# Patient Record
Sex: FEMALE | Race: WHITE | NOT HISPANIC OR LATINO | Employment: OTHER | URBAN - METROPOLITAN AREA
[De-identification: names, ages, dates, MRNs, and addresses within clinical notes are randomized per-mention and may not be internally consistent; named-entity substitution may affect disease eponyms.]

---

## 2017-01-13 ENCOUNTER — CLINICAL SUPPORT (OUTPATIENT)
Dept: LAB | Facility: HOSPITAL | Age: 67
End: 2017-01-13
Attending: ORTHOPAEDIC SURGERY
Payer: MEDICARE

## 2017-01-13 ENCOUNTER — HOSPITAL ENCOUNTER (OUTPATIENT)
Dept: RADIOLOGY | Facility: HOSPITAL | Age: 67
Discharge: HOME | End: 2017-01-13
Attending: ORTHOPAEDIC SURGERY
Payer: MEDICARE

## 2017-01-13 DIAGNOSIS — Z01.818 PREOP EXAMINATION: ICD-10-CM

## 2017-01-13 PROCEDURE — 71020 XR CHEST PA AND LATERAL: CPT | Mod: TC

## 2017-01-13 PROCEDURE — 93005 ELECTROCARDIOGRAM TRACING: CPT

## 2017-01-13 PROCEDURE — 71020 XR CHEST PA AND LATERAL: CPT | Mod: 26,,, | Performed by: RADIOLOGY

## 2017-01-24 ENCOUNTER — ANESTHESIA EVENT (OUTPATIENT)
Dept: SURGERY | Facility: HOSPITAL | Age: 67
DRG: 470 | End: 2017-01-24
Payer: MEDICARE

## 2017-01-24 ENCOUNTER — CLINICAL SUPPORT (OUTPATIENT)
Dept: ELECTROPHYSIOLOGY | Facility: CLINIC | Age: 67
End: 2017-01-24
Payer: MEDICARE

## 2017-01-24 DIAGNOSIS — I63.9 CRYPTOGENIC STROKE: ICD-10-CM

## 2017-01-24 DIAGNOSIS — Z95.818 PRESENCE OF CARDIAC DEVICE: ICD-10-CM

## 2017-01-24 PROCEDURE — 93299 LOOP RECORDER REMOTE: CPT | Mod: S$GLB,,, | Performed by: INTERNAL MEDICINE

## 2017-01-24 PROCEDURE — 93297 REM INTERROG DEV EVAL ICPMS: CPT | Mod: S$GLB,,, | Performed by: INTERNAL MEDICINE

## 2017-01-25 ENCOUNTER — OFFICE VISIT (OUTPATIENT)
Dept: CARDIOLOGY | Facility: CLINIC | Age: 67
End: 2017-01-25
Payer: MEDICARE

## 2017-01-25 ENCOUNTER — HOSPITAL ENCOUNTER (OUTPATIENT)
Dept: PREADMISSION TESTING | Facility: HOSPITAL | Age: 67
Discharge: HOME | End: 2017-01-25
Attending: RADIOLOGY
Payer: MEDICARE

## 2017-01-25 ENCOUNTER — OFFICE VISIT (OUTPATIENT)
Dept: FAMILY MEDICINE | Facility: HOSPITAL | Age: 67
End: 2017-01-25
Attending: FAMILY MEDICINE
Payer: MEDICARE

## 2017-01-25 VITALS
DIASTOLIC BLOOD PRESSURE: 82 MMHG | BODY MASS INDEX: 38.76 KG/M2 | SYSTOLIC BLOOD PRESSURE: 142 MMHG | HEART RATE: 62 BPM | OXYGEN SATURATION: 98 % | HEIGHT: 66 IN | WEIGHT: 241.19 LBS

## 2017-01-25 VITALS
SYSTOLIC BLOOD PRESSURE: 130 MMHG | BODY MASS INDEX: 38.92 KG/M2 | RESPIRATION RATE: 18 BRPM | HEIGHT: 66 IN | HEART RATE: 66 BPM | WEIGHT: 242.19 LBS | DIASTOLIC BLOOD PRESSURE: 63 MMHG | OXYGEN SATURATION: 95 %

## 2017-01-25 VITALS
BODY MASS INDEX: 38.69 KG/M2 | HEART RATE: 64 BPM | DIASTOLIC BLOOD PRESSURE: 78 MMHG | SYSTOLIC BLOOD PRESSURE: 139 MMHG | HEIGHT: 66 IN | WEIGHT: 240.75 LBS

## 2017-01-25 DIAGNOSIS — I10 ESSENTIAL HYPERTENSION: Chronic | ICD-10-CM

## 2017-01-25 DIAGNOSIS — I63.9 CEREBRAL INFARCTION, UNSPECIFIED MECHANISM: ICD-10-CM

## 2017-01-25 DIAGNOSIS — Z01.818 PRE-OP EVALUATION: Primary | ICD-10-CM

## 2017-01-25 DIAGNOSIS — G47.33 OSA (OBSTRUCTIVE SLEEP APNEA): ICD-10-CM

## 2017-01-25 DIAGNOSIS — E78.2 MIXED HYPERLIPIDEMIA: Chronic | ICD-10-CM

## 2017-01-25 DIAGNOSIS — Z01.818 PRE-OP EVALUATION: ICD-10-CM

## 2017-01-25 PROCEDURE — 1125F AMNT PAIN NOTED PAIN PRSNT: CPT | Mod: S$GLB,,, | Performed by: INTERNAL MEDICINE

## 2017-01-25 PROCEDURE — 3074F SYST BP LT 130 MM HG: CPT | Mod: S$GLB,,, | Performed by: INTERNAL MEDICINE

## 2017-01-25 PROCEDURE — 1157F ADVNC CARE PLAN IN RCRD: CPT | Mod: S$GLB,,, | Performed by: INTERNAL MEDICINE

## 2017-01-25 PROCEDURE — 1160F RVW MEDS BY RX/DR IN RCRD: CPT | Mod: S$GLB,,, | Performed by: INTERNAL MEDICINE

## 2017-01-25 PROCEDURE — 99999 PR PBB SHADOW E&M-EST. PATIENT-LVL III: CPT | Mod: PBBFAC,,, | Performed by: INTERNAL MEDICINE

## 2017-01-25 PROCEDURE — 1159F MED LIST DOCD IN RCRD: CPT | Mod: S$GLB,,, | Performed by: INTERNAL MEDICINE

## 2017-01-25 PROCEDURE — 3079F DIAST BP 80-89 MM HG: CPT | Mod: S$GLB,,, | Performed by: INTERNAL MEDICINE

## 2017-01-25 PROCEDURE — 99499 UNLISTED E&M SERVICE: CPT | Mod: S$GLB,,, | Performed by: INTERNAL MEDICINE

## 2017-01-25 PROCEDURE — 99204 OFFICE O/P NEW MOD 45 MIN: CPT | Mod: S$GLB,,, | Performed by: INTERNAL MEDICINE

## 2017-01-25 RX ORDER — SODIUM CHLORIDE, SODIUM LACTATE, POTASSIUM CHLORIDE, CALCIUM CHLORIDE 600; 310; 30; 20 MG/100ML; MG/100ML; MG/100ML; MG/100ML
INJECTION, SOLUTION INTRAVENOUS CONTINUOUS
Status: CANCELLED | OUTPATIENT
Start: 2017-01-25

## 2017-01-25 RX ORDER — LIDOCAINE HYDROCHLORIDE 10 MG/ML
1 INJECTION, SOLUTION EPIDURAL; INFILTRATION; INTRACAUDAL; PERINEURAL ONCE
Status: CANCELLED | OUTPATIENT
Start: 2017-01-25 | End: 2017-01-25

## 2017-01-25 RX ORDER — CALCIUM CARBONATE 600 MG
600 TABLET ORAL DAILY
COMMUNITY
End: 2023-02-07

## 2017-01-25 NOTE — PROGRESS NOTES
Subjective:       Patient ID: Tracy Armendariz is a 66 y.o. female.    Chief Complaint: Pre-op Exam (R. knee replacement)    HPI Comments: Pt presents for pre op evaluation for right knee arthroplasty by Dr Ventura. Pt is scheduled for a February 6th 2017 surgery. Pt states that she can climb a flight of stairs but is limited by knee pain and will get short of breath afterwards. She is able to wash dishes and do other chores around the house without shortness of breath. She does endorse doing very limited physical activity on a daily basis. She has a history of 20+ pack years smoking but quit 10-15 years ago. She has never had any complications with general anesthesia.    Review of Systems   Constitutional: Negative for chills and fever.   HENT: Negative for sore throat.    Eyes: Negative for visual disturbance.   Respiratory: Negative for shortness of breath.    Cardiovascular: Negative for chest pain.   Gastrointestinal: Negative for abdominal pain.   Endocrine: Negative for polyuria.   Genitourinary: Negative for dysuria.   Musculoskeletal: Negative for back pain.   Skin: Negative for color change.   Neurological: Negative for headaches.   Psychiatric/Behavioral: Negative for agitation and confusion.       Objective:      Vitals:    01/25/17 0959   BP: 139/78   Pulse: 64     Physical Exam   Constitutional: She is oriented to person, place, and time. She appears well-developed and well-nourished.   HENT:   Head: Normocephalic and atraumatic.   Eyes: EOM are normal. Pupils are equal, round, and reactive to light.   Neck: Normal range of motion. Neck supple.   Cardiovascular: Normal rate, regular rhythm, normal heart sounds and intact distal pulses.    Pulmonary/Chest: Effort normal and breath sounds normal.   Abdominal: Soft. She exhibits no distension.   Musculoskeletal: Normal range of motion.   Neurological: She is alert and oriented to person, place, and time.   Skin: Skin is warm and dry.   Psychiatric: She has  a normal mood and affect. Her behavior is normal. Judgment and thought content normal.   Nursing note and vitals reviewed.      Assessment:       1. Pre-op evaluation        Plan:       Pre-op evaluation  - pt is able to do greater than 4 METs on a daily basis, which includes washing dishes, standing, and sitting  - pt is on appropriate medications including aspirin, plavix, statin after her CVA  - pt history atrial enlargement but never diagnosed with atrial fibrillation after undergoing holter monitoring  - in surgical procedure with intermediate bleeding risk would continue aspirin and plavix but will defer to cardiologist  - pt has seen Dr Higginbotham who is cardiologist a [facility], will defer cardiac clearance to her cardiologist  - labs, EKG, and CXR reviewed, once evaluated by her cardiologist patient is medically optimized for surgery    Jefferson Melo MD  PGY-2   11:19 AM

## 2017-01-25 NOTE — PROGRESS NOTES
Subjective:   Patient ID:  Tracy Armendariz is a 66 y.o. female who presents for evaluation of Pre-op Exam      HPI: 67 y/o female with PMH of HTN, HLD and CVA present to establish care and to be evaluated before right knee surgery. She is on plavix secondary to CVA in . She was suspected of having cryptogenic CVA so ILR was placed and Atrial fib has been seen since implant. No history of CAD or stent placement. No chest pain or SOB currently. BP is controlled. No CHF. No known tachyarrhythmias or severe obstructive valve disease. She can walk 2-3 blocks despite knee pain. Recent echo showed normal ef with no DD. No valve pathology. She has treadmill at home and can walk for 30 minutes.     She has MARTIN and wear CPAP nightly.    Past Medical History   Diagnosis Date    Anticoagulant long-term use     Arthritis     CVA (cerebral infarction)     Depression     Diverticulosis of colon     Extrinsic asthma, unspecified     Hyperlipidemia     Hypertension     Left atrial enlargement 2014    Low back pain     MARTIN (obstructive sleep apnea)     Osteopenia     PUD (peptic ulcer disease)     Stroke  2013       Past Surgical History   Procedure Laterality Date    Inner ear surgery       replaced ear drum    Oophorectomy      Cholecystectomy       laparoscopic     section  1977    Dilation and curettage of uterus  1972    Appendectomy       @ time of hysterectomy    Hysterectomy       TAHUSO with appendectomy    Tympanoplasty      Lumbar discectomy       L4-L5    Knee arthroscopy w/ debridement      Tonsillectomy      Back surgery      Cataract extraction         Social History   Substance Use Topics    Smoking status: Former Smoker     Quit date: 1995    Smokeless tobacco: Never Used    Alcohol use 0.6 oz/week     1 Glasses of wine per week      Comment: social       Family History   Problem Relation Age of Onset    Cervical cancer Mother      Cancer Mother 65     lung cancer - non smoker    Stroke Paternal Grandfather     Hypertension Maternal Grandfather     Heart disease Maternal Grandfather     Hypertension Father     Coronary artery disease Father 62    Heart disease Father     Diabetes Sister     Heart disease Sister     Kidney disease Sister     Breast cancer Daughter 36    Heart failure Sister      60s    Colon cancer Neg Hx     Ovarian cancer Neg Hx        Patient's Medications   New Prescriptions    No medications on file   Previous Medications    ALBUTEROL (PROAIR HFA) 90 MCG/ACTUATION INHALER    Inhale 2 puffs into the lungs every 6 (six) hours as needed. As needed for wheezing    ASPIRIN 81 MG CHEW    Take 81 mg by mouth once daily.    ATORVASTATIN (LIPITOR) 10 MG TABLET    TAKE 1 TABLET(10 MG) BY MOUTH EVERY DAY    CALCIUM CARBONATE (OS-SPENCER) 600 MG (1,500 MG) TAB    Take 600 mg by mouth 2 (two) times daily with meals.    CHOLECALCIFEROL, VITAMIN D3, 1,000 UNIT CHEW    Take by mouth.    CLOPIDOGREL (PLAVIX) 75 MG TABLET    TAKE ONE TABLET BY MOUTH EVERY DAY    CLOTRIMAZOLE-BETAMETHASONE 1-0.05% (LOTRISONE) CREAM    Apply twice daily    ESCITALOPRAM OXALATE (LEXAPRO) 10 MG TABLET    TAKE 1 TABLET BY MOUTH EVERY DAY    LOSARTAN (COZAAR) 100 MG TABLET    Take 1 tablet (100 mg total) by mouth once daily.    MULTIVIT WITH CALCIUM,IRON,MIN (WOMEN'S DAILY MULTIVITAMIN ORAL)    Take by mouth.    MUPIROCIN (BACTROBAN) 2 % OINTMENT       Modified Medications    No medications on file   Discontinued Medications    No medications on file        Review of patient's allergies indicates:   Allergen Reactions    Iodinated contrast media - iv dye Hives    Pcn [penicillins] Rash       Review of Systems   Constitution: Negative.   HENT: Negative.    Eyes: Negative.    Cardiovascular: Negative.    Respiratory: Negative.    Endocrine: Negative.    Hematologic/Lymphatic: Negative.    Skin: Negative.    Musculoskeletal: Negative.    Gastrointestinal:  Negative.    Neurological: Negative.      Objective:   Physical Exam   Constitutional: She is oriented to person, place, and time. She appears well-developed and well-nourished. No distress.   Examination of the digits showed no clubbing or cyanosis   HENT:   Head: Normocephalic and atraumatic.   Eyes: Conjunctivae are normal. Pupils are equal, round, and reactive to light. Right eye exhibits no discharge.   Neck: Normal range of motion. Neck supple. No JVD present. No thyromegaly present.   No carotid bruits   Cardiovascular: Normal rate, regular rhythm, S1 normal, S2 normal, normal heart sounds, intact distal pulses and normal pulses.  PMI is not displaced.  Exam reveals no gallop, no friction rub and no opening snap.    No murmur heard.  Pulmonary/Chest: Effort normal and breath sounds normal. No respiratory distress. She has no wheezes. She has no rales. She exhibits no tenderness.   Abdominal: Soft. Bowel sounds are normal. She exhibits no distension and no mass. There is no tenderness. There is no guarding.   No hepatosplenomegaly   Musculoskeletal: Normal range of motion. She exhibits no edema or tenderness.   Lymphadenopathy:     She has no cervical adenopathy.   Neurological: She is alert and oriented to person, place, and time.   Skin: Skin is warm. No rash noted. She is not diaphoretic. No erythema.   Psychiatric: She has a normal mood and affect.   Nursing note and vitals reviewed.      Lab Results   Component Value Date    WBC 9.98 01/13/2017    HGB 14.1 01/13/2017    HCT 42.5 01/13/2017    MCV 92 01/13/2017     01/13/2017         Chemistry        Component Value Date/Time     01/13/2017 1418    K 4.0 01/13/2017 1418     01/13/2017 1418    CO2 26 01/13/2017 1418    BUN 13 01/13/2017 1418    CREATININE 0.7 01/13/2017 1418     01/13/2017 1418        Component Value Date/Time    CALCIUM 10.0 01/13/2017 1418    ALKPHOS 73 01/13/2017 1418    AST 17 01/13/2017 1418    ALT 16  01/13/2017 1418    BILITOT 0.4 01/13/2017 1418            Lab Results   Component Value Date    CHOL 166 03/14/2016    CHOL 204 (H) 12/01/2015    CHOL 167 09/23/2014     Lab Results   Component Value Date    HDL 81 (H) 03/14/2016    HDL 79 (H) 12/01/2015    HDL 85 (H) 09/23/2014     Lab Results   Component Value Date    LDLCALC 64.8 03/14/2016    LDLCALC 96.8 12/01/2015    LDLCALC 65.0 09/23/2014     Lab Results   Component Value Date    TRIG 101 03/14/2016    TRIG 141 12/01/2015    TRIG 85 09/23/2014     Lab Results   Component Value Date    CHOLHDL 48.8 03/14/2016    CHOLHDL 38.7 12/01/2015    CHOLHDL 50.9 (H) 09/23/2014       Lab Results   Component Value Date    TSH 1.795 03/22/2016       Lab Results   Component Value Date    HGBA1C 6.1 01/13/2017       ECGs reviewed-NSR  LABS reviewed  Imaging including Echoes reviewed    Assessment:     1. Pre-op evaluation    2. Cerebral infarction, unspecified mechanism    3. Essential hypertension    4. Mixed hyperlipidemia    5. MARTIN (obstructive sleep apnea)        Plan:     Patient is low to moderate risk for intermediate risk surgery to have right knee replaced. No contraindications to surgery such as recent MI, CHF, Tachyarrhythmias, or obstructive valve disease. METs>4. Stop plavix 5 days before surgery. Restart day after surgery.     Continue current medications.   Activity as tolerate  F/u in 6 months        Clinic time spent with this patient is 40 minutes.

## 2017-01-25 NOTE — MR AVS SNAPSHOT
[facility]  [address]  [facility] [address]  Phone: [phone]                  Tracy Armendariz   2017 3:20 PM   Office Visit    Description:  Female : 1950   Provider:  Eloy Medrano MD   Department:  [facility]           Reason for Visit     Pre-op Exam           Diagnoses this Visit        Comments    Pre-op evaluation    -  Primary     Cerebral infarction, unspecified mechanism         Essential hypertension         Mixed hyperlipidemia         MARTIN (obstructive sleep apnea)                To Do List           Future Appointments        Provider Department Dept Phone    2017 3:20 PM Eloy Medrano MD [facility] [phone]    2017 1:00 PM Betsy Nunez, PT [facility] [phone]    2017 3:00 PM Betsy Nunez, PT [facility] [phone]    2017 2:00 PM Betsy Nunez, PT [facility] [phone]    2017 1:00 PM Betsy Nunez, PT [facility] [phone]      Your Future Surgeries/Procedures     2017   Surgery with Michael Treviño MD   [facility]    [address]  [facility] [address]   [phone]            2017   Surgery with John Kim MD   [facility]    [address]  [place] [address]   [phone]              Goals (5 Years of Data)     None      [facility]     [facility] Nurse Care Line -  Assistance  Registered nurses in the [facility] provide clinical advisement, health education, appointment booking, and other advisory services.  Call for this free service at [phone].             Medications           Message regarding Medications     Verify the changes and/or additions to your medication regime listed below are the same as discussed with your clinician today.   "If any of these changes or additions are incorrect, please notify your healthcare provider.             Verify that the below list of medications is an accurate representation of the medications you are currently taking.  If none reported, the list may be blank. If incorrect, please contact your healthcare provider. Carry this list with you in case of emergency.           Current Medications     calcium carbonate (OS-SPENCER) 600 mg (1,500 mg) Tab Take 600 mg by mouth 2 (two) times daily with meals.    albuterol (PROAIR HFA) 90 mcg/actuation inhaler Inhale 2 puffs into the lungs every 6 (six) hours as needed. As needed for wheezing    aspirin 81 MG Chew Take 81 mg by mouth once daily.    atorvastatin (LIPITOR) 10 MG tablet TAKE 1 TABLET(10 MG) BY MOUTH EVERY DAY    cholecalciferol, vitamin D3, 1,000 unit Chew Take by mouth.    clopidogrel (PLAVIX) 75 mg tablet TAKE ONE TABLET BY MOUTH EVERY DAY    clotrimazole-betamethasone 1-0.05% (LOTRISONE) cream Apply twice daily    escitalopram oxalate (LEXAPRO) 10 MG tablet TAKE 1 TABLET BY MOUTH EVERY DAY    losartan (COZAAR) 100 MG tablet Take 1 tablet (100 mg total) by mouth once daily.    MULTIVIT WITH CALCIUM,IRON,MIN (WOMEN'S DAILY MULTIVITAMIN ORAL) Take by mouth.    mupirocin (BACTROBAN) 2 % ointment            Clinical Reference Information           Vital Signs - Last Recorded  Most recent update: 1/25/2017  2:27 PM by Adriane M Toussaint, LPN    BP Pulse Ht Wt SpO2 BMI    (!) 142/82 62 5' 6" (1.676 m) 109.4 kg (241 lb 3.2 oz) 98% 38.93 kg/m2      Blood Pressure          Most Recent Value    BP  (!)  142/82      Allergies as of 1/25/2017     Iodinated Contrast Media - Iv Dye    Pcn [Penicillins]      Immunizations Administered on Date of Encounter - 1/25/2017     None      "

## 2017-01-25 NOTE — DISCHARGE INSTRUCTIONS
· Arrive on  2/6/2017  at  5:30 am.  · Report to the 2nd floor Procedural Check In Room .   · Nothing to eat or drink after midnight the night before your procedure.  · Take the Losartan medications the morning of surgery with a small sip of water.                                                         · Please remove all body piercings and leave all your jewelery and valuables at home .  · Please remove your contact lenses.   · Wear loose comfortable clothing.  · You will not be able to drive that day, please make arrangement for transportation to and from your procedure.  · You cannot be alone for 24 hours after anesthesia. Make arrangements as needed.  · Shower twice a day for  3 days before your procedure and the morning of your procedure with Hibiclens antibacterial solution.  · No lotions, creams or powders  · Gargle twice daily with Gold Listerine 3 days prior to surgery  · Stop Aspirin, Ibuprofen, Advil, Motrin, Aleve, Nuprin, Naprosyn (all NSAID Medication) or any other blood thinners 5 days before your procedure unless directed by your physician.  Also hold all over the counter vitamins and herbal supplements for 5 days prior to your procedure.  · You may take Tylenol or Acetaminophen up the day of surgery for any pain.        Call [phone] with any questions.        Pre-Op Bathing Instructions    Before surgery, you can play an important role in your own health.    Because skin is not sterile, we need to be sure that your skin is as free of germs as possible. By following the instructions below, you can reduce the number of germs on your skin before surgery.    IMPORTANT: You will need to shower with a special soap called Hibiclens*, available at any pharmacy, over the counter usually in the first aid isle.  If you are allergic to Chlorhexidine (the antiseptic in Hibiclens), use an antibacterial soap such as Dial Soap for your preoperative showers.  You will shower with Hibiclens twice a day for  three days prior to surgery. Both the night before your surgery and the morning of your surgery see steps 2 and 3.   Do not use Hibiclens on the head, face or genitals to avoid injury to those areas.    STEP #1  1.    Three (3) days prior to surgery, shower twice a day with Hibiclens solution      STEP #2: THE NIGHT BEFORE YOUR SURGERY     1. Do not shave the area of your body where your surgery will be performed.  2. Wash your hair as usual with your normal  Shampoo. Wash body shoulder to toes with Hibiclens.  3. Squeeze Hibiclens into hand apply to surgical site.   4. Wash the site gently for five (5) minutes. Do not scrub your skin too hard.   5. Do not wash with your regular soap after Hibiclens is used.  6. Rinse your body thoroughly.  7. Pat yourself dry with a clean, soft towel.  8. Do not use lotion, cream, or powder.  9. Wear clean clothes.    STEP #3: THE MORNING OF YOUR SURGERY     1. Repeat Step #2.    * Not to be used by people allergic to Chlorhexidine.          Anesthesia: Regional Anesthesia  Youre scheduled for surgery. During surgery, youll receive medication called anesthesia to keep you comfortable and pain-free. Your surgeon has decided that youll receive regional anesthesia. This sheet tells you what to expect with this type of anesthesia.  What is regional anesthesia?  Regional anesthesia numbs one region of your body. The anesthesia may be given around nerves or into veins in your arms, neck, or legs (nerve block or [place] block). Or it may be sent into the spinal fluid (spinal anesthesia) or into the space just outside the spinal fluid (epidural anesthesia). You may also be given sedatives to help you relax.  Nerve block or Jam block  A small area of the body, such as an arm or leg, can be numbed using a nerve block or [place] block.  · Nerve block. During a nerve block, your skin is numbed. A needle is then inserted near nerves that serve the area to be numbed. Anesthetic is sent through the  needle.  · IV regional or Jam block. For this type of block, an IV line is put into a vein. The blood flow to the area to be numbed is blocked for a short time. Anesthetic is sent through the IV.  Spinal anesthesia  Spinal anesthesia numbs your body from about the waist down.  · Anesthetic is injected into the spinal fluid. This is a substance that surrounds the spinal cord in your spinal column. The anesthetic blocks pain traveling from the body to the brain.  · To receive the anesthetic, your skin is numbed at the injection site on your back.  · A needle is then inserted into the spinal space. Anesthetic is sent into the spinal fluid through the needle.    Epidural anesthesia  Epidural anesthesia is most commonly used during childbirth and may also be used after surgical procedures of the chest, abdomen, and legs.  · Anesthetic is injected into the epidural space. This is just outside the dural sac which contains the spinal fluid.  · To receive the anesthetic, your skin is numbed at the injection site on your back.  · A needle is then inserted into the epidural space. Anesthetic is sent into the epidural space through the needle.  · A small flexible catheter may be attached to the needle and left in place. This allows for continuous injections or infusions of anesthetic.  Anesthesia tools and medications that might be near you during your procedure  · Local anesthetic.  This medication is given through a needle numbs one region of your body.  · Electrocardiography leads (electrodes). These are used to record your heart rate and rhythm.  · Blood pressure cuff. A cuff is placed on your arm to keep track of your blood pressure.  · Pulse oximeter. This small clip is placed on the end of the finger. It measures your blood oxygen level.  · Sedatives. These medications may be given through an IV. They help to relax you and keep you comfortable. You may stay awake or sleep lightly.  · Oxygen. You may be given oxygen  through a facemask.  Risks and possible complications  Regional anesthesia carries some risks. These include:  · Nausea and vomiting  · Headache  · Backache  · Decreased blood pressure  · Allergic reaction to the anesthetic  · Ongoing numbness (rare)  · Irregular heartbeat (rare)  · Cardiac arrest (rare)   © 1926-1737 [employer]. [address] [place] [address]. All rights reserved. This information is not intended as a substitute for professional medical care. Always follow your healthcare professional's instructions.            Total Knee Replacement  During total knee replacement surgery, your damaged knee joint is replaced with an artificial joint, called a prosthesis. This surgery almost always reduces joint pain and improves your quality of life.     The parts of the prosthesis are secured to the bones of the knee. Together they form the new joint.   Before your surgery  You will most likely arrive at the hospital on the morning of the surgery. Be sure to follow all of your doctors instructions on preparing for surgery:  · Stop eating or drinking 10 hours before surgery.  · If you take a daily medicine, ask if you should still take it the morning of surgery.  · At the hospital, your temperature, pulse, breathing, and blood pressure will be checked.  · An IV (intravenous) line will be started to provide fluids and medicines needed during surgery.  The surgical procedure  When the surgical team is ready, youll be taken to the operating room. There youll be given anesthesia to help you sleep through surgery, or to make you numb from the waist down. Then an incision is made on the front or side of your knee. Any damaged bone is cleaned away, and the new joint components are put into place. The incision is closed with surgical staples or stitches.  After your surgery  After surgery, youll be sent to the recovery room. When you are fully awake, youll be moved to your room. The nurses  will give you medicines to ease your pain. You may have a catheter (small tube) in your bladder. A continuous passive motion machine may be used on your knee to keep it from getting stiff. A sequential compression machine may be used to prevent blood clots by gently squeezing then releasing your leg. You may be given medicine to prevent blood clots. Soon, healthcare providers will help you get up and moving.  When to call your doctor  Once at home, call your doctor if you have any of the symptoms below:  · An increase in knee pain  · Pain or swelling in the calf or leg  · Unusual redness, heat, or drainage at the incision site  · Fever of 100.4°F (38°C) or higher  · Shaking chills  · Trouble breathing or chest pain (call 911)   © 8626-2019 [employer]. [address] [place] [address], [place] [address]. All rights reserved. This information is not intended as a substitute for professional medical care. Always follow your healthcare professional's instructions.

## 2017-01-25 NOTE — ANESTHESIA PREPROCEDURE EVALUATION
"                                                                                                             2017  Tracy Armendariz is a 66 y.o., female is scheduled for right TKA under spinal/reg/MAC/gen on 2017.    Cardiology note 2017 (Dr. Medrano):  "Patient is low to moderate risk for intermediate risk surgery to have right knee replaced. No contraindications to surgery such as recent MI, CHF, Tachyarrhythmias, or obstructive valve disease. METs>4. Stop plavix 5 days before surgery. Restart day after surgery."       Past Surgical History   Procedure Laterality Date    Inner ear surgery       replaced ear drum    Oophorectomy      Cholecystectomy       laparoscopic     section      Dilation and curettage of uterus      Appendectomy       @ time of hysterectomy    Hysterectomy       TAHUSO with appendectomy    Tympanoplasty      Lumbar discectomy       L4-L5    Knee arthroscopy w/ debridement      Tonsillectomy      Back surgery      Cataract extraction           [facility] Anesthesia Evaluation    I have reviewed the Patient Summary Reports.    I have reviewed the Nursing Notes.   I have reviewed the Medications.   Steroids Taken In Past Year: Cortisone    Review of Systems  Anesthesia Hx:  No problems with previous Anesthesia  History of prior surgery of interest to airway management or planning: Previous anesthesia: General, MAC  Denies Personal Hx of Anesthesia complications.   Social:  No Alcohol Use, Former Smoker  Tobacco Use: Former smoker of cigarette, quit smoking >10 years ago, Smoking Cessation discussion.   Hematology/Oncology:        Hematology Comments: Pt on plavix and ASA; pt will see cardiology for recommendations   EENT/Dental:EENT/Dental Normal   Cardiovascular:   Hypertension, well controlled Denies Dysrhythmias.   Denies Angina. hyperlipidemia CARD  Functional Capacity 3.5 METS    Pulmonary:   Asthma mild Shortness of breath Sleep Apnea  "   Hepatic/GI:   PUD, GERD, well controlled    Musculoskeletal:   Arthritis  Right knee pain   Neurological:   CVA (visual disturbance left eye), residual symptoms  CVA - Cerebrovasular Accident, Thrombotic Stroke , Most recent CVA was on 12/2013 , has had 1 stroke , residual deficits are residual deficit.    Endocrine:  Endocrine Normal    Psych:   depression          Physical Exam  General:  Obesity    Airway/Jaw/Neck:  Airway Findings: Mouth Opening: Normal Tongue: Normal  General Airway Assessment: Adult  Mallampati: II  TM Distance: Normal, at least 6 cm  Jaw/Neck Findings:  Neck ROM: Extension Decreased, Mild     Eyes/Ears/Nose:  EYES/EARS/NOSE FINDINGS: Normal   Dental:  Dental Findings: Periodontal disease, Severe   Chest/Lungs:  Chest/Lungs Clear    Heart/Vascular:  Heart Findings: Normal Heart murmur: negative    Abdomen:  Abdomen Findings: Normal    Musculoskeletal:  Musculoskeletal Findings: (right knee) Tender Joint, Swollen Joint     Mental Status:  Mental Status Findings: Normal        2D Echo w/CFD 3/2016  CONCLUSIONS     1 - Normal left ventricular systolic function (EF 55-60%).     2 - Normal left ventricular diastolic function.     3 - Normal right ventricular systolic function .     4 - Concentric remodeling.     5 - Mild left atrial enlargement.     6 - The estimated PA systolic pressure is greater than 15 mmHg.   negative Bubble study  This document has been electronically    SIGNED BY: Eloy Medrano MD On: 03/31/2016 12:35    Anesthesia Plan  Type of Anesthesia, risks & benefits discussed:  Anesthesia Type:  spinal, regional, MAC  Patient's Preference:   Intra-op Monitoring Plan:   Intra-op Monitoring Plan Comments:   Post Op Pain Control Plan:   Post Op Pain Control Plan Comments:   Induction:   IV  Beta Blocker:  Patient is not currently on a Beta-Blocker (No further documentation required).       Informed Consent: Patient understands risks and agrees with Anesthesia plan.  Questions  answered.   ASA Score: 3     Day of Surgery Review of History & Physical:        Anesthesia Plan Notes: Anesthesia consent will be obtained prior to procedure on 2/06/2017.    Cardiology note 1/25/2017 (Dr. Medrano) in [facility]        Ready For Surgery From Anesthesia Perspective.

## 2017-01-25 NOTE — PLAN OF CARE
· Arrive on  2/6/2017  at  5:30 am.  · Report to the 2nd floor Procedural Check In Room .   · Nothing to eat or drink after midnight the night before your procedure.  · Take the Losartan medications the morning of surgery with a small sip of water.                                                         · Please remove all body piercings and leave all your jewelery and valuables at home .  · Please remove your contact lenses.   · Wear loose comfortable clothing.  · You will not be able to drive that day, please make arrangement for transportation to and from your procedure.  · You cannot be alone for 24 hours after anesthesia. Make arrangements as needed.  · Shower twice a day for  3 days before your procedure and the morning of your procedure with Hibiclens antibacterial solution.  · No lotions, creams or powders  · Gargle twice daily with Gold Listerine 3 days prior to surgery  · Stop Aspirin, Ibuprofen, Advil, Motrin, Aleve, Nuprin, Naprosyn (all NSAID Medication) or any other blood thinners 5 days before your procedure unless directed by your physician.  Also hold all over the counter vitamins and herbal supplements for 5 days prior to your procedure.  · You may take Tylenol or Acetaminophen up the day of surgery for any pain.        Call [phone] with any questions.        Pre-Op Bathing Instructions    Before surgery, you can play an important role in your own health.    Because skin is not sterile, we need to be sure that your skin is as free of germs as possible. By following the instructions below, you can reduce the number of germs on your skin before surgery.    IMPORTANT: You will need to shower with a special soap called Hibiclens*, available at any pharmacy, over the counter usually in the first aid isle.  If you are allergic to Chlorhexidine (the antiseptic in Hibiclens), use an antibacterial soap such as Dial Soap for your preoperative showers.  You will shower with Hibiclens twice a day for  three days prior to surgery. Both the night before your surgery and the morning of your surgery see steps 2 and 3.   Do not use Hibiclens on the head, face or genitals to avoid injury to those areas.    STEP #1  1.    Three (3) days prior to surgery, shower twice a day with Hibiclens solution      STEP #2: THE NIGHT BEFORE YOUR SURGERY     1. Do not shave the area of your body where your surgery will be performed.  2. Wash your hair as usual with your normal  Shampoo. Wash body shoulder to toes with Hibiclens.  3. Squeeze Hibiclens into hand apply to surgical site.   4. Wash the site gently for five (5) minutes. Do not scrub your skin too hard.   5. Do not wash with your regular soap after Hibiclens is used.  6. Rinse your body thoroughly.  7. Pat yourself dry with a clean, soft towel.  8. Do not use lotion, cream, or powder.  9. Wear clean clothes.    STEP #3: THE MORNING OF YOUR SURGERY     1. Repeat Step #2.    * Not to be used by people allergic to Chlorhexidine.          Anesthesia: Regional Anesthesia  Youre scheduled for surgery. During surgery, youll receive medication called anesthesia to keep you comfortable and pain-free. Your surgeon has decided that youll receive regional anesthesia. This sheet tells you what to expect with this type of anesthesia.  What is regional anesthesia?  Regional anesthesia numbs one region of your body. The anesthesia may be given around nerves or into veins in your arms, neck, or legs (nerve block or Marbury block). Or it may be sent into the spinal fluid (spinal anesthesia) or into the space just outside the spinal fluid (epidural anesthesia). You may also be given sedatives to help you relax.  Nerve block or Jam block  A small area of the body, such as an arm or leg, can be numbed using a nerve block or Marbury block.  · Nerve block. During a nerve block, your skin is numbed. A needle is then inserted near nerves that serve the area to be numbed. Anesthetic is sent through the  needle.  · IV regional or Jam block. For this type of block, an IV line is put into a vein. The blood flow to the area to be numbed is blocked for a short time. Anesthetic is sent through the IV.  Spinal anesthesia  Spinal anesthesia numbs your body from about the waist down.  · Anesthetic is injected into the spinal fluid. This is a substance that surrounds the spinal cord in your spinal column. The anesthetic blocks pain traveling from the body to the brain.  · To receive the anesthetic, your skin is numbed at the injection site on your back.  · A needle is then inserted into the spinal space. Anesthetic is sent into the spinal fluid through the needle.    Epidural anesthesia  Epidural anesthesia is most commonly used during childbirth and may also be used after surgical procedures of the chest, abdomen, and legs.  · Anesthetic is injected into the epidural space. This is just outside the dural sac which contains the spinal fluid.  · To receive the anesthetic, your skin is numbed at the injection site on your back.  · A needle is then inserted into the epidural space. Anesthetic is sent into the epidural space through the needle.  · A small flexible catheter may be attached to the needle and left in place. This allows for continuous injections or infusions of anesthetic.  Anesthesia tools and medications that might be near you during your procedure  · Local anesthetic.  This medication is given through a needle numbs one region of your body.  · Electrocardiography leads (electrodes). These are used to record your heart rate and rhythm.  · Blood pressure cuff. A cuff is placed on your arm to keep track of your blood pressure.  · Pulse oximeter. This small clip is placed on the end of the finger. It measures your blood oxygen level.  · Sedatives. These medications may be given through an IV. They help to relax you and keep you comfortable. You may stay awake or sleep lightly.  · Oxygen. You may be given oxygen  through a facemask.  Risks and possible complications  Regional anesthesia carries some risks. These include:  · Nausea and vomiting  · Headache  · Backache  · Decreased blood pressure  · Allergic reaction to the anesthetic  · Ongoing numbness (rare)  · Irregular heartbeat (rare)  · Cardiac arrest (rare)   © 8254-2549 [employer]. [address] [place] [address]. All rights reserved. This information is not intended as a substitute for professional medical care. Always follow your healthcare professional's instructions.            Total Knee Replacement  During total knee replacement surgery, your damaged knee joint is replaced with an artificial joint, called a prosthesis. This surgery almost always reduces joint pain and improves your quality of life.     The parts of the prosthesis are secured to the bones of the knee. Together they form the new joint.   Before your surgery  You will most likely arrive at the hospital on the morning of the surgery. Be sure to follow all of your doctors instructions on preparing for surgery:  · Stop eating or drinking 10 hours before surgery.  · If you take a daily medicine, ask if you should still take it the morning of surgery.  · At the hospital, your temperature, pulse, breathing, and blood pressure will be checked.  · An IV (intravenous) line will be started to provide fluids and medicines needed during surgery.  The surgical procedure  When the surgical team is ready, youll be taken to the operating room. There youll be given anesthesia to help you sleep through surgery, or to make you numb from the waist down. Then an incision is made on the front or side of your knee. Any damaged bone is cleaned away, and the new joint components are put into place. The incision is closed with surgical staples or stitches.  After your surgery  After surgery, youll be sent to the recovery room. When you are fully awake, youll be moved to your room. The nurses  will give you medicines to ease your pain. You may have a catheter (small tube) in your bladder. A continuous passive motion machine may be used on your knee to keep it from getting stiff. A sequential compression machine may be used to prevent blood clots by gently squeezing then releasing your leg. You may be given medicine to prevent blood clots. Soon, healthcare providers will help you get up and moving.  When to call your doctor  Once at home, call your doctor if you have any of the symptoms below:  · An increase in knee pain  · Pain or swelling in the calf or leg  · Unusual redness, heat, or drainage at the incision site  · Fever of 100.4°F (38°C) or higher  · Shaking chills  · Trouble breathing or chest pain (call 911)   © 0151-9616 [employer]. [address] [place] [address], [place] [address]. All rights reserved. This information is not intended as a substitute for professional medical care. Always follow your healthcare professional's instructions.

## 2017-01-25 NOTE — IP AVS SNAPSHOT
[facility]  [address]  [place] [address]  Phone: [phone]           Patient Discharge Instructions    Our goal is to set you up for success. This packet includes information on your condition, medications, and your home care. It will help you to care for yourself so you don't get sicker.     Please ask your nurse if you have any questions.        There are many details to remember when preparing for your surgery. Here is what you will need to do, please ask your nurse if there are more specific instructions and if you have any questions:    1. 24 hours before procedure Do not smoke or drink alcoholic beverages 24 hours prior to your procedure    2. Eating before procedure Do not eat or drink anything 8 hours before your procedure - this includes gum, mints, and candy.     3. Day of procedure Please remove all jewelry for the procedure. If you wear contact lenses, dentures, hearing aids or glasses, bring a container to put them in during your surgery and give to a family member for safekeeping.  If your doctor has scheduled you for an overnight stay, bring a small overnight bag with any personal items that you need.    4. After procedure Make arrangements in advance for transportation home by a responsible adult. It is not safe to drive a vehicle during the 24 hours following surgery.     PLEASE NOTE: You may be contacted the day before your surgery to confirm your surgery date and arrival time. The Surgery schedule has many variables which may affect the time of your surgery case. Family members should be available if your surgery time changes.                [facility]  Unless otherwise directed by your provider, please contact [facility], our nurse care line that is available for 24/7 assistance.     [phone] (toll-free)    Registered nurses in the [facility] provide clinical advisement, health education, appointment booking, and other advisory services.                     ** Verify the list of medication(s) below is accurate and up to date. Carry this with you in case of emergency. If your medications have changed, please notify your healthcare provider.             Medication List      TAKE these medications        Additional Info                      albuterol 90 mcg/actuation inhaler   Commonly known as:  PROAIR HFA   Quantity:  8.5 g   Refills:  3   Dose:  2 puff    Instructions:  Inhale 2 puffs into the lungs every 6 (six) hours as needed. As needed for wheezing     Begin Date    AM    Noon    PM    Bedtime       aspirin 81 MG Chew   Refills:  0   Dose:  81 mg    Instructions:  Take 81 mg by mouth once daily.     Begin Date    AM    Noon    PM    Bedtime       atorvastatin 10 MG tablet   Commonly known as:  LIPITOR   Quantity:  90 tablet   Refills:  11   Comments:  **Patient requests 90 days supply**    Instructions:  TAKE 1 TABLET(10 MG) BY MOUTH EVERY DAY     Begin Date    AM    Noon    PM    Bedtime       calcium carbonate 600 mg (1,500 mg) Tab   Commonly known as:  OS-SPENCER   Refills:  0   Dose:  600 mg    Instructions:  Take 600 mg by mouth 2 (two) times daily with meals.     Begin Date    AM    Noon    PM    Bedtime       cholecalciferol (vitamin D3) 1,000 unit Chew   Refills:  0    Instructions:  Take by mouth.     Begin Date    AM    Noon    PM    Bedtime       clopidogrel 75 mg tablet   Commonly known as:  PLAVIX   Quantity:  90 tablet   Refills:  3    Instructions:  TAKE ONE TABLET BY MOUTH EVERY DAY     Begin Date    AM    Noon    PM    Bedtime       clotrimazole-betamethasone 1-0.05% cream   Commonly known as:  LOTRISONE   Quantity:  45 g   Refills:  6    Instructions:  Apply twice daily     Begin Date    AM    Noon    PM    Bedtime       escitalopram oxalate 10 MG tablet   Commonly known as:  LEXAPRO   Quantity:  90 tablet   Refills:  3    Instructions:  TAKE 1 TABLET BY MOUTH EVERY DAY     Begin Date    AM    Noon    PM    Bedtime       losartan 100  MG tablet   Commonly known as:  COZAAR   Quantity:  90 tablet   Refills:  11   Dose:  100 mg    Instructions:  Take 1 tablet (100 mg total) by mouth once daily.     Begin Date    AM    Noon    PM    Bedtime       mupirocin 2 % ointment   Commonly known as:  BACTROBAN   Refills:  0      Begin Date    AM    Noon    PM    Bedtime       WOMEN'S DAILY MULTIVITAMIN ORAL   Refills:  0    Instructions:  Take by mouth.     Begin Date    AM    Noon    PM    Bedtime                  Please bring to all follow up appointments:    1. A copy of your discharge instructions.  2. All medicines you are currently taking in their original bottles.  3. Identification and insurance card.    Please arrive 15 minutes ahead of scheduled appointment time.    Please call 24 hours in advance if you must reschedule your appointment and/or time.        Your Scheduled Appointments     Feb 07, 2017  1:00 PM CST   New Physical Therapy Patient with Betsy Nunez, PT   [facility]    [address]  [place] [address]   [phone]            Feb 08, 2017  3:00 PM CST   Established Physical Therapy with Betsy Nunez, PT   [facility]    [address]  [place] [address]   [phone]            Feb 13, 2017  2:00 PM CST   Established Physical Therapy with Betsy Nunez, PT   [facility]    [address]  [place] [address]   [phone]            Feb 14, 2017  1:00 PM CST   Established Physical Therapy with Betsy Nunez, PT   [facility]    [address]  [place] [address]   [phone]            Feb 16, 2017  1:00 PM CST   Established Physical Therapy with Betsy Nunez, PT   [facility]    [address]  [place] [address]   [phone]              Your Future  Surgeries/Procedures     Feb 02, 2017   Surgery with Michael Treviño MD   [facility]    [address] [place] [address] [phone]            Feb 06, 2017   Surgery with John Kim MD   [facility]    [address]  [place] [id number] [phone]                  Discharge Instructions       · Arrive on  2/6/2017  at  5:30 am.  · Report to the 2nd floor Procedural Check In Room .   · Nothing to eat or drink after midnight the night before your procedure.  · Take the Losartan medications the morning of surgery with a small sip of water.                                                         · Please remove all body piercings and leave all your jewelery and valuables at home .  · Please remove your contact lenses.   · Wear loose comfortable clothing.  · You will not be able to drive that day, please make arrangement for transportation to and from your procedure.  · You cannot be alone for 24 hours after anesthesia. Make arrangements as needed.  · Shower twice a day for  3 days before your procedure and the morning of your procedure with Hibiclens antibacterial solution.  · No lotions, creams or powders  · Gargle twice daily with Gold Listerine 3 days prior to surgery  · Stop Aspirin, Ibuprofen, Advil, Motrin, Aleve, Nuprin, Naprosyn (all NSAID Medication) or any other blood thinners 5 days before your procedure unless directed by your physician.  Also hold all over the counter vitamins and herbal supplements for 5 days prior to your procedure.  · You may take Tylenol or Acetaminophen up the day of surgery for any pain.        Call [phone] with any questions.        Pre-Op Bathing Instructions    Before surgery, you can play an important role in your own health.    Because skin is not sterile, we need to be sure that your skin is as free of germs as possible. By following the instructions below, you can  reduce the number of germs on your skin before surgery.    IMPORTANT: You will need to shower with a special soap called Hibiclens*, available at any pharmacy, over the counter usually in the first aid isle.  If you are allergic to Chlorhexidine (the antiseptic in Hibiclens), use an antibacterial soap such as Dial Soap for your preoperative showers.  You will shower with Hibiclens twice a day for three days prior to surgery. Both the night before your surgery and the morning of your surgery see steps 2 and 3.   Do not use Hibiclens on the head, face or genitals to avoid injury to those areas.    STEP #1  1.    Three (3) days prior to surgery, shower twice a day with Hibiclens solution      STEP #2: THE NIGHT BEFORE YOUR SURGERY     1. Do not shave the area of your body where your surgery will be performed.  2. Wash your hair as usual with your normal  Shampoo. Wash body shoulder to toes with Hibiclens.  3. Squeeze Hibiclens into hand apply to surgical site.   4. Wash the site gently for five (5) minutes. Do not scrub your skin too hard.   5. Do not wash with your regular soap after Hibiclens is used.  6. Rinse your body thoroughly.  7. Pat yourself dry with a clean, soft towel.  8. Do not use lotion, cream, or powder.  9. Wear clean clothes.    STEP #3: THE MORNING OF YOUR SURGERY     1. Repeat Step #2.    * Not to be used by people allergic to Chlorhexidine.          Anesthesia: Regional Anesthesia  Youre scheduled for surgery. During surgery, youll receive medication called anesthesia to keep you comfortable and pain-free. Your surgeon has decided that youll receive regional anesthesia. This sheet tells you what to expect with this type of anesthesia.  What is regional anesthesia?  Regional anesthesia numbs one region of your body. The anesthesia may be given around nerves or into veins in your arms, neck, or legs (nerve block or Jam block). Or it may be sent into the spinal fluid (spinal anesthesia) or into  the space just outside the spinal fluid (epidural anesthesia). You may also be given sedatives to help you relax.  Nerve block or [place] block  A small area of the body, such as an arm or leg, can be numbed using a nerve block or [place] block.  · Nerve block. During a nerve block, your skin is numbed. A needle is then inserted near nerves that serve the area to be numbed. Anesthetic is sent through the needle.  · IV regional or Jam block. For this type of block, an IV line is put into a vein. The blood flow to the area to be numbed is blocked for a short time. Anesthetic is sent through the IV.  Spinal anesthesia  Spinal anesthesia numbs your body from about the waist down.  · Anesthetic is injected into the spinal fluid. This is a substance that surrounds the spinal cord in your spinal column. The anesthetic blocks pain traveling from the body to the brain.  · To receive the anesthetic, your skin is numbed at the injection site on your back.  · A needle is then inserted into the spinal space. Anesthetic is sent into the spinal fluid through the needle.    Epidural anesthesia  Epidural anesthesia is most commonly used during childbirth and may also be used after surgical procedures of the chest, abdomen, and legs.  · Anesthetic is injected into the epidural space. This is just outside the dural sac which contains the spinal fluid.  · To receive the anesthetic, your skin is numbed at the injection site on your back.  · A needle is then inserted into the epidural space. Anesthetic is sent into the epidural space through the needle.  · A small flexible catheter may be attached to the needle and left in place. This allows for continuous injections or infusions of anesthetic.  Anesthesia tools and medications that might be near you during your procedure  · Local anesthetic.  This medication is given through a needle numbs one region of your body.  · Electrocardiography leads (electrodes). These are used to record your heart  rate and rhythm.  · Blood pressure cuff. A cuff is placed on your arm to keep track of your blood pressure.  · Pulse oximeter. This small clip is placed on the end of the finger. It measures your blood oxygen level.  · Sedatives. These medications may be given through an IV. They help to relax you and keep you comfortable. You may stay awake or sleep lightly.  · Oxygen. You may be given oxygen through a facemask.  Risks and possible complications  Regional anesthesia carries some risks. These include:  · Nausea and vomiting  · Headache  · Backache  · Decreased blood pressure  · Allergic reaction to the anesthetic  · Ongoing numbness (rare)  · Irregular heartbeat (rare)  · Cardiac arrest (rare)   © 3226-2551 [employer]. [address] [place] [address], [place] [address]. All rights reserved. This information is not intended as a substitute for professional medical care. Always follow your healthcare professional's instructions.            Total Knee Replacement  During total knee replacement surgery, your damaged knee joint is replaced with an artificial joint, called a prosthesis. This surgery almost always reduces joint pain and improves your quality of life.     The parts of the prosthesis are secured to the bones of the knee. Together they form the new joint.   Before your surgery  You will most likely arrive at the hospital on the morning of the surgery. Be sure to follow all of your doctors instructions on preparing for surgery:  · Stop eating or drinking 10 hours before surgery.  · If you take a daily medicine, ask if you should still take it the morning of surgery.  · At the hospital, your temperature, pulse, breathing, and blood pressure will be checked.  · An IV (intravenous) line will be started to provide fluids and medicines needed during surgery.  The surgical procedure  When the surgical team is ready, youll be taken to the operating room. There youll be given anesthesia to help you sleep  "through surgery, or to make you numb from the waist down. Then an incision is made on the front or side of your knee. Any damaged bone is cleaned away, and the new joint components are put into place. The incision is closed with surgical staples or stitches.  After your surgery  After surgery, youll be sent to the recovery room. When you are fully awake, youll be moved to your room. The nurses will give you medicines to ease your pain. You may have a catheter (small tube) in your bladder. A continuous passive motion machine may be used on your knee to keep it from getting stiff. A sequential compression machine may be used to prevent blood clots by gently squeezing then releasing your leg. You may be given medicine to prevent blood clots. Soon, healthcare providers will help you get up and moving.  When to call your doctor  Once at home, call your doctor if you have any of the symptoms below:  · An increase in knee pain  · Pain or swelling in the calf or leg  · Unusual redness, heat, or drainage at the incision site  · Fever of 100.4°F (38°C) or higher  · Shaking chills  · Trouble breathing or chest pain (call 911)   © 6985-2393 [employer]. [address] [place] [address]. All rights reserved. This information is not intended as a substitute for professional medical care. Always follow your healthcare professional's instructions.            Admission Information     Date & Time Provider Department CSN    1/25/2017  1:00 PM Michael Treviño MD [facility] [id number]      Care Providers     Provider Role Specialty Primary office phone    Michael Treviño MD Attending Provider Radiology [phone]      Your Vitals Were     BP Pulse Resp Height Weight SpO2    130/63 66 18 5' 6" (1.676 m) 109.8 kg (242 lb 2.8 oz) 95%    BMI                39.09 kg/m2          Recent Lab Values        6/13/2008 5/4/2011 5/12/2012 12/19/2013 4/4/2014 12/1/2015 3/14/2016 1/13/2017      9:07 AM  " 4:42 PM  8:18 AM  9:30 PM 10:38 AM  8:10 AM 12:28 PM  2:18 PM    A1C 5.8 6.0 5.8 6.1 6.1 5.9 5.9 6.1    Comment for A1C at  2:18 PM on 1/13/2017:  According to ADA guidelines, hemoglobin A1C <7.0% represents  optimal control in non-pregnant diabetic patients.  Different  metrics may apply to specific populations.   Standards of Medical Care in Diabetes - 2016.  For the purpose of screening for the presence of diabetes:  <5.7%     Consistent with the absence of diabetes  5.7-6.4%  Consistent with increasing risk for diabetes   (prediabetes)  >or=6.5%  Consistent with diabetes  Currently no consensus exists for use of hemoglobin A1C  for diagnosis of diabetes for children.        Allergies as of 1/25/2017        Reactions    Iodinated Contrast Media - Iv Dye Hives    Pcn [Penicillins] Rash      Advance Directives     An advance directive is a document which, in the event you are no longer able to make decisions for yourself, tells your healthcare team what kind of treatment you do or do not want to receive, or who you would like to make those decisions for you.  If you do not currently have an advance directive, [facility] encourages you to create one.  For more information call:  [phone] ([phone]), [phone] ([phone]),  or log on to [email or web address].        Smoking Cessation     If you would like to quit smoking:   You may be eligible for free services if you are a [place] resident and started smoking cigarettes before September 1, 1988.  Call the Smoking Cessation Trust (SCT) toll free at (358) 623-7132 or (077) 701-2100.   Call 1-800-QUIT-NOW if you do not meet the above criteria.            Language Assistance Services     ATTENTION: Language assistance services are available, free of charge. Please call [phone].      ATENCIÓN: Si habla español, tiene a wheeler disposición servicios gratuitos de asistencia lingüística. Llame al [phone].     CHÚ Ý: N?u b?n nói Ti?ng Vi?t, có các  d?ch v? h? tr? ngôn ng? mi?n phí dành cho b?n. G?i s? [phone].        Stroke Education              Pneumonmia Discharge Instructions                 [facility] complies with applicable Federal civil rights laws and does not discriminate on the basis of race, color, national origin, age, disability, or sex.

## 2017-01-25 NOTE — LETTER
January 25, 2017        John Kim MD  [address]  [address]  [facility] [address]             [facility]  [address]  [facility] [address]  Phone: [phone]   Patient: Tracy Armendariz   MR Number: [id number]   YOB: 1950   Date of Visit: 1/25/2017       Dear Dr. Kim:    Thank you for referring Tracy Armendariz to me for evaluation. Attached you will find relevant portions of my assessment and plan of care.    ECGs reviewed-NSR  LABS reviewed  Imaging including Echoes reviewed     Assessment:      1. Pre-op evaluation    2. Cerebral infarction, unspecified mechanism    3. Essential hypertension    4. Mixed hyperlipidemia    5. MARTIN (obstructive sleep apnea)          Plan:      Patient is low to moderate risk for intermediate risk surgery to have right knee replaced. No contraindications to surgery such as recent MI, CHF, Tachyarrhythmias, or obstructive valve disease. METs>4. Stop asa and plavix 5 days before surgery. Restart day after surgery.      Continue current medications.   Activity as tolerate  F/u in 6 months    If you have questions, please do not hesitate to call me. I look forward to following Tracy Armendariz along with you.    Sincerely,      Eloy Medrano MD            CC  No Recipients    Enclosure

## 2017-01-26 NOTE — PROGRESS NOTES
I assume primary medical responsibility for this patient, I have reviewed the case history, findings, diagnosis and treatment plan with the resident and agree that the care is reasonable and necessary. This service has been performed by a resident without the presence of a teaching physician under the primary care exception  Mary Bateman  1/26/2017

## 2017-02-02 ENCOUNTER — SURGERY (OUTPATIENT)
Age: 67
End: 2017-02-02

## 2017-02-02 ENCOUNTER — LAB VISIT (OUTPATIENT)
Dept: LAB | Facility: HOSPITAL | Age: 67
End: 2017-02-02
Attending: ORTHOPAEDIC SURGERY
Payer: MEDICARE

## 2017-02-02 DIAGNOSIS — M17.10 PRIMARY LOCALIZED OSTEOARTHROSIS, LOWER LEG: ICD-10-CM

## 2017-02-02 DIAGNOSIS — Z01.818 PRE-OPERATIVE CLEARANCE: ICD-10-CM

## 2017-02-02 PROBLEM — M17.11 OSTEOARTHRITIS OF RIGHT KNEE: Status: ACTIVE | Noted: 2017-02-02

## 2017-02-02 LAB
ABO + RH BLD: NORMAL
BLD GP AB SCN CELLS X3 SERPL QL: NORMAL

## 2017-02-02 PROCEDURE — 86900 BLOOD TYPING SEROLOGIC ABO: CPT

## 2017-02-02 PROCEDURE — 86901 BLOOD TYPING SEROLOGIC RH(D): CPT

## 2017-02-02 PROCEDURE — 36415 COLL VENOUS BLD VENIPUNCTURE: CPT

## 2017-02-06 ENCOUNTER — SURGERY (OUTPATIENT)
Age: 67
End: 2017-02-06

## 2017-02-06 ENCOUNTER — ANESTHESIA (OUTPATIENT)
Dept: SURGERY | Facility: HOSPITAL | Age: 67
DRG: 470 | End: 2017-02-06
Payer: MEDICARE

## 2017-02-06 PROBLEM — M17.11 ARTHRITIS OF RIGHT KNEE: Status: ACTIVE | Noted: 2017-02-06

## 2017-02-06 PROCEDURE — 27200704 HC ULTRASOUND NDL/ECHOSTIM: Performed by: ANESTHESIOLOGY

## 2017-02-06 PROCEDURE — 63600175 PHARM REV CODE 636 W HCPCS: Performed by: ANESTHESIOLOGY

## 2017-02-06 PROCEDURE — 25000003 PHARM REV CODE 250: Performed by: ANESTHESIOLOGY

## 2017-02-06 PROCEDURE — 76942 ECHO GUIDE FOR BIOPSY: CPT | Performed by: ANESTHESIOLOGY

## 2017-02-06 PROCEDURE — 27200688 HC TRAY, SPINAL-HYPER/ ISOBARIC: Performed by: ANESTHESIOLOGY

## 2017-02-06 PROCEDURE — 25000003 PHARM REV CODE 250: Performed by: STUDENT IN AN ORGANIZED HEALTH CARE EDUCATION/TRAINING PROGRAM

## 2017-02-06 RX ORDER — SODIUM CHLORIDE, SODIUM LACTATE, POTASSIUM CHLORIDE, CALCIUM CHLORIDE 600; 310; 30; 20 MG/100ML; MG/100ML; MG/100ML; MG/100ML
INJECTION, SOLUTION INTRAVENOUS CONTINUOUS PRN
Status: DISCONTINUED | OUTPATIENT
Start: 2017-02-06 | End: 2017-02-06

## 2017-02-06 RX ORDER — FENTANYL CITRATE 50 UG/ML
INJECTION, SOLUTION INTRAMUSCULAR; INTRAVENOUS
Status: DISCONTINUED | OUTPATIENT
Start: 2017-02-06 | End: 2017-02-06

## 2017-02-06 RX ORDER — ROPIVACAINE HYDROCHLORIDE 5 MG/ML
INJECTION, SOLUTION EPIDURAL; INFILTRATION; PERINEURAL
Status: DISCONTINUED | OUTPATIENT
Start: 2017-02-06 | End: 2017-02-06

## 2017-02-06 RX ORDER — BUPIVACAINE HYDROCHLORIDE 7.5 MG/ML
INJECTION, SOLUTION INTRASPINAL
Status: DISCONTINUED | OUTPATIENT
Start: 2017-02-06 | End: 2017-02-06

## 2017-02-06 RX ORDER — PROPOFOL 10 MG/ML
INJECTION, EMULSION INTRAVENOUS
Status: DISCONTINUED | OUTPATIENT
Start: 2017-02-06 | End: 2017-02-06

## 2017-02-06 RX ORDER — ONDANSETRON 2 MG/ML
INJECTION INTRAMUSCULAR; INTRAVENOUS
Status: DISCONTINUED | OUTPATIENT
Start: 2017-02-06 | End: 2017-02-06

## 2017-02-06 RX ORDER — MIDAZOLAM HYDROCHLORIDE 1 MG/ML
INJECTION, SOLUTION INTRAMUSCULAR; INTRAVENOUS
Status: DISCONTINUED | OUTPATIENT
Start: 2017-02-06 | End: 2017-02-06

## 2017-02-06 RX ORDER — PROPOFOL 10 MG/ML
INJECTION, EMULSION INTRAVENOUS CONTINUOUS PRN
Status: DISCONTINUED | OUTPATIENT
Start: 2017-02-06 | End: 2017-02-06

## 2017-02-06 RX ORDER — LIDOCAINE HCL/PF 100 MG/5ML
SYRINGE (ML) INTRAVENOUS
Status: DISCONTINUED | OUTPATIENT
Start: 2017-02-06 | End: 2017-02-06

## 2017-02-06 RX ADMIN — ROPIVACAINE HYDROCHLORIDE 10 ML: 5 INJECTION, SOLUTION EPIDURAL; INFILTRATION; PERINEURAL at 09:02

## 2017-02-06 RX ADMIN — BUPIVACAINE HYDROCHLORIDE 1.6 ML: 7.5 INJECTION, SOLUTION SUBARACHNOID at 06:02

## 2017-02-06 RX ADMIN — SODIUM CHLORIDE, SODIUM LACTATE, POTASSIUM CHLORIDE, AND CALCIUM CHLORIDE: .6; .31; .03; .02 INJECTION, SOLUTION INTRAVENOUS at 06:02

## 2017-02-06 RX ADMIN — LIDOCAINE HYDROCHLORIDE 100 MG: 20 INJECTION, SOLUTION INTRAVENOUS at 07:02

## 2017-02-06 RX ADMIN — ONDANSETRON 4 MG: 2 INJECTION, SOLUTION INTRAMUSCULAR; INTRAVENOUS at 08:02

## 2017-02-06 RX ADMIN — FENTANYL CITRATE 25 MCG: 50 INJECTION, SOLUTION INTRAMUSCULAR; INTRAVENOUS at 06:02

## 2017-02-06 RX ADMIN — PROPOFOL 100 MCG/KG/MIN: 10 INJECTION, EMULSION INTRAVENOUS at 07:02

## 2017-02-06 RX ADMIN — BUPIVACAINE HYDROCHLORIDE AND EPINEPHRINE BITARTRATE 90 ML: 2.5; .0091 INJECTION, SOLUTION EPIDURAL; INFILTRATION; INTRACAUDAL; PERINEURAL at 07:02

## 2017-02-06 RX ADMIN — PROPOFOL 10 MG: 10 INJECTION, EMULSION INTRAVENOUS at 09:02

## 2017-02-06 RX ADMIN — FENTANYL CITRATE 65 MCG: 50 INJECTION, SOLUTION INTRAMUSCULAR; INTRAVENOUS at 08:02

## 2017-02-06 RX ADMIN — PROPOFOL 20 MG: 10 INJECTION, EMULSION INTRAVENOUS at 07:02

## 2017-02-06 RX ADMIN — FENTANYL CITRATE 10 MCG: 50 INJECTION, SOLUTION INTRAMUSCULAR; INTRAVENOUS at 06:02

## 2017-02-06 RX ADMIN — TRANEXAMIC ACID 1000 MG: 100 INJECTION, SOLUTION INTRAVENOUS at 08:02

## 2017-02-06 RX ADMIN — MIDAZOLAM 2 MG: 1 INJECTION INTRAMUSCULAR; INTRAVENOUS at 06:02

## 2017-02-06 RX ADMIN — METHYLENE BLUE 100 MG: 10 INJECTION INTRAVENOUS at 07:02

## 2017-02-06 RX ADMIN — CLINDAMYCIN PHOSPHATE 900 MG: 18 INJECTION, SOLUTION INTRAVENOUS at 06:02

## 2017-02-06 RX ADMIN — TRANEXAMIC ACID 3000 MG: 100 INJECTION, SOLUTION INTRAVENOUS at 07:02

## 2017-02-06 NOTE — TRANSFER OF CARE
"Anesthesia Transfer of Care Note    Patient: Tracy Armendariz    Procedure(s) Performed: Procedure(s) (LRB):  ARTHROPLASTY-KNEE (Right)    Patient location: PACU    Anesthesia Type: spinal    Transport from OR: Transported from OR on 6-10 L/min O2 by face mask with adequate spontaneous ventilation    Post pain: adequate analgesia    Post assessment: no apparent anesthetic complications    Post vital signs: stable    Level of consciousness: sedated    Nausea/Vomiting: no nausea/vomiting    Complications: none          Last vitals:   Visit Vitals    /65 (BP Location: Right arm, Patient Position: Lying, BP Method: Automatic)    Pulse 60    Temp 36.7 °C (98 °F) (Oral)    Resp 18    Ht 5' 2" (1.575 m)    Wt 108.9 kg (240 lb)    SpO2 95%    Breastfeeding No    BMI 43.9 kg/m2     "

## 2017-02-06 NOTE — ANESTHESIA POSTPROCEDURE EVALUATION
"Anesthesia Post Evaluation    Patient: Tracy Armendariz    Procedure(s) Performed: Procedure(s) (LRB):  ARTHROPLASTY-KNEE (Right)    Final Anesthesia Type: spinal  Patient location during evaluation: PACU  Patient participation: Yes- Able to Participate  Level of consciousness: awake and alert  Post-procedure vital signs: reviewed and stable  Pain management: adequate  Airway patency: patent  PONV status at discharge: No PONV  Anesthetic complications: no      Cardiovascular status: hemodynamically stable  Respiratory status: unassisted  Hydration status: euvolemic  Follow-up not needed.        Visit Vitals    /61    Pulse (!) 48    Temp 36.4 °C (97.5 °F) (Oral)    Resp 11    Ht 5' 2" (1.575 m)    Wt 108.9 kg (240 lb)    SpO2 96%    Breastfeeding No    BMI 43.9 kg/m2       Pain/Elsie Score: Pain Assessment Performed: Yes (2/6/2017 10:10 AM)  Presence of Pain: denies (2/6/2017 10:10 AM)  Pain Rating Prior to Med Admin: 0 (2/6/2017  6:18 AM)  Elsie Score: 7 (2/6/2017 10:10 AM)      "

## 2017-02-06 NOTE — ANESTHESIA PROCEDURE NOTES
Peripheral    Patient location during procedure: post-op    Start time: 2/6/2017 9:52 AM  Timeout: 2/6/2017 9:50 AM   End time: 2/6/2017 9:56 AM  Staffing  Anesthesiologist: STEPHEN BAHENA  Resident/CRNA: DHEERAJ BAR  Performed by: resident/CRNA   Preanesthetic Checklist  Completed: patient identified, site marked, surgical consent, pre-op evaluation, timeout performed, IV checked, risks and benefits discussed and monitors and equipment checked  Peripheral Block  Patient position: supine  Prep: ChloraPrep  Patient monitoring: heart rate, cardiac monitor, continuous pulse ox, continuous capnometry and frequent blood pressure checks  Block type: adductor canal  Laterality: right  Injection technique: single shot  Needle  Needle type: Short-bevel   Needle gauge: 20 G  Needle length: 3.5 in  Needle localization: ultrasound guidance  Needle insertion depth: 6 cm   -ultrasound image captured on disc.  Assessment  Injection assessment: negative aspiration, negative parasthesia and local visualized surrounding nerve  Paresthesia pain: none  Heart rate change: no  Slow fractionated injection: yes  Medications:  Bolus administered: 30 mL of 0.5 ropivacaine

## 2017-02-06 NOTE — ANESTHESIA PROCEDURE NOTES
Spinal    Patient location during procedure: OR  Start time: 2/6/2017 6:48 AM  Timeout: 2/6/2017 6:48 AM  End time: 2/6/2017 6:58 AM  Staffing  Anesthesiologist: STEPHEN BAHENA  Resident/CRNA: DHEERAJ BAR  Performed by: resident/CRNA   Preanesthetic Checklist  Completed: patient identified, site marked, surgical consent, pre-op evaluation, timeout performed, IV checked, risks and benefits discussed and monitors and equipment checked  Spinal Block  Patient position: sitting  Prep: ChloraPrep  Patient monitoring: heart rate and continuous pulse ox  Approach: midline  Location: L2-3  Injection technique: single shot  CSF Fluid: clear free-flowing CSF  Needle  Needle type: pencil-tip   Needle gauge: 25 G  Needle length: 4 in  Additional Documentation: incremental injection, negative aspiration for heme and no paresthesia on injection  Needle localization: anatomical landmarks  Assessment  Ease of block: easy  Patient's tolerance of the procedure: comfortable throughout block  Medications:  Bolus administered: 1.6 mL of 0.75 and with dextrose bupivacaine  Opioid administered: 10 mcg of   fentanyl

## 2017-02-07 ENCOUNTER — PATIENT OUTREACH (OUTPATIENT)
Dept: ADMINISTRATIVE | Facility: CLINIC | Age: 67
End: 2017-02-07
Payer: MEDICARE

## 2017-02-07 ENCOUNTER — PATIENT MESSAGE (OUTPATIENT)
Dept: FAMILY MEDICINE | Facility: CLINIC | Age: 67
End: 2017-02-07

## 2017-02-07 ENCOUNTER — TELEPHONE (OUTPATIENT)
Dept: FAMILY MEDICINE | Facility: CLINIC | Age: 67
End: 2017-02-07

## 2017-02-07 ENCOUNTER — CLINICAL SUPPORT (OUTPATIENT)
Dept: SPEECH THERAPY | Facility: HOSPITAL | Age: 67
End: 2017-02-07
Attending: ORTHOPAEDIC SURGERY
Payer: MEDICARE

## 2017-02-07 DIAGNOSIS — R26.89 DECREASED FUNCTIONAL MOBILITY: ICD-10-CM

## 2017-02-07 DIAGNOSIS — R26.2 DIFFICULTY WALKING: ICD-10-CM

## 2017-02-07 DIAGNOSIS — M25.561 RIGHT KNEE PAIN, UNSPECIFIED CHRONICITY: ICD-10-CM

## 2017-02-07 DIAGNOSIS — M25.661 DECREASED RANGE OF MOTION OF RIGHT KNEE: ICD-10-CM

## 2017-02-07 PROCEDURE — 97140 MANUAL THERAPY 1/> REGIONS: CPT

## 2017-02-07 PROCEDURE — G8979 MOBILITY GOAL STATUS: HCPCS | Mod: CJ

## 2017-02-07 PROCEDURE — 97162 PT EVAL MOD COMPLEX 30 MIN: CPT

## 2017-02-07 PROCEDURE — G8978 MOBILITY CURRENT STATUS: HCPCS | Mod: CL

## 2017-02-07 NOTE — TELEPHONE ENCOUNTER
----- Message from John Kim MD sent at 2/6/2017  5:03 PM CST -----  Regarding: plavix  Bartolo,  i sent her home today after tka on xarelto for DVT prophylaxis. Can you transition her back to plavix?

## 2017-02-07 NOTE — PLAN OF CARE
TIME RECORD    Date: 02/07/2017    Start Time:  1300  Stop Time:  1415    PROCEDURES:    TIMED  Procedure Min.   eval 50   MT 10                 UNTIMED  Procedure Min.   CP 15         Total Timed Minutes:  10  Total Timed Units:  1  Total Untimed Units:  1  Charges Billed/# of units:  2 (1 eval, 1 MT)    OUTPATIENT PHYSICAL THERAPY   PATIENT EVALUATION  Onset Date: 2/6/17  Primary Diagnosis:   1. Difficulty walking     2. Decreased range of motion of right knee     3. Right knee pain, unspecified chronicity     4. Decreased functional mobility       Treatment Diagnosis: s/p R knee replacement  Past Medical History   Diagnosis Date    Anticoagulant long-term use     Arthritis     CVA (cerebral infarction) 2013    Depression     Diverticulosis of colon     Extrinsic asthma, unspecified     Hyperlipidemia     Hypertension     Left atrial enlargement 12/16/2014    Low back pain     MARTIN (obstructive sleep apnea)     Osteopenia     PUD (peptic ulcer disease)     Stroke  December 2013     Precautions: standard   Prior Therapy: PT 6 months ago  Medications: Tracy Armendariz has a current medication list which includes the following prescription(s): albuterol, aspirin, atorvastatin, calcium carbonate, cholecalciferol (vitamin d3), clindamycin, clopidogrel, clotrimazole-betamethasone 1-0.05%, docusate sodium, escitalopram oxalate, famotidine, losartan, multivit with calcium,iron,min, mupirocin, oxycodone-acetaminophen, and rivaroxaban.  Nutrition:  Obese  History of Present Illness: R knee OA  Prior Level of Function: Assistive Device  Social History: lives with   Place of Residence (Steps/Adaptations): retired  Functional Deficits Leading to Referral/Nature of Injury: R LE OA  Patient Therapy Goals: to be able to walk without a walker and without pain.  SHe would like to be at 90% or above in the next 2 months to be able to participate in the traveling and other recreational activities before.  She  wants to be able to drive.      Subjective     Tracy Armendariz states she is in a lot of pain. SHe stated she was looking forward to the end result.      Pain:  Location: R knee pain   Description: Aching and Throbbing  Activities Which Increase Pain: Extension and Flexing knee  Activities Which Decrease Pain: ice  Pain Scale: 6/10 at best 9/10 now  9/10 at worst    Objective     Posture: trunk WFL but pt held R LE out in extension in sitting  Palpation: tender to palpation around incision at posterior knee (popliteal region), and proximal thigh  Sensation: intact  DTRs:  Range of Motion/Strength:      AROM: R 10-65  PROM: NT    L/E Strength w/ MicroFET Muscle Michelle Dynamometer Right Left Pain/Dysfunction with Movement   (approx 4 sec hold w/ max contraction)   Hip Flexion 12.1 kg   18.4 kg     Hip Abduction 6.4 kg  20.6 kg     Quadriceps 7.0 kg  13.8 kg     Hamstrings 5.0 kg  17.4 kg       TU seconds (w/  assistive device)  30 second sit-to-stand test (without U/E support): 3 with UE support  KOOS Knee Survey:    Symptoms:   Pain:  33/36  Functional, Daily Livin/68 (78% disabled) Gcode CL  Function, Sports and Recreational Activities:    Quality of Life:    PROMIS-29:    Physical Function:    Anxiety:    Depression:     Fatigue:     Sleep Disturbance:     Satisfaction with Social Role:     Pain Interference:     Pain Intensity:  8/10    Flexibility: NT  Gait: With AD.  Device Used -  Rolling walker  Analysis: decreased ginger, decreased step length L, decreased stance R, decreased heel strike B, antalgic gait R, increased WB through walker  Bed Mobility: supervision  Transfers: supervision with RW  Special Tests: none  Other: FOTO: 99 % level of limitation  Treatment: Pt educated on HEP consisting of: SLR, QS, SAQ, hip abduction, ankle pumps, seated marches, pt verbalized understanding of all.    PT will progess next session to supine TE in LE log and  hold bike and heel slides week 1.      Assessment       Initial Assessment (Pertinent finding, problem list and factors affecting outcome): Pt is a 66 year old female s/p R THR with R knee pain, decreased R knee ROM, decreased functional mobility and gait as well as decreased overall mobility.  She has increase R knee swelling and R knee incision.  She is ambulating slowly with RW with many gait impairments.  She would benefit from PT to increase R LE ROM and to improve gait and overall function.  She would also benefit from intervention to reduce swelling and pain.  Pt has had a previous stroke that affected her R side and her speech and vision.  SHe is otherwise high function pt pre-op.    History  Co-morbidities and personal factors that may impact the plan of care Examination  Body Structures and Functions, activity limitations and participation restrictions that may impact the plan of care Clinical Presentation Decision Making/ Complexity Score   Co-morbidities:    previous CVA, HTN,  HLD, vision impairments                 Personal Factors:   Depression, low back pain Body Regions:LE, trunk     Body Systems: ROM, strength, gait, transfer, gross symmetry        Activity limitations: decreased knee flexion, difficulty walking, decreased functional mobility         Participation Restrictions: pt can't ambulate in community long distances, pt cant walk withtout AD, pt unable to perform household activities in standing          Complicated due to previous stroke with R sided weakness and visual deficits and HTN    moderate complexity eval         Rehab Potiential: good    Short Term Goals (4 Weeks):   1.  Independent with initial HEP for L/E strengthening and knee ROM.  2.  Increase R knee AROM to 0-105.  3.  Increase R knee PROM to 0-110  4.  Increase R hip and knee strength to be 75-80% of L LE.  5.  Decrease edema compared to L knee.  Long Term Goals (8 Weeks):   1.  Independent with updated HEP.  2.  Increase R  knee AROM to 0-120.  3.  Increase R L/E strength to 90% of L LE.  4.  Able to ambulate community distances without use of assistive device without pain or gait deficits.  5.  Pt will report 61% on the FOTO lower extremity assessment placing the patient in the 60-80% impaired, limited, or restricted category indicating increased functional LE mobility.  6.  Pt will report 20/68 on the KOOS functional mobility assessment placing the patient in the 20-40% impaired, limited, or restricted category indicating increased functional LE mobility. Gcode 8979 Goal mobility CJ    Plan     Certification Period: 2/7/2017 to 4/7/17  Recommended Treatment Plan: 1-2 times per week for 8 weeks: Electrical Stimulation russian, Gait Training, Locomotor Training, Manual Therapy, Moist Heat/ Ice, Neuromuscular Re-ed, Patient Education, Self Care, Therapeutic Activites and Therapeutic Exercise  Other Recommendations: none      Therapist: Betsy Nunez, PT    I CERTIFY THE NEED FOR THESE SERVICES FURNISHED UNDER THIS PLAN OF TREATMENT AND WHILE UNDER MY CARE    Physician's comments: ________________________________________________________________________________________________________________________________________________      Physician's Name: ___________________________________

## 2017-02-07 NOTE — PATIENT INSTRUCTIONS
Discharge Instructions for Total Knee Replacement  You have undergone knee replacement surgery. The knee joint forms where the thighbone, shinbone, and kneecap meet. The knee joint is supported by muscles and ligaments, and is lined with a cushioning called cartilage. Over time, cartilage wears away. This can make the knee feel stiff and painful. Your doctor replaced your painful joint with an artificial joint to relieve pain and restore movement. Here are some instructions to follow once at home.  Home care  · When your doctor says it's OK to shower, carefully wash your incision with soap and water. Rinse the incision well. Then gently pat it dry. Dont rub the incision, or apply creams or lotions. Sit on a shower stool or chair when you shower to keep from falling.  · Take pain medicine as directed by your doctor.  Sitting and sleeping  · Sit in chairs with arms. The arms make it easier for you to stand up or sit down.  · Dont sit for more than 30 to 45 minutes at one time.  · Nap if you are tired, but dont stay in bed all day.  · Sleep with a pillow under your ankle, not your knee. Be sure to change the position of your leg during the night.  Moving safely  · The key to successful recovery is movement with walking and exercising your knee as directed by your doctor. You should be able to start moving your leg shortly after surgery as directed by your doctor.    · Walk up and down stairs with support. Try one step at a time. Use the railing if possible.  · Dont drive until your doctor says its OK. Most people can start driving about 6 weeks after surgery. Dont drive while you are taking opioid pain medication.  Other precautions  · Avoid soaking your knee in water (no hot tubs, bathtubs, swimming pools) until your doctor says its OK.  · Wear the support stockings you were given in the hospital, as instructed by your doctor. You may wear these stockings for 4 to 6 weeks after surgery. If needed, you can  place a bandage over the incision to prevent irritation from clothing or support stockings.  · Arrange your household to keep the items you need handy. Keep everything else out of the way. Remove items that may cause you to fall, such as throw rugs and electrical cords.  · Use nonslip bath mats, grab bars, an elevated toilet seat, and a shower chair in your bathroom.  · Until your balance, flexibility, and strength improve, use a cane, crutches, a walker, handrails, or someone to help you.  · Keep your hands free by using a backpack, diana pack, apron, or pockets to carry things.  · Prevent infection. Ask your doctor for instructions if you havent already received them. Any infection will need to be treated immediately. Call your doctor right away if you think you might have an infection.  · Tell your dentist that you have an artificial joint and take antibiotics as prescribed before any dental work.  · Tell all your healthcare providers about your artificial joint before any medical procedure.  · Maintain a healthy weight. Get help to lose any extra pounds. Added body weight puts stress on the knee.  · Take any medicine you may have been given after surgery. This may include blood-thinning medicine to prevent blood clots or antibiotics to prevent infection.  Follow-up care  Follow up with your healthcare provider, or as advised. You will need to have your staples removed 2 to 3 weeks after surgery.     When to call your healthcare provider  Call 911 right away if you have:  · Chest pain  · Shortness of breath  · Any pain or tenderness in your calf  Otherwise, call your healthcare provider right away if you have:  · Fever of 100.4 °F (38 °C) or higher, or as advised  · Shaking chills  · Stiffness, or inability to move the knee  · Increased swelling in your leg  · Increased redness, tenderness, or swelling in or around the knee incision  · Drainage from the knee incision  · Increased knee pain   Date Last Reviewed:  7/1/2016  © 6561-7468 [employer]. [address] [place] [address], [place] [address]. All rights reserved. This information is not intended as a substitute for professional medical care. Always follow your healthcare professional's instructions.

## 2017-02-08 ENCOUNTER — CLINICAL SUPPORT (OUTPATIENT)
Dept: SPEECH THERAPY | Facility: HOSPITAL | Age: 67
End: 2017-02-08
Attending: ORTHOPAEDIC SURGERY
Payer: MEDICARE

## 2017-02-08 ENCOUNTER — TELEPHONE (OUTPATIENT)
Dept: FAMILY MEDICINE | Facility: CLINIC | Age: 67
End: 2017-02-08

## 2017-02-08 DIAGNOSIS — M25.561 RIGHT KNEE PAIN, UNSPECIFIED CHRONICITY: ICD-10-CM

## 2017-02-08 DIAGNOSIS — M25.661 DECREASED RANGE OF MOTION OF RIGHT KNEE: ICD-10-CM

## 2017-02-08 DIAGNOSIS — R26.89 DECREASED FUNCTIONAL MOBILITY: ICD-10-CM

## 2017-02-08 DIAGNOSIS — R26.2 DIFFICULTY WALKING: ICD-10-CM

## 2017-02-08 PROCEDURE — 97140 MANUAL THERAPY 1/> REGIONS: CPT

## 2017-02-08 PROCEDURE — 97110 THERAPEUTIC EXERCISES: CPT

## 2017-02-08 NOTE — TELEPHONE ENCOUNTER
----- Message from Azeb Nicolette sent at 2/7/2017  3:36 PM CST -----  Contact: [facility]  Called in requesting a call to patient to schedule her hospital discharge within 7 days. Patient would also like to discuss the change of her blood thinner from plavix to Xeralto by Dr. Kim and states she is due for a physical. Please advise.

## 2017-02-08 NOTE — PROGRESS NOTES
TIME RECORD    Date:  02/08/2017    Start Time:  1500  Stop Time:  1600    PROCEDURES:    TIMED  Procedure Min.   TE 30   MT 20   SUpervised TE 10             UNTIMED  Procedure Min.             Total Timed Minutes:  60  Total Timed Units:  3  Total Untimed Units:  0  Charges Billed/# of units:  3 (1 MT, 2 TE)      Progress/Current Status    Subjective:     Patient ID: Tracy Armendariz is a 66 y.o. female.  Diagnosis:   1. Difficulty walking     2. Decreased range of motion of right knee     3. Right knee pain, unspecified chronicity     4. Decreased functional mobility       Pain: 8 /10  Pt reported that she slept most of the day today and she stated that she does not like for her knee to bend at all.      Objective:      Session initiated with MT x 20' with retrograde edema massage to R knee and STM to R quad and HS with hs ms splaying and MFR to distal HS and proximal Gastroc, gentle patellar mobs. Pt performed supine and seated TE per log x 30'. Session ended with CP x 10' in supine with LE elevated on wedge.     Date  2/8/17   VISIT 2   POC 2/7/2017   Gcode 2/10   Visit amount   total    MT 20   Long Sit HS 3 x 30'' R   Gastroc Str. --   Bike Hold     QS 5'' x 15   SAQ 2 x 10 B   SLR 2 x 10 R   SL Abd  2 x 10    Heel Slides hold   Sisi Hip Add next   LAQs 2 x 10 B   Seated Hip Flex 2 x 30'' B   Cybex   Leg Press --   TKE --   Hip Abd --   Hip Flex --   Hip Ext --   HS Curls --   Knee Ext --   Step Ups --   Step Downs -   Gait --   CP 10'   Initials FS         Assessment:     Pt was in a severe amount of pain with any knee flexion which limited her in positioning for TE on the L LE.  Pt need max cues for deep breathing and other pain management techniques.      Patient Education/Response:      CONT HEP    Plans and Goals:   Start E-stim for quad facilitation week 2 and start bike week 2   Short Term Goals (4 Weeks):   1. Independent with initial HEP for L/E strengthening and knee ROM.  2. Increase R knee AROM to  0-105.  3. Increase R knee PROM to 0-110  4. Increase R hip and knee strength to be 75-80% of L LE.  5. Decrease edema compared to L knee.  Long Term Goals (8 Weeks):   1. Independent with updated HEP.  2. Increase R knee AROM to 0-120.  3. Increase R L/E strength to 90% of L LE.  4. Able to ambulate community distances without use of assistive device without pain or gait deficits.  5. Pt will report 61% on the FOTO lower extremity assessment placing the patient in the 60-80% impaired, limited, or restricted category indicating increased functional LE mobility.  6. Pt will report 20/68 on the KOOS functional mobility assessment placing the patient in the 20-40% impaired, limited, or restricted category indicating increased functional LE mobility. ode [id number] Goal mobility CJ

## 2017-02-13 ENCOUNTER — CLINICAL SUPPORT (OUTPATIENT)
Dept: REHABILITATION | Facility: HOSPITAL | Age: 67
End: 2017-02-13
Attending: ORTHOPAEDIC SURGERY
Payer: MEDICARE

## 2017-02-13 DIAGNOSIS — R26.2 DIFFICULTY WALKING: ICD-10-CM

## 2017-02-13 DIAGNOSIS — M25.661 DECREASED RANGE OF MOTION OF RIGHT KNEE: ICD-10-CM

## 2017-02-13 DIAGNOSIS — M25.561 RIGHT KNEE PAIN, UNSPECIFIED CHRONICITY: ICD-10-CM

## 2017-02-13 DIAGNOSIS — R26.89 DECREASED FUNCTIONAL MOBILITY: ICD-10-CM

## 2017-02-13 PROCEDURE — 97014 ELECTRIC STIMULATION THERAPY: CPT | Mod: PN

## 2017-02-13 PROCEDURE — 97110 THERAPEUTIC EXERCISES: CPT | Mod: PN

## 2017-02-13 PROCEDURE — 97140 MANUAL THERAPY 1/> REGIONS: CPT | Mod: PN

## 2017-02-13 NOTE — PROGRESS NOTES
TIME RECORD    Date:  02/13/2017    Start Time:  1350  Stop Time:  1410    PROCEDURES:    TIMED  Procedure Min.   Supervised TE 7   MT 20   TE 33             UNTIMED  Procedure Min.   Unattended E-stim 10         Total Timed Minutes:  53  Total Timed Units:  4  Total Untimed Units:  1  Charges Billed/# of units:  5(2 MT, 3TE, 1 unattended E-stim)      Progress/Current Status    Subjective:     Patient ID: Tracy Armendariz is a 66 y.o. female.  Diagnosis:   1. Difficulty walking     2. Decreased range of motion of right knee     3. Right knee pain, unspecified chronicity     4. Decreased functional mobility       Pain: 5 /10  Pt stated she did exercises this weekend and that she feels like she is doing a lot better.      Objective:     Pt presented to session with RW.  Session initiated stationary bike with vcs on partial revolutionthe 1st 3 minutes f/b supervised TE on bike with partial revolutions x 7'.  MT x 20' with retrograde edema massage to R knee and STM to R quad and HS with hs ms splaying and MFR to distal HS and proximal Gastroc, gentle patellar mobs. Pt performed supine and seated TE per log x 33' including unattended E-stim to R LE during some TE in supine with vcs throughout for initiation of movement 10 secs on/10 secs off, ramp up, 2secs at 80pps to quad and VMO x 10' total f/b LE assesment. Session ended with CP x 10' in seated with LE elevated on traction bench    Date  2/13/2017 2/8/17   VISIT 3 2   POC 2/7/2017 2/7/2017   Gcode 3/10 2/10   Visit amount   total 142.88  312.30 93.82  169.42   MT 20 20   HS supine with str 3 x 30'' R 3 x 30'' R   Gastroc Str. W/ strap --   Bike 10' partial revs Hold     QS 4'' on estim 10'' holds R 5'' x 15   SAQ 3'' Estim R  2 x 10 B   SLR 3'' Estim R 2 x 10 R   SL Abd 2 x 10   2 x 10    Heel Slides 10 x 10 L hold   Sisi Hip Add 5'' x10 next   LAQs OOT 2 x 10 B   Seated Hip Flex OOT 2 x 30'' B   Cybex   Leg Press -- --   TKE -- --   Hip Abd -- --   Hip Flex -- --    Hip Ext -- --   HS Curls -- --   Knee Ext -- --   Step Ups -- --   Step Downs -- -   Gait -- --   CP 10' 10'   Initials FS FS     AROM: R 6-75  PROM:R  4-85     L/E Strength w/ Wizeline Muscle Michelle Dynamometer Right Left Pain/Dysfunction with Movement   (approx 4 sec hold w/ max contraction)   Hip Flexion 8.6 kg  14.7 kg      Hip Abduction 15.3 kg  12.9 kg      Quadriceps 3.4 kg  17.4 kg      Hamstrings 8.8 kg  12.4 kg         TU seconds (w/ assistive device)  30 second sit-to-stand test (without U/E support): 7 with UE support  KOOS Knee Survey:   Functional, Daily Living: NT      Assessment:     Pt tolerated session well with less pain and increased ROM with knee flexion.  She is doing well with partial revolutions on bike as well.  Function improving with TUG and transfers.      Patient Education/Response:     CONT HEP and add heel slides issued.    Plans and Goals:     Start E-stim for quad facilitation week 2 and start bike week 2   Short Term Goals (4 Weeks):   1. Independent with initial HEP for L/E strengthening and knee ROM.  2. Increase R knee AROM to 0-105.  3. Increase R knee PROM to 0-110  4. Increase R hip and knee strength to be 75-80% of L LE.  5. Decrease edema compared to L knee.  Long Term Goals (8 Weeks):   1. Independent with updated HEP.  2. Increase R knee AROM to 0-120.  3. Increase R L/E strength to 90% of L LE.  4. Able to ambulate community distances without use of assistive device without pain or gait deficits.  5. Pt will report 61% on the FOTO lower extremity assessment placing the patient in the 60-80% impaired, limited, or restricted category indicating increased functional LE mobility.  6. Pt will report 20/68 on the KOOS functional mobility assessment placing the patient in the 20-40% impaired, limited, or restricted category indicating increased functional LE mobility. Gcode 8979 Goal mobility CJ

## 2017-02-14 ENCOUNTER — TELEPHONE (OUTPATIENT)
Dept: REHABILITATION | Facility: HOSPITAL | Age: 67
End: 2017-02-14

## 2017-02-14 ENCOUNTER — CLINICAL SUPPORT (OUTPATIENT)
Dept: REHABILITATION | Facility: HOSPITAL | Age: 67
End: 2017-02-14
Attending: ORTHOPAEDIC SURGERY
Payer: MEDICARE

## 2017-02-14 DIAGNOSIS — R26.2 DIFFICULTY WALKING: ICD-10-CM

## 2017-02-14 DIAGNOSIS — M25.561 RIGHT KNEE PAIN, UNSPECIFIED CHRONICITY: ICD-10-CM

## 2017-02-14 DIAGNOSIS — M25.661 DECREASED RANGE OF MOTION OF RIGHT KNEE: ICD-10-CM

## 2017-02-14 DIAGNOSIS — R26.89 DECREASED FUNCTIONAL MOBILITY: ICD-10-CM

## 2017-02-14 PROCEDURE — 97014 ELECTRIC STIMULATION THERAPY: CPT | Mod: PN

## 2017-02-14 PROCEDURE — 97110 THERAPEUTIC EXERCISES: CPT | Mod: PN

## 2017-02-14 PROCEDURE — 97140 MANUAL THERAPY 1/> REGIONS: CPT | Mod: PN

## 2017-02-14 NOTE — PROGRESS NOTES
"TIME RECORD    Date:  02/14/2017    Start Time:  1300  Stop Time:  1410    PROCEDURES:    TIMED  Procedure Min.   Supervised TE 10 NC   MT 15   TE 35             UNTIMED  Procedure Min.   Estim 10         Total Timed Minutes:  50  Total Timed Units:  4  Total Untimed Units:  1  Charges Billed/# of units:  5 (MT-1, TE-3, Estim-1)      Progress/Current Status    Subjective:     Patient ID: Tracy Armendariz is a 66 y.o. female.  Diagnosis:   1. Difficulty walking     2. Decreased range of motion of right knee     3. Right knee pain, unspecified chronicity     4. Decreased functional mobility       Pain: 5 /10  Pt reports her knee is "hurting" today.    Objective:     Pt presented to session with RW. Initiated treatment on bike x 10' with partial revolutions f/b TE 1:1 with PT per log x 45'  including Estim to R LE during in supine with verbal and tactile cuing throughout for initiation of movement 10 secs on/10 secs off, ramp up, 2secs at 80pps to quad and VMO x 10' total f/b LE assessment f/b  MT x 15' with retrograde edema massage to R knee and STM to R quad and HS with hs ms splaying and MFR to distal HS and proximal Gastroc, gentle patellar mobs, MFR R ITB.  Session ended with CP to R knee x 10' in supine with B LE on wedge.    Date  2/14/2017 2/13/2017 2/8/17   VISIT 4 3 2   POC 2/7/17 2/7/2017 2/7/2017   Gcode 4/10 3/10 2/10   Visit amount   total 145.00  457.30 142.88  312.30 93.82  169.42   MT 15' 20 20   HS supine with str 3 x 30'' R 3 x 30'' R 3 x 30'' R   Gastroc Str. W/ strap W/ strap --   Bike 10' partial revs 10' partial revs Hold     QS 4'' on estim 10'' holds R 4'' on estim 10'' holds R 5'' x 15   SAQ 3'' Estim R 3'' Estim R  2 x 10 B   SLR 3'' Estim R  Mod A 3'' Estim R 2 x 10 R   SL Abd 2 x 10 L 2 x 10   2 x 10    Heel Slides 10 x 10 L 10 x 10 L hold   Sisi Hip Add 5'' x10 5'' x10 next   LAQs OOT OOT 2 x 10 B   Seated Hip Flex OOT OOT 2 x 30'' B   Cybex   Leg Press -- -- --   TKE -- -- --   Hip Abd " -- -- --   Hip Flex -- -- --   Hip Ext -- -- --   HS Curls -- -- --   Knee Ext -- -- --   Step Ups -- -- --   Step Downs -- -- -   Gait -- -- --   CP 10' 10' 10'   Initials KV FS FS         Assessment:     Pt performing partial revolutions on bike. Required Mod A for R SLR.    Patient Education/Response:     Cont HEP.    Plans and Goals:     Start E-stim for quad facilitation week 2 and start bike week 2     Short Term Goals (4 Weeks):   1. Independent with initial HEP for L/E strengthening and knee ROM.  2. Increase R knee AROM to 0-105.  3. Increase R knee PROM to 0-110  4. Increase R hip and knee strength to be 75-80% of L LE.  5. Decrease edema compared to L knee.    Long Term Goals (8 Weeks):   1. Independent with updated HEP.  2. Increase R knee AROM to 0-120.  3. Increase R L/E strength to 90% of L LE.  4. Able to ambulate community distances without use of assistive device without pain or gait deficits.  5. Pt will report 61% on the FOTO lower extremity assessment placing the patient in the 60-80% impaired, limited, or restricted category indicating increased functional LE mobility.  6. Pt will report 20/68 on the KOOS functional mobility assessment placing the patient in the 20-40% impaired, limited, or restricted category indicating increased functional LE mobility. ode [id number] Goal mobility CJ

## 2017-02-16 ENCOUNTER — TELEPHONE (OUTPATIENT)
Dept: FAMILY MEDICINE | Facility: CLINIC | Age: 67
End: 2017-02-16

## 2017-02-16 ENCOUNTER — CLINICAL SUPPORT (OUTPATIENT)
Dept: REHABILITATION | Facility: HOSPITAL | Age: 67
End: 2017-02-16
Attending: ORTHOPAEDIC SURGERY
Payer: MEDICARE

## 2017-02-16 DIAGNOSIS — R26.89 DECREASED FUNCTIONAL MOBILITY: ICD-10-CM

## 2017-02-16 DIAGNOSIS — M25.661 DECREASED RANGE OF MOTION OF RIGHT KNEE: ICD-10-CM

## 2017-02-16 DIAGNOSIS — R26.2 DIFFICULTY WALKING: ICD-10-CM

## 2017-02-16 DIAGNOSIS — M25.561 RIGHT KNEE PAIN, UNSPECIFIED CHRONICITY: ICD-10-CM

## 2017-02-16 PROCEDURE — 97110 THERAPEUTIC EXERCISES: CPT | Mod: PN

## 2017-02-16 PROCEDURE — 97140 MANUAL THERAPY 1/> REGIONS: CPT | Mod: PN

## 2017-02-16 NOTE — TELEPHONE ENCOUNTER
----- Message from Zuri Jones sent at 2/16/2017  9:29 AM CST -----  Contact: [phone]/ self   Pt its requesting an appointment for a hospital follow up as soon as possible . Please advise

## 2017-02-16 NOTE — TELEPHONE ENCOUNTER
Pt stated that she recently had a knee replacement.  Pt stated that surgeon switched her Plavix to Xarelto and would like her to stay on it for six weeks.  Pt would like to know should she go back to Plavix or stay on the Xarelto pt was advised to speak with you about it.  Please advise

## 2017-02-16 NOTE — PROGRESS NOTES
TIME RECORD    Date:  02/16/2017    Start Time:  1300  Stop Time:  1415    PROCEDURES:    TIMED  Procedure Min.   Supervised TE 37   TE 18       MT 10         UNTIMED  Procedure Min.   Unattended estim 10   CP 10     Total Timed Minutes:  28  Total Timed Units:  2  Total Untimed Units:  0  Charges Billed/# of units:  2 (1TE, 1MT)      Progress/Current Status    Subjective:     Patient ID: Tracy Armendariz is a 66 y.o. female.  Diagnosis:   1. Difficulty walking     2. Decreased range of motion of right knee     3. Right knee pain, unspecified chronicity     4. Decreased functional mobility       Pain: 5 /10  Pt stated that she was doing well today, better everyday.    Objective:     Pt presented to session with RW. Initiated treatment on bike x 10' with partial revolutions with cues intermittently to try to achieve a full revolution f/b supervised TE per log x 37', f/b TE per log with Estim to R LE during in supine with verbal and tactile cuing throughout for initiation of movement 10 secs on/10 secs off, ramp up, 2secs at 80pps to quad and VMO x 10' f/b  MT x 15' with retrograde edema massage to R knee and STM to R quad and HS with hs ms splaying and MFR to distal HS and proximal Gastroc, gentle patellar mobs, MFR R ITB.  Pt performed gait training in // bars with 1 UE support 2 x 15' then trialed SPC x 100 ft with CGA-Margarita and max vcs for 3 point pattern coordination (LOB x 2 with Margarita to correct). Session ended with CP to R knee x 10' in supine with B LE on wedge.    FOTO next visit* 2/17  Date  2/16/17 2/14/2017 2/13/2017 2/8/17   VISIT 5 4 3 2   POC 4/7/17 4/7/17 4/7/2017 4/7/2017   Gcode 5/10 4/10 3/10 2/10   Visit amount   total 61.84  514.14 145.00  457.30 142.88  312.30 93.82  169.42   MT 10' 15' 20 20   HS supine with str 3 x 30'' R 3 x 30'' R 3 x 30'' R 3 x 30'' R   Gastroc Str. W/ strap W/ strap W/ strap --   Bike 10' partial revs 10' partial revs 10' partial revs Hold     QS 4'' on estim 10'' holds R  4'' on estim 10'' holds R 4'' on estim 10'' holds R 5'' x 15   SAQ 3'' Estim R 3'' Estim R 3'' Estim R  2 x 10 B   SLR 3'' Estim R  Mod A 3'' Estim R  Mod A 3'' Estim R 2 x 10 R   SL Abd 2 x 10 L 2 x 10 L 2 x 10   2 x 10    Heel Slides 10'' x 10 L 10 x 10 L 10 x 10 L hold   Sisi Hip Add 5'' x10 5'' x10 5'' x10 next   LAQs 2x10 B OOT OOT 2 x 10 B   Seated Hip Flex 2x10 B OOT OOT 2 x 30'' B   Cybex   Leg Press next -- -- --   TKE -- -- -- --   Hip Abd 2 x 10 B -- -- --   Hip Flex Next  -- -- --   Hip Ext next -- -- --   HS Curls -- -- -- --   Knee Ext -- -- -- --   Step Ups -- -- -- --   Step Downs -- -- -- -   Gait  see note -- -- --   CP 10' 10' 10' 10'   Initials FS KV FS FS       Assessment:     Pt progressing toward SPC with trial today. Pt strength improving.    Patient Education/Response:     CONT HEP    Plans and Goals:     CONT POC and progress as tolerated to SPC  Short Term Goals (4 Weeks):   1. Independent with initial HEP for L/E strengthening and knee ROM.  2. Increase R knee AROM to 0-105.  3. Increase R knee PROM to 0-110  4. Increase R hip and knee strength to be 75-80% of L LE.  5. Decrease edema compared to L knee.    Long Term Goals (8 Weeks):   1. Independent with updated HEP.  2. Increase R knee AROM to 0-120.  3. Increase R L/E strength to 90% of L LE.  4. Able to ambulate community distances without use of assistive device without pain or gait deficits.  5. Pt will report 61% on the FOTO lower extremity assessment placing the patient in the 60-80% impaired, limited, or restricted category indicating increased functional LE mobility.  6. Pt will report 20/68 on the KOOS functional mobility assessment placing the patient in the 20-40% impaired, limited, or restricted category indicating increased functional LE mobility. Gcode 8967 Goal mobility CJ

## 2017-02-17 ENCOUNTER — CLINICAL SUPPORT (OUTPATIENT)
Dept: REHABILITATION | Facility: HOSPITAL | Age: 67
End: 2017-02-17
Attending: ORTHOPAEDIC SURGERY
Payer: MEDICARE

## 2017-02-17 DIAGNOSIS — M25.561 RIGHT KNEE PAIN, UNSPECIFIED CHRONICITY: ICD-10-CM

## 2017-02-17 DIAGNOSIS — R26.89 DECREASED FUNCTIONAL MOBILITY: ICD-10-CM

## 2017-02-17 DIAGNOSIS — M25.661 DECREASED RANGE OF MOTION OF RIGHT KNEE: ICD-10-CM

## 2017-02-17 DIAGNOSIS — R26.2 DIFFICULTY WALKING: ICD-10-CM

## 2017-02-17 PROCEDURE — 97140 MANUAL THERAPY 1/> REGIONS: CPT | Mod: PN

## 2017-02-17 PROCEDURE — 97110 THERAPEUTIC EXERCISES: CPT | Mod: PN

## 2017-02-17 RX ORDER — ESCITALOPRAM OXALATE 10 MG/1
TABLET ORAL
Qty: 90 TABLET | Refills: 11 | Status: SHIPPED | OUTPATIENT
Start: 2017-02-17 | End: 2018-03-12 | Stop reason: SDUPTHER

## 2017-02-17 NOTE — TELEPHONE ENCOUNTER
I believe I sent her a message through email about this but if she supposed to be on Xarelto for 6 weeks, once she stops her Xarelto, she can get back on her Plavix

## 2017-02-17 NOTE — TELEPHONE ENCOUNTER
Call placed to pt to inform her that she can continue back with her Plavix per Dr. Jules.  Pt verbalized understanding.

## 2017-02-17 NOTE — PROGRESS NOTES
TIME RECORD    Date:  02/17/2017    Start Time:  1050  Stop Time:  1110    PROCEDURES:    TIMED  Procedure Min.   Supervised TE 7   TE 38   MT 15             UNTIMED  Procedure Min.   CP 10         Total Timed Minutes:  60  Total Timed Units:  4  Total Untimed Units:  0  Charges Billed/# of units:  4 (3 TE, 1 MT)      Progress/Current Status    Subjective:     Patient ID: Tracy Armendariz is a 66 y.o. female.  Diagnosis:   1. Difficulty walking     2. Decreased range of motion of right knee     3. Right knee pain, unspecified chronicity     4. Decreased functional mobility       Pain: 5 /10  Pt stated that she was doing well today.      Objective:     Pt presented to session with RW.  Session initiated stationary bike with vcs on partial revolution the 1st 5 minutes supervised f/b TE on bike with cues and assist for full backward revolutions partial revolutions x 3' f/b 3' supervised for 10' total.  MT x 15'' with retrograde edema massage to R knee and STM to R quad and HS with hs ms splaying and MFR to distal HS and proximal Gastroc, gentle patellar mobs. Pt performed supine and seated and standing TE per log x 38' including unattended E-stim to R LE during some TE in supine with vcs throughout for initiation of movement 10 secs on/10 secs off, ramp up, 2secs at 80pps to quad and VMO x 10' total. Pt performed gait training with SPC and cues from PT and CGASession ended with CP x 10' in seated with LE elevated on traction bench    FOTO next visit*   Date  2/17/2017 2/16/17 2/14/2017 2/13/2017 2/8/17   VISIT 6 5 4 3 2   POC 4/7/17 4/7/17 4/7/17 4/7/2017 4/7/2017   Gcode 6/10 5/10 4/10 3/10 2/10   Visit amount   total 125.8  639.94 61.84  514.14 145.00  457.30 142.88  312.30 93.82  169.42   MT 10' 10' 15' 20 20   HS supine with str 3 x 30'' R 3 x 30'' R 3 x 30'' R 3 x 30'' R 3 x 30'' R   Gastroc Str. W/ strap W/ strap W/ strap W/ strap --   Bike 10' with 10 bacward full revs 10' partial revs 10' partial revs 10'  partial revs Hold     QS 4'' on estim 10'' holds R 4'' on estim 10'' holds R 4'' on estim 10'' holds R 4'' on estim 10'' holds R 5'' x 15   SAQ 3'' Estim R 3'' Estim R 3'' Estim R 3'' Estim R  2 x 10 B   SLR 3'' Estim R  Mod A 3'' Estim R  Mod A 3'' Estim R  Mod A 3'' Estim R 2 x 10 R   SL Abd 2 x 10 R 2 x 10 R 2 x 10 R 2 x 10   2 x 10    Heel Slides 10'' x 10 R 10'' x 10 r 10 x 10 r 10 x 10 r hold   Sisi Hip Add 5'' x10 5'' x10 5'' x10 5'' x10 next   LAQs 2x10 R 2x10 B OOT OOT 2 x 10 B   Seated Hip Flex OOT 2x10 B OOT OOT 2 x 30'' B   Cybex   Leg Press next next -- -- --   TKE -- -- -- -- --   Hip Abd 2 x 10 R 2 x 10 B -- -- --   Hip Flex 2 x 10  R Next  -- -- --   Hip Ext 2 x 10 R next -- -- --   HS Curls -- -- -- -- --   Knee Ext -- -- -- -- --   Step Ups -- -- -- -- --   Step Downs -- -- -- -- -   Gait See note  see note -- -- --   CP 10' 10' 10' 10' 10'   Initials FS FS KV FS FS       Assessment:     Pt able to perform 10 full revolutions backward on back indicating increase LE flexion.    Patient Education/Response:     CONT HEP    Plans and Goals:     CONT POC and progress as tolerated to SPC  Short Term Goals (4 Weeks):   1. Independent with initial HEP for L/E strengthening and knee ROM.  2. Increase R knee AROM to 0-105.  3. Increase R knee PROM to 0-110  4. Increase R hip and knee strength to be 75-80% of L LE.  5. Decrease edema compared to L knee.    Long Term Goals (8 Weeks):   1. Independent with updated HEP.  2. Increase R knee AROM to 0-120.  3. Increase R L/E strength to 90% of L LE.  4. Able to ambulate community distances without use of assistive device without pain or gait deficits.  5. Pt will report 61% on the FOTO lower extremity assessment placing the patient in the 60-80% impaired, limited, or restricted category indicating increased functional LE mobility.  6. Pt will report 20/68 on the KOOS functional mobility assessment placing the patient in the 20-40% impaired, limited, or restricted  category indicating increased functional LE mobility. Gcode 8961 Goal mobility CJ

## 2017-02-17 NOTE — TELEPHONE ENCOUNTER
----- Message from Aleyda Flores sent at 2/17/2017  1:39 PM CST -----  Contact: [phone]/self   Pt states she's returning your call.   Please advise

## 2017-02-17 NOTE — TELEPHONE ENCOUNTER
----- Message from Aleyda Flores sent at 2/17/2017  1:39 PM CST -----  Contact: [phone]/self   Pt states she's returning your call.   Please advise

## 2017-02-20 ENCOUNTER — RESEARCH ENCOUNTER (OUTPATIENT)
Dept: RESEARCH | Facility: HOSPITAL | Age: 67
End: 2017-02-20

## 2017-02-20 ENCOUNTER — CLINICAL SUPPORT (OUTPATIENT)
Dept: REHABILITATION | Facility: HOSPITAL | Age: 67
End: 2017-02-20
Attending: ORTHOPAEDIC SURGERY
Payer: MEDICARE

## 2017-02-20 DIAGNOSIS — R26.89 DECREASED FUNCTIONAL MOBILITY: ICD-10-CM

## 2017-02-20 DIAGNOSIS — R26.2 DIFFICULTY WALKING: ICD-10-CM

## 2017-02-20 DIAGNOSIS — M25.561 RIGHT KNEE PAIN, UNSPECIFIED CHRONICITY: ICD-10-CM

## 2017-02-20 DIAGNOSIS — M25.661 DECREASED RANGE OF MOTION OF RIGHT KNEE: ICD-10-CM

## 2017-02-20 PROCEDURE — G8979 MOBILITY GOAL STATUS: HCPCS | Mod: CJ,PN

## 2017-02-20 PROCEDURE — G8978 MOBILITY CURRENT STATUS: HCPCS | Mod: CJ,PN

## 2017-02-20 PROCEDURE — 97110 THERAPEUTIC EXERCISES: CPT | Mod: PN

## 2017-02-20 PROCEDURE — 97140 MANUAL THERAPY 1/> REGIONS: CPT | Mod: PN

## 2017-02-20 NOTE — PROGRESS NOTES
Contacted Tracy Armendariz regarding the [employer] registry. Patient has completed the 18 month follow-up period and will be exited from the study, effective 02/20/2017. The chart was reviewed for adverse events, of which none were found, and the patient does not report any additional events. Patient verbalized understanding of study exit

## 2017-02-20 NOTE — PROGRESS NOTES
TIME RECORD    Date:  02/20/2017    Start Time:  1400  Stop Time:  1510    PROCEDURES:    TIMED  Procedure Min.   MT 20   TE 30   Supervised TE 10             UNTIMED  Procedure Min.   CP 10         Total Timed Minutes:  50  Total Timed Units:  3   Total Untimed Units:  0  Charges Billed/# of units:  3 (2 TE, 1MT)      Progress/Current Status    Subjective:     Patient ID: Tracy Armendariz is a 66 y.o. female.  Diagnosis:   1. Difficulty walking     2. Decreased range of motion of right knee     3. Right knee pain, unspecified chronicity     4. Decreased functional mobility       Pain: 4 /10  Pt presented to session feeling good.      Objective:     Pt presented to session with RW.  Session initiated stationary bike with partial revolution supervised for 10' total.  PT performed LE assessment x 10' f/b gait training with SPC and cues from PT and SBA (pt get distracted easily with this).  MT x 15'' with retrograde edema massage to R knee and STM to R quad and HS with hs ms splaying and MFR to distal HS and proximal Gastroc, gentle patellar mobs. Pt performed supine and seated and standing TE per log x 30' no estim. Pt performed Session ended with CP x 10' in seated with LE elevated on traction bench       Date  2/20/2017 2/17/2017 2/16/17 2/14/2017 2/13/2017 2/8/17   VISIT 7 6 5 4 3 2   POC 4/7/17 4/7/17 4/7/17 4/7/17 4/7/2017 4/7/2017   Gcode (KOOS 1/10 6/10 5/10 4/10 3/10 2/10   Visit amount   total 93.82  733.76 125.8  639.94 61.84  514.14 145.00  457.30 142.88  312.30 93.82  169.42   MT 10' 10' 10' 15' 20 20   HS supine with str MT 3 x 30'' R 3 x 30'' R 3 x 30'' R 3 x 30'' R 3 x 30'' R   Gastroc Str. NT W/ strap W/ strap W/ strap W/ strap --   Bike 10' bacward full revs 10' with 10 bacward full revs 10' partial revs 10' partial revs 10' partial revs Hold     QS 10'' x 10 B 4'' on estim 10'' holds R 4'' on estim 10'' holds R 4'' on estim 10'' holds R 4'' on estim 10'' holds R 5'' x 15   SAQ 2 x 10 B 3''  Estim R 3'' Estim R 3'' Estim R 3'' Estim R  2 x 10 B   SLR 2 x 10 B 3'' Estim R  Mod A 3'' Estim R  Mod A 3'' Estim R  Mod A 3'' Estim R 2 x 10 R   SL Abd 2 x 10 B 2 x 10 R 2 x 10 R 2 x 10 R 2 x 10   2 x 10    Heel Slides 10'' x 10 R 10'' x 10 R 10'' x 10 r 10 x 10 r 10 x 10 r hold   Sisi Hip Add 10'' x10 seated 5'' x10 5'' x10 5'' x10 5'' x10 next   LAQs 2x10 R 2x10 R 2x10 B OOT OOT 2 x 10 B   Seated Hip Flex 2 x 30'' B OOT 2x10 B OOT OOT 2 x 30'' B   Cybex   Leg Press OOT/next next next -- -- --   TKE -- -- -- -- -- --   Hip Abd 2 x 10 B 2 x 10 R 2 x 10 B -- -- --   Hip Flex 2 x 10 B 2 x 10  R Next  -- -- --   Hip Ext 2 x 10 B 2 x 10 R next -- -- --   HS Curls -- -- -- -- -- --   Knee Ext -- -- -- -- -- --   Step Ups -- -- -- -- -- --   Step Downs -- -- -- -- -- -   Gait See note See note  see note -- -- --   CP 10' 10' 10' 10' 10' 10'   Initials FS FS FS KV FS FS     AROM: 5-90  PROM: 4-105     L/E Strength w/ MicroFET Muscle Michelle Dynamometer Right Left Pain/Dysfunction with Movement   (approx 4 sec hold w/ max contraction)   Hip Flexion 14.6 kg  15.2 kg      Hip Abduction 13.8 kg  16.6kg      Quadriceps 9.4 kg  18.0 kg      Hamstrings 13.8 kg  18.4 kg         TU seconds (w/ assistive device)  30 second sit-to-stand test (without U/E support):  10 with UE support  KOOS Knee Survey:   Functional, Daily Livin/68 (35% disabled) Gcode CJ      Assessment:     PT ROM and gait improving as well as function and transfers.      Patient Education/Response:     CONT HEP with heel slides and progress toward cane gradually with  providing CGA.      Plans and Goals:     CONT POC and progress as tolerated to SPC  Short Term Goals (4 Weeks):   1. Independent with initial HEP for L/E strengthening and knee ROM.  2. Increase R knee AROM to 0-105.  3. Increase R knee PROM to 0-110  4. Increase R hip and knee strength to be 75-80% of L LE.  5. Decrease edema compared to L knee.    Long Term Goals (8 Weeks):   1.  Independent with updated HEP.  2. Increase R knee AROM to 0-120.  3. Increase R L/E strength to 90% of L LE.  4. Able to ambulate community distances without use of assistive device without pain or gait deficits.  5. Pt will report 61% on the FOTO lower extremity assessment placing the patient in the 60-80% impaired, limited, or restricted category indicating increased functional LE mobility.  6. Pt will report 20/68 on the KOOS functional mobility assessment placing the patient in the 20-40% impaired, limited, or restricted category indicating increased functional LE mobility. ode [id number] Goal mobility CJ

## 2017-02-22 ENCOUNTER — CLINICAL SUPPORT (OUTPATIENT)
Dept: REHABILITATION | Facility: HOSPITAL | Age: 67
End: 2017-02-22
Attending: ORTHOPAEDIC SURGERY
Payer: MEDICARE

## 2017-02-22 DIAGNOSIS — M25.561 RIGHT KNEE PAIN, UNSPECIFIED CHRONICITY: ICD-10-CM

## 2017-02-22 DIAGNOSIS — R26.2 DIFFICULTY WALKING: ICD-10-CM

## 2017-02-22 DIAGNOSIS — M25.661 DECREASED RANGE OF MOTION OF RIGHT KNEE: ICD-10-CM

## 2017-02-22 DIAGNOSIS — R26.89 DECREASED FUNCTIONAL MOBILITY: ICD-10-CM

## 2017-02-22 PROCEDURE — 97110 THERAPEUTIC EXERCISES: CPT | Mod: PN

## 2017-02-22 PROCEDURE — 97116 GAIT TRAINING THERAPY: CPT | Mod: PN

## 2017-02-22 NOTE — PROGRESS NOTES
"TIME RECORD    Date:  02/22/2017    Start Time:  1255  Stop Time:  1400    PROCEDURES:    TIMED  Procedure Min.   Supervised TE 30   MT 12'   TE 5   gait 8         UNTIMED  Procedure Min.             Total Timed Minutes:  25  Total Timed Units:  0  Total Untimed Units:  2  Charges Billed/# of units:  2 (1MT, 1 gait)      Progress/Current Status    Subjective:     Patient ID: Tracy Armendariz is a 66 y.o. female.  Diagnosis:   1. Difficulty walking     2. Decreased range of motion of right knee     3. Right knee pain, unspecified chronicity     4. Decreased functional mobility       Pain: 3 /10  Pt stated she had a good MD appt and she was feeling "tickled" because everyday gets a little better.      Objective:     Pt presented to session with RW.  Session initiated stationary bike with partial revolution supervised for 10' total. Gait training with SPC and cues from PT and SBA (pt get distracted easily with this) then pt performed gait training without  ft with improved gait pattern compared to using SPC.  Pt performed supervised TE per log x 20'.  PT performed MT x 12'' with retrograde edema massage to R knee and STM to R quad and HS with hs ms splaying and MFR to distal HS and proximal Gastroc, gentle patellar mobs. Pt performed supine TE per log x 5' with PT 1:1. Session ended with CP x 10' in supine with LE elevated on wedge.         Date  2/22/2017 2/20/2017 2/17/2017 2/16/17 2/14/2017 2/13/2017 2/8/17   VISIT 8 7 6 5 4 3 2   POC 4/7/17 4/7/17 4/7/17 4/7/17 4/7/17 4/7/2017 4/7/2017   Gcode (KOOS 2/10 1/10 6/10 5/10 4/10 3/10 2/10   Visit amount   total 60.41  794.17 93.82  733.76 125.8  639.94 61.84  514.14 145.00  457.30 142.88  312.30 93.82  169.42   MT 10' 10' 10' 10' 15' 20 20   HS supine with str -- MT 3 x 30'' R 3 x 30'' R 3 x 30'' R 3 x 30'' R 3 x 30'' R   Gastroc Str. -- NT W/ strap W/ strap W/ strap W/ strap --   Bike 10' 10' bacward full revs 10' with 10 bacward full revs 10' partial " revs 10' partial revs 10' partial revs Hold     QS 10'' x 10 B 10'' x 10 B 4'' on estim 10'' holds R 4'' on estim 10'' holds R 4'' on estim 10'' holds R 4'' on estim 10'' holds R 5'' x 15   SAQ 2 x 10 B 2 x 10 B 3'' Estim R 3'' Estim R 3'' Estim R 3'' Estim R  2 x 10 B   SLR 2 x 10 B 2 x 10 B 3'' Estim R  Mod A 3'' Estim R  Mod A 3'' Estim R  Mod A 3'' Estim R 2 x 10 R   SL Abd 2 x 10 B 2 x 10 B 2 x 10 R 2 x 10 R 2 x 10 R 2 x 10   2 x 10    Heel Slides 10'' x 10 R 10'' x 10 R 10'' x 10 R 10'' x 10 r 10 x 10 r 10 x 10 r hold   Sisi Hip Add 10'' x10 seated 10'' x10 seated 5'' x10 5'' x10 5'' x10 5'' x10 next   LAQs 2x10 R 2x10 R 2x10 R 2x10 B OOT OOT 2 x 10 B   Seated Hip Flex 2 x 30'' B 2 x 30'' B OOT 2x10 B OOT OOT 2 x 30'' B   Cybex   Leg Press 3.0 DL  2 x 10 B OOT/next next next -- -- --   TKE -- -- -- -- -- -- --   Hip Abd 2 x 10 B 2 x 10 B 2 x 10 R 2 x 10 B -- -- --   Hip Flex 2 x 10 B 2 x 10 B 2 x 10  R Next  -- -- --   Hip Ext 2 x 10 B 2 x 10 B 2 x 10 R next -- -- --   HS Curls -- -- -- -- -- -- --   Knee Ext -- -- -- -- -- -- --   Step Ups -- -- -- -- -- -- --   Step Downs -- -- -- -- -- -- -   Gait See note See note See note  see note -- -- --   CP 10' 10' 10' 10' 10' 10' 10'   Initials FS FS FS FS KV FS FS       Assessment:     Pt has progressed to walking without SPC.      Patient Education/Response:     CONT HEP    Plans and Goals:     CONT POC and progress as tolerated to SPC  Short Term Goals (4 Weeks):   1. Independent with initial HEP for L/E strengthening and knee ROM.  2. Increase R knee AROM to 0-105.  3. Increase R knee PROM to 0-110  4. Increase R hip and knee strength to be 75-80% of L LE.  5. Decrease edema compared to L knee.    Long Term Goals (8 Weeks):   1. Independent with updated HEP.  2. Increase R knee AROM to 0-120.  3. Increase R L/E strength to 90% of L LE.  4. Able to ambulate community distances without use of assistive device without pain or gait deficits.  5. Pt will report 61%  on the FOTO lower extremity assessment placing the patient in the 60-80% impaired, limited, or restricted category indicating increased functional LE mobility.  6. Pt will report 20/68 on the KOOS functional mobility assessment placing the patient in the 20-40% impaired, limited, or restricted category indicating increased functional LE mobility. Gcode 8963 Goal mobility CJ

## 2017-02-24 ENCOUNTER — CLINICAL SUPPORT (OUTPATIENT)
Dept: REHABILITATION | Facility: HOSPITAL | Age: 67
End: 2017-02-24
Attending: ORTHOPAEDIC SURGERY
Payer: MEDICARE

## 2017-02-24 DIAGNOSIS — M25.661 DECREASED RANGE OF MOTION OF RIGHT KNEE: ICD-10-CM

## 2017-02-24 DIAGNOSIS — M25.561 RIGHT KNEE PAIN, UNSPECIFIED CHRONICITY: ICD-10-CM

## 2017-02-24 DIAGNOSIS — R26.2 DIFFICULTY WALKING: ICD-10-CM

## 2017-02-24 DIAGNOSIS — R26.89 DECREASED FUNCTIONAL MOBILITY: ICD-10-CM

## 2017-02-24 PROCEDURE — 97140 MANUAL THERAPY 1/> REGIONS: CPT | Mod: PN

## 2017-02-24 PROCEDURE — 97110 THERAPEUTIC EXERCISES: CPT | Mod: PN

## 2017-02-24 NOTE — PROGRESS NOTES
"TIME RECORD    Date:  02/24/2017    Start Time:  1255  Stop Time:   0210    PROCEDURES:    TIMED  Procedure Min.   MT 20'   TE 30             UNTIMED  Procedure Min.   bike 10'  NC   CP 10' NC     Total Timed Minutes:    Total Timed Units:  0  Total Untimed Units:    Charges Billed/# of units:      Progress/Current Status    Subjective:     Patient ID: Tracy Armendariz is a 66 y.o. female.  Diagnosis:   1. Difficulty walking     2. Decreased range of motion of right knee     3. Right knee pain, unspecified chronicity     4. Decreased functional mobility       Pain: 5 /10  Patient reports R lateral hip pain.  Objective:     Pt presented to session carrying SPC.   Patient performed bike x 10 minutes with supervision.  Patient received MT x 20 minutes consisting of  retrograde edema massage to R knee and STM to R quad and HS with hs ms splaying and MFR to distal HS and proximal Gastroc, hamstring/gastroc stretching gentle patellar mobs and STM/MFR to proximal ITB. Pt performed TE per log x 30 minutes 1:1 with PTA . Ambulated in and around gym without AD.  Session ended with CP x 10' in supine with LE elevated on wedge.         Date  2./24/17 2/22/17 2/20/2017 2/17/2017 2/16/17 2/14/2017 2/13/2017 2/8/17   VISIT 9 8 7 6 5 4 3 2   POC 4/7/17 4/7/17 4/7/17 4/7/17 4/7/17 4/7/17 4/7/2017 4/7/2017   Gcode (KOOS 3/10 2/10 1/10 6/10 5/10 4/10 3/10 2/10   Visit amount   total 93.82  887.99 60.41  794.17 93.82  733.76 125.8  639.94 61.84  514.14 145.00  457.30 142.88  312.30 93.82  169.42   MT 15' 10' 10' 10' 10' 15' 20 20   HS supine with str - -- MT 3 x 30'' R 3 x 30'' R 3 x 30'' R 3 x 30'' R 3 x 30'' R   Gastroc Str. - -- NT W/ strap W/ strap W/ strap W/ strap --   Bike 10' 10' 10' bacward full revs 10' with 10 bacward full revs 10' partial revs 10' partial revs 10' partial revs Hold     QS 10"x10 B 10'' x 10 B 10'' x 10 B 4'' on estim 10'' holds R 4'' on estim 10'' holds R 4'' on estim 10'' holds R 4'' on estim 10'' " "holds R 5'' x 15   SAQ 2x12 B 2 x 10 B 2 x 10 B 3'' Estim R 3'' Estim R 3'' Estim R 3'' Estim R  2 x 10 B   SLR 2x12 B 2 x 10 B 2 x 10 B 3'' Estim R  Mod A 3'' Estim R  Mod A 3'' Estim R  Mod A 3'' Estim R 2 x 10 R   SL Abd 2x12 B 2 x 10 B 2 x 10 B 2 x 10 R 2 x 10 R 2 x 10 R 2 x 10   2 x 10    Heel Slides 10"x10 R 10'' x 10 R 10'' x 10 R 10'' x 10 R 10'' x 10 r 10 x 10 r 10 x 10 r hold   Sisi Hip Add 10"x10  seated 10'' x10 seated 10'' x10 seated 5'' x10 5'' x10 5'' x10 5'' x10 next   LAQs 2x12 R 2x10 R 2x10 R 2x10 R 2x10 B OOT OOT 2 x 10 B   Seated Hip Flex  2 x 30'' B 2 x 30'' B OOT 2x10 B OOT OOT 2 x 30'' B   Cybex   Leg Press 3.0  DL  2x10 B 3.0 DL  2 x 10 B OOT/next next next -- -- --   TKE  -- -- -- -- -- -- --   Hip Abd 2x10 B 2 x 10 B 2 x 10 B 2 x 10 R 2 x 10 B -- -- --   Hip Flex 2x10 B 2 x 10 B 2 x 10 B 2 x 10  R Next  -- -- --   Hip Ext 2x10 B 2 x 10 B 2 x 10 B 2 x 10 R next -- -- --   HS Curls -- -- -- -- -- -- -- --   Knee Ext -- -- -- -- -- -- -- --   Step Ups -- -- -- -- -- -- -- --   Step Downs -- -- -- -- -- -- -- -   Gait -- See note See note See note  see note -- -- --   CP 10' 10' 10' 10' 10' 10' 10' 10'   Initials CS 1/6 FS FS FS FS KV FS FS       Assessment:   Patient fatigued at end of session but no report of increased pain.    Patient Education/Response:     CONT HEP    Plans and Goals:     CONT POC and progress as tolerated to SPC  Short Term Goals (4 Weeks):   1. Independent with initial HEP for L/E strengthening and knee ROM.  2. Increase R knee AROM to 0-105.  3. Increase R knee PROM to 0-110  4. Increase R hip and knee strength to be 75-80% of L LE.  5. Decrease edema compared to L knee.    Long Term Goals (8 Weeks):   1. Independent with updated HEP.  2. Increase R knee AROM to 0-120.  3. Increase R L/E strength to 90% of L LE.  4. Able to ambulate community distances without use of assistive device without pain or gait deficits.  5. Pt will report 61% on the FOTO lower extremity " assessment placing the patient in the 60-80% impaired, limited, or restricted category indicating increased functional LE mobility.  6. Pt will report 20/68 on the KOOS functional mobility assessment placing the patient in the 20-40% impaired, limited, or restricted category indicating increased functional LE mobility. Gcode 8946 Goal mobility CJ

## 2017-02-27 ENCOUNTER — CLINICAL SUPPORT (OUTPATIENT)
Dept: REHABILITATION | Facility: HOSPITAL | Age: 67
End: 2017-02-27
Attending: ORTHOPAEDIC SURGERY
Payer: MEDICARE

## 2017-02-27 ENCOUNTER — CLINICAL SUPPORT (OUTPATIENT)
Dept: ELECTROPHYSIOLOGY | Facility: CLINIC | Age: 67
End: 2017-02-27
Payer: MEDICARE

## 2017-02-27 DIAGNOSIS — M25.561 RIGHT KNEE PAIN, UNSPECIFIED CHRONICITY: ICD-10-CM

## 2017-02-27 DIAGNOSIS — R26.89 DECREASED FUNCTIONAL MOBILITY: ICD-10-CM

## 2017-02-27 DIAGNOSIS — I63.9 CRYPTOGENIC STROKE: ICD-10-CM

## 2017-02-27 DIAGNOSIS — R26.2 DIFFICULTY WALKING: ICD-10-CM

## 2017-02-27 DIAGNOSIS — Z95.818 PRESENCE OF CARDIAC DEVICE: ICD-10-CM

## 2017-02-27 DIAGNOSIS — M25.661 DECREASED RANGE OF MOTION OF RIGHT KNEE: ICD-10-CM

## 2017-02-27 PROCEDURE — 93297 REM INTERROG DEV EVAL ICPMS: CPT | Mod: S$GLB,,, | Performed by: INTERNAL MEDICINE

## 2017-02-27 PROCEDURE — 97110 THERAPEUTIC EXERCISES: CPT | Mod: PN

## 2017-02-27 PROCEDURE — 97140 MANUAL THERAPY 1/> REGIONS: CPT | Mod: PN

## 2017-02-27 PROCEDURE — 93299 LOOP RECORDER REMOTE: CPT | Mod: S$GLB,,, | Performed by: INTERNAL MEDICINE

## 2017-02-27 NOTE — PROGRESS NOTES
"TIME RECORD    Date:  02/27/2017    Start Time:  1300  Stop Time:  1415    PROCEDURES:    TIMED  Procedure Min.   Supervised TE 10   MT 15   TE 40             UNTIMED  Procedure Min.   CP 10         Total Timed Minutes:  55  Total Timed Units:  4  Total Untimed Units:  0  Charges Billed/# of units:  4 (3TE, 1MT)      Progress/Current Status    Subjective:     Patient ID: Tracy Armendariz is a 66 y.o. female.  Diagnosis:   1. Difficulty walking     2. Decreased range of motion of right knee     3. Right knee pain, unspecified chronicity     4. Decreased functional mobility       Pain: 2 /10  Pt stated that she has been walking around good without her cane.    Objective:       Pt presented to session with RW.  Session initiated stationary bike with partial revolution supervised for 8' total.  Pt performed supine and seated and standing TE per log x 30' f/b LE assessment and objective measures. MT x 15'' with retrograde edema massage to R knee and STM to R quad and HS with hs ms splaying and MFR to distal HS and proximal Gastroc, gentle patellar mob along with scar STM at proximal scar.  Pt performed Session ended with CP x 10' in supine with LE elevated on wedge.       Date  2/27/2017 2./24/17 2/22/17 2/20/2017 2/17/2017 2/16/17 2/14/2017 2/13/2017 2/8/17   VISIT FOTO10 9 8 7 6 5 4 3 2   POC 4/7/17 4/7/17 4/7/17 4/7/17 4/7/17 4/7/17 4/7/17 4/7/2017 4/7/2017   Gcode (KOOS) 4/10 3/10 2/10 1/10 6/10 5/10 4/10 3/10 2/10   Visit amount   total 125.8  1013.79 93.82  887.99 60.41  794.17 93.82  733.76 125.8  639.94 61.84  514.14 145.00  457.30 142.88  312.30 93.82  169.42   MT 15' 15' 10' 10' 10' 10' 15' 20 20   HS supine with str -- - -- MT 3 x 30'' R 3 x 30'' R 3 x 30'' R 3 x 30'' R 3 x 30'' R   Gastroc Str.  - -- NT W/ strap W/ strap W/ strap W/ strap --   Bike 8' 10' 10' 10' bacward full revs 10' with 10 bacward full revs 10' partial revs 10' partial revs 10' partial revs Hold     QS 10"x10 B 10"x10 B 10'' x 10 B " "10'' x 10 B 4'' on estim 10'' holds R 4'' on estim 10'' holds R 4'' on estim 10'' holds R 4'' on estim 10'' holds R 5'' x 15   SAQ -- 2x12 B 2 x 10 B 2 x 10 B 3'' Estim R 3'' Estim R 3'' Estim R 3'' Estim R  2 x 10 B   SLR -- 2x12 B 2 x 10 B 2 x 10 B 3'' Estim R  Mod A 3'' Estim R  Mod A 3'' Estim R  Mod A 3'' Estim R 2 x 10 R   SL Abd -- 2x12 B 2 x 10 B 2 x 10 B 2 x 10 R 2 x 10 R 2 x 10 R 2 x 10   2 x 10    Heel Slides 10"x10 R 10"x10 R 10'' x 10 R 10'' x 10 R 10'' x 10 R 10'' x 10 r 10 x 10 r 10 x 10 r hold   Sisi Hip Add 10"x10  seated 10"x10  seated 10'' x10 seated 10'' x10 seated 5'' x10 5'' x10 5'' x10 5'' x10 next   LAQs 2x15 R 2x12 R 2x10 R 2x10 R 2x10 R 2x10 B OOT OOT 2 x 10 B   Seated Hip Flex --  2 x 30'' B 2 x 30'' B OOT 2x10 B OOT OOT 2 x 30'' B   Cybex   Leg Press 3.5  DL  2x10 B  2.5 R 2x10 3.0  DL  2x10 B 3.0 DL  2 x 10 B OOT/next next next -- -- --   TKE RTB 2 x 10  -- -- -- -- -- -- --   Hip Abd 2x10 B 1# 2x10 B 2 x 10 B 2 x 10 B 2 x 10 R 2 x 10 B -- -- --   Hip Flex 2x10 B 1# 2x10 B 2 x 10 B 2 x 10 B 2 x 10  R Next  -- -- --   Hip Ext 2x10 B 1# 2x10 B 2 x 10 B 2 x 10 B 2 x 10 R next -- -- --   HS Curls next -- -- -- -- -- -- -- --   Knee Ext -- -- -- -- -- -- -- -- --   Step Ups 2 x 10 B -- -- -- -- -- -- -- --   Step Downs -- -- -- -- -- -- -- -- -   Gait -- -- See note See note See note  see note -- -- --   CP 10' 10' 10' 10' 10' 10' 10' 10' 10'   Initials FS CS 1/6 FS FS FS FS KV FS FS     AROM: 0-100  PROM: 0-105      [facility]/ [facility] Muscle Michelle Dynamometer Right Left Pain/Dysfunction with Movement   (approx 4 sec hold w/ max contraction)   Hip Flexion 12.7 kg  13.7 kg       Hip Abduction 15.1 kg  16.6kg       Quadriceps 10.2kg  17.3 kg       Hamstrings 16.9 kg  20.4 kg           TU seconds (w/0 assistive device)  30 second sit-to-stand test (without U/E support): 12 with UE support  KOOS Knee Survey:     Assessment:     Pt ROM improved but her strength measured lower b/c she " was in more pain during exercises because she reported not taking prescribed meds before PT.      Patient Education/Response:     CONT HEP, take meds as prescribed before therapy if it helps with tolerance of TE.      Plans and Goals:     CONT POC and progress as tolerated to SPC  Short Term Goals (4 Weeks):   1. Independent with initial HEP for L/E strengthening and knee ROM.  2. Increase R knee AROM to 0-105.   3. Increase R knee PROM to 0-110.   4. Increase R hip and knee strength to be 75-80% of L LE.  5. Decrease edema compared to L knee.    Long Term Goals (8 Weeks):   1. Independent with updated HEP.  2. Increase R knee AROM to 0-120.  3. Increase R L/E strength to 90% of L LE.  4. Able to ambulate community distances without use of assistive device without pain or gait deficits.  5. Pt will report 61% on the FOTO lower extremity assessment placing the patient in the 60-80% impaired, limited, or restricted category indicating increased functional LE mobility.  6. Pt will report 20/68 on the KOOS functional mobility assessment placing the patient in the 20-40% impaired, limited, or restricted category indicating increased functional LE mobility. ode [id number] Goal mobility CJ

## 2017-03-03 ENCOUNTER — CLINICAL SUPPORT (OUTPATIENT)
Dept: REHABILITATION | Facility: HOSPITAL | Age: 67
End: 2017-03-03
Attending: ORTHOPAEDIC SURGERY
Payer: MEDICARE

## 2017-03-03 DIAGNOSIS — R26.2 DIFFICULTY WALKING: ICD-10-CM

## 2017-03-03 DIAGNOSIS — M25.561 RIGHT KNEE PAIN, UNSPECIFIED CHRONICITY: ICD-10-CM

## 2017-03-03 DIAGNOSIS — R26.89 DECREASED FUNCTIONAL MOBILITY: ICD-10-CM

## 2017-03-03 DIAGNOSIS — M25.661 DECREASED RANGE OF MOTION OF RIGHT KNEE: ICD-10-CM

## 2017-03-03 PROCEDURE — 97110 THERAPEUTIC EXERCISES: CPT | Mod: PN

## 2017-03-03 PROCEDURE — 97140 MANUAL THERAPY 1/> REGIONS: CPT | Mod: PN

## 2017-03-03 NOTE — PROGRESS NOTES
TIME RECORD    Date:  03/03/2017    Start Time:  1100  Stop Time:  1120     PROCEDURES:    TIMED  Procedure Min.   MT 10                     UNTIMED  Procedure Min.             Total Timed Minutes:  10  Total Timed Units:  1  Total Untimed Units:  0  Charges Billed/# of units:  1 MT      Progress/Current Status    Subjective:     Patient ID: Tracy Armendariz is a 66 y.o. female.  Diagnosis:   1. Difficulty walking     2. Decreased range of motion of right knee     3. Right knee pain, unspecified chronicity     4. Decreased functional mobility       Pain: 2 /10  Pt stated that she was sick with a stomach virus on Wednesday.  She stated that she has been doing her exercises and walking around the block.    Objective:     Session initated with supervised TE x 10' on bike. PT performedPatient received MT x 10 minutes consisting of  retrograde edema massage to R knee and STM to R quad and HS with hs ms splaying and MFR to distal HS and proximal Gastroc, hamstring/gastroc stretching gentle patellar mobs and STM/MFR to proximal ITB. Remainder of session and TE performed with CS see her note.    Assessment:     See CS    Patient Education/Response:     CONT HEP    Plans and Goals:       CONT POC and progress as tolerated to SPC  Short Term Goals (4 Weeks):   1. Independent with initial HEP for L/E strengthening and knee ROM.  2. Increase R knee AROM to 0-105.   3. Increase R knee PROM to 0-110.   4. Increase R hip and knee strength to be 75-80% of L LE.  5. Decrease edema compared to L knee.    Long Term Goals (8 Weeks):   1. Independent with updated HEP.  2. Increase R knee AROM to 0-120.  3. Increase R L/E strength to 90% of L LE.  4. Able to ambulate community distances without use of assistive device without pain or gait deficits.  5. Pt will report 61% on the FOTO lower extremity assessment placing the patient in the 60-80% impaired, limited, or restricted category indicating increased functional LE mobility.  6. Pt  will report 20/68 on the KOOS functional mobility assessment placing the patient in the 20-40% impaired, limited, or restricted category indicating increased functional LE mobility. ode [id number] Goal mobility CJ

## 2017-03-03 NOTE — PROGRESS NOTES
"TIME RECORD    Date:  03/03/2017    Start Time:  1120  Stop Time:   1210  PROCEDURES:    TIMED  Procedure Min.   TE 40                     UNTIMED  Procedure Min.   CP 10         Total Timed Minutes:  55  Total Timed Units:  4  Total Untimed Units:  0  Charges Billed/# of units:  4 (3TE, 1MT)      Progress/Current Status    Subjective:     Patient ID: Tracy Armendariz is a 66 y.o. female.  Diagnosis:   1. Difficulty walking     2. Decreased range of motion of right knee     3. Right knee pain, unspecified chronicity     4. Decreased functional mobility       Pain: see note by Betsy Nunez, PT    Objective:       Patient seen by Betsy Nunez, PT for bike and manual therapy (see her note)  Patient then performed therex as outlined per log 1:1 with PTA x 40 minutes.  CP to R anterior and posterior knee with elevation x 10 minutes.     Date  3/3/17 2/27/17 2./24/17 2/22/17 2/20/2017 2/17/2017 2/16/17 2/14/2017 2/13/2017 2/8/17   VISIT 11 FOTO10 9 8 7 6 5 4 3 2   POC 4/7/17 4/7/17 4/7/17 4/7/17 4/7/17 4/7/17 4/7/17 4/7/17 4/7/2017 4/7/2017   Gcode (KOOS) 5/10 4/10 3/10 2/10 1/10 6/10 5/10 4/10 3/10 2/10   Visit amount   total 125.82 125.8  1013.79 93.82  887.99 60.41  794.17 93.82  733.76 125.8  639.94 61.84  514.14 145.00  457.30 142.88  312.30 93.82  169.42   MT See note by  FS, PT 15' 15' 10' 10' 10' 10' 15' 20 20   HS supine with str - -- - -- MT 3 x 30'' R 3 x 30'' R 3 x 30'' R 3 x 30'' R 3 x 30'' R   Gastroc Str.   - -- NT W/ strap W/ strap W/ strap W/ strap --   Bike See note by FS PT 8' 10' 10' 10' bacward full revs 10' with 10 bacward full revs 10' partial revs 10' partial revs 10' partial revs Hold     QS 10"x10 B 10"x10 B 10"x10 B 10'' x 10 B 10'' x 10 B 4'' on estim 10'' holds R 4'' on estim 10'' holds R 4'' on estim 10'' holds R 4'' on estim 10'' holds R 5'' x 15   SAQ -- -- 2x12 B 2 x 10 B 2 x 10 B 3'' Estim R 3'' Estim R 3'' Estim R 3'' Estim R  2 x 10 B   SLR -- -- 2x12 B 2 x 10 B 2 x 10 B " "3'' Estim R  Mod A 3'' Estim R  Mod A 3'' Estim R  Mod A 3'' Estim R 2 x 10 R   SL Abd -- -- 2x12 B 2 x 10 B 2 x 10 B 2 x 10 R 2 x 10 R 2 x 10 R 2 x 10   2 x 10    Heel Slides 10"x10 R 10"x10 R 10"x10 R 10'' x 10 R 10'' x 10 R 10'' x 10 R 10'' x 10 r 10 x 10 r 10 x 10 r hold   Sisi Hip Add NT 10"x10  seated 10"x10  seated 10'' x10 seated 10'' x10 seated 5'' x10 5'' x10 5'' x10 5'' x10 next   LAQs 1# 2x10 R 2x15 R 2x12 R 2x10 R 2x10 R 2x10 R 2x10 B OOT OOT 2 x 10 B   Seated Hip Flex  --  2 x 30'' B 2 x 30'' B OOT 2x10 B OOT OOT 2 x 30'' B   Cybex   Leg Press 3.5  2x10 DL    2.5  2x10 R 3.5  DL  2x10 B  2.5 R 2x10 3.0  DL  2x10 B 3.0 DL  2 x 10 B OOT/next next next -- -- --   TKE RTB 2x12 R RTB 2 x 10  -- -- -- -- -- -- --   Hip Abd 1# 2x12 B 2x10 B 1# 2x10 B 2 x 10 B 2 x 10 B 2 x 10 R 2 x 10 B -- -- --   Hip Flex 1# 2x12 B 2x10 B 1# 2x10 B 2 x 10 B 2 x 10 B 2 x 10  R Next  -- -- --   Hip Ext 1# 2x12 B  2x10 B 1# 2x10 B 2 x 10 B 2 x 10 B 2 x 10 R next -- -- --   HS Curls Next next -- -- -- -- -- -- -- --   Knee Ext -- -- -- -- -- -- -- -- -- --   Step Ups 2x12 B 2 x 10 B -- -- -- -- -- -- -- --   Step Downs -- -- -- -- -- -- -- -- -- -   Gait -- -- -- See note See note See note  see note -- -- --   CP 10' 10' 10' 10' 10' 10' 10' 10' 10' 10'   Initials CS  1/6 FS CS 1/6 FS FS FS FS KV FS FS            Assessment:     Patient performed all therex as outlined per log without difficulty or report of increased pain.    Patient Education/Response:     CONT HEP.    Plans and Goals:     CONT POC and progress as tolerated to SPC  Short Term Goals (4 Weeks):   1. Independent with initial HEP for L/E strengthening and knee ROM.  2. Increase R knee AROM to 0-105.   3. Increase R knee PROM to 0-110.   4. Increase R hip and knee strength to be 75-80% of L LE.  5. Decrease edema compared to L knee.    Long Term Goals (8 Weeks):   1. Independent with updated HEP.  2. Increase R knee AROM to 0-120.  3. Increase R L/E strength to 90% " of L LE.  4. Able to ambulate community distances without use of assistive device without pain or gait deficits.  5. Pt will report 61% on the FOTO lower extremity assessment placing the patient in the 60-80% impaired, limited, or restricted category indicating increased functional LE mobility.  6. Pt will report 20/68 on the KOOS functional mobility assessment placing the patient in the 20-40% impaired, limited, or restricted category indicating increased functional LE mobility. ode [id number] Goal mobility CJ

## 2017-03-06 ENCOUNTER — CLINICAL SUPPORT (OUTPATIENT)
Dept: REHABILITATION | Facility: HOSPITAL | Age: 67
End: 2017-03-06
Attending: ORTHOPAEDIC SURGERY
Payer: MEDICARE

## 2017-03-06 DIAGNOSIS — R26.89 DECREASED FUNCTIONAL MOBILITY: ICD-10-CM

## 2017-03-06 DIAGNOSIS — M25.561 RIGHT KNEE PAIN, UNSPECIFIED CHRONICITY: ICD-10-CM

## 2017-03-06 DIAGNOSIS — R26.2 DIFFICULTY WALKING: ICD-10-CM

## 2017-03-06 DIAGNOSIS — M25.661 DECREASED RANGE OF MOTION OF RIGHT KNEE: ICD-10-CM

## 2017-03-06 PROCEDURE — 97140 MANUAL THERAPY 1/> REGIONS: CPT | Mod: PN

## 2017-03-06 PROCEDURE — 97110 THERAPEUTIC EXERCISES: CPT | Mod: PN

## 2017-03-06 NOTE — PROGRESS NOTES
TIME RECORD    Date:  03/06/2017    Start Time:  1500  Stop Time:  1610    PROCEDURES:    TIMED  Procedure Min.   MT 10   TE 20   SUpervised TE 30             UNTIMED  Procedure Min.   CP 10         Total Timed Minutes:  30  Total Timed Units:  2  Total Untimed Units:  0  Charges Billed/# of units:  2 (1 MT, 1 TE)      Progress/Current Status    Subjective:     Patient ID: Tracy Armendariz is a 66 y.o. female.  Diagnosis:   1. Difficulty walking     2. Decreased range of motion of right knee     3. Right knee pain, unspecified chronicity     4. Decreased functional mobility       Pain: 2 /10  Pt stated that her R shin was hurting today along the whole bone in the front.    Objective:     Pt presented to session with RW.  Session initiated stationary bike with partial revolution supervised for 10' total.  Pt performed supervised TE per log x 20' f/b TE with PT 1:1 x 20' f/b LE assessment and objective measures. MT x 10' with retrograde edema massage to R knee and STM to R quad and HS with hs ms splaying and MFR to distal HS and proximal Gastroc, gentle patellar mob along with scar STM at proximal scar.  Pt performed Session ended with CP x 10' in supine with LE elevated on wedge.    Date  3/6/17 3/3/17 2/27/17 2./24/17 2/22/17 2/20/2017 2/17/2017 2/16/17 2/14/2017 2/13/2017 2/8/17   VISIT 12 11 FOTO10 9 8 7 6 5 4 3 2   POC 4/7/17 4/7/17 4/7/17 4/7/17 4/7/17 4/7/17 4/7/17 4/7/17 4/7/17 4/7/2017 4/7/2017   Gcode (KOOS) 6/10 5/10 4/10 3/10 2/10 1/10 6/10 5/10 4/10 3/10 2/10   Visit amount   total  125.82 125.8  1013.79 93.82  887.99 60.41  794.17 93.82  733.76 125.8  639.94 61.84  514.14 145.00  457.30 142.88  312.30 93.82  169.42   MT 10' See note by  FS, PT 15' 15' 10' 10' 10' 10' 15' 20 20   HS supine with str -- - -- - -- MT 3 x 30'' R 3 x 30'' R 3 x 30'' R 3 x 30'' R 3 x 30'' R   Gastroc Str. --   - -- NT W/ strap W/ strap W/ strap W/ strap --   Bike 10' See note by FS PT 8' 10' 10' 10' danette full revs 10'  "with 10 bacward full revs 10' partial revs 10' partial revs 10' partial revs Hold     QS 10"x10 B 10"x10 B 10"x10 B 10"x10 B 10'' x 10 B 10'' x 10 B 4'' on estim 10'' holds R 4'' on estim 10'' holds R 4'' on estim 10'' holds R 4'' on estim 10'' holds R 5'' x 15   SAQ 2 x 10 B 1# skip next time -- -- 2x12 B 2 x 10 B 2 x 10 B 3'' Estim R 3'' Estim R 3'' Estim R 3'' Estim R  2 x 10 B   SLR 2 x 10 B 1#  Skip next time -- -- 2x12 B 2 x 10 B 2 x 10 B 3'' Estim R  Mod A 3'' Estim R  Mod A 3'' Estim R  Mod A 3'' Estim R 2 x 10 R   SL Abd -- -- -- 2x12 B 2 x 10 B 2 x 10 B 2 x 10 R 2 x 10 R 2 x 10 R 2 x 10   2 x 10    Heel Slides 10"x10 R 10"x10 R 10"x10 R 10"x10 R 10'' x 10 R 10'' x 10 R 10'' x 10 R 10'' x 10 r 10 x 10 r 10 x 10 r hold   Sisi Hip Add -- NT 10"x10  seated 10"x10  seated 10'' x10 seated 10'' x10 seated 5'' x10 5'' x10 5'' x10 5'' x10 next   LAQs 1# 2x15 R 1# 2x10 R 2x15 R 2x12 R 2x10 R 2x10 R 2x10 R 2x10 B OOT OOT 2 x 10 B   Seated Hip Flex --  --  2 x 30'' B 2 x 30'' B OOT 2x10 B OOT OOT 2 x 30'' B   Cybex   Leg Press OOT 3.5  2x10 DL    2.5  2x10 R 3.5  DL  2x10 B  2.5 R 2x10 3.0  DL  2x10 B 3.0 DL  2 x 10 B OOT/next next next -- -- --   TKE RTB 2x12 R RTB 2x12 R RTB 2 x 10  -- -- -- -- -- -- --   Hip Abd 1# 2x12 B 1# 2x12 B 2x10 B 1# 2x10 B 2 x 10 B 2 x 10 B 2 x 10 R 2 x 10 B -- -- --   Hip Flex 1# 2x12 B 1# 2x12 B 2x10 B 1# 2x10 B 2 x 10 B 2 x 10 B 2 x 10  R Next  -- -- --   Hip Ext 1# 2x12 B 1# 2x12 B  2x10 B 1# 2x10 B 2 x 10 B 2 x 10 B 2 x 10 R next -- -- --   HS Curls OOT next Next next -- -- -- -- -- -- -- --   Knee Ext -- -- -- -- -- -- -- -- -- -- --   Step Ups 2x12 R OOT for L 2x12 B 2 x 10 B -- -- -- -- -- -- -- --   Step Downs next -- -- -- -- -- -- -- -- -- -   Gait -- -- -- -- See note See note See note  see note -- -- --   CP 10' 10' 10' 10' 10' 10' 10' 10' 10' 10' 10'   Initials FS CS  1/6 FS CS 1/6 FS FS FS FS KV FS FS     AROM: 0-100  PROM: 0-105      L/E Strength w/ [employer] Muscle Michelle " Dynamometer Right Left Pain/Dysfunction with Movement   (approx 4 sec hold w/ max contraction)   Hip Flexion 16.7 kg  18.4kg       Hip Abduction 16.4 kg  18.5 kg       Quadriceps 13.1 kg  16.7 kg       Hamstrings 13.9 kg  16.1 kg           TU seconds (w/o assistive device)  30 second sit-to-stand test (without U/E support):  12 with UE support  KOOS Knee Survey:   Functional, Daily Livin (9% disabled) Gcode CI     Assessment:     Pt strength and AROM improving.  She had no drainage from wound on today.    Patient Education/Response:     CONT HEP    Plans and Goals:     CONT POC and progress ROM  Short Term Goals (4 Weeks):   1. Independent with initial HEP for L/E strengthening and knee ROM.  2. Increase R knee AROM to 0-105.   3. Increase R knee PROM to 0-110.   4. Increase R hip and knee strength to be 75-80% of L LE.  5. Decrease edema compared to L knee.    Long Term Goals (8 Weeks):   1. Independent with updated HEP.  2. Increase R knee AROM to 0-120.  3. Increase R L/E strength to 90% of L LE.  4. Able to ambulate community distances without use of assistive device without pain or gait deficits.  5. Pt will report 61% on the FOTO lower extremity assessment placing the patient in the 60-80% impaired, limited, or restricted category indicating increased functional LE mobility.  6. Pt will report  on the KOOS functional mobility assessment placing the patient in the 20-40% impaired, limited, or restricted category indicating increased functional LE mobility. ode [id number] Goal mobility CJ

## 2017-03-08 ENCOUNTER — CLINICAL SUPPORT (OUTPATIENT)
Dept: REHABILITATION | Facility: HOSPITAL | Age: 67
End: 2017-03-08
Attending: ORTHOPAEDIC SURGERY
Payer: MEDICARE

## 2017-03-08 DIAGNOSIS — R26.89 DECREASED FUNCTIONAL MOBILITY: ICD-10-CM

## 2017-03-08 DIAGNOSIS — R26.2 DIFFICULTY WALKING: ICD-10-CM

## 2017-03-08 DIAGNOSIS — M25.661 DECREASED RANGE OF MOTION OF RIGHT KNEE: ICD-10-CM

## 2017-03-08 DIAGNOSIS — M25.561 RIGHT KNEE PAIN, UNSPECIFIED CHRONICITY: ICD-10-CM

## 2017-03-08 PROCEDURE — 97140 MANUAL THERAPY 1/> REGIONS: CPT | Mod: PN

## 2017-03-08 PROCEDURE — 97110 THERAPEUTIC EXERCISES: CPT | Mod: PN

## 2017-03-08 NOTE — PROGRESS NOTES
TIME RECORD    Date:  03/08/2017    Start Time:  300  Stop Time:  415  PROCEDURES:    TIMED  Procedure Min.   MT 10   TE 40                 UNTIMED  Procedure Min.   CP 10         Total Timed Minutes: 50  Total Timed Units:  3  Total Untimed Units:  0  Charges Billed/# of units:  1 MT, 2 TE      Progress/Current Status    Subjective:     Patient ID: Tracy Armendariz is a 66 y.o. female.  Diagnosis:   1. Difficulty walking     2. Decreased range of motion of right knee     3. Right knee pain, unspecified chronicity     4. Decreased functional mobility       Pain: 2 /10  Pt continues to state that her R shin was hurting today along the whole bone in the front.    Objective:     Session initiated stationary bike with full revolutions supervised for 10' total.  Patient received  MT x 10' with retrograde edema massage to R knee and STM to R quad and HS with hs ms splaying and MFR to distal HS and proximal Gastroc, gentle patellar mob along with scar STM at proximal scar.  Therex as outlined per log x 40 minutes.  Pt performed session ended with CP x 10' in supine with LE elevated on wedge.    Date  3/8/17 3/6/17 3/3/17 2/27/17 2./24/17 2/22/17 2/20/2017 2/17/2017 2/16/17 2/14/2017 2/13/2017 2/8/17   VISIT 13 12 11 FOTO10 9 8 7 6 5 4 3 2   POC 4/7/17 4/7/17 4/7/17 4/7/17 4/7/17 4/7/17 4/7/17 4/7/17 4/7/17 4/7/17 4/7/2017 4/7/2017   Gcode (KOOS) 7/10 6/10 5/10 4/10 3/10 2/10 1/10 6/10 5/10 4/10 3/10 2/10   Visit amount   total 93.82  1295.27 61.84 125.82 125.8  1013.79 93.82  887.99 60.41  794.17 93.82  733.76 125.8  639.94 61.84  514.14 145.00  457.30 142.88  312.30 93.82  169.42   MT 12' 10' See note by  FS, PT 15' 15' 10' 10' 10' 10' 15' 20 20   HS supine with str  -- - -- - -- MT 3 x 30'' R 3 x 30'' R 3 x 30'' R 3 x 30'' R 3 x 30'' R   Gastroc Str.  --   - -- NT W/ strap W/ strap W/ strap W/ strap --   Bike 10' 10' See note by FS PT 8' 10' 10' 10' bacward full revs 10' with 10 bacward full revs 10' partial revs  "10' partial revs 10' partial revs Hold     QS 10"x10 R 10"x10 B 10"x10 B 10"x10 B 10"x10 B 10'' x 10 B 10'' x 10 B 4'' on estim 10'' holds R 4'' on estim 10'' holds R 4'' on estim 10'' holds R 4'' on estim 10'' holds R 5'' x 15   SAQ -- 2 x 10 B 1# skip next time -- -- 2x12 B 2 x 10 B 2 x 10 B 3'' Estim R 3'' Estim R 3'' Estim R 3'' Estim R  2 x 10 B   SLR -- 2 x 10 B 1#  Skip next time -- -- 2x12 B 2 x 10 B 2 x 10 B 3'' Estim R  Mod A 3'' Estim R  Mod A 3'' Estim R  Mod A 3'' Estim R 2 x 10 R   SL Abd  -- -- -- 2x12 B 2 x 10 B 2 x 10 B 2 x 10 R 2 x 10 R 2 x 10 R 2 x 10   2 x 10    Heel Slides 10"x10 R 10"x10 R 10"x10 R 10"x10 R 10"x10 R 10'' x 10 R 10'' x 10 R 10'' x 10 R 10'' x 10 r 10 x 10 r 10 x 10 r hold   Sisi Hip Add  -- NT 10"x10  seated 10"x10  seated 10'' x10 seated 10'' x10 seated 5'' x10 5'' x10 5'' x10 5'' x10 next   LAQs 1# 2x15 R 1# 2x15 R 1# 2x10 R 2x15 R 2x12 R 2x10 R 2x10 R 2x10 R 2x10 B OOT OOT 2 x 10 B   Seated Hip Flex  --  --  2 x 30'' B 2 x 30'' B OOT 2x10 B OOT OOT 2 x 30'' B   Cybex   Leg Press 3.5 3x10 DL    2.5 2x10 R OOT 3.5  2x10 DL    2.5  2x10 R 3.5  DL  2x10 B  2.5 R 2x10 3.0  DL  2x10 B 3.0 DL  2 x 10 B OOT/next next next -- -- --   TKE  RTB 2x12 R RTB 2x12 R RTB 2 x 10  -- -- -- -- -- -- --   Hip Abd 1# 2x12 B 1# 2x12 B 1# 2x12 B 2x10 B 1# 2x10 B 2 x 10 B 2 x 10 B 2 x 10 R 2 x 10 B -- -- --   Hip Flex 1# 2x12 B 1# 2x12 B 1# 2x12 B 2x10 B 1# 2x10 B 2 x 10 B 2 x 10 B 2 x 10  R Next  -- -- --   Hip Ext 1# 2x12 B 1# 2x12 B 1# 2x12 B  2x10 B 1# 2x10 B 2 x 10 B 2 x 10 B 2 x 10 R next -- -- --   HS Curls 3.0 2x10 OOT next Next next -- -- -- -- -- -- -- --   Knee Ext -- -- -- -- -- -- -- -- -- -- -- --   Step Ups NT 2x12 R OOT for L 2x12 B 2 x 10 B -- -- -- -- -- -- -- --   Step Downs next next -- -- -- -- -- -- -- -- -- -   Gait -- -- -- -- -- See note See note See note  see note -- -- --   CP 10' 10' 10' 10' 10' 10' 10' 10' 10' 10' 10' 10'   Initials CS FS CS  1/6 FS CS 1/6 FS FS FS FS KV " FS FS        Assessment:     Patient performed all therex without difficulty.  Patient Education/Response:     CONT HEP    Plans and Goals:     CONT POC and progress ROM  Short Term Goals (4 Weeks):   1. Independent with initial HEP for L/E strengthening and knee ROM.  2. Increase R knee AROM to 0-105.   3. Increase R knee PROM to 0-110.   4. Increase R hip and knee strength to be 75-80% of L LE.  5. Decrease edema compared to L knee.    Long Term Goals (8 Weeks):   1. Independent with updated HEP.  2. Increase R knee AROM to 0-120.  3. Increase R L/E strength to 90% of L LE.  4. Able to ambulate community distances without use of assistive device without pain or gait deficits.  5. Pt will report 61% on the FOTO lower extremity assessment placing the patient in the 60-80% impaired, limited, or restricted category indicating increased functional LE mobility.  6. Pt will report 20/68 on the KOOS functional mobility assessment placing the patient in the 20-40% impaired, limited, or restricted category indicating increased functional LE mobility. ode [id number] Goal mobility CJ

## 2017-03-14 ENCOUNTER — CLINICAL SUPPORT (OUTPATIENT)
Dept: REHABILITATION | Facility: HOSPITAL | Age: 67
End: 2017-03-14
Attending: ORTHOPAEDIC SURGERY
Payer: MEDICARE

## 2017-03-14 DIAGNOSIS — R26.2 DIFFICULTY WALKING: ICD-10-CM

## 2017-03-14 DIAGNOSIS — M25.661 DECREASED RANGE OF MOTION OF RIGHT KNEE: ICD-10-CM

## 2017-03-14 DIAGNOSIS — M25.561 RIGHT KNEE PAIN, UNSPECIFIED CHRONICITY: ICD-10-CM

## 2017-03-14 DIAGNOSIS — R26.89 DECREASED FUNCTIONAL MOBILITY: ICD-10-CM

## 2017-03-14 PROCEDURE — 97140 MANUAL THERAPY 1/> REGIONS: CPT | Mod: PN

## 2017-03-14 PROCEDURE — 97110 THERAPEUTIC EXERCISES: CPT | Mod: PN

## 2017-03-16 ENCOUNTER — CLINICAL SUPPORT (OUTPATIENT)
Dept: REHABILITATION | Facility: HOSPITAL | Age: 67
End: 2017-03-16
Attending: ORTHOPAEDIC SURGERY
Payer: MEDICARE

## 2017-03-16 DIAGNOSIS — M25.661 DECREASED RANGE OF MOTION OF RIGHT KNEE: ICD-10-CM

## 2017-03-16 DIAGNOSIS — R26.2 DIFFICULTY WALKING: ICD-10-CM

## 2017-03-16 DIAGNOSIS — R26.89 DECREASED FUNCTIONAL MOBILITY: ICD-10-CM

## 2017-03-16 DIAGNOSIS — M25.561 RIGHT KNEE PAIN, UNSPECIFIED CHRONICITY: ICD-10-CM

## 2017-03-16 PROCEDURE — 97140 MANUAL THERAPY 1/> REGIONS: CPT | Mod: PN

## 2017-03-16 PROCEDURE — 97110 THERAPEUTIC EXERCISES: CPT | Mod: PN

## 2017-03-16 NOTE — PROGRESS NOTES
"TIME RECORD    Date:  03/16/2017    Start Time:  1500  Stop Time:  1610    PROCEDURES:    TIMED  Procedure Min.   MT 30   TE 20   Supervised TE 10             UNTIMED  Procedure Min.   CP 10         Total Timed Minutes:  50  Total Timed Units:  3  Total Untimed Units:  0  Charges Billed/# of units:  3 (1 TE, 2 MT)      Progress/Current Status    Subjective:     Patient ID: Tracy Armendariz is a 66 y.o. female.  Diagnosis:   1. Difficulty walking     2. Decreased range of motion of right knee     3. Right knee pain, unspecified chronicity     4. Decreased functional mobility       Pain: 4 /10  Pt presented to session stating that her medial knee was hurting but it felt better than since last session.     Objective:      Session initiated stationary bike with partial revolution supervised for 8' total.   MT x 30' with retrograde edema massage to R knee and STM to R quad and HS with hs ms splaying and MFR/STM using IASTM tool  to medial distal HS and proximal Gastroc and popliteal region of knee, gentle patellar mob along with scar STM at proximal scar. Pt performed TE x 20' per log with PT 1:1. Pt performed Session ended with CP x 10' in seated with LE elevated on bench.  Pt was OOT to perform much of the TE due to time spent on MT today.      Date  3/16/17 3/14/17 3/8/17 3/6/17 3/3/17 2/27/17 2./24/17 2/22/17 2/20/2017 2/17/2017 2/16/17 2/14/2017 2/13/2017 2/8/17   VISIT 15 14 13 12 11 FOTO10 9 8 7 6 5 4 3 2   POC 4/7/17 4/7/17 4/7/17 4/7/17 4/7/17 4/7/17 4/7/17 4/7/17 4/7/17 4/7/17 4/7/17 4/7/17 4/7/2017 4/7/2017   Gcode (KOOS) 9/10 8/10 7/10 6/10 5/10 4/10 3/10 2/10 1/10 6/10 5/10 4/10 3/10 2/10   Visit amount   total 93.81  1514.88 125.80 93.82  1295.27 61.84 125.82 125.8  1013.79 93.82  887.99 60.41  794.17 93.82  733.76 125.8  639.94 61.84  514.14 145.00  457.30 142.88  312.30 93.82  169.42   MT 30' extended 15' 12' 10' See note by  FS, PT 15' 15' 10' 10' 10' 10' 15' 20 20    supine HSS with strap 2x30" " "2x30"  -- - -- - -- MT 3 x 30'' R 3 x 30'' R 3 x 30'' R 3 x 30'' R 3 x 30'' R   Gastroc Str. 3x30" EOS 3x30" EOS  --   - -- NT W/ strap W/ strap W/ strap W/ strap --   Bike 8' 10' 10' 10' See note by FS PT 8' 10' 10' 10' bacward full revs 10' with 10 bacward full revs 10' partial revs 10' partial revs 10' partial revs Hold     QS 10"x 10 10"x 10 10"x10 R 10"x10 B 10"x10 B 10"x10 B 10"x10 B 10'' x 10 B 10'' x 10 B 4'' on estim 10'' holds R 4'' on estim 10'' holds R 4'' on estim 10'' holds R 4'' on estim 10'' holds R 5'' x 15   SAQ HEP 2x15 B 2# -- 2 x 10 B 1# skip next time -- -- 2x12 B 2 x 10 B 2 x 10 B 3'' Estim R 3'' Estim R 3'' Estim R 3'' Estim R  2 x 10 B   SLR 2x15 B 2# 2x15 B 2# -- 2 x 10 B 1#  Skip next time -- -- 2x12 B 2 x 10 B 2 x 10 B 3'' Estim R  Mod A 3'' Estim R  Mod A 3'' Estim R  Mod A 3'' Estim R 2 x 10 R   SL Abd HEP 2x15 B 2#  -- -- -- 2x12 B 2 x 10 B 2 x 10 B 2 x 10 R 2 x 10 R 2 x 10 R 2 x 10   2 x 10    Heel Slides 10"x10 R -- 10"x10 R 10"x10 R 10"x10 R 10"x10 R 10"x10 R 10'' x 10 R 10'' x 10 R 10'' x 10 R 10'' x 10 r 10 x 10 r 10 x 10 r hold   Prone knee flexion W/ strap 5 x 10''                Sisi Hip Add --   -- NT 10"x10  seated 10"x10  seated 10'' x10 seated 10'' x10 seated 5'' x10 5'' x10 5'' x10 5'' x10 next   LAQs NT 2# 2 x 15 1# 2x15 R 1# 2x15 R 1# 2x10 R 2x15 R 2x12 R 2x10 R 2x10 R 2x10 R 2x10 B OOT OOT 2 x 10 B   Prone: HSC NT 2# 2 x 10                  Hip Ext --                Seated Hip Flex --   --  --  2 x 30'' B 2 x 30'' B OOT 2x10 B OOT OOT 2 x 30'' B   Cybex   Leg Press 4.0 3x10 DL    2.5 2x10 R oot 3.5 3x10 DL    2.5 2x10 R OOT 3.5  2x10 DL    2.5  2x10 R 3.5  DL  2x10 B  2.5 R 2x10 3.0  DL  2x10 B 3.0 DL  2 x 10 B OOT/next next next -- -- --   B Heel raises NT 2 x 10 no support               TKE NT   RTB 2x12 R RTB 2x12 R RTB 2 x 10  -- -- -- -- -- -- --   Hip Abd 1.5 2x15 B 2# 2x15 B 1# 2x12 B 1# 2x12 B 1# 2x12 B 2x10 B 1# 2x10 B 2 x 10 B 2 x 10 B 2 x 10 R 2 x 10 B -- -- " --   Hip Flex 1.5 2x15 B 2# 2x15 B 1# 2x12 B 1# 2x12 B 1# 2x12 B 2x10 B 1# 2x10 B 2 x 10 B 2 x 10 B 2 x 10  R Next  -- -- --   Hip Ext 1.5 2x15 B 2# 2x15 B 1# 2x12 B 1# 2x12 B 1# 2x12 B  2x10 B 1# 2x10 B 2 x 10 B 2 x 10 B 2 x 10 R next -- -- --   HS Curls next See prone 3.0 2x10 OOT next Next next -- -- -- -- -- -- -- --   Knee Ext --  -- -- -- -- -- -- -- -- -- -- -- --   Step Ups NT 2 x 10 B alt op  Blue step NT 2x12 R OOT for L 2x12 B 2 x 10 B -- -- -- -- -- -- -- --   Step Downs NT Fwd sm step  X 10 B UE support next next -- -- -- -- -- -- -- -- -- -   Gait --  -- -- -- -- -- See note See note See note  see note -- -- --   CP 10' 10' 10' 10' 10' 10' 10' 10' 10' 10' 10' 10' 10' 10'   Initials FS MB 1/6 CS FS CS  1/6 FS CS 1/6 FS FS FS FS KV FS FS       Assessment:     PT was able to reduce MF tension in posterior medial knee using tools to assist in MT.      Patient Education/Response:     CONT HEP    Plans and Goals:       CONT POC and progress ROM  Short Term Goals (4 Weeks):   1. Independent with initial HEP for L/E strengthening and knee ROM.  2. Increase R knee AROM to 0-105.   3. Increase R knee PROM to 0-110.   4. Increase R hip and knee strength to be 75-80% of L LE.  5. Decrease edema compared to L knee.    Long Term Goals (8 Weeks):   1. Independent with updated HEP.  2. Increase R knee AROM to 0-120.  3. Increase R L/E strength to 90% of L LE.  4. Able to ambulate community distances without use of assistive device without pain or gait deficits.  5. Pt will report 61% on the FOTO lower extremity assessment placing the patient in the 60-80% impaired, limited, or restricted category indicating increased functional LE mobility.  6. Pt will report 20/68 on the KOOS functional mobility assessment placing the patient in the 20-40% impaired, limited, or restricted category indicating increased functional LE mobility. ode [id number] Goal mobility CJ

## 2017-03-21 ENCOUNTER — CLINICAL SUPPORT (OUTPATIENT)
Dept: REHABILITATION | Facility: HOSPITAL | Age: 67
End: 2017-03-21
Attending: ORTHOPAEDIC SURGERY
Payer: MEDICARE

## 2017-03-21 DIAGNOSIS — R26.2 DIFFICULTY WALKING: ICD-10-CM

## 2017-03-21 DIAGNOSIS — M25.561 RIGHT KNEE PAIN, UNSPECIFIED CHRONICITY: ICD-10-CM

## 2017-03-21 DIAGNOSIS — R26.89 DECREASED FUNCTIONAL MOBILITY: ICD-10-CM

## 2017-03-21 DIAGNOSIS — M25.661 DECREASED RANGE OF MOTION OF RIGHT KNEE: ICD-10-CM

## 2017-03-21 PROCEDURE — G8979 MOBILITY GOAL STATUS: HCPCS | Mod: CJ,PN

## 2017-03-21 PROCEDURE — G8978 MOBILITY CURRENT STATUS: HCPCS | Mod: CJ,PN

## 2017-03-21 PROCEDURE — 97110 THERAPEUTIC EXERCISES: CPT | Mod: PN

## 2017-03-21 PROCEDURE — 97140 MANUAL THERAPY 1/> REGIONS: CPT | Mod: PN

## 2017-03-21 NOTE — PROGRESS NOTES
"TIME RECORD    Date:  03/21/2017    Start Time:  1300  Stop Time:  1415    PROCEDURES:    TIMED  Procedure Min.   MT 15   TE 43   SUpervised TE 7'             UNTIMED  Procedure Min.   CP 10         Total Timed Minutes:  58  Total Timed Units:  4  Total Untimed Units:  0  Charges Billed/# of units:  4 (1 MT, 3 TE)      Progress/Current Status    Subjective:     Patient ID: Tracy Armendariz is a 66 y.o. female.  Diagnosis:   1. Difficulty walking     2. Decreased range of motion of right knee     3. Right knee pain, unspecified chronicity     4. Decreased functional mobility       Pain: 4 /10  Pt stated that she was doing better today with anterior and posterior knee pain.    Objective:     Session initiated stationary bike with partial revolution supervised for 7' total.   MT x 15' with retrograde edema massage to R knee and STM to R quad and HS with hs ms splaying and MFR/STM to medial distal HS and proximal Gastroc and popliteal region of knee, gentle patellar mob along with scar STM at proximal scar. Pt performed TE x 43' per log with PT 1:1. Pt performed Session ended with CP x 10' in seated with LE elevated on bench.      Date  3/21/17 3/16/17 3/14/17 3/8/17 3/6/17 3/3/17 2/27/17 2./24/17 2/22/17 2/20/2017 2/17/2017 2/16/17 2/14/2017 2/13/2017 2/8/17   VISIT 16 15 14 13 12 11 FOTO10 9 8 7 6 5 4 3 2   POC 4/7/17 4/7/17 4/7/17 4/7/17 4/7/17 4/7/17 4/7/17 4/7/17 4/7/17 4/7/17 4/7/17 4/7/17 4/7/17 4/7/2017 4/7/2017   Gcode (KOOS) 10/10 9/10 8/10 7/10 6/10 5/10 4/10 3/10 2/10 1/10 6/10 5/10 4/10 3/10 2/10   Visit amount   total 125.80  1640.68 93.81  1514.88 125.80 93.82  1295.27 61.84 125.82 125.8  1013.79 93.82  887.99 60.41  794.17 93.82  733.76 125.8  639.94 61.84  514.14 145.00  457.30 142.88  312.30 93.82  169.42   MT 15' 30' extended 15' 12' 10' See note by  FS, PT 15' 15' 10' 10' 10' 10' 15' 20 20    supine HSS with strap -- 2x30" 2x30"  -- - -- - -- MT 3 x 30'' R 3 x 30'' R 3 x 30'' R 3 x 30'' R 3 x " "30'' R   Gastroc Str. 3 x 30'' on stairs 3x30" EOS 3x30" EOS  --   - -- NT W/ strap W/ strap W/ strap W/ strap --   Bike 7' 8' 10' 10' 10' See note by FS PT 8' 10' 10' 10' bacward full revs 10' with 10 bacward full revs 10' partial revs 10' partial revs 10' partial revs Hold     QS 10"x 10 10"x 10 10"x 10 10"x10 R 10"x10 B 10"x10 B 10"x10 B 10"x10 B 10'' x 10 B 10'' x 10 B 4'' on estim 10'' holds R 4'' on estim 10'' holds R 4'' on estim 10'' holds R 4'' on estim 10'' holds R 5'' x 15   SAQ HEP HEP 2x15 B 2# -- 2 x 10 B 1# skip next time -- -- 2x12 B 2 x 10 B 2 x 10 B 3'' Estim R 3'' Estim R 3'' Estim R 3'' Estim R  2 x 10 B   SLR HEP 2x15 B 2# 2x15 B 2# -- 2 x 10 B 1#  Skip next time -- -- 2x12 B 2 x 10 B 2 x 10 B 3'' Estim R  Mod A 3'' Estim R  Mod A 3'' Estim R  Mod A 3'' Estim R 2 x 10 R   SL Abd HEP HEP 2x15 B 2#  -- -- -- 2x12 B 2 x 10 B 2 x 10 B 2 x 10 R 2 x 10 R 2 x 10 R 2 x 10   2 x 10    Heel Slides HEP 10"x10 R -- 10"x10 R 10"x10 R 10"x10 R 10"x10 R 10"x10 R 10'' x 10 R 10'' x 10 R 10'' x 10 R 10'' x 10 r 10 x 10 r 10 x 10 r hold   Prone knee flexion W/ strap 5 x 10'' W/ strap 5 x 10''                Sisi Hip Add -- --   -- NT 10"x10  seated 10"x10  seated 10'' x10 seated 10'' x10 seated 5'' x10 5'' x10 5'' x10 5'' x10 next   LAQs 2# 2 x 15 NT 2# 2 x 15 1# 2x15 R 1# 2x15 R 1# 2x10 R 2x15 R 2x12 R 2x10 R 2x10 R 2x10 R 2x10 B OOT OOT 2 x 10 B   Prone: HSC 2# 2 x 10 B NT 2# 2 x 10                  Hip Ext -- --                Seated Hip Flex -- --   --  --  2 x 30'' B 2 x 30'' B OOT 2x10 B OOT OOT 2 x 30'' B   Cybex   Leg Press 5.0 3x10 DL    2.5 2x10 R 4.0 3x10 DL    2.5 2x10 R oot 3.5 3x10 DL    2.5 2x10 R OOT 3.5  2x10 DL    2.5  2x10 R 3.5  DL  2x10 B  2.5 R 2x10 3.0  DL  2x10 B 3.0 DL  2 x 10 B OOT/next next next -- -- --   B Heel raises -- NT 2 x 10 no support               TKE RTB 2x12 R NT   RTB 2x12 R RTB 2x12 R RTB 2 x 10  -- -- -- -- -- -- --   Hip Abd 1.5 2x15 B cybex 1.5 2x15 B 2# 2x15 B 1# 2x12 " B 1# 2x12 B 1# 2x12 B 2x10 B 1# 2x10 B 2 x 10 B 2 x 10 B 2 x 10 R 2 x 10 B -- -- --   Hip Flex 1.5 2x15 B 1.5 2x15 B 2# 2x15 B 1# 2x12 B 1# 2x12 B 1# 2x12 B 2x10 B 1# 2x10 B 2 x 10 B 2 x 10 B 2 x 10  R Next  -- -- --   Hip Ext 1.5 2x15 B 1.5 2x15 B 2# 2x15 B 1# 2x12 B 1# 2x12 B 1# 2x12 B  2x10 B 1# 2x10 B 2 x 10 B 2 x 10 B 2 x 10 R next -- -- --   HS Curls See prone next See prone 3.0 2x10 OOT next Next next -- -- -- -- -- -- -- --   Knee Ext -- --  -- -- -- -- -- -- -- -- -- -- -- --   Step Ups 2 x 10 B  blue NT 2 x 10 B alt op  Blue step NT 2x12 R OOT for L 2x12 B 2 x 10 B -- -- -- -- -- -- -- --   Step Downs 2 x 10 B  blue NT Fwd sm step  X 10 B UE support next next -- -- -- -- -- -- -- -- -- -   Gait -- --  -- -- -- -- -- See note See note See note  see note -- -- --   CP 10' 10' 10' 10' 10' 10' 10' 10' 10' 10' 10' 10' 10' 10' 10'   Initials FS FS MB 1/6 CS FS CS  1/6 FS CS 1/6 FS FS FS FS KV FS FS     KOOS: 21/68 (30% level of impairment)    Assessment:     Pt has pain with heel slides and knee flexion but it is improved since last session.  Pt will likely be ready for d/c next session     Patient Education/Response:     CONT HEP    Plans and Goals:     CONT POC and progress ROM  Short Term Goals (4 Weeks):   1. Independent with initial HEP for L/E strengthening and knee ROM. MET  2. Increase R knee AROM to 0-105. MET  3. Increase R knee PROM to 0-110.   4. Increase R hip and knee strength to be 75-80% of L LE.  MET  5. Decrease edema compared to L knee. MET    Long Term Goals (8 Weeks):   1. Independent with updated HEP.  2. Increase R knee AROM to 0-120.  3. Increase R L/E strength to 90% of L LE.  4. Able to ambulate community distances without use of assistive device without pain or gait deficits.  5. Pt will report 61% on the FOTO lower extremity assessment placing the patient in the 60-80% impaired, limited, or restricted category indicating increased functional LE mobility.  6. Pt will report 20/68 on  the KOOS functional mobility assessment placing the patient in the 20-40% impaired, limited, or restricted category indicating increased functional LE mobility. ode [id number] Goal mobility CJ

## 2017-03-24 ENCOUNTER — CLINICAL SUPPORT (OUTPATIENT)
Dept: REHABILITATION | Facility: HOSPITAL | Age: 67
End: 2017-03-24
Attending: ORTHOPAEDIC SURGERY
Payer: MEDICARE

## 2017-03-24 DIAGNOSIS — R26.2 DIFFICULTY WALKING: ICD-10-CM

## 2017-03-24 DIAGNOSIS — M25.661 DECREASED RANGE OF MOTION OF RIGHT KNEE: ICD-10-CM

## 2017-03-24 DIAGNOSIS — R26.89 DECREASED FUNCTIONAL MOBILITY: ICD-10-CM

## 2017-03-24 DIAGNOSIS — M25.561 RIGHT KNEE PAIN, UNSPECIFIED CHRONICITY: ICD-10-CM

## 2017-03-24 PROCEDURE — G8980 MOBILITY D/C STATUS: HCPCS | Mod: CJ,PN

## 2017-03-24 PROCEDURE — 97110 THERAPEUTIC EXERCISES: CPT | Mod: PN

## 2017-03-24 PROCEDURE — G8979 MOBILITY GOAL STATUS: HCPCS | Mod: CJ,PN

## 2017-03-24 NOTE — PROGRESS NOTES
"TIME RECORD    Date:  03/24/2017    Start Time:  1010  Stop Time:  1100    PROCEDURES:    TIMED  Procedure Min.   TE 32   Supervised TE 8'                 UNTIMED  Procedure Min.   CP 10         Total Timed Minutes:  32  Total Timed Units:  2  Total Untimed Units:  0  Charges Billed/# of units:  2 TE      Progress/Current Status    Subjective:     Patient ID: Tracy Armendariz is a 66 y.o. female.  Diagnosis:   1. Difficulty walking     2. Decreased range of motion of right knee     3. Right knee pain, unspecified chronicity     4. Decreased functional mobility       Pain: 2 /10  Pt stated that her femur and shin bone are still hurting.      Objective:       Session initiated stationary bike with partial revolution supervised for 8' total.   MT x 10' with retrograde edema massage to R knee and STM to R quad and HS with hs ms splaying and MFR/STM to medial distal HS and proximal Gastroc and popliteal region of knee, gentle patellar mob along with scar STM at proximal scar. Pt performed TE per log with PT 1:1 including PT assessment and educated on HEP and education on community education. Pt performed Session ended with CP x 10' in seated with LE elevated on bench.      Date  3/23/17 3/21/17 3/16/17 3/14/17 3/8/17 3/6/17 3/3/17 2/27/17 2./24/17 2/22/17 2/20/2017 2/17/2017 2/16/17 2/14/2017 2/13/2017 2/8/17   VISIT 17 16 15 14 13 12 11 FOTO10 9 8 7 6 5 4 3 2   POC 4/7/17 4/7/17 4/7/17 4/7/17 4/7/17 4/7/17 4/7/17 4/7/17 4/7/17 4/7/17 4/7/17 4/7/17 4/7/17 4/7/17 4/7/2017 4/7/2017   Gcode (KOOS) 2/10 1/10 9/10 8/10 7/10 6/10 5/10 4/10 3/10 2/10 1/10 6/10 5/10 4/10 3/10 2/10   Visit amount   total  125.80  1640.68 93.81  1514.88 125.80 93.82  1295.27 61.84 125.82 125.8  1013.79 93.82  887.99 60.41  794.17 93.82  733.76 125.8  639.94 61.84  514.14 145.00  457.30 142.88  312.30 93.82  169.42   MT 10' 15' 30' extended 15' 12' 10' See note by  FS, PT 15' 15' 10' 10' 10' 10' 15' 20 20    supine HSS with strap -- -- 2x30" " "2x30"  -- - -- - -- MT 3 x 30'' R 3 x 30'' R 3 x 30'' R 3 x 30'' R 3 x 30'' R   Gastroc Str. 3 x 30'' on stairs 3 x 30'' on stairs 3x30" EOS 3x30" EOS  --   - -- NT W/ strap W/ strap W/ strap W/ strap --   Bike 8' 7' 8' 10' 10' 10' See note by FS PT 8' 10' 10' 10' bacward full revs 10' with 10 bacward full revs 10' partial revs 10' partial revs 10' partial revs Hold     QS NT 10"x 10 10"x 10 10"x 10 10"x10 R 10"x10 B 10"x10 B 10"x10 B 10"x10 B 10'' x 10 B 10'' x 10 B 4'' on estim 10'' holds R 4'' on estim 10'' holds R 4'' on estim 10'' holds R 4'' on estim 10'' holds R 5'' x 15   SAQ HEP HEP HEP 2x15 B 2# -- 2 x 10 B 1# skip next time -- -- 2x12 B 2 x 10 B 2 x 10 B 3'' Estim R 3'' Estim R 3'' Estim R 3'' Estim R  2 x 10 B   SLR HEP HEP 2x15 B 2# 2x15 B 2# -- 2 x 10 B 1#  Skip next time -- -- 2x12 B 2 x 10 B 2 x 10 B 3'' Estim R  Mod A 3'' Estim R  Mod A 3'' Estim R  Mod A 3'' Estim R 2 x 10 R   SL Abd HEP HEP HEP 2x15 B 2#  -- -- -- 2x12 B 2 x 10 B 2 x 10 B 2 x 10 R 2 x 10 R 2 x 10 R 2 x 10   2 x 10    Heel Slides HEP HEP 10"x10 R -- 10"x10 R 10"x10 R 10"x10 R 10"x10 R 10"x10 R 10'' x 10 R 10'' x 10 R 10'' x 10 R 10'' x 10 r 10 x 10 r 10 x 10 r hold   Prone knee flexion -- W/ strap 5 x 10'' W/ strap 5 x 10''                Sisi Hip Add -- -- --   -- NT 10"x10  seated 10"x10  seated 10'' x10 seated 10'' x10 seated 5'' x10 5'' x10 5'' x10 5'' x10 next   LAQs -- 2# 2 x 15 NT 2# 2 x 15 1# 2x15 R 1# 2x15 R 1# 2x10 R 2x15 R 2x12 R 2x10 R 2x10 R 2x10 R 2x10 B OOT OOT 2 x 10 B   Prone: HSC -- 2# 2 x 10 B NT 2# 2 x 10                  Hip Ext -- -- --                Seated Hip Flex -- -- --   --  --  2 x 30'' B 2 x 30'' B OOT 2x10 B OOT OOT 2 x 30'' B   Cybex   Leg Press Reviewed for HEP 5.0 3x10 DL    2.5 2x10 R 4.0 3x10 DL    2.5 2x10 R oot 3.5 3x10 DL    2.5 2x10 R OOT 3.5  2x10 DL    2.5  2x10 R 3.5  DL  2x10 B  2.5 R 2x10 3.0  DL  2x10 B 3.0 DL  2 x 10 B OOT/next next next -- -- --   B Heel raises Reviewed for HEP -- NT " 2 x 10 no support               TKE -- RTB 2x12 R NT   RTB 2x12 R RTB 2x12 R RTB 2 x 10  -- -- -- -- -- -- --   Hip Abd Reviewed for HEP 1.5 2x15 B cybex 1.5 2x15 B 2# 2x15 B 1# 2x12 B 1# 2x12 B 1# 2x12 B 2x10 B 1# 2x10 B 2 x 10 B 2 x 10 B 2 x 10 R 2 x 10 B -- -- --   Hip Flex Reviewed for HEP 1.5 2x15 B 1.5 2x15 B 2# 2x15 B 1# 2x12 B 1# 2x12 B 1# 2x12 B 2x10 B 1# 2x10 B 2 x 10 B 2 x 10 B 2 x 10  R Next  -- -- --   Hip Ext Reviewed for HEP 1.5 2x15 B 1.5 2x15 B 2# 2x15 B 1# 2x12 B 1# 2x12 B 1# 2x12 B  2x10 B 1# 2x10 B 2 x 10 B 2 x 10 B 2 x 10 R next -- -- --   HS Curls Reviewed for HEP See prone next See prone 3.0 2x10 OOT next Next next -- -- -- -- -- -- -- --   Knee Ext -- -- --  -- -- -- -- -- -- -- -- -- -- -- --   Step Ups -- 2 x 10 B  blue NT 2 x 10 B alt op  Blue step NT 2x12 R OOT for L 2x12 B 2 x 10 B -- -- -- -- -- -- -- --   Step Downs -- 2 x 10 B  blue NT Fwd sm step  X 10 B UE support next next -- -- -- -- -- -- -- -- -- -   Gait -- -- --  -- -- -- -- -- See note See note See note  see note -- -- --   CP 10' 10' 10' 10' 10' 10' 10' 10' 10' 10' 10' 10' 10' 10' 10 10'   Initials FS FS FS MB 1/6 CS FS CS  1/6 FS CS 1/6 FS FS FS FS KV FS FS     KOOS Knee Survey:    Symptoms:   Pain:    Functional, Daily Livin/68 (21% disabled)  Function, Sports and Recreational Activities:    Quality of Life:  10/16  PROMIS-29:    Physical Function:    Anxiety:    Depression:     Fatigue:     Sleep Disturbance:  10/20   Satisfaction with Social Role:     Pain Interference:  10/20   Pain Intensity:  2/10    AROM: 0-112  PROM: 0-115      L/E Strength w/ MicroFET Muscle Michelle Dynamometer Right Left Pain/Dysfunction with Movement   (approx 4 sec hold w/ max contraction)   Hip Flexion 19.3 kg  20.2 kg       Hip Abduction 15.1 kg  16.6kg       Quadriceps 18.0kg  18.2 kg       Hamstrings 16.4 kg  17.7 kg           TU seconds (w/0 assistive device or UE support)  30 second  sit-to-stand test (without U/E support): 12 without UE support      Assessment:     Pt has met all goals with exception of knee flexion but this goal has been updated due to remaining swelling.        Patient Education/Response:     CONT HEP    Plans and Goals:       CONT POC and progress ROM  Short Term Goals (4 Weeks):   1. Independent with initial HEP for L/E strengthening and knee ROM. MET  2. Increase R knee AROM to 0-105. MET  3. Increase R knee PROM to 0-110. MET  4. Increase R hip and knee strength to be 75-80% of L LE.  MET  5. Decrease edema compared to L knee. MET    Long Term Goals (8 Weeks):   1. Independent with updated HEP.  2. Increase R knee AROM to 0-115. Updated and MET  3. Increase R L/E strength to 90% of L LE.   4. Able to ambulate community distances without use of assistive device without pain or gait deficits. Met pt walking 3 blocks a day without AD and minimal knee pain  5. Pt will report 61% on the FOTO lower extremity assessment placing the patient in the 60-80% impaired, limited, or restricted category indicating increased functional LE mobility. MET 57%  6. Pt will report  on the KOOS functional mobility assessment placing the patient in the 20-40% impaired, limited, or restricted category indicating increased functional LE mobility. Gcode [id number] Goal mobility CJ MET        REHAB SERVICES OUTPATIENT DISCHARGE SUMMARY  Physical Therapy      Name:  Tracy Armendariz  Date:  [birth year]  Date of Evaluation:  17  Physician:  Judy Lopez  Total # Of Visits:  17  Cancelled:  1  No Shows:  0  Diagnosis:    1. Difficulty walking     2. Decreased range of motion of right knee     3. Right knee pain, unspecified chronicity     4. Decreased functional mobility         Physical/Functional Status:  At time of discharge, patient was able to met all goals with increased LE function      KOOS Knee Survey:    Symptoms:   Pain:    Functional, Daily Livin/68 (21%  disabled)  Function, Sports and Recreational Activities:    Quality of Life:  10/16  PROMIS-29:    Physical Function:    Anxiety:    Depression:     Fatigue:     Sleep Disturbance:  10/20   Satisfaction with Social Role:     Pain Interference:  10/20   Pain Intensity:  2/10    AROM: 0-112  PROM: 0-115      L/E Strength w/ MicroFET Muscle Michelle Dynamometer Right Left Pain/Dysfunction with Movement   (approx 4 sec hold w/ max contraction)   Hip Flexion 19.3 kg  20.2 kg       Hip Abduction 15.1 kg  16.6kg       Quadriceps 18.0kg  18.2 kg       Hamstrings 16.4 kg  17.7 kg           TU seconds (w/0 assistive device or UE support)  30 second sit-to-stand test (without U/E support): 12 without UE support    The patient is to be discharged from our Therapy service for the following reason(s):  Patient has met all of his/her goals    Degree of Goal Achievement:  Patient has met all goals, see above in tx note    Patient Education:  CONT HEP and community fitness    Discharge Plan:  Home Program and community fitness

## 2017-03-29 ENCOUNTER — CLINICAL SUPPORT (OUTPATIENT)
Dept: ELECTROPHYSIOLOGY | Facility: CLINIC | Age: 67
End: 2017-03-29
Payer: MEDICARE

## 2017-03-29 DIAGNOSIS — Z95.818 PRESENCE OF CARDIAC DEVICE: ICD-10-CM

## 2017-03-29 DIAGNOSIS — I63.9 CRYPTOGENIC STROKE: ICD-10-CM

## 2017-03-31 ENCOUNTER — PATIENT MESSAGE (OUTPATIENT)
Dept: FAMILY MEDICINE | Facility: CLINIC | Age: 67
End: 2017-03-31

## 2017-03-31 ENCOUNTER — TELEPHONE (OUTPATIENT)
Dept: FAMILY MEDICINE | Facility: CLINIC | Age: 67
End: 2017-03-31

## 2017-03-31 DIAGNOSIS — R73.01 IFG (IMPAIRED FASTING GLUCOSE): ICD-10-CM

## 2017-03-31 DIAGNOSIS — E78.5 HYPERLIPIDEMIA, UNSPECIFIED HYPERLIPIDEMIA TYPE: ICD-10-CM

## 2017-03-31 DIAGNOSIS — I10 BENIGN ESSENTIAL HTN: Primary | ICD-10-CM

## 2017-04-06 ENCOUNTER — PATIENT MESSAGE (OUTPATIENT)
Dept: FAMILY MEDICINE | Facility: CLINIC | Age: 67
End: 2017-04-06

## 2017-04-06 NOTE — TELEPHONE ENCOUNTER
Orders Placed This Encounter   Procedures    CBC auto differential    Comprehensive metabolic panel    Hemoglobin A1c    Lipid panel    TSH

## 2017-04-07 ENCOUNTER — OFFICE VISIT (OUTPATIENT)
Dept: FAMILY MEDICINE | Facility: CLINIC | Age: 67
End: 2017-04-07
Payer: MEDICARE

## 2017-04-07 VITALS
WEIGHT: 236.13 LBS | BODY MASS INDEX: 37.95 KG/M2 | SYSTOLIC BLOOD PRESSURE: 135 MMHG | DIASTOLIC BLOOD PRESSURE: 84 MMHG | HEART RATE: 73 BPM | OXYGEN SATURATION: 96 % | HEIGHT: 66 IN

## 2017-04-07 DIAGNOSIS — K59.00 CONSTIPATION, UNSPECIFIED CONSTIPATION TYPE: ICD-10-CM

## 2017-04-07 DIAGNOSIS — Z00.00 ROUTINE GENERAL MEDICAL EXAMINATION AT A HEALTH CARE FACILITY: Primary | ICD-10-CM

## 2017-04-07 DIAGNOSIS — R73.01 IFG (IMPAIRED FASTING GLUCOSE): ICD-10-CM

## 2017-04-07 DIAGNOSIS — Z86.39 HISTORY OF VITAMIN D DEFICIENCY: ICD-10-CM

## 2017-04-07 DIAGNOSIS — M85.80 OSTEOPENIA, UNSPECIFIED LOCATION: ICD-10-CM

## 2017-04-07 DIAGNOSIS — I10 BENIGN ESSENTIAL HTN: ICD-10-CM

## 2017-04-07 DIAGNOSIS — R10.11 RUQ PAIN: ICD-10-CM

## 2017-04-07 DIAGNOSIS — D50.9 IRON DEFICIENCY ANEMIA, UNSPECIFIED IRON DEFICIENCY ANEMIA TYPE: ICD-10-CM

## 2017-04-07 DIAGNOSIS — R53.83 FATIGUE, UNSPECIFIED TYPE: ICD-10-CM

## 2017-04-07 DIAGNOSIS — E78.5 HYPERLIPIDEMIA, UNSPECIFIED HYPERLIPIDEMIA TYPE: ICD-10-CM

## 2017-04-07 DIAGNOSIS — E66.01 SEVERE OBESITY (BMI 35.0-35.9 WITH COMORBIDITY): ICD-10-CM

## 2017-04-07 DIAGNOSIS — Z86.73 HISTORY OF STROKE: ICD-10-CM

## 2017-04-07 DIAGNOSIS — G47.33 OSA (OBSTRUCTIVE SLEEP APNEA): ICD-10-CM

## 2017-04-07 DIAGNOSIS — M89.9 BONE DISORDER: ICD-10-CM

## 2017-04-07 DIAGNOSIS — Z12.31 ENCOUNTER FOR SCREENING MAMMOGRAM FOR BREAST CANCER: ICD-10-CM

## 2017-04-07 PROCEDURE — 99499 UNLISTED E&M SERVICE: CPT | Mod: S$GLB,,, | Performed by: FAMILY MEDICINE

## 2017-04-07 PROCEDURE — 99999 PR PBB SHADOW E&M-EST. PATIENT-LVL IV: CPT | Mod: PBBFAC,,, | Performed by: FAMILY MEDICINE

## 2017-04-07 PROCEDURE — G0009 ADMIN PNEUMOCOCCAL VACCINE: HCPCS | Mod: S$GLB,,, | Performed by: FAMILY MEDICINE

## 2017-04-07 PROCEDURE — 3079F DIAST BP 80-89 MM HG: CPT | Mod: S$GLB,,, | Performed by: FAMILY MEDICINE

## 2017-04-07 PROCEDURE — 99397 PER PM REEVAL EST PAT 65+ YR: CPT | Mod: 25,S$GLB,, | Performed by: FAMILY MEDICINE

## 2017-04-07 PROCEDURE — 3075F SYST BP GE 130 - 139MM HG: CPT | Mod: S$GLB,,, | Performed by: FAMILY MEDICINE

## 2017-04-07 PROCEDURE — 90732 PPSV23 VACC 2 YRS+ SUBQ/IM: CPT | Mod: S$GLB,,, | Performed by: FAMILY MEDICINE

## 2017-04-07 RX ORDER — DOCUSATE SODIUM 100 MG/1
100 CAPSULE, LIQUID FILLED ORAL 2 TIMES DAILY PRN
Qty: 60 CAPSULE | Refills: 11 | Status: SHIPPED | OUTPATIENT
Start: 2017-04-07 | End: 2017-10-26

## 2017-04-07 NOTE — MR AVS SNAPSHOT
[address] [place]  [place] [address]  Phone: [phone]  Fax: [phone]                  Tracy Armendariz   2017 2:20 PM   Office Visit    Description:  Female : 1950   Provider:  Bartolo Jules MD   Department:  [facility]           Reason for Visit     Annual Exam           Diagnoses this Visit        Comments    History of stroke    -  Primary     MARTIN (obstructive sleep apnea)         Benign essential HTN         Osteopenia, unspecified location         Hyperlipidemia, unspecified hyperlipidemia type         IFG (impaired fasting glucose)         Iron deficiency anemia, unspecified iron deficiency anemia type         History of vitamin D deficiency         Bone disorder         Encounter for screening mammogram for breast cancer                To Do List           Future Appointments        Provider Department Dept Phone    4/10/2017 7:20 AM SAME DAY LAB, [place] [facility] [phone]    2017 1:00 PM [facility] MAMMO[facility] [phone]    2017 1:30 PM [facility] DEXA1 [facility] [phone]      Goals (5 Years of Data)     None       These Medications        Disp Refills Start End    docusate sodium (COLACE) 100 MG capsule 60 capsule 11 2017     Take 1 capsule (100 mg total) by mouth 2 (two) times daily as needed for Constipation. - Oral    Pharmacy: [facility] [address] [place] [address] [place] [address] AT [facility] #: [phone]         [facility]     [facility] Nurse Care Line - / Assistance  Unless otherwise directed by your provider, please contact [facility], our nurse care line that is available for / assistance.     Registered nurses in the [facility] provide: appointment scheduling, clinical advisement, health education, and other advisory services.  Call: [phone] (toll  free)               Medications           Message regarding Medications     Verify the changes and/or additions to your medication regime listed below are the same as discussed with your clinician today.  If any of these changes or additions are incorrect, please notify your healthcare provider.        CHANGE how you are taking these medications     Start Taking Instead of    docusate sodium (COLACE) 100 MG capsule docusate sodium (COLACE) 50 MG capsule    Dosage:  Take 1 capsule (100 mg total) by mouth 2 (two) times daily as needed for Constipation. Dosage:  Take 1 capsule (50 mg total) by mouth every 6 (six) hours as needed for Constipation.    Reason for Change:  Reorder       STOP taking these medications     clotrimazole-betamethasone 1-0.05% (LOTRISONE) cream Apply twice daily    mupirocin (BACTROBAN) 2 % ointment     oxycodone-acetaminophen (PERCOCET) 5-325 mg per tablet Take 1 tablet by mouth every 4 (four) hours as needed for Pain.    rivaroxaban (XARELTO) 10 mg Tab Take 1 tablet (10 mg total) by mouth daily with dinner or evening meal.           Verify that the below list of medications is an accurate representation of the medications you are currently taking.  If none reported, the list may be blank. If incorrect, please contact your healthcare provider. Carry this list with you in case of emergency.           Current Medications     albuterol (PROAIR HFA) 90 mcg/actuation inhaler Inhale 2 puffs into the lungs every 6 (six) hours as needed. As needed for wheezing    aspirin 81 MG Chew Take 81 mg by mouth once daily.    atorvastatin (LIPITOR) 10 MG tablet TAKE 1 TABLET(10 MG) BY MOUTH EVERY DAY    calcium carbonate (OS-SPENCER) 600 mg (1,500 mg) Tab Take 600 mg by mouth 2 (two) times daily with meals.    cholecalciferol, vitamin D3, 1,000 unit Chew Take by mouth.    clopidogrel (PLAVIX) 75 mg tablet TAKE ONE TABLET BY MOUTH EVERY DAY    docusate sodium (COLACE) 100 MG capsule Take 1 capsule (100 mg total) by  "mouth 2 (two) times daily as needed for Constipation.    escitalopram oxalate (LEXAPRO) 10 MG tablet TAKE 1 TABLET BY MOUTH EVERY DAY    famotidine (PEPCID) 20 MG tablet Take 1 tablet (20 mg total) by mouth 2 (two) times daily.    losartan (COZAAR) 100 MG tablet Take 1 tablet (100 mg total) by mouth once daily.    MULTIVIT WITH CALCIUM,IRON,MIN (WOMEN'S DAILY MULTIVITAMIN ORAL) Take by mouth.           Clinical Reference Information           Your Vitals Were     BP Pulse Height Weight SpO2 BMI    135/84 73 5' 6" (1.676 m) 107.1 kg (236 lb 1.8 oz) 96% 38.11 kg/m2      Blood Pressure          Most Recent Value    BP  135/84      Allergies as of 4/7/2017     Iodinated Contrast Media - Iv Dye    Isothiazolinones    Pcn [Penicillins]      Immunizations Administered on Date of Encounter - 4/7/2017     Name Date Dose VIS Date Route    Pneumococcal Polysaccharide - 23 Valent  Incomplete 0.5 mL 4/24/2015 Intramuscular      Orders Placed During Today's Visit      Normal Orders This Visit    Pneumococcal Polysaccharide Vaccine (23 Valent) (SQ/IM)     Future Labs/Procedures Expected by Expires    DXA Bone Density Spine And Hip  4/7/2017 4/7/2018    Ferritin  4/7/2017 6/6/2018    Iron and TIBC  4/7/2017 6/6/2018    Mammo Digital Screening Bilat with CAD  4/7/2017 6/9/2018    Vitamin D  4/7/2017 6/6/2018      Language Assistance Services     ATTENTION: Language assistance services are available, free of charge. Please call [phone].      ATENCIÓN: Si habla español, tiene a wheeler disposición servicios gratuitos de asistencia lingüística. Llame al [phone].     CHÚ Ý: N?u b?n nói Ti?ng Vi?t, có các d?ch v? h? tr? ngôn ng? mi?n phí dành cho b?n. G?i s? [phone].         [facility] complies with applicable Federal civil rights laws and does not discriminate on the basis of race, color, national origin, age, disability, or sex.        "

## 2017-04-07 NOTE — PROGRESS NOTES
"(Portions of this note were dictated using voice recognition software and may contain dictation related errors in spelling/grammar/syntax not found on text review)    CC:   Chief Complaint   Patient presents with    Annual Exam       HPI: 66 y.o. female here for annual exam     last visit April 2016 asthma follow-up for transient right arm and leg weakness, neuro workup was negative at the time.  She does have a history of prior thalamic and midbrain stroke in 2013 on aspirin and Plavix for secondary prophylaxis    Hypertension on losartan 100 mg daily.  Blood pressures controlled.    Recently had right knee arthroplasty done in February with Dr. Kim, with PT    MARTIN, on BiPAP therapy, follows with sleep medicine.  Does feel rather sleepy and feels like she can sleep a lot more than she is used to.  Did have some anemia after her surgery, this needs to be reassessed.  Had been on iron in the past    Is constipated after her surgery, only takes Percocet if she needs to for severe pain.  Has been taking some Aleve.  Had gotten a docusate prescription from her surgeon and she finds this really helps with constipation next    Does occasionally get some vague right upper quadrant "bruise" sensation that has been there for several months now.  She denies any trauma.  She has a CT of the abdomen last year which showed normal liver but she states it    Lipidemia, on Lipitor 40mg (was off for a period of time out of concern for myalgias that she was having some pains even off her statins were discussed with her stroke history in the past its better to get back on it)    Osteopenia bone density as below    Past Medical History:   Diagnosis Date    Anticoagulant long-term use     Arthritis     CVA (cerebral infarction) 2013    Depression     Diverticulosis of colon     Extrinsic asthma, unspecified     Hyperlipidemia     Hypertension     Left atrial enlargement 12/16/2014    Low back pain     MARTIN (obstructive sleep " apnea)     Osteopenia     PUD (peptic ulcer disease)     Stroke  2013       Past Surgical History:   Procedure Laterality Date    APPENDECTOMY      @ time of hysterectomy    BACK SURGERY      CATARACT EXTRACTION       SECTION      CHOLECYSTECTOMY      laparoscopic    DILATION AND CURETTAGE OF UTERUS  1972    HYSTERECTOMY  1978    TAHUSO with appendectomy    INNER EAR SURGERY      replaced ear drum    KNEE ARTHROSCOPY W/ DEBRIDEMENT      LUMBAR DISCECTOMY      L4-L5    OOPHORECTOMY      TONSILLECTOMY      TYMPANOPLASTY         Family History   Problem Relation Age of Onset    Cervical cancer Mother     Cancer Mother 65     lung cancer - non smoker    Stroke Paternal Grandfather     Hypertension Maternal Grandfather     Heart disease Maternal Grandfather     Hypertension Father     Coronary artery disease Father 62    Heart disease Father     Diabetes Sister     Heart disease Sister     Kidney disease Sister     Breast cancer Daughter 36    Heart failure Sister      60s    Colon cancer Neg Hx     Ovarian cancer Neg Hx        Social History     Social History    Marital status:      Spouse name: N/A    Number of children: N/A    Years of education: N/A     Occupational History    Not on file.     Social History Main Topics    Smoking status: Former Smoker     Quit date: 1995    Smokeless tobacco: Never Used    Alcohol use 0.6 oz/week     1 Glasses of wine per week      Comment: social    Drug use: No    Sexual activity: Yes     Partners: Male     Birth control/ protection: Surgical     Other Topics Concern    Not on file     Social History Narrative     SCREENINGS  Mammogram 12/1/15     GYN: Dr. Edwardo MCGUIRE 1/15/13.     Colonoscopy 5/3/2011 diverticulosis, repeat 7 years     DEXA 12/1/15. Normal spine and left hip density.  Osteopenia Involving the right hip with a T score -1.2.     Immunizations  Prevnar 2015  [id number]  Pvx:  today  zvx utd    Lab Results   Component Value Date    WBC 7.57 02/06/2017    HGB 11.6 (L) 02/06/2017    HCT 35.4 (L) 02/06/2017     02/06/2017    CHOL 166 03/14/2016    TRIG 101 03/14/2016    HDL 81 (H) 03/14/2016    ALT 16 01/13/2017    AST 17 01/13/2017     01/13/2017    K 4.0 01/13/2017     01/13/2017    CREATININE 0.7 01/13/2017    BUN 13 01/13/2017    CO2 26 01/13/2017    TSH 1.795 03/22/2016    INR 1.0 01/13/2017    GLUF 123 (H) 11/04/2008    HGBA1C 6.1 01/13/2017    LDLCALC 64.8 03/14/2016     01/13/2017         ROS:  GENERAL:  fatigueSKIN: No rashes, no itching.  HEAD: No headaches.  EYES: No visual changes  EARS: No ear pain or changes in hearing.  NOSE: No congestion or rhinorrhea.  MOUTH & THROAT: No hoarseness, change in voice, or sore throat. does complain of some chronic taste changes after her stroke   NODES: Denies swollen glands.  CHEST: Denies CARD, cyanosis, wheezing, cough and sputum production.  CARDIOVASCULAR: Denies chest pain, PND, orthopnea.  ABDOMEN: No nausea, vomiting, or changes in bowel function.  URINARY: No flank pain, dysuria or hematuria.  PERIPHERAL VASCULAR: No claudication or cyanosis.  MUSCULOSKELETAL: still some knee pain for which she will occasionally take Aleve   NEUROLOGIC: No weakness or numbness.    Vital signs reviewed  PE:   APPEARANCE: Well nourished, well developed, in no acute distress.    HEAD: Normocephalic, atraumatic.  EYES: PERRL. EOMI.   Conjunctivae noninjected.  EARS: TM's intact. Light reflex normal. No retraction or perforation  NOSE: Mucosa pink. Airway clear.  MOUTH & THROAT: No tonsillar enlargement. No pharyngeal erythema or exudate.   NECK: Supple with no cervical lymphadenopathy.  No carotid bruits.  No megaly   CHEST: Good inspiratory effort. Lungs clear to auscultation with no wheezes or crackles.  CARDIOVASCULAR: Normal S1, S2. No rubs, murmurs, or gallops.  ABDOMEN: Bowel sounds normal. Not distended. Soft. No tenderness  or masses. very minimal right upper quadrant and epigastric discomfort . No organomegaly.  EXTREMITIES: No edema, cyanosis, or clubbing.      IMPRESSION  1. Routine general medical examination at a health care facility    2. History of stroke    3. MARTIN (obstructive sleep apnea)    4. Benign essential HTN    5. Osteopenia, unspecified location    6. Hyperlipidemia, unspecified hyperlipidemia type    7. IFG (impaired fasting glucose)    8. Iron deficiency anemia, unspecified iron deficiency anemia type    9. History of vitamin D deficiency    10. Bone disorder    11. Encounter for screening mammogram for breast cancer    12. RUQ pain    13. Fatigue, unspecified type    14. Constipation, unspecified constipation type    15. Severe obesity (BMI 35.0-35.9 with comorbidity)            PLAN   hypertension controlled.  Continue losartan.    Hyperlipidemia: Continue Lipitor.  Check lipid profile    Impaired fasting glucose, check A1c and CMP again.  Continue to work on healthy diet, exercise.  She has lost some weight after her knee replacement surgery    Iron deficiency anemia: Could be exacerbated after her surgery.  Will check labs and iron parameters    Constipation: Refill docusate.  Advise fiber, increased water    Fatigue: Could be related to activity change given her knee issues, iron deficiency anemia, also patient states that she was vitamin D deficient several years ago.  We will check these parameters.  Increase exercise.  Advise adequate sleep hygiene.  She is compliant with her BiPAP    Right upper quadrant pain, abdominal ultrasound to rule out fatty liver.  CT last year was normal from a liver standpoint.  No worrisome physical exam findings.  Continue to monitor.  Continue to treat constipation as above    Osteopenia: Vitamin D check, repeat bone density within the year.  Next    She is overdue on mammogram, ordered next    Sleep apnea, continue BiPAP treatment next    History of stroke: Continue aspirin and  Plavix.  No new symptoms suggestive of recurrent disease     Needs Pneumovax.  Prevnar booster in 2020 (5 years since last injection, as this was done before age 65)    Orders Placed This Encounter   Procedures    DXA Bone Density Spine And Hip    Mammo Digital Screening Bilat with CAD    US Abdomen Complete    Pneumococcal Polysaccharide Vaccine (23 Valent) (SQ/IM)    Ferritin    Iron and TIBC    Vitamin D    the above labs were added to her labs already on file in Epic CBC, CMP, lipid, TSH, A1c.

## 2017-04-10 ENCOUNTER — LAB VISIT (OUTPATIENT)
Dept: LAB | Facility: HOSPITAL | Age: 67
End: 2017-04-10
Attending: FAMILY MEDICINE
Payer: MEDICARE

## 2017-04-10 DIAGNOSIS — E78.5 HYPERLIPIDEMIA, UNSPECIFIED HYPERLIPIDEMIA TYPE: ICD-10-CM

## 2017-04-10 DIAGNOSIS — D50.9 IRON DEFICIENCY ANEMIA, UNSPECIFIED IRON DEFICIENCY ANEMIA TYPE: ICD-10-CM

## 2017-04-10 DIAGNOSIS — R73.01 IFG (IMPAIRED FASTING GLUCOSE): ICD-10-CM

## 2017-04-10 DIAGNOSIS — I10 BENIGN ESSENTIAL HTN: ICD-10-CM

## 2017-04-10 DIAGNOSIS — Z86.39 HISTORY OF VITAMIN D DEFICIENCY: ICD-10-CM

## 2017-04-10 LAB
25(OH)D3+25(OH)D2 SERPL-MCNC: 37 NG/ML
ALBUMIN SERPL BCP-MCNC: 3.3 G/DL
ALP SERPL-CCNC: 70 U/L
ALT SERPL W/O P-5'-P-CCNC: 13 U/L
ANION GAP SERPL CALC-SCNC: 8 MMOL/L
AST SERPL-CCNC: 16 U/L
BASOPHILS # BLD AUTO: 0.06 K/UL
BASOPHILS NFR BLD: 0.6 %
BILIRUB SERPL-MCNC: 0.4 MG/DL
BUN SERPL-MCNC: 18 MG/DL
CALCIUM SERPL-MCNC: 9.4 MG/DL
CHLORIDE SERPL-SCNC: 106 MMOL/L
CHOLEST/HDLC SERPL: 1.8 {RATIO}
CO2 SERPL-SCNC: 24 MMOL/L
CREAT SERPL-MCNC: 0.7 MG/DL
DIFFERENTIAL METHOD: NORMAL
EOSINOPHIL # BLD AUTO: 0.3 K/UL
EOSINOPHIL NFR BLD: 2.4 %
ERYTHROCYTE [DISTWIDTH] IN BLOOD BY AUTOMATED COUNT: 13.1 %
EST. GFR  (AFRICAN AMERICAN): >60 ML/MIN/1.73 M^2
EST. GFR  (NON AFRICAN AMERICAN): >60 ML/MIN/1.73 M^2
ESTIMATED AVG GLUCOSE: 123 MG/DL
FERRITIN SERPL-MCNC: 30 NG/ML
GLUCOSE SERPL-MCNC: 127 MG/DL
HBA1C MFR BLD HPLC: 5.9 %
HCT VFR BLD AUTO: 41.2 %
HDL/CHOLESTEROL RATIO: 54.5 %
HDLC SERPL-MCNC: 176 MG/DL
HDLC SERPL-MCNC: 96 MG/DL
HGB BLD-MCNC: 13.7 G/DL
IRON SERPL-MCNC: 54 UG/DL
LDLC SERPL CALC-MCNC: 67.8 MG/DL
LYMPHOCYTES # BLD AUTO: 3.6 K/UL
LYMPHOCYTES NFR BLD: 34.8 %
MCH RBC QN AUTO: 30.4 PG
MCHC RBC AUTO-ENTMCNC: 33.3 %
MCV RBC AUTO: 92 FL
MONOCYTES # BLD AUTO: 0.7 K/UL
MONOCYTES NFR BLD: 6.3 %
NEUTROPHILS # BLD AUTO: 5.8 K/UL
NEUTROPHILS NFR BLD: 55.7 %
NONHDLC SERPL-MCNC: 80 MG/DL
PLATELET # BLD AUTO: 298 K/UL
PMV BLD AUTO: 11.1 FL
POTASSIUM SERPL-SCNC: 4.1 MMOL/L
PROT SERPL-MCNC: 7.1 G/DL
RBC # BLD AUTO: 4.5 M/UL
SATURATED IRON: 13 %
SODIUM SERPL-SCNC: 138 MMOL/L
TOTAL IRON BINDING CAPACITY: 416 UG/DL
TRANSFERRIN SERPL-MCNC: 281 MG/DL
TRIGL SERPL-MCNC: 61 MG/DL
TSH SERPL DL<=0.005 MIU/L-ACNC: 3.45 UIU/ML
WBC # BLD AUTO: 10.34 K/UL

## 2017-04-10 PROCEDURE — 80053 COMPREHEN METABOLIC PANEL: CPT

## 2017-04-10 PROCEDURE — 36415 COLL VENOUS BLD VENIPUNCTURE: CPT

## 2017-04-10 PROCEDURE — 84443 ASSAY THYROID STIM HORMONE: CPT

## 2017-04-10 PROCEDURE — 83540 ASSAY OF IRON: CPT

## 2017-04-10 PROCEDURE — 85025 COMPLETE CBC W/AUTO DIFF WBC: CPT

## 2017-04-10 PROCEDURE — 82306 VITAMIN D 25 HYDROXY: CPT

## 2017-04-10 PROCEDURE — 83036 HEMOGLOBIN GLYCOSYLATED A1C: CPT

## 2017-04-10 PROCEDURE — 82728 ASSAY OF FERRITIN: CPT

## 2017-04-10 PROCEDURE — 80061 LIPID PANEL: CPT

## 2017-04-23 ENCOUNTER — PATIENT MESSAGE (OUTPATIENT)
Dept: FAMILY MEDICINE | Facility: CLINIC | Age: 67
End: 2017-04-23

## 2017-04-28 PROCEDURE — 93297 REM INTERROG DEV EVAL ICPMS: CPT | Mod: S$GLB,,, | Performed by: INTERNAL MEDICINE

## 2017-04-28 PROCEDURE — 93299 LOOP RECORDER REMOTE: CPT | Mod: S$GLB,,, | Performed by: INTERNAL MEDICINE

## 2017-05-01 ENCOUNTER — HOSPITAL ENCOUNTER (OUTPATIENT)
Dept: RADIOLOGY | Facility: HOSPITAL | Age: 67
Discharge: HOME | End: 2017-05-01
Attending: FAMILY MEDICINE
Payer: MEDICARE

## 2017-05-01 DIAGNOSIS — R10.11 RUQ PAIN: ICD-10-CM

## 2017-05-01 PROCEDURE — 76700 US EXAM ABDOM COMPLETE: CPT | Mod: TC

## 2017-05-01 PROCEDURE — 76700 US EXAM ABDOM COMPLETE: CPT | Mod: 26,,, | Performed by: RADIOLOGY

## 2017-05-02 ENCOUNTER — PATIENT MESSAGE (OUTPATIENT)
Dept: FAMILY MEDICINE | Facility: CLINIC | Age: 67
End: 2017-05-02

## 2017-05-25 DIAGNOSIS — M62.81 QUADRICEPS WEAKNESS: Primary | ICD-10-CM

## 2017-05-29 ENCOUNTER — CLINICAL SUPPORT (OUTPATIENT)
Dept: ELECTROPHYSIOLOGY | Facility: CLINIC | Age: 67
End: 2017-05-29
Payer: MEDICARE

## 2017-05-29 DIAGNOSIS — I63.9 CRYPTOGENIC STROKE: ICD-10-CM

## 2017-05-29 DIAGNOSIS — Z95.818 PRESENCE OF CARDIAC DEVICE: ICD-10-CM

## 2017-05-29 DIAGNOSIS — M62.81 QUADRICEPS WEAKNESS: Primary | ICD-10-CM

## 2017-05-29 PROCEDURE — 93299 LOOP RECORDER REMOTE: CPT | Mod: S$GLB,,, | Performed by: INTERNAL MEDICINE

## 2017-05-29 PROCEDURE — 93297 REM INTERROG DEV EVAL ICPMS: CPT | Mod: S$GLB,,, | Performed by: INTERNAL MEDICINE

## 2017-06-17 ENCOUNTER — PATIENT MESSAGE (OUTPATIENT)
Dept: ADMINISTRATIVE | Facility: OTHER | Age: 67
End: 2017-06-17

## 2017-06-30 PROCEDURE — 93297 REM INTERROG DEV EVAL ICPMS: CPT | Mod: S$GLB,,, | Performed by: INTERNAL MEDICINE

## 2017-06-30 PROCEDURE — 93299 LOOP RECORDER REMOTE: CPT | Mod: S$GLB,,, | Performed by: INTERNAL MEDICINE

## 2017-07-11 ENCOUNTER — CLINICAL SUPPORT (OUTPATIENT)
Dept: ELECTROPHYSIOLOGY | Facility: CLINIC | Age: 67
End: 2017-07-11
Payer: MEDICARE

## 2017-07-11 ENCOUNTER — TELEPHONE (OUTPATIENT)
Dept: ELECTROPHYSIOLOGY | Facility: CLINIC | Age: 67
End: 2017-07-11

## 2017-07-11 DIAGNOSIS — Z95.818 PRESENCE OF CARDIAC DEVICE: ICD-10-CM

## 2017-07-11 DIAGNOSIS — I63.9 CEREBROVASCULAR ACCIDENT (CVA), UNSPECIFIED MECHANISM: Primary | ICD-10-CM

## 2017-07-11 DIAGNOSIS — I63.9 CRYPTOGENIC STROKE: ICD-10-CM

## 2017-07-13 ENCOUNTER — HOSPITAL ENCOUNTER (OUTPATIENT)
Dept: CARDIOLOGY | Facility: CLINIC | Age: 67
Discharge: HOME | End: 2017-07-13
Payer: MEDICARE

## 2017-07-13 ENCOUNTER — OFFICE VISIT (OUTPATIENT)
Dept: ELECTROPHYSIOLOGY | Facility: CLINIC | Age: 67
End: 2017-07-13
Payer: MEDICARE

## 2017-07-13 VITALS
BODY MASS INDEX: 39.08 KG/M2 | HEART RATE: 72 BPM | WEIGHT: 243.19 LBS | SYSTOLIC BLOOD PRESSURE: 122 MMHG | DIASTOLIC BLOOD PRESSURE: 84 MMHG | HEIGHT: 66 IN

## 2017-07-13 DIAGNOSIS — I63.9 CEREBROVASCULAR ACCIDENT (CVA), UNSPECIFIED MECHANISM: ICD-10-CM

## 2017-07-13 DIAGNOSIS — I51.7 LEFT ATRIAL ENLARGEMENT: ICD-10-CM

## 2017-07-13 DIAGNOSIS — I63.9 CEREBROVASCULAR ACCIDENT (CVA), UNSPECIFIED MECHANISM: Primary | ICD-10-CM

## 2017-07-13 DIAGNOSIS — I10 ESSENTIAL HYPERTENSION: Chronic | ICD-10-CM

## 2017-07-13 DIAGNOSIS — G47.33 OSA (OBSTRUCTIVE SLEEP APNEA): ICD-10-CM

## 2017-07-13 DIAGNOSIS — J45.909 EXTRINSIC ASTHMA, UNSPECIFIED ASTHMA SEVERITY, UNCOMPLICATED: ICD-10-CM

## 2017-07-13 DIAGNOSIS — E66.01 SEVERE OBESITY (BMI 35.0-39.9) WITH COMORBIDITY: ICD-10-CM

## 2017-07-13 DIAGNOSIS — Z98.890 HISTORY OF LOOP RECORDER: ICD-10-CM

## 2017-07-13 DIAGNOSIS — E78.2 MIXED HYPERLIPIDEMIA: Chronic | ICD-10-CM

## 2017-07-13 PROCEDURE — 93000 ELECTROCARDIOGRAM COMPLETE: CPT | Mod: S$GLB,,, | Performed by: INTERNAL MEDICINE

## 2017-07-13 PROCEDURE — 99214 OFFICE O/P EST MOD 30 MIN: CPT | Mod: S$GLB,,, | Performed by: NURSE PRACTITIONER

## 2017-07-13 PROCEDURE — 99999 PR PBB SHADOW E&M-EST. PATIENT-LVL III: CPT | Mod: PBBFAC,,, | Performed by: NURSE PRACTITIONER

## 2017-07-13 PROCEDURE — 1159F MED LIST DOCD IN RCRD: CPT | Mod: S$GLB,,, | Performed by: NURSE PRACTITIONER

## 2017-07-13 PROCEDURE — 99499 UNLISTED E&M SERVICE: CPT | Mod: S$GLB,,, | Performed by: NURSE PRACTITIONER

## 2017-07-13 PROCEDURE — 1125F AMNT PAIN NOTED PAIN PRSNT: CPT | Mod: S$GLB,,, | Performed by: NURSE PRACTITIONER

## 2017-07-13 NOTE — PROGRESS NOTES
"Subjective:    Patient ID:  Tracy Armendariz is a 66 y.o. female who presents for follow-up for ILR Check.    Tracy Armendariz is a patient of Dr. Garza.     HPI        Ms. Armendariz is a 66 year old female HTN, CVA, MARTIN here for follow up cryptogenic CVA.  She suffered an embolic CVA (12/19/13) leading to evaluation. Holter monitor and 30-Day Event Monitor only showed non-sustained atrial arrhythmias (longest 20 beats). Her Echo showed normal LV function with enlarged LA. In the past, she has reported "flutters" on most nights, more commonly when she lies down for sleep. She has experienced some balance and visual disturbance as well as some memory loss since her CVA. She is on aspirin and plavix for CVA therapy. ILR was implanted (01/20/15). At her last office visit, Ms. Armendariz reported that her neurologic issues had resolved, but she reported continued visual disturbances; she reported being followed by ophthalmology.     Since her last office visit, Ms. Armendariz reports feeling well overall with occasional fatigue; she denies chest pain, SOB/CADR, dizziness, palpitations, or syncope. She reports that she remains active without difficulty.     Recent cardiac studies:  Echo (03/31/16) revealed an EF of 55-60%, mild LAE (NICOL measuring 40.67 cc/m2), normal left ventricular diastolic function, normal right ventricular systolic function, concentric remodeling, and a PAP >15 mmHg. Negative Bubble study  ILR Transmissions (10/12/15 - 06/30/17)  revealed no arrhythmias since implant; No AUTO or SYMPTOM episodes; Battery OK.     I reviewed today's ECG which demonstrated NSR at 72 bpm; , QRS 72, and QTc 451.    Review of Systems   Constitution: Positive for malaise/fatigue. Negative for diaphoresis.   HENT: Negative for headaches and nosebleeds.    Eyes: Negative for double vision.   Cardiovascular: Negative for chest pain, dyspnea on exertion, irregular heartbeat, near-syncope, palpitations and syncope.   Respiratory: " Negative for shortness of breath.    Skin: Negative.    Musculoskeletal: Negative.    Gastrointestinal: Negative for abdominal pain, hematemesis and hematochezia.   Genitourinary: Negative for hematuria.   Neurological: Negative for dizziness and light-headedness.   Psychiatric/Behavioral: Negative for altered mental status.        Objective:    Physical Exam   Constitutional: She is oriented to person, place, and time. She appears well-developed and well-nourished.   HENT:   Head: Normocephalic and atraumatic.   Eyes: Pupils are equal, round, and reactive to light.   Cardiovascular: Normal rate, regular rhythm, normal heart sounds and intact distal pulses.    Pulmonary/Chest: Effort normal and breath sounds normal.   Neurological: She is alert and oriented to person, place, and time.   Skin: She is not diaphoretic.   Vitals reviewed.        Assessment:       1. Cerebrovascular accident (CVA), unspecified mechanism    2. History of loop recorder    3. Left atrial enlargement    4. Mixed hyperlipidemia    5. Essential hypertension    6. Severe obesity (BMI 35.0-39.9) with comorbidity    7. Extrinsic asthma, unspecified asthma severity, uncomplicated    8. MARTIN (obstructive sleep apnea)         Plan:       In summary, Ms. Armendariz is a 66 y.o. female with a hx of CVA s/p ILR, LAE, HLD, HTN, severe obesity, extrinsic asthma, and MARTIN. Ms. Armendariz is doing well from a rhythm perspective with stable device function without arrhythmia noted.     Continue current medication regimen and device settings.   Follow up in device clinic as scheduled.   Follow up in EP clinic in 1 year, sooner as needed.     Stephany Daniel, MN, APRN, FNP-C        (A copy of today's note was sent to Dr. Garza.)

## 2017-07-20 RX ORDER — CLOPIDOGREL BISULFATE 75 MG/1
TABLET ORAL
Qty: 90 TABLET | Refills: 3 | Status: SHIPPED | OUTPATIENT
Start: 2017-07-20 | End: 2018-07-29 | Stop reason: SDUPTHER

## 2017-07-23 RX ORDER — LOSARTAN POTASSIUM 100 MG/1
TABLET ORAL
Qty: 90 TABLET | Refills: 3 | Status: SHIPPED | OUTPATIENT
Start: 2017-07-23 | End: 2018-07-29 | Stop reason: SDUPTHER

## 2017-07-31 ENCOUNTER — CLINICAL SUPPORT (OUTPATIENT)
Dept: ELECTROPHYSIOLOGY | Facility: CLINIC | Age: 67
End: 2017-07-31
Payer: MEDICARE

## 2017-07-31 DIAGNOSIS — I63.9 CRYPTOGENIC STROKE: ICD-10-CM

## 2017-07-31 DIAGNOSIS — Z95.818 PRESENCE OF CARDIAC DEVICE: ICD-10-CM

## 2017-07-31 PROCEDURE — 93299 LOOP RECORDER REMOTE: CPT | Mod: S$GLB,,, | Performed by: INTERNAL MEDICINE

## 2017-07-31 PROCEDURE — 93297 REM INTERROG DEV EVAL ICPMS: CPT | Mod: S$GLB,,, | Performed by: INTERNAL MEDICINE

## 2017-09-05 ENCOUNTER — CLINICAL SUPPORT (OUTPATIENT)
Dept: ELECTROPHYSIOLOGY | Facility: CLINIC | Age: 67
End: 2017-09-05
Payer: MEDICARE

## 2017-09-05 DIAGNOSIS — I63.9 CRYPTOGENIC STROKE: ICD-10-CM

## 2017-09-05 DIAGNOSIS — Z95.818 PRESENCE OF CARDIAC DEVICE: ICD-10-CM

## 2017-09-05 PROCEDURE — 93297 REM INTERROG DEV EVAL ICPMS: CPT | Mod: S$GLB,,, | Performed by: INTERNAL MEDICINE

## 2017-09-05 PROCEDURE — 93299 LOOP RECORDER REMOTE: CPT | Mod: S$GLB,,, | Performed by: INTERNAL MEDICINE

## 2017-10-19 RX ORDER — ATORVASTATIN CALCIUM 10 MG/1
TABLET, FILM COATED ORAL
Qty: 90 TABLET | Refills: 3 | Status: SHIPPED | OUTPATIENT
Start: 2017-10-19 | End: 2018-10-30 | Stop reason: SDUPTHER

## 2017-10-23 ENCOUNTER — TELEPHONE (OUTPATIENT)
Dept: OBSTETRICS AND GYNECOLOGY | Facility: CLINIC | Age: 67
End: 2017-10-23

## 2017-10-23 NOTE — TELEPHONE ENCOUNTER
Spoke with pt. She had a heart device placed in her left breast area some years ago. She started feeling pain about two days ago around the nipple. She is pretty sure that its related to the heart device but she can not feel the device anymore. She would like a mammogram and an appt to see you. Her last mammo was in 2015. Pt states that she is only supposed to get mammo's done every 2 years. Please advise.

## 2017-10-23 NOTE — TELEPHONE ENCOUNTER
----- Message from Mariza Ramirez sent at 10/23/2017  3:20 PM CDT -----  Contact: self  Tracy would like to have an order put into the system for a mammogram.  Pt states that she felt a lump in her breast and wants to get it checked out as soon as possible.  Pt also states that she is not due yet for a pap or mammogram, but wants to be able to come in and be seen by Dr. Brooke to check her breast.    Please contact Tracy back at [phone]    Thank you

## 2017-10-24 ENCOUNTER — TELEPHONE (OUTPATIENT)
Dept: SURGERY | Facility: CLINIC | Age: 67
End: 2017-10-24

## 2017-10-24 NOTE — TELEPHONE ENCOUNTER
Called patient regarding message below.  The patient is scheduled to be seen on Thursday 10/26/17 at 10 am with Celia Braga NP.  The patient voiced understanding of appointment date, time, and location.

## 2017-10-26 ENCOUNTER — HOSPITAL ENCOUNTER (OUTPATIENT)
Dept: RADIOLOGY | Facility: HOSPITAL | Age: 67
Discharge: HOME | End: 2017-10-26
Attending: NURSE PRACTITIONER
Payer: MEDICARE

## 2017-10-26 ENCOUNTER — OFFICE VISIT (OUTPATIENT)
Dept: SURGERY | Facility: CLINIC | Age: 67
End: 2017-10-26
Payer: MEDICARE

## 2017-10-26 VITALS
DIASTOLIC BLOOD PRESSURE: 72 MMHG | HEIGHT: 66 IN | HEART RATE: 65 BPM | TEMPERATURE: 98 F | WEIGHT: 236.88 LBS | SYSTOLIC BLOOD PRESSURE: 128 MMHG | BODY MASS INDEX: 38.07 KG/M2

## 2017-10-26 DIAGNOSIS — N64.4 BREAST PAIN: Primary | ICD-10-CM

## 2017-10-26 DIAGNOSIS — N63.0 BREAST MASS: ICD-10-CM

## 2017-10-26 DIAGNOSIS — Z80.3 FAMILY HISTORY OF BREAST CANCER: ICD-10-CM

## 2017-10-26 PROCEDURE — 99999 PR PBB SHADOW E&M-EST. PATIENT-LVL IV: CPT | Mod: PBBFAC,,, | Performed by: NURSE PRACTITIONER

## 2017-10-26 PROCEDURE — 76642 ULTRASOUND BREAST LIMITED: CPT | Mod: 26,LT,, | Performed by: RADIOLOGY

## 2017-10-26 PROCEDURE — 77066 DX MAMMO INCL CAD BI: CPT | Mod: 26,,, | Performed by: RADIOLOGY

## 2017-10-26 PROCEDURE — 76642 ULTRASOUND BREAST LIMITED: CPT | Mod: TC,LT

## 2017-10-26 PROCEDURE — 77062 BREAST TOMOSYNTHESIS BI: CPT | Mod: TC

## 2017-10-26 PROCEDURE — 77062 BREAST TOMOSYNTHESIS BI: CPT | Mod: 26,,, | Performed by: RADIOLOGY

## 2017-10-26 PROCEDURE — 99202 OFFICE O/P NEW SF 15 MIN: CPT | Mod: S$GLB,,, | Performed by: NURSE PRACTITIONER

## 2017-10-26 NOTE — LETTER
October 26, 2017      Hannah Brooke MD  [address]  [address]  [place] [address]           [facility]  [address]  [place] [address]  Phone: [phone]  Fax: [phone]          Patient: Tracy Armendariz   MR Number: [id number]   YOB: 1950   Date of Visit: 10/26/2017       Dear Dr. Hannah Brooke:    Thank you for referring Tracy Armendariz to me for evaluation. Attached you will find relevant portions of my assessment and plan of care.    If you have questions, please do not hesitate to call me. I look forward to following Tracy Armendariz along with you.    Sincerely,    eClia Braga, STEPHANIE    Enclosure  CC:  No Recipients    If you would like to receive this communication electronically, please contact [email or web address] or [phone] to request more information on [employer] access.    For providers and/or their staff who would like to refer a patient to [facility], please contact us through our one-stop-shop provider referral line, [facility], at [phone].    If you feel you have received this communication in error or would no longer like to receive these types of communications, please e-mail [email or web address]

## 2017-10-26 NOTE — PROGRESS NOTES
Subjective:      Patient ID: Tracy Armendariz is a 66 y.o. female.    Chief Complaint: Breast Pain (Left Breast)      HPI: (PF, EPF - 1-3) (Detailed, Comp, - 4) new patient referred by Dr Brooke presents with c/o new lump left breast, onset 4 days ago, tender to touch. Denies skin changes or nipple discharge. No recalled breast trauma or previous breast surgery. History of CVA with some reported memory loss, short term and long term     Last mmg 2015 with no abnormality reported     Menarche at 13   first at 18  Partial hysterectomy , ? Onset menopause, no HRT    Review of Systems  Objective:   Physical Exam   Pulmonary/Chest: Right breast exhibits no inverted nipple, no mass, no nipple discharge, no skin change and no tenderness. Left breast exhibits tenderness. Left breast exhibits no inverted nipple, no nipple discharge and no skin change. Breasts are symmetrical. There is no breast swelling.   Vague thickening in subareolar region of the left breast at 2 o'clock, associated tenderness reports, this is superficial and may represent a hodge gland, no redness or skin changes    Lymphadenopathy:     She has no cervical adenopathy.     She has no axillary adenopathy.        Right: No supraclavicular adenopathy present.        Left: No supraclavicular adenopathy present.     Assessment:       1. Breast pain    2. Breast mass    3. Family history of breast cancer        Plan:       bilat diag mmg and left US , no mass or abnormality seen. Clinical findings today not suspicious for breast cancer, she will return in 3 months for f/u [facility]  Call for any interval palpable breast mass, pain, nipple discharge, skin changes or other breast related concerns

## 2017-11-06 ENCOUNTER — CLINICAL SUPPORT (OUTPATIENT)
Dept: ELECTROPHYSIOLOGY | Facility: CLINIC | Age: 67
End: 2017-11-06
Attending: INTERNAL MEDICINE
Payer: MEDICARE

## 2017-11-06 DIAGNOSIS — Z95.818 STATUS POST PLACEMENT OF IMPLANTABLE LOOP RECORDER: ICD-10-CM

## 2017-11-06 DIAGNOSIS — I63.9 CRYPTOGENIC STROKE: ICD-10-CM

## 2017-11-06 DIAGNOSIS — Z95.818 STATUS POST PLACEMENT OF IMPLANTABLE LOOP RECORDER: Primary | ICD-10-CM

## 2017-11-06 PROCEDURE — 93298 REM INTERROG DEV EVAL SCRMS: CPT | Mod: S$GLB,,, | Performed by: INTERNAL MEDICINE

## 2017-11-06 PROCEDURE — 93299 LOOP RECORDER REMOTE: CPT | Mod: S$GLB,,, | Performed by: INTERNAL MEDICINE

## 2017-12-07 ENCOUNTER — HOSPITAL ENCOUNTER (OUTPATIENT)
Dept: RADIOLOGY | Facility: HOSPITAL | Age: 67
Discharge: HOME | End: 2017-12-07
Attending: FAMILY MEDICINE
Payer: MEDICARE

## 2017-12-07 DIAGNOSIS — M89.9 BONE DISORDER: ICD-10-CM

## 2017-12-07 DIAGNOSIS — M85.80 OSTEOPENIA, UNSPECIFIED LOCATION: ICD-10-CM

## 2017-12-07 PROCEDURE — 77080 DXA BONE DENSITY AXIAL: CPT | Mod: 26,,, | Performed by: RADIOLOGY

## 2017-12-07 PROCEDURE — 77080 DXA BONE DENSITY AXIAL: CPT | Mod: TC

## 2017-12-08 ENCOUNTER — PATIENT MESSAGE (OUTPATIENT)
Dept: FAMILY MEDICINE | Facility: CLINIC | Age: 67
End: 2017-12-08

## 2017-12-11 ENCOUNTER — CLINICAL SUPPORT (OUTPATIENT)
Dept: ELECTROPHYSIOLOGY | Facility: CLINIC | Age: 67
End: 2017-12-11
Attending: INTERNAL MEDICINE
Payer: MEDICARE

## 2017-12-11 DIAGNOSIS — I63.9 CRYPTOGENIC STROKE: ICD-10-CM

## 2017-12-11 DIAGNOSIS — Z95.818 STATUS POST PLACEMENT OF IMPLANTABLE LOOP RECORDER: ICD-10-CM

## 2017-12-11 PROCEDURE — 93298 REM INTERROG DEV EVAL SCRMS: CPT | Mod: S$GLB,,, | Performed by: INTERNAL MEDICINE

## 2017-12-11 PROCEDURE — 93299 LOOP RECORDER REMOTE: CPT | Mod: S$GLB,,, | Performed by: INTERNAL MEDICINE

## 2018-01-11 ENCOUNTER — OFFICE VISIT (OUTPATIENT)
Dept: OPHTHALMOLOGY | Facility: CLINIC | Age: 68
End: 2018-01-11
Payer: MEDICARE

## 2018-01-11 DIAGNOSIS — H51.0 PARINAUD'S SYNDROME AFFECTING BOTH EYES: Primary | ICD-10-CM

## 2018-01-11 DIAGNOSIS — H51.23 INTERNUCLEAR OPHTHALMOPLEGIA OF BOTH EYES: ICD-10-CM

## 2018-01-11 PROCEDURE — 92014 COMPRE OPH EXAM EST PT 1/>: CPT | Mod: S$GLB,,, | Performed by: OPHTHALMOLOGY

## 2018-01-11 PROCEDURE — 99999 PR PBB SHADOW E&M-EST. PATIENT-LVL III: CPT | Mod: PBBFAC,,, | Performed by: OPHTHALMOLOGY

## 2018-01-11 NOTE — PATIENT INSTRUCTIONS
Given updated eyeglass prescription.  Use artificial tears to lubricate left eye.  Repeat eye exam in one year.

## 2018-01-11 NOTE — PROGRESS NOTES
HPI     Concerns About Ocular Health    Additional comments: Dizziness all the time.            Comments   Mrs. Molina is here today for a follow-up appointment, she states that   she has been feeling dizzy all the time even with her glasses on. She   states this has been happening for about a month now and it is beginning   to escalate. When she is trying to do daily chores like loading the    or doing laundry she feels very sick and tired after bending up   and down. She states that her left eye is sore but not painful. She is not   using any eye medications. When she takes her glasses off she has diplopia   OU no other visual changes. Her left eye is constantly itchy and watering.        I have personally interviewed the patient, reviewed the history and   examined the patient and agree with the technician's exam.       Last edited by Sj Nicole MD on 1/11/2018 11:01 AM. (History)            Assessment /Plan     For exam results, see Encounter Report.    Parinaud's syndrome affecting both eyes    Internuclear ophthalmoplegia of both eyes      Ms. Armendariz's alignment has not changed since her last visit. I updated her eyeglass prescription. I recommended she use artificial tears to deal with the itching and tearing of her left eye. Repeat exam in one year or sooner if requested.

## 2018-01-15 ENCOUNTER — CLINICAL SUPPORT (OUTPATIENT)
Dept: ELECTROPHYSIOLOGY | Facility: CLINIC | Age: 68
End: 2018-01-15
Attending: INTERNAL MEDICINE
Payer: MEDICARE

## 2018-01-15 DIAGNOSIS — Z95.818 STATUS POST PLACEMENT OF IMPLANTABLE LOOP RECORDER: ICD-10-CM

## 2018-01-15 DIAGNOSIS — I63.9 CRYPTOGENIC STROKE: ICD-10-CM

## 2018-01-23 ENCOUNTER — HOSPITAL ENCOUNTER (OUTPATIENT)
Dept: RADIOLOGY | Facility: HOSPITAL | Age: 68
Discharge: HOME | End: 2018-01-23
Attending: NURSE PRACTITIONER
Payer: MEDICARE

## 2018-01-23 ENCOUNTER — OFFICE VISIT (OUTPATIENT)
Dept: SURGERY | Facility: CLINIC | Age: 68
End: 2018-01-23
Payer: MEDICARE

## 2018-01-23 VITALS
WEIGHT: 232.69 LBS | BODY MASS INDEX: 37.4 KG/M2 | DIASTOLIC BLOOD PRESSURE: 73 MMHG | HEIGHT: 66 IN | HEART RATE: 63 BPM | TEMPERATURE: 98 F | SYSTOLIC BLOOD PRESSURE: 141 MMHG

## 2018-01-23 DIAGNOSIS — N63.0 BREAST MASS: Primary | ICD-10-CM

## 2018-01-23 DIAGNOSIS — N63.0 BREAST MASS: ICD-10-CM

## 2018-01-23 PROCEDURE — 76642 ULTRASOUND BREAST LIMITED: CPT | Mod: TC,PO,LT

## 2018-01-23 PROCEDURE — 99999 PR PBB SHADOW E&M-EST. PATIENT-LVL III: CPT | Mod: PBBFAC,,, | Performed by: NURSE PRACTITIONER

## 2018-01-23 PROCEDURE — 99212 OFFICE O/P EST SF 10 MIN: CPT | Mod: S$GLB,,, | Performed by: NURSE PRACTITIONER

## 2018-01-23 PROCEDURE — 76642 ULTRASOUND BREAST LIMITED: CPT | Mod: 26,LT,, | Performed by: RADIOLOGY

## 2018-01-23 NOTE — MEDICAL/APP STUDENT
Patient ID: Tracy Armendariz is a 67 y.o. female.    Chief Complaint: Follow-up      HPI: Returning patient presents today for 3-month follow-up of left breast lump. Last seen in clinic 10/26/2017.    Today, patient reports still feeling same lump to left breast near areola at 4-5 o'clock. First noticed about 3 months ago. Unsure if it has increased in size since first noticing. Feels tube-shaped, about 1 inch long x 1/4 inch wide. Mass is firm; patient believes it has become firmer since last visit. Mass is fixed and is associated with pain only upon palpating or applying pressure while lying on abdomen. Denies nipple discharge. Denies changes to family history of breast/ovarian cancer since last visit.    Donald giron mmg 10/26/2017:  Findings:  Computer-aided detection was utilized in the interpretation of this examination. This procedure was performed using tomosynthesis.     The breasts have scattered areas of fibroglandular density.    Limited breast ultrasound was performed. There is no evidence of suspicious masses, microcalcifications or architectural distortion. Ultrasound evaluation demonstrates no suspicious abnormality. Visible small milk ducts near areola. No mass or cyst.    Impression:  There is no mammographic or sonographic evidence of malignancy.   BI-RADS Category 1: Negative   Recommendation:  Routine screening mammogram in 1 year is recommended.    A benign or negative imaging result should not preclude further clinical investigation of clinically suspicious symptoms.    The patient's estimated lifetime risk of breast cancer (to age 85) based on Tyrer-Cuzick - 7 risk assessment model is: Tyrer-Cuzick: 10.05 %. According to the American [employer] Society,  patients with a lifetime breast cancer risk of 20% or higher might benefit from supplemental screening tests.    Menarche at 13   first at 18  S/p partial hysterectomy, unilateral oopherectomy/salpingectomy  No HRT    Review of Systems   Breast  ROS: positive for - breast lump  negative for - nipple discharge    Objective:   Physical Exam   Pulmonary/Chest: She exhibits no mass, no tenderness, no laceration, no edema and no retraction. Right breast exhibits no inverted nipple, no mass, no nipple discharge, no skin change and no tenderness. Left breast exhibits no inverted nipple, no mass, no nipple discharge, no skin change and no tenderness. Breasts are symmetrical. There is no breast swelling.   Left breast 1 o'clock subareolar region with flattened oval-shaped finding. Similar finding appreciated to right breast 11 o'clock.    Lymphadenopathy:     She has no cervical adenopathy.     She has no axillary adenopathy.        Right: No supraclavicular adenopathy present.        Left: No supraclavicular adenopathy present.     Assessment:       1. Breast mass        Plan:       CBE performed with PORFIRIO Reyna. See today's clinic note per FNP.  Left breast oval-shaped finding on CBE today likely representative of normal breast tissue; area marked for imaging today to determine lactiferous sinus vs. Hayes gland vs. other finding. Patient to follow up pending results. Call clinic with any interval breast-related changes or concerns.

## 2018-01-23 NOTE — PROGRESS NOTES
"Subjective:      Patient ID: Tracy Armendariz is a 67 y.o. female.    Chief Complaint: Follow-up      HPI: (PF, EPF - 1-3) (Detailed, Comp, - 4) returning patient presents for f/u regarding left breast, refer to previous clinic note. She reports tenderness has significantly decreased in her left breast with only mild soreness with deep direct pressure. She reports "I think I can still feel a lump " only when she is sitting up. Denies nipple discharge, skin changes.     10- mmg and US with no abnormality     Review of Systems  Objective:   Physical Exam   Pulmonary/Chest: Right breast exhibits no inverted nipple, no mass, no nipple discharge, no skin change and no tenderness. Left breast exhibits no inverted nipple, no mass, no nipple discharge, no skin change and no tenderness. Breasts are symmetrical. There is no breast swelling.   In the subareolar aspect of the left breast at 1 o'clock there is a flattened oval finding which is similar to the opposite breast at 11 o'clock, most likely representing normal breast tissue, possible Hayes gland. Area marked for imaging today     Lymphadenopathy:     She has no cervical adenopathy.     She has no axillary adenopathy.        Right: No supraclavicular adenopathy present.        Left: No supraclavicular adenopathy present.     Assessment:       1. Breast mass        Plan:       Left US today , again no mass seen, this correlates with benign appearing breast tissue on exam  Reassurance provided, return prn any future breast changes or concerns  To f/u with her GYN or PCP     "

## 2018-03-12 ENCOUNTER — OFFICE VISIT (OUTPATIENT)
Dept: FAMILY MEDICINE | Facility: CLINIC | Age: 68
End: 2018-03-12
Payer: MEDICARE

## 2018-03-12 ENCOUNTER — TELEPHONE (OUTPATIENT)
Dept: FAMILY MEDICINE | Facility: CLINIC | Age: 68
End: 2018-03-12

## 2018-03-12 VITALS
OXYGEN SATURATION: 96 % | BODY MASS INDEX: 36.39 KG/M2 | WEIGHT: 226.44 LBS | HEART RATE: 75 BPM | HEIGHT: 66 IN | DIASTOLIC BLOOD PRESSURE: 73 MMHG | SYSTOLIC BLOOD PRESSURE: 115 MMHG | TEMPERATURE: 98 F

## 2018-03-12 DIAGNOSIS — E66.01 SEVERE OBESITY WITH BODY MASS INDEX 36.0-36.9: ICD-10-CM

## 2018-03-12 DIAGNOSIS — G89.29 CHRONIC EPIGASTRIC PAIN: ICD-10-CM

## 2018-03-12 DIAGNOSIS — K59.09 CONSTIPATION, CHRONIC: ICD-10-CM

## 2018-03-12 DIAGNOSIS — Z86.73 HISTORY OF STROKE: ICD-10-CM

## 2018-03-12 DIAGNOSIS — G47.33 OSA (OBSTRUCTIVE SLEEP APNEA): ICD-10-CM

## 2018-03-12 DIAGNOSIS — Z12.11 COLON CANCER SCREENING: ICD-10-CM

## 2018-03-12 DIAGNOSIS — R10.13 CHRONIC EPIGASTRIC PAIN: ICD-10-CM

## 2018-03-12 DIAGNOSIS — E78.2 MIXED HYPERLIPIDEMIA: ICD-10-CM

## 2018-03-12 DIAGNOSIS — I10 HYPERTENSION, UNSPECIFIED TYPE: ICD-10-CM

## 2018-03-12 DIAGNOSIS — Z00.00 ROUTINE GENERAL MEDICAL EXAMINATION AT A HEALTH CARE FACILITY: Primary | ICD-10-CM

## 2018-03-12 DIAGNOSIS — R73.01 IFG (IMPAIRED FASTING GLUCOSE): ICD-10-CM

## 2018-03-12 PROCEDURE — 99397 PER PM REEVAL EST PAT 65+ YR: CPT | Mod: S$GLB,,, | Performed by: FAMILY MEDICINE

## 2018-03-12 PROCEDURE — 99499 UNLISTED E&M SERVICE: CPT | Mod: S$GLB,,, | Performed by: FAMILY MEDICINE

## 2018-03-12 PROCEDURE — 99999 PR PBB SHADOW E&M-EST. PATIENT-LVL IV: CPT | Mod: PBBFAC,,, | Performed by: FAMILY MEDICINE

## 2018-03-12 RX ORDER — PANTOPRAZOLE SODIUM 40 MG/1
40 TABLET, DELAYED RELEASE ORAL DAILY
Qty: 30 TABLET | Refills: 11 | Status: SHIPPED | OUTPATIENT
Start: 2018-03-12 | End: 2019-06-14 | Stop reason: SDUPTHER

## 2018-03-12 RX ORDER — ESCITALOPRAM OXALATE 10 MG/1
TABLET ORAL
Qty: 90 TABLET | Refills: 0 | Status: SHIPPED | OUTPATIENT
Start: 2018-03-12 | End: 2018-06-28 | Stop reason: SDUPTHER

## 2018-03-12 RX ORDER — ESOMEPRAZOLE MAGNESIUM 20 MG/1
20 GRANULE, DELAYED RELEASE ORAL
COMMUNITY
End: 2018-03-12

## 2018-03-12 NOTE — PATIENT INSTRUCTIONS
Metamucil powder--1 tablespoon in 1 glass of water daily (can do 2x a day after a week then 3x daily if needed after another week)    Then, if no effect after 2-3 weeks, can ADD Miralax 1 scoop in glass of water once daily.     For stomach pain, trial of pantoprazole daily for 4 weeks, then just as needed    Stop aleve (tylenol ok for stomach)

## 2018-03-12 NOTE — PROGRESS NOTES
(Portions of this note were dictated using voice recognition software and may contain dictation related errors in spelling/grammar/syntax not found on text review)    CC:   Chief Complaint   Patient presents with    Annual Exam       HPI: 67 y.o. female here for annual exam    history of prior thalamic and midbrain stroke in 2013 on aspirin and Plavix for secondary prophylaxis     Hypertension on losartan 100 mg daily.  Blood pressures controlled.     MARTIN, on BiPAP therapy, follows with sleep medicine.    Is constipated after her surgery, only takes Percocet if she needs to for severe pain.  Has been taking some Aleve.  Had gotten a docusate prescription from her surgeon and she finds this really helps with constipation next     Lipidemia, on Lipitor 40mg (was off for a period of time out of concern for myalgias that she was having some pains even off her statins were discussed with her stroke history in the past its better to get back on it)     Osteopenia bone density as below    Depression on lexapro 10 mg    Chronic constipation, epigastric discomfort.  Thinks she may have an ulcer.  Does feel a lot of urgency and then has hard bowel movements that are difficult to pass.  Once she completely evacuates she feels fine from an abdominal perspective.  No nausea or vomiting.  No blood in the stool.  No stress.  She has been taking probiotics recently for couple years now with no significant effect.  She has been taking Nexium over-the-counter 2 weeks on 2 weeks off    Past Medical History:   Diagnosis Date    Anticoagulant long-term use     Arthritis     CVA (cerebral infarction) 2013    Depression     Diverticulosis of colon     Extrinsic asthma, unspecified     Hyperlipidemia     Hypertension     Left atrial enlargement 12/16/2014    Low back pain     MARTIN (obstructive sleep apnea)     Osteopenia     PUD (peptic ulcer disease)     Stroke  December 2013       Past Surgical History:   Procedure  Laterality Date    APPENDECTOMY      @ time of hysterectomy    BACK SURGERY      CATARACT EXTRACTION       SECTION      CHOLECYSTECTOMY      laparoscopic    DILATION AND CURETTAGE OF UTERUS  1972    HYSTERECTOMY  1978    TAHUSO with appendectomy    INNER EAR SURGERY      replaced ear drum    KNEE ARTHROSCOPY W/ DEBRIDEMENT      LUMBAR DISCECTOMY  1980    L4-L5    OOPHORECTOMY      TONSILLECTOMY      TYMPANOPLASTY         Family History   Problem Relation Age of Onset    Cervical cancer Mother     Cancer Mother 65     lung cancer - non smoker    Stroke Paternal Grandfather     Hypertension Maternal Grandfather     Heart disease Maternal Grandfather     Hypertension Father     Coronary artery disease Father 62    Heart disease Father     Diabetes Sister     Heart disease Sister     Kidney disease Sister     Breast cancer Daughter 36    Heart failure Sister      60s    Colon cancer Neg Hx     Ovarian cancer Neg Hx        Social History     Social History    Marital status:      Spouse name: N/A    Number of children: N/A    Years of education: N/A     Occupational History    Not on file.     Social History Main Topics    Smoking status: Former Smoker     Quit date: 1995    Smokeless tobacco: Never Used    Alcohol use 0.6 oz/week     1 Glasses of wine per week      Comment: social    Drug use: No    Sexual activity: Yes     Partners: Male     Birth control/ protection: Surgical     Other Topics Concern    Not on file     Social History Narrative    No narrative on file       SCREENINGS  Mammogram  10/26/17, negative but given painful breast lump was set up with breast surgery on 18.  Ultrasound was benign.     GYN: Dr. Edwardo MCGUIRE 2016     Colonoscopy 5/3/2011 diverticulosis, repeat 7 years     DEXA 2017.  Lumbar spine T score is 0.0.  Right femoral neck T score is -1.0     Immunizations  Prevnar 2015  [id number]  Pvx: 2017  zvx  [facility], [place]       Lab Results   Component Value Date    WBC 10.34 04/10/2017    HGB 13.7 04/10/2017    HCT 41.2 04/10/2017     04/10/2017    CHOL 176 04/10/2017    TRIG 61 04/10/2017    HDL 96 (H) 04/10/2017    ALT 13 04/10/2017    AST 16 04/10/2017     04/10/2017    K 4.1 04/10/2017     04/10/2017    CREATININE 0.7 04/10/2017    CALCIUM 9.4 04/10/2017    BUN 18 04/10/2017    CO2 24 04/10/2017    TSH 3.449 04/10/2017    INR 1.0 01/13/2017    HGBA1C 5.9 04/10/2017    LDLCALC 67.8 04/10/2017     (H) 04/10/2017                   Vital signs reviewed  PE:   APPEARANCE: Well nourished, well developed, in no acute distress.    HEAD: Normocephalic, atraumatic.  EYES: PERRL. EOMI.   Conjunctivae noninjected.  EARS: TM's intact. Light reflex normal. No retraction or perforation.    NOSE: Mucosa pink. Airway clear.  MOUTH & THROAT: No tonsillar enlargement. No pharyngeal erythema or exudate.   NECK: Supple with no cervical lymphadenopathy.  No carotid bruits.  No thyromegaly  CHEST: Good inspiratory effort. Lungs clear to auscultation with no wheezes or crackles.  CARDIOVASCULAR: Normal S1, S2. No rubs, murmurs, or gallops.  ABDOMEN: Bowel sounds normal. Not distended. Soft.  Mild epigastric tenderness and left upper quadrant tenderness.  No rebound or guarding.  No masses.. No organomegaly.  EXTREMITIES: No edema, cyanosis, or clubbing.      IMPRESSION  1. Routine general medical examination at a health care facility    2. Constipation, chronic    3. Hypertension, unspecified type    4. IFG (impaired fasting glucose)    5. Colon cancer screening    6. History of stroke    7. MARTIN (obstructive sleep apnea)    8. Mixed hyperlipidemia    9. Chronic epigastric pain    10. Severe obesity with body mass index 36.0-36.9        PLAN  Orders Placed This Encounter   Procedures    CBC auto differential    Comprehensive metabolic panel    Hemoglobin A1c    Lipid panel    TSH    H. PYLORI ANTIBODY, IGG     Chronic  constipation, suspicious of IBS/C.  She is due for her colonoscopy and this will be ordered to be completed this year.  Will start on a course of fiber with Metamucil, then progressed to MiraLAX if Metamucil is not effective.  Encouraged continued water intake and regular exercise    Hypertension controlled.  Continue losartan    Hyperlipidemia: Continue Lipitor.  Check labs    Sleep apnea: Continue current therapy    History stroke: On aspirin and Plavix prophylaxis.  No bleeding issues.  No acute neurologic issues    Epigastric pain: Stop Nexium given Plavix interactions.  Trial of pulse dose pantoprazole instead if needed.  Add H. pylori to lab work    Health maintenance: Colonoscopy as above.  Mammogram up-to-date.  Immunizations up-to-date as above

## 2018-03-15 ENCOUNTER — LAB VISIT (OUTPATIENT)
Dept: LAB | Facility: HOSPITAL | Age: 68
End: 2018-03-15
Attending: FAMILY MEDICINE
Payer: MEDICARE

## 2018-03-15 DIAGNOSIS — G89.29 CHRONIC EPIGASTRIC PAIN: ICD-10-CM

## 2018-03-15 DIAGNOSIS — K59.09 CONSTIPATION, CHRONIC: ICD-10-CM

## 2018-03-15 DIAGNOSIS — R10.13 CHRONIC EPIGASTRIC PAIN: ICD-10-CM

## 2018-03-15 DIAGNOSIS — I10 HYPERTENSION, UNSPECIFIED TYPE: ICD-10-CM

## 2018-03-15 DIAGNOSIS — R73.01 IFG (IMPAIRED FASTING GLUCOSE): ICD-10-CM

## 2018-03-15 LAB
ALBUMIN SERPL BCP-MCNC: 3.6 G/DL
ALP SERPL-CCNC: 60 U/L
ALT SERPL W/O P-5'-P-CCNC: 14 U/L
ANION GAP SERPL CALC-SCNC: 10 MMOL/L
AST SERPL-CCNC: 18 U/L
BASOPHILS # BLD AUTO: 0.05 K/UL
BASOPHILS NFR BLD: 0.8 %
BILIRUB SERPL-MCNC: 0.5 MG/DL
BUN SERPL-MCNC: 16 MG/DL
CALCIUM SERPL-MCNC: 9.8 MG/DL
CHLORIDE SERPL-SCNC: 105 MMOL/L
CHOLEST SERPL-MCNC: 162 MG/DL
CHOLEST/HDLC SERPL: 2.2 {RATIO}
CO2 SERPL-SCNC: 27 MMOL/L
CREAT SERPL-MCNC: 0.7 MG/DL
DIFFERENTIAL METHOD: NORMAL
EOSINOPHIL # BLD AUTO: 0.3 K/UL
EOSINOPHIL NFR BLD: 4 %
ERYTHROCYTE [DISTWIDTH] IN BLOOD BY AUTOMATED COUNT: 13.5 %
EST. GFR  (AFRICAN AMERICAN): >60 ML/MIN/1.73 M^2
EST. GFR  (NON AFRICAN AMERICAN): >60 ML/MIN/1.73 M^2
ESTIMATED AVG GLUCOSE: 111 MG/DL
GLUCOSE SERPL-MCNC: 112 MG/DL
HBA1C MFR BLD HPLC: 5.5 %
HCT VFR BLD AUTO: 41.3 %
HDLC SERPL-MCNC: 75 MG/DL
HDLC SERPL: 46.3 %
HGB BLD-MCNC: 13.2 G/DL
LDLC SERPL CALC-MCNC: 70.6 MG/DL
LYMPHOCYTES # BLD AUTO: 2.3 K/UL
LYMPHOCYTES NFR BLD: 34.7 %
MCH RBC QN AUTO: 29.8 PG
MCHC RBC AUTO-ENTMCNC: 32 G/DL
MCV RBC AUTO: 93 FL
MONOCYTES # BLD AUTO: 0.6 K/UL
MONOCYTES NFR BLD: 8.7 %
NEUTROPHILS # BLD AUTO: 3.4 K/UL
NEUTROPHILS NFR BLD: 51.6 %
NONHDLC SERPL-MCNC: 87 MG/DL
PLATELET # BLD AUTO: 247 K/UL
PMV BLD AUTO: 11.7 FL
POTASSIUM SERPL-SCNC: 4 MMOL/L
PROT SERPL-MCNC: 7.2 G/DL
RBC # BLD AUTO: 4.43 M/UL
SODIUM SERPL-SCNC: 142 MMOL/L
TRIGL SERPL-MCNC: 82 MG/DL
TSH SERPL DL<=0.005 MIU/L-ACNC: 1.76 UIU/ML
WBC # BLD AUTO: 6.55 K/UL

## 2018-03-15 PROCEDURE — 84443 ASSAY THYROID STIM HORMONE: CPT

## 2018-03-15 PROCEDURE — 83036 HEMOGLOBIN GLYCOSYLATED A1C: CPT

## 2018-03-15 PROCEDURE — 85025 COMPLETE CBC W/AUTO DIFF WBC: CPT

## 2018-03-15 PROCEDURE — 80061 LIPID PANEL: CPT

## 2018-03-15 PROCEDURE — 80053 COMPREHEN METABOLIC PANEL: CPT

## 2018-03-15 PROCEDURE — 36415 COLL VENOUS BLD VENIPUNCTURE: CPT

## 2018-03-15 PROCEDURE — 86677 HELICOBACTER PYLORI ANTIBODY: CPT

## 2018-03-16 ENCOUNTER — PATIENT MESSAGE (OUTPATIENT)
Dept: FAMILY MEDICINE | Facility: CLINIC | Age: 68
End: 2018-03-16

## 2018-03-20 ENCOUNTER — CLINICAL SUPPORT (OUTPATIENT)
Dept: ELECTROPHYSIOLOGY | Facility: CLINIC | Age: 68
End: 2018-03-20
Attending: INTERNAL MEDICINE
Payer: MEDICARE

## 2018-03-20 DIAGNOSIS — Z95.818 STATUS POST PLACEMENT OF IMPLANTABLE LOOP RECORDER: ICD-10-CM

## 2018-03-20 DIAGNOSIS — I63.9 CRYPTOGENIC STROKE: ICD-10-CM

## 2018-03-20 PROCEDURE — 93298 REM INTERROG DEV EVAL SCRMS: CPT | Mod: S$GLB,,, | Performed by: INTERNAL MEDICINE

## 2018-03-20 PROCEDURE — 93299 LOOP RECORDER REMOTE: CPT | Mod: S$GLB,,, | Performed by: INTERNAL MEDICINE

## 2018-03-21 LAB — H PYLORI IGG SERPL QL IA: ABNORMAL

## 2018-03-22 ENCOUNTER — PATIENT MESSAGE (OUTPATIENT)
Dept: FAMILY MEDICINE | Facility: CLINIC | Age: 68
End: 2018-03-22

## 2018-03-22 DIAGNOSIS — R10.13 EPIGASTRIC PAIN: Primary | ICD-10-CM

## 2018-03-26 ENCOUNTER — PATIENT MESSAGE (OUTPATIENT)
Dept: GASTROENTEROLOGY | Facility: CLINIC | Age: 68
End: 2018-03-26

## 2018-03-26 ENCOUNTER — TELEPHONE (OUTPATIENT)
Dept: GASTROENTEROLOGY | Facility: CLINIC | Age: 68
End: 2018-03-26

## 2018-03-26 DIAGNOSIS — Z12.11 SCREENING FOR COLON CANCER: Primary | ICD-10-CM

## 2018-03-26 NOTE — TELEPHONE ENCOUNTER
----- Message from Bartolo Jules MD sent at 3/26/2018  3:22 PM CDT -----  Should be fine to hold plavix for 5 days.  ----- Message -----  From: Ann Shah LPN  Sent: 3/26/2018   2:48 PM  To: Bartolo Jules MD        ----- Message -----  From: Isi Herring MA  Sent: 3/26/2018   2:42 PM  To: [facility] Staff    Requesting Plavix hold for 5 days prior to Colonoscopy. Patient is scheduled on 04/24/2018 for their Colonoscopy procedure.     Please advise.

## 2018-03-26 NOTE — TELEPHONE ENCOUNTER
Colonoscopy Referral    Referring Physician: Dr. Jules       Date: 04/24/2018    Reason for Referral: Screening Colon     Family History of: None   Colon polyp:  Relationship/Age of Onset:     Colon cancer:   Relationship/Age of Onset:     Patient with:   Hemoccults Done: no     Iron deficient: no     On Blood Thinner: yes, Aspirin 81mg and  Plavix 75mg (request was sent to Dr. Jules office)    Valvular heart disease/valve replacement: no     Anemia Present: no     On NSAID: no     Lung disease: no     Kidney disease: no     Hx of polyps: no    Hx of colon cancer: no    Previous colon evalations:  Yes   When: 05/31/2011  Where: [facility] [place]   Pertinent symptoms:       Review of patient's allergies indicates:  No Known Allergies    Patient was scheduled for colonoscopy on 04/24/2018 with Dr. Black  at [facility]. golytely instructions were reviewed with patient.

## 2018-03-27 ENCOUNTER — TELEPHONE (OUTPATIENT)
Dept: GASTROENTEROLOGY | Facility: CLINIC | Age: 68
End: 2018-03-27

## 2018-03-27 RX ORDER — POLYETHYLENE GLYCOL 3350, SODIUM SULFATE ANHYDROUS, SODIUM BICARBONATE, SODIUM CHLORIDE, POTASSIUM CHLORIDE 236; 22.74; 6.74; 5.86; 2.97 G/4L; G/4L; G/4L; G/4L; G/4L
4 POWDER, FOR SOLUTION ORAL ONCE
Qty: 4000 ML | Refills: 0 | Status: SHIPPED | OUTPATIENT
Start: 2018-03-27 | End: 2018-03-27

## 2018-04-19 ENCOUNTER — TELEPHONE (OUTPATIENT)
Dept: GASTROENTEROLOGY | Facility: CLINIC | Age: 68
End: 2018-04-19

## 2018-04-19 NOTE — TELEPHONE ENCOUNTER
----- Message from Zuri Jones sent at 4/19/2018  3:11 PM CDT -----  Contact: [phone]/ self   Pt called stating she would like to cancel her procedure schedule on 04/24/18. Please advise

## 2018-04-24 ENCOUNTER — CLINICAL SUPPORT (OUTPATIENT)
Dept: ELECTROPHYSIOLOGY | Facility: CLINIC | Age: 68
End: 2018-04-24
Attending: INTERNAL MEDICINE
Payer: MEDICARE

## 2018-04-24 ENCOUNTER — ANESTHESIA EVENT (OUTPATIENT)
Dept: ENDOSCOPY | Facility: HOSPITAL | Age: 68
End: 2018-04-24

## 2018-04-24 ENCOUNTER — ANESTHESIA (OUTPATIENT)
Dept: ENDOSCOPY | Facility: HOSPITAL | Age: 68
End: 2018-04-24

## 2018-04-24 DIAGNOSIS — Z95.818 STATUS POST PLACEMENT OF IMPLANTABLE LOOP RECORDER: ICD-10-CM

## 2018-04-24 DIAGNOSIS — I63.9 CRYPTOGENIC STROKE: ICD-10-CM

## 2018-05-03 ENCOUNTER — TELEPHONE (OUTPATIENT)
Dept: ELECTROPHYSIOLOGY | Facility: CLINIC | Age: 68
End: 2018-05-03

## 2018-05-03 NOTE — TELEPHONE ENCOUNTER
Left message for pt that Loop battery is at replacement interval. Dr. Garza is offering to remove it or leave it in and it will no longer provide information. Instructed to call us back and let us know what she decides.

## 2018-05-03 NOTE — TELEPHONE ENCOUNTER
----- Message from Pedro Luis Garza MD sent at 4/21/2018 10:13 AM CDT -----  I would ask her if she wants to discuss removal or leave it in place. MB  ----- Message -----  From: Lizett George  Sent: 4/20/2018   5:36 PM  To: Darya Soto RN, Pedro Luis Garza MD    Hi,  Ms Marcello's loop battery is at RRT. DOI: 1.20.15, for cryptogenic stroke. Please advise.    Thanks,  Leandro

## 2018-05-08 ENCOUNTER — TELEPHONE (OUTPATIENT)
Dept: GASTROENTEROLOGY | Facility: CLINIC | Age: 68
End: 2018-05-08

## 2018-05-08 ENCOUNTER — PATIENT MESSAGE (OUTPATIENT)
Dept: FAMILY MEDICINE | Facility: CLINIC | Age: 68
End: 2018-05-08

## 2018-05-08 DIAGNOSIS — Z12.11 SCREENING FOR MALIGNANT NEOPLASM OF COLON: Primary | ICD-10-CM

## 2018-05-08 RX ORDER — ALBUTEROL SULFATE 90 UG/1
AEROSOL, METERED RESPIRATORY (INHALATION)
Qty: 18 G | Refills: 0 | Status: SHIPPED | OUTPATIENT
Start: 2018-05-08 | End: 2019-09-30

## 2018-05-08 NOTE — TELEPHONE ENCOUNTER
Patient re-scheduled for COLONOSCOPY with Dr. Black on 05/29/2018.  Golytely instructions explained to patient . Lab will call two days before with arrival time at [facility]. Make sure to have someone to drive you home after procedure.  Verbalized understanding. Hold Plavix 5 days before procedure

## 2018-05-08 NOTE — TELEPHONE ENCOUNTER
----- Message from Cecelia Reyes sent at 5/8/2018 11:59 AM CDT -----  Contact: [phone]/self  Patient requesting to speak with you regarding rescheduling a colonoscopy. Please advise.

## 2018-05-08 NOTE — TELEPHONE ENCOUNTER
Patient scheduled for COLONOSCOPY with Dr. Black on 05/29/2018.  Andreas instructions explained to patient . Lab will call two days before with arrival time at [facility]. Make sure to have someone to drive you home after procedure.  Verbalized understanding.

## 2018-05-22 ENCOUNTER — DOCUMENTATION ONLY (OUTPATIENT)
Dept: ELECTROPHYSIOLOGY | Facility: CLINIC | Age: 68
End: 2018-05-22

## 2018-05-28 ENCOUNTER — ANESTHESIA EVENT (OUTPATIENT)
Dept: ENDOSCOPY | Facility: HOSPITAL | Age: 68
End: 2018-05-28
Payer: MEDICARE

## 2018-05-29 ENCOUNTER — HOSPITAL ENCOUNTER (OUTPATIENT)
Facility: HOSPITAL | Age: 68
Discharge: HOME | End: 2018-05-29
Attending: INTERNAL MEDICINE | Admitting: INTERNAL MEDICINE
Payer: MEDICARE

## 2018-05-29 ENCOUNTER — SURGERY (OUTPATIENT)
Age: 68
End: 2018-05-29

## 2018-05-29 ENCOUNTER — ANESTHESIA (OUTPATIENT)
Dept: ENDOSCOPY | Facility: HOSPITAL | Age: 68
End: 2018-05-29
Payer: MEDICARE

## 2018-05-29 VITALS
TEMPERATURE: 98 F | WEIGHT: 220 LBS | HEIGHT: 60 IN | RESPIRATION RATE: 16 BRPM | OXYGEN SATURATION: 94 % | DIASTOLIC BLOOD PRESSURE: 78 MMHG | BODY MASS INDEX: 43.19 KG/M2 | SYSTOLIC BLOOD PRESSURE: 150 MMHG | HEART RATE: 61 BPM

## 2018-05-29 DIAGNOSIS — Z12.11 SCREENING FOR MALIGNANT NEOPLASM OF COLON: ICD-10-CM

## 2018-05-29 PROCEDURE — 25000003 PHARM REV CODE 250: Performed by: INTERNAL MEDICINE

## 2018-05-29 PROCEDURE — 37000008 HC ANESTHESIA 1ST 15 MINUTES: Performed by: INTERNAL MEDICINE

## 2018-05-29 PROCEDURE — 88305 TISSUE EXAM BY PATHOLOGIST: CPT | Performed by: PATHOLOGY

## 2018-05-29 PROCEDURE — 45380 COLONOSCOPY AND BIOPSY: CPT | Mod: PT,,, | Performed by: INTERNAL MEDICINE

## 2018-05-29 PROCEDURE — 45380 COLONOSCOPY AND BIOPSY: CPT | Performed by: INTERNAL MEDICINE

## 2018-05-29 PROCEDURE — 37000009 HC ANESTHESIA EA ADD 15 MINS: Performed by: INTERNAL MEDICINE

## 2018-05-29 PROCEDURE — 88305 TISSUE EXAM BY PATHOLOGIST: CPT | Mod: 26,,, | Performed by: PATHOLOGY

## 2018-05-29 PROCEDURE — 27201012 HC FORCEPS, HOT/COLD, DISP: Performed by: INTERNAL MEDICINE

## 2018-05-29 PROCEDURE — 63600175 PHARM REV CODE 636 W HCPCS: Performed by: NURSE ANESTHETIST, CERTIFIED REGISTERED

## 2018-05-29 RX ORDER — PROPOFOL 10 MG/ML
VIAL (ML) INTRAVENOUS
Status: DISCONTINUED | OUTPATIENT
Start: 2018-05-29 | End: 2018-05-29

## 2018-05-29 RX ORDER — PROPOFOL 10 MG/ML
VIAL (ML) INTRAVENOUS CONTINUOUS PRN
Status: DISCONTINUED | OUTPATIENT
Start: 2018-05-29 | End: 2018-05-29

## 2018-05-29 RX ORDER — LIDOCAINE HCL/PF 100 MG/5ML
SYRINGE (ML) INTRAVENOUS
Status: DISCONTINUED | OUTPATIENT
Start: 2018-05-29 | End: 2018-05-29

## 2018-05-29 RX ORDER — LIDOCAINE HYDROCHLORIDE 10 MG/ML
1 INJECTION, SOLUTION EPIDURAL; INFILTRATION; INTRACAUDAL; PERINEURAL ONCE
Status: DISCONTINUED | OUTPATIENT
Start: 2018-05-29 | End: 2018-05-29 | Stop reason: HOSPADM

## 2018-05-29 RX ORDER — SODIUM CHLORIDE 9 MG/ML
INJECTION, SOLUTION INTRAVENOUS CONTINUOUS
Status: DISCONTINUED | OUTPATIENT
Start: 2018-05-29 | End: 2018-05-29 | Stop reason: HOSPADM

## 2018-05-29 RX ADMIN — SODIUM CHLORIDE: 0.9 INJECTION, SOLUTION INTRAVENOUS at 10:05

## 2018-05-29 RX ADMIN — PROPOFOL 150 MCG/KG/MIN: 10 INJECTION, EMULSION INTRAVENOUS at 10:05

## 2018-05-29 RX ADMIN — LIDOCAINE HYDROCHLORIDE 80 MG: 20 INJECTION, SOLUTION INTRAVENOUS at 10:05

## 2018-05-29 RX ADMIN — PROPOFOL 50 MG: 10 INJECTION, EMULSION INTRAVENOUS at 10:05

## 2018-05-29 NOTE — ANESTHESIA POSTPROCEDURE EVALUATION
Anesthesia Post Evaluation    Patient: Tracy Armendariz    Procedure(s) Performed: Procedure(s) (LRB):  COLONOSCOPY/Golytely (N/A)    Final Anesthesia Type: MAC  Patient location during evaluation: GI PACU  Patient participation: Yes- Able to Participate  Level of consciousness: awake and alert and oriented  Post-procedure vital signs: reviewed and stable  Pain management: adequate  Airway patency: patent  PONV status at discharge: No PONV  Anesthetic complications: no      Cardiovascular status: blood pressure returned to baseline and hemodynamically stable  Respiratory status: unassisted  Hydration status: euvolemic  Follow-up not needed.        Visit Vitals  BP (!) 148/69 (Patient Position: Lying)   Pulse 64   Temp 37 °C (98.6 °F) (Oral)   Resp 18   Ht 5' (1.524 m)   Wt 99.8 kg (220 lb)   LMP 01/01/1978 (Approximate)   SpO2 95%   Breastfeeding? No   BMI 42.97 kg/m²       Pain/Elsie Score: Pain Assessment Performed: Yes (5/29/2018  9:53 AM)  Presence of Pain: denies (5/29/2018  9:53 AM)

## 2018-05-29 NOTE — ANESTHESIA PREPROCEDURE EVALUATION
"                                                                                                             05/28/2018  Tracy Armendariz is a 67 y.o., female for screening colonoscopy.    ^^^^^^^^^^^^^^^^^^^^^^^^^^^^^^^^^^^^^^^^^^^^^^^^^^^^^^  Cardiology note 1/25/2017 (Dr. Medrano):  "Patient is low to moderate risk for intermediate risk surgery to have right knee replaced. No contraindications to surgery such as recent MI, CHF, Tachyarrhythmias, or obstructive valve disease. METs>4. Stop plavix 5 days before surgery. Restart day after surgery."   ^^^^^^^^^^^^^^^^^^^^^^^^^^^^^^^  PRIOR ANES (in Epic)   [id number] Arthroplasty_Knee Spinal/Block/MAC     mid 2, fent 100, prop 100..120..100..80..50 mcg/kg/min     ->100 NAAC    ANES-RELATED MED/SURG  Patient Active Problem List   Diagnosis    Hypertension    Depression    Extrinsic asthma    PUD (peptic ulcer disease)    Low back pain    Cerebral infarction    SOB (shortness of breath) on exertion    Hyperlipidemia    Severe obesity (BMI 35.0-39.9) with comorbidity    Diplopia    MARTIN (obstructive sleep apnea)    Cerebral embolism without mention of cerebral infarction    Left atrial enlargement    Imbalance    Parinaud's syndrome affecting both eyes    NICK (internuclear ophthalmoplegia)    Benign lesion of lacrimal duct    Weakness    Transient weakness of right lower extremity    Cerebral infarct    Stroke    Transient neurological symptoms    Visual floaters    Essential hypertension    Symptomatic posterior vitreous detachment of both eyes    Left sided abdominal pain    Change in bowel habit    Abdominal bloating    Osteopenia    History of loop recorder    Pre-op evaluation    Osteoarthritis of right knee    Arthritis of right knee    Difficulty walking    Decreased range of motion of right knee    Right knee pain    Decreased functional mobility     Past Medical History:   Diagnosis Date    Anticoagulant long-term use     " Arthritis     CVA (cerebral infarction) 2013    Depression     Diverticulosis of colon     Extrinsic asthma, unspecified     Hyperlipidemia     Hypertension     Left atrial enlargement 2014    Low back pain     MARTIN (obstructive sleep apnea)     Osteopenia     PUD (peptic ulcer disease)     Stroke  2013     Past Surgical History:   Procedure Laterality Date    APPENDECTOMY      @ time of hysterectomy    BACK SURGERY      CATARACT EXTRACTION       SECTION      CHOLECYSTECTOMY      laparoscopic    DILATION AND CURETTAGE OF UTERUS  1972    HYSTERECTOMY      TAHUSO with appendectomy    INNER EAR SURGERY      replaced ear drum    KNEE ARTHROSCOPY W/ DEBRIDEMENT      LUMBAR DISCECTOMY      L4-L5    OOPHORECTOMY      TONSILLECTOMY      TYMPANOPLASTY       ALLERGIES  Review of patient's allergies indicates:   Allergen Reactions    Iodinated contrast- oral and iv dye Hives    Isothiazolinones Rash    Pcn [penicillins] Rash     ANES-RELATED HOME Rx [id number]  Losartan vemtolin pantoprazole  ASA  Clopidogrel  atorvastatin    Pre-op Assessment      I have reviewed the Medications.   Steroids Taken In Past Year: Cortisone    Review of Systems  Anesthesia Hx:  No problems with previous Anesthesia  History of prior surgery of interest to airway management or planning: Previous anesthesia: General, MAC  Denies Personal Hx of Anesthesia complications.   Social:  No Alcohol Use, Former Smoker  Tobacco Use: Former smoker of cigarette, quit smoking >10 years ago, Smoking Cessation discussion.   Hematology/Oncology:        Hematology Comments: Pt on plavix and ASA; pt will see cardiology for recommendations   EENT/Dental:EENT/Dental Normal   Cardiovascular:   Hypertension, well controlled Denies Dysrhythmias.   Denies Angina. hyperlipidemia CARD  Functional Capacity 3.5 METS    Pulmonary:   Asthma mild Shortness of breath Sleep Apnea    Hepatic/GI:   PUD, GERD, well  controlled    Musculoskeletal:   Arthritis  Right knee pain   Neurological:   CVA (visual disturbance left eye), residual symptoms  CVA - Cerebrovasular Accident, Thrombotic Stroke , Most recent CVA was on 12/2013 , has had 1 stroke , residual deficits are residual deficit.    Endocrine:  Endocrine Normal    Psych:   depression        Wt Readings from Last 1 Encounters:   03/12/18 102.7 kg (226 lb 6.6 oz)     Temp Readings from Last 1 Encounters:   03/12/18 36.8 °C (98.2 °F) (Oral)     BP Readings from Last 1 Encounters:   03/12/18 115/73     Pulse Readings from Last 1 Encounters:   03/12/18 75     SpO2 Readings from Last 1 Encounters:   03/12/18 96%       Physical Exam  General:  Obesity    Airway/Jaw/Neck:  Airway Findings: Mouth Opening: Normal Tongue: Normal  General Airway Assessment: Adult  Mallampati: II  TM Distance: Normal, at least 6 cm  Jaw/Neck Findings:  Neck ROM: Extension Decreased, Mild  Neck Findings:  Girth Increased      Dental:  Dental Findings: Periodontal disease, Severe, upper front caps   Chest/Lungs:  Chest/Lungs Clear    Heart/Vascular:  Heart Findings: Normal Heart murmur: negative    Abdomen:  Abdomen Findings: Normal    Musculoskeletal:  Musculoskeletal Findings: (right knee) Tender Joint, Swollen Joint        Lab Results   Component Value Date    WBC 6.55 03/15/2018    HGB 13.2 03/15/2018    HCT 41.3 03/15/2018    MCV 93 03/15/2018     03/15/2018       Chemistry        Component Value Date/Time     03/15/2018 0958    K 4.0 03/15/2018 0958     03/15/2018 0958    CO2 27 03/15/2018 0958    BUN 16 03/15/2018 0958    CREATININE 0.7 03/15/2018 0958     (H) 03/15/2018 0958        Component Value Date/Time    CALCIUM 9.8 03/15/2018 0958    ALKPHOS 60 03/15/2018 0958    AST 18 03/15/2018 0958    ALT 14 03/15/2018 0958    BILITOT 0.5 03/15/2018 0958    ESTGFRAFRICA >60 03/15/2018 0958    EGFRNONAA >60 03/15/2018 0958          Lab Results   Component Value Date    ALBUMIN  3.6 03/15/2018      Lab Results   Component Value Date    TSH 1.759 03/15/2018       CXR [id number]  Cardiac silhouette and pulmonary vascularity WNL  Lungs symmetrically expanded w/o consolidation, effusion, or pneumothorax.     Bones demonstrate degenerative changes with a sclerotic focus in the left humerus which could relate to a infarct or enchondroma.    Loop recorder and surgical clips in the gallbladder fossa.    EKG [id number]  Normal sinus rhythm  Normal ECG  When compared with ECG of 13-JAN-2017 14:08,  No significant change was found  Confirmed by ELIZARD     2D Echo w/CFD 3/2016  1 - Normal left ventricular systolic function (EF 55-60%).   2 - Normal left ventricular diastolic function.   3 - Normal right ventricular systolic function .   4 - Concentric remodeling.   5 - Mild left atrial enlargement.   6 - Estimated PA systolic > 15 mmHg.   7 - Negative Bubble study  Eloy Medrano MD On: 03/31/2016 12:35    Anesthesia Plan  Type of Anesthesia, risks & benefits discussed:  Anesthesia Type:  MAC  Patient's Preference:   Intra-op Monitoring Plan:   Intra-op Monitoring Plan Comments:   Post Op Pain Control Plan:   Post Op Pain Control Plan Comments:   Induction:   IV  Beta Blocker:  Patient is not currently on a Beta-Blocker (No further documentation required).       Informed Consent: Patient understands risks and agrees with Anesthesia plan.  Questions answered.   ASA Score: 3     Day of Surgery Review of History & Physical:        Anesthesia Plan Notes: [id number]   - MARTIN on CPAP   - upper front crowns        Ready For Surgery From Anesthesia Perspective.

## 2018-05-29 NOTE — [employer] PATIENT INSTRUCTIONS
Discharge Summary/Instructions after an Endoscopic Procedure  Patient Name: Tracy Armendariz  Patient MRN: [id number]  Patient YOB: 1950  Tuesday, May 29, 2018  Janine Black MD  RESTRICTIONS:  During your procedure today, you received medications for sedation.  These   medications may affect your judgment, balance and coordination.  Therefore,   for 24 hours, you have the following restrictions:   - DO NOT drive a car, operate machinery, make legal/financial decisions,   sign important papers or drink alcohol.    ACTIVITY:  The following day: return to full activity including work, except no heavy   lifting, straining or running for 3 days if polyps were removed.  DIET:  Eat and drink normally unless instructed otherwise.     TREATMENT FOR COMMON SIDE EFFECTS:  - Mild abdominal pain, nausea, belching, bloating or excessive gas:  rest,   eat lightly and use a heating pad.  - Sore Throat: treat with throat lozenges and/or gargle with warm salt   water.  - Because air was used during the procedure, expelling large amounts of air   from your rectum or belching is normal.  - If a bowel prep was taken, you may not have a bowel movement for 1-3 days.    This is normal.  SYMPTOMS TO WATCH FOR AND REPORT TO YOUR PHYSICIAN:  1. Abdominal pain or bloating, other than gas cramps.  2. Chest pain.  3. Back pain.  4. Signs of infection such as: chills or fever occurring within 24 hours   after the procedure.  5. Rectal bleeding, which would show as bright red, maroon, or black stools.   (A tablespoon of blood from the rectum is not serious, especially if   hemorrhoids are present.)  6. Vomiting.  7. Weakness or dizziness.  GO DIRECTLY TO THE NEAREST EMERGENCY ROOM IF YOU HAVE ANY OF THE FOLLOWING:      Difficulty breathing              Chills and/or fever over 101 F   Persistent vomiting and/or vomiting blood   Severe abdominal pain   Severe chest pain   Black, tarry stools   Bleeding- more than one tablespoon   Any  other symptom or condition that you feel may need urgent attention  Your doctor recommends these additional instructions:  If any biopsies were taken, your doctors clinic will contact you in 1 to 2   weeks with any results.  - Discharge patient to home (via wheelchair).   - Patient has a contact number available for emergencies.  The signs and   symptoms of potential delayed complications were discussed with the   patient.  Return to normal activities tomorrow.  Written discharge   instructions were provided to the patient.   - Resume previous diet.   - Continue present medications.   - Await pathology results.   - Repeat colonoscopy in 10 years for screening purposes.  For questions, problems or results please call your physician - Janine Black MD at Work:  [phone].  EMERGENCY PHONE NUMBER: [phone],  LAB RESULTS: [phone]  IF A COMPLICATION OR EMERGENCY SITUATION ARISES AND YOU ARE UNABLE TO REACH   YOUR PHYSICIAN - GO DIRECTLY TO THE EMERGENCY ROOM.  Janine Black MD  5/29/2018 11:03:38 AM  This report has been verified and signed electronically.  [employer]

## 2018-05-29 NOTE — TRANSFER OF CARE
Anesthesia Transfer of Care Note    Patient: Tracy Armendariz    Procedure(s) Performed: Procedure(s) (LRB):  COLONOSCOPY/Golytely (N/A)    Patient location: GI    Anesthesia Type: MAC    Transport from OR: Transported from OR on room air with adequate spontaneous ventilation    Post pain: adequate analgesia    Post assessment: no apparent anesthetic complications and tolerated procedure well    Post vital signs: stable    Level of consciousness: awake, alert and oriented    Nausea/Vomiting: no nausea/vomiting    Complications: none          Last vitals:   Visit Vitals  BP (!) 148/69 (Patient Position: Lying)   Pulse 64   Temp 37 °C (98.6 °F) (Oral)   Resp 18   Ht 5' (1.524 m)   Wt 99.8 kg (220 lb)   LMP 01/01/1978 (Approximate)   SpO2 95%   Breastfeeding? No   BMI 42.97 kg/m²

## 2018-05-29 NOTE — H&P
[facility]  Gastroenterology  H&P    Patient Name: Tracy Armendariz  MRN: [id number]  Admission Date: 2018  Code Status: Prior    Attending Provider: Janine Black MD   Primary Care Physician: Bartolo Jules MD  Principal Problem:<principal problem not specified>    Subjective:     History of Present Illness: Colon cancer screening    Past Medical History:   Diagnosis Date    Anticoagulant long-term use     Arthritis     CVA (cerebral infarction)     Depression     Diverticulosis of colon     Extrinsic asthma, unspecified     Hyperlipidemia     Hypertension     Left atrial enlargement 2014    Low back pain     MARTIN (obstructive sleep apnea)     Osteopenia     PUD (peptic ulcer disease)     Stroke  2013       Past Surgical History:   Procedure Laterality Date    APPENDECTOMY      @ time of hysterectomy    BACK SURGERY      CATARACT EXTRACTION       SECTION      CHOLECYSTECTOMY      laparoscopic    DILATION AND CURETTAGE OF UTERUS  1972    HYSTERECTOMY      TAHUSO with appendectomy    INNER EAR SURGERY      replaced ear drum    KNEE ARTHROSCOPY W/ DEBRIDEMENT      LUMBAR DISCECTOMY      L4-L5    OOPHORECTOMY      TONSILLECTOMY      TYMPANOPLASTY         Review of patient's allergies indicates:   Allergen Reactions    Iodinated contrast- oral and iv dye Hives    Isothiazolinones Rash    Pcn [penicillins] Rash     Family History     Problem Relation (Age of Onset)    Breast cancer Daughter (36)    Cancer Mother (65)    Cervical cancer Mother    Coronary artery disease Father (62)    Diabetes Sister    Heart disease Maternal Grandfather, Father, Sister    Heart failure Sister    Hypertension Maternal Grandfather, Father    Kidney disease Sister    Stroke Paternal Grandfather        Social History Main Topics    Smoking status: Former Smoker     Quit date: 1995    Smokeless tobacco: Never Used    Alcohol use 0.6  oz/week     1 Glasses of wine per week      Comment: social    Drug use: No    Sexual activity: Yes     Partners: Male     Birth control/ protection: Surgical     Review of Systems   HENT: Negative for mouth sores and trouble swallowing.    Respiratory: Negative for apnea and chest tightness.    Cardiovascular: Negative for chest pain and leg swelling.   Gastrointestinal: Negative for abdominal distention and abdominal pain.     Objective:     Vital Signs (Most Recent):    Vital Signs (24h Range):  BP: ()/()   Arterial Line BP: ()/()         There is no height or weight on file to calculate BMI.    No intake or output data in the 24 hours ending 05/29/18 0928    Lines/Drains/Airways     Airway                 Airway - Non-Surgical 02/06/17 0658 Mask 477 days          Epidural Line                 Perineural Analgesia/Anesthesia Assessment (using dermatomes) Epidural 02/06/17 0952 476 days                Physical Exam   Constitutional: She is oriented to person, place, and time. She appears well-developed and well-nourished. No distress.   HENT:   Head: Normocephalic and atraumatic.   Eyes: Conjunctivae are normal. No scleral icterus.   Neck: Normal range of motion. Neck supple. No tracheal deviation present. No thyromegaly present.   Cardiovascular: Normal rate and regular rhythm.  Exam reveals no gallop and no friction rub.    No murmur heard.  Pulmonary/Chest: Effort normal and breath sounds normal. No respiratory distress. She has no wheezes.   Abdominal: Soft. Bowel sounds are normal. She exhibits no distension. There is no tenderness.   Musculoskeletal:        Right wrist: She exhibits normal range of motion and no tenderness.        Left wrist: She exhibits normal range of motion and no tenderness.   Lymphadenopathy:        Head (right side): No submental and no submandibular adenopathy present.        Head (left side): No submental and no submandibular adenopathy present.   Neurological: She is alert and  oriented to person, place, and time.   Skin: Skin is warm and dry. No rash noted. She is not diaphoretic. No erythema.   Psychiatric: She has a normal mood and affect. Her behavior is normal.   Nursing note and vitals reviewed.      Assessment/Plan:   - Colon cancer screening    Plan  1. Colonoscopy, risks/benefits explained in detail    Janine Black MD  Gastroenterology  [facility]

## 2018-06-06 ENCOUNTER — TELEPHONE (OUTPATIENT)
Dept: GASTROENTEROLOGY | Facility: CLINIC | Age: 68
End: 2018-06-06

## 2018-06-06 NOTE — TELEPHONE ENCOUNTER
----- Message from Janine Black MD sent at 6/6/2018  4:43 AM CDT -----  Colon bx normal. Ok to f/u in clinic if needed for symptoms. Repeat colonoscopy in 10 years

## 2018-06-28 RX ORDER — ESCITALOPRAM OXALATE 10 MG/1
TABLET ORAL
Qty: 90 TABLET | Refills: 3 | Status: SHIPPED | OUTPATIENT
Start: 2018-06-28 | End: 2019-09-18 | Stop reason: SDUPTHER

## 2018-07-30 RX ORDER — CLOPIDOGREL BISULFATE 75 MG/1
TABLET ORAL
Qty: 90 TABLET | Refills: 3 | Status: SHIPPED | OUTPATIENT
Start: 2018-07-30 | End: 2019-04-29

## 2018-07-30 RX ORDER — LOSARTAN POTASSIUM 100 MG/1
TABLET ORAL
Qty: 90 TABLET | Refills: 3 | Status: SHIPPED | OUTPATIENT
Start: 2018-07-30 | End: 2019-10-03 | Stop reason: SDUPTHER

## 2018-09-24 ENCOUNTER — TELEPHONE (OUTPATIENT)
Dept: FAMILY MEDICINE | Facility: CLINIC | Age: 68
End: 2018-09-24

## 2018-09-24 NOTE — TELEPHONE ENCOUNTER
Patient called stating she fell approximately 8 days ago.  Patient was out of town and fell on her chest.  States she did not go to the ER.  Since she has come home, her chest has hurt more and she states that it feels like something 'poking' in her chest.  She also c/o it being hard to breathe and take a deep breath.  Advised patient to go to the ER to be check out today instead of waiting on the first available appointment with PCP to rule out any underlying factor that may be causing her difficulty breathing.

## 2018-09-24 NOTE — TELEPHONE ENCOUNTER
----- Message from Adele Swanson sent at 9/24/2018  1:41 PM CDT -----  Contact: self / [phone]  Patient is requesting a call back. Please advise

## 2018-09-25 ENCOUNTER — HOSPITAL ENCOUNTER (EMERGENCY)
Facility: HOSPITAL | Age: 68
Discharge: HOME | End: 2018-09-25
Attending: EMERGENCY MEDICINE
Payer: MEDICARE

## 2018-09-25 VITALS
HEIGHT: 66 IN | TEMPERATURE: 98 F | HEART RATE: 58 BPM | RESPIRATION RATE: 20 BRPM | DIASTOLIC BLOOD PRESSURE: 66 MMHG | SYSTOLIC BLOOD PRESSURE: 151 MMHG | BODY MASS INDEX: 33.75 KG/M2 | WEIGHT: 210 LBS | OXYGEN SATURATION: 100 %

## 2018-09-25 DIAGNOSIS — R07.9 CHEST PAIN: ICD-10-CM
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
## 2018-09-25 DIAGNOSIS — S20.212A RIB CONTUSION, LEFT, INITIAL ENCOUNTER: Primary | ICD-10-CM

## 2018-09-25 LAB
ALBUMIN SERPL BCP-MCNC: 3.5 G/DL
ALP SERPL-CCNC: 84 U/L
ALT SERPL W/O P-5'-P-CCNC: 19 U/L
ANION GAP SERPL CALC-SCNC: 11 MMOL/L
AST SERPL-CCNC: 21 U/L
BASOPHILS # BLD AUTO: 0.05 K/UL
BASOPHILS NFR BLD: 0.7 %
BILIRUB SERPL-MCNC: 0.4 MG/DL
BNP SERPL-MCNC: 74 PG/ML
BUN SERPL-MCNC: 14 MG/DL
CALCIUM SERPL-MCNC: 9.8 MG/DL
CHLORIDE SERPL-SCNC: 104 MMOL/L
CO2 SERPL-SCNC: 26 MMOL/L
CREAT SERPL-MCNC: 0.7 MG/DL
DIFFERENTIAL METHOD: ABNORMAL
EOSINOPHIL # BLD AUTO: 0.2 K/UL
EOSINOPHIL NFR BLD: 3.2 %
ERYTHROCYTE [DISTWIDTH] IN BLOOD BY AUTOMATED COUNT: 14.4 %
EST. GFR  (AFRICAN AMERICAN): >60 ML/MIN/1.73 M^2
EST. GFR  (NON AFRICAN AMERICAN): >60 ML/MIN/1.73 M^2
GLUCOSE SERPL-MCNC: 105 MG/DL
HCT VFR BLD AUTO: 39.7 %
HGB BLD-MCNC: 12.4 G/DL
LYMPHOCYTES # BLD AUTO: 2.5 K/UL
LYMPHOCYTES NFR BLD: 35.2 %
MCH RBC QN AUTO: 28.2 PG
MCHC RBC AUTO-ENTMCNC: 31.2 G/DL
MCV RBC AUTO: 90 FL
MONOCYTES # BLD AUTO: 0.6 K/UL
MONOCYTES NFR BLD: 8.4 %
NEUTROPHILS # BLD AUTO: 3.8 K/UL
NEUTROPHILS NFR BLD: 52.4 %
PLATELET # BLD AUTO: 214 K/UL
PMV BLD AUTO: 11.5 FL
POTASSIUM SERPL-SCNC: 4.1 MMOL/L
PROT SERPL-MCNC: 7.3 G/DL
RBC # BLD AUTO: 4.4 M/UL
SODIUM SERPL-SCNC: 141 MMOL/L
TROPONIN I SERPL DL<=0.01 NG/ML-MCNC: <0.006 NG/ML
WBC # BLD AUTO: 7.16 K/UL

## 2018-09-25 PROCEDURE — 80053 COMPREHEN METABOLIC PANEL: CPT

## 2018-09-25 PROCEDURE — 85025 COMPLETE CBC W/AUTO DIFF WBC: CPT

## 2018-09-25 PROCEDURE — 63600175 PHARM REV CODE 636 W HCPCS: Performed by: EMERGENCY MEDICINE

## 2018-09-25 PROCEDURE — 93010 ELECTROCARDIOGRAM REPORT: CPT | Mod: ,,, | Performed by: STUDENT IN AN ORGANIZED HEALTH CARE EDUCATION/TRAINING PROGRAM

## 2018-09-25 PROCEDURE — 93005 ELECTROCARDIOGRAM TRACING: CPT

## 2018-09-25 PROCEDURE — 99284 EMERGENCY DEPT VISIT MOD MDM: CPT | Mod: 25

## 2018-09-25 PROCEDURE — 96374 THER/PROPH/DIAG INJ IV PUSH: CPT

## 2018-09-25 PROCEDURE — 84484 ASSAY OF TROPONIN QUANT: CPT

## 2018-09-25 PROCEDURE — 94799 UNLISTED PULMONARY SVC/PX: CPT

## 2018-09-25 PROCEDURE — 83880 ASSAY OF NATRIURETIC PEPTIDE: CPT

## 2018-09-25 RX ORDER — MELOXICAM 15 MG/1
15 TABLET ORAL DAILY
Qty: 15 TABLET | Refills: 0 | Status: SHIPPED | OUTPATIENT
Start: 2018-09-25 | End: 2018-12-10

## 2018-09-25 RX ORDER — KETOROLAC TROMETHAMINE 30 MG/ML
15 INJECTION, SOLUTION INTRAMUSCULAR; INTRAVENOUS
Status: COMPLETED | OUTPATIENT
Start: 2018-09-25 | End: 2018-09-25

## 2018-09-25 RX ADMIN — KETOROLAC TROMETHAMINE 15 MG: 30 INJECTION, SOLUTION INTRAMUSCULAR at 02:09

## 2018-09-25 NOTE — ED TRIAGE NOTES
Patient states while at her daughter's house in [place] she was trying to clean up cat emesis when she slipped at fell on her chest. Patient denies LOC or hitting her head however cant be 100% sure she didn't. Patient complains of bilateral rib pain and sternal pain that worsens with deep breaths.

## 2018-09-25 NOTE — ED PROVIDER NOTES
Encounter Date: 2018       History     Chief Complaint   Patient presents with    Fall     Patient slipped and fell onto chest area one week ago and did not seek medical assistance.  Patient states she hurts to sternum area and underneath sternum and pain increases with deep breathing.     The patient is a 67-year-old female who presents the emergency department after falling 1 week ago.  The patient states that she leaned over and her depth perception is off due to her poor vision fell on to her anterior chest.  She had some pain at the time but states that over the last few days, the pain in her chest has gotten worse, not better.  She states it now hurts to take a deep breath and to cough.  She has no fever no shortness of breath no productive cough.  No abdominal pain, no nausea vomiting or diarrhea.  No back pain. She denies hitting her head and denies any neck pain.          Review of patient's allergies indicates:   Allergen Reactions    Iodinated contrast- oral and iv dye Hives    Isothiazolinones Rash    Pcn [penicillins] Rash     Past Medical History:   Diagnosis Date    Anticoagulant long-term use     Arthritis     CVA (cerebral infarction)     Depression     Diverticulosis of colon     Extrinsic asthma, unspecified     Hyperlipidemia     Hypertension     Left atrial enlargement 2014    Low back pain     MARTIN (obstructive sleep apnea)     Osteopenia     PUD (peptic ulcer disease)     Stroke  2013     Past Surgical History:   Procedure Laterality Date    APPENDECTOMY      @ time of hysterectomy    ARTHROPLASTY-KNEE Right 2017    [facility]    BACK SURGERY      CATARACT EXTRACTION       SECTION      CHOLECYSTECTOMY      laparoscopic    COLONOSCOPY N/A 2018    Procedure: COLONOSCOPY/Golytely;  Surgeon: Janine Black MD;  Location: [facility];  Service: Endoscopy;  Laterality: N/A;     COLONOSCOPY/Golytely N/A 2018    [facility] at [facility] ENDO    DILATION AND CURETTAGE OF UTERUS  1972    HYSTERECTOMY  1978    TAHUSO with appendectomy    INNER EAR SURGERY      replaced ear drum    IOVERA Right 2017    [facility] at [facility] OR    KNEE ARTHROSCOPY W/ DEBRIDEMENT      LUMBAR DISCECTOMY  1980    L4-L5    OOPHORECTOMY      TONSILLECTOMY      TYMPANOPLASTY       Family History   Problem Relation Age of Onset    Cervical cancer Mother     Cancer Mother 65        lung cancer - non smoker    Stroke Paternal Grandfather     Hypertension Maternal Grandfather     Heart disease Maternal Grandfather     Hypertension Father     Coronary artery disease Father 62    Heart disease Father     Diabetes Sister     Heart disease Sister     Kidney disease Sister     Breast cancer Daughter 36    Heart failure Sister         60s    Colon cancer Neg Hx     Ovarian cancer Neg Hx      Social History     Tobacco Use    Smoking status: Former Smoker     Last attempt to quit: 1995     Years since quittin.7    Smokeless tobacco: Never Used   Substance Use Topics    Alcohol use: Yes     Alcohol/week: 0.6 oz     Types: 1 Glasses of wine per week     Comment: social    Drug use: No     Review of Systems   Constitutional: Negative for fever.   HENT: Negative for sore throat.    Respiratory: Negative for cough, chest tightness, shortness of breath and wheezing.    Cardiovascular: Positive for chest pain.   Gastrointestinal: Negative for abdominal pain, nausea and vomiting.   Genitourinary: Negative for dysuria.   Musculoskeletal: Negative for back pain, neck pain and neck stiffness.   Skin: Negative for rash.   Neurological: Negative for weakness and headaches.   Hematological: Does not bruise/bleed easily.   Psychiatric/Behavioral: Negative for confusion.       Physical Exam     Initial Vitals [18 1208]   BP Pulse Resp Temp SpO2   129/74  (!) 58 16 97.9 °F (36.6 °C) 96 %      MAP       --         Physical Exam    Nursing note and vitals reviewed.  Constitutional: She appears well-developed and well-nourished.   HENT:   Head: Normocephalic and atraumatic.   Mouth/Throat: Oropharynx is clear and moist.   Eyes: Conjunctivae and EOM are normal. Pupils are equal, round, and reactive to light.   Neck: Normal range of motion. Neck supple.   Cardiovascular: Normal rate, regular rhythm, normal heart sounds and intact distal pulses. Exam reveals no gallop and no friction rub.    No murmur heard.  Pulmonary/Chest: Breath sounds normal. She exhibits tenderness (Tenderness to the left costosternal junction of the lower left ribs and the lower left ribs in the mid clavicular line).   No crepitance, no ecchymosis, no swelling.   Abdominal: Soft. Bowel sounds are normal. She exhibits no distension. There is no tenderness. There is no rebound and no guarding.   Musculoskeletal: Normal range of motion. She exhibits no edema or tenderness.   Lymphadenopathy:     She has no cervical adenopathy.   Neurological: She is alert and oriented to person, place, and time. She has normal strength and normal reflexes.   Skin: Skin is warm and dry.   Psychiatric: She has a normal mood and affect. Her behavior is normal. Judgment and thought content normal.         ED Course   Procedures  Labs Reviewed   CBC W/ AUTO DIFFERENTIAL - Abnormal; Notable for the following components:       Result Value    MCHC 31.2 (*)     All other components within normal limits   COMPREHENSIVE METABOLIC PANEL   B-TYPE NATRIURETIC PEPTIDE   TROPONIN I     EKG Readings: (Independently Interpreted)   Rhythm: Sinus Bradycardia. Heart Rate: 54. Ectopy: No Ectopy. Conduction: Normal. ST Segments: Normal ST Segments. T Waves: Normal. Clinical Impression: Normal Sinus Rhythm   1309       Imaging Results          X-Ray Ribs 2 View Left (Final result)  Result time 09/25/18 14:28:35    Final result by Ari LEONE  MD Subhash (09/25/18 14:28:35)                 Impression:      No left rib fractures identified.      Electronically signed by: Ari Cullen MD  Date:    09/25/2018  Time:    14:28             Narrative:    EXAMINATION:  XR RIBS 2 VIEW LEFT    CLINICAL HISTORY:  Unspecified fall, initial encounter    TECHNIQUE:  Two views of the left ribs were performed.    COMPARISON:  01/13/2017    FINDINGS:  No left rib fractures identified.  No pneumothorax.  Left lung is clear.                               X-Ray Chest PA And Lateral (Final result)  Result time 09/25/18 14:27:05    Final result by Ari Cullen MD (09/25/18 14:27:05)                 Impression:      No acute findings.      Electronically signed by: Ari Cullen MD  Date:    09/25/2018  Time:    14:27             Narrative:    EXAMINATION:  XR CHEST PA AND LATERAL    CLINICAL HISTORY:  Unspecified fall, initial encounter    TECHNIQUE:  PA and lateral views of the chest were performed.    COMPARISON:  01/13/2017    FINDINGS:  The cardiomediastinal contour is within normal limits.  The lungs are clear.  No pneumothorax.  No pleural effusions.                                                      Clinical Impression:   The primary encounter diagnosis was Rib contusion, left, initial encounter. Diagnoses of Fall and Chest pain were also pertinent to this visit.                             Romy Jamison MD  09/25/18 1756

## 2018-10-30 RX ORDER — ATORVASTATIN CALCIUM 10 MG/1
TABLET, FILM COATED ORAL
Qty: 90 TABLET | Refills: 3 | Status: SHIPPED | OUTPATIENT
Start: 2018-10-30 | End: 2020-02-11

## 2018-11-26 ENCOUNTER — TELEPHONE (OUTPATIENT)
Dept: FAMILY MEDICINE | Facility: CLINIC | Age: 68
End: 2018-11-26

## 2018-11-26 RX ORDER — SULFAMETHOXAZOLE AND TRIMETHOPRIM 800; 160 MG/1; MG/1
1 TABLET ORAL 2 TIMES DAILY
Qty: 6 TABLET | Refills: 0 | Status: SHIPPED | OUTPATIENT
Start: 2018-11-26 | End: 2018-11-29

## 2018-11-26 NOTE — TELEPHONE ENCOUNTER
Can try Bactrim for 3 days b.i.d., prescription placed in chart, please call in to pharmacy of preference

## 2018-11-26 NOTE — TELEPHONE ENCOUNTER
Patient is out of town in [place] and has a UTI.  She's been taking AZO but has not helped.  She is asking if there is anything else she can take over the counter to help.  Please advise.

## 2018-11-26 NOTE — TELEPHONE ENCOUNTER
----- Message from Aleyda Flores sent at 11/26/2018  3:26 PM CST -----  Contact: [phone]/self  Patient requesting a prescription for a UTI.   She states she's been taking over the counter AZO and she would like to know if she needs something else   Pharmacy: [facility] in [facility]# [phone]

## 2018-11-27 ENCOUNTER — TELEPHONE (OUTPATIENT)
Dept: FAMILY MEDICINE | Facility: CLINIC | Age: 68
End: 2018-11-27

## 2018-12-03 ENCOUNTER — PATIENT MESSAGE (OUTPATIENT)
Dept: FAMILY MEDICINE | Facility: CLINIC | Age: 68
End: 2018-12-03

## 2018-12-04 ENCOUNTER — OFFICE VISIT (OUTPATIENT)
Dept: UROLOGY | Facility: CLINIC | Age: 68
End: 2018-12-04
Payer: MEDICARE

## 2018-12-04 ENCOUNTER — TELEPHONE (OUTPATIENT)
Dept: FAMILY MEDICINE | Facility: CLINIC | Age: 68
End: 2018-12-04

## 2018-12-04 VITALS
DIASTOLIC BLOOD PRESSURE: 84 MMHG | BODY MASS INDEX: 33.75 KG/M2 | WEIGHT: 210 LBS | HEART RATE: 72 BPM | TEMPERATURE: 98 F | SYSTOLIC BLOOD PRESSURE: 149 MMHG | HEIGHT: 66 IN

## 2018-12-04 DIAGNOSIS — A41.9 SEPSIS, DUE TO UNSPECIFIED ORGANISM: ICD-10-CM

## 2018-12-04 DIAGNOSIS — R10.9 FLANK PAIN: ICD-10-CM

## 2018-12-04 DIAGNOSIS — N20.1 URETERAL CALCULUS: Primary | ICD-10-CM

## 2018-12-04 PROCEDURE — 1100F PTFALLS ASSESS-DOCD GE2>/YR: CPT | Mod: CPTII,S$GLB,, | Performed by: STUDENT IN AN ORGANIZED HEALTH CARE EDUCATION/TRAINING PROGRAM

## 2018-12-04 PROCEDURE — 99499 UNLISTED E&M SERVICE: CPT | Mod: S$GLB,,, | Performed by: STUDENT IN AN ORGANIZED HEALTH CARE EDUCATION/TRAINING PROGRAM

## 2018-12-04 PROCEDURE — 3079F DIAST BP 80-89 MM HG: CPT | Mod: CPTII,S$GLB,, | Performed by: STUDENT IN AN ORGANIZED HEALTH CARE EDUCATION/TRAINING PROGRAM

## 2018-12-04 PROCEDURE — 3288F FALL RISK ASSESSMENT DOCD: CPT | Mod: CPTII,S$GLB,, | Performed by: STUDENT IN AN ORGANIZED HEALTH CARE EDUCATION/TRAINING PROGRAM

## 2018-12-04 PROCEDURE — 99204 OFFICE O/P NEW MOD 45 MIN: CPT | Mod: S$GLB,,, | Performed by: STUDENT IN AN ORGANIZED HEALTH CARE EDUCATION/TRAINING PROGRAM

## 2018-12-04 PROCEDURE — 3077F SYST BP >= 140 MM HG: CPT | Mod: CPTII,S$GLB,, | Performed by: STUDENT IN AN ORGANIZED HEALTH CARE EDUCATION/TRAINING PROGRAM

## 2018-12-04 PROCEDURE — 99999 PR PBB SHADOW E&M-EST. PATIENT-LVL IV: CPT | Mod: PBBFAC,,, | Performed by: STUDENT IN AN ORGANIZED HEALTH CARE EDUCATION/TRAINING PROGRAM

## 2018-12-04 RX ORDER — ONDANSETRON 8 MG/1
8 TABLET, ORALLY DISINTEGRATING ORAL EVERY 6 HOURS PRN
Qty: 30 TABLET | Refills: 0 | Status: SHIPPED | OUTPATIENT
Start: 2018-12-04 | End: 2019-01-22

## 2018-12-04 NOTE — TELEPHONE ENCOUNTER
Reviewed note, heard from Dr. Zamora regarding visit this afternoon to reassess her kidney stone and stent.  I wrote Dr. Zamora stating that patient can be off aspirin and Plavix for a week prior to any planned procedure

## 2018-12-04 NOTE — PROGRESS NOTES
"Subjective:       Patient ID: Tracy Armendariz is a 68 y.o. female.    Chief Complaint: Nephrolithiasis  This is a 68 y.o.  female patient that is new to me.  The patient is referred to me by Dr. Bartolo Jules her PCP for kidney stone treatment.  She was visiting [facility] and was diagnosed with an obstructing stone. She presented to the ER at [facility] on 18 with fevers and altered mental status. She notes that they found a kidney stone of unknown laterality she does not recall left or right. Dr. Pérez, a urologist at [facility], was consulted after a Ct was performed and found an "impacted" stone that was "very close to the bladder" per the patient and her . She was told that she had a urinary tract infection and that the infection went into the blood stream (positive bacteremia). She underwent a cystoscopy and ureteral stent placement on 18 - again they are unsure of the laterality. She has been taking ceftin antibiotic and is still currently taking the medication. She notes she has been feeling tired after her hospital stay.     She is here today with her  Mr. Masood Armendariz.  They were visiting their daughter in [place] when this occurred.     Lab Results   Component Value Date    CREATININE 0.7 2018      ---  Past Medical History:   Diagnosis Date    Anticoagulant long-term use     Arthritis     CVA (cerebral infarction)     Depression     Diverticulosis of colon     Extrinsic asthma, unspecified     Hyperlipidemia     Hypertension     Left atrial enlargement 2014    Low back pain     MARTIN (obstructive sleep apnea)     Osteopenia     PUD (peptic ulcer disease)     Stroke  2013       Past Surgical History:   Procedure Laterality Date    APPENDECTOMY      @ time of hysterectomy    ARTHROPLASTY-KNEE Right 2017    [facility] OR    BACK SURGERY      CATARACT EXTRACTION       SECTION   "    CHOLECYSTECTOMY      laparoscopic    COLONOSCOPY N/A 2018    Procedure: COLONOSCOPY/Golytely;  Surgeon: Janine Black MD;  Location: [facility] ENDO;  Service: Endoscopy;  Laterality: N/A;    COLONOSCOPY/Golytely N/A 2018    Performed by [facility]ine Black [facility] at [facility] ENDO    DILATION AND CURETTAGE OF UTERUS  1972    HYSTERECTOMY  1978    TAHUSO with appendectomy    INNER EAR SURGERY      replaced ear drum    IOVERA Right 2017    [facility] at [facility] OR    KNEE ARTHROSCOPY W/ DEBRIDEMENT      LUMBAR DISCECTOMY      L4-L5    OOPHORECTOMY      TONSILLECTOMY      TYMPANOPLASTY         Family History   Problem Relation Age of Onset    Cervical cancer Mother     Cancer Mother 65        lung cancer - non smoker    Stroke Paternal Grandfather     Hypertension Maternal Grandfather     Heart disease Maternal Grandfather     Hypertension Father     Coronary artery disease Father 62    Heart disease Father     Diabetes Sister     Heart disease Sister     Kidney disease Sister     Breast cancer Daughter 36    Heart failure Sister         60s    Colon cancer Neg Hx     Ovarian cancer Neg Hx        Social History     Tobacco Use    Smoking status: Former Smoker     Last attempt to quit: 1995     Years since quittin.9    Smokeless tobacco: Never Used   Substance Use Topics    Alcohol use: Yes     Alcohol/week: 0.6 oz     Types: 1 Glasses of wine per week     Comment: social    Drug use: No       Current Outpatient Medications on File Prior to Visit   Medication Sig Dispense Refill    aspirin 81 MG Chew Take 81 mg by mouth once daily.      atorvastatin (LIPITOR) 10 MG tablet TAKE 1 TABLET BY MOUTH DAILY 90 tablet 3    calcium carbonate (OS-SPENCER) 600 mg (1,500 mg) Tab Take 600 mg by mouth 2 (two) times daily with meals.      cholecalciferol, vitamin D3, 1,000 unit Chew Take by mouth.      clopidogrel (PLAVIX) 75 mg tablet TAKE 1 TABLET BY MOUTH  EVERY DAY 90 tablet 3    escitalopram oxalate (LEXAPRO) 10 MG tablet TAKE 1 TABLET BY MOUTH EVERY DAY 90 tablet 3    LACTOBACILLUS ACIDOPHILUS (PROBIOTIC ORAL) Take by mouth.      losartan (COZAAR) 100 MG tablet TAKE 1 TABLET(100 MG) BY MOUTH EVERY DAY 90 tablet 3    meloxicam (MOBIC) 15 MG tablet Take 1 tablet (15 mg total) by mouth once daily. 15 tablet 0    MULTIVIT WITH CALCIUM,IRON,MIN (WOMEN'S DAILY MULTIVITAMIN ORAL) Take by mouth.      pantoprazole (PROTONIX) 40 MG tablet Take 1 tablet (40 mg total) by mouth once daily. 30 tablet 11    VENTOLIN HFA 90 mcg/actuation inhaler INHALE 2 PUFFS EVERY 6 HOURS AS NEEDED FOR WHEEZING 18 g 0     No current facility-administered medications on file prior to visit.        Review of patient's allergies indicates:   Allergen Reactions    Iodinated contrast- oral and iv dye Hives    Isothiazolinones Rash    Pcn [penicillins] Rash       Review of Systems   Constitutional: Negative for activity change.   HENT: Negative for congestion.    Eyes: Negative for visual disturbance.   Respiratory: Negative for shortness of breath.    Cardiovascular: Negative for chest pain.   Gastrointestinal: Negative for abdominal distention.   Musculoskeletal: Negative for gait problem.   Skin: Negative for color change.   Neurological: Negative for dizziness.   Psychiatric/Behavioral: Negative for agitation.       Objective:      Physical Exam   Constitutional: She is oriented to person, place, and time. She appears well-developed.   HENT:   Head: Normocephalic and atraumatic.   Eyes: EOM are normal.   Neck: Normal range of motion.   Cardiovascular: Intact distal pulses.   Pulmonary/Chest: Effort normal.   Musculoskeletal: Normal range of motion.   Neurological: She is alert and oriented to person, place, and time.   Skin: Skin is warm and dry.   Psychiatric: She has a normal mood and affect.       Assessment:       1. Ureteral calculus    2. Flank pain    3. Sepsis, due to unspecified  organism        Plan:       1. Will obtain noncontrasted Ct of the abdomen and pelvis to evaluate for location of right or left ureteral stone as the patient did not know.   2. Patient will need to give a urine sample for UA and culture next week. She is currently taking ceftin now.   3. Dr. Jules has cleared the patient at an acceptable risk to hold aspirin and plavix for 7 days prior to planned urologic procedure, see telephone note from him on 12/4/18.   4.  Will have patient sign DEANNE to obtain records from [facility].   5. Plan - noncontrasted CT later this week to evaluate for laterality and location of the ureteral stone. She will go to the lab to obtain a CBC and BMP on 12/10/18 to ensure that her WBCs have returned to the normal range. She will followup with me on Tuesday 12/11/18.   6. Discussed likelihood that we will probably need to address the stone with an outpatient ureteroscopy, holmium laser lithotripsy, stent exchange once she is further out from her hospital stay.     Ureteral calculus  -     CT Abdomen Pelvis  Without Contrast; Future; Expected date: 12/04/2018  -     CBC auto differential; Future; Expected date: 12/10/2018  -     Basic metabolic panel; Future; Expected date: 12/10/2018    Flank pain  -     CT Abdomen Pelvis  Without Contrast; Future; Expected date: 12/04/2018  -     CBC auto differential; Future; Expected date: 12/10/2018  -     Basic metabolic panel; Future; Expected date: 12/10/2018    Sepsis, due to unspecified organism  -     CT Abdomen Pelvis  Without Contrast; Future; Expected date: 12/04/2018  -     CBC auto differential; Future; Expected date: 12/10/2018  -     Basic metabolic panel; Future; Expected date: 12/10/2018    Other orders  -     ondansetron (ZOFRAN-ODT) 8 MG TbDL; Take 1 tablet (8 mg total) by mouth every 6 (six) hours as needed (nausea).  Dispense: 30 tablet; Refill: 0

## 2018-12-04 NOTE — TELEPHONE ENCOUNTER
Received notation from  in Urology regarding seeing patient for kidney stone and concern for needing stone retrieval.  Would like patient to be off Plavix for the procedure.  She has been on aspirin Plavix for secondary prevention given prior stroke in 2013.  At this time I think it is reasonable for her to stay off her aspirin Plavix for a week in preparation for planned stone retrieval procedure.

## 2018-12-07 ENCOUNTER — HOSPITAL ENCOUNTER (OUTPATIENT)
Dept: RADIOLOGY | Facility: HOSPITAL | Age: 68
Discharge: HOME | End: 2018-12-07
Attending: STUDENT IN AN ORGANIZED HEALTH CARE EDUCATION/TRAINING PROGRAM
Payer: MEDICARE

## 2018-12-07 DIAGNOSIS — R10.9 FLANK PAIN: ICD-10-CM

## 2018-12-07 DIAGNOSIS — N20.1 URETERAL CALCULUS: ICD-10-CM

## 2018-12-07 DIAGNOSIS — A41.9 SEPSIS, DUE TO UNSPECIFIED ORGANISM: ICD-10-CM

## 2018-12-07 PROCEDURE — 74176 CT ABD & PELVIS W/O CONTRAST: CPT | Mod: 26,,, | Performed by: RADIOLOGY

## 2018-12-07 PROCEDURE — 74176 CT ABD & PELVIS W/O CONTRAST: CPT | Mod: TC

## 2018-12-10 ENCOUNTER — LAB VISIT (OUTPATIENT)
Dept: LAB | Facility: HOSPITAL | Age: 68
End: 2018-12-10
Attending: STUDENT IN AN ORGANIZED HEALTH CARE EDUCATION/TRAINING PROGRAM
Payer: MEDICARE

## 2018-12-10 ENCOUNTER — OFFICE VISIT (OUTPATIENT)
Dept: UROLOGY | Facility: CLINIC | Age: 68
End: 2018-12-10
Payer: MEDICARE

## 2018-12-10 VITALS
HEART RATE: 82 BPM | WEIGHT: 210 LBS | BODY MASS INDEX: 33.75 KG/M2 | SYSTOLIC BLOOD PRESSURE: 102 MMHG | HEIGHT: 66 IN | DIASTOLIC BLOOD PRESSURE: 68 MMHG

## 2018-12-10 DIAGNOSIS — N20.1 URETERAL CALCULUS: ICD-10-CM

## 2018-12-10 DIAGNOSIS — Z87.898 HISTORY OF BACTEREMIA: ICD-10-CM

## 2018-12-10 DIAGNOSIS — R10.9 FLANK PAIN: ICD-10-CM

## 2018-12-10 DIAGNOSIS — A41.9 SEPSIS, DUE TO UNSPECIFIED ORGANISM: ICD-10-CM

## 2018-12-10 DIAGNOSIS — N20.0 NEPHROLITHIASIS: Primary | ICD-10-CM

## 2018-12-10 DIAGNOSIS — Z86.19 HISTORY OF SEPSIS: ICD-10-CM

## 2018-12-10 LAB
ANION GAP SERPL CALC-SCNC: 10 MMOL/L
BACTERIA #/AREA URNS AUTO: ABNORMAL /HPF
BASOPHILS # BLD AUTO: 0.08 K/UL
BASOPHILS NFR BLD: 0.9 %
BILIRUB UR QL STRIP: NEGATIVE
BUN SERPL-MCNC: 14 MG/DL
CALCIUM SERPL-MCNC: 10.2 MG/DL
CHLORIDE SERPL-SCNC: 105 MMOL/L
CLARITY UR REFRACT.AUTO: ABNORMAL
CO2 SERPL-SCNC: 27 MMOL/L
COLOR UR AUTO: ABNORMAL
CREAT SERPL-MCNC: 0.8 MG/DL
DIFFERENTIAL METHOD: ABNORMAL
EOSINOPHIL # BLD AUTO: 0.4 K/UL
EOSINOPHIL NFR BLD: 4.6 %
ERYTHROCYTE [DISTWIDTH] IN BLOOD BY AUTOMATED COUNT: 13.5 %
EST. GFR  (AFRICAN AMERICAN): >60 ML/MIN/1.73 M^2
EST. GFR  (NON AFRICAN AMERICAN): >60 ML/MIN/1.73 M^2
GLUCOSE SERPL-MCNC: 114 MG/DL
GLUCOSE UR QL STRIP: NEGATIVE
HCT VFR BLD AUTO: 41.8 %
HGB BLD-MCNC: 13.3 G/DL
HGB UR QL STRIP: ABNORMAL
HYALINE CASTS UR QL AUTO: 0 /LPF
KETONES UR QL STRIP: NEGATIVE
LEUKOCYTE ESTERASE UR QL STRIP: NEGATIVE
LYMPHOCYTES # BLD AUTO: 3.6 K/UL
LYMPHOCYTES NFR BLD: 41 %
MCH RBC QN AUTO: 29.8 PG
MCHC RBC AUTO-ENTMCNC: 31.8 G/DL
MCV RBC AUTO: 94 FL
MICROSCOPIC COMMENT: ABNORMAL
MONOCYTES # BLD AUTO: 0.8 K/UL
MONOCYTES NFR BLD: 9 %
NEUTROPHILS # BLD AUTO: 3.9 K/UL
NEUTROPHILS NFR BLD: 44.4 %
NITRITE UR QL STRIP: NEGATIVE
PH UR STRIP: 5 [PH] (ref 5–8)
PLATELET # BLD AUTO: 439 K/UL
PMV BLD AUTO: 10.4 FL
POTASSIUM SERPL-SCNC: 4.6 MMOL/L
PROT UR QL STRIP: ABNORMAL
RBC # BLD AUTO: 4.46 M/UL
RBC #/AREA URNS AUTO: 89 /HPF (ref 0–4)
SODIUM SERPL-SCNC: 142 MMOL/L
SP GR UR STRIP: 1.02 (ref 1–1.03)
SQUAMOUS #/AREA URNS AUTO: 17 /HPF
URN SPEC COLLECT METH UR: ABNORMAL
WBC # BLD AUTO: 8.82 K/UL
WBC #/AREA URNS AUTO: 5 /HPF (ref 0–5)

## 2018-12-10 PROCEDURE — 99999 PR PBB SHADOW E&M-EST. PATIENT-LVL V: CPT | Mod: PBBFAC,,, | Performed by: STUDENT IN AN ORGANIZED HEALTH CARE EDUCATION/TRAINING PROGRAM

## 2018-12-10 PROCEDURE — 1101F PT FALLS ASSESS-DOCD LE1/YR: CPT | Mod: CPTII,S$GLB,, | Performed by: STUDENT IN AN ORGANIZED HEALTH CARE EDUCATION/TRAINING PROGRAM

## 2018-12-10 PROCEDURE — 99214 OFFICE O/P EST MOD 30 MIN: CPT | Mod: S$GLB,,, | Performed by: STUDENT IN AN ORGANIZED HEALTH CARE EDUCATION/TRAINING PROGRAM

## 2018-12-10 PROCEDURE — 85025 COMPLETE CBC W/AUTO DIFF WBC: CPT

## 2018-12-10 PROCEDURE — 3078F DIAST BP <80 MM HG: CPT | Mod: CPTII,S$GLB,, | Performed by: STUDENT IN AN ORGANIZED HEALTH CARE EDUCATION/TRAINING PROGRAM

## 2018-12-10 PROCEDURE — 3074F SYST BP LT 130 MM HG: CPT | Mod: CPTII,S$GLB,, | Performed by: STUDENT IN AN ORGANIZED HEALTH CARE EDUCATION/TRAINING PROGRAM

## 2018-12-10 PROCEDURE — 36415 COLL VENOUS BLD VENIPUNCTURE: CPT

## 2018-12-10 PROCEDURE — 87086 URINE CULTURE/COLONY COUNT: CPT

## 2018-12-10 PROCEDURE — 80048 BASIC METABOLIC PNL TOTAL CA: CPT

## 2018-12-10 PROCEDURE — 81001 URINALYSIS AUTO W/SCOPE: CPT

## 2018-12-10 RX ORDER — CIPROFLOXACIN 2 MG/ML
400 INJECTION, SOLUTION INTRAVENOUS
Status: CANCELLED | OUTPATIENT
Start: 2018-12-10

## 2018-12-10 RX ORDER — SODIUM CHLORIDE 9 MG/ML
INJECTION, SOLUTION INTRAVENOUS CONTINUOUS
Status: CANCELLED | OUTPATIENT
Start: 2018-12-10

## 2018-12-10 NOTE — PROGRESS NOTES
"Subjective:       Patient ID: Tracy Armendariz is a 68 y.o. female.    Chief Complaint:  followup after Ct scan  This is a 68 y.o.  female patient that is an established patient of mine.  The patient is referred to me by Dr. Bartolo Jules her PCP for kidney stone treatment.  She was visiting [facility] and was diagnosed with an obstructing stone. She presented to the ER at [facility] on 11/27/18 with fevers and altered mental status. She notes that they found a kidney stone of unknown laterality she does not recall left or right. Dr. Pérez, a urologist at [facility], was consulted after a Ct was performed and found an "impacted" stone that was "very close to the bladder" per the patient and her . She was told that she had a urinary tract infection and that the infection went into the blood stream (positive bacteremia). She underwent a cystoscopy and ureteral stent placement on 11/28/18 - again they are unsure of the laterality. She has been taking ceftin antibiotic and is still currently taking the medication. She notes she has been feeling tired after her hospital stay.   She is here today with her  Mr. Masood Armendariz.  They were visiting their daughter in [place] when this occurred.      12/10/18  She is here today for a followup    Lab Results   Component Value Date    CREATININE 0.8 12/10/2018      CT 12/7/18 -  Left double-J ureteral stent appears in good position.  No hydronephrosis.  Numerous nonobstructing left renal stones with probable two small stones along the course of the distal left ureteral stent.  ---  Past Medical History:   Diagnosis Date    Anticoagulant long-term use     Arthritis     CVA (cerebral infarction) 2013    Depression     Diverticulosis of colon     Extrinsic asthma, unspecified     Hyperlipidemia     Hypertension     Left atrial enlargement 12/16/2014    Low back pain     MARTIN (obstructive sleep apnea)     Osteopenia     PUD (peptic ulcer " disease)     Stroke  2013       Past Surgical History:   Procedure Laterality Date    APPENDECTOMY      @ time of hysterectomy    ARTHROPLASTY-KNEE Right 2017    [facility] by [facility] at [facility] OR    BACK SURGERY      CATARACT EXTRACTION       SECTION      CHOLECYSTECTOMY      laparoscopic    COLONOSCOPY N/A 2018    Procedure: COLONOSCOPY/Golytely;  Surgeon: Janine Black MD;  Location: [facility] ENDO;  Service: Endoscopy;  Laterality: N/A;    COLONOSCOPY/Golytely N/A 2018    Performed by [facility]ine Black MD at [facility] ENDO    DILATION AND CURETTAGE OF UTERUS  1972    HYSTERECTOMY  1978    TAHUSO with appendectomy    INNER EAR SURGERY      replaced ear drum    IOVERA Right 2017    [facility] at [facility] OR    KNEE ARTHROSCOPY W/ DEBRIDEMENT      LUMBAR DISCECTOMY      L4-L5    OOPHORECTOMY      TONSILLECTOMY      TYMPANOPLASTY         Family History   Problem Relation Age of Onset    Cervical cancer Mother     Cancer Mother 65        lung cancer - non smoker    Stroke Paternal Grandfather     Hypertension Maternal Grandfather     Heart disease Maternal Grandfather     Hypertension Father     Coronary artery disease Father 62    Heart disease Father     Diabetes Sister     Heart disease Sister     Kidney disease Sister     Breast cancer Daughter 36    Heart failure Sister         60s    Colon cancer Neg Hx     Ovarian cancer Neg Hx        Social History     Tobacco Use    Smoking status: Former Smoker     Last attempt to quit: 1995     Years since quittin.9    Smokeless tobacco: Never Used   Substance Use Topics    Alcohol use: Yes     Alcohol/week: 0.6 oz     Types: 1 Glasses of wine per week     Comment: social    Drug use: No       Current Outpatient Medications on File Prior to Visit   Medication Sig Dispense Refill    aspirin 81 MG Chew Take 81 mg by mouth once daily.      atorvastatin  (LIPITOR) 10 MG tablet TAKE 1 TABLET BY MOUTH DAILY 90 tablet 3    calcium carbonate (OS-SPENCER) 600 mg (1,500 mg) Tab Take 600 mg by mouth 2 (two) times daily with meals.      cholecalciferol, vitamin D3, 1,000 unit Chew Take by mouth.      clopidogrel (PLAVIX) 75 mg tablet TAKE 1 TABLET BY MOUTH EVERY DAY 90 tablet 3    escitalopram oxalate (LEXAPRO) 10 MG tablet TAKE 1 TABLET BY MOUTH EVERY DAY 90 tablet 3    LACTOBACILLUS ACIDOPHILUS (PROBIOTIC ORAL) Take by mouth.      losartan (COZAAR) 100 MG tablet TAKE 1 TABLET(100 MG) BY MOUTH EVERY DAY 90 tablet 3    MULTIVIT WITH CALCIUM,IRON,MIN (WOMEN'S DAILY MULTIVITAMIN ORAL) Take by mouth.      ondansetron (ZOFRAN-ODT) 8 MG TbDL Take 1 tablet (8 mg total) by mouth every 6 (six) hours as needed (nausea). 30 tablet 0    pantoprazole (PROTONIX) 40 MG tablet Take 1 tablet (40 mg total) by mouth once daily. 30 tablet 11    VENTOLIN HFA 90 mcg/actuation inhaler INHALE 2 PUFFS EVERY 6 HOURS AS NEEDED FOR WHEEZING 18 g 0    [DISCONTINUED] meloxicam (MOBIC) 15 MG tablet Take 1 tablet (15 mg total) by mouth once daily. 15 tablet 0     No current facility-administered medications on file prior to visit.        Review of patient's allergies indicates:   Allergen Reactions    Iodinated contrast- oral and iv dye Hives and Rash    Iodine Hives    Isothiazolinones Rash    Penicillins Rash       Review of Systems   Constitutional: Negative for activity change.   HENT: Negative for congestion.    Eyes: Negative for visual disturbance.   Respiratory: Negative for shortness of breath.    Cardiovascular: Negative for chest pain.   Gastrointestinal: Negative for abdominal distention.   Musculoskeletal: Negative for gait problem.   Skin: Negative for color change.   Neurological: Negative for dizziness.   Psychiatric/Behavioral: Negative for agitation.       Objective:      Physical Exam   Constitutional: She is oriented to person, place, and time. She appears well-developed.    HENT:   Head: Normocephalic and atraumatic.   Eyes: EOM are normal.   Neck: Normal range of motion.   Cardiovascular: Intact distal pulses.   Pulmonary/Chest: Effort normal. No stridor.   Abdominal: Soft. She exhibits no distension. There is no tenderness.   Musculoskeletal: Normal range of motion.   Neurological: She is alert and oriented to person, place, and time.   Skin: Skin is warm and dry.   Psychiatric: She has a normal mood and affect.       Assessment:       1. Nephrolithiasis    2. History of sepsis    3. History of bacteremia    4. Ureteral calculus        Plan:         1. Will await urinalysis and culture results. Catheterized urine specimen obtained today in clinic by my nurse Nestor.  2. Even if culture shows no bacteria, will call in antibiotics for patient to start about 12/14/18 as prophylaxis given her history of sepsis and [place].  3. To address left distal ureteral calculi (x2) and possible left kidney stones - Left ureteroscopy possible staged procedure 12/19/18 the patient has elected to proceed with left ureteroscopy, holmium laser lithotripsy, stone basketing, retrograde pyelogram, stent exchange and all other indicated procedures with me in the operating room on 12/9/18. She understands this may be a staged procedure if I cannot treat the renal calculus at the same time. Alternatives of the procedure were also discussed. The risks included but were not limited to pain, infection (urinary tract infection), bleeding (hematuria), ureteral or urethral stricture,  injury to the urethra, bladder, ureter, need for additional treatments, inability or incomplete removal of kidney stones, pain, and discomfort related to the stent were discussed in depth with the patient.  The patient understands that if I am unable to pass the cameras up to the level of the stone or if visibility is poor, then I will only place a left ureteral stent and pursue a staged procedure.      The ureteral stent was discussed at length. The patient will need to have it removed and the time period in which it should remain indwelling is to be determined after surgery. If left indwelling, the sequelae include pain, infection, lower urinary tract symptoms, development of calcifications on the ureteral stent, worsening kidney function, and complete loss of kidney function.     The patient voiced understanding and all questions have been answered and informed consents were signed.        Nephrolithiasis  -     Urinalysis  -     Urinalysis Microscopic  -     Urine culture  -     Cancel: Urine culture  -     Urinalysis Microscopic  -     Urinalysis  -     Urine culture  -     Case Request Operating Room: REMOVAL, CALCULUS, URETER, URETEROSCOPIC, holmium laser lithotripsy, stone basket extraction, retrograde pyelogram, ureteral stent exchange  -     Place in Outpatient; Standing  -     Insert peripheral IV; Standing  -     Place sequential compression device; Standing  -     EKG 12-lead; Standing  -     X-Ray Chest PA And Lateral; Standing    History of sepsis  -     Urinalysis  -     Urinalysis Microscopic  -     Urine culture  -     Case Request Operating Room: REMOVAL, CALCULUS, URETER, URETEROSCOPIC, holmium laser lithotripsy, stone basket extraction, retrograde pyelogram, ureteral stent exchange  -     Place in Outpatient; Standing  -     Insert peripheral IV; Standing  -     Place sequential compression device; Standing  -     EKG 12-lead; Standing  -     X-Ray Chest PA And Lateral; Standing    History of bacteremia  -     Urinalysis  -     Urinalysis Microscopic  -     Urine culture  -     Cancel: Urine culture  -     Urinalysis Microscopic  -     Urinalysis  -     Urine culture  -     Case Request Operating Room: REMOVAL, CALCULUS, URETER, URETEROSCOPIC, holmium laser lithotripsy, stone basket extraction, retrograde pyelogram, ureteral stent exchange  -     Place in Outpatient; Standing  -     Insert  peripheral IV; Standing  -     Place sequential compression device; Standing  -     EKG 12-lead; Standing  -     X-Ray Chest PA And Lateral; Standing    Ureteral calculus  -     Urinalysis  -     Urinalysis Microscopic  -     Urine culture  -     Case Request Operating Room: REMOVAL, CALCULUS, URETER, URETEROSCOPIC, holmium laser lithotripsy, stone basket extraction, retrograde pyelogram, ureteral stent exchange  -     Place in Outpatient; Standing  -     Insert peripheral IV; Standing  -     Place sequential compression device; Standing  -     EKG 12-lead; Standing  -     X-Ray Chest PA And Lateral; Standing    Other orders  -     IP VTE LOW RISK PATIENT; Standing  -     0.9%  NaCl infusion  -     ciprofloxacin (CIPRO)400mg/200ml D5W IVPB 400 mg

## 2018-12-10 NOTE — H&P
"Subjective:       Patient ID: Tracy Armendariz is a 68 y.o. female.    Chief Complaint:  followup after Ct scan  This is a 68 y.o.  female patient that is an established patient of mine.  The patient is referred to me by Dr. Bartolo Jules her PCP for kidney stone treatment.  She was visiting [facility] and was diagnosed with an obstructing stone. She presented to the ER at [facility] on 11/27/18 with fevers and altered mental status. She notes that they found a kidney stone of unknown laterality she does not recall left or right. Dr. Pérez, a urologist at [facility], was consulted after a Ct was performed and found an "impacted" stone that was "very close to the bladder" per the patient and her . She was told that she had a urinary tract infection and that the infection went into the blood stream (positive bacteremia). She underwent a cystoscopy and ureteral stent placement on 11/28/18 - again they are unsure of the laterality. She has been taking ceftin antibiotic and is still currently taking the medication. She notes she has been feeling tired after her hospital stay.   She is here today with her  Mr. Masood Armendariz.  They were visiting their daughter in [place] when this occurred.      12/10/18  She is here today for a followup    Lab Results   Component Value Date    CREATININE 0.8 12/10/2018      CT 12/7/18 -  Left double-J ureteral stent appears in good position.  No hydronephrosis.  Numerous nonobstructing left renal stones with probable two small stones along the course of the distal left ureteral stent.  ---  Past Medical History:   Diagnosis Date    Anticoagulant long-term use     Arthritis     CVA (cerebral infarction) 2013    Depression     Diverticulosis of colon     Extrinsic asthma, unspecified     Hyperlipidemia     Hypertension     Left atrial enlargement 12/16/2014    Low back pain     MARTIN (obstructive sleep apnea)     Osteopenia     PUD (peptic ulcer " disease)     Stroke  2013       Past Surgical History:   Procedure Laterality Date    APPENDECTOMY      @ time of hysterectomy    ARTHROPLASTY-KNEE Right 2017    Performed by John Kim MD at [facility] OR    BACK SURGERY      CATARACT EXTRACTION       SECTION      CHOLECYSTECTOMY      laparoscopic    COLONOSCOPY N/A 2018    Procedure: COLONOSCOPY/Golytely;  Surgeon: Janine Black MD;  Location: [facility] ENDO;  Service: Endoscopy;  Laterality: N/A;    COLONOSCOPY/Golytely N/A 2018    Performed by Janine Black MD at [facility] ENDO    DILATION AND CURETTAGE OF UTERUS  1972    HYSTERECTOMY  1978    TAHUSO with appendectomy    INNER EAR SURGERY      replaced ear drum    IOVERA Right 2017    Performed by Michael Treviño MD at [facility] OR    KNEE ARTHROSCOPY W/ DEBRIDEMENT      LUMBAR DISCECTOMY      L4-L5    OOPHORECTOMY      TONSILLECTOMY      TYMPANOPLASTY         Family History   Problem Relation Age of Onset    Cervical cancer Mother     Cancer Mother 65        lung cancer - non smoker    Stroke Paternal Grandfather     Hypertension Maternal Grandfather     Heart disease Maternal Grandfather     Hypertension Father     Coronary artery disease Father 62    Heart disease Father     Diabetes Sister     Heart disease Sister     Kidney disease Sister     Breast cancer Daughter 36    Heart failure Sister         60s    Colon cancer Neg Hx     Ovarian cancer Neg Hx        Social History     Tobacco Use    Smoking status: Former Smoker     Last attempt to quit: 1995     Years since quittin.9    Smokeless tobacco: Never Used   Substance Use Topics    Alcohol use: Yes     Alcohol/week: 0.6 oz     Types: 1 Glasses of wine per week     Comment: social    Drug use: No       Current Outpatient Medications on File Prior to Visit   Medication Sig Dispense Refill    aspirin 81 MG Chew Take 81 mg by mouth once daily.      atorvastatin  (LIPITOR) 10 MG tablet TAKE 1 TABLET BY MOUTH DAILY 90 tablet 3    calcium carbonate (OS-SPENCER) 600 mg (1,500 mg) Tab Take 600 mg by mouth 2 (two) times daily with meals.      cholecalciferol, vitamin D3, 1,000 unit Chew Take by mouth.      clopidogrel (PLAVIX) 75 mg tablet TAKE 1 TABLET BY MOUTH EVERY DAY 90 tablet 3    escitalopram oxalate (LEXAPRO) 10 MG tablet TAKE 1 TABLET BY MOUTH EVERY DAY 90 tablet 3    LACTOBACILLUS ACIDOPHILUS (PROBIOTIC ORAL) Take by mouth.      losartan (COZAAR) 100 MG tablet TAKE 1 TABLET(100 MG) BY MOUTH EVERY DAY 90 tablet 3    MULTIVIT WITH CALCIUM,IRON,MIN (WOMEN'S DAILY MULTIVITAMIN ORAL) Take by mouth.      ondansetron (ZOFRAN-ODT) 8 MG TbDL Take 1 tablet (8 mg total) by mouth every 6 (six) hours as needed (nausea). 30 tablet 0    pantoprazole (PROTONIX) 40 MG tablet Take 1 tablet (40 mg total) by mouth once daily. 30 tablet 11    VENTOLIN HFA 90 mcg/actuation inhaler INHALE 2 PUFFS EVERY 6 HOURS AS NEEDED FOR WHEEZING 18 g 0    [DISCONTINUED] meloxicam (MOBIC) 15 MG tablet Take 1 tablet (15 mg total) by mouth once daily. 15 tablet 0     No current facility-administered medications on file prior to visit.        Review of patient's allergies indicates:   Allergen Reactions    Iodinated contrast- oral and iv dye Hives and Rash    Iodine Hives    Isothiazolinones Rash    Penicillins Rash       Review of Systems   Constitutional: Negative for activity change.   HENT: Negative for congestion.    Eyes: Negative for visual disturbance.   Respiratory: Negative for shortness of breath.    Cardiovascular: Negative for chest pain.   Gastrointestinal: Negative for abdominal distention.   Musculoskeletal: Negative for gait problem.   Skin: Negative for color change.   Neurological: Negative for dizziness.   Psychiatric/Behavioral: Negative for agitation.       Objective:      Physical Exam   Constitutional: She is oriented to person, place, and time. She appears well-developed.    HENT:   Head: Normocephalic and atraumatic.   Eyes: EOM are normal.   Neck: Normal range of motion.   Cardiovascular: Intact distal pulses.   Pulmonary/Chest: Effort normal. No stridor.   Abdominal: Soft. She exhibits no distension. There is no tenderness.   Musculoskeletal: Normal range of motion.   Neurological: She is alert and oriented to person, place, and time.   Skin: Skin is warm and dry.   Psychiatric: She has a normal mood and affect.       Assessment:       1. Nephrolithiasis    2. History of sepsis    3. History of bacteremia    4. Ureteral calculus        Plan:         1. Will await urinalysis and culture results. Catheterized urine specimen obtained today in clinic by my nurse Nestor.  2. Even if culture shows no bacteria, will call in antibiotics for patient to start about 12/14/18 as prophylaxis given her history of sepsis and [place].  3. To address left distal ureteral calculi (x2) and possible left kidney stones - Left ureteroscopy possible staged procedure 12/19/18 the patient has elected to proceed with left ureteroscopy, holmium laser lithotripsy, stone basketing, retrograde pyelogram, stent exchange and all other indicated procedures with me in the operating room on 12/9/18. She understands this may be a staged procedure if I cannot treat the renal calculus at the same time. Alternatives of the procedure were also discussed. The risks included but were not limited to pain, infection (urinary tract infection), bleeding (hematuria), ureteral or urethral stricture,  injury to the urethra, bladder, ureter, need for additional treatments, inability or incomplete removal of kidney stones, pain, and discomfort related to the stent were discussed in depth with the patient.  The patient understands that if I am unable to pass the cameras up to the level of the stone or if visibility is poor, then I will only place a left ureteral stent and pursue a staged procedure.      The ureteral stent was discussed at length. The patient will need to have it removed and the time period in which it should remain indwelling is to be determined after surgery. If left indwelling, the sequelae include pain, infection, lower urinary tract symptoms, development of calcifications on the ureteral stent, worsening kidney function, and complete loss of kidney function.     The patient voiced understanding and all questions have been answered and informed consents were signed.        Nephrolithiasis  -     Urinalysis  -     Urinalysis Microscopic  -     Urine culture  -     Cancel: Urine culture  -     Urinalysis Microscopic  -     Urinalysis  -     Urine culture  -     Case Request Operating Room: REMOVAL, CALCULUS, URETER, URETEROSCOPIC, holmium laser lithotripsy, stone basket extraction, retrograde pyelogram, ureteral stent exchange  -     Place in Outpatient; Standing  -     Insert peripheral IV; Standing  -     Place sequential compression device; Standing  -     EKG 12-lead; Standing  -     X-Ray Chest PA And Lateral; Standing    History of sepsis  -     Urinalysis  -     Urinalysis Microscopic  -     Urine culture  -     Case Request Operating Room: REMOVAL, CALCULUS, URETER, URETEROSCOPIC, holmium laser lithotripsy, stone basket extraction, retrograde pyelogram, ureteral stent exchange  -     Place in Outpatient; Standing  -     Insert peripheral IV; Standing  -     Place sequential compression device; Standing  -     EKG 12-lead; Standing  -     X-Ray Chest PA And Lateral; Standing    History of bacteremia  -     Urinalysis  -     Urinalysis Microscopic  -     Urine culture  -     Cancel: Urine culture  -     Urinalysis Microscopic  -     Urinalysis  -     Urine culture  -     Case Request Operating Room: REMOVAL, CALCULUS, URETER, URETEROSCOPIC, holmium laser lithotripsy, stone basket extraction, retrograde pyelogram, ureteral stent exchange  -     Place in Outpatient; Standing  -     Insert  peripheral IV; Standing  -     Place sequential compression device; Standing  -     EKG 12-lead; Standing  -     X-Ray Chest PA And Lateral; Standing    Ureteral calculus  -     Urinalysis  -     Urinalysis Microscopic  -     Urine culture  -     Case Request Operating Room: REMOVAL, CALCULUS, URETER, URETEROSCOPIC, holmium laser lithotripsy, stone basket extraction, retrograde pyelogram, ureteral stent exchange  -     Place in Outpatient; Standing  -     Insert peripheral IV; Standing  -     Place sequential compression device; Standing  -     EKG 12-lead; Standing  -     X-Ray Chest PA And Lateral; Standing    Other orders  -     IP VTE LOW RISK PATIENT; Standing  -     0.9%  NaCl infusion  -     ciprofloxacin (CIPRO)400mg/200ml D5W IVPB 400 mg

## 2018-12-10 NOTE — H&P (VIEW-ONLY)
"Subjective:       Patient ID: Tracy Armendariz is a 68 y.o. female.    Chief Complaint:  followup after Ct scan  This is a 68 y.o.  female patient that is an established patient of mine.  The patient is referred to me by Dr. Bartolo Jules her PCP for kidney stone treatment.  She was visiting [facility] and was diagnosed with an obstructing stone. She presented to the ER at [facility] on 11/27/18 with fevers and altered mental status. She notes that they found a kidney stone of unknown laterality she does not recall left or right. Dr. Pérez, a urologist at [facility], was consulted after a Ct was performed and found an "impacted" stone that was "very close to the bladder" per the patient and her . She was told that she had a urinary tract infection and that the infection went into the blood stream (positive bacteremia). She underwent a cystoscopy and ureteral stent placement on 11/28/18 - again they are unsure of the laterality. She has been taking ceftin antibiotic and is still currently taking the medication. She notes she has been feeling tired after her hospital stay.   She is here today with her  Mr. Masood Armendariz.  They were visiting their daughter in [place] when this occurred.      12/10/18  She is here today for a followup    Lab Results   Component Value Date    CREATININE 0.8 12/10/2018      CT 12/7/18 -  Left double-J ureteral stent appears in good position.  No hydronephrosis.  Numerous nonobstructing left renal stones with probable two small stones along the course of the distal left ureteral stent.  ---  Past Medical History:   Diagnosis Date    Anticoagulant long-term use     Arthritis     CVA (cerebral infarction) 2013    Depression     Diverticulosis of colon     Extrinsic asthma, unspecified     Hyperlipidemia     Hypertension     Left atrial enlargement 12/16/2014    Low back pain     MARTIN (obstructive sleep apnea)     Osteopenia     PUD (peptic ulcer " disease)     Stroke  2013       Past Surgical History:   Procedure Laterality Date    APPENDECTOMY      @ time of hysterectomy    ARTHROPLASTY-KNEE Right 2017    [facility] by [facility] at [facility] OR    BACK SURGERY      CATARACT EXTRACTION       SECTION      CHOLECYSTECTOMY      laparoscopic    COLONOSCOPY N/A 2018    Procedure: COLONOSCOPY/Golytely;  Surgeon: Janine Black MD;  Location: [facility] ENDO;  Service: Endoscopy;  Laterality: N/A;    COLONOSCOPY/Golytely N/A 2018    Performed by Janine Black MD at [facility] ENDO    DILATION AND CURETTAGE OF UTERUS  1972    HYSTERECTOMY  1978    TAHUSO with appendectomy    INNER EAR SURGERY      replaced ear drum    IOVERA Right 2017    Performed by Michael Tre[facility] MD at [facility] OR    KNEE ARTHROSCOPY W/ DEBRIDEMENT      LUMBAR DISCECTOMY      L4-L5    OOPHORECTOMY      TONSILLECTOMY      TYMPANOPLASTY         Family History   Problem Relation Age of Onset    Cervical cancer Mother     Cancer Mother 65        lung cancer - non smoker    Stroke Paternal Grandfather     Hypertension Maternal Grandfather     Heart disease Maternal Grandfather     Hypertension Father     Coronary artery disease Father 62    Heart disease Father     Diabetes Sister     Heart disease Sister     Kidney disease Sister     Breast cancer Daughter 36    Heart failure Sister         60s    Colon cancer Neg Hx     Ovarian cancer Neg Hx        Social History     Tobacco Use    Smoking status: Former Smoker     Last attempt to quit: 1995     Years since quittin.9    Smokeless tobacco: Never Used   Substance Use Topics    Alcohol use: Yes     Alcohol/week: 0.6 oz     Types: 1 Glasses of wine per week     Comment: social    Drug use: No       Current Outpatient Medications on File Prior to Visit   Medication Sig Dispense Refill    aspirin 81 MG Chew Take 81 mg by mouth once daily.      atorvastatin  (LIPITOR) 10 MG tablet TAKE 1 TABLET BY MOUTH DAILY 90 tablet 3    calcium carbonate (OS-SPENCER) 600 mg (1,500 mg) Tab Take 600 mg by mouth 2 (two) times daily with meals.      cholecalciferol, vitamin D3, 1,000 unit Chew Take by mouth.      clopidogrel (PLAVIX) 75 mg tablet TAKE 1 TABLET BY MOUTH EVERY DAY 90 tablet 3    escitalopram oxalate (LEXAPRO) 10 MG tablet TAKE 1 TABLET BY MOUTH EVERY DAY 90 tablet 3    LACTOBACILLUS ACIDOPHILUS (PROBIOTIC ORAL) Take by mouth.      losartan (COZAAR) 100 MG tablet TAKE 1 TABLET(100 MG) BY MOUTH EVERY DAY 90 tablet 3    MULTIVIT WITH CALCIUM,IRON,MIN (WOMEN'S DAILY MULTIVITAMIN ORAL) Take by mouth.      ondansetron (ZOFRAN-ODT) 8 MG TbDL Take 1 tablet (8 mg total) by mouth every 6 (six) hours as needed (nausea). 30 tablet 0    pantoprazole (PROTONIX) 40 MG tablet Take 1 tablet (40 mg total) by mouth once daily. 30 tablet 11    VENTOLIN HFA 90 mcg/actuation inhaler INHALE 2 PUFFS EVERY 6 HOURS AS NEEDED FOR WHEEZING 18 g 0    [DISCONTINUED] meloxicam (MOBIC) 15 MG tablet Take 1 tablet (15 mg total) by mouth once daily. 15 tablet 0     No current facility-administered medications on file prior to visit.        Review of patient's allergies indicates:   Allergen Reactions    Iodinated contrast- oral and iv dye Hives and Rash    Iodine Hives    Isothiazolinones Rash    Penicillins Rash       Review of Systems   Constitutional: Negative for activity change.   HENT: Negative for congestion.    Eyes: Negative for visual disturbance.   Respiratory: Negative for shortness of breath.    Cardiovascular: Negative for chest pain.   Gastrointestinal: Negative for abdominal distention.   Musculoskeletal: Negative for gait problem.   Skin: Negative for color change.   Neurological: Negative for dizziness.   Psychiatric/Behavioral: Negative for agitation.       Objective:      Physical Exam   Constitutional: She is oriented to person, place, and time. She appears well-developed.    HENT:   Head: Normocephalic and atraumatic.   Eyes: EOM are normal.   Neck: Normal range of motion.   Cardiovascular: Intact distal pulses.   Pulmonary/Chest: Effort normal. No stridor.   Abdominal: Soft. She exhibits no distension. There is no tenderness.   Musculoskeletal: Normal range of motion.   Neurological: She is alert and oriented to person, place, and time.   Skin: Skin is warm and dry.   Psychiatric: She has a normal mood and affect.       Assessment:       1. Nephrolithiasis    2. History of sepsis    3. History of bacteremia    4. Ureteral calculus        Plan:         1. Will await urinalysis and culture results. Catheterized urine specimen obtained today in clinic by my nurse Nestor.  2. Even if culture shows no bacteria, will call in antibiotics for patient to start about 12/14/18 as prophylaxis given her history of sepsis and [place] when [place] was [place] state.  3. To address left distal ureteral calculi (x2) and possible left kidney stones - Left ureteroscopy possible staged procedure 12/19/18 the patient has elected to proceed with left ureteroscopy, holmium laser lithotripsy, stone basketing, retrograde pyelogram, stent exchange and all other indicated procedures with me in the operating room on 12/9/18. She understands this may be a staged procedure if I cannot treat the renal calculus at the same time. Alternatives of the procedure were also discussed. The risks included but were not limited to pain, infection (urinary tract infection), bleeding (hematuria), ureteral or urethral stricture,  injury to the urethra, bladder, ureter, need for additional treatments, inability or incomplete removal of kidney stones, pain, and discomfort related to the stent were discussed in depth with the patient.  The patient understands that if I am unable to pass the cameras up to the level of the stone or if visibility is poor, then I will only place a left ureteral stent and pursue a staged procedure.      The ureteral stent was discussed at length. The patient will need to have it removed and the time period in which it should remain indwelling is to be determined after surgery. If left indwelling, the sequelae include pain, infection, lower urinary tract symptoms, development of calcifications on the ureteral stent, worsening kidney function, and complete loss of kidney function.     The patient voiced understanding and all questions have been answered and informed consents were signed.        Nephrolithiasis  -     Urinalysis  -     Urinalysis Microscopic  -     Urine culture  -     Cancel: Urine culture  -     Urinalysis Microscopic  -     Urinalysis  -     Urine culture  -     Case Request Operating Room: REMOVAL, CALCULUS, URETER, URETEROSCOPIC, holmium laser lithotripsy, stone basket extraction, retrograde pyelogram, ureteral stent exchange  -     Place in Outpatient; Standing  -     Insert peripheral IV; Standing  -     Place sequential compression device; Standing  -     EKG 12-lead; Standing  -     X-Ray Chest PA And Lateral; Standing    History of sepsis  -     Urinalysis  -     Urinalysis Microscopic  -     Urine culture  -     Case Request Operating Room: REMOVAL, CALCULUS, URETER, URETEROSCOPIC, holmium laser lithotripsy, stone basket extraction, retrograde pyelogram, ureteral stent exchange  -     Place in Outpatient; Standing  -     Insert peripheral IV; Standing  -     Place sequential compression device; Standing  -     EKG 12-lead; Standing  -     X-Ray Chest PA And Lateral; Standing    History of bacteremia  -     Urinalysis  -     Urinalysis Microscopic  -     Urine culture  -     Cancel: Urine culture  -     Urinalysis Microscopic  -     Urinalysis  -     Urine culture  -     Case Request Operating Room: REMOVAL, CALCULUS, URETER, URETEROSCOPIC, holmium laser lithotripsy, stone basket extraction, retrograde pyelogram, ureteral stent exchange  -     Place in Outpatient; Standing  -     Insert  peripheral IV; Standing  -     Place sequential compression device; Standing  -     EKG 12-lead; Standing  -     X-Ray Chest PA And Lateral; Standing    Ureteral calculus  -     Urinalysis  -     Urinalysis Microscopic  -     Urine culture  -     Case Request Operating Room: REMOVAL, CALCULUS, URETER, URETEROSCOPIC, holmium laser lithotripsy, stone basket extraction, retrograde pyelogram, ureteral stent exchange  -     Place in Outpatient; Standing  -     Insert peripheral IV; Standing  -     Place sequential compression device; Standing  -     EKG 12-lead; Standing  -     X-Ray Chest PA And Lateral; Standing    Other orders  -     IP VTE LOW RISK PATIENT; Standing  -     0.9%  NaCl infusion  -     ciprofloxacin (CIPRO)400mg/200ml D5W IVPB 400 mg

## 2018-12-11 ENCOUNTER — PATIENT MESSAGE (OUTPATIENT)
Dept: SURGERY | Facility: HOSPITAL | Age: 68
End: 2018-12-11

## 2018-12-12 LAB — BACTERIA UR CULT: NO GROWTH

## 2018-12-12 RX ORDER — SULFAMETHOXAZOLE AND TRIMETHOPRIM 800; 160 MG/1; MG/1
1 TABLET ORAL 2 TIMES DAILY
Qty: 28 TABLET | Refills: 0 | Status: SHIPPED | OUTPATIENT
Start: 2018-12-12 | End: 2018-12-26

## 2018-12-17 ENCOUNTER — CLINICAL SUPPORT (OUTPATIENT)
Dept: LAB | Facility: HOSPITAL | Age: 68
End: 2018-12-17
Attending: STUDENT IN AN ORGANIZED HEALTH CARE EDUCATION/TRAINING PROGRAM
Payer: MEDICARE

## 2018-12-17 ENCOUNTER — HOSPITAL ENCOUNTER (OUTPATIENT)
Dept: PREADMISSION TESTING | Facility: HOSPITAL | Age: 68
Discharge: HOME | End: 2018-12-17
Attending: STUDENT IN AN ORGANIZED HEALTH CARE EDUCATION/TRAINING PROGRAM
Payer: MEDICARE

## 2018-12-17 ENCOUNTER — HOSPITAL ENCOUNTER (OUTPATIENT)
Dept: RADIOLOGY | Facility: HOSPITAL | Age: 68
Discharge: HOME | End: 2018-12-17
Attending: STUDENT IN AN ORGANIZED HEALTH CARE EDUCATION/TRAINING PROGRAM
Payer: MEDICARE

## 2018-12-17 ENCOUNTER — ANESTHESIA EVENT (OUTPATIENT)
Dept: SURGERY | Facility: HOSPITAL | Age: 68
End: 2018-12-17
Payer: MEDICARE

## 2018-12-17 VITALS
RESPIRATION RATE: 18 BRPM | SYSTOLIC BLOOD PRESSURE: 133 MMHG | DIASTOLIC BLOOD PRESSURE: 74 MMHG | OXYGEN SATURATION: 97 % | BODY MASS INDEX: 35.36 KG/M2 | HEIGHT: 66 IN | WEIGHT: 220 LBS | HEART RATE: 67 BPM

## 2018-12-17 DIAGNOSIS — N20.0 NEPHROLITHIASIS: ICD-10-CM

## 2018-12-17 DIAGNOSIS — N20.0 NEPHROLITHIASIS: Primary | ICD-10-CM

## 2018-12-17 PROCEDURE — 71046 X-RAY EXAM CHEST 2 VIEWS: CPT | Mod: 26,,, | Performed by: RADIOLOGY

## 2018-12-17 PROCEDURE — 93010 ELECTROCARDIOGRAM REPORT: CPT | Mod: ,,, | Performed by: INTERNAL MEDICINE

## 2018-12-17 PROCEDURE — 93010 EKG 12-LEAD: ICD-10-PCS | Mod: ,,, | Performed by: INTERNAL MEDICINE

## 2018-12-17 PROCEDURE — 71046 X-RAY EXAM CHEST 2 VIEWS: CPT | Mod: TC,FY

## 2018-12-17 PROCEDURE — 93005 ELECTROCARDIOGRAM TRACING: CPT

## 2018-12-17 RX ORDER — LIDOCAINE HYDROCHLORIDE 10 MG/ML
1 INJECTION, SOLUTION EPIDURAL; INFILTRATION; INTRACAUDAL; PERINEURAL ONCE
Status: CANCELLED | OUTPATIENT
Start: 2018-12-17 | End: 2018-12-17

## 2018-12-17 NOTE — PRE-PROCEDURE INSTRUCTIONS
[facility] [phone]    Allergies, medical, surgical, family and psychosocial histories reviewed with patient. Periop plan of care reviewed. Preop instructions given, including medications to take and to hold. Hibiclens soap and instructions on use given. Time allotted for questions to be addressed.  Patient verbalized understanding.

## 2018-12-17 NOTE — DISCHARGE INSTRUCTIONS
Your surgery is scheduled for 12/19/18.    Please report to Outpatient Surgery Intake Office on the 2nd FLOOR at 6:30a.m.          INSTRUCTIONS IMPORTANT!!!  ¨ Do not eat or drink after 12 midnight-including water. OK to brush teeth, no   gum, candy or mints!    ¨ Take only these medicines with a small swallow of water-morning of surgery: Losartan and protonix        ____  Proceed to [facility] on 12/17/18 for additional blood test.        ____  Do not wear makeup, including mascara.  ____  No powder, lotions or creams to surgical area.  ____  Please remove all jewelry, including piercings and leave at home.  ____  No money or valuables needed. Please leave at home.  ____  Please bring any documents given by your doctor.  ____  If going home the same day, arrange for a ride home. You will not be able to             drive if Anesthesia was used.  ____  Wear loose fitting clothing. Allow for dressings, bandages.  ____  Stop Aspirin, Ibuprofen, Motrin and Aleve at least 3-5 days before surgery, unless otherwise instructed by your doctor, or the nurse.   You MAY use Tylenol/acetaminophen until day of surgery.  ____  If you take diabetic medication, do not take am of surgery unless instructed by Doctor.  ____  Call MD for temperature above 101 degrees or any other signs of infection such as Urinary (bladder) infection, Upper respiratory infection, skin boils, etc.  ____ Stop taking any Fish Oil supplement or any Vitamins that contain Vitamin E at least 5 days prior to surgery.  ____ Do Not wear your contact lenses the day of your procedure.  You may wear your glasses.      ____Do not shave for 3 days prior to surgery.  ____ Practice Good hand washing before, during, and after procedure.      I have read or had read and explained to me, and understand the above information.  Additional comments or instructions:  For additional questions call [phone]      Treating Kidney Stones: Ureteroscopic Stone  Removal    Ureteroscopic stone removal may be done before, after, or instead of other treatments. If you need this procedure, your healthcare provider will discuss its risks and possible complications. You will be told how to prepare. And you will be told about anesthesia that will keep you pain-free during treatment.     A ureteroscope lets your doctor see your stone before removing it.   Removing the stone through the ureter  Ureteroscopic stone removal extracts a small stone in your ureter without an incision. Your doctor places a viewing tube (ureteroscope) in your ureter. A wire basket inserted through the tube removes the stone. Sometimes, a laser or a mechanical device is used to break up the stone. A soft tube may be left in your ureter briefly to drain urine.     The stone may be fragmented. The stone is then withdrawn or passed.   Your recovery  This is an outpatient or overnight procedure. For a few days after surgery, you may feel some pain when you urinate. Or you may need to urinate more often, or have bloody urine. You may have a ureteral stent. This is a soft tube that prevents blockage from swelling after the procedure. The stent is removed when the swelling goes down, often within days. Follow up as instructed to check for any new stones.  When to call your healthcare provider  Call your healthcare provider right away if:  · You have sudden pain or flank pain  · You have a fever over 100.4°F (38°C)  · You have nausea that lasts for days  · You have heavy bleeding when you urinate  · You have heavy bleeding through your drainage tube  · You have swelling or redness around your incision   Date Last Reviewed: 2/1/2017  © 5553-9796 [employer]. [address] [place] [address], [place] [address]. All rights reserved. This information is not intended as a substitute for professional medical care. Always follow your healthcare professional's instructions.

## 2018-12-17 NOTE — ANESTHESIA PREPROCEDURE EVALUATION
2018  Tracy Armnedariz is a 68 y.o., female scheduled for left ureteroscopy, holmium laser lithotripsy, stone basketing, retrograde pyelogram, and stent exchange 18.    Past Medical History:   Diagnosis Date    Anticoagulant long-term use     Arthritis     CVA (cerebral infarction)     Depression     Diverticulosis of colon     Extrinsic asthma, unspecified     Hyperlipidemia     Hypertension     Left atrial enlargement 2014    Low back pain     MARTIN (obstructive sleep apnea)     Osteopenia     PUD (peptic ulcer disease)     Stroke  2013     Past Surgical History:   Procedure Laterality Date    APPENDECTOMY      @ time of hysterectomy    ARTHROPLASTY-KNEE Right 2017    Performed by John Kim MD at [facility] OR    BACK SURGERY      CATARACT EXTRACTION       SECTION      CHOLECYSTECTOMY      laparoscopic    COLONOSCOPY N/A 2018    Procedure: COLONOSCOPY/Golytely;  Surgeon: Janine Black MD;  Location: [facility] ENDO;  Service: Endoscopy;  Laterality: N/A;    COLONOSCOPY/Golytely N/A 2018    Performed by Janine Black MD at [facility] ENDO    DILATION AND CURETTAGE OF UTERUS      HYSTERECTOMY      TAHUSO with appendectomy    INNER EAR SURGERY      replaced ear drum    IOVERA Right 2017    Performed by Michael Treviño MD at [facility] OR    JOINT REPLACEMENT Right     knee    KNEE ARTHROSCOPY W/ DEBRIDEMENT      LUMBAR DISCECTOMY      L4-L5    OOPHORECTOMY      TONSILLECTOMY      TYMPANOPLASTY         Anesthesia Evaluation    I have reviewed the Patient Summary Reports.     I have reviewed the Medications.     Review of Systems  Anesthesia Hx:  No problems with previous Anesthesia    Social:  Former Smoker, Social Alcohol Use  Tobacco Use:  of cigarette, quit smoking >10 years ago, Smoking Cessation discussion.    Hematology/Oncology:        Hematology Comments: Pt on plavix and ASA; per PCP last dose 12/11   EENT/Dental:EENT/Dental Normal   Cardiovascular:   Hypertension, well controlled Denies Dysrhythmias.   Denies Angina. hyperlipidemia Implanted Loop Recorder, Serial # [id number] Mod: LNQ11 , battery dead no longer monitored Functional Capacity 3.5 METS    Pulmonary:   Asthma mild  Obstructive Sleep Apnea (MARTIN) (Bipap).   Renal/:   renal calculi    Hepatic/GI:   PUD, GERD, well controlled Denies Liver Disease.    Musculoskeletal:   Arthritis     Neurological:   Denies Seizures.  CVA - Cerebrovasular Accident (Residual deficit:  visual disturbance left eye), Thrombotic Stroke , Most recent CVA was on 12/2013 , has had 1 stroke    Endocrine:  Endocrine Normal    Psych:   depression        Physical Exam  General:  Obesity    Airway/Jaw/Neck:  Airway Findings: Mouth Opening: Normal Tongue: Normal  General Airway Assessment: Adult  Mallampati: II  TM Distance: Normal, at least 6 cm  Jaw/Neck Findings:  Neck ROM: Extension Decreased, Mild  Neck Findings:  Girth Increased      Dental:  Dental Findings: Periodontal disease, Severe, upper front caps   Chest/Lungs:  Chest/Lungs Findings: Clear to auscultation, Normal Respiratory Rate     Heart/Vascular:  Heart Findings: Rate: Normal  Rhythm: Regular Rhythm  Sounds: Normal  Heart murmur: negative     Musculoskeletal:  Musculoskeletal Findings: right knee.        2D Echo w/CFD 3/2016  1 - Normal left ventricular systolic function (EF 55-60%).   2 - Normal left ventricular diastolic function.   3 - Normal right ventricular systolic function .   4 - Concentric remodeling.   5 - Mild left atrial enlargement.   6 - Estimated PA systolic > 15 mmHg.   7 - Negative Bubble study  Eloy Medrano MD On: 03/31/2016 12:35    Anesthesia Plan  Type of Anesthesia, risks & benefits discussed:  Anesthesia Type:  general  Patient's Preference:   Intra-op Monitoring Plan:   Intra-op Monitoring Plan  Comments:   Post Op Pain Control Plan:   Post Op Pain Control Plan Comments:   Induction:   IV  Beta Blocker:         Informed Consent: Patient understands risks and agrees with Anesthesia plan.  Questions answered.   ASA Score: 3     Day of Surgery Review of History & Physical:        Anesthesia Plan Notes:           Ready For Surgery From Anesthesia Perspective.

## 2018-12-19 ENCOUNTER — HOSPITAL ENCOUNTER (OUTPATIENT)
Facility: HOSPITAL | Age: 68
Discharge: HOME | End: 2018-12-19
Attending: STUDENT IN AN ORGANIZED HEALTH CARE EDUCATION/TRAINING PROGRAM | Admitting: STUDENT IN AN ORGANIZED HEALTH CARE EDUCATION/TRAINING PROGRAM
Payer: MEDICARE

## 2018-12-19 ENCOUNTER — ANESTHESIA (OUTPATIENT)
Dept: SURGERY | Facility: HOSPITAL | Age: 68
End: 2018-12-19
Payer: MEDICARE

## 2018-12-19 DIAGNOSIS — N20.0 NEPHROLITHIASIS: ICD-10-CM

## 2018-12-19 DIAGNOSIS — N20.1 URETERAL CALCULUS: ICD-10-CM

## 2018-12-19 DIAGNOSIS — Z86.19 HISTORY OF SEPSIS: ICD-10-CM

## 2018-12-19 DIAGNOSIS — N20.0 NEPHROLITHIASIS: Primary | ICD-10-CM

## 2018-12-19 DIAGNOSIS — R10.9 LEFT SIDED ABDOMINAL PAIN: Primary | ICD-10-CM

## 2018-12-19 DIAGNOSIS — Z87.898 HISTORY OF BACTEREMIA: ICD-10-CM

## 2018-12-19 PROCEDURE — 27201423 OPTIME MED/SURG SUP & DEVICES STERILE SUPPLY: Performed by: STUDENT IN AN ORGANIZED HEALTH CARE EDUCATION/TRAINING PROGRAM

## 2018-12-19 PROCEDURE — 71000016 HC POSTOP RECOV ADDL HR: Performed by: STUDENT IN AN ORGANIZED HEALTH CARE EDUCATION/TRAINING PROGRAM

## 2018-12-19 PROCEDURE — 74420 UROGRAPHY RTRGR +-KUB: CPT | Mod: 26,,, | Performed by: STUDENT IN AN ORGANIZED HEALTH CARE EDUCATION/TRAINING PROGRAM

## 2018-12-19 PROCEDURE — C1894 INTRO/SHEATH, NON-LASER: HCPCS | Performed by: STUDENT IN AN ORGANIZED HEALTH CARE EDUCATION/TRAINING PROGRAM

## 2018-12-19 PROCEDURE — 25000003 PHARM REV CODE 250: Performed by: NURSE ANESTHETIST, CERTIFIED REGISTERED

## 2018-12-19 PROCEDURE — S0028 INJECTION, FAMOTIDINE, 20 MG: HCPCS | Performed by: NURSE ANESTHETIST, CERTIFIED REGISTERED

## 2018-12-19 PROCEDURE — 25000003 PHARM REV CODE 250: Performed by: STUDENT IN AN ORGANIZED HEALTH CARE EDUCATION/TRAINING PROGRAM

## 2018-12-19 PROCEDURE — C2617 STENT, NON-COR, TEM W/O DEL: HCPCS | Performed by: STUDENT IN AN ORGANIZED HEALTH CARE EDUCATION/TRAINING PROGRAM

## 2018-12-19 PROCEDURE — 25000003 PHARM REV CODE 250: Performed by: ANESTHESIOLOGY

## 2018-12-19 PROCEDURE — 71000015 HC POSTOP RECOV 1ST HR: Performed by: STUDENT IN AN ORGANIZED HEALTH CARE EDUCATION/TRAINING PROGRAM

## 2018-12-19 PROCEDURE — 36000706: Performed by: STUDENT IN AN ORGANIZED HEALTH CARE EDUCATION/TRAINING PROGRAM

## 2018-12-19 PROCEDURE — 52356 CYSTO/URETERO W/LITHOTRIPSY: CPT | Mod: 22,LT,, | Performed by: STUDENT IN AN ORGANIZED HEALTH CARE EDUCATION/TRAINING PROGRAM

## 2018-12-19 PROCEDURE — 37000009 HC ANESTHESIA EA ADD 15 MINS: Performed by: STUDENT IN AN ORGANIZED HEALTH CARE EDUCATION/TRAINING PROGRAM

## 2018-12-19 PROCEDURE — 36000707: Performed by: STUDENT IN AN ORGANIZED HEALTH CARE EDUCATION/TRAINING PROGRAM

## 2018-12-19 PROCEDURE — 88300 SURGICAL PATH GROSS: CPT | Mod: 26,,, | Performed by: PATHOLOGY

## 2018-12-19 PROCEDURE — 71000033 HC RECOVERY, INTIAL HOUR: Performed by: STUDENT IN AN ORGANIZED HEALTH CARE EDUCATION/TRAINING PROGRAM

## 2018-12-19 PROCEDURE — 63600175 PHARM REV CODE 636 W HCPCS: Performed by: STUDENT IN AN ORGANIZED HEALTH CARE EDUCATION/TRAINING PROGRAM

## 2018-12-19 PROCEDURE — 37000008 HC ANESTHESIA 1ST 15 MINUTES: Performed by: STUDENT IN AN ORGANIZED HEALTH CARE EDUCATION/TRAINING PROGRAM

## 2018-12-19 PROCEDURE — 76000 FLUOROSCOPY <1 HR PHYS/QHP: CPT | Mod: 26,59,, | Performed by: STUDENT IN AN ORGANIZED HEALTH CARE EDUCATION/TRAINING PROGRAM

## 2018-12-19 PROCEDURE — C1769 GUIDE WIRE: HCPCS | Performed by: STUDENT IN AN ORGANIZED HEALTH CARE EDUCATION/TRAINING PROGRAM

## 2018-12-19 PROCEDURE — 25500020 PHARM REV CODE 255: Performed by: STUDENT IN AN ORGANIZED HEALTH CARE EDUCATION/TRAINING PROGRAM

## 2018-12-19 PROCEDURE — 63600175 PHARM REV CODE 636 W HCPCS: Performed by: NURSE ANESTHETIST, CERTIFIED REGISTERED

## 2018-12-19 PROCEDURE — 88300 SURGICAL PATH GROSS: CPT

## 2018-12-19 PROCEDURE — 82365 CALCULUS SPECTROSCOPY: CPT | Mod: 91

## 2018-12-19 DEVICE — IMPLANTABLE DEVICE: Type: IMPLANTABLE DEVICE | Site: URETER | Status: FUNCTIONAL

## 2018-12-19 RX ORDER — SUCCINYLCHOLINE CHLORIDE 20 MG/ML
INJECTION INTRAMUSCULAR; INTRAVENOUS
Status: DISCONTINUED | OUTPATIENT
Start: 2018-12-19 | End: 2018-12-19

## 2018-12-19 RX ORDER — EPHEDRINE SULFATE 50 MG/ML
INJECTION, SOLUTION INTRAVENOUS
Status: DISCONTINUED | OUTPATIENT
Start: 2018-12-19 | End: 2018-12-19

## 2018-12-19 RX ORDER — SODIUM CHLORIDE 9 MG/ML
INJECTION, SOLUTION INTRAVENOUS CONTINUOUS
Status: DISCONTINUED | OUTPATIENT
Start: 2018-12-19 | End: 2018-12-19 | Stop reason: HOSPADM

## 2018-12-19 RX ORDER — TAMSULOSIN HYDROCHLORIDE 0.4 MG/1
0.4 CAPSULE ORAL DAILY
Qty: 14 CAPSULE | Refills: 0 | Status: SHIPPED | OUTPATIENT
Start: 2018-12-19 | End: 2019-03-18

## 2018-12-19 RX ORDER — KETOROLAC TROMETHAMINE 30 MG/ML
15 INJECTION, SOLUTION INTRAMUSCULAR; INTRAVENOUS ONCE
Status: COMPLETED | OUTPATIENT
Start: 2018-12-19 | End: 2018-12-19

## 2018-12-19 RX ORDER — FENTANYL CITRATE 50 UG/ML
INJECTION, SOLUTION INTRAMUSCULAR; INTRAVENOUS
Status: DISCONTINUED | OUTPATIENT
Start: 2018-12-19 | End: 2018-12-19

## 2018-12-19 RX ORDER — LIDOCAINE HYDROCHLORIDE 10 MG/ML
1 INJECTION, SOLUTION EPIDURAL; INFILTRATION; INTRACAUDAL; PERINEURAL ONCE
Status: DISCONTINUED | OUTPATIENT
Start: 2018-12-19 | End: 2018-12-19 | Stop reason: HOSPADM

## 2018-12-19 RX ORDER — GLYCOPYRROLATE 0.2 MG/ML
INJECTION INTRAMUSCULAR; INTRAVENOUS
Status: DISCONTINUED | OUTPATIENT
Start: 2018-12-19 | End: 2018-12-19

## 2018-12-19 RX ORDER — FAMOTIDINE 10 MG/ML
INJECTION INTRAVENOUS
Status: DISCONTINUED | OUTPATIENT
Start: 2018-12-19 | End: 2018-12-19

## 2018-12-19 RX ORDER — PROPOFOL 10 MG/ML
VIAL (ML) INTRAVENOUS
Status: DISCONTINUED | OUTPATIENT
Start: 2018-12-19 | End: 2018-12-19

## 2018-12-19 RX ORDER — ONDANSETRON 2 MG/ML
INJECTION INTRAMUSCULAR; INTRAVENOUS
Status: DISCONTINUED | OUTPATIENT
Start: 2018-12-19 | End: 2018-12-19

## 2018-12-19 RX ORDER — OXYCODONE HYDROCHLORIDE 5 MG/1
5 TABLET ORAL
Status: DISCONTINUED | OUTPATIENT
Start: 2018-12-19 | End: 2018-12-19 | Stop reason: HOSPADM

## 2018-12-19 RX ORDER — LIDOCAINE HCL/PF 100 MG/5ML
SYRINGE (ML) INTRAVENOUS
Status: DISCONTINUED | OUTPATIENT
Start: 2018-12-19 | End: 2018-12-19

## 2018-12-19 RX ORDER — OXYCODONE AND ACETAMINOPHEN 5; 325 MG/1; MG/1
1 TABLET ORAL EVERY 8 HOURS PRN
Qty: 15 TABLET | Refills: 0 | Status: SHIPPED | OUTPATIENT
Start: 2018-12-19 | End: 2019-01-22

## 2018-12-19 RX ORDER — DIPHENHYDRAMINE HYDROCHLORIDE 50 MG/ML
INJECTION INTRAMUSCULAR; INTRAVENOUS
Status: DISCONTINUED | OUTPATIENT
Start: 2018-12-19 | End: 2018-12-19

## 2018-12-19 RX ORDER — HYDROMORPHONE HYDROCHLORIDE 2 MG/ML
0.5 INJECTION, SOLUTION INTRAMUSCULAR; INTRAVENOUS; SUBCUTANEOUS EVERY 5 MIN PRN
Status: DISCONTINUED | OUTPATIENT
Start: 2018-12-19 | End: 2018-12-19 | Stop reason: HOSPADM

## 2018-12-19 RX ORDER — SODIUM CHLORIDE, SODIUM LACTATE, POTASSIUM CHLORIDE, CALCIUM CHLORIDE 600; 310; 30; 20 MG/100ML; MG/100ML; MG/100ML; MG/100ML
INJECTION, SOLUTION INTRAVENOUS CONTINUOUS PRN
Status: DISCONTINUED | OUTPATIENT
Start: 2018-12-19 | End: 2018-12-19

## 2018-12-19 RX ORDER — MIDAZOLAM HYDROCHLORIDE 1 MG/ML
INJECTION, SOLUTION INTRAMUSCULAR; INTRAVENOUS
Status: DISCONTINUED | OUTPATIENT
Start: 2018-12-19 | End: 2018-12-19

## 2018-12-19 RX ORDER — CIPROFLOXACIN 2 MG/ML
400 INJECTION, SOLUTION INTRAVENOUS
Status: COMPLETED | OUTPATIENT
Start: 2018-12-19 | End: 2018-12-19

## 2018-12-19 RX ORDER — ROCURONIUM BROMIDE 10 MG/ML
INJECTION, SOLUTION INTRAVENOUS
Status: DISCONTINUED | OUTPATIENT
Start: 2018-12-19 | End: 2018-12-19

## 2018-12-19 RX ORDER — DEXAMETHASONE SODIUM PHOSPHATE 4 MG/ML
INJECTION, SOLUTION INTRA-ARTICULAR; INTRALESIONAL; INTRAMUSCULAR; INTRAVENOUS; SOFT TISSUE
Status: DISCONTINUED | OUTPATIENT
Start: 2018-12-19 | End: 2018-12-19

## 2018-12-19 RX ORDER — NEOSTIGMINE METHYLSULFATE 1 MG/ML
INJECTION, SOLUTION INTRAVENOUS
Status: DISCONTINUED | OUTPATIENT
Start: 2018-12-19 | End: 2018-12-19

## 2018-12-19 RX ORDER — ONDANSETRON 2 MG/ML
4 INJECTION INTRAMUSCULAR; INTRAVENOUS DAILY PRN
Status: DISCONTINUED | OUTPATIENT
Start: 2018-12-19 | End: 2018-12-19 | Stop reason: HOSPADM

## 2018-12-19 RX ADMIN — DEXAMETHASONE SODIUM PHOSPHATE 4 MG: 4 INJECTION, SOLUTION INTRAMUSCULAR; INTRAVENOUS at 08:12

## 2018-12-19 RX ADMIN — SODIUM CHLORIDE, SODIUM LACTATE, POTASSIUM CHLORIDE, AND CALCIUM CHLORIDE: .6; .31; .03; .02 INJECTION, SOLUTION INTRAVENOUS at 06:12

## 2018-12-19 RX ADMIN — PROPOFOL 170 MG: 10 INJECTION, EMULSION INTRAVENOUS at 07:12

## 2018-12-19 RX ADMIN — EPHEDRINE SULFATE 5 MG: 50 INJECTION, SOLUTION INTRAMUSCULAR; INTRAVENOUS; SUBCUTANEOUS at 08:12

## 2018-12-19 RX ADMIN — SUCCINYLCHOLINE CHLORIDE 140 MG: 20 INJECTION, SOLUTION INTRAMUSCULAR; INTRAVENOUS at 07:12

## 2018-12-19 RX ADMIN — CIPROFLOXACIN 400 MG: 2 INJECTION, SOLUTION INTRAVENOUS at 07:12

## 2018-12-19 RX ADMIN — SODIUM CHLORIDE, SODIUM LACTATE, POTASSIUM CHLORIDE, AND CALCIUM CHLORIDE: .6; .31; .03; .02 INJECTION, SOLUTION INTRAVENOUS at 08:12

## 2018-12-19 RX ADMIN — ROCURONIUM BROMIDE 10 MG: 10 INJECTION, SOLUTION INTRAVENOUS at 07:12

## 2018-12-19 RX ADMIN — EPHEDRINE SULFATE 5 MG: 50 INJECTION, SOLUTION INTRAMUSCULAR; INTRAVENOUS; SUBCUTANEOUS at 07:12

## 2018-12-19 RX ADMIN — FAMOTIDINE 20 MG: 10 INJECTION INTRAVENOUS at 07:12

## 2018-12-19 RX ADMIN — ONDANSETRON 4 MG: 2 INJECTION, SOLUTION INTRAMUSCULAR; INTRAVENOUS at 08:12

## 2018-12-19 RX ADMIN — GLYCOPYRROLATE 0.6 MG: 0.2 INJECTION, SOLUTION INTRAMUSCULAR; INTRAVENOUS at 08:12

## 2018-12-19 RX ADMIN — FENTANYL CITRATE 50 MCG: 50 INJECTION, SOLUTION INTRAMUSCULAR; INTRAVENOUS at 07:12

## 2018-12-19 RX ADMIN — ROCURONIUM BROMIDE 25 MG: 10 INJECTION, SOLUTION INTRAVENOUS at 07:12

## 2018-12-19 RX ADMIN — NEOSTIGMINE METHYLSULFATE 4 MG: 1 INJECTION INTRAVENOUS at 08:12

## 2018-12-19 RX ADMIN — LIDOCAINE HYDROCHLORIDE 80 MG: 20 INJECTION, SOLUTION INTRAVENOUS at 07:12

## 2018-12-19 RX ADMIN — ROCURONIUM BROMIDE 5 MG: 10 INJECTION, SOLUTION INTRAVENOUS at 07:12

## 2018-12-19 RX ADMIN — KETOROLAC TROMETHAMINE 15 MG: 30 INJECTION, SOLUTION INTRAMUSCULAR at 09:12

## 2018-12-19 RX ADMIN — MIDAZOLAM 1 MG: 1 INJECTION INTRAMUSCULAR; INTRAVENOUS at 06:12

## 2018-12-19 RX ADMIN — OXYCODONE HYDROCHLORIDE 5 MG: 5 TABLET ORAL at 09:12

## 2018-12-19 RX ADMIN — SODIUM CHLORIDE 10 ML: 0.9 INJECTION, SOLUTION INTRAVENOUS at 06:12

## 2018-12-19 RX ADMIN — DIPHENHYDRAMINE HYDROCHLORIDE 12.5 MG: 50 INJECTION, SOLUTION INTRAMUSCULAR; INTRAVENOUS at 07:12

## 2018-12-19 NOTE — PLAN OF CARE
Patient sleeping, arousable. VSS. C/o burning pain, medicated per prn orders . Patient requests to go to her room to use restroom. Dr. Ignacio Clark to release patient from PACU. Report to Greg CAZARES with time for questions, verbalized understanding. Patient discharged to OPS.

## 2018-12-19 NOTE — ANESTHESIA POSTPROCEDURE EVALUATION
"Anesthesia Post Evaluation    Patient: Tracy Armendariz    Procedure(s) Performed: Procedure(s) (LRB):  REMOVAL, CALCULUS, URETER, URETEROSCOPIC, holmium laser lithotripsy, stone basket extraction, retrograde pyelogram, ureteral stent exchange (Left)    Final Anesthesia Type: general  Patient location during evaluation: PACU  Patient participation: Yes- Able to Participate  Level of consciousness: awake and alert  Post-procedure vital signs: reviewed and stable  Pain management: adequate  Airway patency: patent  PONV status at discharge: No PONV  Anesthetic complications: no      Cardiovascular status: blood pressure returned to baseline  Respiratory status: unassisted  Hydration status: euvolemic          Visit Vitals  BP (!) 142/56   Pulse 68   Temp 36.4 °C (97.5 °F) (Skin)   Resp 15   Ht 5' 6" (1.676 m)   Wt 99.8 kg (220 lb)   LMP 01/01/1978 (Approximate)   SpO2 (!) 91%   Breastfeeding? No   BMI 35.51 kg/m²       Pain/Elsie Score: Pain Rating Prior to Med Admin: 6 (12/19/2018  9:09 AM)        "

## 2018-12-19 NOTE — TRANSFER OF CARE
"Anesthesia Transfer of Care Note    Patient: Tracy Armendariz    Procedure(s) Performed: Procedure(s) (LRB):  REMOVAL, CALCULUS, URETER, URETEROSCOPIC, holmium laser lithotripsy, stone basket extraction, retrograde pyelogram, ureteral stent exchange (Left)    Patient location: PACU    Anesthesia Type: general    Transport from OR: Transported from OR on 6-10 L/min O2 by face mask with adequate spontaneous ventilation    Post pain: adequate analgesia    Post assessment: no apparent anesthetic complications    Post vital signs: stable    Level of consciousness: sedated    Nausea/Vomiting: no nausea/vomiting    Complications: none    Transfer of care protocol was followed      Last vitals:   Visit Vitals  BP (!) 117/54   Pulse 63   Temp 36.4 °C (97.5 °F) (Skin)   Resp 20   Ht 5' 6" (1.676 m)   Wt 99.8 kg (220 lb)   LMP 01/01/1978 (Approximate)   SpO2 95%   Breastfeeding? No   BMI 35.51 kg/m²     "

## 2018-12-19 NOTE — OP NOTE
OPERATIVE DICTATION  DATE OF OPERATION: 12/19/2018    SERVICE: Urology    SURGEONS:  1. Anaya Zamora MD    ANESTHESIA:  Anesthesiologist: Dann Pierre MD  CRNA: Camila Cao CRNA    STAFF:  Circulator: Sara Alvarado RN  Scrub Person: Shanon Mullins    ANESTHESIA: General    PREOPERATIVE DIAGNOSIS: Pre-Op Diagnosis Codes:     * Left Nephrolithiasis [N20.0]     * History of sepsis [Z86.19] s/p ureteral stenting at outside hospital     * History of bacteremia [Z87.898]     * Ureteral calculus [N20.1]    POSTOPERATIVE DIAGNOSIS: Post-Op Diagnosis Codes:     * Left Nephrolithiasis [N20.0]     * History of sepsis [Z86.19] s/p ureteral stenting at outside hospital     * History of bacteremia [Z87.898]     * Ureteral calculus [N20.1]    PROCEDURES:   1. left nephro-ureteroscopy, holmium laser lithotripsy, stone basket extraction modifier 22  2. Cystoscopy, left retrograde pyelogram  3. Cystoscopy, placement of left 6 Gabonese by 26 cm JJ ureteral stent OFF A STRING  4. Fluoroscopy < 1 hour  5. Interpretation of fluoroscopic images      COMPLICATIONS: * No complications entered in OR log *    DRAINS: None    TUBES: None    IMPLANTS: Left 6 Tajik x 26 cm JJ ureteral stent OFF A STRING    FLUIDS: see anesthesia record     ESTIMATED BLOOD LOSS: Minimal    FINDINGS:   1. Two left distal ureteral calculi which were fragmented with the holmium laser and removed using the basket.  2. The left kidney upper mid and lower pole calyces were inspected. There were two larger calculi in the lower pole which was a very difficult and tortuous calyx to enter. This rendered the procedure longer than the norm by at least 50% due to the difficulty of passing the ureteroscope into the lower pole calyx to first ensnare the stone and then move it to an upper pole calyx to be addressed/fragmented with the holmium laser and then basketed to be removed.    SPECIMEN(S):   1. Left ureteral stent for gross ID  2. Left  "ureteral calculi for stone chemical analysis  3. Left kidney calculi for stone chemical analysis    CONDITION: stable    INDICATIONS FOR THE PROCEDURE:  This is a 68 y.o.  female patient that is an established patient of mine.  The patient is referred to me by Dr. Bartolo Jules her PCP for kidney stone treatment.  She was visiting [facility] and was diagnosed with an obstructing stone. She presented to the ER at [facility] on 11/27/18 with fevers and altered mental status. She notes that they found a kidney stone of unknown laterality she does not recall left or right. Dr. Pérez, a urologist at [facility], was consulted after a Ct was performed and found an "impacted" stone that was "very close to the bladder" per the patient and her . She was told that she had a urinary tract infection and that the infection went into the blood stream (positive bacteremia). She underwent a cystoscopy and ureteral stent placement on 11/28/18 - again they are unsure of the laterality. She has been taking ceftin antibiotic and is still currently taking the medication. She notes she has been feeling tired after her hospital stay.   She is here today with her  Mr. Masood Armendariz.  They were visiting their daughter in [place] when this occurred.    CT 12/7/18 - Left double-J ureteral stent appears in good position.  No hydronephrosis.  Numerous nonobstructing left renal stones with probable two small stones along the course of the distal left ureteral stent.    To address left distal ureteral calculi (x2) and possible left kidney stones - Left ureteroscopy possible staged procedure 12/19/18 the patient has elected to proceed with left ureteroscopy, holmium laser lithotripsy, stone basketing, retrograde pyelogram, stent exchange and all other indicated procedures with me in the operating room on 12/9/18. She understands this may be a staged procedure if I cannot treat the renal calculus at the same time. " Alternatives of the procedure were also discussed. The risks included but were not limited to pain, infection (urinary tract infection), bleeding (hematuria), ureteral or urethral stricture,  injury to the urethra, bladder, ureter, need for additional treatments, inability or incomplete removal of kidney stones, pain, and discomfort related to the stent were discussed in depth with the patient.  The patient understands that if I am unable to pass the cameras up to the level of the stone or if visibility is poor, then I will only place a left ureteral stent and pursue a staged procedure.      The ureteral stent was discussed at length. The patient will need to have it removed and the time period in which it should remain indwelling is to be determined after surgery. If left indwelling, the sequelae include pain, infection, lower urinary tract symptoms, development of calcifications on the ureteral stent, worsening kidney function, and complete loss of kidney function.      The patient voiced understanding and all questions have been answered and informed consents were signed.    She underwent a catheterized urine specimen in clinic on 12/10/18 which demonstrated no growth. She was started on varun-operative antibiotic prophylaxis given her history of bacteremia and sepsis at an outside hospital and she was started on Bactrim on 12/12/18 in anticipation for her surgery today 12/19/18.     PROCEDURE IN DETAIL:  After appropriate informed consent was obtained, the patient was taken to the operating room and placed in the supine position. After induction of General, she was placed in the dorsal lithotomy position.  Then she was prepped and draped in the usual sterile fashion.     Thereafter a WHO timeout was performed and the procedure was initiated. The rigid 22 [place] cystoscope was inserted into the bladder via the urethra.     I visualized the left ureteral orifice with the distal coil of the indwelling stent  emanating from the ureteral orifice. I passed the guidewire through the left ureteral stent with a Motion guidewire. I advanced the wire until I noted a coil in the renal pelvis fluoroscopically.     I kept the guidewire in place and advanced a rigid ureteroscope into the left ureter and inspected as proximally as possible with the rigid scope. In the distal ureter, I identified two ureteral calculi which were right behind the other. These were likely the stones that obstructed the patient when she was out of town causing her to require a ureteral stent placement by an outside urologist.     A 273 holmium laser fiber was used to fragment the stones into smaller pieces which were removed using the nitinol basket. The rigid ureteroscope was then advanced as proximally as possible and it would only pass into the lower mid-ureter. No additional stone fragments were seen.     A left retrograde pyelogram was performed by injecting dilute isovue into the ureter through the rigid ureteroscope and no filling defects concerning for more stones were seen.     Afterwards I advanced a second guidewire, the amplatz superstiff guidewire, into the left renal pelvis under fluoroscopic guidance through the rigid ureteroscope. This was noted to coil alongside the first guidewire. I advanced a 35cm ureteral access sheath into the ureter until it was passed as proximally as possible. Then the inner obturator was removed.     I advanced the flexible ureteroscope through the access sheath and into the left renal pelvis. I used the flexible ureteroscope to inspect all calyces of the kidney. The upper mid and lower pole calyces were inspected. There were two larger calculi in the lower pole which was a very difficult and tortuous calyx to enter. This rendered the procedure longer than the norm by at least 50% due to the difficulty of passing and navigating the ureteroscope into the lower pole calyx even reach the stone. By trying multiple  angles with the flexible ureteroscope, eventually we were able to ensnare the stones and then move it to an upper pole calyx to be addressed/fragmented with the holmium laser and then basketed to be removed.    I used the 273 holmium micron laser fiber to fragment the stones into smaller manageable fragments. A nitinol tipless basket was used to remove the larger fragments of stones. These stones were sent off for analysis.     Afterwards I performed a left retrograde pyelogram by injecting dilute isovue through the flexible ureteroscope. I mapped out all of the other calyces and did not identify any additional large basketable stone fragments.     I removed the flexible ureteroscope and the access sheath together. As the sheath was being removed, the entire length of the ureter was inspected. There were no areas of trauma or bleeding identified. No additional calculi were encountered.     Then we proceeded with the left ureteral stent placement. A 6 Albanian x 26 cm JJ ureteral stent was advanced over the guidewire. Using the radiopaque marker of the pusher the stent was positioned in the correct location. As the guidewire was being removed, the left proximal coil in the renal pelvis was formed and visualized fluoroscopically. A coil in the bladder was also noted fluoroscopically.     The rigid cystoscope was re-inserted into the bladder via the urethra to drain the bladder of all urinary contents and the distal coil of the stent was visualized directly and this concluded the procedure.     ATTENDING ATTESTATION  I was present and scrubbed for the entire duration  of the procedure.      CASE DURATION:  * Missing case tracking time(s) *    DISPOSITION:   The patient tolerated the procedure well, she was extubated, and taken to post-anesthesia care unit in satisfactory condition.  She convalesced in the PACU and will be discharged home from same day surgery. She will followup with me in 1 week for a cystoscopy, stent  removal in clinic.     Anaya Zamora MD

## 2018-12-19 NOTE — DISCHARGE INSTRUCTIONS
Ureteral Stents  A ureteral stent is a soft plastic tube with holes in it. Its temporarily inserted into a ureter to help drain urine into the bladder. One end goes in the kidney. The other end goes in the bladder. A coil on each end holds the stent in place. The stent cant be seen from outside the body. It shouldnt interfere with your normal routine. Your stent will be put in by a doctor trained in treating the urinary tract (a urologist) or another specialist. The procedure is done in a hospital or surgery center. Youll likely go home the same day.  When is a ureteral stent used?  A ureteral stent may be used:  · To bypass a blockage in a kidney or ureter.  · During kidney stone removal.  · To let a ureter heal after surgery.    Before the Procedure  Your healthcare provider will give you instructions to prepare for the procedure. X-rays or other imaging tests of your kidneys and ureters may be done beforehand.  During the procedure  · You receive medicine to prevent pain and help you relax or sleep during the procedure. Once this takes effect, the procedure starts.  · The doctor inserts a cystoscope (lighted instrument) through the urethra and into the bladder. This shows the opening to the ureter.  · A thin wire is carefully threaded through the cystoscope, up the ureter, and into the kidney. The stent is inserted over the wire.  · A fluoroscope (special X-ray machine) is used to help position the stent. When the stent is in place, the wire and cystoscope are removed.  While you have a stent  · Some discomfort is normal. Certain movements may trigger pain or a feeling that you need to urinate. You may also feel mild soreness or pressure before or during urination. These symptoms will go away a few days after the stent is removed.  · Medicine to control pain or bladder spasms or to prevent infection may be prescribed. Take this as directed.  · Drink plenty of fluids to help flush out your urinary  tract.  · Your urine may be slightly pink or red. This is due to bleeding caused by minor irritation from the stent. This may happen on and off while you have the stent.  · As with any synthetic device placed in the body, there is a risk of infection. The stent may have to be removed if this happens.   How long will you need a stent?  The stent is often taken out after the blockage in the ureter is treated or the ureter has healed. This may take 1 week to 2 weeks, or longer. If a stent is needed for a long time, it may need to be changed every few months.  When to call your healthcare provider  Contact your healthcare provider right away if:  · Your urine contains blood clots or you see a large amount of blood-tinged urine  · You have symptoms similar to those you had before the stent was placed  · You constantly leak urine  · You have a fever over 100.4°F (38°C), chills, nausea, or vomiting  · Your pain is not relieved with medicine  · The end of the stent comes out of the urethra   Date Last Reviewed: 1/1/2017  © 9866-9972 [employer]. [address] [place] [address]. All rights reserved. This information is not intended as a substitute for professional medical care. Always follow your healthcare professional's instructions.  ANESTHESIA  -For the first 24 hours after surgery:  Do not drive, use heavy equipment, make important decisions, or drink alcohol  -It is normal to feel sleepy for several hours.  Rest until you are more awake.  -Have someone stay with you, if needed.  They can watch for problems and help keep you safe.  -Some possible post anesthesia side effects include: nausea and vomiting, sore throat and hoarseness, sleepiness, and dizziness.    PAIN  -If you have pain after surgery, pain medicine will help you feel better.  Take it as directed, before pain becomes severe.  Most pain relievers taken by mouth need at least 20-30 minutes to start working.  -Do not drive or drink  alcohol while taking pain medicine.  -Pain medication can upset your stomach.  Taking them with a little food may help.  -Other ways to help control pain: elevation, ice, and relaxation  -Call your surgeon if still having unmanageable pain an hour after taking pain medicine.  -Pain medicine can cause constipation.  Taking an over-the counter stool softener while on prescription pain medicine and drinking plenty of fluids can prevent this side effect.  -Call your surgeon if you have severe side effects like: breathing problems, trouble waking up, dizziness, confusion, or severe constipation.    NAUSEA  -Some people have nausea after surgery.  This is often because of anesthesia, pain, pain medicine, or the stress of surgery.  -Do not push yourself to eat.  Start off with clear liquids and soup.  Slowly move to solid foods.  Don't eat fatty, rich, spicy foods at first.  Eat smaller amounts.  -If you develop persistent nausea and vomiting please notify your surgeon immediately.    BLEEDING  -Different types of surgery require different types of care and dressing changes.  It is important to follow all instructions and advice from your surgeon.  Change dressing as directed.  Call your surgeon for any concerns regarding postop bleeding.    SIGNS OF INFECTION  -Signs of infection include: fever, swelling, drainage, and redness  -Notify your surgeon if you have a fever of 100.4 F (38.0 C) or higher.  -Notify your surgeon if you notice redness, swelling, increased pain, pus, or a foul smell at the incision site.    Acetaminophen; Oxycodone capsules  What is this medicine?  ACETAMINOPHEN; OXYCODONE (a set a ELIESER edwar fen; ox i KOE done) is a pain reliever. It is used to treat moderate to severe pain.  How should I use this medicine?  Take this medicine by mouth with a full glass of water. Follow the directions on the prescription label. You can take it with or without food. If it upsets your stomach, take it with food. Take  your medicine at regular intervals. Do not take it more often than directed.  A special MedGuide will be given to you by the pharmacist with each prescription and refill. Be sure to read this information carefully each time.  Talk to your pediatrician regarding the use of this medicine in children. Special care may be needed.  What side effects may I notice from receiving this medicine?  Side effects that you should report to your doctor or health care professional as soon as possible:  · allergic reactions like skin rash, itching or hives, swelling of the face, lips, or tongue  · breathing problems  · confusion  · redness, blistering, peeling or loosening of the skin, including inside the mouth  · signs and symptoms of liver injury like dark yellow or brown urine; general ill feeling or flu-like symptoms; light-colored stools; loss of appetite; nausea; right upper belly pain; unusually weak or tired; yellowing of the eyes or skin  · signs and symptoms of low blood pressure like dizziness; feeling faint or lightheaded, falls; unusually weak or tired  · trouble passing urine or change in the amount of urine  Side effects that usually do not require medical attention (report to your doctor or health care professional if they continue or are bothersome):  · constipation  · dry mouth  · nausea, vomiting  · tiredness  What may interact with this medicine?  This medicine may interact with the following medications:  · alcohol  · antihistamines for allergy, cough and cold  · antiviral medicines for HIV or AIDS  · atropine  · certain antibiotics like clarithromycin, erythromycin, linezolid, rifampin  · certain medicines for anxiety or sleep  · certain medicines for bladder problems like oxybutynin, tolterodine  · certain medicines for depression like amitriptyline, fluoxetine, sertraline  · certain medicines for fungal infections like ketoconazole, itraconazole, voriconazole  · certain medicines for migraine headache like  almotriptan, eletriptan, frovatriptan, naratriptan, rizatriptan, sumatriptan, zolmitriptan  · certain medicines for nausea or vomiting like dolasetron, ondansetron, palonosetron  · certain medicines for Parkinson's disease like benztropine, trihexyphenidyl  · certain medicines for seizures like phenobarbital, phenytoin, primidone  · certain medicines for stomach problems like dicyclomine, hyoscyamine  · certain medicines for travel sickness like scopolamine  · diuretics  · general anesthetics like halothane, isoflurane, methoxyflurane, propofol  · ipratropium  · local anesthetics like lidocaine, pramoxine, tetracaine  · MAOIs like Carbex, Eldepryl, Marplan, Nardil, and Parnate  · medicines that relax muscles for surgery  · methylene blue  · nilotinib  · other medicines with acetaminophen  · other narcotic medicines for pain or cough  · phenothiazines like chlorpromazine, mesoridazine, prochlorperazine, thioridazine  What if I miss a dose?  If you miss a dose, take it as soon as you can. If it is almost time for your next dose, take only that dose. Do not take double or extra doses.  Where should I keep my medicine?  Keep out of the reach of children. This medicine can be abused. Keep your medicine in a safe place to protect it from theft. Do not share this medicine with anyone. Selling or giving away this medicine is dangerous and against the law.  This medicine may cause accidental overdose and death if it taken by other adults, children, or pets. Mix any unused medicine with a substance like cat litter or coffee grounds. Then throw the medicine away in a sealed container like a sealed bag or a coffee can with a lid. Do not use the medicine after the expiration date.  Store at room temperature between 15 and 30 degrees C (59 and 86 degrees F). Keep container tightly closed. Protect from light.  What should I tell my health care provider before I take this medicine?  They need to know if you have any of these  conditions:  · brain tumor  · Crohn's disease, inflammatory bowel disease, or ulcerative colitis  · drug abuse or addiction  · head injury  · heart or circulation problems  · if you often drink alcohol  · kidney disease or problems going to the bathroom  · liver disease  · lung disease, asthma, or breathing problems  · an unusual or allergic reaction to salicylates, acetaminophen, oxycodone, other opioid analgesics, other medicines, foods, dyes, or preservatives  · pregnant or trying to get pregnant  · breast-feeding  What should I watch for while using this medicine?  Tell your doctor or health care professional if your pain does not go away, if it gets worse, or if you have new or a different type of pain. You may develop tolerance to the medicine. Tolerance means that you will need a higher dose of the medication for pain relief. Tolerance is normal and is expected if you take this medicine for a long time.  Do not suddenly stop taking your medicine because you may develop a severe reaction. Your body becomes used to the medicine. This does NOT mean you are addicted. Addiction is a behavior related to getting and using a drug for a nonmedical reason. If you have pain, you have a medical reason to take pain medicine. Your doctor will tell you how much medicine to take. If your doctor wants you to stop the medicine, the dose will be slowly lowered over time to avoid any side effects.  There are different types of narcotic medicines (opiates). If you take more than one type at the same time or if you are taking another medicine that also causes drowsiness, you may have more side effects. Give your health care provider a list of all medicines you use. Your doctor will tell you how much medicine to take. Do not take more medicine than directed. Call emergency for help if you have problems breathing or unusual sleepiness.  Do not take other medicines that contain acetaminophen with this medicine. Always read labels  carefully. If you have questions, ask your doctor or pharmacist.  If you take too much acetaminophen get medical help right away. Too much acetaminophen can be very dangerous and cause liver damage. Even if you do not have symptoms, it is important to get help right away.  You may get drowsy or dizzy. Do not drive, use machinery, or do anything that needs mental alertness until you know how this medicine affects you. Do not stand or sit up quickly, especially if you are an older patient. This reduces the risk of dizzy or fainting spells. Alcohol may interfere with the effect of this medicine. Avoid alcoholic drinks.  The medicine will cause constipation. Try to have a bowel movement at least every 2 to 3 days. If you do not have a bowel movement for 3 days, call your doctor or health care professional.  Your mouth may get dry. Chewing sugarless gum or sucking hard candy, and drinking plenty of water may help. Contact your doctor if the problem does not go away or is severe.  NOTE:This sheet is a summary. It may not cover all possible information. If you have questions about this medicine, talk to your doctor, pharmacist, or health care provider. Copyright© 2017 [employer]    Tamsulosin capsules  What is this medicine?  TAMSULOSIN (moore CECE jan sin) is used to treat enlargement of the prostate gland in men, a condition called benign prostatic hyperplasia or BPH. It is not for use in women. It works by relaxing muscles in the prostate and bladder neck. This improves urine flow and reduces BPH symptoms.  How should I use this medicine?  Take this medicine by mouth about 30 minutes after the same meal every day. Follow the directions on the prescription label. Swallow the capsules whole with a glass of water. Do not crush, chew, or open capsules. Do not take your medicine more often than directed. Do not stop taking your medicine unless your doctor tells you to.  Talk to your pediatrician regarding the use of this  medicine in children. Special care may be needed.  What side effects may I notice from receiving this medicine?  Side effects that you should report to your doctor or health care professional as soon as possible:  · allergic reactions like skin rash or itching, hives, swelling of the lips, mouth, tongue, or throat  · breathing problems  · change in vision  · feeling faint or lightheaded  · irregular heartbeat  · prolonged or painful erection  · weakness  Side effects that usually do not require medical attention (report to your doctor or health care professional if they continue or are bothersome):  · back pain  · change in sex drive or performance  · constipation, nausea or vomiting  · cough  · drowsy  · runny or stuffy nose  · trouble sleeping  What may interact with this medicine?  · cimetidine  · fluoxetine  · ketoconazole  · medicines for erectile disfunction like sildenafil, tadalafil, vardenafil  · medicines for high blood pressure  · other alpha-blockers like alfuzosin, doxazosin, phentolamine, phenoxybenzamine, prazosin, terazosin  · warfarin  What if I miss a dose?  If you miss a dose, take it as soon as you can. If it is almost time for your next dose, take only that dose. Do not take double or extra doses. If you stop taking your medicine for several days or more, ask your doctor or health care professional what dose you should start back on.  Where should I keep my medicine?  Keep out of the reach of children.  Store at room temperature between 15 and 30 degrees C (59 and 86 degrees F). Throw away any unused medicine after the expiration date.  What should I tell my health care provider before I take this medicine?  They need to know if you have any of the following conditions:  · advanced kidney disease  · advanced liver disease  · low blood pressure  · prostate cancer  · an unusual or allergic reaction to tamsulosin, sulfa drugs, other medicines, foods, dyes, or preservatives  · pregnant or trying to  get pregnant  · breast-feeding  What should I watch for while using this medicine?  Visit your doctor or health care professional for regular check ups. You will need lab work done before you start this medicine and regularly while you are taking it. Check your blood pressure as directed. Ask your health care professional what your blood pressure should be, and when you should contact him or her.  This medicine may make you feel dizzy or lightheaded. This is more likely to happen after the first dose, after an increase in dose, or during hot weather or exercise. Drinking alcohol and taking some medicines can make this worse. Do not drive, use machinery, or do anything that needs mental alertness until you know how this medicine affects you. Do not sit or stand up quickly. If you begin to feel dizzy, sit down until you feel better. These effects can decrease once your body adjusts to the medicine.  Contact your doctor or health care professional right away if you have an erection that lasts longer than 4 hours or if it becomes painful. This may be a sign of a serious problem and must be treated right away to prevent permanent damage.  If you are thinking of having cataract surgery, tell your eye surgeon that you have taken this medicine.  NOTE:This sheet is a summary. It may not cover all possible information. If you have questions about this medicine, talk to your doctor, pharmacist, or health care provider. Copyright© 2017 [employer]

## 2018-12-19 NOTE — DISCHARGE SUMMARY
[facility]  Urology  Discharge Summary      Patient Name: Tracy Armendariz  MRN: [id number]  Admission Date: 12/19/2018  Hospital Length of Stay: 0 days  Discharge Date: 12/19/2018  Attending Physician: Anaya Zamora MD   Discharging Provider: Anaya Zamora MD  Primary Care Physician: Bartolo Jules MD    HPI: The patient is a 68 y.o. female with past medical history (listed below) kidney stones,  ureteral stones, sepsis s/p ureteral stent placement at an outside hospital.     The patient elected to proceed with the procedure(s) below. Please see H&P and/or clinic progress note(s) for full details.     Procedure(s) (LRB):  REMOVAL, CALCULUS, URETER, URETEROSCOPIC, holmium laser lithotripsy, stone basket extraction, retrograde pyelogram, ureteral stent exchange (Left)     Past Medical History:   Diagnosis Date    Anticoagulant long-term use     Arthritis     CVA (cerebral infarction) 2013    Depression     Diverticulosis of colon     Extrinsic asthma, unspecified     Hyperlipidemia     Hypertension     Left atrial enlargement 12/16/2014    Low back pain     MARTIN (obstructive sleep apnea)     Osteopenia     PUD (peptic ulcer disease)     Stroke  December 2013       Hospital Course (synopsis of major diagnoses, care, treatment, and services provided during the course of the hospital stay): the patient tolerated the procedure well, no intraoperative complications were incurred. She was transferred to the PACU and discharged home in stable condition from same day surgery.       Consults: None    Significant Diagnostic Studies:      Pending Diagnostic Studies:     Procedure Component Value Units Date/Time    SURG [place] Surgery Cystogram [[id number]] Resulted:  12/19/18 0829    Order Status:  Sent Lab Status:  In process Updated:  12/19/18 0836          Final Active Diagnoses:      Problems Resolved During this Admission:    Diagnosis Date Noted Date Resolved POA    Nephrolithiasis [N20.0]  12/10/2018 12/19/2018 Yes       Discharged Condition: good    Disposition: Home or Self Care    Follow Up:  Follow-up Information     Anaya Zamora MD.    Specialty:  Urology  Why:  my office will call patient tomorrow to schedule cystoscopy, stent removal in clinic with me in 1 week  Contact information:  [address]  [address]  [place] [address]  [phone]                   Patient Instructions:      Diet Adult Regular     No dressing needed     Activity as tolerated   Order Comments: No strenuous exercise for 1 week. Ok to ambulate, walk around, walk up/down steps without any limitation.       Medications:  Reconciled Home Medications:      Medication List      START taking these medications    oxyCODONE-acetaminophen 5-325 mg per tablet  Commonly known as:  PERCOCET  Take 1 tablet by mouth every 8 (eight) hours as needed for Pain.     tamsulosin 0.4 mg Cap  Commonly known as:  FLOMAX  Take 1 capsule (0.4 mg total) by mouth once daily. for 14 days        CONTINUE taking these medications    aspirin 81 MG Chew  Take 81 mg by mouth once daily.     atorvastatin 10 MG tablet  Commonly known as:  LIPITOR  TAKE 1 TABLET BY MOUTH DAILY     calcium carbonate 600 mg calcium (1,500 mg) Tab  Commonly known as:  OS-SPENCER  Take 600 mg by mouth 2 (two) times daily with meals.     cholecalciferol (vitamin D3) 1,000 unit Chew  Take by mouth.     clopidogrel 75 mg tablet  Commonly known as:  PLAVIX  TAKE 1 TABLET BY MOUTH EVERY DAY     escitalopram oxalate 10 MG tablet  Commonly known as:  LEXAPRO  TAKE 1 TABLET BY MOUTH EVERY DAY     losartan 100 MG tablet  Commonly known as:  COZAAR  TAKE 1 TABLET(100 MG) BY MOUTH EVERY DAY     ondansetron 8 MG Tbdl  Commonly known as:  ZOFRAN-ODT  Take 1 tablet (8 mg total) by mouth every 6 (six) hours as needed (nausea).     pantoprazole 40 MG tablet  Commonly known as:  PROTONIX  Take 1 tablet (40 mg total) by mouth once daily.     PROBIOTIC ORAL  Take by mouth.      sulfamethoxazole-trimethoprim 800-160mg 800-160 mg Tab  Commonly known as:  BACTRIM DS  Take 1 tablet by mouth 2 (two) times daily. for 14 days     VENTOLIN HFA 90 mcg/actuation inhaler  Generic drug:  albuterol  INHALE 2 PUFFS EVERY 6 HOURS AS NEEDED FOR WHEEZING     WOMEN'S DAILY MULTIVITAMIN ORAL  Take by mouth.            Time spent on the discharge of patient: 20 minutes    Anaya Zamora MD  Urology  [facility]

## 2018-12-20 ENCOUNTER — TELEPHONE (OUTPATIENT)
Dept: UROLOGY | Facility: CLINIC | Age: 68
End: 2018-12-20

## 2018-12-20 VITALS
TEMPERATURE: 98 F | OXYGEN SATURATION: 94 % | HEART RATE: 69 BPM | HEIGHT: 66 IN | RESPIRATION RATE: 18 BRPM | DIASTOLIC BLOOD PRESSURE: 67 MMHG | SYSTOLIC BLOOD PRESSURE: 135 MMHG | WEIGHT: 220 LBS | BODY MASS INDEX: 35.36 KG/M2

## 2018-12-20 NOTE — TELEPHONE ENCOUNTER
----- Message from Anaya Zamora MD sent at 12/19/2018 12:39 PM CST -----  Please call patient to schedule cystoscopy, stent removal in 1 week (12/26 or 12/27) in clinic

## 2018-12-26 ENCOUNTER — PROCEDURE VISIT (OUTPATIENT)
Dept: UROLOGY | Facility: CLINIC | Age: 68
End: 2018-12-26
Payer: MEDICARE

## 2018-12-26 VITALS
HEIGHT: 66 IN | DIASTOLIC BLOOD PRESSURE: 77 MMHG | SYSTOLIC BLOOD PRESSURE: 136 MMHG | BODY MASS INDEX: 35.51 KG/M2 | TEMPERATURE: 98 F | HEART RATE: 77 BPM

## 2018-12-26 DIAGNOSIS — N20.1 URETERAL CALCULUS: ICD-10-CM

## 2018-12-26 DIAGNOSIS — N20.0 NEPHROLITHIASIS: ICD-10-CM

## 2018-12-26 LAB
ANNOTATION COMMENT IMP: NORMAL
ANNOTATION COMMENT IMP: NORMAL
COMPN STONE: NORMAL
SPECIMEN SOURCE: NORMAL
SPECIMEN SOURCE: NORMAL
STONE ANALYSIS IR-IMP: NORMAL
STONE ANALYSIS IR-IMP: NORMAL

## 2018-12-26 PROCEDURE — 52310 CYSTOSCOPY AND TREATMENT: CPT | Mod: S$GLB,,, | Performed by: STUDENT IN AN ORGANIZED HEALTH CARE EDUCATION/TRAINING PROGRAM

## 2018-12-26 NOTE — PROCEDURES
Procedure:   1. Flexible cysto-uretheroscopy and ureteral stent removal    Pre Procedure Diagnosis:  1. Indwelling ureteral stent s/p left ureteroscopy, holmium laser lithotripsy SBE and stent placement on 12/19/18    Post Procedure Diagnosis:  1. Same    Surgeon: Anaya Zamora MD    Anesthesia: 2% uro-jet lidocaine jelly for local analgesia    Procedure note:  A flexible cysto-urethroscopy was performed after consent was obtained.  The risks and benefits were explained. 2% lidocaine urojet was used for local analgesia. The genitalia was prepped and draped in the sterile fashion. The flexible scope was advanced into the urethra and into the bladder. The distal coil of the ureteral stent was identified and grasped with the cystoscopic alligator graspers and removed intact. The flexible cystoscope was removed. The patient tolerated the procedure well without complication.     Findings in summary:  1. Left ureteral stent removed intact.  2. Counseled patient that stone analysis is still pending and I will message her with the stone analysis results over the portal when the results return as well as recommendations for stone prevention once the stone analysis returns. If Calcium based stone, will also message Dr. Jules about patient's Calcium supplementation which she is on per the patient for borderline osteoporosis.   3. Patient has been on a perioperative protective dose of bactrim which she started on 12/12/18 and she will finish the 2 week course.   4. RTC in 1 month to reassess urinary leakage which pre-dates her ureteral stone issues.

## 2019-01-10 ENCOUNTER — PATIENT MESSAGE (OUTPATIENT)
Dept: UROLOGY | Facility: CLINIC | Age: 69
End: 2019-01-10

## 2019-01-22 ENCOUNTER — OFFICE VISIT (OUTPATIENT)
Dept: OBSTETRICS AND GYNECOLOGY | Facility: CLINIC | Age: 69
End: 2019-01-22
Attending: OBSTETRICS & GYNECOLOGY
Payer: MEDICARE

## 2019-01-22 VITALS
SYSTOLIC BLOOD PRESSURE: 124 MMHG | HEIGHT: 67 IN | DIASTOLIC BLOOD PRESSURE: 84 MMHG | BODY MASS INDEX: 36.88 KG/M2 | WEIGHT: 235 LBS

## 2019-01-22 DIAGNOSIS — Z12.31 SCREENING MAMMOGRAM, ENCOUNTER FOR: ICD-10-CM

## 2019-01-22 DIAGNOSIS — Z01.419 ENCOUNTER FOR GYNECOLOGICAL EXAMINATION WITHOUT ABNORMAL FINDING: Primary | ICD-10-CM

## 2019-01-22 DIAGNOSIS — L30.4 INTERTRIGO: ICD-10-CM

## 2019-01-22 PROCEDURE — 99999 PR PBB SHADOW E&M-EST. PATIENT-LVL III: ICD-10-PCS | Mod: PBBFAC,,, | Performed by: OBSTETRICS & GYNECOLOGY

## 2019-01-22 PROCEDURE — G0101 PR CA SCREEN;PELVIC/BREAST EXAM: ICD-10-PCS | Mod: S$GLB,,, | Performed by: OBSTETRICS & GYNECOLOGY

## 2019-01-22 PROCEDURE — G0101 CA SCREEN;PELVIC/BREAST EXAM: HCPCS | Mod: S$GLB,,, | Performed by: OBSTETRICS & GYNECOLOGY

## 2019-01-22 PROCEDURE — 99999 PR PBB SHADOW E&M-EST. PATIENT-LVL III: CPT | Mod: PBBFAC,,, | Performed by: OBSTETRICS & GYNECOLOGY

## 2019-01-22 RX ORDER — CLOTRIMAZOLE AND BETAMETHASONE DIPROPIONATE 10; .64 MG/G; MG/G
CREAM TOPICAL 2 TIMES DAILY
Qty: 45 G | Refills: 2 | Status: SHIPPED | OUTPATIENT
Start: 2019-01-22 | End: 2021-04-19

## 2019-01-22 RX ORDER — FLUCONAZOLE 150 MG/1
150 TABLET ORAL EVERY OTHER DAY
Qty: 3 TABLET | Refills: 0 | Status: SHIPPED | OUTPATIENT
Start: 2019-01-22 | End: 2019-02-01

## 2019-01-22 NOTE — PROGRESS NOTES
Subjective:       Patient ID: Tracy Armendariz is a 68 y.o. female.    Chief Complaint:  Well Woman      History of Present Illness  HPI  Tracy Armendariz is a 68 y.o. female  here for her annual GYN exam.   She had sepsis from kidney stones a few months ago and has had a recurrent rash in her thighs/creases and vulva since then.  denies vaginal itching or irritation.  Denies vaginal discharge.  She is sexually active. She has had 1 partner for 47 years .   History of abnormal pap: No  Last Pap: was normal  Last MMG: normal--routine follow-up in 12 months  Last Colonoscopy:  colonoscopy a few years ago without abnormalities.  denies domestic violence. She does feel safe at home.     Past Medical History:   Diagnosis Date    Anticoagulant long-term use     Arthritis     CVA (cerebral infarction)     Depression     Disorder of kidney and ureter     renal stones    Diverticulosis of colon     Extrinsic asthma, unspecified     Hyperlipidemia     Hypertension     Left atrial enlargement 2014    Low back pain     MARTIN (obstructive sleep apnea)     Osteopenia     PUD (peptic ulcer disease)     Stroke  2013     Past Surgical History:   Procedure Laterality Date    APPENDECTOMY      @ time of hysterectomy    ARTHROPLASTY-KNEE Right 2017    [facility]    BACK SURGERY      CATARACT EXTRACTION       SECTION      CHOLECYSTECTOMY      laparoscopic    COLONOSCOPY/Golytely N/A 2018    [facility] ENDO    DILATION AND CURETTAGE OF UTERUS  1972    HYSTERECTOMY  1978    TAHUSO with appendectomy    INNER EAR SURGERY      replaced ear drum    IOVERA Right 2017    [facility] OR    JOINT REPLACEMENT Right     knee    KNEE ARTHROSCOPY W/ DEBRIDEMENT      LUMBAR DISCECTOMY      L4-L5    OOPHORECTOMY      unilateral    REMOVAL, CALCULUS, URETER,  "URETEROSCOPIC, holmium laser lithotripsy, stone basket extraction, retrograde pyelogram, ureteral stent exchange Left 2018    [facility]    TONSILLECTOMY      TYMPANOPLASTY       Social History     Socioeconomic History    Marital status:      Spouse name: Not on file    Number of children: Not on file    Years of education: Not on file    Highest education level: Not on file   Social Needs    Financial resource strain: Not on file    Food insecurity - worry: Not on file    Food insecurity - inability: Not on file    Transportation needs - medical: Not on file    Transportation needs - non-medical: Not on file   Occupational History    Not on file   Tobacco Use    Smoking status: Former Smoker     Last attempt to quit: 1995     Years since quittin.0    Smokeless tobacco: Never Used   Substance and Sexual Activity    Alcohol use: Yes     Alcohol/week: 0.6 oz     Types: 1 Glasses of wine per week     Comment: social    Drug use: No    Sexual activity: Yes     Partners: Male     Birth control/protection: Surgical     Comment:  since    Other Topics Concern    Not on file   Social History Narrative    Not on file     Family History   Problem Relation Age of Onset    Cervical cancer Mother     Cancer Mother 65        lung cancer - non smoker    Stroke Paternal Grandfather     Hypertension Maternal Grandfather     Heart disease Maternal Grandfather     Hypertension Father     Coronary artery disease Father 62    Heart disease Father     Diabetes Sister     Heart disease Sister     Kidney disease Sister     Breast cancer Daughter 36    Heart failure Sister         60s    Colon cancer Neg Hx     Ovarian cancer Neg Hx      OB History      Para Term  AB Living    4 3 3 0 1 3    SAB TAB Ectopic Multiple Live Births    1 0 0 0 3          /84   Ht 5' 7" (1.702 m)   Wt 106.6 kg (235 lb 0.2 oz)   LMP 1978 " (Approximate)   BMI 36.81 kg/m²         GYN & OB History    Date of Last Pap: No result found    OB History    Para Term  AB Living   4 3 3 0 1 3   SAB TAB Ectopic Multiple Live Births   1 0 0 0 3      # Outcome Date GA Lbr Scot/2nd Weight Sex Delivery Anes PTL Lv   4 SAB            3 Term      Vag-Spont   KEV   2 Term      Vag-Spont   KEV   1 Term      CS-LTranv   KEV          Review of Systems  Review of Systems   Constitutional: Negative for activity change, appetite change, fatigue and unexpected weight change.   HENT: Negative.    Eyes: Negative for visual disturbance.   Respiratory: Negative for shortness of breath and wheezing.    Cardiovascular: Negative for chest pain, palpitations and leg swelling.   Gastrointestinal: Negative for abdominal pain, bloating and blood in stool.   Endocrine: Negative for diabetes and hair loss.   Genitourinary: Negative for decreased libido, dyspareunia and postmenopausal bleeding.   Musculoskeletal: Negative for back pain and joint swelling.   Neurological: Negative for headaches.   Hematological: Does not bruise/bleed easily.   Psychiatric/Behavioral: Negative for depression and sleep disturbance. The patient is not nervous/anxious.    Breast: Negative for mastodynia and nipple discharge          Objective:      Physical Exam:   Constitutional: She is oriented to person, place, and time. She appears well-developed and well-nourished.    HENT:   Head: Normocephalic and atraumatic.    Eyes: EOM are normal. Pupils are equal, round, and reactive to light.    Neck: Normal range of motion. Neck supple.    Cardiovascular: Normal rate and regular rhythm.     Pulmonary/Chest: Effort normal and breath sounds normal.   BREASTS:  no mass, no tenderness, no deformity and no retraction. Right breast exhibits no inverted nipple, no mass, no nipple discharge, no skin change, no tenderness, no bleeding and no swelling. Left breast exhibits no inverted nipple, no mass, no nipple  discharge, no skin change, no tenderness, no bleeding and no swelling. Breasts are symmetrical.              Abdominal: Soft. Bowel sounds are normal.     Genitourinary: Pelvic exam was performed with patient supine.   Genitourinary Comments: PELVIC: Normal external female genitalia without lesions. Severe intertrigo beneath pannus and in thigh creases. Normal hair distribution. Adequate perineal body, normal urethral meatus. Vagina atrophic without lesions or discharge. No significant cystocele or rectocele. Bimanual exam shows uterus and cervix to be surgically absent. Adnexa without masses or tenderness.  RECTAL: Deferred             Musculoskeletal: Normal range of motion and moves all extremeties.       Neurological: She is alert and oriented to person, place, and time.    Skin: Skin is warm and dry.    Psychiatric: She has a normal mood and affect.              Assessment:        1. Encounter for gynecological examination without abnormal finding    2. Screening mammogram, encounter for    3. Intertrigo                Plan:      1. Encounter for gynecological examination without abnormal finding  COUNSELING:  The patient was counseled today on osteoporosis prevention, calcium supplementation, and regular weight bearing exercise. The patient was also counseled today on ACS PAP guidelines, with recommendations for yearly pelvic exams unless their uterus, cervix, and ovaries were removed for benign reasons; in that case, examinations every 3-5 years are recommended. The patient was also counseled regarding monthly breast self-examination, routine STD screening for at-risk populations, prophylactic immunizations for transmitted infections such as HPV, Pertussis, or Influenza as appropriate, and yearly mammograms when indicated by ACS guidelines. She was advised to see her primary care physician for all other health maintenance.   FOLLOW-UP with me for next routine visit.         2. Screening mammogram, encounter  for    - Mammo Digital Screening Bilat w/ David; Future    3. Intertrigo    - fluconazole (DIFLUCAN) 150 MG Tab; Take 1 tablet (150 mg total) by mouth every other day. And repeat in 3 days if not improving.  Dispense: 3 tablet; Refill: 0  - clotrimazole-betamethasone 1-0.05% (LOTRISONE) cream; Apply topically 2 (two) times daily.  Dispense: 45 g; Refill: 2       Follow-up in about 2 years (around 1/22/2021).

## 2019-01-28 ENCOUNTER — HOSPITAL ENCOUNTER (OUTPATIENT)
Dept: RADIOLOGY | Facility: HOSPITAL | Age: 69
Discharge: HOME | End: 2019-01-28
Attending: OBSTETRICS & GYNECOLOGY
Payer: MEDICARE

## 2019-01-28 ENCOUNTER — OFFICE VISIT (OUTPATIENT)
Dept: UROLOGY | Facility: CLINIC | Age: 69
End: 2019-01-28
Payer: MEDICARE

## 2019-01-28 VITALS
SYSTOLIC BLOOD PRESSURE: 143 MMHG | BODY MASS INDEX: 36.88 KG/M2 | TEMPERATURE: 98 F | HEIGHT: 67 IN | DIASTOLIC BLOOD PRESSURE: 87 MMHG | HEART RATE: 77 BPM | WEIGHT: 235 LBS

## 2019-01-28 DIAGNOSIS — R39.15 URGENCY OF URINATION: ICD-10-CM

## 2019-01-28 DIAGNOSIS — N20.1 URETERAL CALCULUS: Primary | ICD-10-CM

## 2019-01-28 DIAGNOSIS — Z12.31 SCREENING MAMMOGRAM, ENCOUNTER FOR: ICD-10-CM

## 2019-01-28 DIAGNOSIS — N39.41 URGE INCONTINENCE: ICD-10-CM

## 2019-01-28 LAB
BACTERIA #/AREA URNS AUTO: ABNORMAL /HPF
BILIRUB SERPL-MCNC: ABNORMAL MG/DL
BILIRUB UR QL STRIP: NEGATIVE
BLOOD URINE, POC: ABNORMAL
CLARITY UR REFRACT.AUTO: ABNORMAL
COLOR UR AUTO: YELLOW
COLOR, POC UA: YELLOW
GLUCOSE UR QL STRIP: ABNORMAL
GLUCOSE UR QL STRIP: NEGATIVE
HGB UR QL STRIP: NEGATIVE
HYALINE CASTS UR QL AUTO: 0 /LPF
KETONES UR QL STRIP: ABNORMAL
KETONES UR QL STRIP: NEGATIVE
LEUKOCYTE ESTERASE UR QL STRIP: ABNORMAL
LEUKOCYTE ESTERASE URINE, POC: ABNORMAL
MICROSCOPIC COMMENT: ABNORMAL
NITRITE UR QL STRIP: NEGATIVE
NITRITE, POC UA: ABNORMAL
PH UR STRIP: 5 [PH] (ref 5–8)
PH, POC UA: 5
POC RESIDUAL URINE VOLUME: 0 ML (ref 0–100)
PROT UR QL STRIP: ABNORMAL
PROTEIN, POC: ABNORMAL
RBC #/AREA URNS AUTO: 1 /HPF (ref 0–4)
SP GR UR STRIP: 1.02 (ref 1–1.03)
SPECIFIC GRAVITY, POC UA: 1.02
SQUAMOUS #/AREA URNS AUTO: 1 /HPF
URN SPEC COLLECT METH UR: ABNORMAL
UROBILINOGEN, POC UA: ABNORMAL
WBC #/AREA URNS AUTO: 6 /HPF (ref 0–5)

## 2019-01-28 PROCEDURE — 1101F PT FALLS ASSESS-DOCD LE1/YR: CPT | Mod: CPTII,S$GLB,, | Performed by: STUDENT IN AN ORGANIZED HEALTH CARE EDUCATION/TRAINING PROGRAM

## 2019-01-28 PROCEDURE — 99999 PR PBB SHADOW E&M-EST. PATIENT-LVL IV: CPT | Mod: PBBFAC,,, | Performed by: STUDENT IN AN ORGANIZED HEALTH CARE EDUCATION/TRAINING PROGRAM

## 2019-01-28 PROCEDURE — 3077F PR MOST RECENT SYSTOLIC BLOOD PRESSURE >= 140 MM HG: ICD-10-PCS | Mod: CPTII,S$GLB,, | Performed by: STUDENT IN AN ORGANIZED HEALTH CARE EDUCATION/TRAINING PROGRAM

## 2019-01-28 PROCEDURE — 87086 URINE CULTURE/COLONY COUNT: CPT

## 2019-01-28 PROCEDURE — 81002 URINALYSIS NONAUTO W/O SCOPE: CPT | Mod: S$GLB,,, | Performed by: STUDENT IN AN ORGANIZED HEALTH CARE EDUCATION/TRAINING PROGRAM

## 2019-01-28 PROCEDURE — 77067 SCR MAMMO BI INCL CAD: CPT | Mod: 26,,, | Performed by: RADIOLOGY

## 2019-01-28 PROCEDURE — 1101F PR PT FALLS ASSESS DOC 0-1 FALLS W/OUT INJ PAST YR: ICD-10-PCS | Mod: CPTII,S$GLB,, | Performed by: STUDENT IN AN ORGANIZED HEALTH CARE EDUCATION/TRAINING PROGRAM

## 2019-01-28 PROCEDURE — 3079F DIAST BP 80-89 MM HG: CPT | Mod: CPTII,S$GLB,, | Performed by: STUDENT IN AN ORGANIZED HEALTH CARE EDUCATION/TRAINING PROGRAM

## 2019-01-28 PROCEDURE — 81002 POCT URINE DIPSTICK WITHOUT MICROSCOPE: ICD-10-PCS | Mod: S$GLB,,, | Performed by: STUDENT IN AN ORGANIZED HEALTH CARE EDUCATION/TRAINING PROGRAM

## 2019-01-28 PROCEDURE — 99214 OFFICE O/P EST MOD 30 MIN: CPT | Mod: 25,S$GLB,, | Performed by: STUDENT IN AN ORGANIZED HEALTH CARE EDUCATION/TRAINING PROGRAM

## 2019-01-28 PROCEDURE — 77067 SCR MAMMO BI INCL CAD: CPT | Mod: TC

## 2019-01-28 PROCEDURE — 99999 PR PBB SHADOW E&M-EST. PATIENT-LVL IV: ICD-10-PCS | Mod: PBBFAC,,, | Performed by: STUDENT IN AN ORGANIZED HEALTH CARE EDUCATION/TRAINING PROGRAM

## 2019-01-28 PROCEDURE — 81001 URINALYSIS AUTO W/SCOPE: CPT

## 2019-01-28 PROCEDURE — 77067 MAMMO DIGITAL SCREENING BILAT WITH TOMOSYNTHESIS_CAD: ICD-10-PCS | Mod: 26,,, | Performed by: RADIOLOGY

## 2019-01-28 PROCEDURE — 3077F SYST BP >= 140 MM HG: CPT | Mod: CPTII,S$GLB,, | Performed by: STUDENT IN AN ORGANIZED HEALTH CARE EDUCATION/TRAINING PROGRAM

## 2019-01-28 PROCEDURE — 77063 MAMMO DIGITAL SCREENING BILAT WITH TOMOSYNTHESIS_CAD: ICD-10-PCS | Mod: 26,,, | Performed by: RADIOLOGY

## 2019-01-28 PROCEDURE — 3079F PR MOST RECENT DIASTOLIC BLOOD PRESSURE 80-89 MM HG: ICD-10-PCS | Mod: CPTII,S$GLB,, | Performed by: STUDENT IN AN ORGANIZED HEALTH CARE EDUCATION/TRAINING PROGRAM

## 2019-01-28 PROCEDURE — 77063 BREAST TOMOSYNTHESIS BI: CPT | Mod: 26,,, | Performed by: RADIOLOGY

## 2019-01-28 PROCEDURE — 51798 US URINE CAPACITY MEASURE: CPT | Mod: S$GLB,,, | Performed by: STUDENT IN AN ORGANIZED HEALTH CARE EDUCATION/TRAINING PROGRAM

## 2019-01-28 PROCEDURE — 51798 PR MEAS,POST-VOID RES,US,NON-IMAGING: ICD-10-PCS | Mod: S$GLB,,, | Performed by: STUDENT IN AN ORGANIZED HEALTH CARE EDUCATION/TRAINING PROGRAM

## 2019-01-28 PROCEDURE — 99214 PR OFFICE/OUTPT VISIT, EST, LEVL IV, 30-39 MIN: ICD-10-PCS | Mod: 25,S$GLB,, | Performed by: STUDENT IN AN ORGANIZED HEALTH CARE EDUCATION/TRAINING PROGRAM

## 2019-01-28 RX ORDER — AMOXICILLIN 500 MG/1
CAPSULE ORAL
COMMUNITY
Start: 2019-01-27 | End: 2019-04-29

## 2019-01-28 NOTE — PROGRESS NOTES
"Subjective:       Patient ID: Tracy Armendariz is a 68 y.o. female.    Chief Complaint: Follow-up (Bladder Scan)  This is a 68 y.o.  female patient that is an established patient of mine.   The patient is referred to me by Dr. Bartolo Jules her PCP for kidney stone treatment.  She was visiting [facility] and was diagnosed with an obstructing stone. She presented to the ER at [facility] on 11/27/18 with fevers and altered mental status. She notes that they found a kidney stone of unknown laterality she does not recall left or right. Dr. Pérez, a urologist at [facility], was consulted after a Ct was performed and found an "impacted" stone that was "very close to the bladder" per the patient and her . She was told that she had a urinary tract infection and that the infection went into the blood stream (positive bacteremia). She underwent a cystoscopy and ureteral stent placement on 11/28/18.    She underwent a L urs/hll/sbe/stent exchange on 12/19/2018. Her stent has been removed in clinic on 12/26/2018.    1/28/2019  She is here to follow up her urinary voiding patterns. She noted urinary urgency and urgency incontinence which pre-dates her stone issues. She has been double voiding since we last saw each other. She states that has been helping greatly. She notes that when she wakes up in the morning she has an issue making it to the bathroom on time. She feels a strong urge and she will void on the way to the bathroom only in the mornings.  She states it is a large volume leakage. No other times when she cannot make it to the bathroom. She is dry throughout the day. Nocturia - 2x/night.   She eats dinner around 5:30pm - usually gatorade with dinner. She notes she will finish the gatorade bottle before going to bed, around 10pm. She notes that she goes to bed around midnight. She wakes up around 10:30am/11am.   She has had a yeast infection treated by her ob/gyn recently.   Has two daughter, " and 1 son who lives here. Daughters are in [place] and [place]. 3 grandsons in [place]. Her son who lives here has a boy and a girl.       Lab Results   Component Value Date    CREATININE 0.8 12/10/2018        ---  Past Medical History:   Diagnosis Date    Allergy     Anticoagulant long-term use     Arthritis     CVA (cerebral infarction)     Depression     Disorder of kidney and ureter     renal stones    Diverticulosis of colon     Extrinsic asthma, unspecified     Hyperlipidemia     Hypertension     Left atrial enlargement 2014    Low back pain     MARTIN (obstructive sleep apnea)     Osteopenia     PUD (peptic ulcer disease)     Stroke  2013       Past Surgical History:   Procedure Laterality Date    APPENDECTOMY      @ time of hysterectomy    ARTHROPLASTY-KNEE Right 2017    [facility]    BACK SURGERY      CATARACT EXTRACTION       SECTION      CHOLECYSTECTOMY      laparoscopic    COLONOSCOPY/Golytely N/A 2018    [facility] ENDO    DILATION AND CURETTAGE OF UTERUS  1972    HYSTERECTOMY      TAHUSO with appendectomy    INNER EAR SURGERY      replaced ear drum    IOVERA Right 2017    [facility]    JOINT REPLACEMENT Right     knee    KNEE ARTHROSCOPY W/ DEBRIDEMENT      LUMBAR DISCECTOMY      L4-L5    OOPHORECTOMY      unilateral    REMOVAL, CALCULUS, URETER, URETEROSCOPIC, holmium laser lithotripsy, stone basket extraction, retrograde pyelogram, ureteral stent exchange Left 2018    [facility]    TONSILLECTOMY      TYMPANOPLASTY         Family History   Problem Relation Age of Onset    Cervical cancer Mother     Cancer Mother 65        lung cancer - non smoker    Stroke Paternal Grandfather     Hypertension Maternal Grandfather     Heart disease Maternal Grandfather     Hypertension  Father     Coronary artery disease Father 62    Heart disease Father     Diabetes Sister     Heart disease Sister     Kidney disease Sister     Breast cancer Daughter 36    Heart failure Sister         60s    Colon cancer Neg Hx     Ovarian cancer Neg Hx        Social History     Tobacco Use    Smoking status: Former Smoker     Last attempt to quit: 1995     Years since quittin.0    Smokeless tobacco: Never Used   Substance Use Topics    Alcohol use: Yes     Alcohol/week: 0.6 oz     Types: 1 Glasses of wine per week     Comment: social    Drug use: No       Current Outpatient Medications on File Prior to Visit   Medication Sig Dispense Refill    amoxicillin (AMOXIL) 500 MG capsule       aspirin 81 MG Chew Take 81 mg by mouth once daily.      atorvastatin (LIPITOR) 10 MG tablet TAKE 1 TABLET BY MOUTH DAILY 90 tablet 3    calcium carbonate (OS-SPENCER) 600 mg (1,500 mg) Tab Take 600 mg by mouth 2 (two) times daily with meals.      cholecalciferol, vitamin D3, 1,000 unit Chew Take by mouth.      clopidogrel (PLAVIX) 75 mg tablet TAKE 1 TABLET BY MOUTH EVERY DAY 90 tablet 3    clotrimazole-betamethasone 1-0.05% (LOTRISONE) cream Apply topically 2 (two) times daily. 45 g 2    escitalopram oxalate (LEXAPRO) 10 MG tablet TAKE 1 TABLET BY MOUTH EVERY DAY 90 tablet 3    LACTOBACILLUS ACIDOPHILUS (PROBIOTIC ORAL) Take by mouth.      losartan (COZAAR) 100 MG tablet TAKE 1 TABLET(100 MG) BY MOUTH EVERY DAY 90 tablet 3    MULTIVIT WITH CALCIUM,IRON,MIN (WOMEN'S DAILY MULTIVITAMIN ORAL) Take by mouth.      pantoprazole (PROTONIX) 40 MG tablet Take 1 tablet (40 mg total) by mouth once daily. 30 tablet 11    VENTOLIN HFA 90 mcg/actuation inhaler INHALE 2 PUFFS EVERY 6 HOURS AS NEEDED FOR WHEEZING 18 g 0    fluconazole (DIFLUCAN) 150 MG Tab Take 1 tablet (150 mg total) by mouth every other day. And repeat in 3 days if not improving. 3 tablet 0    tamsulosin (FLOMAX) 0.4 mg Cap Take 1 capsule (0.4 mg  total) by mouth once daily for 14 days. 14 capsule 0     No current facility-administered medications on file prior to visit.        Review of patient's allergies indicates:   Allergen Reactions    Iodinated contrast- oral and iv dye Hives and Rash    Iodine Hives    Isothiazolinones Rash    Penicillins Rash       Review of Systems   Constitutional: Negative for activity change.   HENT: Negative for congestion.    Eyes: Negative for visual disturbance.   Respiratory: Negative for shortness of breath.    Cardiovascular: Negative for chest pain.   Gastrointestinal: Negative for abdominal distention.   Musculoskeletal: Negative for gait problem.   Skin: Negative for color change.   Neurological: Negative for dizziness.   Psychiatric/Behavioral: Negative for agitation.       Objective:      Physical Exam   Constitutional: She is oriented to person, place, and time. She appears well-developed.   HENT:   Head: Normocephalic and atraumatic.   Eyes: EOM are normal.   Neck: Normal range of motion.   Cardiovascular: Intact distal pulses.   Pulmonary/Chest: Effort normal.   Musculoskeletal: Normal range of motion.   Neurological: She is alert and oriented to person, place, and time.   Skin: Skin is warm and dry.   Psychiatric: She has a normal mood and affect.       Assessment:       1. Ureteral calculus    2. Urgency of urination    3. Urge incontinence        Plan:       1. Will send urine for Ua and culture. POCT urinalysis benign. Bladder scan 0cc.   2. Urinary urgency, urinary urgency incontinence - Will set an alarm in the morning to try to void earlier before leaking on herself. A regular wake up time may help her greatly - set alarm around 9 or 9am.   3. Message Dr. Jules about right lower hip pain.   4. Can try OTC Azo prn dysuria.     Ureteral calculus  -     POCT URINE DIPSTICK WITHOUT MICROSCOPE  -     POCT Bladder Scan  -     Urinalysis  -     Urinalysis Microscopic  -     Urine culture    Urgency of  urination    Urge incontinence

## 2019-01-28 NOTE — Clinical Note
Waldo Mcfarland,I have treated Ms. Molina's left sided kidney stone and her ureteral stent has been removed. We have been working on improving her quality of life and focusing on her urinary urgency and bothersome urinary urgency incontinence which only happens in the morning times. I think setting an alarm and waking up regularly would keep her dry.I wanted to message you because she was asking to see if she could come see you for a regular visit. Also she has this right sided hip pain that was present before the stone procedure and is still bothering her. The location is not typical for any kidney stone pain. Also on the CT I obtained, she did not have any urologic abnormalities or stones on the right. I counseled her it may be musculoskeletal versus arthritic pain versus sciatica, etc. And that I would message you about it.Sincerely,Anaya

## 2019-01-29 LAB
BACTERIA UR CULT: NORMAL
BACTERIA UR CULT: NORMAL

## 2019-01-30 ENCOUNTER — PATIENT MESSAGE (OUTPATIENT)
Dept: UROLOGY | Facility: CLINIC | Age: 69
End: 2019-01-30

## 2019-01-30 ENCOUNTER — TELEPHONE (OUTPATIENT)
Dept: FAMILY MEDICINE | Facility: CLINIC | Age: 69
End: 2019-01-30

## 2019-01-30 NOTE — TELEPHONE ENCOUNTER
----- Message from Charissa Keane sent at 1/30/2019  9:42 AM CST -----  Contact: Self [phone]  Patient Returning Your Phone Call     Please inform patient of negative herpes test

## 2019-01-30 NOTE — TELEPHONE ENCOUNTER
Left message requesting a callback.  Need to schedule annual in March and if patient is still experiencing hip pain, we can get her in sooner than March for urgent visit to address this.

## 2019-01-31 ENCOUNTER — TELEPHONE (OUTPATIENT)
Dept: FAMILY MEDICINE | Facility: CLINIC | Age: 69
End: 2019-01-31

## 2019-01-31 NOTE — TELEPHONE ENCOUNTER
Patient scheduled for tomorrow, 2/1/2019 at 3:40pm to address hip pain.  Also scheduled for 3/18/2019 at 1:20pm for annual.

## 2019-01-31 NOTE — TELEPHONE ENCOUNTER
----- Message from Ana Rosa Crawford sent at 1/30/2019  4:20 PM CST -----  Contact: Self/ [phone]  Patient called in returning your call. Please call and advise.

## 2019-02-01 ENCOUNTER — OFFICE VISIT (OUTPATIENT)
Dept: FAMILY MEDICINE | Facility: CLINIC | Age: 69
End: 2019-02-01
Payer: MEDICARE

## 2019-02-01 VITALS
HEIGHT: 66 IN | TEMPERATURE: 98 F | OXYGEN SATURATION: 97 % | BODY MASS INDEX: 37.09 KG/M2 | WEIGHT: 230.81 LBS | SYSTOLIC BLOOD PRESSURE: 123 MMHG | DIASTOLIC BLOOD PRESSURE: 78 MMHG | HEART RATE: 87 BPM

## 2019-02-01 DIAGNOSIS — N20.1 URETERAL STONE: ICD-10-CM

## 2019-02-01 DIAGNOSIS — M54.16 LUMBAR RADICULOPATHY, CHRONIC: Primary | ICD-10-CM

## 2019-02-01 DIAGNOSIS — A41.9 SEPSIS, DUE TO UNSPECIFIED ORGANISM: ICD-10-CM

## 2019-02-01 DIAGNOSIS — G89.29 CHRONIC RIGHT SACROILIAC PAIN: ICD-10-CM

## 2019-02-01 DIAGNOSIS — M54.2 NECK PAIN: ICD-10-CM

## 2019-02-01 DIAGNOSIS — M53.3 CHRONIC RIGHT SACROILIAC PAIN: ICD-10-CM

## 2019-02-01 PROCEDURE — 1101F PR PT FALLS ASSESS DOC 0-1 FALLS W/OUT INJ PAST YR: ICD-10-PCS | Mod: CPTII,S$GLB,, | Performed by: FAMILY MEDICINE

## 2019-02-01 PROCEDURE — 99215 PR OFFICE/OUTPT VISIT, EST, LEVL V, 40-54 MIN: ICD-10-PCS | Mod: S$GLB,,, | Performed by: FAMILY MEDICINE

## 2019-02-01 PROCEDURE — 99999 PR PBB SHADOW E&M-EST. PATIENT-LVL V: ICD-10-PCS | Mod: PBBFAC,,, | Performed by: FAMILY MEDICINE

## 2019-02-01 PROCEDURE — 3074F PR MOST RECENT SYSTOLIC BLOOD PRESSURE < 130 MM HG: ICD-10-PCS | Mod: CPTII,S$GLB,, | Performed by: FAMILY MEDICINE

## 2019-02-01 PROCEDURE — 3078F PR MOST RECENT DIASTOLIC BLOOD PRESSURE < 80 MM HG: ICD-10-PCS | Mod: CPTII,S$GLB,, | Performed by: FAMILY MEDICINE

## 2019-02-01 PROCEDURE — 99999 PR PBB SHADOW E&M-EST. PATIENT-LVL V: CPT | Mod: PBBFAC,,, | Performed by: FAMILY MEDICINE

## 2019-02-01 PROCEDURE — 99215 OFFICE O/P EST HI 40 MIN: CPT | Mod: S$GLB,,, | Performed by: FAMILY MEDICINE

## 2019-02-01 PROCEDURE — 1101F PT FALLS ASSESS-DOCD LE1/YR: CPT | Mod: CPTII,S$GLB,, | Performed by: FAMILY MEDICINE

## 2019-02-01 PROCEDURE — 3074F SYST BP LT 130 MM HG: CPT | Mod: CPTII,S$GLB,, | Performed by: FAMILY MEDICINE

## 2019-02-01 PROCEDURE — 3078F DIAST BP <80 MM HG: CPT | Mod: CPTII,S$GLB,, | Performed by: FAMILY MEDICINE

## 2019-02-01 NOTE — PROGRESS NOTES
(Portions of this note were dictated using voice recognition software and may contain dictation related errors in spelling/grammar/syntax not found on text review)    CC:   Chief Complaint   Patient presents with    Hip Pain     right side       HPI: 68 y.o. female Last visit in March for annual exam.  Medical history below including a MARTIN on BiPAP followed with Sleep Medicine, hyperlipidemia on Lipitor 40 mg daily, osteopenia, depression on Lexapro 10 mg daily, chronic constipation, history of stroke on aspirin and Plavix, equivocal H pylori antibody 2018.      Has recently been followed by Urology for obstructive kidney stone.    Urology notes and hospital summary from outside facility reviewed.  Was visiting [place]   and Went in to the hospital with fever, flank pain and altered mental status.  Was diagnosed with severe sepsis/septic shock given fluids, IV antibiotics obstructive kidney stone.  CT showed 2.5 mm stone left UVJ.  Underwent cystoscopy and  ureteral stent placement  11/20/2018.  Stent exchange on 12/19/2018, removal on 12/26/2018.    Further testing include chest x-ray which showed some mild pulmonary edema treated with 1 dose of Lasix diuresis.. She had an echo done that showed EF of 60%, otherwise normal    Recently saw Urology on 01/28/2019  For urge incontinence.  Urinalysis showed trace leukocytes and 2+ protein.  microscopy showed 6 WBCs per HPF, Urine culture did not show any significant infection.   She had a normal postvoid residual of 0.  Her urologist had message to me about some  patient symptoms of hip pain that she felt on related to the urinary issues above.    She states that she has been having chronic low back pain on the right side which has been off and on for many years but in the last 2 months it feels like it is more constant and getting worse.  She will feel sometimes radiating pain down the front of her leg beyond the knee.  She is not sure how much of the leg pain has  to do with the fact that she has a knee replacement on the right side.  She has some chronic numbness around the knee after her knee replacement surgery but denies any worsening of numbness/paresthesias in her leg.  She does find herself falling occasionally.  She also describes several other joint pains like neck pain and stiffness, shoulder pain and stiffness, ankle pain stiffness.  No fever, chills, sweats, systemic symptoms associated with the above.  No current urinary symptoms.    Past Medical History:   Diagnosis Date    Allergy     Anticoagulant long-term use     Arthritis     CVA (cerebral infarction)     Depression     Disorder of kidney and ureter     renal stones    Diverticulosis of colon     Extrinsic asthma, unspecified     Hyperlipidemia     Hypertension     Left atrial enlargement 2014    Low back pain     MARTIN (obstructive sleep apnea)     Osteopenia     PUD (peptic ulcer disease)     Stroke  2013       Past Surgical History:   Procedure Laterality Date    APPENDECTOMY      @ time of hysterectomy    ARTHROPLASTY-KNEE Right 2017    [facility]    BACK SURGERY      CATARACT EXTRACTION       SECTION      CHOLECYSTECTOMY      laparoscopic    COLONOSCOPY/Golytely N/A 2018    [facility]ine Black [facility] at [facility] ENDO    DILATION AND CURETTAGE OF UTERUS  1972    HYSTERECTOMY  1978    TAHUSO with appendectomy    INNER EAR SURGERY      replaced ear drum    IOVERA Right 2017    [facility]    JOINT REPLACEMENT Right     knee    KNEE ARTHROSCOPY W/ DEBRIDEMENT      LUMBAR DISCECTOMY      L4-L5    OOPHORECTOMY      unilateral    REMOVAL, CALCULUS, URETER, URETEROSCOPIC, holmium laser lithotripsy, stone basket extraction, retrograde pyelogram, ureteral stent exchange Left 2018    [facility] at [facility] OR    TONSILLECTOMY       TYMPANOPLASTY         Family History   Problem Relation Age of Onset    Cervical cancer Mother     Cancer Mother 65        lung cancer - non smoker    Stroke Paternal Grandfather     Hypertension Maternal Grandfather     Heart disease Maternal Grandfather     Hypertension Father     Coronary artery disease Father 62    Heart disease Father     Diabetes Sister     Heart disease Sister     Kidney disease Sister     Breast cancer Daughter 36    Heart failure Sister         60s    Colon cancer Neg Hx     Ovarian cancer Neg Hx        Social History     Socioeconomic History    Marital status:      Spouse name: Not on file    Number of children: Not on file    Years of education: Not on file    Highest education level: Not on file   Social Needs    Financial resource strain: Not on file    Food insecurity - worry: Not on file    Food insecurity - inability: Not on file    Transportation needs - medical: Not on file    Transportation needs - non-medical: Not on file   Occupational History    Not on file   Tobacco Use    Smoking status: Former Smoker     Last attempt to quit: 1995     Years since quittin.1    Smokeless tobacco: Never Used   Substance and Sexual Activity    Alcohol use: Yes     Alcohol/week: 0.6 oz     Types: 1 Glasses of wine per week     Comment: social    Drug use: No    Sexual activity: Yes     Partners: Male     Birth control/protection: Surgical     Comment:  since    Other Topics Concern    Not on file   Social History Narrative    Not on file       ROS:  GENERAL: No fever, chills, fatigability or weight loss.  SKIN: No rashes, no itching.  HEAD: No headaches.  EYES: No visual changes  EARS: No ear pain or changes in hearing.  NOSE: No congestion or rhinorrhea.  MOUTH & THROAT: No hoarseness, change in voice, or sore throat.  NODES: Denies swollen glands.  CHEST: Denies CARD, cyanosis, wheezing, cough and sputum production.  CARDIOVASCULAR:  Denies chest pain, PND, orthopnea.  ABDOMEN: No nausea, vomiting, or changes in bowel function.  URINARY: No flank pain, dysuria or hematuria.  PERIPHERAL VASCULAR: No claudication or cyanosis.  MUSCULOSKELETAL:  Above  NEUROLOGIC: No weakness or numbness.    Vital signs reviewed  PE:   APPEARANCE: Well nourished, well developed, in no acute distress.    HEAD: Normocephalic, atraumatic.  EYES:  Conjunctivae noninjected.  NECK: Supple with no cervical lymphadenopathy.    CHEST: Good inspiratory effort. Lungs clear to auscultation with no wheezes or crackles.  CARDIOVASCULAR: Normal S1, S2. No rubs, murmurs, or gallops.  ABDOMEN: Bowel sounds normal. Not distended. Soft. No tenderness or masses. No organomegaly.  EXTREMITIES: No edema, cyanosis, or clubbing.  MSK:  Does have right sacroiliac joint tenderness to palpation.  No midline lumbar or paralumbar tenderness otherwise.  Positive straight leg raise on the right causing shooting lancinating pains down her leg into the foot.  1+ knee and ankle reflexes bilaterally. Range of motion of the back demonstrates full flexion, limited extension secondary to above pain. Painful and limited if the lateral bending secondary to pain.  Contralateral bending overall intact.    IMPRESSION  1. Lumbar radiculopathy, chronic    2. Chronic right sacroiliac pain    3. Neck pain    4. Sepsis, due to unspecified organism    5. Ureteral stone            PLAN  She has some chronic pain that sounds more mechanical in nature but given some of the radiating leg pains and positive straight leg raise concerning for possible or lumbar radiculopathy.  We discussed potential evaluation with MRI although she does have an implantable cardiac loop recorder.  Looks like she did have an MRI of the brain in 2016 after the loop recorder was implanted in 2015.  However, we discussed perhaps just starting with a CT of the lumbar spine to assess for any significant pathology, along with referral to  physical therapy.  Could consider gabapentin initiation after testing results are in and if she receives no significant benefit with starting PT or is having significant pain that is inhibiting participation with PT.  Have advised that PT may be able to help with some of her other pains including neck pain which is likely degenerative in nature and is nonradiating.  If symptoms are progressively deteriorating and she needs MRI, will discuss with Radiology whether this could be safely done with her implanted loop recorder    Reviewed her hospital documentation regarding her workup and treatment for septic shock and obstructive ureteral stone.  Patient had been stented and stents have been now removed.  Patient denies any further issues, currently medically stable.  To follow up with Urology as directed    Orders Placed This Encounter   Procedures    CT Lumbar Spine Without Contrast    Ambulatory Referral to Physical/Occupational Therapy           SCREENINGS  Mammogram   01/28/2019, normal.     GYN: Dr. Edwardo MCGUIRE  01/22/2019     Colonoscopy May 29, 2018.  Granularity descending and ascending colon biopsied (benign).  Sigmoid and descending colon diverticulosis.  Return in 10 years.     DEXA December 2017.  Lumbar spine T score is 0.0.  Right femoral neck T score is -1.0     Immunizations  Prevnar 2015  [id number]  Pvx: 2017  zvx utd

## 2019-02-01 NOTE — PATIENT INSTRUCTIONS
LOW BACK PAIN EXERCISES    Exercises that stretch and strengthen the muscles of your abdomen and spine can help prevent back problems. Strong back and abdominal muscles help you keep good posture, with your spine in its correct position.  If your muscles are tight, take a warm shower or bath before doing the exercises. Exercise on a rug or mat. Wear loose clothing. Dont wear shoes. Stop doing any exercise that causes pain until you have talked with your healthcare provider.  Ask your provider or physical therapist to help you develop an exercise program. Ask your provider how many times a week you need to do the exercises. Remember to start slowly.   Exercises  These exercises are intended only as suggestions. Be sure to check with your provider before starting the exercises.   Standing hamstring stretch: Put the heel of one leg on a stool about 15 inches high. Keep your leg straight. Lean forward, bending at the hips until you feel a mild stretch in the back of your thigh. Make sure you do not roll your shoulders or bend at the waist when doing this. You want to stretch your leg, not your lower back. Hold the stretch for 15 to 30 seconds. Repeat with each leg 3 times.   Cat and camel: Get down on your hands and knees. Let your stomach sag, allowing your back to curve downward. Hold this position for 5 seconds. Then arch your back and hold for 5 seconds. Do 2 sets of 15.   Quadruped arm and leg raise: Get down on your hands and knees. Pull in your belly button and tighten your abdominal muscles to stiffen your spine. While keeping your abdominals tight, raise one arm and the opposite leg away from you. Hold this position for 5 seconds. Lower your arm and leg slowly and change sides. Do this 10 times on each side.   Pelvic tilt: Lie on your back with your knees bent and your feet flat on the floor. Pull your belly button in towards your spine and push your lower back into the floor, flattening your back. Hold this  position for 15 seconds, then relax. Repeat 5 to 10 times.   Partial curl: Lie on your back with your knees bent and your feet flat on the floor. Draw in your abdomen and tighten your stomach muscles. With your hands stretched out in front of you, curl your upper body forward until your shoulders clear the floor. Hold this position for 3 seconds. Don't hold your breath. It helps to breathe out as you lift your shoulders. Relax back to the floor. Repeat 10 times. Build to 2 sets of 15. To challenge yourself, clasp your hands behind your head and keep your elbows out to your sides.   Gluteal stretch: Lie on your back with both knees bent. Rest your right ankle over the knee of your left leg. Grasp the thigh of the left leg and pull toward your chest. You will feel a stretch along the buttocks and possibly along the outside of your hip. Hold the stretch for 15 to 30 seconds. Then repeat the exercise with your left ankle over your right knee. Do the exercise 3 times with each leg.   Extension exercise   Lie face down on the floor for 5 minutes. If this hurts too much, lie face down with a pillow under your stomach. This should relieve your leg or back pain. When you can lie on your stomach for 5 minutes without a pillow, you can continue with Part B of this exercise.   After lying on your stomach for 5 minutes, prop yourself up on your elbows for another 5 minutes. If you can do this without having more leg or buttock pain, you can start doing part C of this exercise.   Lie on your stomach with your hands under your shoulders. Then press down on your hands and extend your elbows while keeping your hips flat on the floor. Hold for 1 second and lower yourself to the floor. Do 3 to 5 sets of 10 repetitions. Rest for 1 minute between sets. You should have no pain in your legs when you do this, but it is normal to feel some pain in your lower back.   Do this exercise several times a day.  Side plank: Lie on your side with  your legs, hips, and shoulders in a straight line. Prop yourself up onto your forearm with your elbow directly under your shoulder. Lift your hips off the floor and balance on your forearm and the outside of your foot. Try to hold this position for 15 seconds and then slowly lower your hip to the ground. Switch sides and repeat. Work up to holding for 1 minute. This exercise can be made easier by starting with your knees and hips flexed toward your chest.            Back Exercises: Leg Pull        To start, lie on your back with your knees bent and feet flat on the floor. Dont press your neck or lower back to the floor. Breathe deeply. You should feel comfortable and relaxed in this position.  · Pull one knee to your chest.  · Hold for 30-60 seconds. Return to starting position.  · Repeat 2 times.  · Switch legs.  · For a double leg pull, pull both legs to your chest at the same time. Repeat 2 times.  For your safety, check with your healthcare provider before starting an exercise program.   © 3323-0222 [employer]. [address] [place] [address]. All rights reserved. This information is not intended as a substitute for professional medical care. Always follow your healthcare professional's instructions.          Shoulder Clock Exercise    To start, stand tall with your ears, shoulders, and hips in line. Your feet should be slightly apart, positioned just under your hips. Focus your eyes directly in front of you.  this position for a few seconds before starting your exercise. This helps increase your awareness of proper posture.  · Imagine that your right shoulder is the center of a clock. With the outer point of your shoulder, roll it around to slowly trace the outer edge of the clock.  · Move clockwise first, then counterclockwise.  · Repeat 3 to 5 times. Switch shoulders.   Date Last Reviewed: 10/2/2015  © 0584-2520 [employer]. [address] [place] [address]  [address]. All rights reserved. This information is not intended as a substitute for professional medical care. Always follow your healthcare professional's instructions.        Shoulder Shrug Exercise    To start, sit in a chair with your feet flat on the floor. Shift your weight slightly forward to avoid rounding your back. Relax. Keep your ears, shoulders, and hips aligned:  · Raise both of your shoulders as high as you can, as if you were trying to touch them to your ears. Keep your head and neck still and relaxed.  · Hold for a count of 10. Release.  · Repeat 5 times.  For your safety, check with your healthcare provider before starting an exercise program.   Date Last Reviewed: 8/16/2015  © 3263-6740 [employer]. [address] [place] [address]. All rights reserved. This information is not intended as a substitute for professional medical care. Always follow your healthcare professional's instructions.        Neck Exercises: Neck Flex  To start, sit in a chair with your feet flat on the floor. Your weight should be slightly forward so that youre balanced evenly on your buttocks. Relax your shoulders and keep your head level. Avoid arching your back or rounding your shoulders. Using a chair with arms may help you keep your balance:  · Rest the back of your left hand against your lower back. Place your right palm on the top of your head.  · Gently pull your head forward and down until you feel a stretch in the muscles in the back of your neck. Dont force the motion.  · Hold for 20 seconds, then return to starting position. Switch arms.    Date Last Reviewed: 10/2/2015  © 6911-5037 [employer]. [address] [place] [address]. All rights reserved. This information is not intended as a substitute for professional medical care. Always follow your healthcare professional's instructions.        Neck Clock (Flexibility)    1. Lie on your back on the floor, with your knees  bent and your feet flat on the floor. Place a rolled-up towel or neck roll under your neck.  2. Close your eyes and imagine a clock face. With your nose, slowly trace the outer edge of the clock in a clockwise direction. Move your neck smoothly. Dont force your head or neck.  3. Repeat 5 times, or as instructed.  4. Then switch to a counterclockwise direction, and repeat the exercise 5 times, or as instructed.     Challenge yourself  You can also do this exercise while sitting at your work desk. Sit up straight with your back supported firmly against your chair. You can do the exercise several times throughout your day.   Date Last Reviewed: 3/10/2016  © 5482-6183 [employer]. [address] [place] [address]. All rights reserved. This information is not intended as a substitute for professional medical care. Always follow your healthcare professional's instructions.        Head Tilt / Upper Trapezius Stretch (Flexibility)    5. Sit up straight in a chair with your head and neck in a neutral position, ears in line with shoulders. Hold the edge of your chair seat with your right hand. Tuck your chin in slightly.  6. Tilt your head to the left, while looking straight ahead.  7. Put your left hand on the right side of your head. Gently pull your head to the left. Hold for 30 to 60 seconds. Use gentle pressure to increase the stretch. Dont force your head into position.  8. Return your head and neck to the neutral position.  9. Repeat this exercise 2 times, or as instructed.  10. Switch sides and repeat 2 times, or as instructed.  Challenge yourself  Tuck one end of a towel under your left arm. Then bring the other end over your right shoulder. Pull the towel down on your right shoulder with both hands as you side-bend your head to the left. Repeat with the other side.   Date Last Reviewed: 3/10/2016  © 4215-4897 [employer]. [address] [place] [address]. All rights  reserved. This information is not intended as a substitute for professional medical care. Always follow your healthcare professional's instructions.

## 2019-02-13 ENCOUNTER — HOSPITAL ENCOUNTER (OUTPATIENT)
Dept: RADIOLOGY | Facility: HOSPITAL | Age: 69
Discharge: HOME | End: 2019-02-13
Attending: FAMILY MEDICINE
Payer: MEDICARE

## 2019-02-13 DIAGNOSIS — M54.16 LUMBAR RADICULOPATHY, CHRONIC: ICD-10-CM

## 2019-02-13 DIAGNOSIS — M53.3 CHRONIC RIGHT SACROILIAC PAIN: ICD-10-CM

## 2019-02-13 DIAGNOSIS — G89.29 CHRONIC RIGHT SACROILIAC PAIN: ICD-10-CM

## 2019-02-13 PROCEDURE — 72131 CT LUMBAR SPINE WITHOUT CONTRAST: ICD-10-PCS | Mod: 26,,, | Performed by: RADIOLOGY

## 2019-02-13 PROCEDURE — 72131 CT LUMBAR SPINE W/O DYE: CPT | Mod: TC

## 2019-02-13 PROCEDURE — 72131 CT LUMBAR SPINE W/O DYE: CPT | Mod: 26,,, | Performed by: RADIOLOGY

## 2019-02-15 ENCOUNTER — PATIENT MESSAGE (OUTPATIENT)
Dept: FAMILY MEDICINE | Facility: CLINIC | Age: 69
End: 2019-02-15

## 2019-02-15 RX ORDER — GABAPENTIN 300 MG/1
300 CAPSULE ORAL 3 TIMES DAILY
Qty: 90 CAPSULE | Refills: 11 | Status: SHIPPED | OUTPATIENT
Start: 2019-02-15 | End: 2019-02-22 | Stop reason: SDUPTHER

## 2019-02-15 NOTE — TELEPHONE ENCOUNTER
Dear Tracy Armendariz    Your recent CT scan of the back does show arthritis on several levels that could be impinging upon the nerves to your right leg (less accurate of a test than MRI but as you recall because of your implanted cardiac loop recorder we opted to hold off on doing the MRI for now).  I believe we placed a referral for physical therapy, but as we also discussed we could try a med called gabapentin that helps with pain from irritated nerves. I'll write a 300 mg gabapentin pill Rx, and you can start out at night for a few days to a week, and increase it to twice a day if still needed to further control pain, and again to 3x daily if still needed to control pain (the Rx is written as 3x daily but you can do the regimen above, accepting lower dose if pain is controlled as such).let me know if the symptoms are not improving or severely worsening over the next several weeks with the gabapentin (the dose of this can be increased over time if needed)(    Bartolo Jules MD      [facility]  [address] [facility] [address]  [address] [place] [address]  Phone: [phone] Fax: [phone]

## 2019-02-18 ENCOUNTER — CLINICAL SUPPORT (OUTPATIENT)
Dept: REHABILITATION | Facility: HOSPITAL | Age: 69
End: 2019-02-18
Attending: FAMILY MEDICINE
Payer: MEDICARE

## 2019-02-18 DIAGNOSIS — M53.3 SACROILIAC PAIN: ICD-10-CM

## 2019-02-18 DIAGNOSIS — M54.2 NECK PAIN: ICD-10-CM

## 2019-02-18 DIAGNOSIS — R29.898 WEAKNESS OF BOTH LOWER EXTREMITIES: ICD-10-CM

## 2019-02-18 DIAGNOSIS — M54.50 LUMBAR PAIN: ICD-10-CM

## 2019-02-18 PROCEDURE — G8978 MOBILITY CURRENT STATUS: HCPCS | Mod: CL,PN

## 2019-02-18 PROCEDURE — G8979 MOBILITY GOAL STATUS: HCPCS | Mod: CK,PN

## 2019-02-18 PROCEDURE — 97162 PT EVAL MOD COMPLEX 30 MIN: CPT | Mod: PN

## 2019-02-18 NOTE — PLAN OF CARE
[facility]  Physical Therapy Initial Evaluation    Name: Tracy Armendariz  [facility] Number: [id number]    Therapy Diagnosis:   Encounter Diagnoses   Name Primary?    Lumbar pain     Sacroiliac pain     Neck pain     Weakness of both lower extremities      Physician: Bartolo Jules MD    Physician Orders: PT Eval and Treat   Medical Diagnosis from Referral: Lumbar radiculopathy, chronic [M54.16]  Chronic right sacroiliac pain [M53.3, G89.29]  Neck pain [M54.2]  Evaluation Date: 2/18/2019  Authorization Period Expiration: 4/18/19  Plan of Care Expiration: 4/19/19  Visit # / Visits authorized: 1/ 12    Time In: 5:05 pm   Time Out: 5:55 pm   Total Billable Time: 50 minutes    Precautions: Standard and HTN, hx of CVA, fall risk, osteopenia, implanted cardiac loop recorder    Subjective   Date of onset: chronic   History of current condition - Tracy reports: that she has been having pain around her (R) sacroiliac joint and lower back for years.  Pt stated that the week after Thanksgiving 2018 she had 9 kidney stones so her family took to her ER and she was in ICU for a few days. Pt stated that she still has approximately 4 kidney stones left. Pt stated that she underwent CT scan recently on her lower back. Pt stated that she experiences intermittent radiating pain into posterior (R) LE. Pt stated that she does sometimes experience numbness in (R) LE. Pt reports experiencing neck pain since November 2018. Pt stated that her neck feels stiff and stated that she experiences pain in her neck when she turns her neck.  Pt denies radiating pain into (B)UE and stated that sometimes when she wakes up she will experience numbness in her hand on side that she is lying on. Pt reports hx of back surgery in 1980s. Pt reported that she had a stroke 5 years ago. Pt stated that most recent fall was two months ago when she was sitting on a bar stool and was looking down and got dizzy. Pt reported that  prior to that she hadn't fallen in a couple of years. 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    Medical History:   Past Medical History:   Diagnosis Date    Allergy     Anticoagulant long-term use     Arthritis     CVA (cerebral infarction)     Depression     Disorder of kidney and ureter     renal stones    Diverticulosis of colon     Extrinsic asthma, unspecified     Hyperlipidemia     Hypertension     Left atrial enlargement 2014    Low back pain     MARTIN (obstructive sleep apnea)     Osteopenia     PUD (peptic ulcer disease)     Stroke  2013       Surgical History:   Tracy Armendariz  has a past surgical history that includes Inner ear surgery; Cholecystectomy ();  section (); Dilation and curettage of uterus (); Appendectomy (); Hysterectomy (); Tympanoplasty; Lumbar discectomy (); Knee arthroscopy w/ debridement (); Tonsillectomy; Back surgery; Cataract extraction; Colonoscopy (N/A, 2018); Joint replacement (Right); Ureteroscopic removal of ureteric calculus (Left, 2018); and Oophorectomy.    Medications:   Tracy has a current medication list which includes the following prescription(s): amoxicillin, aspirin, atorvastatin, calcium carbonate, cholecalciferol (vitamin d3), clopidogrel, clotrimazole-betamethasone 1-0.05%, escitalopram oxalate, gabapentin, lactobacillus acidophilus, losartan, multivit with calcium,iron,min, pantoprazole, tamsulosin, and ventolin hfa.    Allergies:   Review of patient's allergies indicates:   Allergen Reactions    Iodinated contrast- oral and iv dye Hives and Rash    Iodine Hives    Isothiazolinones Rash    Penicillins Rash        Imaging, CT lumbar spine: see imaging section in pt chart review     Prior Therapy: yes   Social History: Pt stated that she lives with her  who is able to  assist pt as needed.   Occupation: retired  Prior Level of Function:  Independent   Current Level of Function: Mod I     Pain:  Current 3/10, worst 7/10, best 0/10   Location: bilateral neck   Description: Sharp  Aggravating Factors: turning her head  Easing Factors: resting head on head rest, pain medicine    Current 8/10, worst 10/10, best 4/10   Location: right  Lumbar/SI region    Description: Aching and Sharp  Aggravating Factors: bending tasks, bowel movement - pt stated that once she has a bowel movement pain eases, turning over in bed, walking, standing, sitting on hard surface without padding   Easing Factors: pain medication and CBD oil    Pts goals: release tension in lower back/neck    Objective     Cervical AROM: Pain/Dysfunction with Movement:   Flexion 50 degrees C/o increased neck pain   Extension 35 degrees    Right side bending 20 degrees C/o pulling/pain in mid/left cervical/UT region    Left side bending 25 degrees C/o pulling/ pain in (R) cervical/UT region   Right rotation 75 degrees    Left rotation 75 degrees C/o increased pain in (R) cervical region     Shoulder Right  Left  Pain/Dysfunction with Movement    AROM MMT AROM MMT != pain    flexion WFL 5/5 WFL 5/5    abduction WFL 5/5 WFL! 5/5 C/o pain in (L) deltoid region   Internal rotation WFL 5/5 WFL! 5/5 C/o pain in (L) deltoid region   ER at 90° abd WFL NT WFL NT    ER at 0° abd NT 5/5 NT 5/5        Lumbar AROM: Pain/Dysfunction with Movement:    Tested seated EOM    Flexion 70 degrees    Extension 10 degrees C/o increased pain in mid lumbar region   Right side bending 20 degrees C/o increased pain in (R) lumbar/SI region   Left side bending 30 degrees      Hip Right  Left  Pain/Dysfunction with Movement    AROM MMT AROM MMT != pain    Flexion WFL 4+/5 WFL 4+/5    Extension NT NT NT NT    Abduction WFL 4/5! WFL 4/5    External rotation WFL 4/5 WFL 4/5       Knee Right  Left  Pain/Dysfunction with Movement    AROM MMT AROM MMT    Flexion  WFL 4/5 WFL 4+/5    Extension WFL 4+/5 WFL 4+/5      Ankle Right  Left  Pain/Dysfunction with Movement    AROM MMT AROM MMT    Dorsiflexion WFL 5/5 WFL 5/5    Plantarflexion WFL 4+/5 WFL 4+/5      Special Tests: SI Compression Test: (R) = positive; SI Distraction Test: (R) = positive; SLR Test: (R) = positive; 90/90 hamstring test: (R) = 25 degrees; (L) = 30 degrees     Palpation: pt c/o tenderness to palpation along (B) UT region, (R) cervical paraspinal region, (R) SI joint, sacrum, L4 and L5 spinous processes    Sensation: (B) LE grossly intact to light touch sensation and (B) UE grossly intact to light touch sensation         CMS Impairment/Limitation/Restriction for FOTO lumbar Survey    Therapist reviewed FOTO scores for Tracy Armendariz on 2/18/2019.   FOTO documents entered into [employer] - see Media section.    Limitation Score: 67%  Category: Mobility    Current : CL = least 60% but < 80% impaired, limited or restricted  Goal: CK = at least 40% but < 60% impaired, limited or restricted             TREATMENT   Treatment Time In:   Treatment Time Out:   Total Treatment time separate from Evaluation: 0 minutes      Home Exercises and Patient Education Provided    Education provided:   - role of therapy      Assessment   Tracy is a 68 y.o. female referred to outpatient Physical Therapy with a medical diagnosis of chronic lumbar radiculopathy, chronic right sacroiliac pain, and neck pain. Pt presents with decreased/painful cervical AROM, decreased/painful lumbar AROM, decreased (B) LE strength/flexibility, decreased postural awareness, and decreased functional mobility. Pt's signs and symptoms are consistent with (R) SI joint pathology. Pt will benefit from skilled PT to address the limitations listed above in order to return pt to her highest level of function with decreased pain and limitation.     Pt prognosis is Good.   Pt will benefit from skilled outpatient Physical Therapy to address the deficits stated  "above and in the chart below, provide pt/family education, and to maximize pt's level of independence.     Plan of care discussed with patient: Yes  Pt's spiritual, cultural and educational needs considered and patient is agreeable to the plan of care and goals as stated below:     Anticipated Barriers for therapy: chronicity of pain, comorbidities    Medical Necessity is demonstrated by the following  History  Co-morbidities and personal factors that may impact the plan of care Co-morbidities:   HTN and hx of CVA, osteopenia    Personal Factors:   no deficits     high   Examination  Body Structures and Functions, activity limitations and participation restrictions that may impact the plan of care Body Regions:   neck  back  lower extremities  upper extremities    Body Systems:    ROM  strength  gait  transfers  (B) LE and (B) UE light touch sensation    Participation Restrictions:   None mentioned    Activity limitations:   Learning and applying knowledge  no deficits    General Tasks and Commands  no deficits    Communication  no deficits    Mobility  walking    Self care  no deficits    Domestic Life  doing house work (cleaning house, washing dishes, laundry)    Interactions/Relationships  no deficits    Life Areas  no deficits    Community and Social Life  no deficits         high   Clinical Presentation evolving clinical presentation with changing clinical characteristics moderate   Decision Making/ Complexity Score: moderate     Goals:  Short Term Goals (4 Weeks):   1. Pt will be compliant with HEP to supplement PT in decreasing pain with functional mobility.  2. Pt will perform and hold TA contraction for 10x10" in dynamic sitting activities to demonstrate improved core strength  3. Pt will improve impaired LE MMTs by at least 1/2 grade to improve strength for functional tasks.  4. Pt will demonstrate improved sitting posture to decrease pain experienced in neck.  5. Pt will improve cervical AROM 10 deg in " "all planes to improve cervical mobility.    Long Term Goals (8 Weeks):   1. Pt will improve FOTO score to </= 53% limited to decrease perceived limitation with maintaining/changing body position  2. Pt will improve B 9090 HS length by 10 deg B to improve flexibility for normal movement patterns.   3. Pt will perform and hold TA contraction for 10x10" in dynamic standing activities to demonstrate improved core strength  4. Pt will improve impaired LE MMTs by at least 1 full grade to improve strength for functional tasks.  5. Pt will report lumbar/ SI pain </= 4/10 during ADLs to promote functional QOL.  6. Pt will report neck pain </= 4/10 at worst to promote return to PLOF    Plan   Plan of care Certification: 2/18/2019 to 4/19/19.    Outpatient Physical Therapy 2 times weekly for 8 weeks to include the following interventions: Gait Training, Manual Therapy, Moist Heat/ Ice, Neuromuscular Re-ed, Patient Education, Self Care, Therapeutic Activites, Therapeutic Exercise and ASTYM, FDN, and modalities prn.     Ese Thomas, PT  "

## 2019-02-21 ENCOUNTER — CLINICAL SUPPORT (OUTPATIENT)
Dept: REHABILITATION | Facility: HOSPITAL | Age: 69
End: 2019-02-21
Attending: FAMILY MEDICINE
Payer: MEDICARE

## 2019-02-21 DIAGNOSIS — M54.2 NECK PAIN: ICD-10-CM

## 2019-02-21 DIAGNOSIS — R29.898 WEAKNESS OF BOTH LOWER EXTREMITIES: ICD-10-CM

## 2019-02-21 DIAGNOSIS — M54.50 LUMBAR PAIN: ICD-10-CM

## 2019-02-21 DIAGNOSIS — M53.3 SACROILIAC PAIN: ICD-10-CM

## 2019-02-21 PROCEDURE — 97110 THERAPEUTIC EXERCISES: CPT | Mod: PN

## 2019-02-21 NOTE — PROGRESS NOTES
"  Physical Therapy Daily Treatment Note     Name: Tracy Armendariz  Clinic Number: [id number]    Therapy Diagnosis: No diagnosis found.  Physician: Bartolo Jules MD    Visit Date: 2/21/2019  Physician Orders: PT Eval and Treat   Medical Diagnosis from Referral: Lumbar radiculopathy, chronic [M54.16]  Chronic right sacroiliac pain [M53.3, G89.29]  Neck pain [M54.2]  Evaluation Date: 2/18/2019  Authorization Period Expiration: 4/18/19  Plan of Care Expiration: 4/19/19  Visit # / Visits authorized: 2/ 12  PTA visit: 1/6       Time In: 4:00  Time Out: 4:55  Total Billable Time: 30 minutes    Precautions:Standard and HTN, hx of CVA, fall risk, osteopenia, implanted cardiac loop recorder    Subjective     Pt reports: iliosacral pain. Reports frontal HA since Nov.   She was not provided so was not yet compliant with home exercise program.  Response to previous treatment: Provided eval only  Functional change: None    Pain: 5/10  Location: right Low back    Objective     Tracy received therapeutic exercises to develop strength, endurance, ROM, flexibility, posture and core stabilization for 30 minutes including: with additional 25 minutes supvised    Supine:  HSS                               3x30" w/strap  TA                                  5"x10   Brace marching            1' x2 trials  Hip Iso ADd                   5"x10 w/ball between knees  Hip Iso ABd                   2"x1 w/strap (Elicited pain so terminated)  LTR                               2' w/Grn SB  Reverse crunches        2' w/Grn SB    Seated:   Upper trapezius stretch 3x30"    Levator stretch               3x30"         Home Exercises Provided and Patient Education Provided     Education provided:   - HEP.     Written Home Exercises Provided: upper trap, levator, hamstring stretches. pelvic tilt, chin tuck and LTR.  Exercises were reviewed and Tracy was able to demonstrate them prior to the end of the session.  Tracy demonstrated good  " "understanding of the education provided.     See EMR under Media for exercises provided today.    Assessment   Tolerated interventions well. Issued HEP.    Tracy is progressing well towards her goals.   Pt prognosis is Good.     Pt will continue to benefit from skilled outpatient physical therapy to address the deficits listed in the problem list box on initial evaluation, provide pt/family education and to maximize pt's level of independence in the home and community environment.     Pt's spiritual, cultural and educational needs considered and pt agreeable to plan of care and goals.     Anticipated barriers to physical therapy: chronicity of pain, comorbitities    Goals: Short Term Goals (4 Weeks):   1. Pt will be compliant with HEP to supplement PT in decreasing pain with functional mobility.  2. Pt will perform and hold TA contraction for 10x10" in dynamic sitting activities to demonstrate improved core strength  3. Pt will improve impaired LE MMTs by at least 1/2 grade to improve strength for functional tasks.  4. Pt will demonstrate improved sitting posture to decrease pain experienced in neck.  5. Pt will improve cervical AROM 10 deg in all planes to improve cervical mobility.     Long Term Goals (8 Weeks):   1. Pt will improve FOTO score to </= 53% limited to decrease perceived limitation with maintaining/changing body position  2. Pt will improve B 9090 HS length by 10 deg B to improve flexibility for normal movement patterns.   3. Pt will perform and hold TA contraction for 10x10" in dynamic standing activities to demonstrate improved core strength  4. Pt will improve impaired LE MMTs by at least 1 full grade to improve strength for functional tasks.  5. Pt will report lumbar/ SI pain </= 4/10 during ADLs to promote functional QOL.  6. Pt will report neck pain </= 4/10 at worst to promote return to PLOF       Plan     Continue PT POC.     Devan Esqueda, PTA   "

## 2019-02-22 ENCOUNTER — PATIENT MESSAGE (OUTPATIENT)
Dept: FAMILY MEDICINE | Facility: CLINIC | Age: 69
End: 2019-02-22

## 2019-02-22 RX ORDER — GABAPENTIN 100 MG/1
100 CAPSULE ORAL 3 TIMES DAILY
Qty: 90 CAPSULE | Refills: 11 | Status: SHIPPED | OUTPATIENT
Start: 2019-02-22 | End: 2019-03-18

## 2019-02-22 NOTE — TELEPHONE ENCOUNTER
could try to decrease the gabapentin dose done to 100 mg if you want to try that.  I will send this over to pharmacy [facility] can try this.  Regarding the worsening back pain when you have to have a bowel movement which gets better after you finish, I am not specifically concerned about this at this time.  Certainly if you're constipated this can certainly worsen back pains on not sure if you're straining a lot but certainly taking more water even fiber to help with preventing constipation would be helpful if you are constipated.  If you are having any blood in the stool or change in bowel habits or characteristics, this may need to be further looked at.    Bartolo Jules MD      ----- Message -----     From: Tracy Armendariz     Sent: 2/19/2019  2:15 PM CST       To: Bartolo Jules MD  Subject: RE:CT spine    Thank you for the quick response with the results of the cat scan.  I know I have only taken 2 doses of the gabapentin but I really don't like what it does to me.  I slept till 2 o'clock this afternoon.  My body feels like jelly.  I am totally relaxed but the pain in the lower S1 joint is still there.  Is there anything else I could try?  I went to my first therapy session yesterday and Ese Thomas seemed rather surprise when I told her the pain always intensifies when I need to have a bowel movement.  I told her that I did not tell you about that.  Should I be concerned?    ----- Message -----  From: Bartolo Jules MD  Sent: 2/15/2019  2:13 PM CST  To: Tracy Armendariz  Subject: CT spine  Dear Tracy Armendariz    Your recent CT scan of the back does show arthritis on several levels that could be impinging upon the nerves to your right leg (less accurate of a test than MRI but as you recall because of your implanted cardiac loop recorder we opted to hold off on doing the MRI for now).  I believe we placed a referral for physical therapy, but as we also discussed we could try a med called gabapentin that helps with  pain from irritated nerves. I'll write a 300 mg gabapentin pill Rx, and you can start out at night for a few days to a week, and increase it to twice a day if still needed to further control pain, and again to 3x daily if still needed to control pain (the Rx is written as 3x daily but you can do the regimen above, accepting lower dose if pain is controlled as such).let me know if the symptoms are not improving or severely worsening over the next several weeks with the gabapentin (the dose of this can be increased over time if needed)(

## 2019-02-25 ENCOUNTER — CLINICAL SUPPORT (OUTPATIENT)
Dept: REHABILITATION | Facility: HOSPITAL | Age: 69
End: 2019-02-25
Attending: FAMILY MEDICINE
Payer: MEDICARE

## 2019-02-25 DIAGNOSIS — M54.50 LUMBAR PAIN: ICD-10-CM

## 2019-02-25 DIAGNOSIS — M54.2 NECK PAIN: ICD-10-CM

## 2019-02-25 DIAGNOSIS — M53.3 SACROILIAC PAIN: ICD-10-CM

## 2019-02-25 DIAGNOSIS — R29.898 WEAKNESS OF BOTH LOWER EXTREMITIES: ICD-10-CM

## 2019-02-25 PROCEDURE — 97110 THERAPEUTIC EXERCISES: CPT | Mod: PN

## 2019-02-25 NOTE — PROGRESS NOTES
"  Physical Therapy Daily Treatment Note     Name: Tracy Armendariz  Clinic Number: [id number]    Therapy Diagnosis:   Encounter Diagnoses   Name Primary?    Lumbar pain     Sacroiliac pain     Neck pain     Weakness of both lower extremities      Physician: Bartolo Jules MD    Visit Date: 2/25/2019    Physician Orders: PT Eval and Treat   Medical Diagnosis from Referral: Lumbar radiculopathy, chronic [M54.16]  Chronic right sacroiliac pain [M53.3, G89.29]  Neck pain [M54.2]  Evaluation Date: 2/18/2019  Authorization Period Expiration: 4/18/19  Plan of Care Expiration: 4/19/19  Visit # / Visits authorized: 3/ 12      Time In: 2:05 pm   Time Out: 2:55 pm   Total Billable Time: 25 minutes    Precautions: Standard and HTN, hx of CVA, fall risk, osteopenia, implanted cardiac loop recorder    Subjective     Pt reports: that she felt good after last therapy session.   She was compliant with home exercise program.  Response to previous treatment: no adverse effects  Functional change: none    Pain: 6/10  Location: right buttocks/SI region  Objective     Tracy received therapeutic exercises to develop strength, endurance, ROM, flexibility, posture and core stabilization for 25 minutes 1:1 with PT and x 25' supervised including:       HSS                               3x30" w/strap  TA                                  5"x15  Brace marching            1' x2 trials  Hip Iso ADd                   5"x10 w/ball between knees  SL hip abduction  2x10   Piriformis stretch          2x10   Chin tucks    5"x10   Bridges             2x10   LTR                               2' w/Grn SB NOT PERFORMED THIS TREATMENT  Reverse crunches        2' w/Grn SB NOT PERFORMED THIS TREATMENT    Rows   RTB 2x10    Upper trapezius stretch 3x30"    Levator stretch               3x30"   Home Exercises Provided and Patient Education Provided     Education provided:   - Continue with HEP     Written Home Exercises Provided: Patient instructed to " "cont prior HEP.  Exercises were reviewed and Tracy was able to demonstrate them prior to the end of the session.  Tracy demonstrated good  understanding of the education provided.     See EMR under Media for exercises provided 2/21/19.      Assessment     Pt tolerated all therex noted above including progression of therex without c/o increased pain during or after therapy session.   Tracy is progressing well towards her goals.   Pt prognosis is Good.     Pt will continue to benefit from skilled outpatient physical therapy to address the deficits listed in the problem list box on initial evaluation, provide pt/family education and to maximize pt's level of independence in the home and community environment.     Pt's spiritual, cultural and educational needs considered and pt agreeable to plan of care and goals.    Anticipated barriers to physical therapy: chronicity of pain, comorbitities    Goals:   Short Term Goals (4 Weeks):   1. Pt will be compliant with HEP to supplement PT in decreasing pain with functional mobility.  2. Pt will perform and hold TA contraction for 10x10" in dynamic sitting activities to demonstrate improved core strength  3. Pt will improve impaired LE MMTs by at least 1/2 grade to improve strength for functional tasks.  4. Pt will demonstrate improved sitting posture to decrease pain experienced in neck.  5. Pt will improve cervical AROM 10 deg in all planes to improve cervical mobility.     Long Term Goals (8 Weeks):   1. Pt will improve FOTO score to </= 53% limited to decrease perceived limitation with maintaining/changing body position  2. Pt will improve B 9090 HS length by 10 deg B to improve flexibility for normal movement patterns.   3. Pt will perform and hold TA contraction for 10x10" in dynamic standing activities to demonstrate improved core strength  4. Pt will improve impaired LE MMTs by at least 1 full grade to improve strength for functional tasks.  5. Pt will report " lumbar/ SI pain </= 4/10 during ADLs to promote functional QOL.  6. Pt will report neck pain </= 4/10 at worst to promote return to PLOF        Plan     Continue per POC, progress as tolerated    Ese Thomas, PT

## 2019-02-26 ENCOUNTER — DOCUMENTATION ONLY (OUTPATIENT)
Dept: REHABILITATION | Facility: HOSPITAL | Age: 69
End: 2019-02-26

## 2019-02-26 NOTE — PROGRESS NOTES
Face to Face PTA Conference performed with PTA regarding patient's current status, overall progress, and plan of care.     Ese Thomas, PT     Devan Esqueda, PTA

## 2019-03-01 ENCOUNTER — CLINICAL SUPPORT (OUTPATIENT)
Dept: REHABILITATION | Facility: HOSPITAL | Age: 69
End: 2019-03-01
Attending: FAMILY MEDICINE
Payer: MEDICARE

## 2019-03-01 DIAGNOSIS — M54.2 NECK PAIN: ICD-10-CM

## 2019-03-01 DIAGNOSIS — M54.50 LUMBAR PAIN: ICD-10-CM

## 2019-03-01 DIAGNOSIS — R29.898 WEAKNESS OF BOTH LOWER EXTREMITIES: ICD-10-CM

## 2019-03-01 DIAGNOSIS — M53.3 SACROILIAC PAIN: ICD-10-CM

## 2019-03-01 PROCEDURE — 97110 THERAPEUTIC EXERCISES: CPT | Mod: PN

## 2019-03-01 NOTE — PROGRESS NOTES
"  Physical Therapy Daily Treatment Note     Name: Tracy Armendariz  Clinic Number: [id number]    Therapy Diagnosis:   Encounter Diagnoses   Name Primary?    Lumbar pain     Sacroiliac pain     Neck pain     Weakness of both lower extremities      Physician: Bartolo Jules MD    Visit Date: 3/1/2019    Physician Orders: PT Eval and Treat   Medical Diagnosis from Referral: Lumbar radiculopathy, chronic [M54.16]  Chronic right sacroiliac pain [M53.3, G89.29]  Neck pain [M54.2]  Evaluation Date: 2/18/2019  Authorization Period Expiration: 4/18/19  Plan of Care Expiration: 4/19/19  Visit # / Visits authorized: 4/ 12    FOTO: 4/5 NEXT    Gcode:  4/10    Cap Visit:     30.32  Cap Total:  234.48     Time In: 2:05 pm   Time Out: 2:50 pm   Total Billable Time: 15 minutes    Precautions: Standard and HTN, hx of CVA, fall risk, osteopenia, implanted cardiac loop recorder    Subjective     Pt reports: that she is feeling a little dizzy today.  States she fell out of bed recently ((R black eye)   She was compliant with home exercise program.  Response to previous treatment: no adverse effects  Functional change: none    Pain: 4/10  Location: right buttocks/SI region  Objective     Tracy received therapeutic exercises to develop strength, endurance, ROM, flexibility, posture and core stabilization for 15 minutes 1:1 with PTA and x 25' supervised including:       HSS                               3x30" w/strap  TA                                  5"x15  Brace marching            1' x2 trials  Hip Iso ADd                   5"x10 w/ball between knees  SL hip abduction  2x10   Piriformis stretch          2x30"  Chin tucks     5"x10   Bridges               2x10   LTR                               2' w/Grn SB   Reverse crunches        2' w/Grn SB     Rows   RTB 2x10    Upper trapezius stretch 3x30"  Stopped due to dizzy  Levator stretch               3x30"  Not performed today  Home Exercises Provided and Patient Education " "Provided     Education provided:   - Continue with HEP - perform in pain free range    Written Home Exercises Provided: Patient instructed to cont prior HEP.  Exercises were reviewed and Tracy was able to demonstrate them prior to the end of the session.  Tracy demonstrated good  understanding of the education provided.     See EMR under Media for exercises provided 2/21/19.      Assessment     Patient completed all mat therex without complaint of pain during performance, afterwards states R hip pain when she was doing them "sharp pain"  Which resolved shortly after completion.  Held stretching to to increased "dizzy" complaints  States "less pain now" after treatment, not specific to scale.  Cautioned to perform all therex in pain free range and advise if any difficulty.  Tracy is progressing well towards her goals.   Pt prognosis is Good.     Pt will continue to benefit from skilled outpatient physical therapy to address the deficits listed in the problem list box on initial evaluation, provide pt/family education and to maximize pt's level of independence in the home and community environment.     Pt's spiritual, cultural and educational needs considered and pt agreeable to plan of care and goals.    Anticipated barriers to physical therapy: chronicity of pain, comorbitities    Goals:   Short Term Goals (4 Weeks):   1. Pt will be compliant with HEP to supplement PT in decreasing pain with functional mobility.  2. Pt will perform and hold TA contraction for 10x10" in dynamic sitting activities to demonstrate improved core strength  3. Pt will improve impaired LE MMTs by at least 1/2 grade to improve strength for functional tasks.  4. Pt will demonstrate improved sitting posture to decrease pain experienced in neck.  5. Pt will improve cervical AROM 10 deg in all planes to improve cervical mobility.     Long Term Goals (8 Weeks):   1. Pt will improve FOTO score to </= 53% limited to decrease perceived " "limitation with maintaining/changing body position  2. Pt will improve B 9090 HS length by 10 deg B to improve flexibility for normal movement patterns.   3. Pt will perform and hold TA contraction for 10x10" in dynamic standing activities to demonstrate improved core strength  4. Pt will improve impaired LE MMTs by at least 1 full grade to improve strength for functional tasks.  5. Pt will report lumbar/ SI pain </= 4/10 during ADLs to promote functional QOL.  6. Pt will report neck pain </= 4/10 at worst to promote return to PLOF        Plan     Continue per POC, progress as tolerated    Madhavi Perales, ETHEL   "

## 2019-03-04 ENCOUNTER — CLINICAL SUPPORT (OUTPATIENT)
Dept: REHABILITATION | Facility: HOSPITAL | Age: 69
End: 2019-03-04
Attending: FAMILY MEDICINE
Payer: MEDICARE

## 2019-03-04 DIAGNOSIS — M53.3 SACROILIAC PAIN: ICD-10-CM

## 2019-03-04 DIAGNOSIS — M54.50 LUMBAR PAIN: ICD-10-CM

## 2019-03-04 DIAGNOSIS — M54.2 NECK PAIN: ICD-10-CM

## 2019-03-04 DIAGNOSIS — R29.898 WEAKNESS OF BOTH LOWER EXTREMITIES: ICD-10-CM

## 2019-03-04 PROCEDURE — 97110 THERAPEUTIC EXERCISES: CPT | Mod: PN

## 2019-03-04 NOTE — PROGRESS NOTES
"  Physical Therapy Daily Treatment Note     Name: Tracy Armendariz  Clinic Number: [id number]    Therapy Diagnosis:   Encounter Diagnoses   Name Primary?    Lumbar pain     Sacroiliac pain     Neck pain     Weakness of both lower extremities      Physician: Bartolo Jules MD    Visit Date: 3/4/2019    Physician Orders: PT Eval and Treat   Medical Diagnosis from Referral: Lumbar radiculopathy, chronic [M54.16]  Chronic right sacroiliac pain [M53.3, G89.29]  Neck pain [M54.2]  Evaluation Date: 2/18/2019  Authorization Period Expiration: 4/18/19  Plan of Care Expiration: 4/19/19  Visit # / Visits authorized: 5/12    FOTO: 5/5 DONE    Gcode:  5/10    Cap Visit:   121.28  Cap Total:  355.76    Time In: 12:06 pm   Time Out: 1:00 pm   Total Billable Time: 54 minutes    Precautions: Standard and HTN, hx of CVA, fall risk, osteopenia, implanted cardiac loop recorder    Subjective     Pt reports: She feels back along her R side of low back and at her neck and shoulders. Pain is always better after she leaves  She was compliant with home exercise program.  Response to previous treatment: no adverse effects  Functional change: none    Pain: 6/10  Location: right buttocks/SI region/lower cervical spine  Objective   O: anteriorly rotated R innominate with R sided low back pain    Tracy received therapeutic exercises to develop strength, endurance, ROM, flexibility, posture and core stabilization for 54 minutes 1:1 with PT including:     HSS                               3x30" w/strap  TA                                  5"x15  Brace marching            1' x2 trials  Hip Iso Add/abd             20x3'' c/ ball and belt  SL hip abduction   2x10   Piriformis stretch          2x30"  Chin tucks     5"x10   Bridges               2x10   LTR                               2' w/Grn SB   Reverse crunches        2' w/Grn SB     Rows      2x15 GTB  Upper trapezius stretch 3x30"  Not performed today  Levator stretch               3x30"  " "Not performed today    Home Exercises Provided and Patient Education Provided   Education provided:   - Continue with HEP - perform in pain free range    Written Home Exercises Provided: Patient instructed to cont prior HEP.  Exercises were reviewed and Tracy was able to demonstrate them prior to the end of the session.  Tracy demonstrated good  understanding of the education provided.     See EMR under Media for exercises provided 2/21/19.    Assessment     R sided low back pain that worsened with bridges therefore held at same reps/sets; pain also present at end range LTRs. Trial hip MET for R anterior rotated innominate. 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    Tracy is progressing well towards her goals.   Pt prognosis is Good.     Pt will continue to benefit from skilled outpatient physical therapy to address the deficits listed in the problem list box on initial evaluation, provide pt/family education and to maximize pt's level of independence in the home and community environment.   Pt's spiritual, cultural and educational needs considered and pt agreeable to plan of care and goals.    Anticipated barriers to physical therapy: chronicity of pain, comorbitities    Goals:   Short Term Goals (4 Weeks):   1. Pt will be compliant with HEP to supplement PT in decreasing pain with functional mobility. - progressing  2. Pt will perform and hold TA contraction for 10x10" in dynamic sitting activities to demonstrate improved core strength - progressing  3. Pt will improve impaired LE MMTs by at least 1/2 grade to improve strength for functional tasks. - progressing  4. Pt will demonstrate improved sitting posture to decrease pain experienced in neck. - progressing  5. Pt will improve cervical AROM 10 deg in all planes to improve cervical mobility. - progressing     Long Term Goals (8 Weeks):   1. Pt will " "improve FOTO score to </= 53% limited to decrease perceived limitation with maintaining/changing body position. - progressing  2. Pt will improve B 9090 HS length by 10 deg B to improve flexibility for normal movement patterns.  - progressing  3. Pt will perform and hold TA contraction for 10x10" in dynamic standing activities to demonstrate improved core strength. - progressing  4. Pt will improve impaired LE MMTs by at least 1 full grade to improve strength for functional tasks. - progressing  5. Pt will report lumbar/ SI pain </= 4/10 during ADLs to promote functional QOL. - progressing  6. Pt will report neck pain </= 4/10 at worst to promote return to PLOF. - progressing    Plan     Continue per POC, progress as tolerated    Jg Day, PT   "

## 2019-03-04 NOTE — PROGRESS NOTES
"  Physical Therapy Daily Treatment Note     Name: Tracy Armendariz  Clinic Number: [id number]    Therapy Diagnosis:   Encounter Diagnoses   Name Primary?    Lumbar pain     Sacroiliac pain     Neck pain     Weakness of both lower extremities      Physician: Bartolo Jules MD    Visit Date: 3/4/2019    Physician Orders: PT Eval and Treat   Medical Diagnosis from Referral: Lumbar radiculopathy, chronic [M54.16]  Chronic right sacroiliac pain [M53.3, G89.29]  Neck pain [M54.2]  Evaluation Date: 2/18/2019  Authorization Period Expiration: 4/18/2019  Plan of Care Expiration: 4/19/2019  Visit # / Visits authorized: 5/12    FOTO: 5/5 done  GCODE: 5/10    Cap Visit: ***  Cap Total: 234.48 ***    Time In: ***  Time Out: ***   Total Billable Time: *** minutes    Precautions: Standard and HTN, hx of CVA, fall risk, osteopenia, implanted cardiac loop recorder    Subjective     Pt reports: that she is feeling a little dizzy today.  States she fell out of bed recently ((R black eye)   She was compliant with home exercise program.  Response to previous treatment: no adverse effects  Functional change: none    Pain: ***/10  Location: right buttocks/SI region    Objective     Tracy received therapeutic exercises to develop strength, endurance, ROM, flexibility, posture and core stabilization for *** minutes 1:1 and *** minutes supervised including:     HSS                                3x30" w/strap  TA                                  5"x15  Brace marching             1' x2 trials  Hip Iso ADd                    5"x10 w/ball between knees  SL hip abduction   2x10   Piriformis stretch           2x30"  Chin tucks      5"x10   Bridges                2x10   LTR                                2' w/Grn SB   Reverse crunches         2' w/Grn SB     Rows    RTB 2x10    Upper trapezius stretch  3x30"  Stopped due to dizzy  Levator stretch               3x30"  Not performed today    Home Exercises Provided and Patient Education " "Provided     Education provided:   - Continue with HEP within pain free range    Written Home Exercises Provided: No.  Exercises were reviewed and Tracy was able to demonstrate them prior to the end of the session.  Tracy demonstrated good  understanding of the education provided.     See EMR under Media for exercises provided 2/21/19.    Assessment     Patient completed all mat therex without complaint of pain during performance, afterwards states R hip pain when she was doing them "sharp pain"  Which resolved shortly after completion.  Held stretching to to increased "dizzy" complaints  States "less pain now" after treatment, not specific to scale.  Cautioned to perform all therex in pain free range and advise if any difficulty.    Tracy is progressing well towards her goals.   Pt prognosis is Good.   Pt will continue to benefit from skilled outpatient physical therapy to address the deficits listed in the problem list box on initial evaluation, provide pt/family education and to maximize pt's level of independence in the home and community environment.     Pt's spiritual, cultural and educational needs considered and pt agreeable to plan of care and goals.  Anticipated barriers to physical therapy: chronicity of pain, comorbitities    Goals:   Short Term Goals (4 Weeks):   1. Pt will be compliant with HEP to supplement PT in decreasing pain with functional mobility.  2. Pt will perform and hold TA contraction for 10x10" in dynamic sitting activities to demonstrate improved core strength  3. Pt will improve impaired LE MMTs by at least 1/2 grade to improve strength for functional tasks.  4. Pt will demonstrate improved sitting posture to decrease pain experienced in neck.  5. Pt will improve cervical AROM 10 deg in all planes to improve cervical mobility.     Long Term Goals (8 Weeks):   1. Pt will improve FOTO score to </= 53% limited to decrease perceived limitation with maintaining/changing body " "position  2. Pt will improve B 9090 HS length by 10 deg B to improve flexibility for normal movement patterns.   3. Pt will perform and hold TA contraction for 10x10" in dynamic standing activities to demonstrate improved core strength  4. Pt will improve impaired LE MMTs by at least 1 full grade to improve strength for functional tasks.  5. Pt will report lumbar/ SI pain </= 4/10 during ADLs to promote functional QOL.  6. Pt will report neck pain </= 4/10 at worst to promote return to PLOF    Plan     Continue plan of care. ***    Rosalba Ann, PT   "

## 2019-03-07 ENCOUNTER — CLINICAL SUPPORT (OUTPATIENT)
Dept: REHABILITATION | Facility: HOSPITAL | Age: 69
End: 2019-03-07
Attending: FAMILY MEDICINE
Payer: MEDICARE

## 2019-03-07 DIAGNOSIS — R29.898 WEAKNESS OF BOTH LOWER EXTREMITIES: ICD-10-CM

## 2019-03-07 DIAGNOSIS — M54.50 LUMBAR PAIN: ICD-10-CM

## 2019-03-07 DIAGNOSIS — M53.3 SACROILIAC PAIN: ICD-10-CM

## 2019-03-07 DIAGNOSIS — M54.2 NECK PAIN: ICD-10-CM

## 2019-03-07 PROCEDURE — 97110 THERAPEUTIC EXERCISES: CPT | Mod: PN

## 2019-03-07 NOTE — PROGRESS NOTES
"  Physical Therapy Daily Treatment Note     Name: Tracy Armendariz  Clinic Number: [id number]    Therapy Diagnosis:   Encounter Diagnoses   Name Primary?    Lumbar pain     Sacroiliac pain     Neck pain     Weakness of both lower extremities      Physician: Bartolo Jules MD    Visit Date: 3/7/2019    Physician Orders: PT Eval and Treat   Medical Diagnosis from Referral: Lumbar radiculopathy, chronic [M54.16]  Chronic right sacroiliac pain [M53.3, G89.29]  Neck pain [M54.2]  Evaluation Date: 2/18/2019  Authorization Period Expiration: 4/18/19  Plan of Care Expiration: 4/19/19  Visit # / Visits authorized: 6/12    FOTO: 6/10    Gcode:  6/10    Cap Visit:     90.96  Cap Total:  446.72    Time In: 1600 pm   Time Out:1700 pm   Total Billable Time: 40 minutes TEx3    Precautions: Standard and HTN, hx of CVA, fall risk, osteopenia, implanted cardiac loop recorder    Subjective     Pt reports: She feels like she is not doing chin tucks correctly.  Caused increased pain.  States is always better after she leaves therapy  She was compliant with home exercise program.  Response to previous treatment: no adverse effects  Functional change: none    Pain: 6/10  Location: right buttocks/SI region/lower cervical spine  Objective     Tracy received therapeutic exercises to develop strength, endurance, ROM, flexibility, posture and core stabilization for 40 minutes 1:1 with PTA additional 15 with supervision including:     HSS                               3x30" w/strap  TA                                  5"x15  Brace marching            1' x2 trials  Hip Iso Add/abd             20x5'' c/ ball and belt  SL hip abduction   2x10 B  Piriformis stretch          2x30"  Chin tucks     5"x10   Bridges               2x10   LTR                               2' w/Grn SB   Reverse crunches        2' w/Grn SB     Rows      2x15 GTB  Upper trapezius stretch 3x30"  No UE overpressure (headache)  Levator stretch               3x30"  " "    Home Exercises Provided and Patient Education Provided   Education provided:   - Continue with HEP - perform in pain free range    Written Home Exercises Provided: Patient instructed to cont prior HEP, plus expanded HEP given 3/07/19.  Exercises were reviewed and Tracy was able to demonstrate them prior to the end of the session.  Tracy demonstrated good  understanding of the education provided.     See EMR under Media for exercises provided 2/21/19, expanded 3/7/19.    Assessment     Able to complete chin tucks in pain free range after review of technique. States she has a headache with overpressure during UT stretch, modified to perform without hand on head and completed same. All other therex without difficulty or pain.Rates 3/10 after treatment.  Tracy is progressing well towards her goals.   Pt prognosis is Good.     Pt will continue to benefit from skilled outpatient physical therapy to address the deficits listed in the problem list box on initial evaluation, provide pt/family education and to maximize pt's level of independence in the home and community environment.   Pt's spiritual, cultural and educational needs considered and pt agreeable to plan of care and goals.    Anticipated barriers to physical therapy: chronicity of pain, comorbitities    Goals:   Short Term Goals (4 Weeks):   1. Pt will be compliant with HEP to supplement PT in decreasing pain with functional mobility. - progressing  2. Pt will perform and hold TA contraction for 10x10" in dynamic sitting activities to demonstrate improved core strength - progressing  3. Pt will improve impaired LE MMTs by at least 1/2 grade to improve strength for functional tasks. - progressing  4. Pt will demonstrate improved sitting posture to decrease pain experienced in neck. - progressing  5. Pt will improve cervical AROM 10 deg in all planes to improve cervical mobility. - progressing     Long Term Goals (8 Weeks):   1. Pt will improve FOTO score " "to </= 53% limited to decrease perceived limitation with maintaining/changing body position. - progressing  2. Pt will improve B 9090 HS length by 10 deg B to improve flexibility for normal movement patterns.  - progressing  3. Pt will perform and hold TA contraction for 10x10" in dynamic standing activities to demonstrate improved core strength. - progressing  4. Pt will improve impaired LE MMTs by at least 1 full grade to improve strength for functional tasks. - progressing  5. Pt will report lumbar/ SI pain </= 4/10 during ADLs to promote functional QOL. - progressing  6. Pt will report neck pain </= 4/10 at worst to promote return to PLOF. - progressing    Plan     Continue per POC, progress as tolerated    Madhavi Perales PTA   "

## 2019-03-11 ENCOUNTER — CLINICAL SUPPORT (OUTPATIENT)
Dept: REHABILITATION | Facility: HOSPITAL | Age: 69
End: 2019-03-11
Attending: FAMILY MEDICINE
Payer: MEDICARE

## 2019-03-11 DIAGNOSIS — M54.2 NECK PAIN: ICD-10-CM

## 2019-03-11 DIAGNOSIS — R29.898 WEAKNESS OF BOTH LOWER EXTREMITIES: ICD-10-CM

## 2019-03-11 DIAGNOSIS — M53.3 SACROILIAC PAIN: ICD-10-CM

## 2019-03-11 DIAGNOSIS — M54.50 LUMBAR PAIN: ICD-10-CM

## 2019-03-11 PROCEDURE — 97110 THERAPEUTIC EXERCISES: CPT | Mod: PN

## 2019-03-11 NOTE — PROGRESS NOTES
"  Physical Therapy Daily Treatment Note     Name: Tracy Armendariz  Clinic Number: [id number]    Therapy Diagnosis:   Encounter Diagnoses   Name Primary?    Lumbar pain     Sacroiliac pain     Neck pain     Weakness of both lower extremities      Physician: Bartolo Jules MD    Visit Date: 3/11/2019    Physician Orders: PT Eval and Treat   Medical Diagnosis from Referral: Lumbar radiculopathy, chronic [M54.16]  Chronic right sacroiliac pain [M53.3, G89.29]  Neck pain [M54.2]  Evaluation Date: 2/18/2019  Authorization Period Expiration: 4/18/19  Plan of Care Expiration: 4/19/19  Visit # / Visits authorized: 7/12    FOTO: 7/10    Gcode:  7/10    Cap Visit:     60.64  Cap Total:  507.36    Time In: 2:00 pm   Time Out: 2:55 pm   Total Billable Time: 30 minutes    Precautions: Standard and HTN, hx of CVA, fall risk, osteopenia, implanted cardiac loop recorder    Subjective     Pt reports: that she is feeling good today. Pt stated that she had a hard time last night with her neck pain extending into (B) UT region. Pt stated that yesterday she just did her regular exercises. Pt stated that her neck was feeling better this morning.   She was compliant with home exercise program.  Response to previous treatment: no adverse effects  Functional change: none    Pain: 0/10  Location: bilateral neck and lumbar/SI region      Objective     Tracy received therapeutic exercises to develop strength, endurance, ROM, flexibility, posture and core stabilization for 25 minutes supervised and x 30  1:1 with PT including:     HSS                               3x30" w/strap  TA                                  5"x15  Brace marching            1' x2 trials  Hip Iso Add/abd             20x5'' c/ ball and belt  SL hip abduction   2x12 B  SL clams    1x10 3" hold   Piriformis stretch          2x30"  Chin tucks     5"x10   Bridges               2x10   LTR                               2' w/Grn SB   Reverse crunches        2' w/Grn SB " "  Lats     2x10 RTB   Rows      2x15 GTB  Upper trapezius stretch 3x30"  No UE overpressure (headache)  Levator stretch               3x30"    Home Exercises Provided and Patient Education Provided     Education provided:   - Continue with HEP     Written Home Exercises Provided: Patient instructed to cont prior HEP.  Exercises were reviewed and Tracy was able to demonstrate them prior to the end of the session.  Tracy demonstrated good  understanding of the education provided.     See EMR under Media for exercises provided 2/21/19, expanded 3/7/19.    Assessment     Pt was able to tolerate all therex including progression of therex noted above without c/o increased pain. Pt reported no increased pain at the end of therapy sesssion.   Tracy is progressing well towards her goals.   Pt prognosis is Good.     Pt will continue to benefit from skilled outpatient physical therapy to address the deficits listed in the problem list box on initial evaluation, provide pt/family education and to maximize pt's level of independence in the home and community environment.     Pt's spiritual, cultural and educational needs considered and pt agreeable to plan of care and goals.    Anticipated barriers to physical therapy: chronicity of pain, comorbitities    Goals:     Goals:   Short Term Goals (4 Weeks):   1. Pt will be compliant with HEP to supplement PT in decreasing pain with functional mobility. - progressing  2. Pt will perform and hold TA contraction for 10x10" in dynamic sitting activities to demonstrate improved core strength - progressing  3. Pt will improve impaired LE MMTs by at least 1/2 grade to improve strength for functional tasks. - progressing  4. Pt will demonstrate improved sitting posture to decrease pain experienced in neck. - progressing  5. Pt will improve cervical AROM 10 deg in all planes to improve cervical mobility. - progressing     Long Term Goals (8 Weeks):   1. Pt will improve FOTO score to </= " "53% limited to decrease perceived limitation with maintaining/changing body position. - progressing  2. Pt will improve B 9090 HS length by 10 deg B to improve flexibility for normal movement patterns.  - progressing  3. Pt will perform and hold TA contraction for 10x10" in dynamic standing activities to demonstrate improved core strength. - progressing  4. Pt will improve impaired LE MMTs by at least 1 full grade to improve strength for functional tasks. - progressing  5. Pt will report lumbar/ SI pain </= 4/10 during ADLs to promote functional QOL. - progressing  6. Pt will report neck pain </= 4/10 at worst to promote return to PLOF. - progressing    Plan     Continue per POC, progress as tolerated    Ese Thomas, PT   "

## 2019-03-14 ENCOUNTER — CLINICAL SUPPORT (OUTPATIENT)
Dept: REHABILITATION | Facility: HOSPITAL | Age: 69
End: 2019-03-14
Attending: FAMILY MEDICINE
Payer: MEDICARE

## 2019-03-14 DIAGNOSIS — M54.2 NECK PAIN: ICD-10-CM

## 2019-03-14 DIAGNOSIS — R29.898 WEAKNESS OF BOTH LOWER EXTREMITIES: ICD-10-CM

## 2019-03-14 DIAGNOSIS — M54.50 LUMBAR PAIN: ICD-10-CM

## 2019-03-14 DIAGNOSIS — M53.3 SACROILIAC PAIN: ICD-10-CM

## 2019-03-14 PROCEDURE — 97110 THERAPEUTIC EXERCISES: CPT | Mod: PN

## 2019-03-14 NOTE — PROGRESS NOTES
"  Physical Therapy Daily Treatment Note     Name: Tracy Armendariz  Clinic Number: [id number]    Therapy Diagnosis:   Encounter Diagnoses   Name Primary?    Lumbar pain     Sacroiliac pain     Neck pain     Weakness of both lower extremities      Physician: Bartolo Jules MD    Visit Date: 3/14/2019    Physician Orders: PT Eval and Treat   Medical Diagnosis from Referral: Lumbar radiculopathy, chronic [M54.16]  Chronic right sacroiliac pain [M53.3, G89.29]  Neck pain [M54.2]  Evaluation Date: 2/18/2019  Authorization Period Expiration: 4/18/19  Plan of Care Expiration: 4/19/19  Visit # / Visits authorized: 8/12    FOTO: 8/10    Gcode:  8/10    Cap Visit:     90.96  Cap Total:  598.32    Time In: 1:00 pm   Time Out: 2:00 pm   Total Billable Time: 45 minutes 3 TE    Precautions: Standard and HTN, hx of CVA, fall risk, osteopenia, implanted cardiac loop recorder    Subjective     Pt reports: she is just overall feeling better.  She stated that    She was compliant with home exercise program.  Response to previous treatment: no adverse effects  Functional change: none    Pain: 0/10  Location: bilateral neck and lumbar/SI region      Objective     Tracy received therapeutic exercises to develop strength, endurance, ROM, flexibility, posture and core stabilization for 25 minutes supervised and x 45 1:1 with PT and supervised x 15' including:     HSS                               3x30" w/strap   TA                                  5"x15   Brace marching            1' x2 trials   Hip Iso Add/abd             20x5'' c/ ball and belt   SL hip abduction   2x12 B  SL clams    x20 3" hold   Piriformis stretch          2x30"   Chin tucks     5"x10   Bridges               2x10    Reverse crunches        2' w/Grn SB   Lats     2x10 RTB   Rows      2x15 GTB  Upper trapezius stretch 3x30"  No UE overpressure (headache)   Levator stretch               3x30"      Home Exercises Provided and Patient Education Provided " "    Education provided:   - Continue with HEP     Written Home Exercises Provided: Patient instructed to cont prior HEP.  Exercises were reviewed and Tracy was able to demonstrate them prior to the end of the session.  Tracy demonstrated good  understanding of the education provided.     See EMR under Media for exercises provided 2/21/19, expanded 3/7/19.    Assessment     Pt reported fatigue and "tired feeling".  She denied increased pain.  She would benefit from progression to standing TE for LE strength.     Tracy is progressing well towards her goals.   Pt prognosis is Good.     Pt will continue to benefit from skilled outpatient physical therapy to address the deficits listed in the problem list box on initial evaluation, provide pt/family education and to maximize pt's level of independence in the home and community environment.     Pt's spiritual, cultural and educational needs considered and pt agreeable to plan of care and goals.    Anticipated barriers to physical therapy: chronicity of pain, comorbitities    Goals:     Goals:   Short Term Goals (4 Weeks):   1. Pt will be compliant with HEP to supplement PT in decreasing pain with functional mobility. - progressing  2. Pt will perform and hold TA contraction for 10x10" in dynamic sitting activities to demonstrate improved core strength - progressing  3. Pt will improve impaired LE MMTs by at least 1/2 grade to improve strength for functional tasks. - progressing  4. Pt will demonstrate improved sitting posture to decrease pain experienced in neck. - progressing  5. Pt will improve cervical AROM 10 deg in all planes to improve cervical mobility. - progressing     Long Term Goals (8 Weeks):   1. Pt will improve FOTO score to </= 53% limited to decrease perceived limitation with maintaining/changing body position. - progressing  2. Pt will improve B 9090 HS length by 10 deg B to improve flexibility for normal movement patterns.  - progressing  3. Pt " "will perform and hold TA contraction for 10x10" in dynamic standing activities to demonstrate improved core strength. - progressing  4. Pt will improve impaired LE MMTs by at least 1 full grade to improve strength for functional tasks. - progressing  5. Pt will report lumbar/ SI pain </= 4/10 during ADLs to promote functional QOL. - progressing  6. Pt will report neck pain </= 4/10 at worst to promote return to PLOF. - progressing    Plan     Continue per POC, progress as tolerated to standing LE SERINA Nunez, PT   "

## 2019-03-14 NOTE — PROGRESS NOTES
"  Physical Therapy Daily Treatment Note     Name: Tracy Armendariz  Clinic Number: [id number]    Therapy Diagnosis:   No diagnosis found.  Physician: Barotlo Jules MD    Visit Date: 3/14/2019    Physician Orders: PT Eval and Treat   Medical Diagnosis from Referral: Lumbar radiculopathy, chronic [M54.16]  Chronic right sacroiliac pain [M53.3, G89.29]  Neck pain [M54.2]  Evaluation Date: 2/18/2019  Authorization Period Expiration: 4/18/19  Plan of Care Expiration: 4/19/19  Visit # / Visits authorized: 7/12    FOTO: 7/10    Gcode:  7/10    Cap Visit:     60.64***  Cap Total:  507.36    Time In: 2:00 pm ***  Time Out: 2:55 pm   Total Billable Time: 30 minutes    Precautions: Standard and HTN, hx of CVA, fall risk, osteopenia, implanted cardiac loop recorder    Subjective     Pt reports: that she is feeling good today. Pt stated that she had a hard time last night with her neck pain extending into (B) UT region. Pt stated that yesterday she just did her regular exercises. Pt stated that her neck was feeling better this morning.   She was compliant with home exercise program.  Response to previous treatment: no adverse effects  Functional change: none    Pain: 0/10  Location: bilateral neck and lumbar/SI region      Objective     Tracy received therapeutic exercises to develop strength, endurance, ROM, flexibility, posture and core stabilization for 25 minutes supervised and x 30  1:1 with PTA including:     HSS                               3x30" w/strap  TA                                  5"x15  Brace marching            1' x2 trials  Hip Iso Add/abd             20x5'' c/ ball and belt  SL hip abduction   2x12 B  SL clams    1x10 3" hold   Piriformis stretch          2x30"  Chin tucks     5"x10   Bridges               2x10   LTR                               2' w/Grn SB   Reverse crunches        2' w/Grn SB   Lats     2x10 RTB   Rows      2x15 GTB  Upper trapezius stretch 3x30"  No UE overpressure " "(headache)  Levator stretch               3x30"      Home Exercises Provided and Patient Education Provided     Education provided:   - Continue with HEP     Written Home Exercises Provided: Patient instructed to cont prior HEP.  Exercises were reviewed and Tracy was able to demonstrate them prior to the end of the session.  Tracy demonstrated good  understanding of the education provided.     See EMR under Media for exercises provided 2/21/19, expanded 3/7/19.    Assessment     Pt was able to tolerate all therex including progression of therex noted above without c/o increased pain. Pt reported no increased pain at the end of therapy sesssion.   Tracy is progressing well towards her goals.   Pt prognosis is Good.     Pt will continue to benefit from skilled outpatient physical therapy to address the deficits listed in the problem list box on initial evaluation, provide pt/family education and to maximize pt's level of independence in the home and community environment.     Pt's spiritual, cultural and educational needs considered and pt agreeable to plan of care and goals.    Anticipated barriers to physical therapy: chronicity of pain, comorbitities    Goals:     Goals:   Short Term Goals (4 Weeks):   1. Pt will be compliant with HEP to supplement PT in decreasing pain with functional mobility. - progressing  2. Pt will perform and hold TA contraction for 10x10" in dynamic sitting activities to demonstrate improved core strength - progressing  3. Pt will improve impaired LE MMTs by at least 1/2 grade to improve strength for functional tasks. - progressing  4. Pt will demonstrate improved sitting posture to decrease pain experienced in neck. - progressing  5. Pt will improve cervical AROM 10 deg in all planes to improve cervical mobility. - progressing     Long Term Goals (8 Weeks):   1. Pt will improve FOTO score to </= 53% limited to decrease perceived limitation with maintaining/changing body position. - " "progressing  2. Pt will improve B 9090 HS length by 10 deg B to improve flexibility for normal movement patterns.  - progressing  3. Pt will perform and hold TA contraction for 10x10" in dynamic standing activities to demonstrate improved core strength. - progressing  4. Pt will improve impaired LE MMTs by at least 1 full grade to improve strength for functional tasks. - progressing  5. Pt will report lumbar/ SI pain </= 4/10 during ADLs to promote functional QOL. - progressing  6. Pt will report neck pain </= 4/10 at worst to promote return to PLOF. - progressing    Plan     Continue per POC, progress as tolerated    Madhavi Perales PTA   "

## 2019-03-18 ENCOUNTER — OFFICE VISIT (OUTPATIENT)
Dept: FAMILY MEDICINE | Facility: CLINIC | Age: 69
End: 2019-03-18
Payer: MEDICARE

## 2019-03-18 ENCOUNTER — CLINICAL SUPPORT (OUTPATIENT)
Dept: REHABILITATION | Facility: HOSPITAL | Age: 69
End: 2019-03-18
Attending: FAMILY MEDICINE
Payer: MEDICARE

## 2019-03-18 VITALS
WEIGHT: 232.56 LBS | HEART RATE: 70 BPM | DIASTOLIC BLOOD PRESSURE: 72 MMHG | SYSTOLIC BLOOD PRESSURE: 108 MMHG | HEIGHT: 66 IN | OXYGEN SATURATION: 96 % | BODY MASS INDEX: 37.38 KG/M2 | TEMPERATURE: 98 F

## 2019-03-18 DIAGNOSIS — K27.9 PUD (PEPTIC ULCER DISEASE): ICD-10-CM

## 2019-03-18 DIAGNOSIS — M54.50 LUMBAR PAIN: ICD-10-CM

## 2019-03-18 DIAGNOSIS — M54.2 NECK PAIN: ICD-10-CM

## 2019-03-18 DIAGNOSIS — M54.12 CERVICAL RADICULOPATHY: ICD-10-CM

## 2019-03-18 DIAGNOSIS — Z86.73 HISTORY OF STROKE: ICD-10-CM

## 2019-03-18 DIAGNOSIS — R58 ECCHYMOSIS: ICD-10-CM

## 2019-03-18 DIAGNOSIS — M53.3 SACROILIAC PAIN: ICD-10-CM

## 2019-03-18 DIAGNOSIS — R29.898 WEAKNESS OF BOTH LOWER EXTREMITIES: ICD-10-CM

## 2019-03-18 DIAGNOSIS — M54.16 LUMBAR RADICULOPATHY, CHRONIC: Primary | ICD-10-CM

## 2019-03-18 PROCEDURE — 1101F PR PT FALLS ASSESS DOC 0-1 FALLS W/OUT INJ PAST YR: ICD-10-PCS | Mod: CPTII,S$GLB,, | Performed by: FAMILY MEDICINE

## 2019-03-18 PROCEDURE — 97110 THERAPEUTIC EXERCISES: CPT | Mod: PN

## 2019-03-18 PROCEDURE — 3074F SYST BP LT 130 MM HG: CPT | Mod: CPTII,S$GLB,, | Performed by: FAMILY MEDICINE

## 2019-03-18 PROCEDURE — 3078F DIAST BP <80 MM HG: CPT | Mod: CPTII,S$GLB,, | Performed by: FAMILY MEDICINE

## 2019-03-18 PROCEDURE — 3078F PR MOST RECENT DIASTOLIC BLOOD PRESSURE < 80 MM HG: ICD-10-PCS | Mod: CPTII,S$GLB,, | Performed by: FAMILY MEDICINE

## 2019-03-18 PROCEDURE — 99999 PR PBB SHADOW E&M-EST. PATIENT-LVL IV: ICD-10-PCS | Mod: PBBFAC,,, | Performed by: FAMILY MEDICINE

## 2019-03-18 PROCEDURE — 99999 PR PBB SHADOW E&M-EST. PATIENT-LVL IV: CPT | Mod: PBBFAC,,, | Performed by: FAMILY MEDICINE

## 2019-03-18 PROCEDURE — 3074F PR MOST RECENT SYSTOLIC BLOOD PRESSURE < 130 MM HG: ICD-10-PCS | Mod: CPTII,S$GLB,, | Performed by: FAMILY MEDICINE

## 2019-03-18 PROCEDURE — 99215 PR OFFICE/OUTPT VISIT, EST, LEVL V, 40-54 MIN: ICD-10-PCS | Mod: S$GLB,,, | Performed by: FAMILY MEDICINE

## 2019-03-18 PROCEDURE — 99215 OFFICE O/P EST HI 40 MIN: CPT | Mod: S$GLB,,, | Performed by: FAMILY MEDICINE

## 2019-03-18 PROCEDURE — 1101F PT FALLS ASSESS-DOCD LE1/YR: CPT | Mod: CPTII,S$GLB,, | Performed by: FAMILY MEDICINE

## 2019-03-18 NOTE — PROGRESS NOTES
(Portions of this note were dictated using voice recognition software and may contain dictation related errors in spelling/grammar/syntax not found on text review)    CC:  Neck pain    HPI: 68 y.o. female initially scheduled for annual exam but states that she is here for neck pain. She does have Chronic low back pain with radiculopathy, had reported worsening at last visit.  Had deferred MRI given history of having an implanted loop recorder in but did a CT of the lumbar spine which suggest multilevel degenerative change most pronounced at L3-L4 with bulging disc, bilateral neural foraminal stenosis, mild central canal stenosis, facet hypertrophy, and more prominent neural foraminal narrowing at L4-L5 and L5-S1.  Was tried on gabapentin but had difficulty tolerating 300 mg dose  so we went down to 100 mg 3 times a day if tolerated.  Was referred to physical therapy.  Physical therapy has helped a lot with her low back.  I reviewed her most recent therapy note from 03/14/2019 .  In it therapist has reported good progression towards her goals and good prognosis for improvement of symptoms.  Was feeling somewhat tired during the last therapy session but denied any increased pain.  Recommendation was to continue with home exercise program and with therapy sessions.  She is unable to even take 100 mg gabapentin because of dizziness and somnolence so she stop this completely.  For pain , takes Aleve was at 2 a day but currently down to 1 a day    She also has a therapist working on her neck for some chronic neck stiffness and shoulder stiffness for the past 6 months.  She does report associated numbness and tingling in pains in her arms bilaterally and tingling in her ring and middle fingers bilaterally. Denies any weakness of her hands or arms.  While therapy has helped a lot with her low back, felt like it has not helped much at all with her neck.  Aleve as above helps a little bit.  Tylenol helps a little bit as well.   She has tried some Aspercreme which does not really help with her neck at all.  She does describe a lot of pain and stiffness with the neck especially with rotating in either direction.  No direct trauma.    history of peptic ulcer disease, on pantoprazole.  No recent evaluation into this  Denies any GI issues especially with her taking Aleve as above.  She is also on aspirin and Plavix for history of stroke.  Does recall some easy bruising in the last couple months however.  Of note she states that she was not on any aspirin prior to her stroke        Past Medical History:   Diagnosis Date    Allergy     Anticoagulant long-term use     Arthritis     CVA (cerebral infarction)     Depression     Disorder of kidney and ureter     renal stones    Diverticulosis of colon     Extrinsic asthma, unspecified     Hyperlipidemia     Hypertension     Left atrial enlargement 2014    Low back pain     MARTIN (obstructive sleep apnea)     Osteopenia     PUD (peptic ulcer disease)     Stroke  2013       Past Surgical History:   Procedure Laterality Date    APPENDECTOMY      @ time of hysterectomy    ARTHROPLASTY-KNEE Right 2017    [facility] at [facility]    BACK SURGERY      CATARACT EXTRACTION       SECTION      CHOLECYSTECTOMY      laparoscopic    COLONOSCOPY/Golytely N/A 2018    [facility] Black [facility] at [facility]    DILATION AND CURETTAGE OF UTERUS  1972    HYSTERECTOMY  1978    TAHUSO with appendectomy    INNER EAR SURGERY      replaced ear drum    IOVERA Right 2017    [facility] at [facility]    JOINT REPLACEMENT Right     knee    KNEE ARTHROSCOPY W/ DEBRIDEMENT      LUMBAR DISCECTOMY      L4-L5    OOPHORECTOMY      unilateral    REMOVAL, CALCULUS, URETER, URETEROSCOPIC, holmium laser lithotripsy, stone basket extraction, retrograde pyelogram, ureteral stent exchange Left 2018     [facility]    TONSILLECTOMY      TYMPANOPLASTY         Family History   Problem Relation Age of Onset    Cervical cancer Mother     Cancer Mother 65        lung cancer - non smoker    Stroke Paternal Grandfather     Hypertension Maternal Grandfather     Heart disease Maternal Grandfather     Hypertension Father     Coronary artery disease Father 62    Heart disease Father     Diabetes Sister     Heart disease Sister     Kidney disease Sister     Breast cancer Daughter 36    Heart failure Sister         60s    Colon cancer Neg Hx     Ovarian cancer Neg Hx        Social History     Socioeconomic History    Marital status:      Spouse name: Not on file    Number of children: Not on file    Years of education: Not on file    Highest education level: Not on file   Social Needs    Financial resource strain: Not on file    Food insecurity - worry: Not on file    Food insecurity - inability: Not on file    Transportation needs - medical: Not on file    Transportation needs - non-medical: Not on file   Occupational History    Not on file   Tobacco Use    Smoking status: Former Smoker     Last attempt to quit: 1995     Years since quittin.2    Smokeless tobacco: Never Used   Substance and Sexual Activity    Alcohol use: Yes     Alcohol/week: 0.6 oz     Types: 1 Glasses of wine per week     Comment: social    Drug use: No    Sexual activity: Yes     Partners: Male     Birth control/protection: Surgical     Comment:  since    Other Topics Concern    Not on file   Social History Narrative    Not on file     Lab Results   Component Value Date    WBC 8.82 12/10/2018    HGB 13.3 12/10/2018    HCT 41.8 12/10/2018     (H) 12/10/2018    CHOL 162 03/15/2018    TRIG 82 03/15/2018    HDL 75 03/15/2018    ALT 19 2018    AST 21 2018     12/10/2018    K 4.6 12/10/2018     12/10/2018    CREATININE 0.8 12/10/2018     CALCIUM 10.2 12/10/2018    ALBUMIN 3.5 09/25/2018    BUN 14 12/10/2018    CO2 27 12/10/2018    TSH 1.759 03/15/2018    INR 1.0 01/13/2017    HGBA1C 5.5 03/15/2018    LDLCALC 70.6 03/15/2018     (H) 12/10/2018       ROS:  GENERAL: No fever, chills, fatigability or weight loss.  SKIN:  Above  HEAD: No headaches.  EYES: No visual changes  EARS: No ear pain or changes in hearing.  NOSE: No congestion or rhinorrhea.  MOUTH & THROAT: No hoarseness, change in voice, or sore throat.  NODES: Denies swollen glands.  CHEST: Denies CARD, cyanosis, wheezing, cough and sputum production.  CARDIOVASCULAR: Denies chest pain, PND, orthopnea.  ABDOMEN: No nausea, vomiting, or changes in bowel function.  URINARY: No flank pain, dysuria or hematuria.  PERIPHERAL VASCULAR: No claudication or cyanosis.  MUSCULOSKELETAL:  Above  NEUROLOGIC:  Above            Vital signs reviewed  PE:   APPEARANCE: Well nourished, well developed, in no acute distress.    HEAD: Normocephalic, atraumatic.  EYES:    Conjunctivae noninjected.  MSK/neuro:  2+ biceps and brachioradialis reflexes bilaterally.  Very subtle diminishment in  strength bilaterally.  Normal biceps strength.  Normal deltoid strength but she does find this maneuver painful.  Positive painful arc test on the left, negative on the right.  No pain with resisted external rotation test bilaterally.  No pain with internal rotation test bilaterally.  Positive Neer's and Radford impingement sign on the left, negative on the right.  Positive bilateral Spurling's test causing paresthesias down both of her arms with this lateral neck rotation/compression, relieved with neutral position and axial distraction.  Negative Tinel sign and Phalen sign bilateral wrists  Range of motion of the neck demonstrates no pain with range of motion although she does have some noticeable palpable crepitus with neck rotation  Skin:  Small ecchymotic area noted right antecubital fossa.  Patient denies any  trauma    IMPRESSION  1. Lumbar radiculopathy, chronic    2. Cervical radiculopathy    3. History of stroke    4. PUD (peptic ulcer disease)    5. Ecchymosis        PLAN  Orders Placed This Encounter   Procedures    MRI Cervical Spine Without Contrast     Lumbar radiculopathy improved much with physical therapy.  No further evaluation needed at this time unless patient has recurrent or worsening problems despite therapy      Highly suspect cervical degenerative disease but given physical exam findings also suspect component of cervical radiculopathy.  Contacted with Radiology, safe to do MRI with her implantable loop recorder (not currently functional).  MRI of the cervical spine will be ordered.  Has not been tolerant of gabapentin even at low dose.  Physical therapy for the neck has not improved her symptoms.  Will consider pain management referral after MRI is returned.  Also could consider rotator cuff impingement syndrome particularly on the left.  However, will defer further testing and treatment for this for the time being and focus on cervical spine treatment 1st    History of stroke:  Concerned about some easy bruising.  No other easy bleeding issues reported.  Has been on dual anti-platelet therapy after her stroke.  Was not on aspirin prior.  Discussed that it is reasonable to continue aspirin therapy at this time and can discontinue Plavix given concern for bruising and also given her peptic ulcer disease history.  Her labs as noted above had shown no sign of anemia or thrombocytopenia      SCREENINGS  Mammogram   01/20/2019, negative    GYN: Dr. Brooke LOV  01/22/2019     Colonoscopy 05/29/2018.  Descending and ascending colon area of granularity, biopsy done and was normal.  Diverticulosis sigmoid and descending colon.  Otherwise normal.  Repeat in 10 years     DEXA December 2017.  Lumbar spine T score is 0.0.  Right femoral neck T score is -1.0     Immunizations  Prevnar 2015  [id number]  Pvx:  2017  zvx utd

## 2019-03-18 NOTE — PROGRESS NOTES
"  Physical Therapy Daily Treatment Note     Name: Tracy Armendariz  Clinic Number: [id number]    Therapy Diagnosis:   Encounter Diagnoses   Name Primary?    Lumbar pain     Sacroiliac pain     Neck pain     Weakness of both lower extremities      Physician: Bartolo Jules MD    Visit Date: 3/18/2019    Physician Orders: PT Eval and Treat   Medical Diagnosis from Referral: Lumbar radiculopathy, chronic [M54.16]  Chronic right sacroiliac pain [M53.3, G89.29]  Neck pain [M54.2]  Evaluation Date: 2/18/2019  Authorization Period Expiration: 4/18/19  Plan of Care Expiration: 4/19/19  Visit # / Visits authorized: 9/12  FOTO: 9/10  Gcode:  9/10  PTA visit: 1/6       Cap Visit:     121.28  Cap Total:  719.60     Time In: 1505  Time Out: 1600  Total Billable Time: 55 minutes 4 TE     Precautions: Standard and HTN, hx of CVA, fall risk, osteopenia, implanted cardiac loop recorder      Subjective     Pt reports: she had MD appt prior to therapy visit and is having increased pain because he "poked and prodded" her.  Overall low back has been feeling better with less pain, but neck and shoulders remain the same,  She was compliant with home exercise program.  Response to previous treatment: no adverse reaction  Functional change: none    Pain: 7/10  Location: bilateral back  and neck      Objective       Tracy received therapeutic exercises to develop strength, ROM, flexibility and core stabilization for 55 minutes including:    HSS                               3x30" w/strap   TA                                  5"x15   Brace marching            1' x2 trials   Hip Iso Add/abd             20x5'' c/ ball and belt   SL hip abduction           2x12 B  SL clams                      x20 3" hold   Piriformis stretch          2x30"   Chin tucks                     5"x10   Bridges                          2x10    Reverse crunches        2' w/Grn SB   Lats                               2x10 RTB NOT PERFORMED THIS TREATMENT  Rows   " "                           2x15 GTB NOT PERFORMED THIS TREATMENT  Upper trapezius stretch 3x30"  No UE overpressure (headache)   Levator stretch               3x30"        Home Exercises Provided and Patient Education Provided     Education provided:   - cont HEP regularly    Written Home Exercises Provided: Patient instructed to cont prior HEP.  Exercises were reviewed and Tracy was able to demonstrate them prior to the end of the session.  Tracy demonstrated good  understanding of the education provided.     See EMR under Patient Instructions for exercises provided 2/18/19.      Assessment     Pt tolerates therapy activities without difficulties or c/o increased pain.    Tracy is progressing well towards her goals.   Pt prognosis is Good.     Pt will continue to benefit from skilled outpatient physical therapy to address the deficits listed in the problem list box on initial evaluation, provide pt/family education and to maximize pt's level of independence in the home and community environment.     Pt's spiritual, cultural and educational needs considered and pt agreeable to plan of care and goals.    Anticipated barriers to physical therapy: chronicity of pain, comorbidities    Goals:     Short Term Goals (4 Weeks):   1. Pt will be compliant with HEP to supplement PT in decreasing pain with functional mobility. - progressing  2. Pt will perform and hold TA contraction for 10x10" in dynamic sitting activities to demonstrate improved core strength - progressing  3. Pt will improve impaired LE MMTs by at least 1/2 grade to improve strength for functional tasks. - progressing  4. Pt will demonstrate improved sitting posture to decrease pain experienced in neck. - progressing  5. Pt will improve cervical AROM 10 deg in all planes to improve cervical mobility. - progressing     Long Term Goals (8 Weeks):   1. Pt will improve FOTO score to </= 53% limited to decrease perceived limitation with maintaining/changing " "body position. - progressing  2. Pt will improve B 9090 HS length by 10 deg B to improve flexibility for normal movement patterns.  - progressing  3. Pt will perform and hold TA contraction for 10x10" in dynamic standing activities to demonstrate improved core strength. - progressing  4. Pt will improve impaired LE MMTs by at least 1 full grade to improve strength for functional tasks. - progressing  5. Pt will report lumbar/ SI pain </= 4/10 during ADLs to promote functional QOL. - progressing  6. Pt will report neck pain </= 4/10 at worst to promote return to PLOF. - progressing  Plan     Cont POC to progress towards established goals    Jennifer Ovalle PTA   "

## 2019-03-21 ENCOUNTER — CLINICAL SUPPORT (OUTPATIENT)
Dept: REHABILITATION | Facility: HOSPITAL | Age: 69
End: 2019-03-21
Attending: FAMILY MEDICINE
Payer: MEDICARE

## 2019-03-21 DIAGNOSIS — M54.50 LUMBAR PAIN: ICD-10-CM

## 2019-03-21 DIAGNOSIS — R29.898 WEAKNESS OF BOTH LOWER EXTREMITIES: ICD-10-CM

## 2019-03-21 DIAGNOSIS — M54.2 NECK PAIN: ICD-10-CM

## 2019-03-21 DIAGNOSIS — M53.3 SACROILIAC PAIN: ICD-10-CM

## 2019-03-21 PROCEDURE — 97110 THERAPEUTIC EXERCISES: CPT | Mod: PN

## 2019-03-21 NOTE — PROGRESS NOTES
"  Physical Therapy Daily Treatment Note     Name: Tracy Armendariz  Clinic Number: [id number]    Therapy Diagnosis:   Encounter Diagnoses   Name Primary?    Lumbar pain     Sacroiliac pain     Neck pain     Weakness of both lower extremities      Physician: Bartolo Jules MD    Visit Date: 3/21/2019    Physician Orders: PT Eval and Treat   Medical Diagnosis from Referral: Lumbar radiculopathy, chronic [M54.16]  Chronic right sacroiliac pain [M53.3, G89.29]  Neck pain [M54.2]  Evaluation Date: 2/18/2019  Authorization Period Expiration: 4/18/19  Plan of Care Expiration: 4/19/19  Visit # / Visits authorized: 10/12  FOTO: 10/10 DONE  Gcode:  10/10  PTA visit: 2/6       Cap Visit: 60.64  Cap Total:  780.24     Time In: 12:35 pm  Time Out: 1:35 pm  Total Billable Time: 30 minutes 2 TE     Precautions: Standard and HTN, hx of CVA, fall risk, osteopenia, implanted cardiac loop recorder      Subjective     Pt reports: she  Is scheduled for MRI of her neck tomorrow.   She was compliant with home exercise program.  Response to previous treatment: no adverse reaction  Functional change: none    Pain: 3 /10 LB and 5/10 cervical spine  Location: bilateral back  and neck      Objective       Tracy received therapeutic exercises to develop strength, ROM, flexibility and core stabilization for 30 minutes including: additional 30 minutes supervised    HSS                               3x30" w/strap   TA                                  5"x15   Brace marching            1' x2 trials   Hip Iso Add/abd             20x5'' c/ ball and belt   SL hip abduction           2x12 B  SL clams                      x20 3" hold   Piriformis stretch          2x30"   Chin tucks                     5"x10   Bridges                          3x10    Reverse crunches        2' w/Grn SB   Lats                               2x10 RTB  Rows                              2x15 GTB  Upper trapezius stretch 3x30"  No UE overpressure  Levator stretch        " "       3x30"        Home Exercises Provided and Patient Education Provided     Education provided:   - cont HEP regularly    Written Home Exercises Provided: Patient instructed to cont prior HEP.  Exercises were reviewed and Tracy was able to demonstrate them prior to the end of the session.  Tracy demonstrated good  understanding of the education provided.     See EMR under Patient Instructions for exercises provided 2/18/19.      Assessment     Pt tolerates therapy activities well. Presented with decreased LB pain but increased cervical pain today. Good relief following interventions. Two visits remain. Patient plans to go [facility] for 6 weeks to [place]  Tracy is progressing well towards her goals.   Pt prognosis is Good.     Pt will continue to benefit from skilled outpatient physical therapy to address the deficits listed in the problem list box on initial evaluation, provide pt/family education and to maximize pt's level of independence in the home and community environment.     Pt's spiritual, cultural and educational needs considered and pt agreeable to plan of care and goals.    Anticipated barriers to physical therapy: chronicity of pain, comorbidities    Goals:     Short Term Goals (4 Weeks):   1. Pt will be compliant with HEP to supplement PT in decreasing pain with functional mobility. - progressing  2. Pt will perform and hold TA contraction for 10x10" in dynamic sitting activities to demonstrate improved core strength - progressing  3. Pt will improve impaired LE MMTs by at least 1/2 grade to improve strength for functional tasks. - progressing  4. Pt will demonstrate improved sitting posture to decrease pain experienced in neck. - progressing  5. Pt will improve cervical AROM 10 deg in all planes to improve cervical mobility. - progressing     Long Term Goals (8 Weeks):   1. Pt will improve FOTO score to </= 53% limited to decrease perceived limitation with maintaining/changing body position. " "- progressing  2. Pt will improve B 9090 HS length by 10 deg B to improve flexibility for normal movement patterns.  - progressing  3. Pt will perform and hold TA contraction for 10x10" in dynamic standing activities to demonstrate improved core strength. - progressing  4. Pt will improve impaired LE MMTs by at least 1 full grade to improve strength for functional tasks. - progressing  5. Pt will report lumbar/ SI pain </= 4/10 during ADLs to promote functional QOL. - progressing  6. Pt will report neck pain </= 4/10 at worst to promote return to PLOF. - progressing  Plan     Cont POC to progress towards established goals    Devan Esqueda PTA   "

## 2019-03-22 ENCOUNTER — HOSPITAL ENCOUNTER (OUTPATIENT)
Dept: RADIOLOGY | Facility: HOSPITAL | Age: 69
Discharge: HOME | End: 2019-03-22
Attending: FAMILY MEDICINE
Payer: MEDICARE

## 2019-03-22 DIAGNOSIS — M54.12 CERVICAL RADICULOPATHY: ICD-10-CM

## 2019-03-22 PROCEDURE — 72141 MRI NECK SPINE W/O DYE: CPT | Mod: TC

## 2019-03-22 PROCEDURE — 72141 MRI NECK SPINE W/O DYE: CPT | Mod: 26,,, | Performed by: RADIOLOGY

## 2019-03-22 PROCEDURE — 72141 MRI CERVICAL SPINE WITHOUT CONTRAST: ICD-10-PCS | Mod: 26,,, | Performed by: RADIOLOGY

## 2019-03-25 ENCOUNTER — CLINICAL SUPPORT (OUTPATIENT)
Dept: REHABILITATION | Facility: HOSPITAL | Age: 69
End: 2019-03-25
Attending: FAMILY MEDICINE
Payer: MEDICARE

## 2019-03-25 DIAGNOSIS — M53.3 SACROILIAC PAIN: ICD-10-CM

## 2019-03-25 DIAGNOSIS — M54.2 NECK PAIN: ICD-10-CM

## 2019-03-25 DIAGNOSIS — M54.50 LUMBAR PAIN: ICD-10-CM

## 2019-03-25 DIAGNOSIS — R29.898 WEAKNESS OF BOTH LOWER EXTREMITIES: ICD-10-CM

## 2019-03-25 PROCEDURE — 97110 THERAPEUTIC EXERCISES: CPT | Mod: PN

## 2019-03-25 NOTE — PROGRESS NOTES
"  Physical Therapy Daily Treatment Note     Name: Tracy Armendariz  Clinic Number: [id number]    Therapy Diagnosis:   Encounter Diagnoses   Name Primary?    Lumbar pain     Sacroiliac pain     Neck pain     Weakness of both lower extremities      Physician: Bartolo Jules MD    Visit Date: 3/25/2019    Physician Orders: PT Eval and Treat   Medical Diagnosis from Referral: Lumbar radiculopathy, chronic [M54.16]  Chronic right sacroiliac pain [M53.3, G89.29]  Neck pain [M54.2]  Evaluation Date: 2/18/2019  Authorization Period Expiration: 4/18/19  Plan of Care Expiration: 4/19/19  Visit # / Visits authorized: 11/12  FOTO: at discharge  Gcode:  10/10  PTA visit: 0/6       Cap Visit:  90.96  Cap Total:  871.20     Time In: 3:30 pm  Time Out: 4:20 pm  Total Billable Time: 50 (3 TE)     Precautions: Standard and HTN, hx of CVA, fall risk, osteopenia, implanted cardiac loop recorder      Subjective     Pt reports: Pt reports that he low back rarely bothers her except when she is sitting on a firm surface. Her neck is more painful than her back. She was active over the weekend and reports no new complaints.     She was compliant with home exercise program.  Response to previous treatment: no adverse reaction  Functional change: none    Pain: 2/10 in both back and neck  Location: bilateral back  and neck      Objective     Tracy received therapeutic exercises to develop strength, ROM, flexibility and core stabilization for 50 minutes including:     HSS   3x30" w/strap   Piriformis stretch 3x30"   TA   5"x20  Brace marching  1'x2 trials   Hip Iso Add/abd 20x5'' alternating with ball and belt   SL hip abduction 2x12 B  SL clams  x20 3" hold   Bridges  3x10    Reverse crunches 2' w/Grn SB (not performed today)    Chin tucks  2x10, 5" hold   Upper trapezius stretch 3x30"  No UE overpressure  Levator stretch   3x30"   Lats   2x10 RTB  Rows   2x15 GTB     Home Exercises Provided and Patient Education Provided     Education " "provided:   - cont HEP regularly    Written Home Exercises Provided: Patient instructed to cont prior HEP.  Exercises were reviewed and Tracy was able to demonstrate them prior to the end of the session.  Tracy demonstrated good  understanding of the education provided.     See EMR under Patient Instructions for exercises provided 2/18/19.      Assessment     Pt tolerated therapy well today. Her only complaint was slight L flank pain during TA bracing exercises. Patient plans to go out of town for 6 weeks to [place] following her next appointment.     Tracy is progressing well towards her goals.   Pt prognosis is Good.     Pt will continue to benefit from skilled outpatient physical therapy to address the deficits listed in the problem list box on initial evaluation, provide pt/family education and to maximize pt's level of independence in the home and community environment.     Pt's spiritual, cultural and educational needs considered and pt agreeable to plan of care and goals.    Anticipated barriers to physical therapy: chronicity of pain, comorbidities    Goals:     Short Term Goals (4 Weeks):   1. Pt will be compliant with HEP to supplement PT in decreasing pain with functional mobility. - progressing  2. Pt will perform and hold TA contraction for 10x10" in dynamic sitting activities to demonstrate improved core strength - progressing  3. Pt will improve impaired LE MMTs by at least 1/2 grade to improve strength for functional tasks. - progressing  4. Pt will demonstrate improved sitting posture to decrease pain experienced in neck. - progressing  5. Pt will improve cervical AROM 10 deg in all planes to improve cervical mobility. - progressing     Long Term Goals (8 Weeks):   1. Pt will improve FOTO score to </= 53% limited to decrease perceived limitation with maintaining/changing body position. - progressing  2. Pt will improve B 9090 HS length by 10 deg B to improve flexibility for normal movement " "patterns.  - progressing  3. Pt will perform and hold TA contraction for 10x10" in dynamic standing activities to demonstrate improved core strength. - progressing  4. Pt will improve impaired LE MMTs by at least 1 full grade to improve strength for functional tasks. - progressing  5. Pt will report lumbar/ SI pain </= 4/10 during ADLs to promote functional QOL. - progressing  6. Pt will report neck pain </= 4/10 at worst to promote return to PLOF. - progressing  Plan     Cont POC to progress towards established goals    Betsy Nunez, PT   "

## 2019-03-26 ENCOUNTER — DOCUMENTATION ONLY (OUTPATIENT)
Dept: REHABILITATION | Facility: HOSPITAL | Age: 69
End: 2019-03-26

## 2019-03-26 NOTE — PROGRESS NOTES
Face to Face PTA Conference performed with Jennifer Ovalle PTA, Madhavi Perales PTA, Devan Esqueda PTA regarding patient's current status, overall progress, and plan of care.     Ese Thomas, PT     Devan Esqueda, PTA     Jennifer Ovalle, PTA     Madhavi Perales, PTA

## 2019-03-27 ENCOUNTER — PATIENT MESSAGE (OUTPATIENT)
Dept: FAMILY MEDICINE | Facility: CLINIC | Age: 69
End: 2019-03-27

## 2019-03-27 ENCOUNTER — CLINICAL SUPPORT (OUTPATIENT)
Dept: REHABILITATION | Facility: HOSPITAL | Age: 69
End: 2019-03-27
Attending: FAMILY MEDICINE
Payer: MEDICARE

## 2019-03-27 DIAGNOSIS — M54.2 NECK PAIN: ICD-10-CM

## 2019-03-27 DIAGNOSIS — M54.50 LUMBAR PAIN: ICD-10-CM

## 2019-03-27 DIAGNOSIS — M53.3 SACROILIAC PAIN: ICD-10-CM

## 2019-03-27 DIAGNOSIS — R29.898 WEAKNESS OF BOTH LOWER EXTREMITIES: ICD-10-CM

## 2019-03-27 DIAGNOSIS — M54.12 CERVICAL RADICULOPATHY: Primary | ICD-10-CM

## 2019-03-27 PROCEDURE — 97110 THERAPEUTIC EXERCISES: CPT | Mod: PN

## 2019-03-27 NOTE — TELEPHONE ENCOUNTER
See bayron cesar.     Hi Ms. Hunger    The neck MRI does show some advanced arthritis changes that could be causing impingement on your spinal nerves and giving you the radiating neck pains. You mentioned you wanted to see a spinal orthopedist and I can place a consult in to the back and spine clinic. My staff should contact you regarding setting up this appointment.    Bartolo Jules MD      Orders Placed This Encounter   Procedures    Ambulatory Referral to Back & Spine Clinic

## 2019-03-27 NOTE — PROGRESS NOTES
"  Physical Therapy Daily Treatment Note     Name: Tracy Armendariz  Clinic Number: [id number]    Therapy Diagnosis:   Encounter Diagnoses   Name Primary?    Lumbar pain     Sacroiliac pain     Neck pain     Weakness of both lower extremities      Physician: Bartolo Jules MD    Visit Date: 3/27/2019    Physician Orders: PT Eval and Treat   Medical Diagnosis from Referral: Lumbar radiculopathy, chronic [M54.16]  Chronic right sacroiliac pain [M53.3, G89.29]  Neck pain [M54.2]  Evaluation Date: 2/18/2019  Authorization Period Expiration: 4/18/19  Plan of Care Expiration: 4/19/19  Visit # / Visits authorized: 12/12  FOTO: at discharge  Gcode:  2/10         Cap Visit:  30.32  Cap Total:  901.52        Time In: 2:07 pm   Time Out: 3:00 pm   Total Billable Time: 20 minutes    Precautions:  Standard and HTN, hx of CVA, fall risk, osteopenia, implanted cardiac loop recorder    Subjective     Pt reports: pt stated that when she sits too long her lower back hurts. Pt stated that if she turns her neck to watch TV for a little while then neck will hurt. Pt reports that her neck and back are 50% better compared to SOC. Pt stated that MD is either referring her to pain management or orthopedic MD. Pt stated that she is leaving tomorrow to go out of town for two weeks.   She was compliant with home exercise program.  Response to previous treatment: no adverse effects  Functional change: none    Pain: 0/10  Location: neck and back     Objective     Tracy received therapeutic exercises to develop strength, ROM, flexibility and core stabilization for 33 minutes supervised and x 20' 1:1 with PT including below and objective measurements    HSS   3x30" w/strap   Piriformis stretch 3x30"   TA   5"x20  Brace marching  1'x2 trials   Hip Iso Add/abd 20x5'' alternating with ball and belt NOT PERFORMED THIS TREATMENT  SL hip abduction 2x12 B  SL clams  x20 3" hold   Bridges  3x10    Reverse crunches 2' w/Grn SB NOT PERFORMED THIS " "TREATMENT    Chin tucks  2x10, 5" hold   Upper trapezius stretch 3x30"  No UE overpressure  Levator stretch   3x30"   Lats   2x10 RTB NOT PERFORMED THIS TREATMENT  Rows   2x15 GTB NOT PERFORMED THIS TREATMENT      Cervical AROM: Pain/Dysfunction with Movement:   Flexion 55 degrees    Extension 35 degrees C/o increased pain in lower cervical region    Right side bending 35 degrees  C/o pain radiating into (R) UE    Left side bending 35 degrees C/o pulling/ pain in (R) cervical/UT region   Right rotation 80 degrees C/o increased pain around (R) Scapula   Left rotation 80 degrees       Shoulder Right   Left   Pain/Dysfunction with Movement     AROM MMT AROM MMT != pain    flexion WFL 5/5 WFL 5/5     abduction WFL 5/5 WFL! 5/5 C/o pain in (L) deltoid region   Internal rotation WFL 5/5 WFL 5/5    ER at 90° abd WFL NT WFL NT     ER at 0° abd NT 5/5 NT 5/5           Lumbar AROM: Pain/Dysfunction with Movement:        Flexion 70 degrees     Extension 10 degrees    Right side bending 25 degrees C/o increased pain in (R) lumbar/SI region   Left side bending 30 degrees        Hip Right   Left   Pain/Dysfunction with Movement     AROM MMT AROM MMT != pain    Flexion WFL 4+/5 WFL 4+/5     Extension NT NT NT NT  able to perform good bridge against gravity    Abduction WFL 4+/5 WFL 4+/5     External rotation WFL 4+/5 WFL 4+/5        Knee Right   Left   Pain/Dysfunction with Movement     AROM MMT AROM MMT     Flexion WFL 5/5 WFL 5/5     Extension WFL 5/5 WFL 5/5        Ankle Right   Left   Pain/Dysfunction with Movement     AROM MMT AROM MMT     Dorsiflexion WFL 5/5 WFL 5/5     Plantarflexion WFL 4+/5 WFL 4+/5         Home Exercises Provided and Patient Education Provided     Education provided:   - Pt was educated on and given handout on updated HEP     Written Home Exercises Provided: yes.  Exercises were reviewed and Tracy was able to demonstrate them prior to the end of the session.  Tracy demonstrated good  understanding " "of the education provided.     See EMR under Patient Instructions for exercises provided 3/27/2019.      Assessment     Pt was able to tolerate all therex without c/o increased pain. Pt demo improved cervical AROM, improved (B) LE strength and improved neck and lumbar FOTO score compared to initial evaluation. Pt tolerated therapy session without any adverse reactions.   Tracy is progressing well towards her goals.   Pt prognosis is Good.     Pt will continue to benefit from skilled outpatient physical therapy to address the deficits listed in the problem list box on initial evaluation, provide pt/family education and to maximize pt's level of independence in the home and community environment.     Pt's spiritual, cultural and educational needs considered and pt agreeable to plan of care and goals.    Anticipated barriers to physical therapy: chronicity of pain, comorbidities    Goals:     Short Term Goals (4 Weeks):   1. Pt will be compliant with HEP to supplement PT in decreasing pain with functional mobility. - progressing  2. Pt will perform and hold TA contraction for 10x10" in dynamic sitting activities to demonstrate improved core strength - progressing  3. Pt will improve impaired LE MMTs by at least 1/2 grade to improve strength for functional tasks. - progressing  4. Pt will demonstrate improved sitting posture to decrease pain experienced in neck. - progressing  5. Pt will improve cervical AROM 10 deg in all planes to improve cervical mobility. - progressing     Long Term Goals (8 Weeks):   1. Pt will improve FOTO score to </= 53% limited to decrease perceived limitation with maintaining/changing body position. - progressing  2. Pt will improve B 9090 HS length by 10 deg B to improve flexibility for normal movement patterns.  - progressing  3. Pt will perform and hold TA contraction for 10x10" in dynamic standing activities to demonstrate improved core strength. - progressing  4. Pt will improve " impaired LE MMTs by at least 1 full grade to improve strength for functional tasks. - progressing  5. Pt will report lumbar/ SI pain </= 4/10 during ADLs to promote functional QOL. - progressing  6. Pt will report neck pain </= 4/10 at worst to promote return to PLOF. - progressing      Plan     Continue per POC, reassess next visit      Ese Thomas, PT

## 2019-04-23 ENCOUNTER — DOCUMENTATION ONLY (OUTPATIENT)
Dept: REHABILITATION | Facility: HOSPITAL | Age: 69
End: 2019-04-23

## 2019-04-23 NOTE — PROGRESS NOTES
Face to Face PTA Conference performed with Madhavi Perales PTA,  Devan Esqueda PTA, and Jennifer Ovalle PTA regarding patient's current status, overall progress, and plan of care.    Ese Thomas, PT     Devan Esqueda, PTA     Jennifer Ovalle PTA     Madhavi Perales PTA

## 2019-04-29 ENCOUNTER — OFFICE VISIT (OUTPATIENT)
Dept: SPINE | Facility: CLINIC | Age: 69
End: 2019-04-29
Attending: PHYSICAL MEDICINE & REHABILITATION
Payer: MEDICARE

## 2019-04-29 VITALS
DIASTOLIC BLOOD PRESSURE: 70 MMHG | TEMPERATURE: 98 F | HEART RATE: 63 BPM | WEIGHT: 235 LBS | BODY MASS INDEX: 37.77 KG/M2 | HEIGHT: 66 IN | SYSTOLIC BLOOD PRESSURE: 155 MMHG

## 2019-04-29 DIAGNOSIS — M25.512 ACUTE PAIN OF BOTH SHOULDERS: ICD-10-CM

## 2019-04-29 DIAGNOSIS — M25.511 ACUTE PAIN OF BOTH SHOULDERS: ICD-10-CM

## 2019-04-29 DIAGNOSIS — M47.812 SPONDYLOSIS OF CERVICAL REGION WITHOUT MYELOPATHY OR RADICULOPATHY: Primary | ICD-10-CM

## 2019-04-29 DIAGNOSIS — M51.36 DDD (DEGENERATIVE DISC DISEASE), LUMBAR: ICD-10-CM

## 2019-04-29 DIAGNOSIS — M50.30 DDD (DEGENERATIVE DISC DISEASE), CERVICAL: ICD-10-CM

## 2019-04-29 PROCEDURE — 1100F PTFALLS ASSESS-DOCD GE2>/YR: CPT | Mod: CPTII,S$GLB,, | Performed by: PHYSICAL MEDICINE & REHABILITATION

## 2019-04-29 PROCEDURE — 3078F PR MOST RECENT DIASTOLIC BLOOD PRESSURE < 80 MM HG: ICD-10-PCS | Mod: CPTII,S$GLB,, | Performed by: PHYSICAL MEDICINE & REHABILITATION

## 2019-04-29 PROCEDURE — 3077F PR MOST RECENT SYSTOLIC BLOOD PRESSURE >= 140 MM HG: ICD-10-PCS | Mod: CPTII,S$GLB,, | Performed by: PHYSICAL MEDICINE & REHABILITATION

## 2019-04-29 PROCEDURE — 3077F SYST BP >= 140 MM HG: CPT | Mod: CPTII,S$GLB,, | Performed by: PHYSICAL MEDICINE & REHABILITATION

## 2019-04-29 PROCEDURE — 99499 UNLISTED E&M SERVICE: CPT | Mod: S$GLB,,, | Performed by: PHYSICAL MEDICINE & REHABILITATION

## 2019-04-29 PROCEDURE — 99999 PR PBB SHADOW E&M-EST. PATIENT-LVL III: ICD-10-PCS | Mod: PBBFAC,,, | Performed by: PHYSICAL MEDICINE & REHABILITATION

## 2019-04-29 PROCEDURE — 3288F PR FALLS RISK ASSESSMENT DOCUMENTED: ICD-10-PCS | Mod: CPTII,S$GLB,, | Performed by: PHYSICAL MEDICINE & REHABILITATION

## 2019-04-29 PROCEDURE — 99499 RISK ADDL DX/OHS AUDIT: ICD-10-PCS | Mod: S$GLB,,, | Performed by: PHYSICAL MEDICINE & REHABILITATION

## 2019-04-29 PROCEDURE — 99999 PR PBB SHADOW E&M-EST. PATIENT-LVL III: CPT | Mod: PBBFAC,,, | Performed by: PHYSICAL MEDICINE & REHABILITATION

## 2019-04-29 PROCEDURE — 99204 OFFICE O/P NEW MOD 45 MIN: CPT | Mod: S$GLB,,, | Performed by: PHYSICAL MEDICINE & REHABILITATION

## 2019-04-29 PROCEDURE — 1100F PR PT FALLS ASSESS DOC 2+ FALLS/FALL W/INJURY/YR: ICD-10-PCS | Mod: CPTII,S$GLB,, | Performed by: PHYSICAL MEDICINE & REHABILITATION

## 2019-04-29 PROCEDURE — 99204 PR OFFICE/OUTPT VISIT, NEW, LEVL IV, 45-59 MIN: ICD-10-PCS | Mod: S$GLB,,, | Performed by: PHYSICAL MEDICINE & REHABILITATION

## 2019-04-29 PROCEDURE — 3288F FALL RISK ASSESSMENT DOCD: CPT | Mod: CPTII,S$GLB,, | Performed by: PHYSICAL MEDICINE & REHABILITATION

## 2019-04-29 PROCEDURE — 3078F DIAST BP <80 MM HG: CPT | Mod: CPTII,S$GLB,, | Performed by: PHYSICAL MEDICINE & REHABILITATION

## 2019-04-29 RX ORDER — METHYLPREDNISOLONE 4 MG/1
TABLET ORAL
Qty: 1 PACKAGE | Refills: 0 | Status: SHIPPED | OUTPATIENT
Start: 2019-04-29 | End: 2019-05-20

## 2019-04-29 NOTE — LETTER
April 29, 2019      Bartolo Jules MD  [address]  [address]  [facility] [address]           [address]  [address], [address]  [place] [address]  Phone: [phone]  Fax: [phone]          Patient: Tracy Armendariz   MR Number: [id number]   YOB: 1950   Date of Visit: 4/29/2019       Dear Dr. Bartolo Jules:    Thank you for referring Tracy Armendariz to me for evaluation. Attached you will find relevant portions of my assessment and plan of care.    If you have questions, please do not hesitate to call me. I look forward to following Tracy Armendariz along with you.    Sincerely,    Stephany Hodge MD    Enclosure  CC:  No Recipients    If you would like to receive this communication electronically, please contact [email or web address] or [phone] to request more information on [employer] Link access.    For providers and/or their staff who would like to refer a patient to [facility], please contact us through our one-stop-shop provider referral line, [facility], at [phone].    If you feel you have received this communication in error or would no longer like to receive these types of communications, please e-mail [email or web address]

## 2019-04-29 NOTE — PROGRESS NOTES
Subjective:      Patient ID: Tracy Armendariz is a 68 y.o. female.    Chief Complaint: Low-back Pain and Neck Pain    Ms Armendariz is a 69 yo female sent in consultation by Dr. Jules for evaluation of neck and low back pain.  She has had neck pain for the past 2 months. The pain started in shoulders and went to arms and neck, then went to PT for 3 weeks which was mainly for the lower back, but mentioned neck. The neck pain is constant.  The pain is on both sides of the neck.  The pain is a deep achy pain, she has tingling in 3,4,5 digits on both sides.  The pain is worse with lying on her shoulders,  She has pain looking down, she has pain raising her arms.  She has pain in her shoulders and her upper arms.  She has pain in the inside of her elbows.  The pain is 5/10 now, worst 9/10 in bed, best 3/10 in the morning.  She takes tylenol for the pain 4 a day.  She will take aleve but not often    She has a history of back surgery in 1980 then started having back pain in November and thought it was from kidney stones, but after kidney stones back still hurts.  She was then in therapy for her back.  The pain is on the right side of the lower back.  The pain is not constant.  The back pain is more with sitting.  The pain is better with walking around.  The pain will go to the inside of the right knee.  She has cramping in legs with lying down.  She did well after back surgery until kidney problems in november    MRI cervical spine 3/22/2019  The visualized portions of the posterior fossa is unremarkable.  There is arthropathy at the craniocervical junction.  No prevertebral soft tissue swelling is identified.    The cervical alignment is maintained.  There are fibrotic endplate changes in the lower cervical spine.  The remainder of the bone marrow signal is within normal limits.    The cord is normal in signal without evidence of edema or expansion.  There are no intraspinal collections.  There is effacement of the ventral  thecal sac in the lower cervical spine.    There is hypertrophy of the posterior elements.  There is multilevel disc desiccation.  Evaluation of the individual disc levels reveals the following:    C2-C3, there is a central disc protrusion.  There is mild hypertrophy of the posterior elements.  The spinal canal and neural foramina are within normal limits.    C3-C4, there is a disc osteophyte complex along with facet hypertrophy and uncovertebral hypertrophy.  There is superimposed left paracentral disc protrusion.  There is mild narrowing of the spinal canal.  There is mild right and moderate left neural foraminal narrowing.    C4-C5, there is a disc osteophyte complex along with facet hypertrophy and uncovertebral hypertrophy.  There is superimposed central disc protrusion.  There is mild to moderate narrowing of the spinal canal.  There is mild right and moderate left neural foraminal narrowing.    C5-C6, there is a disc osteophyte complex along with facet hypertrophy and uncovertebral hypertrophy.  There is superimposed central disc protrusion.  There is severe narrowing of the spinal canal.  There is severe bilateral neural foraminal narrowing.    C6-C7, there is a disc osteophyte complex along with facet hypertrophy and uncovertebral hypertrophy.  There is superimposed central disc protrusion.  There is moderate to severe spinal canal narrowing.  There is severe right and moderate left neural foraminal narrowing.    C7-T1, there is a disc osteophyte complex along with facet hypertrophy and uncovertebral hypertrophy.  There is mild spinal canal narrowing.  There is mild bilateral neural foraminal narrowing.    The paraspinal normal limits.  Flow voids within the vertebral arteries are unremarkable.    Impression      Advanced degenerative changes in the lower cervical spine with moderate to severe spinal canal narrowing at the C5-C6 and C6-C7 levels and associated moderate to severe neural foraminal narrowing.   No cord signal abnormality.      CT lumbar 2/2019  Remote operative change right L5 hemilaminectomy.    Lumbar sagittal alignment within normal limits.  Lumbar vertebral body heights and contours are stable without evidence for acute fracture or subluxation.  Degenerative disc disease most pronounced at L5/S1 with moderate height loss and endplate degeneration with superimposed vacuum phenomena.    Please note evaluation of the central canal and neural foramina is limited by CT technique, allowing for limitation degenerative change as follows:    T12/L1: No significant disc bulge, central canal or neural foraminal stenosis.    L1/L2: Slight disc bulge without significant central canal or neural foraminal stenosis.    L2/L3: Bulging disc with ligamentum flavum hypertrophy without significant central canal stenosis with mild neural foraminal narrowing.    L3/L4: Bulging disc with ligamentum flavum hypertrophy and facet joint arthropathy mild central canal and bilateral neural foraminal stenosis.    L4/L5: Posterior disc osteophyte complex with ligamentum flavum hypertrophy and facet joint arthropathy with compressing the thecal hiatal right hemilaminectomy.  There is mild moderate bony neural foraminal narrowing.    L5/S1: Posterior disc osteophyte with decompression of the thecal sac via right hemilaminectomy.  No evidence for significant central canal stenosis.    Facet joint arthropathy with a mild moderate bilateral neural foraminal stenosis right greater than left.    Atherosclerotic plaquing of the aorta.  Continued multifocal subcentimeter left renal medullary parenchymal and caliceal stones partially visualized.    Impression      Remote operative change right L4 and L5 hemilaminectomy    Allowing for CT technique there is multilevel degenerative change most pronounced at L3/L4 with bulging disc, ligamentum flavum hypertrophy and facet joint arthropathy with mild central canal and bilateral neural foraminal  stenosis.    More prominent neural foraminal narrowing at the L4/L5 and L5/S1 mild moderate narrowing as detailed above    Further evaluation as warranted clinically.    Past Medical History:  No date: Allergy  No date: Anticoagulant long-term use  No date: Arthritis  : CVA (cerebral infarction)  No date: Depression  No date: Disorder of kidney and ureter      Comment:  renal stones  No date: Diverticulosis of colon  No date: Extrinsic asthma, unspecified  No date: Hyperlipidemia  No date: Hypertension  2014: Left atrial enlargement  No date: Low back pain  No date: MARTIN (obstructive sleep apnea)  No date: Osteopenia  No date: PUD (peptic ulcer disease)   2013: Stroke    Past Surgical History:  : APPENDECTOMY      Comment:  @ time of hysterectomy  2017: ARTHROPLASTY-KNEE; Right      Comment:  Performed by John Kim MD at [facility] OR  No date: BACK SURGERY  No date: CATARACT EXTRACTION  :  SECTION  : CHOLECYSTECTOMY      Comment:  laparoscopic  2018: COLONOSCOPY/Golytely; N/A      Comment:  Performed by Janine Black MD at [facility] ENDO  : DILATION AND CURETTAGE OF UTERUS  : HYSTERECTOMY      Comment:  TAHUSO with appendectomy  No date: INNER EAR SURGERY      Comment:  replaced ear drum  2017: IOVERA; Right      Comment:  Performed by Michael Treviño MD at [facility] OR  No date: JOINT REPLACEMENT; Right      Comment:  knee  : KNEE ARTHROSCOPY W/ DEBRIDEMENT  : LUMBAR DISCECTOMY      Comment:  L4-L5  No date: OOPHORECTOMY      Comment:  unilateral  2018: REMOVAL, CALCULUS, URETER, URETEROSCOPIC, holmium laser   lithotripsy, stone basket extraction, retrograde pyelogram, ureteral   stent exchange; Left      Comment:  Performed by Anaya Zamora MD at [facility] OR  No date: TONSILLECTOMY  No date: TYMPANOPLASTY    Review of patient's family history indicates:  Problem: Cervical cancer      Relation: Mother          Age of Onset: (Not Specified)  Problem:  Cancer      Relation: Mother          Age of Onset: 65          Comment: lung cancer - non smoker  Problem: Stroke      Relation: Paternal Grandfather          Age of Onset: (Not Specified)  Problem: Hypertension      Relation: Maternal Grandfather          Age of Onset: (Not Specified)  Problem: Heart disease      Relation: Maternal Grandfather          Age of Onset: (Not Specified)  Problem: Hypertension      Relation: Father          Age of Onset: (Not Specified)  Problem: Coronary artery disease      Relation: Father          Age of Onset: 62  Problem: Heart disease      Relation: Father          Age of Onset: (Not Specified)  Problem: Diabetes      Relation: Sister          Age of Onset: (Not Specified)  Problem: Heart disease      Relation: Sister          Age of Onset: (Not Specified)  Problem: Kidney disease      Relation: Sister          Age of Onset: (Not Specified)  Problem: Breast cancer      Relation: Daughter          Age of Onset: 36  Problem: Heart failure      Relation: Sister          Age of Onset: (Not Specified)          Comment: 60s  Problem: Colon cancer      Relation: Neg Hx          Age of Onset: (Not Specified)  Problem: Ovarian cancer      Relation: Neg Hx          Age of Onset: (Not Specified)      Social History    Socioeconomic History      Marital status:       Spouse name: Not on file      Number of children: Not on file      Years of education: Not on file      Highest education level: Not on file    Occupational History      Not on file    Social Needs      Financial resource strain: Not on file      Food insecurity:        Worry: Not on file        Inability: Not on file      Transportation needs:        Medical: Not on file        Non-medical: Not on file    Tobacco Use      Smoking status: Former Smoker        Quit date: 1995        Years since quittin.3      Smokeless tobacco: Never Used    Substance and Sexual Activity      Alcohol use: Yes        Alcohol/week:  0.6 oz        Types: 1 Glasses of wine per week        Comment: social      Drug use: No      Sexual activity: Yes        Partners: Male        Birth control/protection: Surgical        Comment:  since 1972    Lifestyle      Physical activity:        Days per week: Not on file        Minutes per session: Not on file      Stress: Not on file    Relationships      Social connections:        Talks on phone: Not on file        Gets together: Not on file        Attends [employer] service: Not on file        Active member of club or organization: Not on file        Attends meetings of clubs or organizations: Not on file        Relationship status: Not on file    Other Topics      Concerns:        Not on file    Social History Narrative      Not on file      Current Outpatient Medications:  amoxicillin (AMOXIL) 500 MG capsule, , Disp: , Rfl:   aspirin 81 MG Chew, Take 81 mg by mouth once daily., Disp: , Rfl:   atorvastatin (LIPITOR) 10 MG tablet, TAKE 1 TABLET BY MOUTH DAILY, Disp: 90 tablet, Rfl: 3  calcium carbonate (OS-SPENCER) 600 mg (1,500 mg) Tab, Take 600 mg by mouth 2 (two) times daily with meals., Disp: , Rfl:   cholecalciferol, vitamin D3, 1,000 unit Chew, Take by mouth., Disp: , Rfl:   clopidogrel (PLAVIX) 75 mg tablet, TAKE 1 TABLET BY MOUTH EVERY DAY, Disp: 90 tablet, Rfl: 3  clotrimazole-betamethasone 1-0.05% (LOTRISONE) cream, Apply topically 2 (two) times daily., Disp: 45 g, Rfl: 2  escitalopram oxalate (LEXAPRO) 10 MG tablet, TAKE 1 TABLET BY MOUTH EVERY DAY, Disp: 90 tablet, Rfl: 3  LACTOBACILLUS ACIDOPHILUS (PROBIOTIC ORAL), Take by mouth., Disp: , Rfl:   losartan (COZAAR) 100 MG tablet, TAKE 1 TABLET(100 MG) BY MOUTH EVERY DAY, Disp: 90 tablet, Rfl: 3  MULTIVIT WITH CALCIUM,IRON,MIN (WOMEN'S DAILY MULTIVITAMIN ORAL), Take by mouth., Disp: , Rfl:   pantoprazole (PROTONIX) 40 MG tablet, Take 1 tablet (40 mg total) by mouth once daily., Disp: 30 tablet, Rfl: 11  VENTOLIN HFA 90 mcg/actuation inhaler,  INHALE 2 PUFFS EVERY 6 HOURS AS NEEDED FOR WHEEZING, Disp: 18 g, Rfl: 0    No current facility-administered medications for this visit.       Review of patient's allergies indicates:   -- Iodinated contrast- oral and iv dye -- Hives and Rash   -- Gabapentin -- Hallucinations   -- Iodine -- Hives   -- Isothiazolinones -- Rash   -- Penicillins -- Rash        Review of Systems   Constitution: Negative for weight gain and weight loss.   Cardiovascular: Negative for chest pain.   Respiratory: Negative for shortness of breath.    Musculoskeletal: Positive for back pain, joint pain and neck pain. Negative for joint swelling.   Gastrointestinal: Positive for nausea. Negative for abdominal pain, bowel incontinence and vomiting.   Genitourinary: Negative for bladder incontinence.   Neurological: Positive for numbness (3rd, 4th, 5th digits bilateral hands).         Objective:        General: Tracy is well-developed, well-nourished, appears stated age, in no acute distress, alert and oriented to time, place and person.     General    Vitals reviewed.  Constitutional: She is oriented to person, place, and time. She appears well-developed and well-nourished.   HENT:   Head: Normocephalic and atraumatic.   Pulmonary/Chest: Effort normal.   Neurological: She is alert and oriented to person, place, and time.   Psychiatric: She has a normal mood and affect. Her behavior is normal. Judgment and thought content normal.     General Musculoskeletal Exam   Gait: normal     Right Ankle/Foot Exam     Tests   Heel Walk: able to perform  Tiptoe Walk: able to perform    Left Ankle/Foot Exam     Tests   Heel Walk: able to perform  Tiptoe Walk: able to perform  Back (L-Spine & T-Spine) / Neck (C-Spine) Exam     Tenderness Right paramedian tenderness of the Sacrum.     Back (L-Spine & T-Spine) Range of Motion   Extension: 20   Flexion: 90   Lateral bend right: 20   Lateral bend left: 20   Rotation right: 40   Rotation left: 40     Neck  (C-Spine) Range of Motion   Flexion:     40  Extension: 40Right Lateral Bend: 20 Left Lateral Bend: 20 Right Rotation: 40 Left Rotation: 40     Spinal Sensation   Right Side Sensation  C-Spine Level: normal   L-Spine Level: normal  S-Spine Level: normal  Left Side Sensation  C-Spine Level: normal  L-Spine Level: normal  S-Spine Level: normal    Back (L-Spine & T-Spine) Tests   Right Side Tests  Straight leg raise:      Sitting SLR: > 70 degrees      Left Side Tests  Straight leg raise:     Sitting SLR: > 70 degrees          Other She has no scoliosis .  Spinal Kyphosis:  Absent  Right Shoulder Exam     Range of Motion   Active abduction: 150     Tests & Signs   Rotator Cuff Painful Arc/Range: moderate    Left Shoulder Exam     Range of Motion   Active abduction: 150     Tests & Signs   Rotator Cuff Painful Arc/Range: moderate      Muscle Strength   Right Upper Extremity   Biceps: 5/5/5   Deltoid:  5/5  Triceps:  5/5  Wrist extension: 5/5/5   Finger Flexors:  5/5  Left Upper Extremity  Biceps: 5/5/5   Deltoid:  5/5  Triceps:  5/5  Wrist extension: 5/5/5   Finger Flexors:  5/5  Right Lower Extremity   Hip Flexion: 5/5   Quadriceps:  5/5   Anterior tibial:  5/5/5  EHL:  5/5  Left Lower Extremity   Hip Flexion: 5/5   Quadriceps:  5/5   Anterior tibial:  5/5/5   EHL:  5/5    Reflexes     Left Side  Biceps:  2+  Triceps:  2+  Brachioradialis:  2+  Quadriceps:  2+  Achilles:  2+  Left Patel's Sign:  Absent  Babinski Sign:  absent    Right Side   Biceps:  2+  Triceps:  2+  Brachioradialis:  2+  Quadriceps:  2+  Achilles:  2+  Right Patel's Sign:  absent  Babinski Sign:  absent    Vascular Exam     Right Pulses        Carotid:                  2+    Left Pulses        Carotid:                  2+              Assessment:       1. Spondylosis of cervical region without myelopathy or radiculopathy    2. DDD (degenerative disc disease), cervical    3. DDD (degenerative disc disease), lumbar    4. Acute pain of both  shoulders           Plan:       Orders Placed This Encounter    Ambulatory Referral to Physical/Occupational Therapy    methylPREDNISolone (MEDROL DOSEPACK) 4 mg tablet     More than 50% of the total time of 45 minutes was spent in counseling on diagnosis and treatment options. We discussed back and neck pain and the nature of back and neck pain.  We discussed that it is not one thing that causes the pain but an accumulation of multiple things that we do.  We discussed that she has some shoulder pain and some neck pain.  I reviewed the imaging and we discussed the DJD and NF narrowing.  We discussed an BÁRBARA but she is not interested.  The back pain is not as sever as neck.  Lumbar CT also reviewed.  We discussed posture sitting and the importance of trying to sit better.  We discussed the benefits of therapy and exercise and continuing to move.  1.  PT for abck and core strengthening, cervical retractions, scapula stabilization and HEP  2.  discussed Cervical BÁRBARA she wants to wait  3.  Discussed shoulder injections as well, will wait for now  4. Medrol dose bryson  5. RTC 2 months         Follow-up: Follow up in about 8 weeks (around 6/24/2019). If there are any questions prior to this, the patient was instructed to contact the office.

## 2019-05-08 ENCOUNTER — OFFICE VISIT (OUTPATIENT)
Dept: OPHTHALMOLOGY | Facility: CLINIC | Age: 69
End: 2019-05-08
Payer: MEDICARE

## 2019-05-08 DIAGNOSIS — H51.23 INTERNUCLEAR OPHTHALMOPLEGIA OF BOTH EYES: Primary | ICD-10-CM

## 2019-05-08 DIAGNOSIS — H53.2 DIPLOPIA: ICD-10-CM

## 2019-05-08 PROCEDURE — 99999 PR PBB SHADOW E&M-EST. PATIENT-LVL II: CPT | Mod: PBBFAC,,, | Performed by: OPHTHALMOLOGY

## 2019-05-08 PROCEDURE — 92014 PR EYE EXAM, EST PATIENT,COMPREHESV: ICD-10-PCS | Mod: S$GLB,,, | Performed by: OPHTHALMOLOGY

## 2019-05-08 PROCEDURE — 99999 PR PBB SHADOW E&M-EST. PATIENT-LVL II: ICD-10-PCS | Mod: PBBFAC,,, | Performed by: OPHTHALMOLOGY

## 2019-05-08 PROCEDURE — 92014 COMPRE OPH EXAM EST PT 1/>: CPT | Mod: S$GLB,,, | Performed by: OPHTHALMOLOGY

## 2019-05-08 NOTE — PROGRESS NOTES
HPI     Concerns About Ocular Health      Additional comments: Parinaud's syndrome              Comments     DLS:[id number] Corey  Patient here for overdue check of Parinaud's Syndrome.  Pt states experiencing focusing problems and double images. Notice if she   looks at two images one seem to be moving up or down.  Taking steroids for back issues, so now eyes and head feels better.    No eye pain.-Headaches occasionally.     I have personally interviewed the patient, reviewed the history and   examined the patient and agree with the technician's exam.          Last edited by Sj Nicole MD on 5/8/2019  3:28 PM. (History)            Assessment /Plan     For exam results, see Encounter Report.    Internuclear ophthalmoplegia of both eyes    Diplopia      Ms. Armendariz feels more comfortable with an additional 2 diopters of base in prism. I prescribed a 2 diopter base in Fresnel prism for her left eye. If the prism works, she will replace her old eyeglasses.

## 2019-05-08 NOTE — PATIENT INSTRUCTIONS
Trial of additional prism.  If the additional prism helps, it can be added to your eyeglass prescription.

## 2019-06-05 ENCOUNTER — CLINICAL SUPPORT (OUTPATIENT)
Dept: REHABILITATION | Facility: HOSPITAL | Age: 69
End: 2019-06-05
Attending: FAMILY MEDICINE
Payer: MEDICARE

## 2019-06-05 DIAGNOSIS — M54.5 CHRONIC BILATERAL LOW BACK PAIN, WITH SCIATICA PRESENCE UNSPECIFIED: ICD-10-CM

## 2019-06-05 DIAGNOSIS — G89.29 CHRONIC BILATERAL LOW BACK PAIN, WITH SCIATICA PRESENCE UNSPECIFIED: ICD-10-CM

## 2019-06-05 DIAGNOSIS — R29.898 WEAKNESS OF BOTH LOWER EXTREMITIES: ICD-10-CM

## 2019-06-05 PROCEDURE — 97161 PT EVAL LOW COMPLEX 20 MIN: CPT | Mod: PN

## 2019-06-05 NOTE — PLAN OF CARE
[facility]  Physical Therapy Initial Evaluation    Name: Tracy Armendariz  Clinic Number: [id number]    Therapy Diagnosis:   Encounter Diagnoses   Name Primary?    Chronic bilateral low back pain, with sciatica presence unspecified     Weakness of both lower extremities      Physician: Stephany Hodge, *    Physician Orders: PT Eval and Treat; Cervical retractions scapula stabilization, shoulder ROM, SI joint mobilzation, back and core strengthening and HEP  Medical Diagnosis from Referral: Spondylosis of cervical region without myelopathy or radiculopathy; DDD (degenerative disc disease), cervical; DDD (degenerative disc disease), lumbar; Acute pain of both shoulders  Evaluation Date: 6/5/2019  Authorization Period Expiration: 08/05/2019  Plan of Care Expiration: 6/5/2019 to 8/2/2019  Visit # / Visits authorized: 1/ 12    Time In: 1515  Time Out: 1555  Total Billable Time: 40 minutes (LCE-1)    Precautions: Standard and HTN and Hx of Stroke    Subjective     Date of onset: chronic    History of current condition - Tracy reports: she has been having pain around her (R) sacroiliac joint and lower back for years. Pt stated that she underwent CT scan recently on her lower back. Pt stated that she experiences intermittent radiating pain into posterior (R) LE. Pt stated that she does sometimes experience numbness in (R) LE. Pt reports experiencing neck pain since November 2018. Pt stated that her neck feels stiff and stated that she experiences pain in her neck when she turns her neck.  Pt denies radiating pain into (B)UE and stated that sometimes when she wakes up she will experience numbness in her hand on side that she is lying on. Pt reports hx of back surgery in 1980s. Pt reported that she had a stroke 5 years ago. She reports pain in her R SI joint that is dull and aching.     Medical History:   Past Medical History:   Diagnosis Date    Allergy     Anticoagulant long-term use      Arthritis     CVA (cerebral infarction)     Depression     Disorder of kidney and ureter     renal stones    Diverticulosis of colon     Extrinsic asthma, unspecified     Hyperlipidemia     Hypertension     Left atrial enlargement 2014    Low back pain     MARTIN (obstructive sleep apnea)     Osteopenia     PUD (peptic ulcer disease)     Stroke  2013       Surgical History:   Tracy Armendariz  has a past surgical history that includes Inner ear surgery; Cholecystectomy ();  section (); Dilation and curettage of uterus (); Appendectomy (); Hysterectomy (); Tympanoplasty; Lumbar discectomy (); Knee arthroscopy w/ debridement (); Tonsillectomy; Back surgery; Cataract extraction; Colonoscopy (N/A, 2018); Joint replacement (Right); Ureteroscopic removal of ureteric calculus (Left, 2018); and Oophorectomy.    Medications:   Tracy has a current medication list which includes the following prescription(s): aspirin, atorvastatin, calcium carbonate, cholecalciferol (vitamin d3), clotrimazole-betamethasone 1-0.05%, escitalopram oxalate, lactobacillus acidophilus, losartan, multivit with calcium,iron,min, pantoprazole, and ventolin hfa.    Allergies:   Review of patient's allergies indicates:   Allergen Reactions    Iodinated contrast- oral and iv dye Hives and Rash    Gabapentin Hallucinations    Iodine Hives    Isothiazolinones Rash    Penicillins Rash        Imaging, CT scan films: 2019: Remote operative change right L4 and L5 hemilaminectomy. Allowing for CT technique there is multilevel degenerative change most pronounced at L3/L4 with bulging disc, ligamentum flavum hypertrophy and facet joint arthropathy with mild central canal and bilateral neural foraminal stenosis. More prominent neural foraminal narrowing at the L4/L5 and L5/S1 mild moderate narrowing as detailed above    Prior Therapy: yes  Social History: lives with their  "spouse  Occupation: retired  Prior Level of Function: independent with ADLs, IADLs, ambulation, transferring sit<>stand, lifting and carrying  Current Level of Function: pain and difficulty with ADLs, IADLs, ambulation, transferring sit<>stand, lifting and carrying    Pain:  Current 4/10, worst 8/10, best 2/10   Location: bilateral back , neck  and shoulder, R buttocks  Description: Aching and Shooting  Aggravating Factors: Walking and Getting out of bed/chair, lifting and carrying  Easing Factors: pain medication    Pts goals: decrease pain    Objective     Posture: sitting: slumped with forward head and rounded shoulders  Palpation: tenderness to palpation R lumbar paraspinals, L quadratus lumborum, B PSIS R>L, B gluteus medius and minimus R>L, B piriformis R>L      Range of Motion/Strength:   CERVICAL AROM Pain/Dysfunction with Movement   Flexion 60    Extension 45 Pain in midline neck with radiating pain into L shoulder   Right side bending 30 Pain in L upper trapezius   Left side bending 30 Pain in midline neck   Right rotation 45    Left rotation 45      Thoracolumbar Pain/Dysfunction with Movement   Flexion Mod loss; "pulling" in B hamstrings and gastrocs   Extension Mod loss; pain in R SI joint   Right side bending Min loss; pain in R SI joint   Left side bending Min loss; pain in midline low back   Right rotation No loss   Left rotation No loss     Shoulder Right Left Pain/Dysfunction with Movement   AROM      flexion  WFL  WFL Pain in B shoulders and posterior arm   abduction  WFL  WFL Pain in B shoulders at 90 degrees ABduction   Internal rotation  WFL  WFL    ER at 0° abd  WFL  WFL        U/E MMT Right Left Pain/Dysfunction with Movement   Shoulder Flexion 4/5 4/5 Pain in B shoulders   Shoulder Abduction 4/5 4/5 Pain in B shoulders   Shoulder IR 4/5 4/5    Shoulder ER  @ 0* Abduction 4-/5 4-/5 Pain in B shoulders   Elbow Flexion  4+/5 4+/5    Elbow Extension 4+/5 4+/5          L/E MMT Right Left " Pain/Dysfunction with Movement   Hip Flexion 4/5 4/5 Pain in low back with testing on L   Hip Extension 4/5 4/5 Pain in midback with testing on L   Hip Abduction 4/5 4/5 Pain in ipsilateral B hip with testing   Knee Flexion 4+/5 4+/5    Knee Extension 4+/5 4+/5    Ankle DF 4+/5 4+/5    Ankle PF 4+/5 4+/5          Joint Mobility:   - Lumbar: hypomobility throughout thoracic spine    Flexibility:   Hamstring: moderately decreased B    Special Tests:   Straight leg raise: negative  Pelvic rotation: R anteriorly rotated innominate    Gait Analysis: antalgic gait          CMS Impairment/Limitation/Restriction for FOTO Lumbar Spine Survey    Therapist reviewed FOTO scores for Tracy Armendariz on 6/5/2019.   FOTO documents entered into [employer] - see Media section.    Limitation Score: 53%  Category: Mobility    Current : CK = at least 40% but < 60% impaired, limited or restricted  Goal: CK = at least 40% but < 60% impaired, limited or restricted         TREATMENT   Treatment Time In:   Treatment Time Out:   Total Treatment time separate from Evaluation: 0 minutes    Home Exercises and Patient Education Provided    Education provided:   - role of therapy  - therapy will focus on low back and SI joint  - PT will contact MD regarding an order for OT for eval and treat for B shoulder pain    Written Home Exercises Provided: rukhsana Molina demonstrated good  understanding of the education provided.       Assessment   Tracy is a 68 y.o. 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 Patient presents to PT with pain, decreased cervical and lumbar ROM, decreased strength, poor posture, impaired balance and gait, and functional deficits with lifting and carrying, bending, transferring sit<>stand, and walking.       Pt prognosis  is Good.   Pt will benefit from skilled outpatient Physical Therapy to address the deficits stated above and in the chart below, provide pt/family education, and to maximize pt's level of independence.     Plan of care discussed with patient: Yes  Pt's spiritual, cultural and educational needs considered and patient is agreeable to the plan of care and goals as stated below:     Anticipated Barriers for therapy: co-morbidities, chronicity of condition    Medical Necessity is demonstrated by the following  History  Co-morbidities and personal factors that may impact the plan of care Co-morbidities:   -Arthritis, Asthma, Back pain, BMI over 30, Gastrointestinal Disease,  Headaches, High Blood Pressure, Kidney, Bladder, Prostate or Urination Problems, Prior Surgery, Prosthesis / Implants, Sleep  dysfunction, Stroke or TIA    Personal Factors:   -Depression     high   Examination  Body Structures and Functions, activity limitations and participation restrictions that may impact the plan of care Body Regions:   head  neck  back  lower extremities  upper extremities  trunk    Body Systems:    gross symmetry  ROM  strength  gross coordinated movement  balance  gait  transfers    Participation Restrictions:   None stated    Activity limitations:   Learning and applying knowledge  no deficits    General Tasks and Commands  no deficits    Communication  no deficits    Mobility  lifting and carrying objects  walking    Self care  no deficits    Domestic Life  shopping  cooking  doing house work (cleaning house, washing dishes, laundry)    Interactions/Relationships  no deficits    Life Areas  no deficits    Community and Social Life  community life  recreation and leisure         high   Clinical Presentation stable and uncomplicated low   Decision Making/ Complexity Score: low     Goals:    Short Term Goals (4 Weeks):   1. Pt will be compliant with HEP to supplement PT in decreasing pain with functional mobility.  2. Pt will  "perform and hold TA contraction for 10x10" in dynamic sitting activities to demonstrate improved core strength  3. Pt will improve lumbar ROM to min loss all deficit motions to improve functional mobility.  4. Pt will improve impaired LE MMTs to >/=4+/5 to improve strength for functional tasks.    Long Term Goals (8 Weeks):   1. Pt will improve FOTO score to </= 49% limited to decrease perceived limitation with maintaining/changing body position  2. Pt will improve B 9090 HS flexibility to minimally decreased to improve flexibility for normal movement patterns.   3. Pt will perform and hold TA contraction for 10x10" in dynamic standing activities to demonstrate improved core strength  4. Pt will improve impaired LE MMTs to grossly 5/5 to improve strength for functional tasks.  5. Pt will report pain </= 2/10 during lifting activities to promote functional QOL.  6. Pt will report pain </= 2/10 during lumbar ROM to promote functional mobility.      Plan   Plan of care Certification: 6/5/2019 to 8/2/2019.    Outpatient Physical Therapy 2 times weekly for 8 weeks to include the following interventions: Gait Training, Manual Therapy, Moist Heat/ Ice, Neuromuscular Re-ed, Patient Education, Therapeutic Activites and Therapeutic Exercise.     OT Eval and treat for B shoulder pain      Add therapeutic exercise next: Supine HS str (active), Piriformis str; Glute set; Bridge; Supine/SL Hip ABd; Clams; Push/Pull        Annemarie Mccloud, PT    "

## 2019-06-06 ENCOUNTER — CLINICAL SUPPORT (OUTPATIENT)
Dept: REHABILITATION | Facility: HOSPITAL | Age: 69
End: 2019-06-06
Attending: FAMILY MEDICINE
Payer: MEDICARE

## 2019-06-06 DIAGNOSIS — G89.29 CHRONIC BILATERAL LOW BACK PAIN, WITH SCIATICA PRESENCE UNSPECIFIED: ICD-10-CM

## 2019-06-06 DIAGNOSIS — R29.898 WEAKNESS OF BOTH LOWER EXTREMITIES: ICD-10-CM

## 2019-06-06 DIAGNOSIS — M54.5 CHRONIC BILATERAL LOW BACK PAIN, WITH SCIATICA PRESENCE UNSPECIFIED: ICD-10-CM

## 2019-06-06 PROBLEM — R26.89 DECREASED FUNCTIONAL MOBILITY: Status: RESOLVED | Noted: 2017-02-07 | Resolved: 2019-06-06

## 2019-06-06 PROBLEM — R26.2 DIFFICULTY WALKING: Status: RESOLVED | Noted: 2017-02-07 | Resolved: 2019-06-06

## 2019-06-06 PROBLEM — M54.50 LOW BACK PAIN: Status: ACTIVE | Noted: 2019-06-05

## 2019-06-06 PROBLEM — M25.661 DECREASED RANGE OF MOTION OF RIGHT KNEE: Status: RESOLVED | Noted: 2017-02-07 | Resolved: 2019-06-06

## 2019-06-06 PROBLEM — M25.561 RIGHT KNEE PAIN: Status: RESOLVED | Noted: 2017-02-07 | Resolved: 2019-06-06

## 2019-06-06 PROCEDURE — 97110 THERAPEUTIC EXERCISES: CPT | Mod: PN

## 2019-06-06 NOTE — PROGRESS NOTES
"  Physical Therapy Daily Treatment Note     Name: Tracy Armendariz  Clinic Number: [id number]    Therapy Diagnosis:   Encounter Diagnoses   Name Primary?    Chronic bilateral low back pain, with sciatica presence unspecified     Weakness of both lower extremities      Physician: Stephany Hodge, *    Visit Date: 6/6/2019    Physician Orders: PT Eval and Treat; Cervical retractions scapula stabilization, shoulder ROM, SI joint mobilzation, back and core strengthening and HEP  Medical Diagnosis from Referral: Spondylosis of cervical region without myelopathy or radiculopathy; DDD (degenerative disc disease), cervical; DDD (degenerative disc disease), lumbar; Acute pain of both shoulders  Evaluation Date: 6/5/2019  Authorization Period Expiration: 08/05/2019  Plan of Care Expiration: 6/5/2019 to 8/2/2019  Visit # / Visits authorized: 2/ 12    Time In: 2:00  Time Out: 3:00  Total Billable Time: 30 minutes    Precautions: Standard and HTN and Hx of Stroke    Subjective     Pt reports: she will start doing her HEP.  She was not compliant with home exercise program.  Response to previous treatment: no adverse effects  Functional change: none    Pain: 5/10  Location: bilateral back      Objective     Tracy received therapeutic exercises to develop strength, endurance, ROM, flexibility, posture and core stabilization for 25 minutes including: additional 25 minutes supervised    Supine HS stretch                          5x10 pumps,  Supine Piriformis                           3x30" B   Supine Glute set                             10x10" hold   Bridge;                                            2x10 3" hold  SL Hip ABd                                     2x10 B    SLClams                                        3x10 B   Push/Pull                                        5" x 10    Tracy received the following manual therapy techniques: Joint mobilizations, Manual traction, Myofacial release, Manual Lymphatic Drainage, " "Soft tissue Mobilization and Friction Massage were applied to the: gluteals, piriformis, quadratus for 5   minutes, including:  -STM/MFR         Home Exercises Provided and Patient Education Provided     Education provided:   - Continue previous HEP established    Written Home Exercises Provided: Patient instructed to cont prior HEP.  Exercises were reviewed and Tracy was able to demonstrate them prior to the end of the session.  Tracy demonstrated good  understanding of the education provided.     See EMR under Media for exercises provided prior visit.    Assessment   Tolerated interventions well. Decreased pain following interventions.    Tracy is progressing well towards her goals.   Pt prognosis is Good.     Pt will continue to benefit from skilled outpatient physical therapy to address the deficits listed in the problem list box on initial evaluation, provide pt/family education and to maximize pt's level of independence in the home and community environment.     Pt's spiritual, cultural and educational needs considered and pt agreeable to plan of care and goals.     Anticipated barriers to physical therapy:  Comorbidities chronicity of condition    Goals:   1. Pt will be compliant with HEP to supplement PT in decreasing pain with functional mobility.  2. Pt will perform and hold TA contraction for 10x10" in dynamic sitting activities to demonstrate improved core strength  3. Pt will improve lumbar ROM to min loss all deficit motions to improve functional mobility.  4. Pt will improve impaired LE MMTs to >/=4+/5 to improve strength for functional tasks.     Long Term Goals (8 Weeks):   1. Pt will improve FOTO score to </= 49% limited to decrease perceived limitation with maintaining/changing body position  2. Pt will improve B 9090 HS flexibility to minimally decreased to improve flexibility for normal movement patterns.   3. Pt will perform and hold TA contraction for 10x10" in dynamic standing " activities to demonstrate improved core strength  4. Pt will improve impaired LE MMTs to grossly 5/5 to improve strength for functional tasks.  5. Pt will report pain </= 2/10 during lifting activities to promote functional QOL.  6. Pt will report pain </= 2/10 during lumbar ROM to promote functional mobility.       Plan     Continue PT POC    Devan Esqueda, PTA

## 2019-06-10 ENCOUNTER — CLINICAL SUPPORT (OUTPATIENT)
Dept: REHABILITATION | Facility: HOSPITAL | Age: 69
End: 2019-06-10
Attending: FAMILY MEDICINE
Payer: MEDICARE

## 2019-06-10 ENCOUNTER — TELEPHONE (OUTPATIENT)
Dept: OPHTHALMOLOGY | Facility: CLINIC | Age: 69
End: 2019-06-10

## 2019-06-10 DIAGNOSIS — G89.29 CHRONIC BILATERAL LOW BACK PAIN, WITH SCIATICA PRESENCE UNSPECIFIED: ICD-10-CM

## 2019-06-10 DIAGNOSIS — R29.898 WEAKNESS OF BOTH LOWER EXTREMITIES: ICD-10-CM

## 2019-06-10 DIAGNOSIS — M54.5 CHRONIC BILATERAL LOW BACK PAIN, WITH SCIATICA PRESENCE UNSPECIFIED: ICD-10-CM

## 2019-06-10 PROCEDURE — 97110 THERAPEUTIC EXERCISES: CPT | Mod: PN

## 2019-06-10 PROCEDURE — 97140 MANUAL THERAPY 1/> REGIONS: CPT | Mod: PN

## 2019-06-10 NOTE — PROGRESS NOTES
"  Physical Therapy Daily Treatment Note     Name: Tracy Armendariz  Clinic Number: [id number]    Therapy Diagnosis:   Encounter Diagnoses   Name Primary?    Chronic bilateral low back pain, with sciatica presence unspecified     Weakness of both lower extremities      Physician: Stephany Hodge, *    Visit Date: 6/10/2019    Physician Orders: PT Eval and Treat; Cervical retractions scapula stabilization, shoulder ROM, SI joint mobilzation, back and core strengthening and HEP  Medical Diagnosis from Referral: Spondylosis of cervical region without myelopathy or radiculopathy; DDD (degenerative disc disease), cervical; DDD (degenerative disc disease), lumbar; Acute pain of both shoulders  Evaluation Date: 6/5/2019  Authorization Period Expiration: 08/05/2019  Plan of Care Expiration: 6/5/2019 to 8/2/2019  Visit # / Visits authorized: 3/ 12  FOTO: 3/10  Gcode: 3/10  Visit: 57.78  Total: 201.30    Time In: 1302  Time Out: 1359  Total Billable Time: 29 minutes (TE-1, MT-1)    Precautions: Standard and HTN and Hx of Stroke    Subjective     Pt reports: she is feeling much better than at initial evaluation  She was not compliant with home exercise program.  Response to previous treatment: no adverse effects  Functional change: none    Pain: 3/10  Location: bilateral back      Objective     Tracy received therapeutic exercises to develop strength, endurance, ROM, flexibility, posture and core stabilization for 47 minutes including:     Supine HS stretch                          5x10" B  Supine Piriformis                           3x30" B   Supine Glute set                             10x10" hold   Bridge;                                            2x10 3" hold  SL Hip ABd                                     2x10 B    SLClams                                        3x10 B   Push/Pull                                        5" x 10 NOT PERFORMED THIS TREATMENT  Hip ADduction isometric        20x5"  Posterior pelvic tilt   " "      20x5"  Low trunk rotation         20x5" B  Prone hip extension         Next  Straight leg raise                            Next      Tracy received the following manual therapy techniques: Joint mobilizations, Manual traction, Myofacial release, Manual Lymphatic Drainage, Soft tissue Mobilization and Friction Massage were applied to the: gluteals, piriformis, quadratus for 10 minutes, including    -STM/MFR to B  Gluteus medius and minimus, B piriformis, B quadratus lumborum and lumbar paraspinals        Home Exercises Provided and Patient Education Provided     Education provided:   - Continue previous HEP established    Written Home Exercises Provided: Patient instructed to cont prior HEP.  Exercises were reviewed and Tracy was able to demonstrate them prior to the end of the session.  Tracy demonstrated good  understanding of the education provided.     See EMR under Media for exercises provided prior visit.    Assessment     Patient tolerated progression of exercises well with reports of decreased pain to 0/10 at the end of the treatment session. She presented with increased tissue texture in B  Gluteus medius and minimus, B piriformis, B quadratus lumborum and lumbar paraspinals. Good pelvic alignment today.     Tracy is progressing well towards her goals.   Pt prognosis is Good.     Pt will continue to benefit from skilled outpatient physical therapy to address the deficits listed in the problem list box on initial evaluation, provide pt/family education and to maximize pt's level of independence in the home and community environment.     Pt's spiritual, cultural and educational needs considered and pt agreeable to plan of care and goals.     Anticipated barriers to physical therapy:  Comorbidities chronicity of condition    Goals:   1. Pt will be compliant with HEP to supplement PT in decreasing pain with functional mobility. PROGRESSING, NOT MET 6/10/2019  2. Pt will perform and hold TA " "contraction for 10x10" in dynamic sitting activities to demonstrate improved core strength PROGRESSING, NOT MET 6/10/2019  3. Pt will improve lumbar ROM to min loss all deficit motions to improve functional mobility. PROGRESSING, NOT MET 6/10/2019  4. Pt will improve impaired LE MMTs to >/=4+/5 to improve strength for functional tasks.PROGRESSING, NOT MET 6/10/2019     Long Term Goals (8 Weeks):   1. Pt will improve FOTO score to </= 49% limited to decrease perceived limitation with maintaining/changing body position PROGRESSING, NOT MET 6/10/2019  2. Pt will improve B 9090 HS flexibility to minimally decreased to improve flexibility for normal movement patterns. PROGRESSING, NOT MET 6/10/2019  3. Pt will perform and hold TA contraction for 10x10" in dynamic standing activities to demonstrate improved core strength PROGRESSING, NOT MET 6/10/2019  4. Pt will improve impaired LE MMTs to grossly 5/5 to improve strength for functional tasks. PROGRESSING, NOT MET 6/10/2019  5. Pt will report pain </= 2/10 during lifting activities to promote functional QOL. PROGRESSING, NOT MET 6/10/2019  6. Pt will report pain </= 2/10 during lumbar ROM to promote functional mobility. PROGRESSING, NOT MET 6/10/2019       Plan     Continue PT POC    Annemarie Mccloud, PT   "

## 2019-06-12 NOTE — PROGRESS NOTES
"  Physical Therapy Daily Treatment Note     Name: Tracy Armendariz  Clinic Number: [id number]    Therapy Diagnosis:   Encounter Diagnoses   Name Primary?    Chronic bilateral low back pain, with sciatica presence unspecified     Weakness of both lower extremities      Physician: Stephany Hodge, *    Visit Date: 6/13/2019    Physician Orders: PT Eval and Treat; Cervical retractions scapula stabilization, shoulder ROM, SI joint mobilzation, back and core strengthening and HEP  Medical Diagnosis from Referral: Spondylosis of cervical region without myelopathy or radiculopathy; DDD (degenerative disc disease), cervical; DDD (degenerative disc disease), lumbar; Acute pain of both shoulders  Evaluation Date: 6/5/2019  Authorization Period Expiration: 08/05/2019  Plan of Care Expiration: 6/5/2019 to 8/2/2019  Visit # / Visits authorized: 4/ 12  FOTO: 4/10  Gcode: 4/10  Visit: ***  Total: 201.30***    Time In: ***  Time Out: ***  Total Billable Time: *** minutes     Precautions: Standard and HTN and Hx of Stroke    Subjective     Pt reports: she is feeling much better than at initial evaluation  She was not compliant with home exercise program.  Response to previous treatment: no adverse effects  Functional change: none    Pain: 3/10  Location: bilateral back      Objective     Tracy received therapeutic exercises to develop strength, endurance, ROM, flexibility, posture and core stabilization for 47 minutes including:     Supine HS stretch                          5x10" B  Supine Piriformis                           3x30" B   Supine Glute set                             10x10" hold   Bridge;                                            2x10 3" hold  SL Hip ABd                                     2x10 B    SLClams                                        3x10 B   Push/Pull                                        5" x 10 NOT PERFORMED THIS TREATMENT  Hip ADduction isometric        20x5"  Posterior pelvic tilt         " "20x5"  Low trunk rotation         20x5" B  Prone hip extension         Next  Straight leg raise                            Next      Tracy received the following manual therapy techniques: Joint mobilizations, Manual traction, Myofacial release, Manual Lymphatic Drainage, Soft tissue Mobilization and Friction Massage were applied to the: gluteals, piriformis, quadratus for 10 minutes, including    -STM/MFR to B  Gluteus medius and minimus, B piriformis, B quadratus lumborum and lumbar paraspinals        Home Exercises Provided and Patient Education Provided     Education provided:   - Continue previous HEP established    Written Home Exercises Provided: Patient instructed to cont prior HEP.  Exercises were reviewed and Tracy was able to demonstrate them prior to the end of the session.  Tracy demonstrated good  understanding of the education provided.     See EMR under Media for exercises provided prior visit.    Assessment     Patient tolerated progression of exercises well with reports of decreased pain to 0/10 at the end of the treatment session. She presented with increased tissue texture in B  Gluteus medius and minimus, B piriformis, B quadratus lumborum and lumbar paraspinals. Good pelvic alignment today.     Tracy is progressing well towards her goals.   Pt prognosis is Good.     Pt will continue to benefit from skilled outpatient physical therapy to address the deficits listed in the problem list box on initial evaluation, provide pt/family education and to maximize pt's level of independence in the home and community environment.     Pt's spiritual, cultural and educational needs considered and pt agreeable to plan of care and goals.     Anticipated barriers to physical therapy:  Comorbidities chronicity of condition    Goals:   1. Pt will be compliant with HEP to supplement PT in decreasing pain with functional mobility. PROGRESSING, NOT MET 6/13/2019  2. Pt will perform and hold TA contraction " "for 10x10" in dynamic sitting activities to demonstrate improved core strength PROGRESSING, NOT MET 6/13/2019  3. Pt will improve lumbar ROM to min loss all deficit motions to improve functional mobility. PROGRESSING, NOT MET 6/13/2019  4. Pt will improve impaired LE MMTs to >/=4+/5 to improve strength for functional tasks.PROGRESSING, NOT MET 6/13/2019     Long Term Goals (8 Weeks):   1. Pt will improve FOTO score to </= 49% limited to decrease perceived limitation with maintaining/changing body position PROGRESSING, NOT MET 6/13/2019  2. Pt will improve B 9090 HS flexibility to minimally decreased to improve flexibility for normal movement patterns. PROGRESSING, NOT MET 6/13/2019  3. Pt will perform and hold TA contraction for 10x10" in dynamic standing activities to demonstrate improved core strength PROGRESSING, NOT MET 6/13/2019  4. Pt will improve impaired LE MMTs to grossly 5/5 to improve strength for functional tasks. PROGRESSING, NOT MET 6/13/2019  5. Pt will report pain </= 2/10 during lifting activities to promote functional QOL. PROGRESSING, NOT MET 6/13/2019  6. Pt will report pain </= 2/10 during lumbar ROM to promote functional mobility. PROGRESSING, NOT MET 6/13/2019       Plan     Continue PT POC    Annemarie Mccloud, PT   "

## 2019-06-13 ENCOUNTER — TELEPHONE (OUTPATIENT)
Dept: SPINE | Facility: CLINIC | Age: 69
End: 2019-06-13

## 2019-06-13 ENCOUNTER — CLINICAL SUPPORT (OUTPATIENT)
Dept: REHABILITATION | Facility: HOSPITAL | Age: 69
End: 2019-06-13
Attending: FAMILY MEDICINE
Payer: MEDICARE

## 2019-06-13 DIAGNOSIS — R29.898 WEAKNESS OF BOTH LOWER EXTREMITIES: ICD-10-CM

## 2019-06-13 DIAGNOSIS — M25.511 ACUTE PAIN OF BOTH SHOULDERS: Primary | ICD-10-CM

## 2019-06-13 DIAGNOSIS — M54.5 CHRONIC BILATERAL LOW BACK PAIN, WITH SCIATICA PRESENCE UNSPECIFIED: ICD-10-CM

## 2019-06-13 DIAGNOSIS — M25.512 ACUTE PAIN OF BOTH SHOULDERS: Primary | ICD-10-CM

## 2019-06-13 DIAGNOSIS — G89.29 CHRONIC BILATERAL LOW BACK PAIN, WITH SCIATICA PRESENCE UNSPECIFIED: ICD-10-CM

## 2019-06-13 PROCEDURE — 97110 THERAPEUTIC EXERCISES: CPT | Mod: PN

## 2019-06-13 NOTE — TELEPHONE ENCOUNTER
She has started PT and wants to focus on her back and not her neck . She is still having shoulder pain.  PT recommend OT for her shoulders. For shoulder ROM, strengthening and HEP

## 2019-06-13 NOTE — PROGRESS NOTES
"  Physical Therapy Daily Treatment Note     Name: Tracy Armendariz  Clinic Number: [id number]    Therapy Diagnosis:   Encounter Diagnoses   Name Primary?    Chronic bilateral low back pain, with sciatica presence unspecified     Weakness of both lower extremities      Physician: Stephany Hodge, *    Visit Date: 6/13/2019    Physician Orders: PT Eval and Treat; Cervical retractions scapula stabilization, shoulder ROM, SI joint mobilzation, back and core strengthening and HEP  Medical Diagnosis from Referral: Spondylosis of cervical region without myelopathy or radiculopathy; DDD (degenerative disc disease), cervical; DDD (degenerative disc disease), lumbar; Acute pain of both shoulders  Evaluation Date: 6/5/2019  Authorization Period Expiration: 08/05/2019  Plan of Care Expiration: 6/5/2019 to 8/2/2019  Visit # / Visits authorized: 4/ 12  FOTO: 4/10  Gcode: 4/10  Visit: 90.96  Total:  292.26    Time In: 12:07  Time Out: 1:00  Total Billable Time: 45 minutes (TE-3,)    Precautions: Standard and HTN and Hx of Stroke    Subjective     Pt reports: Stated she is doing well today and is pain free. I'm riding my upright stationary bike 10 minutes every day."  She was not compliant with home exercise program.  Response to previous treatment: no adverse effects  Functional change: none    Pain: 0/10  Location: bilateral back      Objective     Tracy received therapeutic exercises to develop strength, endurance, ROM, flexibility, posture and core stabilization for 45 minutes including:     Supine HS stretch                          5x10" B  Supine Piriformis                           3x30" B   Supine Glute set                             10x10" hold   Bridge;                                            2x10 3" hold  SL Hip ABd                                     3x10 B     SLClams                                        3x10 B RTB  Push/Pull                                        5" x 10 NOT PERFORMED THIS TREATMENT  Hip " "ADduction isometric        20x5"  Posterior pelvic tilt         20x5"  Low trunk rotation         20x5" B  Prone hip extension         2x10 (rolled towel for cervical neutral comfort)  Straight leg raise                         2x10   Standing calf stretch                   3x30" on fitter  Standing hamstring stretch        2x30" on 2nd step      Tracy received the following manual therapy techniques: Joint mobilizations, Manual traction, Myofacial release, Manual Lymphatic Drainage, Soft tissue Mobilization and Friction Massage were applied to the: gluteals, piriformis, quadratus for 00 minutes, including    -STM/MFR to B  Gluteus medius and minimus, B piriformis, B quadratus lumborum and lumbar paraspinals (not performed today due to patient pain free)        Home Exercises Provided and Patient Education Provided     Education provided:   - Continue previous HEP established    Written Home Exercises Provided: Patient instructed to cont prior HEP.  Exercises were reviewed and Tracy was able to demonstrate them prior to the end of the session.  Tracy demonstrated good  understanding of the education provided.     See EMR under Media for exercises provided prior visit.    Assessment     Tracy presents pain free today. Patient tolerated progression of exercises well and pain free throughout visit. Progressions in BOLD. No manual therapy performed today due patient is pain free.  Tracy is progressing well towards her goals.   Pt prognosis is Good.     Pt will continue to benefit from skilled outpatient physical therapy to address the deficits listed in the problem list box on initial evaluation, provide pt/family education and to maximize pt's level of independence in the home and community environment.     Pt's spiritual, cultural and educational needs considered and pt agreeable to plan of care and goals.     Anticipated barriers to physical therapy:  Comorbidities chronicity of condition    Goals:   1. Pt " "will be compliant with HEP to supplement PT in decreasing pain with functional mobility. PROGRESSING, NOT MET 6/13/2019  2. Pt will perform and hold TA contraction for 10x10" in dynamic sitting activities to demonstrate improved core strength PROGRESSING, NOT MET 6/13/2019  3. Pt will improve lumbar ROM to min loss all deficit motions to improve functional mobility. PROGRESSING, NOT MET 6/13/2019  4. Pt will improve impaired LE MMTs to >/=4+/5 to improve strength for functional tasks.PROGRESSING, NOT MET 6/13/2019     Long Term Goals (8 Weeks):   1. Pt will improve FOTO score to </= 49% limited to decrease perceived limitation with maintaining/changing body position PROGRESSING, NOT MET 6/13/2019  2. Pt will improve B 9090 HS flexibility to minimally decreased to improve flexibility for normal movement patterns. PROGRESSING, NOT MET 6/13/2019  3. Pt will perform and hold TA contraction for 10x10" in dynamic standing activities to demonstrate improved core strength PROGRESSING, NOT MET 6/13/2019  4. Pt will improve impaired LE MMTs to grossly 5/5 to improve strength for functional tasks. PROGRESSING, NOT MET 6/13/2019  5. Pt will report pain </= 2/10 during lifting activities to promote functional QOL. PROGRESSING, NOT MET 6/13/2019  6. Pt will report pain </= 2/10 during lumbar ROM to promote functional mobility. PROGRESSING, NOT MET 6/13/2019       Plan     Continue PT POC    Devan Esqueda PTA   "

## 2019-06-14 ENCOUNTER — DOCUMENTATION ONLY (OUTPATIENT)
Dept: REHABILITATION | Facility: HOSPITAL | Age: 69
End: 2019-06-14

## 2019-06-14 DIAGNOSIS — M54.5 CHRONIC BILATERAL LOW BACK PAIN, WITH SCIATICA PRESENCE UNSPECIFIED: ICD-10-CM

## 2019-06-14 DIAGNOSIS — M54.2 NECK PAIN: ICD-10-CM

## 2019-06-14 DIAGNOSIS — M53.3 SACROILIAC PAIN: ICD-10-CM

## 2019-06-14 DIAGNOSIS — G89.29 CHRONIC BILATERAL LOW BACK PAIN, WITH SCIATICA PRESENCE UNSPECIFIED: ICD-10-CM

## 2019-06-14 PROBLEM — M54.50 CHRONIC BILATERAL LOW BACK PAIN: Status: RESOLVED | Noted: 2019-02-18 | Resolved: 2019-06-14

## 2019-06-14 RX ORDER — PANTOPRAZOLE SODIUM 40 MG/1
TABLET, DELAYED RELEASE ORAL
Qty: 30 TABLET | Refills: 5 | Status: SHIPPED | OUTPATIENT
Start: 2019-06-14 | End: 2021-04-19

## 2019-06-14 NOTE — PROGRESS NOTES
"  Outpatient Therapy Discharge Summary     Name: Tracy Armendariz  Clinic Number: [id number]    Therapy Diagnosis:   Encounter Diagnoses   Name Primary?    Chronic bilateral low back pain, with sciatica presence unspecified     Sacroiliac pain     Neck pain      Physician: No ref. provider found    Physician Orders: PT Eval and Treat   Medical Diagnosis from Referral: Lumbar radiculopathy, chronic [M54.16]  Chronic right sacroiliac pain [M53.3, G89.29]  Neck pain [M54.2]  Evaluation Date: 2/18/2019    Date of Last visit: 3/27/19  Total Visits Received: 12      Assessment    Goals: goal status per note on 3/27/19, pt's last attended PT session for this episode of care    Short Term Goals (4 Weeks):   1. Pt will be compliant with HEP to supplement PT in decreasing pain with functional mobility. - progressing  2. Pt will perform and hold TA contraction for 10x10" in dynamic sitting activities to demonstrate improved core strength - progressing  3. Pt will improve impaired LE MMTs by at least 1/2 grade to improve strength for functional tasks. - progressing  4. Pt will demonstrate improved sitting posture to decrease pain experienced in neck. - progressing  5. Pt will improve cervical AROM 10 deg in all planes to improve cervical mobility. - progressing     Long Term Goals (8 Weeks):   1. Pt will improve FOTO score to </= 53% limited to decrease perceived limitation with maintaining/changing body position. - progressing  2. Pt will improve B 9090 HS length by 10 deg B to improve flexibility for normal movement patterns.  - progressing  3. Pt will perform and hold TA contraction for 10x10" in dynamic standing activities to demonstrate improved core strength. - progressing  4. Pt will improve impaired LE MMTs by at least 1 full grade to improve strength for functional tasks. - progressing  5. Pt will report lumbar/ SI pain </= 4/10 during ADLs to promote functional QOL. - progressing  6. Pt will report neck pain " </= 4/10 at worst to promote return to PLOF. - progressing    Discharge reason: Patient has not attended therapy since 3/27/19 for this episode of care    Plan   This patient is discharged from Physical Therapy

## 2019-06-18 ENCOUNTER — CLINICAL SUPPORT (OUTPATIENT)
Dept: REHABILITATION | Facility: HOSPITAL | Age: 69
End: 2019-06-18
Attending: FAMILY MEDICINE
Payer: MEDICARE

## 2019-06-18 DIAGNOSIS — G89.29 CHRONIC BILATERAL LOW BACK PAIN, WITH SCIATICA PRESENCE UNSPECIFIED: ICD-10-CM

## 2019-06-18 DIAGNOSIS — R29.898 WEAKNESS OF BOTH LOWER EXTREMITIES: ICD-10-CM

## 2019-06-18 DIAGNOSIS — M54.5 CHRONIC BILATERAL LOW BACK PAIN, WITH SCIATICA PRESENCE UNSPECIFIED: ICD-10-CM

## 2019-06-18 PROCEDURE — 97110 THERAPEUTIC EXERCISES: CPT | Mod: PN

## 2019-06-18 NOTE — PROGRESS NOTES
"  Physical Therapy Daily Treatment Note     Name: Tracy Armendariz  Clinic Number: [id number]    Therapy Diagnosis:   Encounter Diagnoses   Name Primary?    Chronic bilateral low back pain, with sciatica presence unspecified     Weakness of both lower extremities      Physician: Stephany Hodge, *    Visit Date: 6/18/2019    Physician Orders: PT Eval and Treat; Cervical retractions scapula stabilization, shoulder ROM, SI joint mobilzation, back and core strengthening and HEP  Medical Diagnosis from Referral: Spondylosis of cervical region without myelopathy or radiculopathy; DDD (degenerative disc disease), cervical; DDD (degenerative disc disease), lumbar; Acute pain of both shoulders  Evaluation Date: 6/5/2019  Authorization Period Expiration: 08/05/2019  Plan of Care Expiration: 6/5/2019 to 8/2/2019  Visit # / Visits authorized: 5/ 12  FOTO: 5/10  Gcode: 5/10  Visit: 60.64  Total: 352.90    Time In: 2:00  Time Out: 3:00  Total Billable Time: 30 minutes (TE-2,)    Precautions: Standard and HTN and Hx of Stroke    Subjective     Pt reports: she is improving  She was not compliant with home exercise program.  Response to previous treatment: no adverse effects  Functional change: none    Pain: 0/10  Location: bilateral back      Objective     Tracy received therapeutic exercises to develop strength, endurance, ROM, flexibility, posture and core stabilization for 30 minutes including:     Supine HS stretch                          5x10" B  Supine Piriformis                           3x30" B   Supine Glute set                             10x10" hold   Bridge;                                            2x10 3" hold  SL Hip ABd                                     3x10 B     SLClams                                        3x10 B RTB  Push/Pull                                        5" x 10 NOT PERFORMED THIS TREATMENT  Hip ADduction isometric        20x5"  Posterior pelvic tilt         20x5"  Low trunk rotation       " "  20x5" B  Prone hip extension         2x10 (rolled towel for cervical neutral comfort)  Straight leg raise                            2x10   Standing calf stretch                      3x30" on fitter  Standing hamstring stretch            2x30" on 2nd step      Tracy received the following manual therapy techniques: Joint mobilizations, Manual traction, Myofacial release, Manual Lymphatic Drainage, Soft tissue Mobilization and Friction Massage were applied to the: gluteals, piriformis, quadratus for 00 minutes, including    -STM/MFR to B  Gluteus medius and minimus, B piriformis, B quadratus lumborum and lumbar paraspinals (not performed today due to patient pain free)        Home Exercises Provided and Patient Education Provided     Education provided:   - Continue previous HEP established    Written Home Exercises Provided: Patient instructed to cont prior HEP.  Exercises were reviewed and Tracy was able to demonstrate them prior to the end of the session.  Tracy demonstrated good  understanding of the education provided.     See EMR under Media for exercises provided prior visit.    Assessment     Tracy presents pain free today. Patient tolerated progression of exercises well and pain free throughout visit. Progressions in BOLD. No manual therapy performed today due patient is pain free again today.  Tracy is progressing well towards her goals.   Pt prognosis is Good.     Pt will continue to benefit from skilled outpatient physical therapy to address the deficits listed in the problem list box on initial evaluation, provide pt/family education and to maximize pt's level of independence in the home and community environment.     Pt's spiritual, cultural and educational needs considered and pt agreeable to plan of care and goals.     Anticipated barriers to physical therapy:  Comorbidities chronicity of condition    Goals:   1. Pt will be compliant with HEP to supplement PT in decreasing pain with " "functional mobility. PROGRESSING, NOT MET 6/18/2019  2. Pt will perform and hold TA contraction for 10x10" in dynamic sitting activities to demonstrate improved core strength PROGRESSING, NOT MET 6/18/2019  3. Pt will improve lumbar ROM to min loss all deficit motions to improve functional mobility. PROGRESSING, NOT MET 6/18/2019  4. Pt will improve impaired LE MMTs to >/=4+/5 to improve strength for functional tasks.PROGRESSING, NOT MET 6/18/2019     Long Term Goals (8 Weeks):   1. Pt will improve FOTO score to </= 49% limited to decrease perceived limitation with maintaining/changing body position PROGRESSING, NOT MET 6/18/2019  2. Pt will improve B 9090 HS flexibility to minimally decreased to improve flexibility for normal movement patterns. PROGRESSING, NOT MET 6/18/2019  3. Pt will perform and hold TA contraction for 10x10" in dynamic standing activities to demonstrate improved core strength PROGRESSING, NOT MET 6/18/2019  4. Pt will improve impaired LE MMTs to grossly 5/5 to improve strength for functional tasks. PROGRESSING, NOT MET 6/18/2019  5. Pt will report pain </= 2/10 during lifting activities to promote functional QOL. PROGRESSING, NOT MET 6/18/2019  6. Pt will report pain </= 2/10 during lumbar ROM to promote functional mobility. PROGRESSING, NOT MET 6/18/2019       Plan     Continue PT POC    Devan Esqueda PTA       "

## 2019-06-20 ENCOUNTER — CLINICAL SUPPORT (OUTPATIENT)
Dept: REHABILITATION | Facility: HOSPITAL | Age: 69
End: 2019-06-20
Attending: FAMILY MEDICINE
Payer: MEDICARE

## 2019-06-20 DIAGNOSIS — G89.29 CHRONIC BILATERAL LOW BACK PAIN, WITH SCIATICA PRESENCE UNSPECIFIED: ICD-10-CM

## 2019-06-20 DIAGNOSIS — R29.898 WEAKNESS OF BOTH LOWER EXTREMITIES: ICD-10-CM

## 2019-06-20 DIAGNOSIS — M54.5 CHRONIC BILATERAL LOW BACK PAIN, WITH SCIATICA PRESENCE UNSPECIFIED: ICD-10-CM

## 2019-06-20 PROCEDURE — 97110 THERAPEUTIC EXERCISES: CPT | Mod: PN

## 2019-06-20 NOTE — PROGRESS NOTES
"  Physical Therapy Daily Treatment Note     Name: Tracy Armendariz  Clinic Number: [id number]    Therapy Diagnosis:   Encounter Diagnoses   Name Primary?    Chronic bilateral low back pain, with sciatica presence unspecified     Weakness of both lower extremities      Physician: Stephany Hodge, *    Visit Date: 6/20/2019    Physician Orders: PT Eval and Treat; Cervical retractions scapula stabilization, shoulder ROM, SI joint mobilzation, back and core strengthening and HEP  Medical Diagnosis from Referral: Spondylosis of cervical region without myelopathy or radiculopathy; DDD (degenerative disc disease), cervical; DDD (degenerative disc disease), lumbar; Acute pain of both shoulders  Evaluation Date: 6/5/2019  Authorization Period Expiration: 08/05/2019  Plan of Care Expiration: 6/5/2019 to 8/2/2019  Visit # / Visits authorized: 6/ 12  FOTO: 6/10  Gcode: 6/10  PTA visit: 2/6  Visit: 121.28  Total: 474.18     Time In: 1300  Time Out: 1400  Total Billable Time: 60 minutes (TE-4)     Precautions: Standard and HTN and Hx of Stroke      Subjective     Pt reports: she is having some hip soreness today that she thinks is form increased exercises and increased low back pain.  She was compliant with home exercise program.  Response to previous treatment: no adverse reaction  Functional change: none    Pain: 4/10  Location: bilateral low back and hips      Objective       Tracy received therapeutic exercises to develop strength, flexibility and core stabilization for 60 minutes including:    Supine HS stretch                          5x10" B  Supine Piriformis                           3x30" B   Supine Glute set                             10x10" hold   Bridge;                                            2x10 3" hold  SL Hip ABd                                     3x10 B     SLClams                                        3x10 B no band this treatment  Push/Pull                                        5" x 10 NOT " "PERFORMED THIS TREATMENT  Hip ADduction isometric                20x5"  Posterior pelvic tilt                          20x5"  Low trunk rotation                          20x5" B  Prone hip extension                       2x10 (rolled towel for cervical neutral comfort)  Straight leg raise                            2x10   Standing calf stretch                      3x30" on fitter  Standing hamstring stretch            3x30" B on 2nd step    Home Exercises Provided and Patient Education Provided     Education provided:   - cont HEP regulalry    Written Home Exercises Provided: Patient instructed to cont prior HEP.  Exercises were reviewed and Tracy was able to demonstrate them prior to the end of the session.  Tracy demonstrated good  understanding of the education provided.     See EMR under Patient Instructions for exercises provided 6/5/19.      Assessment     Relays clamshells with band today were too difficult.  Able to complete sets with band removed.  Denies increased pain upon completion of session, reports a decrease in pain that rates at 2/10  Tracy is progressing well towards her goals.   Pt prognosis is Excellent.     Pt will continue to benefit from skilled outpatient physical therapy to address the deficits listed in the problem list box on initial evaluation, provide pt/family education and to maximize pt's level of independence in the home and community environment.     Pt's spiritual, cultural and educational needs considered and pt agreeable to plan of care and goals.    Anticipated barriers to physical therapy: comorbidities and chronicity of pain    Goals:   1. Pt will be compliant with HEP to supplement PT in decreasing pain with functional mobility. PROGRESSING, NOT MET 6/18/2019  2. Pt will perform and hold TA contraction for 10x10" in dynamic sitting activities to demonstrate improved core strength PROGRESSING, NOT MET 6/18/2019  3. Pt will improve lumbar ROM to min loss all deficit " "motions to improve functional mobility. PROGRESSING, NOT MET 6/18/2019  4. Pt will improve impaired LE MMTs to >/=4+/5 to improve strength for functional tasks.PROGRESSING, NOT MET 6/18/2019     Long Term Goals (8 Weeks):   1. Pt will improve FOTO score to </= 49% limited to decrease perceived limitation with maintaining/changing body position PROGRESSING, NOT MET 6/18/2019  2. Pt will improve B 9090 HS flexibility to minimally decreased to improve flexibility for normal movement patterns. PROGRESSING, NOT MET 6/18/2019  3. Pt will perform and hold TA contraction for 10x10" in dynamic standing activities to demonstrate improved core strength PROGRESSING, NOT MET 6/18/2019  4. Pt will improve impaired LE MMTs to grossly 5/5 to improve strength for functional tasks. PROGRESSING, NOT MET 6/18/2019  5. Pt will report pain </= 2/10 during lifting activities to promote functional QOL. PROGRESSING, NOT MET 6/18/2019  6. Pt will report pain </= 2/10 during lumbar ROM to promote functional mobility. PROGRESSING, NOT MET 6/18/2019       Plan     Cont POC to progress towards established goals    Jennifer Ovalle, ETHEL   "

## 2019-06-25 ENCOUNTER — DOCUMENTATION ONLY (OUTPATIENT)
Dept: REHABILITATION | Facility: HOSPITAL | Age: 69
End: 2019-06-25

## 2019-06-25 ENCOUNTER — CLINICAL SUPPORT (OUTPATIENT)
Dept: REHABILITATION | Facility: HOSPITAL | Age: 69
End: 2019-06-25
Attending: FAMILY MEDICINE
Payer: MEDICARE

## 2019-06-25 DIAGNOSIS — R29.898 WEAKNESS OF BOTH LOWER EXTREMITIES: ICD-10-CM

## 2019-06-25 DIAGNOSIS — G89.29 CHRONIC BILATERAL LOW BACK PAIN, WITH SCIATICA PRESENCE UNSPECIFIED: ICD-10-CM

## 2019-06-25 DIAGNOSIS — M54.5 CHRONIC BILATERAL LOW BACK PAIN, WITH SCIATICA PRESENCE UNSPECIFIED: ICD-10-CM

## 2019-06-25 PROCEDURE — 97110 THERAPEUTIC EXERCISES: CPT | Mod: PN

## 2019-06-25 NOTE — PROGRESS NOTES
"  Physical Therapy Daily Treatment Note     Name: Tracy Armendariz  Clinic Number: [id number]    Therapy Diagnosis:   Encounter Diagnoses   Name Primary?    Chronic bilateral low back pain, with sciatica presence unspecified     Weakness of both lower extremities      Physician: Stephany Hodge, *    Visit Date: 6/25/2019    Physician Orders: PT Eval and Treat; Cervical retractions scapula stabilization, shoulder ROM, SI joint mobilzation, back and core strengthening and HEP  Medical Diagnosis from Referral: Spondylosis of cervical region without myelopathy or radiculopathy; DDD (degenerative disc disease), cervical; DDD (degenerative disc disease), lumbar; Acute pain of both shoulders  Evaluation Date: 6/5/2019  Authorization Period Expiration: 08/05/2019  Plan of Care Expiration: 6/5/2019 to 8/2/2019  Visit # / Visits authorized: 7/ 12  FOTO: 7/10  Gcode: 7/10  PTA visit: --  Visit: 121.28  Total: 595.46     Time In: 1100  Time Out: 1159  Total Billable Time: 59 minutes (TE-4)     Precautions: Standard and HTN and Hx of Stroke      Subjective     Pt reports: slight pain today. Pain over the weekend was minimal 1-2/10  She was compliant with home exercise program.  Response to previous treatment: no adverse reaction  Functional change: none    Pain: 1/10  Location: bilateral low back and hips      Objective       Tracy received therapeutic exercises to develop strength, flexibility and core stabilization for 59 minutes including:    Supine HS stretch                          3x30" B  Supine Piriformis                           3x30" B   Supine Glute set                             10x10" hold   Bridge;                                            3x10 3" hold  SL Hip ABd                                     2x15 B     SLClams                                       2x15 B  Push/Pull                                        5" x 10 NOT PERFORMED THIS TREATMENT    Posterior pelvic tilt                          " "20x10"  Low trunk rotation                          20x5" B  Straight leg raise                            3x10 B  Prone hip extension                       2x10 (rolled towel for cervical neutral comfort)        Home Exercises Provided and Patient Education Provided     Education provided:   - continue HEP regulalry    Written Home Exercises Provided: Patient instructed to cont prior HEP.  Exercises were reviewed and Tracy was able to demonstrate them prior to the end of the session.  Tracy demonstrated good  understanding of the education provided.     See EMR under Patient Instructions for exercises provided 6/5/19.      Assessment     Patient required cuing to perform stretches in pain-free range. She tolerated progression of exercises well with no complaints of increased pain or discomfort.    Tracy is progressing well towards her goals.   Pt prognosis is Excellent.     Pt will continue to benefit from skilled outpatient physical therapy to address the deficits listed in the problem list box on initial evaluation, provide pt/family education and to maximize pt's level of independence in the home and community environment.     Pt's spiritual, cultural and educational needs considered and pt agreeable to plan of care and goals.    Anticipated barriers to physical therapy: comorbidities and chronicity of pain    Goals:   1. Pt will be compliant with HEP to supplement PT in decreasing pain with functional mobility. PROGRESSING, NOT MET 6/18/2019  2. Pt will perform and hold TA contraction for 10x10" in dynamic sitting activities to demonstrate improved core strength PROGRESSING, NOT MET 6/18/2019  3. Pt will improve lumbar ROM to min loss all deficit motions to improve functional mobility. PROGRESSING, NOT MET 6/18/2019  4. Pt will improve impaired LE MMTs to >/=4+/5 to improve strength for functional tasks.PROGRESSING, NOT MET 6/18/2019     Long Term Goals (8 Weeks):   1. Pt will improve FOTO score to " "</= 49% limited to decrease perceived limitation with maintaining/changing body position PROGRESSING, NOT MET 6/18/2019  2. Pt will improve B 9090 HS flexibility to minimally decreased to improve flexibility for normal movement patterns. PROGRESSING, NOT MET 6/18/2019  3. Pt will perform and hold TA contraction for 10x10" in dynamic standing activities to demonstrate improved core strength PROGRESSING, NOT MET 6/18/2019  4. Pt will improve impaired LE MMTs to grossly 5/5 to improve strength for functional tasks. PROGRESSING, NOT MET 6/18/2019  5. Pt will report pain </= 2/10 during lifting activities to promote functional QOL. PROGRESSING, NOT MET 6/18/2019  6. Pt will report pain </= 2/10 during lumbar ROM to promote functional mobility. PROGRESSING, NOT MET 6/18/2019       Plan     Continue plan of care.     Annemarie Mccloud, PT   "

## 2019-06-25 NOTE — PROGRESS NOTES
PT/PTA met face to face to discuss pt's treatment plan and progress towards established goals. Pt will be seen by a physical therapist minimally every 6th visit or every 30 days.      Annemarie Mccloud, PT    Jennifer Ovalle PTA

## 2019-06-27 ENCOUNTER — CLINICAL SUPPORT (OUTPATIENT)
Dept: REHABILITATION | Facility: HOSPITAL | Age: 69
End: 2019-06-27
Attending: FAMILY MEDICINE
Payer: MEDICARE

## 2019-06-27 DIAGNOSIS — M54.5 CHRONIC BILATERAL LOW BACK PAIN, WITH SCIATICA PRESENCE UNSPECIFIED: ICD-10-CM

## 2019-06-27 DIAGNOSIS — G89.29 CHRONIC BILATERAL LOW BACK PAIN, WITH SCIATICA PRESENCE UNSPECIFIED: ICD-10-CM

## 2019-06-27 DIAGNOSIS — R29.898 WEAKNESS OF BOTH LOWER EXTREMITIES: ICD-10-CM

## 2019-06-27 PROCEDURE — 97110 THERAPEUTIC EXERCISES: CPT | Mod: PN

## 2019-06-27 NOTE — PROGRESS NOTES
"  Physical Therapy Daily Treatment Note     Name: Tracy Armendariz  Clinic Number: [id number]    Therapy Diagnosis:   Encounter Diagnoses   Name Primary?    Chronic bilateral low back pain, with sciatica presence unspecified     Weakness of both lower extremities      Physician: Stephany Hodge, *    Visit Date: 6/27/2019    Physician Orders: PT Eval and Treat; Cervical retractions scapula stabilization, shoulder ROM, SI joint mobilzation, back and core strengthening and HEP  Medical Diagnosis from Referral: Spondylosis of cervical region without myelopathy or radiculopathy; DDD (degenerative disc disease), cervical; DDD (degenerative disc disease), lumbar; Acute pain of both shoulders  Evaluation Date: 6/5/2019  Authorization Period Expiration: 08/05/2019  Plan of Care Expiration: 6/5/2019 to 8/2/2019  Visit # / Visits authorized: 8/ 12  FOTO: 8/10  Gcode: 8/10  PTA visit: 1/6  Visit:     121.28  Total: 1618.26     Time In: 1400  Time Out: 1455  Total Billable Time: 55 minutes (TE-4)     Precautions: Standard and HTN and Hx of Stroke      Subjective     Pt reports: "Great now - not so great this morning."  States pain resolved after moving around a bit.  She was compliant with home exercise program.  Response to previous treatment: no adverse reaction  Functional change: none    Pain: 0/10  Location: bilateral low back and hips      Objective       Tracy received therapeutic exercises to develop strength, flexibility and core stabilization for 55 minutes including:    Supine HS stretch                          3x30" B  Supine Piriformis                           3x30" B   Supine Glute set                             10x10" hold   Bridge;                                            3x10 3" hold  SL Hip ABd                                     2x15 B     SLClams                                        3" hold 2x10 B  Push/Pull                                        5" x 10 NOT PERFORMED THIS TREATMENT    Posterior " "pelvic tilt                          20x10"  Low trunk rotation                          20x5" B  Straight leg raise                            2x15 B  Prone hip extension                       2x10 (rolled towel for cervical neutral comfort)  Leg Press          4.5 3x10 DL   [place]                      1x10 B       Home Exercises Provided and Patient Education Provided     Education provided:   - continue HEP regulalry    Written Home Exercises Provided: Patient instructed to cont prior HEP.  Exercises were reviewed and Tracy was able to demonstrate them prior to the end of the session.  Tracy demonstrated good  understanding of the education provided.     See EMR under Patient Instructions for exercises provided 6/5/19.      Assessment     Patient required cuing to perform stretches in pain-free range. She tolerated progression of exercises including standing therex well with no complaints of increased pain or discomfort.  Mild fatigue noted.    Tracy is progressing well towards her goals.   Pt prognosis is Excellent.     Pt will continue to benefit from skilled outpatient physical therapy to address the deficits listed in the problem list box on initial evaluation, provide pt/family education and to maximize pt's level of independence in the home and community environment.     Pt's spiritual, cultural and educational needs considered and pt agreeable to plan of care and goals.    Anticipated barriers to physical therapy: comorbidities and chronicity of pain    Goals:   1. Pt will be compliant with HEP to supplement PT in decreasing pain with functional mobility. PROGRESSING, NOT MET 6/18/2019  2. Pt will perform and hold TA contraction for 10x10" in dynamic sitting activities to demonstrate improved core strength PROGRESSING, NOT MET 6/18/2019  3. Pt will improve lumbar ROM to min loss all deficit motions to improve functional mobility. PROGRESSING, NOT MET 6/18/2019  4. Pt will improve impaired LE MMTs " "to >/=4+/5 to improve strength for functional tasks.PROGRESSING, NOT MET 6/18/2019     Long Term Goals (8 Weeks):   1. Pt will improve FOTO score to </= 49% limited to decrease perceived limitation with maintaining/changing body position PROGRESSING, NOT MET 6/18/2019  2. Pt will improve B 9090 HS flexibility to minimally decreased to improve flexibility for normal movement patterns. PROGRESSING, NOT MET 6/18/2019  3. Pt will perform and hold TA contraction for 10x10" in dynamic standing activities to demonstrate improved core strength PROGRESSING, NOT MET 6/18/2019  4. Pt will improve impaired LE MMTs to grossly 5/5 to improve strength for functional tasks. PROGRESSING, NOT MET 6/18/2019  5. Pt will report pain </= 2/10 during lifting activities to promote functional QOL. PROGRESSING, NOT MET 6/18/2019  6. Pt will report pain </= 2/10 during lumbar ROM to promote functional mobility. PROGRESSING, NOT MET 6/18/2019       Plan     Continue plan of care, progress towards established goals.  Focus on core strengthening    Madhavi Perales, PTA   "

## 2019-07-02 ENCOUNTER — CLINICAL SUPPORT (OUTPATIENT)
Dept: REHABILITATION | Facility: HOSPITAL | Age: 69
End: 2019-07-02
Attending: FAMILY MEDICINE
Payer: MEDICARE

## 2019-07-02 DIAGNOSIS — M25.60 STIFFNESS IN JOINT: ICD-10-CM

## 2019-07-02 DIAGNOSIS — M79.601 PAIN IN BOTH UPPER EXTREMITIES: ICD-10-CM

## 2019-07-02 DIAGNOSIS — R29.898 WEAKNESS OF BOTH UPPER EXTREMITIES: ICD-10-CM

## 2019-07-02 DIAGNOSIS — M79.602 PAIN IN BOTH UPPER EXTREMITIES: ICD-10-CM

## 2019-07-02 DIAGNOSIS — R29.898 WEAKNESS OF BOTH LOWER EXTREMITIES: ICD-10-CM

## 2019-07-02 DIAGNOSIS — G89.29 CHRONIC BILATERAL LOW BACK PAIN, WITH SCIATICA PRESENCE UNSPECIFIED: ICD-10-CM

## 2019-07-02 DIAGNOSIS — M54.5 CHRONIC BILATERAL LOW BACK PAIN, WITH SCIATICA PRESENCE UNSPECIFIED: ICD-10-CM

## 2019-07-02 PROCEDURE — 97110 THERAPEUTIC EXERCISES: CPT | Mod: PN

## 2019-07-02 PROCEDURE — 97165 OT EVAL LOW COMPLEX 30 MIN: CPT | Mod: PN

## 2019-07-02 PROCEDURE — G8987 SELF CARE CURRENT STATUS: HCPCS | Mod: CK,PN

## 2019-07-02 PROCEDURE — G8988 SELF CARE GOAL STATUS: HCPCS | Mod: CI,PN

## 2019-07-02 NOTE — PLAN OF CARE
[facility] Occupational Therapy  Initial Evaluation     Date: 7/2/2019  Patient: Tracy rAmendariz  Chart Number: [id number]    Therapy Diagnosis:   Encounter Diagnoses   Name Primary?    Pain in both upper extremities     Stiffness in joint     Weakness of both upper extremities      Physician: Stephany Hodge, *    Physician Orders: OT evaluate and treat  Medical Diagnosis:     M25.511,M25.512 (ICD-10-CM) - Acute pain of both shoulders     Evaluation Date: 7/2/2019  Insurance Authorization period Expiration: 09/02/2019  Plan of Care Expiration Period: 8/30/2019  Next MD appointment:8/2/2019    Visit # / Visits Authorized: 1 / 12  Time In:200  Time Out: 240  Total Billable Time: 40 minutes  LCE1 TE  Medicare:$[id number]    Precautions: Standard a    Subjective     Involved Side: Bilateral Right greater than Left  Dominant Side: Right  Date of Onset: Chronic  Mechanism of Injury: chronic from CVA in 2013. Pt states it got better after therapy but it got worse again.   History of Current Condition: pt presents today with decreased ROM, weakness and pain all of which limit pt functionally  Surgical Procedure: none  Imaging: No recent MRI or Xray  Previous Therapy: received therapy for shoulders in 2015, currently seeing PT for low back    Patient's Goals for Therapy: to decrease pain and increase functional use Bilateral shoulders    Pain:  Functional Pain Scale Rating 0-10:   4/10 on average  0/10 at best  8/10 at worst  Location: Bilateral lateral epicondyles and Bilateral shoulders deltoid area  Description: Aching  Aggravating Factors: Night Time  Easing Factors: repositioning    Occupation:  retired    Functional Limitations/Social History:    Previous functional status includes: Independent with all ADLs.     Current FunctionalStatus   Home/Living environment : lives with their spouse      Limitation of Functional Status as follows:   ADLs/IADLs:     - Feeding: Independent    - Bathing:  Independent    - Dressing/Grooming: Independent    - Driving: Independent     Leisure: currently putting yoga and gym routine on hold because of pain      Past Medical History/Physical Systems Review:   Tracy Armendariz  has a past medical history of Allergy, Anticoagulant long-term use, Arthritis, CVA (cerebral infarction), Depression, Disorder of kidney and ureter, Diverticulosis of colon, Extrinsic asthma, unspecified, Hyperlipidemia, Hypertension, Left atrial enlargement, Low back pain, MARTIN (obstructive sleep apnea), Osteopenia, PUD (peptic ulcer disease), and Stroke.    Tracy Armendariz  has a past surgical history that includes Inner ear surgery; Cholecystectomy ();  section (); Dilation and curettage of uterus (); Appendectomy (); Hysterectomy (); Tympanoplasty; Lumbar discectomy (); Knee arthroscopy w/ debridement (); Tonsillectomy; Back surgery; Cataract extraction; Colonoscopy (N/A, 2018); Joint replacement (Right); Ureteroscopic removal of ureteric calculus (Left, 2018); and Oophorectomy.    Tracy has a current medication list which includes the following prescription(s): aspirin, atorvastatin, calcium carbonate, cholecalciferol (vitamin d3), clotrimazole-betamethasone 1-0.05%, escitalopram oxalate, lactobacillus acidophilus, losartan, multivit with calcium,iron,min, pantoprazole, and ventolin hfa.    Review of patient's allergies indicates:   Allergen Reactions    Iodinated contrast- oral and iv dye Hives and Rash    Gabapentin Hallucinations    Iodine Hives    Isothiazolinones Rash    Penicillins Rash          Objective     Sensation Test: experiences tingling experiences numbness on right side only    Observation/Inspection: rounded shoulders,  forward head    Range of Motion/Strength:   Shoulder  Left   Right  Pain/Dysfunction with Movement    AROM PROM MMT AROM PROM MMT    Flexion 140 WNL 4/5 155 WNL 4/5    Extension 65 WNL 4/5 65 WNL 4/5   "  Abduction 125 WNL 4/5 145 WNL 4/5    HorizAdduction 25 WNL 4/5 25 WNL 4/5    Internal rotation T12 WNL 4/5 T12 WNL 4/5    ER at 90° abd 90* WNL 4/5 80 WNL 4/5 *pain   ER at 0° abd 70 WNL 4/5 70 WNL 4/5    NT=Not tested    ROM Comments: Pain at end range    Painful Arc: none noted    Tenderness upon Palpation:      Positive: Bicipital Groove and Supraspinatus Region    Special Tests:  AC Joint Left Right   Empty Can Test - +   Drop Arm test - -   O'latoya's test - -   Hawkin's Kenndy + +   Neer's Test + +     Scapular Control/Dyskinesis:    Normal / Subtle / Obvious  Comments    Left  Subtle -    Right  Subtle -       CMS Impairment/Limitation/Restriction for FOTO Shoulder Survey    Therapist reviewed FOTO scores for Tracy Armendariz on 7/2/2019.   FOTO documents entered into [employer] - see Media section.    Limitation Score: 58%  Category: Self Care    Current : CK = at least 40% but < 60% impaired, limited or restricted         Treatment     Treatment Time In: 230  Treatment Time Out: 240  Total Treatment time separate from Evaluation time:10    Tracy received therapeutic exercises for 10 minutes including:  -Shoulder flexion with the wand 5 repetitions, Sidelying Abduction 5 repetitions, Sidelying External Rotation 5 repetitions, Theraband pull downs 5 repetitions, Theraband Rows 5 repetitions, Theraband External Rotation 5 repetitions, Theraband Internal Rotation 5 repetitions, Theraband Horizontal Abduction 5 repetitions, wall pushups 5 repetitions, corner pectoralis stretch 1/30"  Home Exercise Program/Education:  Issued HEP (see patient instructions in EMR) and educated on modality use for pain management . Exercises were reviewed and Tracy was able to demonstrate them prior to the end of the session.   Pt received a written copy of exercises to perform at home. Tracy demonstrated good  understanding of the education provided.  Pt was advised to perform these exercises free of pain, and to stop performing " them if pain occurs.    Patient/Family Education: role of OT, goals for OT, scheduling/cancellations - pt verbalized understanding. Discussed insurance limitations with patient.    Assessment     Tracy Armendariz is a 68 y.o. female referred to outpatient occupational therapy and presents with a medical diagnosis of Bilateral shoulder pain , resulting in Decreased ROM, Decreased muscle strength, Increased pain and Joint Stiffness and demonstrates limitations as described in the chart below. Following medical record review it is determined that pt will benefit from occupational therapy services in order to maximize pain free and/or functional use of bilateral shoulders. The following goals were discussed with the patient and patient is in agreement with them as to be addressed in the treatment plan. The patient's rehab potential is Good.     Anticipated barriers to occupational therapy: none  Pt has no cultural, educational or language barriers to learning provided.    Profile and History Assessment of Occupational Performance Level of Clinical Decision Making Complexity Score   Occupational Profile:   Tracy Armendariz is a 68 y.o. female who lives with their spouse and is currently retired. Tracy Armendariz has difficulty with  Sleeping and gym rotuine  affecting his/her daily functional abilities. His/her main goal for therapy is to decrease pain and return to gym routine, sleep better.     Comorbidities:   anxiety, CAD, depression and history of CVA, low back pain    Medical and Therapy History Review:   Expanded               Performance Deficits    Physical:  Joint Mobility  Muscle Power/Strength  Muscle Endurance  Muscle Tone  Postural Control  Pain    Cognitive:  No Deficits    Psychosocial:    No Deficits     Clinical Decision Making:  low    Assessment Process:  Problem-Focused Assessments    Modification/Need for Assistance:  Not Necessary    Intervention Selection:  Several Treatment Options        low  Based on PMHX, co morbidities , data from assessments and functional level of assistance required with task and clinical presentation directly impacting function.       The following goals were discussed with the patient and patient is in agreement with them as to be addressed in the treatment plan.     Goals:     Short Term Goals to be met in 4 weeks: (8/1/2019)  1) Initiate Hep   2) Pt will increase Bilateral shoulder AROM by 10 degrees grossly for improved performance with overhead ADL's  3) Pt will report 6/10 pain in (Bilateral)shoulder at worst  4) Pt will demonstrate increased MMT to 4+/5 grossly Bilateral shoulder  5) Patient will be able to achieve less than or equal to 35% on FOTO shoulder survey demonstrating overall improved functional ability with upper extremity.     Long Term Goals to be met by discharge:  1) Independent with HEP  2) Pt will demonstrate (Bilateral) shoulder AROM WNL grossly for [place] with ADL's  3) Pt will demonstrate (Bilateral) shoulder MMT WNL grossly for [place] with functional activities  4) Independent and pain free with ADL's and IADL's  5) Patient will be able to achieve less than or equal to 15% on FOTO shoulder survey demonstrating overall improved functional ability with upper extremity.       Plan   Certification Period/Plan of care expiration: 7/2/2019 to 8/30/2019.    Outpatient Occupational Therapy 2 times weekly for 8 weeks to include the following interventions: Manual therapy/joint mobilizations, Modalities for pain management, Therapeutic exercises/activities., Strengthening, Joint Protection and Energy Conservation.      HARIS Sarmiento

## 2019-07-02 NOTE — PROGRESS NOTES
"  Physical Therapy Daily Treatment Note     Name: Tracy Armendariz  Clinic Number: [id number]    Therapy Diagnosis:   Encounter Diagnoses   Name Primary?    Chronic bilateral low back pain, with sciatica presence unspecified     Weakness of both lower extremities      Physician: Stephany Hodge, *    Visit Date: 7/2/2019    Physician Orders: PT Eval and Treat; Cervical retractions scapula stabilization, shoulder ROM, SI joint mobilzation, back and core strengthening and HEP  Medical Diagnosis from Referral: Spondylosis of cervical region without myelopathy or radiculopathy; DDD (degenerative disc disease), cervical; DDD (degenerative disc disease), lumbar; Acute pain of both shoulders  Evaluation Date: 6/5/2019  Authorization Period Expiration: 08/05/2019  Plan of Care Expiration: 6/5/2019 to 8/2/2019  Visit # / Visits authorized: 9/ 12  FOTO: 9/10  Gcode: 9/10  PTA visit: 2/6  Visit:   121.28  Total:  1739.54     Time In: 1300  Time Out: 1355  Total Billable Time: 55 minutes (TE-4)     Precautions: Standard and HTN and Hx of Stroke      Subjective     Pt reports: she was hurting last night and had to take an Aleve. Cannot say she did anything unusual or out of ordinary  She was compliant with home exercise program.  Response to previous treatment: no adverse reaction  Functional change: none    Pain: 0/10  Location: bilateral low back and hips      Objective       Tracy received therapeutic exercises to develop strength, flexibility and core stabilization for 55 minutes including:    Supine HS stretch                          3x30" B  Supine Piriformis                           3x30" B   Hook Lying Glute set                      10x10" hold   Bridge;                                            3x10 3" hold  SL Hip ABd                                     2x15 B     SLClams                                        3" hold 2x10 B  Push/Pull                                        5" x 10 NOT PERFORMED THIS " "TREATMENT    Posterior pelvic tilt                          20x10"  Low trunk rotation                          20x5" B  Straight leg raise                            2x15 B  Prone hip extension                       2x10 (rolled towel for cervical neutral comfort)  Leg Press          4.5 3x10 DL   Steamboats                     1x10 B       Home Exercises Provided and Patient Education Provided     Education provided:   - continue HEP regulalry    Written Home Exercises Provided: Patient instructed to cont prior HEP.  Exercises were reviewed and Tracy was able to demonstrate them prior to the end of the session.  Tracy demonstrated good  understanding of the education provided.     See EMR under Patient Instructions for exercises provided 6/5/19.      Assessment     Patient presented with 4/10 pain. Noted decreased pain to 1/10 following treatment.    Tracy is progressing well towards her goals.   Pt prognosis is Excellent.     Pt will continue to benefit from skilled outpatient physical therapy to address the deficits listed in the problem list box on initial evaluation, provide pt/family education and to maximize pt's level of independence in the home and community environment.     Pt's spiritual, cultural and educational needs considered and pt agreeable to plan of care and goals.    Anticipated barriers to physical therapy: comorbidities and chronicity of pain    Goals:   1. Pt will be compliant with HEP to supplement PT in decreasing pain with functional mobility. PROGRESSING, NOT MET 6/18/2019  2. Pt will perform and hold TA contraction for 10x10" in dynamic sitting activities to demonstrate improved core strength PROGRESSING, NOT MET 6/18/2019  3. Pt will improve lumbar ROM to min loss all deficit motions to improve functional mobility. PROGRESSING, NOT MET 6/18/2019  4. Pt will improve impaired LE MMTs to >/=4+/5 to improve strength for functional tasks.PROGRESSING, NOT MET 6/18/2019     Long Term " "Goals (8 Weeks):   1. Pt will improve FOTO score to </= 49% limited to decrease perceived limitation with maintaining/changing body position PROGRESSING, NOT MET 6/18/2019  2. Pt will improve B 9090 HS flexibility to minimally decreased to improve flexibility for normal movement patterns. PROGRESSING, NOT MET 6/18/2019  3. Pt will perform and hold TA contraction for 10x10" in dynamic standing activities to demonstrate improved core strength PROGRESSING, NOT MET 6/18/2019  4. Pt will improve impaired LE MMTs to grossly 5/5 to improve strength for functional tasks. PROGRESSING, NOT MET 6/18/2019  5. Pt will report pain </= 2/10 during lifting activities to promote functional QOL. PROGRESSING, NOT MET 6/18/2019  6. Pt will report pain </= 2/10 during lumbar ROM to promote functional mobility. PROGRESSING, NOT MET 6/18/2019       Plan     Continue plan of care, progress towards established goals.  Focus on core strengthening    Devan Esqueda PTA   "

## 2019-07-15 ENCOUNTER — CLINICAL SUPPORT (OUTPATIENT)
Dept: REHABILITATION | Facility: HOSPITAL | Age: 69
End: 2019-07-15
Attending: FAMILY MEDICINE
Payer: MEDICARE

## 2019-07-15 DIAGNOSIS — R29.898 WEAKNESS OF BOTH UPPER EXTREMITIES: ICD-10-CM

## 2019-07-15 DIAGNOSIS — M79.601 PAIN IN BOTH UPPER EXTREMITIES: ICD-10-CM

## 2019-07-15 DIAGNOSIS — M79.602 PAIN IN BOTH UPPER EXTREMITIES: ICD-10-CM

## 2019-07-15 DIAGNOSIS — M25.60 STIFFNESS IN JOINT: ICD-10-CM

## 2019-07-15 PROCEDURE — 97140 MANUAL THERAPY 1/> REGIONS: CPT | Mod: PN

## 2019-07-15 PROCEDURE — 97110 THERAPEUTIC EXERCISES: CPT | Mod: PN

## 2019-07-15 NOTE — PROGRESS NOTES
"  Occupational Therapy Daily Treatment Note     Date: 7/15/2019  Name: Tracy Armendariz  Clinic Number: [id number]    Therapy Diagnosis:   Encounter Diagnoses   Name Primary?    Pain in both upper extremities     Stiffness in joint     Weakness of both upper extremities      Physician: Stephany Hodge, *    Physician Orders: OT evaluate and treat  Medical Diagnosis:      M25.511,M25.512 (ICD-10-CM) - Acute pain of both shoulders      Evaluation Date: 7/2/2019  Insurance Authorization period Expiration: 09/02/2019  Plan of Care Expiration Period: 8/30/2019  Next MD appointment:8/2/2019     Visit # / Visits Authorized: 2 / 12  Time In:100  Time Out: 200  Total Billable Time: 40 minutes  MT1 TE2    Medicare:$[id number]    Precautions: Standard    Subjective     Pt reports: "My neck hurts today, my shoulders are just sore from that arm bike"  she was compliant with home exercise program given last session.   Response to previous treatment: increased neck pain  Functional change: pt able to increase weight with exercises    Pain: 6/10  Location:  neck      Objective     Tracy received the following manual therapy techniques for 10 minutes:   -consisting of patient supine for Bilateral shoulder lateral telescoping, upper trapezius soft tissue mobilization, pectoralis lift, subscapularis myofascial release and stretch, PROM with endrange stretching, glenohumeral joint inferior anterior posterior glides grade I-III, gentle shoulder oscillations    Tracy received therapeutic exercises for 30 minutes including:  Exercises        PROM (Bilateral) Shoulder Flexion/Abduction/Internal rotation/External Rotation 10x   Supine dowel Flexion/Chest Press 2#  2/15   Sidelying Abduction 1  2/15   Sidelying External Rotation 1  2/15   pulleys 3'   Wall slides ball  2/15   Corner pectoralis stretch 3/30"   Theraband Lats Green  2/15   Theraband Rows Green  2/15   Theraband Internal Rotation/External Rotation/horiz abd Green  2/15 "     Home Exercises and Education Provided     Education provided:   - Progress towards goals     Written Home Exercises Provided: Patient instructed to cont prior HEP.  Exercises were reviewed and Tracy was able to demonstrate them prior to the end of the session.  Tracy demonstrated good  understanding of the HEP provided.     See EMR under Patient Instructions for exercises provided 7/02/2019     Assessment     Pt participated well this date. She had increased neck pain today however was able to complete all exercises in a pain free ROM. She had some soft tissue tightness noted in shoulders however responded well to manual therapy.     Tracy is progressing well towards her goals and there are no updates to goals at this time. Pt prognosis is Good.     Pt will continue to benefit from skilled outpatient occupational therapy to address the deficits listed in the problem list on initial evaluation provide pt/family education and to maximize pt's level of independence in the home and community environment.     Anticipated barriers to occupational therapy: none    Pt's spiritual, cultural and educational needs considered and pt agreeable to plan of care and goals.    Goals:  Short Term Goals to be met in 4 weeks: (8/1/2019)  1) Initiate Hep-met, ongoing  2) Pt will increase Bilateral shoulder AROM by 10 degrees grossly for improved performance with overhead ADL's -Not met, progressing  3) Pt will report 6/10 pain in (Bilateral)shoulder at worst -Not met, progressing  4) Pt will demonstrate increased MMT to 4+/5 grossly Bilateral shoulder -Not met, progressing  5) Patient will be able to achieve less than or equal to 35% on FOTO shoulder survey demonstrating overall improved functional ability with upper extremity. -Not met, progressing     Long Term Goals to be met by discharge:  1) Independent with HEP -Not met, progressing  2) Pt will demonstrate (Bilateral) shoulder AROM WNL grossly for [place] with ADL's  -Not met, progressing  3) Pt will demonstrate (Bilateral) shoulder MMT WNL grossly for [place] with functional activities -Not met, progressing  4) Independent and pain free with ADL's and IADL's -Not met, progressing  5) Patient will be able to achieve less than or equal to 15% on FOTO shoulder survey demonstrating overall improved functional ability with upper extremity. -Not met, progressing    Plan   Continue with OT POC  Updates/Grading for next session: progress with ROM and strengthening      HARIS Sarmiento

## 2019-07-16 ENCOUNTER — CLINICAL SUPPORT (OUTPATIENT)
Dept: REHABILITATION | Facility: HOSPITAL | Age: 69
End: 2019-07-16
Attending: FAMILY MEDICINE
Payer: MEDICARE

## 2019-07-16 DIAGNOSIS — M54.5 CHRONIC BILATERAL LOW BACK PAIN, WITH SCIATICA PRESENCE UNSPECIFIED: ICD-10-CM

## 2019-07-16 DIAGNOSIS — G89.29 CHRONIC BILATERAL LOW BACK PAIN, WITH SCIATICA PRESENCE UNSPECIFIED: ICD-10-CM

## 2019-07-16 DIAGNOSIS — R29.898 WEAKNESS OF BOTH LOWER EXTREMITIES: ICD-10-CM

## 2019-07-16 PROCEDURE — G8979 MOBILITY GOAL STATUS: HCPCS | Mod: CK,PN

## 2019-07-16 PROCEDURE — 97110 THERAPEUTIC EXERCISES: CPT | Mod: PN

## 2019-07-16 PROCEDURE — G8978 MOBILITY CURRENT STATUS: HCPCS | Mod: CK,PN

## 2019-07-16 NOTE — PROGRESS NOTES
"  Physical Therapy Daily Treatment Note     Name: Tracy Armendariz  Clinic Number: [id number]    Therapy Diagnosis:   Encounter Diagnoses   Name Primary?    Chronic bilateral low back pain, with sciatica presence unspecified     Weakness of both lower extremities      Physician: Stephany Hodge, *    Visit Date: 7/16/2019    Physician Orders: PT Eval and Treat; Cervical retractions scapula stabilization, shoulder ROM, SI joint mobilzation, back and core strengthening and HEP  Medical Diagnosis from Referral: Spondylosis of cervical region without myelopathy or radiculopathy; DDD (degenerative disc disease), cervical; DDD (degenerative disc disease), lumbar; Acute pain of both shoulders  Evaluation Date: 6/5/2019  Authorization Period Expiration: 08/05/2019  Plan of Care Expiration: 6/5/2019 to 8/2/2019  Visit # / Visits authorized: 10/ 12  FOTO: 10/10  Gcode: 10/10  PTA visit: 3/6  Visit:      118.42  Total:  1860.62     Time In: 1400  Time Out: 1450  Total Billable Time: 50 minutes (TE-3) (MTx1 additional See note of Annemarie Mccloud, PT)     Precautions: Standard and HTN and Hx of Stroke      Subjective     Pt reports: she is doing pretty godd today "some soreness"  She was compliant with home exercise program.  Response to previous treatment: no adverse reaction  Functional change: none    Pain: 2/10  Location: bilateral low back and hips      Objective       Tracy received therapeutic exercises to develop strength, flexibility and core stabilization for 50 minutes including:    Supine HS stretch                          3x30" B  Supine Piriformis                            3x30" B   Hook Lying Glute set                      10x10" hold   Bridge;                                            3x10 3" hold  SL Hip ABd                                     2x15 B     SLClams                                        3" hold 2x10 B  Push/Pull                                        5" x 10 NOT PERFORMED THIS " "TREATMENT    Posterior pelvic tilt                          20x10"  Low trunk rotation                          20x5" B  Straight leg raise                            2x15 B  Prone hip extension                       2x12 (rolled towel for cervical neutral comfort)  Leg Press          4.5 3x10 DL   Steamboats                     1x10 B Not performed today      Assessment performed by Annemarie Mccloud, PT, DPT - see attached note    Home Exercises Provided and Patient Education Provided     Education provided:   - continue HEP daily    Written Home Exercises Provided: Patient instructed to cont prior HEP.  Exercises were reviewed and Tracy was able to demonstrate them prior to the end of the session.  Tracy demonstrated good  understanding of the education provided.     See EMR under Patient Instructions for exercises provided 6/5/19.      Assessment     Patient presented with 2/10 pain. Noted decreased pain to 1/10 following treatment. Able to complete all therex as per log with reports of decreased pain after treatment.  See attached assessment of Annemarie Mccloud, PT, DPT.    Tracy is progressing well towards her goals.   Pt prognosis is Excellent.     Pt will continue to benefit from skilled outpatient physical therapy to address the deficits listed in the problem list box on initial evaluation, provide pt/family education and to maximize pt's level of independence in the home and community environment.     Pt's spiritual, cultural and educational needs considered and pt agreeable to plan of care and goals.    Anticipated barriers to physical therapy: comorbidities and chronicity of pain    Goals:   1. Pt will be compliant with HEP to supplement PT in decreasing pain with functional mobility. PROGRESSING, NOT MET 6/18/2019  2. Pt will perform and hold TA contraction for 10x10" in dynamic sitting activities to demonstrate improved core strength PROGRESSING, NOT MET 6/18/2019  3. Pt will improve lumbar ROM to " "min loss all deficit motions to improve functional mobility. PROGRESSING, NOT MET 6/18/2019  4. Pt will improve impaired LE MMTs to >/=4+/5 to improve strength for functional tasks.PROGRESSING, NOT MET 6/18/2019     Long Term Goals (8 Weeks):   1. Pt will improve FOTO score to </= 49% limited to decrease perceived limitation with maintaining/changing body position PROGRESSING, NOT MET 6/18/2019  2. Pt will improve B 9090 HS flexibility to minimally decreased to improve flexibility for normal movement patterns. PROGRESSING, NOT MET 6/18/2019  3. Pt will perform and hold TA contraction for 10x10" in dynamic standing activities to demonstrate improved core strength PROGRESSING, NOT MET 6/18/2019  4. Pt will improve impaired LE MMTs to grossly 5/5 to improve strength for functional tasks. PROGRESSING, NOT MET 6/18/2019  5. Pt will report pain </= 2/10 during lifting activities to promote functional QOL. PROGRESSING, NOT MET 6/18/2019  6. Pt will report pain </= 2/10 during lumbar ROM to promote functional mobility. PROGRESSING, NOT MET 6/18/2019       Plan     Continue plan of care, progress towards established goals.  Focus on core strengthening    Madhavi Perales, ETHEL   "

## 2019-07-16 NOTE — PROGRESS NOTES
"  Physical Therapy Daily Treatment Note     Name: Tracy Armendariz  Clinic Number: [id number]    Therapy Diagnosis: No diagnosis found.  Physician: Stephany Hodge, *    Visit Date: 7/16/2019    Physician Orders: PT Eval and Treat; Cervical retractions scapula stabilization, shoulder ROM, SI joint mobilzation, back and core strengthening and HEP  Medical Diagnosis from Referral: Spondylosis of cervical region without myelopathy or radiculopathy; DDD (degenerative disc disease), cervical; DDD (degenerative disc disease), lumbar; Acute pain of both shoulders  Evaluation Date: 6/5/2019  Authorization Period Expiration: 08/05/2019  Plan of Care Expiration: 6/5/2019 to 8/2/2019  Visit # / Visits authorized: 10/ 12  FOTO: 10/10  Gcode: 10/10  PTA visit: 3/6  Visit:   ***  Total:  1739.54     Time In: ***  Time Out: ***  Total Billable Time: *** minutes (TE-4)     Precautions: Standard and HTN and Hx of Stroke    Subjective     Pt reports: ***.  She {Actions; was/was not:11979} compliant with home exercise program.  Response to previous treatment: ***  Functional change: ***    Pain: {0-10:64965::"0"}/10  Location: {RIGHT/LEFT/BILATERAL:09790} {LOCATION ON BODY:72582}     Objective       Tracy received therapeutic exercises to develop {AMB PT PROGRESS OBJECTIVE:33728} for *** minutes including:  ***  Supine HS stretch                          3x30" B  Supine Piriformis                           3x30" B   Hook Lying Glute set                      10x10" hold   Bridge;                                            3x10 3" hold  SL Hip ABd                                     2x15 B     SLClams                                        3" hold 2x10 B  Push/Pull                                        5" x 10 NOT PERFORMED THIS TREATMENT     Posterior pelvic tilt                          20x10"  Low trunk rotation                          20x5" B  Straight leg raise                            2x15 B  Prone hip " "extension                       2x10 (rolled towel for cervical neutral comfort)  Leg Press                                      4.5 3x10 DL           Steamboats                                   1x10 B          Home Exercises Provided and Patient Education Provided     Education provided:   - ***    Written Home Exercises Provided: {Blank single:86511::"yes","Patient instructed to cont prior HEP"}.  Exercises were reviewed and Tracy was able to demonstrate them prior to the end of the session.  Tracy demonstrated {Desc; good/fair/poor:13324} understanding of the education provided.     See EMR under {Blank single:63225::"Media","Patient Instructions"} for exercises provided {Blank single:17642::"7/16/2019","prior visit"}.      Assessment     ***  Tracy {IS/IS NOT:42691} progressing well towards her goals.   Pt prognosis is {REHAB PROGNOSIS OHS:91416}.     Pt will continue to benefit from skilled outpatient physical therapy to address the deficits listed in the problem list box on initial evaluation, provide pt/family education and to maximize pt's level of independence in the home and community environment.     Pt's spiritual, cultural and educational needs considered and pt agreeable to plan of care and goals.    Anticipated barriers to physical therapy: comorbidites and chronicity of pain    Goals:   1. Pt will be compliant with HEP to supplement PT in decreasing pain with functional mobility. PROGRESSING, NOT MET 6/18/2019  2. Pt will perform and hold TA contraction for 10x10" in dynamic sitting activities to demonstrate improved core strength PROGRESSING, NOT MET 6/18/2019  3. Pt will improve lumbar ROM to min loss all deficit motions to improve functional mobility. PROGRESSING, NOT MET 6/18/2019  4. Pt will improve impaired LE MMTs to >/=4+/5 to improve strength for functional tasks.PROGRESSING, NOT MET 6/18/2019     Long Term Goals (8 Weeks):   1. Pt will improve FOTO score to </= 49% limited to " "decrease perceived limitation with maintaining/changing body position PROGRESSING, NOT MET 6/18/2019  2. Pt will improve B 9090 HS flexibility to minimally decreased to improve flexibility for normal movement patterns. PROGRESSING, NOT MET 6/18/2019  3. Pt will perform and hold TA contraction for 10x10" in dynamic standing activities to demonstrate improved core strength PROGRESSING, NOT MET 6/18/2019  4. Pt will improve impaired LE MMTs to grossly 5/5 to improve strength for functional tasks. PROGRESSING, NOT MET 6/18/2019  5. Pt will report pain </= 2/10 during lifting activities to promote functional QOL. PROGRESSING, NOT MET 6/18/2019  6. Pt will report pain </= 2/10 during lumbar ROM to promote functional mobility. PROGRESSING, NOT MET 6/18/2019       Plan     ***    Jennifer Ovalle, ETHEL   "

## 2019-07-16 NOTE — PROGRESS NOTES
Date:  7/16/2019    Name: Tracy Armendariz  Clinic Number: [id number]    Therapy Diagnosis:   Encounter Diagnoses   Name Primary?    Chronic bilateral low back pain, with sciatica presence unspecified     Weakness of both lower extremities      Physician: Stephany Hodge, *    Start Time:  1450  Stop Time:  1500  Total Billable Time: 10 minutes (TE-1)       Subjective        See Madhavi Perales PTA note     Objective             Posture: sitting: slumped with forward head and rounded shoulders  Palpation: tenderness to B quadratus lumborum, B PSIS R>L, B gluteus medius and minimus R>L, B piriformis R>L        Range of Motion/Strength:      Thoracolumbar Pain/Dysfunction with Movement   Flexion Min loss; pulling in B gastroc   Extension No pain   Right side bending Min loss; pain in R low back   Left side bending No loss   Right rotation No loss   Left rotation No loss                L/E MMT Right Left Pain/Dysfunction with Movement   Hip Flexion 4/5 4/5 Pain in low back with testing on L   Hip Extension 4+/5 4*/5 Pain in R midback with testing on L; Pain in L midback with testing on R   Hip Abduction 5/5 5/5    Knee Flexion 5/5 5/5     Knee Extension 5/5 5/5     Ankle DF 5/5 5/5     Ankle PF 4+/5 4+/5              Joint Mobility:   - Lumbar: hypomobility throughout thoracic spine     Flexibility:   Hamstring: minimally decreased R; WFL L     Special Tests:   Straight leg raise: negative  Pelvic rotation: no obliquity      Gait Analysis: no significant gait deviations           CMS Impairment/Limitation/Restriction for FOTO Lumbar Spine Survey     Therapist reviewed FOTO scores for Tracy Armendariz on 7/16/2019.   FOTO documents entered into [employer] - see Media section.     Limitation Score: 48%  Category: Mobility     Current : CK = at least 40% but < 60% impaired, limited or restricted  Goal: CK = at least 40% but < 60% impaired, limited or restricted            Assessment       Patient demonstrated improved lumbar  ROM, flexibility, and LE strength this date. She continues with decreased lumbar ROM, flexibility, and LE strength and would continue to benefit from skilled PT to address remaining limitations to improve functional mobility. Functional limitations reporting using FOTO Lumbar Spine survey. Current score: 48%. Previous score: 56%. Patient demonstrated a 8% improvement in function.     Plan       See Madhavi Perales, PTA note

## 2019-07-17 ENCOUNTER — CLINICAL SUPPORT (OUTPATIENT)
Dept: REHABILITATION | Facility: HOSPITAL | Age: 69
End: 2019-07-17
Attending: FAMILY MEDICINE
Payer: MEDICARE

## 2019-07-17 DIAGNOSIS — M25.60 STIFFNESS IN JOINT: ICD-10-CM

## 2019-07-17 DIAGNOSIS — R29.898 WEAKNESS OF BOTH UPPER EXTREMITIES: ICD-10-CM

## 2019-07-17 DIAGNOSIS — M79.602 PAIN IN BOTH UPPER EXTREMITIES: ICD-10-CM

## 2019-07-17 DIAGNOSIS — M79.601 PAIN IN BOTH UPPER EXTREMITIES: ICD-10-CM

## 2019-07-17 PROCEDURE — 97140 MANUAL THERAPY 1/> REGIONS: CPT | Mod: PN

## 2019-07-17 NOTE — PROGRESS NOTES
"   Occupational Therapy Daily Treatment Note     Date: 7/17/2019  Name: Tracy Armendariz  Clinic Number: [id number]    Therapy Diagnosis:   Encounter Diagnoses   Name Primary?    Pain in both upper extremities     Stiffness in joint     Weakness of both upper extremities      Physician: Stephany Hodge, *    Physician Orders: OT evaluate and treat  Medical Diagnosis:      M25.511,M25.512 (ICD-10-CM) - Acute pain of both shoulders      Evaluation Date: 7/2/2019  Insurance Authorization period Expiration: 09/02/2019  Plan of Care Expiration Period: 8/30/2019  Next MD appointment:8/2/2019     Visit # / Visits Authorized: 3 / 12  Time In:100  Time Out: 210  Total Billable Time: 17 minutes  MT1     Medicare:$[id number]    Precautions: Standard    Subjective     Pt reports: "Both my shoulders are hurting today."  she was compliant with home exercise program given 7/2/2019  Response to previous treatment: increased neck pain  Functional change: pt able to increase weight with exercises    Pain: 5/10  Location:  Bilateral shoulders    Objective     Tracy received the following manual therapy techniques for 10 minutes:   -consisting of patient supine for Bilateral shoulder lateral telescoping, upper trapezius soft tissue mobilization, pectoralis lift, subscapularis myofascial release and stretch, PROM with endrange stretching, glenohumeral joint inferior anterior posterior glides grade I-III, gentle shoulder oscillations    Tracy received therapeutic exercises for 7 minutes including:  Exercises        PROM (Bilateral) Shoulder Flexion/Abduction/Internal rotation/External Rotation 10x   Supine dowel Flexion/Chest Press 2#  2/15   Sidelying Abduction 1  2/15   Sidelying External Rotation 1  2/15   pulleys 3'   Wall slides ball  2/15   Corner pectoralis stretch 3/30"   Theraband Lats Green  2/15   Theraband Rows Green  2/15   Theraband Internal Rotation/External Rotation/horiz abd Green  2/15     Home Exercises and " Education Provided     Education provided:   - Progress towards goals     Written Home Exercises Provided: Patient instructed to cont prior HEP.  Exercises were reviewed and Tracy was able to demonstrate them prior to the end of the session.  Tracy demonstrated good  understanding of the HEP provided.     See EMR under Patient Instructions for exercises provided 7/02/2019     Assessment   Pt participated well this date. She continues to  Report some neck pain today but also c/o shoulder pain today. Despite increased pain report she was able to complete all her exercises without c/o however required cues for technique with exercises. She continues with some soft tissue tightness noted in shoulders however responded well to manual therapy. Pt motivated.     Tracy is progressing well towards her goals and there are no updates to goals at this time. Pt prognosis is Good.     Pt will continue to benefit from skilled outpatient occupational therapy to address the deficits listed in the problem list on initial evaluation provide pt/family education and to maximize pt's level of independence in the home and community environment.     Anticipated barriers to occupational therapy: none    Pt's spiritual, cultural and educational needs considered and pt agreeable to plan of care and goals.    Goals:  Short Term Goals to be met in 4 weeks: (8/1/2019)  1) Initiate Hep-met, ongoing  2) Pt will increase Bilateral shoulder AROM by 10 degrees grossly for improved performance with overhead ADL's -Not met, progressing  3) Pt will report 6/10 pain in (Bilateral)shoulder at worst -Not met, progressing  4) Pt will demonstrate increased MMT to 4+/5 grossly Bilateral shoulder -Not met, progressing  5) Patient will be able to achieve less than or equal to 35% on FOTO shoulder survey demonstrating overall improved functional ability with upper extremity. -Not met, progressing     Long Term Goals to be met by discharge:  1) Independent  with HEP -Not met, progressing  2) Pt will demonstrate (Bilateral) shoulder AROM WNL grossly for [place] with ADL's -Not met, progressing  3) Pt will demonstrate (Bilateral) shoulder MMT WNL grossly for [place] with functional activities -Not met, progressing  4) Independent and pain free with ADL's and IADL's -Not met, progressing  5) Patient will be able to achieve less than or equal to 15% on FOTO shoulder survey demonstrating overall improved functional ability with upper extremity. -Not met, progressing    Plan   Continue with OT POC  Updates/Grading for next session: progress with ROM and strengthening      HARIS Sarmiento

## 2019-07-18 ENCOUNTER — CLINICAL SUPPORT (OUTPATIENT)
Dept: REHABILITATION | Facility: HOSPITAL | Age: 69
End: 2019-07-18
Attending: FAMILY MEDICINE
Payer: MEDICARE

## 2019-07-18 DIAGNOSIS — G89.29 CHRONIC BILATERAL LOW BACK PAIN, WITH SCIATICA PRESENCE UNSPECIFIED: ICD-10-CM

## 2019-07-18 DIAGNOSIS — M54.5 CHRONIC BILATERAL LOW BACK PAIN, WITH SCIATICA PRESENCE UNSPECIFIED: ICD-10-CM

## 2019-07-18 DIAGNOSIS — R29.898 WEAKNESS OF BOTH LOWER EXTREMITIES: ICD-10-CM

## 2019-07-18 PROCEDURE — 97110 THERAPEUTIC EXERCISES: CPT | Mod: PN

## 2019-07-18 NOTE — PROGRESS NOTES
"  Physical Therapy Daily Treatment Note     Name: Tracy Armendariz  Clinic Number: [id number]    Therapy Diagnosis:   Encounter Diagnoses   Name Primary?    Chronic bilateral low back pain, with sciatica presence unspecified     Weakness of both lower extremities      Physician: Stephany Hodge, *    Visit Date: 7/18/2019    Physician Orders: PT Eval and Treat; Cervical retractions scapula stabilization, shoulder ROM, SI joint mobilzation, back and core strengthening and HEP  Medical Diagnosis from Referral: Spondylosis of cervical region without myelopathy or radiculopathy; DDD (degenerative disc disease), cervical; DDD (degenerative disc disease), lumbar; Acute pain of both shoulders  Evaluation Date: 6/5/2019  Authorization Period Expiration: 08/05/2019  Plan of Care Expiration: 6/5/2019 to 8/2/2019  Visit # / Visits authorized: 11/12  FOTO: 1/10  Gcode: 1/10  PTA visit: 0/6  Visit:    60.64  Total:  1921.26     Time In: 100 pm  Time Out: 200 pm  Total Billable Time: 30 minutes (TE-2)     Precautions: Standard and HTN and Hx of Stroke    Subjective     Pt reports: doing well and no low back pain today  She was compliant with home exercise program.  Response to previous treatment: no adverse reaction  Functional change: none    Pain: 0/10  Location: bilateral low back and hips      Objective   O: L groin pain with L hip flexion, L sided low back pain with L hip scour test and IR PROM    Tracy received therapeutic exercises to develop strength, flexibility and core stabilization for 30 minutes supervised and 30 minutes 1:1 with PT including:    Supine HS stretch                          3x30" B  Supine Piriformis                            3x30" B   Hook Lying Glute set                      10x10" hold   Bridge                                            3x10 3" hold  SL Hip ABd                                     2x10 B  ( decreased due to hip fatigue today)  SLClams                                        3" " "hold 2x10 B RTB  Push/Pull                                        1x with shotgun    Posterior pelvic tilt                          20x10" - not performed today  Low trunk rotation                          20x5" B - not performed today  Straight leg raise                            2x15 B  Prone hip extension                       2x12 (rolled towel for cervical neutral comfort) - not performed today  Leg Press          4.5 3x10 DL   Steamboats                     1x10 B Not performed today      Range of Motion/Strength:   Thoracolumbar Pain/Dysfunction with Movement   Flexion Min loss; pulling in B gastroc   Extension No pain   Right side bending Min loss; pain in R low back   Left side bending No loss   Right rotation No loss   Left rotation No loss     L/E MMT Right Left Pain/Dysfunction with Movement   Hip Flexion 4/5 4/5 Pain in low back with testing on L   Hip Extension 4+/5 4*/5 Pain in R midback with testing on L; Pain in L midback with testing on R   Hip Abduction 5/5 5/5    Knee Flexion 5/5 5/5     Knee Extension 5/5 5/5     Ankle DF 5/5 5/5     Ankle PF 4+/5 4+/5       Joint Mobility:   - Lumbar: hypomobility throughout thoracic spine  Flexibility:   Hamstring: minimally decreased R; WFL L  Special Tests:   Straight leg raise: negative  Pelvic rotation: no obliquity    Gait Analysis: no significant gait deviations      Home Exercises Provided and Patient Education Provided   Education provided:   - continue HEP daily    Written Home Exercises Provided: Patient instructed to cont prior HEP.  Exercises were reviewed and Tracy was able to demonstrate them prior to the end of the session.  Tracy demonstrated good  understanding of the education provided.     See EMR under Patient Instructions for exercises provided 6/5/19.    Assessment     Pt did well and reported no increased low back pain throughout, pt progressing towards LTGs. Pt did appear more fatigued than normal and unable to complete full " "repetitions on on SL hip abd.    Tracy is progressing well towards her goals.   Pt prognosis is Excellent.     Pt will continue to benefit from skilled outpatient physical therapy to address the deficits listed in the problem list box on initial evaluation, provide pt/family education and to maximize pt's level of independence in the home and community environment.   Pt's spiritual, cultural and educational needs considered and pt agreeable to plan of care and goals.    Anticipated barriers to physical therapy: comorbidities and chronicity of pain    Goals:   1. Pt will be compliant with HEP to supplement PT in decreasing pain with functional mobility. PROGRESSING, NOT MET 6/18/2019  2. Pt will perform and hold TA contraction for 10x10" in dynamic sitting activities to demonstrate improved core strength PROGRESSING, NOT MET 6/18/2019  3. Pt will improve lumbar ROM to min loss all deficit motions to improve functional mobility. PROGRESSING, NOT MET 6/18/2019  4. Pt will improve impaired LE MMTs to >/=4+/5 to improve strength for functional tasks.PROGRESSING, NOT MET 6/18/2019     Long Term Goals (8 Weeks):   1. Pt will improve FOTO score to </= 49% limited to decrease perceived limitation with maintaining/changing body position PROGRESSING, NOT MET 6/18/2019  2. Pt will improve B 9090 HS flexibility to minimally decreased to improve flexibility for normal movement patterns. PROGRESSING, NOT MET 6/18/2019  3. Pt will perform and hold TA contraction for 10x10" in dynamic standing activities to demonstrate improved core strength PROGRESSING, NOT MET 6/18/2019  4. Pt will improve impaired LE MMTs to grossly 5/5 to improve strength for functional tasks. PROGRESSING, NOT MET 6/18/2019  5. Pt will report pain </= 2/10 during lifting activities to promote functional QOL. PROGRESSING, NOT MET 6/18/2019  6. Pt will report pain </= 2/10 during lumbar ROM to promote functional mobility. PROGRESSING, NOT MET " 6/18/2019       Plan     Continue plan of care, progress towards established goals.  Focus on core strengthening    Jg Day, PT

## 2019-07-22 ENCOUNTER — CLINICAL SUPPORT (OUTPATIENT)
Dept: REHABILITATION | Facility: HOSPITAL | Age: 69
End: 2019-07-22
Attending: FAMILY MEDICINE
Payer: MEDICARE

## 2019-07-22 DIAGNOSIS — M79.602 PAIN IN BOTH UPPER EXTREMITIES: ICD-10-CM

## 2019-07-22 DIAGNOSIS — R29.898 WEAKNESS OF BOTH UPPER EXTREMITIES: ICD-10-CM

## 2019-07-22 DIAGNOSIS — M79.601 PAIN IN BOTH UPPER EXTREMITIES: ICD-10-CM

## 2019-07-22 DIAGNOSIS — M25.60 STIFFNESS IN JOINT: ICD-10-CM

## 2019-07-22 PROCEDURE — 97140 MANUAL THERAPY 1/> REGIONS: CPT | Mod: PN

## 2019-07-22 PROCEDURE — 97110 THERAPEUTIC EXERCISES: CPT | Mod: PN

## 2019-07-22 NOTE — PROGRESS NOTES
"   Occupational Therapy Daily Treatment Note     Date: 7/22/2019  Name: Tracy Armendariz  Clinic Number: [id number]    Therapy Diagnosis:   Encounter Diagnoses   Name Primary?    Pain in both upper extremities     Stiffness in joint     Weakness of both upper extremities      Physician: Stephany Hodge, *    Physician Orders: OT evaluate and treat  Medical Diagnosis:      M25.511,M25.512 (ICD-10-CM) - Acute pain of both shoulders      Evaluation Date: 7/2/2019  Insurance Authorization period Expiration: 09/02/2019  Plan of Care Expiration Period: 8/30/2019  Next MD appointment:8/2/2019     Visit # / Visits Authorized: 4 / 12  Time In:104  Time Out: 207  Total Billable Time: 53 minutes  MT1 TE3    Medicare:$[id number]    Precautions: Standard    Subjective     Pt reports: "My left shoulder was hurting more over the wknd but today my Right shoulder hurts more."  she was compliant with home exercise program given 7/2/2019  Response to previous treatment: increased neck pain  Functional change: pt able to increase weight with exercises    Pain: 4/10  Location:  Bilateral shoulders left greater than right    Objective     Tracy received the following manual therapy techniques for 10 minutes:   -consisting of patient supine for Bilateral shoulder lateral telescoping, upper trapezius soft tissue mobilization, pectoralis lift, subscapularis myofascial release and stretch, PROM with endrange stretching, glenohumeral joint inferior anterior posterior glides grade I-III, gentle shoulder oscillations    Tracy received therapeutic exercises for 43 minutes including:  Exercises        PROM (Bilateral) Shoulder Flexion/Abduction/Internal rotation/External Rotation 10x   Supine dowel Flexion/Chest Press 2#  2/15   Sidelying Abduction 1  2/15   Sidelying External Rotation 1  2/15   pulleys 3'   Wall slides ball  2/15   Corner pectoralis stretch 3/30"   Theraband Lats Green  2/15   Theraband Rows Green  2/15   Theraband Internal " Rotation/External Rotation/horiz abd Green  2/15   CP x 10 minutes after for pain management  Home Exercises and Education Provided     Education provided:   - Progress towards goals     Written Home Exercises Provided: Patient instructed to cont prior HEP.  Exercises were reviewed and Tracy was able to demonstrate them prior to the end of the session.  Tracy demonstrated good  understanding of the HEP provided.     See EMR under Patient Instructions for exercises provided 7/02/2019     Assessment   Pt participated well this date. Increased pain noted on Right today despite initially stating her left was worse. Pt able to complete all exercises in a pain free ROM.  Pt motivated.     Tracy is progressing well towards her goals and there are no updates to goals at this time. Pt prognosis is Good.     Pt will continue to benefit from skilled outpatient occupational therapy to address the deficits listed in the problem list on initial evaluation provide pt/family education and to maximize pt's level of independence in the home and community environment.     Anticipated barriers to occupational therapy: none    Pt's spiritual, cultural and educational needs considered and pt agreeable to plan of care and goals.    Goals:  Short Term Goals to be met in 4 weeks: (8/1/2019)  1) Initiate Hep-met, ongoing  2) Pt will increase Bilateral shoulder AROM by 10 degrees grossly for improved performance with overhead ADL's -Not met, progressing  3) Pt will report 6/10 pain in (Bilateral)shoulder at worst -Not met, progressing  4) Pt will demonstrate increased MMT to 4+/5 grossly Bilateral shoulder -Not met, progressing  5) Patient will be able to achieve less than or equal to 35% on FOTO shoulder survey demonstrating overall improved functional ability with upper extremity. -Not met, progressing     Long Term Goals to be met by discharge:  1) Independent with HEP -Not met, progressing  2) Pt will demonstrate (Bilateral)  shoulder AROM WNL grossly for [place] with ADL's -Not met, progressing  3) Pt will demonstrate (Bilateral) shoulder MMT WNL grossly for [place] with functional activities -Not met, progressing  4) Independent and pain free with ADL's and IADL's -Not met, progressing  5) Patient will be able to achieve less than or equal to 15% on FOTO shoulder survey demonstrating overall improved functional ability with upper extremity. -Not met, progressing    Plan   Continue with OT POC  Updates/Grading for next session: progress with ROM and strengthening      HARIS Sarmiento

## 2019-07-23 ENCOUNTER — CLINICAL SUPPORT (OUTPATIENT)
Dept: REHABILITATION | Facility: HOSPITAL | Age: 69
End: 2019-07-23
Attending: FAMILY MEDICINE
Payer: MEDICARE

## 2019-07-23 DIAGNOSIS — G89.29 CHRONIC BILATERAL LOW BACK PAIN, WITH SCIATICA PRESENCE UNSPECIFIED: ICD-10-CM

## 2019-07-23 DIAGNOSIS — M79.602 PAIN IN BOTH UPPER EXTREMITIES: ICD-10-CM

## 2019-07-23 DIAGNOSIS — M25.60 STIFFNESS IN JOINT: ICD-10-CM

## 2019-07-23 DIAGNOSIS — R29.898 WEAKNESS OF BOTH LOWER EXTREMITIES: ICD-10-CM

## 2019-07-23 DIAGNOSIS — M79.601 PAIN IN BOTH UPPER EXTREMITIES: ICD-10-CM

## 2019-07-23 DIAGNOSIS — M54.5 CHRONIC BILATERAL LOW BACK PAIN, WITH SCIATICA PRESENCE UNSPECIFIED: ICD-10-CM

## 2019-07-23 DIAGNOSIS — R29.898 WEAKNESS OF BOTH UPPER EXTREMITIES: ICD-10-CM

## 2019-07-23 PROCEDURE — 97110 THERAPEUTIC EXERCISES: CPT | Mod: PN

## 2019-07-23 NOTE — PROGRESS NOTES
"  Physical Therapy Daily Treatment Note     Name: Tracy Armendariz  Clinic Number: [id number]    Therapy Diagnosis:   Encounter Diagnoses   Name Primary?    Pain in both upper extremities     Stiffness in joint     Weakness of both upper extremities      Physician: Stephany Hodge, *    Visit Date: 7/23/2019    Physician Orders: PT Eval and Treat; Cervical retractions scapula stabilization, shoulder ROM, SI joint mobilzation, back and core strengthening and HEP  Medical Diagnosis from Referral: Spondylosis of cervical region without myelopathy or radiculopathy; DDD (degenerative disc disease), cervical; DDD (degenerative disc disease), lumbar; Acute pain of both shoulders  Evaluation Date: 6/5/2019  Authorization Period Expiration: 08/05/2019  Plan of Care Expiration: 6/5/2019 to 8/2/2019  Visit # / Visits authorized: 12/12  FOTO: 2/10  Gcode: 2/10  PTA visit: 1/6  Visit:    121.28  Total:  2042.54     Time In: 100 pm  Time Out: 200 pm  Total Billable Time: 60 minutes (TE-4)     Precautions: Standard and HTN and Hx of Stroke    Subjective     Pt reports:"this schedule is killing me. I'm worn out coming here 4 days a week. My arms and shoulders are killing me."   She was compliant with home exercise program.  Response to previous treatment: no adverse reaction  Functional change: none    Pain: 0/10  Location: bilateral low back and hips      Objective       Tracy received therapeutic exercises to develop strength, flexibility and core stabilization for  60 minutes 1:1 with PTA including:    Supine HS stretch                          3x30" B  Supine Piriformis                            3x30" B   Hook Lying Glute set                      10x10" hold    Bridge                                             3x10 3" hold  SL Hip ABd                                     3x10 B    SLClams                                        3" hold 2x10 B RTB  Push/Pull                                        1x with shotgun  Double " "knee to chest                     2 minutes w/Green Swiss ball      Posterior pelvic tilt                          20x10" -   Low trunk rotation                          2 minutes with Swiss ball  Straight leg raise                            2x15 B  Prone hip extension                       2x12 (rolled towel for cervical neutral comfort) -   Leg Press          5.0 3x10 DL   Steamboats (3 way)          2x10 B w/YTB           Home Exercises Provided and Patient Education Provided   Education provided:   - continue HEP daily    Written Home Exercises Provided: Patient instructed to cont prior HEP.  Exercises were reviewed and Tracy was able to demonstrate them prior to the end of the session.  Tracy demonstrated good  understanding of the education provided.     See EMR under Patient Instructions for exercises provided 6/5/19.    Assessment     Pt did well and reported no increased low back pain throughout session. Due to c/o schedule patient was offered an opportunity to change her schedule but declined stating, "Maybe I just need to get used to it."    Tracy is progressing well towards her goals.   Pt prognosis is Excellent.     Pt will continue to benefit from skilled outpatient physical therapy to address the deficits listed in the problem list box on initial evaluation, provide pt/family education and to maximize pt's level of independence in the home and community environment.   Pt's spiritual, cultural and educational needs considered and pt agreeable to plan of care and goals.    Anticipated barriers to physical therapy: comorbidities and chronicity of pain    Goals:   1. Pt will be compliant with HEP to supplement PT in decreasing pain with functional mobility. PROGRESSING, NOT MET 6/18/2019  2. Pt will perform and hold TA contraction for 10x10" in dynamic sitting activities to demonstrate improved core strength PROGRESSING, NOT MET 6/18/2019  3. Pt will improve lumbar ROM to min loss all deficit " "motions to improve functional mobility. PROGRESSING, NOT MET 6/18/2019  4. Pt will improve impaired LE MMTs to >/=4+/5 to improve strength for functional tasks.PROGRESSING, NOT MET 6/18/2019     Long Term Goals (8 Weeks):   1. Pt will improve FOTO score to </= 49% limited to decrease perceived limitation with maintaining/changing body position PROGRESSING, NOT MET 6/18/2019  2. Pt will improve B 9090 HS flexibility to minimally decreased to improve flexibility for normal movement patterns. PROGRESSING, NOT MET 6/18/2019  3. Pt will perform and hold TA contraction for 10x10" in dynamic standing activities to demonstrate improved core strength PROGRESSING, NOT MET 6/18/2019  4. Pt will improve impaired LE MMTs to grossly 5/5 to improve strength for functional tasks. PROGRESSING, NOT MET 6/18/2019  5. Pt will report pain </= 2/10 during lifting activities to promote functional QOL. PROGRESSING, NOT MET 6/18/2019  6. Pt will report pain </= 2/10 during lumbar ROM to promote functional mobility. PROGRESSING, NOT MET 6/18/2019       Plan     Continue plan of care, progress towards established goals.  Focus on core strengthening    Devan Esqueda PTA   "

## 2019-07-24 ENCOUNTER — CLINICAL SUPPORT (OUTPATIENT)
Dept: REHABILITATION | Facility: HOSPITAL | Age: 69
End: 2019-07-24
Attending: FAMILY MEDICINE
Payer: MEDICARE

## 2019-07-24 DIAGNOSIS — R29.898 WEAKNESS OF BOTH UPPER EXTREMITIES: ICD-10-CM

## 2019-07-24 DIAGNOSIS — M79.601 PAIN IN BOTH UPPER EXTREMITIES: ICD-10-CM

## 2019-07-24 DIAGNOSIS — M25.60 STIFFNESS IN JOINT: ICD-10-CM

## 2019-07-24 DIAGNOSIS — M79.602 PAIN IN BOTH UPPER EXTREMITIES: ICD-10-CM

## 2019-07-24 PROCEDURE — 97140 MANUAL THERAPY 1/> REGIONS: CPT | Mod: PN

## 2019-07-24 PROCEDURE — 97110 THERAPEUTIC EXERCISES: CPT | Mod: PN

## 2019-07-24 NOTE — PROGRESS NOTES
"   Occupational Therapy Daily Treatment Note     Date: 7/24/2019  Name: Tracy Armendariz  Clinic Number: [id number]    Therapy Diagnosis:   Encounter Diagnoses   Name Primary?    Pain in both upper extremities     Stiffness in joint     Weakness of both upper extremities      Physician: Stephany Hodge, *    Physician Orders: OT evaluate and treat  Medical Diagnosis:      M25.511,M25.512 (ICD-10-CM) - Acute pain of both shoulders      Evaluation Date: 7/2/2019  Insurance Authorization period Expiration: 09/02/2019  Plan of Care Expiration Period: 8/30/2019  Next MD appointment:8/2/2019     Visit # / Visits Authorized: 5 / 12 FOTO:58%  Time In:100  Time Out: 205  Total Billable Time: 45 minutes  MT1 TE2    Medicare:$[id number]    Precautions: Standard    Subjective     Pt reports: "My shoulders feel better today than yesterday."  she was compliant with home exercise program given 7/2/2019  Response to previous treatment: decreased pain  Functional change: pt able to increase weight with exercises    Pain: 2/10  Location:  Bilateral shoulders left greater than right    Objective     Tracy received the following manual therapy techniques for 10 minutes:   -consisting of patient supine for Bilateral shoulder lateral telescoping, upper trapezius soft tissue mobilization, pectoralis lift, subscapularis myofascial release and stretch, PROM with endrange stretching, glenohumeral joint inferior anterior posterior glides grade I-III, gentle shoulder oscillations    Tracy received therapeutic exercises for 35 minutes including:  Exercises        PROM (Bilateral) Shoulder Flexion/Abduction/Internal rotation/External Rotation 10x   Supine dowel Flexion/Chest Press 2#  2/15   Sidelying Abduction 1  2/15   Sidelying External Rotation 1  2/15   pulleys 3'   Wall slides ball  2/15   Corner pectoralis stretch 3/30"   Theraband Lats Green  2/15   Theraband Rows Green  2/15   Theraband Internal Rotation/External Rotation/horiz abd " Green  2/15     Range of Motion/Strength:   Shoulder   Left     Right   Pain/Dysfunction with Movement     AROM PROM MMT AROM PROM MMT     Flexion 150(+10) WNL 4/5 155(=) WNL 4/5     Extension 65 WNL 4/5 65 WNL 4/5     Abduction 135(+10) WNL 4/5 150(+5) WNL 4/5     HorizAdduction 30(+5) WNL 4/5 30(+5) WNL 4/5     Internal rotation T12(=) WNL 4/5 T12 WNL 4/5     ER at 90° abd 90(=) WNL 4/5 90(+10) WNL 4/5    ER at 0° abd 85(+15) WNL 4/5 75(+5) WNL 4/5     NT=Not tested     CP x 10 minutes after for pain management  Home Exercises and Education Provided     Education provided:   - Progress towards goals     Written Home Exercises Provided: Patient instructed to cont prior HEP.  Exercises were reviewed and Tracy was able to demonstrate them prior to the end of the session.  Tracy demonstrated good  understanding of the HEP provided.     See EMR under Patient Instructions for exercises provided 7/02/2019     Assessment   Pt with improved pain report today. She had good endurance with exercises. Her ROM is overall improved since initial evaluation. Her Left continues to limit her more than pain. No change in FOTO score since initial evaluation.     Tracy is progressing well towards her goals and there are no updates to goals at this time. Pt prognosis is Good.     Pt will continue to benefit from skilled outpatient occupational therapy to address the deficits listed in the problem list on initial evaluation provide pt/family education and to maximize pt's level of independence in the home and community environment.     Anticipated barriers to occupational therapy: none    Pt's spiritual, cultural and educational needs considered and pt agreeable to plan of care and goals.    Goals:  Short Term Goals to be met in 4 weeks: (8/1/2019)  1) Initiate Hep-met, ongoing  2) Pt will increase Bilateral shoulder AROM by 10 degrees grossly for improved performance with overhead ADL's -met  3) Pt will report 6/10 pain in  (Bilateral)shoulder at worst -met  4) Pt will demonstrate increased MMT to 4+/5 grossly Bilateral shoulder -Not met, progressing  5) Patient will be able to achieve less than or equal to 35% on FOTO shoulder survey demonstrating overall improved functional ability with upper extremity. -Not met, progressing     Long Term Goals to be met by discharge:  1) Independent with HEP -Not met, progressing  2) Pt will demonstrate (Bilateral) shoulder AROM WNL grossly for [place] with ADL's -Not met, progressing  3) Pt will demonstrate (Bilateral) shoulder MMT WNL grossly for [place] with functional activities -Not met, progressing  4) Independent and pain free with ADL's and IADL's -Not met, progressing  5) Patient will be able to achieve less than or equal to 15% on FOTO shoulder survey demonstrating overall improved functional ability with upper extremity. -Not met, progressing    Plan   Continue with OT POC  Updates/Grading for next session: progress with ROM and strengthening      HARIS Sarmiento

## 2019-07-25 ENCOUNTER — CLINICAL SUPPORT (OUTPATIENT)
Dept: REHABILITATION | Facility: HOSPITAL | Age: 69
End: 2019-07-25
Attending: FAMILY MEDICINE
Payer: MEDICARE

## 2019-07-25 DIAGNOSIS — R29.898 WEAKNESS OF BOTH LOWER EXTREMITIES: ICD-10-CM

## 2019-07-25 DIAGNOSIS — M54.5 CHRONIC BILATERAL LOW BACK PAIN, WITH SCIATICA PRESENCE UNSPECIFIED: ICD-10-CM

## 2019-07-25 DIAGNOSIS — G89.29 CHRONIC BILATERAL LOW BACK PAIN, WITH SCIATICA PRESENCE UNSPECIFIED: ICD-10-CM

## 2019-07-25 PROCEDURE — 97110 THERAPEUTIC EXERCISES: CPT | Mod: PN

## 2019-07-25 NOTE — PROGRESS NOTES
"  Physical Therapy Daily Treatment Note     Name: Tracy Armendariz  Clinic Number: [id number]    Therapy Diagnosis:   Encounter Diagnoses   Name Primary?    Chronic bilateral low back pain, with sciatica presence unspecified     Weakness of both lower extremities      Physician: Stephany Hodge, *    Visit Date: 7/25/2019    Physician Orders: PT Eval and Treat; Cervical retractions scapula stabilization, shoulder ROM, SI joint mobilzation, back and core strengthening and HEP  Medical Diagnosis from Referral: Spondylosis of cervical region without myelopathy or radiculopathy; DDD (degenerative disc disease), cervical; DDD (degenerative disc disease), lumbar; Acute pain of both shoulders  Evaluation Date: 6/5/2019  Authorization Period Expiration: 08/05/2019  Plan of Care Expiration: 6/5/2019 to 8/2/2019  Visit # / Visits authorized: 13/12  FOTO: 3/10  Gcode: 3/10  PTA visit: 2/6  Visit:    60.64  Total:   2103.18     Time In: 100 pm  Time Out: 200 pm  Total Billable Time: 30 minutes (TE-2)     Precautions: Standard and HTN and Hx of Stroke    Subjective     Pt reports:better tolerance to the 4 day a week schedule combined PT/OT  She was compliant with home exercise program.  Response to previous treatment: no adverse reaction  Functional change: none    Pain: 3/10  Location: bilateral low back and hips      Objective       Tracy received therapeutic exercises to develop strength, flexibility and core stabilization for  60 minutes 1:1 with PTA including: additional 30 minutes suprvised    Supine HS stretch                          3x30" B  Supine Piriformis                            3x30" B   Hook Lying Glute set                      10x10" hold    Bridge                                             3x10 3" hold  SL Hip ABd                                     3x10 B    SLClams                                        3" hold 2x10 B RTB  Push/Pull                                        1x with shotgun  Double knee " "to chest                     2 minutes w/Green Swiss ball  Posterior pelvic tilt                          20x10" -   Low trunk rotation                          2 minutes with Swiss ball  Straight leg raise                            3 x 10 B  Prone hip extension                       2x12 (rolled towel for cervical neutral comfort) -   Leg Press          5.0 3x10 DL   Steamboats (3 way)          2x10 B w/YTB           Home Exercises Provided and Patient Education Provided   Education provided:   - continue HEP daily    Written Home Exercises Provided: Patient instructed to cont prior HEP.  Exercises were reviewed and Tracy was able to demonstrate them prior to the end of the session.  Tracy demonstrated good  understanding of the education provided.     See EMR under Patient Instructions for exercises provided 6/5/19.    Assessment     Presents with 3/10 LB pain. Has not experienced spasms presently. Pt did well and reported no increased low back pain throughout session. Decreased pain following treatment session.    Tracy is progressing well towards her goals.   Pt prognosis is Excellent.     Pt will continue to benefit from skilled outpatient physical therapy to address the deficits listed in the problem list box on initial evaluation, provide pt/family education and to maximize pt's level of independence in the home and community environment.   Pt's spiritual, cultural and educational needs considered and pt agreeable to plan of care and goals.    Anticipated barriers to physical therapy: comorbidities and chronicity of pain    Goals:   1. Pt will be compliant with HEP to supplement PT in decreasing pain with functional mobility. PROGRESSING, NOT MET 6/18/2019  2. Pt will perform and hold TA contraction for 10x10" in dynamic sitting activities to demonstrate improved core strength PROGRESSING, NOT MET 6/18/2019  3. Pt will improve lumbar ROM to min loss all deficit motions to improve functional mobility. " "PROGRESSING, NOT MET 6/18/2019  4. Pt will improve impaired LE MMTs to >/=4+/5 to improve strength for functional tasks.PROGRESSING, NOT MET 6/18/2019     Long Term Goals (8 Weeks):   1. Pt will improve FOTO score to </= 49% limited to decrease perceived limitation with maintaining/changing body position PROGRESSING, NOT MET 6/18/2019  2. Pt will improve B 9090 HS flexibility to minimally decreased to improve flexibility for normal movement patterns. PROGRESSING, NOT MET 6/18/2019  3. Pt will perform and hold TA contraction for 10x10" in dynamic standing activities to demonstrate improved core strength PROGRESSING, NOT MET 6/18/2019  4. Pt will improve impaired LE MMTs to grossly 5/5 to improve strength for functional tasks. PROGRESSING, NOT MET 6/18/2019  5. Pt will report pain </= 2/10 during lifting activities to promote functional QOL. PROGRESSING, NOT MET 6/18/2019  6. Pt will report pain </= 2/10 during lumbar ROM to promote functional mobility. PROGRESSING, NOT MET 6/18/2019       Plan     Continue plan of care, progress towards established goals.  Focus on core strengthening    Devan Esqueda PTA   "

## 2019-07-30 ENCOUNTER — CLINICAL SUPPORT (OUTPATIENT)
Dept: REHABILITATION | Facility: HOSPITAL | Age: 69
End: 2019-07-30
Attending: FAMILY MEDICINE
Payer: MEDICARE

## 2019-07-30 DIAGNOSIS — G89.29 CHRONIC BILATERAL LOW BACK PAIN, WITH SCIATICA PRESENCE UNSPECIFIED: ICD-10-CM

## 2019-07-30 DIAGNOSIS — R29.898 WEAKNESS OF BOTH LOWER EXTREMITIES: ICD-10-CM

## 2019-07-30 DIAGNOSIS — M54.5 CHRONIC BILATERAL LOW BACK PAIN, WITH SCIATICA PRESENCE UNSPECIFIED: ICD-10-CM

## 2019-07-30 PROCEDURE — 97110 THERAPEUTIC EXERCISES: CPT | Mod: PN

## 2019-07-30 NOTE — PROGRESS NOTES
"  Physical Therapy Daily Treatment Note     Name: Tracy Armendariz  Clinic Number: [id number]    Therapy Diagnosis:   Encounter Diagnoses   Name Primary?    Chronic bilateral low back pain, with sciatica presence unspecified     Weakness of both lower extremities      Physician: Stephany Hodge, *    Visit Date: 7/30/2019    Physician Orders: PT Eval and Treat; Cervical retractions scapula stabilization, shoulder ROM, SI joint mobilzation, back and core strengthening and HEP  Medical Diagnosis from Referral: Spondylosis of cervical region without myelopathy or radiculopathy; DDD (degenerative disc disease), cervical; DDD (degenerative disc disease), lumbar; Acute pain of both shoulders  Evaluation Date: 6/5/2019  Authorization Period Expiration: 08/05/2019  Plan of Care Expiration: 6/5/2019 to 8/2/2019  Visit # / Visits authorized: 14/24  FOTO: 4/10  Gcode: 4/10  PTA visit: --  Visit:    60.64  Total:   2163.82     Time In: 1415  Time Out: 1457  Total Billable Time: 27 minutes (TE-2)     Precautions: Standard and HTN and Hx of Stroke    Subjective     Pt reports: her back pain has improved since eval, but she thinks doing shoulder exercises is increasing her pain.  She was compliant with home exercise program.  Response to previous treatment: no adverse reaction  Functional change: none    Pain: 3/10  Location: bilateral low back and hips      Objective       Tracy received therapeutic exercises to develop strength, flexibility and core stabilization for  42 minutes including:    Supine HS stretch                          3x30" B  Supine Piriformis                            3x30" B   Hook Lying Glute set                      10x10" hold    Bridge                                             3x10 3" hold  SL Hip ABd                                     3x10 B  NOT PERFORMED THIS TREATMENT  SLClams                                        3" hold 2x10 B RTB  Push/Pull                                        1x with " "shotgun  Double knee to chest                     2 minutes w/Green Swiss ball  Posterior pelvic tilt                          20x10" -   Low trunk rotation                          2 minutes with Swiss ball  Straight leg raise                            3 x 10 B NOT PERFORMED THIS TREATMENT  Prone hip extension                       2x12 (rolled towel for cervical neutral comfort) - NOT PERFORMED THIS TREATMENT  Leg Press          5.0 3x10 DL    Steamboats (3 way)          2x10 B w/YTB   NOT PERFORMED THIS TREATMENT    Rows     3x10 green theraband (hold next)        Home Exercises Provided and Patient Education Provided   Education provided:   - continue HEP daily    Written Home Exercises Provided: Patient instructed to cont prior HEP.  Exercises were reviewed and Tracy was able to demonstrate them prior to the end of the session.  Tracy demonstrated good  understanding of the education provided.     See EMR under Patient Instructions for exercises provided 6/5/19.    Assessment     Patient reported pain in R groin with bridges that improved following pelvic muscle-energy technique. Will reassess next visit.    Tracy is progressing well towards her goals.   Pt prognosis is Excellent.     Pt will continue to benefit from skilled outpatient physical therapy to address the deficits listed in the problem list box on initial evaluation, provide pt/family education and to maximize pt's level of independence in the home and community environment.   Pt's spiritual, cultural and educational needs considered and pt agreeable to plan of care and goals.    Anticipated barriers to physical therapy: comorbidities and chronicity of pain    Goals:   1. Pt will be compliant with HEP to supplement PT in decreasing pain with functional mobility. PROGRESSING, NOT MET 6/18/2019  2. Pt will perform and hold TA contraction for 10x10" in dynamic sitting activities to demonstrate improved core strength PROGRESSING, NOT MET " "6/18/2019  3. Pt will improve lumbar ROM to min loss all deficit motions to improve functional mobility. PROGRESSING, NOT MET 6/18/2019  4. Pt will improve impaired LE MMTs to >/=4+/5 to improve strength for functional tasks.PROGRESSING, NOT MET 6/18/2019     Long Term Goals (8 Weeks):   1. Pt will improve FOTO score to </= 49% limited to decrease perceived limitation with maintaining/changing body position PROGRESSING, NOT MET 6/18/2019  2. Pt will improve B 9090 HS flexibility to minimally decreased to improve flexibility for normal movement patterns. PROGRESSING, NOT MET 6/18/2019  3. Pt will perform and hold TA contraction for 10x10" in dynamic standing activities to demonstrate improved core strength PROGRESSING, NOT MET 6/18/2019  4. Pt will improve impaired LE MMTs to grossly 5/5 to improve strength for functional tasks. PROGRESSING, NOT MET 6/18/2019  5. Pt will report pain </= 2/10 during lifting activities to promote functional QOL. PROGRESSING, NOT MET 6/18/2019  6. Pt will report pain </= 2/10 during lumbar ROM to promote functional mobility. PROGRESSING, NOT MET 6/18/2019       Plan     Continue plan of care, progress towards established goals.  Focus on core strengthening. Reassess next visit.    Annemarie Mccloud, PT   "

## 2019-07-31 ENCOUNTER — CLINICAL SUPPORT (OUTPATIENT)
Dept: REHABILITATION | Facility: HOSPITAL | Age: 69
End: 2019-07-31
Attending: FAMILY MEDICINE
Payer: MEDICARE

## 2019-07-31 DIAGNOSIS — M79.601 PAIN IN BOTH UPPER EXTREMITIES: ICD-10-CM

## 2019-07-31 DIAGNOSIS — M25.60 STIFFNESS IN JOINT: ICD-10-CM

## 2019-07-31 DIAGNOSIS — M79.602 PAIN IN BOTH UPPER EXTREMITIES: ICD-10-CM

## 2019-07-31 DIAGNOSIS — R29.898 WEAKNESS OF BOTH UPPER EXTREMITIES: ICD-10-CM

## 2019-07-31 PROCEDURE — 97140 MANUAL THERAPY 1/> REGIONS: CPT | Mod: PN

## 2019-07-31 PROCEDURE — 97110 THERAPEUTIC EXERCISES: CPT | Mod: PN

## 2019-07-31 NOTE — PROGRESS NOTES
"   Occupational Therapy Daily Treatment Note     Date: 7/31/2019  Name: Tracy Armendariz  Clinic Number: [id number]    Therapy Diagnosis:   Encounter Diagnoses   Name Primary?    Pain in both upper extremities     Stiffness in joint     Weakness of both upper extremities      Physician: Stephany Hodge, *    Physician Orders: OT evaluate and treat  Medical Diagnosis:      M25.511,M25.512 (ICD-10-CM) - Acute pain of both shoulders      Evaluation Date: 7/2/2019  Insurance Authorization period Expiration: 09/02/2019  Plan of Care Expiration Period: 8/30/2019  Next MD appointment:8/2/2019     Visit # / Visits Authorized: 6 / 12 FOTO:58%  Time In:1252  Time Out:155  Total Billable Time: 53 minutes  MT1 TE3    Medicare:$561.66    Precautions: Standard    Subjective     Pt reports: "I have a headache today and my neck has started to hurt"  she was compliant with home exercise program given 7/2/2019  Response to previous treatment: decreased pain  Functional change: pt able to increase weight with exercises    Pain: 4/10  Location:  Bilateral shoulders left greater than right    Objective     Tracy received the following manual therapy techniques for 10 minutes:   -consisting of patient supine for Bilateral shoulder lateral telescoping, upper trapezius soft tissue mobilization, pectoralis lift, subscapularis myofascial release and stretch, PROM with endrange stretching, glenohumeral joint inferior anterior posterior glides grade I-III, gentle shoulder oscillations    Tracy received therapeutic exercises for 43 minutes including:  Exercises        PROM (Bilateral) Shoulder Flexion/Abduction/Internal rotation/External Rotation 10x   Supine dowel Flexion/Chest Press 2#  2/15   Sidelying Abduction 1  2/15   Sidelying External Rotation 1  2/15   pulleys 3'   Wall slides ball  2/15   Corner pectoralis stretch 3/30"   Theraband Lats Green  2/15   Theraband Rows Green  2/15   Theraband Internal Rotation/External " Rotation/horiz abd Green  2/15     CP x 10 minutes after for pain management  Home Exercises and Education Provided     Education provided:   - Progress towards goals     Written Home Exercises Provided: Patient instructed to cont prior HEP.  Exercises were reviewed and Tracy was able to demonstrate them prior to the end of the session.  Tracy demonstrated good  understanding of the HEP provided.     See EMR under Patient Instructions for exercises provided 7/02/2019     Assessment   Pt with increased pain report today in shoulders and neck. She was able to complete all exercises in a pain free ROM despite increased pain report initially. Tenderness noted in bilateral pectoralis, biceps and upper trapezius however responded well to soft tissue mobilization.     Tracy is progressing well towards her goals and there are no updates to goals at this time. Pt prognosis is Good.     Pt will continue to benefit from skilled outpatient occupational therapy to address the deficits listed in the problem list on initial evaluation provide pt/family education and to maximize pt's level of independence in the home and community environment.     Anticipated barriers to occupational therapy: none    Pt's spiritual, cultural and educational needs considered and pt agreeable to plan of care and goals.    Goals:  Short Term Goals to be met in 4 weeks: (8/1/2019)  1) Initiate Hep-met, ongoing  2) Pt will increase Bilateral shoulder AROM by 10 degrees grossly for improved performance with overhead ADL's -met  3) Pt will report 6/10 pain in (Bilateral)shoulder at worst -met  4) Pt will demonstrate increased MMT to 4+/5 grossly Bilateral shoulder -Not met, progressing  5) Patient will be able to achieve less than or equal to 35% on FOTO shoulder survey demonstrating overall improved functional ability with upper extremity. -Not met, progressing     Long Term Goals to be met by discharge:  1) Independent with HEP -Not met,  progressing  2) Pt will demonstrate (Bilateral) shoulder AROM WNL grossly for [place] with ADL's -Not met, progressing  3) Pt will demonstrate (Bilateral) shoulder MMT WNL grossly for [place] with functional activities -Not met, progressing  4) Independent and pain free with ADL's and IADL's -Not met, progressing  5) Patient will be able to achieve less than or equal to 15% on FOTO shoulder survey demonstrating overall improved functional ability with upper extremity. -Not met, progressing    Plan   Continue with OT POC  Updates/Grading for next session: progress with ROM and strengthening      HARIS Sarmiento

## 2019-08-01 ENCOUNTER — CLINICAL SUPPORT (OUTPATIENT)
Dept: REHABILITATION | Facility: HOSPITAL | Age: 69
End: 2019-08-01
Attending: FAMILY MEDICINE
Payer: MEDICARE

## 2019-08-01 DIAGNOSIS — R29.898 WEAKNESS OF BOTH LOWER EXTREMITIES: ICD-10-CM

## 2019-08-01 DIAGNOSIS — G89.29 CHRONIC BILATERAL LOW BACK PAIN, WITH SCIATICA PRESENCE UNSPECIFIED: ICD-10-CM

## 2019-08-01 DIAGNOSIS — M54.5 CHRONIC BILATERAL LOW BACK PAIN, WITH SCIATICA PRESENCE UNSPECIFIED: ICD-10-CM

## 2019-08-01 PROCEDURE — 97110 THERAPEUTIC EXERCISES: CPT | Mod: KX,PN

## 2019-08-01 NOTE — PROGRESS NOTES
Subjective:      Patient ID: Tracy Armendariz is a 68 y.o. female.    Chief Complaint: Follow-up    Ms Armendariz is a 67 yo female here for follow up of neck and low back pain.  She was seen by me 4/29/2019 and has had neck pain for the past 2 months. She has a history of back surgery in 1980 then started having back pain in November.  The pain started in shoulders and went to arms and neck.  She was sent to PT and given a medrol dose bryson.  Todays, she feels like her back is better.  She feels like PT helped her lower back.  She just started the shoulder PT, and feels like it has been increasing the pain. She has been having neck and shoulder.  The pain is with lifting her arms.  She has some neck pain, and one morning woke up and could not move her neck.  She can move her neck now, but moving it causing the neck pain.  She has shoulder pain with moving the shoulder but not with moving the neck.  The pain is with all motions of the neck.  The neck pain is an achy throbbing pain.  The pain is 5/10 now, worst 7/10 going to sleep, best 3/10 in the morning.  They did not give her exercises for her neck.  She felt like her neck got better, but then her neck pain restarted with shoulder therapy.  She feels like the medrol dose bryson helped all of ehr pain and gave her some relief for her pain all over    The low back pain is not severe.  The low back pain is not constant.  The pain is mainly with standing.  The  Pain is in the SI joint.  She will do her HEP and she feels like that helps.  She is doing exercises on her own for her back    MRI cervical spine 3/22/2019  The visualized portions of the posterior fossa is unremarkable.  There is arthropathy at the craniocervical junction.  No prevertebral soft tissue swelling is identified.    The cervical alignment is maintained.  There are fibrotic endplate changes in the lower cervical spine.  The remainder of the bone marrow signal is within normal limits.    The cord is normal  in signal without evidence of edema or expansion.  There are no intraspinal collections.  There is effacement of the ventral thecal sac in the lower cervical spine.    There is hypertrophy of the posterior elements.  There is multilevel disc desiccation.  Evaluation of the individual disc levels reveals the following:    C2-C3, there is a central disc protrusion.  There is mild hypertrophy of the posterior elements.  The spinal canal and neural foramina are within normal limits.  C3-C4, there is a disc osteophyte complex along with facet hypertrophy and uncovertebral hypertrophy.  There is superimposed left paracentral disc protrusion.  There is mild narrowing of the spinal canal.  There is mild right and moderate left neural foraminal narrowing.    C4-C5, there is a disc osteophyte complex along with facet hypertrophy and uncovertebral hypertrophy.  There is superimposed central disc protrusion.  There is mild to moderate narrowing of the spinal canal.  There is mild right and moderate left neural foraminal narrowing.    C5-C6, there is a disc osteophyte complex along with facet hypertrophy and uncovertebral hypertrophy.  There is superimposed central disc protrusion.  There is severe narrowing of the spinal canal.  There is severe bilateral neural foraminal narrowing.    C6-C7, there is a disc osteophyte complex along with facet hypertrophy and uncovertebral hypertrophy.  There is superimposed central disc protrusion.  There is moderate to severe spinal canal narrowing.  There is severe right and moderate left neural foraminal narrowing.    C7-T1, there is a disc osteophyte complex along with facet hypertrophy and uncovertebral hypertrophy.  There is mild spinal canal narrowing.  There is mild bilateral neural foraminal narrowing.    The paraspinal normal limits.  Flow voids within the vertebral arteries are unremarkable.    Impression      Advanced degenerative changes in the lower cervical spine with moderate to  severe spinal canal narrowing at the C5-C6 and C6-C7 levels and associated moderate to severe neural foraminal narrowing.  No cord signal abnormality.      CT lumbar 2/2019  Remote operative change right L5 hemilaminectomy.    Lumbar sagittal alignment within normal limits.  Lumbar vertebral body heights and contours are stable without evidence for acute fracture or subluxation.  Degenerative disc disease most pronounced at L5/S1 with moderate height loss and endplate degeneration with superimposed vacuum phenomena.    Please note evaluation of the central canal and neural foramina is limited by CT technique, allowing for limitation degenerative change as follows:    T12/L1: No significant disc bulge, central canal or neural foraminal stenosis.    L1/L2: Slight disc bulge without significant central canal or neural foraminal stenosis.    L2/L3: Bulging disc with ligamentum flavum hypertrophy without significant central canal stenosis with mild neural foraminal narrowing.    L3/L4: Bulging disc with ligamentum flavum hypertrophy and facet joint arthropathy mild central canal and bilateral neural foraminal stenosis.    L4/L5: Posterior disc osteophyte complex with ligamentum flavum hypertrophy and facet joint arthropathy with compressing the thecal hiatal right hemilaminectomy.  There is mild moderate bony neural foraminal narrowing.    L5/S1: Posterior disc osteophyte with decompression of the thecal sac via right hemilaminectomy.  No evidence for significant central canal stenosis.    Facet joint arthropathy with a mild moderate bilateral neural foraminal stenosis right greater than left.    Atherosclerotic plaquing of the aorta.  Continued multifocal subcentimeter left renal medullary parenchymal and caliceal stones partially visualized.    Impression      Remote operative change right L4 and L5 hemilaminectomy    Allowing for CT technique there is multilevel degenerative change most pronounced at L3/L4 with  bulging disc, ligamentum flavum hypertrophy and facet joint arthropathy with mild central canal and bilateral neural foraminal stenosis.    More prominent neural foraminal narrowing at the L4/L5 and L5/S1 mild moderate narrowing as detailed above    Further evaluation as warranted clinically.    Past Medical History:  No date: Allergy  No date: Anticoagulant long-term use  No date: Arthritis  : CVA (cerebral infarction)  No date: Depression  No date: Disorder of kidney and ureter      Comment:  renal stones  No date: Diverticulosis of colon  No date: Extrinsic asthma, unspecified  No date: Hyperlipidemia  No date: Hypertension  2014: Left atrial enlargement  No date: Low back pain  No date: MARTIN (obstructive sleep apnea)  No date: Osteopenia  No date: PUD (peptic ulcer disease)   2013: Stroke    Past Surgical History:  : APPENDECTOMY      Comment:  @ time of hysterectomy  2017: ARTHROPLASTY-KNEE; Right      Comment:  Performed by John Kim MD at [facility] OR  No date: BACK SURGERY  No date: CATARACT EXTRACTION  :  SECTION  : CHOLECYSTECTOMY      Comment:  laparoscopic  2018: COLONOSCOPY/Golytely; N/A      Comment:  Performed by Janine Black MD at [facility] ENDO  : DILATION AND CURETTAGE OF UTERUS  : HYSTERECTOMY      Comment:  TAHUSO with appendectomy  No date: INNER EAR SURGERY      Comment:  replaced ear drum  2017: IOVERA; Right      Comment:  Performed by Michael Treviño MD at [facility] OR  No date: JOINT REPLACEMENT; Right      Comment:  knee  : KNEE ARTHROSCOPY W/ DEBRIDEMENT  : LUMBAR DISCECTOMY      Comment:  L4-L5  No date: OOPHORECTOMY      Comment:  unilateral  2018: REMOVAL, CALCULUS, URETER, URETEROSCOPIC, holmium laser   lithotripsy, stone basket extraction, retrograde pyelogram, ureteral   stent exchange; Left      Comment:  Performed by Anaya Zamora MD at [facility] OR  No date: TONSILLECTOMY  No date: TYMPANOPLASTY    Review of patient's  family history indicates:  Problem: Cervical cancer      Relation: Mother          Age of Onset: (Not Specified)  Problem: Cancer      Relation: Mother          Age of Onset: 65          Comment: lung cancer - non smoker  Problem: Stroke      Relation: Paternal Grandfather          Age of Onset: (Not Specified)  Problem: Hypertension      Relation: Maternal Grandfather          Age of Onset: (Not Specified)  Problem: Heart disease      Relation: Maternal Grandfather          Age of Onset: (Not Specified)  Problem: Hypertension      Relation: Father          Age of Onset: (Not Specified)  Problem: Coronary artery disease      Relation: Father          Age of Onset: 62  Problem: Heart disease      Relation: Father          Age of Onset: (Not Specified)  Problem: Diabetes      Relation: Sister          Age of Onset: (Not Specified)  Problem: Heart disease      Relation: Sister          Age of Onset: (Not Specified)  Problem: Kidney disease      Relation: Sister          Age of Onset: (Not Specified)  Problem: Breast cancer      Relation: Daughter          Age of Onset: 36  Problem: Heart failure      Relation: Sister          Age of Onset: (Not Specified)          Comment: 60s  Problem: Colon cancer      Relation: Neg Hx          Age of Onset: (Not Specified)  Problem: Ovarian cancer      Relation: Neg Hx          Age of Onset: (Not Specified)      Social History    Socioeconomic History      Marital status:       Spouse name: Not on file      Number of children: Not on file      Years of education: Not on file      Highest education level: Not on file    Occupational History      Not on file    Social Needs      Financial resource strain: Not on file      Food insecurity:        Worry: Not on file        Inability: Not on file      Transportation needs:        Medical: Not on file        Non-medical: Not on file    Tobacco Use      Smoking status: Former Smoker        Quit date: 1/1/1995        Years since  quittin.3      Smokeless tobacco: Never Used    Substance and Sexual Activity      Alcohol use: Yes        Alcohol/week: 0.6 oz        Types: 1 Glasses of wine per week        Comment: social      Drug use: No      Sexual activity: Yes        Partners: Male        Birth control/protection: Surgical        Comment:  since     Lifestyle      Physical activity:        Days per week: Not on file        Minutes per session: Not on file      Stress: Not on file    Relationships      Social connections:        Talks on phone: Not on file        Gets together: Not on file        Attends Mandaeism service: Not on file        Active member of club or organization: Not on file        Attends meetings of clubs or organizations: Not on file        Relationship status: Not on file    Other Topics      Concerns:        Not on file    Social History Narrative      Not on file      Current Outpatient Medications:  amoxicillin (AMOXIL) 500 MG capsule, , Disp: , Rfl:   aspirin 81 MG Chew, Take 81 mg by mouth once daily., Disp: , Rfl:   atorvastatin (LIPITOR) 10 MG tablet, TAKE 1 TABLET BY MOUTH DAILY, Disp: 90 tablet, Rfl: 3  calcium carbonate (OS-SPENCER) 600 mg (1,500 mg) Tab, Take 600 mg by mouth 2 (two) times daily with meals., Disp: , Rfl:   cholecalciferol, vitamin D3, 1,000 unit Chew, Take by mouth., Disp: , Rfl:   clopidogrel (PLAVIX) 75 mg tablet, TAKE 1 TABLET BY MOUTH EVERY DAY, Disp: 90 tablet, Rfl: 3  clotrimazole-betamethasone 1-0.05% (LOTRISONE) cream, Apply topically 2 (two) times daily., Disp: 45 g, Rfl: 2  escitalopram oxalate (LEXAPRO) 10 MG tablet, TAKE 1 TABLET BY MOUTH EVERY DAY, Disp: 90 tablet, Rfl: 3  LACTOBACILLUS ACIDOPHILUS (PROBIOTIC ORAL), Take by mouth., Disp: , Rfl:   losartan (COZAAR) 100 MG tablet, TAKE 1 TABLET(100 MG) BY MOUTH EVERY DAY, Disp: 90 tablet, Rfl: 3  MULTIVIT WITH CALCIUM,IRON,MIN (WOMEN'S DAILY MULTIVITAMIN ORAL), Take by mouth., Disp: , Rfl:   pantoprazole (PROTONIX) 40 MG  tablet, Take 1 tablet (40 mg total) by mouth once daily., Disp: 30 tablet, Rfl: 11  VENTOLIN HFA 90 mcg/actuation inhaler, INHALE 2 PUFFS EVERY 6 HOURS AS NEEDED FOR WHEEZING, Disp: 18 g, Rfl: 0    No current facility-administered medications for this visit.       Review of patient's allergies indicates:   -- Iodinated contrast- oral and iv dye -- Hives and Rash   -- Gabapentin -- Hallucinations   -- Iodine -- Hives   -- Isothiazolinones -- Rash   -- Penicillins -- Rash        Review of Systems   Constitution: Negative for weight gain and weight loss.   Cardiovascular: Negative for chest pain.   Respiratory: Negative for shortness of breath.    Musculoskeletal: Positive for back pain, joint pain and neck pain. Negative for joint swelling.   Gastrointestinal: Positive for nausea and vomiting. Negative for abdominal pain and bowel incontinence.   Genitourinary: Negative for bladder incontinence.   Neurological: Positive for headaches and numbness (3rd, 4th, 5th digits bilateral hands).         Objective:        General: Tracy is well-developed, well-nourished, appears stated age, in no acute distress, alert and oriented to time, place and person.     General    Vitals reviewed.  Constitutional: She is oriented to person, place, and time. She appears well-developed and well-nourished.   HENT:   Head: Normocephalic and atraumatic.   Pulmonary/Chest: Effort normal.   Neurological: She is alert and oriented to person, place, and time.   Psychiatric: She has a normal mood and affect. Her behavior is normal. Judgment and thought content normal.     General Musculoskeletal Exam   Gait: normal     Right Ankle/Foot Exam     Tests   Heel Walk: able to perform  Tiptoe Walk: able to perform    Left Ankle/Foot Exam     Tests   Heel Walk: able to perform  Tiptoe Walk: able to perform  Back (L-Spine & T-Spine) / Neck (C-Spine) Exam     Tenderness Right paramedian tenderness of the Upper C-Spine and Sacrum. Left paramedian  tenderness of the Upper C-Spine.     Back (L-Spine & T-Spine) Range of Motion   Extension: 20   Flexion: 90   Lateral bend right: 20   Lateral bend left: 20   Rotation right: 40   Rotation left: 40     Neck (C-Spine) Range of Motion   Flexion:     40  Extension: 40Right Lateral Bend: 20 Left Lateral Bend: 20 Right Rotation: 40 Left Rotation: 40     Spinal Sensation   Right Side Sensation  C-Spine Level: normal   L-Spine Level: normal  S-Spine Level: normal  Left Side Sensation  C-Spine Level: normal  L-Spine Level: normal  S-Spine Level: normal    Back (L-Spine & T-Spine) Tests   Right Side Tests  Straight leg raise:      Sitting SLR: > 70 degrees      Left Side Tests  Straight leg raise:     Sitting SLR: > 70 degrees          Other She has no scoliosis .  Spinal Kyphosis:  Absent  Right Shoulder Exam     Range of Motion   Active abduction: 170   Forward Flexion: 170     Tests & Signs   Rotator Cuff Painful Arc/Range: moderate    Left Shoulder Exam     Range of Motion   Active abduction: 170   Forward Flexion: 170     Tests & Signs   Rotator Cuff Painful Arc/Range: moderate      Muscle Strength   Right Upper Extremity   Biceps: 5/5/5   Deltoid:  5/5  Triceps:  5/5  Wrist extension: 5/5/5   Finger Flexors:  5/5  Left Upper Extremity  Biceps: 5/5/5   Deltoid:  5/5  Triceps:  5/5  Wrist extension: 5/5/5   Finger Flexors:  5/5  Right Lower Extremity   Hip Flexion: 5/5   Quadriceps:  5/5   Anterior tibial:  5/5/5  EHL:  5/5  Left Lower Extremity   Hip Flexion: 5/5   Quadriceps:  5/5   Anterior tibial:  5/5/5   EHL:  5/5    Reflexes     Left Side  Biceps:  2+  Triceps:  2+  Brachioradialis:  2+  Quadriceps:  2+  Achilles:  2+  Left Patel's Sign:  Absent  Babinski Sign:  absent    Right Side   Biceps:  2+  Triceps:  2+  Brachioradialis:  2+  Quadriceps:  2+  Achilles:  2+  Right Patel's Sign:  absent  Babinski Sign:  absent    Vascular Exam     Right Pulses        Carotid:                  2+    Left  Pulses        Carotid:                  2+              Assessment:       1. Spondylosis of cervical region without myelopathy or radiculopathy    2. DDD (degenerative disc disease), cervical    3. DDD (degenerative disc disease), lumbar    4. Acute pain of both shoulders           Plan:       Orders Placed This Encounter    methylPREDNISolone (MEDROL DOSEPACK) 4 mg tablet    tiZANidine (ZANAFLEX) 2 MG tablet       1.  PT  Was done for her back.  They have not worked on her neck.  She was initially given orders for her neck and back.  Her neck pain improved with medrol dose bryson, until she started OT for her shoulders  2.  discussed Cervical BÁRBARA she wants to wait  3.  Continue OT for her shoulders  4. Medrol dose bryson was given again, but discussed we cannot give it all the time.  She feels like it helped her pain all over  5. Tizanidine 2-4mg po QHS  6. RTC 4 months         Follow-up: No follow-ups on file. If there are any questions prior to this, the patient was instructed to contact the office.

## 2019-08-01 NOTE — PROGRESS NOTES
"  Physical Therapy Daily Treatment Note     Name: Tracy Armendariz  Clinic Number: [id number]    Therapy Diagnosis:   Encounter Diagnoses   Name Primary?    Chronic bilateral low back pain, with sciatica presence unspecified     Weakness of both lower extremities      Physician: Stephany Hodge, *    Visit Date: 8/1/2019    Physician Orders: PT Eval and Treat; Cervical retractions scapula stabilization, shoulder ROM, SI joint mobilzation, back and core strengthening and HEP  Medical Diagnosis from Referral: Spondylosis of cervical region without myelopathy or radiculopathy; DDD (degenerative disc disease), cervical; DDD (degenerative disc disease), lumbar; Acute pain of both shoulders  Evaluation Date: 6/5/2019  Authorization Period Expiration: 08/05/2019  Plan of Care Expiration: 6/5/2019 to 8/2/2019  Visit # / Visits authorized: 15/24  FOTO: 5/10  Gcode: 5/10  PTA visit: --  Visit:    121.28  Total:   2285.10     Time In: 1300  Time Out: 1355  Total Billable Time: 55 minutes (TE-4)     Precautions: Standard and HTN and Hx of Stroke    Subjective     Pt reports: overall her back pain has improved and she has been experiencing minimal to no pain. She is continuing to have B shoulder and neck pain  She was compliant with home exercise program.  Response to previous treatment: no adverse reaction  Functional change: none    Pain: 0/10  Location: bilateral low back and hips      Objective       Tracy received therapeutic exercises to develop strength, flexibility and core stabilization for  55 minutes including: below and objective measures taken    Supine HS stretch                          3x30" B  Supine Piriformis                            3x30" B   Hook Lying Glute set                      10x10" hold      SLClams                                        3" hold 2x10 B RTB      Posterior pelvic tilt                          20x10"       Leg Press          5.0 3x10 DL    Steamboats (3 way)          2x10 B " "w/YTB                     Range of Motion/Strength:        Thoracolumbar Pain/Dysfunction with Movement   Flexion Min loss; "pulling" in B hamstrings and gastrocs   Extension No loss   Right side bending No loss   Left side bending No loss   Right rotation No loss   Left rotation No loss        L/E MMT Right Left Pain/Dysfunction with Movement   Hip Flexion 4+/5 4+/5    Hip Extension 4+/5 4+/5    Hip Abduction 4+/5* 4/5 Pain in R groin   Knee Flexion 5/5 5/5     Knee Extension 5/5 5/5     Ankle DF 5/5 5/5     Ankle PF 4+/5 4+/5      *denotes pain           Flexibility:   Hamstring: minimally decreased B       Special Tests:   Straight leg raise: negative  Pelvic rotation: good alignment          Home Exercises Provided and Patient Education Provided     Education provided:   - trial HEP x 2 weeks without PT supervision  - if continuing to experience neck pain after being treated by OT, return to PT to address neck pain    Written Home Exercises Provided: yes  Exercises were reviewed and Tracy was able to demonstrate them prior to the end of the session.  Tracy demonstrated good  understanding of the education provided.     See EMR under Patient Instructions for exercises provided 8/1/2019    Assessment     Patient demonstrated improvements in lumbar ROM,  LE strength, and flexibility. Since beginning PT she has met 6/10 of her goals. Due to her improvements, at this time patient will trial 2 weeks of performing her HEP independently without the supervision of PT while continuing to be treated by OT for her shoulder pain. Patient has verbalized continued neck pain. If patient is continuing to experience neck pain at the end of her OT treatments, she will return to PT to address her neck pain. Patient verbalized understanding and agreement.     Tracy is progressing well towards her goals.   Pt prognosis is Excellent.     Pt will continue to benefit from skilled outpatient physical therapy to address the deficits " "listed in the problem list box on initial evaluation, provide pt/family education and to maximize pt's level of independence in the home and community environment.   Pt's spiritual, cultural and educational needs considered and pt agreeable to plan of care and goals.    Anticipated barriers to physical therapy: comorbidities and chronicity of pain    Goals:   1. Pt will be compliant with HEP to supplement PT in decreasing pain with functional mobility.  MET 8/1/2019  2. Pt will perform and hold TA contraction for 10x10" in dynamic sitting activities to demonstrate improved core strength PROGRESSING, NOT MET 8/1/2019  3. Pt will improve lumbar ROM to min loss all deficit motions to improve functional mobility. MET 8/1/2019  4. Pt will improve impaired LE MMTs to >/=4+/5 to improve strength for functional tasks. MET 8/1/2019     Long Term Goals (8 Weeks):   1. Pt will improve FOTO score to </= 49% limited to decrease perceived limitation with maintaining/changing body position  MET 7/16/2019  2. Pt will improve B 9090 HS flexibility to minimally decreased to improve flexibility for normal movement patterns. MET 8/1/2019  3. Pt will perform and hold TA contraction for 10x10" in dynamic standing activities to demonstrate improved core strength PROGRESSING, NOT MET 8/1/2019  4. Pt will improve impaired LE MMTs to grossly 5/5 to improve strength for functional tasks. PROGRESSING, NOT MET 8/1/2019  5. Pt will report pain </= 2/10 during lifting activities to promote functional QOL. PROGRESSING, NOT MET 8/1/2019  6. Pt will report pain </= 2/10 during lumbar ROM to promote functional mobility. MET 8/1/2019       Plan     Patient to trial HEP x 2 weeks. Will notify PT if having pain and wanting to return to therapy    Annemarie Mccloud, PT   "

## 2019-08-01 NOTE — PATIENT INSTRUCTIONS
Posture - Sitting    Sit upright, head facing forward. Scoot your tailbone all the way to the back of your chair. Lean slightly forward and place a rolled up towel at your waist. Lean back so shoulder blades lightly touch the back of the chair. Keep shoulders relaxed, and avoid rounded back. Keep hips level with knees. Avoid crossing legs for long periods.       Lumbar Rotation    Feet on floor, slowly rock knees from side to side in small, pain-free range of motion. Allow lower back to rotate slightly, but keep shoulders flat on ground. Hold 5 seconds.  Repeat 20 times per set. Do 2 sets per session. Do 1 session per day.         Glute Squeeze, supine    Lie face up and squeeze your rear end. Do not tighten your abdominals or hold your breath. Hold 10 seconds. Relax.  Repeat 10 times per set. Do 2 sets per session. Do 1 session per day.      Pelvic Tilt    Flatten back by tightening stomach muscles and buttocks. Do not hold your breath.  Hold 10 seconds.  Repeat 10 times per set. Do 2 sets per session. Do 1 session per day.       Supine: Leg Stretch With Strap (Basic)        Lie on back with one knee bent, foot flat on floor. Hook strap around other foot. Straighten knee. Keep knee level with other knee. Hold 30 seconds. Relax leg completely down to floor.   Repeat 3 times per session. Do 2 sessions per day.    Copyright © [employer]. All rights reserved.      Piriformis Stretch          Cross foot on opposite knee and pull leg towards opposite shoulder until a gentle stretch is felt across the buttock.  Do not stretch aggressively.     Hold 30 seconds.     Repeat 3 times per session. Do 2 sessions per day.         © 3323-6573 [employer] All Rights Reserved          Hip Abduction: Side-Lying (Single Leg)        Lie on side with knees bent, red band around thighs just above knees. Raise top leg, keeping knee bent. Hold 3 seconds.  Repeat 10 times per set. Repeat on other side. Do 3 sets per session. Do 1 session per  week.     [email or web address]     Copyright © [employer]. All rights reserved.         Strengthening: Hip Abduction - Resisted        With yellow band around ankles, extend leg out from side.  Repeat 10 times per set. Do 3 sets per session. Do 1 sessions per day.        [email or web address]     Copyright © [employer]. All rights reserved.     Strengthening: Hip Extension - Resisted        With yellow band around ankles, pull leg straight back.  Repeat 10 times per set. Do 3 sets per session. Do 1 sessions per day.        [email or web address]     Copyright © [employer]. All rights reserved.

## 2019-08-02 ENCOUNTER — OFFICE VISIT (OUTPATIENT)
Dept: SPINE | Facility: CLINIC | Age: 69
End: 2019-08-02
Attending: PHYSICAL MEDICINE & REHABILITATION
Payer: MEDICARE

## 2019-08-02 VITALS — SYSTOLIC BLOOD PRESSURE: 135 MMHG | HEART RATE: 58 BPM | DIASTOLIC BLOOD PRESSURE: 73 MMHG

## 2019-08-02 DIAGNOSIS — M51.36 DDD (DEGENERATIVE DISC DISEASE), LUMBAR: ICD-10-CM

## 2019-08-02 DIAGNOSIS — M50.30 DDD (DEGENERATIVE DISC DISEASE), CERVICAL: ICD-10-CM

## 2019-08-02 DIAGNOSIS — M47.812 SPONDYLOSIS OF CERVICAL REGION WITHOUT MYELOPATHY OR RADICULOPATHY: Primary | ICD-10-CM

## 2019-08-02 DIAGNOSIS — M25.511 ACUTE PAIN OF BOTH SHOULDERS: ICD-10-CM

## 2019-08-02 DIAGNOSIS — M25.512 ACUTE PAIN OF BOTH SHOULDERS: ICD-10-CM

## 2019-08-02 PROCEDURE — 3078F PR MOST RECENT DIASTOLIC BLOOD PRESSURE < 80 MM HG: ICD-10-PCS | Mod: CPTII,S$GLB,, | Performed by: PHYSICAL MEDICINE & REHABILITATION

## 2019-08-02 PROCEDURE — 99999 PR PBB SHADOW E&M-EST. PATIENT-LVL II: ICD-10-PCS | Mod: PBBFAC,,, | Performed by: PHYSICAL MEDICINE & REHABILITATION

## 2019-08-02 PROCEDURE — 99214 PR OFFICE/OUTPT VISIT, EST, LEVL IV, 30-39 MIN: ICD-10-PCS | Mod: S$GLB,,, | Performed by: PHYSICAL MEDICINE & REHABILITATION

## 2019-08-02 PROCEDURE — 3078F DIAST BP <80 MM HG: CPT | Mod: CPTII,S$GLB,, | Performed by: PHYSICAL MEDICINE & REHABILITATION

## 2019-08-02 PROCEDURE — 1101F PT FALLS ASSESS-DOCD LE1/YR: CPT | Mod: CPTII,S$GLB,, | Performed by: PHYSICAL MEDICINE & REHABILITATION

## 2019-08-02 PROCEDURE — 3075F SYST BP GE 130 - 139MM HG: CPT | Mod: CPTII,S$GLB,, | Performed by: PHYSICAL MEDICINE & REHABILITATION

## 2019-08-02 PROCEDURE — 99999 PR PBB SHADOW E&M-EST. PATIENT-LVL II: CPT | Mod: PBBFAC,,, | Performed by: PHYSICAL MEDICINE & REHABILITATION

## 2019-08-02 PROCEDURE — 99214 OFFICE O/P EST MOD 30 MIN: CPT | Mod: S$GLB,,, | Performed by: PHYSICAL MEDICINE & REHABILITATION

## 2019-08-02 PROCEDURE — 1101F PR PT FALLS ASSESS DOC 0-1 FALLS W/OUT INJ PAST YR: ICD-10-PCS | Mod: CPTII,S$GLB,, | Performed by: PHYSICAL MEDICINE & REHABILITATION

## 2019-08-02 PROCEDURE — 3075F PR MOST RECENT SYSTOLIC BLOOD PRESS GE 130-139MM HG: ICD-10-PCS | Mod: CPTII,S$GLB,, | Performed by: PHYSICAL MEDICINE & REHABILITATION

## 2019-08-02 RX ORDER — TIZANIDINE 2 MG/1
2-4 TABLET ORAL NIGHTLY PRN
Qty: 60 TABLET | Refills: 2 | Status: SHIPPED | OUTPATIENT
Start: 2019-08-02 | End: 2021-04-19

## 2019-08-02 RX ORDER — METHYLPREDNISOLONE 4 MG/1
TABLET ORAL
Qty: 1 PACKAGE | Refills: 0 | Status: SHIPPED | OUTPATIENT
Start: 2019-08-02 | End: 2019-08-23

## 2019-08-05 ENCOUNTER — CLINICAL SUPPORT (OUTPATIENT)
Dept: REHABILITATION | Facility: HOSPITAL | Age: 69
End: 2019-08-05
Attending: FAMILY MEDICINE
Payer: MEDICARE

## 2019-08-05 DIAGNOSIS — R29.898 WEAKNESS OF BOTH UPPER EXTREMITIES: ICD-10-CM

## 2019-08-05 DIAGNOSIS — M25.60 STIFFNESS IN JOINT: ICD-10-CM

## 2019-08-05 DIAGNOSIS — M79.602 PAIN IN BOTH UPPER EXTREMITIES: ICD-10-CM

## 2019-08-05 DIAGNOSIS — M79.601 PAIN IN BOTH UPPER EXTREMITIES: ICD-10-CM

## 2019-08-05 PROCEDURE — 97140 MANUAL THERAPY 1/> REGIONS: CPT | Mod: PN

## 2019-08-05 PROCEDURE — 97110 THERAPEUTIC EXERCISES: CPT | Mod: PN

## 2019-08-05 NOTE — PROGRESS NOTES
"   Occupational Therapy Daily Treatment Note     Date: 8/5/2019  Name: Tracy Armendariz  Clinic Number: [id number]    Therapy Diagnosis:   Encounter Diagnoses   Name Primary?    Pain in both upper extremities     Stiffness in joint     Weakness of both upper extremities      Physician: Stephany Hodge, *    Physician Orders: OT evaluate and treat  Medical Diagnosis:      M25.511,M25.512 (ICD-10-CM) - Acute pain of both shoulders      Evaluation Date: 7/2/2019  Insurance Authorization period Expiration: 09/02/2019  Plan of Care Expiration Period: 8/30/2019  Next MD appointment:8/2/2019     Visit # / Visits Authorized: 7 / 12 FOTO:58%  Time In:100  Time Out:200  Total Billable Time: 25 minutes  MT1 TE1    Medicare:$617.40    Precautions: Standard    Subjective     Pt reports: "I feel like my shoulders are finally improving."  she was compliant with home exercise program given 7/2/2019  Response to previous treatment: decreased pain  Functional change: pt able to increase weight with exercises    Pain: 2/10  Location:  Bilateral shoulders l    Objective     Tracy received the following manual therapy techniques for 10 minutes:   -consisting of patient supine for Bilateral shoulder lateral telescoping, upper trapezius soft tissue mobilization, pectoralis lift, subscapularis myofascial release and stretch, PROM with endrange stretching, glenohumeral joint inferior anterior posterior glides grade I-III, gentle shoulder oscillations    Tracy received therapeutic exercises for 15 minutes including:  Exercises    UBE for/rev @120rpms 6min   PROM (Bilateral) Shoulder Flexion/Abduction/Internal rotation/External Rotation 10x   Supine dowel Flexion/Chest Press 2#  2/15   Sidelying Abduction 1  2/15   Sidelying External Rotation 1  2/15   pulleys 3'   Wall slides ball  2/15   Corner pectoralis stretch 3/30"   Theraband Lats Green  2/15   Theraband Rows Green  2/15   Theraband Internal Rotation/External Rotation/horiz " abd Green  2/15     CP x 10 minutes after for pain management  Home Exercises and Education Provided     Education provided:   - Progress towards goals     Written Home Exercises Provided: Patient instructed to cont prior HEP.  Exercises were reviewed and Tracy was able to demonstrate them prior to the end of the session.  Tracy demonstrated good  understanding of the HEP provided.     See EMR under Patient Instructions for exercises provided 7/02/2019     Assessment   Pt with decreased pain report today in shoulders and neck. She was able to complete all exercises in a pain free ROM receiving minimal cueing for technique. Tenderness noted in bilateral pectoralis, biceps and upper trapezius however responded well to soft tissue mobilization. Pt motivated.     Tracy is progressing well towards her goals and there are no updates to goals at this time. Pt prognosis is Good.     Pt will continue to benefit from skilled outpatient occupational therapy to address the deficits listed in the problem list on initial evaluation provide pt/family education and to maximize pt's level of independence in the home and community environment.     Anticipated barriers to occupational therapy: none    Pt's spiritual, cultural and educational needs considered and pt agreeable to plan of care and goals.    Goals:  Short Term Goals to be met in 4 weeks: (8/1/2019)  1) Initiate Hep-met, ongoing  2) Pt will increase Bilateral shoulder AROM by 10 degrees grossly for improved performance with overhead ADL's -met  3) Pt will report 6/10 pain in (Bilateral)shoulder at worst -met  4) Pt will demonstrate increased MMT to 4+/5 grossly Bilateral shoulder -Not met, progressing  5) Patient will be able to achieve less than or equal to 35% on FOTO shoulder survey demonstrating overall improved functional ability with upper extremity. -Not met, progressing     Long Term Goals to be met by discharge:  1) Independent with HEP -Not met,  progressing  2) Pt will demonstrate (Bilateral) shoulder AROM WNL grossly for [place] with ADL's -Not met, progressing  3) Pt will demonstrate (Bilateral) shoulder MMT WNL grossly for [place] with functional activities -Not met, progressing  4) Independent and pain free with ADL's and IADL's -Not met, progressing  5) Patient will be able to achieve less than or equal to 15% on FOTO shoulder survey demonstrating overall improved functional ability with upper extremity. -Not met, progressing    Plan   Continue with OT POC  Updates/Grading for next session: progress with ROM and strengthening      HARIS Sarmiento

## 2019-08-06 NOTE — PROGRESS NOTES
"   Occupational Therapy Daily Treatment Note     Date: 8/7/2019  Name: Tracy Armendariz  Clinic Number: [id number]    Therapy Diagnosis:   Encounter Diagnoses   Name Primary?    Pain in both upper extremities     Stiffness in joint     Weakness of both upper extremities      Physician: Stephany Hodge, *    Physician Orders: OT evaluate and treat  Medical Diagnosis:      M25.511,M25.512 (ICD-10-CM) - Acute pain of both shoulders      Evaluation Date: 7/2/2019  Insurance Authorization period Expiration: 09/02/2019  Plan of Care Expiration Period: 8/30/2019  Next MD appointment:12/4/2019     Visit # / Visits Authorized: 8 / 12 FOTO:58%  Time In:1230  Time Out:130  Total Billable Time: 40  minutes  MT1 TE2    Medicare:$705.50    Precautions: Standard    Subjective     Pt reports: "I feel good today."  she was compliant with home exercise program given 7/2/2019  Response to previous treatment: decreased pain  Functional change: pt able to increase weight with exercises    Pain: 2/10  Location:  Bilateral shoulders     Objective     Tracy received the following manual therapy techniques for 10 minutes:   -consisting of patient supine for Bilateral shoulder lateral telescoping, upper trapezius soft tissue mobilization, pectoralis lift, subscapularis myofascial release and stretch, PROM with endrange stretching, glenohumeral joint inferior anterior posterior glides grade I-III, gentle shoulder oscillations    Tracy received therapeutic exercises for 30 minutes including:  Exercises    UBE for/rev @120rpms 6min   PROM (Bilateral) Shoulder Flexion/Abduction/Internal rotation/External Rotation 10x   Supine dowel Flexion/Chest Press 3#  2/15   Sidelying Abduction 1  2/15   Sidelying External Rotation 1  2/15   Wall slides ball  2/15   Corner pectoralis stretch 3/30"   Theraband Lats Green  2/15   Theraband Rows Green  2/15   Theraband Internal Rotation/External Rotation/horiz abd Green  2/15     CP x 10 minutes after " for pain management  Home Exercises and Education Provided     Education provided:   - Progress towards goals     Written Home Exercises Provided: Patient instructed to cont prior HEP.  Exercises were reviewed and Tracy was able to demonstrate them prior to the end of the session.  Tracy demonstrated good  understanding of the HEP provided.     See EMR under Patient Instructions for exercises provided 7/02/2019     Assessment   Pt with no change in pain report in shoulders and neck today. She was able to complete all exercises in a pain free ROM receiving minimal cueing for technique. Able to progress with weight with dowel exercises however not with sidelying exercises. Tenderness noted in bilateral pectoralis, biceps and upper trapezius however responded well to soft tissue mobilization. Pt motivated.     Tracy is progressing steadily towards her goals and there are no updates to goals at this time. Pt prognosis is Good.     Pt will continue to benefit from skilled outpatient occupational therapy to address the deficits listed in the problem list on initial evaluation provide pt/family education and to maximize pt's level of independence in the home and community environment.     Anticipated barriers to occupational therapy: none    Pt's spiritual, cultural and educational needs considered and pt agreeable to plan of care and goals.    Goals:  Short Term Goals to be met in 4 weeks: (8/1/2019)  1) Initiate Hep-met, ongoing  2) Pt will increase Bilateral shoulder AROM by 10 degrees grossly for improved performance with overhead ADL's -met  3) Pt will report 6/10 pain in (Bilateral)shoulder at worst -met  4) Pt will demonstrate increased MMT to 4+/5 grossly Bilateral shoulder -Not met, progressing  5) Patient will be able to achieve less than or equal to 35% on FOTO shoulder survey demonstrating overall improved functional ability with upper extremity. -Not met, progressing     Long Term Goals to be met by  discharge:  1) Independent with HEP -Not met, progressing  2) Pt will demonstrate (Bilateral) shoulder AROM WNL grossly for [place] with ADL's -Not met, progressing  3) Pt will demonstrate (Bilateral) shoulder MMT WNL grossly for [place] with functional activities -Not met, progressing  4) Independent and pain free with ADL's and IADL's -Not met, progressing  5) Patient will be able to achieve less than or equal to 15% on FOTO shoulder survey demonstrating overall improved functional ability with upper extremity. -Not met, progressing    Plan   Continue with OT POC  Updates/Grading for next session: progress with ROM and strengthening      HARIS Sarmiento

## 2019-08-07 ENCOUNTER — CLINICAL SUPPORT (OUTPATIENT)
Dept: REHABILITATION | Facility: HOSPITAL | Age: 69
End: 2019-08-07
Attending: FAMILY MEDICINE
Payer: MEDICARE

## 2019-08-07 DIAGNOSIS — M79.601 PAIN IN BOTH UPPER EXTREMITIES: ICD-10-CM

## 2019-08-07 DIAGNOSIS — R29.898 WEAKNESS OF BOTH UPPER EXTREMITIES: ICD-10-CM

## 2019-08-07 DIAGNOSIS — M79.602 PAIN IN BOTH UPPER EXTREMITIES: ICD-10-CM

## 2019-08-07 DIAGNOSIS — M25.60 STIFFNESS IN JOINT: ICD-10-CM

## 2019-08-07 PROCEDURE — 97140 MANUAL THERAPY 1/> REGIONS: CPT | Mod: PN

## 2019-08-07 PROCEDURE — 97110 THERAPEUTIC EXERCISES: CPT | Mod: PN

## 2019-08-12 ENCOUNTER — CLINICAL SUPPORT (OUTPATIENT)
Dept: REHABILITATION | Facility: HOSPITAL | Age: 69
End: 2019-08-12
Attending: FAMILY MEDICINE
Payer: MEDICARE

## 2019-08-12 DIAGNOSIS — M79.601 PAIN IN BOTH UPPER EXTREMITIES: ICD-10-CM

## 2019-08-12 DIAGNOSIS — M25.60 STIFFNESS IN JOINT: ICD-10-CM

## 2019-08-12 DIAGNOSIS — R29.898 WEAKNESS OF BOTH UPPER EXTREMITIES: ICD-10-CM

## 2019-08-12 DIAGNOSIS — M79.602 PAIN IN BOTH UPPER EXTREMITIES: ICD-10-CM

## 2019-08-12 PROCEDURE — 97140 MANUAL THERAPY 1/> REGIONS: CPT | Mod: PN

## 2019-08-12 PROCEDURE — 97110 THERAPEUTIC EXERCISES: CPT | Mod: PN

## 2019-08-12 NOTE — PROGRESS NOTES
"   Occupational Therapy Daily Treatment Note     Date: 8/12/2019  Name: Tracy Armendariz  Clinic Number: [id number]    Therapy Diagnosis:   Encounter Diagnoses   Name Primary?    Pain in both upper extremities     Stiffness in joint     Weakness of both upper extremities      Physician: Stephany Hodge, *    Physician Orders: OT evaluate and treat  Medical Diagnosis:      M25.511,M25.512 (ICD-10-CM) - Acute pain of both shoulders      Evaluation Date: 7/2/2019  Insurance Authorization period Expiration: 09/02/2019  Plan of Care Expiration Period: 8/30/2019  Next MD appointment:12/4/2019     Visit # / Visits Authorized: 9/ 12 FOTO:58%  Time In:1255  Time Out:155  Total Billable Time: 35 minutes  MT1 TE1    Medicare:$761.24    Precautions: Standard    Subjective     Pt reports: No complaints  she was compliant with home exercise program given 7/2/2019  Response to previous treatment: pain free today  Functional change: pt able to reach higher without c/o    Pain: 2/10  Location:  Bilateral shoulders     Objective     Tracy received the following manual therapy techniques for 10 minutes:   -consisting of patient supine for Bilateral shoulder lateral telescoping, upper trapezius soft tissue mobilization, pectoralis lift, subscapularis myofascial release and stretch, PROM with endrange stretching, glenohumeral joint inferior anterior posterior glides grade I-III, gentle shoulder oscillations    Tracy received therapeutic exercises for 20 minutes including:  Exercises    UBE for/rev @120rpms 6min   PROM (Bilateral) Shoulder Flexion/Abduction/Internal rotation/External Rotation 10x   Supine dowel Flexion/Chest Press 3#  2/15   Sidelying Abduction 1  2/15   Sidelying External Rotation 1  2/15   Wall slides ball  2/15   Corner pectoralis stretch 3/30"   Theraband Lats Green  2/15   Theraband Rows Green  2/15   Theraband Internal Rotation/External Rotation/horiz abd Green  2/15     CP x 10 minutes after for pain " management  Home Exercises and Education Provided     Education provided:   - Progress towards goals     Written Home Exercises Provided: Patient instructed to cont prior HEP.  Exercises were reviewed and Tracy was able to demonstrate them prior to the end of the session.  Tracy demonstrated good  understanding of the HEP provided.     See EMR under Patient Instructions for exercises provided 7/02/2019     Assessment   Pt pain free during today's session. Pt continues to require minimal cueing for technique during sessions.  Tenderness noted in bilateral pectoralis, biceps and upper trapezius however responds well to soft tissue mobilization. Pt motivated.     Tracy is progressing steadily towards her goals and there are no updates to goals at this time. Pt prognosis is Good.     Pt will continue to benefit from skilled outpatient occupational therapy to address the deficits listed in the problem list on initial evaluation provide pt/family education and to maximize pt's level of independence in the home and community environment.     Anticipated barriers to occupational therapy: none    Pt's spiritual, cultural and educational needs considered and pt agreeable to plan of care and goals.    Goals:  Short Term Goals to be met in 4 weeks: (8/1/2019)  1) Initiate Hep-met, ongoing  2) Pt will increase Bilateral shoulder AROM by 10 degrees grossly for improved performance with overhead ADL's -met  3) Pt will report 6/10 pain in (Bilateral)shoulder at worst -met  4) Pt will demonstrate increased MMT to 4+/5 grossly Bilateral shoulder -Not met, progressing  5) Patient will be able to achieve less than or equal to 35% on FOTO shoulder survey demonstrating overall improved functional ability with upper extremity. -Not met, progressing     Long Term Goals to be met by discharge:  1) Independent with HEP -Not met, progressing  2) Pt will demonstrate (Bilateral) shoulder AROM WNL grossly for [place] with ADL's -Not  met, progressing  3) Pt will demonstrate (Bilateral) shoulder MMT WNL grossly for [place] with functional activities -Not met, progressing  4) Independent and pain free with ADL's and IADL's -Not met, progressing  5) Patient will be able to achieve less than or equal to 15% on FOTO shoulder survey demonstrating overall improved functional ability with upper extremity. -Not met, progressing    Plan   Continue with OT POC  Updates/Grading for next session: progress with ROM and strengthening      HARIS Sarmiento

## 2019-08-13 NOTE — PROGRESS NOTES
"   Occupational Therapy Daily Treatment Note     Date: 8/14/2019  Name: Tracy Armendariz  Clinic Number: [id number]    Therapy Diagnosis:   Encounter Diagnoses   Name Primary?    Pain in both upper extremities     Stiffness in joint     Weakness of both upper extremities      Physician: Stephany Hodge, *    Physician Orders: OT evaluate and treat  Medical Diagnosis:      M25.511,M25.512 (ICD-10-CM) - Acute pain of both shoulders      Evaluation Date: 7/2/2019  Insurance Authorization period Expiration: 09/02/2019  Plan of Care Expiration Period: 8/30/2019  Next MD appointment:12/4/2019     Visit # / Visits Authorized: 10/ 12 FOTO:59%  Time In:1255  Time Out:200  Total Billable Time: 55 minutes  MT1 TE3    Medicare:$880.24    Precautions: Standard    Subjective     Pt reports: "I'm tired and my shoulders are achy."  she was compliant with home exercise program given 7/2/2019  Response to previous treatment: pt with increased pain today  Functional change: able to complete all exercises however increased difficulty noted    Pain: 3/10  Location:  Bilateral shoulders     Objective     Tracy received the following manual therapy techniques for 10 minutes:   -consisting of patient supine for Bilateral shoulder lateral telescoping, upper trapezius soft tissue mobilization, pectoralis lift, subscapularis myofascial release and stretch, PROM with endrange stretching, glenohumeral joint inferior anterior posterior glides grade I-III, gentle shoulder oscillations    Tracy received therapeutic exercises for 45 minutes including:  Exercises    UBE for/rev @120rpms 6min   PROM (Bilateral) Shoulder Flexion/Abduction/Internal rotation/External Rotation 10x   Supine dowel Flexion/Chest Press 3#  2/15   Sidelying Abduction 1  2/15   Sidelying External Rotation 1  2/15   Wall slides ball  2/15   Corner pectoralis stretch 3/30"   Theraband Lats Green  2/15   Theraband Rows Green  2/15   Theraband Internal Rotation/External " Rotation/horiz abd Green  2/15     CP x 10 minutes after for pain management  Home Exercises and Education Provided     Education provided:   - Progress towards goals     Written Home Exercises Provided: Patient instructed to cont prior HEP.  Exercises were reviewed and Tracy was able to demonstrate them prior to the end of the session.  Tracy demonstrated good  understanding of the HEP provided.     See EMR under Patient Instructions for exercises provided 7/02/2019     Assessment   Pt with increased pain report today. Despite so, she was able to complete all excerises without c/o and good technique. Continues with soft tissue restrictions in bilateral shoulders however responds well to manual therapy. Some end range pain remains with PROM. Pt motivated.     Tracy is progressing steadily towards her goals and there are no updates to goals at this time. Pt prognosis is Good.     Pt will continue to benefit from skilled outpatient occupational therapy to address the deficits listed in the problem list on initial evaluation provide pt/family education and to maximize pt's level of independence in the home and community environment.     Anticipated barriers to occupational therapy: none    Pt's spiritual, cultural and educational needs considered and pt agreeable to plan of care and goals.    Goals:  Short Term Goals to be met in 4 weeks: (8/1/2019)  1) Initiate Hep-met, ongoing  2) Pt will increase Bilateral shoulder AROM by 10 degrees grossly for improved performance with overhead ADL's -met  3) Pt will report 6/10 pain in (Bilateral)shoulder at worst -met  4) Pt will demonstrate increased MMT to 4+/5 grossly Bilateral shoulder -Not met, progressing  5) Patient will be able to achieve less than or equal to 35% on FOTO shoulder survey demonstrating overall improved functional ability with upper extremity. -Not met, progressing     Long Term Goals to be met by discharge:  1) Independent with HEP -Not met,  progressing  2) Pt will demonstrate (Bilateral) shoulder AROM WNL grossly for [place] with ADL's -Not met, progressing  3) Pt will demonstrate (Bilateral) shoulder MMT WNL grossly for [place] with functional activities -Not met, progressing  4) Independent and pain free with ADL's and IADL's -Not met, progressing  5) Patient will be able to achieve less than or equal to 15% on FOTO shoulder survey demonstrating overall improved functional ability with upper extremity. -Not met, progressing    Plan   Continue with OT POC  Updates/Grading for next session: progress with ROM and strengthening. Anticipate discharge to [facility] at the end of the month      HARIS Sarmiento

## 2019-08-14 ENCOUNTER — CLINICAL SUPPORT (OUTPATIENT)
Dept: REHABILITATION | Facility: HOSPITAL | Age: 69
End: 2019-08-14
Attending: FAMILY MEDICINE
Payer: MEDICARE

## 2019-08-14 DIAGNOSIS — M25.60 STIFFNESS IN JOINT: ICD-10-CM

## 2019-08-14 DIAGNOSIS — M79.602 PAIN IN BOTH UPPER EXTREMITIES: ICD-10-CM

## 2019-08-14 DIAGNOSIS — R29.898 WEAKNESS OF BOTH UPPER EXTREMITIES: ICD-10-CM

## 2019-08-14 DIAGNOSIS — M79.601 PAIN IN BOTH UPPER EXTREMITIES: ICD-10-CM

## 2019-08-14 PROCEDURE — 97140 MANUAL THERAPY 1/> REGIONS: CPT | Mod: PN

## 2019-08-14 PROCEDURE — 97110 THERAPEUTIC EXERCISES: CPT | Mod: PN

## 2019-08-19 ENCOUNTER — CLINICAL SUPPORT (OUTPATIENT)
Dept: REHABILITATION | Facility: HOSPITAL | Age: 69
End: 2019-08-19
Attending: FAMILY MEDICINE
Payer: MEDICARE

## 2019-08-19 DIAGNOSIS — R29.898 WEAKNESS OF BOTH UPPER EXTREMITIES: ICD-10-CM

## 2019-08-19 DIAGNOSIS — M79.601 PAIN IN BOTH UPPER EXTREMITIES: ICD-10-CM

## 2019-08-19 DIAGNOSIS — M25.60 STIFFNESS IN JOINT: ICD-10-CM

## 2019-08-19 DIAGNOSIS — M79.602 PAIN IN BOTH UPPER EXTREMITIES: ICD-10-CM

## 2019-08-19 PROCEDURE — G8988 SELF CARE GOAL STATUS: HCPCS | Mod: CI,PN

## 2019-08-19 PROCEDURE — G8989 SELF CARE D/C STATUS: HCPCS | Mod: CK,PN

## 2019-08-19 PROCEDURE — 97140 MANUAL THERAPY 1/> REGIONS: CPT | Mod: PN

## 2019-08-19 PROCEDURE — 97110 THERAPEUTIC EXERCISES: CPT | Mod: PN

## 2019-08-19 NOTE — PROGRESS NOTES
"   Occupational Therapy Daily Treatment Note/Discharge Summary     Date: 8/19/2019  Name: Tracy Armendariz  Clinic Number: [id number]    Therapy Diagnosis:   Encounter Diagnoses   Name Primary?    Pain in both upper extremities     Stiffness in joint     Weakness of both upper extremities      Physician: Stephany Hodge, *    Physician Orders: OT evaluate and treat  Medical Diagnosis:      M25.511,M25.512 (ICD-10-CM) - Acute pain of both shoulders      Evaluation Date: 7/2/2019  Insurance Authorization period Expiration: 09/02/2019  Plan of Care Expiration Period: 8/30/2019  Next MD appointment:12/4/2019     Visit # / Visits Authorized: 11/ 12 FOTO:59%  Time In:100  Time Out:200  Total Billable Time: 60 minutes  MT1 TE3    Medicare:$999.24    Precautions: Standard    Subjective     Pt reports: "I think I can finish up today."  she was compliant with home exercise program given 7/2/2019  Response to previous treatment: pt pain free today  Functional change: Independent with ADL's, IADL's and HEP    Pain: 3/10  Location:  Bilateral shoulders     Objective     Tracy received the following manual therapy techniques for 10 minutes:   -consisting of patient supine for Bilateral shoulder lateral telescoping, upper trapezius soft tissue mobilization, pectoralis lift, subscapularis myofascial release and stretch, PROM with endrange stretching, glenohumeral joint inferior anterior posterior glides grade I-III, gentle shoulder oscillations    Tracy received therapeutic exercises for 50 minutes including:  Exercises    UBE for/rev @120rpms 6min   PROM (Bilateral) Shoulder Flexion/Abduction/Internal rotation/External Rotation 10x   Supine dowel Flexion/Chest Press 3#  2/15   Sidelying Abduction 1  2/15   Sidelying External Rotation 1  2/15   Wall slides ball  2/15   Corner pectoralis stretch 3/30"   Theraband Lats Green  2/15   Theraband Rows Green  2/15   Theraband Internal Rotation/External Rotation/horiz abd " "Green  2/15     Range of Motion/Strength:   Shoulder   Left     Right   Pain/Dysfunction with Movement     AROM PROM MMT AROM PROM MMT     Flexion 150(=) WNL 5/5 165(+10) WNL 5/5     Extension 70(+5) WNL 5/5 65(=) WNL 5/5     Abduction 135(=) WNL 5/5 150(=) WNL 5/5     HorizAdduction 30(=) WNL 5/5 30(=) WNL 5/5     Internal rotation T12(=) WNL 5/5 T12(=) WNL 5/5     ER at 90° abd 90(=) WNL 5/5 90(+10) WNL 5/5     ER at 0° abd 85(=) WNL 5/5 80(+5) WNL 5/5     NT=Not tested    Home Exercises and Education Provided     Education provided:   - Progress towards goals     Written Home Exercises Provided: Patient instructed to cont prior HEP.  Exercises were reviewed and Tracy was able to demonstrate them prior to the end of the session.  Tracy demonstrated good  understanding of the HEP provided.     See EMR under Patient Instructions for exercises provided 7/02/2019     Assessment   Pt has been attending OT x 2 months for treatment Bilateral shoulder pain, weakness and stiffness. Pt has been pleasant and motivated throughout course of care. She is demonstrating overall improved ROM and strength in Bilateral shoulders and is now WNL grossly. She is Independent and pain free with ADL's, IADL"s and HEP.  She has partially met goals. Pt no longer requires skilled services at thi time. Pt instructed to follow up with ortho and may benefit from an MRI if her pain persists or returns.     Tracy is progressing steadily towards her goals and there are no updates to goals at this time. Pt prognosis is Good.     Pt will continue to benefit from skilled outpatient occupational therapy to address the deficits listed in the problem list on initial evaluation provide pt/family education and to maximize pt's level of independence in the home and community environment.     Anticipated barriers to occupational therapy: none    Pt's spiritual, cultural and educational needs considered and pt agreeable to plan of care and " goals.    Goals:  Short Term Goals to be met in 4 weeks: (8/1/2019)  1) Initiate Hep-met, ongoing  2) Pt will increase Bilateral shoulder AROM by 10 degrees grossly for improved performance with overhead ADL's -met  3) Pt will report 6/10 pain in (Bilateral)shoulder at worst -met  4) Pt will demonstrate increased MMT to 4+/5 grossly Bilateral shoulder -met  5) Patient will be able to achieve less than or equal to 35% on FOTO shoulder survey demonstrating overall improved functional ability with upper extremity. -Not met     Long Term Goals to be met by discharge:  1) Independent with HEP -met  2) Pt will demonstrate (Bilateral) shoulder AROM WNL grossly for [place] with ADL's -met  3) Pt will demonstrate (Bilateral) shoulder MMT WNL grossly for [place] with functional activities -met  4) Independent and pain free with ADL's and IADL's -met  5) Patient will be able to achieve less than or equal to 15% on FOTO shoulder survey demonstrating overall improved functional ability with upper extremity. -Not met    Plan   Discharge from OT    HARIS Sarmiento

## 2019-08-20 PROBLEM — M79.602 PAIN IN BOTH UPPER EXTREMITIES: Status: RESOLVED | Noted: 2019-07-02 | Resolved: 2019-08-20

## 2019-08-20 PROBLEM — M25.60 STIFFNESS IN JOINT: Status: RESOLVED | Noted: 2019-07-02 | Resolved: 2019-08-20

## 2019-08-20 PROBLEM — R29.898 WEAKNESS OF BOTH UPPER EXTREMITIES: Status: RESOLVED | Noted: 2019-07-02 | Resolved: 2019-08-20

## 2019-08-20 PROBLEM — M79.601 PAIN IN BOTH UPPER EXTREMITIES: Status: RESOLVED | Noted: 2019-07-02 | Resolved: 2019-08-20

## 2019-09-18 RX ORDER — ESCITALOPRAM OXALATE 10 MG/1
TABLET ORAL
Qty: 90 TABLET | Refills: 3 | Status: SHIPPED | OUTPATIENT
Start: 2019-09-18 | End: 2020-08-03 | Stop reason: SDUPTHER

## 2019-09-19 RX ORDER — ALBUTEROL SULFATE 90 UG/1
2 AEROSOL, METERED RESPIRATORY (INHALATION) EVERY 6 HOURS PRN
Qty: 18 G | Refills: 0 | Status: CANCELLED | OUTPATIENT
Start: 2019-09-19

## 2019-09-19 RX ORDER — ALBUTEROL SULFATE 90 UG/1
AEROSOL, METERED RESPIRATORY (INHALATION)
Qty: 18 G | Refills: 0 | Status: SHIPPED | OUTPATIENT
Start: 2019-09-19

## 2019-09-30 ENCOUNTER — OFFICE VISIT (OUTPATIENT)
Dept: FAMILY MEDICINE | Facility: CLINIC | Age: 69
End: 2019-09-30
Payer: MEDICARE

## 2019-09-30 VITALS
BODY MASS INDEX: 35.96 KG/M2 | WEIGHT: 223.75 LBS | HEART RATE: 61 BPM | SYSTOLIC BLOOD PRESSURE: 134 MMHG | OXYGEN SATURATION: 96 % | DIASTOLIC BLOOD PRESSURE: 82 MMHG | HEIGHT: 66 IN | TEMPERATURE: 98 F

## 2019-09-30 DIAGNOSIS — Z78.9 HISTORY OF RECENT TRAVEL: ICD-10-CM

## 2019-09-30 DIAGNOSIS — R19.7 DIARRHEA, UNSPECIFIED TYPE: ICD-10-CM

## 2019-09-30 DIAGNOSIS — K52.9 GASTROENTERITIS: ICD-10-CM

## 2019-09-30 DIAGNOSIS — M79.10 MYALGIA: Primary | ICD-10-CM

## 2019-09-30 LAB
CTP QC/QA: YES
FLUAV AG NPH QL: NEGATIVE
FLUBV AG NPH QL: NEGATIVE

## 2019-09-30 PROCEDURE — 87804 INFLUENZA ASSAY W/OPTIC: CPT | Mod: QW,S$GLB,, | Performed by: FAMILY MEDICINE

## 2019-09-30 PROCEDURE — 99999 PR PBB SHADOW E&M-EST. PATIENT-LVL IV: CPT | Mod: PBBFAC,,, | Performed by: FAMILY MEDICINE

## 2019-09-30 PROCEDURE — 3079F DIAST BP 80-89 MM HG: CPT | Mod: CPTII,S$GLB,, | Performed by: FAMILY MEDICINE

## 2019-09-30 PROCEDURE — 1101F PR PT FALLS ASSESS DOC 0-1 FALLS W/OUT INJ PAST YR: ICD-10-PCS | Mod: CPTII,S$GLB,, | Performed by: FAMILY MEDICINE

## 2019-09-30 PROCEDURE — 87804 POCT INFLUENZA A/B: ICD-10-PCS | Mod: 59,QW,S$GLB, | Performed by: FAMILY MEDICINE

## 2019-09-30 PROCEDURE — 99214 OFFICE O/P EST MOD 30 MIN: CPT | Mod: 25,S$GLB,, | Performed by: FAMILY MEDICINE

## 2019-09-30 PROCEDURE — 3079F PR MOST RECENT DIASTOLIC BLOOD PRESSURE 80-89 MM HG: ICD-10-PCS | Mod: CPTII,S$GLB,, | Performed by: FAMILY MEDICINE

## 2019-09-30 PROCEDURE — 99214 PR OFFICE/OUTPT VISIT, EST, LEVL IV, 30-39 MIN: ICD-10-PCS | Mod: 25,S$GLB,, | Performed by: FAMILY MEDICINE

## 2019-09-30 PROCEDURE — 3075F PR MOST RECENT SYSTOLIC BLOOD PRESS GE 130-139MM HG: ICD-10-PCS | Mod: CPTII,S$GLB,, | Performed by: FAMILY MEDICINE

## 2019-09-30 PROCEDURE — 1101F PT FALLS ASSESS-DOCD LE1/YR: CPT | Mod: CPTII,S$GLB,, | Performed by: FAMILY MEDICINE

## 2019-09-30 PROCEDURE — 3075F SYST BP GE 130 - 139MM HG: CPT | Mod: CPTII,S$GLB,, | Performed by: FAMILY MEDICINE

## 2019-09-30 PROCEDURE — 99999 PR PBB SHADOW E&M-EST. PATIENT-LVL IV: ICD-10-PCS | Mod: PBBFAC,,, | Performed by: FAMILY MEDICINE

## 2019-09-30 RX ORDER — ONDANSETRON 4 MG/1
4 TABLET, ORALLY DISINTEGRATING ORAL EVERY 8 HOURS PRN
Qty: 30 TABLET | Refills: 0 | Status: SHIPPED | OUTPATIENT
Start: 2019-09-30 | End: 2021-04-19

## 2019-09-30 NOTE — PROGRESS NOTES
"Subjective:       Patient ID: Tracy Armendariz is a 68 y.o. female.    Chief Complaint: flu like symptoms    Pt is 69yo F presenting with a 4-day hx of vomiting, diarrhea, and upper respiratory symptoms, including rhinorrhea, post-nasal drip, nasal congestion, sore throat, cough productive of yellow sputum, wheezing and shortness of breath. Pt also endorses generalized arthralgias/myalgias and reduced appetite.   Pt recently returned from a cruise to [facility] with her , leaving a week ago and returning this past weekend (Mon-Sat). Pt first developed symptoms starting Thursday of the cruise. She initially was able to tolerate foods, but has developed progressive anorexia over the weekend and vomiting with meals. She is able to tolerate fluids well. Pt's  is asymptomatic and they deny any sick contacts or other sick travelers.     Describes diarrhea as very watery, occurring ~3x/day without any blood or mucus. Pt denies drinking local tap water or raw food. She notes drinking a conrado at a local bar that "tasted bitter." Her  sent his drink back while she finished hers. She also ate the lettuce on a cheeseburger which her  picked off. Otherwise both pt and  have ate/drank the same on their trip and deny ingesting any raw or exotic foods. Pt denies nausea, fever, hemoptysis, hematemesis, melena, hematochezia, or urinary symptoms.    She has been frequently using her albuterol inhaler, and has also tried Mucinex, Robitussin DM, and Dayquil for symptom relief. She finds Mucinex provides the most improvement with her symptoms.     Review of Systems   Constitutional: Positive for appetite change and fatigue. Negative for chills and fever.   HENT: Positive for congestion, postnasal drip, rhinorrhea and sore throat. Negative for ear discharge and nosebleeds.    Respiratory: Positive for cough, chest tightness, shortness of breath and wheezing.    Cardiovascular: Negative for chest " pain, palpitations and leg swelling.   Gastrointestinal: Positive for diarrhea and vomiting. Negative for abdominal pain and blood in stool.   Genitourinary: Negative for difficulty urinating, dysuria and hematuria.   Musculoskeletal: Positive for arthralgias and myalgias. Negative for neck pain and neck stiffness.   Skin: Negative for color change and rash.   Neurological: Negative for syncope, light-headedness and headaches.   Hematological: Negative for adenopathy.         Past Medical History:   Diagnosis Date    Allergy     Anticoagulant long-term use     Arthritis     CVA (cerebral infarction)     Depression     Disorder of kidney and ureter     renal stones    Diverticulosis of colon     Extrinsic asthma, unspecified     Hyperlipidemia     Hypertension     Left atrial enlargement 2014    Low back pain     MARTIN (obstructive sleep apnea)     Osteopenia     PUD (peptic ulcer disease)     Stroke  2013       Past Surgical History:   Procedure Laterality Date    APPENDECTOMY      @ time of hysterectomy    BACK SURGERY      CATARACT EXTRACTION       SECTION      CHOLECYSTECTOMY      laparoscopic    COLONOSCOPY N/A 2018    Procedure: COLONOSCOPY/Golytely;  Surgeon: Janine Black MD;  Location: [facility];  Service: Endoscopy;  Laterality: N/A;    DILATION AND CURETTAGE OF UTERUS      HYSTERECTOMY      TAHUSO with appendectomy    INNER EAR SURGERY      replaced ear drum    JOINT REPLACEMENT Right     knee    KNEE ARTHROSCOPY W/ DEBRIDEMENT      LUMBAR DISCECTOMY      L4-L5    OOPHORECTOMY      unilateral    TONSILLECTOMY      TYMPANOPLASTY      URETEROSCOPIC REMOVAL OF URETERIC CALCULUS Left 2018    Procedure: REMOVAL, CALCULUS, URETER, URETEROSCOPIC, holmium laser lithotripsy, stone basket extraction, retrograde pyelogram, ureteral stent exchange;  Surgeon: Anaya Zamora MD;  Location: [facility] OR;  Service: Urology;   Laterality: Left;       Family History   Problem Relation Age of Onset    Cervical cancer Mother     Cancer Mother 65        lung cancer - non smoker    Stroke Paternal Grandfather     Hypertension Maternal Grandfather     Heart disease Maternal Grandfather     Hypertension Father     Coronary artery disease Father 62    Heart disease Father     Diabetes Sister     Heart disease Sister     Kidney disease Sister     Breast cancer Daughter 36    Heart failure Sister         60s    Colon cancer Neg Hx     Ovarian cancer Neg Hx        Social History     Socioeconomic History    Marital status:      Spouse name: Not on file    Number of children: Not on file    Years of education: Not on file    Highest education level: Not on file   Occupational History    Not on file   Social Needs    Financial resource strain: Not on file    Food insecurity:     Worry: Not on file     Inability: Not on file    Transportation needs:     Medical: Not on file     Non-medical: Not on file   Tobacco Use    Smoking status: Former Smoker     Last attempt to quit: 1995     Years since quittin.7    Smokeless tobacco: Never Used   Substance and Sexual Activity    Alcohol use: Yes     Alcohol/week: 1.0 standard drinks     Types: 1 Glasses of wine per week     Comment: social    Drug use: No    Sexual activity: Yes     Partners: Male     Birth control/protection: Surgical     Comment:  since    Lifestyle    Physical activity:     Days per week: Not on file     Minutes per session: Not on file    Stress: Not on file   Relationships    Social connections:     Talks on phone: Not on file     Gets together: Not on file     Attends Buddhism service: Not on file     Active member of club or organization: Not on file     Attends meetings of clubs or organizations: Not on file     Relationship status: Not on file   Other Topics Concern    Not on file   Social History Narrative    Not on file        Objective:      Physical Exam   Constitutional: She appears well-developed and well-nourished.   HENT:   Head: Normocephalic and atraumatic.   Nose: Mucosal edema present.   Mouth/Throat: Posterior oropharyngeal erythema present.   Evidence of post-nasal drip, including posterior oropharyngeal streaking.    Eyes: Pupils are equal, round, and reactive to light. Right conjunctiva is injected. Left conjunctiva is injected.   Tearing bilaterally.    Neck: Normal range of motion. Neck supple.   Cardiovascular: Normal rate, regular rhythm and normal heart sounds.   Pulmonary/Chest: Effort normal and breath sounds normal. No respiratory distress. She has no wheezes.   Abdominal: Soft. She exhibits no distension. Bowel sounds are decreased. There is no tenderness.   Lymphadenopathy:     She has no cervical adenopathy.       Assessment:       1. Myalgia    2. Diarrhea, unspecified type    3. Gastroenteritis    4. History of recent travel        Plan:       Pt's symptoms are likely of viral etiology, but given recent history of international travel on a cruise, will work pt up for additional infectious pathogens via stool sampling.  Flu test negative  Pt encouraged to continue to use albuterol inhaler q 4-6 hrs PRN and Mucinex for upper respiratory symptoms.   Provided with prescription for Zofran for GI symptoms. As pt's diarrhea is only ~3x/day at this time, will defer prescribing anti-diarrheal medication until parasite/bacterial etiology is ruled out.   Pt advised to maintain adequate fluid intake and attempt small bland (non-fatty, non-dairy, non-spicy) meals as tolerated. Pt may utilize Motrin or Advil for myalgias if necessary.     Pt advised to return to clinic if symptoms significantly worsen or if they persist without improvement after a few days.       Orders Placed This Encounter   Procedures    Clostridium difficile EIA    Stool culture    WBC, Stool    Rotavirus antigen, stool    Giardia /  Cryptosporidum, EIA    Stool Exam-Ova,Cysts,Parasites    Norovirus Group 1 & 2, Dectection by RT-PCR    POCT Influenza A/B       I hereby acknowledge that I am relying upon documentation authored by a medical student working under my supervision and further I hereby attest that I have verified the student documentation or findings by personally re-performing the physical exam and medical decision making activities of the Evaluation and Management service to be billed.    Bartolo Jules MD

## 2019-10-01 ENCOUNTER — LAB VISIT (OUTPATIENT)
Dept: LAB | Facility: HOSPITAL | Age: 69
End: 2019-10-01
Attending: FAMILY MEDICINE
Payer: MEDICARE

## 2019-10-01 DIAGNOSIS — M79.10 MYALGIA: ICD-10-CM

## 2019-10-01 DIAGNOSIS — K52.9 GASTROENTERITIS: ICD-10-CM

## 2019-10-01 DIAGNOSIS — Z78.9 HISTORY OF RECENT TRAVEL: ICD-10-CM

## 2019-10-01 DIAGNOSIS — R19.7 DIARRHEA, UNSPECIFIED TYPE: ICD-10-CM

## 2019-10-01 LAB
C DIFF GDH STL QL: NEGATIVE
C DIFF TOX A+B STL QL IA: NEGATIVE
WBC #/AREA STL HPF: NORMAL /[HPF]

## 2019-10-01 PROCEDURE — 89055 LEUKOCYTE ASSESSMENT FECAL: CPT

## 2019-10-01 PROCEDURE — 87449 NOS EACH ORGANISM AG IA: CPT

## 2019-10-03 RX ORDER — LOSARTAN POTASSIUM 100 MG/1
TABLET ORAL
Qty: 90 TABLET | Refills: 0 | Status: SHIPPED | OUTPATIENT
Start: 2019-10-03 | End: 2020-02-11

## 2019-10-20 RX ORDER — ALBUTEROL SULFATE 90 UG/1
AEROSOL, METERED RESPIRATORY (INHALATION)
Qty: 18 G | Refills: 0 | Status: SHIPPED | OUTPATIENT
Start: 2019-10-20 | End: 2021-11-09 | Stop reason: SDUPTHER

## 2019-10-31 ENCOUNTER — PATIENT OUTREACH (OUTPATIENT)
Dept: ADMINISTRATIVE | Facility: OTHER | Age: 69
End: 2019-10-31

## 2019-11-05 ENCOUNTER — OFFICE VISIT (OUTPATIENT)
Dept: UROLOGY | Facility: CLINIC | Age: 69
End: 2019-11-05
Payer: MEDICARE

## 2019-11-05 VITALS
BODY MASS INDEX: 35.84 KG/M2 | DIASTOLIC BLOOD PRESSURE: 84 MMHG | WEIGHT: 223 LBS | HEIGHT: 66 IN | HEART RATE: 77 BPM | TEMPERATURE: 98 F | SYSTOLIC BLOOD PRESSURE: 134 MMHG

## 2019-11-05 DIAGNOSIS — N20.0 NEPHROLITHIASIS: ICD-10-CM

## 2019-11-05 DIAGNOSIS — R39.15 URINARY URGENCY: ICD-10-CM

## 2019-11-05 DIAGNOSIS — R30.0 DYSURIA: Primary | ICD-10-CM

## 2019-11-05 DIAGNOSIS — R35.0 URINE FREQUENCY: ICD-10-CM

## 2019-11-05 DIAGNOSIS — R30.0 DYSURIA: ICD-10-CM

## 2019-11-05 LAB
BACTERIA #/AREA URNS AUTO: ABNORMAL /HPF
BILIRUB UR QL STRIP: NEGATIVE
CLARITY UR REFRACT.AUTO: ABNORMAL
COLOR UR AUTO: ABNORMAL
GLUCOSE UR QL STRIP: NEGATIVE
HGB UR QL STRIP: ABNORMAL
HYALINE CASTS UR QL AUTO: 0 /LPF
KETONES UR QL STRIP: NEGATIVE
LEUKOCYTE ESTERASE UR QL STRIP: ABNORMAL
MICROSCOPIC COMMENT: ABNORMAL
NITRITE UR QL STRIP: POSITIVE
PH UR STRIP: 5 [PH] (ref 5–8)
PROT UR QL STRIP: ABNORMAL
RBC #/AREA URNS AUTO: 88 /HPF (ref 0–4)
SP GR UR STRIP: 1.01 (ref 1–1.03)
SQUAMOUS #/AREA URNS AUTO: 1 /HPF
URN SPEC COLLECT METH UR: ABNORMAL
WBC #/AREA URNS AUTO: >100 /HPF (ref 0–5)
WBC CLUMPS UR QL AUTO: ABNORMAL

## 2019-11-05 PROCEDURE — 87077 CULTURE AEROBIC IDENTIFY: CPT

## 2019-11-05 PROCEDURE — 87186 SC STD MICRODIL/AGAR DIL: CPT

## 2019-11-05 PROCEDURE — 3075F PR MOST RECENT SYSTOLIC BLOOD PRESS GE 130-139MM HG: ICD-10-PCS | Mod: CPTII,S$GLB,, | Performed by: STUDENT IN AN ORGANIZED HEALTH CARE EDUCATION/TRAINING PROGRAM

## 2019-11-05 PROCEDURE — 99214 OFFICE O/P EST MOD 30 MIN: CPT | Mod: 25,S$GLB,, | Performed by: STUDENT IN AN ORGANIZED HEALTH CARE EDUCATION/TRAINING PROGRAM

## 2019-11-05 PROCEDURE — 81002 URINALYSIS NONAUTO W/O SCOPE: CPT | Mod: S$GLB,,, | Performed by: STUDENT IN AN ORGANIZED HEALTH CARE EDUCATION/TRAINING PROGRAM

## 2019-11-05 PROCEDURE — 81002 PR URINALYSIS NONAUTO W/O SCOPE: ICD-10-PCS | Mod: S$GLB,,, | Performed by: STUDENT IN AN ORGANIZED HEALTH CARE EDUCATION/TRAINING PROGRAM

## 2019-11-05 PROCEDURE — 3079F DIAST BP 80-89 MM HG: CPT | Mod: CPTII,S$GLB,, | Performed by: STUDENT IN AN ORGANIZED HEALTH CARE EDUCATION/TRAINING PROGRAM

## 2019-11-05 PROCEDURE — 3079F PR MOST RECENT DIASTOLIC BLOOD PRESSURE 80-89 MM HG: ICD-10-PCS | Mod: CPTII,S$GLB,, | Performed by: STUDENT IN AN ORGANIZED HEALTH CARE EDUCATION/TRAINING PROGRAM

## 2019-11-05 PROCEDURE — 81001 URINALYSIS AUTO W/SCOPE: CPT

## 2019-11-05 PROCEDURE — 1101F PR PT FALLS ASSESS DOC 0-1 FALLS W/OUT INJ PAST YR: ICD-10-PCS | Mod: CPTII,S$GLB,, | Performed by: STUDENT IN AN ORGANIZED HEALTH CARE EDUCATION/TRAINING PROGRAM

## 2019-11-05 PROCEDURE — 1101F PT FALLS ASSESS-DOCD LE1/YR: CPT | Mod: CPTII,S$GLB,, | Performed by: STUDENT IN AN ORGANIZED HEALTH CARE EDUCATION/TRAINING PROGRAM

## 2019-11-05 PROCEDURE — 99214 PR OFFICE/OUTPT VISIT, EST, LEVL IV, 30-39 MIN: ICD-10-PCS | Mod: 25,S$GLB,, | Performed by: STUDENT IN AN ORGANIZED HEALTH CARE EDUCATION/TRAINING PROGRAM

## 2019-11-05 PROCEDURE — 87088 URINE BACTERIA CULTURE: CPT

## 2019-11-05 PROCEDURE — 3075F SYST BP GE 130 - 139MM HG: CPT | Mod: CPTII,S$GLB,, | Performed by: STUDENT IN AN ORGANIZED HEALTH CARE EDUCATION/TRAINING PROGRAM

## 2019-11-05 PROCEDURE — 99999 PR PBB SHADOW E&M-EST. PATIENT-LVL IV: ICD-10-PCS | Mod: PBBFAC,,, | Performed by: STUDENT IN AN ORGANIZED HEALTH CARE EDUCATION/TRAINING PROGRAM

## 2019-11-05 PROCEDURE — 87086 URINE CULTURE/COLONY COUNT: CPT

## 2019-11-05 PROCEDURE — 99999 PR PBB SHADOW E&M-EST. PATIENT-LVL IV: CPT | Mod: PBBFAC,,, | Performed by: STUDENT IN AN ORGANIZED HEALTH CARE EDUCATION/TRAINING PROGRAM

## 2019-11-05 RX ORDER — CIPROFLOXACIN 500 MG/1
500 TABLET ORAL EVERY 12 HOURS
Qty: 14 TABLET | Refills: 0 | Status: SHIPPED | OUTPATIENT
Start: 2019-11-05 | End: 2019-11-08

## 2019-11-05 RX ORDER — AMOXICILLIN 500 MG/1
CAPSULE ORAL
Refills: 0 | COMMUNITY
Start: 2019-10-16 | End: 2019-12-04

## 2019-11-05 RX ORDER — TAMSULOSIN HYDROCHLORIDE 0.4 MG/1
CAPSULE ORAL
Qty: 90 CAPSULE | Refills: 0 | Status: SHIPPED | OUTPATIENT
Start: 2019-11-05 | End: 2022-11-10 | Stop reason: SDUPTHER

## 2019-11-05 RX ORDER — TAMSULOSIN HYDROCHLORIDE 0.4 MG/1
0.4 CAPSULE ORAL DAILY
Qty: 30 CAPSULE | Refills: 0 | Status: SHIPPED | OUTPATIENT
Start: 2019-11-05 | End: 2019-11-05 | Stop reason: SDUPTHER

## 2019-11-05 NOTE — PROGRESS NOTES
"Subjective:       Patient ID: Tracy Armendariz is a 68 y.o. female.    Chief Complaint: Nephrolithiasis and Urinary Tract Infection  This is a 68 y.o.  female patient that is an established patient of mine. The patient is referred to me by Dr. Bartolo Jules her PCP for kidney stone treatment.  She was visiting [facility] and was diagnosed with an obstructing stone. She presented to the ER at [facility] on 11/27/18 with fevers and altered mental status. She notes that they found a kidney stone of unknown laterality she does not recall left or right. Dr. Pérez, a urologist at [facility], was consulted after a Ct was performed and found an "impacted" stone that was "very close to the bladder" per the patient and her . She was told that she had a urinary tract infection and that the infection went into the blood stream (positive bacteremia). She underwent a cystoscopy and ureteral stent placement on 11/28/18.    She underwent a L urs/hll/sbe/stent exchange on 12/19/2018. Her stent has been removed in clinic on 12/26/2018.    1/28/2019  She is here to follow up her urinary voiding patterns. She noted urinary urgency and urgency incontinence which pre-dates her stone issues. She has been double voiding since we last saw each other. She states that has been helping greatly. She notes that when she wakes up in the morning she has an issue making it to the bathroom on time. She feels a strong urge and she will void on the way to the bathroom only in the mornings.  She states it is a large volume leakage. No other times when she cannot make it to the bathroom. She is dry throughout the day. Nocturia - 2x/night.   She eats dinner around 5:30pm - usually gatorade with dinner. She notes she will finish the gatorade bottle before going to bed, around 10pm. She notes that she goes to bed around midnight. She wakes up around 10:30am/11am.   She has had a yeast infection treated by her ob/gyn recently. "   Has two daughter, and 1 son who lives here. Daughters are in [place] and [place]. 3 grandsons in [place]. Her son who lives here has a boy and a girl.     19  The patient began to experience bilateral lower hip pains, urinary urgency, frequency, and dysuria. She was taking Azo to help with the dysuria. She is going out of town for thanksgiving and wanted to come see me to make sure there were no stones passing. Denies fevers/chills.   Unfortunately her brother just passed away yesterday.     Lab Results   Component Value Date    CREATININE 0.8 12/10/2018      ---  Past Medical History:   Diagnosis Date    Allergy     Anticoagulant long-term use     Arthritis     CVA (cerebral infarction)     Depression     Disorder of kidney and ureter     renal stones    Diverticulosis of colon     Extrinsic asthma, unspecified     Hyperlipidemia     Hypertension     Kidney stone     Left atrial enlargement 2014    Low back pain     MARTIN (obstructive sleep apnea)     Osteopenia     PUD (peptic ulcer disease)     Stroke  2013    Urinary tract infection        Past Surgical History:   Procedure Laterality Date    APPENDECTOMY      @ time of hysterectomy    BACK SURGERY      CATARACT EXTRACTION       SECTION      CHOLECYSTECTOMY      laparoscopic    COLONOSCOPY N/A 2018    Procedure: COLONOSCOPY/Golytely;  Surgeon: Janine Black MD;  Location: [facility];  Service: Endoscopy;  Laterality: N/A;    DILATION AND CURETTAGE OF UTERUS      HYSTERECTOMY      TAHUSO with appendectomy    INNER EAR SURGERY      replaced ear drum    JOINT REPLACEMENT Right     knee    KNEE ARTHROSCOPY W/ DEBRIDEMENT      LUMBAR DISCECTOMY      L4-L5    OOPHORECTOMY      unilateral    TONSILLECTOMY      TYMPANOPLASTY      URETEROSCOPIC REMOVAL OF URETERIC CALCULUS Left 2018    Procedure: REMOVAL, CALCULUS, URETER, URETEROSCOPIC, holmium laser lithotripsy,  stone basket extraction, retrograde pyelogram, ureteral stent exchange;  Surgeon: Anaya Zamora MD;  Location: [facility];  Service: Urology;  Laterality: Left;       Family History   Problem Relation Age of Onset    Cervical cancer Mother     Cancer Mother 65        lung cancer - non smoker    Stroke Paternal Grandfather     Hypertension Maternal Grandfather     Heart disease Maternal Grandfather     Hypertension Father     Coronary artery disease Father 62    Heart disease Father     Diabetes Sister     Heart disease Sister     Kidney disease Sister     Breast cancer Daughter 36    Heart failure Sister         60s    Colon cancer Neg Hx     Ovarian cancer Neg Hx        Social History     Tobacco Use    Smoking status: Former Smoker     Last attempt to quit: 1995     Years since quittin.8    Smokeless tobacco: Never Used   Substance Use Topics    Alcohol use: Yes     Alcohol/week: 1.0 standard drinks     Types: 1 Glasses of wine per week     Comment: social    Drug use: No       Current Outpatient Medications on File Prior to Visit   Medication Sig Dispense Refill    albuterol (PROVENTIL/VENTOLIN HFA) 90 mcg/actuation inhaler INHALE 2 PUFFS EVERY 6 HOURS AS NEEDED FOR WHEEZING 18 g 0    albuterol (PROVENTIL/VENTOLIN HFA) 90 mcg/actuation inhaler INHALE 2 PUFFS EVERY 6 HOURS AS NEEDED FOR WHEEZING 18 g 0    amoxicillin (AMOXIL) 500 MG capsule DNC  0    aspirin 81 MG Chew Take 81 mg by mouth once daily.      atorvastatin (LIPITOR) 10 MG tablet TAKE 1 TABLET BY MOUTH DAILY 90 tablet 3    calcium carbonate (OS-SPENCER) 600 mg (1,500 mg) Tab Take 600 mg by mouth 2 (two) times daily with meals.      cholecalciferol, vitamin D3, 1,000 unit Chew Take by mouth.      clotrimazole-betamethasone 1-0.05% (LOTRISONE) cream Apply topically 2 (two) times daily. 45 g 2    escitalopram oxalate (LEXAPRO) 10 MG tablet TAKE 1 TABLET BY MOUTH EVERY DAY 90 tablet 3    FLUAD 4721-5602, 65 YR UP,,PF, 45 mcg  (15 mcg x 3)/0.5 mL Syrg       LACTOBACILLUS ACIDOPHILUS (PROBIOTIC ORAL) Take by mouth.      losartan (COZAAR) 100 MG tablet TAKE 1 TABLET(100 MG) BY MOUTH EVERY DAY 90 tablet 0    MULTIVIT WITH CALCIUM,IRON,MIN (WOMEN'S DAILY MULTIVITAMIN ORAL) Take by mouth.      ondansetron (ZOFRAN-ODT) 4 MG TbDL Take 1 tablet (4 mg total) by mouth every 8 (eight) hours as needed. 30 tablet 0    pantoprazole (PROTONIX) 40 MG tablet TAKE 1 TABLET(40 MG) BY MOUTH EVERY DAY 30 tablet 5    tiZANidine (ZANAFLEX) 2 MG tablet Take 1-2 tablets (2-4 mg total) by mouth nightly as needed. 60 tablet 2     No current facility-administered medications on file prior to visit.        Review of patient's allergies indicates:   Allergen Reactions    Iodinated contrast media Hives and Rash    Gabapentin Hallucinations    Iodine Hives    Isothiazolinones Rash    Penicillins Rash       Review of Systems   Constitutional: Negative for activity change.   HENT: Negative for congestion.    Eyes: Negative for visual disturbance.   Respiratory: Negative for shortness of breath.    Cardiovascular: Negative for chest pain.   Gastrointestinal: Negative for abdominal distention.   Musculoskeletal: Negative for gait problem.   Skin: Negative for color change.   Neurological: Negative for dizziness.   Psychiatric/Behavioral: Negative for agitation.       Objective:      Physical Exam   Constitutional: She is oriented to person, place, and time. She appears well-developed.   HENT:   Head: Normocephalic and atraumatic.   Eyes: EOM are normal.   Neck: Normal range of motion.   Cardiovascular: Intact distal pulses.   Pulmonary/Chest: Effort normal.   Abdominal: Soft. She exhibits no distension. There is no tenderness (no CVA tenderness; patient points to bilateral lower hips as site of pain).   Musculoskeletal: Normal range of motion.   Neurological: She is alert and oriented to person, place, and time.   Skin: Skin is warm and dry.   Psychiatric: She  has a normal mood and affect.       Assessment:       1. Dysuria    2. Nephrolithiasis    3. Urinary urgency    4. Urine frequency        Plan:       68 y.o. female with dysuria and POCT UA suspicious for a UTI and history of nephrolithiasis with bilateral lower back pain.  1. Will start Flomax for empiric medical expulsive therapy treatment. Patient will continue to hydrate.  2. Will send urine for UA and culture - and start patient on empiric cipro while awaiting culture results.  3. Will obtain a noncon CT scan to rule out stone(s).  4. followup on same day after CT scan to review images.      Dysuria  -     Urinalysis  -     Urinalysis Microscopic  -     Urine culture  -     ciprofloxacin HCl (CIPRO) 500 MG tablet; Take 1 tablet (500 mg total) by mouth every 12 (twelve) hours. for 7 days  Dispense: 14 tablet; Refill: 0  -     CT Abdomen Pelvis  Without Contrast; Future; Expected date: 11/05/2019  -     Discontinue: tamsulosin (FLOMAX) 0.4 mg Cap; Take 1 capsule (0.4 mg total) by mouth once daily.  Dispense: 30 capsule; Refill: 0    Nephrolithiasis  -     ciprofloxacin HCl (CIPRO) 500 MG tablet; Take 1 tablet (500 mg total) by mouth every 12 (twelve) hours. for 7 days  Dispense: 14 tablet; Refill: 0  -     CT Abdomen Pelvis  Without Contrast; Future; Expected date: 11/05/2019  -     Discontinue: tamsulosin (FLOMAX) 0.4 mg Cap; Take 1 capsule (0.4 mg total) by mouth once daily.  Dispense: 30 capsule; Refill: 0    Urinary urgency    Urine frequency

## 2019-11-08 ENCOUNTER — TELEPHONE (OUTPATIENT)
Dept: UROLOGY | Facility: CLINIC | Age: 69
End: 2019-11-08

## 2019-11-08 DIAGNOSIS — N39.0 URINARY TRACT INFECTION WITHOUT HEMATURIA, SITE UNSPECIFIED: Primary | ICD-10-CM

## 2019-11-08 LAB — BACTERIA UR CULT: ABNORMAL

## 2019-11-08 RX ORDER — NITROFURANTOIN 25; 75 MG/1; MG/1
100 CAPSULE ORAL 2 TIMES DAILY
Qty: 14 CAPSULE | Refills: 0 | Status: SHIPPED | OUTPATIENT
Start: 2019-11-08 | End: 2019-11-15

## 2019-11-08 NOTE — TELEPHONE ENCOUNTER
----- Message from Anaya Zamora MD sent at 11/8/2019 10:48 AM CST -----  Ordering macrobid antibiotic. Please call patient and notify of positive culture and antibiotics. The urine culture grew out bacteria concerning for a urinary tract infection.  The bacteria was resistant to cipro. She should stop cipro and start macrobid instead.

## 2019-11-15 ENCOUNTER — HOSPITAL ENCOUNTER (OUTPATIENT)
Dept: RADIOLOGY | Facility: HOSPITAL | Age: 69
Discharge: HOME | End: 2019-11-15
Attending: STUDENT IN AN ORGANIZED HEALTH CARE EDUCATION/TRAINING PROGRAM
Payer: MEDICARE

## 2019-11-15 ENCOUNTER — OFFICE VISIT (OUTPATIENT)
Dept: UROLOGY | Facility: CLINIC | Age: 69
End: 2019-11-15
Payer: MEDICARE

## 2019-11-15 VITALS
DIASTOLIC BLOOD PRESSURE: 79 MMHG | HEIGHT: 66 IN | SYSTOLIC BLOOD PRESSURE: 129 MMHG | HEART RATE: 68 BPM | WEIGHT: 223 LBS | BODY MASS INDEX: 35.84 KG/M2

## 2019-11-15 DIAGNOSIS — N39.0 URINARY TRACT INFECTION WITHOUT HEMATURIA, SITE UNSPECIFIED: Primary | ICD-10-CM

## 2019-11-15 DIAGNOSIS — N20.0 NEPHROLITHIASIS: ICD-10-CM

## 2019-11-15 DIAGNOSIS — R30.0 DYSURIA: ICD-10-CM

## 2019-11-15 PROCEDURE — 87088 URINE BACTERIA CULTURE: CPT

## 2019-11-15 PROCEDURE — 99999 PR PBB SHADOW E&M-EST. PATIENT-LVL IV: ICD-10-PCS | Mod: PBBFAC,,, | Performed by: STUDENT IN AN ORGANIZED HEALTH CARE EDUCATION/TRAINING PROGRAM

## 2019-11-15 PROCEDURE — 74176 CT ABD & PELVIS W/O CONTRAST: CPT | Mod: TC

## 2019-11-15 PROCEDURE — 1101F PR PT FALLS ASSESS DOC 0-1 FALLS W/OUT INJ PAST YR: ICD-10-PCS | Mod: CPTII,S$GLB,, | Performed by: STUDENT IN AN ORGANIZED HEALTH CARE EDUCATION/TRAINING PROGRAM

## 2019-11-15 PROCEDURE — 99214 PR OFFICE/OUTPT VISIT, EST, LEVL IV, 30-39 MIN: ICD-10-PCS | Mod: 25,S$GLB,, | Performed by: STUDENT IN AN ORGANIZED HEALTH CARE EDUCATION/TRAINING PROGRAM

## 2019-11-15 PROCEDURE — 3078F DIAST BP <80 MM HG: CPT | Mod: CPTII,S$GLB,, | Performed by: STUDENT IN AN ORGANIZED HEALTH CARE EDUCATION/TRAINING PROGRAM

## 2019-11-15 PROCEDURE — 3078F PR MOST RECENT DIASTOLIC BLOOD PRESSURE < 80 MM HG: ICD-10-PCS | Mod: CPTII,S$GLB,, | Performed by: STUDENT IN AN ORGANIZED HEALTH CARE EDUCATION/TRAINING PROGRAM

## 2019-11-15 PROCEDURE — 99214 OFFICE O/P EST MOD 30 MIN: CPT | Mod: 25,S$GLB,, | Performed by: STUDENT IN AN ORGANIZED HEALTH CARE EDUCATION/TRAINING PROGRAM

## 2019-11-15 PROCEDURE — 99999 PR PBB SHADOW E&M-EST. PATIENT-LVL IV: CPT | Mod: PBBFAC,,, | Performed by: STUDENT IN AN ORGANIZED HEALTH CARE EDUCATION/TRAINING PROGRAM

## 2019-11-15 PROCEDURE — 74176 CT ABD & PELVIS W/O CONTRAST: CPT | Mod: 26,,, | Performed by: RADIOLOGY

## 2019-11-15 PROCEDURE — 87186 SC STD MICRODIL/AGAR DIL: CPT

## 2019-11-15 PROCEDURE — 87077 CULTURE AEROBIC IDENTIFY: CPT

## 2019-11-15 PROCEDURE — 81002 PR URINALYSIS NONAUTO W/O SCOPE: ICD-10-PCS | Mod: S$GLB,,, | Performed by: STUDENT IN AN ORGANIZED HEALTH CARE EDUCATION/TRAINING PROGRAM

## 2019-11-15 PROCEDURE — 87086 URINE CULTURE/COLONY COUNT: CPT

## 2019-11-15 PROCEDURE — 3074F SYST BP LT 130 MM HG: CPT | Mod: CPTII,S$GLB,, | Performed by: STUDENT IN AN ORGANIZED HEALTH CARE EDUCATION/TRAINING PROGRAM

## 2019-11-15 PROCEDURE — 1101F PT FALLS ASSESS-DOCD LE1/YR: CPT | Mod: CPTII,S$GLB,, | Performed by: STUDENT IN AN ORGANIZED HEALTH CARE EDUCATION/TRAINING PROGRAM

## 2019-11-15 PROCEDURE — 3074F PR MOST RECENT SYSTOLIC BLOOD PRESSURE < 130 MM HG: ICD-10-PCS | Mod: CPTII,S$GLB,, | Performed by: STUDENT IN AN ORGANIZED HEALTH CARE EDUCATION/TRAINING PROGRAM

## 2019-11-15 PROCEDURE — 81002 URINALYSIS NONAUTO W/O SCOPE: CPT | Mod: S$GLB,,, | Performed by: STUDENT IN AN ORGANIZED HEALTH CARE EDUCATION/TRAINING PROGRAM

## 2019-11-15 PROCEDURE — 74176 CT ABDOMEN PELVIS WITHOUT CONTRAST: ICD-10-PCS | Mod: 26,,, | Performed by: RADIOLOGY

## 2019-11-15 RX ORDER — TAMSULOSIN HYDROCHLORIDE 0.4 MG/1
0.4 CAPSULE ORAL DAILY
Qty: 30 CAPSULE | Refills: 5 | Status: SHIPPED | OUTPATIENT
Start: 2019-11-15 | End: 2019-12-15

## 2019-11-15 RX ORDER — ONDANSETRON 8 MG/1
8 TABLET, ORALLY DISINTEGRATING ORAL EVERY 6 HOURS PRN
Qty: 30 TABLET | Refills: 0 | Status: SHIPPED | OUTPATIENT
Start: 2019-11-15 | End: 2021-04-19

## 2019-11-15 RX ORDER — NITROFURANTOIN 25; 75 MG/1; MG/1
100 CAPSULE ORAL 2 TIMES DAILY
Qty: 14 CAPSULE | Refills: 0 | Status: SHIPPED | OUTPATIENT
Start: 2019-11-15 | End: 2019-11-22

## 2019-11-15 RX ORDER — KETOROLAC TROMETHAMINE 10 MG/1
10 TABLET, FILM COATED ORAL EVERY 6 HOURS PRN
Qty: 20 TABLET | Refills: 0 | Status: SHIPPED | OUTPATIENT
Start: 2019-11-15 | End: 2021-11-09

## 2019-11-15 NOTE — PROGRESS NOTES
"Subjective:       Patient ID: Tracy Armendariz is a 68 y.o. female.    Chief Complaint: Follow-up (after CT)  This is a 68 y.o.  female patient that is an established patient of mine.  The patient is referred to me by Dr. Bartolo Jules her PCP for kidney stone treatment.  She was visiting [facility] and was diagnosed with an obstructing stone. She presented to the ER at [facility] on 11/27/18 with fevers and altered mental status. She notes that they found a kidney stone of unknown laterality she does not recall left or right. Dr. Pérez, a urologist at [facility], was consulted after a Ct was performed and found an "impacted" stone that was "very close to the bladder" per the patient and her . She was told that she had a urinary tract infection and that the infection went into the blood stream (positive bacteremia). She underwent a cystoscopy and ureteral stent placement on 11/28/18.    She underwent a L urs/hll/sbe/stent exchange on 12/19/2018. Her stent has been removed in clinic on 12/26/2018.    1/28/2019  She is here to follow up her urinary voiding patterns. She noted urinary urgency and urgency incontinence which pre-dates her stone issues. She has been double voiding since we last saw each other. She states that has been helping greatly. She notes that when she wakes up in the morning she has an issue making it to the bathroom on time. She feels a strong urge and she will void on the way to the bathroom only in the mornings.  She states it is a large volume leakage. No other times when she cannot make it to the bathroom. She is dry throughout the day. Nocturia - 2x/night.   She eats dinner around 5:30pm - usually gatorade with dinner. She notes she will finish the gatorade bottle before going to bed, around 10pm. She notes that she goes to bed around midnight. She wakes up around 10:30am/11am.   She has had a yeast infection treated by her ob/gyn recently.   Has two daughter, and 1 " son who lives here. Daughters are in [place] and [place]. 3 grandsons in [place]. Her son who lives here has a boy and a girl.     11/5/19  The patient began to experience bilateral lower hip pains, urinary urgency, frequency, and dysuria. She was taking Azo to help with the dysuria. She is going out of town for thanksgiving and wanted to come see me to make sure there were no stones passing. Denies fevers/chills.     11/15/19  She returns back to followup on her Ct scan obtained earlier today. She had an E. Coli UTI which I treated with macrobid. She notes her urinary sx did improve but still experiencing some urinary incontinence associated with urgency.     Lab Results   Component Value Date    CREATININE 0.8 12/10/2018       I personally reviewed the images: Ct Abdomen Pelvis  Without Contrast    Result Date: 11/15/2019 upper mid pole calcifications likely not true kidney stones. Previous URS that I performed did not reveal stones in those areas and those calcifications appear stable compared to the Ct in 12/2019. Lower pole stones are likely true kidney stones - in a tortuous difficult to access calyx. Smaller burden compared to 12/2019.   Several nonobstructive forming stones noted within the left kidney, similar to the prior exam. Cholecystectomy. Hysterectomy. Mild colonic diverticulosis. Electronically signed by: Ariella Mariano MD Date:    11/15/2019 Time:    15:00        ---  Past Medical History:   Diagnosis Date    Allergy     Anticoagulant long-term use     Arthritis     CVA (cerebral infarction) 2013    Depression     Disorder of kidney and ureter     renal stones    Diverticulosis of colon     Extrinsic asthma, unspecified     Hyperlipidemia     Hypertension     Kidney stone     Left atrial enlargement 12/16/2014    Low back pain     MARTIN (obstructive sleep apnea)     Osteopenia     PUD (peptic ulcer disease)     Stroke  December 2013    Urinary tract infection        Past  Surgical History:   Procedure Laterality Date    APPENDECTOMY      @ time of hysterectomy    BACK SURGERY      CATARACT EXTRACTION       SECTION      CHOLECYSTECTOMY      laparoscopic    COLONOSCOPY N/A 2018    Procedure: COLONOSCOPY/Golytely;  Surgeon: Janine Black MD;  Location: [facility] ENDO;  Service: Endoscopy;  Laterality: N/A;    DILATION AND CURETTAGE OF UTERUS  1972    HYSTERECTOMY      TAHUSO with appendectomy    INNER EAR SURGERY      replaced ear drum    JOINT REPLACEMENT Right     knee    KNEE ARTHROSCOPY W/ DEBRIDEMENT      LUMBAR DISCECTOMY      L4-L5    OOPHORECTOMY      unilateral    TONSILLECTOMY      TYMPANOPLASTY      URETEROSCOPIC REMOVAL OF URETERIC CALCULUS Left 2018    Procedure: REMOVAL, CALCULUS, URETER, URETEROSCOPIC, holmium laser lithotripsy, stone basket extraction, retrograde pyelogram, ureteral stent exchange;  Surgeon: Anaya Zamora MD;  Location: [facility] OR;  Service: Urology;  Laterality: Left;       Family History   Problem Relation Age of Onset    Cervical cancer Mother     Cancer Mother 65        lung cancer - non smoker    Stroke Paternal Grandfather     Hypertension Maternal Grandfather     Heart disease Maternal Grandfather     Hypertension Father     Coronary artery disease Father 62    Heart disease Father     Diabetes Sister     Heart disease Sister     Kidney disease Sister     Breast cancer Daughter 36    Heart failure Sister         60s    Colon cancer Neg Hx     Ovarian cancer Neg Hx        Social History     Tobacco Use    Smoking status: Former Smoker     Last attempt to quit: 1995     Years since quittin.8    Smokeless tobacco: Never Used   Substance Use Topics    Alcohol use: Yes     Alcohol/week: 1.0 standard drinks     Types: 1 Glasses of wine per week     Comment: social    Drug use: No       Current Outpatient Medications on File Prior to Visit   Medication Sig Dispense Refill     albuterol (PROVENTIL/VENTOLIN HFA) 90 mcg/actuation inhaler INHALE 2 PUFFS EVERY 6 HOURS AS NEEDED FOR WHEEZING 18 g 0    albuterol (PROVENTIL/VENTOLIN HFA) 90 mcg/actuation inhaler INHALE 2 PUFFS EVERY 6 HOURS AS NEEDED FOR WHEEZING 18 g 0    amoxicillin (AMOXIL) 500 MG capsule DNC  0    aspirin 81 MG Chew Take 81 mg by mouth once daily.      atorvastatin (LIPITOR) 10 MG tablet TAKE 1 TABLET BY MOUTH DAILY 90 tablet 3    calcium carbonate (OS-SPENCER) 600 mg (1,500 mg) Tab Take 600 mg by mouth 2 (two) times daily with meals.      cholecalciferol, vitamin D3, 1,000 unit Chew Take by mouth.      clotrimazole-betamethasone 1-0.05% (LOTRISONE) cream Apply topically 2 (two) times daily. 45 g 2    escitalopram oxalate (LEXAPRO) 10 MG tablet TAKE 1 TABLET BY MOUTH EVERY DAY 90 tablet 3    FLUAD 2664-6457, 65 YR UP,,PF, 45 mcg (15 mcg x 3)/0.5 mL Syrg       LACTOBACILLUS ACIDOPHILUS (PROBIOTIC ORAL) Take by mouth.      losartan (COZAAR) 100 MG tablet TAKE 1 TABLET(100 MG) BY MOUTH EVERY DAY 90 tablet 0    MULTIVIT WITH CALCIUM,IRON,MIN (WOMEN'S DAILY MULTIVITAMIN ORAL) Take by mouth.      nitrofurantoin, macrocrystal-monohydrate, (MACROBID) 100 MG capsule Take 1 capsule (100 mg total) by mouth 2 (two) times daily. for 7 days 14 capsule 0    ondansetron (ZOFRAN-ODT) 4 MG TbDL Take 1 tablet (4 mg total) by mouth every 8 (eight) hours as needed. 30 tablet 0    pantoprazole (PROTONIX) 40 MG tablet TAKE 1 TABLET(40 MG) BY MOUTH EVERY DAY 30 tablet 5    tamsulosin (FLOMAX) 0.4 mg Cap TAKE 1 CAPSULE(0.4 MG) BY MOUTH EVERY DAY 90 capsule 0    tiZANidine (ZANAFLEX) 2 MG tablet Take 1-2 tablets (2-4 mg total) by mouth nightly as needed. 60 tablet 2     No current facility-administered medications on file prior to visit.        Review of patient's allergies indicates:   Allergen Reactions    Iodinated contrast media Hives and Rash    Gabapentin Hallucinations    Iodine Hives    Isothiazolinones Rash     Penicillins Rash       Review of Systems   Constitutional: Negative for activity change.   HENT: Negative for congestion.    Eyes: Negative for visual disturbance.   Respiratory: Negative for shortness of breath.    Cardiovascular: Negative for chest pain.   Gastrointestinal: Negative for abdominal distention.   Musculoskeletal: Negative for gait problem.   Skin: Negative for color change.   Neurological: Negative for dizziness.   Psychiatric/Behavioral: Negative for agitation.       Objective:      Physical Exam   Constitutional: She is oriented to person, place, and time. She appears well-developed.   HENT:   Head: Normocephalic and atraumatic.   Eyes: EOM are normal.   Neck: Normal range of motion.   Cardiovascular: Intact distal pulses.   Pulmonary/Chest: Effort normal.   Abdominal: Soft. She exhibits no distension. There is no tenderness.   Musculoskeletal: Normal range of motion.   Neurological: She is alert and oriented to person, place, and time.   Skin: Skin is warm and dry.   Psychiatric: She has a normal mood and affect.       Assessment:       1. Urinary tract infection without hematuria, site unspecified        Plan:       1. Result Date: 11/15/2019 upper mid pole calcifications likely not true kidney stones. Previous URS that I performed did not reveal stones in those areas and those calcifications appear stable compared to the Ct in 12/2019. Lower pole stones are likely true kidney stones - in a tortuous difficult to access calyx. Smaller burden compared to 12/2019. Reassured patient that there is not a stone that is moving in the ureter.  2. Continue hydration.  3. Prescribed flomax, zofran, and toradol PRN sx of stone passage. She will pack this with her on her upcoming tripa.  4. Will extend macrobid to 7 more days since she is going out of town. Will send repeat urine culture from sample today to document resolution of UTI.      Urinary tract infection without hematuria, site unspecified  -      POCT urine dipstick without microscope  -     Urine culture  -     nitrofurantoin, macrocrystal-monohydrate, (MACROBID) 100 MG capsule; Take 1 capsule (100 mg total) by mouth 2 (two) times daily. for 7 days  Dispense: 14 capsule; Refill: 0    Other orders  -     tamsulosin (FLOMAX) 0.4 mg Cap; Take 1 capsule (0.4 mg total) by mouth once daily.  Dispense: 30 capsule; Refill: 5  -     ondansetron (ZOFRAN-ODT) 8 MG TbDL; Take 1 tablet (8 mg total) by mouth every 6 (six) hours as needed.  Dispense: 30 tablet; Refill: 0  -     ketorolac (TORADOL) 10 mg tablet; Take 1 tablet (10 mg total) by mouth every 6 (six) hours as needed for Pain.  Dispense: 20 tablet; Refill: 0

## 2019-11-15 NOTE — PATIENT INSTRUCTIONS
You should take and finish the 7 day additional antibiotic course of macrobid.    Pack with you whenever you travel and if you experience flank pain start taking flomax 1 pill daily.   If you experience pain that requires more than tylenol or ibuprofen, you can take toradol.  If you experience nausea, you can take zofran (oral dissolvable tablet)

## 2019-11-17 LAB — BACTERIA UR CULT: ABNORMAL

## 2019-11-19 ENCOUNTER — TELEPHONE (OUTPATIENT)
Dept: UROLOGY | Facility: CLINIC | Age: 69
End: 2019-11-19

## 2019-11-19 RX ORDER — NAPROXEN 500 MG/1
500 TABLET ORAL 2 TIMES DAILY WITH MEALS
Qty: 20 TABLET | Refills: 0 | Status: SHIPPED | OUTPATIENT
Start: 2019-11-19 | End: 2020-12-18

## 2019-11-19 NOTE — TELEPHONE ENCOUNTER
Insurance requesting alternative medication for Toradol.  Alternatives:  Ibuprofen, Naproxen, Mobic, and Sulindac.   Send new prescription to [facility]'s pharmacy.  Allergies reviewed.  Thanks

## 2019-12-03 NOTE — PROGRESS NOTES
Subjective:      Patient ID: Tracy Armendariz is a 69 y.o. female.    Chief Complaint: Leg Pain; Low-back Pain; and Knee Pain    Ms Armendariz is a 68 yo female here for follow up of neck and low back pain.  She was seen by me 8/2/2019 and has had neck pain since feb 2019. She has a history of back surgery in 1980 then started having back pain in November.  She was sent to PT which helped her back and then went to OT for her shoulders and feels like that irritated her neck.  She was given a second medrol dose bryson.  Today,  She is having more low back pain.  She feels like all of her joints hurt.  She has thumb pain.  She has knee pain.  She feels like her knees hurt.  She feels like her shoulders hurt.  She does not have pain in her neck.  She has continued all of her exercises for her neck and her back.  She does have more back pain with sitting.  The back flared up with drive to [facility].  The pain is across the lower back.  The pain is 7/10 now, worst 10/10 after long drive, best 3/10 in the morning or when wakes up.  She feels like the steroid always helps.  She still has shoulder pain at bedtime.  She has a hard time sleeping with arm under pillow.  She has some pain with overhead activity.  She does feel like the tizanidine helps.       MRI cervical spine 3/22/2019  The visualized portions of the posterior fossa is unremarkable.  There is arthropathy at the craniocervical junction.  No prevertebral soft tissue swelling is identified.    The cervical alignment is maintained.  There are fibrotic endplate changes in the lower cervical spine.  The remainder of the bone marrow signal is within normal limits.    The cord is normal in signal without evidence of edema or expansion.  There are no intraspinal collections.  There is effacement of the ventral thecal sac in the lower cervical spine.    There is hypertrophy of the posterior elements.  There is multilevel disc desiccation.  Evaluation of the individual disc  levels reveals the following:    C2-C3, there is a central disc protrusion.  There is mild hypertrophy of the posterior elements.  The spinal canal and neural foramina are within normal limits.  C3-C4, there is a disc osteophyte complex along with facet hypertrophy and uncovertebral hypertrophy.  There is superimposed left paracentral disc protrusion.  There is mild narrowing of the spinal canal.  There is mild right and moderate left neural foraminal narrowing.    C4-C5, there is a disc osteophyte complex along with facet hypertrophy and uncovertebral hypertrophy.  There is superimposed central disc protrusion.  There is mild to moderate narrowing of the spinal canal.  There is mild right and moderate left neural foraminal narrowing.    C5-C6, there is a disc osteophyte complex along with facet hypertrophy and uncovertebral hypertrophy.  There is superimposed central disc protrusion.  There is severe narrowing of the spinal canal.  There is severe bilateral neural foraminal narrowing.    C6-C7, there is a disc osteophyte complex along with facet hypertrophy and uncovertebral hypertrophy.  There is superimposed central disc protrusion.  There is moderate to severe spinal canal narrowing.  There is severe right and moderate left neural foraminal narrowing.    C7-T1, there is a disc osteophyte complex along with facet hypertrophy and uncovertebral hypertrophy.  There is mild spinal canal narrowing.  There is mild bilateral neural foraminal narrowing.    The paraspinal normal limits.  Flow voids within the vertebral arteries are unremarkable.    Impression      Advanced degenerative changes in the lower cervical spine with moderate to severe spinal canal narrowing at the C5-C6 and C6-C7 levels and associated moderate to severe neural foraminal narrowing.  No cord signal abnormality.      CT lumbar 2/2019  Remote operative change right L5 hemilaminectomy.    Lumbar sagittal alignment within normal limits.  Lumbar  vertebral body heights and contours are stable without evidence for acute fracture or subluxation.  Degenerative disc disease most pronounced at L5/S1 with moderate height loss and endplate degeneration with superimposed vacuum phenomena.    Please note evaluation of the central canal and neural foramina is limited by CT technique, allowing for limitation degenerative change as follows:    T12/L1: No significant disc bulge, central canal or neural foraminal stenosis.    L1/L2: Slight disc bulge without significant central canal or neural foraminal stenosis.    L2/L3: Bulging disc with ligamentum flavum hypertrophy without significant central canal stenosis with mild neural foraminal narrowing.    L3/L4: Bulging disc with ligamentum flavum hypertrophy and facet joint arthropathy mild central canal and bilateral neural foraminal stenosis.    L4/L5: Posterior disc osteophyte complex with ligamentum flavum hypertrophy and facet joint arthropathy with compressing the thecal hiatal right hemilaminectomy.  There is mild moderate bony neural foraminal narrowing.    L5/S1: Posterior disc osteophyte with decompression of the thecal sac via right hemilaminectomy.  No evidence for significant central canal stenosis.    Facet joint arthropathy with a mild moderate bilateral neural foraminal stenosis right greater than left.    Atherosclerotic plaquing of the aorta.  Continued multifocal subcentimeter left renal medullary parenchymal and caliceal stones partially visualized.    Impression      Remote operative change right L4 and L5 hemilaminectomy    Allowing for CT technique there is multilevel degenerative change most pronounced at L3/L4 with bulging disc, ligamentum flavum hypertrophy and facet joint arthropathy with mild central canal and bilateral neural foraminal stenosis.    More prominent neural foraminal narrowing at the L4/L5 and L5/S1 mild moderate narrowing as detailed above    Further evaluation as warranted  clinically.    Past Medical History:  No date: Allergy  No date: Anticoagulant long-term use  No date: Arthritis  : CVA (cerebral infarction)  No date: Depression  No date: Disorder of kidney and ureter      Comment:  renal stones  No date: Diverticulosis of colon  No date: Extrinsic asthma, unspecified  No date: Hyperlipidemia  No date: Hypertension  2014: Left atrial enlargement  No date: Low back pain  No date: MARTIN (obstructive sleep apnea)  No date: Osteopenia  No date: PUD (peptic ulcer disease)   2013: Stroke    Past Surgical History:  : APPENDECTOMY      Comment:  @ time of hysterectomy  2017: ARTHROPLASTY-KNEE; Right      Comment:  Performed by John Kim MD at [facility] OR  No date: BACK SURGERY  No date: CATARACT EXTRACTION  :  SECTION  : CHOLECYSTECTOMY      Comment:  laparoscopic  2018: COLONOSCOPY/Golytely; N/A      Comment:  Performed by Janine Black MD at [facility] ENDO  : DILATION AND CURETTAGE OF UTERUS  : HYSTERECTOMY      Comment:  TAHUSO with appendectomy  No date: INNER EAR SURGERY      Comment:  replaced ear drum  2017: IOVERA; Right      Comment:  Performed by Michael Treviño MD at [facility] OR  No date: JOINT REPLACEMENT; Right      Comment:  knee  : KNEE ARTHROSCOPY W/ DEBRIDEMENT  : LUMBAR DISCECTOMY      Comment:  L4-L5  No date: OOPHORECTOMY      Comment:  unilateral  2018: REMOVAL, CALCULUS, URETER, URETEROSCOPIC, holmium laser   lithotripsy, stone basket extraction, retrograde pyelogram, ureteral   stent exchange; Left      Comment:  Performed by Anaya Zamora MD at [facility] OR  No date: TONSILLECTOMY  No date: TYMPANOPLASTY    Review of patient's family history indicates:  Problem: Cervical cancer      Relation: Mother          Age of Onset: (Not Specified)  Problem: Cancer      Relation: Mother          Age of Onset: 65          Comment: lung cancer - non smoker  Problem: Stroke      Relation: Paternal Grandfather           Age of Onset: (Not Specified)  Problem: Hypertension      Relation: Maternal Grandfather          Age of Onset: (Not Specified)  Problem: Heart disease      Relation: Maternal Grandfather          Age of Onset: (Not Specified)  Problem: Hypertension      Relation: Father          Age of Onset: (Not Specified)  Problem: Coronary artery disease      Relation: Father          Age of Onset: 62  Problem: Heart disease      Relation: Father          Age of Onset: (Not Specified)  Problem: Diabetes      Relation: Sister          Age of Onset: (Not Specified)  Problem: Heart disease      Relation: Sister          Age of Onset: (Not Specified)  Problem: Kidney disease      Relation: Sister          Age of Onset: (Not Specified)  Problem: Breast cancer      Relation: Daughter          Age of Onset: 36  Problem: Heart failure      Relation: Sister          Age of Onset: (Not Specified)          Comment: 60s  Problem: Colon cancer      Relation: Neg Hx          Age of Onset: (Not Specified)  Problem: Ovarian cancer      Relation: Neg Hx          Age of Onset: (Not Specified)      Social History    Socioeconomic History      Marital status:       Spouse name: Not on file      Number of children: Not on file      Years of education: Not on file      Highest education level: Not on file    Occupational History      Not on file    Social Needs      Financial resource strain: Not on file      Food insecurity:        Worry: Not on file        Inability: Not on file      Transportation needs:        Medical: Not on file        Non-medical: Not on file    Tobacco Use      Smoking status: Former Smoker        Quit date: 1995        Years since quittin.3      Smokeless tobacco: Never Used    Substance and Sexual Activity      Alcohol use: Yes        Alcohol/week: 0.6 oz        Types: 1 Glasses of wine per week        Comment: social      Drug use: No      Sexual activity: Yes        Partners: Male        Birth  control/protection: Surgical        Comment:  since 1972    Lifestyle      Physical activity:        Days per week: Not on file        Minutes per session: Not on file      Stress: Not on file    Relationships      Social connections:        Talks on phone: Not on file        Gets together: Not on file        Attends Mosque service: Not on file        Active member of club or organization: Not on file        Attends meetings of clubs or organizations: Not on file        Relationship status: Not on file    Other Topics      Concerns:        Not on file    Social History Narrative      Not on file      Current Outpatient Medications:  amoxicillin (AMOXIL) 500 MG capsule, , Disp: , Rfl:   aspirin 81 MG Chew, Take 81 mg by mouth once daily., Disp: , Rfl:   atorvastatin (LIPITOR) 10 MG tablet, TAKE 1 TABLET BY MOUTH DAILY, Disp: 90 tablet, Rfl: 3  calcium carbonate (OS-SPENCER) 600 mg (1,500 mg) Tab, Take 600 mg by mouth 2 (two) times daily with meals., Disp: , Rfl:   cholecalciferol, vitamin D3, 1,000 unit Chew, Take by mouth., Disp: , Rfl:   clopidogrel (PLAVIX) 75 mg tablet, TAKE 1 TABLET BY MOUTH EVERY DAY, Disp: 90 tablet, Rfl: 3  clotrimazole-betamethasone 1-0.05% (LOTRISONE) cream, Apply topically 2 (two) times daily., Disp: 45 g, Rfl: 2  escitalopram oxalate (LEXAPRO) 10 MG tablet, TAKE 1 TABLET BY MOUTH EVERY DAY, Disp: 90 tablet, Rfl: 3  LACTOBACILLUS ACIDOPHILUS (PROBIOTIC ORAL), Take by mouth., Disp: , Rfl:   losartan (COZAAR) 100 MG tablet, TAKE 1 TABLET(100 MG) BY MOUTH EVERY DAY, Disp: 90 tablet, Rfl: 3  MULTIVIT WITH CALCIUM,IRON,MIN (WOMEN'S DAILY MULTIVITAMIN ORAL), Take by mouth., Disp: , Rfl:   pantoprazole (PROTONIX) 40 MG tablet, Take 1 tablet (40 mg total) by mouth once daily., Disp: 30 tablet, Rfl: 11  VENTOLIN HFA 90 mcg/actuation inhaler, INHALE 2 PUFFS EVERY 6 HOURS AS NEEDED FOR WHEEZING, Disp: 18 g, Rfl: 0    No current facility-administered medications for this visit.       Review of  patient's allergies indicates:   -- Iodinated contrast- oral and iv dye -- Hives and Rash   -- Gabapentin -- Hallucinations   -- Iodine -- Hives   -- Isothiazolinones -- Rash   -- Penicillins -- Rash        Review of Systems   Constitution: Negative for weight gain and weight loss.   Cardiovascular: Negative for chest pain.   Respiratory: Negative for shortness of breath.    Musculoskeletal: Positive for back pain, joint pain (all of her joints hurt) and neck pain. Negative for joint swelling.   Gastrointestinal: Positive for nausea and vomiting. Negative for abdominal pain and bowel incontinence.   Genitourinary: Negative for bladder incontinence.   Neurological: Positive for headaches and numbness (3rd, 4th, 5th digits bilateral hands).         Objective:        General: Tracy is well-developed, well-nourished, appears stated age, in no acute distress, alert and oriented to time, place and person.     General    Vitals reviewed.  Constitutional: She is oriented to person, place, and time. She appears well-developed and well-nourished.   HENT:   Head: Normocephalic and atraumatic.   Pulmonary/Chest: Effort normal.   Neurological: She is alert and oriented to person, place, and time.   Psychiatric: She has a normal mood and affect. Her behavior is normal. Judgment and thought content normal.     General Musculoskeletal Exam   Gait: normal         Right Hip Exam     Tenderness  Also right side trochanteric tenderness.  Left Hip Exam     Tenderness  Also left side trochanteric tenderness.      Back (L-Spine & T-Spine) / Neck (C-Spine) Exam     Tenderness   The patient is tender to palpation of the right side trochanteric and left side trochanteric. Right paramedian tenderness of the Upper C-Spine and Sacrum. Left paramedian tenderness of the Upper C-Spine and Sacrum.     Back (L-Spine & T-Spine) Range of Motion   Extension: 20   Flexion: 90   Lateral bend right: 20   Lateral bend left: 20   Rotation right: 40    Rotation left: 40     Neck (C-Spine) Range of Motion   Flexion:     40  Extension: 40Right Lateral Bend: 20 Left Lateral Bend: 20 Right Rotation: 40 Left Rotation: 40     Spinal Sensation   Right Side Sensation  C-Spine Level: normal   L-Spine Level: normal  S-Spine Level: normal  Left Side Sensation  C-Spine Level: normal  L-Spine Level: normal  S-Spine Level: normal    Back (L-Spine & T-Spine) Tests   Right Side Tests  Straight leg raise:      Sitting SLR: > 70 degrees      Left Side Tests  Straight leg raise:     Sitting SLR: > 70 degrees          Other She has no scoliosis .  Spinal Kyphosis:  Absent  Right Shoulder Exam     Tenderness   The patient is tender to palpation of the biceps tendon.    Range of Motion   Active abduction: 170   Forward Flexion: 170     Tests & Signs   Rotator Cuff Painful Arc/Range: moderate    Left Shoulder Exam     Tenderness   The patient is tender to palpation of the biceps tendon.    Range of Motion   Active abduction: 170   Forward Flexion: 170     Tests & Signs   Rotator Cuff Painful Arc/Range: moderate      Muscle Strength   Right Upper Extremity   Biceps: 5/5/5   Deltoid:  5/5  Triceps:  5/5  Wrist extension: 5/5/5   Finger Flexors:  5/5  Left Upper Extremity  Biceps: 5/5/5   Deltoid:  5/5  Triceps:  5/5  Wrist extension: 5/5/5   Finger Flexors:  5/5  Right Lower Extremity   Hip Flexion: 5/5   Quadriceps:  5/5   Anterior tibial:  5/5/5  EHL:  5/5  Left Lower Extremity   Hip Flexion: 5/5   Quadriceps:  5/5   Anterior tibial:  5/5/5   EHL:  5/5    Reflexes     Left Side  Biceps:  2+  Triceps:  2+  Brachioradialis:  2+  Quadriceps:  2+  Achilles:  2+  Left Patel's Sign:  Absent  Babinski Sign:  absent    Right Side   Biceps:  2+  Triceps:  2+  Brachioradialis:  2+  Quadriceps:  2+  Achilles:  2+  Right Patel's Sign:  absent  Babinski Sign:  absent    Vascular Exam     Right Pulses        Carotid:                  2+    Left Pulses        Carotid:                   2+              Assessment:       1. Spondylosis of cervical region without myelopathy or radiculopathy    2. DDD (degenerative disc disease), cervical    3. DDD (degenerative disc disease), lumbar    4. Acute pain of both shoulders    5. Multiple joint pain           Plan:       Orders Placed This Encounter    methylPREDNISolone (MEDROL DOSEPACK) 4 mg tablet       1.  PT continue HEP for neck and low back  2.  discussed Cervical BÁRBARA she wants to wait  3.  Still has some shoulder pain and biceps tendonitis.  We discussed injection vs ortho.  She feels like steroids by mouth help all of her pain  4. Medrol dose bryson was given again, but discussed we cannot give it all the time.  She feels like it helped her pain all over.  We discussed going to rheumatology.  She had high CRP and sed rate 2 years ago.  She cannot have nsaid due to stomach.  She does not want to go to rheum until after her trip to [place] in April.  She will call and let me know if she needs medrol dose bryson before [place].  Discussed that need to consider other options, but can do that for her trip  5. Tizanidine 2-4mg po QHS  6. RTC 6 months         Follow-up: Follow up in about 6 months (around 6/4/2020). If there are any questions prior to this, the patient was instructed to contact the office.

## 2019-12-04 ENCOUNTER — OFFICE VISIT (OUTPATIENT)
Dept: SPINE | Facility: CLINIC | Age: 69
End: 2019-12-04
Attending: PHYSICAL MEDICINE & REHABILITATION
Payer: MEDICARE

## 2019-12-04 VITALS
HEART RATE: 66 BPM | SYSTOLIC BLOOD PRESSURE: 120 MMHG | BODY MASS INDEX: 35.84 KG/M2 | DIASTOLIC BLOOD PRESSURE: 65 MMHG | WEIGHT: 223 LBS | HEIGHT: 66 IN

## 2019-12-04 DIAGNOSIS — M50.30 DDD (DEGENERATIVE DISC DISEASE), CERVICAL: ICD-10-CM

## 2019-12-04 DIAGNOSIS — M25.512 ACUTE PAIN OF BOTH SHOULDERS: ICD-10-CM

## 2019-12-04 DIAGNOSIS — M25.50 MULTIPLE JOINT PAIN: ICD-10-CM

## 2019-12-04 DIAGNOSIS — M47.812 SPONDYLOSIS OF CERVICAL REGION WITHOUT MYELOPATHY OR RADICULOPATHY: Primary | ICD-10-CM

## 2019-12-04 DIAGNOSIS — M51.36 DDD (DEGENERATIVE DISC DISEASE), LUMBAR: ICD-10-CM

## 2019-12-04 DIAGNOSIS — M25.511 ACUTE PAIN OF BOTH SHOULDERS: ICD-10-CM

## 2019-12-04 PROCEDURE — 1159F PR MEDICATION LIST DOCUMENTED IN MEDICAL RECORD: ICD-10-PCS | Mod: S$GLB,,, | Performed by: PHYSICAL MEDICINE & REHABILITATION

## 2019-12-04 PROCEDURE — 1101F PR PT FALLS ASSESS DOC 0-1 FALLS W/OUT INJ PAST YR: ICD-10-PCS | Mod: CPTII,S$GLB,, | Performed by: PHYSICAL MEDICINE & REHABILITATION

## 2019-12-04 PROCEDURE — 1125F AMNT PAIN NOTED PAIN PRSNT: CPT | Mod: S$GLB,,, | Performed by: PHYSICAL MEDICINE & REHABILITATION

## 2019-12-04 PROCEDURE — 3074F SYST BP LT 130 MM HG: CPT | Mod: CPTII,S$GLB,, | Performed by: PHYSICAL MEDICINE & REHABILITATION

## 2019-12-04 PROCEDURE — 1101F PT FALLS ASSESS-DOCD LE1/YR: CPT | Mod: CPTII,S$GLB,, | Performed by: PHYSICAL MEDICINE & REHABILITATION

## 2019-12-04 PROCEDURE — 1125F PR PAIN SEVERITY QUANTIFIED, PAIN PRESENT: ICD-10-PCS | Mod: S$GLB,,, | Performed by: PHYSICAL MEDICINE & REHABILITATION

## 2019-12-04 PROCEDURE — 3074F PR MOST RECENT SYSTOLIC BLOOD PRESSURE < 130 MM HG: ICD-10-PCS | Mod: CPTII,S$GLB,, | Performed by: PHYSICAL MEDICINE & REHABILITATION

## 2019-12-04 PROCEDURE — 99999 PR PBB SHADOW E&M-EST. PATIENT-LVL III: CPT | Mod: PBBFAC,,, | Performed by: PHYSICAL MEDICINE & REHABILITATION

## 2019-12-04 PROCEDURE — 3078F PR MOST RECENT DIASTOLIC BLOOD PRESSURE < 80 MM HG: ICD-10-PCS | Mod: CPTII,S$GLB,, | Performed by: PHYSICAL MEDICINE & REHABILITATION

## 2019-12-04 PROCEDURE — 99214 PR OFFICE/OUTPT VISIT, EST, LEVL IV, 30-39 MIN: ICD-10-PCS | Mod: S$GLB,,, | Performed by: PHYSICAL MEDICINE & REHABILITATION

## 2019-12-04 PROCEDURE — 99214 OFFICE O/P EST MOD 30 MIN: CPT | Mod: S$GLB,,, | Performed by: PHYSICAL MEDICINE & REHABILITATION

## 2019-12-04 PROCEDURE — 99999 PR PBB SHADOW E&M-EST. PATIENT-LVL III: ICD-10-PCS | Mod: PBBFAC,,, | Performed by: PHYSICAL MEDICINE & REHABILITATION

## 2019-12-04 PROCEDURE — 1159F MED LIST DOCD IN RCRD: CPT | Mod: S$GLB,,, | Performed by: PHYSICAL MEDICINE & REHABILITATION

## 2019-12-04 PROCEDURE — 3078F DIAST BP <80 MM HG: CPT | Mod: CPTII,S$GLB,, | Performed by: PHYSICAL MEDICINE & REHABILITATION

## 2019-12-04 RX ORDER — METHYLPREDNISOLONE 4 MG/1
TABLET ORAL
Qty: 1 PACKAGE | Refills: 0 | Status: SHIPPED | OUTPATIENT
Start: 2019-12-04 | End: 2019-12-25

## 2020-02-11 RX ORDER — ATORVASTATIN CALCIUM 10 MG/1
TABLET, FILM COATED ORAL
Qty: 90 TABLET | Refills: 3 | Status: SHIPPED | OUTPATIENT
Start: 2020-02-11 | End: 2021-05-17 | Stop reason: SDUPTHER

## 2020-02-11 RX ORDER — LOSARTAN POTASSIUM 100 MG/1
TABLET ORAL
Qty: 90 TABLET | Refills: 3 | Status: SHIPPED | OUTPATIENT
Start: 2020-02-11 | End: 2021-04-16 | Stop reason: SDUPTHER

## 2020-03-26 ENCOUNTER — DOCUMENTATION ONLY (OUTPATIENT)
Dept: REHABILITATION | Facility: HOSPITAL | Age: 70
End: 2020-03-26

## 2020-03-26 DIAGNOSIS — R29.898 WEAKNESS OF BOTH LOWER EXTREMITIES: ICD-10-CM

## 2020-03-26 NOTE — PROGRESS NOTES
Pt was evaluated on 6/5/2019 and was seen 15 times for PT. Pt has not attended PT since 8/1/2019. Pt was given HEP. Current status is unknown. Pt to be d/c'd at this time.

## 2020-06-12 ENCOUNTER — TELEPHONE (OUTPATIENT)
Dept: FAMILY MEDICINE | Facility: CLINIC | Age: 70
End: 2020-06-12
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
## 2020-06-15 NOTE — TELEPHONE ENCOUNTER
See order.  Orders Placed This Encounter   Procedures    HME - OTHER     Encounter Diagnosis   Name Primary?    Fall from bed, initial encounter Yes

## 2020-06-24 ENCOUNTER — TELEPHONE (OUTPATIENT)
Dept: FAMILY MEDICINE | Facility: CLINIC | Age: 70
End: 2020-06-24

## 2020-06-24 NOTE — TELEPHONE ENCOUNTER
----- Message from Jerad Obrien sent at 6/24/2020  9:07 AM CDT -----  [phone]  Jennifer would like to discuss the recent prescription for a bed rail for the patient, states she would like to know if the patient has an actual hospital bed. Please Advise.

## 2020-06-24 NOTE — TELEPHONE ENCOUNTER
Patient does not have hospital bed.  Realized she purchased herself.  Is contacting company that she purchased from to get bed rail.

## 2020-08-03 ENCOUNTER — HOSPITAL ENCOUNTER (OUTPATIENT)
Dept: RADIOLOGY | Facility: HOSPITAL | Age: 70
Discharge: HOME | End: 2020-08-03
Attending: FAMILY MEDICINE
Payer: MEDICARE

## 2020-08-03 ENCOUNTER — OFFICE VISIT (OUTPATIENT)
Dept: FAMILY MEDICINE | Facility: CLINIC | Age: 70
End: 2020-08-03
Payer: MEDICARE

## 2020-08-03 VITALS
HEART RATE: 71 BPM | HEIGHT: 66 IN | WEIGHT: 226.19 LBS | OXYGEN SATURATION: 96 % | DIASTOLIC BLOOD PRESSURE: 76 MMHG | SYSTOLIC BLOOD PRESSURE: 122 MMHG | BODY MASS INDEX: 36.35 KG/M2 | TEMPERATURE: 98 F

## 2020-08-03 DIAGNOSIS — M85.88 OTHER SPECIFIED DISORDERS OF BONE DENSITY AND STRUCTURE, OTHER SITE: ICD-10-CM

## 2020-08-03 DIAGNOSIS — M47.812 SPONDYLOSIS, CERVICAL: ICD-10-CM

## 2020-08-03 DIAGNOSIS — M43.07 SPONDYLOLYSIS, LUMBOSACRAL: ICD-10-CM

## 2020-08-03 DIAGNOSIS — Z00.00 ROUTINE GENERAL MEDICAL EXAMINATION AT A HEALTH CARE FACILITY: ICD-10-CM

## 2020-08-03 DIAGNOSIS — R25.1 TREMOR: ICD-10-CM

## 2020-08-03 DIAGNOSIS — Z00.00 ROUTINE GENERAL MEDICAL EXAMINATION AT A HEALTH CARE FACILITY: Primary | ICD-10-CM

## 2020-08-03 DIAGNOSIS — M85.80 OSTEOPENIA, UNSPECIFIED LOCATION: ICD-10-CM

## 2020-08-03 DIAGNOSIS — Z86.73 HISTORY OF STROKE: ICD-10-CM

## 2020-08-03 DIAGNOSIS — Z12.31 OTHER SCREENING MAMMOGRAM: ICD-10-CM

## 2020-08-03 DIAGNOSIS — E66.01 SEVERE OBESITY WITH BODY MASS INDEX (BMI) OF 36.0 TO 36.9 WITH SERIOUS COMORBIDITY: ICD-10-CM

## 2020-08-03 DIAGNOSIS — K27.9 PUD (PEPTIC ULCER DISEASE): ICD-10-CM

## 2020-08-03 DIAGNOSIS — M89.9 BONE DISORDER: ICD-10-CM

## 2020-08-03 DIAGNOSIS — I70.0 ATHEROSCLEROSIS OF AORTA: ICD-10-CM

## 2020-08-03 PROCEDURE — 3078F PR MOST RECENT DIASTOLIC BLOOD PRESSURE < 80 MM HG: ICD-10-PCS | Mod: CPTII,S$GLB,, | Performed by: FAMILY MEDICINE

## 2020-08-03 PROCEDURE — 99499 UNLISTED E&M SERVICE: CPT | Mod: S$GLB,,, | Performed by: FAMILY MEDICINE

## 2020-08-03 PROCEDURE — 99214 PR OFFICE/OUTPT VISIT, EST, LEVL IV, 30-39 MIN: ICD-10-PCS | Mod: S$GLB,,, | Performed by: FAMILY MEDICINE

## 2020-08-03 PROCEDURE — 99999 PR PBB SHADOW E&M-EST. PATIENT-LVL V: CPT | Mod: PBBFAC,,, | Performed by: FAMILY MEDICINE

## 2020-08-03 PROCEDURE — 77077 JOINT SURVEY SINGLE VIEW: CPT | Mod: 26,,, | Performed by: RADIOLOGY

## 2020-08-03 PROCEDURE — 3074F SYST BP LT 130 MM HG: CPT | Mod: CPTII,S$GLB,, | Performed by: FAMILY MEDICINE

## 2020-08-03 PROCEDURE — 3074F PR MOST RECENT SYSTOLIC BLOOD PRESSURE < 130 MM HG: ICD-10-PCS | Mod: CPTII,S$GLB,, | Performed by: FAMILY MEDICINE

## 2020-08-03 PROCEDURE — 99499 RISK ADDL DX/OHS AUDIT: ICD-10-PCS | Mod: S$GLB,,, | Performed by: FAMILY MEDICINE

## 2020-08-03 PROCEDURE — 77077 JOINT SURVEY SINGLE VIEW: CPT | Mod: TC

## 2020-08-03 PROCEDURE — 3078F DIAST BP <80 MM HG: CPT | Mod: CPTII,S$GLB,, | Performed by: FAMILY MEDICINE

## 2020-08-03 PROCEDURE — 99999 PR PBB SHADOW E&M-EST. PATIENT-LVL V: ICD-10-PCS | Mod: PBBFAC,,, | Performed by: FAMILY MEDICINE

## 2020-08-03 PROCEDURE — 99214 OFFICE O/P EST MOD 30 MIN: CPT | Mod: S$GLB,,, | Performed by: FAMILY MEDICINE

## 2020-08-03 PROCEDURE — 77077 XR ARTHRITIS SURVEY: ICD-10-PCS | Mod: 26,,, | Performed by: RADIOLOGY

## 2020-08-03 RX ORDER — ESCITALOPRAM OXALATE 20 MG/1
20 TABLET ORAL DAILY
Qty: 90 TABLET | Refills: 3 | Status: SHIPPED | OUTPATIENT
Start: 2020-08-03 | End: 2021-09-12 | Stop reason: SDUPTHER

## 2020-08-03 RX ORDER — FAMOTIDINE 10 MG/1
10 TABLET ORAL 2 TIMES DAILY
Status: ON HOLD | COMMUNITY
End: 2022-12-11

## 2020-08-03 NOTE — PROGRESS NOTES
(Portions of this note were dictated using voice recognition software and may contain dictation related errors in spelling/grammar/syntax not found on text review)    CC:   Chief Complaint   Patient presents with    Follow-up     rheumatology referral recommendation       HPI: 69 y.o. female  Last seen in September 2019.  She states that she needs her physical.  Also  Saw Physical Medicine for multiple joint pain neck back and extremities.  Reviewed note from Physical Medicine.  Has reported significant benefit with steroids in the past.   imaging of neck and lumbar spine shows multilevel degenerative disease.  There is a concern that she need rheumatic workup given significant benefit with steroids  in the past, was advised rheumatology referral.  Chart review in the past demonstrates sed rate and CRP elevated at  34/8.9 respectively in 2017, 42 in 15.1 respectively in 2016.  Has had negative MARY in 2016 negative rheumatoid factor in 2016.  States that she hurts all over, neck, back, shoulders, knees, hands.  Has been to physical therapy in the past, states that it does help while she is in therapy but that she usually given exercises afterwards that she does not keep up with.    Has noticed some tremors usually just in the morning when she has not eaten anything.  As soon as she drinks her coffee she usually feels better.  Does not complain of tremors during the day.  States that she sleeps a lot between 12 and 15 hr a day.  States that her  thinks it is because she has nothing better to do, but she does feel like she gets extremely tired during the day if she does not sleep that long.  She does have sleep apnea and uses her CPAP regularly but has not followed up with this in some time.  She feels like she sleeps well overall at night.  She does admit that her mood has been down recently and that she feels like her depression medication Lexapro 10 mg is probably not helping as much as it should.  Does not  really exercise.    Also states that she has some night sweats occasionally at night.  No fevers or weight changes.    Prior history of stroke, on aspirin 81 mg daily, atorvastatin 10 mg daily.  Plavix was discontinued per Neurology recommendations in 2016, no reviewed.  No current neurological concerns related to this.  BP stable on losartan 100 mg daily    Has had a scratchy throat last couple of days.  Does notice some heartburn recently.  History of prior peptic ulcer disease.  Takes Pepcid    Past Medical History:   Diagnosis Date    Allergy     Anticoagulant long-term use     Arthritis     CVA (cerebral infarction)     Depression     Disorder of kidney and ureter     renal stones    Diverticulosis of colon     Extrinsic asthma, unspecified     Hyperlipidemia     Hypertension     Kidney stone     Left atrial enlargement 2014    Low back pain     MARTIN (obstructive sleep apnea)     Osteopenia     PUD (peptic ulcer disease)     Stroke  2013    Urinary tract infection        Past Surgical History:   Procedure Laterality Date    APPENDECTOMY      @ time of hysterectomy    BACK SURGERY      CATARACT EXTRACTION       SECTION      CHOLECYSTECTOMY      laparoscopic    COLONOSCOPY N/A 2018    Procedure: COLONOSCOPY/Golytely;  Surgeon: Janine Black MD;  Location: [facility];  Service: Endoscopy;  Laterality: N/A;    DILATION AND CURETTAGE OF UTERUS      HYSTERECTOMY      TAHUSO with appendectomy    INNER EAR SURGERY      replaced ear drum    JOINT REPLACEMENT Right     knee    KNEE ARTHROSCOPY W/ DEBRIDEMENT      LUMBAR DISCECTOMY      L4-L5    OOPHORECTOMY      unilateral    TONSILLECTOMY      TYMPANOPLASTY      URETEROSCOPIC REMOVAL OF URETERIC CALCULUS Left 2018    Procedure: REMOVAL, CALCULUS, URETER, URETEROSCOPIC, holmium laser lithotripsy, stone basket extraction, retrograde pyelogram, ureteral stent exchange;   Surgeon: Anaya Zamora MD;  Location: [facility];  Service: Urology;  Laterality: Left;       Family History   Problem Relation Age of Onset    Cervical cancer Mother     Cancer Mother 65        lung cancer - non smoker    Stroke Paternal Grandfather     Hypertension Maternal Grandfather     Heart disease Maternal Grandfather     Hypertension Father     Coronary artery disease Father 62    Heart disease Father     Diabetes Sister     Heart disease Sister     Kidney disease Sister     Breast cancer Daughter 36    Heart failure Sister         60s    Colon cancer Neg Hx     Ovarian cancer Neg Hx        Social History     Tobacco Use    Smoking status: Former Smoker     Quit date: 1995     Years since quittin.6    Smokeless tobacco: Never Used   Substance Use Topics    Alcohol use: Yes     Alcohol/week: 1.0 standard drinks     Types: 1 Glasses of wine per week     Comment: social    Drug use: No       Lab Results   Component Value Date    WBC 8.82 12/10/2018    HGB 13.3 12/10/2018    HCT 41.8 12/10/2018    MCV 94 12/10/2018     (H) 12/10/2018    CHOL 162 03/15/2018    TRIG 82 03/15/2018    HDL 75 03/15/2018    ALT 19 2018    AST 21 2018    BILITOT 0.4 2018    ALKPHOS 84 2018     12/10/2018    K 4.6 12/10/2018     12/10/2018    CREATININE 0.8 12/10/2018    ESTGFRAFRICA >60 12/10/2018    EGFRNONAA >60 12/10/2018    CALCIUM 10.2 12/10/2018    ALBUMIN 3.5 2018    BUN 14 12/10/2018    CO2 27 12/10/2018    TSH 1.759 03/15/2018    INR 1.0 2017    HGBA1C 5.5 03/15/2018    LDLCALC 70.6 03/15/2018     (H) 12/10/2018                 ROS:  GENERAL: No fever, chills, fatigability or weight loss.  SKIN: No rashes, no itching.  HEAD: No headaches.  EYES: No visual changes  EARS: No ear pain or changes in hearing.  NOSE: No congestion or rhinorrhea.  MOUTH & THROAT: No hoarseness, change in voice, or sore throat.  NODES: Denies swollen  glands.  CHEST: Denies CARD, cyanosis, wheezing, cough and sputum production.  CARDIOVASCULAR: Denies chest pain, PND, orthopnea.  ABDOMEN: No nausea, vomiting, or changes in bowel function.  URINARY: No flank pain, dysuria or hematuria.  PERIPHERAL VASCULAR: No claudication or cyanosis.  MUSCULOSKELETAL: No joint stiffness or swelling. Denies back pain.  NEUROLOGIC: No weakness or numbness.    Vital signs reviewed  PE:   APPEARANCE: Well nourished, well developed, in no acute distress.    HEAD: Normocephalic, atraumatic.  EYES: PERRL. EOMI.   Conjunctivae noninjected.  EARS: TM's intact. Light reflex normal. No retraction or perforation  NOSE: Mucosa pink. Airway clear.  MOUTH & THROAT: No tonsillar enlargement.  Mild pharyngeal erythema.  No exudate.  NECK: Supple with no cervical lymphadenopathy.    CHEST: Good inspiratory effort. Lungs clear to auscultation with no wheezes or crackles.  CARDIOVASCULAR: Normal S1, S2. No rubs, murmurs, or gallops.  ABDOMEN: Bowel sounds normal. Not distended. Soft. No tenderness or masses. No organomegaly.  EXTREMITIES: No edema, cyanosis, or clubbing.      IMPRESSION    1. Routine general medical examination at a health care facility    2. Severe obesity with body mass index (BMI) of 36.0 to 36.9 with serious comorbidity    3. Atherosclerosis of aorta    4. Tremor    5. Osteopenia, unspecified location    6. Bone disorder    7. Spondylosis, cervical    8. Spondylolysis, lumbosacral    9. Other screening mammogram    10. Other specified disorders of bone density and structure, other site     11. History of stroke    12. PUD (peptic ulcer disease)          PLAN  Orders Placed This Encounter   Procedures    Mammo Digital Screening Bilat    DXA Bone Density Spine And Hip    X-ray Arthritis Survey    CBC auto differential    Comprehensive metabolic panel    Lipid Panel    TSH    Sedimentation rate    C-reactive protein    Rheumatoid factor    CYCLIC CITRUL PEPTIDE ANTIBODY,  IGG     BP controlled.  Continue losartan    Stroke history:  Continue aspirin, statin.  Check labs    Arthritis:  Discussed likely osteoarthritis although will workup with labs and x-rays as above.  Discussed the importance of regular physical activity.  Have given her home exercise program for spine, shoulders, hips, knees.  Consider formal physical therapy again.  Advise regular exercise.  She takes Tylenol for pain, not taking NSAIDs because of stomach pain.  Discussed we can try the above home exercise program before needing rheumatology consult in case her symptoms do improve significantly and rheumatic workup is negative.    History of PUD.  No NSAIDs.  Given some GERD issues in scratchy throat symptoms, advise Pepcid regularly for the next few weeks, then p.r.n..  Notify for any worsening which needs further evaluation    Tremors:  Usually just in the morning after she has not eaten.  She sleeping a significant amount of time and not eating very much.  Advise regular meals to avoid any hypoglycemic symptoms potentially    Concerned about her excessive sleepiness and possible depression connection.  Will increase her Lexapro to 20 mg daily.  Advise increasing exercise.  Also would advise follow-up with her sleep medicine specialist again given her sleep apnea diagnosis.        SCREENINGS  Mammogram   01/20/2019, negative, reordered     GYN: Dr. Brooke LOV  01/22/2019     Colonoscopy 05/29/2018.  Descending and ascending colon area of granularity, biopsy done and was normal.  Diverticulosis sigmoid and descending colon.  Otherwise normal.  Repeat in 10 years     DEXA December 2017.  Lumbar spine T score is 0.0.  Right femoral neck T score is -1.0, reordered     Immunizations  Prevnar 2015  [id number]  Pvx: 2017  zvx utd

## 2020-08-04 ENCOUNTER — LAB VISIT (OUTPATIENT)
Dept: LAB | Facility: HOSPITAL | Age: 70
End: 2020-08-04
Attending: FAMILY MEDICINE
Payer: MEDICARE

## 2020-08-04 DIAGNOSIS — Z12.31 OTHER SCREENING MAMMOGRAM: ICD-10-CM

## 2020-08-04 DIAGNOSIS — M43.07 SPONDYLOLYSIS, LUMBOSACRAL: ICD-10-CM

## 2020-08-04 DIAGNOSIS — M85.80 OSTEOPENIA, UNSPECIFIED LOCATION: ICD-10-CM

## 2020-08-04 DIAGNOSIS — E66.01 SEVERE OBESITY WITH BODY MASS INDEX (BMI) OF 36.0 TO 36.9 WITH SERIOUS COMORBIDITY: ICD-10-CM

## 2020-08-04 DIAGNOSIS — M89.9 BONE DISORDER: ICD-10-CM

## 2020-08-04 DIAGNOSIS — M47.812 SPONDYLOSIS, CERVICAL: ICD-10-CM

## 2020-08-04 DIAGNOSIS — M85.88 OTHER SPECIFIED DISORDERS OF BONE DENSITY AND STRUCTURE, OTHER SITE: ICD-10-CM

## 2020-08-04 DIAGNOSIS — Z00.00 ROUTINE GENERAL MEDICAL EXAMINATION AT A HEALTH CARE FACILITY: ICD-10-CM

## 2020-08-04 DIAGNOSIS — I70.0 ATHEROSCLEROSIS OF AORTA: ICD-10-CM

## 2020-08-04 DIAGNOSIS — R25.1 TREMOR: ICD-10-CM

## 2020-08-04 LAB
ALBUMIN SERPL BCP-MCNC: 3.5 G/DL (ref 3.5–5.2)
ALP SERPL-CCNC: 63 U/L (ref 55–135)
ALT SERPL W/O P-5'-P-CCNC: 14 U/L (ref 10–44)
ANION GAP SERPL CALC-SCNC: 8 MMOL/L (ref 8–16)
AST SERPL-CCNC: 18 U/L (ref 10–40)
BASOPHILS # BLD AUTO: 0.06 K/UL (ref 0–0.2)
BASOPHILS NFR BLD: 0.8 % (ref 0–1.9)
BILIRUB SERPL-MCNC: 0.4 MG/DL (ref 0.1–1)
BUN SERPL-MCNC: 16 MG/DL (ref 8–23)
CALCIUM SERPL-MCNC: 9.3 MG/DL (ref 8.7–10.5)
CHLORIDE SERPL-SCNC: 106 MMOL/L (ref 95–110)
CHOLEST SERPL-MCNC: 188 MG/DL (ref 120–199)
CHOLEST/HDLC SERPL: 2.1 {RATIO} (ref 2–5)
CO2 SERPL-SCNC: 28 MMOL/L (ref 23–29)
CREAT SERPL-MCNC: 0.8 MG/DL (ref 0.5–1.4)
CRP SERPL-MCNC: 10.6 MG/L (ref 0–8.2)
DIFFERENTIAL METHOD: ABNORMAL
EOSINOPHIL # BLD AUTO: 0.3 K/UL (ref 0–0.5)
EOSINOPHIL NFR BLD: 3.4 % (ref 0–8)
ERYTHROCYTE [DISTWIDTH] IN BLOOD BY AUTOMATED COUNT: 12.2 % (ref 11.5–14.5)
ERYTHROCYTE [SEDIMENTATION RATE] IN BLOOD BY WESTERGREN METHOD: 37 MM/HR (ref 0–20)
EST. GFR  (AFRICAN AMERICAN): >60 ML/MIN/1.73 M^2
EST. GFR  (NON AFRICAN AMERICAN): >60 ML/MIN/1.73 M^2
GLUCOSE SERPL-MCNC: 113 MG/DL (ref 70–110)
HCT VFR BLD AUTO: 41.3 % (ref 37–48.5)
HDLC SERPL-MCNC: 88 MG/DL (ref 40–75)
HDLC SERPL: 46.8 % (ref 20–50)
HGB BLD-MCNC: 13.7 G/DL (ref 12–16)
IMM GRANULOCYTES # BLD AUTO: 0.01 K/UL (ref 0–0.04)
IMM GRANULOCYTES NFR BLD AUTO: 0.1 % (ref 0–0.5)
LDLC SERPL CALC-MCNC: 74.2 MG/DL (ref 63–159)
LYMPHOCYTES # BLD AUTO: 3 K/UL (ref 1–4.8)
LYMPHOCYTES NFR BLD: 40.4 % (ref 18–48)
MCH RBC QN AUTO: 31.3 PG (ref 27–31)
MCHC RBC AUTO-ENTMCNC: 33.2 G/DL (ref 32–36)
MCV RBC AUTO: 94 FL (ref 82–98)
MONOCYTES # BLD AUTO: 0.6 K/UL (ref 0.3–1)
MONOCYTES NFR BLD: 8 % (ref 4–15)
NEUTROPHILS # BLD AUTO: 3.5 K/UL (ref 1.8–7.7)
NEUTROPHILS NFR BLD: 47.3 % (ref 38–73)
NONHDLC SERPL-MCNC: 100 MG/DL
NRBC BLD-RTO: 0 /100 WBC
PLATELET # BLD AUTO: 239 K/UL (ref 150–350)
PMV BLD AUTO: 11.5 FL (ref 9.2–12.9)
POTASSIUM SERPL-SCNC: 4.3 MMOL/L (ref 3.5–5.1)
PROT SERPL-MCNC: 7.1 G/DL (ref 6–8.4)
RBC # BLD AUTO: 4.38 M/UL (ref 4–5.4)
SODIUM SERPL-SCNC: 142 MMOL/L (ref 136–145)
TRIGL SERPL-MCNC: 129 MG/DL (ref 30–150)
TSH SERPL DL<=0.005 MIU/L-ACNC: 1.73 UIU/ML (ref 0.4–4)
WBC # BLD AUTO: 7.37 K/UL (ref 3.9–12.7)

## 2020-08-04 PROCEDURE — 86431 RHEUMATOID FACTOR QUANT: CPT

## 2020-08-04 PROCEDURE — 80061 LIPID PANEL: CPT

## 2020-08-04 PROCEDURE — 86140 C-REACTIVE PROTEIN: CPT

## 2020-08-04 PROCEDURE — 84443 ASSAY THYROID STIM HORMONE: CPT

## 2020-08-04 PROCEDURE — 36415 COLL VENOUS BLD VENIPUNCTURE: CPT

## 2020-08-04 PROCEDURE — 85025 COMPLETE CBC W/AUTO DIFF WBC: CPT

## 2020-08-04 PROCEDURE — 86200 CCP ANTIBODY: CPT

## 2020-08-04 PROCEDURE — 85652 RBC SED RATE AUTOMATED: CPT

## 2020-08-04 PROCEDURE — 80053 COMPREHEN METABOLIC PANEL: CPT

## 2020-08-05 ENCOUNTER — PATIENT MESSAGE (OUTPATIENT)
Dept: FAMILY MEDICINE | Facility: CLINIC | Age: 70
End: 2020-08-05

## 2020-08-05 LAB
CCP AB SER IA-ACNC: <0.5 U/ML
RHEUMATOID FACT SERPL-ACNC: <10 IU/ML (ref 0–15)

## 2020-08-05 NOTE — TELEPHONE ENCOUNTER
Dear Tracy Armendariz     Your lab work was mostly normal.  Thyroid testing and  rheumatoid arthritis antibody testing was negative.  Your inflammation labs were elevated but these can be very nonspecific, in that it does not necessarily point to  a particular diagnosis.  These test could be elevated with bad wear and tear arthritis as in even other forms of arthritis.  blood counts were normal.  Your cholesterol was normal.  Your blood sugar test was just mildly elevated in the prediabetes range.  I think definitely working on a home exercise program would be very helpful to see if this gets a control of your multiple joint pains.  If this is not helping, we could try some formal physical therapy like we discussed in the clinic.  We could consider rheumatology consult if all the above is not helpful  Bartolo Jules MD

## 2020-08-13 ENCOUNTER — HOSPITAL ENCOUNTER (OUTPATIENT)
Dept: RADIOLOGY | Facility: HOSPITAL | Age: 70
Discharge: HOME | End: 2020-08-13
Attending: FAMILY MEDICINE
Payer: MEDICARE

## 2020-08-13 ENCOUNTER — HOSPITAL ENCOUNTER (OUTPATIENT)
Dept: RADIOLOGY | Facility: CLINIC | Age: 70
Discharge: HOME | End: 2020-08-13
Attending: FAMILY MEDICINE
Payer: MEDICARE

## 2020-08-13 DIAGNOSIS — M89.9 BONE DISORDER: ICD-10-CM

## 2020-08-13 DIAGNOSIS — M43.07 SPONDYLOLYSIS, LUMBOSACRAL: ICD-10-CM

## 2020-08-13 DIAGNOSIS — M47.812 SPONDYLOSIS, CERVICAL: ICD-10-CM

## 2020-08-13 DIAGNOSIS — E66.01 SEVERE OBESITY WITH BODY MASS INDEX (BMI) OF 36.0 TO 36.9 WITH SERIOUS COMORBIDITY: ICD-10-CM

## 2020-08-13 DIAGNOSIS — M85.88 OTHER SPECIFIED DISORDERS OF BONE DENSITY AND STRUCTURE, OTHER SITE: ICD-10-CM

## 2020-08-13 DIAGNOSIS — Z12.31 OTHER SCREENING MAMMOGRAM: ICD-10-CM

## 2020-08-13 DIAGNOSIS — M85.80 OSTEOPENIA, UNSPECIFIED LOCATION: ICD-10-CM

## 2020-08-13 DIAGNOSIS — Z00.00 ROUTINE GENERAL MEDICAL EXAMINATION AT A HEALTH CARE FACILITY: ICD-10-CM

## 2020-08-13 DIAGNOSIS — R25.1 TREMOR: ICD-10-CM

## 2020-08-13 DIAGNOSIS — I70.0 ATHEROSCLEROSIS OF AORTA: ICD-10-CM

## 2020-08-13 PROCEDURE — 77080 DXA BONE DENSITY AXIAL: CPT | Mod: 26,,, | Performed by: INTERNAL MEDICINE

## 2020-08-13 PROCEDURE — 77067 MAMMO DIGITAL SCREENING BILAT WITH TOMOSYNTHESIS_CAD: ICD-10-PCS | Mod: 26,,, | Performed by: RADIOLOGY

## 2020-08-13 PROCEDURE — 77067 SCR MAMMO BI INCL CAD: CPT | Mod: 26,,, | Performed by: RADIOLOGY

## 2020-08-13 PROCEDURE — 77063 MAMMO DIGITAL SCREENING BILAT WITH TOMOSYNTHESIS_CAD: ICD-10-PCS | Mod: 26,,, | Performed by: RADIOLOGY

## 2020-08-13 PROCEDURE — 77080 DEXA BONE DENSITY SPINE HIP: ICD-10-PCS | Mod: 26,,, | Performed by: INTERNAL MEDICINE

## 2020-08-13 PROCEDURE — 77063 BREAST TOMOSYNTHESIS BI: CPT | Mod: 26,,, | Performed by: RADIOLOGY

## 2020-08-13 PROCEDURE — 77067 SCR MAMMO BI INCL CAD: CPT | Mod: TC

## 2020-08-13 PROCEDURE — 77080 DXA BONE DENSITY AXIAL: CPT | Mod: TC

## 2020-08-19 ENCOUNTER — OFFICE VISIT (OUTPATIENT)
Dept: URGENT CARE | Facility: CLINIC | Age: 70
End: 2020-08-19
Payer: MEDICARE

## 2020-08-19 ENCOUNTER — PATIENT MESSAGE (OUTPATIENT)
Dept: FAMILY MEDICINE | Facility: CLINIC | Age: 70
End: 2020-08-19

## 2020-08-19 VITALS
OXYGEN SATURATION: 97 % | DIASTOLIC BLOOD PRESSURE: 60 MMHG | WEIGHT: 227 LBS | BODY MASS INDEX: 36.48 KG/M2 | HEART RATE: 82 BPM | RESPIRATION RATE: 18 BRPM | TEMPERATURE: 98 F | SYSTOLIC BLOOD PRESSURE: 142 MMHG | HEIGHT: 66 IN

## 2020-08-19 DIAGNOSIS — A49.9 BACTERIAL UTI: Primary | ICD-10-CM

## 2020-08-19 DIAGNOSIS — N39.0 BACTERIAL UTI: Primary | ICD-10-CM

## 2020-08-19 LAB
BILIRUB UR QL STRIP: NEGATIVE
GLUCOSE UR QL STRIP: NEGATIVE
KETONES UR QL STRIP: NEGATIVE
LEUKOCYTE ESTERASE UR QL STRIP: POSITIVE
PH, POC UA: 5 (ref 5–8)
POC BLOOD, URINE: NEGATIVE
POC NITRATES, URINE: NEGATIVE
PROT UR QL STRIP: POSITIVE
SP GR UR STRIP: 1.03 (ref 1–1.03)
UROBILINOGEN UR STRIP-ACNC: NORMAL (ref 0.1–1.1)

## 2020-08-19 PROCEDURE — 87086 URINE CULTURE/COLONY COUNT: CPT

## 2020-08-19 PROCEDURE — 99214 PR OFFICE/OUTPT VISIT, EST, LEVL IV, 30-39 MIN: ICD-10-PCS | Mod: 25,S$GLB,, | Performed by: PHYSICIAN ASSISTANT

## 2020-08-19 PROCEDURE — 99214 OFFICE O/P EST MOD 30 MIN: CPT | Mod: 25,S$GLB,, | Performed by: PHYSICIAN ASSISTANT

## 2020-08-19 PROCEDURE — 81003 URINALYSIS AUTO W/O SCOPE: CPT | Mod: QW,S$GLB,, | Performed by: PHYSICIAN ASSISTANT

## 2020-08-19 PROCEDURE — 81003 POCT URINALYSIS, DIPSTICK, AUTOMATED, W/O SCOPE: ICD-10-PCS | Mod: QW,S$GLB,, | Performed by: PHYSICIAN ASSISTANT

## 2020-08-19 RX ORDER — NITROFURANTOIN 25; 75 MG/1; MG/1
100 CAPSULE ORAL 2 TIMES DAILY
Qty: 10 CAPSULE | Refills: 0 | Status: SHIPPED | OUTPATIENT
Start: 2020-08-19 | End: 2020-08-24

## 2020-08-19 NOTE — PROGRESS NOTES
"Subjective:       Patient ID: Tracy Armendariz is a 69 y.o. female.    Vitals:  height is 5' 6" (1.676 m) and weight is 103 kg (227 lb). Her temperature is 98.4 °F (36.9 °C). Her blood pressure is 142/60 (abnormal) and her pulse is 82. Her respiration is 18 and oxygen saturation is 97%.     Chief Complaint: Urinary Tract Infection    69 year old female c/o dysuria, urgency, frequency, and nausea that started 2 weeks ago.     Urinary Tract Infection   This is a new problem. The current episode started 1 to 4 weeks ago. The problem occurs every urination. The problem has been unchanged. The quality of the pain is described as burning. The pain is at a severity of 9/10. The pain is severe. There has been no fever. She is not sexually active. There is no history of pyelonephritis. Associated symptoms include frequency, nausea and urgency. Pertinent negatives include no behavior changes, chills, discharge, flank pain, hematuria, hesitancy, possible pregnancy, sweats, vomiting, weight loss, bubble bath use, constipation, rash or withholding. She has tried nothing for the symptoms. Her past medical history is significant for hypertension, kidney stones and recurrent UTIs. There is no history of catheterization, diabetes insipidus, diabetes mellitus, genitourinary reflux, a single kidney, STD, urinary stasis or a urological procedure.       Constitution: Negative for chills, fatigue and fever.   HENT: Negative for congestion and sore throat.    Neck: Negative for painful lymph nodes.   Cardiovascular: Negative for chest pain and leg swelling.   Eyes: Negative for double vision and blurred vision.   Respiratory: Negative for cough and shortness of breath.    Gastrointestinal: Positive for abdominal pain and nausea. Negative for vomiting, constipation and diarrhea.   Genitourinary: Positive for dysuria, frequency and urgency. Negative for urine decreased, flank pain, hematuria, history of kidney stones, painful menstruation, " irregular menstruation, missed menses, heavy menstrual bleeding, ovarian cysts, genital trauma, vaginal pain, vaginal discharge, vaginal bleeding, vaginal odor, painful intercourse, genital sore, painful ejaculation and pelvic pain.   Musculoskeletal: Negative for joint pain, joint swelling, back pain, muscle cramps and muscle ache.   Skin: Negative for color change, pale, rash, lesion and bruising.   Allergic/Immunologic: Negative for seasonal allergies.   Neurological: Negative for dizziness, history of vertigo, light-headedness, passing out and headaches.   Hematologic/Lymphatic: Negative for swollen lymph nodes.   Psychiatric/Behavioral: Negative for nervous/anxious, sleep disturbance and depression. The patient is not nervous/anxious.        Objective:      Physical Exam   Constitutional: She is oriented to person, place, and time. She appears well-developed. She is cooperative.  Non-toxic appearance. She does not appear ill. No distress.   HENT:   Head: Normocephalic and atraumatic.   Ears:   Right Ear: External ear normal.   Left Ear: External ear normal.   Nose: Nose normal.   Mouth/Throat: Oropharynx is clear and moist.   Eyes: Conjunctivae, EOM and lids are normal. Right eye exhibits no discharge. Left eye exhibits no discharge. No scleral icterus.   Neck: Trachea normal, normal range of motion, full passive range of motion without pain and phonation normal. Neck supple.   Cardiovascular: Normal rate, regular rhythm and normal heart sounds. Exam reveals no gallop.   No murmur heard.  Pulmonary/Chest: Effort normal and breath sounds normal. No respiratory distress. She has no decreased breath sounds. She has no wheezes. She has no rhonchi. She has no rales.   Abdominal: Bowel sounds are normal. There is no abdominal tenderness. There is no left CVA tenderness and no right CVA tenderness.   Musculoskeletal:         General: No deformity.   Neurological: She is alert and oriented to person, place, and time.  Coordination normal.   Skin: Skin is warm, dry, intact, not diaphoretic and not pale. Psychiatric: Her speech is normal and behavior is normal. Judgment and thought content normal.   Nursing note and vitals reviewed.        Assessment:       1. Bacterial UTI        Office Visit on 08/19/2020   Component Date Value Ref Range Status    POC Blood, Urine 08/19/2020 Negative  Negative Final    POC Bilirubin, Urine 08/19/2020 Negative  Negative Final    POC Urobilinogen, Urine 08/19/2020 Normal  0.1 - 1.1 Final    POC Ketones, Urine 08/19/2020 Negative  Negative Final    POC Protein, Urine 08/19/2020 Positive* Negative Final    POC Nitrates, Urine 08/19/2020 Negative  Negative Final    POC Glucose, Urine 08/19/2020 Negative  Negative Final    pH, UA 08/19/2020 5.0  5 - 8 Final    POC Specific Gravity, Urine 08/19/2020 1.030* 1.003 - 1.029 Final    POC Leukocytes, Urine 08/19/2020 Positive* Negative Final     Plan:       Treating with macrobid based on last urine culture. Sending for culture.    Bacterial UTI  -     POCT Urinalysis, Dipstick, Automated, W/O Scope  -     nitrofurantoin, macrocrystal-monohydrate, (MACROBID) 100 MG capsule; Take 1 capsule (100 mg total) by mouth 2 (two) times daily. for 5 days  Dispense: 10 capsule; Refill: 0  -     Urine culture      Patient Instructions   -Please take antibiotic to completion.  -Rest and stay hydrated.  -We will call with urine culture results in the next few days.    Please follow up with your primary care provider within 2-5 days if your signs and symptoms have not resolved or worsen.     If your condition worsens or fails to improve we recommend that you receive another evaluation at the emergency room immediately or contact your primary medical clinic to discuss your concerns.   You must understand that you have received an Urgent Care treatment only and that you may be released before all of your medical problems are known or treated. You, the patient, will  "arrange for follow up care as instructed.         Bladder Infection, Female (Adult)    Urine is normally doesn't have any bacteria in it. But bacteria can get into the urinary tract from the skin around the rectum. Or they can travel in the blood from elsewhere in the body. Once they are in your urinary tract, they can cause infection in the urethra (urethritis), the bladder (cystitis), or the kidneys (pyelonephritis).  The most common place for an infection is in the bladder. This is called a bladder infection. This is one of the most common infections in women. Most bladder infections are easily treated. They are not serious unless the infection spreads to the kidney.  The phrases "bladder infection," "UTI," and "cystitis" are often used to describe the same thing. But they are not always the same. Cystitis is an inflammation of the bladder. The most common cause of cystitis is an infection.  Symptoms  The infection causes inflammation in the urethra and bladder. This causes many of the symptoms. The most common symptoms of a bladder infection are:  · Pain or burning when urinating  · Having to urinate more often than usual  · Urgent need to urinate  · Only a small amount of urine comes out  · Blood in urine  · Abdominal discomfort. This is usually in the lower abdomen above the pubic bone.  · Cloudy urine  · Strong- or bad-smelling urine  · Unable to urinate (urinary retention)  · Unable to hold urine in (urinary incontinence)  · Fever  · Loss of appetite  · Confusion (in older adults)  Causes  Bladder infections are not contagious. You can't get one from someone else, from a toilet seat, or from sharing a bath.  The most common cause of bladder infections is bacteria from the bowels. The bacteria get onto the skin around the opening of the urethra. From there, they can get into the urine and travel up to the bladder, causing inflammation and infection. This usually happens because of:  · Wiping improperly after " urinating. Always wipe from front to back.  · Bowel incontinence  · Pregnancy  · Procedures such as having a catheter inserted  · Older age  · Not emptying your bladder. This can allow bacteria a chance to grow in your urine.  · Dehydration  · Constipation  · Sex  · Use of a diaphragm for birth control   Treatment  Bladder infections are diagnosed by a urine test. They are treated with antibiotics and usually clear up quickly without complications. Treatment helps prevent a more serious kidney infection.  Medicines  Medicines can help in the treatment of a bladder infection:  · Take antibiotics until they are used up, even if you feel better. It is important to finish them to make sure the infection has cleared.  · You can use acetaminophen or ibuprofen for pain, fever, or discomfort, unless another medicine was prescribed. If you have chronic liver or kidney disease, talk with your healthcare provider before using these medicines. Also talk with your provider if you've ever had a stomach ulcer or gastrointestinal bleeding, or are taking blood-thinner medicines.  · If you are given phenazopydridine to reduce burning with urination, it will cause your urine to become a bright orange color. This can stain clothing.  Care and prevention  These self-care steps can help prevent future infections:  · Drink plenty of fluids to prevent dehydration and flush out your bladder. Do this unless you must restrict fluids for other health reasons, or your doctor told you not to.  · Proper cleaning after going to the bathroom is important. Wipe from front to back after using the toilet to prevent the spread of bacteria.  · Urinate more often. Don't try to hold urine in for a long time.  · Wear loose-fitting clothes and cotton underwear. Avoid tight-fitting pants.  · Improve your diet and prevent constipation. Eat more fresh fruit and vegetables, and fiber, and less junk and fatty foods.  · Avoid sex until your symptoms are  gone.  · Avoid caffeine, alcohol, and spicy foods. These can irritate your bladder.  · Urinate right after intercourse to flush out your bladder.  · If you use birth control pills and have frequent bladder infections, discuss it with your doctor.  Follow-up care  Call your healthcare provider if all symptoms are not gone after 3 days of treatment. This is especially important if you have repeat infections.  If a culture was done, you will be told if your treatment needs to be changed. If directed, you can call to find out the results.  If X-rays were done, you will be told if the results will affect your treatment.  Call 911  Call 911 if any of the following occur:  · Trouble breathing  · Hard to wake up or confusion  · Fainting or loss of consciousness  · Rapid heart rate  When to seek medical advice  Call your healthcare provider right away if any of these occur:  · Fever of 100.4ºF (38.0ºC) or higher, or as directed by your healthcare provider  · Symptoms are not better by the third day of treatment  · Back or belly (abdominal) pain that gets worse  · Repeated vomiting, or unable to keep medicine down  · Weakness or dizziness  · Vaginal discharge  · Pain, redness, or swelling in the outer vaginal area (labia)  Date Last Reviewed: 10/1/2016  © 6378-5068 [employer]. [address] [place] [address], [place] [address]. All rights reserved. This information is not intended as a substitute for professional medical care. Always follow your healthcare professional's instructions.

## 2020-08-19 NOTE — PATIENT INSTRUCTIONS
"-Please take antibiotic to completion.  -Rest and stay hydrated.  -We will call with urine culture results in the next few days.    Please follow up with your primary care provider within 2-5 days if your signs and symptoms have not resolved or worsen.     If your condition worsens or fails to improve we recommend that you receive another evaluation at the emergency room immediately or contact your primary medical clinic to discuss your concerns.   You must understand that you have received an Urgent Care treatment only and that you may be released before all of your medical problems are known or treated. You, the patient, will arrange for follow up care as instructed.         Bladder Infection, Female (Adult)    Urine is normally doesn't have any bacteria in it. But bacteria can get into the urinary tract from the skin around the rectum. Or they can travel in the blood from elsewhere in the body. Once they are in your urinary tract, they can cause infection in the urethra (urethritis), the bladder (cystitis), or the kidneys (pyelonephritis).  The most common place for an infection is in the bladder. This is called a bladder infection. This is one of the most common infections in women. Most bladder infections are easily treated. They are not serious unless the infection spreads to the kidney.  The phrases "bladder infection," "UTI," and "cystitis" are often used to describe the same thing. But they are not always the same. Cystitis is an inflammation of the bladder. The most common cause of cystitis is an infection.  Symptoms  The infection causes inflammation in the urethra and bladder. This causes many of the symptoms. The most common symptoms of a bladder infection are:  · Pain or burning when urinating  · Having to urinate more often than usual  · Urgent need to urinate  · Only a small amount of urine comes out  · Blood in urine  · Abdominal discomfort. This is usually in the lower abdomen above the pubic " bone.  · Cloudy urine  · Strong- or bad-smelling urine  · Unable to urinate (urinary retention)  · Unable to hold urine in (urinary incontinence)  · Fever  · Loss of appetite  · Confusion (in older adults)  Causes  Bladder infections are not contagious. You can't get one from someone else, from a toilet seat, or from sharing a bath.  The most common cause of bladder infections is bacteria from the bowels. The bacteria get onto the skin around the opening of the urethra. From there, they can get into the urine and travel up to the bladder, causing inflammation and infection. This usually happens because of:  · Wiping improperly after urinating. Always wipe from front to back.  · Bowel incontinence  · Pregnancy  · Procedures such as having a catheter inserted  · Older age  · Not emptying your bladder. This can allow bacteria a chance to grow in your urine.  · Dehydration  · Constipation  · Sex  · Use of a diaphragm for birth control   Treatment  Bladder infections are diagnosed by a urine test. They are treated with antibiotics and usually clear up quickly without complications. Treatment helps prevent a more serious kidney infection.  Medicines  Medicines can help in the treatment of a bladder infection:  · Take antibiotics until they are used up, even if you feel better. It is important to finish them to make sure the infection has cleared.  · You can use acetaminophen or ibuprofen for pain, fever, or discomfort, unless another medicine was prescribed. If you have chronic liver or kidney disease, talk with your healthcare provider before using these medicines. Also talk with your provider if you've ever had a stomach ulcer or gastrointestinal bleeding, or are taking blood-thinner medicines.  · If you are given phenazopydridine to reduce burning with urination, it will cause your urine to become a bright orange color. This can stain clothing.  Care and prevention  These self-care steps can help prevent future  infections:  · Drink plenty of fluids to prevent dehydration and flush out your bladder. Do this unless you must restrict fluids for other health reasons, or your doctor told you not to.  · Proper cleaning after going to the bathroom is important. Wipe from front to back after using the toilet to prevent the spread of bacteria.  · Urinate more often. Don't try to hold urine in for a long time.  · Wear loose-fitting clothes and cotton underwear. Avoid tight-fitting pants.  · Improve your diet and prevent constipation. Eat more fresh fruit and vegetables, and fiber, and less junk and fatty foods.  · Avoid sex until your symptoms are gone.  · Avoid caffeine, alcohol, and spicy foods. These can irritate your bladder.  · Urinate right after intercourse to flush out your bladder.  · If you use birth control pills and have frequent bladder infections, discuss it with your doctor.  Follow-up care  Call your healthcare provider if all symptoms are not gone after 3 days of treatment. This is especially important if you have repeat infections.  If a culture was done, you will be told if your treatment needs to be changed. If directed, you can call to find out the results.  If X-rays were done, you will be told if the results will affect your treatment.  Call 911  Call 911 if any of the following occur:  · Trouble breathing  · Hard to wake up or confusion  · Fainting or loss of consciousness  · Rapid heart rate  When to seek medical advice  Call your healthcare provider right away if any of these occur:  · Fever of 100.4ºF (38.0ºC) or higher, or as directed by your healthcare provider  · Symptoms are not better by the third day of treatment  · Back or belly (abdominal) pain that gets worse  · Repeated vomiting, or unable to keep medicine down  · Weakness or dizziness  · Vaginal discharge  · Pain, redness, or swelling in the outer vaginal area (labia)  Date Last Reviewed: 10/1/2016  © 0377-2518 [employer] [address]  [place] [address]. All rights reserved. This information is not intended as a substitute for professional medical care. Always follow your healthcare professional's instructions.

## 2020-08-19 NOTE — TELEPHONE ENCOUNTER
Dear Tracy Armendariz    Just a note that your bone density test was normal.    Bartolo Jules MD

## 2020-08-21 LAB — BACTERIA UR CULT: NO GROWTH

## 2020-08-25 DIAGNOSIS — N20.0 NEPHROLITHIASIS: Primary | ICD-10-CM

## 2020-08-25 DIAGNOSIS — R10.9 ABDOMINAL PAIN, UNSPECIFIED ABDOMINAL LOCATION: ICD-10-CM

## 2020-08-28 ENCOUNTER — HOSPITAL ENCOUNTER (OUTPATIENT)
Dept: RADIOLOGY | Facility: HOSPITAL | Age: 70
Discharge: HOME | End: 2020-08-28
Attending: STUDENT IN AN ORGANIZED HEALTH CARE EDUCATION/TRAINING PROGRAM
Payer: MEDICARE

## 2020-08-28 DIAGNOSIS — R10.9 ABDOMINAL PAIN, UNSPECIFIED ABDOMINAL LOCATION: ICD-10-CM

## 2020-08-28 DIAGNOSIS — N20.0 NEPHROLITHIASIS: ICD-10-CM

## 2020-08-28 PROCEDURE — 74176 CT ABD & PELVIS W/O CONTRAST: CPT | Mod: TC

## 2020-08-28 PROCEDURE — 74176 CT ABDOMEN PELVIS WITHOUT CONTRAST: ICD-10-PCS | Mod: 26,,, | Performed by: RADIOLOGY

## 2020-08-28 PROCEDURE — 74176 CT ABD & PELVIS W/O CONTRAST: CPT | Mod: 26,,, | Performed by: RADIOLOGY

## 2020-10-19 ENCOUNTER — HOSPITAL ENCOUNTER (OUTPATIENT)
Dept: RADIOLOGY | Facility: HOSPITAL | Age: 70
Discharge: HOME | End: 2020-10-19
Attending: PODIATRIST
Payer: MEDICARE

## 2020-10-19 ENCOUNTER — PATIENT OUTREACH (OUTPATIENT)
Dept: ADMINISTRATIVE | Facility: OTHER | Age: 70
End: 2020-10-19

## 2020-10-19 ENCOUNTER — OFFICE VISIT (OUTPATIENT)
Dept: PODIATRY | Facility: CLINIC | Age: 70
End: 2020-10-19
Payer: MEDICARE

## 2020-10-19 VITALS
HEIGHT: 66 IN | DIASTOLIC BLOOD PRESSURE: 72 MMHG | BODY MASS INDEX: 36.49 KG/M2 | HEART RATE: 66 BPM | SYSTOLIC BLOOD PRESSURE: 140 MMHG | WEIGHT: 227.06 LBS

## 2020-10-19 DIAGNOSIS — M79.671 BILATERAL FOOT PAIN: ICD-10-CM

## 2020-10-19 DIAGNOSIS — M79.672 BILATERAL FOOT PAIN: ICD-10-CM

## 2020-10-19 DIAGNOSIS — M76.70 PERONEAL TENDINITIS, UNSPECIFIED LATERALITY: Primary | ICD-10-CM

## 2020-10-19 DIAGNOSIS — M76.70 PERONEAL TENDINITIS, UNSPECIFIED LATERALITY: ICD-10-CM

## 2020-10-19 PROCEDURE — 3078F PR MOST RECENT DIASTOLIC BLOOD PRESSURE < 80 MM HG: ICD-10-PCS | Mod: CPTII,S$GLB,, | Performed by: PODIATRIST

## 2020-10-19 PROCEDURE — 73630 XR FOOT COMPLETE 3 VIEW BILATERAL: ICD-10-PCS | Mod: 26,50,, | Performed by: RADIOLOGY

## 2020-10-19 PROCEDURE — 3077F PR MOST RECENT SYSTOLIC BLOOD PRESSURE >= 140 MM HG: ICD-10-PCS | Mod: CPTII,S$GLB,, | Performed by: PODIATRIST

## 2020-10-19 PROCEDURE — 99999 PR PBB SHADOW E&M-EST. PATIENT-LVL III: CPT | Mod: PBBFAC,,, | Performed by: PODIATRIST

## 2020-10-19 PROCEDURE — 1159F MED LIST DOCD IN RCRD: CPT | Mod: S$GLB,,, | Performed by: PODIATRIST

## 2020-10-19 PROCEDURE — 3078F DIAST BP <80 MM HG: CPT | Mod: CPTII,S$GLB,, | Performed by: PODIATRIST

## 2020-10-19 PROCEDURE — 1125F AMNT PAIN NOTED PAIN PRSNT: CPT | Mod: S$GLB,,, | Performed by: PODIATRIST

## 2020-10-19 PROCEDURE — 3008F BODY MASS INDEX DOCD: CPT | Mod: CPTII,S$GLB,, | Performed by: PODIATRIST

## 2020-10-19 PROCEDURE — 99203 PR OFFICE/OUTPT VISIT, NEW, LEVL III, 30-44 MIN: ICD-10-PCS | Mod: S$GLB,,, | Performed by: PODIATRIST

## 2020-10-19 PROCEDURE — 1101F PT FALLS ASSESS-DOCD LE1/YR: CPT | Mod: CPTII,S$GLB,, | Performed by: PODIATRIST

## 2020-10-19 PROCEDURE — 3008F PR BODY MASS INDEX (BMI) DOCUMENTED: ICD-10-PCS | Mod: CPTII,S$GLB,, | Performed by: PODIATRIST

## 2020-10-19 PROCEDURE — 73630 X-RAY EXAM OF FOOT: CPT | Mod: TC,50,FY

## 2020-10-19 PROCEDURE — 1125F PR PAIN SEVERITY QUANTIFIED, PAIN PRESENT: ICD-10-PCS | Mod: S$GLB,,, | Performed by: PODIATRIST

## 2020-10-19 PROCEDURE — 1159F PR MEDICATION LIST DOCUMENTED IN MEDICAL RECORD: ICD-10-PCS | Mod: S$GLB,,, | Performed by: PODIATRIST

## 2020-10-19 PROCEDURE — 73630 X-RAY EXAM OF FOOT: CPT | Mod: 26,50,, | Performed by: RADIOLOGY

## 2020-10-19 PROCEDURE — 99999 PR PBB SHADOW E&M-EST. PATIENT-LVL III: ICD-10-PCS | Mod: PBBFAC,,, | Performed by: PODIATRIST

## 2020-10-19 PROCEDURE — 1101F PR PT FALLS ASSESS DOC 0-1 FALLS W/OUT INJ PAST YR: ICD-10-PCS | Mod: CPTII,S$GLB,, | Performed by: PODIATRIST

## 2020-10-19 PROCEDURE — 3077F SYST BP >= 140 MM HG: CPT | Mod: CPTII,S$GLB,, | Performed by: PODIATRIST

## 2020-10-19 PROCEDURE — 99203 OFFICE O/P NEW LOW 30 MIN: CPT | Mod: S$GLB,,, | Performed by: PODIATRIST

## 2020-10-19 NOTE — PROGRESS NOTES
Health Maintenance Due   Topic Date Due    Shingles Vaccine (2 of 3) 05/03/2016    Influenza Vaccine (1) 08/01/2020     Updates were requested from care everywhere.  Chart was reviewed for overdue Proactive [facility] Encounters (DELMIS) topics (CRS, Breast Cancer Screening, Eye exam)  Health Maintenance has been updated.  LINKS immunization registry triggered.  Immunizations were reconciled.

## 2020-10-19 NOTE — PROGRESS NOTES
"Subjective:      Patient ID: Tracy Armendariz is a 69 y.o. female.    Chief Complaint: Ankle Pain (bilateral)    Complains of moderate pain and to both ankles x3 weeks.  Relates that she traveled to [place] to visit family member and walked a lot.  By in the week she had difficulty walking and she had a lot swelling around her ankles.  Denies any trauma.  Pain is alleviated by rest.  She is wearing compression stockings and has changed her shoes which has helped somewhat.  She takes 1 leave the day and Tylenol arthritis.  History of total knee arthroplasty right knee per Dr. Kim.    Vitals:    10/19/20 1532   BP: (!) 140/72   Pulse: 66   Weight: 103 kg (227 lb 1.2 oz)   Height: 5' 6" (1.676 m)   PainSc:   9   PainLoc: Ankle      Past Medical History:   Diagnosis Date    Allergy     Anticoagulant long-term use     Arthritis     CVA (cerebral infarction)     Depression     Disorder of kidney and ureter     renal stones    Diverticulosis of colon     Extrinsic asthma, unspecified     Hyperlipidemia     Hypertension     Kidney stone     Left atrial enlargement 2014    Low back pain     MARTIN (obstructive sleep apnea)     Osteopenia     PUD (peptic ulcer disease)     Stroke  2013    Urinary tract infection        Past Surgical History:   Procedure Laterality Date    APPENDECTOMY      @ time of hysterectomy    BACK SURGERY      CATARACT EXTRACTION       SECTION      CHOLECYSTECTOMY      laparoscopic    COLONOSCOPY N/A 2018    Procedure: COLONOSCOPY/Golytely;  Surgeon: Janine Black MD;  Location: [facility];  Service: Endoscopy;  Laterality: N/A;    DILATION AND CURETTAGE OF UTERUS      HYSTERECTOMY      TAHUSO with appendectomy    INNER EAR SURGERY      replaced ear drum    JOINT REPLACEMENT Right     knee    KNEE ARTHROSCOPY W/ DEBRIDEMENT      LUMBAR DISCECTOMY      L4-L5    OOPHORECTOMY      unilateral    TONSILLECTOMY   "    TYMPANOPLASTY      URETEROSCOPIC REMOVAL OF URETERIC CALCULUS Left 2018    Procedure: REMOVAL, CALCULUS, URETER, URETEROSCOPIC, holmium laser lithotripsy, stone basket extraction, retrograde pyelogram, ureteral stent exchange;  Surgeon: Anaya Zamora MD;  Location: [facility];  Service: Urology;  Laterality: Left;       Family History   Problem Relation Age of Onset    Cervical cancer Mother     Cancer Mother 65        lung cancer - non smoker    Stroke Paternal Grandfather     Hypertension Maternal Grandfather     Heart disease Maternal Grandfather     Hypertension Father     Coronary artery disease Father 62    Heart disease Father     Diabetes Sister     Heart disease Sister     Kidney disease Sister     Breast cancer Daughter 36    Heart failure Sister         60s    Colon cancer Neg Hx     Ovarian cancer Neg Hx        Social History     Socioeconomic History    Marital status:      Spouse name: Not on file    Number of children: Not on file    Years of education: Not on file    Highest education level: Not on file   Occupational History    Not on file   Social Needs    Financial resource strain: Not on file    Food insecurity     Worry: Not on file     Inability: Not on file    Transportation needs     Medical: Not on file     Non-medical: Not on file   Tobacco Use    Smoking status: Former Smoker     Quit date: 1995     Years since quittin.8    Smokeless tobacco: Never Used   Substance and Sexual Activity    Alcohol use: Yes     Alcohol/week: 1.0 standard drinks     Types: 1 Glasses of wine per week     Comment: social    Drug use: No    Sexual activity: Yes     Partners: Male     Birth control/protection: Surgical     Comment:  since    Lifestyle    Physical activity     Days per week: Not on file     Minutes per session: Not on file    Stress: Not on file   Relationships    Social connections     Talks on phone: Not on file     Gets together:  Not on file     Attends [employer] service: Not on file     Active member of club or organization: Not on file     Attends meetings of clubs or organizations: Not on file     Relationship status: Not on file   Other Topics Concern    Not on file   Social History Narrative    Not on file       Current Outpatient Medications   Medication Sig Dispense Refill    albuterol (PROVENTIL/VENTOLIN HFA) 90 mcg/actuation inhaler INHALE 2 PUFFS EVERY 6 HOURS AS NEEDED FOR WHEEZING 18 g 0    albuterol (PROVENTIL/VENTOLIN HFA) 90 mcg/actuation inhaler INHALE 2 PUFFS EVERY 6 HOURS AS NEEDED FOR WHEEZING 18 g 0    albuterol (PROVENTIL/VENTOLIN HFA) 90 mcg/actuation inhaler INHALE 2 PUFFS EVERY 6 HOURS AS NEEDED FOR WHEEZING 8.5 g 3    aspirin 81 MG Chew Take 81 mg by mouth once daily.      atorvastatin (LIPITOR) 10 MG tablet TAKE 1 TABLET BY MOUTH DAILY 90 tablet 3    calcium carbonate (OS-SPENCER) 600 mg (1,500 mg) Tab Take 600 mg by mouth 2 (two) times daily with meals.      cholecalciferol, vitamin D3, 1,000 unit Chew Take by mouth.      clotrimazole-betamethasone 1-0.05% (LOTRISONE) cream Apply topically 2 (two) times daily. 45 g 2    escitalopram oxalate (LEXAPRO) 20 MG tablet Take 1 tablet (20 mg total) by mouth once daily. 90 tablet 3    famotidine (PEPCID) 10 MG tablet Take 10 mg by mouth 2 (two) times daily.      FLUAD 9134-0885, 65 YR UP,,PF, 45 mcg (15 mcg x 3)/0.5 mL Syrg       ketorolac (TORADOL) 10 mg tablet Take 1 tablet (10 mg total) by mouth every 6 (six) hours as needed for Pain. 20 tablet 0    LACTOBACILLUS ACIDOPHILUS (PROBIOTIC ORAL) Take by mouth.      losartan (COZAAR) 100 MG tablet TAKE 1 TABLET(100 MG) BY MOUTH EVERY DAY 90 tablet 3    MULTIVIT WITH CALCIUM,IRON,MIN (WOMEN'S DAILY MULTIVITAMIN ORAL) Take by mouth.      naproxen (NAPROSYN) 500 MG tablet Take 1 tablet (500 mg total) by mouth 2 (two) times daily with meals. 20 tablet 0    ondansetron (ZOFRAN-ODT) 4 MG TbDL Take 1 tablet (4 mg  total) by mouth every 8 (eight) hours as needed. 30 tablet 0    ondansetron (ZOFRAN-ODT) 8 MG TbDL Take 1 tablet (8 mg total) by mouth every 6 (six) hours as needed. 30 tablet 0    pantoprazole (PROTONIX) 40 MG tablet TAKE 1 TABLET(40 MG) BY MOUTH EVERY DAY 30 tablet 5    tamsulosin (FLOMAX) 0.4 mg Cap TAKE 1 CAPSULE(0.4 MG) BY MOUTH EVERY DAY 90 capsule 0    tiZANidine (ZANAFLEX) 2 MG tablet Take 1-2 tablets (2-4 mg total) by mouth nightly as needed. 60 tablet 2     No current facility-administered medications for this visit.        Review of patient's allergies indicates:   Allergen Reactions    Iodinated contrast media Hives and Rash    Gabapentin Hallucinations    Iodine Hives    Isothiazolinones Rash    Penicillins Rash         Review of Systems   Constitution: Negative for chills, fever and malaise/fatigue.   HENT: Negative for congestion and hearing loss.    Cardiovascular: Negative for chest pain, claudication and leg swelling.   Respiratory: Negative for cough and shortness of breath.    Skin: Negative for color change.   Musculoskeletal: Positive for joint pain and joint swelling. Negative for back pain, muscle cramps and muscle weakness.   Gastrointestinal: Negative for nausea and vomiting.   Neurological: Negative for numbness, paresthesias and weakness.   Psychiatric/Behavioral: Negative for altered mental status.           Objective:      Physical Exam  Constitutional:       General: She is not in acute distress.     Appearance: She is obese. She is not ill-appearing.   Cardiovascular:      Pulses:           Dorsalis pedis pulses are 1+ on the right side and 1+ on the left side.        Posterior tibial pulses are 2+ on the right side and 2+ on the left side.      Comments: Mild nonpitting lower extremity edema bilateral.  Musculoskeletal:      Comments: Localized pain on palpation overlying the sinus tarsi bilateral right greater than left.  Localized pain on palpation overlying the peroneal  tendons commencing posterior to the lateral malleolus and extending to the lateral heel bilateral.  Pain is aggravated by resisted eversion, eversion with plantar flexion right foot.  No pain range of motion left foot overlying the course of peroneal tendons.  Negative anterior drawer sign bilateral ankle.  No pain stress eversion or inversion of the ankle bilateral.  No pain with stress testing of the subtalar joint bilateral.    Mild cavus foot structure bilateral foot.   Skin:     General: Skin is warm.      Capillary Refill: Capillary refill takes less than 2 seconds.      Findings: No ecchymosis or erythema.      Nails: There is no clubbing.     Neurological:      Mental Status: She is alert and oriented to person, place, and time.      Sensory: Sensation is intact.      Motor: Motor function is intact.               Assessment:       Encounter Diagnoses   Name Primary?    Peroneal tendinitis, unspecified laterality Yes    Bilateral foot pain          Plan:       Tracy was seen today for ankle pain.    Diagnoses and all orders for this visit:    Peroneal tendinitis, unspecified laterality  -     X-Ray Foot Complete 3 view Bilateral; Future  -     Ambulatory referral/consult to Physical/Occupational Therapy; Future    Bilateral foot pain  -     X-Ray Foot Complete 3 view Bilateral; Future  -     Ambulatory referral/consult to Physical/Occupational Therapy; Future      I counseled the patient on her conditions, their implications and medical management.    From a clinical perspective patient has significant peroneal tendonitis secondary to overuse.  She has some mild irritation over the sinus tarsi as well.  We discussed rest, ice and elevation p.r.n..  Prescribed topical compound pain cream to be massage affected area up to 4 times daily.  Referred to physical therapy for modalities to help reduce inflammation and gradually increase the strength and range of motion to lower extremity bilateral.    Also  recommend shoes are more support.  Her current shoes lack significant shank support.  Recommended [employer] tennis shoes.    RTC 6-8 weeks or p.r.n. as discussed.    A portion of this note was generated by voice recognition software and may contain spelling and grammar errors.      .

## 2020-10-19 NOTE — PATIENT INSTRUCTIONS
Consider [employer] tennis shoes such as [employer]i found at [employer].    Prescribed compound analgesic cream from [facility] to be applied as needed.

## 2020-11-05 ENCOUNTER — CLINICAL SUPPORT (OUTPATIENT)
Dept: REHABILITATION | Facility: HOSPITAL | Age: 70
End: 2020-11-05
Attending: PODIATRIST
Payer: MEDICARE

## 2020-11-05 DIAGNOSIS — M79.672 BILATERAL FOOT PAIN: ICD-10-CM

## 2020-11-05 DIAGNOSIS — M25.572 ACUTE BILATERAL ANKLE PAIN: ICD-10-CM

## 2020-11-05 DIAGNOSIS — M79.671 BILATERAL FOOT PAIN: ICD-10-CM

## 2020-11-05 DIAGNOSIS — M25.571 ACUTE BILATERAL ANKLE PAIN: ICD-10-CM

## 2020-11-05 DIAGNOSIS — Z74.09 IMPAIRED FUNCTIONAL MOBILITY AND ACTIVITY TOLERANCE: ICD-10-CM

## 2020-11-05 DIAGNOSIS — M76.70 PERONEAL TENDINITIS, UNSPECIFIED LATERALITY: ICD-10-CM

## 2020-11-05 PROCEDURE — 97110 THERAPEUTIC EXERCISES: CPT | Mod: PN

## 2020-11-05 PROCEDURE — 97162 PT EVAL MOD COMPLEX 30 MIN: CPT | Mod: PN

## 2020-11-05 NOTE — PLAN OF CARE
"[facility]  Physical Therapy Initial Evaluation    Date: 11/5/2020   Name: Tracy Armendariz  Clinic Number: [id number]    Therapy Diagnosis:   Encounter Diagnoses   Name Primary?    Peroneal tendinitis, unspecified laterality     Bilateral foot pain     Acute bilateral ankle pain     Impaired functional mobility and activity tolerance      Physician: Cezar Nice DPM    Physician Orders: PT Eval and Treat ("Consider astym, e-stim, iontophoresis")  Medical Diagnosis from Referral:   M76.70 (ICD-10-CM) - Peroneal tendinitis, unspecified laterality   M79.671,M79.672 (ICD-10-CM) - Bilateral foot pain   Evaluation Date: 11/5/2020  Authorization Period Expiration: 01/05/2021  Plan of Care Expiration: 1/1/2021  Visit # / Visits authorized: 1/ 12    Time In: 1:45PM  Time Out: 2:30PM  Total Appointment Time (timed & untimed codes): 45 minutes (1 mod eval; 1 TE)    Precautions: Standard, blood thinners and chronic CVA    Subjective   Date of onset: September 2020  History of current condition - Tracy reports: Went on a 10-day vacation to [place] and walked more than she was used to. She felt pain on the last day of vacation on the lateral/plantar side of both feet (R>L). Pain was so bad that she used wheelchair on final day. Since returning from vacation, she was still feeling pain even with rest. Dr. Nice prescribed topical anesthetic and recommended [place] tennis shoes, both of which have given her a large amount of relief. She is still feeling moderate levels of foot/ankle pain however.     Medical History:   Past Medical History:   Diagnosis Date    Allergy     Anticoagulant long-term use     Arthritis     CVA (cerebral infarction) 2013    Depression     Disorder of kidney and ureter     renal stones    Diverticulosis of colon     Extrinsic asthma, unspecified     Hyperlipidemia     Hypertension     Kidney stone     Left atrial enlargement 12/16/2014    Low back pain  " "   MARTIN (obstructive sleep apnea)     Osteopenia     PUD (peptic ulcer disease)     Stroke  2013    Urinary tract infection        Surgical History:   Tracy Armendariz  has a past surgical history that includes Inner ear surgery; Cholecystectomy ();  section (); Dilation and curettage of uterus (); Appendectomy (); Hysterectomy (); Tympanoplasty; Lumbar discectomy (); Knee arthroscopy w/ debridement (); Tonsillectomy; Back surgery; Cataract extraction; Colonoscopy (N/A, 2018); Joint replacement (Right); Ureteroscopic removal of ureteric calculus (Left, 2018); and Oophorectomy.    Medications:   Tracy has a current medication list which includes the following prescription(s): albuterol, albuterol, albuterol, aspirin, atorvastatin, calcium carbonate, cholecalciferol (vitamin d3), clotrimazole-betamethasone 1-0.05%, escitalopram oxalate, famotidine, fluad [id number] (65 yr up)(pf), ketorolac, lactobacillus acidophilus, losartan, multivit with calcium,iron,min, naproxen, ondansetron, ondansetron, pantoprazole, tamsulosin, and tizanidine.    Allergies:   Review of patient's allergies indicates:   Allergen Reactions    Iodinated contrast media Hives and Rash    Gabapentin Hallucinations    Iodine Hives    Isothiazolinones Rash    Penicillins Rash      Imaging, X-Ray Foot Bilateral (10/19/2020):   1. Mild osteoarthritis.  2. Calcaneal spurring.    Prior Therapy: At this location post-TKA and post-CVA  Social History: Lives with   Occupation: Retired  Prior Level of Function: Independent  Current Level of Function: Independent but with B ankle/foot pain    Pain:  Current 4/10, worst 4/10, best 4/10   Location: bilateral feet/ankles  Description: Aching  Aggravating Factors: Standing  Easing Factors: Topical medication; supportive tennis shoes    Patients goals: "I want to be able to stand and walk for long periods of time without pain"    Objective " "    Sensation: Pt endorses occasional paresthesia on plantar surface of B feet  Palpation: TTP about B peroneal tendons and 5th rays of B feet (R>L); pt also endorses some pain grossly about dorsal surfaces of B feet  Joint mobility: Hypomobile AP/PA talocrural glides and ML/LM subtalar glides bilaterally; normal mid-foot joint mobility bilaterally     A/PROM and MMT  ! = pain with testing  NT = Not tested    Hip  Right   Left     AROM PROM MMT AROM PROM MMT   Flexion WFL WFL 3+/5 WFL WFL 3+/5   Extension WFL WFL 3+/5 WFL WFL 3+/5   Abduction WFL WFL 4-/5 WFL WFL 4-/5      Knee  Right   Left     AROM PROM MMT AROM PROM MMT   Flexion (140 deg) WFL WFL 4/5 WFL WFL 4/5   Extension (0-5 deg) WFL WFL 5/5 WFL WFL 5/5     Ankle  Right   Left     AROM PROM MMT AROM PROM MMT   Plantarflexion (50 deg 50 55 4/5! 51 55 4/5!   Dorsiflexion (20 deg) 8 10 4/5 5 10 4/5   Inversion (20-30 deg) NT NT 4+/5 NT NT 4+/5   Eversion (5-10 deg) NT NT 3+/5! NT NT 3+/5!     Anterior Drawer Test = Negative B  Ramirez test = Negative B  Talar Tilit Test = Negative B    Figure 8 edema measurement:  55 cm on R  54.5 on L    CMS Impairment/Limitation/Restriction for FOTO ANKLE Survey    Therapist reviewed FOTO scores for Tracy Armendariz on 11/5/2020.   FOTO documents entered into [employer] - see Media section.    Limitation Score: 71%  Category: Mobility  Predicted: 48%     TREATMENT   Treatment Time In: 2:20PM  Treatment Time Out: 2:30PM  Total Treatment time separate from Evaluation: 10 minutes    Tracy received therapeutic exercises to develop strength, endurance, ROM, flexibility and posture for 10 minutes including:    -- 4-way ankle (instruction and performance x10 each direction B) YTB  -- Gastroc stretch (instruction and performance x30" B)    Other treatment suggestions for follow-up:  -- Proximal strengthening (bridges, clamshells, SLR, etc.)  -- Manual therapy (IASTM and ankle joint mobs)  -- Seated/supine ankle and foot exercise "     Home Exercises and Patient Education Provided:  Education provided:   - PT plan of care  - HEP instruction    Written Home Exercises Provided: yes.  Exercises were reviewed and Tracy was able to demonstrate them prior to the end of the session.  Tracy demonstrated good  understanding of the education provided.     See EMR under Patient Instructions for exercises provided 11/5/2020.    Assessment   Tracy is a 69 y.o. female referred to outpatient Physical Therapy with medical diagnoses of (1) Peroneal tendinitis, unspecified laterality and (2) Bilateral foot pain. Pt currently presents with B lateral ankle/foot pain, pain exacerbation with resisted ankle eversion/plantarflexion, decreased ankle arthrokinematics and osteokinematics, mild ankle edema, reduced BLE strength (particularly proximally), pain in weightbearing positions, and reduced overall functional mobility due to her impairments. Clinical presentation is consistent with medical diagnosis. Pt would benefit from skilled PT consisting of gait training, muscular skeletal stretching/strengthening, manual therapy, neuro muscular re-education, and modalities prn to address limitations and increase functional mobility.    Pt prognosis is Good.   Pt will benefit from skilled outpatient Physical Therapy to address the deficits stated above and in the chart below, provide pt/family education, and to maximize pt's level of independence.     Plan of care discussed with patient: Yes  Pt's spiritual, cultural and educational needs considered and patient is agreeable to the plan of care and goals as stated below:     Anticipated Barriers for therapy: Co-morbidities     Medical Necessity is demonstrated by the following  History  Co-morbidities and personal factors that may impact the plan of care Co-morbidities:   Allergy   Anticoagulant long-term use   Arthritis   CVA (cerebral infarction)   Depression   Disorder of kidney and ureter   renal stones    Diverticulosis of colon   Extrinsic asthma, unspecified   Hyperlipidemia   Hypertension   Kidney stone   Left atrial enlargement   Low back pain   MARTIN (obstructive sleep apnea)   Osteopenia   PUD (peptic ulcer disease)   Stroke   Urinary tract infection     Personal Factors:   no deficits     high   Examination  Body Structures and Functions, activity limitations and participation restrictions that may impact the plan of care Body Regions:   lower extremities    Body Systems:    gross symmetry  ROM  strength  gross coordinated movement  balance  gait  transfers  transitions  motor control  edema    Participation Restrictions:   Reduced quality of life due to pain    Activity limitations:   Learning and applying knowledge  no deficits    General Tasks and Commands  no deficits    Communication  no deficits    Mobility  walking    Self care  no deficits    Domestic Life  shopping  cooking  doing house work (cleaning house, washing dishes, laundry)    Interactions/Relationships  no deficits    Life Areas  no deficits    Community and Social Life  no deficits         high   Clinical Presentation evolving clinical presentation with changing clinical characteristics moderate   Decision Making/ Complexity Score: moderate     GOALS:     Short Term Goals:  4 weeks  1. Pt will report decreased B ankle pain </= 2/10 to increase tolerance for ADLs.  2. Pt be compliant with HEP to improve ROM, strength, and independence with ADLs.    Long Term Goals: 8 weeks  1. Pt will increase B ankle AROM by 5 degrees in order to walk with minimal to no compensation.   2. Pt will report decreased B ankle pain 0/10 to increase tolerance for increased QoL and improved ADLs.  3. Pt will increase strength to >/= 4/5 for BLE to increase tolerance for ADL and work activities.  4. Pt will report at </= 48% impaired on ANKLE FOTO score to demonstrate increased functional mobility.   5. Pt will be able to ambulate community distances and negotiate  stairs with minimal ankle pain for increased functional mobility and QoL.    Plan   Plan of care Certification: 11/5/2020 to 1/1/2021.    Outpatient Physical Therapy 2 times weekly for 8 weeks to include the following interventions: Electrical Stimulation IFC/Russian, Gait Training, Manual Therapy, Moist Heat/ Ice, Neuromuscular Re-ed, Orthotic Management and Training, Patient Education, Self Care, Therapeutic Activities, Therapeutic Exercise, Ultrasound, Modalities PRN, Dry Needling PRN.     MIGUEL BRICEÑO, PT, DPT

## 2020-11-08 PROBLEM — M25.571 ACUTE BILATERAL ANKLE PAIN: Status: ACTIVE | Noted: 2020-11-08

## 2020-11-08 PROBLEM — Z74.09 IMPAIRED FUNCTIONAL MOBILITY AND ACTIVITY TOLERANCE: Status: ACTIVE | Noted: 2020-11-08

## 2020-11-08 PROBLEM — M25.572 ACUTE BILATERAL ANKLE PAIN: Status: ACTIVE | Noted: 2020-11-08

## 2020-11-10 ENCOUNTER — CLINICAL SUPPORT (OUTPATIENT)
Dept: REHABILITATION | Facility: HOSPITAL | Age: 70
End: 2020-11-10
Attending: PODIATRIST
Payer: MEDICARE

## 2020-11-10 ENCOUNTER — PATIENT MESSAGE (OUTPATIENT)
Dept: PODIATRY | Facility: CLINIC | Age: 70
End: 2020-11-10

## 2020-11-10 DIAGNOSIS — M25.571 ACUTE BILATERAL ANKLE PAIN: ICD-10-CM

## 2020-11-10 DIAGNOSIS — M25.572 ACUTE BILATERAL ANKLE PAIN: ICD-10-CM

## 2020-11-10 DIAGNOSIS — Z74.09 IMPAIRED FUNCTIONAL MOBILITY AND ACTIVITY TOLERANCE: ICD-10-CM

## 2020-11-10 PROCEDURE — 97140 MANUAL THERAPY 1/> REGIONS: CPT | Mod: PN,CQ

## 2020-11-10 PROCEDURE — 97110 THERAPEUTIC EXERCISES: CPT | Mod: PN,CQ

## 2020-11-10 NOTE — PROGRESS NOTES
"  Physical Therapy Treatment Note     Name: Tracy Armendariz  Clinic Number: [id number]    Therapy Diagnosis:        Encounter Diagnoses   Name Primary?    Peroneal tendinitis, unspecified laterality      Bilateral foot pain      Acute bilateral ankle pain      Impaired functional mobility and activity tolerance          Time In: 3:30  Time Out: 4;15  Total Billable Time: 30 minutes    Precautions: Standard, blood thinners and chronic CVA    Subjective     Pt reports:  No pain since last week excepting yesterday experienced mild foot pain. By I large improved since doing HEP and using medicated topical  She was compliant with home exercise program.  Response to previous treatment:  Produced some knee pain R>L  Functional change:     Pain: 0/10  Location: B Feet    Objective     Tracy received therapeutic exercises to develop strength, endurance, ROM, flexibility, posture and core stabilization for  30 minutes including:  -- 4-way ankle (instruction and performance 2x10 each direction B) RTB  -- Gastroc stretch 3x30" B on fitter  -- Bridges, 2x10 w/3 sec holds (GTB around knees)  -- Straight leg raise 2x10 B  -- Hip Abd 2x10 GTB  -- Manual therapy (IASTM and ankle joint mobs)  -- Seated/supine ankle and foot exercise     Tracy received the following manual therapy techniques: Manual traction, Myofacial release, Soft tissue Mobilization and Friction Massage were applied to the: B ankle mm for 15 minutes, including:  -- Manual therapy (IASTM Hawk  and ankle joint mobs)  -- Ankle mobs talocrural Grade I-IV        Home Exercises Provided and Patient Education Provided     Education provided:   - HEP review    Written Home Exercises Provided: Patient instructed to cont prior HEP.  Exercises were reviewed and Tracy was able to demonstrate them prior to the end of the session.  Tracy demonstrated good  understanding of the education provided.     See EMR under Patient Instructions for exercises provided prior " visit.    Assessment   rTacy tolerated first follow up well. Interventions per log     Tracy is progressing well towards her goals.   Pt prognosis is Good      Pt will continue to benefit from skilled outpatient physical therapy to address the deficits listed in the problem list box on initial evaluation, provide pt/family education and to maximize pt's level of independence in the home and community environment.     Pt's spiritual, cultural and educational needs considered and pt agreeable to plan of care and goals.     Anticipated barriers to physical therapy: Co-morbidities    Goals:   Short Term Goals:  4 weeks  1. Pt will report decreased B ankle pain </= 2/10 to increase tolerance for ADLs.  2. Pt be compliant with HEP to improve ROM, strength, and independence with ADLs.     Long Term Goals: 8 weeks  1. Pt will increase B ankle AROM by 5 degrees in order to walk with minimal to no compensation.   2. Pt will report decreased B ankle pain 0/10 to increase tolerance for increased QoL and improved ADLs.  3. Pt will increase strength to >/= 4/5 for BLE to increase tolerance for ADL and work activities.  4. Pt will report at </= 48% impaired on ANKLE FOTO score to demonstrate increased functional mobility.   5. Pt will be able to ambulate community distances and negotiate stairs with minimal ankle pain for increased functional mobility and QoL.  Plan     Continue PT POC    Devan Esqueda, PTA

## 2020-11-11 RX ORDER — LIDOCAINE AND PRILOCAINE 25; 25 MG/G; MG/G
CREAM TOPICAL
Qty: 30 G | Refills: 5 | Status: SHIPPED | OUTPATIENT
Start: 2020-11-11 | End: 2022-02-18

## 2020-11-11 RX ORDER — DICLOFENAC SODIUM 10 MG/G
2 GEL TOPICAL DAILY
Qty: 100 G | Refills: 5 | Status: SHIPPED | OUTPATIENT
Start: 2020-11-11 | End: 2022-02-18

## 2020-11-12 ENCOUNTER — CLINICAL SUPPORT (OUTPATIENT)
Dept: REHABILITATION | Facility: HOSPITAL | Age: 70
End: 2020-11-12
Attending: PODIATRIST
Payer: MEDICARE

## 2020-11-12 DIAGNOSIS — M25.572 ACUTE BILATERAL ANKLE PAIN: ICD-10-CM

## 2020-11-12 DIAGNOSIS — M25.571 ACUTE BILATERAL ANKLE PAIN: ICD-10-CM

## 2020-11-12 DIAGNOSIS — Z74.09 IMPAIRED FUNCTIONAL MOBILITY AND ACTIVITY TOLERANCE: ICD-10-CM

## 2020-11-12 PROCEDURE — 97140 MANUAL THERAPY 1/> REGIONS: CPT | Mod: PN,CQ

## 2020-11-12 PROCEDURE — 97110 THERAPEUTIC EXERCISES: CPT | Mod: PN,CQ

## 2020-11-12 NOTE — PROGRESS NOTES
"  Physical Therapy Treatment Note     Name: Tracy Armendariz  Clinic Number: [id number]    Therapy Diagnosis:        Encounter Diagnoses   Name Primary?    Peroneal tendinitis, unspecified laterality      Bilateral foot pain      Acute bilateral ankle pain      Impaired functional mobility and activity tolerance          Time In: 3:30  Time Out: 4;15  Total Billable Time: 45 minutes (2 TE, 1 MT)    Precautions: Standard, blood thinners and chronic CVA    Subjective     Pt reports:  "They hurt today" pt agreeable to PT session   She was compliant with home exercise program.  Response to previous treatment:  Some soreness in B foot  Functional change: none stated by pt.     Pain: 4/10  Location: B Feet    Objective     Tracy received therapeutic exercises to develop strength, endurance, ROM, flexibility, posture and core stabilization for  30 minutes including:    -- 4-way ankle (instruction and performance 2x10 each direction B) RTB  -- Gastroc stretch 3x30" B on fitter  -- Bridges, 2x10 w/3 sec holds (GTB around knees)  -- Straight leg raise 2x10 B  -- Hip Abd 2x10 GTB  -- Seated/supine ankle and foot exercise     Tracy received the following manual therapy techniques: Manual traction, Myofacial release, Soft tissue Mobilization and Friction Massage were applied to the: B ankle mm for 15 minutes, including:  -- Manual therapy (IASTM Hawk  and ankle joint mobs)  -- Ankle mobs talocrural Grade I  -- STM to lateral / medial malleolar musculature  -- achilles tendon play      Home Exercises Provided and Patient Education Provided     Education provided:   - HEP review    Written Home Exercises Provided: Patient instructed to cont prior HEP.  Exercises were reviewed and Tracy was able to demonstrate them prior to the end of the session.  Tracy demonstrated good  understanding of the education provided.     See EMR under Patient Instructions for exercises provided prior visit.    Assessment   Tracy " completed all therex with reports of no change in pain at end of session. She did not report any increased pain. Some verbal instructions provided on technique with stretching (runners stretch), overall pt responded to session with no adverse reactions.     Tracy is progressing well towards her goals.   Pt prognosis is Good      Pt will continue to benefit from skilled outpatient physical therapy to address the deficits listed in the problem list box on initial evaluation, provide pt/family education and to maximize pt's level of independence in the home and community environment.     Pt's spiritual, cultural and educational needs considered and pt agreeable to plan of care and goals.     Anticipated barriers to physical therapy: Co-morbidities    Goals:   Short Term Goals:  4 weeks  1. Pt will report decreased B ankle pain </= 2/10 to increase tolerance for ADLs.  2. Pt be compliant with HEP to improve ROM, strength, and independence with ADLs.     Long Term Goals: 8 weeks  1. Pt will increase B ankle AROM by 5 degrees in order to walk with minimal to no compensation.   2. Pt will report decreased B ankle pain 0/10 to increase tolerance for increased QoL and improved ADLs.  3. Pt will increase strength to >/= 4/5 for BLE to increase tolerance for ADL and work activities.  4. Pt will report at </= 48% impaired on ANKLE FOTO score to demonstrate increased functional mobility.   5. Pt will be able to ambulate community distances and negotiate stairs with minimal ankle pain for increased functional mobility and QoL.  Plan     Continue PT POC  Monitor response to session  Gary Crandall PTA

## 2020-11-17 ENCOUNTER — CLINICAL SUPPORT (OUTPATIENT)
Dept: REHABILITATION | Facility: HOSPITAL | Age: 70
End: 2020-11-17
Attending: PODIATRIST
Payer: MEDICARE

## 2020-11-17 DIAGNOSIS — Z74.09 IMPAIRED FUNCTIONAL MOBILITY AND ACTIVITY TOLERANCE: ICD-10-CM

## 2020-11-17 DIAGNOSIS — M25.572 ACUTE BILATERAL ANKLE PAIN: ICD-10-CM

## 2020-11-17 DIAGNOSIS — M25.571 ACUTE BILATERAL ANKLE PAIN: ICD-10-CM

## 2020-11-17 PROCEDURE — 97110 THERAPEUTIC EXERCISES: CPT | Mod: PN

## 2020-11-17 PROCEDURE — 97140 MANUAL THERAPY 1/> REGIONS: CPT | Mod: PN

## 2020-11-17 NOTE — PROGRESS NOTES
"  Physical Therapy Treatment Note     Name: Tracy Armendariz  Clinic Number: [id number]    Therapy Diagnosis:   Encounter Diagnoses   Name Primary?    Acute bilateral ankle pain     Impaired functional mobility and activity tolerance      Physician: Cezar Nice DPM    Visit Date: 11/17/2020    Physician Orders: PT Eval and Treat ("Consider astym, e-stim, iontophoresis")  Medical Diagnosis:   M76.70 (ICD-10-CM) - Peroneal tendinitis, unspecified laterality   M79.671,M79.672 (ICD-10-CM) - Bilateral foot pain   Evaluation Date: 11/5/2020  Authorization Period Expiration: 01/05/2021  Plan of Care Certification Period: 11/5/2020 to 1/1/2021  Visit #/Visits authorized: 4/ 12   FOTO: 4/5 NEXT    Time In: 4:15PM  Time Out: 5:00PM  Total Billable Time: 45 minutes (2 TE, 1 MT)    Precautions: Standard, blood thinners and chronic CVA    Subjective     Pt reports: Increased foot pain today which she partially attributes for standing for a long period of time at [employer] yesterday. She says that exercises at home are gradually getting easier to complete.  She was compliant with home exercise program.  Response to previous treatment: No adverse response  Functional change: None reported     Pain: 6/10 (onset); 4/10 (end)  Location: B Feet    Objective     Tracy received therapeutic exercises to develop strength, endurance, ROM, flexibility, posture and core stabilization for 35 minutes including:    -- B ankle plantarflexion with RTB x30  -- B ankle eversion with RTB x30  -- Gastroc stretch 3x30" B on fitter  -- Bridges, 2x10 w/3 sec holds (held today due to HS cramping)  -- Straight leg raise 2x15 B  -- Hamstring stretch with strap 2x30"B  -- SL clamshells YTB 2x10B  -- Heel-toe raises seated 2x15   -- Towel crunches 2x60"    Tracy received the following manual therapy techniques: Manual traction, Myofacial release, Soft tissue Mobilization and Friction Massage were applied to the: B ankle for 10 minutes, including:  -- " Ankle mobs (subtalar and inter-metatarsal Grade II)  -- IASTM to lateral malleolar musculature    Home Exercises Provided and Patient Education Provided     Education provided:   - HEP review    Written Home Exercises Provided: Patient instructed to cont prior HEP.  Exercises were reviewed and Tracy was able to demonstrate them prior to the end of the session.  Tracy demonstrated good  understanding of the education provided.     See EMR under Patient Instructions for exercises provided prior visit.    Assessment   Tracy completed most exercises today without increase in symptoms; moderate hamstring cramping reported with bridging, even with verbal cues, so these exercises held today. Pt experienced positive response to manual therapy today with slight decrease in ankle pain following intervention. She would continue to benefit from PT services to progress toward goals.    Tracy is progressing well towards her goals.   Pt prognosis is Good      Pt will continue to benefit from skilled outpatient physical therapy to address the deficits listed in the problem list box on initial evaluation, provide pt/family education and to maximize pt's level of independence in the home and community environment.     Pt's spiritual, cultural and educational needs considered and pt agreeable to plan of care and goals.     Anticipated barriers to physical therapy: Co-morbidities    Goals:   Short Term Goals:  4 weeks  1. Pt will report decreased B ankle pain </= 2/10 to increase tolerance for ADLs. (progressing, not met)  2. Pt be compliant with HEP to improve ROM, strength, and independence with ADLs. (progressing, not met)     Long Term Goals: 8 weeks  1. Pt will increase B ankle AROM by 5 degrees in order to walk with minimal to no compensation. (progressing, not met)  2. Pt will report decreased B ankle pain 0/10 to increase tolerance for increased QoL and improved ADLs. (progressing, not met)  3. Pt will increase strength  to >/= 4/5 for BLE to increase tolerance for ADL and work activities. (progressing, not met)  4. Pt will report at </= 48% impaired on ANKLE FOTO score to demonstrate increased functional mobility. (progressing, not met)  5. Pt will be able to ambulate community distances and negotiate stairs with minimal ankle pain for increased functional mobility and QoL. (progressing, not met)  Plan     Continue PT POC. Monitor response to session; include standing hip exercise for proximal stability next if time permits    MIGUEL BRICEÑO PT

## 2020-11-20 ENCOUNTER — CLINICAL SUPPORT (OUTPATIENT)
Dept: REHABILITATION | Facility: HOSPITAL | Age: 70
End: 2020-11-20
Attending: PODIATRIST
Payer: MEDICARE

## 2020-11-20 DIAGNOSIS — Z74.09 IMPAIRED FUNCTIONAL MOBILITY AND ACTIVITY TOLERANCE: ICD-10-CM

## 2020-11-20 DIAGNOSIS — M25.571 ACUTE BILATERAL ANKLE PAIN: ICD-10-CM

## 2020-11-20 DIAGNOSIS — M25.572 ACUTE BILATERAL ANKLE PAIN: ICD-10-CM

## 2020-11-20 PROCEDURE — 97110 THERAPEUTIC EXERCISES: CPT | Mod: PN

## 2020-11-20 PROCEDURE — 97140 MANUAL THERAPY 1/> REGIONS: CPT | Mod: PN

## 2020-11-20 NOTE — PROGRESS NOTES
"  Physical Therapy Treatment Note     Name: Tracy Armendariz  Clinic Number: [id number]    Therapy Diagnosis:   Encounter Diagnoses   Name Primary?    Acute bilateral ankle pain     Impaired functional mobility and activity tolerance      Physician: Cezar Nice DPM    Visit Date: 11/20/2020    Physician Orders: PT Eval and Treat ("Consider astym, e-stim, iontophoresis")  Medical Diagnosis:   M76.70 (ICD-10-CM) - Peroneal tendinitis, unspecified laterality   M79.671,M79.672 (ICD-10-CM) - Bilateral foot pain   Evaluation Date: 11/5/2020  Authorization Period Expiration: 01/05/2021  Plan of Care Certification Period: 11/5/2020 to 1/1/2021  Visit #/Visits authorized: 5/ 12   FOTO: 5/5 DONE    Time In: 3:00PM  Time Out: 3:45PM  Total Billable Time: 45 minutes (2 TE, 1 MT)    Precautions: Standard, blood thinners and chronic CVA    Subjective     Pt reports: Hip soreness that has persisted to today from last appointment. Ankle pain has improved however. She says she was able to tolerate several errands today without major increase in ankle pain  She was compliant with home exercise program.  Response to previous treatment: No adverse response  Functional change: Tolerating standing ADLs with decreased ankle pain     Pain: 3/10  Location: B Feet    Objective     Tracy received therapeutic exercises to develop strength, endurance, ROM, flexibility, posture and core stabilization for 35 minutes including:    -- Gastroc stretch 3x30" B on fitter  -- Bridges, 3x8  -- Straight leg raise 2x10 B  -- Hamstring stretch with strap 2x30"B  -- SL clamshells 2x10B  -- Standing heel raises on step 3x10B    BELOW NOT PERFORMED TODAY:  -- B ankle plantarflexion with RTB x30  -- B ankle eversion with RTB x30  -- Heel-toe raises seated 2x15   -- Towel crunches 2x60"    Tracy received the following manual therapy techniques: Manual traction, Myofacial release, Soft tissue Mobilization and Friction Massage were applied to the: B " ankle for 10 minutes, including:    -- IASTM to lateral malleolar musculature (Hawksgrip)    Tracy received cryotherapy to B ankles x 10 minutes at end of session    Home Exercises Provided and Patient Education Provided     Education provided:   - HEP review  - Cryotherapy parameters for home    Written Home Exercises Provided: Patient instructed to cont prior HEP.  Exercises were reviewed and Tracy was able to demonstrate them prior to the end of the session.  Tracy demonstrated good  understanding of the education provided.     See EMR under Patient Instructions for exercises provided prior visit.    Assessment   Tracy complained of some delayed onset muscle soreness from hip exercise initiated at last visit. Progressions held for hip exercise today. Improved ankle pain noted today, and progression to standing ankle exercise tolerated with no increase in symptoms. Decreased myofascial restrictions during manual therapy when compared to last visit. Pt reports improved tolerance to standing activity outside of therapy. Tracy is progressing well but would benefit from continued PT services to progress toward goals.    Tracy is progressing well towards her goals.   Pt prognosis is Good      Pt will continue to benefit from skilled outpatient physical therapy to address the deficits listed in the problem list box on initial evaluation, provide pt/family education and to maximize pt's level of independence in the home and community environment.     Pt's spiritual, cultural and educational needs considered and pt agreeable to plan of care and goals.     Anticipated barriers to physical therapy: Co-morbidities    Goals:   Short Term Goals:  4 weeks  1. Pt will report decreased B ankle pain </= 2/10 to increase tolerance for ADLs. (progressing, not met)  2. Pt be compliant with HEP to improve ROM, strength, and independence with ADLs. (MET)     Long Term Goals: 8 weeks  1. Pt will increase B ankle AROM by 5  degrees in order to walk with minimal to no compensation. (progressing, not met)  2. Pt will report decreased B ankle pain 0/10 to increase tolerance for increased QoL and improved ADLs. (progressing, not met)  3. Pt will increase strength to >/= 4/5 for BLE to increase tolerance for ADL and work activities. (progressing, not met)  4. Pt will report at </= 48% impaired on ANKLE FOTO score to demonstrate increased functional mobility. (progressing, not met)  5. Pt will be able to ambulate community distances and negotiate stairs with minimal ankle pain for increased functional mobility and QoL. (progressing, not met)  Plan     Continue to progress as per POC. Monitor response to session.    MIGUEL BRICEÑO, PT

## 2020-11-23 ENCOUNTER — CLINICAL SUPPORT (OUTPATIENT)
Dept: REHABILITATION | Facility: HOSPITAL | Age: 70
End: 2020-11-23
Attending: PODIATRIST
Payer: MEDICARE

## 2020-11-23 DIAGNOSIS — M25.572 ACUTE BILATERAL ANKLE PAIN: ICD-10-CM

## 2020-11-23 DIAGNOSIS — M25.571 ACUTE BILATERAL ANKLE PAIN: ICD-10-CM

## 2020-11-23 DIAGNOSIS — Z74.09 IMPAIRED FUNCTIONAL MOBILITY AND ACTIVITY TOLERANCE: ICD-10-CM

## 2020-11-23 PROCEDURE — 97140 MANUAL THERAPY 1/> REGIONS: CPT | Mod: PN

## 2020-11-23 PROCEDURE — 97110 THERAPEUTIC EXERCISES: CPT | Mod: PN

## 2020-11-23 NOTE — PROGRESS NOTES
"  Physical Therapy Treatment Note     Name: Tracy Armendariz  Clinic Number: [id number]    Therapy Diagnosis:   Encounter Diagnoses   Name Primary?    Acute bilateral ankle pain     Impaired functional mobility and activity tolerance      Physician: Cezar Nice DPM    Visit Date: 11/23/2020    Physician Orders: PT Eval and Treat ("Consider astym, e-stim, iontophoresis")  Medical Diagnosis:   M76.70 (ICD-10-CM) - Peroneal tendinitis, unspecified laterality   M79.671,M79.672 (ICD-10-CM) - Bilateral foot pain   Evaluation Date: 11/5/2020  Authorization Period Expiration: 01/05/2021  Plan of Care Certification Period: 11/5/2020 to 1/1/2021  Visit #/Visits authorized: 6/ 12   FOTO: 6/10     Time In: 2:21PM  Time Out: 3:01 PM (+ 10 minutes cryotherapy at end of session)  Total Billable Time: 40 minutes (2 TE, 1 MT)    Precautions: Standard, blood thinners and chronic CVA    Subjective     Pt reports: Improved hip soreness but she is feeling some calf soreness today after initiation of heel raises last week. She enjoyed icing ankles last week as it decreased resting pain levels. She says she was happy she was able to complete several errands on Friday both before and after therapy with no increase in pain.  She was compliant with home exercise program.  Response to previous treatment: No adverse response  Functional change: Tolerating standing ADLs with decreased ankle pain     Pain: 2/10  Location: B Feet    Objective     Tracy received therapeutic exercises to develop strength, endurance, ROM, flexibility, posture and core stabilization for 30 minutes including:    -- Gastroc stretch 3x30" B on fitter  -- Hamstring stretch at stairs 2x30"B  -- Standing hip abduction (stance leg on blue foam disc) 2x10B YTB  -- Standing hip extension (stance leg on blue foam disc) 2x10B YTB  -- Heel raises on leg press 5 plates; 3x15B  -- Bridges, 3x8 (not today)  -- Straight leg raise 2x10 B  -- SL clamshells 3x10B  -- B ankle " eversion x30B (GTB with L ankle; AROM with R ankle due to pain onset)    Tracy received the following manual therapy techniques: Manual traction, Myofacial release, Soft tissue Mobilization and Friction Massage were applied to the: B ankle for 10 minutes, including:    -- IASTM to lateral malleolar musculature (Hawksgrip)    Tracy received cryotherapy to B ankles x 10 minutes at end of session    Home Exercises Provided and Patient Education Provided     Education provided:   - HEP review  - Cryotherapy parameters for home    Written Home Exercises Provided: Patient instructed to cont prior HEP.  Exercises were reviewed and Tracy was able to demonstrate them prior to the end of the session.  Tracy demonstrated good  understanding of the education provided.     See EMR under Patient Instructions for exercises provided prior visit.    Assessment   Mild delayed onset muscle soreness in B calves after initiation of closed chain GSC strengthening at last visit. Heel raises on leg press substituted today without increase in symptoms. Pt's pain at rest is gradually decreasing and she tolerating increased volume of standing activity outside of therapy. She remains appropriate for PT to progress toward remaining goals.    Tracy is progressing well towards her goals.   Pt prognosis is Good      Pt will continue to benefit from skilled outpatient physical therapy to address the deficits listed in the problem list box on initial evaluation, provide pt/family education and to maximize pt's level of independence in the home and community environment.     Pt's spiritual, cultural and educational needs considered and pt agreeable to plan of care and goals.     Anticipated barriers to physical therapy: Co-morbidities    Goals:   Short Term Goals:  4 weeks  1. Pt will report decreased B ankle pain </= 2/10 to increase tolerance for ADLs. (progressing, not met)  2. Pt be compliant with HEP to improve ROM, strength, and  independence with ADLs. (MET)     Long Term Goals: 8 weeks  1. Pt will increase B ankle AROM by 5 degrees in order to walk with minimal to no compensation. (progressing, not met)  2. Pt will report decreased B ankle pain 0/10 to increase tolerance for increased QoL and improved ADLs. (progressing, not met)  3. Pt will increase strength to >/= 4/5 for BLE to increase tolerance for ADL and work activities. (progressing, not met)  4. Pt will report at </= 48% impaired on ANKLE FOTO score to demonstrate increased functional mobility. (progressing, not met)  5. Pt will be able to ambulate community distances and negotiate stairs with minimal ankle pain for increased functional mobility and QoL. (progressing, not met)  Plan     Continue to progress as per POC. Monitor response to session.    MIGUEL BRICEÑO, PT

## 2020-12-01 ENCOUNTER — CLINICAL SUPPORT (OUTPATIENT)
Dept: REHABILITATION | Facility: HOSPITAL | Age: 70
End: 2020-12-01
Attending: PODIATRIST
Payer: MEDICARE

## 2020-12-01 DIAGNOSIS — M25.572 ACUTE BILATERAL ANKLE PAIN: ICD-10-CM

## 2020-12-01 DIAGNOSIS — M25.571 ACUTE BILATERAL ANKLE PAIN: ICD-10-CM

## 2020-12-01 DIAGNOSIS — Z74.09 IMPAIRED FUNCTIONAL MOBILITY AND ACTIVITY TOLERANCE: ICD-10-CM

## 2020-12-01 PROCEDURE — 97110 THERAPEUTIC EXERCISES: CPT | Mod: PN,CQ

## 2020-12-01 NOTE — PROGRESS NOTES
"  Physical Therapy Treatment Note     Name: Tracy Armendariz  Clinic Number: [id number]    Therapy Diagnosis:        Encounter Diagnoses   Name Primary?    Acute bilateral ankle pain      Impaired functional mobility and activity tolerance        Physician: Cezar Nice DPM    Visit Date: 12/1/2020    Physician Orders: PT Eval and Treat ("Consider astym, e-stim, iontophoresis")  Medical Diagnosis:   M76.70 (ICD-10-CM) - Peroneal tendinitis, unspecified laterality   M79.671,M79.672 (ICD-10-CM) - Bilateral foot pain   Evaluation Date: 11/5/2020  Authorization Period Expiration: 01/05/2021  Plan of Care Certification Period: 11/5/2020 to 1/1/2021  Visit #/Visits authorized: 7/ 12   FOTO: 7/10     Time In: 12:30  Time Out: 1:15    Total Billable Time: 40 minutes (2 TE, 1 MT)    Precautions: Standard, blood thinners and chronic CVA    Subjective     Pt reports: her ankles are fine it's the knees and today the HIPS  that hurt now.  She was compliant with home exercise program.  Response to previous treatment: No adverse response  Functional change: Tolerating standing ADLs with decreased ankle pain     Pain: 5/10  Location: B knees, Hips     Objective     Tracy received therapeutic exercises to develop strength, endurance, ROM, flexibility, posture and core stabilization for 45 minutes including:    -- Scit Fit B LE 5 minutes for endurance  -- Gastroc stretch 3x30" B on fitter  -- Hamstring stretch supine 3x30"B  -- Standing hip abduction (stance leg on red Amelia disc) 2x10 B YTB  -- Standing hip extension (stance leg on red Amelia disc) 2x10 B YTB  -- Heel raises on leg press 5.5 plates; 3x15B   -- Squats on leg press 5.5 plates; 2x10 B   -- Bridges, 3x8 (not performed  today)  -- Straight leg raise 2x10 B   -- SL Hip Abd 3x10 B  -- B ankle eversion x30B (GTB with L ankle; AROM with R ankle due to pain onset) Not performed today    Tracy received the following manual therapy techniques: Manual traction, Myofacial " release, Soft tissue Mobilization and Friction Massage were applied to the: B ankle for 00 minutes, including:    -- IASTM to lateral malleolar musculature (Hawksgrip) not performed due pain free ankles    Tracy received cryotherapy to B ankles x 00 minutes at end of session not performed due to pain free ankles    Home Exercises Provided and Patient Education Provided     Education provided:   - HEP review  - Cryotherapy parameters for home    Written Home Exercises Provided: Patient instructed to cont prior HEP.  Exercises were reviewed and Tracy was able to demonstrate them prior to the end of the session.  Tracy demonstrated good  understanding of the education provided.     See EMR under Patient Instructions for exercises provided prior visit.    Assessment   Tracy presents with no c/o ankle pain instead stating B knees and Hip are 5/10 pain. Stated pain is unchanged following today's interventions. Somewhat easily fatigued in supine and side lying position conditions. She remains appropriate for PT to progress toward remaining goals.    Tracy is progressing well towards her goals.   Pt prognosis is Good      Pt will continue to benefit from skilled outpatient physical therapy to address the deficits listed in the problem list box on initial evaluation, provide pt/family education and to maximize pt's level of independence in the home and community environment.     Pt's spiritual, cultural and educational needs considered and pt agreeable to plan of care and goals.     Anticipated barriers to physical therapy: Co-morbidities    Goals:   Short Term Goals:  4 weeks  1. Pt will report decreased B ankle pain </= 2/10 to increase tolerance for ADLs. (progressing, not met)  2. Pt be compliant with HEP to improve ROM, strength, and independence with ADLs. (MET)     Long Term Goals: 8 weeks  1. Pt will increase B ankle AROM by 5 degrees in order to walk with minimal to no compensation. (progressing, not  met)  2. Pt will report decreased B ankle pain 0/10 to increase tolerance for increased QoL and improved ADLs. (progressing, not met)  3. Pt will increase strength to >/= 4/5 for BLE to increase tolerance for ADL and work activities. (progressing, not met)  4. Pt will report at </= 48% impaired on ANKLE FOTO score to demonstrate increased functional mobility. (progressing, not met)  5. Pt will be able to ambulate community distances and negotiate stairs with minimal ankle pain for increased functional mobility and QoL. (progressing, not met)  Plan     Continue to progress as per POC. Monitor response to session.    Devan Esqueda, PTA

## 2020-12-03 ENCOUNTER — CLINICAL SUPPORT (OUTPATIENT)
Dept: REHABILITATION | Facility: HOSPITAL | Age: 70
End: 2020-12-03
Attending: PODIATRIST
Payer: MEDICARE

## 2020-12-03 DIAGNOSIS — Z74.09 IMPAIRED FUNCTIONAL MOBILITY AND ACTIVITY TOLERANCE: ICD-10-CM

## 2020-12-03 DIAGNOSIS — M25.572 ACUTE BILATERAL ANKLE PAIN: ICD-10-CM

## 2020-12-03 DIAGNOSIS — M25.571 ACUTE BILATERAL ANKLE PAIN: ICD-10-CM

## 2020-12-03 PROCEDURE — 97110 THERAPEUTIC EXERCISES: CPT | Mod: PN,CQ

## 2020-12-03 NOTE — PROGRESS NOTES
"  Physical Therapy Treatment Note     Name: Tracy Armendariz  Clinic Number: [id number]    Therapy Diagnosis:        Encounter Diagnoses   Name Primary?    Acute bilateral ankle pain      Impaired functional mobility and activity tolerance        Physician: Cezar Nice DPM    Visit Date: 12/3/2020    Physician Orders: PT Eval and Treat ("Consider astym, e-stim, iontophoresis")  Medical Diagnosis:   M76.70 (ICD-10-CM) - Peroneal tendinitis, unspecified laterality   M79.671,M79.672 (ICD-10-CM) - Bilateral foot pain   Evaluation Date: 11/5/2020  Authorization Period Expiration: 01/05/2021  Plan of Care Certification Period: 11/5/2020 to 1/1/2021  Visit #/Visits authorized: 8/ 12   FOTO: 8/10     Time In: 2:00  Time Out: 2:45    Total Billable Time: 45 minutes (3 TE)    Precautions: Standard, blood thinners and chronic CVA    Subjective     Pt reports: no complaints of ankle hip knee nor back pain today.   She was compliant with home exercise program.  Response to previous treatment: No adverse response  Functional change: Tolerating standing ADLs with decreased ankle pain     Pain: 0/10  Location: B knees, Hips     Objective     Tracy received therapeutic exercises to develop strength, endurance, ROM, flexibility, posture and core stabilization for 45 minutes including:    -- Scit Fit B LE 5 minutes for endurance Level 3  -- Gastroc stretch 3x30" B on fitter  -- Hamstring stretch supine 3x30"B  -- Standing hip abduction (stance leg on green foam oval) 2x15 w/B RTB  -- Standing hip extension (stance leg on green foam oval) 2x15 w/B RTB  - Standing hip flexion (stance leg on green foam oval) 2x15 w/B RTB  -- Heel raises on leg press 6.5 plates; 2x15 B   -- Squats on leg press 6.5 plates; 2x15 B   -- Bridges, 3x10 w/3sec hold  -- Straight leg raise 2x10 B   -- SL Hip Abd 3x10 B  -- B ankle eversion x30B (GTB with L ankle; AROM with R ankle due to pain onset) Not performed today    Tracy received the following " manual therapy techniques: Manual traction, Myofacial release, Soft tissue Mobilization and Friction Massage were applied to the: B ankle for 00 minutes, including:    -- IASTM to lateral malleolar musculature (Hawksgrip) not performed due pain free ankles    Tracy received cryotherapy to B ankles x 00 minutes at end of session not performed due to pain free ankles    Home Exercises Provided and Patient Education Provided     Education provided:   - HEP review  - Cryotherapy parameters for home    Written Home Exercises Provided: Patient instructed to cont prior HEP.  Exercises were reviewed and Tracy was able to demonstrate them prior to the end of the session.  Tracy demonstrated good  understanding of the education provided.     See EMR under Patient Instructions for exercises provided prior visit.    Assessment   Tracy presents with no c/o ankle pain  knee nor Hip pain. Felt much improved in terms of knee and hip pain later the same day. Struggles somewhat in supine and side lying changing positions. Progressed with additional challenges in bold. She remains appropriate for PT to progress toward remaining goals.    Tracy is progressing well towards her goals.   Pt prognosis is Good      Pt will continue to benefit from skilled outpatient physical therapy to address the deficits listed in the problem list box on initial evaluation, provide pt/family education and to maximize pt's level of independence in the home and community environment.     Pt's spiritual, cultural and educational needs considered and pt agreeable to plan of care and goals.     Anticipated barriers to physical therapy: Co-morbidities    Goals:   Short Term Goals:  4 weeks  1. Pt will report decreased B ankle pain </= 2/10 to increase tolerance for ADLs. (progressing, not met)  2. Pt be compliant with HEP to improve ROM, strength, and independence with ADLs. (MET)     Long Term Goals: 8 weeks  1. Pt will increase B ankle AROM by 5  degrees in order to walk with minimal to no compensation. (progressing, not met)  2. Pt will report decreased B ankle pain 0/10 to increase tolerance for increased QoL and improved ADLs. (progressing, not met)  3. Pt will increase strength to >/= 4/5 for BLE to increase tolerance for ADL and work activities. (progressing, not met)  4. Pt will report at </= 48% impaired on ANKLE FOTO score to demonstrate increased functional mobility. (progressing, not met)  5. Pt will be able to ambulate community distances and negotiate stairs with minimal ankle pain for increased functional mobility and QoL. (progressing, not met)  Plan     Continue to progress as per POC. Monitor response to session.    Devan Esqueda, PTA

## 2020-12-08 ENCOUNTER — CLINICAL SUPPORT (OUTPATIENT)
Dept: REHABILITATION | Facility: HOSPITAL | Age: 70
End: 2020-12-08
Attending: PODIATRIST
Payer: MEDICARE

## 2020-12-08 ENCOUNTER — DOCUMENTATION ONLY (OUTPATIENT)
Dept: REHABILITATION | Facility: HOSPITAL | Age: 70
End: 2020-12-08

## 2020-12-08 DIAGNOSIS — M25.571 ACUTE BILATERAL ANKLE PAIN: ICD-10-CM

## 2020-12-08 DIAGNOSIS — Z74.09 IMPAIRED FUNCTIONAL MOBILITY AND ACTIVITY TOLERANCE: ICD-10-CM

## 2020-12-08 DIAGNOSIS — M25.572 ACUTE BILATERAL ANKLE PAIN: ICD-10-CM

## 2020-12-08 PROCEDURE — 97110 THERAPEUTIC EXERCISES: CPT | Mod: PN

## 2020-12-08 NOTE — PROGRESS NOTES
PT/PTA met face to face to discuss pt's treatment plan and progress towards established goals. Pt will be seen by a physical therapist minimally every 6th visit or every 30 days.    MIGUEL BRICEÑO, PT    Gary Crandall PTA

## 2020-12-08 NOTE — PROGRESS NOTES
"  Physical Therapy Treatment Note/Discharge Summary     Name: Tracy Armendariz  Clinic Number: [id number]    Therapy Diagnosis:        Encounter Diagnoses   Name Primary?    Acute bilateral ankle pain      Impaired functional mobility and activity tolerance        Physician: Cezar Nice DPM    Visit Date: 12/8/2020    Physician Orders: PT Eval and Treat ("Consider astym, e-stim, iontophoresis")  Medical Diagnosis:   M76.70 (ICD-10-CM) - Peroneal tendinitis, unspecified laterality   M79.671,M79.672 (ICD-10-CM) - Bilateral foot pain   Evaluation Date: 11/5/2020  Authorization Period Expiration: 01/05/2021  Plan of Care Certification Period: 11/5/2020 to 1/1/2021  Visit #/Visits authorized: 9/ 12   FOTO: 9/10     Time In: 3:30PM  Time Out: 4:00PM    Total Billable Time: 30 minutes (2 TE)    Precautions: Standard, blood thinners and chronic CVA    Subjective     Pt reports: Pain-free in B ankles/feet for over a week now. She is agreeable to discharge today.  She was compliant with home exercise program.  Response to previous treatment: No adverse response  Functional change: Pain-free for over a week    Pain: 0/10  Location: B knees, Hips     Objective     Tracy received therapeutic exercises to develop strength, endurance, ROM, flexibility, posture and core stabilization for 30 minutes including:    -- Standing hip abduction 2x10 w/B RTB  -- Standing hip extension 2x10 w/B RTB  -- Standing hip flexion 2x10 w/B RTB  -- Runner's gastroc stretch 3x30"B  -- B heel raises 3x10  -- Objective tests/measures (updated values in parentheses)    Hip   Right     Left       AROM PROM MMT AROM PROM MMT   Flexion WFL WFL 3+/5 (4+/5) WFL WFL 3+/5 (4+/5)   Extension WFL WFL 3+/5 (4/5) WFL WFL 3+/5 (4/5)   Abduction WFL WFL 4-/5 (4/5) WFL WFL 4-/5 (4/5)      Knee   Right     Left       AROM PROM MMT AROM PROM MMT   Flexion (140 deg) WFL WFL 4/5 (5/5) WFL WFL 4/5 (5/5)   Extension (0-5 deg) [facility] 5/5 (5/5) [facility] 5/5 (5/5) "      Ankle   Right     Left       AROM PROM MMT AROM PROM MMT   Plantarflexion (50 deg 50 (NT) 55 (NT) 4/5! (5/5) 51 (NT) 55 (NT) 4/5! (5/5)   Dorsiflexion (20 deg) 8 (10) 10 (12) 4/5 (4+/5) 5 (10) 10 (13) 4/5 (4+/5)   Inversion (20-30 deg) NT NT 4+/5 (5/5) NT NT 4+/5 (5/5)   Eversion (5-10 deg) NT NT 3+/5! (5/5) NT NT 3+/5! (5/5)      CMS Impairment/Limitation/Restriction for FOTO Ankle Survey: 30%    Home Exercises Provided and Patient Education Provided     Education provided:   - HEP review with updates  - Call PT with any questions/concerns post-discharge  - Cryotherapy parameters for home    Written Home Exercises Provided: Patient instructed to cont prior HEP.  Exercises were reviewed and Tracy was able to demonstrate them prior to the end of the session.  Tracy demonstrated good  understanding of the education provided.     See EMR under Patient Instructions for exercises provided prior visit and today.    Assessment   Since initiating physical therapy, pt has improved in terms of BLE strength, ankle mobility, foot/ankle pain, self-perception of functional mobility and overall tolerance to activity. She is appropriate for discharge at this time secondary to achieving most goals and progressing well toward others. She was issued maintenance HEP and understands she can contact PT with any questions/concerns post-discharge.    Tracy is progressing well towards her goals.   Pt prognosis is Good      Pt will continue to benefit from skilled outpatient physical therapy to address the deficits listed in the problem list box on initial evaluation, provide pt/family education and to maximize pt's level of independence in the home and community environment.     Pt's spiritual, cultural and educational needs considered and pt agreeable to plan of care and goals.     Anticipated barriers to physical therapy: Co-morbidities    Goals:   Short Term Goals:  4 weeks  1. Pt will report decreased B ankle pain </= 2/10 to  increase tolerance for ADLs. (MET)  2. Pt be compliant with HEP to improve ROM, strength, and independence with ADLs. (MET)     Long Term Goals: 8 weeks  1. Pt will increase B ankle AROM by 5 degrees in order to walk with minimal to no compensation. (progressing, not met)  2. Pt will report decreased B ankle pain 0/10 to increase tolerance for increased QoL and improved ADLs. (MET)  3. Pt will increase strength to >/= 4/5 for BLE to increase tolerance for ADL and work activities. (MET)  4. Pt will report at </= 48% impaired on ANKLE FOTO score to demonstrate increased functional mobility. (MET)  5. Pt will be able to ambulate community distances and negotiate stairs with minimal ankle pain for increased functional mobility and QoL.(MET)   Plan     Discharge PT    MIGUEL BRICEÑO PT

## 2020-12-18 ENCOUNTER — OFFICE VISIT (OUTPATIENT)
Dept: PODIATRY | Facility: CLINIC | Age: 70
End: 2020-12-18
Payer: MEDICARE

## 2020-12-18 VITALS
DIASTOLIC BLOOD PRESSURE: 64 MMHG | HEART RATE: 66 BPM | SYSTOLIC BLOOD PRESSURE: 120 MMHG | WEIGHT: 227 LBS | HEIGHT: 66 IN | BODY MASS INDEX: 36.48 KG/M2

## 2020-12-18 DIAGNOSIS — M79.672 BILATERAL FOOT PAIN: Primary | ICD-10-CM

## 2020-12-18 DIAGNOSIS — M79.671 BILATERAL FOOT PAIN: Primary | ICD-10-CM

## 2020-12-18 PROCEDURE — 99999 PR PBB SHADOW E&M-EST. PATIENT-LVL IV: ICD-10-PCS | Mod: PBBFAC,,, | Performed by: PODIATRIST

## 2020-12-18 PROCEDURE — 3008F PR BODY MASS INDEX (BMI) DOCUMENTED: ICD-10-PCS | Mod: CPTII,S$GLB,, | Performed by: PODIATRIST

## 2020-12-18 PROCEDURE — 99212 PR OFFICE/OUTPT VISIT, EST, LEVL II, 10-19 MIN: ICD-10-PCS | Mod: S$GLB,,, | Performed by: PODIATRIST

## 2020-12-18 PROCEDURE — 3008F BODY MASS INDEX DOCD: CPT | Mod: CPTII,S$GLB,, | Performed by: PODIATRIST

## 2020-12-18 PROCEDURE — 3074F PR MOST RECENT SYSTOLIC BLOOD PRESSURE < 130 MM HG: ICD-10-PCS | Mod: CPTII,S$GLB,, | Performed by: PODIATRIST

## 2020-12-18 PROCEDURE — 99212 OFFICE O/P EST SF 10 MIN: CPT | Mod: S$GLB,,, | Performed by: PODIATRIST

## 2020-12-18 PROCEDURE — 1159F PR MEDICATION LIST DOCUMENTED IN MEDICAL RECORD: ICD-10-PCS | Mod: S$GLB,,, | Performed by: PODIATRIST

## 2020-12-18 PROCEDURE — 1126F PR PAIN SEVERITY QUANTIFIED, NO PAIN PRESENT: ICD-10-PCS | Mod: S$GLB,,, | Performed by: PODIATRIST

## 2020-12-18 PROCEDURE — 1126F AMNT PAIN NOTED NONE PRSNT: CPT | Mod: S$GLB,,, | Performed by: PODIATRIST

## 2020-12-18 PROCEDURE — 3078F DIAST BP <80 MM HG: CPT | Mod: CPTII,S$GLB,, | Performed by: PODIATRIST

## 2020-12-18 PROCEDURE — 1159F MED LIST DOCD IN RCRD: CPT | Mod: S$GLB,,, | Performed by: PODIATRIST

## 2020-12-18 PROCEDURE — 3078F PR MOST RECENT DIASTOLIC BLOOD PRESSURE < 80 MM HG: ICD-10-PCS | Mod: CPTII,S$GLB,, | Performed by: PODIATRIST

## 2020-12-18 PROCEDURE — 99999 PR PBB SHADOW E&M-EST. PATIENT-LVL IV: CPT | Mod: PBBFAC,,, | Performed by: PODIATRIST

## 2020-12-18 PROCEDURE — 3074F SYST BP LT 130 MM HG: CPT | Mod: CPTII,S$GLB,, | Performed by: PODIATRIST

## 2020-12-18 NOTE — PROGRESS NOTES
"Subjective:      Patient ID: Tracy Armendariz is a 70 y.o. female.    Chief Complaint: Follow-up    Complains of moderate pain and to both ankles x3 weeks.  Relates that she traveled to [place] to visit family member and walked a lot.  By in the week she had difficulty walking and she had a lot swelling around her ankles.  Denies any trauma.  Pain is alleviated by rest.  She is wearing compression stockings and has changed her shoes which has helped somewhat.  She takes 1 leave the day and Tylenol arthritis.  History of total knee arthroplasty right knee per Dr. Kim.    2020:  Follow-up for bilateral peroneal tendinitis.  She completed a physical therapy sessions.  She change her shoes and is now wearing [employer] one tennis shoes and is using compound [facility] reports no pain.  She is doing very well is pleased overall with her progress.  No new concerns.    Vitals:    20 1618   BP: 120/64   Pulse: 66   Weight: 103 kg (227 lb)   Height: 5' 6" (1.676 m)   PainSc: 0-No pain      Past Medical History:   Diagnosis Date    Allergy     Anticoagulant long-term use     Arthritis     CVA (cerebral infarction)     Depression     Disorder of kidney and ureter     renal stones    Diverticulosis of colon     Extrinsic asthma, unspecified     Hyperlipidemia     Hypertension     Kidney stone     Left atrial enlargement 2014    Low back pain     MARTIN (obstructive sleep apnea)     Osteopenia     PUD (peptic ulcer disease)     Stroke  2013    Urinary tract infection        Past Surgical History:   Procedure Laterality Date    APPENDECTOMY      @ time of hysterectomy    BACK SURGERY      CATARACT EXTRACTION       SECTION      CHOLECYSTECTOMY      laparoscopic    COLONOSCOPY N/A 2018    Procedure: COLONOSCOPY/Golytely;  Surgeon: Janine Black MD;  Location: [facility];  Service: Endoscopy;  Laterality: N/A;    " DILATION AND CURETTAGE OF UTERUS      HYSTERECTOMY      TAHUSO with appendectomy    INNER EAR SURGERY      replaced ear drum    JOINT REPLACEMENT Right     knee    KNEE ARTHROSCOPY W/ DEBRIDEMENT      LUMBAR DISCECTOMY      L4-L5    OOPHORECTOMY      unilateral    TONSILLECTOMY      TYMPANOPLASTY      URETEROSCOPIC REMOVAL OF URETERIC CALCULUS Left 2018    Procedure: REMOVAL, CALCULUS, URETER, URETEROSCOPIC, holmium laser lithotripsy, stone basket extraction, retrograde pyelogram, ureteral stent exchange;  Surgeon: Anaya Zamora MD;  Location: [facility];  Service: Urology;  Laterality: Left;       Family History   Problem Relation Age of Onset    Cervical cancer Mother     Cancer Mother 65        lung cancer - non smoker    Stroke Paternal Grandfather     Hypertension Maternal Grandfather     Heart disease Maternal Grandfather     Hypertension Father     Coronary artery disease Father 62    Heart disease Father     Diabetes Sister     Heart disease Sister     Kidney disease Sister     Breast cancer Daughter 36    Heart failure Sister         60s    Colon cancer Neg Hx     Ovarian cancer Neg Hx        Social History     Socioeconomic History    Marital status:      Spouse name: Not on file    Number of children: Not on file    Years of education: Not on file    Highest education level: Not on file   Occupational History    Not on file   Social Needs    Financial resource strain: Not on file    Food insecurity     Worry: Not on file     Inability: Not on file    Transportation needs     Medical: Not on file     Non-medical: Not on file   Tobacco Use    Smoking status: Former Smoker     Quit date: 1995     Years since quittin.9    Smokeless tobacco: Never Used   Substance and Sexual Activity    Alcohol use: Yes     Alcohol/week: 1.0 standard drinks     Types: 1 Glasses of wine per week     Comment: social    Drug use: No    Sexual activity: Yes      Partners: Male     Birth control/protection: Surgical     Comment:  since 1972   Lifestyle    Physical activity     Days per week: Not on file     Minutes per session: Not on file    Stress: Not on file   Relationships    Social connections     Talks on phone: Not on file     Gets together: Not on file     Attends Mosque service: Not on file     Active member of club or organization: Not on file     Attends meetings of clubs or organizations: Not on file     Relationship status: Not on file   Other Topics Concern    Not on file   Social History Narrative    Not on file       Current Outpatient Medications   Medication Sig Dispense Refill    albuterol (PROVENTIL/VENTOLIN HFA) 90 mcg/actuation inhaler INHALE 2 PUFFS EVERY 6 HOURS AS NEEDED FOR WHEEZING 18 g 0    albuterol (PROVENTIL/VENTOLIN HFA) 90 mcg/actuation inhaler INHALE 2 PUFFS EVERY 6 HOURS AS NEEDED FOR WHEEZING 18 g 0    albuterol (PROVENTIL/VENTOLIN HFA) 90 mcg/actuation inhaler INHALE 2 PUFFS EVERY 6 HOURS AS NEEDED FOR WHEEZING 8.5 g 3    aspirin 81 MG Chew Take 81 mg by mouth once daily.      atorvastatin (LIPITOR) 10 MG tablet TAKE 1 TABLET BY MOUTH DAILY 90 tablet 3    calcium carbonate (OS-SPENCER) 600 mg (1,500 mg) Tab Take 600 mg by mouth 2 (two) times daily with meals.      cholecalciferol, vitamin D3, 1,000 unit Chew Take by mouth.      clotrimazole-betamethasone 1-0.05% (LOTRISONE) cream Apply topically 2 (two) times daily. 45 g 2    diclofenac sodium (VOLTAREN) 1 % Gel Apply 2 g topically once daily. 100 g 5    escitalopram oxalate (LEXAPRO) 20 MG tablet Take 1 tablet (20 mg total) by mouth once daily. 90 tablet 3    famotidine (PEPCID) 10 MG tablet Take 10 mg by mouth 2 (two) times daily.      FLUAD [id number], 65 YR UP,,PF, 45 mcg (15 mcg x 3)/0.5 mL Syrg       ketorolac (TORADOL) 10 mg tablet Take 1 tablet (10 mg total) by mouth every 6 (six) hours as needed for Pain. 20 tablet 0    LACTOBACILLUS ACIDOPHILUS  (PROBIOTIC ORAL) Take by mouth.      lidocaine-prilocaine (EMLA) cream Apply topically as needed. 30 g 5    losartan (COZAAR) 100 MG tablet TAKE 1 TABLET(100 MG) BY MOUTH EVERY DAY 90 tablet 3    MULTIVIT WITH CALCIUM,IRON,MIN (WOMEN'S DAILY MULTIVITAMIN ORAL) Take by mouth.      ondansetron (ZOFRAN-ODT) 4 MG TbDL Take 1 tablet (4 mg total) by mouth every 8 (eight) hours as needed. 30 tablet 0    ondansetron (ZOFRAN-ODT) 8 MG TbDL Take 1 tablet (8 mg total) by mouth every 6 (six) hours as needed. 30 tablet 0    pantoprazole (PROTONIX) 40 MG tablet TAKE 1 TABLET(40 MG) BY MOUTH EVERY DAY 30 tablet 5    tamsulosin (FLOMAX) 0.4 mg Cap TAKE 1 CAPSULE(0.4 MG) BY MOUTH EVERY DAY 90 capsule 0    tiZANidine (ZANAFLEX) 2 MG tablet Take 1-2 tablets (2-4 mg total) by mouth nightly as needed. 60 tablet 2     No current facility-administered medications for this visit.        Review of patient's allergies indicates:   Allergen Reactions    Iodinated contrast media Hives and Rash    Gabapentin Hallucinations    Iodine Hives    Isothiazolinones Rash    Penicillins Rash         Review of Systems   Constitution: Negative for chills, fever and malaise/fatigue.   HENT: Negative for congestion and hearing loss.    Cardiovascular: Negative for chest pain, claudication and leg swelling.   Respiratory: Negative for cough and shortness of breath.    Skin: Negative for color change.   Musculoskeletal: Positive for joint pain and joint swelling. Negative for back pain, muscle cramps and muscle weakness.   Gastrointestinal: Negative for nausea and vomiting.   Neurological: Negative for numbness, paresthesias and weakness.   Psychiatric/Behavioral: Negative for altered mental status.           Objective:      Physical Exam  Constitutional:       General: She is not in acute distress.     Appearance: She is obese. She is not ill-appearing.   Cardiovascular:      Pulses:           Dorsalis pedis pulses are 1+ on the right side and  1+ on the left side.        Posterior tibial pulses are 2+ on the right side and 2+ on the left side.      Comments: Mild nonpitting lower extremity edema bilateral.  Musculoskeletal:      Comments: No pain on palpation, range of motion or manual muscle strength testing bilateral foot ankle.  Negative anterior drawer sign bilateral ankle.  No pain stress eversion or inversion of the ankle bilateral.  No pain with stress testing of the subtalar joint bilateral.    Mild cavus foot structure bilateral foot.   Skin:     General: Skin is warm.      Capillary Refill: Capillary refill takes less than 2 seconds.      Findings: No ecchymosis or erythema.      Nails: There is no clubbing.     Neurological:      Mental Status: She is alert and oriented to person, place, and time.      Sensory: Sensation is intact.      Motor: Motor function is intact.               Assessment:       Encounter Diagnosis   Name Primary?    Bilateral foot pain Yes         Plan:       Tracy was seen today for follow-up.    Diagnoses and all orders for this visit:    Bilateral foot pain      I counseled the patient on her conditions, their implications and medical management.    Inspected her current tennis shoes which appear to be very stable provide adequate support.  We did discuss frequent replacement every 300-500 miles or 6-12 months per year    Continue using analgesic pain cream as needed.    RTC p.r.n. as discussed.  A portion of this note was generated by voice recognition software and may contain spelling and grammar errors.      .

## 2020-12-21 ENCOUNTER — DOCUMENTATION ONLY (OUTPATIENT)
Dept: REHABILITATION | Facility: HOSPITAL | Age: 70
End: 2020-12-21

## 2020-12-21 NOTE — PROGRESS NOTES
Face to face meeting completed with Val Crockett PT regarding current status and progress of   Tracy Armendariz .  Devan Esqueda, PTA

## 2021-01-04 ENCOUNTER — IMMUNIZATION (OUTPATIENT)
Dept: INTERNAL MEDICINE | Facility: CLINIC | Age: 71
End: 2021-01-04
Payer: MEDICARE

## 2021-01-04 DIAGNOSIS — Z23 NEED FOR VACCINATION: ICD-10-CM

## 2021-01-04 PROCEDURE — 91300 COVID-19, MRNA, LNP-S, PF, 30 MCG/0.3 ML DOSE VACCINE: CPT | Mod: PBBFAC | Performed by: FAMILY MEDICINE

## 2021-01-25 ENCOUNTER — IMMUNIZATION (OUTPATIENT)
Dept: INTERNAL MEDICINE | Facility: CLINIC | Age: 71
End: 2021-01-25
Payer: MEDICARE

## 2021-01-25 DIAGNOSIS — Z23 NEED FOR VACCINATION: Primary | ICD-10-CM

## 2021-01-25 PROCEDURE — 91300 COVID-19, MRNA, LNP-S, PF, 30 MCG/0.3 ML DOSE VACCINE: CPT | Mod: PBBFAC | Performed by: FAMILY MEDICINE

## 2021-01-25 PROCEDURE — 0002A COVID-19, MRNA, LNP-S, PF, 30 MCG/0.3 ML DOSE VACCINE: CPT | Mod: PBBFAC | Performed by: FAMILY MEDICINE

## 2021-04-16 ENCOUNTER — PATIENT MESSAGE (OUTPATIENT)
Dept: FAMILY MEDICINE | Facility: CLINIC | Age: 71
End: 2021-04-16

## 2021-04-16 RX ORDER — LOSARTAN POTASSIUM 100 MG/1
100 TABLET ORAL DAILY
Qty: 90 TABLET | Refills: 3 | Status: SHIPPED | OUTPATIENT
Start: 2021-04-16 | End: 2022-04-27

## 2021-04-19 ENCOUNTER — OFFICE VISIT (OUTPATIENT)
Dept: FAMILY MEDICINE | Facility: CLINIC | Age: 71
End: 2021-04-19
Payer: MEDICARE

## 2021-04-19 VITALS
OXYGEN SATURATION: 96 % | WEIGHT: 224.19 LBS | HEIGHT: 66 IN | TEMPERATURE: 98 F | SYSTOLIC BLOOD PRESSURE: 134 MMHG | BODY MASS INDEX: 36.03 KG/M2 | HEART RATE: 71 BPM | DIASTOLIC BLOOD PRESSURE: 70 MMHG

## 2021-04-19 DIAGNOSIS — K64.4 EXTERNAL HEMORRHOID: Primary | ICD-10-CM

## 2021-04-19 DIAGNOSIS — K60.2 ANAL FISSURE: ICD-10-CM

## 2021-04-19 DIAGNOSIS — F32.A DEPRESSION, UNSPECIFIED DEPRESSION TYPE: ICD-10-CM

## 2021-04-19 DIAGNOSIS — G47.10 HYPERSOMNOLENCE: ICD-10-CM

## 2021-04-19 PROCEDURE — 99999 PR PBB SHADOW E&M-EST. PATIENT-LVL IV: CPT | Mod: PBBFAC,,, | Performed by: FAMILY MEDICINE

## 2021-04-19 PROCEDURE — 99214 OFFICE O/P EST MOD 30 MIN: CPT | Mod: S$GLB,,, | Performed by: FAMILY MEDICINE

## 2021-04-19 PROCEDURE — 1125F PR PAIN SEVERITY QUANTIFIED, PAIN PRESENT: ICD-10-PCS | Mod: S$GLB,,, | Performed by: FAMILY MEDICINE

## 2021-04-19 PROCEDURE — 1125F AMNT PAIN NOTED PAIN PRSNT: CPT | Mod: S$GLB,,, | Performed by: FAMILY MEDICINE

## 2021-04-19 PROCEDURE — 1159F MED LIST DOCD IN RCRD: CPT | Mod: S$GLB,,, | Performed by: FAMILY MEDICINE

## 2021-04-19 PROCEDURE — 1159F PR MEDICATION LIST DOCUMENTED IN MEDICAL RECORD: ICD-10-PCS | Mod: S$GLB,,, | Performed by: FAMILY MEDICINE

## 2021-04-19 PROCEDURE — 99499 UNLISTED E&M SERVICE: CPT | Mod: S$GLB,,, | Performed by: FAMILY MEDICINE

## 2021-04-19 PROCEDURE — 99999 PR PBB SHADOW E&M-EST. PATIENT-LVL IV: ICD-10-PCS | Mod: PBBFAC,,, | Performed by: FAMILY MEDICINE

## 2021-04-19 PROCEDURE — 3008F PR BODY MASS INDEX (BMI) DOCUMENTED: ICD-10-PCS | Mod: CPTII,S$GLB,, | Performed by: FAMILY MEDICINE

## 2021-04-19 PROCEDURE — 99499 RISK ADDL DX/OHS AUDIT: ICD-10-PCS | Mod: S$GLB,,, | Performed by: FAMILY MEDICINE

## 2021-04-19 PROCEDURE — 99214 PR OFFICE/OUTPT VISIT, EST, LEVL IV, 30-39 MIN: ICD-10-PCS | Mod: S$GLB,,, | Performed by: FAMILY MEDICINE

## 2021-04-19 PROCEDURE — 3008F BODY MASS INDEX DOCD: CPT | Mod: CPTII,S$GLB,, | Performed by: FAMILY MEDICINE

## 2021-04-19 RX ORDER — HYDROCORTISONE 10 MG/G
1 CREAM TOPICAL 2 TIMES DAILY PRN
Qty: 28.4 G | Refills: 2 | Status: SHIPPED | OUTPATIENT
Start: 2021-04-19 | End: 2021-04-20

## 2021-04-19 RX ORDER — BUPROPION HYDROCHLORIDE 150 MG/1
150 TABLET ORAL DAILY
Qty: 30 TABLET | Refills: 11 | Status: SHIPPED | OUTPATIENT
Start: 2021-04-19 | End: 2022-02-15 | Stop reason: SDUPTHER

## 2021-04-20 ENCOUNTER — PATIENT MESSAGE (OUTPATIENT)
Dept: FAMILY MEDICINE | Facility: CLINIC | Age: 71
End: 2021-04-20

## 2021-04-20 RX ORDER — HYDROCORTISONE 25 MG/G
CREAM TOPICAL 2 TIMES DAILY
Qty: 20 G | Refills: 2 | Status: SHIPPED | OUTPATIENT
Start: 2021-04-20 | End: 2021-04-20

## 2021-04-20 RX ORDER — HYDROCORTISONE 25 MG/G
CREAM TOPICAL 2 TIMES DAILY
Qty: 30 G | Refills: 2 | Status: ON HOLD | OUTPATIENT
Start: 2021-04-20 | End: 2022-12-11

## 2021-05-17 RX ORDER — ATORVASTATIN CALCIUM 10 MG/1
10 TABLET, FILM COATED ORAL DAILY
Qty: 90 TABLET | Refills: 3 | Status: SHIPPED | OUTPATIENT
Start: 2021-05-17 | End: 2022-07-26

## 2021-10-07 ENCOUNTER — IMMUNIZATION (OUTPATIENT)
Dept: INTERNAL MEDICINE | Facility: CLINIC | Age: 71
End: 2021-10-07
Payer: MEDICARE

## 2021-10-07 DIAGNOSIS — Z23 NEED FOR VACCINATION: Primary | ICD-10-CM

## 2021-10-07 PROCEDURE — 0003A COVID-19, MRNA, LNP-S, PF, 30 MCG/0.3 ML DOSE VACCINE: CPT | Mod: PBBFAC | Performed by: FAMILY MEDICINE

## 2021-10-07 PROCEDURE — 91300 COVID-19, MRNA, LNP-S, PF, 30 MCG/0.3 ML DOSE VACCINE: CPT | Mod: PBBFAC | Performed by: FAMILY MEDICINE

## 2021-11-09 ENCOUNTER — HOSPITAL ENCOUNTER (EMERGENCY)
Facility: HOSPITAL | Age: 71
Discharge: HOME | End: 2021-11-09
Attending: EMERGENCY MEDICINE
Payer: MEDICARE

## 2021-11-09 VITALS
SYSTOLIC BLOOD PRESSURE: 146 MMHG | HEART RATE: 61 BPM | TEMPERATURE: 98 F | OXYGEN SATURATION: 100 % | DIASTOLIC BLOOD PRESSURE: 77 MMHG | RESPIRATION RATE: 20 BRPM

## 2021-11-09 DIAGNOSIS — M25.531 RIGHT WRIST PAIN: Primary | ICD-10-CM
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
## 2021-11-09 DIAGNOSIS — S52.591A OTHER CLOSED FRACTURE OF DISTAL END OF RIGHT RADIUS, INITIAL ENCOUNTER: ICD-10-CM

## 2021-11-09 PROCEDURE — 25000003 PHARM REV CODE 250: Performed by: EMERGENCY MEDICINE

## 2021-11-09 PROCEDURE — 25605 CLTX DST RDL FX/EPHYS SEP W/: CPT | Mod: RT

## 2021-11-09 PROCEDURE — 96372 THER/PROPH/DIAG INJ SC/IM: CPT

## 2021-11-09 PROCEDURE — 99284 EMERGENCY DEPT VISIT MOD MDM: CPT | Mod: 25

## 2021-11-09 PROCEDURE — 63600175 PHARM REV CODE 636 W HCPCS: Performed by: EMERGENCY MEDICINE

## 2021-11-09 PROCEDURE — 99285 EMERGENCY DEPT VISIT HI MDM: CPT | Mod: 25

## 2021-11-09 RX ORDER — HYDROCODONE BITARTRATE AND ACETAMINOPHEN 5; 325 MG/1; MG/1
1 TABLET ORAL
Status: DISCONTINUED | OUTPATIENT
Start: 2021-11-09 | End: 2021-11-09 | Stop reason: HOSPADM

## 2021-11-09 RX ORDER — ONDANSETRON 4 MG/1
4 TABLET, ORALLY DISINTEGRATING ORAL EVERY 8 HOURS PRN
Qty: 20 TABLET | Refills: 0 | Status: SHIPPED | OUTPATIENT
Start: 2021-11-09 | End: 2022-05-31 | Stop reason: SDUPTHER

## 2021-11-09 RX ORDER — ONDANSETRON 4 MG/1
4 TABLET, ORALLY DISINTEGRATING ORAL
Status: COMPLETED | OUTPATIENT
Start: 2021-11-09 | End: 2021-11-09

## 2021-11-09 RX ORDER — MORPHINE SULFATE 4 MG/ML
4 INJECTION, SOLUTION INTRAMUSCULAR; INTRAVENOUS
Status: COMPLETED | OUTPATIENT
Start: 2021-11-09 | End: 2021-11-09

## 2021-11-09 RX ORDER — HYDROCODONE BITARTRATE AND ACETAMINOPHEN 5; 325 MG/1; MG/1
1 TABLET ORAL EVERY 4 HOURS PRN
Qty: 12 TABLET | Refills: 0 | Status: SHIPPED | OUTPATIENT
Start: 2021-11-09 | End: 2021-11-15 | Stop reason: SDUPTHER

## 2021-11-09 RX ORDER — LIDOCAINE HYDROCHLORIDE 10 MG/ML
20 INJECTION, SOLUTION EPIDURAL; INFILTRATION; INTRACAUDAL; PERINEURAL
Status: COMPLETED | OUTPATIENT
Start: 2021-11-09 | End: 2021-11-09

## 2021-11-09 RX ADMIN — ONDANSETRON 4 MG: 4 TABLET, ORALLY DISINTEGRATING ORAL at 09:11

## 2021-11-09 RX ADMIN — LIDOCAINE HYDROCHLORIDE 200 MG: 10 INJECTION, SOLUTION EPIDURAL; INFILTRATION; INTRACAUDAL; PERINEURAL at 10:11

## 2021-11-09 RX ADMIN — MORPHINE SULFATE 4 MG: 4 INJECTION INTRAVENOUS at 09:11

## 2021-11-10 ENCOUNTER — TELEPHONE (OUTPATIENT)
Dept: ORTHOPEDICS | Facility: CLINIC | Age: 71
End: 2021-11-10
Payer: MEDICARE

## 2021-11-12 DIAGNOSIS — S52.591A OTHER CLOSED FRACTURE OF DISTAL END OF RIGHT RADIUS, INITIAL ENCOUNTER: Primary | ICD-10-CM

## 2021-11-14 ENCOUNTER — PATIENT MESSAGE (OUTPATIENT)
Dept: ADMINISTRATIVE | Facility: OTHER | Age: 71
End: 2021-11-14
Payer: MEDICARE

## 2021-11-14 ENCOUNTER — PATIENT OUTREACH (OUTPATIENT)
Dept: ADMINISTRATIVE | Facility: OTHER | Age: 71
End: 2021-11-14
Payer: MEDICARE

## 2021-11-14 DIAGNOSIS — Z12.31 ENCOUNTER FOR SCREENING MAMMOGRAM FOR MALIGNANT NEOPLASM OF BREAST: Primary | ICD-10-CM

## 2021-11-15 ENCOUNTER — HOSPITAL ENCOUNTER (OUTPATIENT)
Dept: RADIOLOGY | Facility: HOSPITAL | Age: 71
Discharge: HOME | End: 2021-11-15
Attending: ORTHOPAEDIC SURGERY
Payer: MEDICARE

## 2021-11-15 ENCOUNTER — OFFICE VISIT (OUTPATIENT)
Dept: ORTHOPEDICS | Facility: CLINIC | Age: 71
End: 2021-11-15
Payer: MEDICARE

## 2021-11-15 DIAGNOSIS — S62.101A CLOSED FRACTURE OF RIGHT WRIST, INITIAL ENCOUNTER: ICD-10-CM

## 2021-11-15 DIAGNOSIS — M25.531 RIGHT WRIST PAIN: ICD-10-CM

## 2021-11-15 DIAGNOSIS — S52.591A OTHER CLOSED FRACTURE OF DISTAL END OF RIGHT RADIUS, INITIAL ENCOUNTER: ICD-10-CM

## 2021-11-15 PROCEDURE — 29075 APPL CST ELBW FNGR SHORT ARM: CPT | Mod: RT,S$GLB,, | Performed by: ORTHOPAEDIC SURGERY

## 2021-11-15 PROCEDURE — 99999 PR PBB SHADOW E&M-EST. PATIENT-LVL III: ICD-10-PCS | Mod: PBBFAC,,, | Performed by: ORTHOPAEDIC SURGERY

## 2021-11-15 PROCEDURE — 1101F PR PT FALLS ASSESS DOC 0-1 FALLS W/OUT INJ PAST YR: ICD-10-PCS | Mod: CPTII,S$GLB,, | Performed by: ORTHOPAEDIC SURGERY

## 2021-11-15 PROCEDURE — 29075 PR APPLY FOREARM CAST: ICD-10-PCS | Mod: RT,S$GLB,, | Performed by: ORTHOPAEDIC SURGERY

## 2021-11-15 PROCEDURE — 73110 X-RAY EXAM OF WRIST: CPT | Mod: 26,RT,, | Performed by: RADIOLOGY

## 2021-11-15 PROCEDURE — 3288F FALL RISK ASSESSMENT DOCD: CPT | Mod: CPTII,S$GLB,, | Performed by: ORTHOPAEDIC SURGERY

## 2021-11-15 PROCEDURE — 1101F PT FALLS ASSESS-DOCD LE1/YR: CPT | Mod: CPTII,S$GLB,, | Performed by: ORTHOPAEDIC SURGERY

## 2021-11-15 PROCEDURE — 4010F ACE/ARB THERAPY RXD/TAKEN: CPT | Mod: CPTII,S$GLB,, | Performed by: ORTHOPAEDIC SURGERY

## 2021-11-15 PROCEDURE — 99203 OFFICE O/P NEW LOW 30 MIN: CPT | Mod: 25,S$GLB,, | Performed by: ORTHOPAEDIC SURGERY

## 2021-11-15 PROCEDURE — 1159F PR MEDICATION LIST DOCUMENTED IN MEDICAL RECORD: ICD-10-PCS | Mod: CPTII,S$GLB,, | Performed by: ORTHOPAEDIC SURGERY

## 2021-11-15 PROCEDURE — 73110 X-RAY EXAM OF WRIST: CPT | Mod: TC,PN,RT

## 2021-11-15 PROCEDURE — 1159F MED LIST DOCD IN RCRD: CPT | Mod: CPTII,S$GLB,, | Performed by: ORTHOPAEDIC SURGERY

## 2021-11-15 PROCEDURE — 4010F PR ACE/ARB THEARPY RXD/TAKEN: ICD-10-PCS | Mod: CPTII,S$GLB,, | Performed by: ORTHOPAEDIC SURGERY

## 2021-11-15 PROCEDURE — 3288F PR FALLS RISK ASSESSMENT DOCUMENTED: ICD-10-PCS | Mod: CPTII,S$GLB,, | Performed by: ORTHOPAEDIC SURGERY

## 2021-11-15 PROCEDURE — 73110 XR WRIST COMPLETE 3 VIEWS RIGHT: ICD-10-PCS | Mod: 26,RT,, | Performed by: RADIOLOGY

## 2021-11-15 PROCEDURE — 99999 PR PBB SHADOW E&M-EST. PATIENT-LVL III: CPT | Mod: PBBFAC,,, | Performed by: ORTHOPAEDIC SURGERY

## 2021-11-15 PROCEDURE — 99203 PR OFFICE/OUTPT VISIT, NEW, LEVL III, 30-44 MIN: ICD-10-PCS | Mod: 25,S$GLB,, | Performed by: ORTHOPAEDIC SURGERY

## 2021-11-15 RX ORDER — HYDROCODONE BITARTRATE AND ACETAMINOPHEN 5; 325 MG/1; MG/1
1 TABLET ORAL EVERY 4 HOURS PRN
Qty: 30 TABLET | Refills: 0 | Status: SHIPPED | OUTPATIENT
Start: 2021-11-15 | End: 2021-11-22 | Stop reason: SDUPTHER

## 2021-11-17 ENCOUNTER — PES CALL (OUTPATIENT)
Dept: ADMINISTRATIVE | Facility: CLINIC | Age: 71
End: 2021-11-17
Payer: MEDICARE

## 2021-11-19 DIAGNOSIS — S62.101A CLOSED FRACTURE OF RIGHT WRIST, INITIAL ENCOUNTER: Primary | ICD-10-CM

## 2021-11-22 ENCOUNTER — OFFICE VISIT (OUTPATIENT)
Dept: ORTHOPEDICS | Facility: CLINIC | Age: 71
End: 2021-11-22
Payer: MEDICARE

## 2021-11-22 ENCOUNTER — HOSPITAL ENCOUNTER (OUTPATIENT)
Dept: RADIOLOGY | Facility: HOSPITAL | Age: 71
Discharge: HOME | End: 2021-11-22
Attending: ORTHOPAEDIC SURGERY
Payer: MEDICARE

## 2021-11-22 VITALS — WEIGHT: 224 LBS | BODY MASS INDEX: 36.15 KG/M2

## 2021-11-22 DIAGNOSIS — S62.101A CLOSED FRACTURE OF RIGHT WRIST, INITIAL ENCOUNTER: ICD-10-CM

## 2021-11-22 DIAGNOSIS — S62.101D CLOSED FRACTURE OF RIGHT WRIST WITH ROUTINE HEALING, SUBSEQUENT ENCOUNTER: Primary | ICD-10-CM

## 2021-11-22 PROCEDURE — 99999 PR PBB SHADOW E&M-EST. PATIENT-LVL III: CPT | Mod: PBBFAC,,, | Performed by: ORTHOPAEDIC SURGERY

## 2021-11-22 PROCEDURE — 73110 X-RAY EXAM OF WRIST: CPT | Mod: 26,RT,, | Performed by: RADIOLOGY

## 2021-11-22 PROCEDURE — 4010F ACE/ARB THERAPY RXD/TAKEN: CPT | Mod: CPTII,S$GLB,, | Performed by: ORTHOPAEDIC SURGERY

## 2021-11-22 PROCEDURE — 73110 XR WRIST COMPLETE 3 VIEWS RIGHT: ICD-10-PCS | Mod: 26,RT,, | Performed by: RADIOLOGY

## 2021-11-22 PROCEDURE — 73110 X-RAY EXAM OF WRIST: CPT | Mod: TC,PN,RT

## 2021-11-22 PROCEDURE — 99213 OFFICE O/P EST LOW 20 MIN: CPT | Mod: S$GLB,,, | Performed by: ORTHOPAEDIC SURGERY

## 2021-11-22 PROCEDURE — 99999 PR PBB SHADOW E&M-EST. PATIENT-LVL III: ICD-10-PCS | Mod: PBBFAC,,, | Performed by: ORTHOPAEDIC SURGERY

## 2021-11-22 PROCEDURE — 99213 PR OFFICE/OUTPT VISIT, EST, LEVL III, 20-29 MIN: ICD-10-PCS | Mod: S$GLB,,, | Performed by: ORTHOPAEDIC SURGERY

## 2021-11-22 PROCEDURE — 4010F PR ACE/ARB THEARPY RXD/TAKEN: ICD-10-PCS | Mod: CPTII,S$GLB,, | Performed by: ORTHOPAEDIC SURGERY

## 2021-11-22 RX ORDER — HYDROCODONE BITARTRATE AND ACETAMINOPHEN 5; 325 MG/1; MG/1
1 TABLET ORAL EVERY 12 HOURS PRN
Qty: 28 TABLET | Refills: 0 | Status: SHIPPED | OUTPATIENT
Start: 2021-11-22 | End: 2021-12-02

## 2021-12-09 ENCOUNTER — OFFICE VISIT (OUTPATIENT)
Dept: ORTHOPEDICS | Facility: CLINIC | Age: 71
End: 2021-12-09
Payer: MEDICARE

## 2021-12-09 ENCOUNTER — HOSPITAL ENCOUNTER (OUTPATIENT)
Dept: RADIOLOGY | Facility: HOSPITAL | Age: 71
Discharge: HOME | End: 2021-12-09
Attending: ORTHOPAEDIC SURGERY
Payer: MEDICARE

## 2021-12-09 VITALS — HEIGHT: 66 IN | BODY MASS INDEX: 36 KG/M2 | WEIGHT: 224 LBS

## 2021-12-09 DIAGNOSIS — S62.101D CLOSED FRACTURE OF RIGHT WRIST WITH ROUTINE HEALING, SUBSEQUENT ENCOUNTER: ICD-10-CM

## 2021-12-09 DIAGNOSIS — S62.101D CLOSED FRACTURE OF RIGHT WRIST WITH ROUTINE HEALING, SUBSEQUENT ENCOUNTER: Primary | ICD-10-CM

## 2021-12-09 PROCEDURE — 99213 OFFICE O/P EST LOW 20 MIN: CPT | Mod: S$GLB,,, | Performed by: ORTHOPAEDIC SURGERY

## 2021-12-09 PROCEDURE — 99999 PR PBB SHADOW E&M-EST. PATIENT-LVL III: ICD-10-PCS | Mod: PBBFAC,,, | Performed by: ORTHOPAEDIC SURGERY

## 2021-12-09 PROCEDURE — 4010F ACE/ARB THERAPY RXD/TAKEN: CPT | Mod: CPTII,S$GLB,, | Performed by: ORTHOPAEDIC SURGERY

## 2021-12-09 PROCEDURE — 73110 X-RAY EXAM OF WRIST: CPT | Mod: TC,PN,RT

## 2021-12-09 PROCEDURE — 73110 X-RAY EXAM OF WRIST: CPT | Mod: 26,RT,, | Performed by: RADIOLOGY

## 2021-12-09 PROCEDURE — 99213 PR OFFICE/OUTPT VISIT, EST, LEVL III, 20-29 MIN: ICD-10-PCS | Mod: S$GLB,,, | Performed by: ORTHOPAEDIC SURGERY

## 2021-12-09 PROCEDURE — 99999 PR PBB SHADOW E&M-EST. PATIENT-LVL III: CPT | Mod: PBBFAC,,, | Performed by: ORTHOPAEDIC SURGERY

## 2021-12-09 PROCEDURE — 73110 XR WRIST COMPLETE 3 VIEWS RIGHT: ICD-10-PCS | Mod: 26,RT,, | Performed by: RADIOLOGY

## 2021-12-09 PROCEDURE — 4010F PR ACE/ARB THEARPY RXD/TAKEN: ICD-10-PCS | Mod: CPTII,S$GLB,, | Performed by: ORTHOPAEDIC SURGERY

## 2021-12-29 ENCOUNTER — TELEPHONE (OUTPATIENT)
Dept: ORTHOPEDICS | Facility: CLINIC | Age: 71
End: 2021-12-29
Payer: MEDICARE

## 2021-12-29 DIAGNOSIS — S62.101D CLOSED FRACTURE OF RIGHT WRIST WITH ROUTINE HEALING, SUBSEQUENT ENCOUNTER: Primary | ICD-10-CM

## 2022-01-04 ENCOUNTER — PATIENT OUTREACH (OUTPATIENT)
Dept: ADMINISTRATIVE | Facility: OTHER | Age: 72
End: 2022-01-04
Payer: MEDICARE

## 2022-01-04 NOTE — PROGRESS NOTES
Health Maintenance Due   Topic Date Due    Shingles Vaccine (2 of 3) 05/03/2016    Mammogram  08/13/2021     Updates were requested from care everywhere.  Chart was reviewed for overdue Proactive [facility] Encounters (DELMIS) topics (CRS, Breast Cancer Screening, Eye exam)  Health Maintenance has been updated.  LINKS immunization registry triggered.  Immunizations were reconciled.

## 2022-01-05 NOTE — PROGRESS NOTES
Subjective:      Patient ID: Tracy Armendariz is a 71 y.o. female.    Chief Complaint: Follow-up and Wrist Injury (Right Fx)      HPI: Tracy Armendariz is here in follow-up of right wrist fracture about 8 weeks out from injury. She is being treated closed with EXOS. Today, she reports doing well since our last visit except for the last few days. She noticed increase in wrist pain. She does reports multiple falls (x3) since our last visit but does not think the hand was involved. She reports worsening vertigo and balance issues since CVA many years ago. She does not have any medication for vertigo. Recommend she see PCP/ neurologist.     Past Medical History:   Diagnosis Date    Allergy     Anticoagulant long-term use     Arthritis     CVA (cerebral infarction) 2013    Depression     Disorder of kidney and ureter     renal stones    Diverticulosis of colon     Extrinsic asthma, unspecified     Hyperlipidemia     Hypertension     Kidney stone     Left atrial enlargement 12/16/2014    Low back pain     MARTIN (obstructive sleep apnea)     Osteopenia     PUD (peptic ulcer disease)     Stroke  December 2013    Urinary tract infection        Current Outpatient Medications:     albuterol (PROVENTIL/VENTOLIN HFA) 90 mcg/actuation inhaler, INHALE 2 PUFFS EVERY 6 HOURS AS NEEDED FOR WHEEZING (Patient taking differently: Inhale 2 puffs into the lungs every 6 (six) hours as needed for Wheezing.), Disp: 18 g, Rfl: 0    aspirin 81 MG Chew, Take 81 mg by mouth once daily., Disp: , Rfl:     atorvastatin (LIPITOR) 10 MG tablet, Take 1 tablet (10 mg total) by mouth once daily., Disp: 90 tablet, Rfl: 3    buPROPion (WELLBUTRIN XL) 150 MG TB24 tablet, Take 1 tablet (150 mg total) by mouth once daily., Disp: 30 tablet, Rfl: 11    calcium carbonate (OS-SPENCER) 600 mg (1,500 mg) Tab, Take 600 mg by mouth 2 (two) times daily with meals., Disp: , Rfl:     cholecalciferol, vitamin D3, 1,000 unit Chew, Take 1,000 Units by  "mouth once daily., Disp: , Rfl:     diclofenac sodium (VOLTAREN) 1 % Gel, Apply 2 g topically once daily., Disp: 100 g, Rfl: 5    EScitalopram oxalate (LEXAPRO) 20 MG tablet, TAKE 1 TABLET(20 MG) BY MOUTH EVERY DAY (Patient taking differently: Take 20 mg by mouth once daily.), Disp: 90 tablet, Rfl: 3    famotidine (PEPCID) 10 MG tablet, Take 10 mg by mouth 2 (two) times daily., Disp: , Rfl:     FLUAD [id number], 65 YR UP,,PF, 45 mcg (15 mcg x 3)/0.5 mL Syrg, , Disp: , Rfl:     hydrocortisone 2.5 % cream, Apply topically 2 (two) times daily to affected area, Disp: 30 g, Rfl: 2    LACTOBACILLUS ACIDOPHILUS (PROBIOTIC ORAL), Take 1 capsule by mouth once daily., Disp: , Rfl:     lidocaine-prilocaine (EMLA) cream, Apply topically as needed., Disp: 30 g, Rfl: 5    losartan (COZAAR) 100 MG tablet, Take 1 tablet (100 mg total) by mouth once daily., Disp: 90 tablet, Rfl: 3    MULTIVIT WITH CALCIUM,IRON,MIN (WOMEN'S DAILY MULTIVITAMIN ORAL), Take 1 tablet by mouth once daily., Disp: , Rfl:     ondansetron (ZOFRAN-ODT) 4 MG TbDL, dissolve 1 tablet (4 mg total) by mouth every 8 (eight) hours as needed (nausea)., Disp: 20 tablet, Rfl: 0    tamsulosin (FLOMAX) 0.4 mg Cap, TAKE 1 CAPSULE(0.4 MG) BY MOUTH EVERY DAY (Patient taking differently: Take 0.4 mg by mouth once daily.), Disp: 90 capsule, Rfl: 0  Review of patient's allergies indicates:   Allergen Reactions    Iodinated contrast media Hives and Rash    Gabapentin Hallucinations    Iodine Hives    Isothiazolinones Rash    Penicillins Rash       Ht 5' 6" (1.676 m)   Wt 101.6 kg (223 lb 15.8 oz)   LMP 01/01/1978 (Approximate)   BMI 36.15 kg/m²     Review of Systems   Constitutional: Negative for chills and fever.   Cardiovascular: Negative for chest pain and palpitations.   Respiratory: Negative for shortness of breath and wheezing.    Skin: Negative for poor wound healing and rash.   Musculoskeletal: Positive for falls, joint pain, joint swelling and " stiffness.   Gastrointestinal: Negative for nausea and vomiting.   Genitourinary: Negative for dysuria and hematuria.   Neurological: Positive for loss of balance and vertigo. Negative for seizures and tremors.   Psychiatric/Behavioral: Negative for altered mental status.   Allergic/Immunologic: Negative for environmental allergies and persistent infections.         Objective:    Ortho Exam       Right UE: Skin intact. Swelling mild. TTP minmal. ROM limited in felxion/ extension. Sensation intact. Tinels negative. Pulses present. Cap refill birsk.   GEN: Well developed, well nourished female. AAOX3. No acute distress.   Normocephalic, atraumatic.   LIZBET  Breathing unlabored.  Mood and affect appropriate.     Assessment:     Imaging:   I have personally reviewed and interpreted the radiographs. Xray of the right wrist shows increased callus, alignment stable.         1. Closed fracture of right wrist with routine healing, subsequent encounter          Plan:       Healing distal radius fracture, now 8 weeks out  Switched to smaller removable brace to be worn part time, start weaning out and using hand for light activity. No lifting, brace for leaving house  Start therapy  Continue tylenol as needed    Follow-up with PCP or neurologist for falls      Orders Placed This Encounter    Ambulatory referral/consult to Physical/Occupational Therapy     Follow up in about 3 weeks (around 1/27/2022).

## 2022-01-06 ENCOUNTER — HOSPITAL ENCOUNTER (OUTPATIENT)
Dept: RADIOLOGY | Facility: HOSPITAL | Age: 72
Discharge: HOME | End: 2022-01-06
Attending: ORTHOPAEDIC SURGERY
Payer: MEDICARE

## 2022-01-06 ENCOUNTER — OFFICE VISIT (OUTPATIENT)
Dept: ORTHOPEDICS | Facility: CLINIC | Age: 72
End: 2022-01-06
Payer: MEDICARE

## 2022-01-06 VITALS — WEIGHT: 224 LBS | HEIGHT: 66 IN | BODY MASS INDEX: 36 KG/M2

## 2022-01-06 DIAGNOSIS — S62.101D CLOSED FRACTURE OF RIGHT WRIST WITH ROUTINE HEALING, SUBSEQUENT ENCOUNTER: Primary | ICD-10-CM

## 2022-01-06 DIAGNOSIS — S62.101D CLOSED FRACTURE OF RIGHT WRIST WITH ROUTINE HEALING, SUBSEQUENT ENCOUNTER: ICD-10-CM

## 2022-01-06 PROCEDURE — 1125F AMNT PAIN NOTED PAIN PRSNT: CPT | Mod: CPTII,S$GLB,, | Performed by: ORTHOPAEDIC SURGERY

## 2022-01-06 PROCEDURE — 3008F PR BODY MASS INDEX (BMI) DOCUMENTED: ICD-10-PCS | Mod: CPTII,S$GLB,, | Performed by: ORTHOPAEDIC SURGERY

## 2022-01-06 PROCEDURE — 1159F MED LIST DOCD IN RCRD: CPT | Mod: CPTII,S$GLB,, | Performed by: ORTHOPAEDIC SURGERY

## 2022-01-06 PROCEDURE — 73110 X-RAY EXAM OF WRIST: CPT | Mod: TC,PN,RT

## 2022-01-06 PROCEDURE — 73110 X-RAY EXAM OF WRIST: CPT | Mod: 26,RT,, | Performed by: RADIOLOGY

## 2022-01-06 PROCEDURE — 1125F PR PAIN SEVERITY QUANTIFIED, PAIN PRESENT: ICD-10-PCS | Mod: CPTII,S$GLB,, | Performed by: ORTHOPAEDIC SURGERY

## 2022-01-06 PROCEDURE — 1100F PTFALLS ASSESS-DOCD GE2>/YR: CPT | Mod: CPTII,S$GLB,, | Performed by: ORTHOPAEDIC SURGERY

## 2022-01-06 PROCEDURE — 73110 XR WRIST COMPLETE 3 VIEWS RIGHT: ICD-10-PCS | Mod: 26,RT,, | Performed by: RADIOLOGY

## 2022-01-06 PROCEDURE — 3288F FALL RISK ASSESSMENT DOCD: CPT | Mod: CPTII,S$GLB,, | Performed by: ORTHOPAEDIC SURGERY

## 2022-01-06 PROCEDURE — 3008F BODY MASS INDEX DOCD: CPT | Mod: CPTII,S$GLB,, | Performed by: ORTHOPAEDIC SURGERY

## 2022-01-06 PROCEDURE — 3288F PR FALLS RISK ASSESSMENT DOCUMENTED: ICD-10-PCS | Mod: CPTII,S$GLB,, | Performed by: ORTHOPAEDIC SURGERY

## 2022-01-06 PROCEDURE — 99999 PR PBB SHADOW E&M-EST. PATIENT-LVL III: CPT | Mod: PBBFAC,,, | Performed by: ORTHOPAEDIC SURGERY

## 2022-01-06 PROCEDURE — 1160F RVW MEDS BY RX/DR IN RCRD: CPT | Mod: CPTII,S$GLB,, | Performed by: ORTHOPAEDIC SURGERY

## 2022-01-06 PROCEDURE — 1100F PR PT FALLS ASSESS DOC 2+ FALLS/FALL W/INJURY/YR: ICD-10-PCS | Mod: CPTII,S$GLB,, | Performed by: ORTHOPAEDIC SURGERY

## 2022-01-06 PROCEDURE — 99999 PR PBB SHADOW E&M-EST. PATIENT-LVL III: ICD-10-PCS | Mod: PBBFAC,,, | Performed by: ORTHOPAEDIC SURGERY

## 2022-01-06 PROCEDURE — 1159F PR MEDICATION LIST DOCUMENTED IN MEDICAL RECORD: ICD-10-PCS | Mod: CPTII,S$GLB,, | Performed by: ORTHOPAEDIC SURGERY

## 2022-01-06 PROCEDURE — 99214 OFFICE O/P EST MOD 30 MIN: CPT | Mod: S$GLB,,, | Performed by: ORTHOPAEDIC SURGERY

## 2022-01-06 PROCEDURE — 99214 PR OFFICE/OUTPT VISIT, EST, LEVL IV, 30-39 MIN: ICD-10-PCS | Mod: S$GLB,,, | Performed by: ORTHOPAEDIC SURGERY

## 2022-01-06 PROCEDURE — 1160F PR REVIEW ALL MEDS BY PRESCRIBER/CLIN PHARMACIST DOCUMENTED: ICD-10-PCS | Mod: CPTII,S$GLB,, | Performed by: ORTHOPAEDIC SURGERY

## 2022-01-10 ENCOUNTER — PATIENT MESSAGE (OUTPATIENT)
Dept: ADMINISTRATIVE | Facility: HOSPITAL | Age: 72
End: 2022-01-10
Payer: MEDICARE

## 2022-01-12 ENCOUNTER — CLINICAL SUPPORT (OUTPATIENT)
Dept: REHABILITATION | Facility: HOSPITAL | Age: 72
End: 2022-01-12
Payer: MEDICARE

## 2022-01-12 DIAGNOSIS — S62.101D CLOSED FRACTURE OF RIGHT WRIST WITH ROUTINE HEALING, SUBSEQUENT ENCOUNTER: ICD-10-CM

## 2022-01-12 DIAGNOSIS — R68.89 ALTERATION IN SELF-CARE ABILITY: ICD-10-CM

## 2022-01-12 PROCEDURE — 97110 THERAPEUTIC EXERCISES: CPT | Mod: PN

## 2022-01-12 PROCEDURE — 97165 OT EVAL LOW COMPLEX 30 MIN: CPT | Mod: PN

## 2022-01-12 NOTE — PLAN OF CARE
"  [facility] Occupational Therapy  Wrist & Hand Initial Evaluation     Date: 1/12/2022  Patient: Tracy Armendariz  Chart Number: [id number]  Referring Physician: Nichole Lyles PA-C  Therapy Diagnosis: Alteration in self-care ability    Medical Diagnosis: S62.101D (ICD-10-CM) - Closed fracture of right wrist with routine healing, subsequent encounter  Physician Orders: Eval and treat  Evaluation Date: 1/12/2022  Insurance Authorization Period Expiration: through 1/26/22 (insurance approved 6 visits)  Plan of Care from 1/12/2022 to 3/11/22   Date of Return to MD: 1/31/22    Visit #: 1 of 6  Time In: 0950  Time Out: 1015  Total Billable Time: 25    Precautions: Standard , Standard, blood thinners, Fall and Weightbearing    Subjective     Involved Side: Right  Dominant Side: Right  Date of Onset: 11/9/21  History of Current Condition: fall on outstretched hand when tripping over uneven pavement. Was initially reduced non-surgically then immobilized in fiberglass, but transitioned to splint and is now in short arm as of MD visit 1/6/22.    Imaging: Last x-ray was on 1/6/22 read by Dr. Redmond which indicated, "Healing comminuted distal radial fracture. Ulnar styloid fracture."  Previous Therapy: none yet.    Patient's Goals for Therapy: Tracy desires to restore pain-free function of the   Right UE for participating in all customary & necessary I/ADLs.    Pain:  Functional Pain Scale Rating 0-10:   5/10 on average  n/a/10 at best  8/10 at worst  Location: primarily ulnar styloid, thenar muscles and proximal to MP joint  Description: aching, soreness  Aggravating Factors: holding in a dependent position, also pro/supination, grasping and use of hand.  Easing Factors: not doing the things that hurt.    Occupation:    Retired, lives with able bodied . No pets.    Functional Limitations/Social History:    Previous functional status includes:  Independent with all ADLs.     Current Level of " Function    Home/Living environment : lives with their spouse   Limitation of Functional Status as follows:  ADLs/IADLs:     - Feeding: unable to use fork and knife. Compensating with left hand.    - Bathing: using left hand.    - Dressing/Grooming: fasteners are difficult.  Leaves in earrings. Wearing slide on shoes.    - Driving: independent.   Leisure: Gardening     CMS Impairment/Limitation/Restriction for FOTO Hand Survey     Therapist reviewed FOTO scores for Tracy on 2022.   FOTO documents entered into [employer] - see Media section.     Limitation Score: 65        Past Medical History/Physical Systems Review:   Tracy Armendariz  has a past medical history of Allergy, Anticoagulant long-term use, Arthritis, CVA (cerebral infarction), Depression, Disorder of kidney and ureter, Diverticulosis of colon, Extrinsic asthma, unspecified, Hyperlipidemia, Hypertension, Kidney stone, Left atrial enlargement, Low back pain, MARTIN (obstructive sleep apnea), Osteopenia, PUD (peptic ulcer disease), Stroke, and Urinary tract infection.    Tracy Armendariz  has a past surgical history that includes Inner ear surgery; Cholecystectomy ();  section (); Dilation and curettage of uterus (); Appendectomy (); Hysterectomy (); Tympanoplasty; Lumbar discectomy (); Knee arthroscopy w/ debridement (); Tonsillectomy; Back surgery; Cataract extraction; Colonoscopy (N/A, 2018); Joint replacement (Right); Ureteroscopic removal of ureteric calculus (Left, 2018); and Oophorectomy.    Tracy has a current medication list which includes the following prescription(s): albuterol, aspirin, atorvastatin, bupropion, calcium carbonate, cholecalciferol (vitamin d3), diclofenac sodium, escitalopram oxalate, famotidine, fluad [id number] (65 yr up)(pf), hydrocortisone, lactobacillus acidophilus, lidocaine-prilocaine, losartan, multivit with calcium,iron,min, ondansetron, and tamsulosin.    Review of  patient's allergies indicates:   Allergen Reactions    Iodinated contrast media Hives and Rash    Gabapentin Hallucinations    Iodine Hives    Isothiazolinones Rash    Penicillins Rash          Objective     Mental status: alert, interactive, oriented x3    Observation:   Forward shoulders. Thenar wasting on right hand.  Bruising at volar wrist.    Edema: Circumferential measurements: In centimters     Right/Left    IE-   IF P1  6.7/6.3   SF P1   5.8/5.6   Thumb P1 7.4/7.5   DPC 19/17.5   Wrist 8.3/7.0         Digit and Thumb AROM (in centimeters)   Right   DATE IE-   IF  4cm/full   Thumb Opposition to second segment of 5th digit on right, base of fifth digit on left    LIRIANO Will measure next session.      Wrist AROM   Right/Left   DATE IE-   EXT 30/90   FLX 17/45   RD 0/12   UD  26/35      Forearm AROM   Right/Left   DATE IE-   Supination 68/85   Pronation 90/90        Treatment     Treatment Time In: 1005  Treatment Time Out: 1015  Total Treatment time separate from Evaluation time:10    Tracy performed therapeutic exercises for 10 minutes including:  -home exercise program including continuing pain free active range of motion within splint while avoiding lifting.    Tracy participated in an in-depth and concurrent discussion of evaluation findings and specific home care, including:  - education related to the tissue involvement as evidenced by MD diagnosis, positive response during provative movements and/or special testing.  - the anatomy and pathophysiology of diagnosis.    Home Exercise Program and Home Care Resources/Education:  Issued HEP (see patient instructions in EMR) and educated on modality use for pain management . Exercises were reviewed and Tracy was able to demonstrate them prior to the end of the session.   Pt received a written copy of exercises to perform at home. Tracy demonstrated good  understanding of the education provided.  Pt was advised to perform these exercises free of  pain, and to stop performing them if pain occurs.    Patient/Family Education: role of OT, goals for OT, scheduling/cancellations - pt verbalized understanding. Discussed insurance limitations with patient.    Additional Education provided: benefits of arriving on time.      Assessment     Tracy Armendariz is a 71 y.o. female presents with limitations as described in problem list. Patient can benefit from Occupational Therapy services for Iontophoresis, ultrasound, moist heat, therapeutic exercises, home exercise program provied with written instructions, ice and strengthening and orthotics, if deemed necessary . The following goals were discussed with the patient and she is in agreement with them as to be addressed in the treatment plan.    The patient's rehab potential is Good.     Anticipated barriers to occupational therapy: history of falls, pain  Pt has no cultural, educational or language barriers to learning provided.    Profile and History Assessment of Occupational Performance Level of Clinical Decision Making Complexity Score   Occupational Profile:   Tracy Armendariz is a 71 y.o. female who is retired Tracy Armendariz has difficulty with  ADLs and IADLs as listed previously, which  affecting his/her daily functional abilities.      Comorbidities:    has a past medical history of Allergy, Anticoagulant long-term use, Arthritis, CVA (cerebral infarction), Depression, Disorder of kidney and ureter, Diverticulosis of colon, Extrinsic asthma, unspecified, Hyperlipidemia, Hypertension, Kidney stone, Left atrial enlargement, Low back pain, MARTIN (obstructive sleep apnea), Osteopenia, PUD (peptic ulcer disease), Stroke, and Urinary tract infection.    Medical and Therapy History Review:   Brief               Performance Deficits    Physical:  Joint Mobility  Joint Stability  Muscle Power/Strength  Muscle Endurance  Edema   Strength  Pinch Strength  Pain    Cognitive:  No Deficits    Psychosocial:     Habits  Routines  Rituals     Clinical Decision Making:  low    Assessment Process:  Problem-Focused Assessments    Modification/Need for Assistance:  Not Necessary    Intervention Selection:  Limited Treatment Options       low  Based on PMHX, co morbidities , data from assessments and functional level of assistance required with task and clinical presentation directly impacting function.         Goals:   ( LTG's (6-8 weeks):  1)   Increase right wrist LIRIANO to >90% of the right wrist to increase functional hand use for weight bearing and reaching tasks.  2)   Right  strength  to be > 70 % of the unaffected side.  3)   Right pinch strength to be >60% of the unaffected side.  4)   Decrease complaints of pain to 2 out of 10 at worst to increase functional hand use and UE support functions for moderate to heavy ADL/work/leisure activities.  5)   Patient to score at 45 % or less on Quick/DASH and/or Work module to demonstrate improved perception of functional right hand use to evidence return to near to prior level of function for I/ADLs and return to gardening.    STG's (4-5 weeks)  1)   Patient to be IND with HEP and modalities for pain/edema managment.  2)   Pt to tolerate advancing PREs and flexibility activities for weight bearing positions with self-graded intensity and effective symptom monitoring.   3) Increase wrist and forearm LIRIANO by 10 degrees to increase functional hand use and support function positions for ADLs/work/leisure activities.  4) Digit and Thumb LIRIANO to be WNL as compared to the unaffected side for maximizing  and fine motor skills for reactivation the hand for light ADLs.  5)  Decrease edema by 1.5 cm for evidencing soft tissue healing and effective edema mgmt.     Plan     Pt to be treated by Occupational Therapy 2 times per week for 8 weeks during the certification period from 1/12/2022 to 3/11/22 to achieve the established goals.     Treatment to include: Paraffin, Fluidotherapy,  Manual therapy/joint mobilizations, Modalities for pain management, Therapeutic exercises/activities., Strengthening, Orthotic Fabrication/Fit/Training, Edema Control, Joint Protection and Energy Conservation, as well as any other treatments deemed necessary based on the patient's needs or progress.     ASHLEY BRAGA, OT  Occupational Therapist    I certify the need for these services furnished under this plan of treatment and while under my care    ____________________________________  Physician/Referring Practitioner    _______________  Date of Signature

## 2022-01-20 ENCOUNTER — CLINICAL SUPPORT (OUTPATIENT)
Dept: REHABILITATION | Facility: HOSPITAL | Age: 72
End: 2022-01-20
Payer: MEDICARE

## 2022-01-20 DIAGNOSIS — R68.89 ALTERATION IN SELF-CARE ABILITY: ICD-10-CM

## 2022-01-20 PROCEDURE — 97140 MANUAL THERAPY 1/> REGIONS: CPT | Mod: PN

## 2022-01-20 PROCEDURE — 97110 THERAPEUTIC EXERCISES: CPT | Mod: PN

## 2022-01-20 PROCEDURE — 97022 WHIRLPOOL THERAPY: CPT | Mod: PN

## 2022-01-20 NOTE — PROGRESS NOTES
"  Occupational Therapy Daily Treatment Note     Name: Tracy Armendariz  Clinic Number: [id number]    Therapy Diagnosis:   Encounter Diagnosis   Name Primary?    Alteration in self-care ability      Physician: Nichole Lyles PA-C    Visit Date: 1/20/2022    Medical Diagnosis: S62.101D (ICD-10-CM) - Closed fracture of right wrist with routine healing, subsequent encounter  Physician Orders: Eval and treat  Evaluation Date: 1/12/2022  Insurance Authorization Period Expiration: through 1/26/22 (insurance approved 6 visits)  Plan of Care from 1/12/2022 to 3/11/22   Date of Return to MD: 1/31/22  Date of Onset: 11/9/21    Visit # / Visits authorized: 1 / 6 (total 2 with eval)  Time In:1305  Time Out: 1345  Total Billable Time: 40 minutes  Charges: 3  Todays Amount:  Total Amount:    Precautions:  Standard and blood thinners, falls  10 weeks as of 1/20/22    Subjective     Pt reports: "My splint is very itchy."  she was compliant with home exercise program given last session.   Response to previous treatment: first visit back  Functional change: first visit back    Pain: 4/10  Location: right lat epicondyle, radial and ulnar wrist.      Objective     Tracy received the following supervised modalities after being cleared for contradictions for 15 minutes:   - Fluidotherapy for promoting healing, reducing pain, desensitization and increasing tissue extensibility; both hands    Tracy performed therapeutic exercises for 20 minutes including:  -home exercise program including continuing pain free active range of motion within splint while avoiding lifting.    Tracy participated in dynamic functional manual techniques for 10  minutes, including:  -grade 2-3 joint mobilization at digits, hand, and wrist. No pain.  -Gentle passive range of motion within patient's pain tolerance using hold/release.     Home Exercises and Education Provided     Education provided:   - Gentle approach to therapy. She expected more force " during treatments.  - Progress towards goals     Written Home Exercises Provided: Patient instructed to cont prior HEP.  Exercises were reviewed and Tracy was able to demonstrate them prior to the end of the session.  Tracy demonstrated good  understanding of the HEP provided.   .   See EMR under Patient Instructions for exercises provided prior visit.        Assessment   Pt had good first session following initial evaluation. No adverse reactions to treatment.  Active range of motion and gentle passive stretch were tolerable. Trained patient on safe, effective use of ice for after treatment and before bed. Pt would continue to benefit from skilled OT. Tracy is progressing well towards her goals and there are no updates to goals at this time. Pt prognosis is Excellent.     Pt will continue to benefit from skilled outpatient occupational therapy to address the deficits listed in the problem list on initial evaluation provide pt/family education and to maximize pt's level of independence in the home and community environment.     Anticipated barriers to occupational therapy: pain    Pt's spiritual, cultural and educational needs considered and pt agreeable to plan of care and goals.    Goals:  ( LTG's (6-8 weeks):  1)   Increase right wrist LIRIANO to >90% of the right wrist to increase functional hand use for weight bearing and reaching tasks.  2)   Right  strength  to be > 70 % of the unaffected side.  3)   Right pinch strength to be >60% of the unaffected side.  4)   Decrease complaints of pain to 2 out of 10 at worst to increase functional hand use and UE support functions for moderate to heavy ADL/work/leisure activities.  5)   Patient to score at 45 % or less on Quick/DASH and/or Work module to demonstrate improved perception of functional right hand use to evidence return to near to prior level of function for I/ADLs and return to gardening.     STG's (4-5 weeks)  1)   Patient to be IND with HEP and  modalities for pain/edema managment.  2)   Pt to tolerate advancing PREs and flexibility activities for weight bearing positions with self-graded intensity and effective symptom monitoring.   3) Increase wrist and forearm LIRIANO by 10 degrees to increase functional hand use and support function positions for ADLs/work/leisure activities.  4) Digit and Thumb LIRIANO to be WNL as compared to the unaffected side for maximizing  and fine motor skills for reactivation the hand for light ADLs.  5)  Decrease edema by 1.5 cm for evidencing soft tissue healing and effective edema mgmt.     Plan   Updates/Grading for next session: Continue gentle passive range of motion.    ASHLEY BRAGA, OT

## 2022-01-25 ENCOUNTER — CLINICAL SUPPORT (OUTPATIENT)
Dept: REHABILITATION | Facility: HOSPITAL | Age: 72
End: 2022-01-25
Payer: MEDICARE

## 2022-01-25 DIAGNOSIS — R68.89 ALTERATION IN SELF-CARE ABILITY: ICD-10-CM

## 2022-01-25 PROCEDURE — 97022 WHIRLPOOL THERAPY: CPT | Mod: PN

## 2022-01-25 PROCEDURE — 97140 MANUAL THERAPY 1/> REGIONS: CPT | Mod: PN

## 2022-01-25 PROCEDURE — 97110 THERAPEUTIC EXERCISES: CPT | Mod: PN

## 2022-01-25 NOTE — PROGRESS NOTES
"  Occupational Therapy Daily Treatment Note     Name: Tracy Armendariz  Clinic Number: [id number]    Therapy Diagnosis:   Encounter Diagnosis   Name Primary?    Alteration in self-care ability      Physician: Nichole Lyles PA-C    Visit Date: 1/25/2022    Medical Diagnosis: S62.101D (ICD-10-CM) - Closed fracture of right wrist with routine healing, subsequent encounter  Physician Orders: Eval and treat  Evaluation Date: 1/12/2022  Insurance Authorization Period Expiration: through 1/26/22 (insurance approved 6 visits)  Plan of Care from 1/12/2022 to 3/11/22   Date of Return to MD: 1/31/22  Date of Onset: 11/9/21    Visit # / Visits authorized: 2 / 6 (total 3 with eval)  Time In:1307  Time Out: 1345  Total Billable Time: 38 minutes  Charges: 3  Todays Amount:  Total Amount:    Precautions:  Standard and blood thinners, falls  11 weeks as of 1/25/22    Subjective     Pt reports: "My splint is very itchy."  she was compliant with home exercise program given last session.   Response to previous treatment: first visit back  Functional change: first visit back    Pain: 4/10  Location: right lat epicondyle, radial and ulnar wrist.      Objective     Tracy received the following supervised modalities after being cleared for contradictions for 15 minutes:   - Fluidotherapy for promoting healing, reducing pain, desensitization and increasing tissue extensibility; both hands    Tracy performed therapeutic exercises for 20 minutes including:  -Review of home exercise program with good return demonstrations of all exercises including elbow flexion/extension, wrist flexion/extension, supination/pronation, as well as finger opposition, pinky slides, and flexor tendon glides. Good return demonstrations with each activity.  -introduced intrinsic muscle strengthening with red sponge.  Return demonstrations of lumbrical pinches and tip-tip pinch x 1 minute each.    Tracy participated in dynamic functional manual techniques " for 10 minutes, including:  -grade 2-3 joint mobilization at digits, hand, and wrist. No pain.  -Gentle passive range of motion within patient's pain tolerance using hold/release.   -Edema mgmt w/ lymphatic drainage technique;  Deep STM, including MFR, CFM, TPR, using hand techniques to increase blood flow/circulation, improve soft tissue extensability and decrease pain.      Home Exercises and Education Provided     Education provided:   - Progress towards goals     Written Home Exercises Provided: Patient instructed to cont prior HEP.  Exercises were reviewed and Tracy was able to demonstrate them prior to the end of the session.  Tracy demonstrated good  understanding of the HEP provided.   .   See EMR under Patient Instructions for exercises provided prior visit.        Assessment   Pt had good second session today. No adverse reactions to treatment.  Active range of motion and gentle passive stretch were tolerable.  Pt would continue to benefit from skilled OT. Tracy is progressing well towards her goals and there are no updates to goals at this time. Pt prognosis is Excellent.     Pt will continue to benefit from skilled outpatient occupational therapy to address the deficits listed in the problem list on initial evaluation provide pt/family education and to maximize pt's level of independence in the home and community environment.     Anticipated barriers to occupational therapy: pain    Pt's spiritual, cultural and educational needs considered and pt agreeable to plan of care and goals.    Goals:  ( LTG's (6-8 weeks):  1)   Increase right wrist LIRIANO to >90% of the right wrist to increase functional hand use for weight bearing and reaching tasks.  2)   Right  strength  to be > 70 % of the unaffected side.  3)   Right pinch strength to be >60% of the unaffected side.  4)   Decrease complaints of pain to 2 out of 10 at worst to increase functional hand use and UE support functions for moderate to heavy  ADL/work/leisure activities.  5)   Patient to score at 45 % or less on Quick/DASH and/or Work module to demonstrate improved perception of functional right hand use to evidence return to near to prior level of function for I/ADLs and return to gardening.     STG's (4-5 weeks)  1)   Patient to be IND with HEP and modalities for pain/edema managment.  2)   Pt to tolerate advancing PREs and flexibility activities for weight bearing positions with self-graded intensity and effective symptom monitoring.   3) Increase wrist and forearm LIRIANO by 10 degrees to increase functional hand use and support function positions for ADLs/work/leisure activities.  4) Digit and Thumb LIRIANO to be WNL as compared to the unaffected side for maximizing  and fine motor skills for reactivation the hand for light ADLs.  5)  Decrease edema by 1.5 cm for evidencing soft tissue healing and effective edema mgmt.     Plan   Updates/Grading for next session: Continue gentle passive range of motion.    ASHLEY BRAGA, OT

## 2022-01-27 ENCOUNTER — CLINICAL SUPPORT (OUTPATIENT)
Dept: REHABILITATION | Facility: HOSPITAL | Age: 72
End: 2022-01-27
Payer: MEDICARE

## 2022-01-27 DIAGNOSIS — R68.89 ALTERATION IN SELF-CARE ABILITY: ICD-10-CM

## 2022-01-27 PROCEDURE — 97140 MANUAL THERAPY 1/> REGIONS: CPT | Mod: PN

## 2022-01-27 PROCEDURE — 97110 THERAPEUTIC EXERCISES: CPT | Mod: PN

## 2022-01-27 PROCEDURE — 97018 PARAFFIN BATH THERAPY: CPT | Mod: PN

## 2022-01-27 NOTE — PROGRESS NOTES
Occupational Therapy Daily Treatment Note     Name: Tracy Armendariz  Clinic Number: [id number]    Therapy Diagnosis:   Encounter Diagnosis   Name Primary?    Alteration in self-care ability      Physician: No ref. provider found    Visit Date: 1/27/2022    Medical Diagnosis: S62.101D (ICD-10-CM) - Closed fracture of right wrist with routine healing, subsequent encounter  Physician Orders: Eval and treat  Evaluation Date: 1/12/2022  Insurance Authorization Period Expiration: through 1/26/22 (insurance approved 6 visits)  Plan of Care from 1/12/2022 to 3/11/22   Date of Return to MD: 1/31/22  Date of Onset: 11/9/21    Visit # / Visits authorized: 3 / 6 (total 3 with eval)  Time In:1258  Time Out: 1345  Total Billable Time: 47 minutes  Charges: 3  Todays Amount:  Total Amount:    Precautions:  Standard and blood thinners, falls  11 weeks as of 1/25/22    Subjective     Pt reports: Thumb soreness  she was compliant with home exercise program given last session.   Response to previous treatment: pain relief  Functional change: better opposition with thumb to middle finger.    Pain: 5/10  Location: right lat epicondyle, radial and ulnar wrist.      Objective     Tracy received the following supervised modalities after being cleared for contradictions for 15 minutes:   Paraffin bath to improve extensibility of tissues for exercise and manual techniques.    Tracy performed therapeutic exercises for 10 minutes including:  Finger bands for intrinsic strengthening with composite finger extension, 2 minutes, 2 sets.  Lumbrical/lateral pinches vs. blue sponge x 10 x 2.  Wrist maze trial was too uncomfortable to continue.      Tracy participated in dynamic functional manual techniques for 20 minutes, including:  -grade 2-3 joint mobilization at digits, hand, and wrist. No pain.   -Edema mgmt w/ lymphatic drainage technique;  Deep STM, including MFR, CFM, TPR, using hand techniques to increase blood flow/circulation, improve  soft tissue extensability and decrease pain.  -Provided hold release and contract release for wrist flexion/extension, supination.      Home Exercises and Education Provided     Education provided:   - Progress towards goals     Written Home Exercises Provided: Patient instructed to cont prior HEP.  Exercises were reviewed and Tracy was able to demonstrate them prior to the end of the session.  Tracy demonstrated good  understanding of the HEP provided.   .   See EMR under Patient Instructions for exercises provided prior visit.        Assessment   Pt had good third session today. Occasional pain during treatment, but patient expressed when she needed to change activity as with wrist maze. Overall pain and active range of motion are improving.  Pt would continue to benefit from skilled OT. Tracy is progressing well towards her goals and there are no updates to goals at this time. Pt prognosis is Excellent.     Pt will continue to benefit from skilled outpatient occupational therapy to address the deficits listed in the problem list on initial evaluation provide pt/family education and to maximize pt's level of independence in the home and community environment.     Anticipated barriers to occupational therapy: pain    Pt's spiritual, cultural and educational needs considered and pt agreeable to plan of care and goals.    Goals:  ( LTG's (6-8 weeks):  1)   Increase right wrist LIRIANO to >90% of the right wrist to increase functional hand use for weight bearing and reaching tasks.  2)   Right  strength  to be > 70 % of the unaffected side.  3)   Right pinch strength to be >60% of the unaffected side.  4)   Decrease complaints of pain to 2 out of 10 at worst to increase functional hand use and UE support functions for moderate to heavy ADL/work/leisure activities.  5)   Patient to score at 45 % or less on Quick/DASH and/or Work module to demonstrate improved perception of functional right hand use to evidence  return to near to prior level of function for I/ADLs and return to gardening.     STG's (4-5 weeks)  1)   Patient to be IND with HEP and modalities for pain/edema managment.  2)   Pt to tolerate advancing PREs and flexibility activities for weight bearing positions with self-graded intensity and effective symptom monitoring.   3) Increase wrist and forearm LIRIANO by 10 degrees to increase functional hand use and support function positions for ADLs/work/leisure activities.  4) Digit and Thumb LIRIANO to be WNL as compared to the unaffected side for maximizing  and fine motor skills for reactivation the hand for light ADLs.  5)  Decrease edema by 1.5 cm for evidencing soft tissue healing and effective edema mgmt.     Plan   Updates/Grading for next session: check physician note for new instructions. continue gentle range of motion.    ASHLEY BRAGA, OT

## 2022-01-28 ENCOUNTER — TELEPHONE (OUTPATIENT)
Dept: ORTHOPEDICS | Facility: CLINIC | Age: 72
End: 2022-01-28
Payer: MEDICARE

## 2022-01-28 DIAGNOSIS — S62.101D CLOSED FRACTURE OF RIGHT WRIST WITH ROUTINE HEALING, SUBSEQUENT ENCOUNTER: Primary | ICD-10-CM

## 2022-01-31 ENCOUNTER — HOSPITAL ENCOUNTER (OUTPATIENT)
Dept: RADIOLOGY | Facility: HOSPITAL | Age: 72
Discharge: HOME | End: 2022-01-31
Attending: ORTHOPAEDIC SURGERY
Payer: MEDICARE

## 2022-01-31 ENCOUNTER — OFFICE VISIT (OUTPATIENT)
Dept: ORTHOPEDICS | Facility: CLINIC | Age: 72
End: 2022-01-31
Payer: MEDICARE

## 2022-01-31 VITALS — BODY MASS INDEX: 35.84 KG/M2 | WEIGHT: 223 LBS | HEIGHT: 66 IN

## 2022-01-31 DIAGNOSIS — S62.101D CLOSED FRACTURE OF RIGHT WRIST WITH ROUTINE HEALING, SUBSEQUENT ENCOUNTER: Primary | ICD-10-CM

## 2022-01-31 DIAGNOSIS — S62.101D CLOSED FRACTURE OF RIGHT WRIST WITH ROUTINE HEALING, SUBSEQUENT ENCOUNTER: ICD-10-CM

## 2022-01-31 PROCEDURE — 73110 X-RAY EXAM OF WRIST: CPT | Mod: TC,PN,RT

## 2022-01-31 PROCEDURE — 99213 OFFICE O/P EST LOW 20 MIN: CPT | Mod: S$GLB,,, | Performed by: ORTHOPAEDIC SURGERY

## 2022-01-31 PROCEDURE — 73110 X-RAY EXAM OF WRIST: CPT | Mod: 26,RT,, | Performed by: RADIOLOGY

## 2022-01-31 PROCEDURE — 3008F BODY MASS INDEX DOCD: CPT | Mod: CPTII,S$GLB,, | Performed by: ORTHOPAEDIC SURGERY

## 2022-01-31 PROCEDURE — 1159F PR MEDICATION LIST DOCUMENTED IN MEDICAL RECORD: ICD-10-PCS | Mod: CPTII,S$GLB,, | Performed by: ORTHOPAEDIC SURGERY

## 2022-01-31 PROCEDURE — 3008F PR BODY MASS INDEX (BMI) DOCUMENTED: ICD-10-PCS | Mod: CPTII,S$GLB,, | Performed by: ORTHOPAEDIC SURGERY

## 2022-01-31 PROCEDURE — 99999 PR PBB SHADOW E&M-EST. PATIENT-LVL III: ICD-10-PCS | Mod: PBBFAC,,, | Performed by: ORTHOPAEDIC SURGERY

## 2022-01-31 PROCEDURE — 1125F AMNT PAIN NOTED PAIN PRSNT: CPT | Mod: CPTII,S$GLB,, | Performed by: ORTHOPAEDIC SURGERY

## 2022-01-31 PROCEDURE — 1125F PR PAIN SEVERITY QUANTIFIED, PAIN PRESENT: ICD-10-PCS | Mod: CPTII,S$GLB,, | Performed by: ORTHOPAEDIC SURGERY

## 2022-01-31 PROCEDURE — 99999 PR PBB SHADOW E&M-EST. PATIENT-LVL III: CPT | Mod: PBBFAC,,, | Performed by: ORTHOPAEDIC SURGERY

## 2022-01-31 PROCEDURE — 1159F MED LIST DOCD IN RCRD: CPT | Mod: CPTII,S$GLB,, | Performed by: ORTHOPAEDIC SURGERY

## 2022-01-31 PROCEDURE — 1101F PT FALLS ASSESS-DOCD LE1/YR: CPT | Mod: CPTII,S$GLB,, | Performed by: ORTHOPAEDIC SURGERY

## 2022-01-31 PROCEDURE — 3288F FALL RISK ASSESSMENT DOCD: CPT | Mod: CPTII,S$GLB,, | Performed by: ORTHOPAEDIC SURGERY

## 2022-01-31 PROCEDURE — 73110 XR WRIST COMPLETE 3 VIEWS RIGHT: ICD-10-PCS | Mod: 26,RT,, | Performed by: RADIOLOGY

## 2022-01-31 PROCEDURE — 99213 PR OFFICE/OUTPT VISIT, EST, LEVL III, 20-29 MIN: ICD-10-PCS | Mod: S$GLB,,, | Performed by: ORTHOPAEDIC SURGERY

## 2022-01-31 PROCEDURE — 3288F PR FALLS RISK ASSESSMENT DOCUMENTED: ICD-10-PCS | Mod: CPTII,S$GLB,, | Performed by: ORTHOPAEDIC SURGERY

## 2022-01-31 PROCEDURE — 1101F PR PT FALLS ASSESS DOC 0-1 FALLS W/OUT INJ PAST YR: ICD-10-PCS | Mod: CPTII,S$GLB,, | Performed by: ORTHOPAEDIC SURGERY

## 2022-01-31 NOTE — PROGRESS NOTES
Subjective:      Patient ID: Tracy Armendariz is a 71 y.o. female.  Chief Complaint: Follow-up (follow up right wrist fracture.)      HPI  Tracy Armendariz is a  71 y.o. female presenting today for follow up of right distal radius fracture treated non operatively.  She reports that she is still having some pain but improving currently in therapy she is 2 and half months out from injury.    Review of patient's allergies indicates:   Allergen Reactions    Iodinated contrast media Hives and Rash    Gabapentin Hallucinations    Iodine Hives    Isothiazolinones Rash    Penicillins Rash         Current Outpatient Medications   Medication Sig Dispense Refill    albuterol (PROVENTIL/VENTOLIN HFA) 90 mcg/actuation inhaler INHALE 2 PUFFS EVERY 6 HOURS AS NEEDED FOR WHEEZING (Patient taking differently: Inhale 2 puffs into the lungs every 6 (six) hours as needed for Wheezing.) 18 g 0    aspirin 81 MG Chew Take 81 mg by mouth once daily.      atorvastatin (LIPITOR) 10 MG tablet Take 1 tablet (10 mg total) by mouth once daily. 90 tablet 3    buPROPion (WELLBUTRIN XL) 150 MG TB24 tablet Take 1 tablet (150 mg total) by mouth once daily. 30 tablet 11    calcium carbonate (OS-SPENCER) 600 mg (1,500 mg) Tab Take 600 mg by mouth 2 (two) times daily with meals.      cholecalciferol, vitamin D3, 1,000 unit Chew Take 1,000 Units by mouth once daily.      diclofenac sodium (VOLTAREN) 1 % Gel Apply 2 g topically once daily. 100 g 5    EScitalopram oxalate (LEXAPRO) 20 MG tablet TAKE 1 TABLET(20 MG) BY MOUTH EVERY DAY (Patient taking differently: Take 20 mg by mouth once daily.) 90 tablet 3    famotidine (PEPCID) 10 MG tablet Take 10 mg by mouth 2 (two) times daily.      FLUAD [id number], 65 YR UP,,PF, 45 mcg (15 mcg x 3)/0.5 mL Syrg       hydrocortisone 2.5 % cream Apply topically 2 (two) times daily to affected area 30 g 2    LACTOBACILLUS ACIDOPHILUS (PROBIOTIC ORAL) Take 1 capsule by mouth once daily.       lidocaine-prilocaine (EMLA) cream Apply topically as needed. 30 g 5    losartan (COZAAR) 100 MG tablet Take 1 tablet (100 mg total) by mouth once daily. 90 tablet 3    MULTIVIT WITH CALCIUM,IRON,MIN (WOMEN'S DAILY MULTIVITAMIN ORAL) Take 1 tablet by mouth once daily.      tamsulosin (FLOMAX) 0.4 mg Cap TAKE 1 CAPSULE(0.4 MG) BY MOUTH EVERY DAY (Patient taking differently: Take 0.4 mg by mouth once daily.) 90 capsule 0    ondansetron (ZOFRAN-ODT) 4 MG TbDL dissolve 1 tablet (4 mg total) by mouth every 8 (eight) hours as needed (nausea). (Patient not taking: Reported on 2022) 20 tablet 0     No current facility-administered medications for this visit.       Past Medical History:   Diagnosis Date    Allergy     Anticoagulant long-term use     Arthritis     CVA (cerebral infarction)     Depression     Disorder of kidney and ureter     renal stones    Diverticulosis of colon     Extrinsic asthma, unspecified     Hyperlipidemia     Hypertension     Kidney stone     Left atrial enlargement 2014    Low back pain     MARTIN (obstructive sleep apnea)     Osteopenia     PUD (peptic ulcer disease)     Stroke  2013    Urinary tract infection        Past Surgical History:   Procedure Laterality Date    APPENDECTOMY      @ time of hysterectomy    BACK SURGERY      CATARACT EXTRACTION       SECTION      CHOLECYSTECTOMY      laparoscopic    COLONOSCOPY N/A 2018    Procedure: COLONOSCOPY/Golytely;  Surgeon: Janine Black MD;  Location: [facility];  Service: Endoscopy;  Laterality: N/A;    DILATION AND CURETTAGE OF UTERUS      HYSTERECTOMY      TASO with appendectomy    INNER EAR SURGERY      replaced ear drum    JOINT REPLACEMENT Right     knee    KNEE ARTHROSCOPY W/ DEBRIDEMENT      LUMBAR DISCECTOMY      L4-L5    OOPHORECTOMY      unilateral    TONSILLECTOMY      TYMPANOPLASTY      URETEROSCOPIC REMOVAL OF URETERIC CALCULUS Left  "12/19/2018    Procedure: REMOVAL, CALCULUS, URETER, URETEROSCOPIC, holmium laser lithotripsy, stone basket extraction, retrograde pyelogram, ureteral stent exchange;  Surgeon: Anaya Zamora MD;  Location: [facility];  Service: Urology;  Laterality: Left;       OBJECTIVE:   PHYSICAL EXAM:  Height: 5' 6" (167.6 cm) Weight: 101.2 kg (223 lb)  Vitals:    01/31/22 1417   Weight: 101.2 kg (223 lb)   Height: 5' 6" (1.676 m)   PainSc:   4   PainLoc: Wrist     Ortho/SPM Exam  Examination right hand wrist the fracture site is nontender slight swelling range of motion slightly decreased especially flexion although she has full extension  Range of motion fingers full  strength decreased sensation intact    RADIOGRAPHS:  AP lateral x-ray right wrist demonstrates healed fracture with slight dorsal tilt and shortening  Comments: I have personally reviewed the imaging and I agree with the above radiologist's report.    ASSESSMENT/PLAN:     IMPRESSION:  Right distal radius fracture slightly shortened healed    PLAN:  The fracture is healed so I want her to wean out of the wrist brace increase activities as tolerated  Continue therapy advance activities  Squeeze ball for strengthening      FOLLOW UP:  4-6 weeks    Disclaimer: This note has been generated using voice-recognition software. There may be typographical errors that have been missed during proof-reading.    "

## 2022-01-31 NOTE — PATIENT INSTRUCTIONS
[facility]CCUPATIONAL [facility]ERAPY  HOME EXERCISE PROGRAM         PERFORM EXERCISES 1 TO 3 SETS OF 10, 4-6 TIMES PER DAY; ALWAYS PAIN-FREE. DON'T LET THE PICTURES PRESSURE YOU TO PUSH THROUGH PAINFUL RANGES.

## 2022-01-31 NOTE — PROGRESS NOTES
"  Occupational Therapy Daily Treatment Note     Name: Tracy Armendariz  Clinic Number: [id number]    Therapy Diagnosis:   Encounter Diagnosis   Name Primary?    Alteration in self-care ability      Physician: Nichole Lyles PA-C    Visit Date: 2/1/2022    Medical Diagnosis: S62.101D (ICD-10-CM) - Closed fracture of right wrist with routine healing, subsequent encounter  Physician Orders: Eval and treat  Evaluation Date: 1/12/2022  Insurance Authorization Period Expiration: through 1/26/22 (insurance approved 6 visits)  Plan of Care from 1/12/2022 to 3/11/22   Date of Return to MD: 2/28/22 1/31/22 MD PLAN:  The fracture is healed so I want her to wean out of the wrist brace increase activities as tolerated  Continue therapy advance activities  Squeeze ball for strengthening  Date of Onset: 11/9/21    Visit # / Visits authorized: 4 / 6 (total 4 with eval)  Time In: 12:10 pm  Time Out: 1:30 pm  Total Billable Time: 55 minutes    Precautions:  Standard and blood thinners, falls; gait and balance problems noted 2/1/22; Precautions as per imaging and s/p Week 12    Subjective     Pt report/Response to previous treatments: 2/1: Pt arrives without her wrist brace. She states that she wears her wrist brace when she feels pain, but it remains off majority of the day, since her MD follow up appointment yesterday. OT clarified the MD plan for "weaning" from the brace. Pt reports thenar pain present on L hand and describes force that was placed on her left hand during her fall.    Functional change: TBA    Pain: 5/10 at arrival and throughout tx session, no pain increase during session,   Location: dorsal surface middle of the wrist, thenar pad on bilateral hands  Location during prior sessions: right lat epicondyle, radial and ulnar wrist.      Objective     Mental status: alert, interactive, oriented x3     Observation: Wavering during static stance, during sit <> stand and while ambulating; no assistive device " "used  Forward shoulders. Thenar wasting on right hand.  Bruising at volar wrist.     Edema: Circumferential measurements: In centimters     Right/Left Right     IE-1/12/22 2/1/22   IF P1  6.7/6.3 7.3 (+0.6)   SF P1   5.8/5.6 6.1 (+0.3)   Thumb P1 7.4/7.5 7.4 (=)   DPC 19/17.5 19.7 (+0.7)   Wrist 8.3/7.0 19.5/17.9             Tip to DPC (in centimeters). Left hand is WNL   AROM Right Right    DATE IE-1/12/22 2/1/22   IF  4.0 2.5 (+1.5)   LF nt 2.0   RF nt 1.5   SF nt .75   Thumb Opposition to SF P2 4 cm      Wrist AROM    Right/Left Right   DATE IE-1/12/22 2/1/22  Post-tx   EXT 30/90 49   FLX 17/45 36   RD 0/12 11   UD  26/35 18   LIRIANO 73/182 114 (+41)      Forearm AROM    Right/Left Right   DATE IE-1/12/22 2/1/22*  Pre-tx   Supination 68/85 46   Pronation 90/90 85   *different car, rapidly moving in quick end range position causes pain      Treatment      Tracy received the following supervised modalities after being cleared for contradictions for 15 minutes:   -defer- Paraffin wax heat pre-tx for promoting healing, decreasing pain and increasing tissue extensibility  -Fluidotherapy for promoting healing, reducing pain, desensitization and increasing tissue extensibility    Tracy received the following manual therapy techniques for 10 minutes:   - Retrograde massage  - R/A girth, fit and issued size large 3/4 finger compression glove. Alternative digit sleeves provided: 1" compression sleeve for Th, IF, LF & RF. Size Large Digitsleeve for the SF.  - Elastic tape applied with space correction at [facility] for pain and edema management; precautions discussed    Tracy performed therapeutic exercises for 20 minutes including:  TGEs, wrist AROM, spreads, lifts, opposition Performed during the final minutes of fluidotherapy   Wrist AAROM 1 x 15 each:  -flx  -ext  -UD  -RD   Wrist/forearm AROM  2x minutes each  -wrist wheel for supination  -defer- wrist maze   Dexterity and thumb AROM -Manipulation, " "storage, and translation picking up one at a time, store 4 in hand, Med pompoms, 1/2 container  - Opposition to SF picking up medium sized pompoms 1/2 container    Digit AROM 2 minutes each:  -TGEs hook to full fist rolls using 3/8"cm dowel   Intrinsic strengthening 2 minutes each:  -spreads and lifts with yellow spidyband, no thumb    Wrist & Forearm AROM R/A 2/1/22         Tracy received Self Care instructions and Orthosis Management for 10 minutes and participated in an ongoing and concurrent discussion of re/evaluation findings and specific home care, including:  - discussion of wrist brace wear schedule according to new MD orders. Education provided on monitoring symptoms of pain and swelling, and slowly weaning out of the splint.   - Instruction of Activity modification for moderate to heavy ADLs at this time include wearing the brace for protecting healing tissues and reducing episodes of provocative activities/postures.   - Education on wear schedule for compressive sleeve and digit sleeve; begin with 1 hour, and if no pain, continues to wear during the day intermittently, removing for HEP and hygiene; progress to night time wear as tolerated.    Home Exercises and Education Provided     Education provided:   - Education provided on all interventions performed during today's session   - Progress towards goals    - See Discussion above  - HEP for AROM     Education provided prior sessions:  - informal    Written Home Exercises Provided: yes.  Exercises were reviewed and Tracy was able to demonstrate them prior to the end of the session.  Tracy demonstrated good  understanding of the HEP provided.     See EMR under Patient Instructions for exercises provided prior visit. 2/1/22       Assessment   2/1: Balance impairments observed during this session. Increased edema and pain as a result of suspect misunderstanding of MD orders for "weaning" the splint.  Pt appeared to have improved understanding of MD " order after discussion. Pt has improved understanding of soft tissue injury that occurs after bone fractures and responses to activities at home and in therapy that could result in increased symptoms. Pt was receptive to alternative compression garments provided for edema management and understands precautions for elastic tape applied for trial intervention today.     Tracy is progressing well towards her goals and there are no updates to goals at this time. Pt prognosis is Excellent.     Pt will continue to benefit from skilled outpatient occupational therapy to address the deficits listed in the problem list on initial evaluation provide pt/family education and to maximize pt's level of independence in the home and community environment.     Anticipated barriers to occupational therapy: pain    Pt's spiritual, cultural and educational needs considered and pt agreeable to plan of care and goals.    Goals:  ( LTG's (6-8 weeks):  1)   Increase right wrist and forearm LIRIANO to  >75% of the  left wrist to increase functional hand use for weight bearing and reaching tasks. Modified and Progressing on 2/1/22.  2)   Right  strength  to be >70% of the unaffected side.  3)   Right pinch strength to be >60% of the unaffected side.  4)   Decrease complaints of pain to 2 out of 10 at worst to increase functional hand use and UE support functions for moderate to heavy ADL/work/leisure activities.  5)   Patient to score at 45 % or less on Quick/DASH and/or Work module to demonstrate improved perception of functional right hand use to evidence return to near to prior level of function for I/ADLs and return to gardening.     STG's (4-5 weeks)  1)   Patient to be IND with HEP and modalities for pain/edema managment.  2)   Pt to tolerate advancing PREs and flexibility activities for weight bearing positions with self-graded intensity and effective symptom monitoring.   3) Increase wrist and forearm LIRIANO by  by 60 degrees to  "increase functional hand use and support function positions for ADLs & leisure activities. Met and upgraded on 2/1/22  4) Digit and Thumb LIRIANO to be WNL as compared to the unaffected side for maximizing  and fine motor skills for reactivation the hand for light ADLs. Progressing  5)  Decrease edema by 1.5 cm for evidencing soft tissue healing and effective edema mgmt.     Plan   2/1: Request balance screen from PT if patient is receptive. Assess effectiveness of compression garments and patient's understanding of current precautions/activity modifications for protecting healing tissues. Advance flexibility and PREs as tolerated.     MICAH Patel  Student Occupational Therapist    "I, HARIS Chand, MARGARITA,  certify that I was present in the room directing the student in service delivery and guiding them using my skilled judgment. As the co-signing therapist, I have reviewed the student's documentation and am responsible for the treatment, assessment and plan."    HARIS Chi, MARGARITA  Occupational Therapist, Certified Hand Therapist      "

## 2022-02-01 ENCOUNTER — CLINICAL SUPPORT (OUTPATIENT)
Dept: REHABILITATION | Facility: HOSPITAL | Age: 72
End: 2022-02-01
Payer: MEDICARE

## 2022-02-01 DIAGNOSIS — R68.89 ALTERATION IN SELF-CARE ABILITY: ICD-10-CM

## 2022-02-01 PROCEDURE — 97140 MANUAL THERAPY 1/> REGIONS: CPT | Mod: PN

## 2022-02-01 PROCEDURE — 97022 WHIRLPOOL THERAPY: CPT | Mod: PN

## 2022-02-01 PROCEDURE — 97110 THERAPEUTIC EXERCISES: CPT | Mod: PN

## 2022-02-01 PROCEDURE — 97535 SELF CARE MNGMENT TRAINING: CPT | Mod: PN

## 2022-02-03 ENCOUNTER — CLINICAL SUPPORT (OUTPATIENT)
Dept: REHABILITATION | Facility: HOSPITAL | Age: 72
End: 2022-02-03
Payer: MEDICARE

## 2022-02-03 DIAGNOSIS — R68.89 ALTERATION IN SELF-CARE ABILITY: ICD-10-CM

## 2022-02-03 PROCEDURE — 97018 PARAFFIN BATH THERAPY: CPT | Mod: PN

## 2022-02-03 PROCEDURE — 97110 THERAPEUTIC EXERCISES: CPT | Mod: PN

## 2022-02-03 PROCEDURE — 97140 MANUAL THERAPY 1/> REGIONS: CPT | Mod: PN

## 2022-02-03 NOTE — PROGRESS NOTES
Occupational Therapy Daily Treatment Note     Name: Tracy Armendariz  Clinic Number: [id number]    Therapy Diagnosis:   Encounter Diagnosis   Name Primary?    Alteration in self-care ability      Physician: Nichole Lyles PA-C    Visit Date: 2/3/2022    Medical Diagnosis: S62.101D (ICD-10-CM) - Closed fracture of right wrist with routine healing, subsequent encounter  Physician Orders: Eval and treat  Evaluation Date: 1/12/2022  Insurance Authorization Period Expiration: through 1/26/22 (insurance approved 6 visits)  Plan of Care from 1/12/2022 to 3/11/22   Date of Return to MD: 2/28/22 1/31/22 MD PLAN:  The fracture is healed so I want her to wean out of the wrist brace increase activities as tolerated  Continue therapy advance activities  Squeeze ball for strengthening  Date of Onset: 11/9/21    Visit # / Visits authorized: 5 / 16 (total 6 with eval)  Time In: 1210  Time Out: 1300  Total Billable Time: 50 minutes    Precautions:  Standard and blood thinners, falls; gait and balance problems noted 2/1/22; Precautions as per imaging and s/p Week 12    Subjective     Pt report/Response to previous treatments: 22: Pt arrives without her wrist brace. Hand is feeling better, range of motion looking better.     Functional change: TBA    Pain: 4/10 at arrival and throughout tx session, no pain increase during session,   Location: dorsal surface middle of the wrist, thenar pad on bilateral hands  Location during prior sessions: right lat epicondyle, radial and ulnar wrist.      Objective     Mental status: alert, interactive, oriented x3     Observation: Wavering during static stance, during sit <> stand and while ambulating; no assistive device used  Forward shoulders. Thenar wasting on right hand.  Bruising at volar wrist.     Edema: Circumferential measurements: In centimters     Right/Left Right Right     IE-1/12/22 2/1/22 2/3/22   IF P1  6.7/6.3 7.3 (+0.6) 7.2 (-.1)   SF P1   5.8/5.6 6.1 (+0.3) 6.2 (+0.1)   Thumb  "P1 7.4/7.5 7.4 (=) 7.0 (-0.4)   DPC 19/17.5 19.7 (+0.7) 19 (-0.7)   Wrist 18.3/17.0 19.5/17.9 18.8 (-0.8)              Tip to DPC (in centimeters). Left hand is WNL   AROM Right Right    DATE IE-1/12/22 2/1/22   IF  4.0 2.5 (+1.5)   LF nt 2.0   RF nt 1.5   SF nt .75   Thumb Opposition to SF P2 4 cm      Wrist AROM    Right/Left Right Right   DATE IE-1/12/22 2/1/22  Post-tx 2/3/22  Post-tx   EXT 30/90 49 55   FLX 17/45 36 28   RD 0/12 11 23   UD  26/35 18 28   LIRIANO 73/182 114 (+41) 134 (+20)      Forearm AROM    Right/Left Right Right   DATE IE-1/12/22 2/1/22*  Pre-tx 2/3/22 post tx   Supination 68/85 46 55   Pronation 90/90 85 90   *different car, rapidly moving in quick end range position causes pain      Treatment      Tracy received the following supervised modalities after being cleared for contradictions for 15 minutes:   -Paraffin wax heat pre-tx for promoting healing, decreasing pain and increasing tissue extensibility  -defer- Fluidotherapy for promoting healing, reducing pain, desensitization and increasing tissue extensibility    Tracy received the following manual therapy techniques for 10 minutes:   - Retrograde massage  - R/A girth -deferred- fit and issued size large 3/4 finger compression glove. Alternative digit sleeves provided: 1" compression sleeve for Th, IF, LF & RF. Size Large Digitsleeve for the SF.  - Elastic tape applied with space correction at center of [facility] of wrist for pain and edema management; precautions discussed    Tracy performed therapeutic exercises for 20 minutes including:  TGEs, wrist AROM, spreads, lifts, opposition Performed during the final minutes of fluidotherapy   Wrist AAROM -defer- 1 x 15 each:  -flx  -ext  -UD  -RD   Wrist/forearm AROM -defer- 2x minutes each  -wrist wheel for supination  -defer- wrist maze   Dexterity and thumb AROM -deferred -Manipulation, storage, and translation picking up one at a time, store 4 in hand, Med libia, 1/2 container  - " "Opposition to SF picking up medium sized pompoms 1/2 container    Digit AROM 2 minutes each:  -TGEs hook to full fist rolls using 3/8"cm dowel   Intrinsic strengthening 2 minutes each:  -spreads and lifts with yellow spidyband, no thumb    Wrist & Forearm AROM R/A 2/3/22   Unweight shoulder flexion/reach x10 x 2 added to HEP     Tracy received Self Care instructions and Orthosis Management for 0 minutes and participated in an ongoing and concurrent discussion of re/evaluation findings and specific home care, including:  - discussion of wrist brace wear schedule according to new MD orders. Education provided on monitoring symptoms of pain and swelling, and slowly weaning out of the splint.   - Instruction of Activity modification for moderate to heavy ADLs at this time include wearing the brace for protecting healing tissues and reducing episodes of provocative activities/postures.   - Education on wear schedule for compressive sleeve and digit sleeve; begin with 1 hour, and if no pain, continues to wear during the day intermittently, removing for HEP and hygiene; progress to night time wear as tolerated.    Home Exercises and Education Provided     Education provided:   - Education provided on all interventions performed during today's session   - Progress towards goals    - See Discussion above  - HEP for AROM     Education provided prior sessions:  - informal    Written Home Exercises Provided: yes.  Exercises were reviewed and Tracy was able to demonstrate them prior to the end of the session.  Tracy demonstrated good  understanding of the HEP provided.     See EMR under Patient Instructions for exercises provided prior visit. 2/1/22       Assessment   2/3: Good follow-up session today. Patient is taking pride in decreased pain and increased function.    Tracy is progressing well towards her goals and there are no updates to goals at this time. Pt prognosis is Excellent.     Pt will continue to benefit " from skilled outpatient occupational therapy to address the deficits listed in the problem list on initial evaluation provide pt/family education and to maximize pt's level of independence in the home and community environment.     Anticipated barriers to occupational therapy: pain    Pt's spiritual, cultural and educational needs considered and pt agreeable to plan of care and goals.    Goals:  ( LTG's (6-8 weeks):  1)   Increase right wrist and forearm LIRIANO to  >75% of the  left wrist to increase functional hand use for weight bearing and reaching tasks. Modified and Progressing on 2/1/22.  2)   Right  strength  to be >70% of the unaffected side.  3)   Right pinch strength to be >60% of the unaffected side.  4)   Decrease complaints of pain to 2 out of 10 at worst to increase functional hand use and UE support functions for moderate to heavy ADL/work/leisure activities.  5)   Patient to score at 45 % or less on Quick/DASH and/or Work module to demonstrate improved perception of functional right hand use to evidence return to near to prior level of function for I/ADLs and return to gardening.     STG's (4-5 weeks)  1)   Patient to be IND with HEP and modalities for pain/edema managment.  2)   Pt to tolerate advancing PREs and flexibility activities for weight bearing positions with self-graded intensity and effective symptom monitoring.   3) Increase wrist and forearm LIRIANO by  by 60 degrees to increase functional hand use and support function positions for ADLs & leisure activities. Met and upgraded on 2/1/22  4) Digit and Thumb LIRIANO to be WNL as compared to the unaffected side for maximizing  and fine motor skills for reactivation the hand for light ADLs. Progressing  5)  Decrease edema by 1.5 cm for evidencing soft tissue healing and effective edema mgmt.     Plan   2/3 :  Advance flexibility and PREs as tolerated.     HARIS Contreras  Occupational Therapist

## 2022-02-08 ENCOUNTER — CLINICAL SUPPORT (OUTPATIENT)
Dept: REHABILITATION | Facility: HOSPITAL | Age: 72
End: 2022-02-08
Payer: MEDICARE

## 2022-02-08 DIAGNOSIS — R68.89 ALTERATION IN SELF-CARE ABILITY: ICD-10-CM

## 2022-02-08 PROCEDURE — 97140 MANUAL THERAPY 1/> REGIONS: CPT | Mod: PN

## 2022-02-08 PROCEDURE — 97018 PARAFFIN BATH THERAPY: CPT | Mod: PN,59

## 2022-02-08 PROCEDURE — 97110 THERAPEUTIC EXERCISES: CPT | Mod: PN

## 2022-02-08 NOTE — PROGRESS NOTES
Occupational Therapy Daily Treatment Note     Name: Tracy Armendariz  Clinic Number: [id number]    Therapy Diagnosis:   Encounter Diagnosis   Name Primary?    Alteration in self-care ability      Physician: Nichole Lyles PA-C    Visit Date: 2/8/2022    Medical Diagnosis: S62.101D (ICD-10-CM) - Closed fracture of right wrist with routine healing, subsequent encounter  Physician Orders: Eval and treat  Evaluation Date: 1/12/2022  Insurance Authorization Period Expiration: through 3/3/22  Plan of Care from 1/12/2022 to 3/11/22   Date of Return to MD: 2/28/22 1/31/22 MD PLAN:  The fracture is healed so I want her to wean out of the wrist brace increase activities as tolerated  Continue therapy advance activities  Squeeze ball for strengthening  Date of Onset: 11/9/21    Visit # / Visits authorized: 6 / 16 (total 7 with eval)  Time In: 1214  Time Out: 1300  Total Billable Time: 45 minutes    Precautions:  Standard and blood thinners, falls; gait and balance problems noted 2/1/22; Precautions as per imaging and s/p Week 12    Subjective     Pt report/Response to previous treatments:   2/8: Pt arrives without her wrist brace. Hand is feeling better, range of motion looking better. Edema glove is too big and she agrees to replace it with smaller size.    Functional change: TBA    Pain: 4/10, but only with supinationat arrival and throughout tx session, no pain increase during session,   Location: dorsal surface middle of the wrist, thenar pad on bilateral hands  Location during prior sessions: right lat epicondyle, radial and ulnar wrist.      Objective     Mental status: alert, interactive, oriented x3     Observation: Wavering during static stance, during sit <> stand and while ambulating; no assistive device used  Forward shoulders. Thenar wasting on right hand.  Bruising at volar wrist.     Edema: Circumferential measurements: In centimters     Right/Left Right Right     IE-1/12/22 2/1/22 2/3/22   IF P1  6.7/6.3  "7.3 (+0.6) 7.2 (-.1)   SF P1   5.8/5.6 6.1 (+0.3) 6.2 (+0.1)   Thumb P1 7.4/7.5 7.4 (=) 7.0 (-0.4)   DPC 19/17.5 19.7 (+0.7) 19 (-0.7)   Wrist 18.3/17.0 19.5/17.9 18.8 (-0.8)              Tip to DPC (in centimeters). Left hand is WNL   AROM Right Right    DATE IE-1/12/22 2/1/22   IF  4.0 2.5 (+1.5)   LF nt 2.0   RF nt 1.5   SF nt .75   Thumb Opposition to SF P2 4 cm      Wrist AROM    Right/Left Right Right   DATE IE-1/12/22 2/1/22  Post-tx 2/3/22  Post-tx   EXT 30/90 49 55   FLX 17/45 36 28   RD 0/12 11 23   UD  26/35 18 28   LIRIANO 73/182 114 (+41) 134 (+20)      Forearm AROM    Right/Left Right Right   DATE IE-1/12/22 2/1/22*  Pre-tx 2/3/22 post tx   Supination 68/85 46 55   Pronation 90/90 85 90   *different car, rapidly moving in quick end range position causes pain      Treatment      Tracy received the following supervised modalities after being cleared for contradictions for 15 minutes:   -Paraffin wax heat pre-tx for promoting healing, decreasing pain and increasing tissue extensibility  -defer- Fluidotherapy for promoting healing, reducing pain, desensitization and increasing tissue extensibility    Tracy received the following manual therapy techniques for 10 minutes:   - Retrograde massage  - R/A girth -deferred- fit and issued size large 3/4 finger compression glove. Alternative digit sleeves provided: 1" compression sleeve for Th, IF, LF & RF. Size Large Digitsleeve for the SF.  - Elastic tape applied with space correction at center of [facility] of wrist for pain and edema management; precautions discussed    Tracy performed therapeutic exercises for 20 minutes including:  TGEs, wrist AROM, spreads, lifts, opposition Performed during the final minutes of fluidotherapy   Wrist AAROM -defer- 1 x 15 each:  -flx  -ext  -UD  -RD   Wrist/forearm AROM  2x minutes each  -wrist wheel for supination  -wrist maze   Dexterity and thumb AROM -deferred -Manipulation, storage, and translation picking up one at a " "time, store 4 in hand, Med pompoms, 1/2 container  - Opposition to SF picking up medium sized pompoms 1/2 container    Digit AROM 2 minutes each:  -TGEs hook to full fist rolls using 3/8" dowel   Intrinsic strengthening 2 minutes each:  -spreads and lifts with red spidyband, no thumb    Wrist & Forearm AROM R/A 2/3/22   Unweight shoulder flexion/reach x10 x 2 added to HEP     Tracy received Self Care instructions and Orthosis Management for 0 minutes and participated in an ongoing and concurrent discussion of re/evaluation findings and specific home care, including:  - discussion of wrist brace wear schedule according to new MD orders. Education provided on monitoring symptoms of pain and swelling, and slowly weaning out of the splint.   - Instruction of Activity modification for moderate to heavy ADLs at this time include wearing the brace for protecting healing tissues and reducing episodes of provocative activities/postures.   - Education on wear schedule for compressive sleeve and digit sleeve; begin with 1 hour, and if no pain, continues to wear during the day intermittently, removing for HEP and hygiene; progress to night time wear as tolerated.    Home Exercises and Education Provided     Education provided:   - Education provided on all interventions performed during today's session   - Progress towards goals    - HEP for AROM     Education provided prior sessions:  - See Discussion above  - informal    Written Home Exercises Provided: yes.  Exercises were reviewed and Tracy was able to demonstrate them prior to the end of the session.  Tracy demonstrated good  understanding of the HEP provided.     See EMR under Patient Instructions for exercises provided prior visit. 2/1/22       Assessment   2/8: Good follow-up session today. Patient is taking pride in decreased pain and increased function.  Patient tolerated red spideyband for intrinsic strengthening as well as wrist maze, both of which she was " unable to do previously.    Tracy is progressing well towards her goals and there are no updates to goals at this time. Pt prognosis is Excellent.     Pt will continue to benefit from skilled outpatient occupational therapy to address the deficits listed in the problem list on initial evaluation provide pt/family education and to maximize pt's level of independence in the home and community environment.     Anticipated barriers to occupational therapy: pain    Pt's spiritual, cultural and educational needs considered and pt agreeable to plan of care and goals.    Goals:  ( LTG's (6-8 weeks):  1)   Increase right wrist and forearm LIRIANO to  >75% of the  left wrist to increase functional hand use for weight bearing and reaching tasks. Modified and Progressing on 2/1/22.  2)   Right  strength  to be >70% of the unaffected side.  3)   Right pinch strength to be >60% of the unaffected side.  4)   Decrease complaints of pain to 2 out of 10 at worst to increase functional hand use and UE support functions for moderate to heavy ADL/work/leisure activities.  5)   Patient to score at 45 % or less on Quick/DASH and/or Work module to demonstrate improved perception of functional right hand use to evidence return to near to prior level of function for I/ADLs and return to gardening.     STG's (4-5 weeks)  1)   Patient to be IND with HEP and modalities for pain/edema managment.  2)   Pt to tolerate advancing PREs and flexibility activities for weight bearing positions with self-graded intensity and effective symptom monitoring.   3) Increase wrist and forearm LIRIANO by  by 60 degrees to increase functional hand use and support function positions for ADLs & leisure activities. Met and upgraded on 2/1/22  4) Digit and Thumb LIRIANO to be WNL as compared to the unaffected side for maximizing  and fine motor skills for reactivation the hand for light ADLs. Progressing  5)  Decrease edema by 1.5 cm for evidencing soft tissue healing  and effective edema mgmt.     Plan   2/8:  Advance flexibility and PREs as tolerated.     HARIS Contreras  Occupational Therapist

## 2022-02-09 NOTE — PROGRESS NOTES
"    Occupational Therapy Daily Treatment Note     Name: Tracy Armendariz  Clinic Number: [id number]    Therapy Diagnosis:   No diagnosis found.  Physician: Nichole Lyles PA-C     Visit Date: 2/10/2022    Medical Diagnosis: S62.101D (ICD-10-CM) - Closed fracture of right wrist with routine healing, subsequent encounter  Physician Orders: Eval and treat  Evaluation Date: 1/12/2022  Insurance Authorization Period Expiration: through 3/3/22  Plan of Care from 1/12/2022 to 3/11/22   Date of Return to MD: 2/28/22 1/31/22 MD PLAN:  The fracture is healed so I want her to wean out of the wrist brace increase activities as tolerated  Continue therapy advance activities  Squeeze ball for strengthening  Date of Onset: 11/9/21    Visit # / Visits authorized: 7 / 16 (total 8 with eval)  Time In: 12:08 pm  Time Out: 1:05 pm  Total Billable Time: 57 minutes    Precautions:  Standard and blood thinners, falls; gait and balance problems noted 2/10/22; Precautions as per imaging and s/p Week 12+    Subjective     Pt report/Response to previous treatments: Pt arrives wearing compression glove. She has 5/10 pain in right wrist, and states "I haven't worn the brace since I put it in my purse last week."  Pt reports she woke up with right arm pain several days ago "like I broke my arm. I don't know if I was sleeping on my arm or what." Pt has no arm pain today, and reports that the pain decreased throughout that day.     Functional change: Pt was able to button and zip her pants today, she no longer needs to wear pull on/elastic waistband pants. Tracy states she blow dried and brushed out her hair yesterday for the first time since she broke her wrist. Her  had done this for her in the past.    Pain: 5/10   Location: ulnar wrist, dorsal surface middle of the wrist, 1st cmc joint on bilateral hands  Location during prior sessions: right lat epicondyle, radial and ulnar wrist.      Objective     Mental status: alert, " interactive, oriented x3     Observation: Wavering during sit <> stand and while ambulating; no assistive device used  Forward shoulders. Thenar wasting on right hand.      Edema: Circumferential measurements: In centimters     Right/Left Right Right Right      IE-1/12/22 2/1/22 2/3/22 2/10/22   IF P1  6.7/6.3 7.3 (+0.6) 7.2 (-.1) 6.8 (-0.4)   SF P1   5.8/5.6 6.1 (+0.3) 6.2 (+0.1) 5.5 (-0.7)   Thumb P1 7.4/7.5 7.4 (=) 7.0 (-0.4) 6.6 (-0.4)   DPC 19/17.5 19.7 (+0.7) 19 (-0.7) 18.9 (-0.1)   Wrist 18.3/17.0 19.5/17.9 18.8 (-0.8) 18.8 (=)               Tip to DPC (in centimeters). Left hand is WNL   AROM Right Right  Right    DATE IE-1/12/22 2/1/22 2/10/22   IF  4.0 2.5 (+1.5) 1.5 (+1.0)   LF nt 2.0 1.5 (+0.5)   RF nt 1.5 1.0 (+0.5)   SF nt .75 0.5 (+0.25)   Thumb Opposition to SF P2 4 cm 2.0 cm (+2.0)      Wrist AROM    Right/Left Right Right   DATE IE-1/12/22 2/1/22  Post-tx 2/3/22  Post-tx   EXT 30/90 49 55   FLX 17/45 36 28   RD 0/12 11 23   UD  26/35 18 28   LIRIANO 73/182 114 (+41) 134 (+20)      Forearm AROM    Right/Left Right Right Right   DATE IE-1/12/22 2/1/22*  Pre-tx 2/3/22 post tx 2/10/22   Supination 68/85 46 55 55 (=)   Pronation 90/90 85 90 88 (-2)   *different car, rapidly moving in quick end range position causes pain         Strength: (measured in psi.)    Right/Left    2/10/2022   Les Rung II 8/40   Phillips Pinch 4.5/4.5   3-pt Pinch 3/6   2-pt Pinch 3/6.75       Treatment      Tracy received the following supervised modalities after being cleared for contradictions for 15 minutes:   -defer-Paraffin wax heat pre-tx for promoting healing, decreasing pain and increasing tissue extensibility  -Fluidotherapy for promoting healing, reducing pain, desensitization and increasing tissue extensibility    Tracy received the following manual therapy techniques for 10 minutes:   - Retrograde massage  - R/A girth -deferred- fit and issued size large 3/4 finger compression glove. Alternative digit sleeves  "provided: 1" compression sleeve for Th, IF, LF & RF. Size Large Digitsleeve for the SF.  - defer-Elastic tape applied with space correction at center of [facility] of wrist for pain and edema management; precautions discussed    Tracy performed therapeutic exercises for 27 minutes including:  TGEs, wrist AROM, spreads, lifts, opposition Performed during the final minutes of fluidotherapy   Wrist AROM  1 min each with 3 in dowel over wedge  -wrist 3 ways*   Wrist/forearm AROM  2 x 1 min   -wrist wheel for sup/pro**   Dexterity and thumb AROM -deferred 1 x 10 each:  -SF slides  -opposition to each finger tip  -defer-Manipulation, storage, and translation picking up one at a time, store 4 in hand, Med pompoms, 1/2 container  - defer-Opposition to SF picking up medium sized pompoms 1/2 container         strengthening Soft grippers  1 x 10 each, 1 bucket   Digit AROM 2 minutes each:  -TGEs hook to full fist rolls using 3/8" dowel   Intrinsic strengthening Defer-2 minutes each:  -spreads and lifts with red spidyband, no thumb    Wrist & Forearm AROM R/A 2/3/22  R/A forearm 2/10/22 x 2 minutes   Assessed baseline  and pinch X 5 minutes   Digit AROM R/A 2/10/22 x 2 minutes   *pain with ulnar deviation  ** pain at ulnar wrist during pronation    Tracy received Self Care instructions and Orthosis Management for 5 minutes and participated in an ongoing and concurrent discussion of re/evaluation findings and specific home care, including:  -Discussion of MD orders of weaning out of the wrist brace. Since pt presents with 5/10 pain today at rest, pt is encouraged to wear the wrist brace for moderate/heavy IADLs including washing and putting away dishes and lifting items to protect healing tissues and reduce episodes of pain. Pt appears to understand instructions    Home Exercises and Education Provided     Education provided:   - Education provided on all interventions performed during today's session   - Progress towards " goals    - HEP for TGEs and wrist AROM  - Wear wrist brace for moderate/heavy IADLs including doing dishes, and lifting items  - Continue to wear compression glove during the day for edema management      Education provided prior sessions:  - Education on wear schedule for compressive sleeve and digit sleeve; begin with 1 hour, and if no pain, continues to wear during the day intermittently, removing for HEP and hygiene; progress to night time wear as tolerated.    Written Home Exercises Provided: Patient instructed to cont prior HEP.  Exercises were reviewed and Tracy was able to demonstrate them prior to the end of the session.  Tracy demonstrated good  understanding of the HEP provided.     See EMR under Patient Instructions for exercises provided prior visit. 2/1/22       Assessment   Today's session focused on wrist AROM, edema management, thumb AROM, and increasing  strength. Pt reports she has not worn her wrist brace in approximately one week, and has 5/10 wrist pain at rest. Pt partakes in a discussion of current MD order for weaning out of the brace. Pt appears to understand to wear the brace for moderate/heavy IADLs to promote tissue healing and reducing episodes of pain. Pt exhibits decreased edema today, evidencing wear of compression glove, and increased digit AROM. Tracy reports increased functional use of her right hand including independently zipping and buttoning her pants and blow drying her hair. Baseline  and pinch were measured today, her right  is 20% of her unaffected side; tx upgraded accordingly.       Tracy is progressing well towards her goals and there are no updates to goals at this time. Pt prognosis is Good.     Pt will continue to benefit from skilled outpatient occupational therapy to address the deficits listed in the problem list on initial evaluation provide pt/family education and to maximize pt's level of independence in the home and community environment.  "    Anticipated barriers to occupational therapy: pain    Pt's spiritual, cultural and educational needs considered and pt agreeable to plan of care and goals.    Goals:  ( LTG's (6-8 weeks):  1)   Increase right wrist and forearm LIRIANO to  >75% of the  left wrist to increase functional hand use for weight bearing and reaching tasks. Modified and Progressing on 2/1/22.  2)   Right  strength  to be >70% of the unaffected side.  3)   Right pinch strength to be >60% of the unaffected side.  4)   Decrease complaints of pain to 2 out of 10 at worst to increase functional hand use and UE support functions for moderate to heavy ADL/work/leisure activities.  5)   Patient to score at 45 % or less on Quick/DASH and/or Work module to demonstrate improved perception of functional right hand use to evidence return to near to prior level of function for I/ADLs and return to gardening.     STG's (4-5 weeks)  1)   Patient to be IND with HEP and modalities for pain/edema managment.  2)   Pt to tolerate advancing PREs and flexibility activities for weight bearing positions with self-graded intensity and effective symptom monitoring.   3) Increase wrist and forearm LIRIANO by  by 60 degrees to increase functional hand use and support function positions for ADLs & leisure activities. Met and upgraded on 2/1/22  4) Digit and Thumb LIRIANO to be WNL as compared to the unaffected side for maximizing  and fine motor skills for reactivation the hand for light ADLs. Progressing  5)  Decrease edema by 1.5 cm for evidencing soft tissue healing and effective edema mgmt. Met 2/10/22    Plan   R/A wrist AROM. Advance flexibility and PREs as tolerated. Upgrade HEP with yellow putty for  strengthening.     MICAH Patel  Student Occupational Therapist    "I, Freddie CARBALLO, certify that I was present in the room directing the student in service delivery and guiding them using my skilled judgment. As the co-signing therapist, I have " "reviewed the student's documentation and am responsible for the treatment, assessment and plan."    Freddie Mcrae  Occupational Therapist    "

## 2022-02-10 ENCOUNTER — CLINICAL SUPPORT (OUTPATIENT)
Dept: REHABILITATION | Facility: HOSPITAL | Age: 72
End: 2022-02-10
Payer: MEDICARE

## 2022-02-10 DIAGNOSIS — R68.89 ALTERATION IN SELF-CARE ABILITY: ICD-10-CM

## 2022-02-10 PROCEDURE — 97022 WHIRLPOOL THERAPY: CPT | Mod: PN

## 2022-02-10 PROCEDURE — 97110 THERAPEUTIC EXERCISES: CPT | Mod: PN

## 2022-02-10 PROCEDURE — 97140 MANUAL THERAPY 1/> REGIONS: CPT | Mod: PN

## 2022-02-10 NOTE — PATIENT INSTRUCTIONS
[facility], OCCUPATIONAL THERAPY  HOME EXERCISE PROGRAM         PERFORM EXERCISES 1 TO 3 SETS OF 10, 4-6 TIMES PER DAY; ALWAYS PAIN-FREE. DON'T LET THE PICTURES PRESSURE YOU TO PUSH THROUGH PAINFUL RANGES.      PERFORM STRENGTHENING EXERCISES EVERY OTHER DAY; AND ALWAYS PAIN-FREE    PERFORM STRENGTHENING EXERCISES EVERY OTHER DAY AND ALWAYS PAIN-FREE                              You can do these using the log also:

## 2022-02-14 ENCOUNTER — CLINICAL SUPPORT (OUTPATIENT)
Dept: REHABILITATION | Facility: HOSPITAL | Age: 72
End: 2022-02-14
Payer: MEDICARE

## 2022-02-14 DIAGNOSIS — R68.89 ALTERATION IN SELF-CARE ABILITY: ICD-10-CM

## 2022-02-14 PROCEDURE — 97140 MANUAL THERAPY 1/> REGIONS: CPT | Mod: PN

## 2022-02-14 PROCEDURE — 97022 WHIRLPOOL THERAPY: CPT | Mod: PN

## 2022-02-14 PROCEDURE — 97110 THERAPEUTIC EXERCISES: CPT | Mod: PN

## 2022-02-14 NOTE — PROGRESS NOTES
Occupational Therapy Daily Treatment Note     Name: Tracy Armendariz  Clinic Number: [id number]    Therapy Diagnosis:   Encounter Diagnosis   Name Primary?    Alteration in self-care ability      Physician: Nichole Lyles PA-C     Visit Date: 2/14/2022    Medical Diagnosis: S62.101D (ICD-10-CM) - Closed fracture of right wrist with routine healing, subsequent encounter  Physician Orders: Eval and treat  Evaluation Date: 1/12/2022  Insurance Authorization Period Expiration: through 3/3/22  Plan of Care from 1/12/2022 to 3/11/22   Date of Return to MD: 2/28/22 1/31/22 MD PLAN:  The fracture is healed so I want her to wean out of the wrist brace increase activities as tolerated  Continue therapy advance activities  Squeeze ball for strengthening  Date of Onset: 11/9/21    FOTO IE 1/12/22: 69.2 % (DASH)    Visit # / Visits authorized: 8 / 16 (total 9 with eval)  Time In: 12:00 pm  Time Out: 12:52 pm  Total Billable Time: 47 minutes    Precautions:  Standard and blood thinners, falls; gait and balance problems noted 2/10/22; Precautions as per imaging and s/p Week 12+    Subjective     Pt reports: 2/14: Pt arrives wearing compression glove. She reports 6/10 pain in right ulna wrist and bilateral thumbs explaining that the pain increased after  exercises last session. She explains that she did not report that pain she was experiencing while she was performing these exercises. She leaves understanding the importance of her feedback during tx sessions. She also c/o shoulder pain when in positions of flexion & external rotation. Pt states she wears her compression glove for majorirty of the day and during the night, and she wears her brace when heavy lifting, eating, and during any activity that puts pressure on her wrist.     Response to previous treatments: Pt reports increased pain at ulna wrist since last therapy session. She states that she did too much last session; tx today adjusted  accordingly.    Functional change: Pt reports she was able to cut her meal this morning for several minutes, a task that her  normally does for her. Pt reports bringing utensils to her mouth continue to cause wrist pain.    Pain: 6/10   Location: ulnar wrist, 1st cmc joint on bilateral hands  Location during prior sessions: right lat epicondyle, radial and ulnar wrist, dorsal surface middle of the wrist,     Objective     Mental status: alert, interactive, oriented x3     Observation: Wavering during sit <> stand and while ambulating; no assistive device used  Forward shoulders. Thenar wasting on right hand.      Edema: Circumferential measurements: In centimters     Right/Left Right Right Right  Right     IE-1/12/22 2/1/22 2/3/22 2/10/22 2/14/22   IF P1  6.7/6.3 7.3 (+0.6) 7.2 (-.1) 6.8 (-0.4) 6.9 (+0.1)   SF P1   5.8/5.6 6.1 (+0.3) 6.2 (+0.1) 5.5 (-0.7) 5.6 (+0.1)   Thumb P1 7.4/7.5 7.4 (=) 7.0 (-0.4) 6.6 (-0.4) 6.8 (+0.2)   DPC 19/17.5 19.7 (+0.7) 19 (-0.7) 18.9 (-0.1) 19.4 (+0.5)   Wrist 18.3/17.0 19.5/17.9 18.8 (-0.8) 18.8 (=) 18.9 (+0.1)                Tip to DPC (in centimeters). Left hand is WNL   AROM Right Right  Right    DATE IE-1/12/22 2/1/22 2/10/22   IF  4.0 2.5 (+1.5) 1.5 (+1.0)   LF nt 2.0 1.5 (+0.5)   RF nt 1.5 1.0 (+0.5)   SF nt .75 0.5 (+0.25)   Thumb Opposition to SF P2 4 cm 2.0 cm (+2.0)      Wrist AROM    Right/Left Right Right Right   DATE IE-1/12/22 2/1/22  Post-tx 2/3/22  Post-tx 2/14/22   EXT 30/90 49 55 55*   FLX 17/45 36 28 39   RD 0/12 11 23 24   UD  26/35 18 28 13**   LIRIANO 73/182 114 (+41) 134 (+20) 131 (-3)    *Flexion provokes pain on the middle dorsal surface of the wrist (less than 3/10 pain)   ** pain limiting today     Forearm AROM    Right/Left Right Right Right   DATE IE-1/12/22 2/1/22*  Pre-tx 2/3/22 post tx 2/10/22   Supination 68/85 46 55 55 (=)   Pronation 90/90 85 90 88 (-2)   *different car, rapidly moving in quick end range position causes pain          "Strength: (measured in psi.)    Right/Left    2/10/2022   Les Rung II 8/40   Phillips Pinch 4.5/4.5   3-pt Pinch 3/6   2-pt Pinch 3/6.75       Treatment      Tracy received the following supervised modalities after being cleared for contradictions for 15 minutes:   -defer-Paraffin wax heat pre-tx for promoting healing, decreasing pain and increasing tissue extensibility  -Fluidotherapy for promoting healing, reducing pain, desensitization and increasing tissue extensibility    Tracy received the following manual therapy techniques for 15 minutes:   - Retrograde massage  - Nnamdi edema mobilization for lymphatic drainage and pain relief  - R/A girth; check out fit of compression glove; size large 3/4 finger compression glove. Alternative digit sleeves provided: 1" compression sleeve for Th, IF, LF & RF. Size Large Digitsleeve for the SF.  - Elastic tape applied with space correction at ulna wrist for pain and edema management; precautions discussed    Tracy performed therapeutic exercises for 12 minutes including:  TGEs, wrist AROM, spreads, lifts Performed during the final minutes of fluidotherapy   Wrist AROM  1 x 10 each:  -Flx/Ext   -RD/UD   Defer-Wrist/forearm AROM  2 x 1 min   -wrist wheel for sup/pro   Dexterity and thumb AROM  1 x 10 each:  -SF slides  -opposition to each finger tip  -Manipulation, storage, and translation picking up one at a time, store 4 in hand, Med pompoms, 1/2 container  - defer-Opposition to SF picking up medium sized pompoms 1/2 container     Defer-Digit AROM 2 minutes each:  -TGEs hook to full fist rolls using 3/8" dowel   Intrinsic strengthening 1 min:  -spreads and lifts with yellow spidyband, no thumb    Wrist & Forearm AROM R/A 2/3/22  R/A forearm 2/10/22  R/A wrist AROM 2/14/22   Assessed baseline  and pinch 2/10/22   Digit AROM R/A 2/10/22    *pain with ulnar deviation    Tracy received Self Care instructions and Orthosis Management for 5 minutes and participated in an " ongoing and concurrent discussion of re/evaluation findings and specific home care, including:  -Discussion of MD orders of weaning out of the wrist brace. Since pt presents with 6/10 pain today at rest, pt is encouraged to wear the wrist brace for moderate/heavy IADLs including washing and putting away dishes and lifting items to protect healing tissues and reduce episodes of pain. Pt appears to understand instructions  - Brief education provided on the pathophysiology of tissue and joint involvement, and edema after wrist fracture  - Education on symptom guided treatment; monitoring pain and avoiding activities and exercises that provoke >3/10 pain  - Arthritis mgmt and joint protection strategies education initiated as follows:   - Education provided on adaptive equipment including rocker knife and built-up foam handles to add to kitchen utensils and hair brush. Built-up foam handle provided for trial use at home.   - Education provided on delegating tasks to her  as needed.    Home Exercises and Education Provided     Education provided:   - Education provided on all interventions performed during today's session   - Arthritis/pain mgmt and joint protection strategies ongoing  - Wear wrist brace for moderate/heavy IADLs including doing dishes, and lifting items  - Continue to wear compression glove during the day for edema management      Education provided prior sessions:  - Education on wear schedule for compressive sleeve and digit sleeve; begin with 1 hour, and if no pain, continues to wear during the day intermittently, removing for HEP and hygiene; progress to night time wear as tolerated.  - HEP for TGEs and wrist AROM    Written Home Exercises Provided: Patient instructed to cont prior HEP.  Exercises were reviewed and Tracy was able to demonstrate them prior to the end of the session.  Tracy demonstrated good  understanding of the HEP provided.     See EMR under Patient Instructions for  exercises provided prior visit. 2/1/22       Assessment   2/14: Increased pain and edema indicative of tissue irritation from attempts to initiate  strengthening last session. Pt responded nicely (reduced pain at end of session) to tx program that was adjusted for promoting healing, protecting shoulder symptoms, edema mgmt and protecting wrist/hand joints.     Tracy is progressing well towards her goals and there are no updates to goals at this time. Pt prognosis is Good.     Pt will continue to benefit from skilled outpatient occupational therapy to address the deficits listed in the problem list on initial evaluation provide pt/family education and to maximize pt's level of independence in the home and community environment.     Anticipated barriers to occupational therapy: pain    Pt's spiritual, cultural and educational needs considered and pt agreeable to plan of care and goals.    2/14/22: Extended Goals:  ( LTG's (10-12 weeks):  1)   Increase right wrist and forearm LIRIANO to  >75% of the  left wrist to increase functional hand use for weight bearing and reaching tasks. Modified and Progressing on 2/1/22.  2)   Right  strength  to be >70% of the unaffected side.  3)   Right pinch strength to be >60% of the unaffected side.  4)   Decrease complaints of pain to 2 out of 10 at worst to increase functional hand use and UE support functions for moderate to heavy ADL/work/leisure activities.  5)   Patient to score at 45 % or less on Quick/DASH and/or Work module to demonstrate improved perception of functional right hand use to evidence return to near to prior level of function for I/ADLs and return to gardening.     STG's (; extended to 6-8 weeks)  1)   Patient to be IND with HEP and modalities for pain/edema managment. MET  2)   Pt to tolerate advancing PREs and flexibility activities for weight bearing positions with self-graded intensity and effective symptom monitoring.   3) Increase wrist and forearm  "LIRIANO by  by 60 degrees to increase functional hand use and support function positions for ADLs & leisure activities. Met and upgraded on 2/1/22  4) Digit and Thumb LIRIANO to be WNL as compared to the unaffected side for maximizing  and fine motor skills for reactivation the hand for light ADLs. Progressing  5)  Decrease edema by 1.5 cm for evidencing soft tissue healing and effective edema mgmt. Met 2/10/22    Plan   2/14: Continue instructing pain mgmt and joint protection strategies. Continue emphasis of intervention on promoting healing. Advance flexibility and PREs only as tolerated.    MICAH Patel  Student Occupational Therapist    "I, HARIS Chand, MARGARITA,  certify that I was present in the room directing the student in service delivery and guiding them using my skilled judgment. As the co-signing therapist, I have reviewed the student's documentation and am responsible for the treatment, assessment and plan."    HARIS Chi, MARGARITA  Occupational Therapist, Certified Hand Therapist        "

## 2022-02-15 ENCOUNTER — OFFICE VISIT (OUTPATIENT)
Dept: FAMILY MEDICINE | Facility: CLINIC | Age: 72
End: 2022-02-15
Payer: MEDICARE

## 2022-02-15 VITALS
BODY MASS INDEX: 36.63 KG/M2 | OXYGEN SATURATION: 98 % | HEART RATE: 63 BPM | HEIGHT: 66 IN | SYSTOLIC BLOOD PRESSURE: 136 MMHG | WEIGHT: 227.94 LBS | DIASTOLIC BLOOD PRESSURE: 82 MMHG

## 2022-02-15 DIAGNOSIS — I10 ESSENTIAL HYPERTENSION: ICD-10-CM

## 2022-02-15 DIAGNOSIS — L85.3 XEROSIS CUTIS: ICD-10-CM

## 2022-02-15 DIAGNOSIS — I70.0 ATHEROSCLEROSIS OF AORTA: ICD-10-CM

## 2022-02-15 DIAGNOSIS — G47.10 HYPERSOMNOLENCE: ICD-10-CM

## 2022-02-15 DIAGNOSIS — E66.01 SEVERE OBESITY WITH BODY MASS INDEX (BMI) OF 36.0 TO 36.9 WITH SERIOUS COMORBIDITY: ICD-10-CM

## 2022-02-15 DIAGNOSIS — H81.10 BENIGN PAROXYSMAL POSITIONAL VERTIGO, UNSPECIFIED LATERALITY: Primary | ICD-10-CM

## 2022-02-15 DIAGNOSIS — Z86.73 HISTORY OF STROKE: ICD-10-CM

## 2022-02-15 DIAGNOSIS — K21.9 GASTROESOPHAGEAL REFLUX DISEASE, UNSPECIFIED WHETHER ESOPHAGITIS PRESENT: ICD-10-CM

## 2022-02-15 PROCEDURE — 3288F FALL RISK ASSESSMENT DOCD: CPT | Mod: CPTII,S$GLB,, | Performed by: FAMILY MEDICINE

## 2022-02-15 PROCEDURE — 3288F PR FALLS RISK ASSESSMENT DOCUMENTED: ICD-10-PCS | Mod: CPTII,S$GLB,, | Performed by: FAMILY MEDICINE

## 2022-02-15 PROCEDURE — 1159F MED LIST DOCD IN RCRD: CPT | Mod: CPTII,S$GLB,, | Performed by: FAMILY MEDICINE

## 2022-02-15 PROCEDURE — 3008F PR BODY MASS INDEX (BMI) DOCUMENTED: ICD-10-PCS | Mod: CPTII,S$GLB,, | Performed by: FAMILY MEDICINE

## 2022-02-15 PROCEDURE — 1125F AMNT PAIN NOTED PAIN PRSNT: CPT | Mod: CPTII,S$GLB,, | Performed by: FAMILY MEDICINE

## 2022-02-15 PROCEDURE — 3075F PR MOST RECENT SYSTOLIC BLOOD PRESS GE 130-139MM HG: ICD-10-PCS | Mod: CPTII,S$GLB,, | Performed by: FAMILY MEDICINE

## 2022-02-15 PROCEDURE — 99215 OFFICE O/P EST HI 40 MIN: CPT | Mod: S$GLB,,, | Performed by: FAMILY MEDICINE

## 2022-02-15 PROCEDURE — 3075F SYST BP GE 130 - 139MM HG: CPT | Mod: CPTII,S$GLB,, | Performed by: FAMILY MEDICINE

## 2022-02-15 PROCEDURE — 3079F PR MOST RECENT DIASTOLIC BLOOD PRESSURE 80-89 MM HG: ICD-10-PCS | Mod: CPTII,S$GLB,, | Performed by: FAMILY MEDICINE

## 2022-02-15 PROCEDURE — 99215 PR OFFICE/OUTPT VISIT, EST, LEVL V, 40-54 MIN: ICD-10-PCS | Mod: S$GLB,,, | Performed by: FAMILY MEDICINE

## 2022-02-15 PROCEDURE — 1100F PR PT FALLS ASSESS DOC 2+ FALLS/FALL W/INJURY/YR: ICD-10-PCS | Mod: CPTII,S$GLB,, | Performed by: FAMILY MEDICINE

## 2022-02-15 PROCEDURE — 99499 UNLISTED E&M SERVICE: CPT | Mod: S$GLB,,, | Performed by: FAMILY MEDICINE

## 2022-02-15 PROCEDURE — 3008F BODY MASS INDEX DOCD: CPT | Mod: CPTII,S$GLB,, | Performed by: FAMILY MEDICINE

## 2022-02-15 PROCEDURE — 1125F PR PAIN SEVERITY QUANTIFIED, PAIN PRESENT: ICD-10-PCS | Mod: CPTII,S$GLB,, | Performed by: FAMILY MEDICINE

## 2022-02-15 PROCEDURE — 1159F PR MEDICATION LIST DOCUMENTED IN MEDICAL RECORD: ICD-10-PCS | Mod: CPTII,S$GLB,, | Performed by: FAMILY MEDICINE

## 2022-02-15 PROCEDURE — 99499 RISK ADDL DX/OHS AUDIT: ICD-10-PCS | Mod: S$GLB,,, | Performed by: FAMILY MEDICINE

## 2022-02-15 PROCEDURE — 3079F DIAST BP 80-89 MM HG: CPT | Mod: CPTII,S$GLB,, | Performed by: FAMILY MEDICINE

## 2022-02-15 PROCEDURE — 99999 PR PBB SHADOW E&M-EST. PATIENT-LVL IV: ICD-10-PCS | Mod: PBBFAC,,, | Performed by: FAMILY MEDICINE

## 2022-02-15 PROCEDURE — 99999 PR PBB SHADOW E&M-EST. PATIENT-LVL IV: CPT | Mod: PBBFAC,,, | Performed by: FAMILY MEDICINE

## 2022-02-15 PROCEDURE — 1100F PTFALLS ASSESS-DOCD GE2>/YR: CPT | Mod: CPTII,S$GLB,, | Performed by: FAMILY MEDICINE

## 2022-02-15 RX ORDER — MECLIZINE HCL 12.5 MG 12.5 MG/1
12.5 TABLET ORAL 3 TIMES DAILY PRN
Qty: 30 TABLET | Refills: 6 | Status: SHIPPED | OUTPATIENT
Start: 2022-02-15 | End: 2022-05-31 | Stop reason: SDUPTHER

## 2022-02-15 RX ORDER — BUPROPION HYDROCHLORIDE 300 MG/1
300 TABLET ORAL DAILY
Qty: 90 TABLET | Refills: 11 | Status: SHIPPED | OUTPATIENT
Start: 2022-02-15 | End: 2023-03-16 | Stop reason: SDUPTHER

## 2022-02-15 RX ORDER — ESOMEPRAZOLE MAGNESIUM 40 MG/1
40 CAPSULE, DELAYED RELEASE ORAL DAILY PRN
Qty: 30 CAPSULE | Refills: 3 | Status: SHIPPED | OUTPATIENT
Start: 2022-02-15 | End: 2022-08-08 | Stop reason: SDUPTHER

## 2022-02-15 NOTE — PATIENT INSTRUCTIONS
Modified Epley maneuver for self-treatment of benign positional vertigo (right)    This maneuver should be carried out three times a day. Repeat this daily until you are free from positional vertigo for 24 hours.  Reproduced from http://[email or web address]/ch/neuro/englishL.htm  Graphic 77606 Version 2.0          Modified Epley maneuver for self-treatment of benign positional vertigo (left)    This maneuver should be carried out three times a day. Repeat this daily until you are free from positional vertigo for 24 hours.  Reproduced from Http://[email or web address]//neuro/englishL.htm  Graphic 26784 Version 2.0           Get over the counter DEBROX to help soften and remove ear wax.

## 2022-02-15 NOTE — PROGRESS NOTES
(Portions of this note were dictated using voice recognition software and may contain dictation related errors in spelling/grammar/syntax not found on text review)    CC:   Chief Complaint   Patient presents with    Dizziness     Worsened over last 6 months     HPI: 71 y.o. female       Last visit 04/19/2021, discussed hypersomnia possibly related to underlying mood issue with depression.  On Lexapro 20 mg daily, added Wellbutrin 150 mg for adjunctive management.  Feels like it helps, still really sleepy and sleeps a lot.  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  Currently in physical therapy    She has been having significant vertigo issues usually when turning her head in a certain direction, more to the right side.  No headache associated.  No resting symptoms.  No lightheadedness or presyncopal symptoms.  She does find that because of these issues she will feel very unsteady, have to sit down and all lot of time just lie down and go to bed.  The above wrist fracture was secondary to a fall that she had while dealing with the vertigo issue.  Not taking any medications for this.  No remain the ears but thinks that perhaps her hearing might be a little bit less.  She thinks that perhaps ease vertigo issues were after her stroke in 2013, does not really recall having major issues with this prior    Also get some heartburn periodically, was wondering if she can take some Nexium as needed      Planning a trip to [facility] over the summer, was wondering if this was safe    Also has been having some dry itchy skin on her face, nose, right side of the neck.    Past Medical History:   Diagnosis Date    Allergy      Anticoagulant long-term use     Arthritis     CVA (cerebral infarction)     Depression     Disorder of kidney and ureter     renal stones    Diverticulosis of colon     Extrinsic asthma, unspecified     Hyperlipidemia     Hypertension     Kidney stone     Left atrial enlargement 2014    Low back pain     MARTIN (obstructive sleep apnea)     Osteopenia     PUD (peptic ulcer disease)     Stroke  2013    Urinary tract infection        Past Surgical History:   Procedure Laterality Date    APPENDECTOMY      @ time of hysterectomy    BACK SURGERY      CATARACT EXTRACTION       SECTION      CHOLECYSTECTOMY      laparoscopic    COLONOSCOPY N/A 2018    Procedure: COLONOSCOPY/Golytely;  Surgeon: Janine Black MD;  Location: [facility] ENDO;  Service: Endoscopy;  Laterality: N/A;    DILATION AND CURETTAGE OF UTERUS  1972    HYSTERECTOMY      TAHUSO with appendectomy    INNER EAR SURGERY      replaced ear drum    JOINT REPLACEMENT Right     knee    KNEE ARTHROSCOPY W/ DEBRIDEMENT      LUMBAR DISCECTOMY      L4-L5    OOPHORECTOMY      unilateral    TONSILLECTOMY      TYMPANOPLASTY      URETEROSCOPIC REMOVAL OF URETERIC CALCULUS Left 2018    Procedure: REMOVAL, CALCULUS, URETER, URETEROSCOPIC, holmium laser lithotripsy, stone basket extraction, retrograde pyelogram, ureteral stent exchange;  Surgeon: Anaya Zamora MD;  Location: [facility] OR;  Service: Urology;  Laterality: Left;       Family History   Problem Relation Age of Onset    Cervical cancer Mother     Cancer Mother 65        lung cancer - non smoker    Stroke Paternal Grandfather     Hypertension Maternal Grandfather     Heart disease Maternal Grandfather     Hypertension Father     Coronary artery disease Father 62    Heart disease Father     Diabetes Sister     Heart disease Sister     Kidney disease Sister     Breast cancer Daughter 36    Heart failure Sister         60s     Colon cancer Neg Hx     Ovarian cancer Neg Hx        Social History     Tobacco Use    Smoking status: Former Smoker     Quit date: 1995     Years since quittin.1    Smokeless tobacco: Never Used   Substance Use Topics    Alcohol use: Yes     Alcohol/week: 1.0 standard drink     Types: 1 Glasses of wine per week     Comment: social    Drug use: No       Lab Results   Component Value Date    WBC 7.37 2020    HGB 13.7 2020    HCT 41.3 2020    MCV 94 2020     2020    CHOL 188 2020    TRIG 129 2020    HDL 88 (H) 2020    ALT 14 2020    AST 18 2020    BILITOT 0.4 2020    ALKPHOS 63 2020     2020    K 4.3 2020     2020    CREATININE 0.8 2020    ESTGFRAFRICA >60 2020    EGFRNONAA >60 2020    CALCIUM 9.3 2020    ALBUMIN 3.5 2020    BUN 16 2020    CO2 28 2020    TSH 1.725 2020    INR 1.0 2017    HGBA1C 5.5 03/15/2018    LDLCALC 74.2 2020     (H) 2020    [id number] 37 04/10/2017             Vital signs reviewed  PE:   APPEARANCE: Well nourished, well developed, in no acute distress.    HEAD: Normocephalic, atraumatic.  Slight erythema to mid face with some fine scaling noted which involves face   between the eyebrows also at the nose.  She does have some similar dry skin noted right side of the neck with some sporadic papular eruption.  No vesicles.  EYES   Conjunctivae noninjected.  EARS: TM's intact. Light reflex normal. No retraction or perforation  NOSE: Mucosa pink. Airway clear.  MOUTH & THROAT: No tonsillar enlargement. No pharyngeal erythema or exudate.   NECK: Supple with no cervical lymphadenopathy.  No carotid bruits.  No thyromegaly   CHEST: Good inspiratory effort. Lungs clear to auscultation with no wheezes or crackles.  CARDIOVASCULAR: Normal S1, S2. No rubs, murmurs, or gallops.  ABDOMEN: Bowel sounds normal. Not  distended. Soft. No tenderness or masses. No organomegaly.  EXTREMITIES: No edema,  NEURO:  Negative Romberg sign.  Negative pronator drift.  Bernardo-Hallpike maneuver elicits vertigo sensation without nystagmus on the right side, slight symptoms but not as prominent on the left.      IMPRESSION  1. Benign paroxysmal positional vertigo, unspecified laterality    2. Xerosis cutis    3. Essential hypertension    4. History of stroke    5. Hypersomnolence    6. Atherosclerosis of aorta    7. Severe obesity with body mass index (BMI) of 36.0 to 36.9 with serious comorbidity    8. Gastroesophageal reflux disease, unspecified whether esophagitis present            PLAN  Symptoms more suggestive of BPPV.  Trial of meclizine provided.  Epley exercises provided for the home.  Given chronicity and recurrence, will consult ENT as well.  No resting symptoms or other neuropathic type symptoms to suggest central cerebrovascular etiology given her prior history of stroke.  However, if significant worsening or other neurologic symptoms developing, may need updated cranial imaging    Hypertension is well controlled    Advise Cetaphil moisturizer for the face and neck.  Advise against using lotions or washes with perfumes or dyes.  She can try some topical hydrocortisone 1% cream to the facial and neck skin which is dry and itchy.    Can increase Wellbutrin to 300 if this helps with her dysthymic symptoms and hypersomnolence as she did notice some improvement with starting at 150 mg    continue aspirin and statin    Okay for Nexium p.r.n. for GERD    Should be okay to travel internationally over the summer.  Advised on COVID precautions and following any necessary guidelines from host country regarding travel     Total time spent including face-to-face time and chart time:  40 min      Orders Placed This Encounter   Procedures    Ambulatory referral/consult to ENT       Medications Ordered This Encounter   Medications    buPROPion  (WELLBUTRIN XL) 300 MG 24 hr tablet     Sig: Take 1 tablet (300 mg total) by mouth once daily.     Dispense:  90 tablet     Refill:  11    esomeprazole (NEXIUM) 40 MG capsule     Sig: Take 1 capsule (40 mg total) by mouth daily as needed (heartburn).     Dispense:  30 capsule     Refill:  3    meclizine (ANTIVERT) 12.5 mg tablet     Sig: Take 1 tablet (12.5 mg total) by mouth 3 (three) times daily as needed for Dizziness or Nausea.     Dispense:  30 tablet     Refill:  6

## 2022-02-17 ENCOUNTER — CLINICAL SUPPORT (OUTPATIENT)
Dept: REHABILITATION | Facility: HOSPITAL | Age: 72
End: 2022-02-17
Payer: MEDICARE

## 2022-02-17 DIAGNOSIS — R68.89 ALTERATION IN SELF-CARE ABILITY: ICD-10-CM

## 2022-02-17 PROCEDURE — 97140 MANUAL THERAPY 1/> REGIONS: CPT | Mod: PN

## 2022-02-17 PROCEDURE — 97022 WHIRLPOOL THERAPY: CPT | Mod: PN

## 2022-02-17 PROCEDURE — 97110 THERAPEUTIC EXERCISES: CPT | Mod: PN

## 2022-02-17 NOTE — PROGRESS NOTES
Occupational Therapy Daily Treatment Note     Name: Tracy Armendariz  Clinic Number: [id number]    Therapy Diagnosis:   Encounter Diagnosis   Name Primary?    Alteration in self-care ability      Physician: Nichole Lyles PA-C     Visit Date: 2/17/2022    Medical Diagnosis: S62.101D (ICD-10-CM) - Closed fracture of right wrist with routine healing, subsequent encounter  Physician Orders: Eval and treat  Evaluation Date: 1/12/2022  Insurance Authorization Period Expiration: through 3/3/22  Plan of Care from 1/12/2022 to 3/11/22   Date of Return to MD: 2/28/22 1/31/22 MD PLAN:  The fracture is healed so I want her to wean out of the wrist brace increase activities as tolerated  Continue therapy advance activities  Squeeze ball for strengthening  Date of Onset: 11/9/21    FOTO IE 1/12/22: 69.2 % (DASH)    Visit # / Visits authorized: 8 / 16 (total 9 with eval)  Time In: 12:00 pm  Time Out: 12:52 pm  Total Billable Time: 47 minutes    Precautions:  Standard and blood thinners, falls; gait and balance problems noted 2/10/22; Precautions as per imaging and s/p Week 12+    Subjective     Pt reports: 2/17: Pt arrives wearing compression glove. She reports 6/10 pain in right ulna wrist.  She simultaneously reports limiting movement with her right hand and pulling open doors.      Response to previous treatments: Pt reports continued pain at ulnar wrist since last therapy session.     Functional change: Patient has limited the use of the affected hand.      Pain: 6/10   Location: ulnar wrist  Location during prior sessions: right lat epicondyle, radial and ulnar wrist, dorsal surface middle of the wrist, and  1st cmc joint on bilateral hands    Objective     Mental status: alert, interactive, oriented x3     Observation: Wavering during sit <> stand and while ambulating; no assistive device used  Forward shoulders. Thenar wasting on right hand.      Edema: Circumferential measurements: In centimters     Right/Left  Right Right Right  Right Right     IE-1/12/22 2/1/22 2/3/22 2/10/22 2/14/22 2/17   IF P1  6.7/6.3 7.3 (+0.6) 7.2 (-.1) 6.8 (-0.4) 6.9 (+0.1) 7.0   SF P1   5.8/5.6 6.1 (+0.3) 6.2 (+0.1) 5.5 (-0.7) 5.6 (+0.1) 5.9   Thumb P1 7.4/7.5 7.4 (=) 7.0 (-0.4) 6.6 (-0.4) 6.8 (+0.2) 7.3   DPC 19/17.5 19.7 (+0.7) 19 (-0.7) 18.9 (-0.1) 19.4 (+0.5) 19.0   Wrist 18.3/17.0 19.5/17.9 18.8 (-0.8) 18.8 (=) 18.9 (+0.1) 18.2                 Tip to DPC (in centimeters). Left hand is WNL   AROM Right Right  Right    DATE IE-1/12/22 2/1/22 2/10/22   IF  4.0 2.5 (+1.5) 1.5 (+1.0)   LF nt 2.0 1.5 (+0.5)   RF nt 1.5 1.0 (+0.5)   SF nt .75 0.5 (+0.25)   Thumb Opposition to SF P2 4 cm 2.0 cm (+2.0)      Wrist AROM    Right/Left Right Right Right   DATE IE-1/12/22 2/1/22  Post-tx 2/3/22  Post-tx 2/14/22   EXT 30/90 49 55 55*   FLX 17/45 36 28 39   RD 0/12 11 23 24   UD  26/35 18 28 13**   LIRIANO 73/182 114 (+41) 134 (+20) 131 (-3)    *Flexion provokes pain on the middle dorsal surface of the wrist (less than 3/10 pain)   ** pain limiting today     Forearm AROM    Right/Left Right Right Right   DATE IE-1/12/22 2/1/22*  Pre-tx 2/3/22 post tx 2/10/22   Supination 68/85 46 55 55 (=)   Pronation 90/90 85 90 88 (-2)   *different car, rapidly moving in quick end range position causes pain         Strength: (measured in psi.)    Right/Left    2/10/2022   Les Rung II 8/40   Phillips Pinch 4.5/4.5   3-pt Pinch 3/6   2-pt Pinch 3/6.75       Treatment      Tracy received the following supervised modalities after being cleared for contradictions for 15 minutes:   -defer-Paraffin wax heat pre-tx for promoting healing, decreasing pain and increasing tissue extensibility  -Fluidotherapy for promoting healing, reducing pain, desensitization and increasing tissue extensibility    Tracy received the following manual therapy techniques for 15 minutes:   - Retrograde massage  - Nnamdi edema mobilization for lymphatic drainage and pain relief  - R/A girth;  "  -defer - check out fit of compression glove; size large 3/4 finger compression glove. Alternative digit sleeves provided: 1" compression sleeve for Th, IF, LF & RF. Size Large Digitsleeve for the SF.  -defer - Elastic tape applied with space correction at ulna wrist for pain and edema management; precautions discussed    Tracy performed therapeutic exercises for 15 minutes including:  TGEs, wrist AROM, spreads, lifts Performed during the final minutes of fluidotherapy   Wrist AROM  1 x 10 each:  -Flx/Ext   -RD/UD   Defer-Wrist/forearm AROM  2 x 1 min   -wrist wheel for sup/pro   Dexterity and thumb AROM  1 x 10 each:  -SF slides  -opposition to each finger tip  - defer - Manipulation, storage, and translation picking up one at a time, store 4 in hand, Med pompoms, 1/2 container  - defer-Opposition to SF picking up medium sized pompoms 1/2 container     Digit AROM - defer 2 minutes each:  -TGEs required retraining due to improper form.   Intrinsic strengthening -defer 1 min:  -spreads and lifts with yellow spidyband, no thumb    Wrist & Forearm AROM R/A 2/3/22  R/A forearm 2/10/22  R/A wrist AROM 2/14/22   Assessed baseline  and pinch 2/10/22   Digit AROM R/A 2/10/22    *pain with ulnar deviation    Tracy received Self Care instructions and Orthosis Management for 0 minutes and participated in an ongoing and concurrent discussion of re/evaluation findings and specific home care, including:  -Discussion of MD orders of weaning out of the wrist brace. Since pt presents with 6/10 pain today at rest, pt is encouraged to wear the wrist brace for moderate/heavy IADLs including washing and putting away dishes and lifting items to protect healing tissues and reduce episodes of pain. Pt appears to understand instructions  - Brief education provided on the pathophysiology of tissue and joint involvement, and edema after wrist fracture  - Education on symptom guided treatment; monitoring pain and avoiding activities " and exercises that provoke >3/10 pain  - Arthritis mgmt and joint protection strategies education initiated as follows:   - Education provided on adaptive equipment including rocker knife and built-up foam handles to add to kitchen utensils and hair brush. Built-up foam handle provided for trial use at home.   - Education provided on delegating tasks to her  as needed.    Home Exercises and Education Provided     Education provided:   - Education provided on all interventions performed during today's session   - Arthritis/pain mgmt and joint protection strategies ongoing  - Wear wrist brace for moderate/heavy IADLs including doing dishes, and lifting items  - Continue to wear compression glove during the day for edema management      Education provided prior sessions:  - Education on wear schedule for compressive sleeve and digit sleeve; begin with 1 hour, and if no pain, continues to wear during the day intermittently, removing for HEP and hygiene; progress to night time wear as tolerated.  - HEP for TGEs and wrist AROM    Written Home Exercises Provided: Patient instructed to cont prior HEP.  Exercises were reviewed and Tracy was able to demonstrate them prior to the end of the session.  Tracy demonstrated good  understanding of the HEP provided.     See EMR under Patient Instructions for exercises provided prior visit. 2/1/22       Assessment   2/17: Provided no strengthening today, but focused on tissue healing and range of motion. Tx program that was adjusted for promoting healing, protecting shoulder symptoms, edema mgmt and protecting wrist/hand joints.     Tracy is progressing well towards her goals and there are no updates to goals at this time. Pt prognosis is Good.     Pt will continue to benefit from skilled outpatient occupational therapy to address the deficits listed in the problem list on initial evaluation provide pt/family education and to maximize pt's level of independence in the  home and community environment.     Anticipated barriers to occupational therapy: pain    Pt's spiritual, cultural and educational needs considered and pt agreeable to plan of care and goals.    2/14/22: Extended Goals:  ( LTG's (10-12 weeks):  1)   Increase right wrist and forearm LIRIANO to  >75% of the  left wrist to increase functional hand use for weight bearing and reaching tasks. Modified and Progressing on 2/1/22.  2)   Right  strength  to be >70% of the unaffected side.  3)   Right pinch strength to be >60% of the unaffected side.  4)   Decrease complaints of pain to 2 out of 10 at worst to increase functional hand use and UE support functions for moderate to heavy ADL/work/leisure activities.  5)   Patient to score at 45 % or less on Quick/DASH and/or Work module to demonstrate improved perception of functional right hand use to evidence return to near to prior level of function for I/ADLs and return to gardening.     STG's (; extended to 6-8 weeks)  1)   Patient to be IND with HEP and modalities for pain/edema managment. MET  2)   Pt to tolerate advancing PREs and flexibility activities for weight bearing positions with self-graded intensity and effective symptom monitoring.   3) Increase wrist and forearm LIRIANO by  by 60 degrees to increase functional hand use and support function positions for ADLs & leisure activities. Met and upgraded on 2/1/22  4) Digit and Thumb LIRIANO to be WNL as compared to the unaffected side for maximizing  and fine motor skills for reactivation the hand for light ADLs. Progressing  5)  Decrease edema by 1.5 cm for evidencing soft tissue healing and effective edema mgmt. Met 2/10/22    Plan   2/17: Continue instructing pain mgmt and joint protection strategies. Continue emphasis of intervention on promoting healing. Advance flexibility and PREs only as tolerated.    Patel Grijalva, OT  Occupational Therapist

## 2022-02-22 ENCOUNTER — CLINICAL SUPPORT (OUTPATIENT)
Dept: REHABILITATION | Facility: HOSPITAL | Age: 72
End: 2022-02-22
Payer: MEDICARE

## 2022-02-22 DIAGNOSIS — M25.531 PAIN IN RIGHT WRIST: ICD-10-CM

## 2022-02-22 DIAGNOSIS — M79.89 SWELLING OF RIGHT HAND: ICD-10-CM

## 2022-02-22 DIAGNOSIS — M25.641 DECREASED RANGE OF MOTION OF FINGER OF RIGHT HAND: ICD-10-CM

## 2022-02-22 DIAGNOSIS — M25.631 DECREASED RANGE OF MOTION OF RIGHT WRIST: ICD-10-CM

## 2022-02-22 DIAGNOSIS — R68.89 ALTERATION IN SELF-CARE ABILITY: Primary | ICD-10-CM

## 2022-02-22 PROCEDURE — 97140 MANUAL THERAPY 1/> REGIONS: CPT | Mod: PN

## 2022-02-22 PROCEDURE — 97022 WHIRLPOOL THERAPY: CPT | Mod: PN

## 2022-02-22 PROCEDURE — 97110 THERAPEUTIC EXERCISES: CPT | Mod: PN

## 2022-02-22 NOTE — PROGRESS NOTES
"    Occupational Therapy Daily Treatment Note     Name: Tracy Armendariz  Clinic Number: [id number]    Therapy Diagnosis:   Encounter Diagnoses   Name Primary?    Alteration in self-care ability Yes    Pain in right wrist     Decreased range of motion of right wrist     Swelling of right hand     Decreased range of motion of finger of right hand      Physician: Nichole Lyles PA-C     Visit Date: 2/22/2022    Medical Diagnosis: S62.101D (ICD-10-CM) - Closed fracture of right wrist with routine healing, subsequent encounter  Physician Orders: Eval and treat  Evaluation Date: 1/12/2022  Insurance Authorization Period Expiration: through 3/3/22  Plan of Care from 1/12/2022 to 3/11/22   Date of Return to MD: 2/28/22 1/31/22 MD PLAN:  The fracture is healed so I want her to wean out of the wrist brace increase activities as tolerated  Continue therapy advance activities  Squeeze ball for strengthening  Date of Onset: 11/9/21    FOTO IE 1/12/22: 69.2 % (DASH)    Visit # / Visits authorized: 9 / 16 (total 9 with eval)  Time In: 12:00 pm  Time Out: 12:55 pm  Total Billable Time: 40 minutes    Precautions:  Standard and blood thinners, Osteopenia; falls; gait and balance problems d/t Vertigo; Precautions as per imaging and s/p Week 12+    Subjective     Pt reports: 2/22: Pt states "I stumbled at home and my wrist went back, I saw stars but it didn't last long". Pt arrives wearing the copper fit compression glove. Pt reports that her other MD had her doing some "shoulder exercises for my ear; I stopped doing them because my shoulder was hurting". Pt reports only wear her wrist brace when she leaves the house. She explain that her  does most of the household ADLs so that she doesn't need to wear it at home.    Response to previous treatments: Pt reports less pain this week since tx program adjusted down.    Functional change: Pt states "I can't do the nozzle on the hairspray with my right hand, so I do it with my " "left"  Patient has limited use of the affected hand.    Pain: 4/10   Location: ulnar wrist  Location during prior sessions: right lat epicondyle, radial and ulnar wrist, dorsal surface middle of the wrist, and  1st cmc joint on bilateral hands    Objective     Mental status: alert, interactive, oriented x3     Observation: Wavering during sit <> stand and while ambulating; no assistive device used  Forward shoulders. Thenar wasting on right hand.      Edema: Circumferential measurements: In centimters     Right/Left Right Right Right  Right Right Right     IE-1/12/22 2/1/22 2/3/22 2/10/22 2/14/22 2/17 2/22/22   IF P1  6.7/6.3 7.3 (+0.6) 7.2 (-.1) 6.8 (-0.4) 6.9 (+0.1) 7.0 7.0   SF P1   5.8/5.6 6.1 (+0.3) 6.2 (+0.1) 5.5 (-0.7) 5.6 (+0.1) 5.9 6.0   Thumb P1 7.4/7.5 7.4 (=) 7.0 (-0.4) 6.6 (-0.4) 6.8 (+0.2) 7.3 7.1   DPC 19/17.5 19.7 (+0.7) 19 (-0.7) 18.9 (-0.1) 19.4 (+0.5) 19.0 19.2   Wrist 18.3/17.0 19.5/17.9 18.8 (-0.8) 18.8 (=) 18.9 (+0.1) 18.2 18.2                  Tip to DPC (in centimeters). Left hand is WNL   AROM LEFT Right Right  Right  R   DATE  IE-1/12/22 2/1/22 2/10/22 2/22/22   IF  0 4.0 2.5 (+1.5) 1.5 (+1.0) 1.5 (=)   LF 0 nt 2.0 1.5 (+0.5) 1.5 (=)   RF 0 nt 1.5 1.0 (+0.5) .5 (+.5)   SF 0 nt .75 0.5 (+0.25) 0 (+.5)   Thumb 0 SF P2 4 cm 2.0 cm (+2.0) (=)      Wrist AROM    Right/Left Right Right R   DATE IE-1/12/22 2/1/22  Post-tx 2/3/22  Post-tx 2/22/22   EXT 30/90 49 55 64   FLX 17/45 36 28 37   RD 0/12 11 23 11   UD  26/35 18 28 25   LIRIANO 73/182 114 (+41) 134 (+20) 137 (+4)     Forearm AROM    Right/Left Right Right Right R   DATE IE-1/12/22 2/1/22*  Pre-tx 2/3/22 post tx 2/10/22 2/22/22   Supination 68/85 46 55 55 (=) 60 (+5)   Pronation 90/90 85 90 88 (-2) nt   *different car, rapidly moving in quick end range position causes pain      Strength: (measured in psi.)    Right/Left Right    2/10/2022    Les Rung II 8/40    Phillips Pinch 4.5/4.5    3-pt Pinch 3/6    2-pt Pinch 3/6.75      Treatment "      Tracy received the following supervised modalities after being cleared for contradictions for 8 minutes:   -defer-Paraffin wax heat pre-tx for promoting healing, decreasing pain and increasing tissue extensibility  -Fluidotherapy for promoting healing, reducing pain, desensitization and increasing tissue extensibility    Tracy received the following manual therapy techniques for 10 minutes:   - Retrograde massage  - defer-Nnamdi edema mobilization for lymphatic drainage and pain relief  - R/A girth; check out fit of compression glove; pt has her own copper fit; had been issued a size large 3/4 finger compression glove.   -defer - Elastic tape applied with space correction at ulna wrist for pain and edema management; precautions discussed    Tracy performed therapeutic exercises for 30 minutes including:  TGEs, wrist AROM, spreads, lifts Defer due to heat becoming uncomfortable; Performed during the final minutes of fluidotherapy   Wrist AROM  1 min each over wedge:  - 4 ways, holding 2cm dowel     Wrist/forearm AROM  2 x 1 min   -wrist wheel for sup/pro   Dexterity and thumb AROM  1 min each:  -opposition with slides down each to each finger tip  - defer - Manipulation, storage, and translation picking up one at a time, store 4 in hand, Med pompoms, 1/2 container   Digit AROM - defer 2 minutes each:  -TGEs required retraining due to improper form.   Intrinsic strengthening -defer 1 min:  -spreads and lifts with yellow spidyband, no thumb    Wrist & Forearm AROM R/A forearm 2/22/22  R/A wrist 2/22/22   Assessed baseline  and pinch 2/10/22   Digit AROM R/A 2/22/22    *pain with ulnar deviation    Tracy participated in an ongoing and concurrent discussion of re/evaluation findings and specific home care, including:  -Discussion of MD orders of weaning out of the wrist brace. Shet is encouraged to wear the wrist brace for moderate/heavy IADLs including washing and putting away dishes and lifting items  to protect healing tissues and reduce episodes of pain.  - Brief education provided on the pathophysiology of tissue and joint involvement, and edema after wrist fracture  - Education on symptom guided treatment; monitoring pain and avoiding activities and exercises that provoke >3/10 pain  - Arthritis mgmt and joint protection strategies education initiated as follows:   - Education provided on adaptive equipment including rocker knife and built-up foam handles to add to kitchen utensils and hair brush. Built-up foam handle provided for trial use at home.   - Education provided on delegating tasks to her  as needed.    Home Exercises and Education Provided     Education provided:   - Education provided on all interventions performed during today's session   - Arthritis/pain mgmt and joint protection strategies ongoing  - Wear wrist brace for moderate/heavy IADLs including doing dishes, and lifting items  - Continue to wear compression glove during the day for edema management      Education provided prior sessions:  - Education on wear schedule for compressive sleeve and digit sleeve; begin with 1 hour, and if no pain, continues to wear during the day intermittently, removing for HEP and hygiene; progress to night time wear as tolerated.  - HEP for TGEs and wrist AROM    Written Home Exercises Provided: Patient instructed to cont prior HEP.  Exercises were reviewed and Tracy was able to demonstrate them prior to the end of the session.  Tracy demonstrated good  understanding of the HEP provided.     See EMR under Patient Instructions for exercises provided prior visit. 2/1/22       Assessment   2/22: Decreased pain and increased tolerance for wrist AROM since discontinuing  and pinch strengthening.     Tracy is progressing well towards her goals and there are no updates to goals at this time. Pt prognosis is Good.     Pt will continue to benefit from skilled outpatient occupational therapy to  address the deficits listed in the problem list on initial evaluation provide pt/family education and to maximize pt's level of independence in the home and community environment.     Anticipated barriers to occupational therapy: pain    Pt's spiritual, cultural and educational needs considered and pt agreeable to plan of care and goals.    2/14/22: Extended Goals:  ( LTG's (10-12 weeks):  1)   Increase right wrist and forearm LIRIANO to  >75% of the  left wrist to increase functional hand use for weight bearing and reaching tasks. Modified and Progressing on 2/1/22.  2)   Right  strength  to be >70% of the unaffected side.  3)   Right pinch strength to be >60% of the unaffected side.  4)   Decrease complaints of pain to 2 out of 10 at worst to increase functional hand use and UE support functions for moderate to heavy ADL/work/leisure activities.  5)   Patient to score at 45 % or less on Quick/DASH and/or Work module to demonstrate improved perception of functional right hand use to evidence return to near to prior level of function for I/ADLs and return to gardening.     STG's (; extended to 6-8 weeks)  1)   Patient to be IND with HEP and modalities for pain/edema managment. MET  2)   Pt to tolerate advancing PREs and flexibility activities for weight bearing positions with self-graded intensity and effective symptom monitoring.   3) Increase wrist and forearm LIRIANO by  by 60 degrees to increase functional hand use and support function positions for ADLs & leisure activities. Met and upgraded on 2/1/22  4) Digit and Thumb LIRIANO to be WNL as compared to the unaffected side for maximizing  and fine motor skills for reactivation the hand for light ADLs. Progressing  5)  Decrease edema by 1.5 cm for evidencing soft tissue healing and effective edema mgmt. Met 2/10/22    Plan   2/22: R/A FOTO #10 next visit. Continue instructing pain mgmt and joint protection strategies. Continue emphasis of intervention on promoting  healing. Advance weight bearing, flexibility and PREs only as tolerated.    HARIS Chi, TIANNAT  Occupational Therapist, Certified Hand Therapist

## 2022-02-24 ENCOUNTER — CLINICAL SUPPORT (OUTPATIENT)
Dept: REHABILITATION | Facility: HOSPITAL | Age: 72
End: 2022-02-24
Payer: MEDICARE

## 2022-02-24 ENCOUNTER — PATIENT OUTREACH (OUTPATIENT)
Dept: ADMINISTRATIVE | Facility: OTHER | Age: 72
End: 2022-02-24
Payer: MEDICARE

## 2022-02-24 DIAGNOSIS — M79.89 SWELLING OF RIGHT HAND: ICD-10-CM

## 2022-02-24 DIAGNOSIS — M25.631 DECREASED RANGE OF MOTION OF RIGHT WRIST: ICD-10-CM

## 2022-02-24 DIAGNOSIS — M25.641 DECREASED RANGE OF MOTION OF FINGER OF RIGHT HAND: ICD-10-CM

## 2022-02-24 DIAGNOSIS — R68.89 ALTERATION IN SELF-CARE ABILITY: ICD-10-CM

## 2022-02-24 DIAGNOSIS — M25.531 PAIN IN RIGHT WRIST: Primary | ICD-10-CM

## 2022-02-24 PROCEDURE — 97140 MANUAL THERAPY 1/> REGIONS: CPT | Mod: PN

## 2022-02-24 PROCEDURE — 97110 THERAPEUTIC EXERCISES: CPT | Mod: PN

## 2022-02-24 PROCEDURE — 97022 WHIRLPOOL THERAPY: CPT | Mod: PN

## 2022-02-24 NOTE — PATIENT INSTRUCTIONS
"  [facility], OCCUPATIONAL THERAPY  HOME EXERCISE PROGRAM         PERFORM EXERCISES 1 TO 3 SETS OF 10, 4-6 TIMES PER DAY; ALWAYS PAIN-FREE. DON'T LET THE PICTURES PRESSURE YOU TO PUSH THROUGH PAINFUL RANGES.      [facility]  OCCUPATIONAL THERAPY  HOME EXERCISE PROGRAM    Apply pressure to the webspace between your thumb & index finger. You can use your other hand or a chip clip. Hold for 30 seconds. Do 3 repetitions, 2x/day.       Ask your  to do this stretch for you   Reach around the back of your thumb using your opposite hand & wrap your fingers around your thumb. Pull your palm open using your opposite hand. To deepen the stretch, bring your palm to your chest.   Hold for 30 seconds. Do 3 repetitions, 2x/day.     Make the shape of the letter "c" using your thumb & index finger.   Hold for 10 seconds. Do 10 repetitions.      Make the shape of the letter "c" using your thumb & index finger. Slowly start to pinch the tips of your thumb and index finger together like you are closing the "c". Stop before you lose the [place] shape. Return to the "c" position and repeat.   Do 10 repetitions, 2x/day.  "

## 2022-02-24 NOTE — PROGRESS NOTES
"    Occupational Therapy Daily Treatment Note     Name: Tracy Armendariz  Clinic Number: [id number]    Therapy Diagnosis:   Encounter Diagnoses   Name Primary?    Pain in right wrist Yes    Decreased range of motion of right wrist     Swelling of right hand     Alteration in self-care ability     Decreased range of motion of finger of right hand      Physician: Nichole Lyles PA-C     Visit Date: 2/24/2022    Medical Diagnosis: S62.101D (ICD-10-CM) - Closed fracture of right wrist with routine healing, subsequent encounter  Physician Orders: Eval and treat  Evaluation Date: 1/12/2022  Insurance Authorization Period Expiration: through 3/3/22  Plan of Care from 1/12/2022 to 3/11/22   Date of Return to MD: 2/28/22 1/31/22 MD PLAN:  The fracture is healed so I want her to wean out of the wrist brace increase activities as tolerated  Continue therapy advance activities  Squeeze ball for strengthening  Date of Onset: 11/9/21    FOTO (DASH) at eval: 69.2 %  FOTO (DASH) RA 2/24/22: 53.3 (+15.9%)    Visit # / Visits authorized: 10 / 16 (total 10 with eval)  Time In: 12:00 pm  Time Out: 1:00 pm  Total Billable Time: 40 minutes    Precautions:  Standard and blood thinners, Osteopenia; falls; gait and balance problems d/t Vertigo; Precautions as per imaging and s/p Week 12+    Subjective     Pt reports: Tracy arrives wearing her copper glove and OTC wrist brace. "I have been wearing the brace and it feels better."  Tracy arrives with 5/10 ulna wrist pain at rest. After treatment, she reports decreased symptoms of pain.     2/22: Pt states "I stumbled at home and my wrist went back, I saw stars but it didn't last long". Pt arrives wearing the copper fit compression glove. Pt reports that her other MD had her doing some "shoulder exercises for my ear; I stopped doing them because my shoulder was hurting". Pt reports only wear her wrist brace when she leaves the house. She explain that her  does most of the " "household ADLs so that she doesn't need to wear it at home.    Response to previous treatments: She states that she has used the built-up foam handle for her fork at home and reports improved functional ability. Pt reports less pain and edema this week since tx program adjusted down.     Functional change: Pt states "I can't do the nozzle on the hairspray with my right hand, so I do it with my left"  Patient has limited use of the affected hand.      Pain: 5/10 at rest   Location: ulnar wrist pain at rest, and during supination and pronation, pain in left 1st cmc joint  Location during prior sessions: Right shoulder, right lat epicondyle, radial and ulnar wrist, dorsal surface middle of the wrist, and  1st cmc joint on bilateral hands    Objective     Mental status: alert, interactive, oriented x3     Observation: Wavering during sit <> stand and while ambulating; no assistive device used  Forward shoulders. Thenar wasting on right hand.      Edema: Circumferential measurements: In centimters     Right/Left Right Right Right  Right Right Right Right     IE-1/12/22 2/1/22 2/3/22 2/10/22 2/14/22 2/17 2/22/22 2/24/22   IF P1  6.7/6.3 7.3 (+0.6) 7.2 (-.1) 6.8 (-0.4) 6.9 (+0.1) 7.0 7.0 6.8 (-0.2)   SF P1   5.8/5.6 6.1 (+0.3) 6.2 (+0.1) 5.5 (-0.7) 5.6 (+0.1) 5.9 6.0 5.6 (-0.4)   Thumb P1 7.4/7.5 7.4 (=) 7.0 (-0.4) 6.6 (-0.4) 6.8 (+0.2) 7.3 7.1 6.9 (-0.2)   DPC 19/17.5 19.7 (+0.7) 19 (-0.7) 18.9 (-0.1) 19.4 (+0.5) 19.0 19.2 18.8 (-0.4)   Wrist 18.3/17.0 19.5/17.9 18.8 (-0.8) 18.8 (=) 18.9 (+0.1) 18.2 18.2 18.3 (+0.1)                   Tip to DPC (in centimeters). Left hand is WNL   AROM LEFT Right Right  Right  R   DATE  IE-1/12/22 2/1/22 2/10/22 2/22/22   IF  0 4.0 2.5 (+1.5) 1.5 (+1.0) 1.5 (=)   LF 0 nt 2.0 1.5 (+0.5) 1.5 (=)   RF 0 nt 1.5 1.0 (+0.5) .5 (+.5)   SF 0 nt .75 0.5 (+0.25) 0 (+.5)   Thumb 0 SF P2 4 cm 2.0 cm (+2.0) (=)      Wrist AROM    Right/Left Right Right R   DATE IE-1/12/22 2/1/22  Post-tx " 2/3/22  Post-tx 2/22/22   EXT 30/90 49 55 64   FLX 17/45 36 28 37   RD 0/12 11 23 11   UD  26/35 18 28 25   LIRIANO 73/182 114 (+41) 134 (+20) 137 (+4)     Forearm AROM    Right/Left Right Right Right R   DATE IE-1/12/22 2/1/22*  Pre-tx 2/3/22 post tx 2/10/22 2/22/22   Supination 68/85 46 55 55 (=) 60 (+5)   Pronation 90/90 85 90 88 (-2) nt   *different car, rapidly moving in quick end range position causes pain      Strength: (measured in psi.)    Right/Left    2/10/2022   Les Rung II 8/40   Phillips Pinch 4.5/4.5   3-pt Pinch 3/6   2-pt Pinch 3/6.75     Treatment      Tracy received the following supervised modalities after being cleared for contradictions for 15 minutes:   -defer-Paraffin wax heat pre-tx for promoting healing, decreasing pain and increasing tissue extensibility  -Fluidotherapy for promoting healing, reducing pain, desensitization and increasing tissue extensibility    Tracy received the following manual therapy techniques for 20 minutes:   - Nnamdi edema mobilization for lymphatic drainage and pain relief   - R/A girth; check out fit of compression glove; pt has her own copper fit; had been issued a size large 3/4 finger compression glove.   - Elastic tape applied with space correction at ulna wrist for pain and edema management; precautions discussed    Tracy performed therapeutic exercises for 15 minutes including:  TGEs, wrist AROM, spreads, lifts Defer due to heat becoming uncomfortable; Performed during the final minutes of fluidotherapy   Wrist AROM  1 min each over wedge:  - 3 ways, holding 1cm dowel     Wrist/forearm AROM  2 x 1 min   -wrist wheel for supination/pronation with a 3 second hold at end range supination   Dexterity and thumb AROM  -opposition with slides down the small finger 1 x 10   - defer - Manipulation, storage, and translation picking up one at a time, store 4 in hand, Med pompoms, 1/2 container   Digit AROM  2 minutes each:  -TGEs required retraining due to  improper form.   Intrinsic strengthening  -spreads and lifts with yellow spidyband, no thumb   -adduction and lifts with red sponges, 2 min   1st CMC OA protocol, left hand - C's with thumb and index finger 1 x 10  - C's to O's 1 x 10 maintaining proper form & muscle activation   - Webspace pressure for AddPol stretch; 20-30 seconds x 2  - Thenar stretch, hold for 20-30 seconds x 2   Wrist & Forearm AROM R/A forearm 2/22/22  R/A wrist 2/22/22   Assessed baseline  and pinch 2/10/22   Digit AROM R/A 2/22/22        Tracy participated in a concurrent discussion of re/evaluation findings and specific home care, including:  -Discussion of MD orders of weaning out of the wrist brace. Shet is encouraged to wear the wrist brace for moderate/heavy IADLs including washing and putting away dishes and lifting items to protect healing tissues and reduce episodes of pain.  - Brief education provided on the pathophysiology of tissue and joint involvement, and edema after wrist fracture  - Education on symptom guided treatment; monitoring pain and avoiding activities and exercises that provoke >3/10 pain  - Arthritis mgmt and joint protection strategies education initiated as follows:   - Education provided on adaptive equipment including rocker knife and built-up foam handles to add to kitchen utensils and hair brush. Built-up foam handle provided for trial use at home.   - Education provided on delegating tasks to her  as needed to reduce joint strain and pain.    Home Exercises and Education Provided     Education provided:   - HEP in place for wrist AROM and TGEs  - HEP upgraded with 1st CMC OA protocol  - Education provided on all interventions performed during today's session   - Arthritis/pain mgmt and joint protection strategies ongoing  - Wear wrist brace for moderate/heavy IADLs including doing dishes, and lifting items  - Continue to wear compression glove during the day for edema management      Education  provided prior sessions:  - Education on wear schedule for compressive sleeve and digit sleeve; begin with 1 hour, and if no pain, continues to wear during the day intermittently, removing for HEP and hygiene; progress to night time wear as tolerated.  - HEP for TGEs and wrist AROM    Written Home Exercises Provided: Patient instructed to cont prior HEP.  Exercises were reviewed and Tracy was able to demonstrate them prior to the end of the session.  Tracy demonstrated good  understanding of the HEP provided.     See EMR under Patient Instructions for exercises provided prior visit. 2/1/22       Assessment   2/24: Increased functional use of her Right hand as evidenced by DASH score. Shoulder pain is limiting at times. She moves throughout wrist ranges today without increased pain, and less verbal cues for proper form. Tracy was receptive to elastic tape application for pain relief at ulna wrist.    Tracy is progressing well towards her goals and there are no updates to goals at this time. Pt prognosis is Good.     Pt will continue to benefit from skilled outpatient occupational therapy to address the deficits listed in the problem list on initial evaluation provide pt/family education and to maximize pt's level of independence in the home and community environment.     Anticipated barriers to occupational therapy: pain    Pt's spiritual, cultural and educational needs considered and pt agreeable to plan of care and goals.    2/14/22: Extended Goals:  ( LTG's (10-12 weeks):  1)   Increase right wrist and forearm LIRIANO to  >75% of the  left wrist to increase functional hand use for weight bearing and reaching tasks. Modified and Progressing on 2/1/22. MET   2)   Right  strength  to be >70% of the unaffected side.  3)   Right pinch strength to be >60% of the unaffected side.  4)   Decrease complaints of pain to 2 out of 10 at worst to increase functional hand use and UE support functions for moderate to  "heavy ADL/work/leisure activities.  5)   Patient to score at 45 % or less on Quick/DASH and/or Work module to demonstrate improved perception of functional right hand use to evidence return to near to prior level of function for I/ADLs and return to gardening.     STG's (; extended to 6-8 weeks)  1)   Patient to be IND with HEP and modalities for pain/edema managment. MET  2)   Pt to tolerate advancing PREs and flexibility activities for weight bearing positions with self-graded intensity and effective symptom monitoring. Progressing   3) Increase wrist and forearm LIRIANO by  by 60 degrees to increase functional hand use and support function positions for ADLs & leisure activities. Met and upgraded on 2/1/22; MET upgraded goal  4) Digit and Thumb LIRIANO to be WNL as compared to the unaffected side for maximizing  and fine motor skills for reactivation the hand for light ADLs. Progressing  5)  Decrease edema by 1.5 cm for evidencing soft tissue healing and effective edema mgmt. Met 2/10/22    Plan   2/24: Acknowledge any new MD/ PA orders. Continue edema management and pain management strategies. Advance weight bearing, flexibility, and PREs only as tolerated.     MICAH Patel  Student Occupational Therapist    "I, HARIS Chand CHT,  certify that I was present in the room directing the student in service delivery and guiding them using my skilled judgment. As the co-signing therapist, I have reviewed the student's documentation and am responsible for the treatment, assessment and plan."    HARIS Chi CHT  Occupational Therapist, Certified Hand Therapist          "

## 2022-02-24 NOTE — PROGRESS NOTES
Health Maintenance Due   Topic Date Due    Shingles Vaccine (2 of 3) 05/03/2016    Mammogram  08/13/2021    COVID-19 Vaccine (4 - Booster for Pfizer series) 03/07/2022     Updates were requested from care everywhere.  Chart was reviewed for overdue Proactive [facility] Encounters ([facility]) topics (CRS, Breast Cancer Screening, Eye exam)  Health Maintenance has been updated.  [place] immunization registry triggered.  Immunizations were reconciled.

## 2022-02-28 ENCOUNTER — OFFICE VISIT (OUTPATIENT)
Dept: ORTHOPEDICS | Facility: CLINIC | Age: 72
End: 2022-02-28
Payer: MEDICARE

## 2022-02-28 VITALS — WEIGHT: 227.94 LBS | HEIGHT: 66 IN | BODY MASS INDEX: 36.63 KG/M2

## 2022-02-28 DIAGNOSIS — S62.101D CLOSED FRACTURE OF RIGHT WRIST WITH ROUTINE HEALING, SUBSEQUENT ENCOUNTER: Primary | ICD-10-CM

## 2022-02-28 PROCEDURE — 3008F PR BODY MASS INDEX (BMI) DOCUMENTED: ICD-10-PCS | Mod: CPTII,S$GLB,, | Performed by: ORTHOPAEDIC SURGERY

## 2022-02-28 PROCEDURE — 3288F FALL RISK ASSESSMENT DOCD: CPT | Mod: CPTII,S$GLB,, | Performed by: ORTHOPAEDIC SURGERY

## 2022-02-28 PROCEDURE — 99999 PR PBB SHADOW E&M-EST. PATIENT-LVL III: ICD-10-PCS | Mod: PBBFAC,,, | Performed by: ORTHOPAEDIC SURGERY

## 2022-02-28 PROCEDURE — 1125F PR PAIN SEVERITY QUANTIFIED, PAIN PRESENT: ICD-10-PCS | Mod: CPTII,S$GLB,, | Performed by: ORTHOPAEDIC SURGERY

## 2022-02-28 PROCEDURE — 1100F PTFALLS ASSESS-DOCD GE2>/YR: CPT | Mod: CPTII,S$GLB,, | Performed by: ORTHOPAEDIC SURGERY

## 2022-02-28 PROCEDURE — 99999 PR PBB SHADOW E&M-EST. PATIENT-LVL III: CPT | Mod: PBBFAC,,, | Performed by: ORTHOPAEDIC SURGERY

## 2022-02-28 PROCEDURE — 1100F PR PT FALLS ASSESS DOC 2+ FALLS/FALL W/INJURY/YR: ICD-10-PCS | Mod: CPTII,S$GLB,, | Performed by: ORTHOPAEDIC SURGERY

## 2022-02-28 PROCEDURE — 99213 OFFICE O/P EST LOW 20 MIN: CPT | Mod: S$GLB,,, | Performed by: ORTHOPAEDIC SURGERY

## 2022-02-28 PROCEDURE — 1159F PR MEDICATION LIST DOCUMENTED IN MEDICAL RECORD: ICD-10-PCS | Mod: CPTII,S$GLB,, | Performed by: ORTHOPAEDIC SURGERY

## 2022-02-28 PROCEDURE — 1159F MED LIST DOCD IN RCRD: CPT | Mod: CPTII,S$GLB,, | Performed by: ORTHOPAEDIC SURGERY

## 2022-02-28 PROCEDURE — 1125F AMNT PAIN NOTED PAIN PRSNT: CPT | Mod: CPTII,S$GLB,, | Performed by: ORTHOPAEDIC SURGERY

## 2022-02-28 PROCEDURE — 3008F BODY MASS INDEX DOCD: CPT | Mod: CPTII,S$GLB,, | Performed by: ORTHOPAEDIC SURGERY

## 2022-02-28 PROCEDURE — 99213 PR OFFICE/OUTPT VISIT, EST, LEVL III, 20-29 MIN: ICD-10-PCS | Mod: S$GLB,,, | Performed by: ORTHOPAEDIC SURGERY

## 2022-02-28 PROCEDURE — 3288F PR FALLS RISK ASSESSMENT DOCUMENTED: ICD-10-PCS | Mod: CPTII,S$GLB,, | Performed by: ORTHOPAEDIC SURGERY

## 2022-02-28 NOTE — PROGRESS NOTES
Subjective:      Patient ID: Tracy Armendariz is a 71 y.o. female.  Chief Complaint: Follow-up (right wrist fx )      HPI  Tracy Armendariz is a  71 y.o. female presenting today for follow up of right wrist fracture now about 3 and half months out from injury.  She reports that she is still having some pain in the wrist but mainly weakness  She is currently in therapy  .    Review of patient's allergies indicates:   Allergen Reactions    Iodinated contrast media Hives and Rash    Gabapentin Hallucinations    Iodine Hives    Isothiazolinones Rash    Penicillins Rash         Current Outpatient Medications   Medication Sig Dispense Refill    aspirin 81 MG Chew Take 81 mg by mouth once daily.      atorvastatin (LIPITOR) 10 MG tablet Take 1 tablet (10 mg total) by mouth once daily. 90 tablet 3    buPROPion (WELLBUTRIN XL) 300 MG 24 hr tablet Take 1 tablet (300 mg total) by mouth once daily. 90 tablet 11    calcium carbonate (OS-SPENCER) 600 mg (1,500 mg) Tab Take 600 mg by mouth 2 (two) times daily with meals.      cholecalciferol, vitamin D3, 1,000 unit Chew Take 1,000 Units by mouth once daily.      diclofenac sodium (VOLTAREN) 1 % Gel APPLY 2-4 GRAMS TO EACH PAINFUL AREA FOUR TIMES DAILY - MAX 32 GRAMS/ g 6    EScitalopram oxalate (LEXAPRO) 20 MG tablet TAKE 1 TABLET(20 MG) BY MOUTH EVERY DAY (Patient taking differently: Take 20 mg by mouth once daily.) 90 tablet 3    esomeprazole (NEXIUM) 40 MG capsule Take 1 capsule (40 mg total) by mouth daily as needed (heartburn). 30 capsule 3    famotidine (PEPCID) 10 MG tablet Take 10 mg by mouth 2 (two) times daily.      hydrocortisone 2.5 % cream Apply topically 2 (two) times daily to affected area 30 g 2    LACTOBACILLUS ACIDOPHILUS (PROBIOTIC ORAL) Take 1 capsule by mouth once daily. PRN      LIDOcaine-prilocaine (EMLA) cream APPLY 2 GRAMS 3-4 TIMES DAILY FOR TREATMENT OF PAIN 240 g 6    losartan (COZAAR) 100 MG tablet Take 1 tablet (100 mg total) by  mouth once daily. 90 tablet 3    meclizine (ANTIVERT) 12.5 mg tablet Take 1 tablet (12.5 mg total) by mouth 3 (three) times daily as needed for Dizziness or Nausea. 30 tablet 6    MULTIVIT WITH CALCIUM,IRON,MIN (WOMEN'S DAILY MULTIVITAMIN ORAL) Take 1 tablet by mouth once daily.      tamsulosin (FLOMAX) 0.4 mg Cap TAKE 1 CAPSULE(0.4 MG) BY MOUTH EVERY DAY 90 capsule 0    albuterol (PROVENTIL/VENTOLIN HFA) 90 mcg/actuation inhaler INHALE 2 PUFFS EVERY 6 HOURS AS NEEDED FOR WHEEZING (Patient not taking: Reported on 2022) 18 g 0    FLUAD [id number], 65 YR UP,,PF, 45 mcg (15 mcg x 3)/0.5 mL Syrg       ondansetron (ZOFRAN-ODT) 4 MG TbDL dissolve 1 tablet (4 mg total) by mouth every 8 (eight) hours as needed (nausea). (Patient not taking: No sig reported) 20 tablet 0     No current facility-administered medications for this visit.       Past Medical History:   Diagnosis Date    Allergy     Anticoagulant long-term use     Arthritis     CVA (cerebral infarction)     Depression     Disorder of kidney and ureter     renal stones    Diverticulosis of colon     Extrinsic asthma, unspecified     Hyperlipidemia     Hypertension     Kidney stone     Left atrial enlargement 2014    Low back pain     MARTIN (obstructive sleep apnea)     Osteopenia     PUD (peptic ulcer disease)     Stroke  2013    Urinary tract infection        Past Surgical History:   Procedure Laterality Date    APPENDECTOMY      @ time of hysterectomy    BACK SURGERY      CATARACT EXTRACTION       SECTION      CHOLECYSTECTOMY      laparoscopic    COLONOSCOPY N/A 2018    Procedure: COLONOSCOPY/Golytely;  Surgeon: Janine Black MD;  Location: [facility];  Service: Endoscopy;  Laterality: N/A;    DILATION AND CURETTAGE OF UTERUS      HYSTERECTOMY      TAHUSO with appendectomy    INNER EAR SURGERY      replaced ear drum    JOINT REPLACEMENT Right     knee    KNEE ARTHROSCOPY W/  "DEBRIDEMENT  2003    LUMBAR DISCECTOMY  1980    L4-L5    OOPHORECTOMY      unilateral    TONSILLECTOMY      TYMPANOPLASTY      URETEROSCOPIC REMOVAL OF URETERIC CALCULUS Left 12/19/2018    Procedure: REMOVAL, CALCULUS, URETER, URETEROSCOPIC, holmium laser lithotripsy, stone basket extraction, retrograde pyelogram, ureteral stent exchange;  Surgeon: Anaya Zamora MD;  Location: [facility];  Service: Urology;  Laterality: Left;       OBJECTIVE:   PHYSICAL EXAM:  Height: 5' 6" (167.6 cm) Weight: 103.4 kg (227 lb 15.3 oz)  Vitals:    02/28/22 1421   Weight: 103.4 kg (227 lb 15.3 oz)   Height: 5' 6" (1.676 m)   PainSc:   5   PainLoc: Wrist     Ortho/SPM Exam  Examination right wrist no tenderness at the fracture site range of motion almost full   strength is decreased sensation intact    RADIOGRAPHS:  Previous x-rays showed healed fracture right distal radius  Comments: I have personally reviewed the imaging and I agree with the above radiologist's report.    ASSESSMENT/PLAN:     IMPRESSION:  Weakness right hand after previous wrist fracture    PLAN:  I explained that I really think she needs to stop using the wrist brace and concentrate on strengthening exercises including use of a squeeze ball  Would like her to continue therapy and try to use arm as much as possible which will help her strength  Advance activities as tolerated    FOLLOW UP:  4-6 weeks    Disclaimer: This note has been generated using voice-recognition software. There may be typographical errors that have been missed during proof-reading.    "

## 2022-03-03 ENCOUNTER — CLINICAL SUPPORT (OUTPATIENT)
Dept: REHABILITATION | Facility: HOSPITAL | Age: 72
End: 2022-03-03
Payer: MEDICARE

## 2022-03-03 DIAGNOSIS — M25.631 DECREASED RANGE OF MOTION OF RIGHT WRIST: ICD-10-CM

## 2022-03-03 DIAGNOSIS — M25.531 PAIN IN RIGHT WRIST: Primary | ICD-10-CM

## 2022-03-03 DIAGNOSIS — R68.89 ALTERATION IN SELF-CARE ABILITY: ICD-10-CM

## 2022-03-03 DIAGNOSIS — M25.641 DECREASED RANGE OF MOTION OF FINGER OF RIGHT HAND: ICD-10-CM

## 2022-03-03 DIAGNOSIS — M79.89 SWELLING OF RIGHT HAND: ICD-10-CM

## 2022-03-03 PROCEDURE — 97022 WHIRLPOOL THERAPY: CPT | Mod: PN

## 2022-03-03 PROCEDURE — 97110 THERAPEUTIC EXERCISES: CPT | Mod: PN

## 2022-03-03 PROCEDURE — 97140 MANUAL THERAPY 1/> REGIONS: CPT | Mod: PN

## 2022-03-03 NOTE — PROGRESS NOTES
Occupational Therapy Daily Treatment Note     Name: Tracy Armendariz  Clinic Number: [id number]    Therapy Diagnosis:   Encounter Diagnoses   Name Primary?    Pain in right wrist Yes    Decreased range of motion of right wrist     Swelling of right hand     Alteration in self-care ability     Decreased range of motion of finger of right hand      Physician: Nichole Lyles PA-C     Visit Date: 3/3/2022    Medical Diagnosis: S62.101D (ICD-10-CM) - Closed fracture of right wrist with routine healing, subsequent encounter  Physician Orders: Eval and treat  Evaluation Date: 1/12/2022  Insurance Authorization Period Expiration: through 3/3/22  Plan of Care from 1/12/2022 to 3/11/22   Date of Return to MD: 4/18/18 2/28/22 IMPRESSION:  Weakness right hand after previous wrist fracture  PLAN:  I explained that I really think she needs to stop using the wrist brace and concentrate on strengthening exercises including use of a squeeze ball  Would like her to continue therapy and try to use arm as much as possible which will help her strength  Advance activities as tolerated  FOLLOW UP:  4-6 weeks  Date of Onset: 11/9/21    FOTO (DASH) at eval: 69.2 %  FOTO (DASH) RA 2/24/22: 53.3 (+15.9%)    Visit # / Visits authorized: 11/16 (total 10 with eval)  Time In: 12:00 pm  Time Out: 12:45 pm  Total Billable Time: 45 minutes    Precautions:  Standard and blood thinners, Osteopenia; falls; gait and balance problems d/t Vertigo; Precautions as per imaging and s/p Week 12+    Subjective     Pt reports: 3/3: Pt arrives after MD/PA f/u appt. She was carrying her purse in her right hand and happy to have no increase in pain doing so. She continues to have ulna wrist pain that increases with forearm rotation exercises and decreases with elastic tape applied. She had no increase in pain reports during PREs today. She did ask to reduce her OT frequency to 1x/week and was receptive to her MD requesting continued OT.    Response to  previous treatments: She states that she has used the built-up foam handle for her fork at home and reports improved functional ability. Pt reports less pain and edema this week since tx program adjusted down.     Functional change: Pt now able to spray her hair spray with her right hand. See FOTO score  Patient has limited use of the affected hand.      Pain: 2- 3/10 at rest; Right shoulder, ulna wrist & 1st CMC  Location: ulnar wrist pain at rest, and during supination and pronation, pain in left 1st cmc joint  Location during prior sessions: Right shoulder, right lat epicondyle, radial and ulnar wrist, dorsal surface middle of the wrist, and  1st cmc joint on bilateral hands    Objective     Mental status: alert, interactive, oriented x3     Observation: [place] during sit <> stand and while ambulating; no assistive device used  Forward shoulders. Thenar wasting on right hand.      Edema: Circumferential measurements: In centimters     Right/Left Right Right Right  Right Right Right Right     IE-1/12/22 2/1/22 2/3/22 2/10/22 2/14/22 2/17 2/22/22 2/24/22   IF P1  6.7/6.3 7.3 (+0.6) 7.2 (-.1) 6.8 (-0.4) 6.9 (+0.1) 7.0 7.0 6.8 (-0.2)   SF P1   5.8/5.6 6.1 (+0.3) 6.2 (+0.1) 5.5 (-0.7) 5.6 (+0.1) 5.9 6.0 5.6 (-0.4)   Thumb P1 7.4/7.5 7.4 (=) 7.0 (-0.4) 6.6 (-0.4) 6.8 (+0.2) 7.3 7.1 6.9 (-0.2)   DPC 19/17.5 19.7 (+0.7) 19 (-0.7) 18.9 (-0.1) 19.4 (+0.5) 19.0 19.2 18.8 (-0.4)   Wrist 18.3/17.0 19.5/17.9 18.8 (-0.8) 18.8 (=) 18.9 (+0.1) 18.2 18.2 18.3 (+0.1)                   Tip to DPC (in centimeters). Left hand is WNL   AROM LEFT Right Right  Right  R   DATE  IE-1/12/22 2/1/22 2/10/22 2/22/22   IF  0 4.0 2.5 (+1.5) 1.5 (+1.0) 1.5 (=)   LF 0 nt 2.0 1.5 (+0.5) 1.5 (=)   RF 0 nt 1.5 1.0 (+0.5) .5 (+.5)   SF 0 nt .75 0.5 (+0.25) 0 (+.5)   Thumb 0 SF P2 4 cm 2.0 cm (+2.0) (=)      Wrist AROM    Right/Left Right Right R   DATE IE-1/12/22 2/1/22  Post-tx 2/3/22  Post-tx 2/22/22   EXT 30/90 49 55 64   FLX 17/45 36 28 37   RD  0/12 11 23 11   UD  26/35 18 28 25   LIRIANO 73/182 114 (+41) 134 (+20) 137 (+4)     Forearm AROM    Right/Left Right Right Right R   DATE IE-1/12/22 2/1/22*  Pre-tx 2/3/22 post tx 2/10/22 2/22/22   Supination 68/85 46 55 55 (=) 60 (+5)   Pronation 90/90 85 90 88 (-2) nt   *different car, rapidly moving in quick end range position causes pain      Strength: (measured in psi.)    Right/Left    2/10/2022   Les Rung II 8/40   Phillips Pinch 4.5/4.5   3-pt Pinch 3/6   2-pt Pinch 3/6.75     Treatment      Tracy received the following supervised modalities after being cleared for contradictions for 15 minutes:   -defer-Paraffin wax heat pre-tx for promoting healing, decreasing pain and increasing tissue extensibility  -Fluidotherapy for promoting healing, reducing pain, desensitization and increasing tissue extensibility    Tracy received the following manual therapy techniques for 15 minutes:   - AAROM each wrist and forearm range  - intrinsic stretch emphasis on IF  - Composite flexion stretch, emphasis on IF/LF  - ORL stretch, IF  - defer-R/A girth; check out fit of compression glove; pt has her own copper fit; had been issued a size large 3/4 finger compression glove.   - Elastic tape applied with space correction at ulna wrist for pain and edema management; precautions discussed    Tracy performed therapeutic exercises for 20 minutes including:  TGEs, wrist and forearm AROM, spreads, lifts, thumb opposition w/SF slides to palm, active extrinsic extensor and extrinsic flexor lengthening Performed 1x10 each during the final minutes of fluidotherapy   Wrist/forearm AROM  3 min each:  - Jux-a-ciser  - defer-wrist wheel for supination/pronation with a 3 second hold at end range supination   Wrist & forearm Strengthening 30 seconds each:  - Wrist 3 ways over wedge holding 1# weight  - Sup/pronation holding red flexbar in the middle, forearm supported on wedge  - Red flexbar for:  -smiles  -frowns  -twists   Dexterity  and thumb AROM  -o pposition with slides down the small finger 1 x 10   - defer - Manipulation, storage, and translation picking up one at a time, store 4 in hand, Med Unblabpoms, 1/2 container   Digit flexibility 30 seconds each:  - Intrinsic stretch, palm down on towel surface  - Composite flexion stretchminutes each:   Intrinsic strengthening-defer -spreads and lifts with yellow spidyband, no thumb   -adduction and lifts with red sponges, 2 min   1st CMC OA protocol, left hand-defer - C's with thumb and index finger 1 x 10  - C's to O's 1 x 10 maintaining proper form & muscle activation   - Webspace pressure for AddPol stretch; 20-30 seconds x 2  - Thenar stretch, hold for 20-30 seconds x 2   Wrist & Forearm AROM R/A forearm 2/22/22  R/A wrist 2/22/22   Assessed baseline  and pinch 2/10/22   Digit AROM R/A 2/22/22        Tracy participated in a concurrent discussion of re/evaluation findings and specific home care, including:  -Discussion of new MD Impression and Plan. She's encouraged to monitor ulna wrist pain during I/ADLs.  - Brief education provided on the pathophysiology of tissue and joint involvement, and edema after wrist fracture  - Education on symptom guided treatment; monitoring pain and avoiding activities and exercises that provoke >3/10 pain  - Arthritis mgmt and joint protection strategies education initiated as follows:   - Education provided on adaptive equipment including rocker knife and built-up foam handles to add to kitchen utensils and hair brush. Built-up foam handle provided for trial use at home.   - Education provided on delegating tasks to her  as needed to reduce joint strain and pain.    Home Exercises and Education Provided     Education provided:   - HEP in place      Education provided prior sessions:  - Education on wear schedule for compressive sleeve and digit sleeve; begin with 1 hour, and if no pain, continues to wear during the day intermittently, removing for  HEP and hygiene; progress to night time wear as tolerated.  - HEP for TGEs and wrist AROM    Written Home Exercises Provided: Patient instructed to cont prior HEP.  Exercises were reviewed and Tracy was able to demonstrate them prior to the end of the session.  Tracy demonstrated good  understanding of the HEP provided.     See EMR under Patient Instructions for exercises provided prior visit. 2/1/22       Assessment   3/3: MD Impression and Plan acknowledged. Pt appeared to tolerate with PREs performed this session. She is encouraged by the decreased pain today and ability to use her Right hand for customary grooming tasks. Tracy remains receptive to elastic tape application for pain relief at ulna wrist and has no skin problems.    Tracy is progressing well towards her goals and there are no updates to goals at this time. Pt prognosis is Good.     Pt will continue to benefit from skilled outpatient occupational therapy to address the deficits listed in the problem list on initial evaluation provide pt/family education and to maximize pt's level of independence in the home and community environment.     Anticipated barriers to occupational therapy: arthritis    Pt's spiritual, cultural and educational needs considered and pt agreeable to plan of care and goals.    2/14/22: Extended Goals:  ( LTG's (10-12 weeks):  1)   Increase right wrist and forearm LIRIANO to  >75% of the  left wrist to increase functional hand use for weight bearing and reaching tasks. Modified and Progressing on 2/1/22. MET   2)   Right  strength  to be >70% of the unaffected side.  3)   Right pinch strength to be >60% of the unaffected side.  4)   Decrease complaints of pain to 2 out of 10 at worst to increase functional hand use and UE support functions for moderate to heavy ADL/work/leisure activities.  5)   Patient to score at 45 % or less on Quick/DASH and/or Work module to demonstrate improved perception of functional right hand  use to evidence return to near to prior level of function for I/ADLs and return to gardening.     STG's (; extended to 6-8 weeks)  1)   Patient to be IND with HEP and modalities for pain/edema managment. MET  2)   Pt to tolerate advancing PREs and flexibility activities for weight bearing positions with self-graded intensity and effective symptom monitoring. Progressing   3) Increase wrist and forearm LIRIANO by  by 60 degrees to increase functional hand use and support function positions for ADLs & leisure activities. Met and upgraded on 2/1/22; MET upgraded goal  4) Digit and Thumb LIRIANO to be WNL as compared to the unaffected side for maximizing  and fine motor skills for reactivation the hand for light ADLs. Progressing  5)  Decrease edema by 1.5 cm for evidencing soft tissue healing and effective edema mgmt. Met 2/10/22    Plan   3/3: Reduce frequency to 1x/week as per patient request. R/A  and pinch. Assess baseline dexterity with Grooved Pegs. Progress tx program and HEP as tolerated, respecting pain and edema at ulna wrist. Upgrade POC prn.    Pt to be treated by Occupational Therapy 1-2 times per week for 8 weeks during the certification period from 1/12/2022 to 3/11/22 to achieve the established goals.      Treatment to include: Paraffin, Fluidotherapy, Manual therapy/joint mobilizations, Modalities for pain management, Therapeutic exercises/activities., Strengthening, Orthotic Fabrication/Fit/Training, Edema Control, Joint Protection and Energy Conservation, as well as any other treatments deemed necessary based on the patient's needs or progress.     HARIS Chi, TIANNAT  Occupational Therapist, Certified Hand Therapist

## 2022-03-04 ENCOUNTER — PES CALL (OUTPATIENT)
Dept: ADMINISTRATIVE | Facility: CLINIC | Age: 72
End: 2022-03-04
Payer: MEDICARE

## 2022-03-04 NOTE — PROGRESS NOTES
"    Occupational Therapy Daily Treatment Note     Name: Tracy Armendariz  Clinic Number: [id number]    Therapy Diagnosis:   Encounter Diagnoses   Name Primary?    Pain in right wrist Yes    Decreased range of motion of right wrist     Swelling of right hand     Alteration in self-care ability     Decreased range of motion of finger of right hand      Physician: No ref. provider found     Visit Date: 3/7/2022    Medical Diagnosis: S62.101D (ICD-10-CM) - Closed fracture of right wrist with routine healing, subsequent encounter  Physician Orders: Eval and treat  Evaluation Date: 1/12/2022  Insurance Authorization Period Expiration: through 3/3/22  Plan of Care from 1/12/2022 to 3/11/22   Date of Return to MD: 4/18/18 2/28/22 IMPRESSION:  Weakness right hand after previous wrist fracture  PLAN:  I explained that I really think she needs to stop using the wrist brace and concentrate on strengthening exercises including use of a squeeze ball  Would like her to continue therapy and try to use arm as much as possible which will help her strength  Advance activities as tolerated  FOLLOW UP:  4-6 weeks  Date of Onset: 11/9/21    FOTO (DASH) at eval: 69.2 %  FOTO (DASH) RA 2/24/22: 53.3 (+15.9%)    Visit # / Visits authorized: 12/16 (total 11 with eval)  Time In: 12:00 pm   Time Out: 12:50 pm  Total Billable Time: 45 minutes    Precautions:  Standard and blood thinners, Osteopenia; falls; gait and balance problems d/t Vertigo; Precautions as per imaging and s/p Week 12+    Subjective     Pt reports: 3/7:  Tracy states that she no longer has "pains that startle me."     Response to previous treatments: She states that she has used the built-up foam handle for her fork at home and reports improved functional ability. Pt reports less pain and edema this week since tx program adjusted down.     Functional change: Tracy reports getting ready in the morning is becoming a lot easier, using her right hand more.  She arrives on " 3/3, carrying her purse in her right hand and happy to have no increase in pain doing so.  Pt now able to spray her hair spray with her right hand. See FOTO score  Patient has limited use of the affected hand.      Pain:  2/10 at rest; Right shoulder, ulna wrist & 1st CMC  Location: ulnar wrist pain at rest, and during supination and pronation, pain in left 1st cmc joint  Location during prior sessions: Right shoulder, right lat epicondyle, radial and ulnar wrist, dorsal surface middle of the wrist, and  1st cmc joint on bilateral hands    Objective     Mental status: alert, interactive, oriented x3     Observation: Wavering during sit <> stand and while ambulating; no assistive device used  Forward shoulders. Thenar wasting on right hand.      Edema: Circumferential measurements: In centimters     Right/Left Right Right Right  Right Right Right Right     IE-1/12/22 2/1/22 2/3/22 2/10/22 2/14/22 2/17 2/22/22 2/24/22   IF P1  6.7/6.3 7.3 (+0.6) 7.2 (-.1) 6.8 (-0.4) 6.9 (+0.1) 7.0 7.0 6.8 (-0.2)   SF P1   5.8/5.6 6.1 (+0.3) 6.2 (+0.1) 5.5 (-0.7) 5.6 (+0.1) 5.9 6.0 5.6 (-0.4)   Thumb P1 7.4/7.5 7.4 (=) 7.0 (-0.4) 6.6 (-0.4) 6.8 (+0.2) 7.3 7.1 6.9 (-0.2)   DPC 19/17.5 19.7 (+0.7) 19 (-0.7) 18.9 (-0.1) 19.4 (+0.5) 19.0 19.2 18.8 (-0.4)   Wrist 18.3/17.0 19.5/17.9 18.8 (-0.8) 18.8 (=) 18.9 (+0.1) 18.2 18.2 18.3 (+0.1)                   Tip to DPC (in centimeters). Left hand is WNL   AROM LEFT Right Right  Right  R   DATE  IE-1/12/22 2/1/22 2/10/22 2/22/22   IF  0 4.0 2.5 (+1.5) 1.5 (+1.0) 1.5 (=)   LF 0 nt 2.0 1.5 (+0.5) 1.5 (=)   RF 0 nt 1.5 1.0 (+0.5) .5 (+.5)   SF 0 nt .75 0.5 (+0.25) 0 (+.5)   Thumb 0 SF P2 4 cm 2.0 cm (+2.0) (=)      Wrist AROM    Right/Left Right Right R   DATE IE-1/12/22 2/1/22  Post-tx 2/3/22  Post-tx 2/22/22   EXT 30/90 49 55 64   FLX 17/45 36 28 37   RD 0/12 11 23 11   UD  26/35 18 28 25   LIRIANO 73/182 114 (+41) 134 (+20) 137 (+4)     Forearm AROM    Right/Left Right Right Right R   DATE  "IE-1/12/22 2/1/22*  Pre-tx 2/3/22 post tx 2/10/22 2/22/22   Supination 68/85 46 55 55 (=) 60 (+5)   Pronation 90/90 85 90 88 (-2) nt   *different car, rapidly moving in quick end range position causes pain      Strength: (measured in psi.)    Right/Left Right    2/10/2022 3/7/22   Les Rung II 8/40 14 (+6)    Key Pinch 4.5/4.5 8 (+3.5)    3-pt Pinch 3/6 4.5 (+1.5)    2-pt Pinch 3/6.75 nt       Dexterity Tests  (Measured in minutes)     Right Left   DATE 3/7/22   3/7/22   Grooved Peg board timed 2'53"   3'32"       Treatment      Tracy received the following supervised modalities after being cleared for contradictions for 15 minutes:   -Paraffin wax heat pre-tx for promoting healing, decreasing pain and increasing tissue extensibility  -defer-Fluidotherapy for promoting healing, reducing pain, desensitization and increasing tissue extensibility    Tracy received the following manual therapy techniques for 15 minutes:   - AAROM each wrist and forearm range  - intrinsic stretch emphasis on IF  - Composite flexion stretch, emphasis on IF/LF  - ORL stretch, IF  - defer-R/A girth; check out fit of compression glove; pt has her own copper fit; had been issued a size large 3/4 finger compression glove.   - Elastic tape applied with space correction at ulna wrist for pain and edema management; precautions discussed    Tracy performed therapeutic exercises for 20 minutes including:  TGEs, wrist and forearm AROM, spreads, lifts, thumb opposition w/SF slides to palm, active extrinsic extensor and extrinsic flexor lengthening Defer-Performed 1x10 each during the final minutes of fluidotherapy   Wrist/forearm AROM  3 min each:  - Jux-a-ciser  - defer-wrist wheel for supination/pronation with a 3 second hold at end range supination   Wrist & forearm Strengthening 30 seconds each:  - Wrist 3 ways over wedge holding 1# weight (neutral wrist)*  - Sup/pronation holding red flexbar in the middle, forearm supported on " wedge  - Red flexbar for:  -smiles  -frowns**  -defer-twists   Dexterity and thumb AROM  -opposition with slides down the small finger 1 x 10   - defer - Manipulation, storage, and translation picking up one at a time, store 4 in hand, Med pompoms, 1/2 container   Digit flexibility 30 seconds each:  - Intrinsic stretch, palm down on towel surface  - Composite flexion stretch   Intrinsic strengthening-defer -spreads and lifts with yellow spidyband, no thumb   -adduction and lifts with red sponges, 2 min   1st CMC OA protocol, left hand-defer - C's with thumb and index finger 1 x 10  - C's to O's 1 x 10 maintaining proper form & muscle activation   - Webspace pressure for AddPol stretch; 20-30 seconds x 2  - Thenar stretch, hold for 20-30 seconds x 2   Wrist & Forearm AROM R/A forearm 2/22/22  R/A wrist 2/22/22   R/A  and pinch 3/7/22   Digit AROM R/A 2/22/22    Assessed baseline fine motor coordination via Grooved  3/7/22     * Pain presents with radial deviation, pain resolves with short arch movements  ** Patient fatigues after two flex bar activities, last activity deferred to next session    Tracy participated in a concurrent discussion of re/evaluation findings and specific home care, including:  -Discussion of new MD Impression and Plan. She's encouraged to monitor ulna wrist pain during I/ADLs.  - Brief education provided on the pathophysiology of tissue and joint involvement, and edema after wrist fracture  - Education on symptom guided treatment; monitoring pain and avoiding activities and exercises that provoke >3/10 pain  - Arthritis mgmt and joint protection strategies education initiated as follows:   - Education provided on adaptive equipment including rocker knife and built-up foam handles to add to kitchen utensils and hair brush. Built-up foam handle provided for trial use at home.   - Education provided on delegating tasks to her  as needed to reduce joint strain and pain.    Home  Exercises and Education Provided     Education provided:   - HEP in place      Education provided prior sessions:  - Education on wear schedule for compressive sleeve and digit sleeve; begin with 1 hour, and if no pain, continues to wear during the day intermittently, removing for HEP and hygiene; progress to night time wear as tolerated.  - HEP for TGEs and wrist AROM    Written Home Exercises Provided: Patient instructed to cont prior HEP.  Exercises were reviewed and Tarcy was able to demonstrate them prior to the end of the session.  Tracy demonstrated good  understanding of the HEP provided.     See EMR under Patient Instructions for exercises provided prior visit. 2/1/22       Assessment   3/8: Tracy is progressing as expected. Improvements in  and pinch strength today. No residual pain from last session.    Tracy is progressing well towards her goals and there are no updates to goals at this time. Pt prognosis is Good.     Pt will continue to benefit from skilled outpatient occupational therapy to address the deficits listed in the problem list on initial evaluation provide pt/family education and to maximize pt's level of independence in the home and community environment.     Anticipated barriers to occupational therapy: arthritis    Pt's spiritual, cultural and educational needs considered and pt agreeable to plan of care and goals.    2/14/22: Extended Goals:  ( LTG's (10-12 weeks):  1)   Increase right wrist and forearm LIRIANO to  >75% of the  left wrist to increase functional hand use for weight bearing and reaching tasks. Modified and Progressing on 2/1/22. MET   2)   Right  strength  to be >70% of the unaffected side. Progressing  3)   Right pinch strength to be >60% of the unaffected side. MET for lateral and 3-jaw pinch on 3/7/22  4)   Decrease complaints of pain to 2 out of 10 at worst to increase functional hand use and UE support functions for moderate to heavy ADL/work/leisure  "activities. Progressing  5)   Patient to score at 45 % or less on Quick/DASH and/or Work module to demonstrate improved perception of functional right hand use to evidence return to near to prior level of function for I/ADLs and return to gardening. Progressing     STG's (; extended to 6-8 weeks)  1)   Patient to be IND with HEP and modalities for pain/edema managment. MET  2)   Pt to tolerate advancing PREs and flexibility activities for weight bearing positions with self-graded intensity and effective symptom monitoring. Progressing   3) Increase wrist and forearm LIRIANO by  by 60 degrees to increase functional hand use and support function positions for ADLs & leisure activities. Met and upgraded on 2/1/22; MET upgraded goal  4) Digit and Thumb LIRIANO to be WNL as compared to the unaffected side for maximizing  and fine motor skills for reactivation the hand for light ADLs. Progressing  5)  Decrease edema by 1.5 cm for evidencing soft tissue healing and effective edema mgmt. Met 2/10/22    Plan   3/7: R/A girth and wrist LIRIANO. Progress tx program and HEP as tolerated, respecting pain and edema at ulna wrist. Upgrade POC prn.    3/3: Reduce frequency to 1x/week as per patient request. R/A  and pinch. Assess baseline dexterity with Grooved Pegs. Progress tx program and HEP as tolerated, respecting pain and edema at ulna wrist. Upgrade POC prn.    Pt to be treated by Occupational Therapy 1-2 times per week for 8 weeks during the certification period from 1/12/2022 to 3/11/22 to achieve the established goals.      Treatment to include: Paraffin, Fluidotherapy, Manual therapy/joint mobilizations, Modalities for pain management, Therapeutic exercises/activities., Strengthening, Orthotic Fabrication/Fit/Training, Edema Control, Joint Protection and Energy Conservation, as well as any other treatments deemed necessary based on the patient's needs or progress.     Nancy Hogan, [facility]  Student Occupational Therapist    "I, " "HARIS Chand, MARGARITA,  certify that I was present in the room directing the student in service delivery and guiding them using my skilled judgment. As the co-signing therapist, I have reviewed the student's documentation and am responsible for the treatment, assessment and plan."    HARIS Chi, MARGARITA  Occupational Therapist, Certified Hand Therapist          "

## 2022-03-07 ENCOUNTER — CLINICAL SUPPORT (OUTPATIENT)
Dept: REHABILITATION | Facility: HOSPITAL | Age: 72
End: 2022-03-07
Payer: MEDICARE

## 2022-03-07 DIAGNOSIS — M25.631 DECREASED RANGE OF MOTION OF RIGHT WRIST: ICD-10-CM

## 2022-03-07 DIAGNOSIS — M25.531 PAIN IN RIGHT WRIST: Primary | ICD-10-CM

## 2022-03-07 DIAGNOSIS — M79.89 SWELLING OF RIGHT HAND: ICD-10-CM

## 2022-03-07 DIAGNOSIS — M25.641 DECREASED RANGE OF MOTION OF FINGER OF RIGHT HAND: ICD-10-CM

## 2022-03-07 DIAGNOSIS — R68.89 ALTERATION IN SELF-CARE ABILITY: ICD-10-CM

## 2022-03-07 PROCEDURE — 97140 MANUAL THERAPY 1/> REGIONS: CPT | Mod: PN

## 2022-03-07 PROCEDURE — 97110 THERAPEUTIC EXERCISES: CPT | Mod: PN

## 2022-03-07 PROCEDURE — 97018 PARAFFIN BATH THERAPY: CPT | Mod: PN

## 2022-03-15 ENCOUNTER — CLINICAL SUPPORT (OUTPATIENT)
Dept: REHABILITATION | Facility: HOSPITAL | Age: 72
End: 2022-03-15
Payer: MEDICARE

## 2022-03-15 DIAGNOSIS — M79.89 SWELLING OF RIGHT HAND: ICD-10-CM

## 2022-03-15 DIAGNOSIS — M25.531 PAIN IN RIGHT WRIST: Primary | ICD-10-CM

## 2022-03-15 DIAGNOSIS — M25.631 DECREASED RANGE OF MOTION OF RIGHT WRIST: ICD-10-CM

## 2022-03-15 DIAGNOSIS — R68.89 ALTERATION IN SELF-CARE ABILITY: ICD-10-CM

## 2022-03-15 DIAGNOSIS — M25.641 DECREASED RANGE OF MOTION OF FINGER OF RIGHT HAND: ICD-10-CM

## 2022-03-15 PROCEDURE — 97110 THERAPEUTIC EXERCISES: CPT | Mod: PN

## 2022-03-15 PROCEDURE — 97018 PARAFFIN BATH THERAPY: CPT | Mod: PN

## 2022-03-15 PROCEDURE — 97140 MANUAL THERAPY 1/> REGIONS: CPT | Mod: PN

## 2022-03-15 NOTE — PLAN OF CARE
"  Outpatient Therapy Updated Plan of Care     Visit Date: 3/15/2022  Name: Tracy Armendariz  Clinic Number: [id number]    Therapy Diagnosis:   Encounter Diagnoses   Name Primary?    Pain in right wrist Yes    Decreased range of motion of right wrist     Swelling of right hand     Alteration in self-care ability     Decreased range of motion of finger of right hand      Physician: No ref. provider found     Visit Date: 3/15/2022    Medical Diagnosis: S62.101D (ICD-10-CM) - Closed fracture of right wrist with routine healing, subsequent encounter  Physician Orders: Eval and treat  Evaluation Date: 1/12/2022  Insurance Authorization Period Expiration: through 3/3/22  Plan of Care from 3/15/2022-5/13/2022  Date of Return to MD: 4/18/18  Date of Onset: 11/9/21    Subjective     Update: "I fell twice over the weekend, my wrist feels more swollen and painful since."    Objective     Update:     Mental status: alert, interactive, oriented x3     Observation: Wavering during sit <> stand and while ambulating; no assistive device used  Forward shoulders. Thenar wasting on right hand.      Edema: Circumferential measurements: In centimters     Right/Left Right Right Right  Right Right Right Right Right/Left     IE-1/12/22 2/1/22 2/3/22 2/10/22 2/14/22 2/17 2/22/22 2/24/22 3/15/2022   IF P1  6.7/6.3 7.3 (+0.6) 7.2 (-.1) 6.8 (-0.4) 6.9 (+0.1) 7.0 7.0 6.8 (-0.2) 6.8/6.5   SF P1   5.8/5.6 6.1 (+0.3) 6.2 (+0.1) 5.5 (-0.7) 5.6 (+0.1) 5.9 6.0 5.6 (-0.4) 5.8/5.4   Thumb P1 7.4/7.5 7.4 (=) 7.0 (-0.4) 6.6 (-0.4) 6.8 (+0.2) 7.3 7.1 6.9 (-0.2) 6.8/6.5   DPC 19/17.5 19.7 (+0.7) 19 (-0.7) 18.9 (-0.1) 19.4 (+0.5) 19.0 19.2 18.8 (-0.4) 18.5/17.5   Wrist 18.3/17.0 19.5/17.9 18.8 (-0.8) 18.8 (=) 18.9 (+0.1) 18.2 18.2 18.3 (+0.1) 18/16.5                    Hand range of motion: Composite finger flexion/extension: Right within normal limits/ Left hand is within normal limits    Wrist AROM    Right/Left Right Right R Right/Left   DATE IE-1/12/22 " 2/1/22  Post-tx 2/3/22  Post-tx 2/22/22 3/15/2022   EXT 30/90 49 55 64 66/80   FLX 17/45 36 28 37 38/70   RD 0/12 11 23 11 18/25   UD  26/35 18 28 25 18/32     Forearm AROM    Right/Left Right Right Right R Right/Left   DATE IE-1/12/22 2/1/22*  Pre-tx 2/3/22 post tx 2/10/22 2/22/22 3/15/2022   Supination 68/85 46 55 55 (=) 60 (+5) 60/70   Pronation 90/90 85 90 88 (-2) nt 90/90   *different car, rapidly moving in quick end range position causes pain      Strength: (measured in psi.)    Right/Left Right Right/Left    2/10/2022 3/7/22 3/15/2022   Les Rung II 8/40 14 (+6)  15/45   Key Pinch 4.5/4.5 8 (+3.5)  7/9   3-pt Pinch 3/6 4.5 (+1.5)  6/7   2-pt Pinch 3/6.75 nt 4/5     Assessment     Update:  Pt has been attending OT x 2 months for treatment pain, weakness and stiffness s/p Right closed fracture of the wrist. Pt has been very pleasant and motivated throughout course of care. Pt seen today and states she had 2 falls over the weekend secondary to her vertigo. Reports some increased pain and swelling in right wrist as compared to previous week. Measurements taken. Pt with overall improved objective measures. All edema has improved except for small finger P1-pt continues to have swelling there as compared to initial evaluation. Right wrist range of motion has improved with all motions. Pt with an overall improved  and pinch strength in right as compared to initial evaluation. Pt issued and updated strengthening putty program as she has not been issued prio to today's session. She reports pain and weakness continue to be her biggest limitations as well as recent falls. Her FOTO score slightly decreased from last intake due to recent fall. Pt has met short term goals, continue towards long term goals. Pt would continue to benefit from skilled services to address remaining deficits with focus on strengthening and return to prior level of function activities.     Previous Short Term Goals Status:  STG's 4  weeks  1)   Patient to be IND with HEP and modalities for pain/edema managment. MET  2)   Pt to tolerate advancing PREs and flexibility activities for weight bearing positions with self-graded intensity and effective symptom monitoring. Met   3) Increase wrist and forearm LIRIANO by 60 degrees to increase functional hand use and support function positions for ADLs & leisure activities. MET   4) Digit and Thumb LIRIANO to be WNL as compared to the unaffected side for maximizing  and fine motor skills for reactivation the hand for light ADLs. met  5)  Decrease edema by 1.5 cm for evidencing soft tissue healing and effective edema mgmt. Met 2/10/22     New Short Term Goals Status=( LTG's 8 weeks):   1)   Increase right wrist and forearm LIRIANO to >75% of the right wrist to increase functional hand use for weight bearing and reaching tasks. Modified and Progressing on 2/1/22. MET   2)   Right  strength  to be >70% of the unaffected side. Progressing  3)   Right pinch strength to be >60% of the unaffected side. MET for lateral and 3-jaw pinch on 3/7/22  4)   Decrease complaints of pain to 2 out of 10 at worst to increase functional hand use and UE support functions for moderate to heavy ADL/work/leisure activities. Progressing  5)   Patient to score at 45 % or less on Quick/DASH and/or Work module to demonstrate improved perception of functional right hand use to evidence return to near to prior level of function for I/ADLs and return to gardening. Progressing  Long Term Goal Status:   continue per initial plan of care.  Reasons for Recertification of Therapy:   Update POC    Plan     Updated Certification Period: 3/15/2022 to 5/13/2022  Recommended Treatment Plan: 2 times per week for 8 weeks: Fluidotherapy, Manual Therapy, Moist Heat/ Ice, Paraffin, Patient Education, Self Care, Therapeutic Activities and Therapeutic Exercise    **PT evaluate and treat for vertigo and frequent recent falls**    Lilian Jamison  HARIS  3/15/2022      I CERTIFY THE NEED FOR THESE SERVICES FURNISHED UNDER THIS PLAN OF TREATMENT AND WHILE UNDER MY CARE    Physician's comments:        Physician's Signature: ___________________________________________________

## 2022-03-15 NOTE — PATIENT INSTRUCTIONS
[facility]  OCCUPATIONAL THERAPY  HOME EXERCISE PROGRAM     Complete the following strengthening program 1x/day.                       _

## 2022-03-15 NOTE — PROGRESS NOTES
"    Occupational Therapy Daily Treatment Note     Name: Tracy Armendariz  Clinic Number: [id number]    Therapy Diagnosis:   Encounter Diagnoses   Name Primary?    Pain in right wrist Yes    Decreased range of motion of right wrist     Swelling of right hand     Alteration in self-care ability     Decreased range of motion of finger of right hand      Physician: Carlos Baum MD    Visit Date: 3/15/2022    Medical Diagnosis: S62.101D (ICD-10-CM) - Closed fracture of right wrist with routine healing, subsequent encounter  Physician Orders: Eval and treat  Evaluation Date: 1/12/2022  Insurance Authorization Period Expiration: through 3/3/22  Plan of Care from 3/15/2022-5/13/2022  Date of Return to MD: 4/18/18  Date of Onset: 11/9/21    FOTO (DASH) at eval: 69.2 %  FOTO (DASH) RA 2/24/22: 53.3 (+15.9%)  FOTO (DASH) RA 3/15/22: 59%   Visit # / Visits authorized: 15/16   Time In: 1:00 pm   Time Out: 1:45 pm  Total Billable Time: 45 minutes    Precautions:  Standard and blood thinners, Osteopenia; falls; gait and balance problems d/t Vertigo; Precautions as per imaging and s/p Week 12+    Subjective     Pt reports: "I fell 2x over the weekend, my balance has been really off lately."    Response to previous treatments: increased pain from 2 falls over the weekend  Functional change: overall improved objective measures from initial evalution    Pain:  4/10   Location: LF and wrist right side    Objective     Tracy received the following supervised modalities after being cleared for contradictions for 10 minutes:   -Paraffin wax heat pre-tx for promoting healing, decreasing pain and increasing tissue extensibility    Tracy received the following manual therapy techniques for 10 minutes:   - Pt received retrograde massage as well as scar massage to decrease edema and stiffness for increased ROM. STM performed to decreased stiffness in surrounding musculature.     Tracy performed therapeutic exercises for 25 minutes " including:    Measurements taken for Update plan of care     Dexerciser 2 minutes   Putty home exercise program  Pink putty  -manipulation 3minutes  - 5x  -blooms 3x  -logs lateral pinch 3 x  -logs key pinch 3x       Home Exercises and Education Provided     Education provided:   -issued updated putty strengthening program     Education provided prior sessions:  - Education on wear schedule for compressive sleeve and digit sleeve; begin with 1 hour, and if no pain, continues to wear during the day intermittently, removing for HEP and hygiene; progress to night time wear as tolerated.  - HEP for TGEs and wrist AROM    Written Home Exercises Provided: Patient instructed to cont prior HEP.  Exercises were reviewed and Tracy was able to demonstrate them prior to the end of the session.  Tracy demonstrated good  understanding of the HEP provided.     See EMR under Patient Instructions for exercises provided prior visit. 2/1/22       Assessment     Tracy is progressing well towards her goals and there are no updates to goals at this time. Pt prognosis is Good.     Pt will continue to benefit from skilled outpatient occupational therapy to address the deficits listed in the problem list on initial evaluation provide pt/family education and to maximize pt's level of independence in the home and community environment.     Anticipated barriers to occupational therapy: arthritis    Pt's spiritual, cultural and educational needs considered and pt agreeable to plan of care and goals.     Goals:  See update POC    Plan     Pt to be treated by Occupational Therapy 2 times per week for 8 weeks during the certification period from 3/15/2022 to 5/13/22 to achieve the established goals.      Treatment to include: Paraffin, Fluidotherapy, Manual therapy/joint mobilizations, Modalities for pain management, Therapeutic exercises/activities., Strengthening, Orthotic Fabrication/Fit/Training, Edema Control, Joint Protection and  Energy Conservation, as well as any other treatments deemed necessary based on the patient's needs or progress.

## 2022-03-22 ENCOUNTER — CLINICAL SUPPORT (OUTPATIENT)
Dept: REHABILITATION | Facility: HOSPITAL | Age: 72
End: 2022-03-22
Payer: MEDICARE

## 2022-03-22 DIAGNOSIS — M25.531 PAIN IN RIGHT WRIST: Primary | ICD-10-CM

## 2022-03-22 DIAGNOSIS — M25.631 DECREASED RANGE OF MOTION OF RIGHT WRIST: ICD-10-CM

## 2022-03-22 DIAGNOSIS — M79.89 SWELLING OF RIGHT HAND: ICD-10-CM

## 2022-03-22 DIAGNOSIS — M25.641 DECREASED RANGE OF MOTION OF FINGER OF RIGHT HAND: ICD-10-CM

## 2022-03-22 DIAGNOSIS — R68.89 ALTERATION IN SELF-CARE ABILITY: ICD-10-CM

## 2022-03-22 PROCEDURE — 97022 WHIRLPOOL THERAPY: CPT | Mod: PN

## 2022-03-22 PROCEDURE — 97110 THERAPEUTIC EXERCISES: CPT | Mod: PN

## 2022-03-22 PROCEDURE — 97140 MANUAL THERAPY 1/> REGIONS: CPT | Mod: PN

## 2022-03-22 NOTE — PROGRESS NOTES
"    Occupational Therapy Daily Treatment Note     Name: Tracy Armendariz  Clinic Number: [id number]    Therapy Diagnosis:   Encounter Diagnoses   Name Primary?    Pain in right wrist Yes    Decreased range of motion of right wrist     Swelling of right hand     Alteration in self-care ability     Decreased range of motion of finger of right hand      Physician: Carlos Baum MD    Visit Date: 3/22/2022    Medical Diagnosis: S62.101D (ICD-10-CM) - Closed fracture of right wrist with routine healing, subsequent encounter  Physician Orders: Eval and treat  Evaluation Date: 1/12/2022  Insurance Authorization Period Expiration: through 3/3/22  Plan of Care from 3/15/2022-5/13/2022  Date of Return to MD: 4/18/18  Date of Onset: 11/9/21    FOTO (DASH) at eval: 69.2 %  FOTO (DASH) RA 2/24/22: 53.3 (+15.9%)  FOTO (DASH) RA 3/15/22: 59%     Visit # / Visits authorized: 16/28    Time In: 12:03 pm  Time Out: 1:57 pm   Total Billable Time: 50 minutes    Precautions:  Standard and blood thinners, Osteopenia; falls; gait and balance problems d/t Vertigo; Precautions as per imaging and s/p Week 12+    Subjective     Pt reports: 3/22: Tracy arrives and states that she fell two days in a row. She states that she fell while she was walking her normal 2 miles with her Rolator. As she was turning into her driveway, a bag on her Rolator fell, and she leaned down to pick it up. When she leaned down, the rollator collapsed and she fell on top of it. She states that she thinks she may have broken a rib and has a large bruise on her right side. Tracy states that her vertigo happens when she makes turns. She reports that she has a doctor's appointment to check her inner ears this week. OT recommends a doctor's check up since she suspects she may have broken a rib. Extra caution taken while ambulating in the clinic today.     Tracy reports that she would like to increase OT frequency back to to 2x a week.      "I fell 2x over the " "weekend, my balance has been really off lately."    Response to previous treatments: "Soreness" in hand and wrist after t-putty exercises. OT decreases frequency of t-putty exercises to one set, every other day and decreases the resistance of t-putty to yellow.  Functional change: overall improved objective measures from initial evalution    Pain:  4/10 at arrival. Patient reports no pain after fluido therapy and after tx.  Location: LF and wrist right side    Objective     Mental status: alert, interactive, oriented x3     Observation: [place]avering during sit <> stand and while ambulating; no assistive device used  Forward shoulders. Thenar wasting on right hand. 2 LOB while standing today.     Edema: Circumferential measurements: In centimters     Right/Left Right Right Right  Right Right Right Right Right/Left     IE-1/12/22 2/1/22 2/3/22 2/10/22 2/14/22 2/17 2/22/22 2/24/22 3/15/2022   IF P1  6.7/6.3 7.3 (+0.6) 7.2 (-.1) 6.8 (-0.4) 6.9 (+0.1) 7.0 7.0 6.8 (-0.2) 6.8/6.5   SF P1   5.8/5.6 6.1 (+0.3) 6.2 (+0.1) 5.5 (-0.7) 5.6 (+0.1) 5.9 6.0 5.6 (-0.4) 5.8/5.4   Thumb P1 7.4/7.5 7.4 (=) 7.0 (-0.4) 6.6 (-0.4) 6.8 (+0.2) 7.3 7.1 6.9 (-0.2) 6.8/6.5   DPC 19/17.5 19.7 (+0.7) 19 (-0.7) 18.9 (-0.1) 19.4 (+0.5) 19.0 19.2 18.8 (-0.4) 18.5/17.5   Wrist 18.3/17.0 19.5/17.9 18.8 (-0.8) 18.8 (=) 18.9 (+0.1) 18.2 18.2 18.3 (+0.1) 18/16.5                            Hand range of motion: Composite finger flexion/extension: Right within normal limits/ Left hand is within normal limits     Wrist AROM    Right/Left Right Right R Right/Left   DATE IE-1/12/22 2/1/22  Post-tx 2/3/22  Post-tx 2/22/22 3/15/2022   EXT 30/90 49 55 64 66/80   FLX 17/45 36 28 37 38/70   RD 0/12 11 23 11 18/25   UD  26/35 18 28 25 18/32      Forearm AROM    Right/Left Right Right Right R Right/Left   DATE IE-1/12/22 2/1/22*  Pre-tx 2/3/22 post tx 2/10/22 2/22/22 3/15/2022   Supination 68/85 46 55 55 (=) 60 (+5) 60/70   Pronation 90/90 85 90 88 (-2) nt 90/90 "   *different car, rapidly moving in quick end range position causes pain       Strength: (measured in psi.)     Right/Left Right Right/Left     2/10/2022 3/7/22 3/15/2022   Les Rung II 8/40 14 (+6)  15/45   Key Pinch 4.5/4.5 8 (+3.5)  7/9   3-pt Pinch 3/6 4.5 (+1.5)  6/7   2-pt Pinch 3/6.75 nt 4/5        Treatment     Tracy received the following supervised modalities after being cleared for contradictions for 10 minutes:   - Fluido therapy to promote healing, decrease pain and increase tissue extensibility  -defer-Paraffin wax heat pre-tx for promoting healing, decreasing pain and increasing tissue extensibility    Tracy received the following manual therapy techniques for 15 minutes:   - thenar and hypothenar stretch  - Soft tissue massage to volar surface of right hand for decreased stiffness  - AA/PROM of each wrist and forearm range of motion 1 x 10 each,  - AA/PROM 20 second hold of comfortable end range supination, wrist flexion, and wrist extension     Tracy performed therapeutic exercises for 25 minutes including:    TGEs, wrist and forearm AROM, spreads, lifts, thumb opposition w/SF slides to palm, active extrinsic extensor and extrinsic flexor lengthening Performed 1x10 each during the final minutes of fluidotherapy   Digit flexibility 30 seconds each:  - Intrinsic stretch, palm down on towel surface  - Composite flexion stretch   Intrinsic strengthening -spreads and lifts with yellow spidyband, no thumb   -adduction and lifts with red sponges, 2 min   Weight Bearing Progression Yellow powerweb x 2 minutes   Wrist/forearm AROM  2 min each:  - wrist wheel for supination/pronation with a 3 second hold at end range supination  - wrist wheel for flexion and extension  - edjib-[id number]-ciser   Wrist & forearm Strengthening-defer 30 seconds each:  - Wrist 3 ways over wedge holding 1# weight (neutral wrist)*  - Sup/pronation holding red flexbar in the middle, forearm supported on wedge  - Red flexbar  for:  -smiles  -frowns  -defer-twists   Dexterity and thumb AROM  -defer-opposition with slides down the small finger 1 x 10   - defer - Manipulation, storage, and translation picking up one at a time, store 4 in hand, Med pompoms, 1/2 container   Pinch strength 2 pt pinch with yellow t-putty , 1 x 5   1st [facility] OA protocol, left hand-defer - C's with thumb and index finger 1 x 10  - C's to O's 1 x 10 maintaining proper form & muscle activation   - Webspace pressure for AddPol stretch; 20-30 seconds x 2  - Thenar stretch, hold for 20-30 seconds x 2   Wrist & Forearm AROM R/A forearm 2/22/22  R/A wrist 2/22/22   R/A  and pinch 3/7/22   Digit AROM R/A 2/22/22    Assessed baseline fine motor coordination via Grooved  3/7/22   Putty home exercise program 3/22/22-Downgraded to yellow t-putty and decreased frequency to 1x every other day, Pink putty  -manipulation 3minutes  - 5x  -blooms 3x  -logs lateral pinch 3 x  -logs key pinch 3x          Home Exercises and Education Provided     Education provided:   -modified t-putty strengthening program for decreased resistance and frequency     Education provided prior sessions:  - Education on wear schedule for compressive sleeve and digit sleeve; begin with 1 hour, and if no pain, continues to wear during the day intermittently, removing for HEP and hygiene; progress to night time wear as tolerated.  - HEP for TGEs and wrist AROM    Written Home Exercises Provided: Patient instructed to cont prior HEP.  Exercises were reviewed and Tracy was able to demonstrate them prior to the end of the session.  Tracy demonstrated good  understanding of the HEP provided.     See EMR under Patient Instructions for exercises provided prior visit. 2/1/22       Assessment   3/22: Tracy participated well in all range of motion exercises and stretching today without pain. Residual pain from strengthening limits Tracy's functional use of her hand for everyday activities and tasks,  and results in guarding. Tx program and HEP were downgraded accordingly. Tracy requires moderate cuing to monitor her own efforts and symptoms; she persistently pushes herself through end-range positions and painful pinching. Tracy's losses of balance in the clinic today is concerning. She may benefit from a PT evaluation for safety with ambulation.     Tracy is progressing well towards her goals and there are no updates to goals at this time. Pt prognosis is Good.     Pt will continue to benefit from skilled outpatient occupational therapy to address the deficits listed in the problem list on initial evaluation provide pt/family education and to maximize pt's level of independence in the home and community environment.     Anticipated barriers to occupational therapy: arthritis, osteopenia     Pt's spiritual, cultural and educational needs considered and pt agreeable to plan of care and goals.     Goals:  Previous Short Term Goals Status:  STG's 4 weeks  1)   Patient to be IND with HEP and modalities for pain/edema managment. MET  2)   Pt to tolerate advancing PREs and flexibility activities for weight bearing positions with self-graded intensity and effective symptom monitoring. Met   3) Increase wrist and forearm LIRIANO by 60 degrees to increase functional hand use and support function positions for ADLs & leisure activities. MET   4) Digit and Thumb LIRIANO to be WNL as compared to the unaffected side for maximizing  and fine motor skills for reactivation the hand for light ADLs. met  5)  Decrease edema by 1.5 cm for evidencing soft tissue healing and effective edema mgmt. Met 2/10/22      New Short Term Goals Status=( LTG's 8 weeks):   1)   Increase right wrist and forearm LIRIANO to >75% of the right wrist to increase functional hand use for weight bearing and reaching tasks. Modified and Progressing on 2/1/22. MET   2)   Right  strength  to be >70% of the unaffected side. Progressing  3)   Right pinch  "strength to be >60% of the unaffected side. MET for lateral and 3-jaw pinch on 3/7/22  4)   Decrease complaints of pain to 2 out of 10 at worst to increase functional hand use and UE support functions for moderate to heavy ADL/work/leisure activities. Progressing  5)   Patient to score at 45 % or less on Quick/DASH and/or Work module to demonstrate improved perception of functional right hand use to evidence return to near to prior level of function for I/ADLs and return to gardening. Progressing  Long Term Goal Status:   continue per initial plan of care.    Plan   3/22: Acknowledge any new MD/PA orders. Continue flexibility and PREs as tolerated. Continue OT 1-2x per week. OT placed a request for a PT order through her nurse coordinator, Sri Frank.    Pt to be treated by Occupational Therapy 2 times per week for 8 weeks during the certification period from 3/15/2022 to 5/13/22 to achieve the established goals.      Treatment to include: Paraffin, Fluidotherapy, Manual therapy/joint mobilizations, Modalities for pain management, Therapeutic exercises/activities., Strengthening, Orthotic Fabrication/Fit/Training, Edema Control, Joint Protection and Energy Conservation, as well as any other treatments deemed necessary based on the patient's needs or progress.     MICAH Patel  Student Occupational Therapist    "I, HARIS Chand CHT,  certify that I was present in the room directing the student in service delivery and guiding them using my skilled judgment. As the co-signing therapist, I have reviewed the student's documentation and am responsible for the treatment, assessment and plan."    HARIS Chi CHT  Occupational Therapist, Certified Hand Therapist    "

## 2022-03-23 ENCOUNTER — PATIENT MESSAGE (OUTPATIENT)
Dept: REHABILITATION | Facility: HOSPITAL | Age: 72
End: 2022-03-23
Payer: MEDICARE

## 2022-03-23 ENCOUNTER — TELEPHONE (OUTPATIENT)
Dept: FAMILY MEDICINE | Facility: CLINIC | Age: 72
End: 2022-03-23
Payer: MEDICARE

## 2022-03-23 NOTE — TELEPHONE ENCOUNTER
I received a secure message regarding scheduling the patient for vertigo with another provider while Dr Jules is out of the office this week. I attempted to call the patient to schedule her a same-day appointment with Dr Martinez. Got voicemail on both phone numbers. Left detailed message for a call back on the patient's preferred number.

## 2022-03-23 NOTE — PROGRESS NOTES
"Subjective:       Patient ID: Tracy Armendariz is a 71 y.o. female.    Chief Complaint: Dizziness and Headache     The patient is referred by Dr.Mehul Jules for evaluation of recurring vertigo and headaches.  He has previously diagnosed her store with benign paroxysmal positional vertigo in placed her on a home Epley exercises.  She has significant pain and limitation of motion in her neck and is not able to tolerate performing the Epley maneuvers.  She has fallen 3-6 times per month for the last year to 2 years.  She is now recovering from my a right wrist fracture that she incurred during 1 of her falls.  She had a CVA 8 years ago and onset of the vertigo occurred shortly after her CVA.  The episodes of vertigo lasts less than 10 minutes.  There is no associated tinnitus or hearing loss.  She does have pressure and fullness in her ears when they occur.  They exacerbated by her being tired, when she 1st wakes up or with rapid head movement or turning either to the left or to the right.  She does have chronic ataxia and episodes of vertigo when she turns around a corner either to the left or to the right.  She is chronically unsteady and uses a walker when she is on any type of irregular ground.  If she is in her house she holds onto her 's arm.  She has past history of right tympanoplasty.  She has tinnitus that "sounds like a tornado" in both ears every day, all day long.  She also has a significant runny nose whenever she is exposed to cold air.    She does have problems with recurring wax impactions, particularly in the right ear.  She has been using Debrox.  Several years ago she experienced a ruptured tympanic membrane which required a tympanoplasty from having her ear irrigated for wax impaction.        Review of Systems     Constitutional: Negative for appetite change, chills, fatigue, fever and unexpected weight loss.      HENT: Positive for ringing in the ears and runny nose.      Eyes:  Negative " for change in eyesight, eye drainage, eye itching and photophobia.     Respiratory:  Positive for sleep apnea.      Cardiovascular:  Negative for chest pain, foot swelling, irregular heartbeat and swollen veins.     Gastrointestinal:  Positive for acid reflux.     Genitourinary: Negative for difficulty urinating, sexual problems and frequent urination.     Musc: Positive for neck pain. Gait disturbance  Uses walker for ambulation    Skin: Negative for rash.     Allergy: Negative for food allergies and seasonal allergies.     Endocrine: Positive for cold intolerance.     Neurological: Positive for dizziness and light-headedness. Ataxia    Hematologic: Negative for bruises/bleeds easily and swollen glands.      Psychiatric: Negative for decreased concentration, depression, nervous/anxious and sleep disturbance.                Objective:      Physical Exam  Vitals and nursing note reviewed. Exam conducted with a chaperone present (Patient's  accompanies her to the visit).   Constitutional:       General: She is awake. She is not in acute distress.     Appearance: Normal appearance. She is well-developed and well-groomed. She is obese.   HENT:      Head: Normocephalic.      Jaw: There is normal jaw occlusion.      Salivary Glands: Right salivary gland is not diffusely enlarged. Left salivary gland is not diffusely enlarged.      Right Ear: Ear canal and external ear normal. Decreased hearing noted. There is impacted cerumen. Tympanic membrane is scarred and retracted. Tympanic membrane has decreased mobility.      Left Ear: Ear canal and external ear normal. Decreased hearing noted. There is impacted cerumen. Tympanic membrane is retracted. Tympanic membrane has decreased mobility.      Ears:      Comments: [place]-Hallpike maneuver not attempted because of limitation of motion in the neck and stiffness of the neck     Nose: Septal deviation (To the right) and rhinorrhea present. No nasal deformity or mucosal edema.  Rhinorrhea is clear.      Right Turbinates: Enlarged and pale.      Left Turbinates: Enlarged and pale.      Mouth/Throat:      Lips: Pink. No lesions.      Mouth: Mucous membranes are moist. No oral lesions.      Dentition: Abnormal dentition. No gum lesions.      Tongue: No lesions.      Palate: No mass and lesions.      Pharynx: Oropharynx is clear. Uvula midline. No oropharyngeal exudate.   Eyes:      General: Lids are normal.         Right eye: No discharge.         Left eye: No discharge.      Conjunctiva/sclera: Conjunctivae normal.      Right eye: Right conjunctiva is not injected.      Left eye: Left conjunctiva is not injected.      Pupils: Pupils are equal, round, and reactive to light.   Neck:      Thyroid: No thyroid mass or thyromegaly.      Vascular: No carotid bruit.      Trachea: Trachea normal. No tracheal deviation.   Cardiovascular:      Rate and Rhythm: Normal rate and regular rhythm.      Pulses: Normal pulses.      Heart sounds: Normal heart sounds. No murmur heard.    No gallop.   Pulmonary:      Effort: Pulmonary effort is normal.      Breath sounds: Normal breath sounds. No decreased breath sounds, wheezing, rhonchi or rales.   Abdominal:      General: Bowel sounds are normal.      Palpations: Abdomen is soft.      Tenderness: There is no abdominal tenderness.   Musculoskeletal:      Right shoulder: Normal.      Cervical back: Neck supple. Decreased range of motion.   Lymphadenopathy:      Head:      Right side of head: No submental, submandibular or occipital adenopathy.      Left side of head: No submental, submandibular or occipital adenopathy.      Cervical: No cervical adenopathy.   Skin:     General: Skin is warm and dry.      Findings: No rash.      Nails: There is no clubbing.   Neurological:      Mental Status: She is alert and oriented to person, place, and time.      Cranial Nerves: No cranial nerve deficit.      Sensory: No sensory deficit.      Gait: Gait normal.      Comments:  Neuro otologic-cranial nerves grossly intact, no focal or cerebellar findings with upper extremities, wide-based gait, unable to perform tandem gait, Romberg falls posteriorly, tandem Romberg falls to the right   Psychiatric:         Speech: Speech normal.         Behavior: Behavior normal. Behavior is cooperative.         Procedures:  1. Removal cerumen impaction, bilaterally, 2. Binocular microscopic examination of the right ear, assistance with removal of cerumen impaction and examination of tympanic membrane  Preop diagnosis:  1. Bilateral cerumen impaction, 2. Status post tympanoplasty, right ear  Postop diagnosis:  Same  Anesthesia:  None  Procedure in detail:  The patient was placed in the examining chair.  Cerumen impaction was removed under direct visualization from the left ear.  Attempted removal of cerumen on the right under direct visualization was not successful.  The chair was placed in the semi recumbent position in the operating microscope was brought over the right ear.  Using a Mckeon needle, 8 [place] suction, 10 [place] suction the cerumen impaction from the right ear was removed completely.  The patient tolerated the procedure well and was discharged post procedure.  The right tympanic membrane is scarred and retracted with monomeric membrane in the central portion near the manubrium of the malleus.    Assessment:       1. Vertigo    2. Benign paroxysmal positional vertigo, unspecified laterality    3. Ataxia    4. Tinnitus of both ears    5. Bilateral impacted cerumen    6. Nasal septal deviation    7. History of CVA (cerebrovascular accident)    8. At high risk for falls        Plan:       I will schedule patient for an audio vestibular evaluation and recheck her when I have the results.  She is to refrain from using meclizine for 4 days prior to the testing.  Ultimately, I am surgeon that she will need to be referred for vestibular rehabilitative therapy.

## 2022-03-24 ENCOUNTER — OFFICE VISIT (OUTPATIENT)
Dept: OTOLARYNGOLOGY | Facility: CLINIC | Age: 72
End: 2022-03-24
Payer: MEDICARE

## 2022-03-24 VITALS
HEART RATE: 66 BPM | BODY MASS INDEX: 36.74 KG/M2 | TEMPERATURE: 98 F | WEIGHT: 227.63 LBS | SYSTOLIC BLOOD PRESSURE: 142 MMHG | DIASTOLIC BLOOD PRESSURE: 63 MMHG

## 2022-03-24 DIAGNOSIS — H61.23 BILATERAL IMPACTED CERUMEN: ICD-10-CM

## 2022-03-24 DIAGNOSIS — R42 VERTIGO: Primary | ICD-10-CM

## 2022-03-24 DIAGNOSIS — Z86.73 HISTORY OF CVA (CEREBROVASCULAR ACCIDENT): ICD-10-CM

## 2022-03-24 DIAGNOSIS — H81.10 BENIGN PAROXYSMAL POSITIONAL VERTIGO, UNSPECIFIED LATERALITY: ICD-10-CM

## 2022-03-24 DIAGNOSIS — Z91.81 AT HIGH RISK FOR FALLS: ICD-10-CM

## 2022-03-24 DIAGNOSIS — H93.13 TINNITUS OF BOTH EARS: ICD-10-CM

## 2022-03-24 DIAGNOSIS — R27.0 ATAXIA: ICD-10-CM

## 2022-03-24 DIAGNOSIS — J34.2 NASAL SEPTAL DEVIATION: ICD-10-CM

## 2022-03-24 PROCEDURE — 1100F PTFALLS ASSESS-DOCD GE2>/YR: CPT | Mod: CPTII,S$GLB,, | Performed by: SPECIALIST

## 2022-03-24 PROCEDURE — 99999 PR PBB SHADOW E&M-EST. PATIENT-LVL V: CPT | Mod: PBBFAC,,, | Performed by: SPECIALIST

## 2022-03-24 PROCEDURE — 3008F BODY MASS INDEX DOCD: CPT | Mod: CPTII,S$GLB,, | Performed by: SPECIALIST

## 2022-03-24 PROCEDURE — 99999 PR PBB SHADOW E&M-EST. PATIENT-LVL V: ICD-10-PCS | Mod: PBBFAC,,, | Performed by: SPECIALIST

## 2022-03-24 PROCEDURE — 3288F FALL RISK ASSESSMENT DOCD: CPT | Mod: CPTII,S$GLB,, | Performed by: SPECIALIST

## 2022-03-24 PROCEDURE — 99204 PR OFFICE/OUTPT VISIT, NEW, LEVL IV, 45-59 MIN: ICD-10-PCS | Mod: S$GLB,,, | Performed by: SPECIALIST

## 2022-03-24 PROCEDURE — 99204 OFFICE O/P NEW MOD 45 MIN: CPT | Mod: S$GLB,,, | Performed by: SPECIALIST

## 2022-03-24 PROCEDURE — 3008F PR BODY MASS INDEX (BMI) DOCUMENTED: ICD-10-PCS | Mod: CPTII,S$GLB,, | Performed by: SPECIALIST

## 2022-03-24 PROCEDURE — 1125F AMNT PAIN NOTED PAIN PRSNT: CPT | Mod: CPTII,S$GLB,, | Performed by: SPECIALIST

## 2022-03-24 PROCEDURE — 3077F SYST BP >= 140 MM HG: CPT | Mod: CPTII,S$GLB,, | Performed by: SPECIALIST

## 2022-03-24 PROCEDURE — 1159F PR MEDICATION LIST DOCUMENTED IN MEDICAL RECORD: ICD-10-PCS | Mod: CPTII,S$GLB,, | Performed by: SPECIALIST

## 2022-03-24 PROCEDURE — 1100F PR PT FALLS ASSESS DOC 2+ FALLS/FALL W/INJURY/YR: ICD-10-PCS | Mod: CPTII,S$GLB,, | Performed by: SPECIALIST

## 2022-03-24 PROCEDURE — 1160F RVW MEDS BY RX/DR IN RCRD: CPT | Mod: CPTII,S$GLB,, | Performed by: SPECIALIST

## 2022-03-24 PROCEDURE — 3288F PR FALLS RISK ASSESSMENT DOCUMENTED: ICD-10-PCS | Mod: CPTII,S$GLB,, | Performed by: SPECIALIST

## 2022-03-24 PROCEDURE — 3077F PR MOST RECENT SYSTOLIC BLOOD PRESSURE >= 140 MM HG: ICD-10-PCS | Mod: CPTII,S$GLB,, | Performed by: SPECIALIST

## 2022-03-24 PROCEDURE — 3078F PR MOST RECENT DIASTOLIC BLOOD PRESSURE < 80 MM HG: ICD-10-PCS | Mod: CPTII,S$GLB,, | Performed by: SPECIALIST

## 2022-03-24 PROCEDURE — 1125F PR PAIN SEVERITY QUANTIFIED, PAIN PRESENT: ICD-10-PCS | Mod: CPTII,S$GLB,, | Performed by: SPECIALIST

## 2022-03-24 PROCEDURE — 1159F MED LIST DOCD IN RCRD: CPT | Mod: CPTII,S$GLB,, | Performed by: SPECIALIST

## 2022-03-24 PROCEDURE — 3078F DIAST BP <80 MM HG: CPT | Mod: CPTII,S$GLB,, | Performed by: SPECIALIST

## 2022-03-24 PROCEDURE — 1160F PR REVIEW ALL MEDS BY PRESCRIBER/CLIN PHARMACIST DOCUMENTED: ICD-10-PCS | Mod: CPTII,S$GLB,, | Performed by: SPECIALIST

## 2022-03-25 ENCOUNTER — CLINICAL SUPPORT (OUTPATIENT)
Dept: REHABILITATION | Facility: HOSPITAL | Age: 72
End: 2022-03-25
Payer: MEDICARE

## 2022-03-25 DIAGNOSIS — M25.631 DECREASED RANGE OF MOTION OF RIGHT WRIST: ICD-10-CM

## 2022-03-25 DIAGNOSIS — M25.531 PAIN IN RIGHT WRIST: Primary | ICD-10-CM

## 2022-03-25 DIAGNOSIS — M25.641 DECREASED RANGE OF MOTION OF FINGER OF RIGHT HAND: ICD-10-CM

## 2022-03-25 DIAGNOSIS — M79.89 SWELLING OF RIGHT HAND: ICD-10-CM

## 2022-03-25 DIAGNOSIS — R68.89 ALTERATION IN SELF-CARE ABILITY: ICD-10-CM

## 2022-03-25 PROCEDURE — 97140 MANUAL THERAPY 1/> REGIONS: CPT | Mod: PN

## 2022-03-25 PROCEDURE — 97022 WHIRLPOOL THERAPY: CPT | Mod: PN

## 2022-03-25 PROCEDURE — 97110 THERAPEUTIC EXERCISES: CPT | Mod: PN

## 2022-03-25 NOTE — PROGRESS NOTES
"    Occupational Therapy Daily Treatment Note     Name: Tracy Armendariz  Clinic Number: [id number]    Therapy Diagnosis:   Encounter Diagnoses   Name Primary?    Pain in right wrist Yes    Decreased range of motion of right wrist     Swelling of right hand     Alteration in self-care ability     Decreased range of motion of finger of right hand      Physician: Carlos Baum MD    Visit Date: 3/25/2022    Medical Diagnosis: S62.101D (ICD-10-CM) - Closed fracture of right wrist with routine healing, subsequent encounter  Physician Orders: Eval and treat  Evaluation Date: 1/12/2022  Insurance Authorization Period Expiration: through 3/3/22  Plan of Care from 3/15/2022-5/13/2022  Date of Return to MD: 4/18/18  Date of Onset: 11/9/21    FOTO (DASH) at eval: 69.2 %  FOTO (DASH) RA 2/24/22: 53.3 (+15.9%)  FOTO (DASH) RA 3/15/22: 59%     Visit # / Visits authorized: 17/28    Time In: 2:00 pm  Time Out: 3:00 pm   Total Billable Time: 50 minutes    Precautions:  Standard and blood thinners, Osteopenia; falls; gait and balance problems d/t Vertigo; Precautions as per imaging and s/p Week 12+    Subjective     Pt reports: 3/25: Pt arrives for pain of "2"/10. Pt reports "I've had 5 falls this month". Pt discussed exam yesterday and is now scheduled for formal balance and ENT examination. Pt observed to wavering with sit to stand and while ambulating. She is spontaneous with her mobility. OT encouraged pt to decrease speed of sit to stand and wait for balance to be gained before beginning to ambulate. Tracy has requested that she return to OT frequency of 2x/week.     Response to previous treatments: 3/25: Pt reports "tired" after t-putty exercises. Pt has had no increase in pain with Yellow t-putty at home, performing every day instead of every other day as instructed.  Functional change: overall improved objective measures from initial evalution    Pain:  2/10 at arrival.   Location: Right ulna wrist and both " thumbs    Objective     Mental status: alert, interactive, oriented x3     Observation: Wavering during sit <> stand and while ambulating; no assistive device used  Forward shoulders. Thenar wasting on right hand. 2 LOB while standing today.     Edema: Circumferential measurements: In centimters     Right/Left Right Right Right  Right Right Right Right Right/Left     IE-1/12/22 2/1/22 2/3/22 2/10/22 2/14/22 2/17 2/22/22 2/24/22 3/15/2022   IF P1  6.7/6.3 7.3 (+0.6) 7.2 (-.1) 6.8 (-0.4) 6.9 (+0.1) 7.0 7.0 6.8 (-0.2) 6.8/6.5   SF P1   5.8/5.6 6.1 (+0.3) 6.2 (+0.1) 5.5 (-0.7) 5.6 (+0.1) 5.9 6.0 5.6 (-0.4) 5.8/5.4   Thumb P1 7.4/7.5 7.4 (=) 7.0 (-0.4) 6.6 (-0.4) 6.8 (+0.2) 7.3 7.1 6.9 (-0.2) 6.8/6.5   DPC 19/17.5 19.7 (+0.7) 19 (-0.7) 18.9 (-0.1) 19.4 (+0.5) 19.0 19.2 18.8 (-0.4) 18.5/17.5   Wrist 18.3/17.0 19.5/17.9 18.8 (-0.8) 18.8 (=) 18.9 (+0.1) 18.2 18.2 18.3 (+0.1) 18/16.5                            Hand range of motion: Composite finger flexion/extension: Right within normal limits/ Left hand is within normal limits     Wrist AROM    Right/Left Right Right R Right/Left   DATE IE-1/12/22 2/1/22  Post-tx 2/3/22  Post-tx 2/22/22 3/15/2022   EXT 30/90 49 55 64 66/80   FLX 17/45 36 28 37 38/70   RD 0/12 11 23 11 18/25   UD  26/35 18 28 25 18/32      Forearm AROM    Right/Left Right Right Right R Right/Left   DATE IE-1/12/22 2/1/22*  Pre-tx 2/3/22 post tx 2/10/22 2/22/22 3/15/2022   Supination 68/85 46 55 55 (=) 60 (+5) 60/70   Pronation 90/90 85 90 88 (-2) nt 90/90   *different car, rapidly moving in quick end range position causes pain       Strength: (measured in psi.)     Right/Left Right Right/Left     2/10/2022 3/7/22 3/15/2022   Les Rung II 8/40 14 (+6)  15/45   Key Pinch 4.5/4.5 8 (+3.5)  7/9   3-pt Pinch 3/6 4.5 (+1.5)  6/7   2-pt Pinch 3/6.75 nt 4/5        Treatment     Tracy received the following supervised modalities after being cleared for contradictions for 13 minutes:   - Fluido therapy to  promote healing, decrease pain and increase tissue extensibility  -defer-Paraffin wax heat pre-tx for promoting healing, decreasing pain and increasing tissue extensibility    Tracy received the following manual therapy techniques for 8 minutes:   - thenar and hypothenar stretch  - Soft tissue massage to volar surface of right hand for decreased stiffness  - AA/PROM of each wrist and forearm range of motion 1 x 10 each,  - Elastic tape applied for 1st CMC position correction and space correction at radial and ulna wrist for reducing pain and edema     Tracy performed therapeutic exercises for 30 minutes including:  TGEs, wrist and forearm AROM, spreads, lifts, thumb opposition w/SF slides to palm, active extrinsic extensor and extrinsic flexor lengthening Performed 1x10 each during the final minutes of fluidotherapy   Digit flexibility 30 seconds each:  - Intrinsic stretch, palm down on towel surface  - Composite flexion stretch   Intrinsic strengthening 2 min each:  -spreads and lifts with yellow spidyband, no thumb   -adduction and lifts with red sponges   Weight Bearing Progression- defer Yellow powerweb x 2 minutes   Wrist/forearm AROM  2 min each:  - wrist wheel for supination/pronation with a 3 second hold at end range supination  - defer-wrist wheel for flexion and extension  - yogax-Jac-b-ciser   Wrist & forearm Strengthening-defer 1 min each:  - Wrist 3 ways over wedge holding 1/2# weight  - Yellow flexbar, 1 min each for:  -sup/pronation swings, held at mid-shaft  -smiles  -frowns  -twists  -isometric supination  -isometric pronation   Elbow strengthening 1 min each, holding 1/2# weight:  - elbow flexion 3 ways  Palm up  Palm neutral  Palm down   Dexterity and thumb AROM  -defer-opposition with slides down the small finger 1 x 10   - defer - Manipulation, storage, and translation picking up one at a time, store 4 in hand, Med pompoms, 1/2 container   Pinch strength Yellow resistance pin for pompom pick  ups, 1/2 container each:  - lateral pinch; discontinued when pain presents   1st CMC OA protocol, left hand- - C's with thumb and index finger 1 x 10  - C's to O's 1 x 10 maintaining proper form & muscle activation   - defer-Webspace pressure for AddPol stretch; 20-30 seconds x 2  - defer-Thenar stretch, hold for 20-30 seconds x 2   Wrist & Forearm AROM R/A forearm 2/22/22  R/A wrist 2/22/22   R/A  and pinch 3/7/22   Digit AROM R/A 2/22/22    Assessed baseline fine motor coordination via Grooved  3/7/22   Putty home exercise program 3/22/22-Downgraded to yellow t-putty and decreased frequency to 1x every other day, Pink putty  -manipulation 3minutes  - 5x  -blooms 3x  -logs lateral pinch 3 x  -logs key pinch 3x          Home Exercises and Education Provided     Education provided:   - HEP as modified t-putty strengthening program for decreased resistance and frequency     Education provided prior sessions:  - Education on wear schedule for compressive sleeve and digit sleeve; begin with 1 hour, and if no pain, continues to wear during the day intermittently, removing for HEP and hygiene; progress to night time wear as tolerated.  - HEP for TGEs and wrist AROM    Written Home Exercises Provided: Patient instructed to cont prior HEP.  Exercises were reviewed and Tracy was able to demonstrate them prior to the end of the session.  Tracy demonstrated good  understanding of the HEP provided.     See EMR under Patient Instructions for exercises provided prior visit. 2/1/22       Assessment   3/25: Fair tolerance for supination/pronation end ranges and pinch activities.    Tracy is progressing well towards her goals and there are no updates to goals at this time. Pt prognosis is Good.     Pt will continue to benefit from skilled outpatient occupational therapy to address the deficits listed in the problem list on initial evaluation provide pt/family education and to maximize pt's level of independence in the  home and community environment.     Anticipated barriers to occupational therapy: arthritis, osteopenia     Pt's spiritual, cultural and educational needs considered and pt agreeable to plan of care and goals.     Goals:  Previous Short Term Goals Status:  STG's 4 weeks  1)   Patient to be IND with HEP and modalities for pain/edema managment. MET  2)   Pt to tolerate advancing PREs and flexibility activities for weight bearing positions with self-graded intensity and effective symptom monitoring. Met   3) Increase wrist and forearm LIRIANO by 60 degrees to increase functional hand use and support function positions for ADLs & leisure activities. MET   4) Digit and Thumb LIRIANO to be WNL as compared to the unaffected side for maximizing  and fine motor skills for reactivation the hand for light ADLs. met  5)  Decrease edema by 1.5 cm for evidencing soft tissue healing and effective edema mgmt. Met 2/10/22      New Short Term Goals Status=( LTG's 8 weeks):   1)   Increase right wrist and forearm LIRIANO to >75% of the right wrist to increase functional hand use for weight bearing and reaching tasks. Modified and Progressing on 2/1/22. MET   2)   Right  strength  to be >70% of the unaffected side. Progressing  3)   Right pinch strength to be >60% of the unaffected side. MET for lateral and 3-jaw pinch on 3/7/22  4)   Decrease complaints of pain to 2 out of 10 at worst to increase functional hand use and UE support functions for moderate to heavy ADL/work/leisure activities. Progressing  5)   Patient to score at 45 % or less on Quick/DASH and/or Work module to demonstrate improved perception of functional right hand use to evidence return to near to prior level of function for I/ADLs and return to gardening. Progressing  Long Term Goal Status:   continue per initial plan of care.    Plan   3/25: Acknowledge any new MD/PA orders. Continue flexibility and PREs as tolerated. Continue OT 1-2x per week. OT placed a request for  a PT order through her nurse coordinator, Sri Frank.    Pt to be treated by Occupational Therapy 2 times per week for 8 weeks during the certification period from 3/15/2022 to 5/13/22 to achieve the established goals.      Treatment to include: Paraffin, Fluidotherapy, Manual therapy/joint mobilizations, Modalities for pain management, Therapeutic exercises/activities., Strengthening, Orthotic Fabrication/Fit/Training, Edema Control, Joint Protection and Energy Conservation, as well as any other treatments deemed necessary based on the patient's needs or progress.     HARIS Chi, T  Occupational Therapist, Certified Hand Therapist

## 2022-03-28 ENCOUNTER — TELEPHONE (OUTPATIENT)
Dept: OTOLARYNGOLOGY | Facility: CLINIC | Age: 72
End: 2022-03-28
Payer: MEDICARE

## 2022-03-28 ENCOUNTER — TELEPHONE (OUTPATIENT)
Dept: GASTROENTEROLOGY | Facility: CLINIC | Age: 72
End: 2022-03-28
Payer: MEDICARE

## 2022-03-28 NOTE — TELEPHONE ENCOUNTER
Called patient to confirm her appointment with Dr. Duron at 11:00 am tomorrow she didn't answer left voice mail.

## 2022-03-28 NOTE — TELEPHONE ENCOUNTER
----- Message from Slime Hamilton sent at 3/28/2022  2:30 PM CDT -----  Who Called: Patient    What is the reqeust in detail: Requesting assistance schedule from referral audiology exam. Please advise.     Can the clinic reply by [facility]? No    Best Call Back Number: [phone]    Additional Information:

## 2022-03-29 ENCOUNTER — TELEPHONE (OUTPATIENT)
Dept: OTOLARYNGOLOGY | Facility: CLINIC | Age: 72
End: 2022-03-29
Payer: MEDICARE

## 2022-03-29 ENCOUNTER — OFFICE VISIT (OUTPATIENT)
Dept: GASTROENTEROLOGY | Facility: CLINIC | Age: 72
End: 2022-03-29
Payer: MEDICARE

## 2022-03-29 ENCOUNTER — PATIENT MESSAGE (OUTPATIENT)
Dept: OTOLARYNGOLOGY | Facility: CLINIC | Age: 72
End: 2022-03-29
Payer: MEDICARE

## 2022-03-29 ENCOUNTER — LAB VISIT (OUTPATIENT)
Dept: LAB | Facility: HOSPITAL | Age: 72
End: 2022-03-29
Attending: INTERNAL MEDICINE
Payer: MEDICARE

## 2022-03-29 VITALS — HEIGHT: 66 IN | WEIGHT: 227.75 LBS | BODY MASS INDEX: 36.6 KG/M2

## 2022-03-29 DIAGNOSIS — R19.8 ALTERNATING CONSTIPATION AND DIARRHEA: ICD-10-CM

## 2022-03-29 DIAGNOSIS — I63.9 CEREBRAL INFARCTION, UNSPECIFIED: ICD-10-CM

## 2022-03-29 DIAGNOSIS — K21.9 GASTROESOPHAGEAL REFLUX DISEASE, UNSPECIFIED WHETHER ESOPHAGITIS PRESENT: ICD-10-CM

## 2022-03-29 DIAGNOSIS — K59.04 CHRONIC IDIOPATHIC CONSTIPATION: Primary | ICD-10-CM

## 2022-03-29 DIAGNOSIS — K59.04 CHRONIC IDIOPATHIC CONSTIPATION: ICD-10-CM

## 2022-03-29 LAB
ALBUMIN SERPL BCP-MCNC: 3.6 G/DL (ref 3.5–5.2)
ALP SERPL-CCNC: 79 U/L (ref 55–135)
ALT SERPL W/O P-5'-P-CCNC: 13 U/L (ref 10–44)
ANION GAP SERPL CALC-SCNC: 9 MMOL/L (ref 8–16)
AST SERPL-CCNC: 16 U/L (ref 10–40)
BASOPHILS # BLD AUTO: 0.09 K/UL (ref 0–0.2)
BASOPHILS NFR BLD: 1.2 % (ref 0–1.9)
BILIRUB SERPL-MCNC: 0.3 MG/DL (ref 0.1–1)
BUN SERPL-MCNC: 13 MG/DL (ref 8–23)
CALCIUM SERPL-MCNC: 9.4 MG/DL (ref 8.7–10.5)
CHLORIDE SERPL-SCNC: 105 MMOL/L (ref 95–110)
CHOLEST SERPL-MCNC: 227 MG/DL (ref 120–199)
CHOLEST/HDLC SERPL: 2.5 {RATIO} (ref 2–5)
CO2 SERPL-SCNC: 27 MMOL/L (ref 23–29)
CREAT SERPL-MCNC: 0.8 MG/DL (ref 0.5–1.4)
DIFFERENTIAL METHOD: ABNORMAL
EOSINOPHIL # BLD AUTO: 0.3 K/UL (ref 0–0.5)
EOSINOPHIL NFR BLD: 3.7 % (ref 0–8)
ERYTHROCYTE [DISTWIDTH] IN BLOOD BY AUTOMATED COUNT: 12 % (ref 11.5–14.5)
EST. GFR  (AFRICAN AMERICAN): >60 ML/MIN/1.73 M^2
EST. GFR  (NON AFRICAN AMERICAN): >60 ML/MIN/1.73 M^2
GLUCOSE SERPL-MCNC: 121 MG/DL (ref 70–110)
HCT VFR BLD AUTO: 42.5 % (ref 37–48.5)
HDLC SERPL-MCNC: 90 MG/DL (ref 40–75)
HDLC SERPL: 39.6 % (ref 20–50)
HGB BLD-MCNC: 13.6 G/DL (ref 12–16)
IMM GRANULOCYTES # BLD AUTO: 0.01 K/UL (ref 0–0.04)
IMM GRANULOCYTES NFR BLD AUTO: 0.1 % (ref 0–0.5)
LDLC SERPL CALC-MCNC: 112.6 MG/DL (ref 63–159)
LYMPHOCYTES # BLD AUTO: 2.1 K/UL (ref 1–4.8)
LYMPHOCYTES NFR BLD: 28.6 % (ref 18–48)
MCH RBC QN AUTO: 31.1 PG (ref 27–31)
MCHC RBC AUTO-ENTMCNC: 32 G/DL (ref 32–36)
MCV RBC AUTO: 97 FL (ref 82–98)
MONOCYTES # BLD AUTO: 0.7 K/UL (ref 0.3–1)
MONOCYTES NFR BLD: 9.2 % (ref 4–15)
NEUTROPHILS # BLD AUTO: 4.2 K/UL (ref 1.8–7.7)
NEUTROPHILS NFR BLD: 57.2 % (ref 38–73)
NONHDLC SERPL-MCNC: 137 MG/DL
NRBC BLD-RTO: 0 /100 WBC
PLATELET # BLD AUTO: 242 K/UL (ref 150–450)
PMV BLD AUTO: 11.3 FL (ref 9.2–12.9)
POTASSIUM SERPL-SCNC: 4.7 MMOL/L (ref 3.5–5.1)
PROT SERPL-MCNC: 7.3 G/DL (ref 6–8.4)
RBC # BLD AUTO: 4.38 M/UL (ref 4–5.4)
SODIUM SERPL-SCNC: 141 MMOL/L (ref 136–145)
TRIGL SERPL-MCNC: 122 MG/DL (ref 30–150)
TSH SERPL DL<=0.005 MIU/L-ACNC: 2.02 UIU/ML (ref 0.4–4)
WBC # BLD AUTO: 7.39 K/UL (ref 3.9–12.7)

## 2022-03-29 PROCEDURE — 3288F FALL RISK ASSESSMENT DOCD: CPT | Mod: CPTII,S$GLB,, | Performed by: INTERNAL MEDICINE

## 2022-03-29 PROCEDURE — 84443 ASSAY THYROID STIM HORMONE: CPT | Performed by: INTERNAL MEDICINE

## 2022-03-29 PROCEDURE — 36415 COLL VENOUS BLD VENIPUNCTURE: CPT | Performed by: INTERNAL MEDICINE

## 2022-03-29 PROCEDURE — 1100F PR PT FALLS ASSESS DOC 2+ FALLS/FALL W/INJURY/YR: ICD-10-PCS | Mod: CPTII,S$GLB,, | Performed by: INTERNAL MEDICINE

## 2022-03-29 PROCEDURE — 1159F PR MEDICATION LIST DOCUMENTED IN MEDICAL RECORD: ICD-10-PCS | Mod: CPTII,S$GLB,, | Performed by: INTERNAL MEDICINE

## 2022-03-29 PROCEDURE — 99999 PR PBB SHADOW E&M-EST. PATIENT-LVL IV: CPT | Mod: PBBFAC,,, | Performed by: INTERNAL MEDICINE

## 2022-03-29 PROCEDURE — 85025 COMPLETE CBC W/AUTO DIFF WBC: CPT | Performed by: INTERNAL MEDICINE

## 2022-03-29 PROCEDURE — 3008F PR BODY MASS INDEX (BMI) DOCUMENTED: ICD-10-PCS | Mod: CPTII,S$GLB,, | Performed by: INTERNAL MEDICINE

## 2022-03-29 PROCEDURE — 1126F PR PAIN SEVERITY QUANTIFIED, NO PAIN PRESENT: ICD-10-PCS | Mod: CPTII,S$GLB,, | Performed by: INTERNAL MEDICINE

## 2022-03-29 PROCEDURE — 99999 PR PBB SHADOW E&M-EST. PATIENT-LVL IV: ICD-10-PCS | Mod: PBBFAC,,, | Performed by: INTERNAL MEDICINE

## 2022-03-29 PROCEDURE — 1126F AMNT PAIN NOTED NONE PRSNT: CPT | Mod: CPTII,S$GLB,, | Performed by: INTERNAL MEDICINE

## 2022-03-29 PROCEDURE — 99204 PR OFFICE/OUTPT VISIT, NEW, LEVL IV, 45-59 MIN: ICD-10-PCS | Mod: S$GLB,,, | Performed by: INTERNAL MEDICINE

## 2022-03-29 PROCEDURE — 1159F MED LIST DOCD IN RCRD: CPT | Mod: CPTII,S$GLB,, | Performed by: INTERNAL MEDICINE

## 2022-03-29 PROCEDURE — 3008F BODY MASS INDEX DOCD: CPT | Mod: CPTII,S$GLB,, | Performed by: INTERNAL MEDICINE

## 2022-03-29 PROCEDURE — 80053 COMPREHEN METABOLIC PANEL: CPT | Performed by: INTERNAL MEDICINE

## 2022-03-29 PROCEDURE — 80061 LIPID PANEL: CPT | Performed by: INTERNAL MEDICINE

## 2022-03-29 PROCEDURE — 3288F PR FALLS RISK ASSESSMENT DOCUMENTED: ICD-10-PCS | Mod: CPTII,S$GLB,, | Performed by: INTERNAL MEDICINE

## 2022-03-29 PROCEDURE — 99204 OFFICE O/P NEW MOD 45 MIN: CPT | Mod: S$GLB,,, | Performed by: INTERNAL MEDICINE

## 2022-03-29 PROCEDURE — 1100F PTFALLS ASSESS-DOCD GE2>/YR: CPT | Mod: CPTII,S$GLB,, | Performed by: INTERNAL MEDICINE

## 2022-03-29 NOTE — PROGRESS NOTES
GASTROENTEROLOGY CLINIC NOTE    Reason for visit: The primary encounter diagnosis was Chronic idiopathic constipation. Diagnoses of Alternating constipation and diarrhea, Cerebral infarction, unspecified , and Gastroesophageal reflux disease, unspecified whether esophagitis present were also pertinent to this visit.  Referring provider/PCP: Bartolo Jules MD    HPI:  Tracy Armendariz is a 71 y.o. female here today for alternating bowel habits, new patient.  Accompanied by .    Symptoms ongoing since November or so, has some cramping like needs to have BM. ususally induced by exercise or walking. Has fecal incontinence at times. Will have really solid movement and then loose and runny. Only assoc with movement. No blood.  Will have BM maybe every 3rd day. Has some straining to have BM. Used to use metamucil and that seemed to help but stopped.   abd cramping as well. Some nausea. She is distressed by the symptosm.     Prior Endoscopy:  EGD:  Colon:  2018 [place]  Granular mucosa in ascending - bx normal  [id number] years    (Portions of this note were dictated using voice recognition software and may contain dictation related errors in spelling/grammar/syntax not found on text review)    Review of Systems   Constitutional: Negative for fever and malaise/fatigue.   Respiratory: Negative for cough and shortness of breath.    Cardiovascular: Negative for chest pain and palpitations.   Gastrointestinal: Positive for abdominal pain, constipation and diarrhea. Negative for blood in stool, nausea and vomiting.   Neurological: Negative for dizziness and headaches.       Past Medical History: has a past medical history of Allergy, Anticoagulant long-term use, Arthritis, CVA (cerebral infarction), Depression, Disorder of kidney and ureter, Diverticulosis of colon, Extrinsic asthma, unspecified, Hyperlipidemia, Hypertension, Kidney stone, Left atrial enlargement, Low back pain, MARTIN (obstructive sleep apnea),  Osteopenia, PUD (peptic ulcer disease), Stroke, and Urinary tract infection.    Past Surgical History: has a past surgical history that includes Inner ear surgery; Cholecystectomy ();  section (); Dilation and curettage of uterus (); Appendectomy (); Hysterectomy (); Tympanoplasty; Lumbar discectomy (); Knee arthroscopy w/ debridement (); Tonsillectomy; Back surgery; Cataract extraction; Colonoscopy (N/A, 2018); Joint replacement (Right); Ureteroscopic removal of ureteric calculus (Left, 2018); and Oophorectomy.    Family History:family history includes Breast cancer (age of onset: 36) in her daughter; Cancer (age of onset: 65) in her mother; Cervical cancer in her mother; Coronary artery disease (age of onset: 62) in her father; Diabetes in her sister; Heart disease in her father, maternal grandfather, and sister; Heart failure in her sister; Hypertension in her father and maternal grandfather; Kidney disease in her sister; Stroke in her paternal grandfather.    Allergies:   Review of patient's allergies indicates:   Allergen Reactions    Iodinated contrast media Hives and Rash    Gabapentin Hallucinations    Iodine Hives    Isothiazolinones Rash    Penicillins Rash       Social History: reports that she quit smoking about 27 years ago. She has never used smokeless tobacco. She reports current alcohol use of about 1.0 standard drink of alcohol per week. She reports that she does not use drugs.    Home medications:   Current Outpatient Medications on File Prior to Visit   Medication Sig Dispense Refill    albuterol (PROVENTIL/VENTOLIN HFA) 90 mcg/actuation inhaler INHALE 2 PUFFS EVERY 6 HOURS AS NEEDED FOR WHEEZING 18 g 0    aspirin 81 MG Chew Take 81 mg by mouth once daily.      atorvastatin (LIPITOR) 10 MG tablet Take 1 tablet (10 mg total) by mouth once daily. 90 tablet 3    buPROPion (WELLBUTRIN XL) 300 MG 24 hr tablet Take 1 tablet (300 mg total) by mouth  "once daily. 90 tablet 11    calcium carbonate (OS-SPENCER) 600 mg (1,500 mg) Tab Take 600 mg by mouth 2 (two) times daily with meals.      cholecalciferol, vitamin D3, 1,000 unit Chew Take 1,000 Units by mouth once daily.      diclofenac sodium (VOLTAREN) 1 % Gel APPLY 2-4 GRAMS TO EACH PAINFUL AREA FOUR TIMES DAILY - MAX 32 GRAMS/ g 6    EScitalopram oxalate (LEXAPRO) 20 MG tablet TAKE 1 TABLET(20 MG) BY MOUTH EVERY DAY (Patient taking differently: Take 20 mg by mouth once daily.) 90 tablet 3    esomeprazole (NEXIUM) 40 MG capsule Take 1 capsule (40 mg total) by mouth daily as needed (heartburn). 30 capsule 3    LACTOBACILLUS ACIDOPHILUS (PROBIOTIC ORAL) Take 1 capsule by mouth once daily. PRN      LIDOcaine-prilocaine (EMLA) cream APPLY 2 GRAMS 3-4 TIMES DAILY FOR TREATMENT OF PAIN 240 g 6    losartan (COZAAR) 100 MG tablet Take 1 tablet (100 mg total) by mouth once daily. 90 tablet 3    meclizine (ANTIVERT) 12.5 mg tablet Take 1 tablet (12.5 mg total) by mouth 3 (three) times daily as needed for Dizziness or Nausea. 30 tablet 6    MULTIVIT WITH CALCIUM,IRON,MIN (WOMEN'S DAILY MULTIVITAMIN ORAL) Take 1 tablet by mouth once daily.      ondansetron (ZOFRAN-ODT) 4 MG TbDL dissolve 1 tablet (4 mg total) by mouth every 8 (eight) hours as needed (nausea). 20 tablet 0    famotidine (PEPCID) 10 MG tablet Take 10 mg by mouth 2 (two) times daily.      FLUAD 2525-9360, 65 YR UP,,PF, 45 mcg (15 mcg x 3)/0.5 mL Syrg       hydrocortisone 2.5 % cream Apply topically 2 (two) times daily to affected area (Patient not taking: Reported on 3/29/2022) 30 g 2    tamsulosin (FLOMAX) 0.4 mg Cap TAKE 1 CAPSULE(0.4 MG) BY MOUTH EVERY DAY (Patient not taking: Reported on 3/29/2022) 90 capsule 0     No current facility-administered medications on file prior to visit.       Vital signs:  Ht 5' 6" (1.676 m)   Wt 103.3 kg (227 lb 11.8 oz)   LMP 01/01/1978 (Approximate)   BMI 36.76 kg/m²     Physical Exam  Vitals reviewed. "   Constitutional:       General: She is not in acute distress.  HENT:      Head: Normocephalic and atraumatic.   Eyes:      General: No scleral icterus.     Conjunctiva/sclera: Conjunctivae normal.   Abdominal:      General: There is no distension.      Palpations: Abdomen is soft.      Tenderness: There is no abdominal tenderness.   Skin:     General: Skin is warm.      Coloration: Skin is not pale.      Findings: No rash.   Neurological:      Mental Status: She is oriented to person, place, and time.      Gait: Gait normal.   Psychiatric:         Mood and Affect: Mood normal.         Behavior: Behavior normal.         I have reviewed prior labs, imaging, notes from last month    Assessment:  1. Chronic idiopathic constipation    2. Alternating constipation and diarrhea    3. Cerebral infarction, unspecified     4. Gastroesophageal reflux disease, unspecified whether esophagitis present      Suspect constipation induced symptoms creating irregular bowel habits.    Plan:  Orders Placed This Encounter    TSH    CBC Auto Differential    Comprehensive Metabolic Panel    Lipid Panel     Basic labs.    Start metamucil daily to twice a day    Continue nexium for heartburn.    RTC Let us know if worsening / no change symptom in 2 -4 weeks      Jaquan Duron MD  [facility] [place]

## 2022-03-30 ENCOUNTER — CLINICAL SUPPORT (OUTPATIENT)
Dept: REHABILITATION | Facility: HOSPITAL | Age: 72
End: 2022-03-30
Payer: MEDICARE

## 2022-03-30 DIAGNOSIS — M25.531 PAIN IN RIGHT WRIST: Primary | ICD-10-CM

## 2022-03-30 DIAGNOSIS — M25.641 DECREASED RANGE OF MOTION OF FINGER OF RIGHT HAND: ICD-10-CM

## 2022-03-30 DIAGNOSIS — M25.631 DECREASED RANGE OF MOTION OF RIGHT WRIST: ICD-10-CM

## 2022-03-30 DIAGNOSIS — R68.89 ALTERATION IN SELF-CARE ABILITY: ICD-10-CM

## 2022-03-30 DIAGNOSIS — M79.89 SWELLING OF RIGHT HAND: ICD-10-CM

## 2022-03-30 PROCEDURE — 97140 MANUAL THERAPY 1/> REGIONS: CPT | Mod: PN

## 2022-03-30 PROCEDURE — 97110 THERAPEUTIC EXERCISES: CPT | Mod: PN

## 2022-03-30 NOTE — PROGRESS NOTES
"    Occupational Therapy Daily Treatment Note     Name: Tracy Armendariz  Clinic Number: [id number]    Therapy Diagnosis:   Encounter Diagnoses   Name Primary?    Pain in right wrist Yes    Decreased range of motion of right wrist     Swelling of right hand     Alteration in self-care ability     Decreased range of motion of finger of right hand      Physician: Carlos Baum MD    Visit Date: 3/30/2022    Medical Diagnosis: S62.101D (ICD-10-CM) - Closed fracture of right wrist with routine healing, subsequent encounter  Physician Orders: Eval and treat  Evaluation Date: 1/12/2022  Insurance Authorization Period Expiration: through 3/3/22  Plan of Care from 3/15/2022-5/13/2022  Date of Return to MD: 4/18/18  Date of Onset: 11/9/21    FOTO (DASH) at eval: 69.2 %  FOTO (DASH) RA 2/24/22: 53.3 (+15.9%)  FOTO (DASH) RA 3/15/22: 59%     Visit # / Visits authorized: 18/28    Time In: 100 pm  Time Out: 145 pm   Total Billable Time: 45 minutes    Precautions:  Standard and blood thinners, Osteopenia; falls; gait and balance problems d/t Vertigo; Precautions as per imaging and s/p Week 12+    Subjective     Pt reports: "Its ok but still painful over this area (points to ulnar aspect of wrist) also states followed up with MD for balance and vertigo issues"     Response to previous treatments: 3/30: Pt reports "tired" after t-putty exercises. Pt has had no increase in pain with Yellow t-putty at home, performing every day instead of every other day as instructed.  Functional change: overall improved objective measures from initial evalution    Pain:  2/10 at arrival.   Location: Right ulna wrist and both thumbs    Objective     Mental status: alert, interactive, oriented x3     Observation: Wavering during sit <> stand and while ambulating; no assistive device used  Forward shoulders. Thenar wasting on right hand. 2 LOB while standing today.     Edema: Circumferential measurements: In centimters     Right/Left Right Right " Right  Right Right Right Right Right/Left     IE-1/12/22 2/1/22 2/3/22 2/10/22 2/14/22 2/17 2/22/22 2/24/22 3/15/2022   IF P1  6.7/6.3 7.3 (+0.6) 7.2 (-.1) 6.8 (-0.4) 6.9 (+0.1) 7.0 7.0 6.8 (-0.2) 6.8/6.5   SF P1   5.8/5.6 6.1 (+0.3) 6.2 (+0.1) 5.5 (-0.7) 5.6 (+0.1) 5.9 6.0 5.6 (-0.4) 5.8/5.4   Thumb P1 7.4/7.5 7.4 (=) 7.0 (-0.4) 6.6 (-0.4) 6.8 (+0.2) 7.3 7.1 6.9 (-0.2) 6.8/6.5   DPC 19/17.5 19.7 (+0.7) 19 (-0.7) 18.9 (-0.1) 19.4 (+0.5) 19.0 19.2 18.8 (-0.4) 18.5/17.5   Wrist 18.3/17.0 19.5/17.9 18.8 (-0.8) 18.8 (=) 18.9 (+0.1) 18.2 18.2 18.3 (+0.1) 18/16.5                            Hand range of motion: Composite finger flexion/extension: Right within normal limits/ Left hand is within normal limits     Wrist AROM    Right/Left Right Right R Right/Left   DATE IE-1/12/22 2/1/22  Post-tx 2/3/22  Post-tx 2/22/22 3/15/2022   EXT 30/90 49 55 64 66/80   FLX 17/45 36 28 37 38/70   RD 0/12 11 23 11 18/25   UD  26/35 18 28 25 18/32      Forearm AROM    Right/Left Right Right Right R Right/Left   DATE IE-1/12/22 2/1/22*  Pre-tx 2/3/22 post tx 2/10/22 2/22/22 3/15/2022   Supination 68/85 46 55 55 (=) 60 (+5) 60/70   Pronation 90/90 85 90 88 (-2) nt 90/90   *different car, rapidly moving in quick end range position causes pain       Strength: (measured in psi.)     Right/Left Right Right/Left     2/10/2022 3/7/22 3/15/2022   Les Rung II 8/40 14 (+6)  15/45   Key Pinch 4.5/4.5 8 (+3.5)  7/9   3-pt Pinch 3/6 4.5 (+1.5)  6/7   2-pt Pinch 3/6.75 nt 4/5        Treatment     Tracy received the following supervised modalities after being cleared for contradictions for 10 minutes:   - Requested Paraffin wax heat pre-tx for promoting healing, decreasing pain and increasing tissue extensibility    Tracy received the following manual therapy techniques for 10 minutes:   - thenar and hypothenar stretch  - Soft tissue massage to volar surface of right hand for decreased stiffness  - AA/PROM of each wrist and forearm range of  motion 1 x 10 each,  - K tape applied for 1st CMC position correction and space correction at radial and ulna wrist for reducing pain and edema     Tracy performed therapeutic exercises for 25 minutes including:      Digit flexibility 30 seconds each:  - Intrinsic stretch, palm down on towel surface  - Composite flexion stretch   Intrinsic strengthening 2 min each:  -spreads and lifts with yellow spidyband, no thumb   -adduction and lifts with red sponges       Wrist/forearm AROM  2 min each:  - wrist wheel for supination/pronation with a 3 second hold at end range supination  - defer-wrist wheel for flexion and extension  - [id number]   Wrist & forearm Strengthening- 1 min each:  - Wrist 3 ways over wedge holding 1/2# weight  - Yellow flexbar, 1 min each for:  -sup/pronation swings, held at mid-shaft  -smiles  -frowns  -twists  -isometric supination  -isometric pronation   Elbow strengthening NOT PERFORMED THIS TREATMENT    1 min each, holding 1/2# weight:  - elbow flexion 3 ways  Palm up  Palm neutral  Palm down   Pinch strength Yellow resistance pin for pompom pick ups, 1/2 container each:  - lateral pinch; discontinued when pain presents   1st CMC OA protocol, left hand- - C's with thumb and index finger 1 x 10  - C's to O's 1 x 10 maintaining proper form & muscle activation                 Home Exercises and Education Provided     Education provided:   - HEP as modified t-putty strengthening program for decreased resistance and frequency     Education provided prior sessions:  - Education on wear schedule for compressive sleeve and digit sleeve; begin with 1 hour, and if no pain, continues to wear during the day intermittently, removing for HEP and hygiene; progress to night time wear as tolerated.  - HEP for TGEs and wrist AROM    Written Home Exercises Provided: Patient instructed to cont prior HEP.  Exercises were reviewed and Tracy was able to demonstrate them prior to the end of the session.   Tracy demonstrated good  understanding of the HEP provided.     See EMR under Patient Instructions for exercises provided prior visit. 2/1/22       Assessment     Pt doing well overall. Still progressing well given her fracture. Progressing as tolerated at this point. She does well but still has some pain. Focus in future session on gentle strengthening and continued ROM.       Tracy is progressing well towards her goals and there are no updates to goals at this time. Pt prognosis is Good.     Pt will continue to benefit from skilled outpatient occupational therapy to address the deficits listed in the problem list on initial evaluation provide pt/family education and to maximize pt's level of independence in the home and community environment.     Anticipated barriers to occupational therapy: arthritis, osteopenia     Pt's spiritual, cultural and educational needs considered and pt agreeable to plan of care and goals.     Goals:  Previous Short Term Goals Status:  STG's 4 weeks  1)   Patient to be IND with HEP and modalities for pain/edema managment. MET  2)   Pt to tolerate advancing PREs and flexibility activities for weight bearing positions with self-graded intensity and effective symptom monitoring. Met   3) Increase wrist and forearm LIRIANO by 60 degrees to increase functional hand use and support function positions for ADLs & leisure activities. MET   4) Digit and Thumb LIRIANO to be WNL as compared to the unaffected side for maximizing  and fine motor skills for reactivation the hand for light ADLs. met  5)  Decrease edema by 1.5 cm for evidencing soft tissue healing and effective edema mgmt. Met 2/10/22      New Short Term Goals Status=( LTG's 8 weeks):   1)   Increase right wrist and forearm LIRIANO to >75% of the right wrist to increase functional hand use for weight bearing and reaching tasks. Modified and Progressing on 2/1/22. MET   2)   Right  strength  to be >70% of the unaffected side.  Progressing  3)   Right pinch strength to be >60% of the unaffected side. MET for lateral and 3-jaw pinch on 3/7/22  4)   Decrease complaints of pain to 2 out of 10 at worst to increase functional hand use and UE support functions for moderate to heavy ADL/work/leisure activities. Progressing  5)   Patient to score at 45 % or less on Quick/DASH and/or Work module to demonstrate improved perception of functional right hand use to evidence return to near to prior level of function for I/ADLs and return to gardening. Progressing  Long Term Goal Status:   continue per initial plan of care.    Plan   3/25: Acknowledge any new MD/PA orders. Continue flexibility and PREs as tolerated. Continue OT 1-2x per week. OT placed a request for a PT order through her nurse coordinator, Sri Frank.  3/30: Progress as tolerated via MD protocol.     Pt to be treated by Occupational Therapy 2 times per week for 8 weeks during the certification period from 3/15/2022 to 5/13/22 to achieve the established goals.      Treatment to include: Paraffin, Fluidotherapy, Manual therapy/joint mobilizations, Modalities for pain management, Therapeutic exercises/activities., Strengthening, Orthotic Fabrication/Fit/Training, Edema Control, Joint Protection and Energy Conservation, as well as any other treatments deemed necessary based on the patient's needs or progress.     Freddie Mcrae OTR/L

## 2022-03-31 ENCOUNTER — TELEPHONE (OUTPATIENT)
Dept: AUDIOLOGY | Facility: CLINIC | Age: 72
End: 2022-03-31
Payer: MEDICARE

## 2022-03-31 NOTE — TELEPHONE ENCOUNTER
----- Message from Elizabeth Fernandez sent at 3/30/2022  9:00 AM CDT -----  Contact: pt  Pt requesting call back re: scheduling a audiology appt and V[facility].    Confirmed contact below:  Contact Name:Tracy Armendariz  Phone Number: [phone]

## 2022-04-01 ENCOUNTER — CLINICAL SUPPORT (OUTPATIENT)
Dept: REHABILITATION | Facility: HOSPITAL | Age: 72
End: 2022-04-01
Payer: MEDICARE

## 2022-04-01 DIAGNOSIS — M25.641 DECREASED RANGE OF MOTION OF FINGER OF RIGHT HAND: ICD-10-CM

## 2022-04-01 DIAGNOSIS — M25.531 PAIN IN RIGHT WRIST: Primary | ICD-10-CM

## 2022-04-01 DIAGNOSIS — M79.89 SWELLING OF RIGHT HAND: ICD-10-CM

## 2022-04-01 DIAGNOSIS — R68.89 ALTERATION IN SELF-CARE ABILITY: ICD-10-CM

## 2022-04-01 DIAGNOSIS — M25.631 DECREASED RANGE OF MOTION OF RIGHT WRIST: ICD-10-CM

## 2022-04-01 PROCEDURE — 97018 PARAFFIN BATH THERAPY: CPT | Mod: PN,59

## 2022-04-01 PROCEDURE — 97110 THERAPEUTIC EXERCISES: CPT | Mod: PN

## 2022-04-01 PROCEDURE — 97140 MANUAL THERAPY 1/> REGIONS: CPT | Mod: PN

## 2022-04-01 NOTE — PROGRESS NOTES
"    Occupational Therapy Daily Treatment Note     Name: Tracy Armendariz  Clinic Number: [id number]    Therapy Diagnosis:   Encounter Diagnoses   Name Primary?    Pain in right wrist Yes    Decreased range of motion of right wrist     Swelling of right hand     Alteration in self-care ability     Decreased range of motion of finger of right hand      Physician: Carlos Baum MD    Visit Date: 4/1/2022    Medical Diagnosis: S62.101D (ICD-10-CM) - Closed fracture of right wrist with routine healing, subsequent encounter  Physician Orders: Eval and treat  Evaluation Date: 1/12/2022  Insurance Authorization Period Expiration: through 3/3/22  Plan of Care from 3/15/2022-5/13/2022  Date of Return to MD: 4/18/18  Date of Onset: 11/9/21    FOTO (DASH) at eval: 69.2 %  FOTO (DASH) RA 2/24/22: 53.3 (+15.9%)  FOTO (DASH) RA 3/15/22: 59%     Visit # / Visits authorized: 19/28    Time In: 133 pm  Time Out: 215 pm   Total Billable Time: 42 minutes    Precautions:  Standard and blood thinners, Osteopenia; falls; gait and balance problems d/t Vertigo; Precautions as per imaging and s/p Week 12+    Subjective     Pt reports: "Its ok but still painful over this area (points to ulnar aspect of wrist and MPs) "     Response to previous treatments: Tracy reported soreness in R CMC and wrist   Functional change: overall improved objective measures from initial evalution    Pain:  2/10 at arrival.   Location: Right ulna wrist and both thumbs    Objective     Mental status: alert, interactive, oriented x3     Observation: Wavering during sit <> stand and while ambulating; no assistive device used  Forward shoulders. Thenar wasting on right hand. 2 LOB while standing today.     Edema: Circumferential measurements: In centimters     Right/Left Right Right Right  Right Right Right Right Right/Left     IE-1/12/22 2/1/22 2/3/22 2/10/22 2/14/22 2/17 2/22/22 2/24/22 3/15/2022   IF P1  6.7/6.3 7.3 (+0.6) 7.2 (-.1) 6.8 (-0.4) 6.9 (+0.1) 7.0 7.0 " 6.8 (-0.2) 6.8/6.5   SF P1   5.8/5.6 6.1 (+0.3) 6.2 (+0.1) 5.5 (-0.7) 5.6 (+0.1) 5.9 6.0 5.6 (-0.4) 5.8/5.4   Thumb P1 7.4/7.5 7.4 (=) 7.0 (-0.4) 6.6 (-0.4) 6.8 (+0.2) 7.3 7.1 6.9 (-0.2) 6.8/6.5   DPC 19/17.5 19.7 (+0.7) 19 (-0.7) 18.9 (-0.1) 19.4 (+0.5) 19.0 19.2 18.8 (-0.4) 18.5/17.5   Wrist 18.3/17.0 19.5/17.9 18.8 (-0.8) 18.8 (=) 18.9 (+0.1) 18.2 18.2 18.3 (+0.1) 18/16.5                            Hand range of motion: Composite finger flexion/extension: Right within normal limits/ Left hand is within normal limits     Wrist AROM    Right/Left Right Right R Right/Left   DATE IE-1/12/22 2/1/22  Post-tx 2/3/22  Post-tx 2/22/22 3/15/2022   EXT 30/90 49 55 64 66/80   FLX 17/45 36 28 37 38/70   RD 0/12 11 23 11 18/25   UD  26/35 18 28 25 18/32      Forearm AROM    Right/Left Right Right Right R Right/Left   DATE IE-1/12/22 2/1/22*  Pre-tx 2/3/22 post tx 2/10/22 2/22/22 3/15/2022   Supination 68/85 46 55 55 (=) 60 (+5) 60/70   Pronation 90/90 85 90 88 (-2) nt 90/90   *different car, rapidly moving in quick end range position causes pain       Strength: (measured in psi.)     Right/Left Right Right/Left     2/10/2022 3/7/22 3/15/2022   Les Rung II 8/40 14 (+6)  15/45   Key Pinch 4.5/4.5 8 (+3.5)  7/9   3-pt Pinch 3/6 4.5 (+1.5)  6/7   2-pt Pinch 3/6.75 nt 4/5        Treatment     Tracy received the following supervised modalities after being cleared for contradictions for 10 minutes:   - Requested Paraffin wax heat pre-tx for promoting healing, decreasing pain and increasing tissue extensibility    Tracy received the following manual therapy techniques for 10 minutes:   - thenar and hypothenar stretch  - Soft tissue massage to volar surface of right hand for decreased stiffness  - AA/PROM of each wrist and forearm range of motion 1 x 10 each,  - K tape applied for 1st CMC position correction and space correction at radial and ulna wrist for reducing pain and edema     Tracy performed therapeutic  exercises for 23 minutes including:  AROM  Wrist maze   x2 minutes    Digit flexibility Towel grabs   X5, 1 layer of towel   Intrinsic strengthening 2 min each:  -spreads and lifts with yellow spidyband, no thumb   -adduction and lifts with red sponges   Wrist & forearm Strengthening- 1 min each:  - Wrist 3 ways over wedge holding 1/2# weight  - Yellow flexbar, 1 min each for:  -sup/pronation swings, held at mid-shaft  -smiles  -frowns   1st CMC OA protocol, left hand- - C's with thumb and index finger 1 x 10  - C's to O's 1 x 10 maintaining proper form & muscle activation             Home Exercises and Education Provided     Education provided:   - HEP as modified t-putty strengthening program for decreased resistance and frequency     Education provided prior sessions:  - Education on wear schedule for compressive sleeve and digit sleeve; begin with 1 hour, and if no pain, continues to wear during the day intermittently, removing for HEP and hygiene; progress to night time wear as tolerated.  - HEP for TGEs and wrist AROM    Written Home Exercises Provided: Patient instructed to cont prior HEP.  Exercises were reviewed and Tracy was able to demonstrate them prior to the end of the session.  Tracy demonstrated good  understanding of the HEP provided.     See EMR under Patient Instructions for exercises provided prior visit. 2/1/22       Assessment     Tracy tolerated today's session well. Interventions focused on joint mobility, AROM and pain in R hand. Tracy reported increased pain in L LF with 2 layer towel grabs; graded to 1 layer resistance with pt reporting pain resolved. Tracy was able to complete wrist exercises using 1/2# wrist weight within comfort. Continued pain in CMC joint, KT applied to provide support.     Tracy is progressing well towards her goals and there are no updates to goals at this time. Pt prognosis is Good.     Pt will continue to benefit from skilled outpatient occupational  therapy to address the deficits listed in the problem list on initial evaluation provide pt/family education and to maximize pt's level of independence in the home and community environment.     Anticipated barriers to occupational therapy: arthritis, osteopenia     Pt's spiritual, cultural and educational needs considered and pt agreeable to plan of care and goals.     Goals:  Previous Short Term Goals Status:  STG's 4 weeks  1)   Patient to be IND with HEP and modalities for pain/edema managment. MET  2)   Pt to tolerate advancing PREs and flexibility activities for weight bearing positions with self-graded intensity and effective symptom monitoring. Met   3) Increase wrist and forearm LIRIANO by 60 degrees to increase functional hand use and support function positions for ADLs & leisure activities. MET   4) Digit and Thumb LIRIANO to be WNL as compared to the unaffected side for maximizing  and fine motor skills for reactivation the hand for light ADLs. met  5)  Decrease edema by 1.5 cm for evidencing soft tissue healing and effective edema mgmt. Met 2/10/22      New Short Term Goals Status=( LTG's 8 weeks):   1)   Increase right wrist and forearm LIRIANO to >75% of the right wrist to increase functional hand use for weight bearing and reaching tasks. Modified and Progressing on 2/1/22. MET   2)   Right  strength  to be >70% of the unaffected side. Progressing  3)   Right pinch strength to be >60% of the unaffected side. MET for lateral and 3-jaw pinch on 3/7/22  4)   Decrease complaints of pain to 2 out of 10 at worst to increase functional hand use and UE support functions for moderate to heavy ADL/work/leisure activities. Progressing  5)   Patient to score at 45 % or less on Quick/DASH and/or Work module to demonstrate improved perception of functional right hand use to evidence return to near to prior level of function for I/ADLs and return to gardening. Progressing  Long Term Goal Status:   continue per initial  plan of care.    Plan   3/25: Acknowledge any new MD/PA orders. Continue flexibility and PREs as tolerated. Continue OT 1-2x per week. OT placed a request for a PT order through her nurse coordinator, Sri Frank.  3/30: Progress as tolerated via MD protocol.     Pt to be treated by Occupational Therapy 2 times per week for 8 weeks during the certification period from 3/15/2022 to 5/13/22 to achieve the established goals.      Treatment to include: Paraffin, Fluidotherapy, Manual therapy/joint mobilizations, Modalities for pain management, Therapeutic exercises/activities., Strengthening, Orthotic Fabrication/Fit/Training, Edema Control, Joint Protection and Energy Conservation, as well as any other treatments deemed necessary based on the patient's needs or progress.     Corinne Rapier  OTR/L

## 2022-04-04 NOTE — PROGRESS NOTES
"    Occupational Therapy Daily Treatment Note     Name: Tracy Armendariz  Clinic Number: [id number]    Therapy Diagnosis:   Encounter Diagnoses   Name Primary?    Pain in right wrist Yes    Decreased range of motion of right wrist     Swelling of right hand     Alteration in self-care ability     Decreased range of motion of finger of right hand      Physician: Carlos Baum MD    Visit Date: 4/5/2022    Medical Diagnosis: S62.101D (ICD-10-CM) - Closed fracture of right wrist with routine healing, subsequent encounter  Physician Orders: Eval and treat  Evaluation Date: 1/12/2022  Insurance Authorization Period Expiration: through 4/4/22  Plan of Care from 3/15/2022-5/13/2022  Date of Return to MD: 4/18/18  Date of Onset: 11/9/21    Visit # / Visits authorized: 20/28  FOTO (DASH) at eval: 69.2 %  FOTO (DASH) RA 2/24/22: 53.3 (+15.9%)  FOTO (DASH) RA 3/15/22: 59%   FOTO (DASH) RA 3/15/22: 39.2% (+14.1%)    Time In: 12:10 pm  Time Out: 1:40 pm   Total Billable Time: 55 minutes    Precautions:  Standard and blood thinners, Osteopenia; falls; gait and balance problems d/t Vertigo; Precautions as per imaging and s/p Week 12+  1/28/22 X-ray: FINDINGS: Osteopenia.  Reconfirmed findings of subacute healing comminuted distal radial fracture with no detrimental changes in the appearance of the fracture site or fracture fragments continued mild dorsal angulation of the distal radius similar to prior exam.  Reconfirmed tiny diastasis ulnar styloid tip fracture.  No new fractures.  Subjective     Pt reports: 4/5: Pt arrives with "2"/10 pain in ulna and radial wrist with RD/UD ranges. She reports "both thumbs hurt".   OT notes patient having increased difficulty staying on tasks. She required v/c throughout the session.     Response to previous treatments: 4/5: Tracy reports positive symptoms relief with elastic tape applied and in place when she arrived today  Functional change: 4/5: See FOTO. Pt reports noticing improving " "strength for "lifting the grocery bags". Pt reports that her "balance test" is not scheduled until the first week of May and then she will be following up with the ordering MD to learn which therapy is appropriate.     Pain:  2/10 at arrival.   Location: Right ulna wrist and both thumbs    Objective     Mental status: alert, interactive, oriented x3     Observation: Wavering during sit <> stand and while ambulating; no assistive device used  Forward shoulders. Thenar wasting on right hand. 2 LOB while standing today.     Edema: Circumferential measurements: In centimters     Right/Left Right Right Right  Right Right Right Right Right/Left     IE-1/12/22 2/1/22 2/3/22 2/10/22 2/14/22 2/17 2/22/22 2/24/22 3/15/2022   IF P1  6.7/6.3 7.3 (+0.6) 7.2 (-.1) 6.8 (-0.4) 6.9 (+0.1) 7.0 7.0 6.8 (-0.2) 6.8/6.5   SF P1   5.8/5.6 6.1 (+0.3) 6.2 (+0.1) 5.5 (-0.7) 5.6 (+0.1) 5.9 6.0 5.6 (-0.4) 5.8/5.4   Thumb P1 7.4/7.5 7.4 (=) 7.0 (-0.4) 6.6 (-0.4) 6.8 (+0.2) 7.3 7.1 6.9 (-0.2) 6.8/6.5   DPC 19/17.5 19.7 (+0.7) 19 (-0.7) 18.9 (-0.1) 19.4 (+0.5) 19.0 19.2 18.8 (-0.4) 18.5/17.5   Wrist 18.3/17.0 19.5/17.9 18.8 (-0.8) 18.8 (=) 18.9 (+0.1) 18.2 18.2 18.3 (+0.1) 18/16.5                            Hand range of motion: Composite finger flexion/extension: Right within normal limits/ Left hand is within normal limits     Wrist AROM    Right/Left Right Right R Right/Left R   DATE IE-1/12/22 2/1/22  Post-tx 2/3/22  Post-tx 2/22/22 3/15/2022 4/5/22   EXT 30/90 49 55 64 66/80 64   FLX 17/45 36 28 37 38/70 37   RD 0/12 11 23 11 18/25 12   UD  26/35 18 28 25 18/32 22   Wrist LIRIANO 73/182 114 (+41)  137 (+23)  135 (-2)      Forearm AROM    Right/Left Right Right Right R Right/Left   DATE IE-1/12/22 2/1/22*  Pre-tx 2/3/22 post tx 2/10/22 2/22/22 3/15/2022   Supination 68/85 46 55 55 (=) 60 (+5) 60/70   Pronation 90/90 85 90 88 (-2) nt 90/90   *different car, rapidly moving in quick end range position causes pain       Strength: (measured in " "psi.)     Right/Left Right Right/Left Right     2/10/2022 3/7/22 3/15/2022 4/5/22   Les Rung II 8/40 14 (+6)  15/45 14*   Key Pinch 4.5/4.5 8 (+3.5)  7/9 7.5   3-pt Pinch 3/6 4.5 (+1.5)  6/7 6.5   2-pt Pinch 3/6.75 nt 4/5 5.0 (+1.0)   * ltd by ulna wrist pain     Treatment     Tracy received the following supervised modalities after being cleared for contradictions for 10 minutes:   - Requested Paraffin wax heat pre-tx for promoting healing, decreasing pain and increasing tissue extensibility    Tracy received the following manual therapy techniques for 8 minutes:   - defer-thenar and hypothenar stretch  - Soft tissue massage to dorsal surface of right wrist and forearm during extrinsic extensor lengthening  - defer-AA/PROM of each wrist and forearm range of motion 1 x 10 each,  - K tape applied for each digit (dorsally), 1st CMC position correction and space correction at radial and ulna wrist for reducing pain and edema     Tracy performed therapeutic exercises for 30 minutes including:  Wrist and digit AROM and extrinsic extensor flexibility 1 min each:  -Sustained wrist flexion over wedge, fingers relaxed   - Sustained wrist flexion over wedge, holding 2 cm dowel  - Wrist 3 ways over wedge, holding 2cm dowel    2 min each:  - Wrist maze   - Jux-a-ciser  - Wrist wheel for s/p   Digit flexibility - Intrinsic stretch, hook position, palm down on table top; 30"x3  - TGEs, 3x10 alternating intrinsic stretch   Intrinsic strengthening 2 min each:  -spreads and lifts with yellow spidyband, no thumb   -adduction and lifts with red sponges   Hand strengthening - Yellow digiflex, no thumb, 1 min  - Green t-putty Strengthening, 5-10 reps each:  - 10 sec sustained   - 3-jaw and lateral pinches  - PADs/DABs w/adduction log squeeze/pumps  - PADs/DABs w/doughnut stretches (spreads), no thumb  - extension rolls w/weight bearing progression     Wrist & forearm Strengthening- Defer-1 min each:  - Wrist 3 ways over " wedge holding 1/2# weight  - Yellow flexbar, 1 min each for:  -sup/pronation swings, held at mid-shaft  -smiles  -frowns   1st CMC OA protocol, left hand- - C's with thumb and index finger 1 x 10  - C's to O's 1 x 10 maintaining proper form & muscle activation      Objective Measures 4/5/22: R/A  and pinch  4/5/22: R/A wrist AROM   HEP as upgraded 4/5/22: Green t-putty      Home Exercises and Education Provided     Education provided:   - HEP as upgraded  - Benefits of Soft thumb supports reviewed    Education provided prior sessions:  - HEP as modified t-putty strengthening program for decreased resistance and frequency  - Education on wear schedule for compressive sleeve and digit sleeve; begin with 1 hour, and if no pain, continues to wear during the day intermittently, removing for HEP and hygiene; progress to night time wear as tolerated.  - HEP for TGEs and wrist AROM    Written Home Exercises Provided: Patient instructed to cont prior HEP.  Exercises were reviewed and Tracy was able to demonstrate them prior to the end of the session.  Tracy demonstrated good  understanding of the HEP provided.     See EMR under Patient Instructions for exercises provided prior visit. 2/1/22       Assessment   4/5: Wrist AROM is reaching a plateau with pain limiting end ranges. Pinch strength is improving and pt denied pain with max efforts today yet arrived w/c/o thumb pain. Pain limits  tolerance. Tracy tolerated today's session well. Tracy may benefit most from wrist isometric strengthening. Cognitive skills challenges noticed today with her difficulty attending. She is noted to slow her mobility, yet wavering gait and stance observed.      Tracy is progressing well towards her goals and there are no updates to goals at this time. Pt prognosis is Good.     Pt will continue to benefit from skilled outpatient occupational therapy to address the deficits listed in the problem list on initial evaluation  provide pt/family education and to maximize pt's level of independence in the home and community environment.     Anticipated barriers to occupational therapy: arthritis, osteopenia     Pt's spiritual, cultural and educational needs considered and pt agreeable to plan of care and goals.     Goals:  Previous Short Term Goals Status:  STG's 4 weeks  1)   Patient to be IND with HEP and modalities for pain/edema managment. MET  2)   Pt to tolerate advancing PREs and flexibility activities for weight bearing positions with self-graded intensity and effective symptom monitoring. Met   3) Increase wrist and forearm LIRIANO by 60 degrees to increase functional hand use and support function positions for ADLs & leisure activities. MET   4) Digit and Thumb LIRIANO to be WNL as compared to the unaffected side for maximizing  and fine motor skills for reactivation the hand for light ADLs. met  5)  Decrease edema by 1.5 cm for evidencing soft tissue healing and effective edema mgmt. Met 2/10/22      New Short Term Goals Status=( LTG's 8 weeks):   1)   Increase right wrist and forearm LIRIANO to >75% of the right wrist to increase functional hand use for weight bearing and reaching tasks. Modified and Progressing on 2/1/22. MET   2)   Right  strength  to be >70% of the unaffected side. Progressing  3)   Right pinch strength to be >60% of the unaffected side. MET for lateral and 3-jaw pinch on 3/7/22  4)   Decrease complaints of pain to 2 out of 10 at worst to increase functional hand use and UE support functions for moderate to heavy ADL/work/leisure activities. MET 4/5/22  5)   Patient to score at 45 % or less on Quick/DASH and/or Work module to demonstrate improved perception of functional right hand use to evidence return to near to prior level of function for I/ADLs and return to gardening. MET 4/5/22  Long Term Goal Status:   continue per initial plan of care.    Plan   4/5: Acknowledge any new MD/PA orders. Continue flexibility  and PREs as tolerated. Continue OT 1-2x per week.    Pt to be treated by Occupational Therapy 2 times per week for 8 weeks during the certification period from 3/15/2022 to 5/13/22 to achieve the established goals.      Treatment to include: Paraffin, Fluidotherapy, Manual therapy/joint mobilizations, Modalities for pain management, Therapeutic exercises/activities., Strengthening, Orthotic Fabrication/Fit/Training, Edema Control, Joint Protection and Energy Conservation, as well as any other treatments deemed necessary based on the patient's needs or progress.     HARIS Chi, T  Occupational Therapist, Certified Hand Therapist

## 2022-04-05 ENCOUNTER — CLINICAL SUPPORT (OUTPATIENT)
Dept: REHABILITATION | Facility: HOSPITAL | Age: 72
End: 2022-04-05
Payer: MEDICARE

## 2022-04-05 DIAGNOSIS — M25.641 DECREASED RANGE OF MOTION OF FINGER OF RIGHT HAND: ICD-10-CM

## 2022-04-05 DIAGNOSIS — M79.89 SWELLING OF RIGHT HAND: ICD-10-CM

## 2022-04-05 DIAGNOSIS — M25.531 PAIN IN RIGHT WRIST: Primary | ICD-10-CM

## 2022-04-05 DIAGNOSIS — R68.89 ALTERATION IN SELF-CARE ABILITY: ICD-10-CM

## 2022-04-05 DIAGNOSIS — M25.631 DECREASED RANGE OF MOTION OF RIGHT WRIST: ICD-10-CM

## 2022-04-05 PROCEDURE — 97110 THERAPEUTIC EXERCISES: CPT | Mod: PN

## 2022-04-05 PROCEDURE — 97140 MANUAL THERAPY 1/> REGIONS: CPT | Mod: PN

## 2022-04-05 PROCEDURE — 97018 PARAFFIN BATH THERAPY: CPT | Mod: PN

## 2022-04-08 ENCOUNTER — CLINICAL SUPPORT (OUTPATIENT)
Dept: REHABILITATION | Facility: HOSPITAL | Age: 72
End: 2022-04-08
Payer: MEDICARE

## 2022-04-08 DIAGNOSIS — M25.531 PAIN IN RIGHT WRIST: Primary | ICD-10-CM

## 2022-04-08 DIAGNOSIS — R68.89 ALTERATION IN SELF-CARE ABILITY: ICD-10-CM

## 2022-04-08 DIAGNOSIS — M25.631 DECREASED RANGE OF MOTION OF RIGHT WRIST: ICD-10-CM

## 2022-04-08 DIAGNOSIS — M25.641 DECREASED RANGE OF MOTION OF FINGER OF RIGHT HAND: ICD-10-CM

## 2022-04-08 DIAGNOSIS — M79.89 SWELLING OF RIGHT HAND: ICD-10-CM

## 2022-04-08 PROCEDURE — 97140 MANUAL THERAPY 1/> REGIONS: CPT | Mod: PN

## 2022-04-08 PROCEDURE — 97018 PARAFFIN BATH THERAPY: CPT | Mod: PN

## 2022-04-08 PROCEDURE — 97110 THERAPEUTIC EXERCISES: CPT | Mod: PN

## 2022-04-08 NOTE — PROGRESS NOTES
"    Occupational Therapy Daily Treatment Note     Name: Tracy Armendariz  Clinic Number: [id number]    Therapy Diagnosis:   Encounter Diagnoses   Name Primary?    Pain in right wrist Yes    Decreased range of motion of right wrist     Swelling of right hand     Alteration in self-care ability     Decreased range of motion of finger of right hand      Physician: Carlos Baum MD    Visit Date: 4/8/2022    Medical Diagnosis: S62.101D (ICD-10-CM) - Closed fracture of right wrist with routine healing, subsequent encounter  Physician Orders: Eval and treat  Evaluation Date: 1/12/2022  Insurance Authorization Period Expiration: through 4/4/22  Plan of Care from 3/15/2022-5/13/2022  Date of Return to MD: 4/18/18  Date of Onset: 11/9/21    Visit # / Visits authorized: 21/28  FOTO (DASH) at eval: 69.2 %  FOTO (DASH) RA 2/24/22: 53.3 (+15.9%)  FOTO (DASH) RA 3/15/22: 59%   FOTO (DASH) RA 4/5/22: 39.2% (+14.1%)    Time In: 11:05 am  Time Out: 12:15 m   Total Billable Time: 45 minutes    Precautions:  Standard and blood thinners, Osteopenia; falls; gait and balance problems d/t Vertigo; Precautions as per imaging and s/p Week 12+  1/28/22 X-ray: FINDINGS: Osteopenia.  Reconfirmed findings of subacute healing comminuted distal radial fracture with no detrimental changes in the appearance of the fracture site or fracture fragments continued mild dorsal angulation of the distal radius similar to prior exam.  Reconfirmed tiny diastasis ulnar styloid tip fracture.  No new fractures.  Subjective     Pt reports: 4/8: Pt denies pain today. Pt states "I can open a can of pork-n-beans with a little opener; it was hard work but It did it". "The tape helped a lot on the fingers"  4/5: Pt arrives with "2"/10 pain in ulna and radial wrist with RD/UD ranges. She reports "both thumbs hurt".   OT notes patient having increased difficulty staying on tasks. She required v/c throughout the session.     Response to previous treatments: 4/5: " "Tracy reports positive symptoms relief with elastic tape applied and in place when she arrived today  Functional change: 4/5: See FOTO. Pt reports noticing improving strength for "lifting the grocery bags". Pt reports that her "balance test" is not scheduled until the first week of May and then she will be following up with the ordering MD to learn which therapy is appropriate.     Pain:  2/10 at arrival.   Location: Right ulna wrist and both thumbs    Objective     Mental status: alert, interactive, oriented x3     Observation: Wavering during sit <> stand and while ambulating; no assistive device used  Forward shoulders. Thenar wasting on right hand. 2 LOB while standing today.     Edema: Circumferential measurements: In centimters     Right/Left Right Right Right  Right Right Right Right Right/Left     IE-1/12/22 2/1/22 2/3/22 2/10/22 2/14/22 2/17 2/22/22 2/24/22 3/15/2022   IF P1  6.7/6.3 7.3 (+0.6) 7.2 (-.1) 6.8 (-0.4) 6.9 (+0.1) 7.0 7.0 6.8 (-0.2) 6.8/6.5   SF P1   5.8/5.6 6.1 (+0.3) 6.2 (+0.1) 5.5 (-0.7) 5.6 (+0.1) 5.9 6.0 5.6 (-0.4) 5.8/5.4   Thumb P1 7.4/7.5 7.4 (=) 7.0 (-0.4) 6.6 (-0.4) 6.8 (+0.2) 7.3 7.1 6.9 (-0.2) 6.8/6.5   DPC 19/17.5 19.7 (+0.7) 19 (-0.7) 18.9 (-0.1) 19.4 (+0.5) 19.0 19.2 18.8 (-0.4) 18.5/17.5   Wrist 18.3/17.0 19.5/17.9 18.8 (-0.8) 18.8 (=) 18.9 (+0.1) 18.2 18.2 18.3 (+0.1) 18/16.5                            Hand range of motion: Composite finger flexion/extension: Right within normal limits/ Left hand is within normal limits     Wrist AROM    Right/Left Right Right R Right/Left R   DATE IE-1/12/22 2/1/22  Post-tx 2/3/22  Post-tx 2/22/22 3/15/2022 4/8/22   EXT 30/90 49 55 64 66/80 65   FLX 17/45 36 28 37 38/70 40   RD 0/12 11 23 11 18/25 12   UD  26/35 18 28 25 18/32 22   Wrist LIRIANO 73/182 114 (+41)  137 (+23)  139 (+2)      Forearm AROM    Right/Left Right Right Right R Right/Left   DATE IE-1/12/22 2/1/22*  Pre-tx 2/3/22 post tx 2/10/22 2/22/22 3/15/2022   Supination 68/85 46 55 55 " "(=) 60 (+5) 60/70   Pronation 90/90 85 90 88 (-2) nt 90/90   *different car, rapidly moving in quick end range position causes pain       Strength: (measured in psi.)     Right/Left Right Right/Left Right     2/10/2022 3/7/22 3/15/2022 4/5/22   Les Rung II 8/40 14 (+6)  15/45 14*   Key Pinch 4.5/4.5 8 (+3.5)  7/9 7.5   3-pt Pinch 3/6 4.5 (+1.5)  6/7 6.5   2-pt Pinch 3/6.75 nt 4/5 5.0 (+1.0)   * ltd by ulna wrist pain     Treatment     Tracy received the following supervised modalities after being cleared for contradictions for 10 minutes:   - Requested Paraffin wax heat pre-tx for promoting healing, decreasing pain and increasing tissue extensibility    Tracy received the following manual therapy techniques for 8 minutes:   - defer-thenar and hypothenar stretch  - Soft tissue massage to dorsal surface of right wrist and forearm during extrinsic extensor lengthening  - defer-AA/PROM of each wrist and forearm range of motion 1 x 10 each,  - K tape applied for each digit (dorsally), 1st CMC position correction and space correction at radial and ulna wrist for reducing pain and edema     Tracy performed therapeutic exercises for 30 minutes including:  Wrist and digit AROM and extrinsic extensor flexibility 1 min each:  -Sustained wrist flexion over wedge, fingers relaxed   - Sustained wrist flexion over wedge, holding 2 cm dowel    2 min each:  - Wrist wheel for e/f  - Jux-a-ciser  - Wrist wheel for s/p   Digit flexibility-defer - Intrinsic stretch, hook position, palm down on table top; 30"x3  - TGEs, 3x10 alternating intrinsic stretch   Intrinsic strengthening-defer 2 min each:  -spreads and lifts with yellow spidyband, no thumb   -adduction and lifts with red sponges   Hand strengthening - Light ; 1x10 each  - Yellow digiflex, no thumb, 1 min   Wrist & forearm Strengthening- 1 min each:  - Wrist 4 ways over wedge holding 1/2# weight  - Yellow flexbar, 1 min each for:  -sup/pronation swings, held " at mid-shaft  -smiles  -frowns  -twists   1st CMC OA protocol, left hand- - C's with thumb and index finger 1 x 10  - C's to O's 1 x 10 maintaining proper form & muscle activation      Objective Measures 4/5/22: R/A  and pinch  4/8/22: R/A wrist AROM   HEP as upgraded 4/5/22: Green t-putty      Home Exercises and Education Provided     Education provided:   - HEP as upgraded  - Benefits of Soft thumb supports reviewed    Education provided prior sessions:  - HEP as modified t-putty strengthening program for decreased resistance and frequency  - Education on wear schedule for compressive sleeve and digit sleeve; begin with 1 hour, and if no pain, continues to wear during the day intermittently, removing for HEP and hygiene; progress to night time wear as tolerated.  - HEP for TGEs and wrist AROM    Written Home Exercises Provided: Patient instructed to cont prior HEP.  Exercises were reviewed and Tracy was able to demonstrate them prior to the end of the session.  Tracy demonstrated good  understanding of the HEP provided.     See EMR under Patient Instructions for exercises provided prior visit. 2/1/22       Assessment   4/8: Pt has been self-monitoring symptoms and has positive symptom relief with elastic tape strategies. Ulna wrist pain continues to be elicited by  resistance. Pt requires v/c to stay on tasks and initiate exercises at times.    Tracy is progressing well towards her goals and there are no updates to goals at this time. Pt prognosis is Good.     Pt will continue to benefit from skilled outpatient occupational therapy to address the deficits listed in the problem list on initial evaluation provide pt/family education and to maximize pt's level of independence in the home and community environment.     Anticipated barriers to occupational therapy: arthritis, osteopenia     Pt's spiritual, cultural and educational needs considered and pt agreeable to plan of care and goals.      Goals:  Previous Short Term Goals Status:  STG's 4 weeks  1)   Patient to be IND with HEP and modalities for pain/edema managment. MET  2)   Pt to tolerate advancing PREs and flexibility activities for weight bearing positions with self-graded intensity and effective symptom monitoring. Met   3) Increase wrist and forearm LIRIANO by 60 degrees to increase functional hand use and support function positions for ADLs & leisure activities. MET   4) Digit and Thumb LIRIANO to be WNL as compared to the unaffected side for maximizing  and fine motor skills for reactivation the hand for light ADLs. met  5)  Decrease edema by 1.5 cm for evidencing soft tissue healing and effective edema mgmt. Met 2/10/22      New Short Term Goals Status=( LTG's 8 weeks):   1)   Increase right wrist and forearm LIRIANO to >75% of the right wrist to increase functional hand use for weight bearing and reaching tasks. Modified and Progressing on 2/1/22. MET   2)   Right  strength  to be >70% of the unaffected side. Progressing  3)   Right pinch strength to be >60% of the unaffected side. MET for lateral and 3-jaw pinch on 3/7/22  4)   Decrease complaints of pain to 2 out of 10 at worst to increase functional hand use and UE support functions for moderate to heavy ADL/work/leisure activities. MET 4/5/22  5)   Patient to score at 45 % or less on Quick/DASH and/or Work module to demonstrate improved perception of functional right hand use to evidence return to near to prior level of function for I/ADLs and return to gardening. MET 4/5/22  Long Term Goal Status:   continue per initial plan of care.    Plan   4/8: Acknowledge any new MD/PA orders. Continue flexibility and PREs as tolerated. Continue OT 1-2x per week.    Pt to be treated by Occupational Therapy 2 times per week for 8 weeks during the certification period from 3/15/2022 to 5/13/22 to achieve the established goals.      Treatment to include: Paraffin, Fluidotherapy, Manual therapy/joint  mobilizations, Modalities for pain management, Therapeutic exercises/activities., Strengthening, Orthotic Fabrication/Fit/Training, Edema Control, Joint Protection and Energy Conservation, as well as any other treatments deemed necessary based on the patient's needs or progress.     HARIS Chi, T  Occupational Therapist, Certified Hand Therapist

## 2022-04-11 NOTE — PROGRESS NOTES
Occupational Therapy Daily Treatment Note     Name: Tracy Armendariz  Clinic Number: [id number]    Therapy Diagnosis:   Encounter Diagnoses   Name Primary?    Pain in right wrist Yes    Decreased range of motion of right wrist     Swelling of right hand     Alteration in self-care ability     Decreased range of motion of finger of right hand      Physician: Carlos Baum MD    Visit Date: 4/12/2022    Medical Diagnosis: S62.101D (ICD-10-CM) - Closed fracture of right wrist with routine healing, subsequent encounter  Physician Orders: Eval and treat  Evaluation Date: 1/12/2022  Insurance Authorization Period Expiration: through 4/4/22  Plan of Care from 3/15/2022-5/13/2022  Date of Return to MD: 4/18/18  Date of Onset: 11/9/21    Visit # / Visits authorized: 22/28  FOTO (DASH) at eval: 69.2 %  FOTO (DASH) RA 2/24/22: 53.3 (+15.9%)  FOTO (DASH) RA 3/15/22: 59%   FOTO (DASH) RA 4/5/22: 39.2% (+14.1%)    Time In: 1303  Time Out: 1345   Total Billable Time: 40 minutes    Precautions:  Standard and blood thinners, Osteopenia; falls; gait and balance problems d/t Vertigo; Precautions as per imaging and s/p Week 12+  1/28/22 X-ray: FINDINGS: Osteopenia.  Reconfirmed findings of subacute healing comminuted distal radial fracture with no detrimental changes in the appearance of the fracture site or fracture fragments continued mild dorsal angulation of the distal radius similar to prior exam.  Reconfirmed tiny diastasis ulnar styloid tip fracture.  No new fractures.  Subjective     Pt reports: She has been able to use her R hand better, feels it is still getting stronger. R thumb still somewhat stiff.     Response to previous treatments: 4/5: Tracy reports positive symptoms relief with elastic tape applied and in place when she arrived today  Functional change: None reported today  Pain:   0/10 at arrival.   6/10 at worst L thumb  4/10 at worst R thumb and R Wrist  Location: Right ulna wrist and both  thumbs    Objective      Edema: Circumferential measurements: In centimters     Right/Left Right Right Right  Right Right Right Right Right/Left     IE-1/12/22 2/1/22 2/3/22 2/10/22 2/14/22 2/17 2/22/22 2/24/22 3/15/2022   IF P1  6.7/6.3 7.3 (+0.6) 7.2 (-.1) 6.8 (-0.4) 6.9 (+0.1) 7.0 7.0 6.8 (-0.2) 6.8/6.5   SF P1   5.8/5.6 6.1 (+0.3) 6.2 (+0.1) 5.5 (-0.7) 5.6 (+0.1) 5.9 6.0 5.6 (-0.4) 5.8/5.4   Thumb P1 7.4/7.5 7.4 (=) 7.0 (-0.4) 6.6 (-0.4) 6.8 (+0.2) 7.3 7.1 6.9 (-0.2) 6.8/6.5   DPC 19/17.5 19.7 (+0.7) 19 (-0.7) 18.9 (-0.1) 19.4 (+0.5) 19.0 19.2 18.8 (-0.4) 18.5/17.5   Wrist 18.3/17.0 19.5/17.9 18.8 (-0.8) 18.8 (=) 18.9 (+0.1) 18.2 18.2 18.3 (+0.1) 18/16.5                            Hand range of motion: Composite finger flexion/extension: Right within normal limits/ Left hand is within normal limits     Wrist AROM    Right/Left Right Right R Right/Left R R   DATE IE-1/12/22 2/1/22  Post-tx 2/3/22  Post-tx 2/22/22 3/15/2022 4/8/22 4/12/22   EXT 30/90 49 55 64 66/80 65 70   FLX 17/45 36 28 37 38/70 40 50   RD 0/12 11 23 11 18/25 12 NT   UD  26/35 18 28 25 18/32 22 NT   Wrist LIRIANO 73/182 114 (+41)  137 (+23)  139 (+2) NT      Thumb AROM: opposition to DPC: 4/12/2022  R = -2.5 cm  L = -0.8 cm    Forearm AROM    Right/Left Right Right Right R Right/Left   DATE IE-1/12/22 2/1/22*  Pre-tx 2/3/22 post tx 2/10/22 2/22/22 3/15/2022   Supination 68/85 46 55 55 (=) 60 (+5) 60/70   Pronation 90/90 85 90 88 (-2) nt 90/90   *different car, rapidly moving in quick end range position causes pain       Strength: (measured in psi.)     Right/Left Right Right/Left Right     2/10/2022 3/7/22 3/15/2022 4/5/22   Les Rung II 8/40 14 (+6)  15/45 14*   Key Pinch 4.5/4.5 8 (+3.5)  7/9 7.5   3-pt Pinch 3/6 4.5 (+1.5)  6/7 6.5   2-pt Pinch 3/6.75 nt 4/5 5.0 (+1.0)   * ltd by ulna wrist pain     Treatment     Tracy received the following supervised modalities after being cleared for contradictions for 8 minutes:   - Requested   "Paraffin wax heat  With thumb held in compsite flexion for gentle stretch pre other tx for promoting healing, decreasing pain and increasing tissue extensibility    Tracy received the following manual therapy techniques for 2 minutes:   - defer-thenar and hypothenar stretch  - defer Soft tissue massage to dorsa and volarl surface of right wrist and forearm during extrinsic extensor lengthening  - defer-AA/PROM of each wrist and forearm range of motion 1 x 10 each,  - K tape applied for each digit (dorsally), 1st CMC position correction and space correction at radial and ulna wrist for reducing pain and edema     Tracy performed therapeutic exercises for 30 minutes including:  Wrist and digit AROM and defer extrinsic extensor flexibility 1 min each:  -Sustained wrist flexion over wedge, fingers relaxed   - Sustained wrist flexion over wedge, holding 2 cm dowel     4/12/2022 (Wrist flex and ext weighted stretch 10x10 sec usiing 1# DB with relaxed )    2 min each:  - Wrist wheel for e/f  - Jux-a-ciser defer  - Wrist wheel for s/p   Digit flexibility-defer - Intrinsic stretch, hook position, palm down on table top; 30"x3  - TGEs, 3x10 alternating intrinsic stretch   Intrinsic strengthening-defer 2 min each:  -spreads and lifts with yellow spidyband, no thumb   -adduction and lifts with red sponges   Hand strengthening - Grasp wax ball post removal x 25   - Light ; 1x10 each  - Yellow digiflex, no thumb, 1 min   Wrist & forearm Strengthening- - Yellow flexbar, 1 min each for:  -sup/pronation swings, held at mid-shaft  -smiles  -frowns  -twists   1st CMC OA protocol, left hand- - Isolated IP/MP and then composite thumb flexion x20 each  - C's with thumb and index finger 1 x 10  - C's to O's 1 x 10 maintaining proper form & muscle activation      Objective Measures 4/5/22: R/A  and pinch  4/8/22: R/A wrist AROM   HEP as upgraded 4/5/22: Green t-putty      Home Exercises and Education Provided     Education " provided:   - HEP as upgraded    Education provided prior sessions:  - HEP as modified t-putty strengthening program for decreased resistance and frequency  - Education on wear schedule for compressive sleeve and digit sleeve; begin with 1 hour, and if no pain, continues to wear during the day intermittently, removing for HEP and hygiene; progress to night time wear as tolerated.  - HEP for TGEs and wrist AROM    Written Home Exercises Provided: Patient instructed to cont prior HEP.  Exercises were reviewed and Tracy was able to demonstrate them prior to the end of the session.  Tracy demonstrated good  understanding of the HEP provided.     See EMR under Patient Instructions for exercises provided prior visit. 2/1/22       Assessment   Pt reports taping helped with pain. Pt with very good response to weighted 1# DB wrist stretch with increased AROM wrist flexion and ext this date. Will cont to progress with R thumb ROM. Cont pr current POC.    Tracy is progressing well towards her goals and there are no updates to goals at this time. Pt prognosis is Good.     Pt will continue to benefit from skilled outpatient occupational therapy to address the deficits listed in the problem list on initial evaluation provide pt/family education and to maximize pt's level of independence in the home and community environment.     Anticipated barriers to occupational therapy: arthritis, osteopenia     Pt's spiritual, cultural and educational needs considered and pt agreeable to plan of care and goals.     Goals:  Previous Short Term Goals Status:  STG's 4 weeks  1)   Patient to be IND with HEP and modalities for pain/edema managment. MET  2)   Pt to tolerate advancing PREs and flexibility activities for weight bearing positions with self-graded intensity and effective symptom monitoring. Met   3) Increase wrist and forearm LIRIANO by 60 degrees to increase functional hand use and support function positions for ADLs & leisure  activities. MET   4) Digit and Thumb LIRIANO to be WNL as compared to the unaffected side for maximizing  and fine motor skills for reactivation the hand for light ADLs. met  5)  Decrease edema by 1.5 cm for evidencing soft tissue healing and effective edema mgmt. Met 2/10/22      New Short Term Goals Status=( LTG's 8 weeks):   1)   Increase right wrist and forearm LIRIANO to >75% of the right wrist to increase functional hand use for weight bearing and reaching tasks. Modified and Progressing on 2/1/22. MET   2)   Right  strength  to be >70% of the unaffected side. Progressing  3)   Right pinch strength to be >60% of the unaffected side. MET for lateral and 3-jaw pinch on 3/7/22  4)   Decrease complaints of pain to 2 out of 10 at worst to increase functional hand use and UE support functions for moderate to heavy ADL/work/leisure activities. MET 4/5/22  5)   Patient to score at 45 % or less on Quick/DASH and/or Work module to demonstrate improved perception of functional right hand use to evidence return to near to prior level of function for I/ADLs and return to gardening. MET 4/5/22  Long Term Goal Status:   continue per initial plan of care.    Plan   No new ordrers this date. Continue flexibility and PREs as tolerated. Continue OT 1-2x per week.    Pt to be treated by Occupational Therapy 2 times per week for 8 weeks during the certification period from 3/15/2022 to 5/13/22 to achieve the established goals.      Treatment to include: Paraffin, Fluidotherapy, Manual therapy/joint mobilizations, Modalities for pain management, Therapeutic exercises/activities., Strengthening, Orthotic Fabrication/Fit/Training, Edema Control, Joint Protection and Energy Conservation, as well as any other treatments deemed necessary based on the patient's needs or progress.     HARIS Rausch/MARGARITA

## 2022-04-12 ENCOUNTER — CLINICAL SUPPORT (OUTPATIENT)
Dept: REHABILITATION | Facility: HOSPITAL | Age: 72
End: 2022-04-12
Payer: MEDICARE

## 2022-04-12 DIAGNOSIS — M25.631 DECREASED RANGE OF MOTION OF RIGHT WRIST: ICD-10-CM

## 2022-04-12 DIAGNOSIS — R68.89 ALTERATION IN SELF-CARE ABILITY: ICD-10-CM

## 2022-04-12 DIAGNOSIS — M25.531 PAIN IN RIGHT WRIST: Primary | ICD-10-CM

## 2022-04-12 DIAGNOSIS — M79.89 SWELLING OF RIGHT HAND: ICD-10-CM

## 2022-04-12 DIAGNOSIS — M25.641 DECREASED RANGE OF MOTION OF FINGER OF RIGHT HAND: ICD-10-CM

## 2022-04-12 PROCEDURE — 97110 THERAPEUTIC EXERCISES: CPT | Mod: PN

## 2022-04-12 PROCEDURE — 97018 PARAFFIN BATH THERAPY: CPT | Mod: PN

## 2022-04-12 PROCEDURE — 97140 MANUAL THERAPY 1/> REGIONS: CPT | Mod: PN

## 2022-04-14 ENCOUNTER — PATIENT OUTREACH (OUTPATIENT)
Dept: ADMINISTRATIVE | Facility: OTHER | Age: 72
End: 2022-04-14
Payer: MEDICARE

## 2022-04-14 NOTE — PROGRESS NOTES
Health Maintenance Due   Topic Date Due    Shingles Vaccine (2 of 3) 05/03/2016    Mammogram  08/13/2021    COVID-19 Vaccine (4 - Booster for Pfizer series) 01/07/2022     Updates were requested from care everywhere.  Chart was reviewed for overdue Proactive [facility] Encounters (DELMIS) topics (CRS, Breast Cancer Screening, Eye exam)  Health Maintenance has been updated.  LINKS immunization registry triggered.  Immunizations were reconciled.

## 2022-04-18 ENCOUNTER — OFFICE VISIT (OUTPATIENT)
Dept: ORTHOPEDICS | Facility: CLINIC | Age: 72
End: 2022-04-18
Payer: MEDICARE

## 2022-04-18 VITALS — WEIGHT: 227 LBS | BODY MASS INDEX: 36.48 KG/M2 | HEIGHT: 66 IN

## 2022-04-18 DIAGNOSIS — R29.898 HAND WEAKNESS: Primary | ICD-10-CM

## 2022-04-18 PROCEDURE — 1125F AMNT PAIN NOTED PAIN PRSNT: CPT | Mod: CPTII,S$GLB,, | Performed by: ORTHOPAEDIC SURGERY

## 2022-04-18 PROCEDURE — 99213 PR OFFICE/OUTPT VISIT, EST, LEVL III, 20-29 MIN: ICD-10-PCS | Mod: S$GLB,,, | Performed by: ORTHOPAEDIC SURGERY

## 2022-04-18 PROCEDURE — 3008F PR BODY MASS INDEX (BMI) DOCUMENTED: ICD-10-PCS | Mod: CPTII,S$GLB,, | Performed by: ORTHOPAEDIC SURGERY

## 2022-04-18 PROCEDURE — 99999 PR PBB SHADOW E&M-EST. PATIENT-LVL IV: ICD-10-PCS | Mod: PBBFAC,,, | Performed by: ORTHOPAEDIC SURGERY

## 2022-04-18 PROCEDURE — 1159F MED LIST DOCD IN RCRD: CPT | Mod: CPTII,S$GLB,, | Performed by: ORTHOPAEDIC SURGERY

## 2022-04-18 PROCEDURE — 3288F FALL RISK ASSESSMENT DOCD: CPT | Mod: CPTII,S$GLB,, | Performed by: ORTHOPAEDIC SURGERY

## 2022-04-18 PROCEDURE — 3288F PR FALLS RISK ASSESSMENT DOCUMENTED: ICD-10-PCS | Mod: CPTII,S$GLB,, | Performed by: ORTHOPAEDIC SURGERY

## 2022-04-18 PROCEDURE — 99213 OFFICE O/P EST LOW 20 MIN: CPT | Mod: S$GLB,,, | Performed by: ORTHOPAEDIC SURGERY

## 2022-04-18 PROCEDURE — 1159F PR MEDICATION LIST DOCUMENTED IN MEDICAL RECORD: ICD-10-PCS | Mod: CPTII,S$GLB,, | Performed by: ORTHOPAEDIC SURGERY

## 2022-04-18 PROCEDURE — 99999 PR PBB SHADOW E&M-EST. PATIENT-LVL IV: CPT | Mod: PBBFAC,,, | Performed by: ORTHOPAEDIC SURGERY

## 2022-04-18 PROCEDURE — 3008F BODY MASS INDEX DOCD: CPT | Mod: CPTII,S$GLB,, | Performed by: ORTHOPAEDIC SURGERY

## 2022-04-18 PROCEDURE — 1101F PT FALLS ASSESS-DOCD LE1/YR: CPT | Mod: CPTII,S$GLB,, | Performed by: ORTHOPAEDIC SURGERY

## 2022-04-18 PROCEDURE — 1101F PR PT FALLS ASSESS DOC 0-1 FALLS W/OUT INJ PAST YR: ICD-10-PCS | Mod: CPTII,S$GLB,, | Performed by: ORTHOPAEDIC SURGERY

## 2022-04-18 PROCEDURE — 1125F PR PAIN SEVERITY QUANTIFIED, PAIN PRESENT: ICD-10-PCS | Mod: CPTII,S$GLB,, | Performed by: ORTHOPAEDIC SURGERY

## 2022-04-18 NOTE — PROGRESS NOTES
Subjective:      Patient ID: Tracy Armendariz is a 71 y.o. female.  Chief Complaint: Pain of the Right Wrist      HPI  Tracy Armendariz is a  71 y.o. female presenting today for follow up of right hand symptoms including weakness after previous fracture.  She reports that she is still having difficulty using the right hand and wrist  She had been in therapy but was delayed I think because of insurance issues but she would like to restart therapy since it was helping  She still reports weakness and difficulty using the right hand and wrist  No numbness or tingling is reported.    Review of patient's allergies indicates:   Allergen Reactions    Iodinated contrast media Hives and Rash    Gabapentin Hallucinations    Iodine Hives    Isothiazolinones Rash    Penicillins Rash         Current Outpatient Medications   Medication Sig Dispense Refill    albuterol (PROVENTIL/VENTOLIN HFA) 90 mcg/actuation inhaler INHALE 2 PUFFS EVERY 6 HOURS AS NEEDED FOR WHEEZING 18 g 0    aspirin 81 MG Chew Take 81 mg by mouth once daily.      atorvastatin (LIPITOR) 10 MG tablet Take 1 tablet (10 mg total) by mouth once daily. 90 tablet 3    buPROPion (WELLBUTRIN XL) 300 MG 24 hr tablet Take 1 tablet (300 mg total) by mouth once daily. 90 tablet 11    calcium carbonate (OS-SPENCER) 600 mg (1,500 mg) Tab Take 600 mg by mouth 2 (two) times daily with meals.      cholecalciferol, vitamin D3, 1,000 unit Chew Take 1,000 Units by mouth once daily.      diclofenac sodium (VOLTAREN) 1 % Gel APPLY 2-4 GRAMS TO EACH PAINFUL AREA FOUR TIMES DAILY - MAX 32 GRAMS/ g 6    EScitalopram oxalate (LEXAPRO) 20 MG tablet TAKE 1 TABLET(20 MG) BY MOUTH EVERY DAY (Patient taking differently: Take 20 mg by mouth once daily.) 90 tablet 3    esomeprazole (NEXIUM) 40 MG capsule Take 1 capsule (40 mg total) by mouth daily as needed (heartburn). 30 capsule 3    famotidine (PEPCID) 10 MG tablet Take 10 mg by mouth 2 (two) times daily.      FLUAD  9084-1938, 65 YR UP,,PF, 45 mcg (15 mcg x 3)/0.5 mL Syrg       hydrocortisone 2.5 % cream Apply topically 2 (two) times daily to affected area 30 g 2    LACTOBACILLUS ACIDOPHILUS (PROBIOTIC ORAL) Take 1 capsule by mouth once daily. PRN      LIDOcaine-prilocaine (EMLA) cream APPLY 2 GRAMS 3-4 TIMES DAILY FOR TREATMENT OF PAIN 240 g 6    losartan (COZAAR) 100 MG tablet Take 1 tablet (100 mg total) by mouth once daily. 90 tablet 3    meclizine (ANTIVERT) 12.5 mg tablet Take 1 tablet (12.5 mg total) by mouth 3 (three) times daily as needed for Dizziness or Nausea. 30 tablet 6    MULTIVIT WITH CALCIUM,IRON,MIN (WOMEN'S DAILY MULTIVITAMIN ORAL) Take 1 tablet by mouth once daily.      ondansetron (ZOFRAN-ODT) 4 MG TbDL dissolve 1 tablet (4 mg total) by mouth every 8 (eight) hours as needed (nausea). 20 tablet 0    tamsulosin (FLOMAX) 0.4 mg Cap TAKE 1 CAPSULE(0.4 MG) BY MOUTH EVERY DAY 90 capsule 0     No current facility-administered medications for this visit.       Past Medical History:   Diagnosis Date    Allergy     Anticoagulant long-term use     Arthritis     CVA (cerebral infarction)     Depression     Disorder of kidney and ureter     renal stones    Diverticulosis of colon     Extrinsic asthma, unspecified     Hyperlipidemia     Hypertension     Kidney stone     Left atrial enlargement 2014    Low back pain     MARTIN (obstructive sleep apnea)     Osteopenia     PUD (peptic ulcer disease)     Stroke  2013    Urinary tract infection        Past Surgical History:   Procedure Laterality Date    APPENDECTOMY      @ time of hysterectomy    BACK SURGERY      CATARACT EXTRACTION       SECTION      CHOLECYSTECTOMY      laparoscopic    COLONOSCOPY N/A 2018    Procedure: COLONOSCOPY/Golytely;  Surgeon: Janine Black MD;  [facility];  Service: Endoscopy;  Laterality: N/A;    DILATION AND CURETTAGE OF UTERUS      HYSTERECTOMY       "AMINATA with appendectomy    INNER EAR SURGERY      replaced ear drum    JOINT REPLACEMENT Right     knee    KNEE ARTHROSCOPY W/ DEBRIDEMENT  2003    LUMBAR DISCECTOMY  1980    L4-L5    OOPHORECTOMY      unilateral    TONSILLECTOMY      TYMPANOPLASTY      URETEROSCOPIC REMOVAL OF URETERIC CALCULUS Left 12/19/2018    Procedure: REMOVAL, CALCULUS, URETER, URETEROSCOPIC, holmium laser lithotripsy, stone basket extraction, retrograde pyelogram, ureteral stent exchange;  Surgeon: Anaya Zamora MD;  Location: [facility];  Service: Urology;  Laterality: Left;       OBJECTIVE:   PHYSICAL EXAM:  Height: 5' 6" (167.6 cm) Weight: 103 kg (227 lb)  Vitals:    04/18/22 1431   Weight: 103 kg (227 lb)   Height: 5' 6" (1.676 m)   PainSc:   3   PainLoc: Wrist     Ortho/SPM Exam  Examination right hand wrist there is some slight swelling including the fingers fingers do have good range of motion but  strength is decreased she really has difficulty making a composite fist  Sensation intact Tinel sign negative      RADIOGRAPHS:  None  Comments: I have personally reviewed the imaging and I agree with the above radiologist's report.    ASSESSMENT/PLAN:     IMPRESSION:  Right hand weakness after wrist fracture    PLAN:  I reordered OT for the right hand and wrist for range of motion and strengthening  I want her to continue with a home exercise program including use of the squeeze ball  I have also encouraged her to use her right hand as much as possible      FOLLOW UP:  4-6 weeks    Disclaimer: This note has been generated using voice-recognition software. There may be typographical errors that have been missed during proof-reading.    "

## 2022-04-21 ENCOUNTER — OFFICE VISIT (OUTPATIENT)
Dept: URGENT CARE | Facility: CLINIC | Age: 72
End: 2022-04-21
Payer: MEDICARE

## 2022-04-21 ENCOUNTER — CLINICAL SUPPORT (OUTPATIENT)
Dept: REHABILITATION | Facility: HOSPITAL | Age: 72
End: 2022-04-21
Payer: MEDICARE

## 2022-04-21 ENCOUNTER — TELEPHONE (OUTPATIENT)
Dept: PHARMACY | Facility: CLINIC | Age: 72
End: 2022-04-21
Payer: MEDICARE

## 2022-04-21 VITALS
DIASTOLIC BLOOD PRESSURE: 83 MMHG | SYSTOLIC BLOOD PRESSURE: 136 MMHG | HEART RATE: 65 BPM | TEMPERATURE: 98 F | RESPIRATION RATE: 18 BRPM | HEIGHT: 66 IN | OXYGEN SATURATION: 96 % | BODY MASS INDEX: 36.48 KG/M2 | WEIGHT: 227 LBS
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
## 2022-04-21 DIAGNOSIS — M25.561 ACUTE PAIN OF RIGHT KNEE: ICD-10-CM

## 2022-04-21 DIAGNOSIS — M25.641 DECREASED RANGE OF MOTION OF FINGER OF RIGHT HAND: ICD-10-CM

## 2022-04-21 DIAGNOSIS — M25.531 PAIN IN RIGHT WRIST: Primary | ICD-10-CM

## 2022-04-21 DIAGNOSIS — M25.631 DECREASED RANGE OF MOTION OF RIGHT WRIST: ICD-10-CM

## 2022-04-21 DIAGNOSIS — R07.81 RIB PAIN ON RIGHT SIDE: ICD-10-CM

## 2022-04-21 DIAGNOSIS — L98.9 SKIN LESION: ICD-10-CM

## 2022-04-21 DIAGNOSIS — N39.0 URINARY TRACT INFECTION WITHOUT HEMATURIA, SITE UNSPECIFIED: ICD-10-CM

## 2022-04-21 DIAGNOSIS — S22.31XA CLOSED FRACTURE OF ONE RIB OF RIGHT SIDE, INITIAL ENCOUNTER: ICD-10-CM

## 2022-04-21 DIAGNOSIS — R30.0 DYSURIA: ICD-10-CM

## 2022-04-21 DIAGNOSIS — M79.89 SWELLING OF RIGHT HAND: ICD-10-CM

## 2022-04-21 DIAGNOSIS — R68.89 ALTERATION IN SELF-CARE ABILITY: ICD-10-CM

## 2022-04-21 LAB
BILIRUB UR QL STRIP: NEGATIVE
GLUCOSE UR QL STRIP: NEGATIVE
KETONES UR QL STRIP: NEGATIVE
LEUKOCYTE ESTERASE UR QL STRIP: NEGATIVE
PH, POC UA: 5
POC BLOOD, URINE: NEGATIVE
POC NITRATES, URINE: NEGATIVE
PROT UR QL STRIP: POSITIVE
SP GR UR STRIP: 1.02 (ref 1–1.03)
UROBILINOGEN UR STRIP-ACNC: ABNORMAL (ref 0.1–1.1)

## 2022-04-21 PROCEDURE — 99214 OFFICE O/P EST MOD 30 MIN: CPT | Mod: 25,S$GLB,, | Performed by: PHYSICIAN ASSISTANT

## 2022-04-21 PROCEDURE — 97018 PARAFFIN BATH THERAPY: CPT | Mod: PN,59

## 2022-04-21 PROCEDURE — 3079F DIAST BP 80-89 MM HG: CPT | Mod: CPTII,S$GLB,, | Performed by: PHYSICIAN ASSISTANT

## 2022-04-21 PROCEDURE — 1125F AMNT PAIN NOTED PAIN PRSNT: CPT | Mod: CPTII,S$GLB,, | Performed by: PHYSICIAN ASSISTANT

## 2022-04-21 PROCEDURE — 3075F PR MOST RECENT SYSTOLIC BLOOD PRESS GE 130-139MM HG: ICD-10-PCS | Mod: CPTII,S$GLB,, | Performed by: PHYSICIAN ASSISTANT

## 2022-04-21 PROCEDURE — 1159F MED LIST DOCD IN RCRD: CPT | Mod: CPTII,S$GLB,, | Performed by: PHYSICIAN ASSISTANT

## 2022-04-21 PROCEDURE — 3008F BODY MASS INDEX DOCD: CPT | Mod: CPTII,S$GLB,, | Performed by: PHYSICIAN ASSISTANT

## 2022-04-21 PROCEDURE — 97110 THERAPEUTIC EXERCISES: CPT | Mod: PN

## 2022-04-21 PROCEDURE — 87086 URINE CULTURE/COLONY COUNT: CPT | Performed by: PHYSICIAN ASSISTANT

## 2022-04-21 PROCEDURE — 1160F PR REVIEW ALL MEDS BY PRESCRIBER/CLIN PHARMACIST DOCUMENTED: ICD-10-PCS | Mod: CPTII,S$GLB,, | Performed by: PHYSICIAN ASSISTANT

## 2022-04-21 PROCEDURE — 71101 XR RIBS MIN 3 VIEWS W/ PA CHEST RIGHT: ICD-10-PCS | Mod: FY,RT,S$GLB, | Performed by: RADIOLOGY

## 2022-04-21 PROCEDURE — 81003 URINALYSIS AUTO W/O SCOPE: CPT | Mod: QW,S$GLB,, | Performed by: PHYSICIAN ASSISTANT

## 2022-04-21 PROCEDURE — 3079F PR MOST RECENT DIASTOLIC BLOOD PRESSURE 80-89 MM HG: ICD-10-PCS | Mod: CPTII,S$GLB,, | Performed by: PHYSICIAN ASSISTANT

## 2022-04-21 PROCEDURE — 99214 PR OFFICE/OUTPT VISIT, EST, LEVL IV, 30-39 MIN: ICD-10-PCS | Mod: 25,S$GLB,, | Performed by: PHYSICIAN ASSISTANT

## 2022-04-21 PROCEDURE — 3008F PR BODY MASS INDEX (BMI) DOCUMENTED: ICD-10-PCS | Mod: CPTII,S$GLB,, | Performed by: PHYSICIAN ASSISTANT

## 2022-04-21 PROCEDURE — 1125F PR PAIN SEVERITY QUANTIFIED, PAIN PRESENT: ICD-10-PCS | Mod: CPTII,S$GLB,, | Performed by: PHYSICIAN ASSISTANT

## 2022-04-21 PROCEDURE — 73562 X-RAY EXAM OF KNEE 3: CPT | Mod: FY,RT,S$GLB, | Performed by: RADIOLOGY

## 2022-04-21 PROCEDURE — 1160F RVW MEDS BY RX/DR IN RCRD: CPT | Mod: CPTII,S$GLB,, | Performed by: PHYSICIAN ASSISTANT

## 2022-04-21 PROCEDURE — 73562 XR KNEE ORTHO RIGHT: ICD-10-PCS | Mod: FY,RT,S$GLB, | Performed by: RADIOLOGY

## 2022-04-21 PROCEDURE — 3075F SYST BP GE 130 - 139MM HG: CPT | Mod: CPTII,S$GLB,, | Performed by: PHYSICIAN ASSISTANT

## 2022-04-21 PROCEDURE — 73560 X-RAY EXAM OF KNEE 1 OR 2: CPT | Mod: FY,LT,S$GLB, | Performed by: RADIOLOGY

## 2022-04-21 PROCEDURE — 97140 MANUAL THERAPY 1/> REGIONS: CPT | Mod: PN

## 2022-04-21 PROCEDURE — 1159F PR MEDICATION LIST DOCUMENTED IN MEDICAL RECORD: ICD-10-PCS | Mod: CPTII,S$GLB,, | Performed by: PHYSICIAN ASSISTANT

## 2022-04-21 PROCEDURE — 81003 POCT URINALYSIS, DIPSTICK, AUTOMATED, W/O SCOPE: ICD-10-PCS | Mod: QW,S$GLB,, | Performed by: PHYSICIAN ASSISTANT

## 2022-04-21 PROCEDURE — 73560 XR KNEE ORTHO RIGHT: ICD-10-PCS | Mod: FY,LT,S$GLB, | Performed by: RADIOLOGY

## 2022-04-21 PROCEDURE — 71101 X-RAY EXAM UNILAT RIBS/CHEST: CPT | Mod: FY,RT,S$GLB, | Performed by: RADIOLOGY

## 2022-04-21 RX ORDER — LIDOCAINE 50 MG/G
1 PATCH TOPICAL DAILY
Qty: 7 PATCH | Refills: 0 | Status: SHIPPED | OUTPATIENT
Start: 2022-04-21 | End: 2022-04-29

## 2022-04-21 RX ORDER — NITROFURANTOIN 25; 75 MG/1; MG/1
100 CAPSULE ORAL 2 TIMES DAILY
Qty: 14 CAPSULE | Refills: 0 | Status: SHIPPED | OUTPATIENT
Start: 2022-04-21 | End: 2022-04-29

## 2022-04-21 NOTE — PATIENT INSTRUCTIONS
PLEASE READ YOUR DISCHARGE INSTRUCTIONS ENTIRELY AS IT CONTAINS IMPORTANT INFORMATION.  A culture was sent to the lab today.  We will call to discuss your results in 3-5 days.    - Rest.    - Drink plenty of fluids.    - Tylenol as directed as needed for fever/pain.    - If you were prescribed antibiotics, please take them to completion.  - If you are female and on birth control pills - please use additional methods of contraception to prevent pregnancy while on antibiotics and for one cycle after.   - If you were prescribed a narcotic medication, a cough syrup, or a muscle relaxer, do not drive or operate heavy equipment or machinery while taking these medications, as they can cause drowsiness.   - If a referral to a specialty was made today, you should receive a phone call in the next few days to schedule an appt.  Please call [phone] to schedule an appt if have not gotten a phone call in the next few days.  - If you smoke, please stop smoking.  -You must understand that you've received an Urgent Care treatment only and that you may be released before all your medical problems are known or treated. You, the patient, will arrange for follow up care as instructed. Please arrange follow up with your primary medical clinic as soon as possible.   - Follow up with your PCP or specialty clinic as directed in the next 1-2 weeks if not improved or as needed.  You can call [phone] to schedule an appointment with the appropriate provider.    - Please return to Urgent Care or to the Emergency Department if your symptoms worsen.    Patient aware and verbalized understanding.

## 2022-04-21 NOTE — PROGRESS NOTES
Occupational Therapy Daily Treatment Note     Name: Tracy Armendariz  Clinic Number: [id number]    Therapy Diagnosis:   Encounter Diagnoses   Name Primary?    Pain in right wrist Yes    Decreased range of motion of right wrist     Swelling of right hand     Alteration in self-care ability     Decreased range of motion of finger of right hand      Physician: Carlos Baum MD    Visit Date: 4/21/2022    Medical Diagnosis: S62.101D (ICD-10-CM) - Closed fracture of right wrist with routine healing, subsequent encounter  Physician Orders: Eval and treat  Evaluation Date: 1/12/2022  Insurance Authorization Period Expiration: through 5/17/22  Plan of Care from 3/15/2022-5/13/2022  Date of Return to MD: 5/30/22 4/18/22: IMPRESSION:  Right hand weakness after wrist fracture: PLAN:  I reordered OT for the right hand and wrist for range of motion and strengthening  I want her to continue with a home exercise program including use of the squeeze ball  I have also encouraged her to use her right hand as much as possible  Date of Onset: 11/9/21    Visit # / Visits authorized: 22/40  FOTO (DASH) at eval: 69.2 %  FOTO (DASH) RA 2/24/22: 53.3 (+15.9%)  FOTO (DASH) RA 3/15/22: 59%   FOTO (DASH) RA 4/5/22: 39.2% (+14.1%)    Time In: 1:15 pm  Time Out: 2:00  Total Billable Time: 45 minutes    Precautions:  Standard and blood thinners, Osteopenia; falls; gait and balance problems d/t Vertigo; Precautions as per imaging and s/p Week 12+  1/28/22 X-ray: FINDINGS: Osteopenia.  Reconfirmed findings of subacute healing comminuted distal radial fracture with no detrimental changes in the appearance of the fracture site or fracture fragments continued mild dorsal angulation of the distal radius similar to prior exam.  Reconfirmed tiny diastasis ulnar styloid tip fracture.  No new fractures.  Subjective     Pt reports: 4/21: Pt returns after vacation to [place] where she fell out of a smaller bed and had a fall when tripping over he  "'s feet. She reports x-ray was negative. She has bruising in the volar forearm. She required v/c at times to complete tasks, stating "I forgot what I was doing". She is noted to waver in her stance and gait continuously today. Her balance assessment is scheduled for May 5. Pt confirms goals for improving ROM and strength and acknowledges MD Plan. She leaves before OT could issue the new compression glove.  Response to previous treatments: 4/5: Tracy reports positive symptoms relief with elastic tape applied and in place when she arrived today  Functional change: 4/21: Pt states "I'm using it so much more"  Pain:   0/10 at arrival.   6/10 at worst L thumb  4/10 at worst R thumb and R Wrist  Location: Right ulna wrist and both thumbs    Objective      Edema: Circumferential measurements: In centimters     Right/Left Right Right Right  Right Right Right Right Right/Left      IE-1/12/22 2/1/22 2/3/22 2/10/22 2/14/22 2/17 2/22/22 2/24/22 3/15/2022    IF P1  6.7/6.3 7.3 (+0.6) 7.2 (-.1) 6.8 (-0.4) 6.9 (+0.1) 7.0 7.0 6.8 (-0.2) 6.8/6.5    SF P1   5.8/5.6 6.1 (+0.3) 6.2 (+0.1) 5.5 (-0.7) 5.6 (+0.1) 5.9 6.0 5.6 (-0.4) 5.8/5.4    Thumb P1 7.4/7.5 7.4 (=) 7.0 (-0.4) 6.6 (-0.4) 6.8 (+0.2) 7.3 7.1 6.9 (-0.2) 6.8/6.5    DPC 19/17.5 19.7 (+0.7) 19 (-0.7) 18.9 (-0.1) 19.4 (+0.5) 19.0 19.2 18.8 (-0.4) 18.5/17.5    Wrist 18.3/17.0 19.5/17.9 18.8 (-0.8) 18.8 (=) 18.9 (+0.1) 18.2 18.2 18.3 (+0.1) 18/16.5                              Hand range of motion: Composite finger flexion/extension: Right within normal limits/ Left hand is within normal limits     Wrist AROM    Right/Left Right Right R Right/Left R R R   DATE IE-1/12/22 2/1/22  Post-tx 2/3/22  Post-tx 2/22/22 3/15/2022 4/8/22 4/12/22 4/21/22   EXT 30/90 49 55 64 66/80 65 70 72   FLX 17/45 36 28 37 38/70 40 50 46   RD 0/12 11 23 11 18/25 12 NT 16   UD  26/35 18 28 25 18/32 22 NT 25   Wrist LIRIANO 73/182 114 (+41)  137 (+23)  139 (+2) NT       Thumb AROM  (in degrees) " "Right/Left Right   DATE 4/12/22 4/21/22  Post-tx   Tip to DPC   (in cm) 2.5/.8 1.5 (1.0)     Forearm AROM    Right/Left Right Right Right R Right/Left   DATE IE-1/12/22 2/1/22*  Pre-tx 2/3/22 post tx 2/10/22 2/22/22 3/15/2022   Supination 68/85 46 55 55 (=) 60 (+5) 60/70   Pronation 90/90 85 90 88 (-2) nt 90/90   *different car, rapidly moving in quick end range position causes pain       Strength: (measured in psi.)     Right/Left Right Right/Left Right Right     2/10/2022 3/7/22 3/15/2022 4/5/22 4/21/22   Les Rung II 8/40 14 (+6)  15/45 14* 19 (+5)   Key Pinch 4.5/4.5 8 (+3.5)  7/9 7.5 8.0 (+5)   3-pt Pinch 3/6 4.5 (+1.5)  6/7 6.5 6.0   2-pt Pinch 3/6.75 nt 4/5 5.0 (+1.0) defer   * ltd by ulna wrist pain     Treatment     Tracy received the following supervised modalities after being cleared for contradictions for 8 minutes:   - Requested  Paraffin wax heat  for promoting healing, decreasing pain and increasing tissue extensibility    Tracy received the following manual therapy techniques for 15 minutes:   - defer-R/A girth  - defer fit and issue size Medium 3/4 finger compression glove for edema mgmt  - Soft tissue massage to dorsa and volarl surface of right wrist and forearm during extrinsic extensor lengthening  - Grade II Rad/Carp mobilization for promoting healing and increasing periarticular tissue lengths  - Mobilization with movement (Grade II Rad/Carp distractions), patient guided for wrist flexion and extension; 3 x 10" each  - AA/PROM of each wrist and forearm range of motion 1 x 10 each    Tracy performed therapeutic exercises for 20 minutes including:  Digit flexibility-defer - Intrinsic stretch, hook position, palm down on table top; 30"x3  - TGEs, 3x10 alternating intrinsic stretch   Flexibility 30" x 3:   - Extrinsic flexor stretch with pray hands  - Wrist flexion stretch  - Step 1 of Finkelstein's stretch   Intrinsic strengthening-defer 2 min each:  -spreads and lifts with yellow " spidyband, no thumb   -adduction and lifts with red sponges   Hand strengthening - Medium ; only 1 due to wrist pain presenting; 1x5  - defer-Yellow digiflex, no thumb, 1 min   Wrist & forearm Strengthening- - Yellow flexbar, 1 min each for:  -defer-sup/pronation swings, held at mid-shaft  -smiles  -frowns  -twists  -isometric supination   1st [facility] OA protocol, left hand- - Isolated IP/MP and then composite thumb flexion x20 each  - C's with thumb and index finger 1 x 10  - C's to O's 1 x 10 maintaining proper form & muscle activation      Objective Measures 4/21/22: R/A  and pinch  4/21/22: R/A wrist AROM  4/21/22: R/A thumb tip to DPC   HEP as upgraded 4/5/22: Green t-putty      Home Exercises and Education Provided     Education provided:   - HEP as set  - Progress made    Education provided prior sessions:  - HEP as modified t-putty strengthening program for decreased resistance and frequency  - Education on wear schedule for compressive sleeve and digit sleeve; begin with 1 hour, and if no pain, continues to wear during the day intermittently, removing for HEP and hygiene; progress to night time wear as tolerated.  - HEP for TGEs and wrist AROM    Written Home Exercises Provided: Patient instructed to cont prior HEP.  Exercises were reviewed and Tracy was able to demonstrate them prior to the end of the session.  Tracy demonstrated good  understanding of the HEP provided.     See EMR under Patient Instructions for exercises provided prior visit. 2/1/22       Assessment   4/21: Improving post-tx wrist and thumb AROM. Improving  and pinch strength. Wrist pain at the end of this session.    Tracy is progressing well towards her goals and there are no updates to goals at this time. Pt prognosis is Good.     Pt will continue to benefit from skilled outpatient occupational therapy to address the deficits listed in the problem list on initial evaluation provide pt/family education and to maximize pt's  level of independence in the home and community environment.     Anticipated barriers to occupational therapy: arthritis, osteopenia     Pt's spiritual, cultural and educational needs considered and pt agreeable to plan of care and goals.     Goals:  Previous Short Term Goals Status:  STG's 4 weeks  1)   Patient to be IND with HEP and modalities for pain/edema managment. MET  2)   Pt to tolerate advancing PREs and flexibility activities for weight bearing positions with self-graded intensity and effective symptom monitoring. Met   3) Increase wrist and forearm LIRIANO by 60 degrees to increase functional hand use and support function positions for ADLs & leisure activities. MET   4) Digit and Thumb LIRIANO to be WNL as compared to the unaffected side for maximizing  and fine motor skills for reactivation the hand for light ADLs. met  5)  Decrease edema by 1.5 cm for evidencing soft tissue healing and effective edema mgmt. Met 2/10/22      New Short Term Goals Status=( LTG's 8 weeks):   1)   Increase right wrist and forearm LIRIANO to >75% of the right wrist to increase functional hand use for weight bearing and reaching tasks. Modified and Progressing on 2/1/22. MET   2)   Right  strength  to be >70% of the unaffected side. Progressing  3)   Right pinch strength to be >60% of the unaffected side. MET for lateral and 3-jaw pinch on 3/7/22  4)   Decrease complaints of pain to 2 out of 10 at worst to increase functional hand use and UE support functions for moderate to heavy ADL/work/leisure activities. MET 4/5/22  5)   Patient to score at 45 % or less on Quick/DASH and/or Work module to demonstrate improved perception of functional right hand use to evidence return to near to prior level of function for I/ADLs and return to gardening. MET 4/5/22  Long Term Goal Status:   continue per initial plan of care.    Plan   4/21: R/A girth and pain. Fit and issue compression garment. Assess tolerance for manual intervention  provided this session. Continue flexibility and PREs as tolerated. Continue OT 1-2x per week.    Pt to be treated by Occupational Therapy 2 times per week for 8 weeks during the certification period from 3/15/2022 to 5/13/22 to achieve the established goals.      Treatment to include: Paraffin, Fluidotherapy, Manual therapy/joint mobilizations, Modalities for pain management, Therapeutic exercises/activities., Strengthening, Orthotic Fabrication/Fit/Training, Edema Control, Joint Protection and Energy Conservation, as well as any other treatments deemed necessary based on the patient's needs or progress.     HARIS Chi, T  Occupational Therapist, Certified Hand Therapist

## 2022-04-21 NOTE — PROGRESS NOTES
"Subjective:       Patient ID: Tracy Armendariz is a 71 y.o. female.    Vitals:  height is 5' 6" (1.676 m) and weight is 103 kg (227 lb). Her oral temperature is 98.2 °F (36.8 °C). Her blood pressure is 136/83 and her pulse is 65. Her respiration is 18 and oxygen saturation is 96%.     Chief Complaint: Fall    Ms. Armendariz presents for evaluation of multiple complaints.  She complains of urinary frequency, dysuria, and suprapubic pain & pressure x 3 days.  She feels like she has a UTI.  She denies any fevers, chills, N/V, flank pain.  She has not taken anything for these symptoms.   She also reports she is prone to falls.  She is currently being worked up for this by her PCP.  She fell on 4/8 and has had right anterior rib pain since that fall.  She fell again on Friday & Saturday of this past week.  The fall on Friday was out of bed & the fall on Saturday was a trip over her husbands feet.  Neither incident results in head injury, neck or back pain, or LOC.  She reports right knee pain, unsure which day she injured the knee.  She also complains of some right wrist pain (recent fx in November '21), but saw her orthopedist recently and states he examined her wrist & told her it was fine.  She is s/p knee replacement on the right.  She is still able to bear weight.  She has pain in her ribs with movement, but no SOB.  She has been taking tylenol for the pain.  She also complains of left facial skin lesion.  She has had the lesion there for approximately 3 years, but it has gotten bigger over the past couple of months.  It sometimes burns & is dry.  She has not tried anything for the skin lesion.    Fall  The accident occurred more than 1 week ago. The fall occurred from a bed and while walking. She fell from a height of 1 to 2 ft. There was no blood loss. The point of impact was the right knee. The pain is present in the right wrist. The pain is at a severity of 6/10. The pain is moderate. The symptoms are aggravated by " movement. Associated symptoms include abdominal pain. Pertinent negatives include no fever, headaches, hematuria, nausea or vomiting. She has tried acetaminophen for the symptoms.       Constitution: Negative for appetite change, chills, sweating, fatigue and fever.   HENT: Negative for ear pain, ear discharge, hearing loss, drooling, congestion, postnasal drip, sinus pain, sinus pressure and sore throat.    Neck: Negative for neck stiffness and painful lymph nodes.   Cardiovascular: Negative for chest trauma, chest pain, leg swelling, palpitations, sob on exertion and passing out.   Eyes: Negative for eye pain and blurred vision.   Respiratory: Negative for chest tightness, cough, sputum production, shortness of breath and wheezing.    Gastrointestinal: Positive for abdominal pain. Negative for nausea, vomiting and diarrhea.   Genitourinary: Positive for dysuria and frequency. Negative for urgency, urine decreased, flank pain, bladder incontinence, bed wetting, hematuria and history of kidney stones.   Musculoskeletal: Positive for pain and trauma. Negative for joint pain, joint swelling, muscle cramps and muscle ache.   Skin: Positive for lesion. Negative for rash.   Allergic/Immunologic: Negative for itching and sneezing.   Neurological: Negative for dizziness, history of vertigo, light-headedness, passing out, facial drooping, speech difficulty, coordination disturbances, loss of balance, headaches, disorientation and altered mental status.   Hematologic/Lymphatic: Negative for swollen lymph nodes and easy bruising/bleeding. Does not bruise/bleed easily.   Psychiatric/Behavioral: Negative for altered mental status and disorientation.       Objective:      Physical Exam   Constitutional: She is oriented to person, place, and time. She appears well-developed. She is cooperative.  Non-toxic appearance. She does not appear ill. No distress.   HENT:   Head: Normocephalic and atraumatic. Head is without abrasion,  without contusion and without laceration.       Ears:   Right Ear: Hearing, tympanic membrane, external ear and ear canal normal. No hemotympanum.   Left Ear: Hearing, tympanic membrane, external ear and ear canal normal. No hemotympanum.   Nose: Nose normal. No mucosal edema, rhinorrhea or nasal deformity. No epistaxis. Right sinus exhibits no maxillary sinus tenderness and no frontal sinus tenderness. Left sinus exhibits no maxillary sinus tenderness and no frontal sinus tenderness.   Mouth/Throat: Uvula is midline, oropharynx is clear and moist and mucous membranes are normal. No trismus in the jaw. Normal dentition. No uvula swelling. No posterior oropharyngeal erythema.   Eyes: Conjunctivae, EOM and lids are normal. Pupils are equal, round, and reactive to light. Right eye exhibits no discharge. Left eye exhibits no discharge. No scleral icterus.   Neck: Trachea normal and phonation normal. Neck supple. No tracheal deviation present. No neck rigidity present. No spinous process tenderness present. No muscular tenderness present.   Cardiovascular: Normal rate, regular rhythm, normal heart sounds and normal pulses.   Pulmonary/Chest: Effort normal and breath sounds normal. No stridor. No respiratory distress. She has no decreased breath sounds. She has no wheezes. She has no rhonchi. She has no rales. She exhibits tenderness. She exhibits no crepitus, no edema and no swelling.   TTP right anterior ribs.  No ecchymosis or crepitus.        Comments: TTP right anterior ribs.  No ecchymosis or crepitus.    Abdominal: Normal appearance and bowel sounds are normal. She exhibits no distension, no pulsatile midline mass and no mass. Soft. There is abdominal tenderness in the suprapubic area. There is no rebound, no guarding, no tenderness at McBurney's point, negative Osorio's sign, no left CVA tenderness, negative Rovsing's sign, negative psoas sign, no right CVA tenderness and negative obturator sign.      Comments:  Suprapubic TTP.  No guarding or rebound.     Musculoskeletal: Normal range of motion.         General: No deformity. Normal range of motion.      Right knee: She exhibits ecchymosis. She exhibits normal range of motion, no swelling, no effusion, no deformity and no erythema. Tenderness found. Medial joint line tenderness noted.      Comments: TTP medial knee with mild ecchymosis.  Full ROM.  NVI.   Neurological: She is alert and oriented to person, place, and time. She has normal strength. No cranial nerve deficit or sensory deficit. She exhibits normal muscle tone. She displays no seizure activity. Coordination normal. GCS eye subscore is 4. GCS verbal subscore is 5. GCS motor subscore is 6.   Skin: Skin is warm, dry, intact, not diaphoretic and not pale. Capillary refill takes less than 2 seconds. No abrasion, No burn, No bruising and No ecchymosis   Psychiatric: Her speech is normal and behavior is normal. Judgment and thought content normal.   Nursing note and vitals reviewed.      XR R ribs/CXR - Ribs three views right with chest.  Heart size is normal.  Lungs are clear.  No pneumothorax, pleural fluid, or lung contusion seen.  There is a fracture of right anterolateral rib 8.     XR R knee - Three views right knee: There is a TKA in place good alignment no complication.     Left knee: There is minimal DJD and a minimal varus deformity.       Results for orders placed or performed in visit on 04/21/22   POCT Urinalysis, Dipstick, Automated, W/O Scope   Result Value Ref Range    POC Blood, Urine Negative Negative    POC Bilirubin, Urine Negative Negative    POC Urobilinogen, Urine norm 0.1 - 1.1    POC Ketones, Urine Negative Negative    POC Protein, Urine Positive (A) Negative    POC Nitrates, Urine Negative Negative    POC Glucose, Urine Negative Negative    pH, UA 5.0     POC Specific Gravity, Urine 1.025 1.003 - 1.029    POC Leukocytes, Urine Negative Negative       Assessment:       1. Fall, initial  encounter    2. Urinary tract infection without hematuria, site unspecified    3. Dysuria    4. Skin lesion    5. Rib pain on right side    6. Acute pain of right knee    7. Closed fracture of one rib of right side, initial encounter          Plan:         Fall, initial encounter    Urinary tract infection without hematuria, site unspecified  -     POCT Urinalysis, Dipstick, Automated, W/O Scope    Dysuria  -     Urine culture    Skin lesion  -     Ambulatory referral/consult to Dermatology    Rib pain on right side  -     XR RIB RIGHT W/ PA CHEST; Future; Expected date: 04/21/2022    Acute pain of right knee  -     Cancel: XR KNEE 3 VIEW RIGHT; Future; Expected date: 04/21/2022    Closed fracture of one rib of right side, initial encounter    Other orders  -     nitrofurantoin, macrocrystal-monohydrate, (MACROBID) 100 MG capsule; Take 1 capsule (100 mg total) by mouth 2 (two) times daily. for 7 days  Dispense: 14 capsule; Refill: 0  -     LIDOcaine (LIDODERM) 5 %; Place 1 patch onto the skin once daily. Remove & Discard patch within 12 hours or as directed by MD for 7 days  Dispense: 7 patch; Refill: 0    Skin lesion looks like actinic keratosis, but patient states bigger/change in lesion in the past couple months, will send to dermatology.  UA negative in clinic, patient with clinical s/s of UTI - tx with abx & cx sent to confirm.  Multiple falls recently - she does have a single rib fx on XR today.  R knee XR with hardware intact.  She is in no respiratory distress & pain has been controlled with tylenol at home.     Diagnoses and plan discussed with the patient, as well as the expected course and duration of her symptoms. All questions and concerns were addressed prior to discharge.  She was advised to follow up with her PCP within 1 week if symptoms do not improve. Emergency department precautions were given. Patient verbalized understanding and was happy with the plan of care.   Note dictated with voice  recognition software, please excuse any grammatical errors.    Patient Instructions   PLEASE READ YOUR DISCHARGE INSTRUCTIONS ENTIRELY AS IT CONTAINS IMPORTANT INFORMATION.  A culture was sent to the lab today.  We will call to discuss your results in 3-5 days.    - Rest.    - Drink plenty of fluids.    - Tylenol as directed as needed for fever/pain.    - If you were prescribed antibiotics, please take them to completion.  - If you are female and on birth control pills - please use additional methods of contraception to prevent pregnancy while on antibiotics and for one cycle after.   - If you were prescribed a narcotic medication, a cough syrup, or a muscle relaxer, do not drive or operate heavy equipment or machinery while taking these medications, as they can cause drowsiness.   - If a referral to a specialty was made today, you should receive a phone call in the next few days to schedule an appt.  Please call [phone] to schedule an appt if have not gotten a phone call in the next few days.  - If you smoke, please stop smoking.  -You must understand that you've received an Urgent Care treatment only and that you may be released before all your medical problems are known or treated. You, the patient, will arrange for follow up care as instructed. Please arrange follow up with your primary medical clinic as soon as possible.   - Follow up with your PCP or specialty clinic as directed in the next 1-2 weeks if not improved or as needed.  You can call [phone] to schedule an appointment with the appropriate provider.    - Please return to Urgent Care or to the Emergency Department if your symptoms worsen.    Patient aware and verbalized understanding.

## 2022-04-23 LAB — BACTERIA UR CULT: NO GROWTH

## 2022-04-25 ENCOUNTER — TELEPHONE (OUTPATIENT)
Dept: URGENT CARE | Facility: CLINIC | Age: 72
End: 2022-04-25
Payer: MEDICARE

## 2022-04-25 NOTE — TELEPHONE ENCOUNTER
Called patient to discuss negative urine culture.  She can stop her antibiotics.  No answer, left voicemail.

## 2022-04-27 RX ORDER — LOSARTAN POTASSIUM 100 MG/1
TABLET ORAL
Qty: 90 TABLET | Refills: 3 | Status: SHIPPED | OUTPATIENT
Start: 2022-04-27 | End: 2023-07-10 | Stop reason: SDUPTHER

## 2022-04-27 NOTE — TELEPHONE ENCOUNTER
Refill Authorization Note   Tracy Armendariz  is requesting a refill authorization.  Brief Assessment and Rationale for Refill:  Approve     Medication Therapy Plan:       Medication Reconciliation Completed: No   Comments:     No Care Gaps recommended.     Note composed:1:24 PM 04/27/2022

## 2022-04-27 NOTE — TELEPHONE ENCOUNTER
No new care gaps identified.  Powered by [employer]. Reference number: [id number].   4/27/2022 6:00:19 AM CDT

## 2022-04-28 ENCOUNTER — TELEPHONE (OUTPATIENT)
Dept: URGENT CARE | Facility: CLINIC | Age: 72
End: 2022-04-28
Payer: MEDICARE

## 2022-04-28 ENCOUNTER — CLINICAL SUPPORT (OUTPATIENT)
Dept: REHABILITATION | Facility: HOSPITAL | Age: 72
End: 2022-04-28
Payer: MEDICARE

## 2022-04-28 DIAGNOSIS — M25.631 DECREASED RANGE OF MOTION OF RIGHT WRIST: ICD-10-CM

## 2022-04-28 DIAGNOSIS — M25.531 PAIN IN RIGHT WRIST: Primary | ICD-10-CM

## 2022-04-28 DIAGNOSIS — R68.89 ALTERATION IN SELF-CARE ABILITY: ICD-10-CM

## 2022-04-28 DIAGNOSIS — M25.641 DECREASED RANGE OF MOTION OF FINGER OF RIGHT HAND: ICD-10-CM

## 2022-04-28 DIAGNOSIS — M79.89 SWELLING OF RIGHT HAND: ICD-10-CM

## 2022-04-28 PROCEDURE — 97140 MANUAL THERAPY 1/> REGIONS: CPT | Mod: PN

## 2022-04-28 PROCEDURE — 97018 PARAFFIN BATH THERAPY: CPT | Mod: PN

## 2022-04-28 PROCEDURE — 97110 THERAPEUTIC EXERCISES: CPT | Mod: PN

## 2022-04-28 NOTE — PROGRESS NOTES
"    Occupational Therapy Daily Treatment Note     Name: Tracy Armendariz  Clinic Number: [id number]    Therapy Diagnosis:   Encounter Diagnoses   Name Primary?    Pain in right wrist Yes    Decreased range of motion of right wrist     Swelling of right hand     Alteration in self-care ability     Decreased range of motion of finger of right hand      Physician: Carlos Baum MD    Visit Date: 4/28/2022    Medical Diagnosis: S62.101D (ICD-10-CM) - Closed fracture of right wrist with routine healing, subsequent encounter  Physician Orders: Eval and treat  Evaluation Date: 1/12/2022  Insurance Authorization Period Expiration: through 5/17/22  Plan of Care from 3/15/2022-5/13/2022  Date of Return to MD: 5/30/22 4/18/22: IMPRESSION:  Right hand weakness after wrist fracture: PLAN:  I reordered OT for the right hand and wrist for range of motion and strengthening  I want her to continue with a home exercise program including use of the squeeze ball  I have also encouraged her to use her right hand as much as possible  Date of Onset: 11/9/21    Visit # / Visits authorized: 24/40  FOTO (DASH) at eval: 69.2 %  FOTO (DASH) RA 2/24/22: 53.3 (+15.9%)  FOTO (DASH) RA 3/15/22: 59%   FOTO (DASH) RA 4/5/22: 39.2% (+14.1%)    Time In: 1:00  Time Out: 1:45  Total Billable Time: 45 minutes    Precautions:  Standard and blood thinners, Osteopenia; falls; gait and balance problems d/t Vertigo; Precautions as per imaging and s/p Week 12+  1/28/22 X-ray: FINDINGS: Osteopenia.  Reconfirmed findings of subacute healing comminuted distal radial fracture with no detrimental changes in the appearance of the fracture site or fracture fragments continued mild dorsal angulation of the distal radius similar to prior exam.  Reconfirmed tiny diastasis ulnar styloid tip fracture.  No new fractures.  Subjective     Pt reports: 4/25: "I've been reading my Violeta more the past few days and have more soreness from using it."  Response to previous " "treatments: 4/5: Tracy reports positive symptoms relief with elastic tape applied and in place when she arrived today  Functional change: 4/21: Pt states "I'm using it so much more"  Pain:   Patient having a 4/10 in ulnar right arm at arrival.   6/10 at worst L thumb  4/10 at worst R thumb and R Wrist  Location: Right ulna wrist and both thumbs    Objective      Edema: Circumferential measurements: In centimters     Right/Left Right Right Right  Right Right Right Right Right/Left Right     IE-1/12/22 2/1/22 2/3/22 2/10/22 2/14/22 2/17 2/22/22 2/24/22 3/15/2022 4/28/22   IF P1  6.7/6.3 7.3 (+0.6) 7.2 (-.1) 6.8 (-0.4) 6.9 (+0.1) 7.0 7.0 6.8 (-0.2) 6.8/6.5 6.7 (-0.1)   SF P1   5.8/5.6 6.1 (+0.3) 6.2 (+0.1) 5.5 (-0.7) 5.6 (+0.1) 5.9 6.0 5.6 (-0.4) 5.8/5.4 5.7(-0.1)   Thumb P1 7.4/7.5 7.4 (=) 7.0 (-0.4) 6.6 (-0.4) 6.8 (+0.2) 7.3 7.1 6.9 (-0.2) 6.8/6.5 7.0(+0.2)   DPC 19/17.5 19.7 (+0.7) 19 (-0.7) 18.9 (-0.1) 19.4 (+0.5) 19.0 19.2 18.8 (-0.4) 18.5/17.5 18.9(+0.4)   Wrist 18.3/17.0 19.5/17.9 18.8 (-0.8) 18.8 (=) 18.9 (+0.1) 18.2 18.2 18.3 (+0.1) 18/16.5 18.1(+0.1)                              Hand range of motion: Composite finger flexion/extension: Right within normal limits/ Left hand is within normal limits     Wrist AROM    Right/Left Right Right R Right/Left R R R   DATE IE-1/12/22 2/1/22  Post-tx 2/3/22  Post-tx 2/22/22 3/15/2022 4/8/22 4/12/22 4/21/22   EXT 30/90 49 55 64 66/80 65 70 72   FLX 17/45 36 28 37 38/70 40 50 46   RD 0/12 11 23 11 18/25 12 NT 16   UD  26/35 18 28 25 18/32 22 NT 25   Wrist LIRIANO 73/182 114 (+41)  137 (+23)  139 (+2) NT       Thumb AROM  (in degrees) Right/Left Right   DATE 4/12/22 4/21/22  Post-tx   Tip to DPC   (in cm) 2.5/.8 1.5 (1.0)     Forearm AROM    Right/Left Right Right Right R Right/Left   DATE IE-1/12/22 2/1/22*  Pre-tx 2/3/22 post tx 2/10/22 2/22/22 3/15/2022   Supination 68/85 46 55 55 (=) 60 (+5) 60/70   Pronation 90/90 85 90 88 (-2) nt 90/90   *different car, rapidly " "moving in quick end range position causes pain       Strength: (measured in psi.)     Right/Left Right Right/Left Right Right     2/10/2022 3/7/22 3/15/2022 4/5/22 4/21/22   Les Rung II 8/40 14 (+6)  15/45 14* 19 (+5)   Key Pinch 4.5/4.5 8 (+3.5)  7/9 7.5 8.0 (+5)   3-pt Pinch 3/6 4.5 (+1.5)  6/7 6.5 6.0   2-pt Pinch 3/6.75 nt 4/5 5.0 (+1.0) defer   * ltd by ulna wrist pain     Treatment     Tracy received the following supervised modalities after being cleared for contradictions for 10 minutes:   - Paraffin wax heat  for promoting healing, decreasing pain and increasing tissue extensibility    Tracy received the following manual therapy techniques for 15 minutes:   - defer-R/A girth  - defer fit and issue size Medium 3/4 finger compression glove for edema mgmt  - Soft tissue massage to dorsa and volarl surface of right wrist and forearm during extrinsic extensor lengthening  - Grade II Rad/Carp mobilization for promoting healing and increasing periarticular tissue lengths  - Mobilization with movement (Grade II Rad/Carp distractions), patient guided for wrist flexion and extension; 3 x 10" each  - AA/PROM of each wrist and forearm range of motion 1 x 10 each    Tracy performed therapeutic exercises for 20 minutes including:  Digit flexibility-defer - Intrinsic stretch, hook position, palm down on table top; 30"x3  - TGEs, 3x10 alternating intrinsic stretch   Flexibility 30" x 3:   - Extrinsic flexor stretch with pray hands  - Wrist flexion stretch  - Step 1 of Finkelstein's stretch   Intrinsic strengthening-defer 2 min each:  -spreads and lifts with yellow spidyband, no thumb   -adduction and lifts with red sponges   Hand strengthening Defer - Medium ; only 1 due to wrist pain presenting; 1x5  - defer-Yellow digiflex, no thumb, 1 min   Wrist & forearm Strengthening- - Yellow flexbar, 1 min each for:  -defer-sup/pronation swings, held at mid-shaft  -smiles  -frowns  -twists  -isometric supination "   1st CMC OA protocol, left hand- Defer   - Isolated IP/MP and then composite thumb flexion x20 each  - C's with thumb and index finger 1 x 10  - C's to O's 1 x 10 maintaining proper form & muscle activation      Objective Measures 4/21/22: R/A  and pinch  4/21/22: R/A wrist AROM  4/21/22: R/A thumb tip to DPC   HEP as upgraded 4/5/22: Green t-putty      Home Exercises and Education Provided     Education provided:   - HEP as set  - Progress made    Education provided prior sessions:  - HEP as modified t-putty strengthening program for decreased resistance and frequency  - Education on wear schedule for compressive sleeve and digit sleeve; begin with 1 hour, and if no pain, continues to wear during the day intermittently, removing for HEP and hygiene; progress to night time wear as tolerated.  - HEP for TGEs and wrist AROM    Written Home Exercises Provided: Patient instructed to cont prior HEP.  Exercises were reviewed and Tracy was able to demonstrate them prior to the end of the session.  Tracy demonstrated good  understanding of the HEP provided.     See EMR under Patient Instructions for exercises provided prior visit. 2/1/22       Assessment   4/28: Patient reports less tightness in wrist by end of session. Determined that prolonged isometrics during reading her Violeta is a likely contributor to ulnar forearm and wrist soreness.    Tracy is progressing well towards her goals and there are no updates to goals at this time. Pt prognosis is Good.     Pt will continue to benefit from skilled outpatient occupational therapy to address the deficits listed in the problem list on initial evaluation provide pt/family education and to maximize pt's level of independence in the home and community environment.     Anticipated barriers to occupational therapy: arthritis, osteopenia     Pt's spiritual, cultural and educational needs considered and pt agreeable to plan of care and goals.     Goals:  Previous Short  Term Goals Status:  STG's 4 weeks  1)   Patient to be IND with HEP and modalities for pain/edema managment. MET  2)   Pt to tolerate advancing PREs and flexibility activities for weight bearing positions with self-graded intensity and effective symptom monitoring. Met   3) Increase wrist and forearm LIRIANO by 60 degrees to increase functional hand use and support function positions for ADLs & leisure activities. MET   4) Digit and Thumb LIRIANO to be WNL as compared to the unaffected side for maximizing  and fine motor skills for reactivation the hand for light ADLs. met  5)  Decrease edema by 1.5 cm for evidencing soft tissue healing and effective edema mgmt. Met 2/10/22      New Short Term Goals Status=( LTG's 8 weeks):   1)   Increase right wrist and forearm LIRIANO to >75% of the right wrist to increase functional hand use for weight bearing and reaching tasks. Modified and Progressing on 2/1/22. MET   2)   Right  strength  to be >70% of the unaffected side. Progressing  3)   Right pinch strength to be >60% of the unaffected side. MET for lateral and 3-jaw pinch on 3/7/22  4)   Decrease complaints of pain to 2 out of 10 at worst to increase functional hand use and UE support functions for moderate to heavy ADL/work/leisure activities. MET 4/5/22  5)   Patient to score at 45 % or less on Quick/DASH and/or Work module to demonstrate improved perception of functional right hand use to evidence return to near to prior level of function for I/ADLs and return to gardening. MET 4/5/22  Long Term Goal Status:   continue per initial plan of care.    Plan   4/28: Assess tolerance for manual intervention provided this session. Continue flexibility and PREs as tolerated. Continue OT 1-2x per week.    Pt to be treated by Occupational Therapy 2 times per week for 8 weeks during the certification period from 3/15/2022 to 5/13/22 to achieve the established goals.      Treatment to include: Paraffin, Fluidotherapy, Manual  therapy/joint mobilizations, Modalities for pain management, Therapeutic exercises/activities., Strengthening, Orthotic Fabrication/Fit/Training, Edema Control, Joint Protection and Energy Conservation, as well as any other treatments deemed necessary based on the patient's needs or progress.     Patel Grijalva, OTR/L  Occupational Therapist

## 2022-04-29 ENCOUNTER — TELEPHONE (OUTPATIENT)
Dept: REHABILITATION | Facility: HOSPITAL | Age: 72
End: 2022-04-29
Payer: MEDICARE

## 2022-05-02 ENCOUNTER — CLINICAL SUPPORT (OUTPATIENT)
Dept: REHABILITATION | Facility: HOSPITAL | Age: 72
End: 2022-05-02
Payer: MEDICARE

## 2022-05-02 ENCOUNTER — TELEPHONE (OUTPATIENT)
Dept: URGENT CARE | Facility: CLINIC | Age: 72
End: 2022-05-02
Payer: MEDICARE

## 2022-05-02 DIAGNOSIS — R68.89 ALTERATION IN SELF-CARE ABILITY: ICD-10-CM

## 2022-05-02 DIAGNOSIS — M25.641 DECREASED RANGE OF MOTION OF FINGER OF RIGHT HAND: ICD-10-CM

## 2022-05-02 DIAGNOSIS — M25.531 PAIN IN RIGHT WRIST: Primary | ICD-10-CM

## 2022-05-02 DIAGNOSIS — M79.89 SWELLING OF RIGHT HAND: ICD-10-CM

## 2022-05-02 DIAGNOSIS — M25.631 DECREASED RANGE OF MOTION OF RIGHT WRIST: ICD-10-CM

## 2022-05-02 PROCEDURE — 97110 THERAPEUTIC EXERCISES: CPT | Mod: PN

## 2022-05-02 PROCEDURE — 97140 MANUAL THERAPY 1/> REGIONS: CPT | Mod: PN

## 2022-05-02 PROCEDURE — 97022 WHIRLPOOL THERAPY: CPT | Mod: PN

## 2022-05-02 NOTE — TELEPHONE ENCOUNTER
Left message with a family member to call clinic in regards to negative urine culture result.    ----- Message from Mara Rosenthal PA-C sent at 5/1/2022  6:05 PM CDT -----  Please let patient know of negative urine culture.

## 2022-05-03 ENCOUNTER — TELEPHONE (OUTPATIENT)
Dept: URGENT CARE | Facility: CLINIC | Age: 72
End: 2022-05-03
Payer: MEDICARE

## 2022-05-03 NOTE — TELEPHONE ENCOUNTER
Left message for patient to call clinic in regards to her negative urine culture.    ----- Message from Mara Rosenthal PA-C sent at 5/1/2022  6:05 PM CDT -----  Please let patient know of negative urine culture.

## 2022-05-05 ENCOUNTER — IMMUNIZATION (OUTPATIENT)
Dept: INTERNAL MEDICINE | Facility: CLINIC | Age: 72
End: 2022-05-05
Payer: MEDICARE

## 2022-05-05 DIAGNOSIS — Z23 NEED FOR VACCINATION: Primary | ICD-10-CM

## 2022-05-05 PROCEDURE — 91305 COVID-19, MRNA, LNP-S, PF, 30 MCG/0.3 ML DOSE VACCINE (PFIZER): CPT | Mod: PBBFAC | Performed by: FAMILY MEDICINE

## 2022-05-06 ENCOUNTER — CLINICAL SUPPORT (OUTPATIENT)
Dept: REHABILITATION | Facility: HOSPITAL | Age: 72
End: 2022-05-06
Payer: MEDICARE

## 2022-05-06 DIAGNOSIS — M25.641 DECREASED RANGE OF MOTION OF FINGER OF RIGHT HAND: ICD-10-CM

## 2022-05-06 DIAGNOSIS — M25.531 PAIN IN RIGHT WRIST: Primary | ICD-10-CM

## 2022-05-06 DIAGNOSIS — M25.631 DECREASED RANGE OF MOTION OF RIGHT WRIST: ICD-10-CM

## 2022-05-06 DIAGNOSIS — R68.89 ALTERATION IN SELF-CARE ABILITY: ICD-10-CM

## 2022-05-06 DIAGNOSIS — M79.89 SWELLING OF RIGHT HAND: ICD-10-CM

## 2022-05-06 PROCEDURE — 97110 THERAPEUTIC EXERCISES: CPT | Mod: PN

## 2022-05-06 PROCEDURE — 97022 WHIRLPOOL THERAPY: CPT | Mod: PN

## 2022-05-06 NOTE — PROGRESS NOTES
Occupational Therapy Daily Treatment Note     Name: Tracy Armendariz  Clinic Number: [id number]    Therapy Diagnosis:   Encounter Diagnoses   Name Primary?    Pain in right wrist Yes    Decreased range of motion of right wrist     Swelling of right hand     Alteration in self-care ability     Decreased range of motion of finger of right hand      Physician: Carlos Baum MD    Visit Date: 5/6/2022    Medical Diagnosis: S62.101D (ICD-10-CM) - Closed fracture of right wrist with routine healing, subsequent encounter  Physician Orders: Eval and treat  Evaluation Date: 1/12/2022  Insurance Authorization Period Expiration: through 5/17/22  Plan of Care from 3/15/2022-5/13/2022  Date of Return to MD: 5/30/22 4/18/22: IMPRESSION:  Right hand weakness after wrist fracture: PLAN:  I reordered OT for the right hand and wrist for range of motion and strengthening  I want her to continue with a home exercise program including use of the squeeze ball  I have also encouraged her to use her right hand as much as possible  Date of Onset: 11/9/21    Visit # / Visits authorized: 26/40  FOTO (DASH) at eval: 69.2 %  FOTO (DASH) RA 2/24/22: 53.3 (+15.9%)  FOTO (DASH) RA 3/15/22: 59%   FOTO (DASH) RA 4/5/22: 39.2% (+14.1%)    Time In: 1215  Time Out: 1300  Total Billable Time: 45 minutes    Precautions:  Standard and blood thinners, Osteopenia; falls; gait and balance problems d/t Vertigo; Precautions as per imaging and s/p Week 12+  1/28/22 X-ray: FINDINGS: Osteopenia.  Reconfirmed findings of subacute healing comminuted distal radial fracture with no detrimental changes in the appearance of the fracture site or fracture fragments continued mild dorsal angulation of the distal radius similar to prior exam.  Reconfirmed tiny diastasis ulnar styloid tip fracture.  No new fractures.  Subjective     Pt reports: 5/6: Pt explain she's had another fall this week while putting on her shoes. She managed to catch her left thumb between the  "the toilet seat and the toilet before having uncontrolled descent to the toilet. Her left thumb huts now.  Response to previous treatments:   No adverse reactions. Limited pain.  "I still can't pour tea from a gallon jug or open twist tops on bottles.  Functional change:   5/6: Pt states "I'm using it so much more"  Pain:   Patient having a 3/10 in ulnar right arm at arrival.   4/10 at worst L thumb  4/10 at worst R thumb and R Wrist  Location: as above   Objective      Edema: Circumferential measurements: In centimters     Right/Left Right Right Right  Right Right Right Right Right/Left Right Right     IE-1/12/22 2/1/22 2/3/22 2/10/22 2/14/22 2/17 2/22/22 2/24/22 3/15/2022 4/28/22 5/2/22   IF P1  6.7/6.3 7.3 (+0.6) 7.2 (-.1) 6.8 (-0.4) 6.9 (+0.1) 7.0 7.0 6.8 (-0.2) 6.8/6.5 6.7 (-0.1)    SF P1   5.8/5.6 6.1 (+0.3) 6.2 (+0.1) 5.5 (-0.7) 5.6 (+0.1) 5.9 6.0 5.6 (-0.4) 5.8/5.4 5.7(-0.1)    Thumb P1 7.4/7.5 7.4 (=) 7.0 (-0.4) 6.6 (-0.4) 6.8 (+0.2) 7.3 7.1 6.9 (-0.2) 6.8/6.5 7.0(+0.2)    DPC 19/17.5 19.7 (+0.7) 19 (-0.7) 18.9 (-0.1) 19.4 (+0.5) 19.0 19.2 18.8 (-0.4) 18.5/17.5 18.9(+0.4)    Wrist 18.3/17.0 19.5/17.9 18.8 (-0.8) 18.8 (=) 18.9 (+0.1) 18.2 18.2 18.3 (+0.1) 18/16.5 18.1(+0.1)  18.6 (+.5)                              Hand range of motion: Composite finger flexion/extension: Right within normal limits/ Left hand is within normal limits     Wrist AROM    Right/Left Right Right R Right/Left R R R R   DATE IE-1/12/22 2/1/22  Post-tx 2/3/22  Post-tx 2/22/22 3/15/2022 4/8/22 4/12/22 4/21/22 5/2/22   EXT 30/90 49 55 64 66/80 65 70 72 72   FLX 17/45 36 28 37 38/70 40 50 46 40   RD 0/12 11 23 11 18/25 12 NT 16 10   UD  26/35 18 28 25 18/32 22 NT 25 15   Wrist LIRIANO 73/182 114 (+41)  137 (+23)  139 (+2)  (+20) 137 (-22)      Thumb AROM  (in degrees) Right/Left Right Right   DATE 4/12/22 4/21/22  Post-tx 5/2/2   Tip to DPC   (in cm) 2.5/.8 1.5 (1.0) 1.5 (=)     Forearm AROM    Right/Left Right Right Right R Right/Left " "Right   DATE IE-1/12/22 2/1/22*  Pre-tx 2/3/22 post tx 2/10/22 2/22/22 3/15/2022 5/2/22   Supination 68/85 46 55 55 (=) 60 (+5) 60/70 72 (+12)   Pronation 90/90 85 90 88 (-2) nt 90/90 86   *different car, rapidly moving in quick end range position causes pain       Strength: (measured in psi.)     Right/Left Right Right/Left Right Right Right     2/10/2022 3/7/22 3/15/2022 4/5/22 4/21/22 5/2/22   Les Rung II 8/40 14 (+6)  15/45 14* 19 (+5) 16**   Key Pinch 4.5/4.5 8 (+3.5)  7/9 7.5 8.0 (+5) 7.5   3-pt Pinch 3/6 4.5 (+1.5)  6/7 6.5 6.0 5.5   2-pt Pinch 3/6.75 nt 4/5 5.0 (+1.0) defer defer   * ltd by ulna wrist pain  ** ltd by thumb pain     Treatment     Tracy received the following supervised modalities after being cleared for contradictions for 15 minutes:   - defer due to not being melted-Paraffin wax heat  for promoting healing, decreasing pain and increasing tissue extensibility  - Fluidotherapy for promoting healing, reducing pain, desensitization and increasing tissue extensibility    Tracy received the following manual therapy techniques for 15 minutes:   - R/A girth; encouraged wearing the size Medium 3/4 finger compression glove for edema mgmt intermittently during the day and while sleeping  - Soft tissue massage to dorsal and volar surface of right wrist and forearm during extrinsic extensor lengthening  - Grade II Rad/Carp mobilization for promoting healing and increasing periarticular tissue lengths  - defer-Mobilization with movement (Grade II Rad/Carp distractions), patient guided for wrist flexion and extension; 3 x 10" each  - AA/PROM of each wrist and forearm range of motion 1 x 10 each    Tracy performed therapeutic exercises for 25 minutes including:  Flexibility 30" x 3:   - defer-Extrinsic flexor stretch with weight bearing progression in stance over extended hands  - Wrist flexion stretch  - Step 1 of Finkelstein's stretch   Intrinsic strengthening-defer 2 min each:  -spreads " "and lifts with yellow spidyband, no thumb   -adduction and lifts with red sponges   Hand strengthening Defer to HEP- Green t-putty or ball issued by MD  - defer-Yellow digiflex, no thumb, 1 min   Wrist & forearm Strengthening- - Yellow flexbar, 2 min each for:  -defer-sup/pronation swings, held at mid-shaft  -smiles  -frowns  -twists  -isometric supination   1st CMC OA protocol, right hand Defer  - Thenar stretch; 30" x 2  - defer-C's with thumb and index finger 1 x 10  - defer-C's to O's 1 x 10 maintaining proper form & muscle activation      Objective Measures 5/2/22: R/A  and pinch  5/2/22: R/A wrist AROM  5/2/22: R/A thumb tip to DPC   HEP as set 5/2/22: Recommended t-putty only every other day  4/5/22: Green t-putty      Home Exercises and Education Provided     Education provided:   - HEP as set  - Progress made    Education provided prior sessions:  - HEP as modified t-putty strengthening program for decreased resistance and frequency  - Education on wear schedule for compressive sleeve and digit sleeve; begin with 1 hour, and if no pain, continues to wear during the day intermittently, removing for HEP and hygiene; progress to night time wear as tolerated.  - HEP for TGEs and wrist AROM    Written Home Exercises Provided: Patient instructed to cont prior HEP.  Exercises were reviewed and Tracy was able to demonstrate them prior to the end of the session.  Tracy demonstrated good  understanding of the HEP provided.     See EMR under Patient Instructions for exercises provided prior visit. 2/1/22       Assessment   5/6: Improved FOTO scores denote progress with her wrist since last measures.    Patient continues to present with ongoing pain, wrist swelling & loss of wrist AROM. She continues to have frequent falls and has increased Right wrist pain. It is difficult to discern what is causing the Right wrist pain because of this. Symptoms described in the thumb/radial wrist are suspect for 1st CMC or " DeQuervain's. Symptoms described in the ulna wrist are suspect for commonly experienced ulna styloid healing or pain from recent fall. She appears to have reached a plateau with  tolerance and pinch strength.    Tracy is progressing well towards her goals and there are no updates to goals at this time. Pt prognosis is Good.     Pt will continue to benefit from skilled outpatient occupational therapy to address the deficits listed in the problem list on initial evaluation provide pt/family education and to maximize pt's level of independence in the home and community environment.     Anticipated barriers to occupational therapy: arthritis, osteopenia, frequent falls     Pt's spiritual, cultural and educational needs considered and pt agreeable to plan of care and goals.     Goals:  Previous Short Term Goals Status:  STG's 4 weeks  1)   Patient to be IND with HEP and modalities for pain/edema managment. MET  2)   Pt to tolerate advancing PREs and flexibility activities for weight bearing positions with self-graded intensity and effective symptom monitoring. Met   3) Increase wrist and forearm LIRIANO by 60 degrees to increase functional hand use and support function positions for ADLs & leisure activities. MET   4) Digit and Thumb LIRIANO to be WNL as compared to the unaffected side for maximizing  and fine motor skills for reactivation the hand for light ADLs. met  5)  Decrease edema by 1.5 cm for evidencing soft tissue healing and effective edema mgmt. Met 2/10/22      New Short Term Goals Status=( LTG's 8 weeks):   1)   Increase right wrist and forearm LIRIANO to >75% of the right wrist to increase functional hand use for weight bearing and reaching tasks. Modified and Progressing on 2/1/22. MET   2)   Right  strength  to be >70% of the unaffected side. Progressing  3)   Right pinch strength to be >60% of the unaffected side. MET for lateral and 3-jaw pinch on 3/7/22  4)   Decrease complaints of pain to 2 out of  10 at worst to increase functional hand use and UE support functions for moderate to heavy ADL/work/leisure activities. MET 4/5/22. Returned after falling 5/2/22  5)   Patient to score at 45 % or less on Quick/DASH and/or Work module to demonstrate improved perception of functional right hand use to evidence return to near to prior level of function for I/ADLs and return to gardening. MET 4/5/22  Long Term Goal Status:   continue per initial plan of care.    Plan   5/6: refer to MD notes vs. DC planning    Pt to be treated by Occupational Therapy 2 times per week for 8 weeks during the certification period from 3/15/2022 to 5/13/22 to achieve the established goals.      Treatment to include: Paraffin, Fluidotherapy, Manual therapy/joint mobilizations, Modalities for pain management, Therapeutic exercises/activities., Strengthening, Orthotic Fabrication/Fit/Training, Edema Control, Joint Protection and Energy Conservation, as well as any other treatments deemed necessary based on the patient's needs or progress.     Patel Grijalva, OTR/L  Occupational Therapist

## 2022-05-07 ENCOUNTER — TELEPHONE (OUTPATIENT)
Dept: URGENT CARE | Facility: CLINIC | Age: 72
End: 2022-05-07
Payer: MEDICARE

## 2022-05-07 NOTE — TELEPHONE ENCOUNTER
LVM for patient to return call to clinic in regards to her negative urine culture result.    ----- Message from Mara Rosenthal PA-C sent at 5/1/2022  6:05 PM CDT -----  Please let patient know of negative urine culture.

## 2022-05-08 ENCOUNTER — TELEPHONE (OUTPATIENT)
Dept: URGENT CARE | Facility: CLINIC | Age: 72
End: 2022-05-08
Payer: MEDICARE

## 2022-05-08 NOTE — TELEPHONE ENCOUNTER
Attempted to call patient to discuss her negative urine culture result, but was unable to reach patient. Left message to call clinic at her earliest convince.    ----- Message from Mara Rosenthal PA-C sent at 5/1/2022  6:05 PM CDT -----  Please let patient know of negative urine culture.

## 2022-05-09 ENCOUNTER — CLINICAL SUPPORT (OUTPATIENT)
Dept: AUDIOLOGY | Facility: CLINIC | Age: 72
End: 2022-05-09
Payer: MEDICARE

## 2022-05-09 ENCOUNTER — TELEPHONE (OUTPATIENT)
Dept: OTOLARYNGOLOGY | Facility: CLINIC | Age: 72
End: 2022-05-09
Payer: MEDICARE

## 2022-05-09 DIAGNOSIS — R27.0 ATAXIA: ICD-10-CM

## 2022-05-09 DIAGNOSIS — H90.A31 MIXED CONDUCTIVE AND SENSORINEURAL HEARING LOSS OF RIGHT EAR WITH RESTRICTED HEARING OF LEFT EAR: Primary | ICD-10-CM

## 2022-05-09 DIAGNOSIS — R42 VERTIGO: ICD-10-CM

## 2022-05-09 DIAGNOSIS — H93.13 TINNITUS OF BOTH EARS: ICD-10-CM

## 2022-05-09 DIAGNOSIS — H81.8X1 RIGHT-SIDED VESTIBULAR WEAKNESS: Primary | ICD-10-CM

## 2022-05-09 PROCEDURE — 92545 PR OSCILLATING TRACKING TEST: ICD-10-PCS | Mod: 59,S$GLB,, | Performed by: AUDIOLOGIST

## 2022-05-09 PROCEDURE — 92541 PR SPONTANEOUS NYSTAGMUS TEST: ICD-10-PCS | Mod: S$GLB,,, | Performed by: AUDIOLOGIST

## 2022-05-09 PROCEDURE — 92557 PR COMPREHENSIVE HEARING TEST: ICD-10-PCS | Mod: S$GLB,,, | Performed by: AUDIOLOGIST

## 2022-05-09 PROCEDURE — 99999 PR PBB SHADOW E&M-EST. PATIENT-LVL I: CPT | Mod: PBBFAC,,, | Performed by: AUDIOLOGIST

## 2022-05-09 PROCEDURE — 92567 TYMPANOMETRY: CPT | Mod: S$GLB,,, | Performed by: AUDIOLOGIST

## 2022-05-09 PROCEDURE — 92544 OPTOKINETIC NYSTAGMUS TEST: CPT | Mod: 59,S$GLB,, | Performed by: AUDIOLOGIST

## 2022-05-09 PROCEDURE — 92537 PR CALORIC VSTBLR TEST W/REC BITHERMAL: ICD-10-PCS | Mod: S$GLB,,, | Performed by: AUDIOLOGIST

## 2022-05-09 PROCEDURE — 92545 OSCILLATING TRACKING TEST: CPT | Mod: 59,S$GLB,, | Performed by: AUDIOLOGIST

## 2022-05-09 PROCEDURE — 92544 PR OPTOKINETIC NYSTAGMUS TEST, BIDIREC, FOVEAL, PERIPH, W RECORD: ICD-10-PCS | Mod: 59,S$GLB,, | Performed by: AUDIOLOGIST

## 2022-05-09 PROCEDURE — 92567 PR TYMPA2METRY: ICD-10-PCS | Mod: S$GLB,,, | Performed by: AUDIOLOGIST

## 2022-05-09 PROCEDURE — 99999 PR PBB SHADOW E&M-EST. PATIENT-LVL I: ICD-10-PCS | Mod: PBBFAC,,, | Performed by: AUDIOLOGIST

## 2022-05-09 PROCEDURE — 92557 COMPREHENSIVE HEARING TEST: CPT | Mod: S$GLB,,, | Performed by: AUDIOLOGIST

## 2022-05-09 PROCEDURE — 92541 SPONTANEOUS NYSTAGMUS TEST: CPT | Mod: S$GLB,,, | Performed by: AUDIOLOGIST

## 2022-05-09 PROCEDURE — 92537 CALORIC VSTBLR TEST W/REC: CPT | Mod: S$GLB,,, | Performed by: AUDIOLOGIST

## 2022-05-09 NOTE — Clinical Note
Waldo Alfonso,  Mrs. Armendariz had a significant right weakness.  We could not complete positional testing as she fractured a rib from a fall 6 weeks ago.  I recommend vestibular therapy and gave her Cawthorne's exercises to start.  She leaves for [place] tomorrow and will be back on the 22nd.  Rose

## 2022-05-09 NOTE — PROGRESS NOTES
"Tracy Armendariz was seen today in the clinic for an audiologic evaluation.  Mrs. Armendariz reported that she has a history of a right tympanoplasty.  She noted that she perceives her hearing in her right ear as muffled and hears loud tinnitus described as "tornado sounds".  Mrs. Armendariz reported that the tinnitus is worsened by closing her eyes and is mostly noticeable in the right ear.  She also complained of fullness and pressure in her right ear.    Tympanometry revealed Type A in the right ear and Type A in the left ear.     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  Headphones and inserts were both used to verify thresholds in the right ear.    Speech reception thresholds were noted at 15 dB in the right ear and 10 dB in the left ear.    Speech discrimination scores were 88% in the right ear and 100% in the left ear.    Recommendations:  1. Otologic evaluation  2. Annual audiogram  3. Hearing protection when in noise  4. Hearing aid consultation, if desired by patient, for the right ear.              "

## 2022-05-09 NOTE — TELEPHONE ENCOUNTER
----- Message from LORRAINE Alfonso MD sent at 5/9/2022 12:53 PM CDT -----  The patient's balance testing was abnormal.  She should be getting an auditory evaluation as well.  Please set her up for a follow-up appointment with me following completion of her auditory evaluation.  ----- Message -----  From: Nedra Hidalgo, CCC-A  Sent: 5/9/2022  11:29 AM CDT  To: LORRAINE Alfonso MD    Hi Dr. Alfonso,    Mrs. Armendariz had a significant right weakness.  We could not complete positional testing as she fractured a rib from a fall 6 weeks ago.  I recommend vestibular therapy and gave her Cawthorne's exercises to start.  She leaves for [place] tomorrow and will be back on the 22nd.    Rose    Called and spoke with patient with letting her know about her results per Dr. Alfonso.

## 2022-05-09 NOTE — PROGRESS NOTES
VNG EVALUATION    Referring physician:  Dr. Alfonso    71 y.o. female complains of dizziness, lightheadedness and imbalance that have been on and off for several years.  Mrs. Armendariz reported headaches every 2-3 days.  She also reported that she had a stroke about 8 years ago.  She fell in 2021 that resulted in her falling on her face and she fell approximately 6 weeks ago fracturing her rib.  She denied taking any contraindicative medications prior to testing.  Mrs. Armendariz noted that she has fallen multiple times due to feeling off balance and has stopped going on her daily walk out of fear of falling again.  She reported loud tinnitus, similar to a tornado sound, that is worse when she closes her eyes.  Mrs. Armendariz also reported aural fullness in her right ear with a history of a right tympanoplasty    Default calibration was used after two failed attempts at calibrating accurately.  Mrs. Armendariz reported that she sees double without her glasses.  She was told to only follow one of the dots.  Gaze nystagmus was absent.  Oculomotor function was mostly normal; saccades had abnormal latency.  Spontaneous nystagmus was absent.    Positional testing could not be completed as patient fractured her rib approximately 6 weeks ago and cannot lay flat.  Left Hallpike was attempted and showed no nystagmus; however, patient was only laying down for less than 10 seconds before stopping the test.    Tympanometry revealed Type A tympanograms in both ears prior to testing.    Unilateral weakness: 50% (right)  Directional preponderance 18% (left-beating)  RW: 7 d/s  LW: 22 d/s  RC: 4 d/s   d/s  Fixation suppression was normal.    Impression: Caloric testing revealed a clinically significant right caloric weakness which is indicative of a right peripheral vestibular abnormality.      Recommend:   1. A trial with Cawthorne exercises; a copy was given to and reviewed with the patient  2. A formal Risk of Falls assessment  and formal vestibular/balance rehab  3. Follow-up with Dr. Alfonso to review the results of today's evaluation

## 2022-05-11 ENCOUNTER — TELEPHONE (OUTPATIENT)
Dept: URGENT CARE | Facility: CLINIC | Age: 72
End: 2022-05-11
Payer: MEDICARE

## 2022-05-11 NOTE — TELEPHONE ENCOUNTER
Attempted to call patient to discuss negative urine culture result, but was unable to reach. Left message for patient to call clinic.    ----- Message from Mara Rosenthal PA-C sent at 5/1/2022  6:05 PM CDT -----  Please let patient know of negative urine culture.

## 2022-05-23 ENCOUNTER — OFFICE VISIT (OUTPATIENT)
Dept: URGENT CARE | Facility: CLINIC | Age: 72
End: 2022-05-23
Payer: MEDICARE

## 2022-05-23 VITALS
BODY MASS INDEX: 36.48 KG/M2 | RESPIRATION RATE: 18 BRPM | WEIGHT: 227 LBS | DIASTOLIC BLOOD PRESSURE: 87 MMHG | OXYGEN SATURATION: 95 % | HEART RATE: 64 BPM | HEIGHT: 66 IN | SYSTOLIC BLOOD PRESSURE: 162 MMHG | TEMPERATURE: 98 F

## 2022-05-23 DIAGNOSIS — S20.212A RIB CONTUSION, LEFT, INITIAL ENCOUNTER: Primary | ICD-10-CM

## 2022-05-23 DIAGNOSIS — S29.9XXA TRAUMATIC INJURY OF RIB: ICD-10-CM

## 2022-05-23 DIAGNOSIS — M25.562 ACUTE PAIN OF LEFT KNEE: ICD-10-CM

## 2022-05-23 DIAGNOSIS — M25.552 LEFT HIP PAIN: ICD-10-CM

## 2022-05-23 DIAGNOSIS — S69.91XA RIGHT WRIST INJURY, INITIAL ENCOUNTER: ICD-10-CM

## 2022-05-23 PROCEDURE — 99214 PR OFFICE/OUTPT VISIT, EST, LEVL IV, 30-39 MIN: ICD-10-PCS | Mod: S$GLB,,, | Performed by: PHYSICIAN ASSISTANT

## 2022-05-23 PROCEDURE — 99214 OFFICE O/P EST MOD 30 MIN: CPT | Mod: S$GLB,,, | Performed by: PHYSICIAN ASSISTANT

## 2022-05-23 PROCEDURE — 3008F PR BODY MASS INDEX (BMI) DOCUMENTED: ICD-10-PCS | Mod: CPTII,S$GLB,, | Performed by: PHYSICIAN ASSISTANT

## 2022-05-23 PROCEDURE — 3077F SYST BP >= 140 MM HG: CPT | Mod: CPTII,S$GLB,, | Performed by: PHYSICIAN ASSISTANT

## 2022-05-23 PROCEDURE — 1159F MED LIST DOCD IN RCRD: CPT | Mod: CPTII,S$GLB,, | Performed by: PHYSICIAN ASSISTANT

## 2022-05-23 PROCEDURE — 73562 XR KNEE 3 VIEW LEFT: ICD-10-PCS | Mod: FY,LT,S$GLB, | Performed by: RADIOLOGY

## 2022-05-23 PROCEDURE — 1125F AMNT PAIN NOTED PAIN PRSNT: CPT | Mod: CPTII,S$GLB,, | Performed by: PHYSICIAN ASSISTANT

## 2022-05-23 PROCEDURE — 3079F PR MOST RECENT DIASTOLIC BLOOD PRESSURE 80-89 MM HG: ICD-10-PCS | Mod: CPTII,S$GLB,, | Performed by: PHYSICIAN ASSISTANT

## 2022-05-23 PROCEDURE — 73502 X-RAY EXAM HIP UNI 2-3 VIEWS: CPT | Mod: FY,LT,S$GLB, | Performed by: RADIOLOGY

## 2022-05-23 PROCEDURE — 3008F BODY MASS INDEX DOCD: CPT | Mod: CPTII,S$GLB,, | Performed by: PHYSICIAN ASSISTANT

## 2022-05-23 PROCEDURE — 1125F PR PAIN SEVERITY QUANTIFIED, PAIN PRESENT: ICD-10-PCS | Mod: CPTII,S$GLB,, | Performed by: PHYSICIAN ASSISTANT

## 2022-05-23 PROCEDURE — 73110 XR WRIST COMPLETE 3 VIEWS RIGHT: ICD-10-PCS | Mod: FY,RT,S$GLB, | Performed by: RADIOLOGY

## 2022-05-23 PROCEDURE — 4010F ACE/ARB THERAPY RXD/TAKEN: CPT | Mod: CPTII,S$GLB,, | Performed by: PHYSICIAN ASSISTANT

## 2022-05-23 PROCEDURE — 1159F PR MEDICATION LIST DOCUMENTED IN MEDICAL RECORD: ICD-10-PCS | Mod: CPTII,S$GLB,, | Performed by: PHYSICIAN ASSISTANT

## 2022-05-23 PROCEDURE — 3079F DIAST BP 80-89 MM HG: CPT | Mod: CPTII,S$GLB,, | Performed by: PHYSICIAN ASSISTANT

## 2022-05-23 PROCEDURE — 71101 X-RAY EXAM UNILAT RIBS/CHEST: CPT | Mod: FY,LT,S$GLB, | Performed by: RADIOLOGY

## 2022-05-23 PROCEDURE — 73110 X-RAY EXAM OF WRIST: CPT | Mod: FY,RT,S$GLB, | Performed by: RADIOLOGY

## 2022-05-23 PROCEDURE — 71101 XR RIBS MIN 3 VIEWS W/ PA CHEST LEFT: ICD-10-PCS | Mod: FY,LT,S$GLB, | Performed by: RADIOLOGY

## 2022-05-23 PROCEDURE — 73502 XR HIP WITH PELVIS WHEN PERFORMED, 2 OR 3 VIEWS LEFT: ICD-10-PCS | Mod: FY,LT,S$GLB, | Performed by: RADIOLOGY

## 2022-05-23 PROCEDURE — 73562 X-RAY EXAM OF KNEE 3: CPT | Mod: FY,LT,S$GLB, | Performed by: RADIOLOGY

## 2022-05-23 PROCEDURE — 4010F PR ACE/ARB THEARPY RXD/TAKEN: ICD-10-PCS | Mod: CPTII,S$GLB,, | Performed by: PHYSICIAN ASSISTANT

## 2022-05-23 PROCEDURE — 3077F PR MOST RECENT SYSTOLIC BLOOD PRESSURE >= 140 MM HG: ICD-10-PCS | Mod: CPTII,S$GLB,, | Performed by: PHYSICIAN ASSISTANT

## 2022-05-23 RX ORDER — LIDOCAINE 50 MG/G
1 PATCH TOPICAL DAILY
Qty: 7 PATCH | Refills: 0 | Status: SHIPPED | OUTPATIENT
Start: 2022-05-23 | End: 2022-06-02

## 2022-05-23 NOTE — PATIENT INSTRUCTIONS
PLEASE READ YOUR DISCHARGE INSTRUCTIONS ENTIRELY AS IT CONTAINS IMPORTANT INFORMATION.  - Rest.    - Drink plenty of fluids.    - Tylenol or Ibuprofen as directed as needed for fever/pain.    - If you were prescribed antibiotics, please take them to completion.  - If you are female and on birth control pills - please use additional methods of contraception to prevent pregnancy while on antibiotics and for one cycle after.   - If you were prescribed a narcotic medication, a cough syrup, or a muscle relaxer, do not drive or operate heavy equipment or machinery while taking these medications, as they can cause drowsiness.   - If a referral to a specialty was made today, you should receive a phone call in the next few days to schedule an appt.  Please call [phone] to schedule an appt if have not gotten a phone call in the next few days.  - If you smoke, please stop smoking.  -You must understand that you've received an Urgent Care treatment only and that you may be released before all your medical problems are known or treated. You, the patient, will arrange for follow up care as instructed. Please arrange follow up with your primary medical clinic as soon as possible.   - Follow up with your PCP or specialty clinic as directed in the next 1-2 weeks if not improved or as needed.  You can call [phone] to schedule an appointment with the appropriate provider.    - Please return to Urgent Care or to the Emergency Department if your symptoms worsen.    Patient aware and verbalized understanding.

## 2022-05-23 NOTE — PROGRESS NOTES
"Subjective:       Patient ID: Tracy Armendariz is a 71 y.o. female.    Vitals:  height is 5' 6" (1.676 m) and weight is 103 kg (227 lb). Her temperature is 98.2 °F (36.8 °C). Her blood pressure is 162/87 (abnormal) and her pulse is 64. Her respiration is 18 and oxygen saturation is 95%.     Chief Complaint: Rib Injury    Ms. Armendariz presents for evaluation of multiple joint pain after fall on Friday.  She was in [place] at the time and tripped, falling on her left side.  She did not hit her head or lose consciousness.  She does take daily aspirin, but no anticoagulants.  She is complaining of right wrist, left knee, left hip, left rib pain.  She has been ambulatory.  She does have a history of prior fracture in her right wrist.  She denies any shortness of breath, but is having pain with breathing and coughing in the left lateral lower ribs.  She has been taking Tylenol at home with some relief.    Chest Pain   The problem has been gradually worsening. Pertinent negatives include no abdominal pain, cough, diaphoresis, dizziness, fever, headaches, nausea, palpitations, shortness of breath, sputum production, syncope, vomiting or weakness. She has tried acetaminophen for the symptoms. The treatment provided no relief.       Constitution: Negative for appetite change, chills, sweating, fatigue and fever.   HENT: Negative for ear pain, ear discharge, hearing loss, drooling, congestion, postnasal drip, sinus pain, sinus pressure and sore throat.    Neck: Negative for neck stiffness and painful lymph nodes.   Cardiovascular: Positive for chest pain. Negative for chest trauma, leg swelling, palpitations, sob on exertion and passing out.   Eyes: Negative for eye pain and blurred vision.   Respiratory: Negative for chest tightness, cough, sputum production, shortness of breath and wheezing.    Gastrointestinal: Negative for abdominal pain, nausea, vomiting and diarrhea.   Genitourinary: Negative for dysuria, frequency and " urgency.   Musculoskeletal: Positive for pain, trauma, joint pain and joint swelling. Negative for muscle cramps and muscle ache.   Skin: Negative for rash.   Allergic/Immunologic: Negative for itching and sneezing.   Neurological: Negative for dizziness, history of vertigo, light-headedness, passing out, facial drooping, speech difficulty, coordination disturbances, loss of balance, headaches and altered mental status.   Hematologic/Lymphatic: Negative for swollen lymph nodes and easy bruising/bleeding. Does not bruise/bleed easily.   Psychiatric/Behavioral: Negative for altered mental status.       Objective:      Physical Exam   Constitutional: She is oriented to person, place, and time. She appears well-developed. She is cooperative.  Non-toxic appearance. She does not appear ill. No distress.   HENT:   Head: Normocephalic and atraumatic.   Ears:   Right Ear: Hearing, tympanic membrane, external ear and ear canal normal.   Left Ear: Hearing, tympanic membrane, external ear and ear canal normal.   Nose: Nose normal. No mucosal edema, rhinorrhea or nasal deformity. No epistaxis. Right sinus exhibits no maxillary sinus tenderness and no frontal sinus tenderness. Left sinus exhibits no maxillary sinus tenderness and no frontal sinus tenderness.   Mouth/Throat: Uvula is midline, oropharynx is clear and moist and mucous membranes are normal. No trismus in the jaw. Normal dentition. No uvula swelling. No posterior oropharyngeal erythema.   Eyes: Conjunctivae and lids are normal. Right eye exhibits no discharge. Left eye exhibits no discharge. No scleral icterus.   Neck: Trachea normal and phonation normal. Neck supple.   Cardiovascular: Normal rate, regular rhythm, normal heart sounds and normal pulses.   Pulmonary/Chest: Effort normal and breath sounds normal. No stridor. No respiratory distress. She has no decreased breath sounds. She has no wheezes. She has no rhonchi. She has no rales. She exhibits tenderness. She  exhibits no crepitus, no edema and no swelling.   Left lower lateral ribs with ecchymosis.  TTP.  No crepitus.             Comments: Left lower lateral ribs with ecchymosis.  TTP.  No crepitus.    Abdominal: Normal appearance and bowel sounds are normal. She exhibits no distension and no mass. Soft. There is no abdominal tenderness.   Musculoskeletal: Normal range of motion.         General: Normal range of motion.      Right wrist: She exhibits deformity (Baseline).        Arms:         Legs:       Comments: TTP of greater trochanter area.  She has full range of motion of her hip.  She has not have pain with external rotation but does have pain with internal rotation.  Knee has full range of motion with ecchymosis present.  Tenderness to medial joint line.  LLE is NVI.  Right wrist with tenderness about the distal ulnar aspect.  Mild edema and no ecchymosis.  She does have deformity, but states this is baseline from prior fracture.  She has full range of motion.  NVI.   Neurological: She is alert and oriented to person, place, and time. She exhibits normal muscle tone. Coordination normal.   Skin: Skin is warm, dry, intact, not diaphoretic and not pale.   Psychiatric: Her speech is normal and behavior is normal. Judgment and thought content normal.   Nursing note and vitals reviewed.        XR R wrist - Wrist complete three views right: There is a distal radial fracture with intra-articular extension and posterior angulation.  There is an ulnar styloid fracture.  There is no worrisome change.  There is baseline DJD.    XR L hip - No acute process seen.    XR L knee - No evidence of a fracture or dislocation of the left knee.     Small suprapatellar joint effusion.     XR L ribs/CXR - No acute process seen.    Assessment:       1. Rib contusion, left, initial encounter    2. Right wrist injury, initial encounter    3. Left hip pain    4. Acute pain of left knee    5. Traumatic injury of rib          Plan:          Rib contusion, left, initial encounter    Right wrist injury, initial encounter  -     XR WRIST COMPLETE 3 VIEWS RIGHT; Future; Expected date: 05/23/2022    Left hip pain  -     X-Ray Hip 2 or 3 views Left (with Pelvis when performed); Future; Expected date: 05/23/2022    Acute pain of left knee  -     XR KNEE 3 VIEW LEFT; Future; Expected date: 05/23/2022    Traumatic injury of rib  -     XR RIB LEFT W/ PA CHEST; Future; Expected date: 05/23/2022    Other orders  -     LIDOcaine (LIDODERM) 5 %; Place 1 patch onto the skin once daily. Remove & Discard patch within 12 hours or as directed by MD for 7 days  Dispense: 7 patch; Refill: 0    Offered pain medication, but patient would prefer lidoderm & tylenol, as this was effective her last fall with rib fracture.     Diagnoses and plan discussed with the patient, as well as the expected course and duration of her symptoms. All questions and concerns were addressed prior to discharge.  She was advised to follow up with her PCP within 1 week if symptoms do not improve. Emergency department precautions were given. Patient verbalized understanding and was happy with the plan of care.   Note dictated with voice recognition software, please excuse any grammatical errors.    Patient Instructions   PLEASE READ YOUR DISCHARGE INSTRUCTIONS ENTIRELY AS IT CONTAINS IMPORTANT INFORMATION.  - Rest.    - Drink plenty of fluids.    - Tylenol or Ibuprofen as directed as needed for fever/pain.    - If you were prescribed antibiotics, please take them to completion.  - If you are female and on birth control pills - please use additional methods of contraception to prevent pregnancy while on antibiotics and for one cycle after.   - If you were prescribed a narcotic medication, a cough syrup, or a muscle relaxer, do not drive or operate heavy equipment or machinery while taking these medications, as they can cause drowsiness.   - If a referral to a specialty was made today, you should  receive a phone call in the next few days to schedule an appt.  Please call [phone] to schedule an appt if have not gotten a phone call in the next few days.  - If you smoke, please stop smoking.  -You must understand that you've received an Urgent Care treatment only and that you may be released before all your medical problems are known or treated. You, the patient, will arrange for follow up care as instructed. Please arrange follow up with your primary medical clinic as soon as possible.   - Follow up with your PCP or specialty clinic as directed in the next 1-2 weeks if not improved or as needed.  You can call [phone] to schedule an appointment with the appropriate provider.    - Please return to Urgent Care or to the Emergency Department if your symptoms worsen.    Patient aware and verbalized understanding.

## 2022-05-30 ENCOUNTER — OFFICE VISIT (OUTPATIENT)
Dept: ORTHOPEDICS | Facility: CLINIC | Age: 72
End: 2022-05-30
Payer: MEDICARE

## 2022-05-30 ENCOUNTER — TELEPHONE (OUTPATIENT)
Dept: OTOLARYNGOLOGY | Facility: CLINIC | Age: 72
End: 2022-05-30
Payer: MEDICARE

## 2022-05-30 VITALS — WEIGHT: 227 LBS | HEIGHT: 66 IN | BODY MASS INDEX: 36.48 KG/M2

## 2022-05-30 DIAGNOSIS — S62.101D CLOSED FRACTURE OF RIGHT WRIST WITH ROUTINE HEALING, SUBSEQUENT ENCOUNTER: Primary | ICD-10-CM

## 2022-05-30 PROCEDURE — 1125F PR PAIN SEVERITY QUANTIFIED, PAIN PRESENT: ICD-10-PCS | Mod: CPTII,S$GLB,, | Performed by: ORTHOPAEDIC SURGERY

## 2022-05-30 PROCEDURE — 99213 PR OFFICE/OUTPT VISIT, EST, LEVL III, 20-29 MIN: ICD-10-PCS | Mod: 25,S$GLB,, | Performed by: ORTHOPAEDIC SURGERY

## 2022-05-30 PROCEDURE — 99999 PR PBB SHADOW E&M-EST. PATIENT-LVL IV: ICD-10-PCS | Mod: PBBFAC,,, | Performed by: ORTHOPAEDIC SURGERY

## 2022-05-30 PROCEDURE — 20605 DRAIN/INJ JOINT/BURSA W/O US: CPT | Mod: RT,S$GLB,, | Performed by: ORTHOPAEDIC SURGERY

## 2022-05-30 PROCEDURE — 3008F BODY MASS INDEX DOCD: CPT | Mod: CPTII,S$GLB,, | Performed by: ORTHOPAEDIC SURGERY

## 2022-05-30 PROCEDURE — 1159F PR MEDICATION LIST DOCUMENTED IN MEDICAL RECORD: ICD-10-PCS | Mod: CPTII,S$GLB,, | Performed by: ORTHOPAEDIC SURGERY

## 2022-05-30 PROCEDURE — 1159F MED LIST DOCD IN RCRD: CPT | Mod: CPTII,S$GLB,, | Performed by: ORTHOPAEDIC SURGERY

## 2022-05-30 PROCEDURE — 99999 PR PBB SHADOW E&M-EST. PATIENT-LVL IV: CPT | Mod: PBBFAC,,, | Performed by: ORTHOPAEDIC SURGERY

## 2022-05-30 PROCEDURE — 99213 OFFICE O/P EST LOW 20 MIN: CPT | Mod: 25,S$GLB,, | Performed by: ORTHOPAEDIC SURGERY

## 2022-05-30 PROCEDURE — 4010F ACE/ARB THERAPY RXD/TAKEN: CPT | Mod: CPTII,S$GLB,, | Performed by: ORTHOPAEDIC SURGERY

## 2022-05-30 PROCEDURE — 3288F FALL RISK ASSESSMENT DOCD: CPT | Mod: CPTII,S$GLB,, | Performed by: ORTHOPAEDIC SURGERY

## 2022-05-30 PROCEDURE — 3288F PR FALLS RISK ASSESSMENT DOCUMENTED: ICD-10-PCS | Mod: CPTII,S$GLB,, | Performed by: ORTHOPAEDIC SURGERY

## 2022-05-30 PROCEDURE — 3008F PR BODY MASS INDEX (BMI) DOCUMENTED: ICD-10-PCS | Mod: CPTII,S$GLB,, | Performed by: ORTHOPAEDIC SURGERY

## 2022-05-30 PROCEDURE — 1125F AMNT PAIN NOTED PAIN PRSNT: CPT | Mod: CPTII,S$GLB,, | Performed by: ORTHOPAEDIC SURGERY

## 2022-05-30 PROCEDURE — 4010F PR ACE/ARB THEARPY RXD/TAKEN: ICD-10-PCS | Mod: CPTII,S$GLB,, | Performed by: ORTHOPAEDIC SURGERY

## 2022-05-30 PROCEDURE — 1101F PT FALLS ASSESS-DOCD LE1/YR: CPT | Mod: CPTII,S$GLB,, | Performed by: ORTHOPAEDIC SURGERY

## 2022-05-30 PROCEDURE — 20605 PR DRAIN/INJECT INTERMEDIATE JOINT/BURSA: ICD-10-PCS | Mod: RT,S$GLB,, | Performed by: ORTHOPAEDIC SURGERY

## 2022-05-30 PROCEDURE — 1101F PR PT FALLS ASSESS DOC 0-1 FALLS W/OUT INJ PAST YR: ICD-10-PCS | Mod: CPTII,S$GLB,, | Performed by: ORTHOPAEDIC SURGERY

## 2022-05-30 RX ORDER — TRIAMCINOLONE ACETONIDE 40 MG/ML
20 INJECTION, SUSPENSION INTRA-ARTICULAR; INTRAMUSCULAR
Status: COMPLETED | OUTPATIENT
Start: 2022-05-30 | End: 2022-05-30

## 2022-05-30 RX ADMIN — TRIAMCINOLONE ACETONIDE 20 MG: 40 INJECTION, SUSPENSION INTRA-ARTICULAR; INTRAMUSCULAR at 03:05

## 2022-05-30 NOTE — PROGRESS NOTES
Subjective:      Patient ID: Tracy Armendariz is a 71 y.o. female.  Chief Complaint: Pain and Follow-up of the Right Wrist      HPI  Tracy Armendariz is a  71 y.o. female presenting today for follow up of right distal radius fracture.  She reports that she is now about 6 months out from injury  She was treated non operatively in a cast  Unfortunately she did have some settling of the fracture and as result of this she has some prominence of the distal ulna  We have talked about this with her before but today it seems to be causing increased pain in the wrist  She is complaining of some ulnar-sided right wrist pain in the therapist was concerned about this  She has been in therapy and feels like therapy is helping  .    Review of patient's allergies indicates:   Allergen Reactions    Iodinated contrast media Hives and Rash    Gabapentin Hallucinations    Iodine Hives    Isothiazolinones Rash    Penicillins Rash         Current Outpatient Medications   Medication Sig Dispense Refill    albuterol (PROVENTIL/VENTOLIN HFA) 90 mcg/actuation inhaler INHALE 2 PUFFS EVERY 6 HOURS AS NEEDED FOR WHEEZING 18 g 0    aspirin 81 MG Chew Take 81 mg by mouth once daily.      atorvastatin (LIPITOR) 10 MG tablet Take 1 tablet (10 mg total) by mouth once daily. 90 tablet 3    buPROPion (WELLBUTRIN XL) 300 MG 24 hr tablet Take 1 tablet (300 mg total) by mouth once daily. 90 tablet 11    calcium carbonate (OS-SPENCER) 600 mg (1,500 mg) Tab Take 600 mg by mouth 2 (two) times daily with meals.      cholecalciferol, vitamin D3, 1,000 unit Chew Take 1,000 Units by mouth once daily.      diclofenac sodium (VOLTAREN) 1 % Gel APPLY 2-4 GRAMS TO EACH PAINFUL AREA FOUR TIMES DAILY - MAX 32 GRAMS/ g 6    EScitalopram oxalate (LEXAPRO) 20 MG tablet TAKE 1 TABLET(20 MG) BY MOUTH EVERY DAY (Patient taking differently: Take 20 mg by mouth once daily.) 90 tablet 3    esomeprazole (NEXIUM) 40 MG capsule Take 1 capsule (40 mg total) by  mouth daily as needed (heartburn). 30 capsule 3    famotidine (PEPCID) 10 MG tablet Take 10 mg by mouth 2 (two) times daily.      FLUAD [id number], 65 YR UP,,PF, 45 mcg (15 mcg x 3)/0.5 mL Syrg       hydrocortisone 2.5 % cream Apply topically 2 (two) times daily to affected area 30 g 2    LACTOBACILLUS ACIDOPHILUS (PROBIOTIC ORAL) Take 1 capsule by mouth once daily. PRN      LIDOcaine (LIDODERM) 5 % Place 1 patch onto the skin once daily. Remove & Discard patch within 12 hours or as directed by MD for 7 days 7 patch 0    LIDOcaine-prilocaine (EMLA) cream APPLY 2 GRAMS 3-4 TIMES DAILY FOR TREATMENT OF PAIN 240 g 6    losartan (COZAAR) 100 MG tablet TAKE 1 TABLET(100 MG) BY MOUTH EVERY DAY 90 tablet 3    meclizine (ANTIVERT) 12.5 mg tablet Take 1 tablet (12.5 mg total) by mouth 3 (three) times daily as needed for Dizziness or Nausea. 30 tablet 6    MULTIVIT WITH CALCIUM,IRON,MIN (WOMEN'S DAILY MULTIVITAMIN ORAL) Take 1 tablet by mouth once daily.      ondansetron (ZOFRAN-ODT) 4 MG TbDL dissolve 1 tablet (4 mg total) by mouth every 8 (eight) hours as needed (nausea). 20 tablet 0    tamsulosin (FLOMAX) 0.4 mg Cap TAKE 1 CAPSULE(0.4 MG) BY MOUTH EVERY DAY 90 capsule 0     No current facility-administered medications for this visit.       Past Medical History:   Diagnosis Date    Allergy     Anticoagulant long-term use     Arthritis     CVA (cerebral infarction)     Depression     Disorder of kidney and ureter     renal stones    Diverticulosis of colon     Extrinsic asthma, unspecified     Hyperlipidemia     Hypertension     Kidney stone     Left atrial enlargement 2014    Low back pain     MARTIN (obstructive sleep apnea)     Osteopenia     PUD (peptic ulcer disease)     Stroke  2013    Urinary tract infection        Past Surgical History:   Procedure Laterality Date    APPENDECTOMY      @ time of hysterectomy    BACK SURGERY      CATARACT EXTRACTION        "SECTION  1977    CHOLECYSTECTOMY  1998    laparoscopic    COLONOSCOPY N/A 5/29/2018    Procedure: COLONOSCOPY/Golytely;  Surgeon: Janine Black MD;  Location[facility] ENDO;  Service: Endoscopy;  Laterality: N/A;    DILATION AND CURETTAGE OF UTERUS  1972    HYSTERECTOMY  1978    TAHUSO with appendectomy    INNER EAR SURGERY      replaced ear drum    JOINT REPLACEMENT Right     knee    KNEE ARTHROSCOPY W/ DEBRIDEMENT  2003    LUMBAR DISCECTOMY  1980    L4-L5    OOPHORECTOMY      unilateral    TONSILLECTOMY      TYMPANOPLASTY      URETEROSCOPIC REMOVAL OF URETERIC CALCULUS Left 12/19/2018    Procedure: REMOVAL, CALCULUS, URETER, URETEROSCOPIC, holmium laser lithotripsy, stone basket extraction, retrograde pyelogram, ureteral stent exchange;  Surgeon: Anaya Zamora MD;  Location: [facility] OR;  Service: Urology;  Laterality: Left;       OBJECTIVE:   PHYSICAL EXAM:  Height: 5' 6" (167.6 cm) Weight: 103 kg (227 lb)  Vitals:    05/30/22 1427   Weight: 103 kg (227 lb)   Height: 5' 6" (1.676 m)   PainSc:   8     Ortho/SPM Exam   Examination right wrist shows prominence and tenderness of the distal ulna  Range of motion of the wrist is actually pretty good but she does have limited ulnar deviation and pain with this maneuver  Range of motion fingers full  strength slightly decreased  Tinel sign negative  Sensation intact all digits      RADIOGRAPHS:  AP lateral x-ray of the right wrist demonstrates healed distal radius fracture with shortening and prominence of the distal ulna  Comments: I have personally reviewed the imaging and I agree with the above radiologist's report.    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    PLAN:  I explained the nature of the problem to the patient  As result of the settling of the radius fracture it has caused prominence of the distal ulna  This can be treated surgically with ulnar shortening osteotomy and we " talked about that briefly today  She is not quite sure she wants surgery though and might like to try an injection today   After pause for time-out identified the right wrist injected ulnar carpal joint with combination Kenalog 20 mg 0.5 cc xylocaine sterile technique  She tolerated the procedure well without complication  I reordered therapy the right hand and wrist  Advil or Motrin by mouth        FOLLOW UP:  4-6 weeks    Disclaimer: This note has been generated using voice-recognition software. There may be typographical errors that have been missed during proof-reading.

## 2022-05-31 ENCOUNTER — PATIENT MESSAGE (OUTPATIENT)
Dept: ADMINISTRATIVE | Facility: HOSPITAL | Age: 72
End: 2022-05-31
Payer: MEDICARE

## 2022-05-31 RX ORDER — MECLIZINE HCL 12.5 MG 12.5 MG/1
12.5 TABLET ORAL 3 TIMES DAILY PRN
Qty: 30 TABLET | Refills: 6 | Status: ON HOLD | OUTPATIENT
Start: 2022-05-31 | End: 2022-12-14 | Stop reason: HOSPADM

## 2022-06-01 RX ORDER — ONDANSETRON 4 MG/1
4 TABLET, ORALLY DISINTEGRATING ORAL EVERY 8 HOURS PRN
Qty: 20 TABLET | Refills: 0 | Status: SHIPPED | OUTPATIENT
Start: 2022-06-01 | End: 2022-06-10

## 2022-06-02 ENCOUNTER — TELEPHONE (OUTPATIENT)
Dept: OTOLARYNGOLOGY | Facility: CLINIC | Age: 72
End: 2022-06-02
Payer: MEDICARE

## 2022-06-02 ENCOUNTER — HOSPITAL ENCOUNTER (OUTPATIENT)
Dept: RADIOLOGY | Facility: HOSPITAL | Age: 72
Discharge: HOME | End: 2022-06-02
Attending: FAMILY MEDICINE
Payer: MEDICARE

## 2022-06-02 ENCOUNTER — CLINICAL SUPPORT (OUTPATIENT)
Dept: REHABILITATION | Facility: HOSPITAL | Age: 72
End: 2022-06-02
Payer: MEDICARE

## 2022-06-02 DIAGNOSIS — M79.89 SWELLING OF RIGHT HAND: ICD-10-CM

## 2022-06-02 DIAGNOSIS — S62.101D CLOSED FRACTURE OF RIGHT WRIST WITH ROUTINE HEALING, SUBSEQUENT ENCOUNTER: ICD-10-CM

## 2022-06-02 DIAGNOSIS — Z12.31 ENCOUNTER FOR SCREENING MAMMOGRAM FOR MALIGNANT NEOPLASM OF BREAST: ICD-10-CM

## 2022-06-02 DIAGNOSIS — H81.391 PERIPHERAL VERTIGO INVOLVING RIGHT EAR: ICD-10-CM

## 2022-06-02 DIAGNOSIS — Z91.81 AT HIGH RISK FOR FALLS: ICD-10-CM

## 2022-06-02 DIAGNOSIS — R27.0 ATAXIA: ICD-10-CM

## 2022-06-02 DIAGNOSIS — R68.89 ALTERATION IN SELF-CARE ABILITY: ICD-10-CM

## 2022-06-02 DIAGNOSIS — M25.531 PAIN IN RIGHT WRIST: Primary | ICD-10-CM

## 2022-06-02 DIAGNOSIS — M25.641 DECREASED RANGE OF MOTION OF FINGER OF RIGHT HAND: ICD-10-CM

## 2022-06-02 DIAGNOSIS — M25.631 DECREASED RANGE OF MOTION OF RIGHT WRIST: ICD-10-CM

## 2022-06-02 PROCEDURE — 77067 SCR MAMMO BI INCL CAD: CPT | Mod: TC

## 2022-06-02 PROCEDURE — 97140 MANUAL THERAPY 1/> REGIONS: CPT | Mod: PN

## 2022-06-02 PROCEDURE — 77063 BREAST TOMOSYNTHESIS BI: CPT | Mod: TC

## 2022-06-02 PROCEDURE — 77063 BREAST TOMOSYNTHESIS BI: CPT | Mod: 26,,, | Performed by: RADIOLOGY

## 2022-06-02 PROCEDURE — 77063 MAMMO DIGITAL SCREENING BILAT WITH TOMO: ICD-10-PCS | Mod: 26,,, | Performed by: RADIOLOGY

## 2022-06-02 PROCEDURE — 77067 SCR MAMMO BI INCL CAD: CPT | Mod: 26,,, | Performed by: RADIOLOGY

## 2022-06-02 PROCEDURE — 97110 THERAPEUTIC EXERCISES: CPT | Mod: PN

## 2022-06-02 PROCEDURE — 77067 MAMMO DIGITAL SCREENING BILAT WITH TOMO: ICD-10-PCS | Mod: 26,,, | Performed by: RADIOLOGY

## 2022-06-02 NOTE — PATIENT INSTRUCTIONS
[facility] OCCUP[facility] THERAPY  HOME EXERCISE PROGRAM   ASHLEY BRAGA, OT

## 2022-06-02 NOTE — TELEPHONE ENCOUNTER
Called and spoke with patient on today per Dr. Alfonso please schedule pt for an earlier appointment. Patient schedule for 06/10/22 at 1:40 pm.

## 2022-06-03 NOTE — PLAN OF CARE
Occupational Therapy Updated Plan of Care     Name: Tracy Armendariz  Clinic Number: [id number]    Therapy Diagnosis:   Encounter Diagnoses   Name Primary?    Closed fracture of right wrist with routine healing, subsequent encounter     Pain in right wrist Yes    Decreased range of motion of right wrist     Swelling of right hand     Alteration in self-care ability     Decreased range of motion of finger of right hand      Physician: Carlos Baum MD    Visit Date: 6/2/2022    Medical Diagnosis: S62.101D (ICD-10-CM) - Closed fracture of right wrist with routine healing, subsequent encounter  Physician Orders: Eval and treat  Evaluation Date: 1/12/2022  Insurance Authorization Period Expiration: through 5/30/23  Plan of Care from 3/15/2022-5/13/2022  Date of Return to MD: LORE  5/30/22: IMPRESSION:  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   I reordered therapy the right hand and wrist  4/18/22: IMPRESSION:  Right hand weakness after wrist fracture: PLAN:  I reordered OT for the right hand and wrist for range of motion and strengthening  I want her to continue with a home exercise program including use of the squeeze ball  I have also encouraged her to use her right hand as much as possible  Date of Onset: 11/9/21    Visit # / Visits authorized: 27/40  FOTO (DASH) at [facility]: 69.2 %  FOTO (DASH) RA 2/24/22: 53.3 (+15.9%)  FOTO (DASH) RA 3/15/22: 59%   FOTO (DASH) RA 4/5/22: 39.2% (+14.1%)    Time In: 1215  Time Out: 1300  Total Billable Time: 45 minutes    Precautions:  Standard and blood thinners, Osteopenia; falls; gait and balance problems d/t Vertigo; Precautions as per imaging and s/p Week 12+  5/30/22 X-ray: AP lateral x-ray of the right wrist demonstrates healed distal radius fracture with shortening and prominence of the distal ulna.  1/28/22 X-ray: FINDINGS: Osteopenia.  Reconfirmed findings of subacute healing comminuted distal radial fracture with no detrimental  "changes in the appearance of the fracture site or fracture fragments continued mild dorsal angulation of the distal radius similar to prior exam.  Reconfirmed tiny diastasis ulnar styloid tip fracture.  No new fractures.  Subjective     Pt reports: Patient returned recently from vacation in [place].  She reported 2 falls during her trip, but without injury.  Response to previous treatments:   No adverse reactions. Limited pain.  "I still can't pour tea from a gallon jug or open twist tops on bottles.  Functional change:   5/6: Pt states "I'm using it so much more"    Pain:   Patient having a 3/10 in ulnar right arm at arrival.   4/10 at worst L thumb  4/10 at worst R thumb and R Wrist  Location: as above   Objective      Edema: Circumferential measurements: In centimters     Right/Left Right Right Right  Right Right Right Right Right/Left Right Right Right     IE-1/12/22 2/1/22 2/3/22 2/10/22 2/14/22 2/17 2/22/22 2/24/22 3/15/2022 4/28/22 5/2/22 6/2/22   IF P1  6.7/6.3 7.3 (+0.6) 7.2 (-.1) 6.8 (-0.4) 6.9 (+0.1) 7.0 7.0 6.8 (-0.2) 6.8/6.5 6.7 (-0.1)  6.6 (-0.1)   SF P1   5.8/5.6 6.1 (+0.3) 6.2 (+0.1) 5.5 (-0.7) 5.6 (+0.1) 5.9 6.0 5.6 (-0.4) 5.8/5.4 5.7(-0.1)  5.5 (-0.2)   Thumb P1 7.4/7.5 7.4 (=) 7.0 (-0.4) 6.6 (-0.4) 6.8 (+0.2) 7.3 7.1 6.9 (-0.2) 6.8/6.5 7.0(+0.2)  6.6 (-0.4)   DPC 19/17.5 19.7 (+0.7) 19 (-0.7) 18.9 (-0.1) 19.4 (+0.5) 19.0 19.2 18.8 (-0.4) 18.5/17.5 18.9(+0.4)  17.5 (-1.4)   Wrist 18.3/17.0 19.5/17.9 18.8 (-0.8) 18.8 (=) 18.9 (+0.1) 18.2 18.2 18.3 (+0.1) 18/16.5 18.1(+0.1)  18.6 (+.5) 17.9 (-0.7)                               Hand range of motion: Composite finger flexion/extension: Right within normal limits/ Left hand is within normal limits     Wrist AROM    Right/Left Right Right R Right/Left R R R R R   DATE IE-1/12/22 2/1/22  Post-tx 2/3/22  Post-tx 2/22/22 3/15/2022 4/8/22 4/12/22 4/21/22 5/2/22 6/2/22   EXT 30/90 49 55 64 66/80 65 70 72 72 67   FLX 17/45 36 28 37 38/70 40 50 46 40 42   RD " 0/12 11 23 11 18/25 12 NT 16 10 12   UD  26/35 18 28 25 18/32 22 NT 25 15 33   Wrist LIRIANO 73/182 114 (+41)  137 (+23)  139 (+2)  (+20) 137 (-22) 154 (+17)      Thumb AROM  (in degrees) Right/Left Right Right Right   DATE 4/12/22 4/21/22  Post-tx 5/2/2 6/2/22   Tip to DPC   (in cm) 2.5/.8 1.5 (1.0) 1.5 (=) 0 (-1.5)     Forearm AROM    Right/Left Right Right Right R Right/Left Right Right   DATE IE-1/12/22 2/1/22*  Pre-tx 2/3/22 post tx 2/10/22 2/22/22 3/15/2022 5/2/22 6/2/22   Supination 68/85 46 55 55 (=) 60 (+5) 60/70 72 (+12) 80   Pronation 90/90 85 90 88 (-2) nt 90/90 86 85   *different car, rapidly moving in quick end range position causes pain       Strength: (measured in psi.)     Right/Left Right Right/Left Right Right Right Right     2/10/2022 3/7/22 3/15/2022 4/5/22 4/21/22 5/2/22 6/2/22   Les Rung II 8/40 14 (+6)  15/45 14* 19 (+5) 16** 26 (+10)   Key Pinch 4.5/4.5 8 (+3.5)  7/9 7.5 8.0 (+5) 7.5 12 (+4.5)   3-pt Pinch 3/6 4.5 (+1.5)  6/7 6.5 6.0 5.5 9 (+3.5)   2-pt Pinch 3/6.75 nt 4/5 5.0 (+1.0) defer defer 4   * ltd by ulna wrist pain  ** ltd by thumb pain     Wrist MMT   Left/Right  Flexion   5/4  Extension   5/5  Treatment     Tracy received the following supervised modalities after being cleared for contradictions for 0 minutes:   - defer - due to not being melted-Paraffin wax heat  for promoting healing, decreasing pain and increasing tissue extensibility  - defer - Fluidotherapy for promoting healing, reducing pain, desensitization and increasing tissue extensibility    Tracy received the following manual therapy techniques for 8 minutes:   - R/A girth  - defer-encouraged wearing the size Medium 3/4 finger compression glove for edema mgmt intermittently during the day and while sleeping  - defer-Soft tissue massage to dorsal and volar surface of right wrist and forearm during extrinsic extensor lengthening  - defer-Grade II Rad/Carp mobilization for promoting healing and increasing  "periarticular tissue lengths  - defer-Mobilization with movement (Grade II Rad/Carp distractions), patient guided for wrist flexion and extension; 3 x 10" each  - defer-AA/PROM of each wrist and forearm range of motion 1 x 10 each    Tracy performed therapeutic exercises for 37 minutes including:  Flexibility Defer  30" x 3:   -Extrinsic flexor stretch with weight bearing progression in stance over extended hands  - Wrist flexion stretch  - Step 1 of Finkelstein's stretch   Intrinsic strengthening-defer Defer 2 min each:  -spreads and lifts with yellow spidyband, no thumb   -adduction and lifts with red sponges   Hand strengthening Defer to HEP- Green t-putty or ball issued by MD  - defer-Yellow digiflex, no thumb, 1 min   Wrist & forearm Strengthening- - defer  - Yellow flexbar, 2 min each for:  -sup/pronation swings, held at mid-shaft  -smiles  -frowns  -twists  -isometric supination   1st [facility] OA protocol, right hand Defer  - Thenar stretch; 30" x 2  - defer-C's with thumb and index finger 1 x 10  - defer-C's to O's 1 x 10 maintaining proper form & muscle activation      Objective Measures 6/2/22: R/A  and pinch, AROM  5/2/22: R/A  and pinch  5/2/22: R/A wrist AROM  5/2/22: R/A thumb tip to DPC   HEP as set 5/2/22: Recommended t-putty only every other day  4/5/22: Green t-putty      Home Exercises and Education Provided     Education provided:   - HEP as set  - Progress made    Education provided prior sessions:  - HEP as modified t-putty strengthening program for decreased resistance and frequency  - Education on wear schedule for compressive sleeve and digit sleeve; begin with 1 hour, and if no pain, continues to wear during the day intermittently, removing for HEP and hygiene; progress to night time wear as tolerated.  - HEP for TGEs and wrist AROM    Written Home Exercises Provided: Patient instructed to cont prior HEP.  Exercises were reviewed and Tracy was able to demonstrate them prior to the " end of the session.  Tracy demonstrated good  understanding of the HEP provided.     See EMR under Patient Instructions for exercises provided prior visit. 2/1/22       Assessment   6/2/22: Improved FOTO scores denote progress with her wrist since last measures, but with new description of ulnar impaction syndrome patient requires additional treatment to compensate for pain during functional activities and to strengthen wrist stability to reduce risk of further injury should she decide against surgery.  Patient continues to present with ongoing pain, wrist swelling & loss of wrist AROM. She continues to have frequent falls and has increased Right wrist pain.    Tracy is progressing well towards her goals and there are no updates to goals at this time. Pt prognosis is Good.     Pt will continue to benefit from skilled outpatient occupational therapy to address the deficits listed in the problem list on initial evaluation provide pt/family education and to maximize pt's level of independence in the home and community environment.     Anticipated barriers to occupational therapy: arthritis, osteopenia, frequent falls     Pt's spiritual, cultural and educational needs considered and pt agreeable to plan of care and goals.     Goals: to be met by July 1st  1) Patient will improve wrist flexion to 5/5 on right side to facilitate meal prep.  2) Patient will describe problem solving strategies for modifying activity which provokes wrist pain.    Previous Short Term Goals Status:  STG's 4 weeks  1)   Patient to be IND with HEP and modalities for pain/edema managment. MET  2)   Pt to tolerate advancing PREs and flexibility activities for weight bearing positions with self-graded intensity and effective symptom monitoring. Met   3) Increase wrist and forearm LIRIANO by 60 degrees to increase functional hand use and support function positions for ADLs & leisure activities. MET   4) Digit and Thumb LIRIANO to be WNL as compared to  the unaffected side for maximizing  and fine motor skills for reactivation the hand for light ADLs. met  5)  Decrease edema by 1.5 cm for evidencing soft tissue healing and effective edema mgmt. Met 2/10/22   6)   Increase right wrist and forearm LIRIANO to >75% of the right wrist to increase functional hand use for weight bearing and reaching tasks. Modified and Progressing on 2/1/22. MET   7)   Right  strength  to be >70% of the unaffected side. Progressing  8)   Right pinch strength to be >60% of the unaffected side. MET for lateral and 3-jaw pinch on 3/7/22  9)   Decrease complaints of pain to 2 out of 10 at worst to increase functional hand use and UE support functions for moderate to heavy ADL/work/leisure activities. MET 4/5/22. Returned after falling 5/2/22  10)   Patient to score at 45 % or less on Quick/DASH and/or Work module to demonstrate improved perception of functional right hand use to evidence return to near to prior level of function for I/ADLs and return to gardening. MET 4/5/22  Long Term Goal Status:   continue per initial plan of care.    Plan   6/2/22: Pt to be treated by Occupational Therapy 1 times per week for 4 weeks during the certification period from 6/2/22 through 7/1/22 to achieve the established goals.      Treatment to include: Paraffin, Fluidotherapy, Manual therapy/joint mobilizations, Modalities for pain management, Therapeutic exercises/activities., Strengthening, Orthotic Fabrication/Fit/Training, Edema Control, Joint Protection and Energy Conservation, as well as any other treatments deemed necessary based on the patient's needs or progress.     Patel Grijalva, GAVIR/L  Occupational Therapist    I certify the need for these services furnished under this plan of treatment and while under my care    ____________________________________  Physician/Referring Practitioner    _______________  Date of Signature

## 2022-06-09 ENCOUNTER — CLINICAL SUPPORT (OUTPATIENT)
Dept: REHABILITATION | Facility: HOSPITAL | Age: 72
End: 2022-06-09
Payer: MEDICARE

## 2022-06-09 DIAGNOSIS — M79.89 SWELLING OF RIGHT HAND: ICD-10-CM

## 2022-06-09 DIAGNOSIS — M25.641 DECREASED RANGE OF MOTION OF FINGER OF RIGHT HAND: ICD-10-CM

## 2022-06-09 DIAGNOSIS — R68.89 ALTERATION IN SELF-CARE ABILITY: ICD-10-CM

## 2022-06-09 DIAGNOSIS — M25.531 PAIN IN RIGHT WRIST: Primary | ICD-10-CM

## 2022-06-09 DIAGNOSIS — M25.631 DECREASED RANGE OF MOTION OF RIGHT WRIST: ICD-10-CM

## 2022-06-09 PROCEDURE — 97110 THERAPEUTIC EXERCISES: CPT | Mod: PN

## 2022-06-09 PROCEDURE — 97018 PARAFFIN BATH THERAPY: CPT | Mod: PN

## 2022-06-09 NOTE — PROGRESS NOTES
"Updated PoC today    Occupational Therapy Daily Treatment Note     Name: Tracy Armendariz  Clinic Number: [id number]    Therapy Diagnosis:   Encounter Diagnoses   Name Primary?   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  Reconfirmed findings of subacute healing comminuted distal radial fracture with no detrimental changes in the appearance of the fracture site or fracture fragments continued mild dorsal angulation of the distal radius similar to prior exam.  Reconfirmed tiny diastasis ulnar styloid tip fracture.  No new fractures.  Subjective     Pt reports: 6/9: I'm wearing my glove more during the day. It still hurts when I hold my rusty or when I use my right hand to clean up after going to the bathroom.  Pt reports "I fell twice in [place]" She has begun to take medication for " "dizziness. Pt will see the MD to discuss plans for reducing the dizziness. Pt states "I stay in bed most of the day"  Response to previous treatments:   No adverse reactions. Limited pain.   Functional change: See FOTO R/A on 6/2  Pain:   Patient having a 3/10 in ulnar right arm at arrival.     Location: as above   Objective      Edema: Circumferential measurements: In centimters     Right/Left Right Right Right  Right Right Right Right Right/Left Right Right Right     IE-1/12/22 2/1/22 2/3/22 2/10/22 2/14/22 2/17 2/22/22 2/24/22 3/15/2022 4/28/22 5/2/22 6/2/22   IF P1  6.7/6.3 7.3 (+0.6) 7.2 (-.1) 6.8 (-0.4) 6.9 (+0.1) 7.0 7.0 6.8 (-0.2) 6.8/6.5 6.7 (-0.1)   6.6 (-0.1)   SF P1   5.8/5.6 6.1 (+0.3) 6.2 (+0.1) 5.5 (-0.7) 5.6 (+0.1) 5.9 6.0 5.6 (-0.4) 5.8/5.4 5.7(-0.1)   5.5 (-0.2)   Thumb P1 7.4/7.5 7.4 (=) 7.0 (-0.4) 6.6 (-0.4) 6.8 (+0.2) 7.3 7.1 6.9 (-0.2) 6.8/6.5 7.0(+0.2)   6.6 (-0.4)   DPC 19/17.5 19.7 (+0.7) 19 (-0.7) 18.9 (-0.1) 19.4 (+0.5) 19.0 19.2 18.8 (-0.4) 18.5/17.5 18.9(+0.4)   17.5 (-1.4)   Wrist 18.3/17.0 19.5/17.9 18.8 (-0.8) 18.8 (=) 18.9 (+0.1) 18.2 18.2 18.3 (+0.1) 18/16.5 18.1(+0.1)  18.6 (+.5) 17.9 (-0.7)                                  Hand range of motion: Composite finger flexion/extension: Right within normal limits/ Left hand is within normal limits     Wrist AROM    Right/Left Right Right R Right/Left R R R R R   DATE IE-1/12/22 2/1/22  Post-tx 2/3/22  Post-tx 2/22/22 3/15/2022 4/8/22 4/12/22 4/21/22 5/2/22 6/2/22   EXT 30/90 49 55 64 66/80 65 70 72 72 67   FLX 17/45 36 28 37 38/70 40 50 46 40 42   RD 0/12 11 23 11 18/25 12 NT 16 10 12   UD  26/35 18 28 25 18/32 22 NT 25 15 33   Wrist LIRIANO 73/182 114 (+41)   137 (+23)   139 (+2)  (+20) 137 (-22) 154 (+17)      Thumb AROM  (in degrees) Right/Left Right Right Right   DATE 4/12/22 4/21/22  Post-tx 5/2/2 6/2/22   Tip to DPC   (in cm) 2.5/.8 1.5 (1.0) 1.5 (=) 0 (-1.5)      Forearm AROM    Right/Left Right Right Right R Right/Left Right " Right   DATE IE-1/12/22 2/1/22*  Pre-tx 2/3/22 post tx 2/10/22 2/22/22 3/15/2022 5/2/22 6/2/22   Supination 68/85 46 55 55 (=) 60 (+5) 60/70 72 (+12) 80   Pronation 90/90 85 90 88 (-2) nt 90/90 86 85   *different car, rapidly moving in quick end range position causes pain       Strength: (measured in psi.)     Right/Left Right Right/Left Right Right Right Right     2/10/2022 3/7/22 3/15/2022 4/5/22 4/21/22 5/2/22 6/2/22   Les Rung II 8/40 14 (+6)  15/45 14* 19 (+5) 16** 26 (+10)   Key Pinch 4.5/4.5 8 (+3.5)  7/9 7.5 8.0 (+5) 7.5 12 (+4.5)   3-pt Pinch 3/6 4.5 (+1.5)  6/7 6.5 6.0 5.5 9 (+3.5)   2-pt Pinch 3/6.75 nt 4/5 5.0 (+1.0) defer defer 4   * ltd by ulna wrist pain  ** ltd by thumb pain     Wrist MMT                  Left/Right  Flexion                         5/4  Extension                    5/5     Treatment     Tracy received the following supervised modalities after being cleared for contradictions for 10 minutes:   - Paraffin wax heat  for promoting healing, decreasing pain and increasing tissue extensibility    Tracy received the following manual therapy techniques for 0 minutes:   - defer-R/A girth; encouraged wearing the size Medium 3/4 finger compression glove for edema mgmt intermittently during the day and while sleeping    Tracy performed therapeutic exercises for 30 minutes including:  Hand strengthening Medium , 1x10 each   Wrist & forearm Strengthening- -Red t-band, isometric, 2 min each for:  -wrist extension with Left and Right shld ER and scap retraction  -wrist flexion with elbow flexion  -wrist RD with biceps and opposite triceps  -defer due to pain-wrist UD with Left and Right shld ER and Scap retraction    - defer-Yellow flexbar, 2 min each for:  -defer-sup/pronation swings, held at mid-shaft  -smiles  -frowns  -twists  -isometric supination   Objective Measures 6/7: R/A and updated POC   HEP as set 5/2/22: Recommended t-putty only every other day  4/5/22: Green t-putty       Home Exercises and Education Provided     Education provided:   - HEP as set  - Progress made    Education provided prior sessions:  - HEP as modified t-putty strengthening program for decreased resistance and frequency  - Education on wear schedule for compressive sleeve and digit sleeve; begin with 1 hour, and if no pain, continues to wear during the day intermittently, removing for HEP and hygiene; progress to night time wear as tolerated.  - HEP for TGEs and wrist AROM    Written Home Exercises Provided: Patient instructed to cont prior HEP.  Exercises were reviewed and Tracy was able to demonstrate them prior to the end of the session.  Tracy demonstrated good  understanding of the HEP provided.     See EMR under Patient Instructions for exercises provided prior visit. 2/1/22       Assessment   6/9: Improved FOTO scores denote progress with her wrist since last measures. Frequent falls remain concerning. She tolerated isometric wrist exercises today.    Tracy is progressing well towards her goals and there are no updates to goals at this time. Pt prognosis is Good.     Pt will continue to benefit from skilled outpatient occupational therapy to address the deficits listed in the problem list on initial evaluation provide pt/family education and to maximize pt's level of independence in the home and community environment.     Anticipated barriers to occupational therapy: arthritis, osteopenia, frequent falls     Pt's spiritual, cultural and educational needs considered and pt agreeable to plan of care and goals.      Goals: to be met by July 1st  1) Patient will improve wrist flexion to 5/5 on right side to facilitate meal prep.  2) Patient will describe problem solving strategies for modifying activity which provokes wrist pain.     Previous Short Term Goals Status:  STG's 4 weeks  1)   Patient to be IND with HEP and modalities for pain/edema managment. MET  2)   Pt to tolerate advancing PREs and  flexibility activities for weight bearing positions with self-graded intensity and effective symptom monitoring. Met   3) Increase wrist and forearm LIRIANO by 60 degrees to increase functional hand use and support function positions for ADLs & leisure activities. MET   4) Digit and Thumb LIRIANO to be WNL as compared to the unaffected side for maximizing  and fine motor skills for reactivation the hand for light ADLs. met  5)  Decrease edema by 1.5 cm for evidencing soft tissue healing and effective edema mgmt. Met 2/10/22   6)   Increase right wrist and forearm LIRIANO to >75% of the right wrist to increase functional hand use for weight bearing and reaching tasks. Modified and Progressing on 2/1/22. MET   7)   Right  strength  to be >70% of the unaffected side. Progressing  8)   Right pinch strength to be >60% of the unaffected side. MET for lateral and 3-jaw pinch on 3/7/22  9)   Decrease complaints of pain to 2 out of 10 at worst to increase functional hand use and UE support functions for moderate to heavy ADL/work/leisure activities. MET 4/5/22. Returned after falling 5/2/22  10)   Patient to score at 45 % or less on Quick/DASH and/or Work module to demonstrate improved perception of functional right hand use to evidence return to near to prior level of function for I/ADLs and return to gardening. MET 4/5/22  Long Term Goal Status:   continue per initial plan of care.    Plan   6/2/22: Pt to be treated by Occupational Therapy 1 times per week for 4 weeks during the certification period from 6/2/22 through 7/1/22 to achieve the established goals.      Treatment to include: Paraffin, Fluidotherapy, Manual therapy/joint mobilizations, Modalities for pain management, Therapeutic exercises/activities., Strengthening, Orthotic Fabrication/Fit/Training, Edema Control, Joint Protection and Energy Conservation, as well as any other treatments deemed necessary based on the patient's needs or progress    Susy Lombardi,  MARGARITA CARBALLO  Occupational Therapist, Certified Hand Therapist

## 2022-06-10 ENCOUNTER — OFFICE VISIT (OUTPATIENT)
Dept: OTOLARYNGOLOGY | Facility: CLINIC | Age: 72
End: 2022-06-10
Payer: MEDICARE

## 2022-06-10 VITALS
SYSTOLIC BLOOD PRESSURE: 142 MMHG | WEIGHT: 226.63 LBS | BODY MASS INDEX: 36.58 KG/M2 | HEART RATE: 67 BPM | TEMPERATURE: 98 F | DIASTOLIC BLOOD PRESSURE: 67 MMHG

## 2022-06-10 DIAGNOSIS — H81.391 PERIPHERAL VERTIGO INVOLVING RIGHT EAR: Primary | ICD-10-CM

## 2022-06-10 DIAGNOSIS — Z13.9 SCREENING FOR CONDITION: ICD-10-CM

## 2022-06-10 DIAGNOSIS — R73.09 OTHER ABNORMAL GLUCOSE: ICD-10-CM

## 2022-06-10 DIAGNOSIS — R73.01 ELEVATED FASTING GLUCOSE: ICD-10-CM

## 2022-06-10 DIAGNOSIS — J34.2 NASAL SEPTAL DEVIATION: ICD-10-CM

## 2022-06-10 DIAGNOSIS — Z91.81 AT HIGH RISK FOR FALLS: ICD-10-CM

## 2022-06-10 DIAGNOSIS — Z86.73 HISTORY OF CVA (CEREBROVASCULAR ACCIDENT): ICD-10-CM

## 2022-06-10 DIAGNOSIS — H90.A11 CONDUCTIVE HEARING LOSS OF RIGHT EAR WITH RESTRICTED HEARING OF LEFT EAR: ICD-10-CM

## 2022-06-10 DIAGNOSIS — R27.0 ATAXIA: ICD-10-CM

## 2022-06-10 PROCEDURE — 1160F PR REVIEW ALL MEDS BY PRESCRIBER/CLIN PHARMACIST DOCUMENTED: ICD-10-PCS | Mod: CPTII,S$GLB,, | Performed by: SPECIALIST

## 2022-06-10 PROCEDURE — 4010F ACE/ARB THERAPY RXD/TAKEN: CPT | Mod: CPTII,S$GLB,, | Performed by: SPECIALIST

## 2022-06-10 PROCEDURE — 1126F AMNT PAIN NOTED NONE PRSNT: CPT | Mod: CPTII,S$GLB,, | Performed by: SPECIALIST

## 2022-06-10 PROCEDURE — 3288F PR FALLS RISK ASSESSMENT DOCUMENTED: ICD-10-PCS | Mod: CPTII,S$GLB,, | Performed by: SPECIALIST

## 2022-06-10 PROCEDURE — 3008F PR BODY MASS INDEX (BMI) DOCUMENTED: ICD-10-PCS | Mod: CPTII,S$GLB,, | Performed by: SPECIALIST

## 2022-06-10 PROCEDURE — 3008F BODY MASS INDEX DOCD: CPT | Mod: CPTII,S$GLB,, | Performed by: SPECIALIST

## 2022-06-10 PROCEDURE — 1159F MED LIST DOCD IN RCRD: CPT | Mod: CPTII,S$GLB,, | Performed by: SPECIALIST

## 2022-06-10 PROCEDURE — 99214 PR OFFICE/OUTPT VISIT, EST, LEVL IV, 30-39 MIN: ICD-10-PCS | Mod: S$GLB,,, | Performed by: SPECIALIST

## 2022-06-10 PROCEDURE — 3288F FALL RISK ASSESSMENT DOCD: CPT | Mod: CPTII,S$GLB,, | Performed by: SPECIALIST

## 2022-06-10 PROCEDURE — 99999 PR PBB SHADOW E&M-EST. PATIENT-LVL IV: CPT | Mod: PBBFAC,,, | Performed by: SPECIALIST

## 2022-06-10 PROCEDURE — 1101F PR PT FALLS ASSESS DOC 0-1 FALLS W/OUT INJ PAST YR: ICD-10-PCS | Mod: CPTII,S$GLB,, | Performed by: SPECIALIST

## 2022-06-10 PROCEDURE — 1101F PT FALLS ASSESS-DOCD LE1/YR: CPT | Mod: CPTII,S$GLB,, | Performed by: SPECIALIST

## 2022-06-10 PROCEDURE — 1160F RVW MEDS BY RX/DR IN RCRD: CPT | Mod: CPTII,S$GLB,, | Performed by: SPECIALIST

## 2022-06-10 PROCEDURE — 1159F PR MEDICATION LIST DOCUMENTED IN MEDICAL RECORD: ICD-10-PCS | Mod: CPTII,S$GLB,, | Performed by: SPECIALIST

## 2022-06-10 PROCEDURE — 3078F PR MOST RECENT DIASTOLIC BLOOD PRESSURE < 80 MM HG: ICD-10-PCS | Mod: CPTII,S$GLB,, | Performed by: SPECIALIST

## 2022-06-10 PROCEDURE — 99999 PR PBB SHADOW E&M-EST. PATIENT-LVL IV: ICD-10-PCS | Mod: PBBFAC,,, | Performed by: SPECIALIST

## 2022-06-10 PROCEDURE — 3077F SYST BP >= 140 MM HG: CPT | Mod: CPTII,S$GLB,, | Performed by: SPECIALIST

## 2022-06-10 PROCEDURE — 3078F DIAST BP <80 MM HG: CPT | Mod: CPTII,S$GLB,, | Performed by: SPECIALIST

## 2022-06-10 PROCEDURE — 3077F PR MOST RECENT SYSTOLIC BLOOD PRESSURE >= 140 MM HG: ICD-10-PCS | Mod: CPTII,S$GLB,, | Performed by: SPECIALIST

## 2022-06-10 PROCEDURE — 1126F PR PAIN SEVERITY QUANTIFIED, NO PAIN PRESENT: ICD-10-PCS | Mod: CPTII,S$GLB,, | Performed by: SPECIALIST

## 2022-06-10 PROCEDURE — 99214 OFFICE O/P EST MOD 30 MIN: CPT | Mod: S$GLB,,, | Performed by: SPECIALIST

## 2022-06-10 PROCEDURE — 4010F PR ACE/ARB THEARPY RXD/TAKEN: ICD-10-PCS | Mod: CPTII,S$GLB,, | Performed by: SPECIALIST

## 2022-06-10 RX ORDER — DIAZEPAM 2 MG/1
TABLET ORAL
Qty: 50 TABLET | Refills: 1 | Status: SHIPPED | OUTPATIENT
Start: 2022-06-10 | End: 2023-12-15

## 2022-06-10 NOTE — PROGRESS NOTES
Subjective:       Patient ID: Tracy Armendariz is a 71 y.o. female.    Chief Complaint: Follow-up    HPI    The patient is returning for to discuss results of her hearing and balance testing.  She continues to have chronic imbalance and ataxia.  She recently turn from a trip to [place].  While there she felt twice.  She continues to have 3 6 falls per month.  This is been the case since she suffered a CVA 8 years ago.      Review of Systems     Constitutional: Negative for appetite change, chills, fatigue, fever and unexpected weight loss.      HENT: Positive for ringing in the ears and runny nose.      Eyes:  Negative for change in eyesight, eye drainage, eye itching and photophobia.     Respiratory:  Positive for sleep apnea.      Cardiovascular:  Negative for chest pain, foot swelling, irregular heartbeat and swollen veins.     Gastrointestinal:  Positive for acid reflux.     Genitourinary: Negative for difficulty urinating, sexual problems and frequent urination.     Musc: Positive for neck pain. Gait disturbance  Uses walker for ambulation    Skin: Negative for rash.     Allergy: Negative for food allergies and seasonal allergies.     Endocrine: Positive for cold intolerance.     Neurological: Positive for dizziness and light-headedness. Ataxia    Hematologic: Negative for bruises/bleeds easily and swollen glands.      Psychiatric: Negative for decreased concentration, depression, nervous/anxious and sleep disturbance.                Objective:      Physical Exam  Vitals and nursing note reviewed. Exam conducted with a chaperone present (Patient's  accompanies her to the visit).   Constitutional:       General: She is awake. She is not in acute distress.     Appearance: Normal appearance. She is well-developed and well-groomed. She is obese.   HENT:      Head: Normocephalic.      Jaw: There is normal jaw occlusion.      Salivary Glands: Right salivary gland is not diffusely enlarged. Left salivary gland  is not diffusely enlarged.      Right Ear: Ear canal and external ear normal. Decreased hearing noted. There is impacted cerumen. Tympanic membrane is scarred and retracted. Tympanic membrane has decreased mobility.      Left Ear: Ear canal and external ear normal. Decreased hearing noted. There is impacted cerumen. Tympanic membrane is retracted. Tympanic membrane has decreased mobility.      Ears:      Comments: Bernardo-Hallpike maneuver not attempted because of limitation of motion in the neck and stiffness of the neck     Nose: Septal deviation (To the right) and rhinorrhea present. No nasal deformity or mucosal edema. Rhinorrhea is clear.      Right Turbinates: Enlarged and pale.      Left Turbinates: Enlarged and pale.      Mouth/Throat:      Lips: Pink. No lesions.      Mouth: Mucous membranes are moist. No oral lesions.      Dentition: Abnormal dentition. No gum lesions.      Tongue: No lesions.      Palate: No mass and lesions.      Pharynx: Oropharynx is clear. Uvula midline. No oropharyngeal exudate.   Eyes:      General: Lids are normal.         Right eye: No discharge.         Left eye: No discharge.      Conjunctiva/sclera: Conjunctivae normal.      Right eye: Right conjunctiva is not injected.      Left eye: Left conjunctiva is not injected.      Pupils: Pupils are equal, round, and reactive to light.   Neck:      Thyroid: No thyroid mass or thyromegaly.      Vascular: No carotid bruit.      Trachea: Trachea normal. No tracheal deviation.   Cardiovascular:      Rate and Rhythm: Normal rate and regular rhythm.      Pulses: Normal pulses.      Heart sounds: Normal heart sounds. No murmur heard.    No gallop.   Pulmonary:      Effort: Pulmonary effort is normal.      Breath sounds: Normal breath sounds. No decreased breath sounds, wheezing, rhonchi or rales.   Abdominal:      General: Bowel sounds are normal.      Palpations: Abdomen is soft.      Tenderness: There is no abdominal tenderness.    Musculoskeletal:      Right shoulder: Normal.      Cervical back: Neck supple. Decreased range of motion.   Lymphadenopathy:      Head:      Right side of head: No submental, submandibular or occipital adenopathy.      Left side of head: No submental, submandibular or occipital adenopathy.      Cervical: No cervical adenopathy.   Skin:     General: Skin is warm and dry.      Findings: No rash.      Nails: There is no clubbing.   Neurological:      Mental Status: She is alert and oriented to person, place, and time.      Cranial Nerves: No cranial nerve deficit.      Sensory: No sensory deficit.      Gait: Gait normal.      Comments: Neuro otologic-cranial nerves grossly intact, no focal or cerebellar findings with upper extremities, wide-based gait, unable to perform tandem gait, Romberg falls posteriorly, tandem Romberg falls to the right   Psychiatric:         Speech: Speech normal.         Behavior: Behavior normal. Behavior is cooperative.         VNG-50% right reduced response, abnormal cicatrix latency      I did discuss the results of the hearing and balance testing above with the patient and her  during her visit.  I provided him with copies of the testing.    Assessment:       1. Peripheral vertigo involving right ear    2. Conductive hearing loss of right ear with restricted hearing of left ear    3. Ataxia    4. At high risk for falls    5. History of CVA (cerebrovascular accident)    6. Nasal septal deviation        Plan:        I am suggesting that a hearing aid to the right ear would improve the hearing in that ear.  She does not seem to be interested.  I will have her go for blood work to complete a metabolic evaluation of the inner ear.  I am referring her for vestibular rehabilitative therapy and will recheck her in 6 weeks.

## 2022-06-14 ENCOUNTER — LAB VISIT (OUTPATIENT)
Dept: LAB | Facility: HOSPITAL | Age: 72
End: 2022-06-14
Attending: SPECIALIST
Payer: MEDICARE

## 2022-06-14 DIAGNOSIS — Z13.9 SCREENING FOR CONDITION: ICD-10-CM

## 2022-06-14 DIAGNOSIS — R73.09 OTHER ABNORMAL GLUCOSE: ICD-10-CM

## 2022-06-14 LAB
CRP SERPL-MCNC: 10.9 MG/L (ref 0–8.2)
ERYTHROCYTE [SEDIMENTATION RATE] IN BLOOD BY WESTERGREN METHOD: 38 MM/HR (ref 0–20)
ESTIMATED AVG GLUCOSE: 131 MG/DL (ref 68–131)
HBA1C MFR BLD: 6.2 % (ref 4–5.6)

## 2022-06-14 PROCEDURE — 86038 ANTINUCLEAR ANTIBODIES: CPT | Performed by: SPECIALIST

## 2022-06-14 PROCEDURE — 83036 HEMOGLOBIN GLYCOSYLATED A1C: CPT | Performed by: SPECIALIST

## 2022-06-14 PROCEDURE — 85652 RBC SED RATE AUTOMATED: CPT | Performed by: SPECIALIST

## 2022-06-14 PROCEDURE — 36415 COLL VENOUS BLD VENIPUNCTURE: CPT | Performed by: SPECIALIST

## 2022-06-14 PROCEDURE — 86140 C-REACTIVE PROTEIN: CPT | Performed by: SPECIALIST

## 2022-06-14 PROCEDURE — 86592 SYPHILIS TEST NON-TREP QUAL: CPT | Performed by: SPECIALIST

## 2022-06-15 LAB
ANA SER QL IF: NORMAL
RPR SER QL: NORMAL

## 2022-06-17 ENCOUNTER — CLINICAL SUPPORT (OUTPATIENT)
Dept: REHABILITATION | Facility: HOSPITAL | Age: 72
End: 2022-06-17
Payer: MEDICARE

## 2022-06-17 DIAGNOSIS — M79.89 SWELLING OF RIGHT HAND: ICD-10-CM

## 2022-06-17 DIAGNOSIS — M25.641 DECREASED RANGE OF MOTION OF FINGER OF RIGHT HAND: ICD-10-CM

## 2022-06-17 DIAGNOSIS — R68.89 ALTERATION IN SELF-CARE ABILITY: ICD-10-CM

## 2022-06-17 DIAGNOSIS — M25.631 DECREASED RANGE OF MOTION OF RIGHT WRIST: ICD-10-CM

## 2022-06-17 DIAGNOSIS — M25.531 PAIN IN RIGHT WRIST: Primary | ICD-10-CM

## 2022-06-17 PROCEDURE — 97110 THERAPEUTIC EXERCISES: CPT | Mod: PN

## 2022-06-17 PROCEDURE — 97140 MANUAL THERAPY 1/> REGIONS: CPT | Mod: PN

## 2022-06-17 NOTE — PROGRESS NOTES
"  Occupational Therapy Daily Treatment Note     Name: Tracy Armendariz  Clinic Number: [id number]    Therapy Diagnosis:   No diagnosis found.  Physician: Carlos Baum MD    Visit Date: 6/17/2022    Medical Diagnosis: S62.101D (ICD-10-CM) - Closed fracture of right wrist with routine healing, subsequent encounter  Physician Orders: Eval and treat  Evaluation Date: 1/12/2022  Insurance Authorization Period Expiration: through 5/17/22  Updated Plan of Care from 6/2/22 to 7/1/2022  Date of Return to MD: TB Scheduled  5/30/22: IMPRESSION:  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              I reordered therapy the right hand and wrist    Date of Onset: 11/9/21  Visit # / Visits authorized: 29/40  FOTO (DASH) at eval: 69.2 %  FOTO (DASH) RA 2/24/22: 53.3 (+15.9%)  FOTO (DASH) RA 4/5/22: 39.2% (+14.1%)  FOTO (DASH) R/A 6/2: 21.7% (+17.5%)    Time In: 11:30 am  Time Out: 12:15 pm  Total Billable Time: 45 minutes    Precautions:  Standard and blood thinners, Osteopenia; falls; gait and balance problems d/t Vertigo; Precautions as per imaging and s/p Week 12+  1/28/22 X-ray: FINDINGS: Osteopenia.  Reconfirmed findings of subacute healing comminuted distal radial fracture with no detrimental changes in the appearance of the fracture site or fracture fragments continued mild dorsal angulation of the distal radius similar to prior exam.  Reconfirmed tiny diastasis ulnar styloid tip fracture.  No new fractures.  Subjective     Pt reports: 6/17: Pt reports prescribed Valium for her dizziness and has had a battery of assessment that she does not yet know the results of. She denies pain at rest today and had a "2"/10 at the end of this session.  Pt reports "I fell twice in [place]" She has begun to take medication for dizziness. Pt will see the MD to discuss plans for reducing the dizziness. Pt states "I stay in bed most of the day"  Response to previous treatments: She had "soreness" that was " <3/10 pain and it resolved the next day.   Functional change: She reports being committed to activity modifications and has reduced episodes of pain as a result.  Pt has begun to use her cell phone instead of the rusty as it does not hurt her wrist to hold. She was receptive to an assistive device for positioning her technology devices hands-free.   Pain: 2/10 at worst; 0/10 at rest  Location: Ulna wrist and thumb CMC    Objective      Edema: Circumferential measurements: In centimters     Right/Left Right Right Right  Right Right Right Right Right/Left Right Right Right Right     IE-1/12/22 2/1/22 2/3/22 2/10/22 2/14/22 2/17 2/22/22 2/24/22 3/15/2022 4/28/22 5/2/22 6/2/22 6/17/22   IF P1  6.7/6.3 7.3 (+0.6) 7.2 (-.1) 6.8 (-0.4) 6.9 (+0.1) 7.0 7.0 6.8 (-0.2) 6.8/6.5 6.7 (-0.1)   6.6 (-0.1)    SF P1   5.8/5.6 6.1 (+0.3) 6.2 (+0.1) 5.5 (-0.7) 5.6 (+0.1) 5.9 6.0 5.6 (-0.4) 5.8/5.4 5.7(-0.1)   5.5 (-0.2)    Thumb P1 7.4/7.5 7.4 (=) 7.0 (-0.4) 6.6 (-0.4) 6.8 (+0.2) 7.3 7.1 6.9 (-0.2) 6.8/6.5 7.0(+0.2)   6.6 (-0.4)    DPC 19/17.5 19.7 (+0.7) 19 (-0.7) 18.9 (-0.1) 19.4 (+0.5) 19.0 19.2 18.8 (-0.4) 18.5/17.5 18.9(+0.4)   17.5 (-1.4)    Wrist 18.3/17.0 19.5/17.9 18.8 (-0.8) 18.8 (=) 18.9 (+0.1) 18.2 18.2 18.3 (+0.1) 18/16.5 18.1(+0.1)  18.6 (+.5) 17.9 (-0.7) 18.8                                   Hand range of motion: Composite finger flexion/extension: Right within normal limits/ Left hand is within normal limits     Wrist AROM    Right/Left Right Right R Right/Left R R R R R R   DATE IE-1/12/22 2/1/22  Post-tx 2/3/22  Post-tx 2/22/22 3/15/2022 4/8/22 4/12/22 4/21/22 5/2/22 6/2/22 6/17/22  Post-tx   EXT 30/90 49 55 64 66/80 65 70 72 72 67 72   FLX 17/45 36 28 37 38/70 40 50 46 40 42 45   RD 0/12 11 23 11 18/25 12 NT 16 10 12 17   UD  26/35 18 28 25 18/32 22 NT 25 15 33 27   Wrist LIRIANO 73/182 114 (+41)   137 (+23)   139 (+2)  (+20) 137 (-22) 154 (+17) 161 (+7)      Thumb AROM  (in degrees) Right/Left Right Right Right    DATE 4/12/22 4/21/22  Post-tx 5/2/2 6/2/22   Tip to DPC   (in cm) 2.5/.8 1.5 (1.0) 1.5 (=) 0 (-1.5)      Forearm AROM    Right/Left Right Right Right R Right/Left Right Right   DATE IE-1/12/22 2/1/22*  Pre-tx 2/3/22 post tx 2/10/22 2/22/22 3/15/2022 5/2/22 6/2/22   Supination 68/85 46 55 55 (=) 60 (+5) 60/70 72 (+12) 80   Pronation 90/90 85 90 88 (-2) nt 90/90 86 85   *different car, rapidly moving in quick end range position causes pain       Strength: (measured in psi.)     Right/Left Right Right/Left Right Right Right Right Right     2/10/2022 3/7/22 3/15/2022 4/5/22 4/21/22 5/2/22 6/2/22 6/17/22   Les Rung II 8/40 14 (+6)  15/45 14* 19 (+5) 16** 26 (+10) 25   Key Pinch 4.5/4.5 8 (+3.5)  7/9 7.5 8.0 (+5) 7.5 12 (+4.5) 10   3-pt Pinch 3/6 4.5 (+1.5)  6/7 6.5 6.0 5.5 9 (+3.5) 9   2-pt Pinch 3/6.75 nt 4/5 5.0 (+1.0) defer defer 4 nt   * ltd by ulna wrist pain  ** ltd by thumb pain     Wrist MMT                  Left/Right  Flexion                         5/4  Extension                    5/5     Treatment     Tracy received the following supervised modalities after being cleared for contradictions for 10 minutes:   - Paraffin wax heat  for promoting healing, decreasing pain and increasing tissue extensibility    Tracy received the following manual therapy techniques for 8 minutes:   - R/A girth; encouraged wearing the size Medium 3/4 finger compression glove for edema mgmt intermittently during the day and while sleeping  - Grade II-III Rad/ulna/Carpal mobilizations    Tracy received Self Care Instructions and performed therapeutic activities and exercises for 25 minutes including:  Hand strengthening Green t-putty Strengthening, 5-10 reps each:  - 10 sec sustained   - tip pinch  - 3-jaw pinch  - lateral pinch  - PADs/DABs w/adduction log squeeze/pumps (scissors)  - PADs/DABs w/donut stretches (spreads-no thumb)  - defer to HEP w/Red putty if pain-free: Extrinsic extensors w/donut stretches with  thumb  - pancake presses for weight bearing progression     Wrist & forearm Strengthening- -Red t-band, isometric, 2 min each for:  -wrist extension with Left and Right shld ER and scap retraction  -wrist flexion with elbow flexion  -wrist RD with biceps and opposite triceps  -defer due to pain-wrist UD with Left and Right shld ER and Scap retraction    -Yellow flexbar, 2 min each for:  -smiles  -defer-frowns   Objective Measures 6/17: R/A post-tx wrist AROM and /pinch   6/7: R/A and updated POC   HEP as set 6/17: Re-issued Green t-putty  6/17: Red t-band for wrist isometrics  5/2/22: Recommended t-putty only every other day  4/5/22: Green t-putty   Activity Modifications 6/17: Tech positioning device demonstrated for hands-free      Home Exercises and Education Provided     Education provided:   - Assistive/positioning devices education ongoing  - HEP as upgraded  - Progress made    Written Home Exercises Provided: yes.  Exercises were reviewed and Tracy was able to demonstrate them prior to the end of the session.  Tracy demonstrated good  understanding of the HEP provided.     See EMR under Patient Instructions for exercises provided prior visit. 6/17/22       Assessment   6/17: Decreased episodes of pain with activities now being modified for respecting ulna wrist pain. Loss of  and lateral pinch strength. HEP upgraded accordingly. Improvements in wrist LIRIANO post-tx.    Tracy is progressing well towards her goals and there are no updates to goals at this time. Pt prognosis is Good.     Pt will continue to benefit from skilled outpatient occupational therapy to address the deficits listed in the problem list on initial evaluation provide pt/family education and to maximize pt's level of independence in the home and community environment.     Anticipated barriers to occupational therapy: arthritis, osteopenia, frequent falls     Pt's spiritual, cultural and educational needs considered and pt  agreeable to plan of care and goals.      Goals: to be met by July 1st  1) Patient will improve wrist flexion to 5/5 on right side to facilitate meal prep.  2) Patient will describe problem solving strategies for modifying activity which provokes wrist pain. MET 6/17/2022     Previous Short Term Goals Status:  STG's 4 weeks  1)   Patient to be IND with HEP and modalities for pain/edema managment. MET  2)   Pt to tolerate advancing PREs and flexibility activities for weight bearing positions with self-graded intensity and effective symptom monitoring. Met   3) Increase wrist and forearm LIRIANO by 60 degrees to increase functional hand use and support function positions for ADLs & leisure activities. MET   4) Digit and Thumb LIRIANO to be WNL as compared to the unaffected side for maximizing  and fine motor skills for reactivation the hand for light ADLs. met  5)  Decrease edema by 1.5 cm for evidencing soft tissue healing and effective edema mgmt. Met 2/10/22   6)   Increase right wrist and forearm LIRIANO to >75% of the right wrist to increase functional hand use for weight bearing and reaching tasks. Modified and Progressing on 2/1/22. MET   7)   Right  strength  to be >70% of the unaffected side. Progressing  8)   Right pinch strength to be >60% of the unaffected side. MET for lateral and 3-jaw pinch on 3/7/22  9)   Decrease complaints of pain to 2 out of 10 at worst to increase functional hand use and UE support functions for moderate to heavy ADL/work/leisure activities. MET 4/5/22. Returned after falling 5/2/22  10)   Patient to score at 45 % or less on Quick/DASH and/or Work module to demonstrate improved perception of functional right hand use to evidence return to near to prior level of function for I/ADLs and return to gardening. MET 4/5/22  Long Term Goal Status:   continue per initial plan of care.    Plan   6/17: Assess tolerance for upgraded HEP. Adjust accordingly. Continue education of activity  modifications including assistive devices and joint protection strategies. Help patient balance strengthening with pain-free ADLs.    Pt to be treated by Occupational Therapy 1 times per week for 4 weeks during the certification period from 6/2/22 through 7/1/22 to achieve the established goals.      Treatment to include: Paraffin, Fluidotherapy, Manual therapy/joint mobilizations, Modalities for pain management, Therapeutic exercises/activities., Strengthening, Orthotic Fabrication/Fit/Training, Edema Control, Joint Protection and Energy Conservation, as well as any other treatments deemed necessary based on the patient's needs or progress    HARIS Chi, MARGARITA  Occupational Therapist, Certified Hand Therapist

## 2022-06-17 NOTE — PATIENT INSTRUCTIONS
Red T-BAND FOR ISOMETRIC STRENGTHENING       PERFORM STRENGTHENING EXERCISES EVERY OTHER DAY      BEGIN WITH UPRIGHT POSTURE, SHOULDER BLADES PULLED BACK COMFORTABLE AND STOMACH MUSCLE ENGAGED.    (2 MINUTE EACH):    NOTE: Keep your wrists in neutral alignment. All exercises shoulder be done Pain-free    1) WRIST/DIGIT EXTENSION (PALMS FACING EACH OTHER)    2) WRIST FLEXION (PALMS FACING AWAY- UNAFFECTED TO CEILING AND THE AFFECTED TO THE FLOOR)    3) WRIST RADIAL DEVIATION (AFFECTED SIDE THUMB POINTS TO THE CEILING; PALMS FACE EACH OTHER)       [facility], OCCUPATIONAL THERAPY  HOME EXERCISE PROGRAM     PERFORM STRENGTHENING EXERCISES EVERY OTHER DAY AND ALWAYS PAIN-FREE    GREEN PUTTY FOR GRIPPING AND PINCHING.      USE YOUR LEFT TO ROLL THE PUTTY OUT IF THERE IS PAIN IN THE RIGHT:            USE THE RED PUTTY FOR THE REST OF THESE:            You can do these using the log also:

## 2022-06-20 ENCOUNTER — PATIENT MESSAGE (OUTPATIENT)
Dept: OTOLARYNGOLOGY | Facility: CLINIC | Age: 72
End: 2022-06-20
Payer: MEDICARE

## 2022-06-21 DIAGNOSIS — Z91.81 AT MODERATE RISK FOR FALL: ICD-10-CM

## 2022-06-21 DIAGNOSIS — Z86.73 HISTORY OF CVA (CEREBROVASCULAR ACCIDENT): ICD-10-CM

## 2022-06-21 DIAGNOSIS — H81.391 PERIPHERAL VERTIGO, RIGHT: Primary | ICD-10-CM

## 2022-06-22 ENCOUNTER — PATIENT MESSAGE (OUTPATIENT)
Dept: ORTHOPEDICS | Facility: CLINIC | Age: 72
End: 2022-06-22
Payer: MEDICARE

## 2022-06-23 ENCOUNTER — CLINICAL SUPPORT (OUTPATIENT)
Dept: REHABILITATION | Facility: HOSPITAL | Age: 72
End: 2022-06-23
Payer: MEDICARE

## 2022-06-23 DIAGNOSIS — M79.89 SWELLING OF RIGHT HAND: ICD-10-CM

## 2022-06-23 DIAGNOSIS — R68.89 ALTERATION IN SELF-CARE ABILITY: ICD-10-CM

## 2022-06-23 DIAGNOSIS — M25.641 DECREASED RANGE OF MOTION OF FINGER OF RIGHT HAND: ICD-10-CM

## 2022-06-23 DIAGNOSIS — M25.531 PAIN IN RIGHT WRIST: Primary | ICD-10-CM

## 2022-06-23 DIAGNOSIS — M25.631 DECREASED RANGE OF MOTION OF RIGHT WRIST: ICD-10-CM

## 2022-06-23 PROCEDURE — 97018 PARAFFIN BATH THERAPY: CPT | Mod: 59,PN

## 2022-06-23 PROCEDURE — 97110 THERAPEUTIC EXERCISES: CPT | Mod: PN

## 2022-06-23 PROCEDURE — 97140 MANUAL THERAPY 1/> REGIONS: CPT | Mod: PN

## 2022-06-23 NOTE — PROGRESS NOTES
"  Occupational Therapy Daily Treatment Note     Name: Tracy Armendariz  Clinic Number: [id number]    Therapy Diagnosis:   No diagnosis found.  Physician: Carlos Baum MD    Visit Date: 6/23/2022    Medical Diagnosis: S62.101D (ICD-10-CM) - Closed fracture of right wrist with routine healing, subsequent encounter  Physician Orders: Eval and treat  Evaluation Date: 1/12/2022  Insurance Authorization Period Expiration: through 5/17/22  Updated Plan of Care from 6/2/22 to 7/1/2022  Date of Return to MD: TB Scheduled  5/30/22: IMPRESSION:  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              I reordered therapy the right hand and wrist    Date of Onset: 11/9/21  Visit # / Visits authorized: 30/40  FOTO (DASH) at eval: 69.2 %  FOTO (DASH) RA 2/24/22: 53.3 (+15.9%)  FOTO (DASH) RA 4/5/22: 39.2% (+14.1%)  FOTO (DASH) R/A 6/2: 21.7% (+17.5%)    Time In: 11:30 am  Time Out: 12:15 pm  Total Billable Time: 45 minutes    Precautions:  Standard and blood thinners, Osteopenia; falls; gait and balance problems d/t Vertigo; Precautions as per imaging and s/p Week 12+  1/28/22 X-ray: FINDINGS: Osteopenia.  Reconfirmed findings of subacute healing comminuted distal radial fracture with no detrimental changes in the appearance of the fracture site or fracture fragments continued mild dorsal angulation of the distal radius similar to prior exam.  Reconfirmed tiny diastasis ulnar styloid tip fracture.  No new fractures.  Subjective     Pt reports: 6/17: Pt reports prescribed Valium for her dizziness and has had a battery of assessment that she does not yet know the results of. She denies pain at rest today and had a "2"/10 at the end of this session.  Pt reports "I fell twice in [place]" She has begun to take medication for dizziness. Pt will see the MD to discuss plans for reducing the dizziness. Pt states "I stay in bed most of the day"  Response to previous treatments: She had "soreness" that was " <3/10 pain and it resolved the next day.   Functional change: She reports being committed to activity modifications and has reduced episodes of pain as a result.  Pt has begun to use her cell phone instead of the rusty as it does not hurt her wrist to hold. She was receptive to an assistive device for positioning her technology devices hands-free.   Pain: 2/10 at worst; 0/10 at rest  Location: Ulna wrist and thumb CMC    Objective      Edema: Circumferential measurements: In centimters     Right/Left Right Right Right  Right Right Right Right Right/Left Right Right Right Right     IE-1/12/22 2/1/22 2/3/22 2/10/22 2/14/22 2/17 2/22/22 2/24/22 3/15/2022 4/28/22 5/2/22 6/2/22 6/17/22   IF P1  6.7/6.3 7.3 (+0.6) 7.2 (-.1) 6.8 (-0.4) 6.9 (+0.1) 7.0 7.0 6.8 (-0.2) 6.8/6.5 6.7 (-0.1)   6.6 (-0.1)    SF P1   5.8/5.6 6.1 (+0.3) 6.2 (+0.1) 5.5 (-0.7) 5.6 (+0.1) 5.9 6.0 5.6 (-0.4) 5.8/5.4 5.7(-0.1)   5.5 (-0.2)    Thumb P1 7.4/7.5 7.4 (=) 7.0 (-0.4) 6.6 (-0.4) 6.8 (+0.2) 7.3 7.1 6.9 (-0.2) 6.8/6.5 7.0(+0.2)   6.6 (-0.4)    DPC 19/17.5 19.7 (+0.7) 19 (-0.7) 18.9 (-0.1) 19.4 (+0.5) 19.0 19.2 18.8 (-0.4) 18.5/17.5 18.9(+0.4)   17.5 (-1.4)    Wrist 18.3/17.0 19.5/17.9 18.8 (-0.8) 18.8 (=) 18.9 (+0.1) 18.2 18.2 18.3 (+0.1) 18/16.5 18.1(+0.1)  18.6 (+.5) 17.9 (-0.7) 18.8                                   Hand range of motion: Composite finger flexion/extension: Right within normal limits/ Left hand is within normal limits     Wrist AROM    Right/Left Right Right R Right/Left R R R R R R   DATE IE-1/12/22 2/1/22  Post-tx 2/3/22  Post-tx 2/22/22 3/15/2022 4/8/22 4/12/22 4/21/22 5/2/22 6/2/22 6/17/22  Post-tx   EXT 30/90 49 55 64 66/80 65 70 72 72 67 72   FLX 17/45 36 28 37 38/70 40 50 46 40 42 45   RD 0/12 11 23 11 18/25 12 NT 16 10 12 17   UD  26/35 18 28 25 18/32 22 NT 25 15 33 27   Wrist LIRIANO 73/182 114 (+41)   137 (+23)   139 (+2)  (+20) 137 (-22) 154 (+17) 161 (+7)      Thumb AROM  (in degrees) Right/Left Right Right Right    DATE 4/12/22 4/21/22  Post-tx 5/2/2 6/2/22   Tip to DPC   (in cm) 2.5/.8 1.5 (1.0) 1.5 (=) 0 (-1.5)      Forearm AROM    Right/Left Right Right Right R Right/Left Right Right   DATE IE-1/12/22 2/1/22*  Pre-tx 2/3/22 post tx 2/10/22 2/22/22 3/15/2022 5/2/22 6/2/22   Supination 68/85 46 55 55 (=) 60 (+5) 60/70 72 (+12) 80   Pronation 90/90 85 90 88 (-2) nt 90/90 86 85   *different car, rapidly moving in quick end range position causes pain       Strength: (measured in psi.)     Right/Left Right Right/Left Right Right Right Right Right     2/10/2022 3/7/22 3/15/2022 4/5/22 4/21/22 5/2/22 6/2/22 6/17/22   Les Rung II 8/40 14 (+6)  15/45 14* 19 (+5) 16** 26 (+10) 25   Key Pinch 4.5/4.5 8 (+3.5)  7/9 7.5 8.0 (+5) 7.5 12 (+4.5) 10   3-pt Pinch 3/6 4.5 (+1.5)  6/7 6.5 6.0 5.5 9 (+3.5) 9   2-pt Pinch 3/6.75 nt 4/5 5.0 (+1.0) defer defer 4 nt   * ltd by ulna wrist pain  ** ltd by thumb pain     Wrist MMT                  Left/Right  Flexion                         5/4  Extension                    5/5     Treatment     Tracy received the following supervised modalities after being cleared for contradictions for 10 minutes:   - Paraffin wax heat  for promoting healing, decreasing pain and increasing tissue extensibility    Tracy received the following manual therapy techniques for 8 minutes:   - R/A girth; encouraged wearing the size Medium 3/4 finger compression glove for edema mgmt intermittently during the day and while sleeping  - Grade II-III Rad/ulna/Carpal mobilizations    Tracy received Self Care Instructions and performed therapeutic activities and exercises for 25 minutes including:  Hand strengthening Green t-putty Strengthening, 5-10 reps each:  - 10 sec sustained   - tip pinch  - 3-jaw pinch  - lateral pinch  - PADs/DABs w/adduction log squeeze/pumps (scissors)  - PADs/DABs w/donut stretches (spreads-no thumb)  - defer to HEP w/Red putty if pain-free: Extrinsic extensors w/donut stretches with  thumb  - pancake presses for weight bearing progression     Wrist & forearm Strengthening- -Red t-band, isometric, 2 min each for:  -wrist extension with Left and Right shld ER and scap retraction  -wrist flexion with elbow flexion  -wrist RD with biceps and opposite triceps  -defer due to pain-wrist UD with Left and Right shld ER and Scap retraction    -Yellow flexbar, 2 min each for:  -smiles  -defer-frowns   Objective Measures 6/17: R/A post-tx wrist AROM and /pinch   6/7: R/A and updated POC   HEP as set 6/17: Re-issued Green t-putty  6/17: Red t-band for wrist isometrics  5/2/22: Recommended t-putty only every other day  4/5/22: Green t-putty   Activity Modifications 6/17: Tech positioning device demonstrated for hands-free      Home Exercises and Education Provided     Education provided:   - Assistive/positioning devices education ongoing  - HEP as upgraded  - Progress made    Written Home Exercises Provided: yes.  Exercises were reviewed and Tracy was able to demonstrate them prior to the end of the session.  Tracy demonstrated good  understanding of the HEP provided.     See EMR under Patient Instructions for exercises provided prior visit. 6/17/22       Assessment   6/17: Decreased episodes of pain with activities now being modified for respecting ulna wrist pain. Loss of  and lateral pinch strength. HEP upgraded accordingly. Improvements in wrist LIRIANO post-tx.    Tracy is progressing well towards her goals and there are no updates to goals at this time. Pt prognosis is Good.     Pt will continue to benefit from skilled outpatient occupational therapy to address the deficits listed in the problem list on initial evaluation provide pt/family education and to maximize pt's level of independence in the home and community environment.     Anticipated barriers to occupational therapy: arthritis, osteopenia, frequent falls     Pt's spiritual, cultural and educational needs considered and pt  agreeable to plan of care and goals.      Goals: to be met by July 1st  1) Patient will improve wrist flexion to 5/5 on right side to facilitate meal prep.  2) Patient will describe problem solving strategies for modifying activity which provokes wrist pain. MET 6/17/2022     Previous Short Term Goals Status:  STG's 4 weeks  1)   Patient to be IND with HEP and modalities for pain/edema managment. MET  2)   Pt to tolerate advancing PREs and flexibility activities for weight bearing positions with self-graded intensity and effective symptom monitoring. Met   3) Increase wrist and forearm LIRIANO by 60 degrees to increase functional hand use and support function positions for ADLs & leisure activities. MET   4) Digit and Thumb LIRIANO to be WNL as compared to the unaffected side for maximizing  and fine motor skills for reactivation the hand for light ADLs. met  5)  Decrease edema by 1.5 cm for evidencing soft tissue healing and effective edema mgmt. Met 2/10/22   6)   Increase right wrist and forearm LIRIANO to >75% of the right wrist to increase functional hand use for weight bearing and reaching tasks. Modified and Progressing on 2/1/22. MET   7)   Right  strength  to be >70% of the unaffected side. Progressing  8)   Right pinch strength to be >60% of the unaffected side. MET for lateral and 3-jaw pinch on 3/7/22  9)   Decrease complaints of pain to 2 out of 10 at worst to increase functional hand use and UE support functions for moderate to heavy ADL/work/leisure activities. MET 4/5/22. Returned after falling 5/2/22  10)   Patient to score at 45 % or less on Quick/DASH and/or Work module to demonstrate improved perception of functional right hand use to evidence return to near to prior level of function for I/ADLs and return to gardening. MET 4/5/22  Long Term Goal Status:   continue per initial plan of care.    Plan   6/17: Assess tolerance for upgraded HEP. Adjust accordingly. Continue education of activity  modifications including assistive devices and joint protection strategies. Help patient balance strengthening with pain-free ADLs.    Pt to be treated by Occupational Therapy 1 times per week for 4 weeks during the certification period from 6/2/22 through 7/1/22 to achieve the established goals.      Treatment to include: Paraffin, Fluidotherapy, Manual therapy/joint mobilizations, Modalities for pain management, Therapeutic exercises/activities., Strengthening, Orthotic Fabrication/Fit/Training, Edema Control, Joint Protection and Energy Conservation, as well as any other treatments deemed necessary based on the patient's needs or progress    HARIS Chi, MARGARITA  Occupational Therapist, Certified Hand Therapist

## 2022-06-23 NOTE — PROGRESS NOTES
"  Occupational Therapy Daily Treatment Note/Discharge Summary     Name: Tracy Armendariz  Clinic Number: [id number]    Therapy Diagnosis:   Encounter Diagnoses   Name Primary?    Pain in right wrist Yes    Decreased range of motion of right wrist     Swelling of right hand     Alteration in self-care ability     Decreased range of motion of finger of right hand      Physician: Carlos Baum MD    Visit Date: 6/23/2022    Medical Diagnosis: S62.101D (ICD-10-CM) - Closed fracture of right wrist with routine healing, subsequent encounter  Physician Orders: Eval and treat  Evaluation Date: 1/12/2022  Insurance Authorization Period Expiration: through 5/17/22  Updated Plan of Care from 6/2/22 to 7/1/2022  Date of Return to MD: 8/8/2022    Date of Onset: 11/9/21  Visit # / Visits authorized: 30/40  FOTO:29% limited;  65% limited at Eval    Time In: 11:30 am  Time Out: 12:15 pm  Total Billable Time: 45 minutes    Precautions:  Standard and blood thinners  Subjective     Pt reports: "I think I can continue on my own."    Pain: 2/10 at worst; 0/10 at rest  Location: Ulna wrist and thumb CMC    Objective      Edema: Circumferential measurements: In centimters     Right/Left Right Right Right  Right Right Right Right Right/Left Right Right Right Right Right/Left     IE-1/12/22 2/1/22 2/3/22 2/10/22 2/14/22 2/17 2/22/22 2/24/22 3/15/2022 4/28/22 5/2/22 6/2/22 6/17/22 6/23/22   IF P1  6.7/6.3 7.3 (+0.6) 7.2 (-.1) 6.8 (-0.4) 6.9 (+0.1) 7.0 7.0 6.8 (-0.2) 6.8/6.5 6.7 (-0.1)   6.6 (-0.1)  6.6/6.6   SF P1   5.8/5.6 6.1 (+0.3) 6.2 (+0.1) 5.5 (-0.7) 5.6 (+0.1) 5.9 6.0 5.6 (-0.4) 5.8/5.4 5.7(-0.1)   5.5 (-0.2)  5.7/5.5   Thumb P1 7.4/7.5 7.4 (=) 7.0 (-0.4) 6.6 (-0.4) 6.8 (+0.2) 7.3 7.1 6.9 (-0.2) 6.8/6.5 7.0(+0.2)   6.6 (-0.4)  6.5/6.5   DPC 19/17.5 19.7 (+0.7) 19 (-0.7) 18.9 (-0.1) 19.4 (+0.5) 19.0 19.2 18.8 (-0.4) 18.5/17.5 18.9(+0.4)   17.5 (-1.4)  18/17   Wrist 18.3/17.0 19.5/17.9 18.8 (-0.8) 18.8 (=) 18.9 (+0.1) 18.2 18.2 18.3 " (+0.1) 18/16.5 18.1(+0.1)  18.6 (+.5) 17.9 (-0.7) 18.8 17/16.5      Hand range of motion: Composite finger flexion/extension: Right within normal limits/ Left hand is within normal limits     Wrist AROM    Right/Left Right Right R Right/Left R R R R R R Right/Left   DATE IE-1/12/22 2/1/22  Post-tx 2/3/22  Post-tx 2/22/22 3/15/2022 4/8/22 4/12/22 4/21/22 5/2/22 6/2/22 6/17/22  Post-tx 6/23/2022   EXT 30/90 49 55 64 66/80 65 70 72 72 67 72 75/75   FLX 17/45 36 28 37 38/70 40 50 46 40 42 45 45//60   RD 0/12 11 23 11 18/25 12 NT 16 10 12 17 10/15   UD  26/35 18 28 25 18/32 22 NT 25 15 33 27 25/30   Wrist LIRIANO 73/182 114 (+41)   137 (+23)   139 (+2)  (+20) 137 (-22) 154 (+17) 161 (+7) -     Forearm AROM    Right/Left Right Right Right R Right/Left Right Right Right/Left   DATE IE-1/12/22 2/1/22*  Pre-tx 2/3/22 post tx 2/10/22 2/22/22 3/15/2022 5/2/22 6/2/22 6/23/22   Supination 68/85 46 55 55 (=) 60 (+5) 60/70 72 (+12) 80 80/80   Pronation 90/90 85 90 88 (-2) nt 90/90 86 85 90/90   *different car, rapidly moving in quick end range position causes pain       Strength: (measured in psi.)     Right/Left Right Right/Left Right Right Right Right Right Right/Left     2/10/2022 3/7/22 3/15/2022 4/5/22 4/21/22 5/2/22 6/2/22 6/17/22 6/23/22   Les Rung II 8/40 14 (+6)  15/45 14* 19 (+5) 16** 26 (+10) 25 25/50   Key Pinch 4.5/4.5 8 (+3.5)  7/9 7.5 8.0 (+5) 7.5 12 (+4.5) 10 8/10   3-pt Pinch 3/6 4.5 (+1.5)  6/7 6.5 6.0 5.5 9 (+3.5) 9 9/9   2-pt Pinch 3/6.75 nt 4/5 5.0 (+1.0) defer defer 4 nt 7/6      Wrist MMT 5/5 grossly    Treatment     Tracy received the following supervised modalities after being cleared for contradictions for 10 minutes:   - Paraffin wax heat  for promoting healing, decreasing pain and increasing tissue extensibility    Tracy received therapeutic exercises for 30 minutes including: Objective measures taken, home exercise program reviewed     Home Exercises and Education Provided     Education  provided:   - Assistive/positioning devices education ongoing  - HEP as upgraded  - Progress made    Written Home Exercises Provided: yes.  Exercises were reviewed and Tracy was able to demonstrate them prior to the end of the session.  Tracy demonstrated good  understanding of the HEP provided.     See EMR under Patient Instructions for exercises provided prior visit. 6/17/22     Assessment   Pt has been attending OT x 6 months for treatment weakness, stiffness and pain s/p right wrist fracture. Pt has been very motivated and pleasant throughout course of care. She is demonstrating overall improved range of motion and strength in right wrist and is now within functional limits grossly. Pt with improved FOTO score indicating increased functional use Right wrist with self care tasks. Pt is Independent and pain free with activities of daily living's, IADL's and home exercise program. Pt has met all goals and no longer requires skilled services at this time.   Goals: to be met by July 1st  1) Patient will improve wrist flexion to 5/5 on right side to facilitate meal prep. -met  2) Patient will describe problem solving strategies for modifying activity which provokes wrist pain. MET 6/17/2022     Previous Short Term Goals Status:  STG's 4 weeks  1)   Patient to be IND with HEP and modalities for pain/edema managment. MET  2)   Pt to tolerate advancing PREs and flexibility activities for weight bearing positions with self-graded intensity and effective symptom monitoring. Met   3) Increase wrist and forearm LIRIANO by 60 degrees to increase functional hand use and support function positions for ADLs & leisure activities. MET   4) Digit and Thumb LIRIANO to be WNL as compared to the unaffected side for maximizing  and fine motor skills for reactivation the hand for light ADLs. met  5)  Decrease edema by 1.5 cm for evidencing soft tissue healing and effective edema mgmt. Met 2/10/22   6)   Increase right wrist and forearm  LIRIANO to >75% of the right wrist to increase functional hand use for weight bearing and reaching tasks. Modified and Progressing on 2/1/22. MET   7)   Right  strength  to be >70% of the unaffected side. met  8)   Right pinch strength to be >60% of the unaffected side. MET for lateral and 3-jaw pinch on 3/7/22  9)   Decrease complaints of pain to 2 out of 10 at worst to increase functional hand use and UE support functions for moderate to heavy ADL/work/leisure activities. MET 4/5/22.   10)   Patient to score at 45 % or less on Quick/DASH and/or Work module to demonstrate improved perception of functional right hand use to evidence return to near to prior level of function for I/ADLs and return to gardening. MET 4/5/22  Long Term Goal Status:   continue per initial plan of care.    Plan   Discharge from OT

## 2022-06-27 ENCOUNTER — OFFICE VISIT (OUTPATIENT)
Dept: UROLOGY | Facility: CLINIC | Age: 72
End: 2022-06-27
Payer: MEDICARE

## 2022-06-27 VITALS
SYSTOLIC BLOOD PRESSURE: 117 MMHG | WEIGHT: 222.88 LBS | HEART RATE: 68 BPM | DIASTOLIC BLOOD PRESSURE: 76 MMHG | BODY MASS INDEX: 35.82 KG/M2 | HEIGHT: 66 IN

## 2022-06-27 DIAGNOSIS — N20.0 NEPHROLITHIASIS: Primary | ICD-10-CM

## 2022-06-27 PROCEDURE — 3078F DIAST BP <80 MM HG: CPT | Mod: CPTII,S$GLB,, | Performed by: STUDENT IN AN ORGANIZED HEALTH CARE EDUCATION/TRAINING PROGRAM

## 2022-06-27 PROCEDURE — 1160F PR REVIEW ALL MEDS BY PRESCRIBER/CLIN PHARMACIST DOCUMENTED: ICD-10-PCS | Mod: CPTII,S$GLB,, | Performed by: STUDENT IN AN ORGANIZED HEALTH CARE EDUCATION/TRAINING PROGRAM

## 2022-06-27 PROCEDURE — 3008F BODY MASS INDEX DOCD: CPT | Mod: CPTII,S$GLB,, | Performed by: STUDENT IN AN ORGANIZED HEALTH CARE EDUCATION/TRAINING PROGRAM

## 2022-06-27 PROCEDURE — 99999 PR PBB SHADOW E&M-EST. PATIENT-LVL IV: ICD-10-PCS | Mod: PBBFAC,,, | Performed by: STUDENT IN AN ORGANIZED HEALTH CARE EDUCATION/TRAINING PROGRAM

## 2022-06-27 PROCEDURE — 3078F PR MOST RECENT DIASTOLIC BLOOD PRESSURE < 80 MM HG: ICD-10-PCS | Mod: CPTII,S$GLB,, | Performed by: STUDENT IN AN ORGANIZED HEALTH CARE EDUCATION/TRAINING PROGRAM

## 2022-06-27 PROCEDURE — 3074F PR MOST RECENT SYSTOLIC BLOOD PRESSURE < 130 MM HG: ICD-10-PCS | Mod: CPTII,S$GLB,, | Performed by: STUDENT IN AN ORGANIZED HEALTH CARE EDUCATION/TRAINING PROGRAM

## 2022-06-27 PROCEDURE — 99214 OFFICE O/P EST MOD 30 MIN: CPT | Mod: S$GLB,,, | Performed by: STUDENT IN AN ORGANIZED HEALTH CARE EDUCATION/TRAINING PROGRAM

## 2022-06-27 PROCEDURE — 99999 PR PBB SHADOW E&M-EST. PATIENT-LVL IV: CPT | Mod: PBBFAC,,, | Performed by: STUDENT IN AN ORGANIZED HEALTH CARE EDUCATION/TRAINING PROGRAM

## 2022-06-27 PROCEDURE — 3288F PR FALLS RISK ASSESSMENT DOCUMENTED: ICD-10-PCS | Mod: CPTII,S$GLB,, | Performed by: STUDENT IN AN ORGANIZED HEALTH CARE EDUCATION/TRAINING PROGRAM

## 2022-06-27 PROCEDURE — 3044F PR MOST RECENT HEMOGLOBIN A1C LEVEL <7.0%: ICD-10-PCS | Mod: CPTII,S$GLB,, | Performed by: STUDENT IN AN ORGANIZED HEALTH CARE EDUCATION/TRAINING PROGRAM

## 2022-06-27 PROCEDURE — 3074F SYST BP LT 130 MM HG: CPT | Mod: CPTII,S$GLB,, | Performed by: STUDENT IN AN ORGANIZED HEALTH CARE EDUCATION/TRAINING PROGRAM

## 2022-06-27 PROCEDURE — 1100F PR PT FALLS ASSESS DOC 2+ FALLS/FALL W/INJURY/YR: ICD-10-PCS | Mod: CPTII,S$GLB,, | Performed by: STUDENT IN AN ORGANIZED HEALTH CARE EDUCATION/TRAINING PROGRAM

## 2022-06-27 PROCEDURE — 99214 PR OFFICE/OUTPT VISIT, EST, LEVL IV, 30-39 MIN: ICD-10-PCS | Mod: S$GLB,,, | Performed by: STUDENT IN AN ORGANIZED HEALTH CARE EDUCATION/TRAINING PROGRAM

## 2022-06-27 PROCEDURE — 1125F PR PAIN SEVERITY QUANTIFIED, PAIN PRESENT: ICD-10-PCS | Mod: CPTII,S$GLB,, | Performed by: STUDENT IN AN ORGANIZED HEALTH CARE EDUCATION/TRAINING PROGRAM

## 2022-06-27 PROCEDURE — 3288F FALL RISK ASSESSMENT DOCD: CPT | Mod: CPTII,S$GLB,, | Performed by: STUDENT IN AN ORGANIZED HEALTH CARE EDUCATION/TRAINING PROGRAM

## 2022-06-27 PROCEDURE — 1125F AMNT PAIN NOTED PAIN PRSNT: CPT | Mod: CPTII,S$GLB,, | Performed by: STUDENT IN AN ORGANIZED HEALTH CARE EDUCATION/TRAINING PROGRAM

## 2022-06-27 PROCEDURE — 1159F PR MEDICATION LIST DOCUMENTED IN MEDICAL RECORD: ICD-10-PCS | Mod: CPTII,S$GLB,, | Performed by: STUDENT IN AN ORGANIZED HEALTH CARE EDUCATION/TRAINING PROGRAM

## 2022-06-27 PROCEDURE — 4010F PR ACE/ARB THEARPY RXD/TAKEN: ICD-10-PCS | Mod: CPTII,S$GLB,, | Performed by: STUDENT IN AN ORGANIZED HEALTH CARE EDUCATION/TRAINING PROGRAM

## 2022-06-27 PROCEDURE — 3008F PR BODY MASS INDEX (BMI) DOCUMENTED: ICD-10-PCS | Mod: CPTII,S$GLB,, | Performed by: STUDENT IN AN ORGANIZED HEALTH CARE EDUCATION/TRAINING PROGRAM

## 2022-06-27 PROCEDURE — 1100F PTFALLS ASSESS-DOCD GE2>/YR: CPT | Mod: CPTII,S$GLB,, | Performed by: STUDENT IN AN ORGANIZED HEALTH CARE EDUCATION/TRAINING PROGRAM

## 2022-06-27 PROCEDURE — 1159F MED LIST DOCD IN RCRD: CPT | Mod: CPTII,S$GLB,, | Performed by: STUDENT IN AN ORGANIZED HEALTH CARE EDUCATION/TRAINING PROGRAM

## 2022-06-27 PROCEDURE — 1160F RVW MEDS BY RX/DR IN RCRD: CPT | Mod: CPTII,S$GLB,, | Performed by: STUDENT IN AN ORGANIZED HEALTH CARE EDUCATION/TRAINING PROGRAM

## 2022-06-27 PROCEDURE — 4010F ACE/ARB THERAPY RXD/TAKEN: CPT | Mod: CPTII,S$GLB,, | Performed by: STUDENT IN AN ORGANIZED HEALTH CARE EDUCATION/TRAINING PROGRAM

## 2022-06-27 PROCEDURE — 3044F HG A1C LEVEL LT 7.0%: CPT | Mod: CPTII,S$GLB,, | Performed by: STUDENT IN AN ORGANIZED HEALTH CARE EDUCATION/TRAINING PROGRAM

## 2022-06-27 NOTE — PROGRESS NOTES
"Subjective:       Patient ID: Tracy Armendariz is a 71 y.o. female.    Chief Complaint: Follow-up  This is a 71 y.o.  female patient that is an established patient of mine.      The patient is referred to me by Dr. Bartolo Jules her PCP for kidney stone treatment.  She was visiting [facility] and was diagnosed with an obstructing stone. She presented to the ER at [facility] on 11/27/18 with fevers and altered mental status. She notes that they found a kidney stone of unknown laterality she does not recall left or right. Dr. Pérez, a urologist at [facility], was consulted after a Ct was performed and found an "impacted" stone that was "very close to the bladder" per the patient and her . She was told that she had a urinary tract infection and that the infection went into the blood stream (positive bacteremia). She underwent a cystoscopy and ureteral stent placement on 11/28/18.     She underwent a L urs/hll/sbe/stent exchange on 12/19/2018. Her stent has been removed in clinic on 12/26/2018.    Has two daughter, and 1 son who lives here. Daughters are in [place] (moved to [place]) and [place]. 3 grandsons in [place]. Her son who lives here has a boy and a girl.      Last cross sectional imaging 8/2020 - Kidneys/ Ureters: Right kidney and collecting system unremarkable.  Multiple punctate calcifications are seen throughout the renal collecting system on the left side ranging in size from 2-5 mm.  No ureteral stone or hydronephrosis seen.    6/27/22  Here for routine f/u. She has been asymptomatic no kidney stone issues.     Lab Results   Component Value Date    CREATININE 0.8 03/29/2022       ---  Past Medical History:   Diagnosis Date    Allergy     Anticoagulant long-term use     Arthritis     CVA (cerebral infarction) 2013    Depression     Disorder of kidney and ureter     renal stones    Diverticulosis of colon     Extrinsic asthma, unspecified     Hyperlipidemia     " Hypertension     Kidney stone     Left atrial enlargement 2014    Low back pain     MARTIN (obstructive sleep apnea)     Osteopenia     PUD (peptic ulcer disease)     Stroke  2013    Urinary tract infection        Past Surgical History:   Procedure Laterality Date    APPENDECTOMY      @ time of hysterectomy    BACK SURGERY      CATARACT EXTRACTION       SECTION      CHOLECYSTECTOMY      laparoscopic    COLONOSCOPY N/A 2018    Procedure: COLONOSCOPY/Golytely;  Surgeon: Janine Black MD;  Location: [facility] ENDO;  Service: Endoscopy;  Laterality: N/A;    DILATION AND CURETTAGE OF UTERUS      HYSTERECTOMY      TAHUSO with appendectomy    INNER EAR SURGERY      replaced ear drum    JOINT REPLACEMENT Right     knee    KNEE ARTHROSCOPY W/ DEBRIDEMENT      LUMBAR DISCECTOMY      L4-L5    OOPHORECTOMY      unilateral    TONSILLECTOMY      TYMPANOPLASTY      URETEROSCOPIC REMOVAL OF URETERIC CALCULUS Left 2018    Procedure: REMOVAL, CALCULUS, URETER, URETEROSCOPIC, holmium laser lithotripsy, stone basket extraction, retrograde pyelogram, ureteral stent exchange;  Surgeon: Anaya Zamora MD;  Location: [facility] OR;  Service: Urology;  Laterality: Left;       Family History   Problem Relation Age of Onset    Cervical cancer Mother     Cancer Mother 65        lung cancer - non smoker    Stroke Paternal Grandfather     Hypertension Maternal Grandfather     Heart disease Maternal Grandfather     Hypertension Father     Coronary artery disease Father 62    Heart disease Father     Diabetes Sister     Heart disease Sister     Kidney disease Sister     Breast cancer Daughter 36    Heart failure Sister         60s    Colon cancer Neg Hx     Ovarian cancer Neg Hx        Social History     Tobacco Use    Smoking status: Former Smoker     Quit date: 1995     Years since quittin.5    Smokeless tobacco: Never Used   Substance Use Topics     Alcohol use: Yes     Alcohol/week: 1.0 standard drink     Types: 1 Glasses of wine per week     Comment: social    Drug use: No       Current Outpatient Medications on File Prior to Visit   Medication Sig Dispense Refill    albuterol (PROVENTIL/VENTOLIN HFA) 90 mcg/actuation inhaler INHALE 2 PUFFS EVERY 6 HOURS AS NEEDED FOR WHEEZING 18 g 0    aspirin 81 MG Chew Take 81 mg by mouth once daily.      atorvastatin (LIPITOR) 10 MG tablet Take 1 tablet (10 mg total) by mouth once daily. 90 tablet 3    buPROPion (WELLBUTRIN XL) 300 MG 24 hr tablet Take 1 tablet (300 mg total) by mouth once daily. 90 tablet 11    calcium carbonate (OS-SPENCER) 600 mg (1,500 mg) Tab Take 600 mg by mouth 2 (two) times daily with meals.      cholecalciferol, vitamin D3, 1,000 unit Chew Take 1,000 Units by mouth once daily.      diazePAM (VALIUM) 2 MG tablet Take 1/2 to 1 tablet 3 times daily as needed to control dizziness 50 tablet 1    diclofenac sodium (VOLTAREN) 1 % Gel APPLY 2-4 GRAMS TO EACH PAINFUL AREA FOUR TIMES DAILY - MAX 32 GRAMS/ g 6    EScitalopram oxalate (LEXAPRO) 20 MG tablet TAKE 1 TABLET(20 MG) BY MOUTH EVERY DAY (Patient taking differently: Take 20 mg by mouth once daily.) 90 tablet 3    esomeprazole (NEXIUM) 40 MG capsule Take 1 capsule (40 mg total) by mouth daily as needed (heartburn). 30 capsule 3    famotidine (PEPCID) 10 MG tablet Take 10 mg by mouth 2 (two) times daily.      FLUAD 7081-0710, 65 YR UP,,PF, 45 mcg (15 mcg x 3)/0.5 mL Syrg       hydrocortisone 2.5 % cream Apply topically 2 (two) times daily to affected area 30 g 2    LACTOBACILLUS ACIDOPHILUS (PROBIOTIC ORAL) Take 1 capsule by mouth once daily. PRN      LIDOcaine-prilocaine (EMLA) cream APPLY 2 GRAMS 3-4 TIMES DAILY FOR TREATMENT OF PAIN 240 g 6    losartan (COZAAR) 100 MG tablet TAKE 1 TABLET(100 MG) BY MOUTH EVERY DAY 90 tablet 3    meclizine (ANTIVERT) 12.5 mg tablet Take 1 tablet (12.5 mg total) by mouth 3 (three) times  daily as needed for Dizziness or Nausea. 30 tablet 6    MULTIVIT WITH CALCIUM,IRON,MIN (WOMEN'S DAILY MULTIVITAMIN ORAL) Take 1 tablet by mouth once daily.      tamsulosin (FLOMAX) 0.4 mg Cap TAKE 1 CAPSULE(0.4 MG) BY MOUTH EVERY DAY 90 capsule 0     No current facility-administered medications on file prior to visit.       Review of patient's allergies indicates:   Allergen Reactions    Iodinated contrast media Hives and Rash    Gabapentin Hallucinations    Iodine Hives    Isothiazolinones Rash    Penicillins Rash       Review of Systems   Constitutional: Negative for activity change.   HENT: Negative for congestion.    Eyes: Negative for visual disturbance.   Respiratory: Negative for shortness of breath.    Cardiovascular: Negative for chest pain.   Gastrointestinal: Negative for abdominal distention.   Musculoskeletal: Negative for gait problem.   Skin: Negative for color change.   Neurological: Negative for dizziness.   Psychiatric/Behavioral: Negative for agitation.       Objective:      Physical Exam  Constitutional:       Appearance: She is well-developed.   HENT:      Head: Normocephalic and atraumatic.   Pulmonary:      Effort: Pulmonary effort is normal.   Musculoskeletal:         General: Normal range of motion.      Cervical back: Normal range of motion.   Skin:     General: Skin is warm and dry.   Neurological:      Mental Status: She is alert and oriented to person, place, and time.         Assessment:       1. Nephrolithiasis        Plan:         1. As pt is asymptomatic, will schedule for renal US to screen for stones.  to arrange.      Nephrolithiasis  -     US Retroperitoneal Complete; Future; Expected date: 06/27/2022

## 2022-06-29 ENCOUNTER — OFFICE VISIT (OUTPATIENT)
Dept: DERMATOLOGY | Facility: CLINIC | Age: 72
End: 2022-06-29
Payer: MEDICARE

## 2022-06-29 DIAGNOSIS — L82.1 SK (SEBORRHEIC KERATOSIS): ICD-10-CM

## 2022-06-29 DIAGNOSIS — L57.0 AK (ACTINIC KERATOSIS): Primary | ICD-10-CM

## 2022-06-29 PROCEDURE — 99999 PR PBB SHADOW E&M-EST. PATIENT-LVL III: CPT | Mod: PBBFAC,,, | Performed by: DERMATOLOGY

## 2022-06-29 PROCEDURE — 3044F HG A1C LEVEL LT 7.0%: CPT | Mod: CPTII,S$GLB,, | Performed by: DERMATOLOGY

## 2022-06-29 PROCEDURE — 99203 OFFICE O/P NEW LOW 30 MIN: CPT | Mod: 25,S$GLB,, | Performed by: DERMATOLOGY

## 2022-06-29 PROCEDURE — 1160F PR REVIEW ALL MEDS BY PRESCRIBER/CLIN PHARMACIST DOCUMENTED: ICD-10-PCS | Mod: CPTII,S$GLB,, | Performed by: DERMATOLOGY

## 2022-06-29 PROCEDURE — 17000 PR DESTRUCTION(LASER SURGERY,CRYOSURGERY,CHEMOSURGERY),PREMALIGNANT LESIONS,FIRST LESION: ICD-10-PCS | Mod: S$GLB,,, | Performed by: DERMATOLOGY

## 2022-06-29 PROCEDURE — 99999 PR PBB SHADOW E&M-EST. PATIENT-LVL III: ICD-10-PCS | Mod: PBBFAC,,, | Performed by: DERMATOLOGY

## 2022-06-29 PROCEDURE — 3044F PR MOST RECENT HEMOGLOBIN A1C LEVEL <7.0%: ICD-10-PCS | Mod: CPTII,S$GLB,, | Performed by: DERMATOLOGY

## 2022-06-29 PROCEDURE — 99203 PR OFFICE/OUTPT VISIT, NEW, LEVL III, 30-44 MIN: ICD-10-PCS | Mod: 25,S$GLB,, | Performed by: DERMATOLOGY

## 2022-06-29 PROCEDURE — 1160F RVW MEDS BY RX/DR IN RCRD: CPT | Mod: CPTII,S$GLB,, | Performed by: DERMATOLOGY

## 2022-06-29 PROCEDURE — 4010F ACE/ARB THERAPY RXD/TAKEN: CPT | Mod: CPTII,S$GLB,, | Performed by: DERMATOLOGY

## 2022-06-29 PROCEDURE — 17000 DESTRUCT PREMALG LESION: CPT | Mod: S$GLB,,, | Performed by: DERMATOLOGY

## 2022-06-29 PROCEDURE — 4010F PR ACE/ARB THEARPY RXD/TAKEN: ICD-10-PCS | Mod: CPTII,S$GLB,, | Performed by: DERMATOLOGY

## 2022-06-29 PROCEDURE — 1159F PR MEDICATION LIST DOCUMENTED IN MEDICAL RECORD: ICD-10-PCS | Mod: CPTII,S$GLB,, | Performed by: DERMATOLOGY

## 2022-06-29 PROCEDURE — 1159F MED LIST DOCD IN RCRD: CPT | Mod: CPTII,S$GLB,, | Performed by: DERMATOLOGY

## 2022-06-29 NOTE — PROGRESS NOTES
Subjective:       Patient ID:  Tracy Armendariz is a 71 y.o. female who presents for   Chief Complaint   Patient presents with    Lesion     Pt c/o red colored lesion near left eye x 2-3 years. Lesion burns. No prev tx. Getting larger.       Review of Systems   Constitutional: Negative for fever.   Gastrointestinal:        Current GI bug   Skin: Positive for daily sunscreen use. Negative for tendency to form keloidal scars.   Hematologic/Lymphatic: Bruises/bleeds easily.        Objective:    Physical Exam   Constitutional: She appears well-developed and well-nourished. No distress.   Neurological: She is alert and oriented to person, place, and time. She is not disoriented.   Psychiatric: She has a normal mood and affect.   Skin:   Areas Examined (abnormalities noted in diagram):   Head / Face Inspection Performed              Diagram Legend     Erythematous scaling macule/papule c/w actinic keratosis       Vascular papule c/w angioma      Pigmented verrucoid papule/plaque c/w seborrheic keratosis      Yellow umbilicated papule c/w sebaceous hyperplasia      Irregularly shaped tan macule c/w lentigo     1-2 mm smooth white papules consistent with Milia      Movable subcutaneous cyst with punctum c/w epidermal inclusion cyst      Subcutaneous movable cyst c/w pilar cyst      Firm pink to brown papule c/w dermatofibroma      Pedunculated fleshy papule(s) c/w skin tag(s)      Evenly pigmented macule c/w junctional nevus     Mildly variegated pigmented, slightly irregular-bordered macule c/w mildly atypical nevus      Flesh colored to evenly pigmented papule c/w intradermal nevus       Pink pearly papule/plaque c/w basal cell carcinoma      Erythematous hyperkeratotic cursted plaque c/w SCC      Surgical scar with no sign of skin cancer recurrence      Open and closed comedones      Inflammatory papules and pustules      Verrucoid papule consistent consistent with wart     Erythematous eczematous patches and plaques      Dystrophic onycholytic nail with subungual debris c/w onychomycosis     Umbilicated papule    Erythematous-base heme-crusted tan verrucoid plaque consistent with inflamed seborrheic keratosis     Erythematous Silvery Scaling Plaque c/w Psoriasis     See annotation      Assessment / Plan:        AK (actinic keratosis)  Cryosurgery Procedure Note    Verbal consent from the patient is obtained including, but not limited to, risk of hypopigmentation/hyperpigmentation, scar, recurrence of lesion. The patient is aware of the precancerous quality and need for treatment of these lesions. Liquid nitrogen cryosurgery is applied to the 1 actinic keratoses, as detailed in the physical exam, to produce a freeze injury. The patient is aware that blisters may form and is instructed on wound care with gentle cleansing and use of vaseline ointment to keep moist until healed. The patient is supplied a handout on cryosurgery and is instructed to call if lesions do not completely resolve.    The nature of sun-induced photo-aging and skin cancers is discussed.  Sun avoidance, protective clothing, and the use of 30-SPF sunscreens is advised. Observe closely for skin damage/changes, and call if such occurs.    SK (seborrheic keratosis)  These are benign inherited growths without a malignant potential. Reassurance given to patient. No treatment is necessary.              Follow up if symptoms worsen or fail to improve.

## 2022-07-12 ENCOUNTER — CLINICAL SUPPORT (OUTPATIENT)
Dept: REHABILITATION | Facility: HOSPITAL | Age: 72
End: 2022-07-12
Attending: SPECIALIST
Payer: MEDICARE

## 2022-07-12 DIAGNOSIS — R42 DIZZINESS: ICD-10-CM

## 2022-07-12 DIAGNOSIS — H53.10 EYE FATIGUE: ICD-10-CM

## 2022-07-12 DIAGNOSIS — Z91.81 AT MODERATE RISK FOR FALL: ICD-10-CM

## 2022-07-12 DIAGNOSIS — R26.89 IMPAIRMENT OF BALANCE: ICD-10-CM

## 2022-07-12 DIAGNOSIS — H81.391 PERIPHERAL VERTIGO, RIGHT: ICD-10-CM

## 2022-07-12 DIAGNOSIS — Z86.73 HISTORY OF CVA (CEREBROVASCULAR ACCIDENT): ICD-10-CM

## 2022-07-12 PROCEDURE — 97162 PT EVAL MOD COMPLEX 30 MIN: CPT | Mod: PO

## 2022-07-12 NOTE — PLAN OF CARE
[facility]  Physical Therapy Neurological Rehabilitation Initial Evaluation    Name: Tracy Armendariz  Clinic Number: [id number]    Therapy Diagnosis:   Encounter Diagnoses   Name Primary?    Peripheral vertigo, right     History of CVA (cerebrovascular accident)     At moderate risk for fall     Eye fatigue     Dizziness     Impairment of balance      Physician: LORRAINE Alfonso MD    Physician Orders: PT Eval and Treat   Medical Diagnosis from Referral:   H81.391 (ICD-10-CM) - Peripheral vertigo involving right ear   R27.0 (ICD-10-CM) - Ataxia   Z91.81 (ICD-10-CM) - At high risk for falls     Evaluation Date: 7/12/2022  Authorization Period Expiration: 06/10/22 to 06/10/23  Plan of Care Expiration: 09/09/22  Visit # / Visits authorized: 01/ 01    Time In: 14:05  Time Out: 14:45  Total Billable Time: 40 minutes    Precautions: Standard, fall, right wrist fracture    Subjective   Date of onset: decline in symptoms over past year    History of current condition - Tracy reports: that dizziness and imbalance started ~8 years ago following cerebral vascular accident. Recently, symptoms have gotten worse. Endorses that she [facility] and broke her right rib in April 2022. Occasional discomfort when lying on right and left sides. She also endorses that she fell this past November and broke her wrist. Just completed therapy for her wrist but endorses difficulty picking up things that are very heavy. Movement triggers dizziness- not when sitting still. Dizziness is described as eyes do not focus quick enough. Also endorses spinning sensation when up and moving and when driving her car. Endorses occasional spinning sensation when moving from lying down to sitting up. She has to look down when walking to see where her feet are in space. Endorses decreased balance in low vision environments, on unsteady surfaces, and steps/curbs. When she stands up, endorses that she takes  a few steps back due to imbalance. On diazepam regularly.     History of Current Symptoms   Triggers: moving around, occasionally driving, supine> sit   Alleviating Factors: let symptoms pass, sit in chair   Description of symptoms: spinning sensation, unsteady sensation   Onset: worsening over past year   Frequency: daily   Duration: entire time she is moving   Positional changes:  Supine > sit    Limitations due to symptoms: stays in bed more than usual because she does not want to fall    History of migraines: No  Blood Pressure: HTN  History of Heart Condition: none significant     Medical History:   Past Medical History:   Diagnosis Date    Allergy     Anticoagulant long-term use     Arthritis     CVA (cerebral infarction)     Depression     Disorder of kidney and ureter     renal stones    Diverticulosis of colon     Extrinsic asthma, unspecified     Hyperlipidemia     Hypertension     Kidney stone     Left atrial enlargement 2014    Low back pain     MARTIN (obstructive sleep apnea)     Osteopenia     PUD (peptic ulcer disease)     Stroke  2013    Urinary tract infection        Surgical History:   Tracy Armendariz  has a past surgical history that includes Inner ear surgery; Cholecystectomy ();  section (); Dilation and curettage of uterus (); Appendectomy (); Hysterectomy (); Tympanoplasty; Lumbar discectomy (); Knee arthroscopy w/ debridement (); Tonsillectomy; Back surgery; Cataract extraction; Colonoscopy (N/A, 2018); Joint replacement (Right); Ureteroscopic removal of ureteric calculus (Left, 2018); and Oophorectomy.    Medications:   Tracy has a current medication list which includes the following prescription(s): albuterol, aspirin, atorvastatin, bupropion, calcium carbonate, cholecalciferol (vitamin d3), diazepam, diclofenac sodium, escitalopram oxalate, esomeprazole, famotidine, fluad 4216-5040 (65 yr up)(pf),  "hydrocortisone, lactobacillus acidophilus, lidocaine-prilocaine, losartan, meclizine, multivit with calcium,iron,min, and tamsulosin.    Allergies:   Review of patient's allergies indicates:   Allergen Reactions    Iodinated contrast media Hives and Rash    Gabapentin Hallucinations    Iodine Hives    Isothiazolinones Rash    Penicillins Rash        Imaging: imaging in Epic reviewed prior to evaluation  VN22: Impression: Caloric testing revealed a clinically significant right caloric weakness which is indicative of a right peripheral vestibular abnormality.  Audiogram: 22: Audiogram results revealed a mild to moderate mixed hearing loss in the right ear and essentially normal hearing with a moderate hearing loss at 3908-5906 Hz in the left ear.     Prior Therapy: [facility] PT in   Social History: lives with her   Falls: multiple recent falls-   DME: rollator, SPC, built in seat in shower but not used; has rollator/SPC in car in case needed  Home Environment: [facility], 1 steps to enter- no issues with this step because she holds onto door   Exercise Routine / History: eye and neck exercises provided by audiology  Family Present at time of Eval: none present   Occupation: not currently working  Prior Level of Function: (I) with functional mobility/ADL;  does cooking and cleaning; able to drive  Current Level of Function: (I) with functional mobility/ADL;  does cooking and cleaning; able to drive    Pain:  Current 0/10, worst 9/10, best 0/10   Location: right wrist  Description: Sharp  Aggravating Factors: picking up heavy things  Easing Factors: cortisone shot    Patient's goals: "to improve dizziness and balance as much as possible"    Objective   - Follows commands: 100% of time   - Speech: no deficits       Mental status: alert, oriented to person, place, and time, normal mood, behavior, speech, dress, motor activity, and thought processes  Appearance: Casually " "dressed  Behavior:  calm and cooperative  Attention Span and Concentration:  Normal    Posture Alignment in sitting/standing:   Head: forward head   Scapulae: rounded shoulders   Trunk: slouched posture   Pelvis: NT   Legs: WFL     Sensation: Light Touch: occasional numbness/tingling in hands and RLE          Proprioception: NT, Kinesthesia NT    Auditory: see audiogram         Visual/Oculomotor Screen: endorses increased double vision when she has been reading for too long and when tired  Spontaneous nystagmus (fixation present): none present  Gaze-evoked nystagmus (fixation present): none present  Tracking/Smooth Pursuits:Impaired: visual slippage with horizontal tracking, no sig dizziness  Saccades: Impaired: jerkiness when tracking right, no sig dizziness  Convergence: impaired, eyes stop converging retirement  VOR 1: Impaired: double vision present  VOR cancellation: NT  Acuity:wears glasses  R/L discrimination: Intact  Visual field: Intact  VCR: NT (head remains still, body moves)    Coordination: NT    ROM:   CERVICAL SPINE  Flexion 56 degrees (80-90 deg); slight dizziness  Extension 45 degrees (70-80 deg)  L side bend 33 degrees, R side bend 36 degrees (20-45 degrees)  L rotation 60 degrees, R rotation 56 degrees (70-90 degrees); double vision when looking left  Are concurrent symptoms present with any of these movements: yes, see above    Modified VAS (Vertebral Artery Screen), in sitting (rotation, then extension):  R: (+) dizziness  L:  (+) dizziness        MICHAEL SENSORY TESTING:  (P= Pass, F= Fail; note any sway; hold each position for 30")  Condition 1: (firm surface/feet together/eyes open) P, slight dizziness  Condition 2: (firm surface/feet together/eyes closed) P, min sway  Condition 3: (firm surface/feet in tandem/eyes open) F, 5 sec c/ RLE in front; 7 sec c/ LLE in front  Condition 4: (firm surface/feet in tandem/eyes closed) NT 2/2 safety concerns  Condition 5: (soft surface/feet together/eyes open) " F, 12 seconds  Condition 6: (soft surface/feet together/eyes closed) F, 13 seconds  Condition 7: (Fukuda step test), measure distance varied from center starting position, > 30 deg deviation to either side indicates hypofunction of biased side NT 2/2 safety concerns    Gait Assessment:  - AD used: none  - Assistance: Mod I due to decreased speed  - Distance: community distances    Endurance Deficit: significant- quick fatigue during evaluation    POSITIONAL CANAL TESTING- not tested this date as patient still endorses rib soreness when lying on both sides; however, plan to test at future date      CMS Impairment/Limitation/Restriction for FOTO Survey- to be performed at first follow up session           TREATMENT   No treatment provided this date. Entire time spent on evaluation.     Home Exercises and Patient Education Provided    Education provided:   - plan of care (POC), scheduling    Written Home Exercises Provided: to be provided at first follow up session.     Assessment   Tracy is a 71 y.o. female referred to outpatient Physical Therapy with a medical diagnosis of Peripheral vertigo involving right ear; ataxia; at high risk for falls. Patient presents with chief complaints of persistent dizziness, decreased balance, and multiple recent falls which have progressively worsened over the past year. Symptoms have been present since cerebral vascular accident 8 years ago, but have worsened with falls becoming more frequent. During oculomotor testing, impairment noted with smooth pursuits, saccades, VOR 1, and convergence. Dizziness only elicited with VOR 1, but poor oculomotor endurance noted throughout. During cervical ROM screen, dizziness elicited with flexion and double vision elicited with left rotation. VAS test (+) dizziness bilaterally but patient denies lightheadedness. CRTs not assessed this date as patient endorses continued soreness when lying on either side due to prior rib injuries. However, would  like to reassess CRTs at later date. During GST, difficulty noted on all tested positions with poor recruitment of balance reactions strategies noted throughout. Functional Gait Assessment to be performed at first follow up session. Patient's presentation is consistent with referring medical diagnoses. Patient is appropriate for continued skilled PT intervention to further address oculomotor, balance, and motion tolerance deficits to reduce risk of fall.     Patient prognosis is Good.   Patient will benefit from skilled outpatient Physical Therapy to address the deficits stated above and in the chart below, provide patient/family education, and to maximize patient's level of independence.     Plan of care discussed with patient: Yes  Patient's spiritual, cultural and educational needs considered and patient is agreeable to the plan of care and goals as stated below:     Anticipated Barriers for therapy: co-morbidities, chronicity of symptoms    Medical Necessity is demonstrated by the following  History  Co-morbidities and personal factors that may impact the plan of care Co-morbidities:   H/o cerebral vascular accident, depression, visual floaters, NICK, h/o vertigo, h/o balance deficits, hypertension, short of breath on exertion, atherosclerosis of aorta, OA right knee, right wrist fracture, fractured ribs, MARTIN    Personal Factors:   no deficits     high   Examination  Body Structures and Functions, activity limitations and participation restrictions that may impact the plan of care Body Regions:   head  lower extremities  trunk    Body Systems:    gross symmetry  ROM  strength  gross coordinated movement  balance  gait  transfers  transitions  motor control  motor learning  vestibular function    Participation Restrictions:   None noted    Activity limitations:   Learning and applying knowledge  no deficits    General Tasks and Commands  no deficits    Communication  no deficits    Mobility  lifting and carrying  objects  walking  driving (bike, car, motorcycle)    Self care  no deficits    Domestic Life  shopping  cooking  doing house work (cleaning house, washing dishes, laundry)  assisting others    Interactions/Relationships  no deficits    Life Areas  no deficits    Community and Social Life  community life  recreation and leisure         moderate   Clinical Presentation evolving clinical presentation with changing clinical characteristics- multiple recent falls moderate   Decision Making/ Complexity Score: moderate     Goals:  Short Term Goals: 4 weeks   1. Patient to be (I) with established home exercise program.   2. Patient to perform smooth pursuits tracking single target in all planes WFL for improved ability to scan environment.   3. Patient to perform saccades at 80 bpm WFL for improved oculomotor endurance.   4. Patient to perform VOR 1 at 70 bpm WFL for improved gaze stabilization.   5. Patient to pass GST condition 2 with no more than slight sway for improved balance in low vision environments.   6. Patient to improve GST condition 3 to at least 12 seconds for improved balance and decreased fall risk.   7. Patient to improve GST condition 5 to at least 20 seconds for improved balance on unlevel surfaces.   8. Patient to improve GST condition 6 to at least 20 seconds for improved balance in low vision environments.   9. PT to formally assess Functional Gait Assessment ans establish appropriate goal.     Long Term Goals: 8 weeks   1. Patient to be (I) with advanced home exercise program.   2. Patient to perform smooth pursuits with reading component in all planes WFL for improved ability to scan environment.   3. Patient to perform saccades at 100 bpm WFL for improved oculomotor endurance.   4. Patient to perform VOR 1 at 90 bpm WFL for improved gaze stabilization.   5. Patient to improve GST condition 3 to at least 18 seconds for improved balance and decreased fall risk.   6. Patient to improve GST condition 5  to at least 30 seconds for improved balance on unlevel surfaces.   7. Patient to improve GST condition 6 to at least 30 seconds for improved balance in low vision environments.   8. TBD: Functional Gait Assessment goal.     Plan   Plan of care Certification: 7/12/2022 to 09/09/22.    Outpatient Physical Therapy 2 times weekly for 8 weeks to include the following interventions: Gait Training, Manual Therapy, Moist Heat/ Ice, Neuromuscular Re-ed, Orthotic Management and Training, Patient Education, Self Care, Therapeutic Activities, Therapeutic Exercise and Canalith Repositioning Procedures.     Next session: Functional Gait Assessment, FOTO, oculomotor home exercise program.     Mónica Thomas, PT

## 2022-07-14 ENCOUNTER — HOSPITAL ENCOUNTER (OUTPATIENT)
Dept: RADIOLOGY | Facility: HOSPITAL | Age: 72
Discharge: HOME | End: 2022-07-14
Attending: STUDENT IN AN ORGANIZED HEALTH CARE EDUCATION/TRAINING PROGRAM
Payer: MEDICARE

## 2022-07-14 ENCOUNTER — CLINICAL SUPPORT (OUTPATIENT)
Dept: REHABILITATION | Facility: HOSPITAL | Age: 72
End: 2022-07-14
Attending: SPECIALIST
Payer: MEDICARE

## 2022-07-14 DIAGNOSIS — H53.10 EYE FATIGUE: Primary | ICD-10-CM

## 2022-07-14 DIAGNOSIS — R26.89 IMPAIRMENT OF BALANCE: ICD-10-CM

## 2022-07-14 DIAGNOSIS — R42 DIZZINESS: ICD-10-CM

## 2022-07-14 DIAGNOSIS — N20.0 NEPHROLITHIASIS: ICD-10-CM

## 2022-07-14 PROCEDURE — 76770 US RETROPERITONEAL COMPLETE: ICD-10-PCS | Mod: 26,,, | Performed by: RADIOLOGY

## 2022-07-14 PROCEDURE — 76770 US EXAM ABDO BACK WALL COMP: CPT | Mod: 26,,, | Performed by: RADIOLOGY

## 2022-07-14 PROCEDURE — 76770 US EXAM ABDO BACK WALL COMP: CPT | Mod: TC

## 2022-07-14 PROCEDURE — 97112 NEUROMUSCULAR REEDUCATION: CPT | Mod: PO

## 2022-07-14 NOTE — PROGRESS NOTES
"[facility]   Physical Therapy Treatment Note     Name: Tracy Armendariz  Clinic Number: [id number]    Therapy Diagnosis:   Encounter Diagnoses   Name Primary?    Eye fatigue Yes    Dizziness     Impairment of balance      Physician: LORRAINE Alfonso MD    Visit Date: 7/14/2022    Physician Orders: PT Eval and Treat   Medical Diagnosis from Referral:   H81.391 (ICD-10-CM) - Peripheral vertigo involving right ear   R27.0 (ICD-10-CM) - Ataxia   Z91.81 (ICD-10-CM) - At high risk for falls      Evaluation Date: 7/12/2022  Authorization Period Expiration: 06/10/22 to 06/10/23  Plan of Care Expiration: 09/09/22  Visit # / Visits authorized: 02/ 11     Time In: 10:45  Time Out: 11:30  Total Billable Time: 45 minutes     Precautions: Standard, fall, right wrist fracture    SUBJECTIVE     Pt reports: that she is having her usual dizziness this morning, but more intense. Endorses spinning sensation when getting out of bed this morning.     Home exercise program provided this date.   Response to previous treatment: no adverse effects  Functional change: ongoing    Pain: 0/10  Location: NA    Dizziness: 6/10    OBJECTIVE     Objective Measures updated at progress report unless specified.     Treatment     Tracy received the treatments listed below:      neuromuscular re-education activities to improve: Balance, Coordination, Sense and vestibular function for 45 minutes. The following activities were included:    Functional Gait Assessment:   1. Gait on level surface =  3  2. Change in Gait Speed = 2  3. Gait with horizontal head turns  = 1  4. Gait with vertical head turns = 2-dizziness  5. Gait with pivot turns = 1  6. Step over obstacle = 2  7. Gait with Narrow STANISLAV = 0  8. Gait with eyes closed = 2  9. Ambulating Backwards = 2  10. Steps = 2    Score 17/30     FOTO for vertigo survey: 55 %    Oculomotor home exercise program:   Smooth Pursuits: reading numbers card  X 30" horizontal, min visual " "slippage, min dizziness  X 30" vertical, jerkiness of eyes, min dizziness  X 30" LUQ <> RLQ diagonal, min visual slippage, decreased ability to focus  X 30" RUQ <> LLQ diagonal, min visual slippage, decreased ability to focus    Saccades: x targets  3 x 30" horizontal, decreased speed, left target out of focus    VOR 1: x target  3 x 30" horizontal, left eye difficulty maintaining target, min dizziness      Patient Education and Home Exercises     Home Exercises Provided and Patient Education Provided     Education provided:   - plan of care (POC), HEP    Written Home Exercises Provided: yes. Exercises were reviewed and Tracy was able to demonstrate them prior to the end of the session.  Tracy demonstrated good  understanding of the education provided. See EMR under Patient Instructions for exercises provided during therapy session on 07/14/22.     ASSESSMENT     Patient tolerated initial therapy session fairly well this morning. Current performance on Functional Gait Assessment places patient in elevated fall risk category. Home exercise program for oculomotor exercises. Due to constant reports of spinning sensation with positional changes, CRT testing is likely indicated. Plan to attempt next session pending tolerance to VAS testing and positional placement of tests. Patient remains appropriate for continued skilled vestibular rehab to address oculomotor, motion tolerance, and balance deficits.     Tracy Is progressing well towards her goals.   Pt prognosis is Good.     Pt will continue to benefit from skilled outpatient physical therapy to address the deficits listed in the problem list box on initial evaluation, provide pt/family education and to maximize pt's level of independence in the home and community environment.     Pt's spiritual, cultural and educational needs considered and pt agreeable to plan of care and goals.     Anticipated Barriers for therapy: co-morbidities, chronicity of " symptoms    Goals:  Short Term Goals: 4 weeks   1. Patient to be (I) with established home exercise program. Ongoing  2. Patient to perform smooth pursuits tracking single target in all planes WFL for improved ability to scan environment. Ongoing  3. Patient to perform saccades at 80 bpm WFL for improved oculomotor endurance. Ongoing  4. Patient to perform VOR 1 at 70 bpm WFL for improved gaze stabilization. Ongoing  5. Patient to pass GST condition 2 with no more than slight sway for improved balance in low vision environments. Ongoing  6. Patient to improve GST condition 3 to at least 12 seconds for improved balance and decreased fall risk. Ongoing  7. Patient to improve GST condition 5 to at least 20 seconds for improved balance on unlevel surfaces. Ongoing  8. Patient to improve GST condition 6 to at least 20 seconds for improved balance in low vision environments. Ongoing  9. PT to formally assess Functional Gait Assessment ans establish appropriate goal. MET 07/14/22     Long Term Goals: 8 weeks   1. Patient to be (I) with advanced home exercise program. Ongoing  2. Patient to perform smooth pursuits with reading component in all planes WFL for improved ability to scan environment. Ongoing  3. Patient to perform saccades at 100 bpm WFL for improved oculomotor endurance. Ongoing  4. Patient to perform VOR 1 at 90 bpm WFL for improved gaze stabilization. Ongoing  5. Patient to improve GST condition 3 to at least 18 seconds for improved balance and decreased fall risk. Ongoing  6. Patient to improve GST condition 5 to at least 30 seconds for improved balance on unlevel surfaces. Ongoing  7. Patient to improve GST condition 6 to at least 30 seconds for improved balance in low vision environments. Ongoing  8. Patient to improve Functional Gait Assessment score to at least 22/30 for improved balance and decreased fall risk in community environments. Ongoing     PLAN     Plan to reassess CRTs at later date pending  improved tolerance to VAS-consider next session. Progress oculomotor and balance activities.     Mónica Thomas, PT

## 2022-07-14 NOTE — PATIENT INSTRUCTIONS
Resource: [employer]         Resource: [employer]       Resource: [employer]

## 2022-07-17 NOTE — PROGRESS NOTES
Occupational Therapy Daily Treatment Note     Name: Tracy Armendariz  Clinic Number: [id number]    Therapy Diagnosis:   Encounter Diagnoses   Name Primary?    Pain in right wrist Yes    Decreased range of motion of right wrist     Swelling of right hand     Alteration in self-care ability     Decreased range of motion of finger of right hand      Physician: Carlos Baum MD    Visit Date: 5/2/2022    Medical Diagnosis: S62.101D (ICD-10-CM) - Closed fracture of right wrist with routine healing, subsequent encounter  Physician Orders: Eval and treat  Evaluation Date: 1/12/2022  Insurance Authorization Period Expiration: through 5/17/22  Plan of Care from 3/15/2022-5/13/2022  Date of Return to MD: 5/30/22 4/18/22: IMPRESSION:  Right hand weakness after wrist fracture: PLAN:  I reordered OT for the right hand and wrist for range of motion and strengthening  I want her to continue with a home exercise program including use of the squeeze ball  I have also encouraged her to use her right hand as much as possible  Date of Onset: 11/9/21    Visit # / Visits authorized: 25/40  FOTO (DASH) at eval: 69.2 %  FOTO (DASH) RA 2/24/22: 53.3 (+15.9%)  FOTO (DASH) RA 3/15/22: 59%   FOTO (DASH) RA 4/5/22: 39.2% (+14.1%)    Time In: 12:30 pm  Time Out: 1:30 pm  Total Billable Time: 55 minutes    Precautions:  Standard and blood thinners, Osteopenia; falls; gait and balance problems d/t Vertigo; Precautions as per imaging and s/p Week 12+  1/28/22 X-ray: FINDINGS: Osteopenia.  Reconfirmed findings of subacute healing comminuted distal radial fracture with no detrimental changes in the appearance of the fracture site or fracture fragments continued mild dorsal angulation of the distal radius similar to prior exam.  Reconfirmed tiny diastasis ulnar styloid tip fracture.  No new fractures.  Subjective     Pt reports: 5/2: Pt explain having a fall when standing up from her dining table. She's unsure, but thinks she may have hit her  "Right upper arm and wrist/hand on another table surface to break the fall. Pt reports that pain has only increased since the fall. She explains using the ball 8x/day as prescribed by MD and the t-putty 2x/day.  4/25: "I've been reading my Violeta more the past few days and have more soreness from using it."  Response to previous treatments: 4/5: Tracy reports positive symptoms relief with elastic tape applied and in place when she arrived today  Functional change: 4/21: Pt states "I'm using it so much more"  Pain:   Patient having a 4/10 in ulnar right arm at arrival.   6/10 at worst L thumb  6/10 at worst R thumb and R Wrist  Location: Right ulna wrist and both thumbs    Objective      Edema: Circumferential measurements: In centimters     Right/Left Right Right Right  Right Right Right Right Right/Left Right Right     IE-1/12/22 2/1/22 2/3/22 2/10/22 2/14/22 2/17 2/22/22 2/24/22 3/15/2022 4/28/22 5/2/22   IF P1  6.7/6.3 7.3 (+0.6) 7.2 (-.1) 6.8 (-0.4) 6.9 (+0.1) 7.0 7.0 6.8 (-0.2) 6.8/6.5 6.7 (-0.1)    SF P1   5.8/5.6 6.1 (+0.3) 6.2 (+0.1) 5.5 (-0.7) 5.6 (+0.1) 5.9 6.0 5.6 (-0.4) 5.8/5.4 5.7(-0.1)    Thumb P1 7.4/7.5 7.4 (=) 7.0 (-0.4) 6.6 (-0.4) 6.8 (+0.2) 7.3 7.1 6.9 (-0.2) 6.8/6.5 7.0(+0.2)    DPC 19/17.5 19.7 (+0.7) 19 (-0.7) 18.9 (-0.1) 19.4 (+0.5) 19.0 19.2 18.8 (-0.4) 18.5/17.5 18.9(+0.4)    Wrist 18.3/17.0 19.5/17.9 18.8 (-0.8) 18.8 (=) 18.9 (+0.1) 18.2 18.2 18.3 (+0.1) 18/16.5 18.1(+0.1)  18.6 (+.5)                              Hand range of motion: Composite finger flexion/extension: Right within normal limits/ Left hand is within normal limits     Wrist AROM    Right/Left Right Right R Right/Left R R R R   DATE IE-1/12/22 2/1/22  Post-tx 2/3/22  Post-tx 2/22/22 3/15/2022 4/8/22 4/12/22 4/21/22 5/2/22   EXT 30/90 49 55 64 66/80 65 70 72 72   FLX 17/45 36 28 37 38/70 40 50 46 40   RD 0/12 11 23 11 18/25 12 NT 16 10   UD  26/35 18 28 25 18/32 22 NT 25 15   Wrist LIRIANO 73/182 114 (+41)  137 (+23)  139 (+2) " " (+20) 137 (-22)      Thumb AROM  (in degrees) Right/Left Right Right   DATE 4/12/22 4/21/22  Post-tx 5/2/2   Tip to DPC   (in cm) 2.5/.8 1.5 (1.0) 1.5 (=)     Forearm AROM    Right/Left Right Right Right R Right/Left Right   DATE IE-1/12/22 2/1/22*  Pre-tx 2/3/22 post tx 2/10/22 2/22/22 3/15/2022 5/2/22   Supination 68/85 46 55 55 (=) 60 (+5) 60/70 72 (+12)   Pronation 90/90 85 90 88 (-2) nt 90/90 86   *different car, rapidly moving in quick end range position causes pain       Strength: (measured in psi.)     Right/Left Right Right/Left Right Right Right     2/10/2022 3/7/22 3/15/2022 4/5/22 4/21/22 5/2/22   Les Rung II 8/40 14 (+6)  15/45 14* 19 (+5) 16**   Key Pinch 4.5/4.5 8 (+3.5)  7/9 7.5 8.0 (+5) 7.5   3-pt Pinch 3/6 4.5 (+1.5)  6/7 6.5 6.0 5.5   2-pt Pinch 3/6.75 nt 4/5 5.0 (+1.0) defer defer   * ltd by ulna wrist pain  ** ltd by thumb pain     Treatment     Tracy received the following supervised modalities after being cleared for contradictions for 15 minutes:   - defer due to not being melted-Paraffin wax heat  for promoting healing, decreasing pain and increasing tissue extensibility  - Fluidotherapy for promoting healing, reducing pain, desensitization and increasing tissue extensibility    Tracy received the following manual therapy techniques for 15 minutes:   - R/A girth; encouraged wearing the size Medium 3/4 finger compression glove for edema mgmt intermittently during the day and while sleeping  - Soft tissue massage to dorsa and volarl surface of right wrist and forearm during extrinsic extensor lengthening  - Grade II Rad/Carp mobilization for promoting healing and increasing periarticular tissue lengths  - defer-Mobilization with movement (Grade II Rad/Carp distractions), patient guided for wrist flexion and extension; 3 x 10" each  - AA/PROM of each wrist and forearm range of motion 1 x 10 each    Tracy performed therapeutic exercises for 25 minutes " "including:  Flexibility 30" x 3:   - defer-Extrinsic flexor stretch with weight bearing progression in stance over extended hands  - Wrist flexion stretch  - Step 1 of Finkelstein's stretch   Intrinsic strengthening-defer 2 min each:  -spreads and lifts with yellow spidyband, no thumb   -adduction and lifts with red sponges   Hand strengthening Defer to HEP- Green t-putty or ball issued by MD  - defer-Yellow digiflex, no thumb, 1 min   Wrist & forearm Strengthening- - Yellow flexbar, 2 min each for:  -defer-sup/pronation swings, held at mid-shaft  -smiles  -frowns  -defer due to pain in ulna wrist - twists  - defer-isometric supination   1st CMC OA protocol, right hand - Thenar stretch; 30" x 2  - defer-C's with thumb and index finger 1 x 10  - defer-C's to O's 1 x 10 maintaining proper form & muscle activation      Objective Measures 5/2/22: R/A  and pinch  5/2/22: R/A wrist AROM  5/2/22: R/A thumb tip to DPC   HEP as set 5/2/22: Recommended t-putty only every other day  4/5/22: Green t-putty      Home Exercises and Education Provided     Education provided:   - HEP as set  - Progress made    Education provided prior sessions:  - HEP as modified t-putty strengthening program for decreased resistance and frequency  - Education on wear schedule for compressive sleeve and digit sleeve; begin with 1 hour, and if no pain, continues to wear during the day intermittently, removing for HEP and hygiene; progress to night time wear as tolerated.  - HEP for TGEs and wrist AROM    Written Home Exercises Provided: Patient instructed to cont prior HEP.  Exercises were reviewed and Tracy was able to demonstrate them prior to the end of the session.  Tracy demonstrated good  understanding of the HEP provided.     See EMR under Patient Instructions for exercises provided prior visit. 2/1/22       Assessment   5/2: Patient presents with ongoing pain, increased wrist swelling & loss of wrist AROM. She continues to have " frequent falls and has increased Right wrist pain. It is difficult to discern what is causing the Right wrist pain because of this. Symptoms described in the thumb/radial wrist are suspect for 1st CMC or DeQuervain's. Symptoms described in the ulna wrist are suspect for commonly experienced ulna styloid healing or pain from recent fall. She appears to have reached a plateau with  tolerance and pinch strength.    Tracy is progressing well towards her goals and there are no updates to goals at this time. Pt prognosis is Good.     Pt will continue to benefit from skilled outpatient occupational therapy to address the deficits listed in the problem list on initial evaluation provide pt/family education and to maximize pt's level of independence in the home and community environment.     Anticipated barriers to occupational therapy: arthritis, osteopenia, frequent falls     Pt's spiritual, cultural and educational needs considered and pt agreeable to plan of care and goals.     Goals:  Previous Short Term Goals Status:  STG's 4 weeks  1)   Patient to be IND with HEP and modalities for pain/edema managment. MET  2)   Pt to tolerate advancing PREs and flexibility activities for weight bearing positions with self-graded intensity and effective symptom monitoring. Met   3) Increase wrist and forearm LIRIANO by 60 degrees to increase functional hand use and support function positions for ADLs & leisure activities. MET   4) Digit and Thumb LIRIANO to be WNL as compared to the unaffected side for maximizing  and fine motor skills for reactivation the hand for light ADLs. met  5)  Decrease edema by 1.5 cm for evidencing soft tissue healing and effective edema mgmt. Met 2/10/22      New Short Term Goals Status=( LTG's 8 weeks):   1)   Increase right wrist and forearm LIRIANO to >75% of the right wrist to increase functional hand use for weight bearing and reaching tasks. Modified and Progressing on 2/1/22. MET   2)   Right   strength  to be >70% of the unaffected side. Progressing  3)   Right pinch strength to be >60% of the unaffected side. MET for lateral and 3-jaw pinch on 3/7/22  4)   Decrease complaints of pain to 2 out of 10 at worst to increase functional hand use and UE support functions for moderate to heavy ADL/work/leisure activities. MET 4/5/22. Returned after falling 5/2/22  5)   Patient to score at 45 % or less on Quick/DASH and/or Work module to demonstrate improved perception of functional right hand use to evidence return to near to prior level of function for I/ADLs and return to gardening. MET 4/5/22  Long Term Goal Status:   continue per initial plan of care.    Plan   5/2: R/A FOTO #26. Continue flexibility and PREs as tolerated. Continue OT 1-2x per week until leaving for vacation in [facility]. Request MD follow up before returning to OT after that.    Pt to be treated by Occupational Therapy 2 times per week for 8 weeks during the certification period from 3/15/2022 to 5/13/22 to achieve the established goals.      Treatment to include: Paraffin, Fluidotherapy, Manual therapy/joint mobilizations, Modalities for pain management, Therapeutic exercises/activities., Strengthening, Orthotic Fabrication/Fit/Training, Edema Control, Joint Protection and Energy Conservation, as well as any other treatments deemed necessary based on the patient's needs or progress.     HARIS Chi, T  Occupational Therapist, Certified Hand Therapist         patient

## 2022-07-19 ENCOUNTER — CLINICAL SUPPORT (OUTPATIENT)
Dept: REHABILITATION | Facility: HOSPITAL | Age: 72
End: 2022-07-19
Attending: SPECIALIST
Payer: MEDICARE

## 2022-07-19 ENCOUNTER — PATIENT MESSAGE (OUTPATIENT)
Dept: RESEARCH | Facility: CLINIC | Age: 72
End: 2022-07-19
Payer: MEDICARE

## 2022-07-19 DIAGNOSIS — R42 DIZZINESS: ICD-10-CM

## 2022-07-19 DIAGNOSIS — R26.89 IMPAIRMENT OF BALANCE: ICD-10-CM

## 2022-07-19 DIAGNOSIS — N20.0 NEPHROLITHIASIS: Primary | ICD-10-CM

## 2022-07-19 DIAGNOSIS — H53.10 EYE FATIGUE: Primary | ICD-10-CM

## 2022-07-19 PROCEDURE — 97112 NEUROMUSCULAR REEDUCATION: CPT | Mod: PO

## 2022-07-19 NOTE — PROGRESS NOTES
"[facility]   Physical Therapy Treatment Note     Name: Tracy Armendariz  Clinic Number: [id number]    Therapy Diagnosis:   Encounter Diagnoses   Name Primary?    Eye fatigue Yes    Dizziness     Impairment of balance      Physician: LORRAINE Alfonso MD    Visit Date: 7/19/2022    Physician Orders: PT Eval and Treat   Medical Diagnosis from Referral:   H81.391 (ICD-10-CM) - Peripheral vertigo involving right ear   R27.0 (ICD-10-CM) - Ataxia   Z91.81 (ICD-10-CM) - At high risk for falls      Evaluation Date: 7/12/2022  Authorization Period Expiration: 06/10/22 to 06/10/23  Plan of Care Expiration: 09/09/22  Visit # / Visits authorized: 03/ 11     Time In: 10:15  Time Out: 11:00  Total Billable Time: 45 minutes     Precautions: Standard, fall, right wrist fracture    SUBJECTIVE     Pt reports: that she is doing better this morning. She feels like the eye exercises are helping with the dizziness.      She was compliant with her home exercise program.   Response to previous treatment: no adverse effects  Functional change: ongoing    Pain: 0/10  Location: NA    Dizziness: 2/10    OBJECTIVE     Objective Measures updated at progress report unless specified.     Treatment     Tracy received the treatments listed below:      neuromuscular re-education activities to improve: Balance, Coordination, Sense and vestibular function for 45 minutes. The following activities were included:    Oculomotor exercises:   Smooth Pursuits: reading colored shapes card  X 30" horizontal, min visual slippage  X 30" vertical, jerkiness of eyes  X 30" LUQ <> RLQ diagonal, min visual slippage, decreased ability to focus  X 30" RUQ <> LLQ diagonal, min visual slippage, decreased ability to focus- PT moves card    Saccades: x targets  3 x 30" horizontal, at 80 bpm, left target out of focus    VOR 1: x target  3 x 30" horizontal at 65 bpm, left eye difficulty maintaining target, min dizziness    Balance / Motion " "Tolerance exercises:    // bars:  X 4 laps ambulation with right<>L head turns, CGA, occ touchdown support  X 4 laps ambulation with up <> down head movements, CGA, occ touchdown support    Foam pad:  X 30" stance with eyes open, CGA, no upper extremity support  2 x 30" right<>L head movements, CGA no upper extremity support  2 x 30" up <> down head movements, Min A, occ touchdown   3 x 30" stance with eyes closed, Min A, no upper extremity support    5 hurdles:  X 4 laps forward reciprocal stepping over hurdles, CGA, intermittent upper extremity support  X 4 laps right<>L side stepping, 1 upper extremity support, CGA    2 x 30" each lower extremity, single leg stance with alternate lower extremity on fitter board, eyes open, CGA      Patient Education and Home Exercises     Home Exercises Provided and Patient Education Provided     Education provided:   - plan of care (POC), HEP    Written Home Exercises Provided: yes. Exercises were reviewed and Tracy was able to demonstrate them prior to the end of the session.  Tracy demonstrated good  understanding of the education provided. See EMR under Patient Instructions for exercises provided during therapy session on 07/14/22.     ASSESSMENT     Patient tolerated therapy session well this morning. Progressed saccades and VOR 1 with metronome this date. Balance activities appeared appropriately challenging this morning. Discussed reassessing VAS and potential CRT assessment next session as patient's symptoms are improving. Patient remains appropriate for continued skilled vestibular rehab to address oculomotor, motion tolerance, and balance deficits.     Tracy Is progressing well towards her goals.   Pt prognosis is Good.     Pt will continue to benefit from skilled outpatient physical therapy to address the deficits listed in the problem list box on initial evaluation, provide pt/family education and to maximize pt's level of independence in the home and community " environment.     Pt's spiritual, cultural and educational needs considered and pt agreeable to plan of care and goals.     Anticipated Barriers for therapy: co-morbidities, chronicity of symptoms    Goals:  Short Term Goals: 4 weeks   1. Patient to be (I) with established home exercise program. Ongoing  2. Patient to perform smooth pursuits tracking single target in all planes WFL for improved ability to scan environment. Ongoing  3. Patient to perform saccades at 80 bpm WFL for improved oculomotor endurance. Ongoing  4. Patient to perform VOR 1 at 70 bpm WFL for improved gaze stabilization. Ongoing  5. Patient to pass GST condition 2 with no more than slight sway for improved balance in low vision environments. Ongoing  6. Patient to improve GST condition 3 to at least 12 seconds for improved balance and decreased fall risk. Ongoing  7. Patient to improve GST condition 5 to at least 20 seconds for improved balance on unlevel surfaces. Ongoing  8. Patient to improve GST condition 6 to at least 20 seconds for improved balance in low vision environments. Ongoing  9. PT to formally assess Functional Gait Assessment ans establish appropriate goal. MET 07/14/22     Long Term Goals: 8 weeks   1. Patient to be (I) with advanced home exercise program. Ongoing  2. Patient to perform smooth pursuits with reading component in all planes WFL for improved ability to scan environment. Ongoing  3. Patient to perform saccades at 100 bpm WFL for improved oculomotor endurance. Ongoing  4. Patient to perform VOR 1 at 90 bpm WFL for improved gaze stabilization. Ongoing  5. Patient to improve GST condition 3 to at least 18 seconds for improved balance and decreased fall risk. Ongoing  6. Patient to improve GST condition 5 to at least 30 seconds for improved balance on unlevel surfaces. Ongoing  7. Patient to improve GST condition 6 to at least 30 seconds for improved balance in low vision environments. Ongoing  8. Patient to improve  Functional Gait Assessment score to at least 22/30 for improved balance and decreased fall risk in community environments. Ongoing     PLAN     Plan to reassess CRTs at later date pending improved tolerance to VAS. Progress oculomotor and balance activities.     Mónica Thomas, PT

## 2022-07-21 ENCOUNTER — CLINICAL SUPPORT (OUTPATIENT)
Dept: REHABILITATION | Facility: HOSPITAL | Age: 72
End: 2022-07-21
Attending: SPECIALIST
Payer: MEDICARE

## 2022-07-21 DIAGNOSIS — R26.89 IMPAIRMENT OF BALANCE: ICD-10-CM

## 2022-07-21 DIAGNOSIS — H53.10 EYE FATIGUE: Primary | ICD-10-CM

## 2022-07-21 DIAGNOSIS — R42 DIZZINESS: ICD-10-CM

## 2022-07-21 PROCEDURE — 97112 NEUROMUSCULAR REEDUCATION: CPT | Mod: PO

## 2022-07-21 NOTE — PROGRESS NOTES
[facility]   Physical Therapy Treatment Note     Name: Tracy Armendariz  Clinic Number: [id number]    Therapy Diagnosis:   Encounter Diagnoses   Name Primary?    Eye fatigue Yes    Dizziness     Impairment of balance      Physician: LORRAINE Alfonso MD    Visit Date: 7/21/2022    Physician Orders: PT Eval and Treat   Medical Diagnosis from Referral:   H81.391 (ICD-10-CM) - Peripheral vertigo involving right ear   R27.0 (ICD-10-CM) - Ataxia   Z91.81 (ICD-10-CM) - At high risk for falls      Evaluation Date: 7/12/2022  Authorization Period Expiration: 06/10/22 to 06/10/23  Plan of Care Expiration: 09/09/22  Visit # / Visits authorized: 04/ 11     Time In: 10:45  Time Out: 11:30  Total Billable Time: 45 minutes     Precautions: Standard, fall, right wrist fracture    SUBJECTIVE     Pt reports: that she is doing fine this morning.     She was compliant with her home exercise program.   Response to previous treatment: no adverse effects  Functional change: ongoing    Pain: 0/10  Location: NA    Dizziness: 2/10; 4/10 post intervention    OBJECTIVE     Objective Measures updated at progress report unless specified.     Treatment     Tracy received the treatments listed below:      neuromuscular re-education activities to improve: Balance, Coordination, Sense and vestibular function for 45 minutes. The following activities were included:    Vertebral Artery Screen:   Right: (-)  Left: (-)    POSITIONAL CANAL TESTING  Looking for nystagmus (slow phase followed by quick phase to the affected side for BPPV)     Bernardo Hallpike (posterior / CL anterior)   Right : (-) nystagmus, (+) brief slight vertigo upon entering position; (-) symptoms upon sitting up from test position   Left: (-) vertigo, (+) slight brief dizziness, (-) nystagmus; (-) symptoms upon sitting up from test position  Horizontal Canals   Right: NT   Left: NT  Treatment Performed: not indicated    Balance / Motion Tolerance  "exercises:    // bars:  X 4 laps ambulation with right<>L head turns, CGA, no UE support  X 4 laps ambulation with up <> down head movements, CGA, no UE support    Foam pad:  2 x 30" right<>L head movements, CGA no upper extremity support  2 x 30" up <> down head movements, Min A, occ touchdown   3 x 30" stance with eyes closed, Min A, no upper extremity support    X 10 reps each lower extremity leading, forward step up onto 4 pt foam fitter board, no upper extremity support, CGA    2 x 30" each lower extremity in front, modified tandem stance, eyes open, CGA, occ touchdown support    5 hurdles:  X 4 laps forward reciprocal stepping over hurdles, CGA, intermittent upper extremity support  X 4 laps right<>L side stepping, 1 upper extremity support, CGA    Patient Education and Home Exercises     Home Exercises Provided and Patient Education Provided     Education provided:   - plan of care (POC), HEP    Written Home Exercises Provided: yes. Exercises were reviewed and Tracy was able to demonstrate them prior to the end of the session.  Tracy demonstrated good  understanding of the education provided. See EMR under Patient Instructions for exercises provided during therapy session on 07/14/22.     ASSESSMENT     Patient tolerated therapy session well this morning. Reassessed VAS test with patient able to give more clear description of how she felt in each test position. Patient (-) bilaterally for VAS test. Therefore, Bernardo Echevarria pike performed to assess for BPPV. Patient (-) nystagmus in both test positions. Patient (+) for slight brief dizziness in left test position and (+) slight brief vertigo in right test position. Testing today indicates patient is (-) for BPPV but likely positive for decreased motion tolerance with initial positional change. Oculomotor exercises held today as patient remains compliant with home exercise program. Remainder of session focused on balance training. Able to reduce reliance on " upper extremity support during ambulation with head movements. Balance and endurance appropriately challenged with activities performed with patient reporting 2 point increase in dizziness symptoms post intervention. Patient remains appropriate for continued skilled vestibular rehab to address oculomotor, motion tolerance, and balance deficits.     Tracy Is progressing well towards her goals.   Pt prognosis is Good.     Pt will continue to benefit from skilled outpatient physical therapy to address the deficits listed in the problem list box on initial evaluation, provide pt/family education and to maximize pt's level of independence in the home and community environment.     Pt's spiritual, cultural and educational needs considered and pt agreeable to plan of care and goals.     Anticipated Barriers for therapy: co-morbidities, chronicity of symptoms    Goals:  Short Term Goals: 4 weeks   1. Patient to be (I) with established home exercise program. Ongoing  2. Patient to perform smooth pursuits tracking single target in all planes WFL for improved ability to scan environment. Ongoing  3. Patient to perform saccades at 80 bpm WFL for improved oculomotor endurance. Ongoing  4. Patient to perform VOR 1 at 70 bpm WFL for improved gaze stabilization. Ongoing  5. Patient to pass GST condition 2 with no more than slight sway for improved balance in low vision environments. Ongoing  6. Patient to improve GST condition 3 to at least 12 seconds for improved balance and decreased fall risk. Ongoing  7. Patient to improve GST condition 5 to at least 20 seconds for improved balance on unlevel surfaces. Ongoing  8. Patient to improve GST condition 6 to at least 20 seconds for improved balance in low vision environments. Ongoing  9. PT to formally assess Functional Gait Assessment ans establish appropriate goal. MET 07/14/22     Long Term Goals: 8 weeks   1. Patient to be (I) with advanced home exercise program. Ongoing  2.  Patient to perform smooth pursuits with reading component in all planes WFL for improved ability to scan environment. Ongoing  3. Patient to perform saccades at 100 bpm WFL for improved oculomotor endurance. Ongoing  4. Patient to perform VOR 1 at 90 bpm WFL for improved gaze stabilization. Ongoing  5. Patient to improve GST condition 3 to at least 18 seconds for improved balance and decreased fall risk. Ongoing  6. Patient to improve GST condition 5 to at least 30 seconds for improved balance on unlevel surfaces. Ongoing  7. Patient to improve GST condition 6 to at least 30 seconds for improved balance in low vision environments. Ongoing  8. Patient to improve Functional Gait Assessment score to at least 22/30 for improved balance and decreased fall risk in community environments. Ongoing     PLAN     Progress oculomotor and balance activities as able.    Mónica Thomas, PT

## 2022-07-25 NOTE — PROGRESS NOTES
"[facility]   Physical Therapy Treatment Note     Name: Tracy Armendariz  Clinic Number: [id number]    Therapy Diagnosis:   Encounter Diagnoses   Name Primary?    Eye fatigue Yes    Dizziness     Impairment of balance      Physician: LORRAINE Alfonso MD    Visit Date: 7/26/2022    Physician Orders: PT Eval and Treat   Medical Diagnosis from Referral:   H81.391 (ICD-10-CM) - Peripheral vertigo involving right ear   R27.0 (ICD-10-CM) - Ataxia   Z91.81 (ICD-10-CM) - At high risk for falls      Evaluation Date: 7/12/2022  Authorization Period Expiration: 06/10/22 to 06/10/23  Plan of Care Expiration: 09/09/22  Visit # / Visits authorized: 05/ 11     Time In: 10:15  Time Out: 10:55  Total Billable Time: 40 minutes     Precautions: Standard, fall, right wrist fracture    SUBJECTIVE     Pt reports: that she is doing fine this morning. She has noticed that the left eye still wobbles with her exercises and with daily activities.     She was compliant with her home exercise program.   Response to previous treatment: no adverse effects  Functional change: ongoing    Pain: 0/10  Location: NA    Dizziness: 3/10; 2/10 post intervention    OBJECTIVE     Objective Measures updated at progress report unless specified.     Treatment     Tracy received the treatments listed below:      neuromuscular re-education activities to improve: Balance, Coordination, Sense and vestibular function for 40 minutes. The following activities were included:    Oculomotor exercises:   Smooth Pursuits: reading colored shapes card  X 30" horizontal, min visual slippage  X 30" vertical, jerkiness of eyes  X 30" LUQ <> RLQ diagonal, min visual slippage, decreased ability to focus  X 30" RUQ <> LLQ diagonal, min visual slippage, decreased ability to focus- hardest plan to track     Saccades: x targets  3 x 30" horizontal, at 90 bpm, left target out of focus     VOR 1: x target  3 x 30" horizontal at 70 bpm, left eye difficulty " "maintaining target, min dizziness    Balance / Motion Tolerance exercises:    Ambulation in hallway:  X 2 laps x 100 feet ambulation with right<>L head turns, CGA to occ Min A, no UE support  X 2 laps x 100 feet ambulation with up <> down head movements, CGA, no UE support    // bars:  Foam pad:  2 x 30" right<>L head movements, CGA no upper extremity support  2 x 30" up <> down head movements, CGA to Min A, no upper extremity support  3 x 30" stance with eyes closed, Min A, occ upper extremity support    2 x 30" each lower extremity in front, modified tandem stance, eyes open, CGA, occ touchdown support    5 hurdles:  X 4 laps forward reciprocal stepping over hurdles, CGA, intermittent upper extremity support  X 4 laps right<>L side stepping, intermittent upper extremity support, CGA    Patient Education and Home Exercises     Home Exercises Provided and Patient Education Provided     Education provided:   - plan of care (POC), HEP    Written Home Exercises Provided: yes. Exercises were reviewed and Tracy was able to demonstrate them prior to the end of the session.  Tracy demonstrated good  understanding of the education provided. See EMR under Patient Instructions for exercises provided during therapy session on 07/14/22.     ASSESSMENT     Patient tolerated therapy session well this morning. Progressed speed of saccades and VOR 1 for increased challenge. Progressed ambulation with head movements to hallway which proved to be appropriate challenge. Rest breaks provided throughout session to prevent excess fatigue. Balance and endurance appropriately challenged with activities performed this date. Patient remains appropriate for continued skilled vestibular rehab to address oculomotor, motion tolerance, and balance deficits.     Tracy Is progressing well towards her goals.   Pt prognosis is Good.     Pt will continue to benefit from skilled outpatient physical therapy to address the deficits listed in the " problem list box on initial evaluation, provide pt/family education and to maximize pt's level of independence in the home and community environment.     Pt's spiritual, cultural and educational needs considered and pt agreeable to plan of care and goals.     Anticipated Barriers for therapy: co-morbidities, chronicity of symptoms    Goals:  Short Term Goals: 4 weeks   1. Patient to be (I) with established home exercise program. Ongoing  2. Patient to perform smooth pursuits tracking single target in all planes WFL for improved ability to scan environment. Ongoing  3. Patient to perform saccades at 80 bpm WFL for improved oculomotor endurance. Ongoing  4. Patient to perform VOR 1 at 70 bpm WFL for improved gaze stabilization. Ongoing  5. Patient to pass GST condition 2 with no more than slight sway for improved balance in low vision environments. Ongoing  6. Patient to improve GST condition 3 to at least 12 seconds for improved balance and decreased fall risk. Ongoing  7. Patient to improve GST condition 5 to at least 20 seconds for improved balance on unlevel surfaces. Ongoing  8. Patient to improve GST condition 6 to at least 20 seconds for improved balance in low vision environments. Ongoing  9. PT to formally assess Functional Gait Assessment ans establish appropriate goal. MET 07/14/22     Long Term Goals: 8 weeks   1. Patient to be (I) with advanced home exercise program. Ongoing  2. Patient to perform smooth pursuits with reading component in all planes WFL for improved ability to scan environment. Ongoing  3. Patient to perform saccades at 100 bpm WFL for improved oculomotor endurance. Ongoing  4. Patient to perform VOR 1 at 90 bpm WFL for improved gaze stabilization. Ongoing  5. Patient to improve GST condition 3 to at least 18 seconds for improved balance and decreased fall risk. Ongoing  6. Patient to improve GST condition 5 to at least 30 seconds for improved balance on unlevel surfaces. Ongoing  7.  Patient to improve GST condition 6 to at least 30 seconds for improved balance in low vision environments. Ongoing  8. Patient to improve Functional Gait Assessment score to at least 22/30 for improved balance and decreased fall risk in community environments. Ongoing     PLAN     Progress oculomotor and balance activities as able.    Mónica Thomas, PT

## 2022-07-26 ENCOUNTER — TELEPHONE (OUTPATIENT)
Dept: OTOLARYNGOLOGY | Facility: CLINIC | Age: 72
End: 2022-07-26
Payer: MEDICARE

## 2022-07-26 ENCOUNTER — OFFICE VISIT (OUTPATIENT)
Dept: OTOLARYNGOLOGY | Facility: CLINIC | Age: 72
End: 2022-07-26
Payer: MEDICARE

## 2022-07-26 ENCOUNTER — CLINICAL SUPPORT (OUTPATIENT)
Dept: REHABILITATION | Facility: HOSPITAL | Age: 72
End: 2022-07-26
Attending: SPECIALIST
Payer: MEDICARE

## 2022-07-26 VITALS
HEART RATE: 69 BPM | TEMPERATURE: 98 F | BODY MASS INDEX: 37.13 KG/M2 | SYSTOLIC BLOOD PRESSURE: 148 MMHG | WEIGHT: 230.06 LBS | DIASTOLIC BLOOD PRESSURE: 63 MMHG

## 2022-07-26 DIAGNOSIS — H93.13 TINNITUS OF BOTH EARS: ICD-10-CM

## 2022-07-26 DIAGNOSIS — H81.391 PERIPHERAL VERTIGO INVOLVING RIGHT EAR: ICD-10-CM

## 2022-07-26 DIAGNOSIS — Z91.81 AT MODERATE RISK FOR FALL: ICD-10-CM

## 2022-07-26 DIAGNOSIS — R42 DIZZINESS: ICD-10-CM

## 2022-07-26 DIAGNOSIS — Z86.73 HISTORY OF CVA (CEREBROVASCULAR ACCIDENT): Primary | ICD-10-CM

## 2022-07-26 DIAGNOSIS — H90.A11 CONDUCTIVE HEARING LOSS OF RIGHT EAR WITH RESTRICTED HEARING OF LEFT EAR: ICD-10-CM

## 2022-07-26 DIAGNOSIS — R26.89 IMPAIRMENT OF BALANCE: ICD-10-CM

## 2022-07-26 DIAGNOSIS — H53.10 EYE FATIGUE: Primary | ICD-10-CM

## 2022-07-26 DIAGNOSIS — H53.2 DIPLOPIA: ICD-10-CM

## 2022-07-26 PROCEDURE — 3288F FALL RISK ASSESSMENT DOCD: CPT | Mod: CPTII,S$GLB,, | Performed by: SPECIALIST

## 2022-07-26 PROCEDURE — 1159F PR MEDICATION LIST DOCUMENTED IN MEDICAL RECORD: ICD-10-PCS | Mod: CPTII,S$GLB,, | Performed by: SPECIALIST

## 2022-07-26 PROCEDURE — 1101F PT FALLS ASSESS-DOCD LE1/YR: CPT | Mod: CPTII,S$GLB,, | Performed by: SPECIALIST

## 2022-07-26 PROCEDURE — 3008F BODY MASS INDEX DOCD: CPT | Mod: CPTII,S$GLB,, | Performed by: SPECIALIST

## 2022-07-26 PROCEDURE — 3078F DIAST BP <80 MM HG: CPT | Mod: CPTII,S$GLB,, | Performed by: SPECIALIST

## 2022-07-26 PROCEDURE — 4010F ACE/ARB THERAPY RXD/TAKEN: CPT | Mod: CPTII,S$GLB,, | Performed by: SPECIALIST

## 2022-07-26 PROCEDURE — 99999 PR PBB SHADOW E&M-EST. PATIENT-LVL V: CPT | Mod: PBBFAC,,, | Performed by: SPECIALIST

## 2022-07-26 PROCEDURE — 99214 OFFICE O/P EST MOD 30 MIN: CPT | Mod: S$GLB,,, | Performed by: SPECIALIST

## 2022-07-26 PROCEDURE — 1159F MED LIST DOCD IN RCRD: CPT | Mod: CPTII,S$GLB,, | Performed by: SPECIALIST

## 2022-07-26 PROCEDURE — 3044F PR MOST RECENT HEMOGLOBIN A1C LEVEL <7.0%: ICD-10-PCS | Mod: CPTII,S$GLB,, | Performed by: SPECIALIST

## 2022-07-26 PROCEDURE — 3078F PR MOST RECENT DIASTOLIC BLOOD PRESSURE < 80 MM HG: ICD-10-PCS | Mod: CPTII,S$GLB,, | Performed by: SPECIALIST

## 2022-07-26 PROCEDURE — 1126F PR PAIN SEVERITY QUANTIFIED, NO PAIN PRESENT: ICD-10-PCS | Mod: CPTII,S$GLB,, | Performed by: SPECIALIST

## 2022-07-26 PROCEDURE — 4010F PR ACE/ARB THEARPY RXD/TAKEN: ICD-10-PCS | Mod: CPTII,S$GLB,, | Performed by: SPECIALIST

## 2022-07-26 PROCEDURE — 3008F PR BODY MASS INDEX (BMI) DOCUMENTED: ICD-10-PCS | Mod: CPTII,S$GLB,, | Performed by: SPECIALIST

## 2022-07-26 PROCEDURE — 3044F HG A1C LEVEL LT 7.0%: CPT | Mod: CPTII,S$GLB,, | Performed by: SPECIALIST

## 2022-07-26 PROCEDURE — 3077F PR MOST RECENT SYSTOLIC BLOOD PRESSURE >= 140 MM HG: ICD-10-PCS | Mod: CPTII,S$GLB,, | Performed by: SPECIALIST

## 2022-07-26 PROCEDURE — 97112 NEUROMUSCULAR REEDUCATION: CPT | Mod: PO

## 2022-07-26 PROCEDURE — 1160F PR REVIEW ALL MEDS BY PRESCRIBER/CLIN PHARMACIST DOCUMENTED: ICD-10-PCS | Mod: CPTII,S$GLB,, | Performed by: SPECIALIST

## 2022-07-26 PROCEDURE — 3077F SYST BP >= 140 MM HG: CPT | Mod: CPTII,S$GLB,, | Performed by: SPECIALIST

## 2022-07-26 PROCEDURE — 1101F PR PT FALLS ASSESS DOC 0-1 FALLS W/OUT INJ PAST YR: ICD-10-PCS | Mod: CPTII,S$GLB,, | Performed by: SPECIALIST

## 2022-07-26 PROCEDURE — 99999 PR PBB SHADOW E&M-EST. PATIENT-LVL V: ICD-10-PCS | Mod: PBBFAC,,, | Performed by: SPECIALIST

## 2022-07-26 PROCEDURE — 3288F PR FALLS RISK ASSESSMENT DOCUMENTED: ICD-10-PCS | Mod: CPTII,S$GLB,, | Performed by: SPECIALIST

## 2022-07-26 PROCEDURE — 1160F RVW MEDS BY RX/DR IN RCRD: CPT | Mod: CPTII,S$GLB,, | Performed by: SPECIALIST

## 2022-07-26 PROCEDURE — 1126F AMNT PAIN NOTED NONE PRSNT: CPT | Mod: CPTII,S$GLB,, | Performed by: SPECIALIST

## 2022-07-26 PROCEDURE — 99214 PR OFFICE/OUTPT VISIT, EST, LEVL IV, 30-39 MIN: ICD-10-PCS | Mod: S$GLB,,, | Performed by: SPECIALIST

## 2022-07-26 RX ORDER — ATORVASTATIN CALCIUM 10 MG/1
TABLET, FILM COATED ORAL
Qty: 90 TABLET | Refills: 2 | Status: SHIPPED | OUTPATIENT
Start: 2022-07-26 | End: 2022-10-03 | Stop reason: SDUPTHER

## 2022-07-26 NOTE — TELEPHONE ENCOUNTER
----- Message from Annemarie Grimes sent at 7/26/2022 10:59 AM CDT -----  Regarding: Requesting a call back  Contact: ALEJANDRA BRAUN [[id number]]  Name of Who is Calling:ALEAJNDRA BRAUN [[id number]]          What is the request in detail:  Pt states she is requesting a call back in regards to coming to her appt earlier, she wants to know if she can come in earlier then 1:40 pm today. Please advise         Can the clinic reply by [facility]:No           [facility]:[phone]    Patient coming in at 1:pm today due to weather.

## 2022-07-26 NOTE — PROGRESS NOTES
"Subjective:       Patient ID: Tracy Armendariz is a 71 y.o. female.    Chief Complaint: Follow-up    HPI    The patient is returning for a follow-up visit.  There are multiple issues to discuss:  1. Right peripheral vertigo:  The patient has been doing vestibular rehabilitative therapy.  She finds it "extremely helpful".  She has not fallen once since starting it.  2. Conductive hearing loss right ear with restricted hearing left ear:  There has been no change in her hearing.  3. Fall risk:  She has not had any falls since starting vestibular rehabilitative therapy.  4. Diplopia:  Since having her stroke 8 years ago she has had diplopia on left gaze.  She finds that the vestibular rehab therapy has helped to stabilize that issue.  She does wear prism lenses in her glasses; however, they have not stop the diplopia.        Review of Systems     Constitutional: Negative for appetite change, chills, fatigue, fever and unexpected weight loss.      HENT: Positive for ringing in the ears and runny nose.      Eyes:  Negative for change in eyesight, eye drainage, eye itching and photophobia. Diplopia    Respiratory:  Positive for sleep apnea.      Cardiovascular:  Negative for chest pain, foot swelling, irregular heartbeat and swollen veins.     Gastrointestinal:  Positive for acid reflux.     Genitourinary: Negative for difficulty urinating, sexual problems and frequent urination.     Musc: Positive for neck pain. Gait disturbance  Uses walker for ambulation    Skin: Negative for rash.     Allergy: Negative for food allergies and seasonal allergies.     Endocrine: Positive for cold intolerance.     Neurological: Positive for dizziness and light-headedness. Ataxia  History CVA.    Hematologic: Negative for bruises/bleeds easily and swollen glands.      Psychiatric: Negative for decreased concentration, depression, nervous/anxious and sleep disturbance.                Objective:      Physical Exam  Vitals and nursing note " reviewed. Exam conducted with a chaperone present (Patient's  accompanies her to the visit).   Constitutional:       General: She is awake. She is not in acute distress.     Appearance: Normal appearance. She is well-developed and well-groomed. She is obese.   HENT:      Head: Normocephalic.      Jaw: There is normal jaw occlusion.      Salivary Glands: Right salivary gland is not diffusely enlarged. Left salivary gland is not diffusely enlarged.      Right Ear: Ear canal and external ear normal. Decreased hearing noted. Tympanic membrane is scarred and retracted. Tympanic membrane has decreased mobility.      Left Ear: Ear canal and external ear normal. Decreased hearing noted. Tympanic membrane is retracted. Tympanic membrane has decreased mobility.      Ears:      Comments:       Nose: Septal deviation (To the right) and rhinorrhea present. No nasal deformity or mucosal edema. Rhinorrhea is clear.      Right Turbinates: Enlarged and pale.      Left Turbinates: Enlarged and pale.      Mouth/Throat:      Lips: Pink. No lesions.      Mouth: Mucous membranes are moist. No oral lesions.      Dentition: Abnormal dentition. No gum lesions.      Tongue: No lesions.      Palate: No mass and lesions.      Pharynx: Oropharynx is clear. Uvula midline. No oropharyngeal exudate.   Eyes:      General: Lids are normal.         Right eye: No discharge.         Left eye: No discharge.      Conjunctiva/sclera: Conjunctivae normal.      Right eye: Right conjunctiva is not injected. No exudate.     Left eye: Left conjunctiva is not injected. No exudate.     Pupils: Pupils are equal, round, and reactive to light.   Neck:      Thyroid: No thyroid mass or thyromegaly.      Vascular: No carotid bruit.      Trachea: Trachea normal. No tracheal deviation.   Cardiovascular:      Rate and Rhythm: Normal rate and regular rhythm.      Pulses: Normal pulses.      Heart sounds: Normal heart sounds. No murmur heard.    No gallop.    Pulmonary:      Effort: Pulmonary effort is normal.      Breath sounds: Normal breath sounds. No stridor. No decreased breath sounds, wheezing, rhonchi or rales.   Abdominal:      General: Bowel sounds are normal.      Palpations: Abdomen is soft.      Tenderness: There is no abdominal tenderness.   Musculoskeletal:      Right shoulder: Normal.      Cervical back: Neck supple. No muscular tenderness. Decreased range of motion.   Lymphadenopathy:      Head:      Right side of head: No submental, submandibular, preauricular, posterior auricular or occipital adenopathy.      Left side of head: No submental, submandibular, preauricular, posterior auricular or occipital adenopathy.      Cervical: No cervical adenopathy.   Skin:     General: Skin is warm and dry.      Findings: No petechiae or rash.      Nails: There is no clubbing.   Neurological:      Mental Status: She is alert and oriented to person, place, and time.      Cranial Nerves: No cranial nerve deficit.      Sensory: No sensory deficit.      Gait: Gait normal.      Comments: Neuro otologic-wide-based gait, tandem gait not attempted, no nystagmus, diplopia with left lateral gaze at 45°, Romberg falls backwards, tandem Romberg unable to perform   Psychiatric:         Speech: Speech normal.         Behavior: Behavior normal. Behavior is cooperative.         Thought Content: Thought content normal.         Judgment: Judgment normal.          Result Note    Component Ref Range & Units 1 mo ago   (6/14/22) 4 yr ago   (3/15/18) 5 yr ago   (4/10/17) 5 yr ago   (1/13/17) 6 yr ago   (3/14/16) 6 yr ago   (12/1/15) 8 yr ago   (4/4/14)   Hemoglobin A1C 4.0 - 5.6 % 6.2 High   5.5 CM  5.9 R, CM  6.1 R, CM  5.9 R  5.9 R  6.1 R    Comment: ADA Screening Guidelines:   5.7-6.4%  Consistent with prediabetes   >or=6.5%  Consistent with diabetes     High levels of fetal hemoglobin interfere with the HbA1C   assay. Heterozygous hemoglobin variants (HbS, HgC, etc)do   not  significantly interfere with this assay.   However, presence of multiple variants may affect accuracy.    Estimated Avg Glucose 68 - 131 mg/dL 131  111  123  128  123 CM  123 CM  128 CM          Result Note    Component Ref Range & Units 1 mo ago 1 yr ago 5 yr ago 6 yr ago   Sed Rate 0 - 20 mm/Hr 38 High   37 High   34 High   42 High     Resulting Agency  [facility]         Result Note    Component Ref Range & Units 1 mo ago 1 yr ago 5 yr ago 6 yr ago   CRP 0.0 - 8.2 mg/L 10.9 High   10.6 High   8.9 High   15.1 High     Resulting Agency  [facility]         Result Note    Component Ref Range & Units 1 mo ago 6 yr ago   MARY Screen Negative <1:80 Negative <1:80  Negative <1:160 R    Comment: MARY test was performed by Immunofluorescence on HEP2 cells.            Result Note    Component Ref Range & Units 1 mo ago 6 yr ago   RPR Non-reactive Non-reactive  Non-reactive    Resulting Agency  [facility]        I personally discussed the results of the above labs with the patient provided she and her  a copy of the results.      Assessment:       1. Tinnitus of both ears    2. Peripheral vertigo involving right ear    3. Conductive hearing loss of right ear with restricted hearing of left ear    4. History of CVA (cerebrovascular accident)    5. At moderate risk for fall    6. Diplopia        Plan:       I will have the patient continue with her vestibular rehab therapy.  I have sent a secure chat message to her primary care physician to review the labs I had performed.  I have think she needs referral to a neurologist and have asked her primary care physician to [facility]. She also needs to revisit with her ophthalmologist regarding her diplopia.  I will have her follow-up in 6 weeks with my colleague Ruperto Mcclellan DNP, and in 3 months with me.        Addendum:  7/20 06/2022, 5:30 p.m.-secure chat message with patient's PCP.  I will refer the patient to Dr. Philippe in the Neurology Department.

## 2022-07-26 NOTE — TELEPHONE ENCOUNTER
No new care gaps identified.  [facility] Embedded Care Gaps. Reference number: [id number]. 7/26/2022   5:59:13 AM CDT

## 2022-07-27 ENCOUNTER — TELEPHONE (OUTPATIENT)
Dept: UROLOGY | Facility: CLINIC | Age: 72
End: 2022-07-27
Payer: MEDICARE

## 2022-07-27 NOTE — TELEPHONE ENCOUNTER
Refill Decision Note   Tracy Armendariz  is requesting a refill authorization.  Brief Assessment and Rationale for Refill:  Approve     Medication Therapy Plan:       Medication Reconciliation Completed: No   Comments:     No Care Gaps recommended.     Note composed:11:24 PM 07/26/2022

## 2022-07-28 ENCOUNTER — CLINICAL SUPPORT (OUTPATIENT)
Dept: REHABILITATION | Facility: HOSPITAL | Age: 72
End: 2022-07-28
Attending: SPECIALIST
Payer: MEDICARE

## 2022-07-28 DIAGNOSIS — R42 DIZZINESS: ICD-10-CM

## 2022-07-28 DIAGNOSIS — R26.89 IMPAIRMENT OF BALANCE: ICD-10-CM

## 2022-07-28 DIAGNOSIS — H53.10 EYE FATIGUE: Primary | ICD-10-CM

## 2022-07-28 PROCEDURE — 97112 NEUROMUSCULAR REEDUCATION: CPT | Mod: PO

## 2022-07-28 NOTE — PROGRESS NOTES
"[facility]   Physical Therapy Treatment Note     Name: Tracy Armendariz  Clinic Number: [id number]    Therapy Diagnosis:   Encounter Diagnoses   Name Primary?    Eye fatigue Yes    Dizziness     Impairment of balance      Physician: LORRAINE Alfonso MD    Visit Date: 7/28/2022    Physician Orders: PT Eval and Treat   Medical Diagnosis from Referral:   H81.391 (ICD-10-CM) - Peripheral vertigo involving right ear   R27.0 (ICD-10-CM) - Ataxia   Z91.81 (ICD-10-CM) - At high risk for falls      Evaluation Date: 7/12/2022  Authorization Period Expiration: 06/10/22 to 06/10/23  Plan of Care Expiration: 09/09/22  Visit # / Visits authorized: 06/ 11     Time In: 13:00  Time Out: 13:40  Total Billable Time: 40 minutes     Precautions: Standard, fall, safety awareness    SUBJECTIVE     Pt reports: that she is doing fine this afternoon. She followed up with ENT yesterday and was recommended to establish care with neurology.     She was compliant with her home exercise program.   Response to previous treatment: no adverse effects  Functional change: ongoing    Pain: 0/10  Location: NA    Dizziness: 1/10  3/10 (headache) post intervention    OBJECTIVE     Objective Measures updated at progress report unless specified.     Treatment     Tracy received the treatments listed below:      neuromuscular re-education activities to improve: Balance, Coordination, Sense and vestibular function for 40 minutes. The following activities were included:    Oculomotor exercises:   Smooth Pursuits: reading colored shapes card  X 30" horizontal, min visual slippage  X 30" vertical, jerkiness of eyes  X 30" LUQ <> RLQ diagonal, min visual slippage, decreased ability to focus  X 30" RUQ <> LLQ diagonal, min visual slippage, decreased ability to focus- hardest plan to track     Saccades: x targets  3 x 30" horizontal, at 90 bpm, left target out of focus     VOR 1: x target  3 x 30" horizontal at 70 bpm, left eye " "difficulty maintaining target, min dizziness    Balance / Motion Tolerance exercises:    Ambulation in hallway:  X 2 laps x 100 feet ambulation with right<>L head turns, CGA to occ Min A, no UE support  X 2 laps x 100 feet ambulation with up <> down head movements, CGA, no UE support  X 2 laps x 100 feet forward ambulation, tossing <> catching ball, CGA    // bars:  Foam pad:  2 x 30" right<>L head movements, CGA no upper extremity support  2 x 30" up <> down head movements, CGA to Min A, no upper extremity support  3 x 30" stance with eyes closed, Min A, occ upper extremity support- posterior lean    X 10 reps, each lower extremity, forward step up onto 4 pt foam fitter board, CGA, 1 upper extremity support    3 x 30" each lower extremity, single leg stance with alternate lower extremity on fitter board, CGA, occ upper extremity support, eyes open      Patient Education and Home Exercises     Home Exercises Provided and Patient Education Provided     Education provided:   - plan of care (POC), HEP    Written Home Exercises Provided: yes. Exercises were reviewed and Tracy was able to demonstrate them prior to the end of the session.  Tracy demonstrated good  understanding of the education provided. See EMR under Patient Instructions for exercises provided during therapy session on 07/14/22.     ASSESSMENT     Patient tolerated therapy session well this afternoon. Oculomotor performance remains consistent with prior trial. Improved steadiness noted with ambulation with up <> down head movements today compared to prior session. Rest breaks provided throughout session to prevent excess fatigue. Balance and endurance appropriately challenged with activities performed this date. Patient endorses mild increase in headache following interventions. Patient remains appropriate for continued skilled vestibular rehab to address oculomotor, motion tolerance, and balance deficits.     Tracy Is progressing well towards her " goals.   Pt prognosis is Good.     Pt will continue to benefit from skilled outpatient physical therapy to address the deficits listed in the problem list box on initial evaluation, provide pt/family education and to maximize pt's level of independence in the home and community environment.     Pt's spiritual, cultural and educational needs considered and pt agreeable to plan of care and goals.     Anticipated Barriers for therapy: co-morbidities, chronicity of symptoms    Goals:  Short Term Goals: 4 weeks   1. Patient to be (I) with established home exercise program. Ongoing  2. Patient to perform smooth pursuits tracking single target in all planes WFL for improved ability to scan environment. Ongoing  3. Patient to perform saccades at 80 bpm WFL for improved oculomotor endurance. Ongoing  4. Patient to perform VOR 1 at 70 bpm WFL for improved gaze stabilization. Ongoing  5. Patient to pass GST condition 2 with no more than slight sway for improved balance in low vision environments. Ongoing  6. Patient to improve GST condition 3 to at least 12 seconds for improved balance and decreased fall risk. Ongoing  7. Patient to improve GST condition 5 to at least 20 seconds for improved balance on unlevel surfaces. Ongoing  8. Patient to improve GST condition 6 to at least 20 seconds for improved balance in low vision environments. Ongoing  9. PT to formally assess Functional Gait Assessment ans establish appropriate goal. MET 07/14/22     Long Term Goals: 8 weeks   1. Patient to be (I) with advanced home exercise program. Ongoing  2. Patient to perform smooth pursuits with reading component in all planes WFL for improved ability to scan environment. Ongoing  3. Patient to perform saccades at 100 bpm WFL for improved oculomotor endurance. Ongoing  4. Patient to perform VOR 1 at 90 bpm WFL for improved gaze stabilization. Ongoing  5. Patient to improve GST condition 3 to at least 18 seconds for improved balance and  decreased fall risk. Ongoing  6. Patient to improve GST condition 5 to at least 30 seconds for improved balance on unlevel surfaces. Ongoing  7. Patient to improve GST condition 6 to at least 30 seconds for improved balance in low vision environments. Ongoing  8. Patient to improve Functional Gait Assessment score to at least 22/30 for improved balance and decreased fall risk in community environments. Ongoing     PLAN     Progress oculomotor and balance activities as able.    Mónica Thomas, PT

## 2022-08-01 ENCOUNTER — PATIENT MESSAGE (OUTPATIENT)
Dept: UROLOGY | Facility: CLINIC | Age: 72
End: 2022-08-01
Payer: MEDICARE

## 2022-08-01 NOTE — PROGRESS NOTES
"[facility]   Physical Therapy Treatment Note     Name: Tracy Armendariz  Clinic Number: [id number]    Therapy Diagnosis:   No diagnosis found.  Physician: LORRAINE Alfonso MD    Visit Date: 8/2/2022    Physician Orders: PT Eval and Treat   Medical Diagnosis from Referral:   H81.391 (ICD-10-CM) - Peripheral vertigo involving right ear   R27.0 (ICD-10-CM) - Ataxia   Z91.81 (ICD-10-CM) - At high risk for falls      Evaluation Date: 7/12/2022  Authorization Period Expiration: 06/10/22 to 06/10/23  Plan of Care Expiration: 09/09/22  Visit # / Visits authorized: 07/ 11     Time In: 11:00  Time Out: 11:45  Total Billable Time: 45 minutes     Precautions: Standard, fall, safety awareness    SUBJECTIVE     Pt reports: that she is doing fine this afternoon. She followed up with ENT yesterday and was recommended to establish care with neurology.     She was compliant with her home exercise program.   Response to previous treatment: no adverse effects  Functional change: ongoing    Pain: 0/10  Location: NA    Dizziness: 1/10  3/10 (headache) post intervention    OBJECTIVE     Objective Measures updated at progress report unless specified.     Treatment     Tracy received the treatments listed below:      neuromuscular re-education activities to improve: Balance, Coordination, Sense and vestibular function for 40 minutes. The following activities were included:    Oculomotor exercises:   Smooth Pursuits: reading colored shapes card  X 30" horizontal, min visual slippage  X 30" vertical, jerkiness of eyes  X 30" LUQ <> RLQ diagonal, min visual slippage, decreased ability to focus  X 30" RUQ <> LLQ diagonal, min visual slippage, decreased ability to focus- hardest plan to track     Saccades: x targets  3 x 30" horizontal, at 90 bpm, left target out of focus     VOR 1: x target  3 x 30" horizontal at 70 bpm, left eye difficulty maintaining target, min dizziness    Balance / Motion Tolerance " "exercises:    Ambulation in hallway:  X 2 laps x 100 feet ambulation with right<>L head turns, CGA to occ Min A, no UE support  X 2 laps x 100 feet ambulation with up <> down head movements, CGA, no UE support  X 2 laps x 100 feet forward ambulation, tossing <> catching ball, CGA    // bars:  Foam pad:  2 x 30" right<>L head movements, CGA no upper extremity support  2 x 30" up <> down head movements, CGA to Min A, no upper extremity support  3 x 30" stance with eyes closed, Min A, occ upper extremity support- posterior lean    X 10 reps, each lower extremity, forward step up onto 4 pt foam fitter board, CGA, 1 upper extremity support    3 x 30" each lower extremity, single leg stance with alternate lower extremity on fitter board, CGA, occ upper extremity support, eyes open      Patient Education and Home Exercises     Home Exercises Provided and Patient Education Provided     Education provided:   - plan of care (POC), HEP    Written Home Exercises Provided: yes. Exercises were reviewed and Tracy was able to demonstrate them prior to the end of the session.  Tracy demonstrated good  understanding of the education provided. See EMR under Patient Instructions for exercises provided during therapy session on 07/14/22.     ASSESSMENT     Patient tolerated therapy session well this afternoon. Oculomotor performance remains consistent with prior trial. Improved steadiness noted with ambulation with up <> down head movements today compared to prior session. Rest breaks provided throughout session to prevent excess fatigue. Balance and endurance appropriately challenged with activities performed this date. Patient endorses mild increase in headache following interventions. Patient remains appropriate for continued skilled vestibular rehab to address oculomotor, motion tolerance, and balance deficits.     Tracy Is progressing well towards her goals.   Pt prognosis is Good.     Pt will continue to benefit from skilled " outpatient physical therapy to address the deficits listed in the problem list box on initial evaluation, provide pt/family education and to maximize pt's level of independence in the home and community environment.     Pt's spiritual, cultural and educational needs considered and pt agreeable to plan of care and goals.     Anticipated Barriers for therapy: co-morbidities, chronicity of symptoms    Goals:  Short Term Goals: 4 weeks   1. Patient to be (I) with established home exercise program. Ongoing  2. Patient to perform smooth pursuits tracking single target in all planes WFL for improved ability to scan environment. Ongoing  3. Patient to perform saccades at 80 bpm WFL for improved oculomotor endurance. Ongoing  4. Patient to perform VOR 1 at 70 bpm WFL for improved gaze stabilization. Ongoing  5. Patient to pass GST condition 2 with no more than slight sway for improved balance in low vision environments. Ongoing  6. Patient to improve GST condition 3 to at least 12 seconds for improved balance and decreased fall risk. Ongoing  7. Patient to improve GST condition 5 to at least 20 seconds for improved balance on unlevel surfaces. Ongoing  8. Patient to improve GST condition 6 to at least 20 seconds for improved balance in low vision environments. Ongoing  9. PT to formally assess Functional Gait Assessment ans establish appropriate goal. MET 07/14/22     Long Term Goals: 8 weeks   1. Patient to be (I) with advanced home exercise program. Ongoing  2. Patient to perform smooth pursuits with reading component in all planes WFL for improved ability to scan environment. Ongoing  3. Patient to perform saccades at 100 bpm WFL for improved oculomotor endurance. Ongoing  4. Patient to perform VOR 1 at 90 bpm WFL for improved gaze stabilization. Ongoing  5. Patient to improve GST condition 3 to at least 18 seconds for improved balance and decreased fall risk. Ongoing  6. Patient to improve GST condition 5 to at least 30  seconds for improved balance on unlevel surfaces. Ongoing  7. Patient to improve GST condition 6 to at least 30 seconds for improved balance in low vision environments. Ongoing  8. Patient to improve Functional Gait Assessment score to at least 22/30 for improved balance and decreased fall risk in community environments. Ongoing     PLAN     Progress oculomotor and balance activities as able.    Mónica Thomas, PT

## 2022-08-02 ENCOUNTER — TELEPHONE (OUTPATIENT)
Dept: UROLOGY | Facility: CLINIC | Age: 72
End: 2022-08-02
Payer: MEDICARE

## 2022-08-02 ENCOUNTER — CLINICAL SUPPORT (OUTPATIENT)
Dept: REHABILITATION | Facility: HOSPITAL | Age: 72
End: 2022-08-02
Attending: SPECIALIST
Payer: MEDICARE

## 2022-08-02 DIAGNOSIS — R26.89 IMPAIRMENT OF BALANCE: ICD-10-CM

## 2022-08-02 DIAGNOSIS — R42 DIZZINESS: ICD-10-CM

## 2022-08-02 DIAGNOSIS — H53.10 EYE FATIGUE: Primary | ICD-10-CM

## 2022-08-02 PROCEDURE — 97112 NEUROMUSCULAR REEDUCATION: CPT | Mod: PO

## 2022-08-02 NOTE — PROGRESS NOTES
"[facility]   Physical Therapy Treatment Note     Name: Tracy Armendariz  Clinic Number: [id number]    Therapy Diagnosis:   Encounter Diagnoses   Name Primary?    Eye fatigue Yes    Dizziness     Impairment of balance      Physician: LORRAINE Alfonso MD    Visit Date: 8/2/2022    Physician Orders: PT Eval and Treat   Medical Diagnosis from Referral:   H81.391 (ICD-10-CM) - Peripheral vertigo involving right ear   R27.0 (ICD-10-CM) - Ataxia   Z91.81 (ICD-10-CM) - At high risk for falls      Evaluation Date: 7/12/2022  Authorization Period Expiration: 06/10/22 to 06/10/23  Plan of Care Expiration: 09/09/22  Visit # / Visits authorized: 07/ 11     Time In: 1103  Time Out: 1145  Total Billable Time: 40 minutes     Precautions: Standard, fall, safety awareness    SUBJECTIVE     Pt reports: no issues upon arrival. She does report "good changes". She feels that her left eye is less "wobbly" with her eye exercises.    She was compliant with her home exercise program.   Response to previous treatment: no adverse effects  Functional change: ongoing    Pain: 0/10  Location: NA    Dizziness: 1/10  3/10 (headache) post intervention    OBJECTIVE     Objective Measures updated at progress report unless specified.     Treatment     Tracy received the treatments listed below:      neuromuscular re-education activities to improve: Balance, Coordination, Sense and vestibular function for 40 minutes. The following activities were included:    Oculomotor exercises:   Smooth Pursuits: reading colored shapes card  X 30" horizontal, min visual slippage  X 30" vertical, jerkiness of eyes  X 30" LUQ <> RLQ diagonal, min visual slippage, decreased ability to focus  X 30" RUQ <> LLQ diagonal, min visual slippage, decreased ability to focus- hardest plane to track     Saccades: x targets  3 x 30" horizontal, at 90 bpm, consistent corrective with R gaze     VOR 1: x target  1 x 30" horizontal at 70 bpm with near " "target, reports double vision  1 x 30" horizontal at 70 bpm with PT holding target ~ 3 feet away, no double vision    Balance / Motion Tolerance exercises:    Ambulation in hallway:  X 3 laps x 100 feet ambulation with right<>L head turns, CGA to occ Min A, no UE support  X 3 laps x 100 feet ambulation with up <> down head movements, CGA, no UE support  X 2 laps x 100 feet forward ambulation, tossing <> catching ball, CGA    // bars:  R Semi-tandem series:  X 30 seconds, static balance  X 30 seconds, horizontal head turns  X 30 seconds, vertical head turns    L Semi-tandem series:  X 30 seconds, static balance  X 30 seconds, horizontal head turns  X 30 seconds, vertical head turns    Foam pad:  2 x 30" right<>L head movements, CGA no upper extremity support  2 x 30" up <> down head movements, CGA to Min A, no upper extremity support  2 x 30" stance with eyes closed, Min A, occ upper extremity support- posterior lean    X 10 reps, each lower extremity, forward step up onto 4 pt foam fitter board, CGA, 1 upper extremity support    3 x 30" each lower extremity, single leg stance with alternate lower extremity on fitter board, CGA, occ upper extremity support, eyes open      Patient Education and Home Exercises     Home Exercises Provided and Patient Education Provided     Education provided:   - plan of care (POC), HEP    Written Home Exercises Provided: yes. Exercises were reviewed and Tracy was able to demonstrate them prior to the end of the session.  Tracy demonstrated good  understanding of the education provided. See EMR under Patient Instructions for exercises provided during therapy session on 07/14/22.     ASSESSMENT     Patient tolerated therapy session well this afternoon. Verbal cues required to slow target movement with smooth pursuits interventions; limited carryover, she may benefit from PT manipulating the target. With VORx1, she reported doubling of the target when it was near; PT held the target " ~3 feet away which eliminated the double vision. She was appropriately challenged with static and dynamic interventions, with occasional Minimal assist required due to staggering/mild loss of stability. She reported onset of mild dizziness with dynamic interventions today. Patient remains appropriate for continued skilled vestibular rehab to address oculomotor, motion tolerance, and balance deficits.     Tracy Is progressing well towards her goals.   Pt prognosis is Good.     Pt will continue to benefit from skilled outpatient physical therapy to address the deficits listed in the problem list box on initial evaluation, provide pt/family education and to maximize pt's level of independence in the home and community environment.     Pt's spiritual, cultural and educational needs considered and pt agreeable to plan of care and goals.     Anticipated Barriers for therapy: co-morbidities, chronicity of symptoms    Goals:  Short Term Goals: 4 weeks   1. Patient to be (I) with established home exercise program. Ongoing  2. Patient to perform smooth pursuits tracking single target in all planes WFL for improved ability to scan environment. Ongoing  3. Patient to perform saccades at 80 bpm WFL for improved oculomotor endurance. Ongoing  4. Patient to perform VOR 1 at 70 bpm WFL for improved gaze stabilization. Ongoing  5. Patient to pass GST condition 2 with no more than slight sway for improved balance in low vision environments. Ongoing  6. Patient to improve GST condition 3 to at least 12 seconds for improved balance and decreased fall risk. Ongoing  7. Patient to improve GST condition 5 to at least 20 seconds for improved balance on unlevel surfaces. Ongoing  8. Patient to improve GST condition 6 to at least 20 seconds for improved balance in low vision environments. Ongoing  9. PT to formally assess Functional Gait Assessment ans establish appropriate goal. MET 07/14/22     Long Term Goals: 8 weeks   1. Patient to be  (I) with advanced home exercise program. Ongoing  2. Patient to perform smooth pursuits with reading component in all planes WFL for improved ability to scan environment. Ongoing  3. Patient to perform saccades at 100 bpm WFL for improved oculomotor endurance. Ongoing  4. Patient to perform VOR 1 at 90 bpm WFL for improved gaze stabilization. Ongoing  5. Patient to improve GST condition 3 to at least 18 seconds for improved balance and decreased fall risk. Ongoing  6. Patient to improve GST condition 5 to at least 30 seconds for improved balance on unlevel surfaces. Ongoing  7. Patient to improve GST condition 6 to at least 30 seconds for improved balance in low vision environments. Ongoing  8. Patient to improve Functional Gait Assessment score to at least 22/30 for improved balance and decreased fall risk in community environments. Ongoing     PLAN     Progress oculomotor and balance activities as able.    Sumaya Galindo, PT

## 2022-08-04 ENCOUNTER — CLINICAL SUPPORT (OUTPATIENT)
Dept: REHABILITATION | Facility: HOSPITAL | Age: 72
End: 2022-08-04
Attending: SPECIALIST
Payer: MEDICARE

## 2022-08-04 ENCOUNTER — HOSPITAL ENCOUNTER (OUTPATIENT)
Dept: RADIOLOGY | Facility: HOSPITAL | Age: 72
Discharge: HOME | End: 2022-08-04
Attending: STUDENT IN AN ORGANIZED HEALTH CARE EDUCATION/TRAINING PROGRAM
Payer: MEDICARE

## 2022-08-04 DIAGNOSIS — R42 DIZZINESS: ICD-10-CM

## 2022-08-04 DIAGNOSIS — H53.10 EYE FATIGUE: Primary | ICD-10-CM

## 2022-08-04 DIAGNOSIS — R26.89 IMPAIRMENT OF BALANCE: ICD-10-CM

## 2022-08-04 DIAGNOSIS — N20.0 NEPHROLITHIASIS: ICD-10-CM

## 2022-08-04 PROCEDURE — 74176 CT ABDOMEN PELVIS WITHOUT CONTRAST: ICD-10-PCS | Mod: 26,,, | Performed by: RADIOLOGY

## 2022-08-04 PROCEDURE — 74176 CT ABD & PELVIS W/O CONTRAST: CPT | Mod: TC

## 2022-08-04 PROCEDURE — 74176 CT ABD & PELVIS W/O CONTRAST: CPT | Mod: 26,,, | Performed by: RADIOLOGY

## 2022-08-04 PROCEDURE — 97112 NEUROMUSCULAR REEDUCATION: CPT | Mod: PO

## 2022-08-04 NOTE — PROGRESS NOTES
"[facility]   Physical Therapy Treatment Note     Name: Tracy Armendariz  Clinic Number: [id number]    Therapy Diagnosis:   Encounter Diagnoses   Name Primary?    Eye fatigue Yes    Dizziness     Impairment of balance      Physician: LORRAINE Alfonso MD    Visit Date: 8/4/2022    Physician Orders: PT Eval and Treat   Medical Diagnosis from Referral:   H81.391 (ICD-10-CM) - Peripheral vertigo involving right ear   R27.0 (ICD-10-CM) - Ataxia   Z91.81 (ICD-10-CM) - At high risk for falls      Evaluation Date: 7/12/2022  Authorization Period Expiration: 06/10/22 to 06/10/23  Plan of Care Expiration: 09/09/22  Visit # / Visits authorized: 08/ 11     Time In: 1346   Time Out: 1430   Total Billable Time: 44 minutes     Precautions: Standard, fall, safety awareness    SUBJECTIVE     Pt reports: she had a CAT scan this morning and this has thrown her schedule off. She enters the clinic with mild dizziness.    She was compliant with her home exercise program.   Response to previous treatment: no adverse effects  Functional change: ongoing    Pain: 0/10  Location: NA    Dizziness: 2/10  4/10 (headache) post intervention    OBJECTIVE     Objective Measures updated at progress report unless specified.     Treatment     Tracy received the treatments listed below:      neuromuscular re-education activities to improve: Balance, Coordination, Sense and vestibular function for 44 minutes. The following activities were included:    Oculomotor exercises:   Smooth Pursuits: reading colored shapes card  2 X 30" horizontal, min visual slippage~ PT helps pt guide card on 2nd trial~ HA reported( PT guides pt's card movement during trial 2)  2 X 30" vertical, occasional jerkiness of eyes~ HA reported( PT guides pt's card movement during trial 2)  X 30" LUQ <> RLQ diagonal, min visual slippage, decreased ability to focus~ HA reported  X 30" RUQ <> LLQ diagonal, min visual slippage, no increase in " Hira Markham Is a 68 year old male presenting for a diabetes check. Patient is doing well today with no questions or concerns.    Vaccine Information Statement(s) was given today. This has been reviewed, questions answered, and verbal consent given by Patient for injection(s) and administration of Anthrax and Influenza (Inactivated).    1. Does the patient have a moderate to severe fever?  No  2. Has the patient had a serious reaction to a flu shot before?   No  3. Has the patient ever had Guillian [place] Syndrome within 6 weeks of a previous flu shot?  No  4. Does the patient have a serious allergy to eggs?  No  5. Is the patient less that 6 months of age?  No    Patient is eligible to receive the vaccine based on all questions being answered as 'No'.          Patient tolerated without incident. See immunization grid for documentation.    Denies known Latex allergy or symptoms of Latex sensitivity.    Tobacco use verified  Medications verified     "symptoms     Saccades: x targets  3 x 30" horizontal, at 90 bpm, consistent corrective with R gaze, some difficulty keeping pace with metronome, some light headedness~ PT holds targets     VOR 1: x target  1 x 30" horizontal at 70 bpm with near target, reports some double vision  1 x 30" horizontal at 70 bpm with PT holding target ~ 3 feet away, still reports double vision+ HA  ( pt loses focus at the end of each trial)      Balance / Motion Tolerance exercises:    Ambulation in hallway:  X 2 laps x 100 feet forward ambulation, self tossing <> catching ball, CGA to occasional slight min A    // bars:  R Semi-tandem series:  X 30 seconds, static balance~ CGA to SBA  X 30 seconds, horizontal head turns~ CGA to min A  X 30 seconds, vertical head turns~ CGA    L Semi-tandem series:  X 30 seconds, static balance~ CGA to SBA  X 30 seconds, horizontal head turns~CGA to slight min A  X 30 seconds, vertical head turns~ CGA    Foam pad:  2 x 30" right<>L head movements, CGA no upper extremity support  2 x 30" up <> down head movements, CGA, no upper extremity support  2 x 30" stance with eyes closed, CGA to slight min A, no upper extremity support- slight to mild posterior lean    X 10 reps, each lower extremity, forward step up onto 4 pt foam fitter board, CGA, 1 upper extremity support~ HA reported    2 x 30" each lower extremity, single leg stance with alternate lower extremity on fitter board, CGA, no support, eyes open      Patient Education and Home Exercises     Home Exercises Provided and Patient Education Provided     Education provided:   - plan of care (POC), HEP    Written Home Exercises Provided: yes. Exercises were reviewed and Tracy was able to demonstrate them prior to the end of the session.  Tracy demonstrated good  understanding of the education provided. See EMR under Patient Instructions for exercises provided during therapy session on 07/14/22.     ASSESSMENT     Patient tolerated therapy session " fairly this afternoon. Pt entered clinic with only mild dizziness but this score increased by 2 points by the end of the session. She struggled with movement of target during smooth pursuits, so PT assisted during 2nd trial of horizontal and vertical; she tends to move card more appropriately with diagonal direction. Saccades and VOR x 1 were both provocative in terms of dizziness and HA. She keeps pace fairly well with metronome during VOR x 1, but loses focus at end of each trial. Saccades appeared most challenging today. She requests to perform her oculomotor exercises at the end of her treatment session next time. She was appropriately challenged with static and dynamic interventions for balance, with occasional minimal assist required due to staggering/mild loss of stability.  Patient remains appropriate for continued skilled vestibular rehab to address oculomotor, motion tolerance, and balance deficits.     Tracy Is progressing well towards her goals.   Pt prognosis is Good.     Pt will continue to benefit from skilled outpatient physical therapy to address the deficits listed in the problem list box on initial evaluation, provide pt/family education and to maximize pt's level of independence in the home and community environment.     Pt's spiritual, cultural and educational needs considered and pt agreeable to plan of care and goals.     Anticipated Barriers for therapy: co-morbidities, chronicity of symptoms    Goals:  Short Term Goals: 4 weeks   1. Patient to be (I) with established home exercise program. Ongoing  2. Patient to perform smooth pursuits tracking single target in all planes WFL for improved ability to scan environment. Ongoing  3. Patient to perform saccades at 80 bpm WFL for improved oculomotor endurance. Ongoing  4. Patient to perform VOR 1 at 70 bpm WFL for improved gaze stabilization. Ongoing  5. Patient to pass GST condition 2 with no more than slight sway for improved balance in low  vision environments. Ongoing  6. Patient to improve GST condition 3 to at least 12 seconds for improved balance and decreased fall risk. Ongoing  7. Patient to improve GST condition 5 to at least 20 seconds for improved balance on unlevel surfaces. Ongoing  8. Patient to improve GST condition 6 to at least 20 seconds for improved balance in low vision environments. Ongoing  9. PT to formally assess Functional Gait Assessment ans establish appropriate goal. MET 07/14/22     Long Term Goals: 8 weeks   1. Patient to be (I) with advanced home exercise program. Ongoing  2. Patient to perform smooth pursuits with reading component in all planes WFL for improved ability to scan environment. Ongoing  3. Patient to perform saccades at 100 bpm WFL for improved oculomotor endurance. Ongoing  4. Patient to perform VOR 1 at 90 bpm WFL for improved gaze stabilization. Ongoing  5. Patient to improve GST condition 3 to at least 18 seconds for improved balance and decreased fall risk. Ongoing  6. Patient to improve GST condition 5 to at least 30 seconds for improved balance on unlevel surfaces. Ongoing  7. Patient to improve GST condition 6 to at least 30 seconds for improved balance in low vision environments. Ongoing  8. Patient to improve Functional Gait Assessment score to at least 22/30 for improved balance and decreased fall risk in community environments. Ongoing     PLAN     Progress oculomotor and balance activities as able. Perform balance interventions first, prior to oculomotor exercises.    Zuhair Cruz, PT   8/4/2022

## 2022-08-04 NOTE — PROGRESS NOTES
"[facility]   Physical Therapy Treatment Note     Name: Tracy Armendariz  Clinic Number: [id number]    Therapy Diagnosis:   No diagnosis found.  Physician: LORRAINE Alfonso MD    Visit Date: 8/4/2022    Physician Orders: PT Eval and Treat   Medical Diagnosis from Referral:   H81.391 (ICD-10-CM) - Peripheral vertigo involving right ear   R27.0 (ICD-10-CM) - Ataxia   Z91.81 (ICD-10-CM) - At high risk for falls      Evaluation Date: 7/12/2022  Authorization Period Expiration: 06/10/22 to 06/10/23  Plan of Care Expiration: 09/09/22  Visit # / Visits authorized: 08/ 11     Time In: 13:45  Time Out: 14:30  Total Billable Time: 45 minutes     Precautions: Standard, fall, safety awareness    SUBJECTIVE     Pt reports: no issues upon arrival. She does report "good changes". She feels that her left eye is less "wobbly" with her eye exercises.    She was compliant with her home exercise program.   Response to previous treatment: no adverse effects  Functional change: ongoing    Pain: 0/10  Location: NA    Dizziness: 1/10  3/10 (headache) post intervention    OBJECTIVE     Objective Measures updated at progress report unless specified.     Treatment     Tracy received the treatments listed below:      neuromuscular re-education activities to improve: Balance, Coordination, Sense and vestibular function for 40 minutes. The following activities were included:    Oculomotor exercises:   Smooth Pursuits: reading colored shapes card  X 30" horizontal, min visual slippage  X 30" vertical, jerkiness of eyes  X 30" LUQ <> RLQ diagonal, min visual slippage, decreased ability to focus  X 30" RUQ <> LLQ diagonal, min visual slippage, decreased ability to focus- hardest plane to track     Saccades: x targets  3 x 30" horizontal, at 90 bpm, consistent corrective with R gaze     VOR 1: x target  1 x 30" horizontal at 70 bpm with near target, reports double vision  1 x 30" horizontal at 70 bpm with PT holding target " "~ 3 feet away, no double vision    Balance / Motion Tolerance exercises:    Ambulation in hallway:  X 3 laps x 100 feet ambulation with right<>L head turns, CGA to occ Min A, no UE support  X 3 laps x 100 feet ambulation with up <> down head movements, CGA, no UE support  X 2 laps x 100 feet forward ambulation, tossing <> catching ball, CGA    // bars:  R Semi-tandem series:  X 30 seconds, static balance  X 30 seconds, horizontal head turns  X 30 seconds, vertical head turns    L Semi-tandem series:  X 30 seconds, static balance  X 30 seconds, horizontal head turns  X 30 seconds, vertical head turns    Foam pad:  2 x 30" right<>L head movements, CGA no upper extremity support  2 x 30" up <> down head movements, CGA to Min A, no upper extremity support  2 x 30" stance with eyes closed, Min A, occ upper extremity support- posterior lean    X 10 reps, each lower extremity, forward step up onto 4 pt foam fitter board, CGA, 1 upper extremity support    3 x 30" each lower extremity, single leg stance with alternate lower extremity on fitter board, CGA, occ upper extremity support, eyes open      Patient Education and Home Exercises     Home Exercises Provided and Patient Education Provided     Education provided:   - plan of care (POC), HEP    Written Home Exercises Provided: yes. Exercises were reviewed and Tracy was able to demonstrate them prior to the end of the session.  Tracy demonstrated good  understanding of the education provided. See EMR under Patient Instructions for exercises provided during therapy session on 07/14/22.     ASSESSMENT     Patient tolerated therapy session well this afternoon. Verbal cues required to slow target movement with smooth pursuits interventions; limited carryover, she may benefit from PT manipulating the target. With VORx1, she reported doubling of the target when it was near; PT held the target ~3 feet away which eliminated the double vision. She was appropriately challenged " with static and dynamic interventions, with occasional Minimal assist required due to staggering/mild loss of stability. She reported onset of mild dizziness with dynamic interventions today. Patient remains appropriate for continued skilled vestibular rehab to address oculomotor, motion tolerance, and balance deficits.     Tracy Is progressing well towards her goals.   Pt prognosis is Good.     Pt will continue to benefit from skilled outpatient physical therapy to address the deficits listed in the problem list box on initial evaluation, provide pt/family education and to maximize pt's level of independence in the home and community environment.     Pt's spiritual, cultural and educational needs considered and pt agreeable to plan of care and goals.     Anticipated Barriers for therapy: co-morbidities, chronicity of symptoms    Goals:  Short Term Goals: 4 weeks   1. Patient to be (I) with established home exercise program. Ongoing  2. Patient to perform smooth pursuits tracking single target in all planes WFL for improved ability to scan environment. Ongoing  3. Patient to perform saccades at 80 bpm WFL for improved oculomotor endurance. Ongoing  4. Patient to perform VOR 1 at 70 bpm WFL for improved gaze stabilization. Ongoing  5. Patient to pass GST condition 2 with no more than slight sway for improved balance in low vision environments. Ongoing  6. Patient to improve GST condition 3 to at least 12 seconds for improved balance and decreased fall risk. Ongoing  7. Patient to improve GST condition 5 to at least 20 seconds for improved balance on unlevel surfaces. Ongoing  8. Patient to improve GST condition 6 to at least 20 seconds for improved balance in low vision environments. Ongoing  9. PT to formally assess Functional Gait Assessment ans establish appropriate goal. MET 07/14/22     Long Term Goals: 8 weeks   1. Patient to be (I) with advanced home exercise program. Ongoing  2. Patient to perform smooth  pursuits with reading component in all planes WFL for improved ability to scan environment. Ongoing  3. Patient to perform saccades at 100 bpm WFL for improved oculomotor endurance. Ongoing  4. Patient to perform VOR 1 at 90 bpm WFL for improved gaze stabilization. Ongoing  5. Patient to improve GST condition 3 to at least 18 seconds for improved balance and decreased fall risk. Ongoing  6. Patient to improve GST condition 5 to at least 30 seconds for improved balance on unlevel surfaces. Ongoing  7. Patient to improve GST condition 6 to at least 30 seconds for improved balance in low vision environments. Ongoing  8. Patient to improve Functional Gait Assessment score to at least 22/30 for improved balance and decreased fall risk in community environments. Ongoing     PLAN     Progress oculomotor and balance activities as able.    Mónica Thomas, PT

## 2022-08-05 DIAGNOSIS — S62.101D CLOSED FRACTURE OF RIGHT WRIST WITH ROUTINE HEALING, SUBSEQUENT ENCOUNTER: Primary | ICD-10-CM

## 2022-08-08 ENCOUNTER — OFFICE VISIT (OUTPATIENT)
Dept: ORTHOPEDICS | Facility: CLINIC | Age: 72
End: 2022-08-08
Payer: MEDICARE

## 2022-08-08 VITALS — HEIGHT: 66 IN | BODY MASS INDEX: 36.96 KG/M2 | WEIGHT: 230 LBS

## 2022-08-08 DIAGNOSIS — S62.101D CLOSED FRACTURE OF RIGHT WRIST WITH ROUTINE HEALING, SUBSEQUENT ENCOUNTER: Primary | ICD-10-CM

## 2022-08-08 PROCEDURE — 1159F PR MEDICATION LIST DOCUMENTED IN MEDICAL RECORD: ICD-10-PCS | Mod: CPTII,S$GLB,, | Performed by: ORTHOPAEDIC SURGERY

## 2022-08-08 PROCEDURE — 4010F PR ACE/ARB THEARPY RXD/TAKEN: ICD-10-PCS | Mod: CPTII,S$GLB,, | Performed by: ORTHOPAEDIC SURGERY

## 2022-08-08 PROCEDURE — 99213 PR OFFICE/OUTPT VISIT, EST, LEVL III, 20-29 MIN: ICD-10-PCS | Mod: S$GLB,,, | Performed by: ORTHOPAEDIC SURGERY

## 2022-08-08 PROCEDURE — 3008F BODY MASS INDEX DOCD: CPT | Mod: CPTII,S$GLB,, | Performed by: ORTHOPAEDIC SURGERY

## 2022-08-08 PROCEDURE — 3044F HG A1C LEVEL LT 7.0%: CPT | Mod: CPTII,S$GLB,, | Performed by: ORTHOPAEDIC SURGERY

## 2022-08-08 PROCEDURE — 1159F MED LIST DOCD IN RCRD: CPT | Mod: CPTII,S$GLB,, | Performed by: ORTHOPAEDIC SURGERY

## 2022-08-08 PROCEDURE — 99999 PR PBB SHADOW E&M-EST. PATIENT-LVL III: CPT | Mod: PBBFAC,,, | Performed by: ORTHOPAEDIC SURGERY

## 2022-08-08 PROCEDURE — 3288F PR FALLS RISK ASSESSMENT DOCUMENTED: ICD-10-PCS | Mod: CPTII,S$GLB,, | Performed by: ORTHOPAEDIC SURGERY

## 2022-08-08 PROCEDURE — 1100F PTFALLS ASSESS-DOCD GE2>/YR: CPT | Mod: CPTII,S$GLB,, | Performed by: ORTHOPAEDIC SURGERY

## 2022-08-08 PROCEDURE — 99213 OFFICE O/P EST LOW 20 MIN: CPT | Mod: S$GLB,,, | Performed by: ORTHOPAEDIC SURGERY

## 2022-08-08 PROCEDURE — 3044F PR MOST RECENT HEMOGLOBIN A1C LEVEL <7.0%: ICD-10-PCS | Mod: CPTII,S$GLB,, | Performed by: ORTHOPAEDIC SURGERY

## 2022-08-08 PROCEDURE — 99999 PR PBB SHADOW E&M-EST. PATIENT-LVL III: ICD-10-PCS | Mod: PBBFAC,,, | Performed by: ORTHOPAEDIC SURGERY

## 2022-08-08 PROCEDURE — 4010F ACE/ARB THERAPY RXD/TAKEN: CPT | Mod: CPTII,S$GLB,, | Performed by: ORTHOPAEDIC SURGERY

## 2022-08-08 PROCEDURE — 1126F PR PAIN SEVERITY QUANTIFIED, NO PAIN PRESENT: ICD-10-PCS | Mod: CPTII,S$GLB,, | Performed by: ORTHOPAEDIC SURGERY

## 2022-08-08 PROCEDURE — 3008F PR BODY MASS INDEX (BMI) DOCUMENTED: ICD-10-PCS | Mod: CPTII,S$GLB,, | Performed by: ORTHOPAEDIC SURGERY

## 2022-08-08 PROCEDURE — 1100F PR PT FALLS ASSESS DOC 2+ FALLS/FALL W/INJURY/YR: ICD-10-PCS | Mod: CPTII,S$GLB,, | Performed by: ORTHOPAEDIC SURGERY

## 2022-08-08 PROCEDURE — 3288F FALL RISK ASSESSMENT DOCD: CPT | Mod: CPTII,S$GLB,, | Performed by: ORTHOPAEDIC SURGERY

## 2022-08-08 PROCEDURE — 1126F AMNT PAIN NOTED NONE PRSNT: CPT | Mod: CPTII,S$GLB,, | Performed by: ORTHOPAEDIC SURGERY

## 2022-08-08 RX ORDER — ESOMEPRAZOLE MAGNESIUM 40 MG/1
40 CAPSULE, DELAYED RELEASE ORAL DAILY PRN
Qty: 30 CAPSULE | Refills: 3 | Status: SHIPPED | OUTPATIENT
Start: 2022-08-08 | End: 2023-12-15 | Stop reason: SDUPTHER

## 2022-08-08 NOTE — TELEPHONE ENCOUNTER
No new care gaps identified.  [facility] Embedded Care Gaps. Reference number: [id number]. 8/08/2022   10:18:16 AM CDT

## 2022-08-08 NOTE — PROGRESS NOTES
Subjective:      Patient ID: Tracy Armendariz is a 71 y.o. female.  Chief Complaint: Follow-up (R Wrist )      HPI  Tracy Armendariz is a  71 y.o. female presenting today for follow up of right wrist pain after previous distal radius fracture.  She reports that she is much improved after the injection last visit really no further pain  .    Review of patient's allergies indicates:   Allergen Reactions    Iodinated contrast media Hives and Rash    Gabapentin Hallucinations    Iodine Hives    Isothiazolinones Rash    Penicillins Rash         Current Outpatient Medications   Medication Sig Dispense Refill    albuterol (PROVENTIL/VENTOLIN HFA) 90 mcg/actuation inhaler INHALE 2 PUFFS EVERY 6 HOURS AS NEEDED FOR WHEEZING 18 g 0    aspirin 81 MG Chew Take 81 mg by mouth once daily.      atorvastatin (LIPITOR) 10 MG tablet TAKE 1 TABLET(10 MG) BY MOUTH EVERY DAY 90 tablet 2    buPROPion (WELLBUTRIN XL) 300 MG 24 hr tablet Take 1 tablet (300 mg total) by mouth once daily. 90 tablet 11    calcium carbonate (OS-SPENCER) 600 mg (1,500 mg) Tab Take 600 mg by mouth 2 (two) times daily with meals.      cholecalciferol, vitamin D3, 1,000 unit Chew Take 1,000 Units by mouth once daily.      diazePAM (VALIUM) 2 MG tablet Take 1/2 to 1 tablet 3 times daily as needed to control dizziness 50 tablet 1    diclofenac sodium (VOLTAREN) 1 % Gel APPLY 2-4 GRAMS TO EACH PAINFUL AREA FOUR TIMES DAILY - MAX 32 GRAMS/ g 6    EScitalopram oxalate (LEXAPRO) 20 MG tablet TAKE 1 TABLET(20 MG) BY MOUTH EVERY DAY (Patient taking differently: Take 20 mg by mouth once daily.) 90 tablet 3    esomeprazole (NEXIUM) 40 MG capsule Take 1 capsule (40 mg total) by mouth daily as needed (heartburn). 30 capsule 3    famotidine (PEPCID) 10 MG tablet Take 10 mg by mouth 2 (two) times daily.      hydrocortisone 2.5 % cream Apply topically 2 (two) times daily to affected area 30 g 2    LACTOBACILLUS ACIDOPHILUS (PROBIOTIC ORAL) Take 1 capsule by  mouth once daily. PRN      LIDOcaine-prilocaine (EMLA) cream APPLY 2 GRAMS 3-4 TIMES DAILY FOR TREATMENT OF PAIN 240 g 6    losartan (COZAAR) 100 MG tablet TAKE 1 TABLET(100 MG) BY MOUTH EVERY DAY 90 tablet 3    meclizine (ANTIVERT) 12.5 mg tablet Take 1 tablet (12.5 mg total) by mouth 3 (three) times daily as needed for Dizziness or Nausea. 30 tablet 6    MULTIVIT WITH CALCIUM,IRON,MIN (WOMEN'S DAILY MULTIVITAMIN ORAL) Take 1 tablet by mouth once daily.      tamsulosin (FLOMAX) 0.4 mg Cap TAKE 1 CAPSULE(0.4 MG) BY MOUTH EVERY DAY 90 capsule 0    FLUAD [id number], 65 YR UP,,PF, 45 mcg (15 mcg x 3)/0.5 mL Syrg        No current facility-administered medications for this visit.       Past Medical History:   Diagnosis Date    Allergy     Anticoagulant long-term use     Arthritis     CVA (cerebral infarction)     Depression     Disorder of kidney and ureter     renal stones    Diverticulosis of colon     Extrinsic asthma, unspecified     Hyperlipidemia     Hypertension     Kidney stone     Left atrial enlargement 2014    Low back pain     MARTIN (obstructive sleep apnea)     Osteopenia     PUD (peptic ulcer disease)     Stroke  2013    Urinary tract infection        Past Surgical History:   Procedure Laterality Date    APPENDECTOMY      @ time of hysterectomy    BACK SURGERY      CATARACT EXTRACTION       SECTION      CHOLECYSTECTOMY      laparoscopic    COLONOSCOPY N/A 2018    Procedure: COLONOSCOPY/Golytely;  Surgeon: Janine Black MD;  [facility];  Service: Endoscopy;  Laterality: N/A;    DILATION AND CURETTAGE OF UTERUS      [facility]    INNER EAR SURGERY      replaced ear drum    JOINT REPLACEMENT Right     knee    KNEE ARTHROSCOPY W/ DEBRIDEMENT      LUMBAR DISCECTOMY      L4-L5    OOPHORECTOMY      unilateral    TONSILLECTOMY      TYMPANOPLASTY      URETEROSCOPIC REMOVAL OF  "URETERIC CALCULUS Left 12/19/2018    Procedure: REMOVAL, CALCULUS, URETER, URETEROSCOPIC, holmium laser lithotripsy, stone basket extraction, retrograde pyelogram, ureteral stent exchange;  Surgeon: Anaya Zamora MD;  Location: [facility];  Service: Urology;  Laterality: Left;       OBJECTIVE:   PHYSICAL EXAM:  Height: 5' 6" (167.6 cm) Weight: 104.3 kg (230 lb)  Vitals:    08/08/22 1537   Weight: 104.3 kg (230 lb)   Height: 5' 6" (1.676 m)   PainSc: 0-No pain     Ortho/SPM Exam  Examination right wrist there is some planks slight prominence of the distal ulna but no tenderness  Swelling minimal  Range of motion excellent  strength improved sensation intact    RADIOGRAPHS:  None  Comments: I have personally reviewed the imaging and I agree with the above radiologist's report.    ASSESSMENT/PLAN:     IMPRESSION:  Ulnar-sided right wrist pain after previous distal radius fracture    PLAN:  Her symptoms have improved so I have recommended she return to full activities  Continue strengthening with a squeeze ball  Keep an eye on symptoms    FOLLOW UP:  As needed    Disclaimer: This note has been generated using voice-recognition software. There may be typographical errors that have been missed during proof-reading.    "

## 2022-08-09 ENCOUNTER — CLINICAL SUPPORT (OUTPATIENT)
Dept: REHABILITATION | Facility: HOSPITAL | Age: 72
End: 2022-08-09
Attending: FAMILY MEDICINE
Payer: MEDICARE

## 2022-08-09 DIAGNOSIS — H53.10 EYE FATIGUE: Primary | ICD-10-CM

## 2022-08-09 DIAGNOSIS — R42 DIZZINESS: ICD-10-CM

## 2022-08-09 DIAGNOSIS — R26.89 IMPAIRMENT OF BALANCE: ICD-10-CM

## 2022-08-09 PROCEDURE — 97112 NEUROMUSCULAR REEDUCATION: CPT | Mod: PO

## 2022-08-09 NOTE — PROGRESS NOTES
"[facility]   Physical Therapy Treatment Note     Name: Tracy Armendariz  Clinic Number: [id number]    Therapy Diagnosis:   Encounter Diagnoses   Name Primary?    Eye fatigue Yes    Dizziness     Impairment of balance      Physician: LORRAINE Alfonso MD    Visit Date: 8/9/2022    Physician Orders: PT Eval and Treat   Medical Diagnosis from Referral:   H81.391 (ICD-10-CM) - Peripheral vertigo involving right ear   R27.0 (ICD-10-CM) - Ataxia   Z91.81 (ICD-10-CM) - At high risk for falls      Evaluation Date: 7/12/2022  Authorization Period Expiration: 06/10/22 to 06/10/23  Plan of Care Expiration: 09/09/22  Visit # / Visits authorized: 09/ 11     Time In: 1148  Time Out: 1230  Total Billable Time: 42 minutes     Precautions: Standard, fall, safety awareness    SUBJECTIVE     Pt reports: that her eye exercises give her a headache. She has 4/10 dizziness today, described as "wobbliness".     She was compliant with her home exercise program.   Response to previous treatment: no adverse effects  Functional change: ongoing    Pain: 0/10  Location: NA    Dizziness: 4/10    OBJECTIVE     Objective Measures updated at progress report unless specified.     Treatment     Tracy received the treatments listed below:      neuromuscular re-education activities to improve: Balance, Coordination, Sense and vestibular function for 42 minutes. The following activities were included:    Oculomotor exercises: (performed later in session per patient's request)   Smooth Pursuits: reading colored shapes card - PT helps pt guide card   X 30" horizontal, mod visual slippage  X 30" vertical, occasional jerkiness of eyes  X 30" LUQ <> RLQ diagonal, min visual slippage, decreased ability to focus  X 30" RUQ <> LLQ diagonal, min visual slippage, no increase in symptoms     Saccades: x targets - Not performed this date  3 x 30" horizontal, at 90 bpm, consistent corrective with R gaze, some difficulty keeping pace with " "metronome, some light headedness~ PT holds targets      VOR 1: x target  1 x 30" horizontal at 70 bpm with near target, no double vision reported  1 x 30" vertical at 70 bpm with near target, no double vision reported      Balance / Motion Tolerance exercises:    Ambulation in hallway: not performed this date  X 2 laps x 100 feet forward ambulation, self tossing <> catching ball, CGA to occasional slight min A    // bars: Patient cued to cross arms across chest due to excessive UE touchdown support   R Semi-tandem series:  X 30 seconds, static balance~ CGA to SBA  X 30 seconds, horizontal head turns~ CGA to min A  X 30 seconds, vertical head turns~ Contact guard assistance  2 x 30 seconds, eyes closed - CGA    L Semi-tandem series:  X 30 seconds, static balance~ CGA to SBA  X 30 seconds, horizontal head turns~CGA to slight min A  X 30 seconds, vertical head turns~ CGA  2 x 30 seconds, eyes closed, Contact guard assistance     2 x 30s with each LE back, Tandem stance    X 4 laps, Tandem ambulation in //bars, Minimal assist - frequent UE touchdown support    1 x 60s, Lateral weightshifting one rocker board, 2->1 UE support, Contact guard assistance  1 x 60s, Lateral weightshifting one rocker board, no UE support, Minimal assist   1 x 60s, A/P weightshifting one rocker board, 2->1 UE support, Contact guard assistance  1 x 60s, A/P weightshifting one rocker board, no UE support, Moderate Assistance - Minimal assist       Patient Education and Home Exercises     Home Exercises Provided and Patient Education Provided     Education provided:   - plan of care (POC), HEP    Written Home Exercises Provided: yes. Exercises were reviewed and Tracy was able to demonstrate them prior to the end of the session.  Tracy demonstrated good  understanding of the education provided. See EMR under Patient Instructions for exercises provided during therapy session on 07/14/22.     ASSESSMENT     Patient tolerated therapy session " "fairly this afternoon. Per patient's request at prior visit, oculomotor interventions were deferred until the end of the session. With smooth pursuits, she exhibited jerkiness of her eyes and reported "eye dizziness". No significant complaints with VOR interventions. Due to excessive UE touchdown support instead of engagement of internal balance strategies, patient was instructed to cross her arms with balance training today; increased guarding and tactile cues were provided. Also included weightshifitng to encourage hip and ankle strategy. Patient remains appropriate for continued skilled vestibular rehab to address oculomotor, motion tolerance, and balance deficits.    Tracy Is progressing well towards her goals.   Pt prognosis is Good.     Pt will continue to benefit from skilled outpatient physical therapy to address the deficits listed in the problem list box on initial evaluation, provide pt/family education and to maximize pt's level of independence in the home and community environment.     Pt's spiritual, cultural and educational needs considered and pt agreeable to plan of care and goals.     Anticipated Barriers for therapy: co-morbidities, chronicity of symptoms    Goals:  Short Term Goals: 4 weeks   1. Patient to be (I) with established home exercise program. Ongoing  2. Patient to perform smooth pursuits tracking single target in all planes WFL for improved ability to scan environment. Ongoing  3. Patient to perform saccades at 80 bpm WFL for improved oculomotor endurance. Ongoing  4. Patient to perform VOR 1 at 70 bpm WFL for improved gaze stabilization. Ongoing  5. Patient to pass GST condition 2 with no more than slight sway for improved balance in low vision environments. Ongoing  6. Patient to improve GST condition 3 to at least 12 seconds for improved balance and decreased fall risk. Ongoing  7. Patient to improve GST condition 5 to at least 20 seconds for improved balance on unlevel surfaces. " Ongoing  8. Patient to improve GST condition 6 to at least 20 seconds for improved balance in low vision environments. Ongoing  9. PT to formally assess Functional Gait Assessment ans establish appropriate goal. MET 07/14/22     Long Term Goals: 8 weeks   1. Patient to be (I) with advanced home exercise program. Ongoing  2. Patient to perform smooth pursuits with reading component in all planes WFL for improved ability to scan environment. Ongoing  3. Patient to perform saccades at 100 bpm WFL for improved oculomotor endurance. Ongoing  4. Patient to perform VOR 1 at 90 bpm WFL for improved gaze stabilization. Ongoing  5. Patient to improve GST condition 3 to at least 18 seconds for improved balance and decreased fall risk. Ongoing  6. Patient to improve GST condition 5 to at least 30 seconds for improved balance on unlevel surfaces. Ongoing  7. Patient to improve GST condition 6 to at least 30 seconds for improved balance in low vision environments. Ongoing  8. Patient to improve Functional Gait Assessment score to at least 22/30 for improved balance and decreased fall risk in community environments. Ongoing     PLAN     Progress oculomotor and balance activities as able. Perform balance interventions first, prior to oculomotor exercises.    Sumaya Galindo, PT   8/9/2022

## 2022-08-11 ENCOUNTER — CLINICAL SUPPORT (OUTPATIENT)
Dept: REHABILITATION | Facility: HOSPITAL | Age: 72
End: 2022-08-11
Attending: SPECIALIST
Payer: MEDICARE

## 2022-08-11 DIAGNOSIS — R42 DIZZINESS: ICD-10-CM

## 2022-08-11 DIAGNOSIS — H53.10 EYE FATIGUE: Primary | ICD-10-CM

## 2022-08-11 DIAGNOSIS — R26.89 IMPAIRMENT OF BALANCE: ICD-10-CM

## 2022-08-11 PROCEDURE — 97112 NEUROMUSCULAR REEDUCATION: CPT | Mod: PO

## 2022-08-11 NOTE — PATIENT INSTRUCTIONS
Resource: [employer] [facility] [employer]         Resource: [employer] [facility] [employer]       Resource: [employer] [facility] [employer]

## 2022-08-11 NOTE — PROGRESS NOTES
"[facility]   Physical Therapy Treatment Note     Name: Tracy Armendariz  Clinic Number: [id number]    Therapy Diagnosis:   Encounter Diagnoses   Name Primary?    Eye fatigue Yes    Dizziness     Impairment of balance      Physician: LORRAINE Alfonso MD    Visit Date: 8/11/2022    Physician Orders: PT Eval and Treat   Medical Diagnosis from Referral:   H81.391 (ICD-10-CM) - Peripheral vertigo involving right ear   R27.0 (ICD-10-CM) - Ataxia   Z91.81 (ICD-10-CM) - At high risk for falls      Evaluation Date: 7/12/2022  Authorization Period Expiration: 06/10/22 to 06/10/23  Plan of Care Expiration: 09/09/22  Visit # / Visits authorized: 10/ 11     Time In: 15:15  Time Out: 16:00  Total Billable Time: 45 minutes     Precautions: Standard, fall, safety awareness    SUBJECTIVE     Pt reports: that she is noticing headaches accompanying oculomotor exercises at home    She was compliant with her home exercise program.   Response to previous treatment: no adverse effects  Functional change: ongoing    Pain: 0/10  Location: NA    Dizziness: 4/10    OBJECTIVE     Objective Measures updated at progress report unless specified.     Treatment     Tracy received the treatments listed below:      neuromuscular re-education activities to improve: Balance, Coordination, Sense and vestibular function for 45 minutes. The following activities were included:    Oculomotor exercises:   Smooth Pursuits:   Impaired with single target- difficulty still noted with tracking lower left quadrant  Impaired with reading component in all 4 directions  X 30" horizontal, min visual slippage  X 30" vertical, jerkiness of eyes  X 30" LUQ <> RLQ diagonal, min visual slippage, decreased ability to focus  X 30" RUQ <> LLQ diagonal, min visual slippage, decreased ability to focus- hardest plan to track     Saccades: x targets- impaired  2 x 30" horizontal, at 90 bpm, left target out of focus     VOR 1: x target- impaired  2 x " "30" horizontal at 70 bpm, left eye difficulty maintaining target, min dizziness      Balance / Motion Tolerance exercises:    MICHAEL SENSORY TESTING:  (P= Pass, F= Fail; note any sway; hold each position for 30")  Condition 1: (firm surface/feet together/eyes open) P  Condition 2: (firm surface/feet together/eyes closed) P  Condition 3: (firm surface/feet in tandem/eyes open) F, 18 sec c/ RLE in front; 9 sec c/ LLE in front  Condition 4: (firm surface/feet in tandem/eyes closed) NT 2/2 safety concerns  Condition 5: (soft surface/feet together/eyes open) P  Condition 6: (soft surface/feet together/eyes closed) P, max sway  Condition 7: (Fukuda step test), measure distance varied from center starting position, > 30 deg deviation to either side indicates hypofunction of biased side NT 2/2 safety concerns      Functional Gait Assessment:   1. Gait on level surface =  3  2. Change in Gait Speed = 2  3. Gait with horizontal head turns  = 2  4. Gait with vertical head turns = 2  5. Gait with pivot turns = 2  6. Step over obstacle = 3  7. Gait with Narrow STANISLAV = 1  8. Gait with eyes closed = 2  9. Ambulating Backwards = 2  10. Steps = 2     Score 21/30  Evaluation: 17/30      FOTO for vertigo survey: 47 %    Patient Education and Home Exercises     Home Exercises Provided and Patient Education Provided     Education provided:   - plan of care (POC)  -08/11/22: since patient endorses headaches while performing oculomotor home exercise program, PT further questioned patient about home exercise program and it appears that patient is doing too many exercises. Therefore, home exercise program re-printed and reviewed with hand written notes for correct performance, correct intensity, and correct frequency.    Written Home Exercises Provided: yes. Exercises were reviewed and Tracy was able to demonstrate them prior to the end of the session.  Tracy demonstrated good  understanding of the education provided. See EMR under Patient " Instructions for exercises provided during therapy session on 07/14/22.     ASSESSMENT     Patient reassessed to determine progress with therapy. Reassessment period: 07/12/22 to 08/11/22. Patient demonstrates good progress with therapy with improvements noted with oculomotor performance, static balance, and dynamic balance. Impairment still noted with smooth pursuits, saccades, and VOR 1, but improvement noted since initial evaluation. Home exercise program for oculomotor exercises re-printed and reviewed with notes made on pages for appropriate performance and intensity to help reduce headaches. Performance on GST improved from evaluation, but remaining difficulty still noted with tandem stance and vision eliminated stance on foam pad. Functional Gait Assessment score improved by 4 points which is a significant change, but current score continues to place patient in elevated fall risk category. At this time, left visual field deficits due appear to contribute to oculomotor tolerance and overall balance. Patient remains appropriate for continued skilled vestibular rehab to address oculomotor, motion tolerance, and balance deficits.    Tracy Is progressing well towards her goals.   Pt prognosis is Good.     Pt will continue to benefit from skilled outpatient physical therapy to address the deficits listed in the problem list box on initial evaluation, provide pt/family education and to maximize pt's level of independence in the home and community environment.     Pt's spiritual, cultural and educational needs considered and pt agreeable to plan of care and goals.     Anticipated Barriers for therapy: co-morbidities, chronicity of symptoms    Goals:  Short Term Goals: 4 weeks   1. Patient to be (I) with established home exercise program. MET 08/11/22  2. Patient to perform smooth pursuits tracking single target in all planes WFL for improved ability to scan environment. Progressing  3. Patient to perform saccades  at 80 bpm WFL for improved oculomotor endurance. MET 08/11/22  4. Patient to perform VOR 1 at 70 bpm WFL for improved gaze stabilization. Progressing  5. Patient to pass GST condition 2 with no more than slight sway for improved balance in low vision environments. MET 08/11/22  6. Patient to improve GST condition 3 to at least 12 seconds for improved balance and decreased fall risk. Partially Met  7. Patient to improve GST condition 5 to at least 20 seconds for improved balance on unlevel surfaces. MET 08/11/22  8. Patient to improve GST condition 6 to at least 20 seconds for improved balance in low vision environments. MET 08/11/22  9. PT to formally assess Functional Gait Assessment ans establish appropriate goal. MET 07/14/22     Long Term Goals: 8 weeks   1. Patient to be (I) with advanced home exercise program. Ongoing  2. Patient to perform smooth pursuits with reading component in all planes WFL for improved ability to scan environment. Ongoing  3. Patient to perform saccades at 100 bpm WFL for improved oculomotor endurance. Ongoing  4. Patient to perform VOR 1 at 90 bpm WFL for improved gaze stabilization. Ongoing  5. Patient to improve GST condition 3 to at least 18 seconds for improved balance and decreased fall risk. Ongoing  6. Patient to improve GST condition 5 to at least 30 seconds for improved balance on unlevel surfaces. MET 08/11/22  7. Patient to improve GST condition 6 to at least 30 seconds for improved balance in low vision environments. Progressing  8. Patient to improve Functional Gait Assessment score to at least 22/30 for improved balance and decreased fall risk in community environments. Progressing     PLAN   Monitor correct home exercise program performance.   Progress oculomotor and balance activities as able. Perform balance interventions first, prior to oculomotor exercises.    Mónica Thomas, PT   8/11/2022

## 2022-08-16 ENCOUNTER — DOCUMENTATION ONLY (OUTPATIENT)
Dept: REHABILITATION | Facility: HOSPITAL | Age: 72
End: 2022-08-16
Payer: MEDICARE

## 2022-08-16 NOTE — PROGRESS NOTES
Documentation Only/ No Show    Patient: Tracy Armendariz  Date of Session: 08/16/2022  MRN: [id number]  Tracy Armendariz did not attend her scheduled physical therapy appointment today. Tracy Armendariz did not call to cancel nor reschedule. This is the 1st appointment that the patient has not attended. No charges have been posted today.     Sumaya Galindo, PT  08/16/2022

## 2022-08-18 ENCOUNTER — CLINICAL SUPPORT (OUTPATIENT)
Dept: REHABILITATION | Facility: HOSPITAL | Age: 72
End: 2022-08-18
Attending: SPECIALIST
Payer: MEDICARE

## 2022-08-18 DIAGNOSIS — H53.10 EYE FATIGUE: Primary | ICD-10-CM

## 2022-08-18 DIAGNOSIS — R42 DIZZINESS: ICD-10-CM

## 2022-08-18 DIAGNOSIS — R26.89 IMPAIRMENT OF BALANCE: ICD-10-CM

## 2022-08-18 PROCEDURE — 97112 NEUROMUSCULAR REEDUCATION: CPT | Mod: PO

## 2022-08-18 NOTE — PROGRESS NOTES
"[facility]   Physical Therapy Treatment Note     Name: Tracy Armendariz  Clinic Number: [id number]    Therapy Diagnosis:   Encounter Diagnoses   Name Primary?    Eye fatigue Yes    Dizziness     Impairment of balance      Physician: LORRAINE Alfonso MD    Visit Date: 8/18/2022    Physician Orders: PT Eval and Treat   Medical Diagnosis from Referral:   H81.391 (ICD-10-CM) - Peripheral vertigo involving right ear   R27.0 (ICD-10-CM) - Ataxia   Z91.81 (ICD-10-CM) - At high risk for falls      Evaluation Date: 7/12/2022  Authorization Period Expiration: 06/10/22 to 06/10/23  Plan of Care Expiration: 09/09/22  Visit # / Visits authorized: 11/ 11     Time In: 01:05p  Time Out: 01:45p  Total Billable Time: 40 minutes     Precautions: Standard, fall, safety awareness    SUBJECTIVE     Pt reports: that she was able to recover from tripping over a wire at home. She did not fall and was able to catch herself, and she stated if that incident happened months ago she would have fallen. She feels her balance is improving.    She was compliant with her home exercise program.   Response to previous treatment: no adverse effects  Functional change: ongoing    Pain: 0/10  Location: NA    Dizziness: 4/10    OBJECTIVE     Objective Measures updated at progress report unless specified.     Treatment     Tracy received the treatments listed below:      neuromuscular re-education activities to improve: Balance, Coordination, Sense and vestibular function for 40 minutes. The following activities were included:    Oculomotor exercises:   Smooth Pursuits:   X 30" horizontal, min visual slippage  X 30" vertical, jerkiness of eyes      VOR 1: X target- impaired  2 x 30" horizontal at 70 bpm, min dizziness, reproduced headache    // bars:   R Semi-tandem series:  X 30 seconds, static balance~ CGA to SBA,   X 30 seconds, horizontal head turns~ CGA to min A, felt her head was spinning  X 30 seconds, vertical head " turns~ Contact guard assistance  2 x 30 seconds, eyes closed - CGA     L Semi-tandem series:  X 30 seconds, static balance~ CGA to SBA  X 30 seconds, horizontal head turns~CGA to slight min A, felt her head was spinning  X 30 seconds, vertical head turns~ CGA  2 x 30 seconds, eyes closed, CGA    X 4 laps, Tandem ambulation, no UE support, CGA  4 sets of 10 reps, Step-ups on foam fitter, occasional UE touchdown support, CGA, 2 sets each LE leading, felt her head spinning  2 sets of 10 reps, Standing on foam fitter with cone taps, 1 green cone, alternating LE, no UE support, CGA  3 sets of 10 reps, Standing on foam fitter catching the yellow ball, tossing ball back and forth to rehab tech, CGA from SPT    Patient Education and Home Exercises     Home Exercises Provided and Patient Education Provided     Education provided:   - plan of care (POC)  -08/11/22: since patient endorses headaches while performing oculomotor home exercise program, PT further questioned patient about home exercise program and it appears that patient is doing too many exercises. Therefore, home exercise program re-printed and reviewed with hand written notes for correct performance, correct intensity, and correct frequency.    Written Home Exercises Provided: yes. Exercises were reviewed and Tracy was able to demonstrate them prior to the end of the session.  Tracy demonstrated good  understanding of the education provided. See EMR under Patient Instructions for exercises provided during therapy session on 07/14/22.     ASSESSMENT   Tracy tolerated today's session fairly well. While performing semi-tandem stance with horizontal head turns, she experienced a mild headache, which required her to take a rest break until ready to proceed with the following exercises. Performing horizontal VOR 1 also reproduced a headache. She was able to maintain good balance and stability while standing on the foam fitter and catching a ball. Patient remains  appropriate for continued skilled vestibular rehab to address oculomotor, motion tolerance, and balance deficits.    Tracy Is progressing well towards her goals.   Pt prognosis is Good.     Pt will continue to benefit from skilled outpatient physical therapy to address the deficits listed in the problem list box on initial evaluation, provide pt/family education and to maximize pt's level of independence in the home and community environment.     Pt's spiritual, cultural and educational needs considered and pt agreeable to plan of care and goals.     Anticipated Barriers for therapy: co-morbidities, chronicity of symptoms    Goals:  Short Term Goals: 4 weeks   1. Patient to be (I) with established home exercise program. MET 08/11/22  2. Patient to perform smooth pursuits tracking single target in all planes WFL for improved ability to scan environment. Progressing  3. Patient to perform saccades at 80 bpm WFL for improved oculomotor endurance. MET 08/11/22  4. Patient to perform VOR 1 at 70 bpm WFL for improved gaze stabilization. Progressing  5. Patient to pass GST condition 2 with no more than slight sway for improved balance in low vision environments. MET 08/11/22  6. Patient to improve GST condition 3 to at least 12 seconds for improved balance and decreased fall risk. Partially Met  7. Patient to improve GST condition 5 to at least 20 seconds for improved balance on unlevel surfaces. MET 08/11/22  8. Patient to improve GST condition 6 to at least 20 seconds for improved balance in low vision environments. MET 08/11/22  9. PT to formally assess Functional Gait Assessment ans establish appropriate goal. MET 07/14/22     Long Term Goals: 8 weeks   1. Patient to be (I) with advanced home exercise program. Ongoing  2. Patient to perform smooth pursuits with reading component in all planes WFL for improved ability to scan environment. Ongoing  3. Patient to perform saccades at 100 bpm WFL for improved oculomotor  endurance. Ongoing  4. Patient to perform VOR 1 at 90 bpm WFL for improved gaze stabilization. Ongoing  5. Patient to improve GST condition 3 to at least 18 seconds for improved balance and decreased fall risk. Ongoing  6. Patient to improve GST condition 5 to at least 30 seconds for improved balance on unlevel surfaces. MET 08/11/22  7. Patient to improve GST condition 6 to at least 30 seconds for improved balance in low vision environments. Progressing  8. Patient to improve Functional Gait Assessment score to at least 22/30 for improved balance and decreased fall risk in community environments. Progressing     PLAN   Monitor correct home exercise program performance.   Progress oculomotor and balance activities as able. Perform balance interventions first, prior to oculomotor exercises.    Arcelia Coreas, SPT     I certify that I was present in the room directing the student in service delivery and guiding them using my skilled judgment. As the co-signing therapist, I have reviewed the student's documentation and am responsible for the treatment, assessment and plan.    Sumaya Galindo, PT

## 2022-08-23 ENCOUNTER — CLINICAL SUPPORT (OUTPATIENT)
Dept: REHABILITATION | Facility: HOSPITAL | Age: 72
End: 2022-08-23
Attending: SPECIALIST
Payer: MEDICARE

## 2022-08-23 DIAGNOSIS — R42 DIZZINESS: ICD-10-CM

## 2022-08-23 DIAGNOSIS — H53.10 EYE FATIGUE: Primary | ICD-10-CM

## 2022-08-23 DIAGNOSIS — R26.89 IMPAIRMENT OF BALANCE: ICD-10-CM

## 2022-08-23 PROCEDURE — 97112 NEUROMUSCULAR REEDUCATION: CPT | Mod: PO

## 2022-08-23 NOTE — PROGRESS NOTES
"[facility]   Physical Therapy Treatment Note     Name: Tracy Armendariz  Clinic Number: [id number]    Therapy Diagnosis:   Encounter Diagnoses   Name Primary?    Eye fatigue Yes    Dizziness     Impairment of balance      Physician: LORRAINE Alfonso MD    Visit Date: 8/23/2022    Physician Orders: PT Eval and Treat   Medical Diagnosis from Referral:   H81.391 (ICD-10-CM) - Peripheral vertigo involving right ear   R27.0 (ICD-10-CM) - Ataxia   Z91.81 (ICD-10-CM) - At high risk for falls      Evaluation Date: 7/12/2022  Authorization Period Expiration: 06/10/22 to 06/10/23  Plan of Care Expiration: 09/09/22  Visit # / Visits authorized: 11/ 11 +eval     Time In: 1145  Time Out: 1230  Total Billable Time: 45 minutes     Precautions: Standard, fall, safety awareness    SUBJECTIVE     Pt reports: that she is feeling ok this morning. She is still getting headaches with her home exercises about half way through, She typically lays down afterward and falls asleep, so she is not sure how long the headaches lasts.     She was compliant with her home exercise program.   Response to previous treatment: no adverse effects  Functional change: ongoing    Pain: 0/10  Location: NA    Dizziness: 4/10    OBJECTIVE     Objective Measures updated at progress report unless specified.     Treatment     Tracy received the treatments listed below:      neuromuscular re-education activities to improve: Balance, Coordination, Sense and vestibular function for 40 minutes. The following activities were included:    Oculomotor exercises:   Saccades - horizontal  X 30s, 90 bpm - onset of headache  X 20s, 90 bpm - target arms length away  X 20s, 90 bpm - PT holding target about 2 ft away      X 3 trials, Pencil push ups     VOR 1: X target- impaired (not performed this date)  2 x 30" horizontal at 70 bpm, min dizziness, reproduced headache    // bars:   X 6 laps, Tandem ambulation, no UE support, CGA  2 x 30s each LE " back, Tandem stance, arms crossed, Minimal assist - Contact guard assistance     Airex Feet together, Arms Crossed:  3 x 30s, Horizontal head turns, Contact guard assistance   3 x 30s, Vertical head turns, Contact guard assistance   3 x 30s, Eyes closed, Contact guard assistance     Laps around neuro gym: standby by assist   X 2 laps, horizontal head turns  X 2 laps, vertical head turns  X 1 lap in each pane, diagonal head turns       Patient Education and Home Exercises     Home Exercises Provided and Patient Education Provided     Education provided:   - plan of care (POC)  -08/11/22: since patient endorses headaches while performing oculomotor home exercise program, PT further questioned patient about home exercise program and it appears that patient is doing too many exercises. Therefore, home exercise program re-printed and reviewed with hand written notes for correct performance, correct intensity, and correct frequency.    Written Home Exercises Provided: yes. Exercises were reviewed and Tracy was able to demonstrate them prior to the end of the session.  Tracy demonstrated good  understanding of the education provided. See EMR under Patient Instructions for exercises provided during therapy session on 07/14/22.     ASSESSMENT   Tracy tolerated today's fairly well. She performed most balance interventions well, but she was challenged with tandem ambulation. She was instructed to perform static balance interventions with her arms crossed across her chest to encourage increased use of internal balance strategies rather than excessive UE support. She reports onset of headaches when perform saccade home exercises; she was instructed to perform saccades with the target taped on the wall about 3 feet in front of her. Due to difficulty with close range oculomotor interventions, PT initiated pencil push ups for convergence training max VCs required. PT to continue per established POC.    Tracy Is progressing  well towards her goals.   Pt prognosis is Good.     Pt will continue to benefit from skilled outpatient physical therapy to address the deficits listed in the problem list box on initial evaluation, provide pt/family education and to maximize pt's level of independence in the home and community environment.     Pt's spiritual, cultural and educational needs considered and pt agreeable to plan of care and goals.     Anticipated Barriers for therapy: co-morbidities, chronicity of symptoms    Goals:  Short Term Goals: 4 weeks   1. Patient to be (I) with established home exercise program. MET 08/11/22  2. Patient to perform smooth pursuits tracking single target in all planes WFL for improved ability to scan environment. Progressing  3. Patient to perform saccades at 80 bpm WFL for improved oculomotor endurance. MET 08/11/22  4. Patient to perform VOR 1 at 70 bpm WFL for improved gaze stabilization. Progressing  5. Patient to pass GST condition 2 with no more than slight sway for improved balance in low vision environments. MET 08/11/22  6. Patient to improve GST condition 3 to at least 12 seconds for improved balance and decreased fall risk. Partially Met  7. Patient to improve GST condition 5 to at least 20 seconds for improved balance on unlevel surfaces. MET 08/11/22  8. Patient to improve GST condition 6 to at least 20 seconds for improved balance in low vision environments. MET 08/11/22  9. PT to formally assess Functional Gait Assessment ans establish appropriate goal. MET 07/14/22     Long Term Goals: 8 weeks   1. Patient to be (I) with advanced home exercise program. Ongoing  2. Patient to perform smooth pursuits with reading component in all planes WFL for improved ability to scan environment. Ongoing  3. Patient to perform saccades at 100 bpm WFL for improved oculomotor endurance. Ongoing  4. Patient to perform VOR 1 at 90 bpm WFL for improved gaze stabilization. Ongoing  5. Patient to improve GST condition 3  to at least 18 seconds for improved balance and decreased fall risk. Ongoing  6. Patient to improve GST condition 5 to at least 30 seconds for improved balance on unlevel surfaces. MET 08/11/22  7. Patient to improve GST condition 6 to at least 30 seconds for improved balance in low vision environments. Progressing  8. Patient to improve Functional Gait Assessment score to at least 22/30 for improved balance and decreased fall risk in community environments. Progressing     PLAN   Monitor correct home exercise program performance.   Progress oculomotor and balance activities as able. Perform balance interventions first, prior to oculomotor exercises.    Sumaya Galindo, PT

## 2022-08-24 ENCOUNTER — OFFICE VISIT (OUTPATIENT)
Dept: OTOLARYNGOLOGY | Facility: CLINIC | Age: 72
End: 2022-08-24
Payer: MEDICARE

## 2022-08-24 DIAGNOSIS — Z86.73 HISTORY OF CVA (CEREBROVASCULAR ACCIDENT): ICD-10-CM

## 2022-08-24 DIAGNOSIS — H90.A11 CONDUCTIVE HEARING LOSS OF RIGHT EAR WITH RESTRICTED HEARING OF LEFT EAR: ICD-10-CM

## 2022-08-24 DIAGNOSIS — H81.391 PERIPHERAL VERTIGO INVOLVING RIGHT EAR: Primary | ICD-10-CM

## 2022-08-24 PROCEDURE — 99212 OFFICE O/P EST SF 10 MIN: CPT | Mod: S$GLB,,, | Performed by: NURSE PRACTITIONER

## 2022-08-24 PROCEDURE — 1126F PR PAIN SEVERITY QUANTIFIED, NO PAIN PRESENT: ICD-10-PCS | Mod: CPTII,S$GLB,, | Performed by: NURSE PRACTITIONER

## 2022-08-24 PROCEDURE — 99999 PR PBB SHADOW E&M-EST. PATIENT-LVL III: ICD-10-PCS | Mod: PBBFAC,,, | Performed by: NURSE PRACTITIONER

## 2022-08-24 PROCEDURE — 1159F PR MEDICATION LIST DOCUMENTED IN MEDICAL RECORD: ICD-10-PCS | Mod: CPTII,S$GLB,, | Performed by: NURSE PRACTITIONER

## 2022-08-24 PROCEDURE — 3044F HG A1C LEVEL LT 7.0%: CPT | Mod: CPTII,S$GLB,, | Performed by: NURSE PRACTITIONER

## 2022-08-24 PROCEDURE — 3288F FALL RISK ASSESSMENT DOCD: CPT | Mod: CPTII,S$GLB,, | Performed by: NURSE PRACTITIONER

## 2022-08-24 PROCEDURE — 1160F PR REVIEW ALL MEDS BY PRESCRIBER/CLIN PHARMACIST DOCUMENTED: ICD-10-PCS | Mod: CPTII,S$GLB,, | Performed by: NURSE PRACTITIONER

## 2022-08-24 PROCEDURE — 4010F ACE/ARB THERAPY RXD/TAKEN: CPT | Mod: CPTII,S$GLB,, | Performed by: NURSE PRACTITIONER

## 2022-08-24 PROCEDURE — 99999 PR PBB SHADOW E&M-EST. PATIENT-LVL III: CPT | Mod: PBBFAC,,, | Performed by: NURSE PRACTITIONER

## 2022-08-24 PROCEDURE — 3288F PR FALLS RISK ASSESSMENT DOCUMENTED: ICD-10-PCS | Mod: CPTII,S$GLB,, | Performed by: NURSE PRACTITIONER

## 2022-08-24 PROCEDURE — 1126F AMNT PAIN NOTED NONE PRSNT: CPT | Mod: CPTII,S$GLB,, | Performed by: NURSE PRACTITIONER

## 2022-08-24 PROCEDURE — 4010F PR ACE/ARB THEARPY RXD/TAKEN: ICD-10-PCS | Mod: CPTII,S$GLB,, | Performed by: NURSE PRACTITIONER

## 2022-08-24 PROCEDURE — 1160F RVW MEDS BY RX/DR IN RCRD: CPT | Mod: CPTII,S$GLB,, | Performed by: NURSE PRACTITIONER

## 2022-08-24 PROCEDURE — 1101F PT FALLS ASSESS-DOCD LE1/YR: CPT | Mod: CPTII,S$GLB,, | Performed by: NURSE PRACTITIONER

## 2022-08-24 PROCEDURE — 1159F MED LIST DOCD IN RCRD: CPT | Mod: CPTII,S$GLB,, | Performed by: NURSE PRACTITIONER

## 2022-08-24 PROCEDURE — 99212 PR OFFICE/OUTPT VISIT, EST, LEVL II, 10-19 MIN: ICD-10-PCS | Mod: S$GLB,,, | Performed by: NURSE PRACTITIONER

## 2022-08-24 PROCEDURE — 1101F PR PT FALLS ASSESS DOC 0-1 FALLS W/OUT INJ PAST YR: ICD-10-PCS | Mod: CPTII,S$GLB,, | Performed by: NURSE PRACTITIONER

## 2022-08-24 PROCEDURE — 3044F PR MOST RECENT HEMOGLOBIN A1C LEVEL <7.0%: ICD-10-PCS | Mod: CPTII,S$GLB,, | Performed by: NURSE PRACTITIONER

## 2022-08-24 NOTE — PROGRESS NOTES
"  Subjective:      Tracy Armendariz is a 71 y.o. female who is here for follow up for dizziness/imbalance. She was last seen by my colleague Dr. Alfonso on 22.     HPI     The patient is returning for a follow-up visit.  There are multiple issues to discuss:  1. Right peripheral vertigo:  The patient has been doing vestibular rehabilitative therapy with benefit.  She finds it "extremely helpful".  She has not fallen once since starting it.  2. Conductive hearing loss right ear with restricted hearing left ear:  There has been no change in her hearing.  3. Fall risk:  She has not had any falls since starting vestibular rehabilitative therapy.  4. Diplopia:  Since having her stroke 8 years ago she has had diplopia on left gaze.  She finds that the vestibular rehab therapy has helped to stabilize that issue.  She does wear prism lenses in her glasses; however, they have not stop the diplopia. She has an appointment with neurology in December. She plans on following up with ophthalmology soon as recommended by Dr. Alfonso.       Past Medical History  She has a past medical history of Allergy, Anticoagulant long-term use, Arthritis, CVA (cerebral infarction), Depression, Disorder of kidney and ureter, Diverticulosis of colon, Extrinsic asthma, unspecified, Hyperlipidemia, Hypertension, Kidney stone, Left atrial enlargement, Low back pain, MARTIN (obstructive sleep apnea), Osteopenia, PUD (peptic ulcer disease), Stroke, and Urinary tract infection.    Past Surgical History  She has a past surgical history that includes Inner ear surgery; Cholecystectomy ();  section (); Dilation and curettage of uterus (); Appendectomy (); Hysterectomy (); Tympanoplasty; Lumbar discectomy (); Knee arthroscopy w/ debridement (); Tonsillectomy; Back surgery; Cataract extraction; Colonoscopy (N/A, 2018); Joint replacement (Right); Ureteroscopic removal of ureteric calculus (Left, 2018); and " Oophorectomy.    Family History  Her family history includes Breast cancer (age of onset: 36) in her daughter; Cancer (age of onset: 65) in her mother; Cervical cancer in her mother; Coronary artery disease (age of onset: 62) in her father; Diabetes in her sister; Heart disease in her father, maternal grandfather, and sister; Heart failure in her sister; Hypertension in her father and maternal grandfather; Kidney disease in her sister; Stroke in her paternal grandfather.    Social History  She reports that she quit smoking about 27 years ago. She has never used smokeless tobacco. She reports current alcohol use of about 1.0 standard drink of alcohol per week. She reports that she does not use drugs.    Allergies  She is allergic to iodinated contrast media, gabapentin, iodine, isothiazolinones, and penicillins.    Medications  She has a current medication list which includes the following prescription(s): albuterol, aspirin, atorvastatin, bupropion, calcium carbonate, cholecalciferol (vitamin d3), diazepam, diclofenac sodium, escitalopram oxalate, esomeprazole, famotidine, fluad [id number] (65 yr up)(pf), hydrocortisone, lactobacillus acidophilus, lidocaine-prilocaine, losartan, meclizine, multivit with calcium,iron,min, and tamsulosin.    Review of Systems   Constitutional: Negative.  Negative for chills, fatigue and fever.   HENT: Positive for hearing loss. Negative for congestion, facial swelling, nosebleeds, postnasal drip, rhinorrhea, sinus pressure, sinus pain, sneezing, sore throat and tinnitus.    Eyes: Negative.  Negative for photophobia, redness, itching and visual disturbance.   Respiratory: Negative.  Negative for apnea, cough, shortness of breath, wheezing and stridor.    Cardiovascular: Negative.  Negative for chest pain and palpitations.   Gastrointestinal: Negative.  Negative for diarrhea, nausea and vomiting.   Endocrine: Negative.    Genitourinary: Negative.  Negative for decreased urine volume,  dysuria and frequency.   Musculoskeletal: Negative.  Negative for arthralgias, myalgias and neck stiffness.   Skin: Negative.  Negative for rash and wound.   Allergic/Immunologic: Negative.  Negative for environmental allergies, food allergies and immunocompromised state.   Neurological: Negative.  Negative for dizziness, syncope, weakness, light-headedness and headaches.   Hematological: Negative.  Negative for adenopathy. Does not bruise/bleed easily.   Psychiatric/Behavioral: Negative.  Negative for confusion, decreased concentration and sleep disturbance.          Objective:     LMP 01/01/1978 (Approximate)      Constitutional:   She is oriented to person, place, and time. Vital signs are normal. She appears well-developed and well-nourished. She appears alert. Normal speech.      Head:  Normocephalic and atraumatic.     Ears:    Right Ear: No lacerations. No drainage, swelling or tenderness. No foreign bodies. No mastoid tenderness. Tympanic membrane is not injected, not scarred, not perforated, not erythematous, not retracted and not bulging. Tympanic membrane mobility is normal. No middle ear effusion. No hemotympanum.   Left Ear: No lacerations. No drainage, swelling or tenderness. No foreign bodies. No mastoid tenderness. Tympanic membrane is not injected, not scarred, not perforated, not erythematous, not retracted and not bulging. Tympanic membrane mobility is normal.  No middle ear effusion. No hemotympanum.     Nose:  No mucosal edema, rhinorrhea, nose lacerations, sinus tenderness, septal deviation, nasal septal hematoma or polyps. No epistaxis.  No foreign bodies. Right sinus exhibits no maxillary sinus tenderness and no frontal sinus tenderness. Left sinus exhibits no maxillary sinus tenderness and no frontal sinus tenderness.     Mouth/Throat  Oropharynx clear and moist without lesions or asymmetry, normal uvula midline and lips, teeth, and gums normal. No uvula swelling, oral lesions, trismus,  mucous membrane lesions or xerostomia. No oropharyngeal exudate, posterior oropharyngeal edema or posterior oropharyngeal erythema.     Neck:  Neck normal without thyromegaly masses, asymmetry, normal tracheal structure, crepitus, and tenderness and no adenopathy.     Psychiatric:   She has a normal mood and affect. Her speech is normal and behavior is normal.     Neurological:   She is alert and oriented to person, place, and time. No cranial nerve deficit.     Skin:   No abrasions, lacerations, lesions, or rashes.       Procedure    None        Data Reviewed    WBC (K/uL)   Date Value   03/29/2022 7.39     Platelets (K/uL)   Date Value   03/29/2022 242      Creatinine (mg/dL)   Date Value   03/29/2022 0.8     TSH (uIU/mL)   Date Value   03/29/2022 2.017     Glucose (mg/dL)   Date Value   03/29/2022 121 (H)     Hemoglobin A1C (%)   Date Value   06/14/2022 6.2 (H)              Assessment:     1. Peripheral vertigo involving right ear    2. Conductive hearing loss of right ear with restricted hearing of left ear    3. History of CVA (cerebrovascular accident)         Plan:     Patient is improving with VRT. I recommend she follow up with Dr. Alfonso once VRT is completed or she may follow up sooner should symptoms worsen.

## 2022-08-24 NOTE — PROGRESS NOTES
[facility]   Physical Therapy Treatment Note     Name: Tracy Armendariz  [facility] Number: [id number]    Therapy Diagnosis:   Encounter Diagnoses   Name Primary?    Eye fatigue Yes    Dizziness     Impairment of balance      Physician: LORRAINE Alfonso MD    Visit Date: 8/25/2022    Physician Orders: PT Eval and Treat   Medical Diagnosis from Referral:   H81.391 (ICD-10-CM) - Peripheral vertigo involving right ear   R27.0 (ICD-10-CM) - Ataxia   Z91.81 (ICD-10-CM) - At high risk for falls      Evaluation Date: 7/12/2022  Authorization Period Expiration: 06/10/22 to 06/10/23  Plan of Care Expiration: 09/09/22  Visit # / Visits authorized: 01/ 11 (13 total visits)     Time In: 13:45  Time Out: 14:25  Total Billable Time: 40 minutes     Precautions: Standard, fall, safety awareness    SUBJECTIVE     Pt reports: that her dizziness has been good. None to start session.  She is noticing headaches mainly when she wakes up and then when she does her eye exercises.     She was compliant with her home exercise program.   Response to previous treatment: no adverse effects  Functional change: ongoing    Pain: 0/10  Location: NA    Dizziness: 0/10    OBJECTIVE     Objective Measures updated at progress report unless specified.     Treatment     Tracy received the treatments listed below:      neuromuscular re-education activities to improve: Balance, Coordination, Sense and vestibular function for 40 minutes. The following activities were included:    // bars:   X 4 laps, Tandem ambulation, intermittent UE support, CGA  1 x 30s each LE back, Tandem stance, with eyes open, Contact guard assistance   2 x 30s each LE back, Tandem stance, with eyes closed, Contact guard assistance     Airex Feet together, Arms Crossed:  3 x 30s, Horizontal head turns, Contact guard assistance   3 x 30s, Vertical head turns, Contact guard assistance   3 x 30s, Eyes closed, Contact guard assistance     X 10 reps, each lower  extremity leading, forward step ups onto 4 pt fitter board, CGA, 1 upper extremity support- verbal cues for use of vision for proper foot placement    Laps around neuro gym: CGA  X 2 laps, horizontal head turns  X 2 laps, vertical head turns  X 2 lap in each plane, diagonal head turns     Patient Education and Home Exercises     Home Exercises Provided and Patient Education Provided     Education provided:   - plan of care (POC)  -08/11/22: since patient endorses headaches while performing oculomotor home exercise program, PT further questioned patient about home exercise program and it appears that patient is doing too many exercises. Therefore, home exercise program re-printed and reviewed with hand written notes for correct performance, correct intensity, and correct frequency.    Written Home Exercises Provided: yes. Exercises were reviewed and Tracy was able to demonstrate them prior to the end of the session.  Tracy demonstrated good  understanding of the education provided. See EMR under Patient Instructions for exercises provided during therapy session on 07/14/22.     ASSESSMENT   Tracy tolerated today's fairly well. Patient instructed to hold off on oculomotor home exercise program until next session to see if that affects headaches. Therapy session only focused on balance training today to see if holding oculomotor exercises help improve headaches symptoms. Rest breaks provided throughout session due to decreased endurance. Poor center of gravity awareness noted with vision eliminated conditions so plan to continue to address this in future sessions. PT to continue per established POC.    Tracy Is progressing well towards her goals.   Pt prognosis is Good.     Pt will continue to benefit from skilled outpatient physical therapy to address the deficits listed in the problem list box on initial evaluation, provide pt/family education and to maximize pt's level of independence in the home and  community environment.     Pt's spiritual, cultural and educational needs considered and pt agreeable to plan of care and goals.     Anticipated Barriers for therapy: co-morbidities, chronicity of symptoms    Goals:  Short Term Goals: 4 weeks   1. Patient to be (I) with established home exercise program. MET 08/11/22  2. Patient to perform smooth pursuits tracking single target in all planes WFL for improved ability to scan environment. Progressing  3. Patient to perform saccades at 80 bpm WFL for improved oculomotor endurance. MET 08/11/22  4. Patient to perform VOR 1 at 70 bpm WFL for improved gaze stabilization. Progressing  5. Patient to pass GST condition 2 with no more than slight sway for improved balance in low vision environments. MET 08/11/22  6. Patient to improve GST condition 3 to at least 12 seconds for improved balance and decreased fall risk. Partially Met  7. Patient to improve GST condition 5 to at least 20 seconds for improved balance on unlevel surfaces. MET 08/11/22  8. Patient to improve GST condition 6 to at least 20 seconds for improved balance in low vision environments. MET 08/11/22  9. PT to formally assess Functional Gait Assessment ans establish appropriate goal. MET 07/14/22     Long Term Goals: 8 weeks   1. Patient to be (I) with advanced home exercise program. Ongoing  2. Patient to perform smooth pursuits with reading component in all planes WFL for improved ability to scan environment. Ongoing  3. Patient to perform saccades at 100 bpm WFL for improved oculomotor endurance. Ongoing  4. Patient to perform VOR 1 at 90 bpm WFL for improved gaze stabilization. Ongoing  5. Patient to improve GST condition 3 to at least 18 seconds for improved balance and decreased fall risk. Ongoing  6. Patient to improve GST condition 5 to at least 30 seconds for improved balance on unlevel surfaces. MET 08/11/22  7. Patient to improve GST condition 6 to at least 30 seconds for improved balance in low  vision environments. Progressing  8. Patient to improve Functional Gait Assessment score to at least 22/30 for improved balance and decreased fall risk in community environments. Progressing     PLAN   Monitor correct home exercise program performance.   Progress oculomotor and balance activities as able. Perform balance interventions first, prior to oculomotor exercises.    Mónica Thomas, PT

## 2022-08-25 ENCOUNTER — PES CALL (OUTPATIENT)
Dept: ADMINISTRATIVE | Facility: CLINIC | Age: 72
End: 2022-08-25
Payer: MEDICARE

## 2022-08-25 ENCOUNTER — OFFICE VISIT (OUTPATIENT)
Dept: UROLOGY | Facility: CLINIC | Age: 72
End: 2022-08-25
Payer: MEDICARE

## 2022-08-25 ENCOUNTER — CLINICAL SUPPORT (OUTPATIENT)
Dept: REHABILITATION | Facility: HOSPITAL | Age: 72
End: 2022-08-25
Attending: SPECIALIST
Payer: MEDICARE

## 2022-08-25 VITALS
DIASTOLIC BLOOD PRESSURE: 77 MMHG | SYSTOLIC BLOOD PRESSURE: 145 MMHG | HEART RATE: 70 BPM | WEIGHT: 229.75 LBS | HEIGHT: 66 IN | BODY MASS INDEX: 36.92 KG/M2

## 2022-08-25 DIAGNOSIS — R26.89 IMPAIRMENT OF BALANCE: ICD-10-CM

## 2022-08-25 DIAGNOSIS — H53.10 EYE FATIGUE: Primary | ICD-10-CM

## 2022-08-25 DIAGNOSIS — N20.0 NEPHROLITHIASIS: Primary | ICD-10-CM

## 2022-08-25 DIAGNOSIS — R42 DIZZINESS: ICD-10-CM

## 2022-08-25 PROCEDURE — 1126F AMNT PAIN NOTED NONE PRSNT: CPT | Mod: CPTII,S$GLB,, | Performed by: STUDENT IN AN ORGANIZED HEALTH CARE EDUCATION/TRAINING PROGRAM

## 2022-08-25 PROCEDURE — 97112 NEUROMUSCULAR REEDUCATION: CPT | Mod: PO

## 2022-08-25 PROCEDURE — 3008F BODY MASS INDEX DOCD: CPT | Mod: CPTII,S$GLB,, | Performed by: STUDENT IN AN ORGANIZED HEALTH CARE EDUCATION/TRAINING PROGRAM

## 2022-08-25 PROCEDURE — 3077F SYST BP >= 140 MM HG: CPT | Mod: CPTII,S$GLB,, | Performed by: STUDENT IN AN ORGANIZED HEALTH CARE EDUCATION/TRAINING PROGRAM

## 2022-08-25 PROCEDURE — 99214 OFFICE O/P EST MOD 30 MIN: CPT | Mod: S$GLB,,, | Performed by: STUDENT IN AN ORGANIZED HEALTH CARE EDUCATION/TRAINING PROGRAM

## 2022-08-25 PROCEDURE — 3044F HG A1C LEVEL LT 7.0%: CPT | Mod: CPTII,S$GLB,, | Performed by: STUDENT IN AN ORGANIZED HEALTH CARE EDUCATION/TRAINING PROGRAM

## 2022-08-25 PROCEDURE — 3008F PR BODY MASS INDEX (BMI) DOCUMENTED: ICD-10-PCS | Mod: CPTII,S$GLB,, | Performed by: STUDENT IN AN ORGANIZED HEALTH CARE EDUCATION/TRAINING PROGRAM

## 2022-08-25 PROCEDURE — 1159F PR MEDICATION LIST DOCUMENTED IN MEDICAL RECORD: ICD-10-PCS | Mod: CPTII,S$GLB,, | Performed by: STUDENT IN AN ORGANIZED HEALTH CARE EDUCATION/TRAINING PROGRAM

## 2022-08-25 PROCEDURE — 99999 PR PBB SHADOW E&M-EST. PATIENT-LVL IV: ICD-10-PCS | Mod: PBBFAC,,, | Performed by: STUDENT IN AN ORGANIZED HEALTH CARE EDUCATION/TRAINING PROGRAM

## 2022-08-25 PROCEDURE — 3077F PR MOST RECENT SYSTOLIC BLOOD PRESSURE >= 140 MM HG: ICD-10-PCS | Mod: CPTII,S$GLB,, | Performed by: STUDENT IN AN ORGANIZED HEALTH CARE EDUCATION/TRAINING PROGRAM

## 2022-08-25 PROCEDURE — 1159F MED LIST DOCD IN RCRD: CPT | Mod: CPTII,S$GLB,, | Performed by: STUDENT IN AN ORGANIZED HEALTH CARE EDUCATION/TRAINING PROGRAM

## 2022-08-25 PROCEDURE — 1160F PR REVIEW ALL MEDS BY PRESCRIBER/CLIN PHARMACIST DOCUMENTED: ICD-10-PCS | Mod: CPTII,S$GLB,, | Performed by: STUDENT IN AN ORGANIZED HEALTH CARE EDUCATION/TRAINING PROGRAM

## 2022-08-25 PROCEDURE — 99999 PR PBB SHADOW E&M-EST. PATIENT-LVL IV: CPT | Mod: PBBFAC,,, | Performed by: STUDENT IN AN ORGANIZED HEALTH CARE EDUCATION/TRAINING PROGRAM

## 2022-08-25 PROCEDURE — 99214 PR OFFICE/OUTPT VISIT, EST, LEVL IV, 30-39 MIN: ICD-10-PCS | Mod: S$GLB,,, | Performed by: STUDENT IN AN ORGANIZED HEALTH CARE EDUCATION/TRAINING PROGRAM

## 2022-08-25 PROCEDURE — 3078F PR MOST RECENT DIASTOLIC BLOOD PRESSURE < 80 MM HG: ICD-10-PCS | Mod: CPTII,S$GLB,, | Performed by: STUDENT IN AN ORGANIZED HEALTH CARE EDUCATION/TRAINING PROGRAM

## 2022-08-25 PROCEDURE — 1160F RVW MEDS BY RX/DR IN RCRD: CPT | Mod: CPTII,S$GLB,, | Performed by: STUDENT IN AN ORGANIZED HEALTH CARE EDUCATION/TRAINING PROGRAM

## 2022-08-25 PROCEDURE — 4010F PR ACE/ARB THEARPY RXD/TAKEN: ICD-10-PCS | Mod: CPTII,S$GLB,, | Performed by: STUDENT IN AN ORGANIZED HEALTH CARE EDUCATION/TRAINING PROGRAM

## 2022-08-25 PROCEDURE — 3078F DIAST BP <80 MM HG: CPT | Mod: CPTII,S$GLB,, | Performed by: STUDENT IN AN ORGANIZED HEALTH CARE EDUCATION/TRAINING PROGRAM

## 2022-08-25 PROCEDURE — 3044F PR MOST RECENT HEMOGLOBIN A1C LEVEL <7.0%: ICD-10-PCS | Mod: CPTII,S$GLB,, | Performed by: STUDENT IN AN ORGANIZED HEALTH CARE EDUCATION/TRAINING PROGRAM

## 2022-08-25 PROCEDURE — 4010F ACE/ARB THERAPY RXD/TAKEN: CPT | Mod: CPTII,S$GLB,, | Performed by: STUDENT IN AN ORGANIZED HEALTH CARE EDUCATION/TRAINING PROGRAM

## 2022-08-25 PROCEDURE — 1126F PR PAIN SEVERITY QUANTIFIED, NO PAIN PRESENT: ICD-10-PCS | Mod: CPTII,S$GLB,, | Performed by: STUDENT IN AN ORGANIZED HEALTH CARE EDUCATION/TRAINING PROGRAM

## 2022-08-25 NOTE — PROGRESS NOTES
"Subjective:       Patient ID: Tracy Armendariz is a 71 y.o. female.    Chief Complaint:  f/u  This is a 71 y.o.  female patient that is an established patient of mine. She was visiting [facility] and was diagnosed with an obstructing stone. She presented to the ER at [facility] on 11/27/18 with fevers and altered mental status. She notes that they found a kidney stone of unknown laterality she does not recall left or right. Dr. Pérez, a urologist at [facility], was consulted after a Ct was performed and found an "impacted" stone that was "very close to the bladder" per the patient and her . She was told that she had a urinary tract infection and that the infection went into the blood stream (positive bacteremia). She underwent a cystoscopy and ureteral stent placement on 11/28/18.     She underwent a L urs/hll/sbe/stent exchange on 12/19/2018. Her stent has been removed in clinic on 12/26/2018.    Has two daughter, and 1 son who lives here. Daughters are in [place] (moved to [place]) and [place]. 3 grandsons in [place]. Her son who lives here has a boy and a girl.      Last cross sectional imaging 8/2020 - Kidneys/ Ureters: Right kidney and collecting system unremarkable.  Multiple punctate calcifications are seen throughout the renal collecting system on the left side ranging in size from 2-5 mm.  No ureteral stone or hydronephrosis seen.    Screening US 7/14/22 led to CT 8/4/22 -  The right kidney and collecting structures are normal.  The left kidney demonstrates approximately 12 intrarenal calculi the largest of which is lower pole and measures 8 mm.  There is no dilatation of collecting structures on the left.  There is a low incidental left lower pole renal cyst seen on the previous ultrasound dated 07/14/2022.  No bladder calculi are seen.  The ureters are normal in course and caliber.     Lab Results   Component Value Date    CREATININE 0.8 03/29/2022       ---  Past Medical " History:   Diagnosis Date    Allergy     Anticoagulant long-term use     Arthritis     CVA (cerebral infarction)     Depression     Disorder of kidney and ureter     renal stones    Diverticulosis of colon     Extrinsic asthma, unspecified     Hyperlipidemia     Hypertension     Kidney stone     Left atrial enlargement 2014    Low back pain     MARTIN (obstructive sleep apnea)     Osteopenia     PUD (peptic ulcer disease)     Stroke  2013    Urinary tract infection        Past Surgical History:   Procedure Laterality Date    APPENDECTOMY      @ time of hysterectomy    BACK SURGERY      CATARACT EXTRACTION       SECTION      CHOLECYSTECTOMY      laparoscopic    COLONOSCOPY N/A 2018    Procedure: COLONOSCOPY/Golytely;  Surgeon: Janine Black MD;  Location: [facility] ENDO;  Service: Endoscopy;  Laterality: N/A;    DILATION AND CURETTAGE OF UTERUS  1972    HYSTERECTOMY      TAHUSO with appendectomy    INNER EAR SURGERY      replaced ear drum    JOINT REPLACEMENT Right     knee    KNEE ARTHROSCOPY W/ DEBRIDEMENT      LUMBAR DISCECTOMY      L4-L5    OOPHORECTOMY      unilateral    TONSILLECTOMY      TYMPANOPLASTY      URETEROSCOPIC REMOVAL OF URETERIC CALCULUS Left 2018    Procedure: REMOVAL, CALCULUS, URETER, URETEROSCOPIC, holmium laser lithotripsy, stone basket extraction, retrograde pyelogram, ureteral stent exchange;  Surgeon: Anaya Zamora MD;  Location: [facility] OR;  Service: Urology;  Laterality: Left;       Family History   Problem Relation Age of Onset    Cervical cancer Mother     Cancer Mother 65        lung cancer - non smoker    Stroke Paternal Grandfather     Hypertension Maternal Grandfather     Heart disease Maternal Grandfather     Hypertension Father     Coronary artery disease Father 62    Heart disease Father     Diabetes Sister     Heart disease Sister     Kidney disease Sister     Breast cancer  Daughter 36    Heart failure Sister         60s    Colon cancer Neg Hx     Ovarian cancer Neg Hx        Social History     Tobacco Use    Smoking status: Former Smoker     Quit date: 1995     Years since quittin.6    Smokeless tobacco: Never Used   Substance Use Topics    Alcohol use: Yes     Alcohol/week: 1.0 standard drink     Types: 1 Glasses of wine per week     Comment: social    Drug use: No       Current Outpatient Medications on File Prior to Visit   Medication Sig Dispense Refill    albuterol (PROVENTIL/VENTOLIN HFA) 90 mcg/actuation inhaler INHALE 2 PUFFS EVERY 6 HOURS AS NEEDED FOR WHEEZING 18 g 0    aspirin 81 MG Chew Take 81 mg by mouth once daily.      atorvastatin (LIPITOR) 10 MG tablet TAKE 1 TABLET(10 MG) BY MOUTH EVERY DAY 90 tablet 2    buPROPion (WELLBUTRIN XL) 300 MG 24 hr tablet Take 1 tablet (300 mg total) by mouth once daily. 90 tablet 11    calcium carbonate (OS-SPENCER) 600 mg (1,500 mg) Tab Take 600 mg by mouth 2 (two) times daily with meals.      cholecalciferol, vitamin D3, 1,000 unit Chew Take 1,000 Units by mouth once daily.      diazePAM (VALIUM) 2 MG tablet Take 1/2 to 1 tablet 3 times daily as needed to control dizziness 50 tablet 1    diclofenac sodium (VOLTAREN) 1 % Gel APPLY 2-4 GRAMS TO EACH PAINFUL AREA FOUR TIMES DAILY - MAX 32 GRAMS/ g 6    EScitalopram oxalate (LEXAPRO) 20 MG tablet TAKE 1 TABLET(20 MG) BY MOUTH EVERY DAY (Patient taking differently: Take 20 mg by mouth once daily.) 90 tablet 3    esomeprazole (NEXIUM) 40 MG capsule Take 1 capsule (40 mg total) by mouth daily as needed (heartburn). 30 capsule 3    famotidine (PEPCID) 10 MG tablet Take 10 mg by mouth 2 (two) times daily.      FLUAD [id number], 65 YR UP,,PF, 45 mcg (15 mcg x 3)/0.5 mL Syrg       hydrocortisone 2.5 % cream Apply topically 2 (two) times daily to affected area 30 g 2    LACTOBACILLUS ACIDOPHILUS (PROBIOTIC ORAL) Take 1 capsule by mouth once daily. PRN       LIDOcaine-prilocaine (EMLA) cream APPLY 2 GRAMS 3-4 TIMES DAILY FOR TREATMENT OF PAIN 240 g 6    losartan (COZAAR) 100 MG tablet TAKE 1 TABLET(100 MG) BY MOUTH EVERY DAY 90 tablet 3    meclizine (ANTIVERT) 12.5 mg tablet Take 1 tablet (12.5 mg total) by mouth 3 (three) times daily as needed for Dizziness or Nausea. 30 tablet 6    MULTIVIT WITH CALCIUM,IRON,MIN (WOMEN'S DAILY MULTIVITAMIN ORAL) Take 1 tablet by mouth once daily.      tamsulosin (FLOMAX) 0.4 mg Cap TAKE 1 CAPSULE(0.4 MG) BY MOUTH EVERY DAY 90 capsule 0     No current facility-administered medications on file prior to visit.       Review of patient's allergies indicates:   Allergen Reactions    Iodinated contrast media Hives and Rash    Gabapentin Hallucinations    Iodine Hives    Isothiazolinones Rash    Penicillins Rash       Review of Systems   Constitutional: Negative for activity change.   HENT: Negative for congestion.    Eyes: Negative for visual disturbance.   Respiratory: Negative for shortness of breath.    Cardiovascular: Negative for chest pain.   Gastrointestinal: Negative for abdominal distention.   Musculoskeletal: Negative for gait problem.   Skin: Negative for color change.   Neurological: Negative for dizziness.   Psychiatric/Behavioral: Negative for agitation.       Objective:      Physical Exam  Constitutional:       Appearance: She is well-developed.   HENT:      Head: Normocephalic and atraumatic.   Pulmonary:      Effort: Pulmonary effort is normal.   Musculoskeletal:         General: Normal range of motion.      Cervical back: Normal range of motion.   Skin:     General: Skin is warm and dry.   Neurological:      Mental Status: She is alert and oriented to person, place, and time.         Assessment:       1. Nephrolithiasis        Plan:       1. Based off of ureteroscopy in 2019, I do not believe that the left upper pole and mid pole calcifications are true stones. The left lower pole is likely reflective of true  stones. Largest dimension is 7mm.   2. Offered observation vs ESWL (would require KUB). As her  is currently undergoing consultation for prostate cancer (also my pt) will continue to observe. Next imaging in a year is reasonable as long as pt is not having issues.      Nephrolithiasis

## 2022-08-30 ENCOUNTER — CLINICAL SUPPORT (OUTPATIENT)
Dept: REHABILITATION | Facility: HOSPITAL | Age: 72
End: 2022-08-30
Attending: SPECIALIST
Payer: MEDICARE

## 2022-08-30 DIAGNOSIS — R26.89 IMPAIRMENT OF BALANCE: ICD-10-CM

## 2022-08-30 DIAGNOSIS — H53.10 EYE FATIGUE: Primary | ICD-10-CM

## 2022-08-30 DIAGNOSIS — R42 DIZZINESS: ICD-10-CM

## 2022-08-30 PROCEDURE — 97112 NEUROMUSCULAR REEDUCATION: CPT | Mod: PO,CQ

## 2022-08-30 NOTE — PROGRESS NOTES
"[facility]   Physical Therapy Treatment Note     Name: Tracy Armendariz  Clinic Number: [id number]    Therapy Diagnosis:   Encounter Diagnoses   Name Primary?    Eye fatigue Yes    Dizziness     Impairment of balance      Physician: LORRAINE Alfonso MD    Visit Date: 8/30/2022    Physician Orders: PT Eval and Treat   Medical Diagnosis from Referral:   H81.391 (ICD-10-CM) - Peripheral vertigo involving right ear   R27.0 (ICD-10-CM) - Ataxia   Z91.81 (ICD-10-CM) - At high risk for falls      Evaluation Date: 7/12/2022  Authorization Period Expiration: 06/10/22 to 06/10/23  Plan of Care Expiration: 09/09/22  Visit # / Visits authorized: 02/ 11 (13 total visits)     Time In: 11:00  Time Out: 11:45  Total Billable Time: 45 minutes     Precautions: Standard, fall, safety awareness    SUBJECTIVE     Pt reports: " I still have dizziness and a little bit of a headache."   As per PT instruction, she did not do her eye exercises over the weekend, but is still reporting headaches.  Response to previous treatment: no adverse effects  Functional change: ongoing    Pain: 4/10  Location: headache    Dizziness: 5/10    OBJECTIVE     Objective Measures updated at progress report unless specified.     Treatment     Tracy received the treatments listed below:      neuromuscular re-education activities to improve: Balance, Coordination, Sense and vestibular function for 40 minutes. The following activities were included:    // bars:   X 4 laps, Tandem ambulation, intermittent UE support, CGA  1 x 30s each LE back, Tandem stance, with eyes open, Contact guard assistance   2 x 30s each LE back, Tandem stance, with eyes closed, Contact guard assistance     Airex Feet together, Arms Crossed:  3 x 30s, Horizontal head turns, Contact guard assistance   3 x 30s, Vertical head turns, Contact guard assistance   3 x 30s, Eyes closed, Contact guard assistance       Laps around neuro gym: CGA  X 2 laps, horizontal head " turns  X 2 laps, vertical head turns  X 2 lap in each plane, diagonal head turns     Patient Education and Home Exercises     Home Exercises Provided and Patient Education Provided     Education provided:   - plan of care (POC)  -08/11/22: since patient endorses headaches while performing oculomotor home exercise program, PT further questioned patient about home exercise program and it appears that patient is doing too many exercises. Therefore, home exercise program re-printed and reviewed with hand written notes for correct performance, correct intensity, and correct frequency.    Written Home Exercises Provided: yes. Exercises were reviewed and Tracy was able to demonstrate them prior to the end of the session.  Tracy demonstrated good  understanding of the education provided. See EMR under Patient Instructions for exercises provided during therapy session on 07/14/22.     ASSESSMENT   Tracy tolerated today's session fairly well despite 5/10 reports of dizziness and 4/10 reports of headache.  Tracy cont to have headaches despite not doing her oculomotor exercises and reports some slight increase of dizziness following her balance activities.  Tracy required 1 sitting rest break today.  Cont with plan of care.    Tracy Is progressing well towards her goals.   Pt prognosis is Good.     Pt will continue to benefit from skilled outpatient physical therapy to address the deficits listed in the problem list box on initial evaluation, provide pt/family education and to maximize pt's level of independence in the home and community environment.     Pt's spiritual, cultural and educational needs considered and pt agreeable to plan of care and goals.     Anticipated Barriers for therapy: co-morbidities, chronicity of symptoms    Goals:  Short Term Goals: 4 weeks   1. Patient to be (I) with established home exercise program. MET 08/11/22  2. Patient to perform smooth pursuits tracking single target in all planes  WFL for improved ability to scan environment. Progressing  3. Patient to perform saccades at 80 bpm WFL for improved oculomotor endurance. MET 08/11/22  4. Patient to perform VOR 1 at 70 bpm WFL for improved gaze stabilization. Progressing  5. Patient to pass GST condition 2 with no more than slight sway for improved balance in low vision environments. MET 08/11/22  6. Patient to improve GST condition 3 to at least 12 seconds for improved balance and decreased fall risk. Partially Met  7. Patient to improve GST condition 5 to at least 20 seconds for improved balance on unlevel surfaces. MET 08/11/22  8. Patient to improve GST condition 6 to at least 20 seconds for improved balance in low vision environments. MET 08/11/22  9. PT to formally assess Functional Gait Assessment ans establish appropriate goal. MET 07/14/22     Long Term Goals: 8 weeks   1. Patient to be (I) with advanced home exercise program. Ongoing  2. Patient to perform smooth pursuits with reading component in all planes WFL for improved ability to scan environment. Ongoing  3. Patient to perform saccades at 100 bpm WFL for improved oculomotor endurance. Ongoing  4. Patient to perform VOR 1 at 90 bpm WFL for improved gaze stabilization. Ongoing  5. Patient to improve GST condition 3 to at least 18 seconds for improved balance and decreased fall risk. Ongoing  6. Patient to improve GST condition 5 to at least 30 seconds for improved balance on unlevel surfaces. MET 08/11/22  7. Patient to improve GST condition 6 to at least 30 seconds for improved balance in low vision environments. Progressing  8. Patient to improve Functional Gait Assessment score to at least 22/30 for improved balance and decreased fall risk in community environments. Progressing     PLAN   Monitor correct home exercise program performance.   Progress oculomotor and balance activities as able. Perform balance interventions first, prior to oculomotor exercises.    Tana Chavez  Pelon, PTA

## 2022-08-30 NOTE — PROGRESS NOTES
[facility]   Physical Therapy Treatment Note     Name: Tracy Armendariz  Clinic Number: [id number]    Therapy Diagnosis:   No diagnosis found.    Physician: LORRAINE Alfonso MD    Visit Date: 8/30/2022    Physician Orders: PT Eval and Treat   Medical Diagnosis from Referral:   H81.391 (ICD-10-CM) - Peripheral vertigo involving right ear   R27.0 (ICD-10-CM) - Ataxia   Z91.81 (ICD-10-CM) - At high risk for falls      Evaluation Date: 7/12/2022  Authorization Period Expiration: 06/10/22 to 06/10/23  Plan of Care Expiration: 09/09/22  Visit # / Visits authorized: 13/ 11 (14 total visits)     Time In: 13:45 ***  Time Out: 14:25 ***  Total Billable Time: 40 minutes     Precautions: Standard, fall, safety awareness    SUBJECTIVE     Pt reports: that her dizziness has been good. None to start session.  She is noticing headaches mainly when she wakes up and then when she does her eye exercises. ***    She was compliant with her home exercise program.   Response to previous treatment: no adverse effects  Functional change: ongoing    Pain: 0/10  Location: NA    Dizziness: 0/10    OBJECTIVE     Objective Measures updated at progress report unless specified.     Treatment     Tracy received the treatments listed below:      neuromuscular re-education activities to improve: Balance, Coordination, Sense and vestibular function for 40 minutes. The following activities were included:    // bars:   X 4 laps, Tandem ambulation, intermittent UE support, CGA  1 x 30s each LE back, Tandem stance, with eyes open, Contact guard assistance   2 x 30s each LE back, Tandem stance, with eyes closed, Contact guard assistance     Airex Feet together, Arms Crossed:  3 x 30s, Horizontal head turns, Contact guard assistance   3 x 30s, Vertical head turns, Contact guard assistance   3 x 30s, Eyes closed, Contact guard assistance     X 10 reps, each lower extremity leading, forward step ups onto 4 pt fitter board, CGA, 1  upper extremity support- verbal cues for use of vision for proper foot placement    Laps around neuro gym: CGA  X 2 laps, horizontal head turns  X 2 laps, vertical head turns  X 2 lap in each plane, diagonal head turns     Patient Education and Home Exercises     Home Exercises Provided and Patient Education Provided     Education provided:   - plan of care (POC)  -08/11/22: since patient endorses headaches while performing oculomotor home exercise program, PT further questioned patient about home exercise program and it appears that patient is doing too many exercises. Therefore, home exercise program re-printed and reviewed with hand written notes for correct performance, correct intensity, and correct frequency.    Written Home Exercises Provided: yes. Exercises were reviewed and Tracy was able to demonstrate them prior to the end of the session.  Tracy demonstrated good  understanding of the education provided. See EMR under Patient Instructions for exercises provided during therapy session on 07/14/22.     ASSESSMENT   Tracy tolerated today's fairly well. Patient instructed to hold off on oculomotor home exercise program until next session to see if that affects headaches. Therapy session only focused on balance training today to see if holding oculomotor exercises help improve headaches symptoms. Rest breaks provided throughout session due to decreased endurance. Poor center of gravity awareness noted with vision eliminated conditions so plan to continue to address this in future sessions. PT to continue per established POC. ***    Tracy Is progressing well towards her goals.   Pt prognosis is Good.     Pt will continue to benefit from skilled outpatient physical therapy to address the deficits listed in the problem list box on initial evaluation, provide pt/family education and to maximize pt's level of independence in the home and community environment.     Pt's spiritual, cultural and educational  needs considered and pt agreeable to plan of care and goals.     Anticipated Barriers for therapy: co-morbidities, chronicity of symptoms    Goals:  Short Term Goals: 4 weeks   1. Patient to be (I) with established home exercise program. MET 08/11/22  2. Patient to perform smooth pursuits tracking single target in all planes WFL for improved ability to scan environment. Progressing  3. Patient to perform saccades at 80 bpm WFL for improved oculomotor endurance. MET 08/11/22  4. Patient to perform VOR 1 at 70 bpm WFL for improved gaze stabilization. Progressing  5. Patient to pass GST condition 2 with no more than slight sway for improved balance in low vision environments. MET 08/11/22  6. Patient to improve GST condition 3 to at least 12 seconds for improved balance and decreased fall risk. Partially Met  7. Patient to improve GST condition 5 to at least 20 seconds for improved balance on unlevel surfaces. MET 08/11/22  8. Patient to improve GST condition 6 to at least 20 seconds for improved balance in low vision environments. MET 08/11/22  9. PT to formally assess Functional Gait Assessment ans establish appropriate goal. MET 07/14/22     Long Term Goals: 8 weeks   1. Patient to be (I) with advanced home exercise program. Ongoing  2. Patient to perform smooth pursuits with reading component in all planes WFL for improved ability to scan environment. Ongoing  3. Patient to perform saccades at 100 bpm WFL for improved oculomotor endurance. Ongoing  4. Patient to perform VOR 1 at 90 bpm WFL for improved gaze stabilization. Ongoing  5. Patient to improve GST condition 3 to at least 18 seconds for improved balance and decreased fall risk. Ongoing  6. Patient to improve GST condition 5 to at least 30 seconds for improved balance on unlevel surfaces. MET 08/11/22  7. Patient to improve GST condition 6 to at least 30 seconds for improved balance in low vision environments. Progressing  8. Patient to improve Functional  Gait Assessment score to at least 22/30 for improved balance and decreased fall risk in community environments. Progressing     PLAN   Monitor correct home exercise program performance.   Progress oculomotor and balance activities as able. Perform balance interventions first, prior to oculomotor exercises.    Sumaya Galindo, PT

## 2022-08-31 NOTE — PROGRESS NOTES
[facility]   Physical Therapy Treatment Note     Name: Tracy Armendariz  Clinic Number: [id number]    Therapy Diagnosis:   Encounter Diagnoses   Name Primary?    Eye fatigue Yes    Dizziness     Impairment of balance        Physician: LORRAINE Alfonso MD    Visit Date: 9/1/2022    Physician Orders: PT Eval and Treat   Medical Diagnosis from Referral:   H81.391 (ICD-10-CM) - Peripheral vertigo involving right ear   R27.0 (ICD-10-CM) - Ataxia   Z91.81 (ICD-10-CM) - At high risk for falls      Evaluation Date: 7/12/2022  Authorization Period Expiration: 06/10/22 to 06/10/23  Plan of Care Expiration: 09/09/22  Visit # / Visits authorized: 03/ 11 (15 total visits)     Time In: 10:45  Time Out: 11:30  Total Billable Time: 45 minutes     Precautions: Standard, fall, safety awareness    SUBJECTIVE     Pt reports: that she has continued to hold off on the eye exercises as instructed by PT and headaches seem to be improving. She does have a mild headache to start today which is located in between her eyes. Dizziness still most noted when turning quickly, but none at rest.     Home exercise program: patient instructed to hold off on oculomotor exercises to see if this helps improve headaches  Response to previous treatment: no adverse effects  Functional change: ongoing    Pain: 3/10  Location: headache between eyes    Dizziness: 0/10    OBJECTIVE     Objective Measures updated at progress report unless specified.     Treatment     Tracy received the treatments listed below:      Tracy received therapeutic exercises to develop strength and endurance for 10 minutes including:     X 8 min on Sci Fit recumbent stepper L1 for CV endurance    neuromuscular re-education activities to improve: Balance, Coordination, Sense and vestibular function for 35 minutes. The following activities were included:    // bars:   X 4 laps, Tandem ambulation, intermittent UE support, CGA  1 x 30s each LE back, Tandem  stance, with eyes open, Contact guard assistance, intermittent upper extremity support   2 x 30s each LE back, Tandem stance, with eyes closed, Min A, intermittent upper extremity support    Airex Feet together, Arms Crossed:  2 x 30s, Horizontal head turns, Contact guard assistance   2 x 30s, Vertical head turns, Contact guard assistance   3 x 30s, Eyes closed, Contact guard assistance     X 10 reps, each lower extremity single leg stance with alternate lower extremity tapping 2 large cones in front, CGA, occ touchdown support    Laps around neuro gym: CGA  X 2 laps, horizontal head turns  X 2 laps, vertical head turns  X 2 lap in each plane, diagonal head turns     Patient Education and Home Exercises     Home Exercises Provided and Patient Education Provided     Education provided:   - plan of care (POC)  -08/11/22: since patient endorses headaches while performing oculomotor home exercise program, PT further questioned patient about home exercise program and it appears that patient is doing too many exercises. Therefore, home exercise program re-printed and reviewed with hand written notes for correct performance, correct intensity, and correct frequency.    Written Home Exercises Provided: yes. Exercises were reviewed and Tracy was able to demonstrate them prior to the end of the session.  Tracy demonstrated good  understanding of the education provided. See EMR under Patient Instructions for exercises provided during therapy session on 07/14/22.     ASSESSMENT   Tracy tolerated therapy session well this morning. Continued to hold off on oculomotor exercises as headaches seem to be gradually improving since stopping oculomotor exercises. Initiated Sci Fit recumbent to progress endurance as rest breaks are still needed throughout balance activities. Headache relieved by end of session. Continue to focus on activity tolerance and balance training.    Tracy Is progressing well towards her goals.   Pt  prognosis is Good.     Pt will continue to benefit from skilled outpatient physical therapy to address the deficits listed in the problem list box on initial evaluation, provide pt/family education and to maximize pt's level of independence in the home and community environment.     Pt's spiritual, cultural and educational needs considered and pt agreeable to plan of care and goals.     Anticipated Barriers for therapy: co-morbidities, chronicity of symptoms    Goals:  Short Term Goals: 4 weeks   1. Patient to be (I) with established home exercise program. MET 08/11/22  2. Patient to perform smooth pursuits tracking single target in all planes WFL for improved ability to scan environment. Progressing  3. Patient to perform saccades at 80 bpm WFL for improved oculomotor endurance. MET 08/11/22  4. Patient to perform VOR 1 at 70 bpm WFL for improved gaze stabilization. Progressing  5. Patient to pass GST condition 2 with no more than slight sway for improved balance in low vision environments. MET 08/11/22  6. Patient to improve GST condition 3 to at least 12 seconds for improved balance and decreased fall risk. Partially Met  7. Patient to improve GST condition 5 to at least 20 seconds for improved balance on unlevel surfaces. MET 08/11/22  8. Patient to improve GST condition 6 to at least 20 seconds for improved balance in low vision environments. MET 08/11/22  9. PT to formally assess Functional Gait Assessment ans establish appropriate goal. MET 07/14/22     Long Term Goals: 8 weeks   1. Patient to be (I) with advanced home exercise program. Ongoing  2. Patient to perform smooth pursuits with reading component in all planes WFL for improved ability to scan environment. Ongoing  3. Patient to perform saccades at 100 bpm WFL for improved oculomotor endurance. Ongoing  4. Patient to perform VOR 1 at 90 bpm WFL for improved gaze stabilization. Ongoing  5. Patient to improve GST condition 3 to at least 18 seconds for  improved balance and decreased fall risk. Ongoing  6. Patient to improve GST condition 5 to at least 30 seconds for improved balance on unlevel surfaces. MET 08/11/22  7. Patient to improve GST condition 6 to at least 30 seconds for improved balance in low vision environments. Progressing  8. Patient to improve Functional Gait Assessment score to at least 22/30 for improved balance and decreased fall risk in community environments. Progressing     PLAN   Discontinue oculomotor exercises. Progress balance and activity tolerance as able.     Mónica Thomas, PT

## 2022-09-01 ENCOUNTER — CLINICAL SUPPORT (OUTPATIENT)
Dept: REHABILITATION | Facility: HOSPITAL | Age: 72
End: 2022-09-01
Attending: SPECIALIST
Payer: MEDICARE

## 2022-09-01 DIAGNOSIS — R42 DIZZINESS: ICD-10-CM

## 2022-09-01 DIAGNOSIS — H53.10 EYE FATIGUE: Primary | ICD-10-CM

## 2022-09-01 DIAGNOSIS — R26.89 IMPAIRMENT OF BALANCE: ICD-10-CM

## 2022-09-01 PROCEDURE — 97112 NEUROMUSCULAR REEDUCATION: CPT | Mod: PO

## 2022-09-01 PROCEDURE — 97110 THERAPEUTIC EXERCISES: CPT | Mod: PO

## 2022-09-06 NOTE — PROGRESS NOTES
"[facility]   Physical Therapy Treatment Note     Name: Tracy Armendariz  Clinic Number: [id number]    Therapy Diagnosis:   Encounter Diagnoses   Name Primary?    Eye fatigue Yes    Dizziness     Impairment of balance        Physician: LORRAINE Alfonso MD    Visit Date: 9/7/2022    Physician Orders: PT Eval and Treat   Medical Diagnosis from Referral:   H81.391 (ICD-10-CM) - Peripheral vertigo involving right ear   R27.0 (ICD-10-CM) - Ataxia   Z91.81 (ICD-10-CM) - At high risk for falls      Evaluation Date: 7/12/2022  Authorization Period Expiration: 09/27/22  Plan of Care Expiration: 09/09/22  Visit # / Visits authorized: 15/ 17 (16 total visits)     Time In: 11:45  Time Out: 12:30  Total Billable Time: 45 minutes     Precautions: Standard, fall, safety awareness    SUBJECTIVE     Pt reports: that headaches are doing much better. She has scheduled an eye MD appointment. Balance is doing much better- no falls since starting therapy. Dizziness is also doing much better.     Home exercise program: patient instructed to hold off on oculomotor exercises to see if this helps improve headaches  Response to previous treatment: no adverse effects  Functional change: ongoing    Pain: 0/10  Location: headache between eyes    Dizziness: 1/10    OBJECTIVE     Objective Measures updated at progress report unless specified.     Treatment     Tracy received the treatments listed below:      Tracy received therapeutic exercises to develop strength and endurance for 10 minutes including:     X 8 min on Sci Fit recumbent stepper L1 for CV endurance    neuromuscular re-education activities to improve: Balance, Coordination, Sense and vestibular function for 35 minutes. The following activities were included:    [place] SENSORY TESTING:  (P= Pass, F= Fail; note any sway; hold each position for 30")  Condition 1: (firm surface/feet together/eyes open) P  Condition 2: (firm surface/feet together/eyes closed) " P  Condition 3: (firm surface/feet in tandem/eyes open) P c/ RLE in front- min sway; 16 sec c/ LLE in front  Condition 4: (firm surface/feet in tandem/eyes closed) NT 2/2 safety concerns  Condition 5: (soft surface/feet together/eyes open) P  Condition 6: (soft surface/feet together/eyes closed) P, min sway  Condition 7: (Fukuda step test), measure distance varied from center starting position, > 30 deg deviation to either side indicates hypofunction of biased side NT 2/2 safety concerns     Functional Gait Assessment:   1. Gait on level surface =  3  2. Change in Gait Speed = 3  3. Gait with horizontal head turns  = 3  4. Gait with vertical head turns = 2  5. Gait with pivot turns = 2  6. Step over obstacle = 3  7. Gait with Narrow STANISLAV = 1  8. Gait with eyes closed = 2  9. Ambulating Backwards = 2  10. Steps = 2     Score 23/30 08/11/22: 21/30  Evaluation: 17/30      FOTO for vertigo survey: 45 %    Laps around neuro gym: CGA  X 2 laps, vertical head turns      Patient Education and Home Exercises     Home Exercises Provided and Patient Education Provided     Education provided:   - plan of care (POC)  -08/11/22: since patient endorses headaches while performing oculomotor home exercise program, PT further questioned patient about home exercise program and it appears that patient is doing too many exercises. Therefore, home exercise program re-printed and reviewed with hand written notes for correct performance, correct intensity, and correct frequency.    Written Home Exercises Provided: yes. Exercises were reviewed and Tracy was able to demonstrate them prior to the end of the session.  Tracy demonstrated good  understanding of the education provided. See EMR under Patient Instructions for exercises provided during therapy session on 07/14/22.     ASSESSMENT   Patient reassessed for plan of care (POC) update. Reassessment period: 08/11/22 to 09/07/22. Patient demonstrates great progress with therapy with  reported improvement in balance and dizziness. Due to reports of headaches, oculomotor exercises held during therapy sessions and as home exercise program. Headaches have seemed to improve with this change, but still occasionally present. Therefore, plan to discontinue oculomotor exercises. Recommend follow up with ophthalmology since visual deficits appear to one of patient's greatest contributing factor for headache presence.  Performance on GST significantly improved with greatest improvements noted on tandem stance and vision eliminated stance on foam pad. These 2 conditions still remain most challenging for patient. Functional Gait Assessment score improved by 2 points with current score now placing patient out of elevated fall risk category. PT to extend plan of care (POC) x 3 weeks with primary focus on balance training and activity tolerance.     Tracy Is progressing well towards her goals.   Pt prognosis is Good.     Pt will continue to benefit from skilled outpatient physical therapy to address the deficits listed in the problem list box on initial evaluation, provide pt/family education and to maximize pt's level of independence in the home and community environment.     Pt's spiritual, cultural and educational needs considered and pt agreeable to plan of care and goals.     Anticipated Barriers for therapy: co-morbidities, chronicity of symptoms    Goals:  Short Term Goals: 4 weeks   1. Patient to be (I) with established home exercise program. MET 08/11/22  2. . Discontinued  3. . Discontinued  4. . Discontinued  5. Patient to pass GST condition 2 with no more than slight sway for improved balance in low vision environments. MET 08/11/22  6. Patient to improve GST condition 3 to at least 12 seconds for improved balance and decreased fall risk. MET 09/07/22  7. Patient to improve GST condition 5 to at least 20 seconds for improved balance on unlevel surfaces. MET 08/11/22  8. Patient to improve GST  condition 6 to at least 20 seconds for improved balance in low vision environments. MET 08/11/22  9. PT to formally assess Functional Gait Assessment ans establish appropriate goal. MET 07/14/22     Long Term Goals: 8 weeks   1. Patient to be (I) with advanced home exercise program. Ongoing  2. . Discontinued  3. . Discontinued  4. . Discontinued  5. Patient to improve GST condition 3 to at least 18 seconds for improved balance and decreased fall risk. Partially Met  6. Patient to improve GST condition 5 to at least 30 seconds for improved balance on unlevel surfaces. MET 08/11/22  7. Patient to improve GST condition 6 to at least 30 seconds for improved balance in low vision environments. Progressing-sig improvement  8. Patient to improve Functional Gait Assessment score to at least 22/30 for improved balance and decreased fall risk in community environments. MET 09/07/22   Updated 09/07/22: Patient to improve Functional Gait Assessment score to at least 24/30 for improved balance and decreased fall risk in community environments. Ongoing    PLAN   PT to extend plan of care (POC) x 3 weeks. Anticipate d/c once plan of care (POC) extension is completed.     Continue outpatient physical therapy 2x weekly under current established Plan of Care, 09/07/22 to 09/30/22, with treatment to include: pt education, HEP, therapeutic exercises, neuromuscular re-education/balance exercises, therapeutic activities, joint mobilizations, and modalities PRN, to work towards established goals. Pt may be seen by PTA to carry out plan of care.     Discontinue oculomotor exercises. Progress balance and activity tolerance as able.     Mónica Thomas, PT

## 2022-09-07 ENCOUNTER — CLINICAL SUPPORT (OUTPATIENT)
Dept: REHABILITATION | Facility: HOSPITAL | Age: 72
End: 2022-09-07
Attending: SPECIALIST
Payer: MEDICARE

## 2022-09-07 DIAGNOSIS — R26.89 IMPAIRMENT OF BALANCE: ICD-10-CM

## 2022-09-07 DIAGNOSIS — R42 DIZZINESS: ICD-10-CM

## 2022-09-07 DIAGNOSIS — H53.10 EYE FATIGUE: Primary | ICD-10-CM

## 2022-09-07 PROCEDURE — 97112 NEUROMUSCULAR REEDUCATION: CPT | Mod: PO

## 2022-09-07 PROCEDURE — 97110 THERAPEUTIC EXERCISES: CPT | Mod: PO

## 2022-09-07 NOTE — PLAN OF CARE
"[facility]   Physical Therapy Treatment Note     Name: Tracy Armendariz  Clinic Number: [id number]    Therapy Diagnosis:   Encounter Diagnoses   Name Primary?    Eye fatigue Yes    Dizziness     Impairment of balance        Physician: LORRAINE Alfonso MD    Visit Date: 9/7/2022    Physician Orders: PT Eval and Treat   Medical Diagnosis from Referral:   H81.391 (ICD-10-CM) - Peripheral vertigo involving right ear   R27.0 (ICD-10-CM) - Ataxia   Z91.81 (ICD-10-CM) - At high risk for falls      Evaluation Date: 7/12/2022  Authorization Period Expiration: 09/27/22  Plan of Care Expiration: 09/09/22  Visit # / Visits authorized: 15/ 17 (16 total visits)     Time In: 11:45  Time Out: 12:30  Total Billable Time: 45 minutes     Precautions: Standard, fall, safety awareness    SUBJECTIVE     Pt reports: that headaches are doing much better. She has scheduled an eye MD appointment. Balance is doing much better- no falls since starting therapy. Dizziness is also doing much better.     Home exercise program: patient instructed to hold off on oculomotor exercises to see if this helps improve headaches  Response to previous treatment: no adverse effects  Functional change: ongoing    Pain: 0/10  Location: headache between eyes    Dizziness: 1/10    OBJECTIVE     Objective Measures updated at progress report unless specified.     Treatment     Tracy received the treatments listed below:      Tracy received therapeutic exercises to develop strength and endurance for 10 minutes including:     X 8 min on Sci Fit recumbent stepper L1 for CV endurance    neuromuscular re-education activities to improve: Balance, Coordination, Sense and vestibular function for 35 minutes. The following activities were included:    [place] SENSORY TESTING:  (P= Pass, F= Fail; note any sway; hold each position for 30")  Condition 1: (firm surface/feet together/eyes open) P  Condition 2: (firm surface/feet together/eyes closed) " P  Condition 3: (firm surface/feet in tandem/eyes open) P c/ RLE in front- min sway; 16 sec c/ LLE in front  Condition 4: (firm surface/feet in tandem/eyes closed) NT 2/2 safety concerns  Condition 5: (soft surface/feet together/eyes open) P  Condition 6: (soft surface/feet together/eyes closed) P, min sway  Condition 7: (Fukuda step test), measure distance varied from center starting position, > 30 deg deviation to either side indicates hypofunction of biased side NT 2/2 safety concerns     Functional Gait Assessment:   1. Gait on level surface =  3  2. Change in Gait Speed = 3  3. Gait with horizontal head turns  = 3  4. Gait with vertical head turns = 2  5. Gait with pivot turns = 2  6. Step over obstacle = 3  7. Gait with Narrow STANISLAV = 1  8. Gait with eyes closed = 2  9. Ambulating Backwards = 2  10. Steps = 2     Score 23/30 08/11/22: 21/30  Evaluation: 17/30      FOTO for vertigo survey: 45 %    Laps around neuro gym: CGA  X 2 laps, vertical head turns      Patient Education and Home Exercises     Home Exercises Provided and Patient Education Provided     Education provided:   - plan of care (POC)  -08/11/22: since patient endorses headaches while performing oculomotor home exercise program, PT further questioned patient about home exercise program and it appears that patient is doing too many exercises. Therefore, home exercise program re-printed and reviewed with hand written notes for correct performance, correct intensity, and correct frequency.    Written Home Exercises Provided: yes. Exercises were reviewed and Tracy was able to demonstrate them prior to the end of the session.  Tracy demonstrated good  understanding of the education provided. See EMR under Patient Instructions for exercises provided during therapy session on 07/14/22.     ASSESSMENT   Patient reassessed for plan of care (POC) update. Reassessment period: 08/11/22 to 09/07/22. Patient demonstrates great progress with therapy with  reported improvement in balance and dizziness. Due to reports of headaches, oculomotor exercises held during therapy sessions and as home exercise program. Headaches have seemed to improve with this change, but still occasionally present. Therefore, plan to discontinue oculomotor exercises. Recommend follow up with ophthalmology since visual deficits appear to one of patient's greatest contributing factor for headache presence.  Performance on GST significantly improved with greatest improvements noted on tandem stance and vision eliminated stance on foam pad. These 2 conditions still remain most challenging for patient. Functional Gait Assessment score improved by 2 points with current score now placing patient out of elevated fall risk category. PT to extend plan of care (POC) x 3 weeks with primary focus on balance training and activity tolerance.     Tracy Is progressing well towards her goals.   Pt prognosis is Good.     Pt will continue to benefit from skilled outpatient physical therapy to address the deficits listed in the problem list box on initial evaluation, provide pt/family education and to maximize pt's level of independence in the home and community environment.     Pt's spiritual, cultural and educational needs considered and pt agreeable to plan of care and goals.     Anticipated Barriers for therapy: co-morbidities, chronicity of symptoms    Goals:  Short Term Goals: 4 weeks   1. Patient to be (I) with established home exercise program. MET 08/11/22  2. . Discontinued  3. . Discontinued  4. . Discontinued  5. Patient to pass GST condition 2 with no more than slight sway for improved balance in low vision environments. MET 08/11/22  6. Patient to improve GST condition 3 to at least 12 seconds for improved balance and decreased fall risk. MET 09/07/22  7. Patient to improve GST condition 5 to at least 20 seconds for improved balance on unlevel surfaces. MET 08/11/22  8. Patient to improve GST  condition 6 to at least 20 seconds for improved balance in low vision environments. MET 08/11/22  9. PT to formally assess Functional Gait Assessment ans establish appropriate goal. MET 07/14/22     Long Term Goals: 8 weeks   1. Patient to be (I) with advanced home exercise program. Ongoing  2. . Discontinued  3. . Discontinued  4. . Discontinued  5. Patient to improve GST condition 3 to at least 18 seconds for improved balance and decreased fall risk. Partially Met  6. Patient to improve GST condition 5 to at least 30 seconds for improved balance on unlevel surfaces. MET 08/11/22  7. Patient to improve GST condition 6 to at least 30 seconds for improved balance in low vision environments. Progressing-sig improvement  8. Patient to improve Functional Gait Assessment score to at least 22/30 for improved balance and decreased fall risk in community environments. MET 09/07/22   Updated 09/07/22: Patient to improve Functional Gait Assessment score to at least 24/30 for improved balance and decreased fall risk in community environments. Ongoing    PLAN   PT to extend plan of care (POC) x 3 weeks. Anticipate d/c once plan of care (POC) extension is completed.     Continue outpatient physical therapy 2x weekly under current established Plan of Care, 09/07/22 to 09/30/22, with treatment to include: pt education, HEP, therapeutic exercises, neuromuscular re-education/balance exercises, therapeutic activities, joint mobilizations, and modalities PRN, to work towards established goals. Pt may be seen by PTA to carry out plan of care.     Discontinue oculomotor exercises. Progress balance and activity tolerance as able.     Mónica Thomas, PT

## 2022-09-08 NOTE — PROGRESS NOTES
"[facility]   Physical Therapy Treatment Note     Name: Tracy Armendariz  Clinic Number: [id number]    Therapy Diagnosis:   Encounter Diagnoses   Name Primary?    Eye fatigue Yes    Dizziness     Impairment of balance        Physician: LORRAINE Alfonso MD    Visit Date: 9/9/2022    Physician Orders: PT Eval and Treat   Medical Diagnosis from Referral:   H81.391 (ICD-10-CM) - Peripheral vertigo involving right ear   R27.0 (ICD-10-CM) - Ataxia   Z91.81 (ICD-10-CM) - At high risk for falls      Evaluation Date: 7/12/2022  Authorization Period Expiration: 09/27/22  Plan of Care Expiration: 09/09/22  Visit # / Visits authorized: 16/ 17 (17 total visits)     Time In: 13:45  Time Out: 14:00  Total Billable Time: 15 minutes     Precautions: Standard, fall, safety awareness    SUBJECTIVE     Pt reports: that she is having dizziness today that started yesterday around dinner time. Dizziness feels like "brain is going in circles" and has not relieved since onset yesterday. She also endorses feeling more fatigued today. No specific triggers to change intensity- intensity is remaining constant.     Home exercise program: patient instructed to hold off on oculomotor exercises to see if this helps improve headaches  Response to previous treatment: no adverse effects  Functional change: ongoing    Pain: 0/10  Location: headache between eyes    Dizziness: 5/10    OBJECTIVE     Objective Measures updated at progress report unless specified.     Treatment     Tracy received the treatments listed below:      neuromuscular re-education activities to improve: Balance, Coordination, Sense and vestibular function for 15 minutes. The following activities were included:    Oculomotor screen:  Spontaneous nystagmus: none  Gaze holding nystagmus: none with gaze holding to left and right, upper/mid/lower planes     BP: automatic cuff on LUE at rest  BP: 145/103  HR: 60 bpm    PT instructed patient that due to this " recent change in usual status and due to elevated diastolic BP, therapy activities are going to be held today until she contacts her MD. PT recommended that patient contact her PCP immediately after therapy session in event that she will need to be seen by PCP. PT provided patient with PCP number and vitals taken during session. PT also sent in-basket message to PCP regarding today's findings. PT further instructed patient that if symptoms worsen at all, or if additional symptoms present, she will need to present to the ED immediately.   Patient verbalized good understanding and agreeable to recommendations. Patient endorses that she feels safe enough to drive home.         Patient Education and Home Exercises     Home Exercises Provided and Patient Education Provided     Education provided:   - plan of care (POC)  -08/11/22: since patient endorses headaches while performing oculomotor home exercise program, PT further questioned patient about home exercise program and it appears that patient is doing too many exercises. Therefore, home exercise program re-printed and reviewed with hand written notes for correct performance, correct intensity, and correct frequency.    Written Home Exercises Provided: yes. Exercises were reviewed and Tracy was able to demonstrate them prior to the end of the session.  Tracy demonstrated good  understanding of the education provided. See EMR under Patient Instructions for exercises provided during therapy session on 07/14/22.     ASSESSMENT   Patient presents to therapy session with reports of increased spinning sensation dizziness that started last night and has remained constant since that time. No triggers to dizziness and symptoms remains present while moving and while at rest. Eyes screened for presence of nystagmus with patient (-) for spontaneous nystagmus and (-) for gaze holding nystagmus. Vitals checked with diastolic BP reading elevated above norm. PT instructed  patient that therapy activities will be held today until she touches base with her medical team regarding these new onset symptoms. PT instructed patient to call her PCP once she gets home in the event that they will want to see her in person. PT also sent message to PCP regarding today's findings. PT further educated patient that if symptoms worsen of additional symptoms present, then she will need to go to ED immediately. Patient agreeable.     Tracy Is progressing well towards her goals.   Pt prognosis is Good.     Pt will continue to benefit from skilled outpatient physical therapy to address the deficits listed in the problem list box on initial evaluation, provide pt/family education and to maximize pt's level of independence in the home and community environment.     Pt's spiritual, cultural and educational needs considered and pt agreeable to plan of care and goals.     Anticipated Barriers for therapy: co-morbidities, chronicity of symptoms    Goals:  Short Term Goals: 4 weeks   1. Patient to be (I) with established home exercise program. MET 08/11/22  2. . Discontinued  3. . Discontinued  4. . Discontinued  5. Patient to pass GST condition 2 with no more than slight sway for improved balance in low vision environments. MET 08/11/22  6. Patient to improve GST condition 3 to at least 12 seconds for improved balance and decreased fall risk. MET 09/07/22  7. Patient to improve GST condition 5 to at least 20 seconds for improved balance on unlevel surfaces. MET 08/11/22  8. Patient to improve GST condition 6 to at least 20 seconds for improved balance in low vision environments. MET 08/11/22  9. PT to formally assess Functional Gait Assessment ans establish appropriate goal. MET 07/14/22     Long Term Goals: 8 weeks   1. Patient to be (I) with advanced home exercise program. Ongoing  2. . Discontinued  3. . Discontinued  4. . Discontinued  5. Patient to improve GST condition 3 to at least 18 seconds for  improved balance and decreased fall risk. Partially Met  6. Patient to improve GST condition 5 to at least 30 seconds for improved balance on unlevel surfaces. MET 08/11/22  7. Patient to improve GST condition 6 to at least 30 seconds for improved balance in low vision environments. Progressing-sig improvement  8. Patient to improve Functional Gait Assessment score to at least 22/30 for improved balance and decreased fall risk in community environments. MET 09/07/22   Updated 09/07/22: Patient to improve Functional Gait Assessment score to at least 24/30 for improved balance and decreased fall risk in community environments. Ongoing    PLAN     Discontinue oculomotor exercises. Progress balance and activity tolerance as able.     Mónica Thomas, PT

## 2022-09-09 ENCOUNTER — CLINICAL SUPPORT (OUTPATIENT)
Dept: REHABILITATION | Facility: HOSPITAL | Age: 72
End: 2022-09-09
Attending: SPECIALIST
Payer: MEDICARE

## 2022-09-09 DIAGNOSIS — R26.89 IMPAIRMENT OF BALANCE: ICD-10-CM

## 2022-09-09 DIAGNOSIS — H53.10 EYE FATIGUE: Primary | ICD-10-CM

## 2022-09-09 DIAGNOSIS — R42 DIZZINESS: ICD-10-CM

## 2022-09-09 PROCEDURE — 97112 NEUROMUSCULAR REEDUCATION: CPT | Mod: PO

## 2022-09-12 ENCOUNTER — TELEPHONE (OUTPATIENT)
Dept: FAMILY MEDICINE | Facility: CLINIC | Age: 72
End: 2022-09-12
Payer: MEDICARE

## 2022-09-12 NOTE — PROGRESS NOTES
[facility]   Physical Therapy Treatment Note     Name: Tracy Armendariz  Clinic Number: [id number]    Therapy Diagnosis:   Encounter Diagnoses   Name Primary?    Eye fatigue Yes    Dizziness     Impairment of balance        Physician: LORRAINE Alfonso MD    Visit Date: 9/13/2022    Physician Orders: PT Eval and Treat   Medical Diagnosis from Referral:   H81.391 (ICD-10-CM) - Peripheral vertigo involving right ear   R27.0 (ICD-10-CM) - Ataxia   Z91.81 (ICD-10-CM) - At high risk for falls      Evaluation Date: 7/12/2022  Authorization Period Expiration: 09/27/22  Plan of Care Expiration: 09/09/22  Visit # / Visits authorized: 17/ 17 (18 total visits)     Time In: 1147  Time Out: 1227  Total Billable Time: 40 minutes     Precautions: Standard, fall, safety awareness    SUBJECTIVE     Pt reports: that the episode of dizziness she was experiencing during last visit has resolved. She believes it was due to salty gumbo. She reached out to her PCP regarding this episode but has not heard back yet.     Home exercise program: patient instructed to hold off on oculomotor exercises to see if this helps improve headaches  Response to previous treatment: no adverse effects  Functional change: ongoing    Pain: 0/10  Location: headache between eyes    Dizziness: 5/10    OBJECTIVE     BP upon arrival: 100/59 HR: 71 bpm    Treatment     Tracy received the treatments listed below:      Tracy received therapeutic exercises to develop strength and endurance for 8 minutes including:    X 8 minutes, Scifit Recumbent Stepper L1    neuromuscular re-education activities to improve: Balance, Coordination, Sense and vestibular function for 32 minutes. The following activities were included:    Hallway ambulation:  1 x 100 feet, horizontal head turns  2 x 100 feet, backward ambulation  1 x 100 feet, vertical head turns    Open environment:  X 10 laps, Forward stepping over hurdles, non-reciprocally, Contact guard  assistance   X 5 laps each way, Side stepping over hurdles, 1 instance of LOB requiring Minimal assist; otherwise, Contact guard assistance     //bars:   2 x 30s each LE back, Tandem stance, eyes open, Contact guard assistance   2 x 30s each LE back, Semi-Tandem stance, eyes closed, Contact guard assistance       Patient Education and Home Exercises     Home Exercises Provided and Patient Education Provided     Education provided:   - plan of care (POC)  -08/11/22: since patient endorses headaches while performing oculomotor home exercise program, PT further questioned patient about home exercise program and it appears that patient is doing too many exercises. Therefore, home exercise program re-printed and reviewed with hand written notes for correct performance, correct intensity, and correct frequency.    Written Home Exercises Provided: yes. Exercises were reviewed and Tracy was able to demonstrate them prior to the end of the session.  Tracy demonstrated good  understanding of the education provided. See EMR under Patient Instructions for exercises provided during therapy session on 07/14/22.     ASSESSMENT   Tracy tolerated today's treatment session well. Blood pressure was assessed at the start of the session and determined to be within a safe range for exercise. Verbal cues required with dynamic balance interventions to slow her movements to favor control rather than speed. She exhibited one instance of loss of balance requiring Minimal assist to re-stabilize due to delayed engagement of balance reaction strategies and exaggerated trunk response. She appeared to be appropriately challenged with static balance interventions, but did tend to use UE touchdown support frequently when she swayed rather than engaging ankle/hip strategy. PT to continue per established POC x 3 weeks, emphasizing endurance and balance training.     Tracy Is progressing well towards her goals.   Pt prognosis is Good.     Pt  will continue to benefit from skilled outpatient physical therapy to address the deficits listed in the problem list box on initial evaluation, provide pt/family education and to maximize pt's level of independence in the home and community environment.     Pt's spiritual, cultural and educational needs considered and pt agreeable to plan of care and goals.     Anticipated Barriers for therapy: co-morbidities, chronicity of symptoms    Goals:  Short Term Goals: 4 weeks   1. Patient to be (I) with established home exercise program. MET 08/11/22  2. . Discontinued  3. . Discontinued  4. . Discontinued  5. Patient to pass GST condition 2 with no more than slight sway for improved balance in low vision environments. MET 08/11/22  6. Patient to improve GST condition 3 to at least 12 seconds for improved balance and decreased fall risk. MET 09/07/22  7. Patient to improve GST condition 5 to at least 20 seconds for improved balance on unlevel surfaces. MET 08/11/22  8. Patient to improve GST condition 6 to at least 20 seconds for improved balance in low vision environments. MET 08/11/22  9. PT to formally assess Functional Gait Assessment ans establish appropriate goal. MET 07/14/22     Long Term Goals: 8 weeks   1. Patient to be (I) with advanced home exercise program. Ongoing  2. . Discontinued  3. . Discontinued  4. . Discontinued  5. Patient to improve GST condition 3 to at least 18 seconds for improved balance and decreased fall risk. Partially Met  6. Patient to improve GST condition 5 to at least 30 seconds for improved balance on unlevel surfaces. MET 08/11/22  7. Patient to improve GST condition 6 to at least 30 seconds for improved balance in low vision environments. Progressing-sig improvement  8. Patient to improve Functional Gait Assessment score to at least 22/30 for improved balance and decreased fall risk in community environments. MET 09/07/22   Updated 09/07/22: Patient to improve Functional Gait  Assessment score to at least 24/30 for improved balance and decreased fall risk in community environments. Ongoing    PLAN     Discontinue oculomotor exercises. Progress balance and activity tolerance as able.     Sumaya Galindo, PT

## 2022-09-12 NOTE — TELEPHONE ENCOUNTER
Received message from PT, did have vertigo blood pressure was elevated.  Had recommended ED if more severe symptoms although if not severe recommending at least follow-up in office to reassess.

## 2022-09-12 NOTE — TELEPHONE ENCOUNTER
----- Message from Mónica Thomas PT sent at 9/9/2022  2:06 PM CDT -----  Regarding: Patient Concern  Dr. Jules,     I have been working with Mrs. Armendariz in physical therapy for vestibular rehab. Today, she was scheduled to see me at 1:45. She presented to therapy with reports of increased spinning sensation of dizziness that started last night at dinner and has remained constant and unchanged up through this afternoon. She denies and positional triggers to dizziness and dizziness is even present at rest. I screened her eye movements with no nystagmus noted. I also checked her vitals. BP at rest was 145/103 and HR at rest was 60 bpm.     Since this is a change in usual status, it was decided to hold therapy for today and I instructed her to call your office to inquire if she would need to be seen. I also instructed her if symptoms are to progress, then she is to go straight to the ED. She verbalized agreement.     I just wanted to communicate this incident with you for when she calls.     Thanks,   Mónica Thomas DPT

## 2022-09-12 NOTE — TELEPHONE ENCOUNTER
----- Message from Xuan Johnston sent at 9/9/2022  2:13 PM CDT -----  Contact: ALEJANDRA BRAUN [[id number]][phone]  Type:  Needs Medical Advice    Who Called: ALEJANDRA BRAUN [[id number]]  Symptoms (please be specific): Vertigo and /103  How long has patient had these symptoms: Since yesterday  Would the patient rather a call back or a response via [employer]? Phone call  Best Call Back Number: [phone]  Additional Information: Patient indicates she was turned away by phys therapy today due to the symptoms.

## 2022-09-13 ENCOUNTER — CLINICAL SUPPORT (OUTPATIENT)
Dept: REHABILITATION | Facility: HOSPITAL | Age: 72
End: 2022-09-13
Attending: FAMILY MEDICINE
Payer: MEDICARE

## 2022-09-13 DIAGNOSIS — H53.10 EYE FATIGUE: Primary | ICD-10-CM

## 2022-09-13 DIAGNOSIS — R26.89 IMPAIRMENT OF BALANCE: ICD-10-CM

## 2022-09-13 DIAGNOSIS — R42 DIZZINESS: ICD-10-CM

## 2022-09-13 PROCEDURE — 97110 THERAPEUTIC EXERCISES: CPT | Mod: PO

## 2022-09-13 PROCEDURE — 97112 NEUROMUSCULAR REEDUCATION: CPT | Mod: PO

## 2022-09-14 NOTE — PROGRESS NOTES
[facility]   Physical Therapy Treatment Note     Name: Tracy Armendariz  Clinic Number: [id number]    Therapy Diagnosis:   Encounter Diagnoses   Name Primary?    Eye fatigue Yes    Dizziness     Impairment of balance        Physician: LORRAINE Alfonso MD    Visit Date: 9/15/2022    Physician Orders: PT Eval and Treat   Medical Diagnosis from Referral:   H81.391 (ICD-10-CM) - Peripheral vertigo involving right ear   R27.0 (ICD-10-CM) - Ataxia   Z91.81 (ICD-10-CM) - At high risk for falls      Evaluation Date: 7/12/2022  Authorization Period Expiration: 09/27/22  Plan of Care Expiration: 09/09/22  Visit # / Visits authorized: 18/ 17 (18 total visits)      Time In: 1300  Time Out: 1343   Total Billable Time: 43 minutes     Precautions: Standard, fall, safety awareness    SUBJECTIVE     Pt reports: that her blood pressure has been well managed.     Home exercise program: patient instructed to hold off on oculomotor exercises to see if this helps improve headaches  Response to previous treatment: no adverse effects  Functional change: ongoing    Pain: 0/10  Location: headache between eyes    Dizziness: 5/10    OBJECTIVE     No objective measurements taken this date.     Treatment     Tracy received the treatments listed below:      Tracy received therapeutic exercises to develop strength and endurance for 8 minutes including:    X 8 minutes, Scifit Recumbent Stepper L2    neuromuscular re-education activities to improve: Balance, Coordination, Sense and vestibular function for 35 minutes. The following activities were included:    Ambulation around neuro gym:  X 1 lap, Horizontal head turns, standby by assist   X 1 lap, vertical head turns, standby by assist   X 1 lap each way, Diagonal head turns standby by assist     //bars:   X 3 laps each way, Tandem ambulation fwd<>bwd, frequent touchdown support, Contact guard assistance   X 45s, Single Cone taps using medium cone, no UE support,  Contact guard assistance   2 x 45s, Single Cone taps using large cone, no UE support, Contact guard assistance   X 30s bilaterally, Split stance with contralateral limb on soft side of BOSU, Contact guard assistance   X 30s bilaterally, Split stance with contralateral limb on firm side of BOSU, SBA  X 30s bilaterally, Split stance with contralateral limb on basketball ball, Contact guard assistance   2 x 30s each LE back, Tandem stance, eyes open, Contact guard assistance   X 30s each LE back, Tandem stance, eyes open with horizontal head turns, Minimal assist - Contact guard assistance   X 30s each LE back, Tandem stance, eyes open with vertical head turns, Minimal assist - Contact guard assistance   X 30s with each LE back, Tandem stance with eyes closed, Minimal assist - Contact guard assistance   X 10 reps with each LE leading, Foam fitter step ups, no UE support, standby by assist         Patient Education and Home Exercises     Home Exercises Provided and Patient Education Provided     Education provided:   - plan of care (POC)  -08/11/22: since patient endorses headaches while performing oculomotor home exercise program, PT further questioned patient about home exercise program and it appears that patient is doing too many exercises. Therefore, home exercise program re-printed and reviewed with hand written notes for correct performance, correct intensity, and correct frequency.    Written Home Exercises Provided: yes. Exercises were reviewed and Tracy was able to demonstrate them prior to the end of the session.  Tracy demonstrated good  understanding of the education provided. See EMR under Patient Instructions for exercises provided during therapy session on 07/14/22.     ASSESSMENT   Tracy tolerated today's treatment session well. She continues to exhibit limited use of internal balance strategies with balance interventions with limited carryover limit UE touchdown support. PT to continue per  established POC x 2 weeks, emphasizing endurance and balance training.     Tracy Is progressing well towards her goals.   Pt prognosis is Good.     Pt will continue to benefit from skilled outpatient physical therapy to address the deficits listed in the problem list box on initial evaluation, provide pt/family education and to maximize pt's level of independence in the home and community environment.     Pt's spiritual, cultural and educational needs considered and pt agreeable to plan of care and goals.     Anticipated Barriers for therapy: co-morbidities, chronicity of symptoms    Goals:  Short Term Goals: 4 weeks   1. Patient to be (I) with established home exercise program. MET 08/11/22  2. . Discontinued  3. . Discontinued  4. . Discontinued  5. Patient to pass GST condition 2 with no more than slight sway for improved balance in low vision environments. MET 08/11/22  6. Patient to improve GST condition 3 to at least 12 seconds for improved balance and decreased fall risk. MET 09/07/22  7. Patient to improve GST condition 5 to at least 20 seconds for improved balance on unlevel surfaces. MET 08/11/22  8. Patient to improve GST condition 6 to at least 20 seconds for improved balance in low vision environments. MET 08/11/22  9. PT to formally assess Functional Gait Assessment ans establish appropriate goal. MET 07/14/22     Long Term Goals: 8 weeks   1. Patient to be (I) with advanced home exercise program. Ongoing  2. . Discontinued  3. . Discontinued  4. . Discontinued  5. Patient to improve GST condition 3 to at least 18 seconds for improved balance and decreased fall risk. Partially Met  6. Patient to improve GST condition 5 to at least 30 seconds for improved balance on unlevel surfaces. MET 08/11/22  7. Patient to improve GST condition 6 to at least 30 seconds for improved balance in low vision environments. Progressing-sig improvement  8. Patient to improve Functional Gait Assessment score to at least  22/30 for improved balance and decreased fall risk in community environments. MET 09/07/22   Updated 09/07/22: Patient to improve Functional Gait Assessment score to at least 24/30 for improved balance and decreased fall risk in community environments. Ongoing    PLAN     Discontinue oculomotor exercises. Progress balance and activity tolerance as able.     Sumaya Galindo, PT

## 2022-09-15 ENCOUNTER — CLINICAL SUPPORT (OUTPATIENT)
Dept: REHABILITATION | Facility: HOSPITAL | Age: 72
End: 2022-09-15
Attending: FAMILY MEDICINE
Payer: MEDICARE

## 2022-09-15 DIAGNOSIS — R42 DIZZINESS: ICD-10-CM

## 2022-09-15 DIAGNOSIS — R26.89 IMPAIRMENT OF BALANCE: ICD-10-CM

## 2022-09-15 DIAGNOSIS — H53.10 EYE FATIGUE: Primary | ICD-10-CM

## 2022-09-15 PROCEDURE — 97112 NEUROMUSCULAR REEDUCATION: CPT | Mod: PO

## 2022-09-15 PROCEDURE — 97110 THERAPEUTIC EXERCISES: CPT | Mod: PO

## 2022-09-19 NOTE — PROGRESS NOTES
[facility]   Physical Therapy Treatment Note     Name: Tracy Armendariz  Clinic Number: [id number]    Therapy Diagnosis:   Encounter Diagnoses   Name Primary?    Eye fatigue Yes    Dizziness     Impairment of balance      Physician: LORRAINE Alfonso MD    Visit Date: 9/20/2022    Physician Orders: PT Eval and Treat   Medical Diagnosis from Referral:   H81.391 (ICD-10-CM) - Peripheral vertigo involving right ear   R27.0 (ICD-10-CM) - Ataxia   Z91.81 (ICD-10-CM) - At high risk for falls      Evaluation Date: 7/12/2022  Authorization Period Expiration: 09/27/22  Plan of Care Expiration: 09/09/22  Visit # / Visits authorized: 19/ 17 (17 total visits)      Time In: 1200  Time Out: 1239   Total Billable Time: 39 minutes (late arrival. Able to partially accommodate)     Precautions: Standard, fall, safety awareness    SUBJECTIVE     Pt reports: that she is running late because she was checking her blood pressure, which was lower than she expected. She denies any symptoms upon arrival. She has been feeling well that past few days.     Home exercise program: patient instructed to hold off on oculomotor exercises to see if this helps improve headaches  Response to previous treatment: no adverse effects  Functional change: ongoing    Pain: 0/10  Location: headache between eyes    Dizziness: 5/10    OBJECTIVE     No objective measurements taken this date.     Treatment     Tracy received the treatments listed below:      Tracy received therapeutic exercises to develop strength and endurance for 8 minutes including:    X 8 minutes, Scifit Recumbent Stepper L2    neuromuscular re-education activities to improve: Balance, Coordination, Sense and vestibular function for 31 minutes. The following activities were included:    Ambulation around neuro gym:  X 1 lap, Horizontal head turns, standby by assist   X 1 lap, vertical head turns, standby by assist   X 1 lap each way, Diagonal head turns standby by  assist     //bars:   X 2 laps each way, Tandem ambulation fwd<>bwd, frequent touchdown support, Contact guard assistance   X 4 laps, Walking marches with 3 sec hold attempts, frequent touchdown support, standby by assist   2 x 45s, Single Cone taps using large cone, no UE support, Contact guard assistance   X 30s bilaterally, Split stance with contralateral limb on firm side of BOSU, Contact guard assistance   X 30s bilaterally, Split stance with contralateral limb on basketball ball, Contact guard assistance     X 4 laps, Figure 8 ambulation around 4 large cones, with horizontal head turns  X 4 laps, Figure 8 ambulation around 4 large cones, with vertical head turns      Patient Education and Home Exercises     Home Exercises Provided and Patient Education Provided     Education provided:   - plan of care (POC)  -08/11/22: since patient endorses headaches while performing oculomotor home exercise program, PT further questioned patient about home exercise program and it appears that patient is doing too many exercises. Therefore, home exercise program re-printed and reviewed with hand written notes for correct performance, correct intensity, and correct frequency.    Written Home Exercises Provided: yes. Exercises were reviewed and Tracy was able to demonstrate them prior to the end of the session.  Tracy demonstrated good  understanding of the education provided. See EMR under Patient Instructions for exercises provided during therapy session on 07/14/22.     ASSESSMENT   Tracy tolerated today's treatment session well, exhibiting appropriate challenge with today's interventions. Frequent rest breaks required throughout session due to fatigue. PT to continue per established POC x 2 visits, emphasizing endurance, balance training, and HEP education.     Tracy Is progressing well towards her goals.   Pt prognosis is Good.     Pt will continue to benefit from skilled outpatient physical therapy to address the  deficits listed in the problem list box on initial evaluation, provide pt/family education and to maximize pt's level of independence in the home and community environment.     Pt's spiritual, cultural and educational needs considered and pt agreeable to plan of care and goals.     Anticipated Barriers for therapy: co-morbidities, chronicity of symptoms    Goals:  Short Term Goals: 4 weeks   1. Patient to be (I) with established home exercise program. MET 08/11/22  2. . Discontinued  3. . Discontinued  4. . Discontinued  5. Patient to pass GST condition 2 with no more than slight sway for improved balance in low vision environments. MET 08/11/22  6. Patient to improve GST condition 3 to at least 12 seconds for improved balance and decreased fall risk. MET 09/07/22  7. Patient to improve GST condition 5 to at least 20 seconds for improved balance on unlevel surfaces. MET 08/11/22  8. Patient to improve GST condition 6 to at least 20 seconds for improved balance in low vision environments. MET 08/11/22  9. PT to formally assess Functional Gait Assessment ans establish appropriate goal. MET 07/14/22     Long Term Goals: 8 weeks   1. Patient to be (I) with advanced home exercise program. Ongoing  2. . Discontinued  3. . Discontinued  4. . Discontinued  5. Patient to improve GST condition 3 to at least 18 seconds for improved balance and decreased fall risk. Partially Met  6. Patient to improve GST condition 5 to at least 30 seconds for improved balance on unlevel surfaces. MET 08/11/22  7. Patient to improve GST condition 6 to at least 30 seconds for improved balance in low vision environments. Progressing-sig improvement  8. Patient to improve Functional Gait Assessment score to at least 22/30 for improved balance and decreased fall risk in community environments. MET 09/07/22   Updated 09/07/22: Patient to improve Functional Gait Assessment score to at least 24/30 for improved balance and decreased fall risk in  community environments. Ongoing    PLAN     Discontinue oculomotor exercises. Progress balance and activity tolerance as able.     Sumaya Galindo, PT

## 2022-09-20 ENCOUNTER — CLINICAL SUPPORT (OUTPATIENT)
Dept: REHABILITATION | Facility: HOSPITAL | Age: 72
End: 2022-09-20
Attending: SPECIALIST
Payer: MEDICARE

## 2022-09-20 DIAGNOSIS — R42 DIZZINESS: ICD-10-CM

## 2022-09-20 DIAGNOSIS — H53.10 EYE FATIGUE: Primary | ICD-10-CM

## 2022-09-20 DIAGNOSIS — R26.89 IMPAIRMENT OF BALANCE: ICD-10-CM

## 2022-09-20 PROCEDURE — 97112 NEUROMUSCULAR REEDUCATION: CPT | Mod: PO

## 2022-09-20 PROCEDURE — 97110 THERAPEUTIC EXERCISES: CPT | Mod: PO

## 2022-09-20 NOTE — PATIENT INSTRUCTIONS
Resource: [employer]     Perform over 30 ft clear walkway. Perform with eyes open. Perform 4 laps for each of the following movements. Take breaks as needed for safety. Perform 4 laps for each of the following movements:  Right <> left head movements  Up <> down head movements  Right upper <> left lower diagonal head movements  Left upper <> right lower diagonal head movements

## 2022-09-21 NOTE — PROGRESS NOTES
[facility]   Physical Therapy Treatment Note     Name: Tracy Armendariz  Clinic Number: [id number]    Therapy Diagnosis:   Encounter Diagnoses   Name Primary?    Eye fatigue Yes    Dizziness     Impairment of balance        Physician: LORRAINE Alfonso MD    Visit Date: 9/22/2022    Physician Orders: PT Eval and Treat   Medical Diagnosis from Referral:   H81.391 (ICD-10-CM) - Peripheral vertigo involving right ear   R27.0 (ICD-10-CM) - Ataxia   Z91.81 (ICD-10-CM) - At high risk for falls      Evaluation Date: 7/12/2022  Authorization Period Expiration: 09/27/22  Plan of Care Expiration: 09/09/22  Visit # / Visits authorized: 20/ 17 (21 total visits) - further authorization pending     Time In: 10:45  Time Out: 11:30   Total Billable Time: 45 minutes     Precautions: Standard, fall, safety awareness    SUBJECTIVE     Pt reports: that she feels proud of her work with therapy because she caught her foot on a curb yesterday and was able to recover her balance to prevent a fall which she has not been able to do in a long time. She has been monitoring her vitals and they have been within normal ranges. No recent dizziness/vertigo episodes.     Home exercise program: patient instructed to hold off on oculomotor exercises to see if this helps improve headaches  Response to previous treatment: no adverse effects  Functional change: ongoing    Pain: 0/10  Location: headache between eyes    Dizziness: 5/10    OBJECTIVE     No objective measurements taken this date.     Treatment     Tracy received the treatments listed below:      Tracy received therapeutic exercises to develop strength and endurance for 10 minutes including:    X 10 minutes, Scifit Recumbent Stepper L2.5    neuromuscular re-education activities to improve: Balance, Coordination, Sense and vestibular function for 35 minutes. The following activities were included:    Ambulation around neuro gym:  X 1 lap, Horizontal head turns,  "standby by assist   X 1 lap, vertical head turns, standby by assist   X 1 lap each way, Diagonal head turns standby by assist     //bars:   X 2 laps each way, Tandem ambulation fwd<>bwd, frequent touchdown support, Contact guard assistance   X 4 laps, Walking marches with 3 sec hold attempts, frequent touchdown support, standby by assist     2 x 45s, Single Cone taps using large cone, no UE support, Contact guard assistance   X 30s bilaterally, Split stance with contralateral limb on firm side of BOSU, Contact guard assistance - eyes open  X 30s bilaterally, Split stance with contralateral limb on firm side of BOSU, Contact guard assistance to occ Min A - eyes closed    3 x 30" stance on foam pad with eyes closed, Min A progressed to CGA- verbal cues for proper ankle strategy      Patient Education and Home Exercises     Home Exercises Provided and Patient Education Provided     Education provided:   - plan of care (POC)  -08/11/22: since patient endorses headaches while performing oculomotor home exercise program, PT further questioned patient about home exercise program and it appears that patient is doing too many exercises. Therefore, home exercise program re-printed and reviewed with hand written notes for correct performance, correct intensity, and correct frequency.    Written Home Exercises Provided: yes. Exercises were reviewed and Tracy was able to demonstrate them prior to the end of the session.  Tracy demonstrated good  understanding of the education provided. See EMR under Patient Instructions for exercises provided during therapy session on 07/14/22. Updated home exercise program provided on 09/22/22; see Patient instructions on 09/22/22.      ASSESSMENT   Tracy tolerated therapy session well this morning. Reviewed updated home exercise program which includes ambulation with head movements and CV walking program. Only 2 seated rest breaks required today due to fatigue. Plan to review home " exercise program as needed next week. D/c in 2 sessions.     Tracy Is progressing well towards her goals.   Pt prognosis is Good.     Pt will continue to benefit from skilled outpatient physical therapy to address the deficits listed in the problem list box on initial evaluation, provide pt/family education and to maximize pt's level of independence in the home and community environment.     Pt's spiritual, cultural and educational needs considered and pt agreeable to plan of care and goals.     Anticipated Barriers for therapy: co-morbidities, chronicity of symptoms    Goals:  Short Term Goals: 4 weeks   1. Patient to be (I) with established home exercise program. MET 08/11/22  2. . Discontinued  3. . Discontinued  4. . Discontinued  5. Patient to pass GST condition 2 with no more than slight sway for improved balance in low vision environments. MET 08/11/22  6. Patient to improve GST condition 3 to at least 12 seconds for improved balance and decreased fall risk. MET 09/07/22  7. Patient to improve GST condition 5 to at least 20 seconds for improved balance on unlevel surfaces. MET 08/11/22  8. Patient to improve GST condition 6 to at least 20 seconds for improved balance in low vision environments. MET 08/11/22  9. PT to formally assess Functional Gait Assessment ans establish appropriate goal. MET 07/14/22     Long Term Goals: 8 weeks   1. Patient to be (I) with advanced home exercise program. Ongoing  2. . Discontinued  3. . Discontinued  4. . Discontinued  5. Patient to improve GST condition 3 to at least 18 seconds for improved balance and decreased fall risk. Partially Met  6. Patient to improve GST condition 5 to at least 30 seconds for improved balance on unlevel surfaces. MET 08/11/22  7. Patient to improve GST condition 6 to at least 30 seconds for improved balance in low vision environments. Progressing-sig improvement  8. Patient to improve Functional Gait Assessment score to at least 22/30 for  improved balance and decreased fall risk in community environments. MET 09/07/22   Updated 09/07/22: Patient to improve Functional Gait Assessment score to at least 24/30 for improved balance and decreased fall risk in community environments. Ongoing    PLAN     Check home exercise program compliance. D/c in 2 sessions.    Mónica Thomas, PT

## 2022-09-22 ENCOUNTER — CLINICAL SUPPORT (OUTPATIENT)
Dept: REHABILITATION | Facility: HOSPITAL | Age: 72
End: 2022-09-22
Attending: SPECIALIST
Payer: MEDICARE

## 2022-09-22 DIAGNOSIS — R42 DIZZINESS: ICD-10-CM

## 2022-09-22 DIAGNOSIS — H53.10 EYE FATIGUE: Primary | ICD-10-CM

## 2022-09-22 DIAGNOSIS — R26.89 IMPAIRMENT OF BALANCE: ICD-10-CM

## 2022-09-22 PROCEDURE — 97112 NEUROMUSCULAR REEDUCATION: CPT | Mod: PO

## 2022-09-22 PROCEDURE — 97110 THERAPEUTIC EXERCISES: CPT | Mod: PO

## 2022-09-27 ENCOUNTER — CLINICAL SUPPORT (OUTPATIENT)
Dept: REHABILITATION | Facility: HOSPITAL | Age: 72
End: 2022-09-27
Attending: FAMILY MEDICINE
Payer: MEDICARE

## 2022-09-27 DIAGNOSIS — H53.10 EYE FATIGUE: Primary | ICD-10-CM

## 2022-09-27 DIAGNOSIS — R26.89 IMPAIRMENT OF BALANCE: ICD-10-CM

## 2022-09-27 DIAGNOSIS — R42 DIZZINESS: ICD-10-CM

## 2022-09-27 PROCEDURE — 97112 NEUROMUSCULAR REEDUCATION: CPT | Mod: PO

## 2022-09-27 PROCEDURE — 97110 THERAPEUTIC EXERCISES: CPT | Mod: PO

## 2022-09-27 NOTE — PROGRESS NOTES
"[facility]   Physical Therapy Treatment Note     Name: Tracy Armendariz  Clinic Number: [id number]    Therapy Diagnosis:   Encounter Diagnoses   Name Primary?    Eye fatigue Yes    Dizziness     Impairment of balance        Physician: LORRAINE Alfonso MD    Visit Date: 9/27/2022    Physician Orders: PT Eval and Treat   Medical Diagnosis from Referral:   H81.391 (ICD-10-CM) - Peripheral vertigo involving right ear   R27.0 (ICD-10-CM) - Ataxia   Z91.81 (ICD-10-CM) - At high risk for falls      Evaluation Date: 7/12/2022  Authorization Period Expiration: 09/27/22  Plan of Care Expiration: 09/09/22     Time In: 1155  Time Out: 1230   Total Billable Time: 35 minutes     Precautions: Standard, fall, safety awareness    SUBJECTIVE     Pt reports: "I didn't do any of my exercises"    Home exercise program: patient instructed to hold off on oculomotor exercises to see if this helps improve headaches  Response to previous treatment: no adverse effects  Functional change: ongoing    Pain: 0/10  Location: headache between eyes    OBJECTIVE     No objective measurements taken this date.     Treatment     Tracy received the treatments listed below:      Tracy received therapeutic exercises to develop strength and endurance for 10 minutes including:    X 10 minutes, Scifit Recumbent Stepper L2.5    neuromuscular re-education activities to improve: Balance, Coordination, Sense and vestibular function for 25 minutes. The following activities were included:    //bars:   X 6 laps each way, Tandem ambulation fwd only, frequent touchdown support, Contact guard assistance   X 4 laps, Walking marches with 3 sec hold attempts, frequent touchdown support, standby by assist     3 x 45s alternating reps, Single Cone taps using large cone, no UE support, Contact guard assistance   X 30s bilaterally, Split stance with contralateral limb on firm side of BOSU, Contact guard assistance - eyes open  X 30s bilaterally, " "Split stance with contralateral limb on firm side of BOSU, Contact guard assistance to occ Min A - eyes closed    X 10 laps, Fwd stepping over hurdles, no UE support, standby by assist     3 x 30" stance on foam pad with eyes closed, Contact guard assistance       Patient Education and Home Exercises     Home Exercises Provided and Patient Education Provided     Education provided:   - plan of care (POC)  -08/11/22: since patient endorses headaches while performing oculomotor home exercise program, PT further questioned patient about home exercise program and it appears that patient is doing too many exercises. Therefore, home exercise program re-printed and reviewed with hand written notes for correct performance, correct intensity, and correct frequency.    Written Home Exercises Provided: yes. Exercises were reviewed and Tracy was able to demonstrate them prior to the end of the session.  Tracy demonstrated good  understanding of the education provided. See EMR under Patient Instructions for exercises provided during therapy session on 07/14/22. Updated home exercise program provided on 09/22/22; see Patient instructions on 09/22/22.      ASSESSMENT   Tracy tolerated therapy session well this morning. She demonstrated fairly good dynamic balance with today's interventions, but did tend to move too quickly to maintain her stability with several interventions. PT to discharge next visit.     Tracy Is progressing well towards her goals.   Pt prognosis is Good.     Pt will continue to benefit from skilled outpatient physical therapy to address the deficits listed in the problem list box on initial evaluation, provide pt/family education and to maximize pt's level of independence in the home and community environment.     Pt's spiritual, cultural and educational needs considered and pt agreeable to plan of care and goals.     Anticipated Barriers for therapy: co-morbidities, chronicity of " symptoms    Goals:  Short Term Goals: 4 weeks   1. Patient to be (I) with established home exercise program. MET 08/11/22  2. . Discontinued  3. . Discontinued  4. . Discontinued  5. Patient to pass GST condition 2 with no more than slight sway for improved balance in low vision environments. MET 08/11/22  6. Patient to improve GST condition 3 to at least 12 seconds for improved balance and decreased fall risk. MET 09/07/22  7. Patient to improve GST condition 5 to at least 20 seconds for improved balance on unlevel surfaces. MET 08/11/22  8. Patient to improve GST condition 6 to at least 20 seconds for improved balance in low vision environments. MET 08/11/22  9. PT to formally assess Functional Gait Assessment ans establish appropriate goal. MET 07/14/22     Long Term Goals: 8 weeks   1. Patient to be (I) with advanced home exercise program. Ongoing  2. . Discontinued  3. . Discontinued  4. . Discontinued  5. Patient to improve GST condition 3 to at least 18 seconds for improved balance and decreased fall risk. Partially Met  6. Patient to improve GST condition 5 to at least 30 seconds for improved balance on unlevel surfaces. MET 08/11/22  7. Patient to improve GST condition 6 to at least 30 seconds for improved balance in low vision environments. Progressing-sig improvement  8. Patient to improve Functional Gait Assessment score to at least 22/30 for improved balance and decreased fall risk in community environments. MET 09/07/22   Updated 09/07/22: Patient to improve Functional Gait Assessment score to at least 24/30 for improved balance and decreased fall risk in community environments. Ongoing    PLAN     Check home exercise program compliance. D/c next session.     Sumaya Galindo, PT

## 2022-09-28 NOTE — PROGRESS NOTES
"[facility]   Physical Therapy Treatment Note     Name: Tracy Armendariz  Clinic Number: [id number]    Therapy Diagnosis:   Encounter Diagnoses   Name Primary?    Eye fatigue Yes    Dizziness     Impairment of balance        Physician: LORRAINE Alfonso MD    Visit Date: 9/29/2022    Physician Orders: PT Eval and Treat   Medical Diagnosis from Referral:   H81.391 (ICD-10-CM) - Peripheral vertigo involving right ear   R27.0 (ICD-10-CM) - Ataxia   Z91.81 (ICD-10-CM) - At high risk for falls      Evaluation Date: 7/12/2022  Authorization Period Expiration: 09/27/22  Plan of Care Expiration: 09/09/22     Time In: 1045  Time Out: 1120   Total Billable Time: 35 minutes     Precautions: Standard, fall, safety awareness    SUBJECTIVE     Pt reports: "you guys have done a wonderful job." She is ready to discharge today.    Home exercise program: patient instructed to hold off on oculomotor exercises to see if this helps improve headaches  Response to previous treatment: no adverse effects  Functional change: ongoing    Pain: 0/10  Location: n/a    OBJECTIVE     MICHAEL SENSORY TESTING:  (P= Pass, F= Fail; note any sway; hold each position for 30")  Condition 1: (firm surface/feet together/eyes open) P  Condition 2: (firm surface/feet together/eyes closed) P  Condition 3: (firm surface/feet in tandem/eyes open) F - R avg: 15 seconds L avg: 10 seconds  Condition 4: (firm surface/feet in tandem/eyes closed) DNT  Condition 5: (soft surface/feet together/eyes open) P  Condition 6: (soft surface/feet together/eyes closed) F - 14 seconds  Condition 7: (Fakuda step test), measure distance varied from center starting position, > 30 deg deviation to either side indicates hypofunction of biased side DNT    Functional Gait Assessment:   1. Gait on level surface =  3  2. Change in Gait Speed = 3  3. Gait with horizontal head turns  = 3  4. Gait with vertical head turns = 3  5. Gait with pivot turns = 3  6. Step over " obstacle = 3  7. Gait with Narrow STANISLAV = 0  8. Gait with eyes closed = 2  9. Ambulating Backwards = 2  10. Steps = 2    Score 24/30       Treatment     Tracy received the treatments listed below:      Tracy received therapeutic exercises to develop strength and endurance for 10 minutes including:    X 10 minutes, Scifit Recumbent Stepper L2.5    neuromuscular re-education activities to improve: Balance, Coordination, Sense and vestibular function for 25 minutes. The following activities were included:    Includes time spent on objective measurements. See above.       Patient Education and Home Exercises     Home Exercises Provided and Patient Education Provided     Education provided:   - plan of care (POC)  -08/11/22: since patient endorses headaches while performing oculomotor home exercise program, PT further questioned patient about home exercise program and it appears that patient is doing too many exercises. Therefore, home exercise program re-printed and reviewed with hand written notes for correct performance, correct intensity, and correct frequency.    Written Home Exercises Provided: yes. Exercises were reviewed and Tracy was able to demonstrate them prior to the end of the session.  Tracy demonstrated good  understanding of the education provided. See EMR under Patient Instructions for exercises provided during therapy session on 07/14/22. Updated home exercise program provided on 09/22/22; see Patient instructions on 09/22/22.        PHYSICAL THERAPY ASSESSMENT & DISCHARGE SUMMARY     Tracy attended outpatient PT for ~2.5 months to address dizziness and imbalance. She exhibits good overall improvement of balance. Static balance has improved as indicated by Kaleb Sensory Testing; however, she is still significantly challenged with conditions #3, #4, and #6 (tandem conditions and vision eliminated on a compliant surface). She also exhibits improved dynamic balance as indicated by Functional Gait  Assessment score; her current score now places her outside of the elevated fall risk range. Her self assessment has improved even beyond predicted level of improvement, indicating decreased self-perceived functional limitation related to dizziness and imbalance. At this time, Tracy has achieved maximal functional level of improvement and is appropriate to discharge at this time.     Goals:  Short Term Goals: 4 weeks   1. Patient to be (I) with established home exercise program. MET 08/11/22  2. Patient to pass GST condition 2 with no more than slight sway for improved balance in low vision environments. MET 08/11/22  3. Patient to improve GST condition 3 to at least 12 seconds for improved balance and decreased fall risk. MET 09/07/22  4. Patient to improve GST condition 5 to at least 20 seconds for improved balance on unlevel surfaces. MET 08/11/22  5. Patient to improve GST condition 6 to at least 20 seconds for improved balance in low vision environments. MET 08/11/22  6. PT to formally assess Functional Gait Assessment ans establish appropriate goal. MET 07/14/22     Long Term Goals: 8 weeks   1. Patient to be (I) with advanced home exercise program. Met 09/29/2022  2. Patient to improve GST condition 3 to at least 18 seconds for improved balance and decreased fall risk. Partially Met  3. Patient to improve GST condition 5 to at least 30 seconds for improved balance on unlevel surfaces. MET 08/11/22  4. Patient to improve GST condition 6 to at least 30 seconds for improved balance in low vision environments. Not met  5. Patient to improve Functional Gait Assessment score to at least 22/30 for improved balance and decreased fall risk in community environments. MET 09/07/22   Updated 09/07/22: Patient to improve Functional Gait Assessment score to at least 24/30 for improved balance and decreased fall risk in community environments. Met 09/29/2022    Discharge reason: Patient has maximized benefit from PT at  this time    PLAN   This patient is discharged from Outpatient Physical Therapy Services.     Sumaya Galindo, PT  09/29/2022

## 2022-09-29 ENCOUNTER — CLINICAL SUPPORT (OUTPATIENT)
Dept: REHABILITATION | Facility: HOSPITAL | Age: 72
End: 2022-09-29
Attending: SPECIALIST
Payer: MEDICARE

## 2022-09-29 DIAGNOSIS — H53.10 EYE FATIGUE: Primary | ICD-10-CM

## 2022-09-29 DIAGNOSIS — R42 DIZZINESS: ICD-10-CM

## 2022-09-29 DIAGNOSIS — R26.89 IMPAIRMENT OF BALANCE: ICD-10-CM

## 2022-09-29 PROCEDURE — 97110 THERAPEUTIC EXERCISES: CPT | Mod: PO

## 2022-09-29 PROCEDURE — 97112 NEUROMUSCULAR REEDUCATION: CPT | Mod: PO

## 2022-09-29 NOTE — PLAN OF CARE
PHYSICAL THERAPY ASSESSMENT & DISCHARGE SUMMARY     Tracy attended outpatient PT for ~2.5 months to address dizziness and imbalance. She exhibits good overall improvement of balance. Static balance has improved as indicated by Kaleb Sensory Testing; however, she is still significantly challenged with conditions #3, #4, and #6 (tandem conditions and vision eliminated on a compliant surface). She also exhibits improved dynamic balance as indicated by Functional Gait Assessment score; her current score now places her outside of the elevated fall risk range. Her self assessment has improved even beyond predicted level of improvement, indicating decreased self-perceived functional limitation related to dizziness and imbalance. At this time, Tracy has achieved maximal functional level of improvement and is appropriate to discharge at this time.     Goals:  Short Term Goals: 4 weeks   1. Patient to be (I) with established home exercise program. MET 08/11/22  2. Patient to pass GST condition 2 with no more than slight sway for improved balance in low vision environments. MET 08/11/22  3. Patient to improve GST condition 3 to at least 12 seconds for improved balance and decreased fall risk. MET 09/07/22  4. Patient to improve GST condition 5 to at least 20 seconds for improved balance on unlevel surfaces. MET 08/11/22  5. Patient to improve GST condition 6 to at least 20 seconds for improved balance in low vision environments. MET 08/11/22  6. PT to formally assess Functional Gait Assessment ans establish appropriate goal. MET 07/14/22     Long Term Goals: 8 weeks   1. Patient to be (I) with advanced home exercise program. Met 09/29/2022  2. Patient to improve GST condition 3 to at least 18 seconds for improved balance and decreased fall risk. Partially Met  3. Patient to improve GST condition 5 to at least 30 seconds for improved balance on unlevel surfaces. MET 08/11/22  4. Patient to improve GST condition 6 to at  least 30 seconds for improved balance in low vision environments. Not met  5. Patient to improve Functional Gait Assessment score to at least 22/30 for improved balance and decreased fall risk in community environments. MET 09/07/22   Updated 09/07/22: Patient to improve Functional Gait Assessment score to at least 24/30 for improved balance and decreased fall risk in community environments. Met 09/29/2022    Discharge reason: Patient has maximized benefit from PT at this time    PLAN   This patient is discharged from Outpatient Physical Therapy Services.     Sumaya Galindo, PT  09/29/2022

## 2022-10-03 ENCOUNTER — OFFICE VISIT (OUTPATIENT)
Dept: CARDIOLOGY | Facility: CLINIC | Age: 72
End: 2022-10-03
Payer: MEDICARE

## 2022-10-03 VITALS
WEIGHT: 231 LBS | HEIGHT: 65 IN | SYSTOLIC BLOOD PRESSURE: 134 MMHG | HEART RATE: 54 BPM | DIASTOLIC BLOOD PRESSURE: 82 MMHG | BODY MASS INDEX: 38.49 KG/M2

## 2022-10-03 DIAGNOSIS — R26.89 IMPAIRMENT OF BALANCE: ICD-10-CM

## 2022-10-03 DIAGNOSIS — R06.02 SOB (SHORTNESS OF BREATH) ON EXERTION: ICD-10-CM

## 2022-10-03 DIAGNOSIS — E78.2 MIXED HYPERLIPIDEMIA: Chronic | ICD-10-CM

## 2022-10-03 DIAGNOSIS — G47.33 OSA (OBSTRUCTIVE SLEEP APNEA): ICD-10-CM

## 2022-10-03 DIAGNOSIS — I10 PRIMARY HYPERTENSION: Chronic | ICD-10-CM

## 2022-10-03 DIAGNOSIS — I63.39 CEREBRAL INFARCTION DUE TO THROMBOSIS OF OTHER CEREBRAL ARTERY: Primary | ICD-10-CM

## 2022-10-03 DIAGNOSIS — I70.0 ATHEROSCLEROSIS OF AORTA: ICD-10-CM

## 2022-10-03 DIAGNOSIS — E66.01 SEVERE OBESITY (BMI 35.0-39.9) WITH COMORBIDITY: ICD-10-CM

## 2022-10-03 PROCEDURE — 99999 PR PBB SHADOW E&M-EST. PATIENT-LVL IV: ICD-10-PCS | Mod: PBBFAC,,, | Performed by: INTERNAL MEDICINE

## 2022-10-03 PROCEDURE — 3008F PR BODY MASS INDEX (BMI) DOCUMENTED: ICD-10-PCS | Mod: CPTII,S$GLB,, | Performed by: INTERNAL MEDICINE

## 2022-10-03 PROCEDURE — 1101F PR PT FALLS ASSESS DOC 0-1 FALLS W/OUT INJ PAST YR: ICD-10-PCS | Mod: CPTII,S$GLB,, | Performed by: INTERNAL MEDICINE

## 2022-10-03 PROCEDURE — 93000 ELECTROCARDIOGRAM COMPLETE: CPT | Mod: S$GLB,,, | Performed by: INTERNAL MEDICINE

## 2022-10-03 PROCEDURE — 1101F PT FALLS ASSESS-DOCD LE1/YR: CPT | Mod: CPTII,S$GLB,, | Performed by: INTERNAL MEDICINE

## 2022-10-03 PROCEDURE — 3044F HG A1C LEVEL LT 7.0%: CPT | Mod: CPTII,S$GLB,, | Performed by: INTERNAL MEDICINE

## 2022-10-03 PROCEDURE — 1126F AMNT PAIN NOTED NONE PRSNT: CPT | Mod: CPTII,S$GLB,, | Performed by: INTERNAL MEDICINE

## 2022-10-03 PROCEDURE — 3288F PR FALLS RISK ASSESSMENT DOCUMENTED: ICD-10-PCS | Mod: CPTII,S$GLB,, | Performed by: INTERNAL MEDICINE

## 2022-10-03 PROCEDURE — 3008F BODY MASS INDEX DOCD: CPT | Mod: CPTII,S$GLB,, | Performed by: INTERNAL MEDICINE

## 2022-10-03 PROCEDURE — 3044F PR MOST RECENT HEMOGLOBIN A1C LEVEL <7.0%: ICD-10-PCS | Mod: CPTII,S$GLB,, | Performed by: INTERNAL MEDICINE

## 2022-10-03 PROCEDURE — 3079F DIAST BP 80-89 MM HG: CPT | Mod: CPTII,S$GLB,, | Performed by: INTERNAL MEDICINE

## 2022-10-03 PROCEDURE — 1159F MED LIST DOCD IN RCRD: CPT | Mod: CPTII,S$GLB,, | Performed by: INTERNAL MEDICINE

## 2022-10-03 PROCEDURE — 1126F PR PAIN SEVERITY QUANTIFIED, NO PAIN PRESENT: ICD-10-PCS | Mod: CPTII,S$GLB,, | Performed by: INTERNAL MEDICINE

## 2022-10-03 PROCEDURE — 1159F PR MEDICATION LIST DOCUMENTED IN MEDICAL RECORD: ICD-10-PCS | Mod: CPTII,S$GLB,, | Performed by: INTERNAL MEDICINE

## 2022-10-03 PROCEDURE — 3075F SYST BP GE 130 - 139MM HG: CPT | Mod: CPTII,S$GLB,, | Performed by: INTERNAL MEDICINE

## 2022-10-03 PROCEDURE — 4010F ACE/ARB THERAPY RXD/TAKEN: CPT | Mod: CPTII,S$GLB,, | Performed by: INTERNAL MEDICINE

## 2022-10-03 PROCEDURE — 3079F PR MOST RECENT DIASTOLIC BLOOD PRESSURE 80-89 MM HG: ICD-10-PCS | Mod: CPTII,S$GLB,, | Performed by: INTERNAL MEDICINE

## 2022-10-03 PROCEDURE — 4010F PR ACE/ARB THEARPY RXD/TAKEN: ICD-10-PCS | Mod: CPTII,S$GLB,, | Performed by: INTERNAL MEDICINE

## 2022-10-03 PROCEDURE — 3288F FALL RISK ASSESSMENT DOCD: CPT | Mod: CPTII,S$GLB,, | Performed by: INTERNAL MEDICINE

## 2022-10-03 PROCEDURE — 99204 OFFICE O/P NEW MOD 45 MIN: CPT | Mod: S$GLB,,, | Performed by: INTERNAL MEDICINE

## 2022-10-03 PROCEDURE — 99499 UNLISTED E&M SERVICE: CPT | Mod: S$GLB,,, | Performed by: INTERNAL MEDICINE

## 2022-10-03 PROCEDURE — 93000 EKG 12-LEAD: ICD-10-PCS | Mod: S$GLB,,, | Performed by: INTERNAL MEDICINE

## 2022-10-03 PROCEDURE — 99499 RISK ADDL DX/OHS AUDIT: ICD-10-PCS | Mod: S$GLB,,, | Performed by: INTERNAL MEDICINE

## 2022-10-03 PROCEDURE — 99204 PR OFFICE/OUTPT VISIT, NEW, LEVL IV, 45-59 MIN: ICD-10-PCS | Mod: S$GLB,,, | Performed by: INTERNAL MEDICINE

## 2022-10-03 PROCEDURE — 3075F PR MOST RECENT SYSTOLIC BLOOD PRESS GE 130-139MM HG: ICD-10-PCS | Mod: CPTII,S$GLB,, | Performed by: INTERNAL MEDICINE

## 2022-10-03 PROCEDURE — 99999 PR PBB SHADOW E&M-EST. PATIENT-LVL IV: CPT | Mod: PBBFAC,,, | Performed by: INTERNAL MEDICINE

## 2022-10-03 RX ORDER — ATORVASTATIN CALCIUM 40 MG/1
40 TABLET, FILM COATED ORAL NIGHTLY
Qty: 90 TABLET | Refills: 3 | Status: SHIPPED | OUTPATIENT
Start: 2022-10-03 | End: 2023-10-12 | Stop reason: SDUPTHER

## 2022-10-03 NOTE — PROGRESS NOTES
Subjective:   Patient ID:  Tracy Armendariz is a 71 y.o. female who presents for  of Hypertension, Obesity, Hyperlipidemia, and Fatigue      HPI:     At 7-year-old female who is here today to establish cardiovascular care.  She has a past medical history of MARTIN, obesity, vertigo, HTN, HLD and CVA in 2013.  She denies any cardiovascular symptoms.  She currently takes aspirin, losartan, and Lipitor.    ECG 10/2/2022: NSR           The 10-year ASCVD risk score (Serenity PANDA, et al., 2019) is: 14.6%    Values used to calculate the score:      Age: 71 years      Sex: Female      Is Non- : No      Diabetic: No      Tobacco smoker: No      Systolic Blood Pressure: 134 mmHg      Is BP treated: Yes      HDL Cholesterol: 90 mg/dL      Total Cholesterol: 227 mg/dL       Patient Active Problem List    Diagnosis Date Noted    Peripheral vertigo involving right ear 07/26/2022    Conductive hearing loss of right ear with restricted hearing of left ear 07/26/2022    At moderate risk for fall 07/26/2022    Diplopia 07/26/2022    Eye fatigue 07/12/2022    Dizziness 07/12/2022    Impairment of balance 07/12/2022    History of CVA (cerebrovascular accident) 03/24/2022    Nasal septal deviation 03/24/2022    Tinnitus of both ears 03/24/2022    Ataxia 03/24/2022    Vertigo 03/24/2022    Closed fracture of right wrist 11/15/2021    Acute bilateral ankle pain 11/08/2020    Impaired functional mobility and activity tolerance 11/08/2020    Atherosclerosis of aorta 08/03/2020    Arthritis of right knee 02/06/2017    Osteoarthritis of right knee 02/02/2017    Osteopenia     Symptomatic posterior vitreous detachment of both eyes 03/30/2016    Visual floaters     Essential hypertension     Benign lesion of lacrimal duct 11/19/2015    Parinaud's syndrome affecting both eyes 01/12/2015    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 Midbrain and thalamic infarct      Hypertension      -TTE (2013): EF 55%, E/e' 12, severe LAE      Depression     Extrinsic asthma     PUD (peptic ulcer disease)            Right Arm BP - Sittin/80  Left Arm BP - Sittin/80        LABS      LAST HbA1c  Lab Results   Component Value Date    HGBA1C 6.2 (H) 2022       Lipid panel  Lab Results   Component Value Date    CHOL 227 (H) 2022    CHOL 188 2020    CHOL 162 03/15/2018     Lab Results   Component Value Date    HDL 90 (H) 2022    HDL 88 (H) 2020    HDL 75 03/15/2018     Lab Results   Component Value Date    LDLCALC 112.6 2022    LDLCALC 74.2 2020    LDLCALC 70.6 03/15/2018     Lab Results   Component Value Date    TRIG 122 2022    TRIG 129 2020    TRIG 82 03/15/2018     Lab Results   Component Value Date    CHOLHDL 39.6 2022    CHOLHDL 46.8 2020    CHOLHDL 46.3 03/15/2018            Review of Systems   Constitutional: Negative for diaphoresis, night sweats, weight gain and weight loss.   HENT:  Negative for congestion.    Eyes:  Negative for blurred vision, discharge and double vision.   Cardiovascular:  Negative for chest pain, claudication, cyanosis, dyspnea on exertion, irregular heartbeat, leg swelling, near-syncope, orthopnea, palpitations, paroxysmal nocturnal dyspnea and syncope.   Respiratory:  Negative for cough, shortness of breath and wheezing.    Endocrine: Negative for cold intolerance, heat intolerance and polyphagia.   Hematologic/Lymphatic: Negative for adenopathy and bleeding problem. Does not bruise/bleed easily.   Skin:   Negative for dry skin and nail changes.   Musculoskeletal:  Negative for arthritis, back pain, falls, joint pain, myalgias and neck pain.   Gastrointestinal:  Negative for bloating, abdominal pain, change in bowel habit and constipation.   Genitourinary:  Negative for bladder incontinence, dysuria, flank pain, genital sores and missed menses.   Neurological:  Negative for aphonia, brief paralysis, difficulty with concentration, dizziness and weakness.   Psychiatric/Behavioral:  Negative for altered mental status and memory loss. The patient does not have insomnia.    Allergic/Immunologic: Negative for environmental allergies.     Objective:   Physical Exam  Constitutional:       Appearance: She is well-developed.      Interventions: She is not intubated.  HENT:      Head: Normocephalic and atraumatic.      Right Ear: External ear normal.      Left Ear: External ear normal.   Eyes:      General: No scleral icterus.        Right eye: No discharge.         Left eye: No discharge.      Conjunctiva/sclera: Conjunctivae normal.      Pupils: Pupils are equal, round, and reactive to light.   Neck:      Thyroid: No thyromegaly.      Vascular: Normal carotid pulses. No carotid bruit, hepatojugular reflux or JVD.      Trachea: No tracheal deviation.   Cardiovascular:      Rate and Rhythm: Normal rate and regular rhythm. No extrasystoles are present.     Chest Wall: PMI is not displaced.      Pulses:           Carotid pulses are 2+ on the right side and 2+ on the left side.       Radial pulses are 2+ on the right side and 2+ on the left side.        Femoral pulses are 2+ on the right side and 2+ on the left side.       Popliteal pulses are 2+ on the right side and 2+ on the left side.        Dorsalis pedis pulses are 2+ on the right side and 2+ on the left side.        Posterior tibial pulses are 2+ on the right side and 2+ on the left side.      Heart sounds: S1 normal and S2 normal. Heart sounds not distant. No midsystolic  click. No murmur heard.    No friction rub. No gallop. No S3 sounds.   Pulmonary:      Effort: Pulmonary effort is normal. No tachypnea, bradypnea, accessory muscle usage or respiratory distress. She is not intubated.      Breath sounds: Normal breath sounds. No stridor. No decreased breath sounds, wheezing or rales.   Chest:      Chest wall: No tenderness.   Abdominal:      General: There is no distension or abdominal bruit.      Palpations: There is no mass or pulsatile mass.      Tenderness: There is no abdominal tenderness. There is no guarding or rebound.   Musculoskeletal:         General: No tenderness. Normal range of motion.      Cervical back: Normal range of motion and neck supple.   Lymphadenopathy:      Cervical: No cervical adenopathy.   Skin:     General: Skin is warm.      Coloration: Skin is not pale.      Findings: No erythema or rash.   Neurological:      Mental Status: She is alert and oriented to person, place, and time.      Cranial Nerves: No cranial nerve deficit.      Coordination: Coordination normal.      Deep Tendon Reflexes: Reflexes are normal and symmetric.   Psychiatric:         Behavior: Behavior normal.         Thought Content: Thought content normal.         Judgment: Judgment normal.       Assessment:     1. Cerebral infarction due to thrombosis of other cerebral artery    2. Primary hypertension    3. Mixed hyperlipidemia    4. Atherosclerosis of aorta    5. SOB (shortness of breath) on exertion    6. Severe obesity (BMI 35.0-39.9) with comorbidity    7. MARTIN (obstructive sleep apnea)    8. Impairment of balance        Plan:         Aspirin 81 mg daily  Statin therapy  LDL goal < 70  Titrate Lipitor to 40 mg nightly.  Weight loss  Exercise  Referral to [facility] for management of sleep apnea.  Follow-up in a year with labs and echocardiogram.        Continue with current medical plan and lifestyle changes.  Return sooner for concerns or questions. If symptoms persist  go to the ED  I have reviewed all pertinent data on this patient       I have reviewed the patient's medical history in detail and updated the computerized patient record.    Orders Placed This Encounter   Procedures    Ambulatory referral/consult to Sleep Disorders     Standing Status:   Future     Standing Expiration Date:   11/3/2023     Referral Priority:   Routine     Referral Type:   Consultation     Requested Specialty:   Sleep Medicine     Number of Visits Requested:   1    EKG 12-lead         Follow up as scheduled. Return sooner for concerns or questions            She expressed verbal understanding and agreed with the plan        Patient's Medications   New Prescriptions    No medications on file   Previous Medications    ALBUTEROL (PROVENTIL/VENTOLIN HFA) 90 MCG/ACTUATION INHALER    INHALE 2 PUFFS EVERY 6 HOURS AS NEEDED FOR WHEEZING    ASPIRIN 81 MG CHEW    Take 81 mg by mouth once daily.    BUPROPION (WELLBUTRIN XL) 300 MG 24 HR TABLET    Take 1 tablet (300 mg total) by mouth once daily.    CALCIUM CARBONATE (OS-SPENCER) 600 MG (1,500 MG) TAB    Take 600 mg by mouth 2 (two) times daily with meals.    CHOLECALCIFEROL, VITAMIN D3, 1,000 UNIT CHEW    Take 1,000 Units by mouth once daily.    DIAZEPAM (VALIUM) 2 MG TABLET    Take 1/2 to 1 tablet 3 times daily as needed to control dizziness    DICLOFENAC SODIUM (VOLTAREN) 1 % GEL    APPLY 2-4 GRAMS TO EACH PAINFUL AREA FOUR TIMES DAILY - MAX 32 GRAMS/DAY    ESCITALOPRAM OXALATE (LEXAPRO) 20 MG TABLET    TAKE 1 TABLET(20 MG) BY MOUTH EVERY DAY    ESOMEPRAZOLE (NEXIUM) 40 MG CAPSULE    Take 1 capsule (40 mg total) by mouth daily as needed (heartburn).    FAMOTIDINE (PEPCID) 10 MG TABLET    Take 10 mg by mouth 2 (two) times daily.    FLUAD 5316-8972, 65 YR UP,,PF, 45 MCG (15 MCG X 3)/0.5 ML SYRG        HYDROCORTISONE 2.5 % CREAM    Apply topically 2 (two) times daily to affected area    LACTOBACILLUS ACIDOPHILUS (PROBIOTIC ORAL)    Take 1 capsule by mouth once  daily. PRN    LIDOCAINE-PRILOCAINE (EMLA) CREAM    APPLY 2 GRAMS 3-4 TIMES DAILY FOR TREATMENT OF PAIN    LOSARTAN (COZAAR) 100 MG TABLET    TAKE 1 TABLET(100 MG) BY MOUTH EVERY DAY    MECLIZINE (ANTIVERT) 12.5 MG TABLET    Take 1 tablet (12.5 mg total) by mouth 3 (three) times daily as needed for Dizziness or Nausea.    MULTIVIT WITH CALCIUM,IRON,MIN (WOMEN'S DAILY MULTIVITAMIN ORAL)    Take 1 tablet by mouth once daily.    TAMSULOSIN (FLOMAX) 0.4 MG CAP    TAKE 1 CAPSULE(0.4 MG) BY MOUTH EVERY DAY   Modified Medications    Modified Medication Previous Medication    ATORVASTATIN (LIPITOR) 40 MG TABLET atorvastatin (LIPITOR) 10 MG tablet       Take 1 tablet (40 mg total) by mouth every evening.    TAKE 1 TABLET(10 MG) BY MOUTH EVERY DAY   Discontinued Medications    No medications on file

## 2022-10-10 ENCOUNTER — OFFICE VISIT (OUTPATIENT)
Dept: SLEEP MEDICINE | Facility: CLINIC | Age: 72
End: 2022-10-10
Payer: MEDICARE

## 2022-10-10 ENCOUNTER — PATIENT MESSAGE (OUTPATIENT)
Dept: UROLOGY | Facility: CLINIC | Age: 72
End: 2022-10-10
Payer: MEDICARE

## 2022-10-10 VITALS
HEART RATE: 77 BPM | WEIGHT: 229.75 LBS | BODY MASS INDEX: 38.28 KG/M2 | DIASTOLIC BLOOD PRESSURE: 85 MMHG | SYSTOLIC BLOOD PRESSURE: 138 MMHG | HEIGHT: 65 IN

## 2022-10-10 DIAGNOSIS — R30.0 DYSURIA: Primary | ICD-10-CM

## 2022-10-10 DIAGNOSIS — G47.10 HYPERSOMNOLENCE: Primary | ICD-10-CM

## 2022-10-10 DIAGNOSIS — F51.09 OTHER INSOMNIA NOT DUE TO A SUBSTANCE OR KNOWN PHYSIOLOGICAL CONDITION: ICD-10-CM

## 2022-10-10 DIAGNOSIS — G47.33 OSA (OBSTRUCTIVE SLEEP APNEA): ICD-10-CM

## 2022-10-10 PROCEDURE — 3079F PR MOST RECENT DIASTOLIC BLOOD PRESSURE 80-89 MM HG: ICD-10-PCS | Mod: CPTII,S$GLB,, | Performed by: INTERNAL MEDICINE

## 2022-10-10 PROCEDURE — 3008F PR BODY MASS INDEX (BMI) DOCUMENTED: ICD-10-PCS | Mod: CPTII,S$GLB,, | Performed by: INTERNAL MEDICINE

## 2022-10-10 PROCEDURE — 1100F PR PT FALLS ASSESS DOC 2+ FALLS/FALL W/INJURY/YR: ICD-10-PCS | Mod: CPTII,S$GLB,, | Performed by: INTERNAL MEDICINE

## 2022-10-10 PROCEDURE — 3075F SYST BP GE 130 - 139MM HG: CPT | Mod: CPTII,S$GLB,, | Performed by: INTERNAL MEDICINE

## 2022-10-10 PROCEDURE — 3079F DIAST BP 80-89 MM HG: CPT | Mod: CPTII,S$GLB,, | Performed by: INTERNAL MEDICINE

## 2022-10-10 PROCEDURE — 4010F PR ACE/ARB THEARPY RXD/TAKEN: ICD-10-PCS | Mod: CPTII,S$GLB,, | Performed by: INTERNAL MEDICINE

## 2022-10-10 PROCEDURE — 99204 PR OFFICE/OUTPT VISIT, NEW, LEVL IV, 45-59 MIN: ICD-10-PCS | Mod: S$GLB,,, | Performed by: INTERNAL MEDICINE

## 2022-10-10 PROCEDURE — 99999 PR PBB SHADOW E&M-EST. PATIENT-LVL IV: CPT | Mod: PBBFAC,,, | Performed by: INTERNAL MEDICINE

## 2022-10-10 PROCEDURE — 3075F PR MOST RECENT SYSTOLIC BLOOD PRESS GE 130-139MM HG: ICD-10-PCS | Mod: CPTII,S$GLB,, | Performed by: INTERNAL MEDICINE

## 2022-10-10 PROCEDURE — 99204 OFFICE O/P NEW MOD 45 MIN: CPT | Mod: S$GLB,,, | Performed by: INTERNAL MEDICINE

## 2022-10-10 PROCEDURE — 3008F BODY MASS INDEX DOCD: CPT | Mod: CPTII,S$GLB,, | Performed by: INTERNAL MEDICINE

## 2022-10-10 PROCEDURE — 1126F AMNT PAIN NOTED NONE PRSNT: CPT | Mod: CPTII,S$GLB,, | Performed by: INTERNAL MEDICINE

## 2022-10-10 PROCEDURE — 1100F PTFALLS ASSESS-DOCD GE2>/YR: CPT | Mod: CPTII,S$GLB,, | Performed by: INTERNAL MEDICINE

## 2022-10-10 PROCEDURE — 3288F PR FALLS RISK ASSESSMENT DOCUMENTED: ICD-10-PCS | Mod: CPTII,S$GLB,, | Performed by: INTERNAL MEDICINE

## 2022-10-10 PROCEDURE — 3044F PR MOST RECENT HEMOGLOBIN A1C LEVEL <7.0%: ICD-10-PCS | Mod: CPTII,S$GLB,, | Performed by: INTERNAL MEDICINE

## 2022-10-10 PROCEDURE — 99999 PR PBB SHADOW E&M-EST. PATIENT-LVL IV: ICD-10-PCS | Mod: PBBFAC,,, | Performed by: INTERNAL MEDICINE

## 2022-10-10 PROCEDURE — 3044F HG A1C LEVEL LT 7.0%: CPT | Mod: CPTII,S$GLB,, | Performed by: INTERNAL MEDICINE

## 2022-10-10 PROCEDURE — 1159F MED LIST DOCD IN RCRD: CPT | Mod: CPTII,S$GLB,, | Performed by: INTERNAL MEDICINE

## 2022-10-10 PROCEDURE — 1159F PR MEDICATION LIST DOCUMENTED IN MEDICAL RECORD: ICD-10-PCS | Mod: CPTII,S$GLB,, | Performed by: INTERNAL MEDICINE

## 2022-10-10 PROCEDURE — 3288F FALL RISK ASSESSMENT DOCD: CPT | Mod: CPTII,S$GLB,, | Performed by: INTERNAL MEDICINE

## 2022-10-10 PROCEDURE — 1126F PR PAIN SEVERITY QUANTIFIED, NO PAIN PRESENT: ICD-10-PCS | Mod: CPTII,S$GLB,, | Performed by: INTERNAL MEDICINE

## 2022-10-10 PROCEDURE — 4010F ACE/ARB THERAPY RXD/TAKEN: CPT | Mod: CPTII,S$GLB,, | Performed by: INTERNAL MEDICINE

## 2022-10-10 NOTE — PROGRESS NOTES
"Referred by Faustino Iniguez MD   NEW PATIENT VISIT    Tracy Armendariz  is a pleasant 71 y.o. female  with PMH significant for MARTIN, Obesity, HTN, and CVA in 2013, MARTIN (Bipap 16/8-CPAP 12,-> Rx auto CPAP 12-18 ), nasal, who presents for evaluation of sleep disorder. Pt previously seen by [facility] in Aug 2016. Pt on CPAP for dx of MARTIN. PT has been having a hard time sleeping. The sleep mask became difficult and uses a full mask. Tossed and turned and swiped it and wake both her and her partner up.Stopped using CPAP for at least a year.    Prior sleep studies:  Yes - last in 2016    Insomnia?:  yes  Hypnotic use?: no    Bed partner:   yes  Witnessed snoring ?:  seldome  Snoring arousals?: Yes because she has a dry throat  Witnessed apneas? yes    H/H hallucinations?: no  Sleep paralysis?: A lot  Cataplexy?:  yes    RLS?:   yes    Sleepy if inactive?: yes  Sleepiness driving: Yes - 2hrs max before she is too tired  ESS:         PAP history   Problems    Mask FFM   Pressure    Benefit    DME    Machine age BiPAP/ auto CPAP 2014   Download No recent usage       SLEEP SCHEDULE   Bed Time 1030/11pm   Sleep Latency 1 hr   Arousals 3-4x   Back to sleep 15/30mins   Wake time 11am/12pm this last month   Naps yes   Nocturia 3-4x   Work Retired       Vitals:    10/10/22 1443   BP: 138/85   BP Location: Right arm   Patient Position: Sitting   BP Method: Large (Automatic)   Pulse: 77   Weight: 104.2 kg (229 lb 11.5 oz)   Height: 5' 5" (1.651 m)       Physical Exam:    GEN:   Well-appearing  Psych:  Appropriate affect, demonstrates insight  SKIN:  No rash on the face or bridge of the nose    LABS:   No results found for: HGB, CO2    RECORDS REVIEWED PREVIOUSLY:         ASSESSMENT    PSG 2014: AHI 17  CPAP titration 2014: controlled at 16/8    PROBLEM DESCRIPTION/ Sx on Presentation  STATUS   MARTIN   AHI 17    New   CPAP intolerance   Mask was leaking, tossing and turning at night     Sleepiness   + sleepiness when inactive , " sleeping many hours a day  denies sleepiness when driving   ESS 12/24 on intake  New   Insomnia   Onset:                      Maintenance:         frequent  Prior hypnotics:        Current hypnotics:     New   Nocturia   x 3-4 per sleep period  New   Other issues:     PLAN     -discussed CPAP titration to re-evaluate pressure needs  -consider referral for Inspire if meets BMI criteria.  -she had questions about Inspire, but not currently a candidate due to BMI  -reevaluate sleepiness after assessing CPAP  -driving precautions were discussed with the patient    RTC          The patient was given open opportunity to ask questions and/or express concerns about treatment plan.   All questions/concerns were discussed.     Two patient identifiers used prior to evaluation.

## 2022-10-11 ENCOUNTER — PATIENT MESSAGE (OUTPATIENT)
Dept: UROLOGY | Facility: CLINIC | Age: 72
End: 2022-10-11
Payer: MEDICARE

## 2022-10-11 ENCOUNTER — LAB VISIT (OUTPATIENT)
Dept: LAB | Facility: HOSPITAL | Age: 72
End: 2022-10-11
Attending: STUDENT IN AN ORGANIZED HEALTH CARE EDUCATION/TRAINING PROGRAM
Payer: MEDICARE

## 2022-10-11 DIAGNOSIS — R30.0 DYSURIA: Primary | ICD-10-CM

## 2022-10-11 DIAGNOSIS — R30.0 DYSURIA: ICD-10-CM

## 2022-10-11 PROCEDURE — 87077 CULTURE AEROBIC IDENTIFY: CPT | Performed by: STUDENT IN AN ORGANIZED HEALTH CARE EDUCATION/TRAINING PROGRAM

## 2022-10-11 PROCEDURE — 87086 URINE CULTURE/COLONY COUNT: CPT | Performed by: STUDENT IN AN ORGANIZED HEALTH CARE EDUCATION/TRAINING PROGRAM

## 2022-10-11 PROCEDURE — 87186 SC STD MICRODIL/AGAR DIL: CPT | Performed by: STUDENT IN AN ORGANIZED HEALTH CARE EDUCATION/TRAINING PROGRAM

## 2022-10-11 PROCEDURE — 87088 URINE BACTERIA CULTURE: CPT | Performed by: STUDENT IN AN ORGANIZED HEALTH CARE EDUCATION/TRAINING PROGRAM

## 2022-10-11 RX ORDER — NITROFURANTOIN 25; 75 MG/1; MG/1
100 CAPSULE ORAL 2 TIMES DAILY
Qty: 14 CAPSULE | Refills: 0 | Status: SHIPPED | OUTPATIENT
Start: 2022-10-11 | End: 2022-10-17

## 2022-10-11 RX ORDER — NITROFURANTOIN 25; 75 MG/1; MG/1
100 CAPSULE ORAL 2 TIMES DAILY
Qty: 14 CAPSULE | Refills: 0 | Status: SHIPPED | OUTPATIENT
Start: 2022-10-11 | End: 2022-10-11

## 2022-10-13 LAB — BACTERIA UR CULT: ABNORMAL

## 2022-10-17 ENCOUNTER — PATIENT MESSAGE (OUTPATIENT)
Dept: UROLOGY | Facility: CLINIC | Age: 72
End: 2022-10-17
Payer: MEDICARE

## 2022-10-17 RX ORDER — SULFAMETHOXAZOLE AND TRIMETHOPRIM 800; 160 MG/1; MG/1
1 TABLET ORAL 2 TIMES DAILY
Qty: 20 TABLET | Refills: 0 | Status: SHIPPED | OUTPATIENT
Start: 2022-10-17 | End: 2022-10-27

## 2022-10-19 ENCOUNTER — TELEPHONE (OUTPATIENT)
Dept: SLEEP MEDICINE | Facility: OTHER | Age: 72
End: 2022-10-19
Payer: MEDICARE

## 2022-10-29 ENCOUNTER — HOSPITAL ENCOUNTER (OUTPATIENT)
Dept: SLEEP MEDICINE | Facility: OTHER | Age: 72
Discharge: HOME | End: 2022-10-29
Attending: INTERNAL MEDICINE
Payer: MEDICARE

## 2022-10-29 DIAGNOSIS — G47.10 HYPERSOMNOLENCE: ICD-10-CM

## 2022-10-29 DIAGNOSIS — G47.33 OSA (OBSTRUCTIVE SLEEP APNEA): ICD-10-CM

## 2022-10-29 DIAGNOSIS — F51.09 OTHER INSOMNIA NOT DUE TO A SUBSTANCE OR KNOWN PHYSIOLOGICAL CONDITION: ICD-10-CM

## 2022-10-29 PROCEDURE — 95811 POLYSOM 6/>YRS CPAP 4/> PARM: CPT

## 2022-10-29 PROCEDURE — 95811 POLYSOM 6/>YRS CPAP 4/> PARM: CPT | Mod: 26,,, | Performed by: INTERNAL MEDICINE

## 2022-10-29 PROCEDURE — 95811 PR POLYSOMNOGRAPHY W/CPAP: ICD-10-PCS | Mod: 26,,, | Performed by: INTERNAL MEDICINE

## 2022-10-30 NOTE — PROGRESS NOTES
Pt set up, education, procedures explained prior to testing. Mask/glove precaution taken. Disposable leads/sensors used where applicable. CPAP titration performed w/ ResMed N20 small. RBD montage added. Post study f/u instructions given in the AM.

## 2022-11-01 ENCOUNTER — OFFICE VISIT (OUTPATIENT)
Dept: UROLOGY | Facility: CLINIC | Age: 72
End: 2022-11-01
Payer: MEDICARE

## 2022-11-01 VITALS
WEIGHT: 232.06 LBS | BODY MASS INDEX: 38.66 KG/M2 | HEIGHT: 65 IN | DIASTOLIC BLOOD PRESSURE: 84 MMHG | SYSTOLIC BLOOD PRESSURE: 137 MMHG | HEART RATE: 76 BPM

## 2022-11-01 DIAGNOSIS — R39.15 URINARY URGENCY: Primary | ICD-10-CM

## 2022-11-01 DIAGNOSIS — R10.2 SUPRAPUBIC DISCOMFORT: ICD-10-CM

## 2022-11-01 DIAGNOSIS — R35.0 URINARY FREQUENCY: ICD-10-CM

## 2022-11-01 PROCEDURE — 99214 OFFICE O/P EST MOD 30 MIN: CPT | Mod: S$GLB,,, | Performed by: STUDENT IN AN ORGANIZED HEALTH CARE EDUCATION/TRAINING PROGRAM

## 2022-11-01 PROCEDURE — 1159F PR MEDICATION LIST DOCUMENTED IN MEDICAL RECORD: ICD-10-PCS | Mod: CPTII,S$GLB,, | Performed by: STUDENT IN AN ORGANIZED HEALTH CARE EDUCATION/TRAINING PROGRAM

## 2022-11-01 PROCEDURE — 3079F PR MOST RECENT DIASTOLIC BLOOD PRESSURE 80-89 MM HG: ICD-10-PCS | Mod: CPTII,S$GLB,, | Performed by: STUDENT IN AN ORGANIZED HEALTH CARE EDUCATION/TRAINING PROGRAM

## 2022-11-01 PROCEDURE — 1160F PR REVIEW ALL MEDS BY PRESCRIBER/CLIN PHARMACIST DOCUMENTED: ICD-10-PCS | Mod: CPTII,S$GLB,, | Performed by: STUDENT IN AN ORGANIZED HEALTH CARE EDUCATION/TRAINING PROGRAM

## 2022-11-01 PROCEDURE — 1159F MED LIST DOCD IN RCRD: CPT | Mod: CPTII,S$GLB,, | Performed by: STUDENT IN AN ORGANIZED HEALTH CARE EDUCATION/TRAINING PROGRAM

## 2022-11-01 PROCEDURE — 3075F SYST BP GE 130 - 139MM HG: CPT | Mod: CPTII,S$GLB,, | Performed by: STUDENT IN AN ORGANIZED HEALTH CARE EDUCATION/TRAINING PROGRAM

## 2022-11-01 PROCEDURE — 3044F PR MOST RECENT HEMOGLOBIN A1C LEVEL <7.0%: ICD-10-PCS | Mod: CPTII,S$GLB,, | Performed by: STUDENT IN AN ORGANIZED HEALTH CARE EDUCATION/TRAINING PROGRAM

## 2022-11-01 PROCEDURE — 1125F PR PAIN SEVERITY QUANTIFIED, PAIN PRESENT: ICD-10-PCS | Mod: CPTII,S$GLB,, | Performed by: STUDENT IN AN ORGANIZED HEALTH CARE EDUCATION/TRAINING PROGRAM

## 2022-11-01 PROCEDURE — 3079F DIAST BP 80-89 MM HG: CPT | Mod: CPTII,S$GLB,, | Performed by: STUDENT IN AN ORGANIZED HEALTH CARE EDUCATION/TRAINING PROGRAM

## 2022-11-01 PROCEDURE — 3044F HG A1C LEVEL LT 7.0%: CPT | Mod: CPTII,S$GLB,, | Performed by: STUDENT IN AN ORGANIZED HEALTH CARE EDUCATION/TRAINING PROGRAM

## 2022-11-01 PROCEDURE — 99999 PR PBB SHADOW E&M-EST. PATIENT-LVL IV: ICD-10-PCS | Mod: PBBFAC,,, | Performed by: STUDENT IN AN ORGANIZED HEALTH CARE EDUCATION/TRAINING PROGRAM

## 2022-11-01 PROCEDURE — 1125F AMNT PAIN NOTED PAIN PRSNT: CPT | Mod: CPTII,S$GLB,, | Performed by: STUDENT IN AN ORGANIZED HEALTH CARE EDUCATION/TRAINING PROGRAM

## 2022-11-01 PROCEDURE — 4010F PR ACE/ARB THEARPY RXD/TAKEN: ICD-10-PCS | Mod: CPTII,S$GLB,, | Performed by: STUDENT IN AN ORGANIZED HEALTH CARE EDUCATION/TRAINING PROGRAM

## 2022-11-01 PROCEDURE — 4010F ACE/ARB THERAPY RXD/TAKEN: CPT | Mod: CPTII,S$GLB,, | Performed by: STUDENT IN AN ORGANIZED HEALTH CARE EDUCATION/TRAINING PROGRAM

## 2022-11-01 PROCEDURE — 1160F RVW MEDS BY RX/DR IN RCRD: CPT | Mod: CPTII,S$GLB,, | Performed by: STUDENT IN AN ORGANIZED HEALTH CARE EDUCATION/TRAINING PROGRAM

## 2022-11-01 PROCEDURE — 99999 PR PBB SHADOW E&M-EST. PATIENT-LVL IV: CPT | Mod: PBBFAC,,, | Performed by: STUDENT IN AN ORGANIZED HEALTH CARE EDUCATION/TRAINING PROGRAM

## 2022-11-01 PROCEDURE — 87086 URINE CULTURE/COLONY COUNT: CPT | Performed by: STUDENT IN AN ORGANIZED HEALTH CARE EDUCATION/TRAINING PROGRAM

## 2022-11-01 PROCEDURE — 3075F PR MOST RECENT SYSTOLIC BLOOD PRESS GE 130-139MM HG: ICD-10-PCS | Mod: CPTII,S$GLB,, | Performed by: STUDENT IN AN ORGANIZED HEALTH CARE EDUCATION/TRAINING PROGRAM

## 2022-11-01 PROCEDURE — 99214 PR OFFICE/OUTPT VISIT, EST, LEVL IV, 30-39 MIN: ICD-10-PCS | Mod: S$GLB,,, | Performed by: STUDENT IN AN ORGANIZED HEALTH CARE EDUCATION/TRAINING PROGRAM

## 2022-11-01 NOTE — PROGRESS NOTES
"Subjective:       Patient ID: Tracy Armendariz is a 71 y.o. female.    Chief Complaint: Nephrolithiasis   This is a 71 y.o.  female patient that is an established patient of mine.  She was visiting [facility] and was diagnosed with an obstructing stone. She presented to the ER at [facility] on 11/27/18 with fevers and altered mental status. She notes that they found a kidney stone of unknown laterality she does not recall left or right. Dr. Pérez, a urologist at [facility], was consulted after a Ct was performed and found an "impacted" stone that was "very close to the bladder" per the patient and her . She was told that she had a urinary tract infection and that the infection went into the blood stream (positive bacteremia). She underwent a cystoscopy and ureteral stent placement on 11/28/18.     She underwent a L urs/hll/sbe/stent exchange on 12/19/2018. Her stent has been removed in clinic on 12/26/2018.    Has two daughter, and 1 son who lives here. Daughters are in [place] (moved to [place]) and [place]. 3 grandsons in [place]. Her son who lives here has a boy and a girl.      Last cross sectional imaging 8/2020 - Kidneys/ Ureters: Right kidney and collecting system unremarkable.  Multiple punctate calcifications are seen throughout the renal collecting system on the left side ranging in size from 2-5 mm.  No ureteral stone or hydronephrosis seen.    Screening US 7/14/22 led to CT 8/4/22 -  The right kidney and collecting structures are normal.  The left kidney demonstrates approximately 12 intrarenal calculi the largest of which is lower pole and measures 8 mm.  There is no dilatation of collecting structures on the left.  There is a low incidental left lower pole renal cyst seen on the previous ultrasound dated 07/14/2022.  No bladder calculi are seen.  The ureters are normal in course and caliber.     Based off of ureteroscopy in 2019, I do not believe that the left upper pole " and mid pole calcifications are true stones. The left lower pole is likely reflective of true stones. Largest dimension is 7mm. Since her  who is also my pt was undergoing prostate cancer tx, she elected for observation.    11/1/22  10/11/22 ucx was + esbl e. Coli UTI, treated. Pt still with persistent urinary urgency, frequency, suprapubic discomfort, left sided flank pain. No fevers/chills. Some night sweats.     Lab Results   Component Value Date    CREATININE 0.8 2022       ---  Past Medical History:   Diagnosis Date    Allergy     Anticoagulant long-term use     Arthritis     CVA (cerebral infarction)     Depression     Disorder of kidney and ureter     renal stones    Diverticulosis of colon     Extrinsic asthma, unspecified     Hyperlipidemia     Hypertension     Kidney stone     Left atrial enlargement 2014    Low back pain     MARTIN (obstructive sleep apnea)     Osteopenia     PUD (peptic ulcer disease)     Stroke  2013    Urinary tract infection        Past Surgical History:   Procedure Laterality Date    APPENDECTOMY      @ time of hysterectomy    BACK SURGERY      CATARACT EXTRACTION       SECTION      CHOLECYSTECTOMY      laparoscopic    COLONOSCOPY N/A 2018    Procedure: COLONOSCOPY/Golytely;  Surgeon: Janine Black MD;  Location: [facility];  Service: Endoscopy;  Laterality: N/A;    DILATION AND CURETTAGE OF UTERUS      HYSTERECTOMY      TAHUSO with appendectomy    INNER EAR SURGERY      replaced ear drum    JOINT REPLACEMENT Right     knee    KNEE ARTHROSCOPY W/ DEBRIDEMENT      LUMBAR DISCECTOMY      L4-L5    OOPHORECTOMY      unilateral    TONSILLECTOMY      TYMPANOPLASTY      URETEROSCOPIC REMOVAL OF URETERIC CALCULUS Left 2018    Procedure: REMOVAL, CALCULUS, URETER, URETEROSCOPIC, holmium laser lithotripsy, stone basket extraction, retrograde pyelogram, ureteral stent exchange;  Surgeon: Anaya Zamora MD;  Location:  [facility] OR;  Service: Urology;  Laterality: Left;       Family History   Problem Relation Age of Onset    Cervical cancer Mother     Cancer Mother 65        lung cancer - non smoker    Stroke Paternal Grandfather     Hypertension Maternal Grandfather     Heart disease Maternal Grandfather     Hypertension Father     Coronary artery disease Father 62    Heart disease Father     Diabetes Sister     Heart disease Sister     Kidney disease Sister     Breast cancer Daughter 36    Heart failure Sister         60s    Colon cancer Neg Hx     Ovarian cancer Neg Hx        Social History     Tobacco Use    Smoking status: Former     Types: Cigarettes     Quit date: 1995     Years since quittin.8    Smokeless tobacco: Never   Substance Use Topics    Alcohol use: Yes     Alcohol/week: 1.0 standard drink     Types: 1 Glasses of wine per week     Comment: social    Drug use: No       Current Outpatient Medications on File Prior to Visit   Medication Sig Dispense Refill    albuterol (PROVENTIL/VENTOLIN HFA) 90 mcg/actuation inhaler INHALE 2 PUFFS EVERY 6 HOURS AS NEEDED FOR WHEEZING 18 g 0    aspirin 81 MG Chew Take 81 mg by mouth once daily.      atorvastatin (LIPITOR) 40 MG tablet Take 1 tablet (40 mg total) by mouth every evening. 90 tablet 3    buPROPion (WELLBUTRIN XL) 300 MG 24 hr tablet Take 1 tablet (300 mg total) by mouth once daily. 90 tablet 11    calcium carbonate (OS-SPENCER) 600 mg (1,500 mg) Tab Take 600 mg by mouth 2 (two) times daily with meals.      cholecalciferol, vitamin D3, 1,000 unit Chew Take 1,000 Units by mouth once daily.      diazePAM (VALIUM) 2 MG tablet Take 1/2 to 1 tablet 3 times daily as needed to control dizziness 50 tablet 1    diclofenac sodium (VOLTAREN) 1 % Gel APPLY 2-4 GRAMS TO EACH PAINFUL AREA FOUR TIMES DAILY - MAX 32 GRAMS/ g 6    EScitalopram oxalate (LEXAPRO) 20 MG tablet TAKE 1 TABLET(20 MG) BY MOUTH EVERY DAY (Patient taking differently: Take 20 mg by mouth once daily.) 90  tablet 3    esomeprazole (NEXIUM) 40 MG capsule Take 1 capsule (40 mg total) by mouth daily as needed (heartburn). 30 capsule 3    famotidine (PEPCID) 10 MG tablet Take 10 mg by mouth 2 (two) times daily.      FLUAD 5567-4864, 65 YR UP,,PF, 45 mcg (15 mcg x 3)/0.5 mL Syrg       hydrocortisone 2.5 % cream Apply topically 2 (two) times daily to affected area 30 g 2    LACTOBACILLUS ACIDOPHILUS (PROBIOTIC ORAL) Take 1 capsule by mouth once daily. PRN      LIDOcaine-prilocaine (EMLA) cream APPLY 2 GRAMS 3-4 TIMES DAILY FOR TREATMENT OF PAIN 240 g 6    losartan (COZAAR) 100 MG tablet TAKE 1 TABLET(100 MG) BY MOUTH EVERY DAY 90 tablet 3    meclizine (ANTIVERT) 12.5 mg tablet Take 1 tablet (12.5 mg total) by mouth 3 (three) times daily as needed for Dizziness or Nausea. 30 tablet 6    MULTIVIT WITH CALCIUM,IRON,MIN (WOMEN'S DAILY MULTIVITAMIN ORAL) Take 1 tablet by mouth once daily.      tamsulosin (FLOMAX) 0.4 mg Cap TAKE 1 CAPSULE(0.4 MG) BY MOUTH EVERY DAY 90 capsule 0     No current facility-administered medications on file prior to visit.       Review of patient's allergies indicates:   Allergen Reactions    Iodinated contrast media Hives and Rash    Gabapentin Hallucinations    Iodine Hives    Isothiazolinones Rash    Penicillins Rash       Review of Systems   Constitutional:  Negative for activity change.   HENT:  Negative for congestion.    Eyes:  Negative for visual disturbance.   Respiratory:  Negative for shortness of breath.    Cardiovascular:  Negative for chest pain.   Gastrointestinal:  Negative for abdominal distention.   Musculoskeletal:  Negative for gait problem.   Skin:  Negative for color change.   Neurological:  Negative for dizziness.   Psychiatric/Behavioral:  Negative for agitation.      Objective:      Physical Exam  Constitutional:       Appearance: She is well-developed.   HENT:      Head: Normocephalic and atraumatic.   Pulmonary:      Effort: Pulmonary effort is normal.   Musculoskeletal:          General: Normal range of motion.      Cervical back: Normal range of motion.   Skin:     General: Skin is warm and dry.   Neurological:      Mental Status: She is alert and oriented to person, place, and time.       Assessment:       1. Urinary urgency    2. Urinary frequency    3. Suprapubic discomfort        Plan:           Offered urine culture and CT scan to rule out UTI and a stone passing. Pt would like to start with ucx first.   Urine for culture.  If +UTI, will treat with abx and follow her her sx do.   If - UTI, then will schedule the CT scan.             Urinary urgency  -     Urine culture    Urinary frequency  -     Urine culture    Suprapubic discomfort  -     Urine culture

## 2022-11-02 LAB — BACTERIA UR CULT: NORMAL

## 2022-11-03 ENCOUNTER — PATIENT MESSAGE (OUTPATIENT)
Dept: UROLOGY | Facility: CLINIC | Age: 72
End: 2022-11-03
Payer: MEDICARE

## 2022-11-03 DIAGNOSIS — R10.9 ABDOMINAL PAIN, UNSPECIFIED ABDOMINAL LOCATION: Primary | ICD-10-CM

## 2022-11-06 ENCOUNTER — PATIENT MESSAGE (OUTPATIENT)
Dept: UROLOGY | Facility: CLINIC | Age: 72
End: 2022-11-06
Payer: MEDICARE

## 2022-11-07 ENCOUNTER — PATIENT MESSAGE (OUTPATIENT)
Dept: SLEEP MEDICINE | Facility: CLINIC | Age: 72
End: 2022-11-07
Payer: MEDICARE

## 2022-11-07 DIAGNOSIS — G47.33 OSA (OBSTRUCTIVE SLEEP APNEA): Primary | ICD-10-CM

## 2022-11-07 NOTE — PROCEDURES
[facility]  Sleep Center  Tel: [phone]    CPAP TITRATION       Patient Name: [facility] #: [id number]   Sex: Female Study Date: 10/29/2022   : 1950 Clinic #: [id number]   Age: 71 Referring Physician: MARCY Stafford MD   Height: 65.0 in Referring Physician #    Weight: 229.0 lbs Sleep Specialist: MARCY Stafford MD   B.M.I.: 38.1 Sleep Specialist #    Hypopnea rule: AASM 1B Scoring Tech: ISAIAH Bradley RPSGT   Total AHI: 0.2 Recording Tech SHAI WellsSGT   Lowest O2 sat: 88.0% Recording Location: [facility]     Sleep architecture: This is a CPAP titration study. At light's out, the patient fell asleep in 102.0 minutes. Sleep efficiency was 69.1%. Total sleep time (TST) was 291.5 minutes. Slow wave sleep proportion of TST was reduced and REM stage sleep proportion of TST was reduced. REM latency was 108.0 minutes.    Motor movement / Parasomnia: The total limb movement index was 5.4 (0.8 with arousal).  Excessive fragmentary myoclonus was seen throughout the study including in stage REM sleep.    Cardiac: single lead EKG revealed normal sinus rhythm    CPAP titration:  The mask used in the study was ResMed N20 small  CPAP was explored to a maximum of 8 cwp.  Consolidated sleep was first seen at 7 cwp.  CPAP = 8 cwp was effective in lateral N2 and REM sleep.  No supine sleep was seen    Oxygenation:  At therapeutic levels of PAP therapy, there was no baseline hypoxemia.    Impression:  -obstructive sleep apnea     Recommendations:    -auto-CPAP with CPAP min = 7 cwp  and CPAP max =  10 cwp is recommended  -the patient has follow up with Sleep Medicine        Pedro Luis Stafford MD    (This Sleep Study was interpreted by a Board Certified Sleep Specialist who conducted an epoch-by-epoch review of the entire raw data recording.)  (The indication for this sleep study was reviewed and deemed appropriate by AASM Practice Parameters or other reasons by a Board Certified Sleep  Specialist.)        TABLES

## 2022-11-10 ENCOUNTER — HOSPITAL ENCOUNTER (OUTPATIENT)
Dept: RADIOLOGY | Facility: HOSPITAL | Age: 72
Discharge: HOME | End: 2022-11-10
Attending: STUDENT IN AN ORGANIZED HEALTH CARE EDUCATION/TRAINING PROGRAM
Payer: MEDICARE

## 2022-11-10 ENCOUNTER — PATIENT MESSAGE (OUTPATIENT)
Dept: UROLOGY | Facility: CLINIC | Age: 72
End: 2022-11-10
Payer: MEDICARE

## 2022-11-10 DIAGNOSIS — R10.9 ABDOMINAL PAIN, UNSPECIFIED ABDOMINAL LOCATION: ICD-10-CM

## 2022-11-10 DIAGNOSIS — R30.0 DYSURIA: ICD-10-CM

## 2022-11-10 DIAGNOSIS — N20.0 NEPHROLITHIASIS: ICD-10-CM

## 2022-11-10 PROCEDURE — 74176 CT ABD & PELVIS W/O CONTRAST: CPT | Mod: 26,,, | Performed by: STUDENT IN AN ORGANIZED HEALTH CARE EDUCATION/TRAINING PROGRAM

## 2022-11-10 PROCEDURE — 74176 CT ABDOMEN PELVIS WITHOUT CONTRAST: ICD-10-PCS | Mod: 26,,, | Performed by: STUDENT IN AN ORGANIZED HEALTH CARE EDUCATION/TRAINING PROGRAM

## 2022-11-10 PROCEDURE — 74176 CT ABD & PELVIS W/O CONTRAST: CPT | Mod: TC

## 2022-11-10 RX ORDER — TAMSULOSIN HYDROCHLORIDE 0.4 MG/1
0.4 CAPSULE ORAL DAILY
Qty: 90 CAPSULE | Refills: 0 | Status: SHIPPED | OUTPATIENT
Start: 2022-11-10 | End: 2023-06-15

## 2022-11-17 ENCOUNTER — PATIENT MESSAGE (OUTPATIENT)
Dept: UROLOGY | Facility: CLINIC | Age: 72
End: 2022-11-17
Payer: MEDICARE

## 2022-11-17 ENCOUNTER — LAB VISIT (OUTPATIENT)
Dept: LAB | Facility: HOSPITAL | Age: 72
End: 2022-11-17
Attending: STUDENT IN AN ORGANIZED HEALTH CARE EDUCATION/TRAINING PROGRAM
Payer: MEDICARE

## 2022-11-17 DIAGNOSIS — R30.0 DYSURIA: Primary | ICD-10-CM

## 2022-11-17 DIAGNOSIS — R30.0 DYSURIA: ICD-10-CM

## 2022-11-17 PROCEDURE — 87186 SC STD MICRODIL/AGAR DIL: CPT | Performed by: STUDENT IN AN ORGANIZED HEALTH CARE EDUCATION/TRAINING PROGRAM

## 2022-11-17 PROCEDURE — 87088 URINE BACTERIA CULTURE: CPT | Performed by: STUDENT IN AN ORGANIZED HEALTH CARE EDUCATION/TRAINING PROGRAM

## 2022-11-17 PROCEDURE — 87086 URINE CULTURE/COLONY COUNT: CPT | Performed by: STUDENT IN AN ORGANIZED HEALTH CARE EDUCATION/TRAINING PROGRAM

## 2022-11-17 PROCEDURE — 87077 CULTURE AEROBIC IDENTIFY: CPT | Performed by: STUDENT IN AN ORGANIZED HEALTH CARE EDUCATION/TRAINING PROGRAM

## 2022-11-17 RX ORDER — SULFAMETHOXAZOLE AND TRIMETHOPRIM 800; 160 MG/1; MG/1
1 TABLET ORAL 2 TIMES DAILY
Qty: 14 TABLET | Refills: 0 | Status: SHIPPED | OUTPATIENT
Start: 2022-11-17 | End: 2022-11-22 | Stop reason: SDUPTHER

## 2022-11-20 LAB — BACTERIA UR CULT: ABNORMAL

## 2022-11-22 RX ORDER — SULFAMETHOXAZOLE AND TRIMETHOPRIM 800; 160 MG/1; MG/1
1 TABLET ORAL 2 TIMES DAILY
Qty: 6 TABLET | Refills: 0 | Status: SHIPPED | OUTPATIENT
Start: 2022-11-22 | End: 2022-11-25

## 2022-12-09 ENCOUNTER — PATIENT MESSAGE (OUTPATIENT)
Dept: UROLOGY | Facility: CLINIC | Age: 72
End: 2022-12-09
Payer: MEDICARE

## 2022-12-09 ENCOUNTER — HOSPITAL ENCOUNTER (OUTPATIENT)
Dept: RADIOLOGY | Facility: HOSPITAL | Age: 72
Discharge: HOME | DRG: 659 | End: 2022-12-09
Attending: STUDENT IN AN ORGANIZED HEALTH CARE EDUCATION/TRAINING PROGRAM
Payer: MEDICARE

## 2022-12-09 DIAGNOSIS — N20.0 NEPHROLITHIASIS: ICD-10-CM

## 2022-12-09 PROCEDURE — 74176 CT ABDOMEN PELVIS WITHOUT CONTRAST: ICD-10-PCS | Mod: 26,,, | Performed by: RADIOLOGY

## 2022-12-09 PROCEDURE — 74176 CT ABD & PELVIS W/O CONTRAST: CPT | Mod: 26,,, | Performed by: RADIOLOGY

## 2022-12-09 PROCEDURE — 74176 CT ABD & PELVIS W/O CONTRAST: CPT | Mod: TC

## 2022-12-11 ENCOUNTER — ANESTHESIA EVENT (OUTPATIENT)
Dept: SURGERY | Facility: HOSPITAL | Age: 72
DRG: 659 | End: 2022-12-11
Payer: MEDICARE

## 2022-12-11 ENCOUNTER — HOSPITAL ENCOUNTER (INPATIENT)
Facility: HOSPITAL | Age: 72
LOS: 4 days | Discharge: HOME | DRG: 659 | End: 2022-12-15
Attending: EMERGENCY MEDICINE | Admitting: INTERNAL MEDICINE
Payer: MEDICARE

## 2022-12-11 ENCOUNTER — ANESTHESIA (OUTPATIENT)
Dept: SURGERY | Facility: HOSPITAL | Age: 72
DRG: 659 | End: 2022-12-11
Payer: MEDICARE

## 2022-12-11 DIAGNOSIS — A49.9 ESBL (EXTENDED SPECTRUM BETA-LACTAMASE) PRODUCING BACTERIA INFECTION: ICD-10-CM

## 2022-12-11 DIAGNOSIS — N20.1 LEFT URETERAL STONE: ICD-10-CM

## 2022-12-11 DIAGNOSIS — E87.6 HYPOKALEMIA: ICD-10-CM

## 2022-12-11 DIAGNOSIS — N12 PYELONEPHRITIS: Primary | ICD-10-CM

## 2022-12-11 DIAGNOSIS — G47.33 OSA (OBSTRUCTIVE SLEEP APNEA): ICD-10-CM

## 2022-12-11 DIAGNOSIS — Z16.12 ESBL (EXTENDED SPECTRUM BETA-LACTAMASE) PRODUCING BACTERIA INFECTION: ICD-10-CM

## 2022-12-11 DIAGNOSIS — J45.909 ASTHMA, UNSPECIFIED ASTHMA SEVERITY, UNSPECIFIED WHETHER COMPLICATED, UNSPECIFIED WHETHER PERSISTENT: ICD-10-CM

## 2022-12-11 DIAGNOSIS — R09.02 HYPOXIA: ICD-10-CM

## 2022-12-11 DIAGNOSIS — N20.0 KIDNEY STONE: ICD-10-CM

## 2022-12-11 DIAGNOSIS — R10.30 LOWER ABDOMINAL PAIN: ICD-10-CM

## 2022-12-11 DIAGNOSIS — N34.2 INFECTIVE URETHRITIS: ICD-10-CM

## 2022-12-11 LAB
ALBUMIN SERPL BCP-MCNC: 3.2 G/DL (ref 3.5–5.2)
ALP SERPL-CCNC: 76 U/L (ref 55–135)
ALT SERPL W/O P-5'-P-CCNC: 28 U/L (ref 10–44)
ANION GAP SERPL CALC-SCNC: 14 MMOL/L (ref 8–16)
AST SERPL-CCNC: 40 U/L (ref 10–40)
BACTERIA #/AREA URNS HPF: ABNORMAL /HPF
BASOPHILS # BLD AUTO: 0.04 K/UL (ref 0–0.2)
BASOPHILS NFR BLD: 0.3 % (ref 0–1.9)
BILIRUB SERPL-MCNC: 1.2 MG/DL (ref 0.1–1)
BILIRUB UR QL STRIP: NEGATIVE
BUN SERPL-MCNC: 17 MG/DL (ref 8–23)
CALCIUM SERPL-MCNC: 8.5 MG/DL (ref 8.7–10.5)
CHLORIDE SERPL-SCNC: 105 MMOL/L (ref 95–110)
CLARITY UR: CLEAR
CO2 SERPL-SCNC: 21 MMOL/L (ref 23–29)
COLOR UR: YELLOW
CREAT SERPL-MCNC: 1 MG/DL (ref 0.5–1.4)
DIFFERENTIAL METHOD: ABNORMAL
EOSINOPHIL # BLD AUTO: 0.2 K/UL (ref 0–0.5)
EOSINOPHIL NFR BLD: 1.9 % (ref 0–8)
ERYTHROCYTE [DISTWIDTH] IN BLOOD BY AUTOMATED COUNT: 12.5 % (ref 11.5–14.5)
EST. GFR  (NO RACE VARIABLE): 60 ML/MIN/1.73 M^2
GLUCOSE SERPL-MCNC: 148 MG/DL (ref 70–110)
GLUCOSE UR QL STRIP: NEGATIVE
HCT VFR BLD AUTO: 41.8 % (ref 37–48.5)
HGB BLD-MCNC: 13.6 G/DL (ref 12–16)
HGB UR QL STRIP: ABNORMAL
HYALINE CASTS #/AREA URNS LPF: 0 /LPF
IMM GRANULOCYTES # BLD AUTO: 0.13 K/UL (ref 0–0.04)
IMM GRANULOCYTES NFR BLD AUTO: 1 % (ref 0–0.5)
KETONES UR QL STRIP: NEGATIVE
LEUKOCYTE ESTERASE UR QL STRIP: ABNORMAL
LYMPHOCYTES # BLD AUTO: 0.5 K/UL (ref 1–4.8)
LYMPHOCYTES NFR BLD: 3.9 % (ref 18–48)
MCH RBC QN AUTO: 31.1 PG (ref 27–31)
MCHC RBC AUTO-ENTMCNC: 32.5 G/DL (ref 32–36)
MCV RBC AUTO: 95 FL (ref 82–98)
MICROSCOPIC COMMENT: ABNORMAL
MONOCYTES # BLD AUTO: 0.1 K/UL (ref 0.3–1)
MONOCYTES NFR BLD: 1 % (ref 4–15)
NEUTROPHILS # BLD AUTO: 11.5 K/UL (ref 1.8–7.7)
NEUTROPHILS NFR BLD: 91.9 % (ref 38–73)
NITRITE UR QL STRIP: NEGATIVE
NRBC BLD-RTO: 0 /100 WBC
PH UR STRIP: 6 [PH] (ref 5–8)
PLATELET # BLD AUTO: 143 K/UL (ref 150–450)
PMV BLD AUTO: 10.7 FL (ref 9.2–12.9)
POTASSIUM SERPL-SCNC: 3.2 MMOL/L (ref 3.5–5.1)
PROT SERPL-MCNC: 6.1 G/DL (ref 6–8.4)
PROT UR QL STRIP: NEGATIVE
RBC # BLD AUTO: 4.38 M/UL (ref 4–5.4)
RBC #/AREA URNS HPF: >100 /HPF (ref 0–4)
SODIUM SERPL-SCNC: 140 MMOL/L (ref 136–145)
SP GR UR STRIP: 1.01 (ref 1–1.03)
URN SPEC COLLECT METH UR: ABNORMAL
UROBILINOGEN UR STRIP-ACNC: NEGATIVE EU/DL
WBC # BLD AUTO: 12.53 K/UL (ref 3.9–12.7)
WBC #/AREA URNS HPF: >100 /HPF (ref 0–5)
WBC CLUMPS URNS QL MICRO: ABNORMAL

## 2022-12-11 PROCEDURE — 36000706: Performed by: UROLOGY

## 2022-12-11 PROCEDURE — 87186 SC STD MICRODIL/AGAR DIL: CPT | Mod: 59 | Performed by: UROLOGY

## 2022-12-11 PROCEDURE — C1758 CATHETER, URETERAL: HCPCS | Performed by: UROLOGY

## 2022-12-11 PROCEDURE — 87077 CULTURE AEROBIC IDENTIFY: CPT | Mod: 59 | Performed by: UROLOGY

## 2022-12-11 PROCEDURE — 99285 EMERGENCY DEPT VISIT HI MDM: CPT | Mod: 25

## 2022-12-11 PROCEDURE — 63600175 PHARM REV CODE 636 W HCPCS: Performed by: STUDENT IN AN ORGANIZED HEALTH CARE EDUCATION/TRAINING PROGRAM

## 2022-12-11 PROCEDURE — 52332 CYSTOSCOPY AND TREATMENT: CPT | Mod: LT,,, | Performed by: UROLOGY

## 2022-12-11 PROCEDURE — 93010 ELECTROCARDIOGRAM REPORT: CPT | Mod: ,,, | Performed by: INTERNAL MEDICINE

## 2022-12-11 PROCEDURE — 99284 EMERGENCY DEPT VISIT MOD MDM: CPT | Mod: 25,,, | Performed by: UROLOGY

## 2022-12-11 PROCEDURE — 85025 COMPLETE CBC W/AUTO DIFF WBC: CPT | Performed by: EMERGENCY MEDICINE

## 2022-12-11 PROCEDURE — 96361 HYDRATE IV INFUSION ADD-ON: CPT

## 2022-12-11 PROCEDURE — 87186 SC STD MICRODIL/AGAR DIL: CPT | Performed by: EMERGENCY MEDICINE

## 2022-12-11 PROCEDURE — 71000033 HC RECOVERY, INTIAL HOUR: Performed by: UROLOGY

## 2022-12-11 PROCEDURE — 63600175 PHARM REV CODE 636 W HCPCS: Performed by: EMERGENCY MEDICINE

## 2022-12-11 PROCEDURE — 37000008 HC ANESTHESIA 1ST 15 MINUTES: Performed by: UROLOGY

## 2022-12-11 PROCEDURE — 74420 PR  X-RAY RETROGRADE PYELOGRAM: ICD-10-PCS | Mod: 26,,, | Performed by: UROLOGY

## 2022-12-11 PROCEDURE — 52332 PR CYSTOSCOPY,INSERT URETERAL STENT: ICD-10-PCS | Mod: LT,,, | Performed by: UROLOGY

## 2022-12-11 PROCEDURE — 87086 URINE CULTURE/COLONY COUNT: CPT | Mod: 59 | Performed by: EMERGENCY MEDICINE

## 2022-12-11 PROCEDURE — 36000707: Performed by: UROLOGY

## 2022-12-11 PROCEDURE — 25000242 PHARM REV CODE 250 ALT 637 W/ HCPCS: Performed by: ANESTHESIOLOGY

## 2022-12-11 PROCEDURE — C2617 STENT, NON-COR, TEM W/O DEL: HCPCS | Performed by: UROLOGY

## 2022-12-11 PROCEDURE — 96374 THER/PROPH/DIAG INJ IV PUSH: CPT

## 2022-12-11 PROCEDURE — 81000 URINALYSIS NONAUTO W/SCOPE: CPT | Performed by: EMERGENCY MEDICINE

## 2022-12-11 PROCEDURE — 80053 COMPREHEN METABOLIC PANEL: CPT | Performed by: EMERGENCY MEDICINE

## 2022-12-11 PROCEDURE — 93010 EKG 12-LEAD: ICD-10-PCS | Mod: ,,, | Performed by: INTERNAL MEDICINE

## 2022-12-11 PROCEDURE — 94640 AIRWAY INHALATION TREATMENT: CPT

## 2022-12-11 PROCEDURE — 27000190 HC CPAP FULL FACE MASK W/VALVE

## 2022-12-11 PROCEDURE — 37000009 HC ANESTHESIA EA ADD 15 MINS: Performed by: UROLOGY

## 2022-12-11 PROCEDURE — 93005 ELECTROCARDIOGRAM TRACING: CPT

## 2022-12-11 PROCEDURE — D9220A PRA ANESTHESIA: Mod: CRNA,,, | Performed by: NURSE ANESTHETIST, CERTIFIED REGISTERED

## 2022-12-11 PROCEDURE — D9220A PRA ANESTHESIA: ICD-10-PCS | Mod: ANES,,, | Performed by: ANESTHESIOLOGY

## 2022-12-11 PROCEDURE — 96372 THER/PROPH/DIAG INJ SC/IM: CPT | Performed by: EMERGENCY MEDICINE

## 2022-12-11 PROCEDURE — 87086 URINE CULTURE/COLONY COUNT: CPT | Performed by: UROLOGY

## 2022-12-11 PROCEDURE — 99284 PR EMERGENCY DEPT VISIT,LEVEL IV: ICD-10-PCS | Mod: 25,,, | Performed by: UROLOGY

## 2022-12-11 PROCEDURE — 25000003 PHARM REV CODE 250: Performed by: STUDENT IN AN ORGANIZED HEALTH CARE EDUCATION/TRAINING PROGRAM

## 2022-12-11 PROCEDURE — D9220A PRA ANESTHESIA: Mod: ANES,,, | Performed by: ANESTHESIOLOGY

## 2022-12-11 PROCEDURE — D9220A PRA ANESTHESIA: ICD-10-PCS | Mod: CRNA,,, | Performed by: NURSE ANESTHETIST, CERTIFIED REGISTERED

## 2022-12-11 PROCEDURE — 96375 TX/PRO/DX INJ NEW DRUG ADDON: CPT

## 2022-12-11 PROCEDURE — 27000221 HC OXYGEN, UP TO 24 HOURS

## 2022-12-11 PROCEDURE — 87077 CULTURE AEROBIC IDENTIFY: CPT | Performed by: EMERGENCY MEDICINE

## 2022-12-11 PROCEDURE — 94761 N-INVAS EAR/PLS OXIMETRY MLT: CPT

## 2022-12-11 PROCEDURE — 25500020 PHARM REV CODE 255: Performed by: UROLOGY

## 2022-12-11 PROCEDURE — 87088 URINE BACTERIA CULTURE: CPT | Performed by: EMERGENCY MEDICINE

## 2022-12-11 PROCEDURE — 25000003 PHARM REV CODE 250: Performed by: EMERGENCY MEDICINE

## 2022-12-11 PROCEDURE — 11000001 HC ACUTE MED/SURG PRIVATE ROOM

## 2022-12-11 PROCEDURE — C1769 GUIDE WIRE: HCPCS | Performed by: UROLOGY

## 2022-12-11 PROCEDURE — 87088 URINE BACTERIA CULTURE: CPT | Mod: 59 | Performed by: UROLOGY

## 2022-12-11 PROCEDURE — 99900035 HC TECH TIME PER 15 MIN (STAT)

## 2022-12-11 PROCEDURE — 74420 UROGRAPHY RTRGR +-KUB: CPT | Mod: 26,,, | Performed by: UROLOGY

## 2022-12-11 PROCEDURE — 63600175 PHARM REV CODE 636 W HCPCS: Performed by: NURSE ANESTHETIST, CERTIFIED REGISTERED

## 2022-12-11 DEVICE — STENT URETERAL UNIV 6FR 26CM: Type: IMPLANTABLE DEVICE | Site: URETER | Status: FUNCTIONAL

## 2022-12-11 RX ORDER — ACETAMINOPHEN 500 MG
500 TABLET ORAL EVERY 6 HOURS PRN
COMMUNITY
End: 2023-01-12

## 2022-12-11 RX ORDER — FENTANYL CITRATE 50 UG/ML
INJECTION, SOLUTION INTRAMUSCULAR; INTRAVENOUS
Status: DISCONTINUED | OUTPATIENT
Start: 2022-12-11 | End: 2022-12-11

## 2022-12-11 RX ORDER — ESCITALOPRAM OXALATE 10 MG/1
20 TABLET ORAL DAILY
Status: DISCONTINUED | OUTPATIENT
Start: 2022-12-11 | End: 2022-12-15 | Stop reason: HOSPADM

## 2022-12-11 RX ORDER — TAMSULOSIN HYDROCHLORIDE 0.4 MG/1
0.4 CAPSULE ORAL DAILY
Status: DISCONTINUED | OUTPATIENT
Start: 2022-12-11 | End: 2022-12-15 | Stop reason: HOSPADM

## 2022-12-11 RX ORDER — PROCHLORPERAZINE EDISYLATE 5 MG/ML
5 INJECTION INTRAMUSCULAR; INTRAVENOUS EVERY 6 HOURS PRN
Status: DISCONTINUED | OUTPATIENT
Start: 2022-12-11 | End: 2022-12-15 | Stop reason: HOSPADM

## 2022-12-11 RX ORDER — KETOROLAC TROMETHAMINE 30 MG/ML
15 INJECTION, SOLUTION INTRAMUSCULAR; INTRAVENOUS
Status: COMPLETED | OUTPATIENT
Start: 2022-12-11 | End: 2022-12-11

## 2022-12-11 RX ORDER — BUPROPION HYDROCHLORIDE 150 MG/1
300 TABLET ORAL DAILY
Status: DISCONTINUED | OUTPATIENT
Start: 2022-12-11 | End: 2022-12-15 | Stop reason: HOSPADM

## 2022-12-11 RX ORDER — ATORVASTATIN CALCIUM 40 MG/1
40 TABLET, FILM COATED ORAL NIGHTLY
Status: DISCONTINUED | OUTPATIENT
Start: 2022-12-11 | End: 2022-12-15 | Stop reason: HOSPADM

## 2022-12-11 RX ORDER — MEROPENEM AND SODIUM CHLORIDE 1 G/50ML
1 INJECTION, SOLUTION INTRAVENOUS
Status: DISCONTINUED | OUTPATIENT
Start: 2022-12-11 | End: 2022-12-11

## 2022-12-11 RX ORDER — KETOROLAC TROMETHAMINE 30 MG/ML
15 INJECTION, SOLUTION INTRAMUSCULAR; INTRAVENOUS EVERY 6 HOURS PRN
Status: DISPENSED | OUTPATIENT
Start: 2022-12-11 | End: 2022-12-14

## 2022-12-11 RX ORDER — SODIUM CHLORIDE, SODIUM LACTATE, POTASSIUM CHLORIDE, CALCIUM CHLORIDE 600; 310; 30; 20 MG/100ML; MG/100ML; MG/100ML; MG/100ML
INJECTION, SOLUTION INTRAVENOUS CONTINUOUS PRN
Status: DISCONTINUED | OUTPATIENT
Start: 2022-12-11 | End: 2022-12-11

## 2022-12-11 RX ORDER — ACETAMINOPHEN 500 MG
1000 TABLET ORAL EVERY 8 HOURS PRN
Status: DISCONTINUED | OUTPATIENT
Start: 2022-12-11 | End: 2022-12-11

## 2022-12-11 RX ORDER — HYDROMORPHONE HYDROCHLORIDE 2 MG/ML
0.5 INJECTION, SOLUTION INTRAMUSCULAR; INTRAVENOUS; SUBCUTANEOUS EVERY 5 MIN PRN
Status: DISCONTINUED | OUTPATIENT
Start: 2022-12-11 | End: 2022-12-11 | Stop reason: HOSPADM

## 2022-12-11 RX ORDER — LOSARTAN POTASSIUM 50 MG/1
100 TABLET ORAL DAILY
Status: DISCONTINUED | OUTPATIENT
Start: 2022-12-11 | End: 2022-12-11

## 2022-12-11 RX ORDER — ALBUTEROL SULFATE 90 UG/1
2 AEROSOL, METERED RESPIRATORY (INHALATION) EVERY 6 HOURS PRN
Status: DISCONTINUED | OUTPATIENT
Start: 2022-12-11 | End: 2022-12-15 | Stop reason: HOSPADM

## 2022-12-11 RX ORDER — PROCHLORPERAZINE EDISYLATE 5 MG/ML
10 INJECTION INTRAMUSCULAR; INTRAVENOUS
Status: COMPLETED | OUTPATIENT
Start: 2022-12-11 | End: 2022-12-11

## 2022-12-11 RX ORDER — SODIUM CHLORIDE 9 MG/ML
INJECTION, SOLUTION INTRAVENOUS CONTINUOUS
Status: DISCONTINUED | OUTPATIENT
Start: 2022-12-11 | End: 2022-12-13

## 2022-12-11 RX ORDER — ONDANSETRON 8 MG/1
8 TABLET, ORALLY DISINTEGRATING ORAL EVERY 6 HOURS PRN
Status: DISCONTINUED | OUTPATIENT
Start: 2022-12-11 | End: 2022-12-15 | Stop reason: HOSPADM

## 2022-12-11 RX ORDER — NAPROXEN SODIUM 220 MG/1
81 TABLET, FILM COATED ORAL DAILY
Status: DISCONTINUED | OUTPATIENT
Start: 2022-12-12 | End: 2022-12-15 | Stop reason: HOSPADM

## 2022-12-11 RX ORDER — PANTOPRAZOLE SODIUM 40 MG/1
40 TABLET, DELAYED RELEASE ORAL DAILY
Status: DISCONTINUED | OUTPATIENT
Start: 2022-12-11 | End: 2022-12-15 | Stop reason: HOSPADM

## 2022-12-11 RX ORDER — SODIUM CHLORIDE 0.9 % (FLUSH) 0.9 %
10 SYRINGE (ML) INJECTION
Status: DISCONTINUED | OUTPATIENT
Start: 2022-12-11 | End: 2022-12-15 | Stop reason: HOSPADM

## 2022-12-11 RX ORDER — ACETAMINOPHEN 500 MG
1000 TABLET ORAL EVERY 8 HOURS PRN
Status: DISCONTINUED | OUTPATIENT
Start: 2022-12-11 | End: 2022-12-15

## 2022-12-11 RX ORDER — PROPOFOL 10 MG/ML
VIAL (ML) INTRAVENOUS
Status: DISCONTINUED | OUTPATIENT
Start: 2022-12-11 | End: 2022-12-11

## 2022-12-11 RX ORDER — MORPHINE SULFATE 4 MG/ML
4 INJECTION, SOLUTION INTRAMUSCULAR; INTRAVENOUS
Status: COMPLETED | OUTPATIENT
Start: 2022-12-11 | End: 2022-12-11

## 2022-12-11 RX ORDER — HEPARIN SODIUM 5000 [USP'U]/ML
5000 INJECTION, SOLUTION INTRAVENOUS; SUBCUTANEOUS EVERY 8 HOURS
Status: DISCONTINUED | OUTPATIENT
Start: 2022-12-11 | End: 2022-12-15 | Stop reason: HOSPADM

## 2022-12-11 RX ORDER — TALC
6 POWDER (GRAM) TOPICAL NIGHTLY PRN
Status: DISCONTINUED | OUTPATIENT
Start: 2022-12-11 | End: 2022-12-15 | Stop reason: HOSPADM

## 2022-12-11 RX ORDER — IPRATROPIUM BROMIDE AND ALBUTEROL SULFATE 2.5; .5 MG/3ML; MG/3ML
3 SOLUTION RESPIRATORY (INHALATION) ONCE
Status: COMPLETED | OUTPATIENT
Start: 2022-12-11 | End: 2022-12-11

## 2022-12-11 RX ORDER — SODIUM CHLORIDE 0.9 % (FLUSH) 0.9 %
10 SYRINGE (ML) INJECTION
Status: DISCONTINUED | OUTPATIENT
Start: 2022-12-11 | End: 2022-12-11 | Stop reason: HOSPADM

## 2022-12-11 RX ORDER — ONDANSETRON 2 MG/ML
4 INJECTION INTRAMUSCULAR; INTRAVENOUS ONCE AS NEEDED
Status: DISCONTINUED | OUTPATIENT
Start: 2022-12-11 | End: 2022-12-11 | Stop reason: HOSPADM

## 2022-12-11 RX ORDER — ONDANSETRON 2 MG/ML
INJECTION INTRAMUSCULAR; INTRAVENOUS
Status: DISCONTINUED | OUTPATIENT
Start: 2022-12-11 | End: 2022-12-11

## 2022-12-11 RX ORDER — CEFAZOLIN SODIUM 1 G/3ML
INJECTION, POWDER, FOR SOLUTION INTRAMUSCULAR; INTRAVENOUS
Status: DISCONTINUED | OUTPATIENT
Start: 2022-12-11 | End: 2022-12-11

## 2022-12-11 RX ORDER — ACETAMINOPHEN 500 MG
1000 TABLET ORAL EVERY 8 HOURS
Status: DISCONTINUED | OUTPATIENT
Start: 2022-12-11 | End: 2022-12-11

## 2022-12-11 RX ADMIN — MORPHINE SULFATE 4 MG: 4 INJECTION, SOLUTION INTRAMUSCULAR; INTRAVENOUS at 09:12

## 2022-12-11 RX ADMIN — KETOROLAC TROMETHAMINE 15 MG: 30 INJECTION, SOLUTION INTRAMUSCULAR; INTRAVENOUS at 07:12

## 2022-12-11 RX ADMIN — KETOROLAC TROMETHAMINE 15 MG: 30 INJECTION, SOLUTION INTRAMUSCULAR; INTRAVENOUS at 08:12

## 2022-12-11 RX ADMIN — PROPOFOL 50 MG: 10 INJECTION, EMULSION INTRAVENOUS at 11:12

## 2022-12-11 RX ADMIN — ONDANSETRON 4 MG: 2 INJECTION, SOLUTION INTRAMUSCULAR; INTRAVENOUS at 11:12

## 2022-12-11 RX ADMIN — POTASSIUM BICARBONATE 50 MEQ: 977.5 TABLET, EFFERVESCENT ORAL at 08:12

## 2022-12-11 RX ADMIN — HEPARIN SODIUM 5000 UNITS: 5000 INJECTION INTRAVENOUS; SUBCUTANEOUS at 02:12

## 2022-12-11 RX ADMIN — MEROPENEM 1 G: 1 INJECTION, POWDER, FOR SOLUTION INTRAVENOUS at 05:12

## 2022-12-11 RX ADMIN — SODIUM CHLORIDE 1000 ML: 0.9 INJECTION, SOLUTION INTRAVENOUS at 07:12

## 2022-12-11 RX ADMIN — PROCHLORPERAZINE EDISYLATE 10 MG: 5 INJECTION INTRAMUSCULAR; INTRAVENOUS at 07:12

## 2022-12-11 RX ADMIN — FENTANYL CITRATE 50 MCG: 50 INJECTION, SOLUTION INTRAMUSCULAR; INTRAVENOUS at 11:12

## 2022-12-11 RX ADMIN — MEROPENEM 1 G: 1 INJECTION, POWDER, FOR SOLUTION INTRAVENOUS at 09:12

## 2022-12-11 RX ADMIN — SODIUM CHLORIDE, SODIUM LACTATE, POTASSIUM CHLORIDE, AND CALCIUM CHLORIDE 1000 ML: .6; .31; .03; .02 INJECTION, SOLUTION INTRAVENOUS at 08:12

## 2022-12-11 RX ADMIN — HEPARIN SODIUM 5000 UNITS: 5000 INJECTION INTRAVENOUS; SUBCUTANEOUS at 10:12

## 2022-12-11 RX ADMIN — PROPOFOL 20 MG: 10 INJECTION, EMULSION INTRAVENOUS at 11:12

## 2022-12-11 RX ADMIN — ACETAMINOPHEN 1000 MG: 500 TABLET ORAL at 08:12

## 2022-12-11 RX ADMIN — SODIUM CHLORIDE, SODIUM LACTATE, POTASSIUM CHLORIDE, AND CALCIUM CHLORIDE: .6; .31; .03; .02 INJECTION, SOLUTION INTRAVENOUS at 11:12

## 2022-12-11 RX ADMIN — IPRATROPIUM BROMIDE AND ALBUTEROL SULFATE 3 ML: .5; 3 SOLUTION RESPIRATORY (INHALATION) at 12:12

## 2022-12-11 RX ADMIN — CEFAZOLIN 2 G: 330 INJECTION, POWDER, FOR SOLUTION INTRAMUSCULAR; INTRAVENOUS at 11:12

## 2022-12-11 RX ADMIN — ATORVASTATIN CALCIUM 40 MG: 40 TABLET, FILM COATED ORAL at 08:12

## 2022-12-11 RX ADMIN — TAMSULOSIN HYDROCHLORIDE 0.4 MG: 0.4 CAPSULE ORAL at 02:12

## 2022-12-11 NOTE — PLAN OF CARE
Patient has met PACU discharge criteria, VSS, denies pain.Family updated by phone. Released from PACU by Dr. Sanchez at bedside.

## 2022-12-11 NOTE — INTERVAL H&P NOTE
The patient has been examined and the H&P has been reviewed:    I concur with the findings and no changes have occurred since H&P was written.    Surgery risks, benefits and alternative options discussed and understood by patient/family.          Active Hospital Problems    Diagnosis  POA    *Pyelonephritis [N12]  Unknown    History of CVA (cerebrovascular accident) [Z86.73]  Not Applicable    Vertigo [R42]  Yes    Kidney stone [N20.0]  Unknown    MARTIN (obstructive sleep apnea) [G47.33]  Yes    Severe obesity (BMI 35.0-39.9) with comorbidity [E66.01]  Yes    Hypertension [I10]  Yes     Chronic     -TTE (12/2013): EF 55%, E/e' 12, severe LAE        Resolved Hospital Problems   No resolved problems to display.

## 2022-12-11 NOTE — H&P
[facility] Medicine H&P Note     Admitting Team: [facility] [place] [facility] Hospitalist [facility] B  Attending Physician: Edis Colbert MD  Resident: Ed  Intern: Francesca     Date of Admit: 12/11/2022    Chief Complaint     Flank pain and fever x1 day     Subjective:      History of Present Illness:  Tracy Armendariz is a 71 year old female with a past history of HTN, HLD, CVA in 2013, and nephrolithiasis presenting to [facility] 12/11/22 with a chief complaint of L flank pain and fever for 1 day.     Patient reports fever, dysuria, urgency, CVA tenderness, and nausea for the last day. She has known kidney stone in L ureter. Recently, CTAP from 12/9/22 shows interval increase in ureteral stone from 3 to 6 mm and hydronephrosis (prior CTAP 11/10/22). Patient follows with urology and has a long history of nephrolithiasis, required stent placement in 2018 for obstruction. She denies chest pain, shortness of breath. Reports some lower abdominal tenderness.       Past Medical History:  Past Medical History:   Diagnosis Date    Allergy     Anticoagulant long-term use     Arthritis     CVA (cerebral infarction) 2013    Depression     Disorder of kidney and ureter     renal stones    Diverticulosis of colon     Extrinsic asthma, unspecified     Hyperlipidemia     Hypertension     Kidney stone     Left atrial enlargement 12/16/2014    Low back pain     MARTIN (obstructive sleep apnea)     Osteopenia     PUD (peptic ulcer disease)     Stroke  December 2013    Urinary tract infection        Allergies:  Review of patient's allergies indicates:   Allergen Reactions    Iodinated contrast media Hives and Rash    Gabapentin Hallucinations    Iodine Hives    Isothiazolinones Rash    Penicillins Rash       Home Medications:  Current Outpatient Medications   Medication Instructions    albuterol (PROVENTIL/VENTOLIN HFA) 90 mcg/actuation inhaler INHALE 2 PUFFS EVERY 6 HOURS AS NEEDED FOR WHEEZING    aspirin 81 mg, Oral, Daily    atorvastatin (LIPITOR) 40 mg,  Oral, Nightly    buPROPion (WELLBUTRIN XL) 300 mg, Oral, Daily    calcium carbonate (OS-SPENCER) 600 mg, Oral, 2 times daily with meals    cholecalciferol (vitamin D3) 1,000 Units, Oral, Daily    diazePAM (VALIUM) 2 MG tablet Take 1/2 to 1 tablet 3 times daily as needed to control dizziness    diclofenac sodium (VOLTAREN) 1 % Gel APPLY 2-4 GRAMS TO EACH PAINFUL AREA FOUR TIMES DAILY - MAX 32 GRAMS/DAY    EScitalopram oxalate (LEXAPRO) 20 MG tablet TAKE 1 TABLET(20 MG) BY MOUTH EVERY DAY    esomeprazole (NEXIUM) 40 mg, Oral, Daily PRN    famotidine (PEPCID) 10 mg, Oral, 2 times daily    FLUAD 1844-6181, 65 YR UP,,PF, 45 mcg (15 mcg x 3)/0.5 mL Syrg No dose, route, or frequency recorded.    hydrocortisone 2.5 % cream Apply topically 2 (two) times daily to affected area    LACTOBACILLUS ACIDOPHILUS (PROBIOTIC ORAL) 1 capsule, Oral, Daily, PRN    LIDOcaine-prilocaine (EMLA) cream APPLY 2 GRAMS 3-4 TIMES DAILY FOR TREATMENT OF PAIN    losartan (COZAAR) 100 MG tablet TAKE 1 TABLET(100 MG) BY MOUTH EVERY DAY    meclizine (ANTIVERT) 12.5 mg, Oral, 3 times daily PRN    MULTIVIT WITH CALCIUM,IRON,MIN (WOMEN'S DAILY MULTIVITAMIN ORAL) 1 tablet, Oral, Daily    tamsulosin (FLOMAX) 0.4 mg, Oral, Daily          Social History:  Social History     Tobacco Use    Smoking status: Former     Types: Cigarettes     Quit date: 1995     Years since quittin.9    Smokeless tobacco: Never   Substance Use Topics    Alcohol use: Yes     Alcohol/week: 1.0 standard drink     Types: 1 Glasses of wine per week     Comment: social    Drug use: No     Lives with      Review of Systems:  Pertinent items are noted in HPI. All other systems are reviewed and are negative.    Objective:   Last 24 Hour Vital Signs:  BP  Min: 132/78  Max: 139/70  Temp  Av.8 °F (36.6 °C)  Min: 97.8 °F (36.6 °C)  Max: 97.8 °F (36.6 °C)  Pulse  Av.3  Min: 80  Max: 84  Resp  Av  Min: 18  Max: 18  SpO2  Av.3 %  Min: 89 %  Max: 100 %  Weight  Avg:  104.3 kg (230 lb)  Min: 104.3 kg (230 lb)  Max: 104.3 kg (230 lb)  Body mass index is 38.27 kg/m².  No intake/output data recorded.    Physical Examination:  Examination  General: Patient sitting comfortably in NAD  Head: normocephalic, atraumatic  Eyes: PERRL, EOMI, no conjunctival injections or icterus  Mouth: MMM, posterior oropharynx without erythema  Cardiac: RRR, no murmurs appreciated, no extra heart sounds  Pulmonary/Chest: CTAB, no wheezing or crackles, requiring 2L O2   GI: Soft, nnon distended, moderately tender in suprapubic region and LLQ, has L CVA tenderness   Extremities: no edema, clubbing, or cyanosis  Skin: dry, warm, intact. No bruising or rashes.  Neuro: Alert and oriented, moving all extremities equally     Laboratory:  Most Recent Data:  CBC:   Lab Results   Component Value Date    WBC 12.53 12/11/2022    HGB 13.6 12/11/2022    HCT 41.8 12/11/2022     (L) 12/11/2022    MCV 95 12/11/2022    RDW 12.5 12/11/2022     BMP:   Lab Results   Component Value Date     12/11/2022    K 3.2 (L) 12/11/2022     12/11/2022    CO2 21 (L) 12/11/2022    BUN 17 12/11/2022    CREATININE 1.0 12/11/2022     (H) 12/11/2022    CALCIUM 8.5 (L) 12/11/2022    MG 1.6 12/23/2013    PHOS 3.6 12/23/2013     LFTs:   Lab Results   Component Value Date    PROT 6.1 12/11/2022    ALBUMIN 3.2 (L) 12/11/2022    BILITOT 1.2 (H) 12/11/2022    AST 40 12/11/2022    ALKPHOS 76 12/11/2022    ALT 28 12/11/2022     Coags:   Lab Results   Component Value Date    INR 1.0 01/13/2017     FLP:   Lab Results   Component Value Date    CHOL 227 (H) 03/29/2022    HDL 90 (H) 03/29/2022    LDLCALC 112.6 03/29/2022    TRIG 122 03/29/2022    CHOLHDL 39.6 03/29/2022     DM:   Lab Results   Component Value Date    HGBA1C 6.2 (H) 06/14/2022    HGBA1C 5.5 03/15/2018    HGBA1C 5.9 04/10/2017    GLUF 123 (H) 11/04/2008    LDLCALC 112.6 03/29/2022    CREATININE 1.0 12/11/2022     Thyroid:   Lab Results   Component Value Date    TSH  2.017 03/29/2022     Anemia:   Lab Results   Component Value Date    IRON 54 04/10/2017    TIBC 416 04/10/2017    FERRITIN 30 04/10/2017    [id number] 797 03/22/2016    FOLATE 11.7 03/22/2016     Cardiac:   Lab Results   Component Value Date    TROPONINI <0.006 09/25/2018    BNP 74 09/25/2018     Urinalysis:   Lab Results   Component Value Date    LABURIN ESCHERICHIA COLI ESBL  >100,000 cfu/ml   (A) 11/17/2022    COLORU Yellow 12/11/2022    SPECGRAV 1.010 12/11/2022    NITRITE Negative 12/11/2022    KETONESU Negative 12/11/2022    UROBILINOGEN Negative 12/11/2022    WBCUA >100 (H) 12/11/2022       Trended Lab Data:  Recent Labs   Lab 12/11/22  0741   WBC 12.53   HGB 13.6   HCT 41.8   *   MCV 95   RDW 12.5      K 3.2*      CO2 21*   BUN 17   CREATININE 1.0   *   PROT 6.1   ALBUMIN 3.2*   BILITOT 1.2*   AST 40   ALKPHOS 76   ALT 28       Microbiology Data:  Urine cultures from 10/11 and 11/17 growing multi drug resistant E. Coli   Ucx 12/11 pending     Radiology:  CTAP 11/10/2022  Kidneys are normal in size and location.  3 mm calculus within the proximal left ureter.  Mild prominence of left renal collecting system without overt hydronephrosis    CTAP 12/9/2022  Similar position of left proximal ureteral stone, measuring 6 mm on today's exam, previously 3 mm.  Prominence of the upstream renal collecting system with mild hydronephrosis.     Assessment:     Tracy Armendariz is a 71 year old female with a past history of HTN, HLD, CVA in 2013, and nephrolithiasis presenting to [facility] 12/11/22 with a chief complaint of L flank pain and fever for 1 day. Patient admitted to [facility] medicine for pyelonephritis in the setting of obstructive kidney stone.      Plan:     Pyelonephritis 2/2 nephrolithiasis   - patient presenting with fever, dysuria, urgency, and CVA tenderness for 1 day   - CTAP from 12/9 shows interval increase in ureteral stone from 3 mm to 6 mm with mild hydronephrosis    - UA  positive for blood and >100k WBCs, culture pending   - received morphine and Toradol in the ED for pain   - follows with urology for chronic nephrolithiasis   - urology consulted, plan for stent placement to relieve obstruction   - Ucx in the past grew multi drug resistant E. Coli, started on meropenem   - continue home flomax    HTN   - pressure controlled on presentation   - continue home losartan 100 mg daily     Prior CVA  - continue ASA and statin     Depression   - continue home wellbutrin and lexapro     Asthma   - only needs rescue inhaler roughly once per week, sometimes less   - PRN albuterol       Code Status: Full   DVT Prophylaxis: heparin   Diet: NPO  Disposition: pending urology recs, urine cultures     Yaquelin Ma MD   U Neurology Resident, -I    \[facility]\"" Medicine Hospitalist Pager numbers:   U Hospitalist Medicine Team A (Zachary/Laurel): [phone]  \[facility]\"" Hospitalist Medicine Team B (Rachel/Sayra):  [phone]

## 2022-12-11 NOTE — H&P (VIEW-ONLY)
[facility] - Emergency Dept  Urology  Consult Note    Patient Name: Tracy Armendariz  MRN: [id number]  Admission Date: 2022  Attending Provider: Edis Colbert MD   Consulting Provider: Mitchell Recinos MD  Principal Problem:Pyelonephritis    Inpatient consult to Urology  Consult performed by: Mitchell Recinos MD  Consult ordered by: Ingris Ward MD        Subjective:     HPI:   Tracy Armendariz is a 72 y.o. female who is admitted with fever, left flank pain, urology consulted for left ureteral stone.      Patient with a known left ureteral stone, follows with Dr. Zamora. Had Ct scan on Friday revealing a 6mm left proximal ureteral stone. Patient had fevers to 101 at home and pain in the left flank, presented to ED. WBC 12, urine indicative of infection, last urine culture growing E. Coli. Urology consulted.    Past Medical History:   Diagnosis Date    Allergy     Anticoagulant long-term use     Arthritis     CVA (cerebral infarction)     Depression     Disorder of kidney and ureter     renal stones    Diverticulosis of colon     Extrinsic asthma, unspecified     Hyperlipidemia     Hypertension     Kidney stone     Left atrial enlargement 2014    Low back pain     MARTIN (obstructive sleep apnea)     Osteopenia     PUD (peptic ulcer disease)     Stroke  2013    Urinary tract infection        Past Surgical History:   Procedure Laterality Date    APPENDECTOMY      @ time of hysterectomy    BACK SURGERY      CATARACT EXTRACTION       SECTION      CHOLECYSTECTOMY      laparoscopic    COLONOSCOPY N/A 2018    Procedure: COLONOSCOPY/Golytely;  Surgeon: Janine Black MD;  Location: [facility];  Service: Endoscopy;  Laterality: N/A;    DILATION AND CURETTAGE OF UTERUS      HYSTERECTOMY      TAHUSO with appendectomy    INNER EAR SURGERY      replaced ear drum    JOINT REPLACEMENT Right     knee    KNEE ARTHROSCOPY W/ DEBRIDEMENT      LUMBAR DISCECTOMY      L4-L5     OOPHORECTOMY      unilateral    TONSILLECTOMY      TYMPANOPLASTY      URETEROSCOPIC REMOVAL OF URETERIC CALCULUS Left 2018    Procedure: REMOVAL, CALCULUS, URETER, URETEROSCOPIC, holmium laser lithotripsy, stone basket extraction, retrograde pyelogram, ureteral stent exchange;  Surgeon: Anaya Zamoar MD;  Location: [facility];  Service: Urology;  Laterality: Left;       Family History   Problem Relation Age of Onset    Cervical cancer Mother     Cancer Mother 65        lung cancer - non smoker    Stroke Paternal Grandfather     Hypertension Maternal Grandfather     Heart disease Maternal Grandfather     Hypertension Father     Coronary artery disease Father 62    Heart disease Father     Diabetes Sister     Heart disease Sister     Kidney disease Sister     Breast cancer Daughter 36    Heart failure Sister         60s    Colon cancer Neg Hx     Ovarian cancer Neg Hx        Social History     Tobacco Use    Smoking status: Former     Types: Cigarettes     Quit date: 1995     Years since quittin.9    Smokeless tobacco: Never   Substance Use Topics    Alcohol use: Yes     Alcohol/week: 1.0 standard drink     Types: 1 Glasses of wine per week     Comment: social    Drug use: No       No current facility-administered medications on file prior to encounter.     Current Outpatient Medications on File Prior to Encounter   Medication Sig Dispense Refill    albuterol (PROVENTIL/VENTOLIN HFA) 90 mcg/actuation inhaler INHALE 2 PUFFS EVERY 6 HOURS AS NEEDED FOR WHEEZING 18 g 0    aspirin 81 MG Chew Take 81 mg by mouth once daily.      atorvastatin (LIPITOR) 40 MG tablet Take 1 tablet (40 mg total) by mouth every evening. 90 tablet 3    buPROPion (WELLBUTRIN XL) 300 MG 24 hr tablet Take 1 tablet (300 mg total) by mouth once daily. 90 tablet 11    calcium carbonate (OS-SPENCER) 600 mg (1,500 mg) Tab Take 600 mg by mouth 2 (two) times daily with meals.      cholecalciferol, vitamin D3, 1,000 unit Chew Take 1,000 Units by  mouth once daily.      diazePAM (VALIUM) 2 MG tablet Take 1/2 to 1 tablet 3 times daily as needed to control dizziness 50 tablet 1    diclofenac sodium (VOLTAREN) 1 % Gel APPLY 2-4 GRAMS TO EACH PAINFUL AREA FOUR TIMES DAILY - MAX 32 GRAMS/ g 6    EScitalopram oxalate (LEXAPRO) 20 MG tablet TAKE 1 TABLET(20 MG) BY MOUTH EVERY DAY (Patient taking differently: Take 20 mg by mouth once daily.) 90 tablet 3    esomeprazole (NEXIUM) 40 MG capsule Take 1 capsule (40 mg total) by mouth daily as needed (heartburn). 30 capsule 3    hydrocortisone 2.5 % cream Apply topically 2 (two) times daily to affected area 30 g 2    LACTOBACILLUS ACIDOPHILUS (PROBIOTIC ORAL) Take 1 capsule by mouth once daily. PRN      LIDOcaine-prilocaine (EMLA) cream APPLY 2 GRAMS 3-4 TIMES DAILY FOR TREATMENT OF PAIN 240 g 6    losartan (COZAAR) 100 MG tablet TAKE 1 TABLET(100 MG) BY MOUTH EVERY DAY 90 tablet 3    MULTIVIT WITH CALCIUM,IRON,MIN (WOMEN'S DAILY MULTIVITAMIN ORAL) Take 1 tablet by mouth once daily.      tamsulosin (FLOMAX) 0.4 mg Cap Take 1 capsule (0.4 mg total) by mouth once daily. 90 capsule 0    famotidine (PEPCID) 10 MG tablet Take 10 mg by mouth 2 (two) times daily.      FLUAD [id number], 65 YR UP,,PF, 45 mcg (15 mcg x 3)/0.5 mL Syrg       meclizine (ANTIVERT) 12.5 mg tablet Take 1 tablet (12.5 mg total) by mouth 3 (three) times daily as needed for Dizziness or Nausea. 30 tablet 6       Review of patient's allergies indicates:   Allergen Reactions    Iodinated contrast media Hives and Rash    Gabapentin Hallucinations    Iodine Hives    Isothiazolinones Rash    Penicillins Rash       Review of Systems:  A review of 10+ systems was conducted with pertinent positive and negative findings documented in HPI with all other systems reviewed and negative.    Objective:     Vitals:   Temp:  [97.8 °F (36.6 °C)] 97.8 °F (36.6 °C)  Pulse:  [80-84] 82  Resp:  [16-26] 26  SpO2:  [89 %-100 %] 92 %  BP: (102-139)/(53-78) 102/53       I/O: No  intake or output data in the 24 hours ending 12/11/22 1103    Physical Exam:  GENERAL: patient sitting comfortably  HEENT: normocephalic  NECK: supple, no JVD  PULM: normal chest rise, no increased WOB  HEART: non-diaphoretic  ABDO: soft, non-distended, non-tender  BACK: mild left CVA tenderness  SKIN: warm, dry, well perfused  EXT: no bruising or edema  NEURO: grossly normal with no focal deficits  PSYCH: appropriate mood and affect    Significant Labs:  CBC:  Recent Labs   Lab 12/11/22  0741   WBC 12.53   HGB 13.6   HCT 41.8   *       BMP:  Recent Labs   Lab 12/11/22  0741      K 3.2*      CO2 21*   BUN 17   CREATININE 1.0   CALCIUM 8.5*       Urinalysis  Recent Labs   Lab 12/11/22  0855   COLORU Yellow   SPECGRAV 1.010   PHUR 6.0   PROTEINUA Negative   BACTERIA Occasional   NITRITE Negative   LEUKOCYTESUR 2+*   UROBILINOGEN Negative   HYALINECASTS 0       Imaging:  All pertinent imaging results/findings from the past 24 hours have been reviewed.    Results for orders placed or performed during the hospital encounter of 12/09/22 (from the past 2160 hour(s))   CT Abdomen Pelvis  Without Contrast    Impression    Similar position of left proximal ureteral stone, measuring 6 mm on today's exam, previously 3 mm.  Prominence of the upstream renal collecting system with mild hydronephrosis.    Other nonobstructing left renal calculi, colonic diverticulosis, and additional findings as above.      Electronically signed by: Mauro Nuñez  Date:    12/09/2022  Time:    11:27     *Note: Due to a large number of results and/or encounters for the requested time period, some results have not been displayed. A complete set of results can be found in Results Review.     No results found. However, due to the size of the patient record, not all encounters were searched. Please check Results Review for a complete set of results.    Urology Specific Assessment:     Left ureteral stone with UTI,  pyelonephritis    Plan:      Admit to internal medicine  NPO  Start ceftriaxone  Send urine for culture  Pain control per primary  Plan for OR today for cystoscopy with left ureteral stent insertion. Risks, benefits, and alternative treatment options were discussed. All questions were answered. No guarantees as to the outcome of the treatment were made.    Thank you for your consult. Urology will follow.    Mitchell Recinos MD  Urology  [facility]    Disclaimer: This note has been generated using voice-recognition software. There may be typographical errors that have been missed during proof-reading.

## 2022-12-11 NOTE — OP NOTE
[facility])  Urology Department  Operative Note    Date of Procedure: 12/11/2022     Pre-Operative Diagnosis:   Left ureteral stone    Post-Operative Diagnosis:   Left ureteral stone    Procedure(s) Performed:    Cystoscopy with left ureteral JJ stent placement  Left retrograde pyelography with on table fluoroscopic interpretation by physician  Fluoroscopy < 1 hour    Operative Findings:    Left ureteral stent placed in standard fashion.   Purulent urine expelled upon placement of wire and obtained from kidney    Surgeon: Mitchell Recinos MD    Assistant: None    Anesthesia: General    Estimated Blood Loss: Minimal    IV Fluids: IV Crystalloid    Drains:    6 Japanese x 26 cm left JJ ureteral stent without strings    Specimen(s):   Left upper tract urine for culture    Complications: None    Indications: Tracy Armendariz is a 72 y.o. female with a left ureteral stone and concern for obstructive pyelonephritis. After the risks, benefits, and alternatives were discussed and all questions were answered to her satisfaction, she elected to proceed with surgery.    Procedure in Detail:  The patient was transferred to the operating room. SCDs were applied and working. Time out was performed, varun-procedural antibiotics were given. Anesthesia was administered. After adequate anesthesia the patient was placed in dorsal lithotomy position and prepped and draped in the usual sterile fashion. A  film was obtained.    A rigid cystoscope in a 22 Fr sheath was introduced into the bladder per urethra. The entire urethra was visualized and revealed no strictures or masses. Cystoscopy was performed which showed the right and left ureteral orifices in the normal anatomic position. There were no bladder tumors, no trabeculations, and no stones.    Attention was turned to the patient's left ureteral orifice, and a hybrid wire was used to cannulate the UO. The wire was advanced up the left ureter to the level of the  expected renal pelvis using intermittent fluoroscopy. A ureteral catheter was then advanced over the wire into the renal pelvis beyond the stone, purulent urine was effluxing with placement of the wire. An upper tract urine specimen was obtained and sent for urine culture.    A left retrograde pyelography was then performed by instilling 10cc of Omnipaque contrast through the ureteral catheter. The entire collecting system was opacified. No extravasation was noted. The kidney was mildly hydronephrotic. No filling defects were seen.    A 6 Fr x 26 cm JJ ureteral stent without strings was advanced over the wire to the level of the renal pelvis using fluoroscopy. When the stent was in the appropriate position, the wire was removed. There were good coils visualized both proximally and distally using fluoroscopy and direct vision in the bladder. The bladder was then drained.    She tolerated the procedure well and was transferred to the recovery room in stable condition.      Disposition: She will stay admitted for IV antibiotics. Await results of urine culture.    Mitchell Recinos MD  Urology  [facility]

## 2022-12-11 NOTE — ANESTHESIA POSTPROCEDURE EVALUATION
Anesthesia Post Evaluation    Patient: Tracy Armendariz    Procedure(s) Performed: Procedure(s) (LRB):  CYSTOSCOPY, WITH URETERAL STENT INSERTION (Left)    Final Anesthesia Type: general      Patient location during evaluation: PACU  Patient participation: Yes- Able to Participate  Level of consciousness: awake and alert  Post-procedure vital signs: reviewed and stable  Pain management: adequate  Airway patency: patent    PONV status at discharge: No PONV  Anesthetic complications: no      Cardiovascular status: blood pressure returned to baseline and hemodynamically stable  Respiratory status: unassisted  Hydration status: euvolemic  Follow-up not needed.          Vitals Value Taken Time   BP 97/55 12/11/22 1233   Temp 36.7 °C (98 °F) 12/11/22 1213   Pulse 76 12/11/22 1235   Resp 18 12/11/22 1235   SpO2 95 % 12/11/22 1235   Vitals shown include unvalidated device data.      No case tracking events are documented in the log.      Pain/Elsie Score: Pain Rating Prior to Med Admin: 10 (12/11/2022  9:30 AM)

## 2022-12-11 NOTE — PHARMACY MED REC
"  Admission Medication History     The home medication history was taken by Cecelia Hernandez CPhT.    Medication history obtained from, Patient's  Verified    You may go to "Admission" then "Reconcile Home Medications" tabs to review and/or act upon these items.     The home medication list has been updated by the Pharmacy department.   Please read ALL comments highlighted in yellow.   Please address this information as you see fit.    Feel free to contact us if you have any questions or require assistance.      The medications listed below were removed from the home medication list.  Please reorder if appropriate:  Patient reports no longer taking the following medication(s):  Famotidine 10 mg  Hydrocortisone 2.5% cream  EMLA cream        Cecelia Hernandez CPhT.  Ext [phone]             .        "

## 2022-12-11 NOTE — ED PROVIDER NOTES
Encounter Date: 12/11/2022       History     Chief Complaint   Patient presents with    Flank Pain     Pt with known kidney stone from CT on Friday presents with weakness, pain and nausea/vomiting. Pt reports she has been able to produce urine. O2 sat low in triage. Pt reports difficulty taking a deep breath. Pt's lips appear pale.      72-year-old female presents emergency department complaining left-sided flank pain, nausea, vomiting, fever, dysuria, urgency.  Patient was diagnosed with a kidney stone.  She had a CT about a month ago showing a left ureteral kidney stone of about 3 mm.  Had a repeat CT scan 2 days ago showing a 6 mm stone.  However her symptoms have been fairly easy to manage at home.  Overnight, she began experiencing worsening pain.  Felt warm and had a temperature taken at home of 101°.  She took some Tylenol prior to arrival.  Notes nausea with 1 or 2 episodes of nonbloody nonbilious emesis.  Also notes urgency and frequency and dysuria, which are new symptoms.  No other symptoms reported.  Denies any headache, lightheadedness, dizziness, chest pain shortness breath, cough, congestion.    Review of patient's allergies indicates:   Allergen Reactions    Iodinated contrast media Hives and Rash    Gabapentin Hallucinations    Iodine Hives    Isothiazolinones Rash    Penicillins Rash     Past Medical History:   Diagnosis Date    Allergy     Anticoagulant long-term use     Arthritis     CVA (cerebral infarction) 2013    Depression     Disorder of kidney and ureter     renal stones    Diverticulosis of colon     Extrinsic asthma, unspecified     Hyperlipidemia     Hypertension     Kidney stone     Left atrial enlargement 12/16/2014    Low back pain     MARTIN (obstructive sleep apnea)     Osteopenia     PUD (peptic ulcer disease)     Stroke  December 2013    Urinary tract infection      Past Surgical History:   Procedure Laterality Date    APPENDECTOMY  1978    @ time of hysterectomy    BACK SURGERY       CATARACT EXTRACTION       SECTION      CHOLECYSTECTOMY      laparoscopic    COLONOSCOPY N/A 2018    Procedure: COLONOSCOPY/Golytely;  Surgeon: Janine Black MD;  Location: [facility] ENDO;  Service: Endoscopy;  Laterality: N/A;    DILATION AND CURETTAGE OF UTERUS  1972    HYSTERECTOMY      TAHUSO with appendectomy    INNER EAR SURGERY      replaced ear drum    JOINT REPLACEMENT Right     knee    KNEE ARTHROSCOPY W/ DEBRIDEMENT      LUMBAR DISCECTOMY      L4-L5    OOPHORECTOMY      unilateral    TONSILLECTOMY      TYMPANOPLASTY      URETEROSCOPIC REMOVAL OF URETERIC CALCULUS Left 2018    Procedure: REMOVAL, CALCULUS, URETER, URETEROSCOPIC, holmium laser lithotripsy, stone basket extraction, retrograde pyelogram, ureteral stent exchange;  Surgeon: Anaya Zamora MD;  Location: [facility] OR;  Service: Urology;  Laterality: Left;     Family History   Problem Relation Age of Onset    Cervical cancer Mother     Cancer Mother 65        lung cancer - non smoker    Stroke Paternal Grandfather     Hypertension Maternal Grandfather     Heart disease Maternal Grandfather     Hypertension Father     Coronary artery disease Father 62    Heart disease Father     Diabetes Sister     Heart disease Sister     Kidney disease Sister     Breast cancer Daughter 36    Heart failure Sister         60s    Colon cancer Neg Hx     Ovarian cancer Neg Hx      Social History     Tobacco Use    Smoking status: Former     Types: Cigarettes     Quit date: 1995     Years since quittin.9    Smokeless tobacco: Never   Substance Use Topics    Alcohol use: Yes     Alcohol/week: 1.0 standard drink     Types: 1 Glasses of wine per week     Comment: social    Drug use: No     Review of Systems   Constitutional:  Positive for fever. Negative for chills and fatigue.   HENT:  Negative for congestion and sore throat.    Eyes:  Negative for photophobia and visual disturbance.   Respiratory:  Negative for cough and  shortness of breath.    Cardiovascular:  Negative for chest pain and palpitations.   Gastrointestinal:  Positive for nausea and vomiting. Negative for abdominal pain.   Musculoskeletal:  Positive for back pain. Negative for neck pain and neck stiffness.   Neurological:  Negative for light-headedness, numbness and headaches.     Physical Exam     Initial Vitals [12/11/22 0721]   BP Pulse Resp Temp SpO2   139/70 84 18 97.8 °F (36.6 °C) (!) 89 %      MAP       --         Physical Exam    Nursing note and vitals reviewed.  Constitutional: She appears well-developed and well-nourished. No distress.   HENT:   Head: Normocephalic and atraumatic.   Eyes: Conjunctivae and EOM are normal. Pupils are equal, round, and reactive to light.   Neck: Neck supple. No tracheal deviation present.   Normal range of motion.  Cardiovascular:  Normal rate and intact distal pulses.           Pulmonary/Chest: No respiratory distress.   Abdominal: Abdomen is soft. She exhibits no distension. There is no abdominal tenderness.   Musculoskeletal:         General: Tenderness (Positive left CVA TTP) present. No edema. Normal range of motion.      Cervical back: Normal range of motion and neck supple.     Neurological: She is alert and oriented to person, place, and time. She has normal strength. No cranial nerve deficit. GCS score is 15. GCS eye subscore is 4. GCS verbal subscore is 5. GCS motor subscore is 6.   Skin: Skin is warm and dry.       ED Course   Critical Care    Date/Time: 12/11/2022 9:38 AM  Performed by: Roderick Kennedy MD  Authorized by: Roderick Kennedy MD   Direct patient critical care time: 15 minutes  Additional history critical care time: 15 minutes  Ordering / reviewing critical care time: 15 minutes  Documentation critical care time: 15 minutes  Consulting other physicians critical care time: 15 minutes  Consult with family critical care time: 10 minutes  Total critical care time (exclusive of procedural time) : 85  minutes  Critical care time was exclusive of separately billable procedures and treating other patients.  Critical care was necessary to treat or prevent imminent or life-threatening deterioration of the following conditions: Infected kidney stone.  Critical care was time spent personally by me on the following activities: development of treatment plan with patient or surrogate, discussions with consultants, interpretation of cardiac output measurements, evaluation of patient's response to treatment, examination of patient, obtaining history from patient or surrogate, ordering and performing treatments and interventions, ordering and review of laboratory studies, pulse oximetry, re-evaluation of patient's condition and review of old charts.      Labs Reviewed   CBC W/ AUTO DIFFERENTIAL - Abnormal; Notable for the following components:       Result Value    MCH 31.1 (*)     Platelets 143 (*)     Immature Granulocytes 1.0 (*)     Gran # (ANC) 11.5 (*)     Immature Grans (Abs) 0.13 (*)     Lymph # 0.5 (*)     Mono # 0.1 (*)     Gran % 91.9 (*)     Lymph % 3.9 (*)     Mono % 1.0 (*)     All other components within normal limits   COMPREHENSIVE METABOLIC PANEL - Abnormal; Notable for the following components:    Potassium 3.2 (*)     CO2 21 (*)     Glucose 148 (*)     Calcium 8.5 (*)     Albumin 3.2 (*)     Total Bilirubin 1.2 (*)     All other components within normal limits   URINALYSIS - Abnormal; Notable for the following components:    Occult Blood UA 2+ (*)     Leukocytes, UA 2+ (*)     All other components within normal limits   URINALYSIS MICROSCOPIC - Abnormal; Notable for the following components:    RBC, UA >100 (*)     WBC, UA >100 (*)     WBC Clumps, UA Many (*)     All other components within normal limits   CULTURE, URINE          Imaging Results    None       X-Rays:   Independently Interpreted Readings:   Other Readings:  CT From 12/9:     EXAMINATION:  CT ABDOMEN PELVIS WITHOUT CONTRAST     CLINICAL  HISTORY:  evaluate if left ureteral stone has passed; Calculus of kidney     TECHNIQUE:  Noncontrast images were obtained through the abdomen and pelvis, per protocol.  Coronal and sagittal images were reviewed.     COMPARISON:  CT abdomen pelvis without contrast dated 11/10/2022     FINDINGS:  Limited images through the lower chest are unremarkable.  Scattered coronary calcific atherosclerosis with mitral annular calcification.     Abdomen and pelvis:     Gallbladder is absent.  The liver, spleen, pancreas, and adrenal glands demonstrate unremarkable noncontrast appearance.  Mild bilateral perinephric stranding, similar and nonspecific.  Nonobstructing left renal calculi.  Similar positioning of left proximal ureteral stone measuring on the order of 6 mm on today's exam, previously 3 mm (series 2, image 100).  Upstream prominence of the renal collecting system with mild hydronephrosis.  No right-sided hydronephrosis.  Urinary bladder is mostly decompressed.  The uterus is absent.     Few intrapelvic phleboliths.  The GI tract is normal in caliber with scattered colonic diverticula.  Appendix is not visualized.  No significant free fluid or adenopathy.  Scattered aortoiliac atherosclerosis.  Tiny fat containing umbilical hernia and fat containing inguinal hernias.     Few remote appearing rib fractures.  No aggressive osseous lesion.  Spine DJD.     Impression:     Similar position of left proximal ureteral stone, measuring 6 mm on today's exam, previously 3 mm.  Prominence of the upstream renal collecting system with mild hydronephrosis.     Other nonobstructing left renal calculi, colonic diverticulosis, and additional findings as above.        Electronically signed by: Mauro Nuñez  Date:                                            12/09/2022  Time:                                           11:27  Medications   meropenem 1 g in sodium chloride 0.9 % 100 mL IVPB (ready to mix system) (has no administration in  "time range)   lactated ringers bolus 1,000 mL (1,000 mLs Intravenous New Bag 12/11/22 0845)   morphine injection 4 mg (has no administration in time range)   sodium chloride 0.9% bolus 1,000 mL (0 mLs Intravenous Stopped 12/11/22 0842)   prochlorperazine injection Soln 10 mg (10 mg Intramuscular Given 12/11/22 0742)   potassium bicarbonate disintegrating tablet 50 mEq (50 mEq Oral Given 12/11/22 0841)   ketorolac injection 15 mg (15 mg Intravenous Given 12/11/22 0841)     Medical Decision Making:   History:   Old Medical Records: I decided to obtain old medical records.  Old Records Summarized: records from clinic visits and records from previous admission(s).       <> Summary of Records: Reviewed recent urology notes as well as recent CT scans and urine cultures  Initial Assessment:   72-year-old female presents emergency department complaining of left-sided flank pain, dysuria, frequency, urgency, nausea, vomiting, fever  Differential Diagnosis:   Diverticulitis, cholecystitis, pancreatitis, appendicitis, obstruction, constipation UTI, pyelonephritis, kidney stone, gastroenteritis, infected kidney stone  Independently Interpreted Test(s):   I have ordered and independently interpreted X-rays - see prior notes.  Clinical Tests:   Lab Tests: Reviewed       <> Summary of Lab: Concerning for mild leukocytosis and pyuria and hematuria  ED Management:  Patient also notes history of having "sepsis from a kidney stone. "Reviewed recent urine cultures.  She had urine cultures that were positive in mid October and mid November for a relatively resistant E coli.  Based on these results I have ordered a g of meropenem for this patient along with IV fluid, Compazine, Toradol.  She is feeling little bit better.  Her potassium was repleted orally.  I discussed her case with Dr. Recinos with Urology who agrees with plan to admit and will see the patient as a consultant, request that she be made NPO.  Informed patient family of " plan and they are comfortable with plan at this time.  Patient reporting improvement but still having some pain so I have ordered some morphine.  I have sent her urine for culture.  Discussed case with the [facility] internal medicine team who will see and admit the patient for further evaluation and management.                         Clinical Impression:   Final diagnoses:  [N34.2] Infective urethritis (Primary)  [N20.0] Kidney stone  [E87.6] Hypokalemia        ED Disposition Condition    Observation Stable                Roderick Kennedy MD  12/11/22 0938

## 2022-12-11 NOTE — ANESTHESIA PREPROCEDURE EVALUATION
2022  Tracy Armendariz is a 72 y.o., female  Past Medical History:   Diagnosis Date    Allergy     Anticoagulant long-term use     Arthritis     CVA (cerebral infarction)     Depression     Disorder of kidney and ureter     renal stones    Diverticulosis of colon     Extrinsic asthma, unspecified     Hyperlipidemia     Hypertension     Kidney stone     Left atrial enlargement 2014    Low back pain     MARTIN (obstructive sleep apnea)     Osteopenia     PUD (peptic ulcer disease)     Stroke  2013    Urinary tract infection      Past Surgical History:   Procedure Laterality Date    APPENDECTOMY      @ time of hysterectomy    BACK SURGERY      CATARACT EXTRACTION       SECTION      CHOLECYSTECTOMY      laparoscopic    COLONOSCOPY N/A 2018    Procedure: COLONOSCOPY/Golytely;  Surgeon: Janine Black MD;  Location: [facility] ENDO;  Service: Endoscopy;  Laterality: N/A;    DILATION AND CURETTAGE OF UTERUS      HYSTERECTOMY      TAHUSO with appendectomy    INNER EAR SURGERY      replaced ear drum    JOINT REPLACEMENT Right     knee    KNEE ARTHROSCOPY W/ DEBRIDEMENT      LUMBAR DISCECTOMY      L4-L5    OOPHORECTOMY      unilateral    TONSILLECTOMY      TYMPANOPLASTY      URETEROSCOPIC REMOVAL OF URETERIC CALCULUS Left 2018    Procedure: REMOVAL, CALCULUS, URETER, URETEROSCOPIC, holmium laser lithotripsy, stone basket extraction, retrograde pyelogram, ureteral stent exchange;  Surgeon: Anaya Zamora MD;  Location: [facility] OR;  Service: Urology;  Laterality: Left;       Pre-op Assessment    I have reviewed the Patient Summary Reports.      I have reviewed the Medications.     Review of Systems  Anesthesia Hx:  No problems with previous Anesthesia    Social:  Former Smoker, Social Alcohol Use  Tobacco Use:  of cigarette,  quit smoking >10 years ago, Smoking Cessation discussion.   Hematology/Oncology:        Hematology Comments: Pt on plavix and ASA; per PCP last dose 12/11   EENT/Dental:EENT/Dental Normal   Cardiovascular:   Hypertension, well controlled Denies Dysrhythmias.   Denies Angina. hyperlipidemia Implanted Loop Recorder, Serial # [id number] Mod: LNQ11 , battery dead no longer monitored Functional Capacity 3.5 METS    Pulmonary:   Asthma mild  Obstructive Sleep Apnea (MARTIN) (Bipap).   Renal/:   renal calculi    Hepatic/GI:   PUD, GERD, well controlled Denies Liver Disease.    Musculoskeletal:   Arthritis     Neurological:   Denies Seizures.  CVA - Cerebrovasular Accident (Residual deficit:  visual disturbance left eye), Thrombotic Stroke , Most recent CVA was on 12/2013 , has had 1 stroke    Endocrine:  Endocrine Normal    Psych:   depression        Physical Exam  General:  Obesity      Airway/Jaw/Neck:  Airway Findings: Mouth Opening: Normal   Tongue: Normal   General Airway Assessment: Adult Mallampati: II  TM Distance: Normal, at least 6 cm   Jaw/Neck Findings:  Neck ROM: Extension Decreased, Mild   Neck Findings:  Girth Increased      Dental:  Dental Findings: Periodontal disease, Severe, upper front caps     Chest/Lungs:  Chest/Lungs Findings: Clear to auscultation, Normal Respiratory Rate      Heart/Vascular:  Heart Findings: Rate: Normal  Rhythm: Regular Rhythm  Sounds: Normal  Heart murmur: negative     Musculoskeletal:  Musculoskeletal Findings: right knee.        2D Echo w/CFD 3/2016  1 - Normal left ventricular systolic function (EF 55-60%).   2 - Normal left ventricular diastolic function.   3 - Normal right ventricular systolic function .   4 - Concentric remodeling.   5 - Mild left atrial enlargement.   6 - Estimated PA systolic > 15 mmHg.   7 - Negative Bubble study  Eloy Medrano MD On: 03/31/2016 12:35    Anesthesia Plan  Type of Anesthesia, risks & benefits discussed:  Anesthesia Type:   general    Patient's Preference:   Plan Factors:          Intra-op Monitoring Plan:   Intra-op Monitoring Plan Comments:   Post Op Pain Control Plan:   Post Op Pain Control Plan Comments:     Induction:   IV  Beta Blocker:         Informed Consent: Informed consent signed with the Patient and all parties understand the risks and agree with anesthesia plan.  All questions answered.    ASA Score: 3     Day of Surgery Review of History & Physical:        Anesthesia Plan Notes:           Ready For Surgery From Anesthesia Perspective.           Physical Exam  General: Obesity    Airway:  Mallampati: II   Mouth Opening: Normal  TM Distance: Normal, at least 6 cm  Tongue: Normal  Neck ROM: Extension Decreased, Mild  Neck: Girth Increased    Dental:  Periodontal disease, Severe, upper front caps    Chest/Lungs:  Clear to auscultation, Normal Respiratory Rate    Heart:  Rate: Normal  Rhythm: Regular Rhythm  Sounds: Normal          Anesthesia Plan  Type of Anesthesia, risks & benefits discussed:    Anesthesia Type: general  Induction:  IV  Informed Consent: Informed consent signed with the Patient and all parties understand the risks and agree with anesthesia plan.  All questions answered.   ASA Score: 3  Anesthesia Plan Notes:       Ready For Surgery From Anesthesia Perspective.       .

## 2022-12-11 NOTE — CONSULTS
[facility] - Emergency Dept  Urology  Consult Note    Patient Name: Tracy Armendariz  MRN: [id number]  Admission Date: 2022  Attending Provider: Edis Colbert MD   Consulting Provider: Mitchell Recinos MD  Principal Problem:Pyelonephritis    Inpatient consult to Urology  Consult performed by: Mitchell Recinos MD  Consult ordered by: Ingris Ward MD        Subjective:     HPI:   Tracy Armendariz is a 72 y.o. female who is admitted with fever, left flank pain, urology consulted for left ureteral stone.      Patient with a known left ureteral stone, follows with Dr. Zamora. Had Ct scan on Friday revealing a 6mm left proximal ureteral stone. Patient had fevers to 101 at home and pain in the left flank, presented to ED. WBC 12, urine indicative of infection, last urine culture growing E. Coli. Urology consulted.    Past Medical History:   Diagnosis Date    Allergy     Anticoagulant long-term use     Arthritis     CVA (cerebral infarction)     Depression     Disorder of kidney and ureter     renal stones    Diverticulosis of colon     Extrinsic asthma, unspecified     Hyperlipidemia     Hypertension     Kidney stone     Left atrial enlargement 2014    Low back pain     MARTIN (obstructive sleep apnea)     Osteopenia     PUD (peptic ulcer disease)     Stroke  2013    Urinary tract infection        Past Surgical History:   Procedure Laterality Date    APPENDECTOMY      @ time of hysterectomy    BACK SURGERY      CATARACT EXTRACTION       SECTION      CHOLECYSTECTOMY      laparoscopic    COLONOSCOPY N/A 2018    Procedure: COLONOSCOPY/Golytely;  Surgeon: Janine Black MD;  Location: [facility];  Service: Endoscopy;  Laterality: N/A;    DILATION AND CURETTAGE OF UTERUS      HYSTERECTOMY      TAHUSO with appendectomy    INNER EAR SURGERY      replaced ear drum    JOINT REPLACEMENT Right     knee    KNEE ARTHROSCOPY W/ DEBRIDEMENT      LUMBAR DISCECTOMY      L4-L5     OOPHORECTOMY      unilateral    TONSILLECTOMY      TYMPANOPLASTY      URETEROSCOPIC REMOVAL OF URETERIC CALCULUS Left 2018    Procedure: REMOVAL, CALCULUS, URETER, URETEROSCOPIC, holmium laser lithotripsy, stone basket extraction, retrograde pyelogram, ureteral stent exchange;  Surgeon: Anaya Zamora MD;  Location: [facility];  Service: Urology;  Laterality: Left;       Family History   Problem Relation Age of Onset    Cervical cancer Mother     Cancer Mother 65        lung cancer - non smoker    Stroke Paternal Grandfather     Hypertension Maternal Grandfather     Heart disease Maternal Grandfather     Hypertension Father     Coronary artery disease Father 62    Heart disease Father     Diabetes Sister     Heart disease Sister     Kidney disease Sister     Breast cancer Daughter 36    Heart failure Sister         60s    Colon cancer Neg Hx     Ovarian cancer Neg Hx        Social History     Tobacco Use    Smoking status: Former     Types: Cigarettes     Quit date: 1995     Years since quittin.9    Smokeless tobacco: Never   Substance Use Topics    Alcohol use: Yes     Alcohol/week: 1.0 standard drink     Types: 1 Glasses of wine per week     Comment: social    Drug use: No       No current facility-administered medications on file prior to encounter.     Current Outpatient Medications on File Prior to Encounter   Medication Sig Dispense Refill    albuterol (PROVENTIL/VENTOLIN HFA) 90 mcg/actuation inhaler INHALE 2 PUFFS EVERY 6 HOURS AS NEEDED FOR WHEEZING 18 g 0    aspirin 81 MG Chew Take 81 mg by mouth once daily.      atorvastatin (LIPITOR) 40 MG tablet Take 1 tablet (40 mg total) by mouth every evening. 90 tablet 3    buPROPion (WELLBUTRIN XL) 300 MG 24 hr tablet Take 1 tablet (300 mg total) by mouth once daily. 90 tablet 11    calcium carbonate (OS-SPENCER) 600 mg (1,500 mg) Tab Take 600 mg by mouth 2 (two) times daily with meals.      cholecalciferol, vitamin D3, 1,000 unit Chew Take 1,000 Units by  mouth once daily.      diazePAM (VALIUM) 2 MG tablet Take 1/2 to 1 tablet 3 times daily as needed to control dizziness 50 tablet 1    diclofenac sodium (VOLTAREN) 1 % Gel APPLY 2-4 GRAMS TO EACH PAINFUL AREA FOUR TIMES DAILY - MAX 32 GRAMS/ g 6    EScitalopram oxalate (LEXAPRO) 20 MG tablet TAKE 1 TABLET(20 MG) BY MOUTH EVERY DAY (Patient taking differently: Take 20 mg by mouth once daily.) 90 tablet 3    esomeprazole (NEXIUM) 40 MG capsule Take 1 capsule (40 mg total) by mouth daily as needed (heartburn). 30 capsule 3    hydrocortisone 2.5 % cream Apply topically 2 (two) times daily to affected area 30 g 2    LACTOBACILLUS ACIDOPHILUS (PROBIOTIC ORAL) Take 1 capsule by mouth once daily. PRN      LIDOcaine-prilocaine (EMLA) cream APPLY 2 GRAMS 3-4 TIMES DAILY FOR TREATMENT OF PAIN 240 g 6    losartan (COZAAR) 100 MG tablet TAKE 1 TABLET(100 MG) BY MOUTH EVERY DAY 90 tablet 3    MULTIVIT WITH CALCIUM,IRON,MIN (WOMEN'S DAILY MULTIVITAMIN ORAL) Take 1 tablet by mouth once daily.      tamsulosin (FLOMAX) 0.4 mg Cap Take 1 capsule (0.4 mg total) by mouth once daily. 90 capsule 0    famotidine (PEPCID) 10 MG tablet Take 10 mg by mouth 2 (two) times daily.      FLUAD 2468-8445, 65 YR UP,,PF, 45 mcg (15 mcg x 3)/0.5 mL Syrg       meclizine (ANTIVERT) 12.5 mg tablet Take 1 tablet (12.5 mg total) by mouth 3 (three) times daily as needed for Dizziness or Nausea. 30 tablet 6       Review of patient's allergies indicates:   Allergen Reactions    Iodinated contrast media Hives and Rash    Gabapentin Hallucinations    Iodine Hives    Isothiazolinones Rash    Penicillins Rash       Review of Systems:  A review of 10+ systems was conducted with pertinent positive and negative findings documented in HPI with all other systems reviewed and negative.    Objective:     Vitals:   Temp:  [97.8 °F (36.6 °C)] 97.8 °F (36.6 °C)  Pulse:  [80-84] 82  Resp:  [16-26] 26  SpO2:  [89 %-100 %] 92 %  BP: (102-139)/(53-78) 102/53       I/O: No  intake or output data in the 24 hours ending 12/11/22 1103    Physical Exam:  GENERAL: patient sitting comfortably  HEENT: normocephalic  NECK: supple, no JVD  PULM: normal chest rise, no increased WOB  HEART: non-diaphoretic  ABDO: soft, non-distended, non-tender  BACK: mild left CVA tenderness  SKIN: warm, dry, well perfused  EXT: no bruising or edema  NEURO: grossly normal with no focal deficits  PSYCH: appropriate mood and affect    Significant Labs:  CBC:  Recent Labs   Lab 12/11/22  0741   WBC 12.53   HGB 13.6   HCT 41.8   *       BMP:  Recent Labs   Lab 12/11/22  0741      K 3.2*      CO2 21*   BUN 17   CREATININE 1.0   CALCIUM 8.5*       Urinalysis  Recent Labs   Lab 12/11/22  0855   COLORU Yellow   SPECGRAV 1.010   PHUR 6.0   PROTEINUA Negative   BACTERIA Occasional   NITRITE Negative   LEUKOCYTESUR 2+*   UROBILINOGEN Negative   HYALINECASTS 0       Imaging:  All pertinent imaging results/findings from the past 24 hours have been reviewed.    Results for orders placed or performed during the hospital encounter of 12/09/22 (from the past 2160 hour(s))   CT Abdomen Pelvis  Without Contrast    Impression    Similar position of left proximal ureteral stone, measuring 6 mm on today's exam, previously 3 mm.  Prominence of the upstream renal collecting system with mild hydronephrosis.    Other nonobstructing left renal calculi, colonic diverticulosis, and additional findings as above.      Electronically signed by: Mauro Nuñez  Date:    12/09/2022  Time:    11:27     *Note: Due to a large number of results and/or encounters for the requested time period, some results have not been displayed. A complete set of results can be found in Results Review.     No results found. However, due to the size of the patient record, not all encounters were searched. Please check Results Review for a complete set of results.    Urology Specific Assessment:     Left ureteral stone with UTI,  pyelonephritis    Plan:      Admit to internal medicine  NPO  Start ceftriaxone  Send urine for culture  Pain control per primary  Plan for OR today for cystoscopy with left ureteral stent insertion. Risks, benefits, and alternative treatment options were discussed. All questions were answered. No guarantees as to the outcome of the treatment were made.    Thank you for your consult. Urology will follow.    Mitchell Recinos MD  Urology  [facility]    Disclaimer: This note has been generated using voice-recognition software. There may be typographical errors that have been missed during proof-reading.

## 2022-12-11 NOTE — PLAN OF CARE
12/11/22 1341   Admission   Initial VN Admission Questions Complete   Communication Issues? None   Safety/Activity   Patient Rounds visualized patient;placement of personal items at bedside;bed in low position;bed wheels locked;call light in patient/parent reach;clutter free environment maintained;ID band on  ( at bedside)

## 2022-12-11 NOTE — TRANSFER OF CARE
Anesthesia Transfer of Care Note    Patient: Tracy Armendariz    Procedure(s) Performed: Procedure(s) (LRB):  CYSTOSCOPY, WITH URETERAL STENT INSERTION (Left)    Patient location: PACU    Anesthesia Type: MAC    Transport from OR: Transported from OR on room air with adequate spontaneous ventilation    Post pain: adequate analgesia    Post assessment: no apparent anesthetic complications    Post vital signs: stable    Level of consciousness: awake and alert    Nausea/Vomiting: no nausea/vomiting    Complications: none    Transfer of care protocol was followed      Last vitals:   Visit Vitals  BP (!) 89/54   Pulse 75   Temp 36.7 °C (98.1 °F)   Resp (!) 26   Wt 104.3 kg (230 lb)   LMP 01/01/1978 (Approximate)   SpO2 (!) 94%   BMI 38.27 kg/m²

## 2022-12-11 NOTE — Clinical Note
Diagnosis: Kidney stone [615132]   Future Attending Provider: UNIQUE RUEDA [[id number]]   Is the patient being sent to ED Observation?: No   Admitting Provider:: UNIQUE RUEDA [[id number]]   Special Needs:: No Special Needs [1]

## 2022-12-12 ENCOUNTER — PATIENT MESSAGE (OUTPATIENT)
Dept: PRIMARY CARE CLINIC | Facility: CLINIC | Age: 72
End: 2022-12-12
Payer: MEDICARE

## 2022-12-12 ENCOUNTER — PATIENT MESSAGE (OUTPATIENT)
Dept: UROLOGY | Facility: CLINIC | Age: 72
End: 2022-12-12
Payer: MEDICARE

## 2022-12-12 DIAGNOSIS — N39.0 URINARY TRACT INFECTION WITHOUT HEMATURIA, SITE UNSPECIFIED: ICD-10-CM

## 2022-12-12 DIAGNOSIS — N20.0 NEPHROLITHIASIS: ICD-10-CM

## 2022-12-12 DIAGNOSIS — R30.0 DYSURIA: Primary | ICD-10-CM

## 2022-12-12 PROBLEM — N34.2 INFECTIVE URETHRITIS: Status: ACTIVE | Noted: 2022-12-12

## 2022-12-12 PROBLEM — N20.1 LEFT URETERAL STONE: Status: ACTIVE | Noted: 2022-12-12

## 2022-12-12 LAB
ALBUMIN SERPL BCP-MCNC: 2.6 G/DL (ref 3.5–5.2)
ALP SERPL-CCNC: 57 U/L (ref 55–135)
ALT SERPL W/O P-5'-P-CCNC: 20 U/L (ref 10–44)
ANION GAP SERPL CALC-SCNC: 11 MMOL/L (ref 8–16)
AST SERPL-CCNC: 29 U/L (ref 10–40)
BASOPHILS # BLD AUTO: ABNORMAL K/UL (ref 0–0.2)
BASOPHILS NFR BLD: 0 % (ref 0–1.9)
BILIRUB SERPL-MCNC: 0.7 MG/DL (ref 0.1–1)
BUN SERPL-MCNC: 27 MG/DL (ref 8–23)
CALCIUM SERPL-MCNC: 8.1 MG/DL (ref 8.7–10.5)
CHLORIDE SERPL-SCNC: 102 MMOL/L (ref 95–110)
CO2 SERPL-SCNC: 24 MMOL/L (ref 23–29)
CREAT SERPL-MCNC: 1.8 MG/DL (ref 0.5–1.4)
DIFFERENTIAL METHOD: ABNORMAL
EOSINOPHIL # BLD AUTO: ABNORMAL K/UL (ref 0–0.5)
EOSINOPHIL NFR BLD: 0 % (ref 0–8)
ERYTHROCYTE [DISTWIDTH] IN BLOOD BY AUTOMATED COUNT: 13.2 % (ref 11.5–14.5)
EST. GFR  (NO RACE VARIABLE): 30 ML/MIN/1.73 M^2
GLUCOSE SERPL-MCNC: 128 MG/DL (ref 70–110)
HCT VFR BLD AUTO: 34.5 % (ref 37–48.5)
HGB BLD-MCNC: 11.1 G/DL (ref 12–16)
IMM GRANULOCYTES # BLD AUTO: ABNORMAL K/UL (ref 0–0.04)
IMM GRANULOCYTES NFR BLD AUTO: ABNORMAL % (ref 0–0.5)
INFLUENZA A, MOLECULAR: NEGATIVE
INFLUENZA B, MOLECULAR: NEGATIVE
LYMPHOCYTES # BLD AUTO: ABNORMAL K/UL (ref 1–4.8)
LYMPHOCYTES NFR BLD: 7 % (ref 18–48)
MAGNESIUM SERPL-MCNC: 1.3 MG/DL (ref 1.6–2.6)
MCH RBC QN AUTO: 31.4 PG (ref 27–31)
MCHC RBC AUTO-ENTMCNC: 32.2 G/DL (ref 32–36)
MCV RBC AUTO: 98 FL (ref 82–98)
MONOCYTES # BLD AUTO: ABNORMAL K/UL (ref 0.3–1)
MONOCYTES NFR BLD: 2 % (ref 4–15)
NEUTROPHILS NFR BLD: 64 % (ref 38–73)
NEUTS BAND NFR BLD MANUAL: 27 %
NRBC BLD-RTO: 0 /100 WBC
PLATELET # BLD AUTO: 138 K/UL (ref 150–450)
PLATELET BLD QL SMEAR: ABNORMAL
PMV BLD AUTO: 12 FL (ref 9.2–12.9)
POTASSIUM SERPL-SCNC: 3.9 MMOL/L (ref 3.5–5.1)
PROT SERPL-MCNC: 5.7 G/DL (ref 6–8.4)
RBC # BLD AUTO: 3.53 M/UL (ref 4–5.4)
SARS-COV-2 RNA RESP QL NAA+PROBE: NOT DETECTED
SODIUM SERPL-SCNC: 137 MMOL/L (ref 136–145)
SPECIMEN SOURCE: NORMAL
WBC # BLD AUTO: 27.84 K/UL (ref 3.9–12.7)

## 2022-12-12 PROCEDURE — 99233 PR SUBSEQUENT HOSPITAL CARE,LEVL III: ICD-10-PCS | Mod: ,,, | Performed by: UROLOGY

## 2022-12-12 PROCEDURE — 99233 SBSQ HOSP IP/OBS HIGH 50: CPT | Mod: ,,, | Performed by: UROLOGY

## 2022-12-12 PROCEDURE — 80053 COMPREHEN METABOLIC PANEL: CPT | Performed by: STUDENT IN AN ORGANIZED HEALTH CARE EDUCATION/TRAINING PROGRAM

## 2022-12-12 PROCEDURE — 25000003 PHARM REV CODE 250: Performed by: STUDENT IN AN ORGANIZED HEALTH CARE EDUCATION/TRAINING PROGRAM

## 2022-12-12 PROCEDURE — 85027 COMPLETE CBC AUTOMATED: CPT | Performed by: STUDENT IN AN ORGANIZED HEALTH CARE EDUCATION/TRAINING PROGRAM

## 2022-12-12 PROCEDURE — 94761 N-INVAS EAR/PLS OXIMETRY MLT: CPT

## 2022-12-12 PROCEDURE — 83735 ASSAY OF MAGNESIUM: CPT | Performed by: STUDENT IN AN ORGANIZED HEALTH CARE EDUCATION/TRAINING PROGRAM

## 2022-12-12 PROCEDURE — 25000003 PHARM REV CODE 250: Performed by: INTERNAL MEDICINE

## 2022-12-12 PROCEDURE — 27000221 HC OXYGEN, UP TO 24 HOURS

## 2022-12-12 PROCEDURE — U0005 INFEC AGEN DETEC AMPLI PROBE: HCPCS

## 2022-12-12 PROCEDURE — 36415 COLL VENOUS BLD VENIPUNCTURE: CPT | Performed by: STUDENT IN AN ORGANIZED HEALTH CARE EDUCATION/TRAINING PROGRAM

## 2022-12-12 PROCEDURE — 85007 BL SMEAR W/DIFF WBC COUNT: CPT | Performed by: STUDENT IN AN ORGANIZED HEALTH CARE EDUCATION/TRAINING PROGRAM

## 2022-12-12 PROCEDURE — 99900035 HC TECH TIME PER 15 MIN (STAT)

## 2022-12-12 PROCEDURE — U0003 INFECTIOUS AGENT DETECTION BY NUCLEIC ACID (DNA OR RNA); SEVERE ACUTE RESPIRATORY SYNDROME CORONAVIRUS 2 (SARS-COV-2) (CORONAVIRUS DISEASE [COVID-19]), AMPLIFIED PROBE TECHNIQUE, MAKING USE OF HIGH THROUGHPUT TECHNOLOGIES AS DESCRIBED BY CMS-2020-01-R: HCPCS

## 2022-12-12 PROCEDURE — 63600175 PHARM REV CODE 636 W HCPCS

## 2022-12-12 PROCEDURE — 63600175 PHARM REV CODE 636 W HCPCS: Performed by: INTERNAL MEDICINE

## 2022-12-12 PROCEDURE — 63600175 PHARM REV CODE 636 W HCPCS: Performed by: STUDENT IN AN ORGANIZED HEALTH CARE EDUCATION/TRAINING PROGRAM

## 2022-12-12 PROCEDURE — 25000003 PHARM REV CODE 250

## 2022-12-12 PROCEDURE — 11000001 HC ACUTE MED/SURG PRIVATE ROOM

## 2022-12-12 PROCEDURE — 87502 INFLUENZA DNA AMP PROBE: CPT | Performed by: STUDENT IN AN ORGANIZED HEALTH CARE EDUCATION/TRAINING PROGRAM

## 2022-12-12 RX ORDER — MAGNESIUM SULFATE HEPTAHYDRATE 40 MG/ML
2 INJECTION, SOLUTION INTRAVENOUS ONCE
Status: COMPLETED | OUTPATIENT
Start: 2022-12-12 | End: 2022-12-12

## 2022-12-12 RX ORDER — DIAZEPAM 2 MG/1
2 TABLET ORAL EVERY 12 HOURS PRN
Status: DISCONTINUED | OUTPATIENT
Start: 2022-12-12 | End: 2022-12-13

## 2022-12-12 RX ORDER — OXYCODONE HYDROCHLORIDE 5 MG/1
5 TABLET ORAL ONCE AS NEEDED
Status: COMPLETED | OUTPATIENT
Start: 2022-12-12 | End: 2022-12-12

## 2022-12-12 RX ORDER — SODIUM CHLORIDE 9 MG/ML
INJECTION, SOLUTION INTRAVENOUS CONTINUOUS
Status: CANCELLED | OUTPATIENT
Start: 2022-12-12

## 2022-12-12 RX ORDER — MEROPENEM AND SODIUM CHLORIDE 1 G/50ML
1 INJECTION, SOLUTION INTRAVENOUS
Status: DISCONTINUED | OUTPATIENT
Start: 2022-12-12 | End: 2022-12-12

## 2022-12-12 RX ADMIN — TAMSULOSIN HYDROCHLORIDE 0.4 MG: 0.4 CAPSULE ORAL at 08:12

## 2022-12-12 RX ADMIN — KETOROLAC TROMETHAMINE 15 MG: 30 INJECTION, SOLUTION INTRAMUSCULAR; INTRAVENOUS at 05:12

## 2022-12-12 RX ADMIN — MEROPENEM 1 G: 1 INJECTION, POWDER, FOR SOLUTION INTRAVENOUS at 02:12

## 2022-12-12 RX ADMIN — MAGNESIUM SULFATE 2 G: 2 INJECTION INTRAVENOUS at 08:12

## 2022-12-12 RX ADMIN — ATORVASTATIN CALCIUM 40 MG: 40 TABLET, FILM COATED ORAL at 09:12

## 2022-12-12 RX ADMIN — HEPARIN SODIUM 5000 UNITS: 5000 INJECTION INTRAVENOUS; SUBCUTANEOUS at 05:12

## 2022-12-12 RX ADMIN — HEPARIN SODIUM 5000 UNITS: 5000 INJECTION INTRAVENOUS; SUBCUTANEOUS at 03:12

## 2022-12-12 RX ADMIN — ESCITALOPRAM OXALATE 20 MG: 10 TABLET ORAL at 08:12

## 2022-12-12 RX ADMIN — MEROPENEM 1 G: 1 INJECTION, POWDER, FOR SOLUTION INTRAVENOUS at 10:12

## 2022-12-12 RX ADMIN — BUPROPION HYDROCHLORIDE 300 MG: 150 TABLET, EXTENDED RELEASE ORAL at 08:12

## 2022-12-12 RX ADMIN — OXYCODONE HYDROCHLORIDE 5 MG: 5 TABLET ORAL at 06:12

## 2022-12-12 RX ADMIN — MEROPENEM 1 G: 1 INJECTION, POWDER, FOR SOLUTION INTRAVENOUS at 09:12

## 2022-12-12 RX ADMIN — ACETAMINOPHEN 1000 MG: 500 TABLET ORAL at 03:12

## 2022-12-12 RX ADMIN — HEPARIN SODIUM 5000 UNITS: 5000 INJECTION INTRAVENOUS; SUBCUTANEOUS at 10:12

## 2022-12-12 RX ADMIN — ACETAMINOPHEN 1000 MG: 500 TABLET ORAL at 04:12

## 2022-12-12 RX ADMIN — KETOROLAC TROMETHAMINE 15 MG: 30 INJECTION, SOLUTION INTRAMUSCULAR; INTRAVENOUS at 04:12

## 2022-12-12 RX ADMIN — PANTOPRAZOLE SODIUM 40 MG: 40 TABLET, DELAYED RELEASE ORAL at 08:12

## 2022-12-12 RX ADMIN — ASPIRIN 81 MG CHEWABLE TABLET 81 MG: 81 TABLET CHEWABLE at 08:12

## 2022-12-12 NOTE — PROGRESS NOTES
"[facility] Hospital Medicine Progress Note    Primary Team: [facility] Hospitalist Team B  Attending Physician: Edis Colbert MD  Resident: Ed  Intern: Francesca    Subjective:      No acute events overnight. Patient still has LLQ pain. Dysuria is improving. This morning, complains of a sore throat and cough, likely from intubation during surgery. Covid and flu ordered.      Objective:     Last 24 Hour Vital Signs:  BP  Min: 89/54  Max: 132/78  Temp  Av.1 °F (36.7 °C)  Min: 97.3 °F (36.3 °C)  Max: 100.3 °F (37.9 °C)  Pulse  Av.3  Min: 73  Max: 85  Resp  Av  Min: 15  Max: 29  SpO2  Av %  Min: 89 %  Max: 100 %  Height  Av' 5" (165.1 cm)  Min: 5' 5" (165.1 cm)  Max: 5' 5" (165.1 cm)  Weight  Av.3 kg (229 lb 15 oz)  Min: 104.3 kg (229 lb 15 oz)  Max: 104.3 kg (229 lb 15 oz)  I/O last 3 completed shifts:  In: 300 [P.O.:100; I.V.:200]  Out: 700 [Urine:700]    Physical Examination:  Examination  General: Patient sitting comfortably in NAD  Head: normocephalic, atraumatic  Eyes: PERRL, EOMI, no conjunctival injections or icterus  Mouth: MMM, posterior oropharynx without erythema  Cardiac: RRR, no murmurs appreciated, no extra heart sounds  Pulmonary/Chest: CTAB, no wheezing or crackles, requiring 2L O2   GI: Soft, nnon distended, moderately tender in suprapubic region and LLQ, CVA tenderness has improved   Extremities: no edema, clubbing, or cyanosis  Skin: dry, warm, intact. No bruising or rashes.  Neuro: Alert and oriented, moving all extremities equally        Laboratory:  Laboratory Data   Recent Labs   Lab 22  0741 22  0300   WBC 12.53 27.84*   HGB 13.6 11.1*   HCT 41.8 34.5*   * 138*   MCV 95 98    137   K 3.2* 3.9    102   CO2 21* 24   BUN 17 27*   CREATININE 1.0 1.8*   * 128*   CALCIUM 8.5* 8.1*   PROT 6.1 5.7*   ALBUMIN 3.2* 2.6*   MG  --  1.3*   AST 40 29   ALT 28 20   ALKPHOS 76 57       Microbiology Data   Urine cultures from 10/11 and  growing multi " drug resistant E. Coli   Ucx 12/11 pending       Radiology Data  CTAP 11/10/2022  Kidneys are normal in size and location.  3 mm calculus within the proximal left ureter.  Mild prominence of left renal collecting system without overt hydronephrosis     CTAP 12/9/2022  Similar position of left proximal ureteral stone, measuring 6 mm on today's exam, previously 3 mm.  Prominence of the upstream renal collecting system with mild hydronephrosis.       Current Medications:     Infusions:   sodium chloride 0.9%          Scheduled:   aspirin  81 mg Oral Daily    atorvastatin  40 mg Oral QHS    buPROPion  300 mg Oral Daily    EScitalopram oxalate  20 mg Oral Daily    heparin (porcine)  5,000 Units Subcutaneous Q8H    magnesium sulfate IVPB  2 g Intravenous Once    meropenem (MERREM) IVPB  1 g Intravenous Q8H    pantoprazole  40 mg Oral Daily    tamsulosin  0.4 mg Oral Daily        PRN:  acetaminophen, albuterol, influenza 65up-adj, ketorolac, melatonin, ondansetron, prochlorperazine, sodium chloride 0.9%    Antibiotics and Day Number of Therapy:  Meropenem 12/11-current       Assessment:     Tracy Armendariz is a 71 year old female with a past history of HTN, HLD, CVA in 2013, and nephrolithiasis presenting to [facility] 12/11/22 with a chief complaint of L flank pain and fever for 1 day. Patient admitted to [facility] medicine for pyelonephritis in the setting of obstructive kidney stone.      Plan:     Pyelonephritis 2/2 nephrolithiasis   - patient presenting with fever, dysuria, urgency, and CVA tenderness for 1 day   - CTAP from 12/9 shows interval increase in ureteral stone from 3 mm to 6 mm with mild hydronephrosis    - UA positive for blood and >100k WBCs, culture pending   - received morphine and Toradol in the ED for pain   - follows with urology for chronic nephrolithiasis   - s/p L ureter stent placement with urology   - Ucx in the past grew multi drug resistant E. Coli, started on meropenem   - continue home  flomax  - follow up urine cultures   - continue meropenem      HTN   - pressure controlled on presentation   - holding home losartan in the setting of hypotension      Prior CVA  - continue ASA and statin      Depression   - continue home wellbutrin and lexapro      Asthma   - only needs rescue inhaler roughly once per week, sometimes less   - PRN albuterol       Code Status: Full   DVT Prophylaxis: heparin   Diet: Regular   Disposition: pending urology recs, urine cultures       Yaquelin Ma MD   U Neurology Resident, -I    [facility] Medicine Hospitalist Pager numbers:   U Hospitalist Medicine Team A (Zachary/Laurel): [phone]  [facility] Hospitalist Medicine Team B (Rachel/Syara):  [phone]

## 2022-12-12 NOTE — PLAN OF CARE
[facility] - Telemetry  Initial Discharge Assessment       Primary Care Provider: Bartolo Jules MD    Admission Diagnosis: Hypokalemia [E87.6]  Kidney stone [N20.0]  Lower abdominal pain [R10.30]  Infective urethritis [N34.2]  Left ureteral stone [N20.1]    Admission Date: 12/11/2022  Expected Discharge Date: 12/14/2022    Consult: urol    Payor: [employer] MEDICARE / Plan: [employer] MEDICARE / Product Type: Medicare Advantage /     Extended Emergency Contact Information  Primary Emergency Contact: Fransisco Armendariz  Address: [address] [place] [address]           [place] [address] [facility]  Home Phone: [phone]  Mobile Phone: [phone]  Relation: Spouse    Discharge Plan A: (P) Home with family  Discharge Plan B: (P) Home Health      [facility]  [address]  [place] [address]  Phone: [phone] Fax: [phone]    [facility] [place]  [place] - [address]  [address]  [place] [address]  Phone: [phone] Fax: [phone]    [id number] - [place] - [address] [facility]  [address]  [place] [address]  Phone: [phone] Fax: [phone]      Initial Assessment (most recent)       Adult Discharge Assessment - 12/12/22 1205          Discharge Assessment    Assessment Type Discharge Planning Assessment     Confirmed/corrected address, phone number and insurance Yes     Confirmed Demographics Correct on Facesheet     Source of Information patient;family   spouserFansisco ([phone])    Communicated JEIMY with patient/caregiver Date not available/Unable to determine     People in Home spouse (P)    spouse, Fransisco Armendariz ([phone])    Do you expect to return to your current living situation? Yes (P)      Do you have help at home or someone to help you manage your care at home? Yes (P)      Prior to hospitilization cognitive status:  Alert/Oriented (P)      Current cognitive status: Alert/Oriented (P)      Walking or Climbing Stairs ambulation difficulty, requires equipment (P)      Dressing/Bathing bathing difficulty, requires equipment (P)      Equipment Currently Used at Home cane, straight;rollator;bedside commode;other (see comments) (P)    BP machine    Readmission within 30 days? No (P)      Patient currently being followed by outpatient case management? No (P)      Do you currently have service(s) that help you manage your care at home? No (P)      Do you take prescription medications? Yes (P)      Do you have prescription coverage? Yes (P)      Do you have any problems affording any of your prescribed medications? No (P)      Is the patient taking medications as prescribed? yes (P)      How do you get to doctors appointments? family or friend will provide (P)      Are you on dialysis? No (P)      Do you take coumadin? No (P)      Discharge Plan A Home with family (P)      Discharge Plan B Home Health (P)      DME Needed Upon Discharge  other (see comments) (P)    tbd    Discharge Plan discussed with: Patient;Spouse/sig other (P)         Physical Activity    On average, how many days per week do you engage in moderate to strenuous exercise (like a brisk walk)? 0 days (P)      On average, how many minutes do you engage in exercise at this level? 0 min (P)         Financial Resource Strain    How hard is it for you to pay for the very basics like food, housing, medical care, and heating? Not hard at all (P)         Housing Stability    In the last 12 months, was there a time when you were not able to pay the mortgage or rent on time? No (P)      In the last 12 months, was there a time when you did not have a steady place to sleep or slept in a shelter (including now)? No (P)         Transportation Needs    In the past 12 months, has lack of transportation kept you from medical appointments or from getting medications? No (P)      In the  past 12 months, has lack of transportation kept you from meetings, work, or from getting things needed for daily living? No (P)         Food Insecurity    Within the past 12 months, you worried that your food would run out before you got the money to buy more. Never true (P)      Within the past 12 months, the food you bought just didn't last and you didn't have money to get more. Never true (P)         Stress    Do you feel stress - tense, restless, nervous, or anxious, or unable to sleep at night because your mind is troubled all the time - these days? To some extent (P)         Social Connections    In a typical week, how many times do you talk on the phone with family, friends, or neighbors? More than three times a week (P)      How often do you get together with friends or relatives? More than three times a week (P)      Do you belong to any clubs or organizations such as Anabaptist groups, unions, fraternal or athletic groups, or school groups? Yes (P)      How often do you attend meetings of the clubs or organizations you belong to? More than 4 times per year (P)      Are you , , , , never , or living with a partner?  (P)         Alcohol Use    Q1: How often do you have a drink containing alcohol? 2-4 times a month (P)      Q2: How many drinks containing alcohol do you have on a typical day when you are drinking? 1 or 2 (P)      Q3: How often do you have six or more drinks on one occasion? Never (P)                    1205  Patient resting quietly in bed with spouse, Fransisco Armendariz ([phone]), & Dr Anaya Zamora (urol) at the bedside when CM rounded. Patient was admitted with pyelonephritis, is being followed by urol, & had a stent placed on 12/11/2022. Pox 94% on 3L O2 this AM.     Patient lives with her spouse, has equipment to assist with ADLs, & denied the need for assistance with transportation at time of discharge.     Previously scheduled hospital follow up  appointment with Dr Zamora on 12/19/2022 at 1100 & Dr Angie Camara ([facility]) on 12/20/2022 at 1330 noted. Information added to the pt's discharge paperwork.    CM updated patient's whiteboard with CM name & contact information.       Will continue to follow.

## 2022-12-12 NOTE — PROGRESS NOTES
Pharmacist Renal Dose Adjustment Note    Tracy Armendariz is a 72 y.o. female being treated with the medication merrem    Patient Data:    Vital Signs (Most Recent):  Temp: 97.3 °F (36.3 °C) (12/12/22 0749)  Pulse: 76 (12/12/22 0749)  Resp: 18 (12/12/22 0749)  BP: (!) 95/47 (12/12/22 0749)  SpO2: (!) 93 % (12/12/22 0749)   Vital Signs (72h Range):  Temp:  [97.3 °F (36.3 °C)-100.3 °F (37.9 °C)]   Pulse:  [73-85]   Resp:  [15-29]   BP: ()/(47-78)   SpO2:  [89 %-100 %]      Recent Labs   Lab 12/11/22  0741 12/12/22 0300   CREATININE 1.0 1.8*     Serum creatinine: 1.8 mg/dL (H) 12/12/22 0300  Estimated creatinine clearance: 33.9 mL/min (A)    Medication:merrem dose: 1gram frequency q8 will be changed to medication:merrem dose:1gram frequency:q12h    Pharmacist's Name: Sonu Norris  Pharmacist's Extension: [phone]

## 2022-12-12 NOTE — NURSING
Shift assessment complete. Pt is alert and oriented x 4. Pt complains of back pain. 7/10 on numeric pain scale. Denies pain. Bed in lowest position, locked, and side rails up x 3. Family at bedside. Call light in reach.

## 2022-12-12 NOTE — PROGRESS NOTES
I have explained the indication and benefits of proceeding with a left ureteroscopy, holmium laser lithotripsy, stone basketing, retrograde pyelogram, stent placement and all other indicated procedures with me in the operating room on ***. Alternatives of the procedure were also discussed. The risks included but were not limited to pain, infection (urinary tract infection), bleeding (hematuria), ureteral or urethral stricture,  injury to the urethra, bladder, ureter, need for additional treatments, inability or incomplete removal of kidney stones, pain, and discomfort related to the stent were discussed in depth with the patient.  The patient understands that if I am unable to pass the cameras up to the level of the stone or if visibility is poor, then I will only place a ureteral stent and pursue a staged procedure.     The ureteral stent was discussed at length. The patient will need to have it removed and the time period in which it should remain indwelling is to be determined after surgery. If left indwelling, the sequelae include pain, infection, lower urinary tract symptoms, development of calcifications on the ureteral stent, worsening kidney function, and complete loss of kidney function.     The patient voiced understanding and all questions have been answered and informed consents were signed.

## 2022-12-12 NOTE — PLAN OF CARE
POC reviewed with the pt  VSS on 4L  AAOX4  PIV intact  Voided twice in the bedside commode, adequate output, dark brown or dark red color urine  X1 assist to the bedside commode  Bed low, locked and call light in reach

## 2022-12-12 NOTE — PLAN OF CARE
Patient received on   3 Lpm NC with SpO2    94%. Pt with no apparent distress noted. Will continue to monitor.

## 2022-12-12 NOTE — PROGRESS NOTES
[facility] - Telemetry   Urology  Progress Note    Patient Name: Tracy Armendariz  MRN: [id number]  Admission Date: 12/11/2022  Hospital Length of Stay: 1 days    POD: 1  Following for: left ureteral stent placement    Subjective:     Interval History:   Pt hypotensive this AM, Temp up to 100.3 overnight  Still with some flank pain and bladder spasms  Cr and white count both elevated today    Review of Systems:  A review of 10+ systems was conducted with pertinent positive and negative findings documented in HPI with all other systems reviewed and negative.    Objective:     Vitals:  Temp:  [97.3 °F (36.3 °C)-100.3 °F (37.9 °C)] 97.3 °F (36.3 °C)  Pulse:  [73-85] 76  Resp:  [15-29] 18  SpO2:  [89 %-97 %] 93 %  BP: ()/(47-61) 95/47       I/O:  Intake/Output Summary (Last 24 hours) at 12/12/2022 1048  Last data filed at 12/11/2022 2031  Gross per 24 hour   Intake 300 ml   Output 700 ml   Net -400 ml        Physical Exam:  GENERAL: patient sitting comfortably  SKIN: warm, dry, well perfused  EXT: no bruising or edema  NEURO: grossly normal with no focal deficits  PSYCH: appropriate mood and affect  BACK: No CVA tenderness  ABDO: non-distended    Significant Labs:  CBC:  Recent Labs   Lab 12/11/22  0741 12/12/22  0300   WBC 12.53 27.84*   HGB 13.6 11.1*   HCT 41.8 34.5*   * 138*       BMP:  Recent Labs   Lab 12/11/22  0741 12/12/22  0300    137   K 3.2* 3.9    102   CO2 21* 24   BUN 17 27*   CREATININE 1.0 1.8*   CALCIUM 8.5* 8.1*         Urology Specific Assessment:     Left ureteral stone with UTI, pyelonephritis  Leukocytosis, KADIE likely secondary to sepsis    Plan:     Insert jackson catheter this AM for maximum decompression (orders placed)  Follow up urine cultures, continue antibiotics  Pt will require 2 week course of culture directed abx prior to stone procedure, will discuss timing of procedure with Dr. Zamora  Rest of care per primary    Urology following    Mitchell Recinos MD  Urology   [facility] [place]

## 2022-12-12 NOTE — CONSULTS
[facility] Infectious Disease Consult     Primary Team: [facility] Hospitalist Team B  Consultant Attending: Smita Leonard MD  Date of Admit: 12/11/2022    Reason for Consult     H/o nephrolithiasis with ESBL; sensitive to bactrim; please guide on bactrim vs meropenem    Assessment     Tracy Armendariz is a 72 y.o. female with:  Pyelonephritis 2/2 nephrolithiasis s/p left uretal stent placement. H/o recurrent nephrolithiasis, previous urine cultures grew ESBL E coli, treated with bactrim. Currently on Meropenem. Afebrile, on 5L NC. Leukocytosis following procedure. Creatinine uptrending. Urology consulted, recommend 2 week antibiotic course prior to stone removal.      Recommendations   Continue Meropenem renally dosed at 1g Q12H.  Would not recommend bactrim at this time.   Follow up urine cultures    Thank you for this consult. We will continue to follow. The care is being discussed with the attending physician Dr. Leonard and they will add an attestation which could potentially have changes or updates to the plan.     Pj Schmidt MD  U IM HO-I  [facility] Infectious Disease  [facility]    History of Present Illness     Tracy Armendariz is a 72 y.o. female with a past medical history of recurrent nephrolithiasis with ESBL E coli s/p stent, CAD, hypertension, hyperlipidemia, CVA, and depression, who presented to [facility] on 12/11/22 for a 1 day history of left flank pain. Patient was also experiencing fever, nausea, and dysuria during this time. CT a/p from 2 days prior to admission showed an interval increase in a left ureteral stone to 6m from 3mm. UA positive fro blood and >100k WBC. Patient received one dose of ceftriaxone and was then started on Meropenem given her history of ESBL E coli. Urology was consulted and placed a left ureteral stent 12/11 and collected urine cultures at that time.     Infectious Disease was consulted for guidance on antibiotic therapy, specifically Bactrim vs Meropenem, in  setting of previous ESBL E coli. Urine cultures 11/2022 grew ESBL E coli sensitive to bactrim. Patient finished course of Bactrim, however did not have resolution of her symptoms.     Review of Systems (positives in bold):  Review of Systems   Constitutional:  Negative for chills and fever.   HENT:  Negative for sore throat.    Eyes:  Positive for pain.   Respiratory:  Positive for shortness of breath. Negative for cough and sputum production.    Cardiovascular:  Negative for chest pain.   Gastrointestinal:  Positive for abdominal pain. Negative for nausea and vomiting.   Genitourinary:  Positive for dysuria and flank pain.   Musculoskeletal:  Positive for back pain and myalgias.   Neurological:  Negative for weakness.   All other systems are reviewed and are negative.    Allergies:  Review of patient's allergies indicates:   Allergen Reactions    Iodinated contrast media Hives and Rash    Gabapentin Hallucinations    Iodine Hives    Isothiazolinones Rash    Penicillins Rash       Medications:   In-Hospital Scheduled Medications:   aspirin  81 mg Oral Daily    atorvastatin  40 mg Oral QHS    buPROPion  300 mg Oral Daily    EScitalopram oxalate  20 mg Oral Daily    heparin (porcine)  5,000 Units Subcutaneous Q8H    meropenem (MERREM) IVPB  1 g Intravenous Q12H    pantoprazole  40 mg Oral Daily    tamsulosin  0.4 mg Oral Daily      In-Hospital PRN Medications:  acetaminophen, albuterol, diazePAM, influenza 65up-adj, ketorolac, melatonin, ondansetron, prochlorperazine, sodium chloride 0.9%   In-Hospital IV Infusion Medications:   sodium chloride 0.9%       Relevant Home Medications:    Antibiotics and Day Number of Therapy:  Cefazolin x 1 12/11  Meropenem 12/11 -   Past Medical History:  Past Medical History:   Diagnosis Date    Allergy     Anticoagulant long-term use     Arthritis     CVA (cerebral infarction) 2013    Depression     Disorder of kidney and ureter     renal stones    Diverticulosis of colon      Extrinsic asthma, unspecified     Hyperlipidemia     Hypertension     Kidney stone     Left atrial enlargement 2014    Low back pain     MARTIN (obstructive sleep apnea)     Osteopenia     PUD (peptic ulcer disease)     Stroke  2013    Urinary tract infection      Past Surgical History/ObGyn Hx if gender appropriate:  Past Surgical History:   Procedure Laterality Date    APPENDECTOMY      @ time of hysterectomy    BACK SURGERY      CATARACT EXTRACTION       SECTION      CHOLECYSTECTOMY      laparoscopic    COLONOSCOPY N/A 2018    Procedure: COLONOSCOPY/Golytely;  Surgeon: Janine Black MD;  Location: [facility] ENDO;  Service: Endoscopy;  Laterality: N/A;    CYSTOSCOPY W/ RETROGRADES  2022    Procedure: CYSTOSCOPY, WITH RETROGRADE PYELOGRAM;  Surgeon: Mitchell Recinos MD;  Location: [facility] OR;  Service: Urology;;    CYSTOSCOPY W/ URETERAL STENT PLACEMENT Left 2022    Procedure: CYSTOSCOPY, WITH URETERAL STENT INSERTION;  Surgeon: Mitchell Recinos MD;  Location: [facility] OR;  Service: Urology;  Laterality: Left;    DILATION AND CURETTAGE OF UTERUS      HYSTERECTOMY      TAHUSO with appendectomy    INNER EAR SURGERY      replaced ear drum    JOINT REPLACEMENT Right     knee    KNEE ARTHROSCOPY W/ DEBRIDEMENT      LUMBAR DISCECTOMY      L4-L5    OOPHORECTOMY      unilateral    TONSILLECTOMY      TYMPANOPLASTY      URETEROSCOPIC REMOVAL OF URETERIC CALCULUS Left 2018    Procedure: REMOVAL, CALCULUS, URETER, URETEROSCOPIC, holmium laser lithotripsy, stone basket extraction, retrograde pyelogram, ureteral stent exchange;  Surgeon: Anaya Zamora MD;  Location: [facility] OR;  Service: Urology;  Laterality: Left;     Family History:  Family History   Problem Relation Age of Onset    Cervical cancer Mother     Cancer Mother 65        lung cancer - non smoker    Stroke Paternal Grandfather     Hypertension Maternal Grandfather     Heart disease Maternal Grandfather      Hypertension Father     Coronary artery disease Father 62    Heart disease Father     Diabetes Sister     Heart disease Sister     Kidney disease Sister     Breast cancer Daughter 36    Heart failure Sister         60s    Colon cancer Neg Hx     Ovarian cancer Neg Hx      Social History:  Social History     Tobacco Use    Smoking status: Former     Types: Cigarettes     Quit date: 1995     Years since quittin.9    Smokeless tobacco: Never   Substance Use Topics    Alcohol use: Yes     Alcohol/week: 1.0 standard drink     Types: 1 Glasses of wine per week     Comment: social    Drug use: No       Objective   Last 24 Hour Vital Signs:  BP  Min: 95/47  Max: 119/55  Temp  Av.1 °F (36.7 °C)  Min: 97.3 °F (36.3 °C)  Max: 100.3 °F (37.9 °C)  Pulse  Av.3  Min: 74  Max: 85  Resp  Av.1  Min: 15  Max: 29  SpO2  Av %  Min: 89 %  Max: 95 %    Lines, Drains, Airway:  Estrella -  placed    PIV    Physical Examination:  Physical Exam  Constitutional:       General: She is not in acute distress.     Appearance: She is ill-appearing. She is not toxic-appearing.   HENT:      Head: Normocephalic and atraumatic.      Mouth/Throat:      Mouth: Mucous membranes are moist.      Pharynx: Oropharynx is clear.   Eyes:      Extraocular Movements: Extraocular movements intact.      Conjunctiva/sclera: Conjunctivae normal.   Cardiovascular:      Rate and Rhythm: Normal rate and regular rhythm.      Pulses: Normal pulses.      Heart sounds: Normal heart sounds. No murmur heard.    No friction rub. No gallop.   Pulmonary:      Breath sounds: Normal breath sounds. No wheezing, rhonchi or rales.      Comments: On 5L NC  Abdominal:      Palpations: Abdomen is soft.      Comments: Left sided tenderness to palpation.  Left CVA tenderness   Genitourinary:     Comments: Estrella in place  Musculoskeletal:         General: No swelling or deformity. Normal range of motion.   Skin:     General: Skin is warm and dry.    Neurological:      General: No focal deficit present.      Mental Status: She is alert and oriented to person, place, and time.   Psychiatric:         Mood and Affect: Mood normal.         Behavior: Behavior normal.         Thought Content: Thought content normal.       Laboratory:  CBC:   Lab Results   Component Value Date    WBC 27.84 (H) 12/12/2022    HGB 11.1 (L) 12/12/2022     (L) 12/12/2022    MCV 98 12/12/2022    RDW 13.2 12/12/2022       Estimated Creatinine Clearance: 33.9 mL/min (A) (based on SCr of 1.8 mg/dL (H)).    Microbiology Data:  Microbiology Results (last 7 days)       Procedure Component Value Units Date/Time    Urine culture [[id number]]  (Abnormal) Collected: 12/11/22 0855    Order Status: Completed Specimen: Urine, Clean Catch Updated: 12/12/22 1646     Urine Culture, Routine GRAM NEGATIVE CORI  10,000 - 49,999 cfu/ml  Identification and susceptibility pending      Narrative:      Indicated criteria for high risk culture:->Prior to urologic  procedures    Urine Culture High Risk [[id number]]  (Abnormal) Collected: 12/11/22 1218    Order Status: Completed Specimen: Urine, Catheterized Updated: 12/12/22 1645     Urine Culture, Routine GRAM NEGATIVE CORI  10,000 - 49,999 cfu/ml  Identification and susceptibility pending      Narrative:      Upper tract urine-urine cx  Indicated criteria for high risk culture:->Other  Other (specify):->surgery    Influenza A & B by Molecular [[id number]] Collected: 12/12/22 0803    Order Status: Completed Specimen: Nasopharyngeal Swab Updated: 12/12/22 0930     Influenza A, Molecular Negative     Influenza B, Molecular Negative     Flu A & B Source Nasal swab    Urine Culture High Risk [[id number]]     Order Status: Canceled Specimen: Urine, Clean Catch           Other Results:  EKG: NSR, Qtc 422    Radiology:  CXR   12/11/22  Possible multifocal ill-defined nodules.  Prominence of the right hilum may be secondary to crowding of the vascular structures and  patient rotation; however, underlying pathology is not excluded.     CT a/p w/o contrast  12/9/22  Similar position of left proximal ureteral stone, measuring 6 mm on today's exam, previously 3 mm.  Prominence of the upstream renal collecting system with mild hydronephrosis. Other nonobstructing left renal calculi, colonic diverticulosis.

## 2022-12-13 LAB
ALBUMIN SERPL BCP-MCNC: 2.6 G/DL (ref 3.5–5.2)
ALP SERPL-CCNC: 96 U/L (ref 55–135)
ALT SERPL W/O P-5'-P-CCNC: 19 U/L (ref 10–44)
ANION GAP SERPL CALC-SCNC: 9 MMOL/L (ref 8–16)
AST SERPL-CCNC: 26 U/L (ref 10–40)
BACTERIA UR CULT: ABNORMAL
BACTERIA UR CULT: ABNORMAL
BASOPHILS # BLD AUTO: ABNORMAL K/UL (ref 0–0.2)
BASOPHILS NFR BLD: 0 % (ref 0–1.9)
BILIRUB SERPL-MCNC: 0.8 MG/DL (ref 0.1–1)
BUN SERPL-MCNC: 25 MG/DL (ref 8–23)
CALCIUM SERPL-MCNC: 8 MG/DL (ref 8.7–10.5)
CHLORIDE SERPL-SCNC: 103 MMOL/L (ref 95–110)
CO2 SERPL-SCNC: 24 MMOL/L (ref 23–29)
CREAT SERPL-MCNC: 1.2 MG/DL (ref 0.5–1.4)
DIFFERENTIAL METHOD: ABNORMAL
EOSINOPHIL # BLD AUTO: ABNORMAL K/UL (ref 0–0.5)
EOSINOPHIL NFR BLD: 0 % (ref 0–8)
ERYTHROCYTE [DISTWIDTH] IN BLOOD BY AUTOMATED COUNT: 12.8 % (ref 11.5–14.5)
EST. GFR  (NO RACE VARIABLE): 48 ML/MIN/1.73 M^2
GLUCOSE SERPL-MCNC: 138 MG/DL (ref 70–110)
HCT VFR BLD AUTO: 34.4 % (ref 37–48.5)
HGB BLD-MCNC: 11 G/DL (ref 12–16)
IMM GRANULOCYTES # BLD AUTO: ABNORMAL K/UL (ref 0–0.04)
IMM GRANULOCYTES NFR BLD AUTO: ABNORMAL % (ref 0–0.5)
LYMPHOCYTES # BLD AUTO: ABNORMAL K/UL (ref 1–4.8)
LYMPHOCYTES NFR BLD: 6 % (ref 18–48)
MAGNESIUM SERPL-MCNC: 1.9 MG/DL (ref 1.6–2.6)
MCH RBC QN AUTO: 30.1 PG (ref 27–31)
MCHC RBC AUTO-ENTMCNC: 32 G/DL (ref 32–36)
MCV RBC AUTO: 94 FL (ref 82–98)
MONOCYTES # BLD AUTO: ABNORMAL K/UL (ref 0.3–1)
MONOCYTES NFR BLD: 1 % (ref 4–15)
NEUTROPHILS NFR BLD: 84 % (ref 38–73)
NEUTS BAND NFR BLD MANUAL: 9 %
NRBC BLD-RTO: 0 /100 WBC
PLATELET # BLD AUTO: 130 K/UL (ref 150–450)
PLATELET BLD QL SMEAR: ABNORMAL
PMV BLD AUTO: 11.2 FL (ref 9.2–12.9)
POTASSIUM SERPL-SCNC: 4.1 MMOL/L (ref 3.5–5.1)
PROT SERPL-MCNC: 5.4 G/DL (ref 6–8.4)
RBC # BLD AUTO: 3.65 M/UL (ref 4–5.4)
SODIUM SERPL-SCNC: 136 MMOL/L (ref 136–145)
WBC # BLD AUTO: 24.5 K/UL (ref 3.9–12.7)

## 2022-12-13 PROCEDURE — 11000001 HC ACUTE MED/SURG PRIVATE ROOM

## 2022-12-13 PROCEDURE — 80053 COMPREHEN METABOLIC PANEL: CPT | Performed by: STUDENT IN AN ORGANIZED HEALTH CARE EDUCATION/TRAINING PROGRAM

## 2022-12-13 PROCEDURE — 99900035 HC TECH TIME PER 15 MIN (STAT)

## 2022-12-13 PROCEDURE — 36415 COLL VENOUS BLD VENIPUNCTURE: CPT | Performed by: STUDENT IN AN ORGANIZED HEALTH CARE EDUCATION/TRAINING PROGRAM

## 2022-12-13 PROCEDURE — 94761 N-INVAS EAR/PLS OXIMETRY MLT: CPT

## 2022-12-13 PROCEDURE — 94640 AIRWAY INHALATION TREATMENT: CPT

## 2022-12-13 PROCEDURE — 63600175 PHARM REV CODE 636 W HCPCS: Performed by: INTERNAL MEDICINE

## 2022-12-13 PROCEDURE — 87040 BLOOD CULTURE FOR BACTERIA: CPT

## 2022-12-13 PROCEDURE — 27000221 HC OXYGEN, UP TO 24 HOURS

## 2022-12-13 PROCEDURE — 85027 COMPLETE CBC AUTOMATED: CPT | Performed by: STUDENT IN AN ORGANIZED HEALTH CARE EDUCATION/TRAINING PROGRAM

## 2022-12-13 PROCEDURE — 27100098 HC SPACER

## 2022-12-13 PROCEDURE — 83735 ASSAY OF MAGNESIUM: CPT | Performed by: STUDENT IN AN ORGANIZED HEALTH CARE EDUCATION/TRAINING PROGRAM

## 2022-12-13 PROCEDURE — 99233 PR SUBSEQUENT HOSPITAL CARE,LEVL III: ICD-10-PCS | Mod: ,,, | Performed by: UROLOGY

## 2022-12-13 PROCEDURE — 63600175 PHARM REV CODE 636 W HCPCS: Performed by: STUDENT IN AN ORGANIZED HEALTH CARE EDUCATION/TRAINING PROGRAM

## 2022-12-13 PROCEDURE — 25000242 PHARM REV CODE 250 ALT 637 W/ HCPCS: Performed by: STUDENT IN AN ORGANIZED HEALTH CARE EDUCATION/TRAINING PROGRAM

## 2022-12-13 PROCEDURE — 36415 COLL VENOUS BLD VENIPUNCTURE: CPT

## 2022-12-13 PROCEDURE — 85007 BL SMEAR W/DIFF WBC COUNT: CPT | Performed by: STUDENT IN AN ORGANIZED HEALTH CARE EDUCATION/TRAINING PROGRAM

## 2022-12-13 PROCEDURE — 25000003 PHARM REV CODE 250: Performed by: STUDENT IN AN ORGANIZED HEALTH CARE EDUCATION/TRAINING PROGRAM

## 2022-12-13 PROCEDURE — 25000003 PHARM REV CODE 250: Performed by: INTERNAL MEDICINE

## 2022-12-13 PROCEDURE — 99233 SBSQ HOSP IP/OBS HIGH 50: CPT | Mod: ,,, | Performed by: UROLOGY

## 2022-12-13 PROCEDURE — 27000207 HC ISOLATION

## 2022-12-13 RX ORDER — FUROSEMIDE 10 MG/ML
40 INJECTION INTRAMUSCULAR; INTRAVENOUS ONCE
Status: DISCONTINUED | OUTPATIENT
Start: 2022-12-13 | End: 2022-12-13

## 2022-12-13 RX ORDER — FUROSEMIDE 10 MG/ML
40 INJECTION INTRAMUSCULAR; INTRAVENOUS ONCE
Status: COMPLETED | OUTPATIENT
Start: 2022-12-13 | End: 2022-12-13

## 2022-12-13 RX ORDER — MUPIROCIN 20 MG/G
OINTMENT TOPICAL 2 TIMES DAILY
Status: DISCONTINUED | OUTPATIENT
Start: 2022-12-13 | End: 2022-12-15 | Stop reason: HOSPADM

## 2022-12-13 RX ADMIN — BUPROPION HYDROCHLORIDE 300 MG: 150 TABLET, EXTENDED RELEASE ORAL at 08:12

## 2022-12-13 RX ADMIN — ACETAMINOPHEN 1000 MG: 500 TABLET ORAL at 12:12

## 2022-12-13 RX ADMIN — ALBUTEROL SULFATE 2 PUFF: 90 AEROSOL, METERED RESPIRATORY (INHALATION) at 12:12

## 2022-12-13 RX ADMIN — HEPARIN SODIUM 5000 UNITS: 5000 INJECTION INTRAVENOUS; SUBCUTANEOUS at 06:12

## 2022-12-13 RX ADMIN — ATORVASTATIN CALCIUM 40 MG: 40 TABLET, FILM COATED ORAL at 09:12

## 2022-12-13 RX ADMIN — TAMSULOSIN HYDROCHLORIDE 0.4 MG: 0.4 CAPSULE ORAL at 08:12

## 2022-12-13 RX ADMIN — ACETAMINOPHEN 1000 MG: 500 TABLET ORAL at 03:12

## 2022-12-13 RX ADMIN — FUROSEMIDE 40 MG: 10 INJECTION, SOLUTION INTRAVENOUS at 11:12

## 2022-12-13 RX ADMIN — HEPARIN SODIUM 5000 UNITS: 5000 INJECTION INTRAVENOUS; SUBCUTANEOUS at 09:12

## 2022-12-13 RX ADMIN — PANTOPRAZOLE SODIUM 40 MG: 40 TABLET, DELAYED RELEASE ORAL at 08:12

## 2022-12-13 RX ADMIN — MEROPENEM 1 G: 1 INJECTION, POWDER, FOR SOLUTION INTRAVENOUS at 08:12

## 2022-12-13 RX ADMIN — ASPIRIN 81 MG CHEWABLE TABLET 81 MG: 81 TABLET CHEWABLE at 08:12

## 2022-12-13 RX ADMIN — MUPIROCIN: 20 OINTMENT TOPICAL at 11:12

## 2022-12-13 RX ADMIN — ESCITALOPRAM OXALATE 20 MG: 10 TABLET ORAL at 08:12

## 2022-12-13 RX ADMIN — HEPARIN SODIUM 5000 UNITS: 5000 INJECTION INTRAVENOUS; SUBCUTANEOUS at 02:12

## 2022-12-13 RX ADMIN — MUPIROCIN: 20 OINTMENT TOPICAL at 09:12

## 2022-12-13 RX ADMIN — MEROPENEM 1 G: 1 INJECTION, POWDER, FOR SOLUTION INTRAVENOUS at 04:12

## 2022-12-13 NOTE — PROGRESS NOTES
[facility] Medicine Progress Note    Primary Team: [facility] Hospital[facility] B  Attending Physician: Edis Colbert MD  Resident: Ed  Intern: Francesca    Subjective:      Patient had an episode of confusion overnight. Says flank and abdominal pain have been improving. Had a fever of 102 overnight.     Objective:     Last 24 Hour Vital Signs:  BP  Min: 100/68  Max: 151/85  Temp  Av.8 °F (37.1 °C)  Min: 97.3 °F (36.3 °C)  Max: 102.1 °F (38.9 °C)  Pulse  Av.2  Min: 73  Max: 105  Resp  Av.2  Min: 15  Max: 20  SpO2  Av %  Min: 91 %  Max: 95 %  I/O last 3 completed shifts:  In: 900 [P.O.:900]  Out: 250 [Urine:250]    Physical Examination:  Examination  General: Patient sitting comfortably in NAD  Head: normocephalic, atraumatic  Eyes: PERRL, EOMI, no conjunctival injections or icterus  Mouth: MMM, posterior oropharynx without erythema  Cardiac: RRR, no murmurs appreciated, no extra heart sounds  Pulmonary/Chest: crackles in bilateral lung bases, expiratory wheezing, requiring 4L O2   GI: Soft, nnon distended, moderately tender in suprapubic region and LLQ (improving)  Extremities: no edema, clubbing, or cyanosis  Skin: dry, warm, intact. No bruising or rashes.  Neuro: Alert and oriented, moving all extremities equally        Laboratory:  Laboratory Data   Recent Labs   Lab 22  0741 22  0300 22  0355   WBC 12.53 27.84* 24.50*   HGB 13.6 11.1* 11.0*   HCT 41.8 34.5* 34.4*   * 138* 130*   MCV 95 98 94    137 136   K 3.2* 3.9 4.1    102 103   CO2 21* 24 24   BUN 17 27* 25*   CREATININE 1.0 1.8* 1.2   * 128* 138*   CALCIUM 8.5* 8.1* 8.0*   PROT 6.1 5.7* 5.4*   ALBUMIN 3.2* 2.6* 2.6*   MG  --  1.3* 1.9   AST 40 29 26   ALT 28 20 19   ALKPHOS 76 57 96         Microbiology Data   Urine cultures from 10/11 and  growing multi drug resistant E. Coli   Ucx  pending       Radiology Data  CTAP 11/10/2022  Kidneys are normal in size and location.  3 mm calculus  within the proximal left ureter.  Mild prominence of left renal collecting system without overt hydronephrosis     CTAP 12/9/2022  Similar position of left proximal ureteral stone, measuring 6 mm on today's exam, previously 3 mm.  Prominence of the upstream renal collecting system with mild hydronephrosis.       Current Medications:     Infusions:   sodium chloride 0.9%          Scheduled:   aspirin  81 mg Oral Daily    atorvastatin  40 mg Oral QHS    buPROPion  300 mg Oral Daily    EScitalopram oxalate  20 mg Oral Daily    furosemide (LASIX) injection  40 mg Intravenous Once    heparin (porcine)  5,000 Units Subcutaneous Q8H    meropenem (MERREM) IVPB  1 g Intravenous Q8H    mupirocin   Nasal BID    pantoprazole  40 mg Oral Daily    tamsulosin  0.4 mg Oral Daily        PRN:  acetaminophen, albuterol, influenza 65up-adj, ketorolac, melatonin, ondansetron, prochlorperazine, sodium chloride 0.9%    Antibiotics and Day Number of Therapy:  Meropenem 12/11-current       Assessment:     Tracy Armendariz is a 71 year old female with a past history of HTN, HLD, CVA in 2013, and nephrolithiasis presenting to [facility] 12/11/22 with a chief complaint of L flank pain and fever for 1 day. Patient admitted to [facility] medicine for pyelonephritis in the setting of obstructive kidney stone.      Plan:     Pyelonephritis 2/2 nephrolithiasis   - patient presenting with fever, dysuria, urgency, and CVA tenderness for 1 day   - CTAP from 12/9 shows interval increase in ureteral stone from 3 mm to 6 mm with mild hydronephrosis    - UA positive for blood and >100k WBCs, culture pending   - received morphine and Toradol in the ED for pain   - follows with urology for chronic nephrolithiasis   - s/p L ureter stent placement with urology   - Ucx in the past grew multi drug resistant E. Coli, started on meropenem   - continue home flomax  - urine growing gram negative rods, follow up speciation   - continue meropenem per ID    Acute hypoxic  respiratory failure   - new oxygen requirement since admission, currently requiring 4L NC, desats to low 80s on room air   - crackles and wheezes on exam   - repeat CXR pending   - giving one dose of IV lasix   - duo nebs ordered      HTN   - pressure controlled on presentation   - holding home losartan in the setting of hypotension      Prior CVA  - continue ASA and statin      Depression   - continue home wellbutrin and lexapro      Asthma   - only needs rescue inhaler roughly once per week, sometimes less   - PRN albuterol       Code Status: Full   DVT Prophylaxis: heparin   Diet: Regular   Disposition: pending urine cultures       Yaquelin Ma MD   U Neurology Resident, HO-I    [facility] Medicine Hospitalist Pager numbers:   U Hospitalist Medicine Team A (Zachary/[place]): [phone]  [facility] Hospitalist Medicine Team B (Rachel/Sayra):  [phone]

## 2022-12-13 NOTE — PROGRESS NOTES
[facility] - Telemetry   Urology  Progress Note    Patient Name: Tracy Armendariz  MRN: [id number]  Admission Date: 12/11/2022  Hospital Length of Stay: 2 days    POD: 2  Following for: left ureteral stent placement    Subjective:     Interval History:   Patient with a leukocytosis to 24 which is improved from yesterday, febrile to 102 overnight  Pressures have been better  Urine output is adequate  Cultures growing GNRs    Review of Systems:  A review of 10+ systems was conducted with pertinent positive and negative findings documented in HPI with all other systems reviewed and negative.    Objective:     Vitals:  Temp:  [97.3 °F (36.3 °C)-102.1 °F (38.9 °C)] 98.4 °F (36.9 °C)  Pulse:  [] 77  Resp:  [15-20] 18  SpO2:  [91 %-95 %] 95 %  BP: (100-151)/(54-85) 127/60       I/O:  Intake/Output Summary (Last 24 hours) at 12/13/2022 1103  Last data filed at 12/12/2022 1951  Gross per 24 hour   Intake 800 ml   Output --   Net 800 ml        Physical Exam:  GENERAL: patient sitting comfortably  SKIN: warm, dry, well perfused  EXT: no bruising or edema  NEURO: grossly normal with no focal deficits  PSYCH: appropriate mood and affect  BACK: No CVA tenderness  ABDO: non-distended  : Estrella draining clear yellow urine    Significant Labs:  CBC:  Recent Labs   Lab 12/11/22  0741 12/12/22  0300 12/13/22  0355   WBC 12.53 27.84* 24.50*   HGB 13.6 11.1* 11.0*   HCT 41.8 34.5* 34.4*   * 138* 130*       BMP:  Recent Labs   Lab 12/11/22  0741 12/12/22  0300 12/13/22  0355    137 136   K 3.2* 3.9 4.1    102 103   CO2 21* 24 24   BUN 17 27* 25*   CREATININE 1.0 1.8* 1.2   CALCIUM 8.5* 8.1* 8.0*     Urine Culture:  Lab Results   Component Value Date    LABURIN (A) 12/11/2022     GRAM NEGATIVE CORI  10,000 - 49,999 cfu/ml  Identification and susceptibility pending        Imaging:  I have personally reviewed the below imaging and discussed my findings with the patient.     Results for orders placed or performed during the  hospital encounter of 12/09/22 (from the past 2160 hour(s))   CT Abdomen Pelvis  Without Contrast    Impression    Similar position of left proximal ureteral stone, measuring 6 mm on today's exam, previously 3 mm.  Prominence of the upstream renal collecting system with mild hydronephrosis.    Other nonobstructing left renal calculi, colonic diverticulosis, and additional findings as above.      Electronically signed by: Mauro Nuñez  Date:    12/09/2022  Time:    11:27     *Note: Due to a large number of results and/or encounters for the requested time period, some results have not been displayed. A complete set of results can be found in Results Review.     No results found. However, due to the size of the patient record, not all encounters were searched. Please check Results Review for a complete set of results.  X-Ray Abdomen AP 1 View  Narrative: EXAMINATION:  XR ABDOMEN AP 1 VIEW    CLINICAL HISTORY:  Eval stent position; Calculus of kidney    TECHNIQUE:  AP View(s) of the abdomen was performed.    COMPARISON:  None    FINDINGS:  Left-sided ureteral stent identified.  The position alignment is satisfactory.  Nephrolithiasis.  No significant bowel dilatation.  Impression: See above    Electronically signed by: Gwyn Melendez MD  Date:    12/13/2022  Time:    09:40         Urology Specific Assessment:     Left ureteral stone with UTI, pyelonephritis s/p left ureteral stent placement on 12/11, cultures growing GNRs  Leukocytosis, KADIE, Sepsis    Plan:     No further intervention planned during this admission from a urologic standpoint.  Stent appears to be in good position on KUB.  Although her stone is likely colonized at this point in could be a source of recurrent urinary tract infections, her current episode of sepsis is due to the high-grade urinary obstruction and not the stone itself.  The obstruction has been relieved by the stent.  Additionally she became septic after stent placement currently has a  leukocytosis and has been febrile and hypotensive.  Attempting to treat this stone without giving her several weeks of antibiotics would induce a high pressure irrigation into the collecting system and could lead to sepsis and a high risk of morbidity and mortality.  Additionally treating the stone at this point could release endotoxin and would also have a high risk of mortality.  Patient should be adequately treated with 2 weeks of antibiotics.  Patient can then follow up with Dr. Zamora as scheduled  Estrella can be removed by primary team when clinically appropriate  Rest of care per primary team    The urology service is available as needed.  Contact on-call urologist if questions arise.    Mitchell Recinos MD  Urology  [facility] [place]

## 2022-12-13 NOTE — PLAN OF CARE
Problem: Adult Inpatient Plan of Care  Goal: Plan of Care Review  Outcome: Ongoing, Progressing  Goal: Patient-Specific Goal (Individualized)  Outcome: Ongoing, Progressing  Goal: Absence of Hospital-Acquired Illness or Injury  Outcome: Ongoing, Progressing  Goal: Optimal Comfort and Wellbeing  Outcome: Ongoing, Progressing  Goal: Readiness for Transition of Care  Outcome: Ongoing, Progressing     Problem: Bleeding (Surgery Nonspecified)  Goal: Absence of Bleeding  Outcome: Ongoing, Progressing     Problem: Bowel Motility Impaired (Surgery Nonspecified)  Goal: Effective Bowel Elimination  Outcome: Ongoing, Progressing     Problem: Fluid and Electrolyte Imbalance (Surgery Nonspecified)  Goal: Fluid and Electrolyte Balance  Outcome: Ongoing, Progressing     Problem: Glycemic Control Impaired (Surgery Nonspecified)  Goal: Blood Glucose Level Within Targeted Range  Outcome: Ongoing, Progressing     Problem: Infection (Surgery Nonspecified)  Goal: Absence of Infection Signs and Symptoms  Outcome: Ongoing, Progressing     Problem: Ongoing Anesthesia Effects (Surgery Nonspecified)  Goal: Anesthesia/Sedation Recovery  Outcome: Ongoing, Progressing     Problem: Pain (Surgery Nonspecified)  Goal: Acceptable Pain Control  Outcome: Ongoing, Progressing     Problem: Postoperative Nausea and Vomiting (Surgery Nonspecified)  Goal: Nausea and Vomiting Relief  Outcome: Ongoing, Progressing     Problem: Postoperative Urinary Retention (Surgery Nonspecified)  Goal: Effective Urinary Elimination  Outcome: Ongoing, Progressing     Problem: Respiratory Compromise (Surgery Nonspecified)  Goal: Effective Oxygenation and Ventilation  Outcome: Ongoing, Progressing     Problem: Behavioral Health Comorbidity  Goal: Maintenance of Behavioral Health Symptom Control  Outcome: Ongoing, Progressing     Problem: Hypertension Comorbidity  Goal: Blood Pressure in Desired Range  Outcome: Ongoing, Progressing     Problem: Obstructive Sleep Apnea Risk  or Actual Comorbidity Management  Goal: Unobstructed Breathing During Sleep  Outcome: Ongoing, Progressing     Problem: Infection  Goal: Absence of Infection Signs and Symptoms  Outcome: Ongoing, Progressing     Problem: UTI (Urinary Tract Infection)  Goal: Improved Infection Symptoms  Outcome: Ongoing, Progressing     Problem: Fall Injury Risk  Goal: Absence of Fall and Fall-Related Injury  Outcome: Ongoing, Progressing

## 2022-12-13 NOTE — PROGRESS NOTES
U Infectious Disease Progress Note     Primary Team: U Hospitalist Team B  Consultant Attending: Smita Leonard MD  Date of Admit: 12/11/2022    Reason for Consult     H/o nephrolithiasis with ESBL; sensitive to bactrim; please guide on bactrim vs meropenem     Assessment/Plan     Tracy Armendariz is a 72 y.o. female with:  Pyelonephritis 2/2 nephrolithiasis s/p left uretal stent placement. H/o recurrent nephrolithiasis, previous urine cultures grew ESBL E coli, treated with bactrim. Currently on Meropenem, fevered overnight to  102.1, still with new oxygen requirement of 4L NC. Leukocytosis following procedure, downtrending. Elevated creatinine also downtrending. Urine cultures collected 12/11 with gram negative rods, identification pending. Blood culture collected this morning 12/13. Urology consulted, recommend 2 week antibiotic course prior to stone removal, procedure scheduled for 12/21/22.       Recommendations   Continue Meropenem renally dosed at 1g Q12H  Follow up urine culture species identification and sensitivities  Follow up blood culture collected today 12/13  Follow up CXR   Encourage incentive spirometry, wean off supplemental oxygen as tolerated  Follow up urology recs regarding stone removal for source control  Recommend placing patient in contact precautions given h/o ESBL       Thank you for your consult. I will follow-up with patient. Please contact us if you have any additional questions.     Pj Schmidt MD  U IM HO-I  [facility] Infectious Disease  [facility]    History of Present Illness   Tracy Armendariz is a 72 y.o. female with a past medical history of recurrent nephrolithiasis with ESBL E coli s/p stent, CAD, hypertension, hyperlipidemia, CVA, and depression, who presented to [facility] on 12/11/22 for a 1 day history of left flank pain. Patient was also experiencing fever, nausea, and dysuria during this time. CT a/p from 2 days prior to admission showed an  interval increase in a left ureteral stone to 6m from 3mm. UA positive fro blood and >100k WBC. Patient received one dose of ceftriaxone and was then started on Meropenem given her history of ESBL E coli. Urology was consulted and placed a left ureteral stent  and collected urine cultures at that time.      Infectious Disease was consulted for guidance on antibiotic therapy, specifically Bactrim vs Meropenem, in setting of previous ESBL E coli. Urine cultures 2022 grew ESBL E coli sensitive to bactrim. Patient finished course of Bactrim, however did not have resolution of her symptoms.     Interval History     Today the patient was evaluated at bedside. She reports 5 episodes of diarrhea yesterday with loose, semi-formed, brown-green stool. Today she reports that the diarrhea has improved and has had only 2 episodes today. She also endorses left sided abdominal pain with movement. She denies fever, chills, and chest pain. She is on 4L NC from 5L yesterday, reports some difficulty breathing.     Objective:   BP  Min: 100/68  Max: 151/85  Temp  Av.8 °F (37.1 °C)  Min: 97.3 °F (36.3 °C)  Max: 102.1 °F (38.9 °C)  Pulse  Av.2  Min: 73  Max: 105  Resp  Av.2  Min: 15  Max: 20  SpO2  Av %  Min: 91 %  Max: 95 %      Physical Examination   Physical Exam  Vitals reviewed.   Constitutional:       General: She is not in acute distress.     Appearance: She is not toxic-appearing.   HENT:      Head: Normocephalic and atraumatic.      Mouth/Throat:      Mouth: Mucous membranes are moist.      Pharynx: Oropharynx is clear.   Eyes:      Extraocular Movements: Extraocular movements intact.      Conjunctiva/sclera: Conjunctivae normal.   Cardiovascular:      Rate and Rhythm: Normal rate and regular rhythm.      Pulses: Normal pulses.      Heart sounds: Normal heart sounds. No murmur heard.    No friction rub. No gallop.   Pulmonary:      Breath sounds: Wheezing present. No rhonchi or rales.      Comments:  Poor inspiratory effort.   Abdominal:      Palpations: Abdomen is soft.      Tenderness: There is no guarding.      Comments: LLQ tenderness to palpation   Genitourinary:     Comments: Estrella in place  Musculoskeletal:         General: No swelling or deformity. Normal range of motion.   Skin:     General: Skin is warm and dry.   Neurological:      General: No focal deficit present.      Mental Status: She is alert and oriented to person, place, and time.   Psychiatric:         Mood and Affect: Mood normal.         Behavior: Behavior normal.         Thought Content: Thought content normal.         Laboratory Data Reviewed:  Recent Labs   Lab 12/11/22  0741 12/12/22  0300 12/13/22  0355   WBC 12.53 27.84* 24.50*   HGB 13.6 11.1* 11.0*   HCT 41.8 34.5* 34.4*   * 138* 130*    137 136   K 3.2* 3.9 4.1    102 103   CREATININE 1.0 1.8* 1.2   BUN 17 27* 25*   CO2 21* 24 24   ALT 28 20 19   AST 40 29 26         Microbiology Data Reviewed:  Microbiology Results (last 7 days)       Procedure Component Value Units Date/Time    Blood culture [[id number]]     Order Status: Sent Specimen: Blood     Urine culture [[id number]]  (Abnormal) Collected: 12/11/22 0855    Order Status: Completed Specimen: Urine, Clean Catch Updated: 12/12/22 1646     Urine Culture, Routine GRAM NEGATIVE CORI  10,000 - 49,999 cfu/ml  Identification and susceptibility pending      Narrative:      Indicated criteria for high risk culture:->Prior to urologic  procedures    Urine Culture High Risk [[id number]]  (Abnormal) Collected: 12/11/22 1218    Order Status: Completed Specimen: Urine, Catheterized Updated: 12/12/22 1645     Urine Culture, Routine GRAM NEGATIVE CORI  10,000 - 49,999 cfu/ml  Identification and susceptibility pending      Narrative:      Upper tract urine-urine cx  Indicated criteria for high risk culture:->Other  Other (specify):->surgery    Influenza A & B by Molecular [[id number]] Collected: 12/12/22 0803    Order Status:  Completed Specimen: Nasopharyngeal Swab Updated: 12/12/22 0946     Influenza A, Molecular Negative     Influenza B, Molecular Negative     Flu A & B Source Nasal swab    Urine Culture High Risk [[id number]]     Order Status: Canceled Specimen: Urine, Clean Catch               Current Medications   aspirin  81 mg Oral Daily    atorvastatin  40 mg Oral QHS    buPROPion  300 mg Oral Daily    EScitalopram oxalate  20 mg Oral Daily    heparin (porcine)  5,000 Units Subcutaneous Q8H    meropenem (MERREM) IVPB  1 g Intravenous Q12H    pantoprazole  40 mg Oral Daily    tamsulosin  0.4 mg Oral Daily

## 2022-12-13 NOTE — PROGRESS NOTES
Pharmacist Renal Dose Adjustment Note    Tracy Armendariz is a 72 y.o. female being treated with the medication merrem    Patient Data:    Vital Signs (Most Recent):  Temp: 98.4 °F (36.9 °C) (12/13/22 0716)  Pulse: 77 (12/13/22 0854)  Resp: 18 (12/13/22 0854)  BP: 127/60 (12/13/22 0716)  SpO2: 95 % (12/13/22 0854) Vital Signs (72h Range):  Temp:  [97.3 °F (36.3 °C)-102.1 °F (38.9 °C)]   Pulse:  []   Resp:  [15-29]   BP: ()/(47-85)   SpO2:  [89 %-100 %]      Recent Labs   Lab 12/11/22  0741 12/12/22  0300 12/13/22  0355   CREATININE 1.0 1.8* 1.2     Serum creatinine: 1.2 mg/dL 12/13/22 0355  Estimated creatinine clearance: 50.8 mL/min    Medication:merrem dose: 1gram frequency q12h will be changed to medication:merrem dose:1gram frequency:q8h    Pharmacist's Name: Sonu Norris  Pharmacist's Extension: [phone]

## 2022-12-13 NOTE — PLAN OF CARE
PT REQUESTING PAIN MEDS AT THIS TIME. DR MEAD NOTIFIED Pt on documented O2, no respiratory distress noted. Will continue to monitor.

## 2022-12-13 NOTE — PLAN OF CARE
POC reviewed with the pt  VSS on 4L  AAOX4  PIV intact  Estrella intact  X1 assist to the restroom  Bed low, locked and call light in reach

## 2022-12-14 PROBLEM — A49.9 ESBL (EXTENDED SPECTRUM BETA-LACTAMASE) PRODUCING BACTERIA INFECTION: Status: ACTIVE | Noted: 2022-12-14

## 2022-12-14 PROBLEM — Z16.12 ESBL (EXTENDED SPECTRUM BETA-LACTAMASE) PRODUCING BACTERIA INFECTION: Status: ACTIVE | Noted: 2022-12-14

## 2022-12-14 LAB
ALBUMIN SERPL BCP-MCNC: 2.6 G/DL (ref 3.5–5.2)
ALP SERPL-CCNC: 110 U/L (ref 55–135)
ALT SERPL W/O P-5'-P-CCNC: 17 U/L (ref 10–44)
ANION GAP SERPL CALC-SCNC: 11 MMOL/L (ref 8–16)
AST SERPL-CCNC: 18 U/L (ref 10–40)
BASOPHILS # BLD AUTO: 0.06 K/UL (ref 0–0.2)
BASOPHILS NFR BLD: 0.4 % (ref 0–1.9)
BILIRUB SERPL-MCNC: 0.5 MG/DL (ref 0.1–1)
BUN SERPL-MCNC: 17 MG/DL (ref 8–23)
CALCIUM SERPL-MCNC: 8.4 MG/DL (ref 8.7–10.5)
CHLORIDE SERPL-SCNC: 103 MMOL/L (ref 95–110)
CO2 SERPL-SCNC: 25 MMOL/L (ref 23–29)
CREAT SERPL-MCNC: 0.9 MG/DL (ref 0.5–1.4)
DIFFERENTIAL METHOD: ABNORMAL
EOSINOPHIL # BLD AUTO: 0.4 K/UL (ref 0–0.5)
EOSINOPHIL NFR BLD: 2.5 % (ref 0–8)
ERYTHROCYTE [DISTWIDTH] IN BLOOD BY AUTOMATED COUNT: 12.9 % (ref 11.5–14.5)
EST. GFR  (NO RACE VARIABLE): >60 ML/MIN/1.73 M^2
GLUCOSE SERPL-MCNC: 149 MG/DL (ref 70–110)
HCT VFR BLD AUTO: 34.1 % (ref 37–48.5)
HGB BLD-MCNC: 11.2 G/DL (ref 12–16)
IMM GRANULOCYTES # BLD AUTO: 0.16 K/UL (ref 0–0.04)
IMM GRANULOCYTES NFR BLD AUTO: 1 % (ref 0–0.5)
LYMPHOCYTES # BLD AUTO: 1.9 K/UL (ref 1–4.8)
LYMPHOCYTES NFR BLD: 11.2 % (ref 18–48)
MAGNESIUM SERPL-MCNC: 1.8 MG/DL (ref 1.6–2.6)
MCH RBC QN AUTO: 30.9 PG (ref 27–31)
MCHC RBC AUTO-ENTMCNC: 32.8 G/DL (ref 32–36)
MCV RBC AUTO: 94 FL (ref 82–98)
MONOCYTES # BLD AUTO: 1.1 K/UL (ref 0.3–1)
MONOCYTES NFR BLD: 6.4 % (ref 4–15)
NEUTROPHILS # BLD AUTO: 13.2 K/UL (ref 1.8–7.7)
NEUTROPHILS NFR BLD: 78.5 % (ref 38–73)
NRBC BLD-RTO: 0 /100 WBC
PLATELET # BLD AUTO: 146 K/UL (ref 150–450)
PMV BLD AUTO: 11.6 FL (ref 9.2–12.9)
POTASSIUM SERPL-SCNC: 3.9 MMOL/L (ref 3.5–5.1)
PROT SERPL-MCNC: 5.7 G/DL (ref 6–8.4)
RBC # BLD AUTO: 3.63 M/UL (ref 4–5.4)
SODIUM SERPL-SCNC: 139 MMOL/L (ref 136–145)
WBC # BLD AUTO: 16.77 K/UL (ref 3.9–12.7)

## 2022-12-14 PROCEDURE — 94640 AIRWAY INHALATION TREATMENT: CPT

## 2022-12-14 PROCEDURE — 36569 INSJ PICC 5 YR+ W/O IMAGING: CPT

## 2022-12-14 PROCEDURE — 94761 N-INVAS EAR/PLS OXIMETRY MLT: CPT

## 2022-12-14 PROCEDURE — 11000001 HC ACUTE MED/SURG PRIVATE ROOM

## 2022-12-14 PROCEDURE — 99232 PR SUBSEQUENT HOSPITAL CARE,LEVL II: ICD-10-PCS | Mod: ,,, | Performed by: UROLOGY

## 2022-12-14 PROCEDURE — 25000003 PHARM REV CODE 250: Performed by: INTERNAL MEDICINE

## 2022-12-14 PROCEDURE — 99232 SBSQ HOSP IP/OBS MODERATE 35: CPT | Mod: ,,, | Performed by: UROLOGY

## 2022-12-14 PROCEDURE — 85025 COMPLETE CBC W/AUTO DIFF WBC: CPT | Performed by: STUDENT IN AN ORGANIZED HEALTH CARE EDUCATION/TRAINING PROGRAM

## 2022-12-14 PROCEDURE — 25000003 PHARM REV CODE 250: Performed by: STUDENT IN AN ORGANIZED HEALTH CARE EDUCATION/TRAINING PROGRAM

## 2022-12-14 PROCEDURE — 63600175 PHARM REV CODE 636 W HCPCS: Performed by: STUDENT IN AN ORGANIZED HEALTH CARE EDUCATION/TRAINING PROGRAM

## 2022-12-14 PROCEDURE — 80053 COMPREHEN METABOLIC PANEL: CPT | Performed by: STUDENT IN AN ORGANIZED HEALTH CARE EDUCATION/TRAINING PROGRAM

## 2022-12-14 PROCEDURE — A4216 STERILE WATER/SALINE, 10 ML: HCPCS | Performed by: INTERNAL MEDICINE

## 2022-12-14 PROCEDURE — 83735 ASSAY OF MAGNESIUM: CPT | Performed by: STUDENT IN AN ORGANIZED HEALTH CARE EDUCATION/TRAINING PROGRAM

## 2022-12-14 PROCEDURE — 36415 COLL VENOUS BLD VENIPUNCTURE: CPT | Performed by: STUDENT IN AN ORGANIZED HEALTH CARE EDUCATION/TRAINING PROGRAM

## 2022-12-14 PROCEDURE — 25000242 PHARM REV CODE 250 ALT 637 W/ HCPCS

## 2022-12-14 PROCEDURE — 27000221 HC OXYGEN, UP TO 24 HOURS

## 2022-12-14 PROCEDURE — 99900035 HC TECH TIME PER 15 MIN (STAT)

## 2022-12-14 PROCEDURE — C1751 CATH, INF, PER/CENT/MIDLINE: HCPCS

## 2022-12-14 PROCEDURE — 27000207 HC ISOLATION

## 2022-12-14 PROCEDURE — 63600175 PHARM REV CODE 636 W HCPCS: Performed by: INTERNAL MEDICINE

## 2022-12-14 RX ORDER — IPRATROPIUM BROMIDE AND ALBUTEROL SULFATE 2.5; .5 MG/3ML; MG/3ML
3 SOLUTION RESPIRATORY (INHALATION) EVERY 6 HOURS
Status: COMPLETED | OUTPATIENT
Start: 2022-12-14 | End: 2022-12-15

## 2022-12-14 RX ORDER — FUROSEMIDE 10 MG/ML
40 INJECTION INTRAMUSCULAR; INTRAVENOUS ONCE
Status: COMPLETED | OUTPATIENT
Start: 2022-12-15 | End: 2022-12-15

## 2022-12-14 RX ORDER — FUROSEMIDE 10 MG/ML
40 INJECTION INTRAMUSCULAR; INTRAVENOUS ONCE
Status: COMPLETED | OUTPATIENT
Start: 2022-12-14 | End: 2022-12-14

## 2022-12-14 RX ORDER — SODIUM CHLORIDE 0.9 % (FLUSH) 0.9 %
10 SYRINGE (ML) INJECTION EVERY 6 HOURS
Status: DISCONTINUED | OUTPATIENT
Start: 2022-12-14 | End: 2022-12-15 | Stop reason: HOSPADM

## 2022-12-14 RX ORDER — LOSARTAN POTASSIUM 50 MG/1
50 TABLET ORAL DAILY
Status: DISCONTINUED | OUTPATIENT
Start: 2022-12-14 | End: 2022-12-15 | Stop reason: HOSPADM

## 2022-12-14 RX ORDER — SODIUM CHLORIDE 0.9 % (FLUSH) 0.9 %
10 SYRINGE (ML) INJECTION
Status: DISCONTINUED | OUTPATIENT
Start: 2022-12-14 | End: 2022-12-15 | Stop reason: HOSPADM

## 2022-12-14 RX ORDER — KETOROLAC TROMETHAMINE 30 MG/ML
15 INJECTION, SOLUTION INTRAMUSCULAR; INTRAVENOUS EVERY 6 HOURS PRN
Status: DISCONTINUED | OUTPATIENT
Start: 2022-12-14 | End: 2022-12-15 | Stop reason: HOSPADM

## 2022-12-14 RX ADMIN — HEPARIN SODIUM 5000 UNITS: 5000 INJECTION INTRAVENOUS; SUBCUTANEOUS at 03:12

## 2022-12-14 RX ADMIN — HEPARIN SODIUM 5000 UNITS: 5000 INJECTION INTRAVENOUS; SUBCUTANEOUS at 10:12

## 2022-12-14 RX ADMIN — MUPIROCIN: 20 OINTMENT TOPICAL at 10:12

## 2022-12-14 RX ADMIN — HEPARIN SODIUM 5000 UNITS: 5000 INJECTION INTRAVENOUS; SUBCUTANEOUS at 06:12

## 2022-12-14 RX ADMIN — MEROPENEM 1 G: 1 INJECTION, POWDER, FOR SOLUTION INTRAVENOUS at 10:12

## 2022-12-14 RX ADMIN — ERTAPENEM 1 G: 1 INJECTION INTRAMUSCULAR; INTRAVENOUS at 03:12

## 2022-12-14 RX ADMIN — LOSARTAN POTASSIUM 50 MG: 50 TABLET, FILM COATED ORAL at 03:12

## 2022-12-14 RX ADMIN — ESCITALOPRAM OXALATE 20 MG: 10 TABLET ORAL at 10:12

## 2022-12-14 RX ADMIN — Medication 10 ML: at 07:12

## 2022-12-14 RX ADMIN — FUROSEMIDE 40 MG: 10 INJECTION, SOLUTION INTRAMUSCULAR; INTRAVENOUS at 12:12

## 2022-12-14 RX ADMIN — TAMSULOSIN HYDROCHLORIDE 0.4 MG: 0.4 CAPSULE ORAL at 10:12

## 2022-12-14 RX ADMIN — IPRATROPIUM BROMIDE AND ALBUTEROL SULFATE 3 ML: .5; 3 SOLUTION RESPIRATORY (INHALATION) at 07:12

## 2022-12-14 RX ADMIN — ASPIRIN 81 MG CHEWABLE TABLET 81 MG: 81 TABLET CHEWABLE at 10:12

## 2022-12-14 RX ADMIN — IPRATROPIUM BROMIDE AND ALBUTEROL SULFATE 3 ML: .5; 3 SOLUTION RESPIRATORY (INHALATION) at 01:12

## 2022-12-14 RX ADMIN — PANTOPRAZOLE SODIUM 40 MG: 40 TABLET, DELAYED RELEASE ORAL at 10:12

## 2022-12-14 RX ADMIN — IPRATROPIUM BROMIDE AND ALBUTEROL SULFATE 3 ML: .5; 3 SOLUTION RESPIRATORY (INHALATION) at 09:12

## 2022-12-14 RX ADMIN — BUPROPION HYDROCHLORIDE 300 MG: 150 TABLET, EXTENDED RELEASE ORAL at 10:12

## 2022-12-14 RX ADMIN — MEROPENEM 1 G: 1 INJECTION, POWDER, FOR SOLUTION INTRAVENOUS at 01:12

## 2022-12-14 RX ADMIN — ATORVASTATIN CALCIUM 40 MG: 40 TABLET, FILM COATED ORAL at 10:12

## 2022-12-14 NOTE — PROGRESS NOTES
[facility] Infectious Disease Progress Note     Primary Team: [facility] Hospitalist Team B  Consultant Attending: Smita Leonard MD  Date of Admit: 12/11/2022    Reason for Consult     H/o nephrolithiasis with ESBL; sensitive to bactrim; please guide on bactrim vs meropenem     Assessment/Plan     Tracy Armendariz is a 72 y.o. female with:  Pyelonephritis 2/2 nephrolithiasis s/p left uretal stent placement. H/o recurrent nephrolithiasis, previous urine cultures grew ESBL E coli, treated with bactrim. Leukocytosis following procedure, downtrending. Creatinine now back to baseline. Supplemental oxygen weaned to 2L NC from 5L. Urine cultures collected 12/11 with ESBL E coli. Blood culture collected 12/13 with no growth to date. Urology consulted, recommend 2 week antibiotic course prior to stone removal, procedure scheduled for 12/21/22.       Recommendations   Switch to Ertapenem for two week total antibiotic course. Received first dose of meropenem on 12/11, so stop date will be 12/24/22.   Will need follow up CBC and CMP in 2 weeks. Please fax results to Dr. Leonard at ID Office at [phone]. Patient will not require ID follow up.   Patient will need to follow up blood cultures as outpatient   Encourage incentive spirometry, wean off supplemental oxygen as tolerated  Continue contact precautions while in patient       Thank you for your consult. I will follow-up with patient. Please contact us if you have any additional questions.     Pj Schmidt MD  U IM HO-I  [facility] Infectious Disease  [facility]    History of Present Illness   Tracy Armendariz is a 72 y.o. female with a past medical history of recurrent nephrolithiasis with ESBL E coli s/p stent, CAD, hypertension, hyperlipidemia, CVA, and depression, who presented to [facility] on 12/11/22 for a 1 day history of left flank pain. Patient was also experiencing fever, nausea, and dysuria during this time. CT a/p from 2 days prior to admission showed an  interval increase in a left ureteral stone to 6m from 3mm. UA positive fro blood and >100k WBC. Patient received one dose of ceftriaxone and was then started on Meropenem given her history of ESBL E coli. Urology was consulted and placed a left ureteral stent  and collected urine cultures at that time.      Infectious Disease was consulted for guidance on antibiotic therapy, specifically Bactrim vs Meropenem, in setting of previous ESBL E coli. Urine cultures 2022 grew ESBL E coli sensitive to bactrim. Patient finished course of Bactrim, however did not have resolution of her symptoms.     Interval History     Today the patient was evaluated with  at bedside. She says that she feels better overall. She continues to endorse left sided lower abdominal pain when she moves. She also endorses dysuria unchanged from previous exams. She denies fever, chills, and chest pain. She reports that her stools are now more formed and have decreased in frequency.     Objective:   BP  Min: 103/59  Max: 136/63  Temp  Av.9 °F (37.2 °C)  Min: 97.7 °F (36.5 °C)  Max: 99.9 °F (37.7 °C)  Pulse  Av.5  Min: 73  Max: 83  Resp  Av  Min: 18  Max: 18  SpO2  Av.4 %  Min: 92 %  Max: 98 %  Weight  Av kg (253 lb 8.5 oz)  Min: 115 kg (253 lb 8.5 oz)  Max: 115 kg (253 lb 8.5 oz)      Physical Examination   Physical Exam  Vitals reviewed.   Constitutional:       General: She is not in acute distress.     Appearance: She is not toxic-appearing.   HENT:      Head: Normocephalic and atraumatic.      Mouth/Throat:      Mouth: Mucous membranes are moist.      Pharynx: Oropharynx is clear.   Eyes:      Extraocular Movements: Extraocular movements intact.      Conjunctiva/sclera: Conjunctivae normal.   Cardiovascular:      Rate and Rhythm: Normal rate and regular rhythm.      Pulses: Normal pulses.      Heart sounds: Normal heart sounds. No murmur heard.    No friction rub. No gallop.   Pulmonary:      Breath sounds:  Wheezing present. No rhonchi or rales.      Comments: Poor inspiratory effort.   Abdominal:      Palpations: Abdomen is soft.      Tenderness: There is no guarding.      Comments: LLQ tenderness to palpation   Genitourinary:     Comments: Estrella in place  Musculoskeletal:         General: No swelling or deformity. Normal range of motion.   Skin:     General: Skin is warm and dry.   Neurological:      General: No focal deficit present.      Mental Status: She is alert and oriented to person, place, and time.   Psychiatric:         Mood and Affect: Mood normal.         Behavior: Behavior normal.         Thought Content: Thought content normal.         Laboratory Data Reviewed:  Recent Labs   Lab 12/12/22  0300 12/13/22  0355 12/14/22  0414   WBC 27.84* 24.50* 16.77*   HGB 11.1* 11.0* 11.2*   HCT 34.5* 34.4* 34.1*   * 130* 146*    136 139   K 3.9 4.1 3.9    103 103   CREATININE 1.8* 1.2 0.9   BUN 27* 25* 17   CO2 24 24 25   ALT 20 19 17   AST 29 26 18           Microbiology Data Reviewed:  Microbiology Results (last 7 days)       Procedure Component Value Units Date/Time    Blood culture [[id number]] Collected: 12/13/22 0947    Order Status: Completed Specimen: Blood Updated: 12/14/22 0115     Blood Culture, Routine No Growth to date    Narrative:      Collection has been rescheduled by SAB4 at 12/13/2022 08:27 Reason:   Was asked to return in 15 minutes  Collection has been rescheduled by SAB4 at 12/13/2022 08:27 Reason:   Was asked to return in 15 minutes    Urine Culture High Risk [[id number]]  (Abnormal)  (Susceptibility) Collected: 12/11/22 1218    Order Status: Completed Specimen: Urine, Catheterized Updated: 12/13/22 2134     Urine Culture, Routine ESCHERICHIA COLI ESBL  10,000 - 49,999 cfu/ml      Narrative:      Upper tract urine-urine cx  Indicated criteria for high risk culture:->Other  Other (specify):->surgery    Urine culture [[id number]]  (Abnormal)  (Susceptibility) Collected: 12/11/22  0855    Order Status: Completed Specimen: Urine, Clean Catch Updated: 12/13/22 2120     Urine Culture, Routine ESCHERICHIA COLI ESBL  10,000 - 49,999 cfu/ml      Narrative:      Indicated criteria for high risk culture:->Prior to urologic  procedures    Influenza A & B by Molecular [[id number]] Collected: 12/12/22 0803    Order Status: Completed Specimen: Nasopharyngeal Swab Updated: 12/12/22 0930     Influenza A, Molecular Negative     Influenza B, Molecular Negative     Flu A & B Source Nasal swab    Urine Culture High Risk [[id number]]     Order Status: Canceled Specimen: Urine, Clean Catch               Current Medications   albuterol-ipratropium  3 mL Nebulization Q6H    aspirin  81 mg Oral Daily    atorvastatin  40 mg Oral QHS    buPROPion  300 mg Oral Daily    EScitalopram oxalate  20 mg Oral Daily    furosemide (LASIX) injection  40 mg Intravenous Once    heparin (porcine)  5,000 Units Subcutaneous Q8H    meropenem (MERREM) IVPB  1 g Intravenous Q8H    mupirocin   Nasal BID    pantoprazole  40 mg Oral Daily    tamsulosin  0.4 mg Oral Daily

## 2022-12-14 NOTE — PROCEDURES
"Tracy Armendariz is a 72 y.o. female patient.    Temp: 98.5 °F (36.9 °C) (12/14/22 1615)  Pulse: 70 (12/14/22 1617)  Resp: 18 (12/14/22 1615)  BP: 135/63 (12/14/22 1615)  SpO2: (!) 93 % (12/14/22 1615)  Weight: 115 kg (253 lb 8.5 oz) (12/14/22 0025)  Height: 5' 5" (165.1 cm) (12/11/22 1329)    PICC  Date/Time: 12/14/2022 4:30 PM  Performed by: Kaden Perez RN  Consent Done: Yes  Time out: Immediately prior to procedure a time out was called to verify the correct patient, procedure, equipment, support staff and site/side marked as required  Indications: med administration  Anesthesia: local infiltration  Local anesthetic: lidocaine 1% without epinephrine  Anesthetic Total (mL): 1  Preparation: skin prepped with ChloraPrep  Skin prep agent dried: skin prep agent completely dried prior to procedure  Sterile barriers: all five maximum sterile barriers used - cap, mask, sterile gown, sterile gloves, and large sterile sheet  Hand hygiene: hand hygiene performed prior to central venous catheter insertion  Location details: right basilic  Catheter type: double lumen  Catheter size: 5 Fr  Catheter Length: 37cm    Ultrasound guidance: yes  Vessel Caliber: medium and large and patent, compressibility normal  Needle advanced into vessel with real time Ultrasound guidance.  Guidewire confirmed in vessel.  Sterile sheath used.  Number of attempts: 1  Post-procedure: blood return through all ports, chlorhexidine patch and sterile dressing applied  Estimated blood loss (mL): 0          Name Kaden Perez  12/14/2022    "

## 2022-12-14 NOTE — PROGRESS NOTES
[facility]   Urology  Progress Note    Patient Name: Tracy Armendariz  MRN: [id number]  Admission Date: 12/11/2022  Hospital Length of Stay: 3 days    POD: 3  Following for: left ureteral stent placement    Subjective:     Interval History:   WBC downtrending  Afebrilef ro 24hrs  Urine culture growing e coli      Review of Systems:  A review of 10+ systems was conducted with pertinent positive and negative findings documented in HPI with all other systems reviewed and negative.    Objective:     Vitals:  Temp:  [97.7 °F (36.5 °C)-99.9 °F (37.7 °C)] 97.7 °F (36.5 °C)  Pulse:  [73-84] 73  Resp:  [18-20] 20  SpO2:  [92 %-96 %] 94 %  BP: (112-143)/(59-65) 143/65       I/O:  Intake/Output Summary (Last 24 hours) at 12/14/2022 1233  Last data filed at 12/14/2022 0530  Gross per 24 hour   Intake --   Output 2600 ml   Net -2600 ml        Physical Exam:  GENERAL: patient sitting comfortably  SKIN: warm, dry, well perfused  EXT: no bruising or edema  NEURO: grossly normal with no focal deficits  PSYCH: appropriate mood and affect  BACK: No CVA tenderness  ABDO: non-distended  : Estrella draining clear yellow urine    Significant Labs:  CBC:  Recent Labs   Lab 12/12/22  0300 12/13/22  0355 12/14/22  0414   WBC 27.84* 24.50* 16.77*   HGB 11.1* 11.0* 11.2*   HCT 34.5* 34.4* 34.1*   * 130* 146*       BMP:  Recent Labs   Lab 12/12/22  0300 12/13/22  0355 12/14/22  0414    136 139   K 3.9 4.1 3.9    103 103   CO2 24 24 25   BUN 27* 25* 17   CREATININE 1.8* 1.2 0.9   CALCIUM 8.1* 8.0* 8.4*     Urine Culture:  Lab Results   Component Value Date    LABURIN ESCHERICHIA COLI ESBL  10,000 - 49,999 cfu/ml   (A) 12/11/2022      Imaging:  I have personally reviewed the below imaging and discussed my findings with the patient.     Results for orders placed or performed during the hospital encounter of 12/09/22 (from the past 2160 hour(s))   CT Abdomen Pelvis  Without Contrast    Impression    Similar position of left  proximal ureteral stone, measuring 6 mm on today's exam, previously 3 mm.  Prominence of the upstream renal collecting system with mild hydronephrosis.    Other nonobstructing left renal calculi, colonic diverticulosis, and additional findings as above.      Electronically signed by: Mauro Nuñez  Date:    12/09/2022  Time:    11:27     *Note: Due to a large number of results and/or encounters for the requested time period, some results have not been displayed. A complete set of results can be found in Results Review.     No results found. However, due to the size of the patient record, not all encounters were searched. Please check Results Review for a complete set of results.  X-Ray Chest 1 View  Narrative: EXAMINATION:  XR CHEST 1 VIEW    CLINICAL HISTORY:  increasing oxgen requirement, concern for fluid overload vs other; Tubulo-interstitial nephritis, not specified as acute or chronic    TECHNIQUE:  Single frontal view of the chest was performed.    COMPARISON:  12/11/2022    FINDINGS:  Mediastinal structures are midline.  Stable cardiomediastinal silhouette.    Prominence of the bilateral pulmonary vascular markings with diffuse interstitial attenuation.  Findings may represent pulmonary edema or infection.  No focal alveolar airspace opacity.  No significant pleural effusion or pneumothorax.    Visualized osseous structures appear intact.  Impression: As above.    Electronically signed by: Mauro Ochoa  Date:    12/13/2022  Time:    12:40  X-Ray Abdomen AP 1 View  Narrative: EXAMINATION:  XR ABDOMEN AP 1 VIEW    CLINICAL HISTORY:  Eval stent position; Calculus of kidney    TECHNIQUE:  AP View(s) of the abdomen was performed.    COMPARISON:  None    FINDINGS:  Left-sided ureteral stent identified.  The position alignment is satisfactory.  Nephrolithiasis.  No significant bowel dilatation.  Impression: See above    Electronically signed by: Gwyn Melendez MD  Date:    12/13/2022  Time:    09:40          Urology Specific Assessment:     Left ureteral stone with UTI, pyelonephritis s/p left ureteral stent placement on 12/11, cultures growing GNRs  Leukocytosis, KADIE, Sepsis    Plan:     No further intervention planned during this admission from a urologic standpoint.  Stent appears to be in good position on KUB.  Although her stone is likely colonized at this point in could be a source of recurrent urinary tract infections, her current episode of sepsis is due to the high-grade urinary obstruction and not the stone itself.  The obstruction has been relieved by the stent.  Additionally she became septic after stent placement currently has a leukocytosis and has been febrile and hypotensive.  Attempting to treat this stone without giving her several weeks of antibiotics would induce a high pressure irrigation into the collecting system and could lead to sepsis and a high risk of morbidity and mortality.  Additionally treating the stone at this point could release endotoxin and would also have a high risk of mortality.  Patient should be adequately treated with 2 weeks of antibiotics.  Patient can then follow up with Dr. Zamora as scheduled  Estrella can be removed by primary team when clinically appropriate  Rest of care per primary team    Urology team will sign off    Mitchell Recinos MD  Urology  [facility] [place]

## 2022-12-14 NOTE — PLAN OF CARE
Problem: Adult Inpatient Plan of Care  Goal: Plan of Care Review  Outcome: Ongoing, Progressing  Goal: Patient-Specific Goal (Individualized)  Outcome: Ongoing, Progressing     Problem: Infection (Surgery Nonspecified)  Goal: Absence of Infection Signs and Symptoms  Outcome: Ongoing, Progressing     Problem: Infection  Goal: Absence of Infection Signs and Symptoms  Outcome: Ongoing, Progressing

## 2022-12-14 NOTE — PLAN OF CARE
7482  CM was informed by Dr Ingris Ward that the patient might need IV abx following discharge. ID consulted. [facility] via [facility].     1030  Pt's face sheet, H&P, and  consult faxed to Sara ([phone]) informing of HH & IV abx needed following discharge. Awaiting response.     1120  Patient resting quietly in bed when CM rounded. No family present. Pox 95% on 2L O2 via NC this AM. CM informed the pt of possible need for IV abx following discharge. Pt stated that she has never received IV abx at home in the past. Awaiting ID recs.     Pt informed this CM of stone removal surgery scheduled with Dr Anaya Zamora (urol) on 12/21/2022.    1235  ID note with recommendations for IV ertapenem until 12/24/2022. ID note sent to [facility] via [facility]. Awaiting HH orders & midline placement.       Will continue to follow.

## 2022-12-14 NOTE — PROGRESS NOTES
[facility] nurse educator met with patient and spouse (patient daughter on speaker phone during meeting) to discuss discharge plan for home IVABX. Tracy Armendariz will dc home with family support. Patient/spouse will infuse medication via Elastomeric Pump. Patient and spouse educated on S.A.S.H procedure. Written instruction on S.A.S.H mat provided.  Patient education checklist reviewed and acknowledged by above person(s) and are agreeable to discharge with home infusion plan of care. IV administration process using aspetic technique was reviewed with successful return demonstration. Patient/spouse feels comfortable with infusion. Patient will dc home with Ertapenem 1 gram once per day with an estimated EOC 12/24/22. Patient is pending picc line placement per spouse. No extension tubing will be needed as patients spouse will administer medication. HH is pending set up from [facility] but once set up will follow patient for dressing changes and lab draws. Time allotted for questions. Patients case management team notified teaching has been completed.     Medication delivery will be made to home    Yolande Olivo RN, BSN  Clinical Liaison   [facility]  Cell [phone]  Available M-F 8:30-5pm  Office [phone]  Available 24/7

## 2022-12-14 NOTE — PROGRESS NOTES
[facility] Hospital Medicine Progress Note    Primary Team: [facility] Hospitalist Team B  Attending Physician: Edis Colbert MD  Resident: Ed  Intern: Francesca    Subjective:      No acute events overnight, afebrile. Slept well last night, no further episodes of delirium. Tolerating PO intake without nausea or vomiting. Back pain is improved. Estrella still in place.     Objective:     Last 24 Hour Vital Signs:  BP  Min: 103/59  Max: 136/63  Temp  Av.9 °F (37.2 °C)  Min: 97.7 °F (36.5 °C)  Max: 99.9 °F (37.7 °C)  Pulse  Av.5  Min: 73  Max: 83  Resp  Av  Min: 18  Max: 18  SpO2  Av.4 %  Min: 92 %  Max: 98 %  Weight  Av kg (253 lb 8.5 oz)  Min: 115 kg (253 lb 8.5 oz)  Max: 115 kg (253 lb 8.5 oz)  I/O last 3 completed shifts:  In: 800 [P.O.:800]  Out: 2600 [Urine:2600]    Physical Examination:  Examination  General: Patient sitting comfortably in NAD  Head: normocephalic, atraumatic  Eyes: PERRL, EOMI, no conjunctival injections or icterus  Mouth: MMM, posterior oropharynx without erythema  Cardiac: RRR, no murmurs appreciated, no extra heart sounds  Pulmonary/Chest: crackles in bilateral lung bases, expiratory wheezing, requiring 2L O2 (down from 4)  GI: Soft, nnon distended, moderately tender in suprapubic region and LLQ (improving)  Extremities: no edema, clubbing, or cyanosis  Skin: dry, warm, intact. No bruising or rashes.  Neuro: Alert and oriented, moving all extremities equally        Laboratory:  Laboratory Data   Recent Labs   Lab 22  0741 22  0300 22  0355 22  0414   WBC 12.53 27.84* 24.50* 16.77*   HGB 13.6 11.1* 11.0* 11.2*   HCT 41.8 34.5* 34.4* 34.1*   * 138* 130* 146*   MCV 95 98 94 94    137 136 139   K 3.2* 3.9 4.1 3.9    102 103 103   CO2 21* 24 24 25   BUN 17 27* 25* 17   CREATININE 1.0 1.8* 1.2 0.9   * 128* 138* 149*   CALCIUM 8.5* 8.1* 8.0* 8.4*   PROT 6.1 5.7* 5.4* 5.7*   ALBUMIN 3.2* 2.6* 2.6* 2.6*   MG  --  1.3* 1.9 1.8   AST 40 29  26 18   ALT 28 20 19 17   ALKPHOS 76 57 96 110         Microbiology Data   Urine cultures from 10/11 and 11/17 growing multi drug resistant E. Coli   Ucx 12/11 growing E. Coli ESBL      Radiology Data  CTAP 11/10/2022  Kidneys are normal in size and location.  3 mm calculus within the proximal left ureter.  Mild prominence of left renal collecting system without overt hydronephrosis     CTAP 12/9/2022  Similar position of left proximal ureteral stone, measuring 6 mm on today's exam, previously 3 mm.  Prominence of the upstream renal collecting system with mild hydronephrosis.       Current Medications:     Infusions:         Scheduled:   albuterol-ipratropium  3 mL Nebulization Q6H    aspirin  81 mg Oral Daily    atorvastatin  40 mg Oral QHS    buPROPion  300 mg Oral Daily    EScitalopram oxalate  20 mg Oral Daily    furosemide (LASIX) injection  40 mg Intravenous Once    heparin (porcine)  5,000 Units Subcutaneous Q8H    meropenem (MERREM) IVPB  1 g Intravenous Q8H    mupirocin   Nasal BID    pantoprazole  40 mg Oral Daily    tamsulosin  0.4 mg Oral Daily        PRN:  acetaminophen, albuterol, influenza 65up-adj, ketorolac, melatonin, ondansetron, prochlorperazine, sodium chloride 0.9%    Antibiotics and Day Number of Therapy:  Meropenem 12/11-current       Assessment:     Tracy Armendariz is a 71 year old female with a past history of HTN, HLD, CVA in 2013, and nephrolithiasis presenting to [facility] 12/11/22 with a chief complaint of L flank pain and fever for 1 day. Patient admitted to [facility] medicine for pyelonephritis in the setting of obstructive kidney stone.      Plan:     Pyelonephritis 2/2 nephrolithiasis   - patient presenting with fever, dysuria, urgency, and CVA tenderness for 1 day   - CTAP from 12/9 shows interval increase in ureteral stone from 3 mm to 6 mm with mild hydronephrosis    - UA positive for blood and >100k WBCs, culture pending   - received morphine and Toradol in the ED for pain   -  follows with urology for chronic nephrolithiasis   - s/p L ureter stent placement with urology   - Ucx in the past grew multi drug resistant E. Coli, started on meropenem   - continue home flomax  - urine culture positive for ESBL e coli, sensitive to meropenem and bactrim   - will either go home with IV ed or PO bactrim pending ID recs     Acute hypoxic respiratory failure   - new oxygen requirement since admission, currently requiring 4L NC, desats to low 80s on room air   - crackles and wheezes on exam   - CXR improved after dose of lasix   - will get another dose of lasix this morning   - has been weaned down to 2L NC  - duo nebs scheduled q6 for one day      HTN   - pressure controlled on presentation   - holding home losartan in the setting of hypotension      Prior CVA  - continue ASA and statin      Depression   - continue home wellbutrin and lexapro      Asthma   - only needs rescue inhaler roughly once per week, sometimes less   - PRN albuterol   - duo nebs scheduled       Code Status: Full   DVT Prophylaxis: heparin   Diet: Regular   Disposition: pending urine cultures       Yaquelin Ma MD   U Neurology Resident, [facility]    [facility] Medicine Hospitalist Pager numbers:   U Hospitalist Medicine Team A (Zachary/Laurel): [phone]  U Hospitalist Medicine Team B (Rachel/Sayra):  [phone]

## 2022-12-15 ENCOUNTER — PATIENT OUTREACH (OUTPATIENT)
Dept: ADMINISTRATIVE | Facility: OTHER | Age: 72
End: 2022-12-15
Payer: MEDICARE

## 2022-12-15 VITALS
TEMPERATURE: 97 F | HEIGHT: 65 IN | BODY MASS INDEX: 42.24 KG/M2 | OXYGEN SATURATION: 95 % | DIASTOLIC BLOOD PRESSURE: 67 MMHG | RESPIRATION RATE: 19 BRPM | HEART RATE: 68 BPM | SYSTOLIC BLOOD PRESSURE: 138 MMHG | WEIGHT: 253.5 LBS

## 2022-12-15 LAB
ALBUMIN SERPL BCP-MCNC: 2.5 G/DL (ref 3.5–5.2)
ALP SERPL-CCNC: 97 U/L (ref 55–135)
ALT SERPL W/O P-5'-P-CCNC: 11 U/L (ref 10–44)
ANION GAP SERPL CALC-SCNC: 9 MMOL/L (ref 8–16)
AST SERPL-CCNC: 15 U/L (ref 10–40)
BASOPHILS # BLD AUTO: 0.08 K/UL (ref 0–0.2)
BASOPHILS NFR BLD: 0.7 % (ref 0–1.9)
BILIRUB SERPL-MCNC: 0.4 MG/DL (ref 0.1–1)
BUN SERPL-MCNC: 12 MG/DL (ref 8–23)
CALCIUM SERPL-MCNC: 9.2 MG/DL (ref 8.7–10.5)
CHLORIDE SERPL-SCNC: 99 MMOL/L (ref 95–110)
CO2 SERPL-SCNC: 30 MMOL/L (ref 23–29)
CREAT SERPL-MCNC: 0.8 MG/DL (ref 0.5–1.4)
DIFFERENTIAL METHOD: ABNORMAL
EOSINOPHIL # BLD AUTO: 0.5 K/UL (ref 0–0.5)
EOSINOPHIL NFR BLD: 4.6 % (ref 0–8)
ERYTHROCYTE [DISTWIDTH] IN BLOOD BY AUTOMATED COUNT: 12.7 % (ref 11.5–14.5)
EST. GFR  (NO RACE VARIABLE): >60 ML/MIN/1.73 M^2
GLUCOSE SERPL-MCNC: 132 MG/DL (ref 70–110)
HCT VFR BLD AUTO: 34 % (ref 37–48.5)
HGB BLD-MCNC: 10.9 G/DL (ref 12–16)
IMM GRANULOCYTES # BLD AUTO: 0.05 K/UL (ref 0–0.04)
IMM GRANULOCYTES NFR BLD AUTO: 0.4 % (ref 0–0.5)
LYMPHOCYTES # BLD AUTO: 2.2 K/UL (ref 1–4.8)
LYMPHOCYTES NFR BLD: 20 % (ref 18–48)
MAGNESIUM SERPL-MCNC: 1.7 MG/DL (ref 1.6–2.6)
MCH RBC QN AUTO: 30.8 PG (ref 27–31)
MCHC RBC AUTO-ENTMCNC: 32.1 G/DL (ref 32–36)
MCV RBC AUTO: 96 FL (ref 82–98)
MONOCYTES # BLD AUTO: 1.5 K/UL (ref 0.3–1)
MONOCYTES NFR BLD: 13.4 % (ref 4–15)
NEUTROPHILS # BLD AUTO: 6.8 K/UL (ref 1.8–7.7)
NEUTROPHILS NFR BLD: 60.9 % (ref 38–73)
NRBC BLD-RTO: 0 /100 WBC
PLATELET # BLD AUTO: 162 K/UL (ref 150–450)
PMV BLD AUTO: 11.8 FL (ref 9.2–12.9)
POTASSIUM SERPL-SCNC: 3.8 MMOL/L (ref 3.5–5.1)
PROT SERPL-MCNC: 6.4 G/DL (ref 6–8.4)
RBC # BLD AUTO: 3.54 M/UL (ref 4–5.4)
SODIUM SERPL-SCNC: 138 MMOL/L (ref 136–145)
WBC # BLD AUTO: 11.12 K/UL (ref 3.9–12.7)

## 2022-12-15 PROCEDURE — 25000003 PHARM REV CODE 250: Performed by: STUDENT IN AN ORGANIZED HEALTH CARE EDUCATION/TRAINING PROGRAM

## 2022-12-15 PROCEDURE — 94640 AIRWAY INHALATION TREATMENT: CPT

## 2022-12-15 PROCEDURE — 63600175 PHARM REV CODE 636 W HCPCS: Performed by: STUDENT IN AN ORGANIZED HEALTH CARE EDUCATION/TRAINING PROGRAM

## 2022-12-15 PROCEDURE — 25000242 PHARM REV CODE 250 ALT 637 W/ HCPCS

## 2022-12-15 PROCEDURE — 80053 COMPREHEN METABOLIC PANEL: CPT | Performed by: STUDENT IN AN ORGANIZED HEALTH CARE EDUCATION/TRAINING PROGRAM

## 2022-12-15 PROCEDURE — 85025 COMPLETE CBC W/AUTO DIFF WBC: CPT | Performed by: STUDENT IN AN ORGANIZED HEALTH CARE EDUCATION/TRAINING PROGRAM

## 2022-12-15 PROCEDURE — 94761 N-INVAS EAR/PLS OXIMETRY MLT: CPT

## 2022-12-15 PROCEDURE — 36415 COLL VENOUS BLD VENIPUNCTURE: CPT | Performed by: INTERNAL MEDICINE

## 2022-12-15 PROCEDURE — 83735 ASSAY OF MAGNESIUM: CPT | Performed by: STUDENT IN AN ORGANIZED HEALTH CARE EDUCATION/TRAINING PROGRAM

## 2022-12-15 PROCEDURE — 87040 BLOOD CULTURE FOR BACTERIA: CPT | Performed by: INTERNAL MEDICINE

## 2022-12-15 PROCEDURE — 99900035 HC TECH TIME PER 15 MIN (STAT)

## 2022-12-15 PROCEDURE — 25000003 PHARM REV CODE 250: Performed by: INTERNAL MEDICINE

## 2022-12-15 PROCEDURE — A4216 STERILE WATER/SALINE, 10 ML: HCPCS | Performed by: INTERNAL MEDICINE

## 2022-12-15 RX ADMIN — HEPARIN SODIUM 5000 UNITS: 5000 INJECTION INTRAVENOUS; SUBCUTANEOUS at 01:12

## 2022-12-15 RX ADMIN — FUROSEMIDE 40 MG: 10 INJECTION, SOLUTION INTRAMUSCULAR; INTRAVENOUS at 06:12

## 2022-12-15 RX ADMIN — Medication 10 ML: at 12:12

## 2022-12-15 RX ADMIN — Medication 10 ML: at 05:12

## 2022-12-15 RX ADMIN — ERTAPENEM 1 G: 1 INJECTION INTRAMUSCULAR; INTRAVENOUS at 12:12

## 2022-12-15 RX ADMIN — LOSARTAN POTASSIUM 50 MG: 50 TABLET, FILM COATED ORAL at 10:12

## 2022-12-15 RX ADMIN — ACETAMINOPHEN 1000 MG: 500 TABLET ORAL at 10:12

## 2022-12-15 RX ADMIN — MUPIROCIN: 20 OINTMENT TOPICAL at 10:12

## 2022-12-15 RX ADMIN — IPRATROPIUM BROMIDE AND ALBUTEROL SULFATE 3 ML: .5; 3 SOLUTION RESPIRATORY (INHALATION) at 12:12

## 2022-12-15 RX ADMIN — ASPIRIN 81 MG CHEWABLE TABLET 81 MG: 81 TABLET CHEWABLE at 10:12

## 2022-12-15 RX ADMIN — ESCITALOPRAM OXALATE 20 MG: 10 TABLET ORAL at 10:12

## 2022-12-15 RX ADMIN — PANTOPRAZOLE SODIUM 40 MG: 40 TABLET, DELAYED RELEASE ORAL at 10:12

## 2022-12-15 RX ADMIN — TAMSULOSIN HYDROCHLORIDE 0.4 MG: 0.4 CAPSULE ORAL at 10:12

## 2022-12-15 RX ADMIN — BUPROPION HYDROCHLORIDE 300 MG: 150 TABLET, EXTENDED RELEASE ORAL at 10:12

## 2022-12-15 RX ADMIN — HEPARIN SODIUM 5000 UNITS: 5000 INJECTION INTRAVENOUS; SUBCUTANEOUS at 05:12

## 2022-12-15 NOTE — PLAN OF CARE
Pt on room air at 92 %  Patient given nebulized treatment. Patient instructed on proper breathing technique, along with benefits of therapy.  Pt refused CPAP

## 2022-12-15 NOTE — PLAN OF CARE
VN note: VN completed AVS and attachments and notified bedside nurseFazal. Will cont to be available and intervene prn.

## 2022-12-15 NOTE — PROGRESS NOTES
[facility] nurse educator met with patient and spouse again. Patients spouse verbally reviewed with me the process of IV abx administration that he was educated on yesterday. He retained the information well. Called report to Maria Teresa at [facility]. She states patient is on the schedule to be see tomorrow by the  nurse. Informed them that dose is due around 1pm. She stated to instruct the patient when  nurse calls to schedule to let her/him know that so they can be out around that time to help with first home dose. Instructed patient  of above. He verbalized understanding.     Yolande Olivo, RN, BSN  Clinical Liaison   [facility]  Cell [phone]  Available M-F 8:30-5pm  Office [phone]  Available 24/7

## 2022-12-15 NOTE — PROGRESS NOTES
[facility] Medicine Progress Note    Primary Team: [facility] Hospitalist Team B  Attending Physician: Edis Colbert MD  Resident: Ed  Intern: Francesca    Subjective:      Reports feeling overall well and ready to go home. Good urine output since jackson removal. Reports shortness of breath is much improved. Denies chest pain, abdominal pain, and flank pain.     Objective:     Last 24 Hour Vital Signs:  BP  Min: 118/56  Max: 136/63  Temp  Av °F (37.2 °C)  Min: 96.3 °F (35.7 °C)  Max: 100.9 °F (38.3 °C)  Pulse  Av.2  Min: 70  Max: 81  Resp  Av.4  Min: 18  Max: 20  SpO2  Av.3 %  Min: 92 %  Max: 96 %  I/O last 3 completed shifts:  In: 200 [P.O.:200]  Out: 8330 [Urine:8330]    Physical Examination:  Examination  General: Patient sitting comfortably in NAD  Head: normocephalic, atraumatic  Eyes: PERRL, EOMI, no conjunctival injections or icterus  Mouth: MMM, posterior oropharynx without erythema  Cardiac: RRR, no murmurs appreciated, no extra heart sounds  Pulmonary/Chest: crackles in bilateral lung bases, expiratory wheezing, requiring 2L O2 (down from 4)  GI: Soft, nontender, nondistended   Extremities: no edema, clubbing, or cyanosis  Skin: dry, warm, intact. No bruising or rashes.  Neuro: Alert and oriented, moving all extremities equally        Laboratory:  Laboratory Data   Recent Labs   Lab 22  0300 22  0355 22  0414 12/15/22  0512   WBC 27.84* 24.50* 16.77* 11.12   HGB 11.1* 11.0* 11.2* 10.9*   HCT 34.5* 34.4* 34.1* 34.0*   * 130* 146* 162   MCV 98 94 94 96    136 139 138   K 3.9 4.1 3.9 3.8    103 103 99   CO2 24 24 25 30*   BUN 27* 25* 17 12   CREATININE 1.8* 1.2 0.9 0.8   * 138* 149* 132*   CALCIUM 8.1* 8.0* 8.4* 9.2   PROT 5.7* 5.4* 5.7* 6.4   ALBUMIN 2.6* 2.6* 2.6* 2.5*   MG 1.3* 1.9 1.8 1.7   AST 29 26 18 15   ALT 20 19 17 11   ALKPHOS 57 96 110 97         Microbiology Data   Urine cultures from 10/11 and  growing multi drug resistant E.  Coli   Ucx 12/11 growing E. Coli ESBL      Radiology Data  CTAP 11/10/2022  Kidneys are normal in size and location.  3 mm calculus within the proximal left ureter.  Mild prominence of left renal collecting system without overt hydronephrosis     CTAP 12/9/2022  Similar position of left proximal ureteral stone, measuring 6 mm on today's exam, previously 3 mm.  Prominence of the upstream renal collecting system with mild hydronephrosis.       Current Medications:     Infusions:         Scheduled:   aspirin  81 mg Oral Daily    atorvastatin  40 mg Oral QHS    buPROPion  300 mg Oral Daily    ertapenem (INVANZ) IVPB  1 g Intravenous Q24H    EScitalopram oxalate  20 mg Oral Daily    heparin (porcine)  5,000 Units Subcutaneous Q8H    losartan  50 mg Oral Daily    mupirocin   Nasal BID    pantoprazole  40 mg Oral Daily    sodium chloride 0.9%  10 mL Intravenous Q6H    tamsulosin  0.4 mg Oral Daily        PRN:  albuterol, influenza 65up-adj, ketorolac, melatonin, ondansetron, prochlorperazine, sodium chloride 0.9%, Flushing PICC Protocol **AND** sodium chloride 0.9% **AND** sodium chloride 0.9%    Antibiotics and Day Number of Therapy:  Meropenem 12/11-current       Assessment:     Tracy Armendariz is a 71 year old female with a past history of HTN, HLD, CVA in 2013, and nephrolithiasis presenting to [facility] 12/11/22 with a chief complaint of L flank pain and fever for 1 day. Patient admitted to [facility] medicine for pyelonephritis in the setting of obstructive kidney stone.      Plan:     Pyelonephritis 2/2 nephrolithiasis   - patient presenting with fever, dysuria, urgency, and CVA tenderness for 1 day   - CTAP from 12/9 shows interval increase in ureteral stone from 3 mm to 6 mm with mild hydronephrosis    - UA positive for blood and >100k WBCs, culture pending   - received morphine and Toradol in the ED for pain   - follows with urology for chronic nephrolithiasis   - s/p L ureter stent placement with urology   - Ucx in  the past grew multi drug resistant E. Coli, started on meropenem   - continue home flomax  - urine culture positive for ESBL e coli, sensitive to meropenem, ertapenem, and bactrim   - received PICC 12/14, will go home with 1 g of ertapenem daily, completion of course on 12/24     Acute hypoxic respiratory failure   - new oxygen requirement since admission, currently requiring 4L NC, desats to low 80s on room air   - crackles and wheezes on exam   - CXR improved after dose of lasix   - will get another dose of lasix this morning   - duo nebs scheduled q6 for one day   - continue weaning oxygen as tolerated      HTN   - pressure controlled on presentation   - holding home losartan in the setting of hypotension      Prior CVA  - continue ASA and statin      Depression   - continue home wellbutrin and lexapro      Asthma   - only needs rescue inhaler roughly once per week, sometimes less   - PRN albuterol   - duo nebs scheduled       Code Status: Full   DVT Prophylaxis: heparin   Diet: Regular   Disposition: pending urine cultures       Yaquelin Ma MD   U Neurology Resident, -I    [facility] Medicine Hospitalist Pager numbers:   U Hospitalist Medicine Team A (Zachary/Laurel): [phone]  [facility] Hospitalist Medicine Team B (Rachel/Sayra):  [phone]

## 2022-12-15 NOTE — PLAN OF CARE
"0715  HH orders manually faxed to Sara ([phone]) w/N for HH assignment. RUE PICC placed yesterday. Pt & spouse received IV abx administration education from [facility] yesterday.     PICC line placement documentation & HH orders sent to [facility].      Awaiting HH assignment from [facility].    0835  CM was informed by [facility] that [facility] will provide services. Information added to the pt's discharge paperwork. Message sent to [facility] informing of above.    0950  Message sent to nurse Fazal, virtual nurse Karen, & pharmacist informing that the pt will dc home today & requesting that the scheduled invanz dose at 1345 be given earlier. Awaiting response.     0955  CM was informed by Aleks that the abx has been rescheduled for 1100 & by Fazal that the pt has a temp 100.9 this AM. Message sent to Dr Ingris Ward informing of above.     1050  CM was informed by Dr Schmidt (ID) that the pt will not need an ID hospfu appt following discharge.     1225  Patient awake & alert sitting on edge of bed eating lunch with spouse, Fransisco Armendariz ([phone]), at the bedside when CM rounded. CM informed both that [facility] has been assigned by [facility] & that [facility] will provide IV abx. RUE PICC secure with dressing intact. CM was informed by [facility]  that she is at [facility] & will see the pt shortly.     CM informed pt & spouse of temp 100.9 this AM, that the MD "will monitor fever curve through the afternoon and if it goes down can still go home today". Both verbalized understanding & agreement with the discharge plan. Spouse stated that he will provide transportation at time of discharge & pt verbalized understanding regarding the hospfu appts with Dr Camara & Dr Zamora.       Will continue to follow.   "

## 2022-12-15 NOTE — DISCHARGE SUMMARY
[facility] Hospital Medicine Discharge Summary    Primary Team: [facility] Hospitalist Team B  Attending Physician: Edis Colbert MD  Resident: Ed  Intern: Francesca    Date of Admit: 12/11/2022  Date of Discharge: 12/15/2022    Discharge to: Home   Condition: Stable     Discharge Diagnoses     Patient Active Problem List   Diagnosis    Hypertension    Depression    Extrinsic asthma    PUD (peptic ulcer disease)    Cerebral infarction    SOB (shortness of breath) on exertion    Hyperlipidemia    Severe obesity (BMI 35.0-39.9) with comorbidity    MARTIN (obstructive sleep apnea)    Cerebral embolism without mention of cerebral infarction    Left atrial enlargement    Parinaud's syndrome affecting both eyes    NICK (internuclear ophthalmoplegia)    Benign lesion of lacrimal duct    Visual floaters    Essential hypertension    Symptomatic posterior vitreous detachment of both eyes    Osteopenia    Osteoarthritis of right knee    Arthritis of right knee    Kidney stone    Atherosclerosis of aorta    Acute bilateral ankle pain    Impaired functional mobility and activity tolerance    Closed fracture of right wrist    History of CVA (cerebrovascular accident)    Nasal septal deviation    Tinnitus of both ears    Ataxia    Vertigo    Eye fatigue    Dizziness    Impairment of balance    Peripheral vertigo involving right ear    Conductive hearing loss of right ear with restricted hearing of left ear    At moderate risk for fall    Diplopia    Pyelonephritis    Infective urethritis    Left ureteral stone    ESBL (extended spectrum beta-lactamase) producing bacteria infection       Consultants and Procedures     Consultants:  Urology, ID     Procedures:   Ureteral stent placement, PICC line       Brief History of Present Illness      Tracy Armendariz is a 71 year old female with a past history of HTN, HLD, CVA in 2013, and nephrolithiasis presenting to [facility] 12/11/22 with a chief complaint of L flank pain and fever for 1 day.       Patient reports fever, dysuria, urgency, CVA tenderness, and nausea for the last day. She has known kidney stone in L ureter. Recently, CTAP from 12/9/22 shows interval increase in ureteral stone from 3 to 6 mm and hydronephrosis (prior CTAP 11/10/22). Patient follows with urology and has a long history of nephrolithiasis, required stent placement in 2018 for obstruction. She denies chest pain, shortness of breath. Reports some lower abdominal tenderness.     Hospital Course By Problem with Pertinent Findings     Pyelonephritis 2/2 nephrolithiasis   - patient presenting with fever, dysuria, urgency, and CVA tenderness for 1 day   - CTAP from 12/9 shows interval increase in ureteral stone from 3 mm to 6 mm with mild hydronephrosis    - UA positive for blood and >100k WBCs, culture pending   - received morphine and Toradol in the ED for pain   - follows with urology for chronic nephrolithiasis   - s/p L ureter stent placement with urology   - Ucx in the past grew multi drug resistant E. Coli, started on meropenem   - continue home flomax  - urine culture positive for ESBL e coli, sensitive to meropenem, ertapenem, and bactrim   - received PICC 12/14, will go home with 1 g of ertapenem daily, completion of course on 12/24      Acute hypoxic respiratory failure   - new oxygen requirement since admission, currently requiring 4L NC, desats to low 80s on room air   - crackles and wheezes on exam   - CXR improved after dose of lasix   - will get another dose of lasix this morning   - duo nebs scheduled q6 for one day   - continue weaning oxygen as tolerated      HTN   - pressure controlled on presentation   - holding home losartan in the setting of hypotension      Prior CVA  - continue ASA and statin      Depression   - continue home wellbutrin and lexapro      Asthma   - only needs rescue inhaler roughly once per week, sometimes less   - PRN albuterol   - duo nebs scheduled       Discharge Medications        Medication  List        START taking these medications      sodium chloride 0.9% SolP 100 mL with ertapenem 1 gram SolR 1 g  Inject 1 g into the vein once daily. for 9 days  Start taking on: December 16, 2022            CHANGE how you take these medications      EScitalopram oxalate 20 MG tablet  Commonly known as: LEXAPRO  TAKE 1 TABLET(20 MG) BY MOUTH EVERY DAY  What changed: when to take this            CONTINUE taking these medications      acetaminophen 500 MG tablet  Commonly known as: TYLENOL     albuterol 90 mcg/actuation inhaler  Commonly known as: PROVENTIL/VENTOLIN HFA  INHALE 2 PUFFS EVERY 6 HOURS AS NEEDED FOR WHEEZING     aspirin 81 MG Chew     atorvastatin 40 MG tablet  Commonly known as: LIPITOR  Take 1 tablet (40 mg total) by mouth every evening.     buPROPion 300 MG 24 hr tablet  Commonly known as: WELLBUTRIN XL  Take 1 tablet (300 mg total) by mouth once daily.     calcium carbonate 600 mg calcium (1,500 mg) Tab  Commonly known as: OS-SPENCER     cholecalciferol (vitamin D3) 25 mcg (1,000 unit) Chew     diazePAM 2 MG tablet  Commonly known as: VALIUM  Take 1/2 to 1 tablet 3 times daily as needed to control dizziness     diclofenac sodium 1 % Gel  Commonly known as: VOLTAREN  APPLY 2-4 GRAMS TO EACH PAINFUL AREA FOUR TIMES DAILY - MAX 32 GRAMS/DAY     esomeprazole 40 MG capsule  Commonly known as: NEXIUM  Take 1 capsule (40 mg total) by mouth daily as needed (heartburn).     FLUAD 6070-8789 (65 YR UP)(PF) 45 mcg (15 mcg x 3)/0.5 mL Syrg  Generic drug: flu vac 2019 65up-swrNI80C(PF)     losartan 100 MG tablet  Commonly known as: COZAAR  TAKE 1 TABLET(100 MG) BY MOUTH EVERY DAY     PROBIOTIC ORAL     tamsulosin 0.4 mg Cap  Commonly known as: FLOMAX  Take 1 capsule (0.4 mg total) by mouth once daily.     WOMEN'S DAILY MULTIVITAMIN ORAL            STOP taking these medications      meclizine 12.5 mg tablet  Commonly known as: ANTIVERT               Where to Get Your Medications        You can get these medications  from any pharmacy    Bring a paper prescription for each of these medications  sodium chloride 0.9% SolP 100 mL with ertapenem 1 gram SolR 1 g          Discharge Information:   Diet:  Regular     Physical Activity:  As tolerated             Instructions:  1. Take all medications as prescribed  2. Keep all follow-up appointments  3. Return to the hospital or call your primary care physicians if any worsening symptoms such as fever, chest pain, shortness of breath, dysuria, flank pain, return of symptoms, or any other concerns.    Follow-Up Appointments:  Follow up with urology 12/19 and 12/21      Yaquelin Martino MD  [facility] Internal Medicine, [facility]

## 2022-12-15 NOTE — PLAN OF CARE
Problem: Adult Inpatient Plan of Care  Goal: Plan of Care Review  Outcome: Ongoing, Progressing  FC removed at 12am. Pt voided to approx 350 ml urine. Pt not in distress. Safety maintained at all times. Call bell at hand. Will continue to monitor.

## 2022-12-16 ENCOUNTER — LAB VISIT (OUTPATIENT)
Dept: LAB | Facility: HOSPITAL | Age: 72
End: 2022-12-16
Attending: STUDENT IN AN ORGANIZED HEALTH CARE EDUCATION/TRAINING PROGRAM
Payer: MEDICARE

## 2022-12-16 DIAGNOSIS — R30.0 DYSURIA: ICD-10-CM

## 2022-12-16 PROCEDURE — 87086 URINE CULTURE/COLONY COUNT: CPT | Performed by: STUDENT IN AN ORGANIZED HEALTH CARE EDUCATION/TRAINING PROGRAM

## 2022-12-16 PROCEDURE — G0180 MD CERTIFICATION HHA PATIENT: HCPCS | Mod: ,,, | Performed by: FAMILY MEDICINE

## 2022-12-16 PROCEDURE — G0180 PR HOME HEALTH MD CERTIFICATION: ICD-10-PCS | Mod: ,,, | Performed by: FAMILY MEDICINE

## 2022-12-16 NOTE — PLAN OF CARE
[place] - Telemetry  Discharge Final Note    Primary Care Provider: Bartolo Jules MD    Expected Discharge Date: 12/15/2022    Final Discharge Note (most recent)       Final Note - 12/16/22 0745          Final Note    Assessment Type Final Discharge Note (P)      Anticipated Discharge Disposition Home-Health Care Svc (P)    [facility]    Hospital Resources/Appts/Education Provided Appointments scheduled and added to AVS (P)         Post-Acute Status    Post-Acute Authorization Home Health;IV Infusion (P)      Home Health Status Set-up Complete/Auth obtained (P)    [facility]     [facility] Infusion Status Set-up Complete/Auth obtained (P)    [facility]                    Important Message from Medicare  Important Message from Medicare regarding Discharge Appeal Rights: Given to patient/caregiver, Explained to patient/caregiver, Signed/date by patient/caregiver     Date IMM was signed: 12/15/22  Time IMM was signed: 1214    Contact Info       Anaya Zamora MD   Specialty: Urology    [address]  [address]  [place] [address]   Phone: [phone]       Next Steps: Follow up on 12/19/2022    Instructions: at 11:00 AM; urology hospital follow up appointment    Angie Camara MD   Specialty: Internal Medicine    [address]  [address]  [place] [address]   Phone: [phone]       Next Steps: Follow up on 12/20/2022    Instructions: at 1:30pm; hospital follow up appointment in the [facility]    [place]  [facility]   Specialty: Urology    [address]  [place] [address]   Phone: [phone]       Next Steps: Follow up on 12/21/2022    Instructions: at 1403; scheduled urology surgery    Javon Philippe DO   Specialty: Neurology    [address]  [address]  [place] [address]   Phone: [phone]       Next Steps: Follow up on 12/30/2022    Instructions: at 9:20 AM; previously scheduled neurology appointment    [facility] Jose Antonio Matute   Specialty:  Home Health Services    [address]  [address]  [place] [address]   Phone: [phone]       Next Steps: Follow up on 12/16/2022    Instructions: will provide home health services

## 2022-12-16 NOTE — NURSING
VN note: Care plan, orders and labs reviewed.  AVS and educational attachments shared with patient and  via [employer]. Discussed thoroughly. Patient and  verbalized clear understanding using teach back method. Notified bedside nurse of completion of education. At present no distress noted. Patient to be discharged via w/c with escort service and family with all of patient's belonings. Will cont to be available to patient and family and intervene prn.

## 2022-12-17 LAB
BACTERIA UR CULT: NORMAL
BACTERIA UR CULT: NORMAL

## 2022-12-18 LAB — BACTERIA BLD CULT: NORMAL

## 2022-12-19 ENCOUNTER — OFFICE VISIT (OUTPATIENT)
Dept: UROLOGY | Facility: CLINIC | Age: 72
End: 2022-12-19
Payer: MEDICARE

## 2022-12-19 VITALS
HEART RATE: 74 BPM | WEIGHT: 234.56 LBS | SYSTOLIC BLOOD PRESSURE: 132 MMHG | DIASTOLIC BLOOD PRESSURE: 53 MMHG | BODY MASS INDEX: 39.08 KG/M2 | TEMPERATURE: 97 F | HEIGHT: 65 IN

## 2022-12-19 DIAGNOSIS — N12 PYELONEPHRITIS: ICD-10-CM

## 2022-12-19 DIAGNOSIS — N20.0 KIDNEY STONE: Primary | ICD-10-CM

## 2022-12-19 DIAGNOSIS — R10.9 FLANK PAIN: ICD-10-CM

## 2022-12-19 DIAGNOSIS — N20.0 NEPHROLITHIASIS: ICD-10-CM

## 2022-12-19 DIAGNOSIS — N13.30 HYDRONEPHROSIS, UNSPECIFIED HYDRONEPHROSIS TYPE: ICD-10-CM

## 2022-12-19 PROCEDURE — 4010F ACE/ARB THERAPY RXD/TAKEN: CPT | Mod: CPTII,S$GLB,, | Performed by: STUDENT IN AN ORGANIZED HEALTH CARE EDUCATION/TRAINING PROGRAM

## 2022-12-19 PROCEDURE — 99999 PR PBB SHADOW E&M-EST. PATIENT-LVL IV: CPT | Mod: PBBFAC,,, | Performed by: STUDENT IN AN ORGANIZED HEALTH CARE EDUCATION/TRAINING PROGRAM

## 2022-12-19 PROCEDURE — 1159F PR MEDICATION LIST DOCUMENTED IN MEDICAL RECORD: ICD-10-PCS | Mod: CPTII,S$GLB,, | Performed by: STUDENT IN AN ORGANIZED HEALTH CARE EDUCATION/TRAINING PROGRAM

## 2022-12-19 PROCEDURE — 3078F PR MOST RECENT DIASTOLIC BLOOD PRESSURE < 80 MM HG: ICD-10-PCS | Mod: CPTII,S$GLB,, | Performed by: STUDENT IN AN ORGANIZED HEALTH CARE EDUCATION/TRAINING PROGRAM

## 2022-12-19 PROCEDURE — 1111F PR DISCHARGE MEDS RECONCILED W/ CURRENT OUTPATIENT MED LIST: ICD-10-PCS | Mod: CPTII,S$GLB,, | Performed by: STUDENT IN AN ORGANIZED HEALTH CARE EDUCATION/TRAINING PROGRAM

## 2022-12-19 PROCEDURE — 99999 PR PBB SHADOW E&M-EST. PATIENT-LVL IV: ICD-10-PCS | Mod: PBBFAC,,, | Performed by: STUDENT IN AN ORGANIZED HEALTH CARE EDUCATION/TRAINING PROGRAM

## 2022-12-19 PROCEDURE — 3044F HG A1C LEVEL LT 7.0%: CPT | Mod: CPTII,S$GLB,, | Performed by: STUDENT IN AN ORGANIZED HEALTH CARE EDUCATION/TRAINING PROGRAM

## 2022-12-19 PROCEDURE — 1101F PT FALLS ASSESS-DOCD LE1/YR: CPT | Mod: CPTII,S$GLB,, | Performed by: STUDENT IN AN ORGANIZED HEALTH CARE EDUCATION/TRAINING PROGRAM

## 2022-12-19 PROCEDURE — 1101F PR PT FALLS ASSESS DOC 0-1 FALLS W/OUT INJ PAST YR: ICD-10-PCS | Mod: CPTII,S$GLB,, | Performed by: STUDENT IN AN ORGANIZED HEALTH CARE EDUCATION/TRAINING PROGRAM

## 2022-12-19 PROCEDURE — 3078F DIAST BP <80 MM HG: CPT | Mod: CPTII,S$GLB,, | Performed by: STUDENT IN AN ORGANIZED HEALTH CARE EDUCATION/TRAINING PROGRAM

## 2022-12-19 PROCEDURE — 99215 PR OFFICE/OUTPT VISIT, EST, LEVL V, 40-54 MIN: ICD-10-PCS | Mod: S$GLB,,, | Performed by: STUDENT IN AN ORGANIZED HEALTH CARE EDUCATION/TRAINING PROGRAM

## 2022-12-19 PROCEDURE — 3075F PR MOST RECENT SYSTOLIC BLOOD PRESS GE 130-139MM HG: ICD-10-PCS | Mod: CPTII,S$GLB,, | Performed by: STUDENT IN AN ORGANIZED HEALTH CARE EDUCATION/TRAINING PROGRAM

## 2022-12-19 PROCEDURE — 1111F DSCHRG MED/CURRENT MED MERGE: CPT | Mod: CPTII,S$GLB,, | Performed by: STUDENT IN AN ORGANIZED HEALTH CARE EDUCATION/TRAINING PROGRAM

## 2022-12-19 PROCEDURE — 4010F PR ACE/ARB THEARPY RXD/TAKEN: ICD-10-PCS | Mod: CPTII,S$GLB,, | Performed by: STUDENT IN AN ORGANIZED HEALTH CARE EDUCATION/TRAINING PROGRAM

## 2022-12-19 PROCEDURE — 1125F PR PAIN SEVERITY QUANTIFIED, PAIN PRESENT: ICD-10-PCS | Mod: CPTII,S$GLB,, | Performed by: STUDENT IN AN ORGANIZED HEALTH CARE EDUCATION/TRAINING PROGRAM

## 2022-12-19 PROCEDURE — 3075F SYST BP GE 130 - 139MM HG: CPT | Mod: CPTII,S$GLB,, | Performed by: STUDENT IN AN ORGANIZED HEALTH CARE EDUCATION/TRAINING PROGRAM

## 2022-12-19 PROCEDURE — 3044F PR MOST RECENT HEMOGLOBIN A1C LEVEL <7.0%: ICD-10-PCS | Mod: CPTII,S$GLB,, | Performed by: STUDENT IN AN ORGANIZED HEALTH CARE EDUCATION/TRAINING PROGRAM

## 2022-12-19 PROCEDURE — 1160F RVW MEDS BY RX/DR IN RCRD: CPT | Mod: CPTII,S$GLB,, | Performed by: STUDENT IN AN ORGANIZED HEALTH CARE EDUCATION/TRAINING PROGRAM

## 2022-12-19 PROCEDURE — 3288F PR FALLS RISK ASSESSMENT DOCUMENTED: ICD-10-PCS | Mod: CPTII,S$GLB,, | Performed by: STUDENT IN AN ORGANIZED HEALTH CARE EDUCATION/TRAINING PROGRAM

## 2022-12-19 PROCEDURE — 3008F PR BODY MASS INDEX (BMI) DOCUMENTED: ICD-10-PCS | Mod: CPTII,S$GLB,, | Performed by: STUDENT IN AN ORGANIZED HEALTH CARE EDUCATION/TRAINING PROGRAM

## 2022-12-19 PROCEDURE — 3008F BODY MASS INDEX DOCD: CPT | Mod: CPTII,S$GLB,, | Performed by: STUDENT IN AN ORGANIZED HEALTH CARE EDUCATION/TRAINING PROGRAM

## 2022-12-19 PROCEDURE — 1159F MED LIST DOCD IN RCRD: CPT | Mod: CPTII,S$GLB,, | Performed by: STUDENT IN AN ORGANIZED HEALTH CARE EDUCATION/TRAINING PROGRAM

## 2022-12-19 PROCEDURE — 1160F PR REVIEW ALL MEDS BY PRESCRIBER/CLIN PHARMACIST DOCUMENTED: ICD-10-PCS | Mod: CPTII,S$GLB,, | Performed by: STUDENT IN AN ORGANIZED HEALTH CARE EDUCATION/TRAINING PROGRAM

## 2022-12-19 PROCEDURE — 1125F AMNT PAIN NOTED PAIN PRSNT: CPT | Mod: CPTII,S$GLB,, | Performed by: STUDENT IN AN ORGANIZED HEALTH CARE EDUCATION/TRAINING PROGRAM

## 2022-12-19 PROCEDURE — 99215 OFFICE O/P EST HI 40 MIN: CPT | Mod: S$GLB,,, | Performed by: STUDENT IN AN ORGANIZED HEALTH CARE EDUCATION/TRAINING PROGRAM

## 2022-12-19 PROCEDURE — 3288F FALL RISK ASSESSMENT DOCD: CPT | Mod: CPTII,S$GLB,, | Performed by: STUDENT IN AN ORGANIZED HEALTH CARE EDUCATION/TRAINING PROGRAM

## 2022-12-19 NOTE — H&P (VIEW-ONLY)
"Subjective:       Patient ID: Tracy Armendariz is a 72 y.o. female.    Chief Complaint: f/u inpt stay    This is a 72 y.o.  female patient that is an established patient of mine.    She was visiting [facility] and was diagnosed with an obstructing stone. Dr. Pérez, a urologist at [facility], was consulted after a Ct was performed and found an "impacted" stone that was "very close to the bladder" per the patient and her . She was told that she had a urinary tract infection and that the infection went into the blood stream (positive bacteremia). She underwent a cystoscopy and ureteral stent placement on 11/28/18.     She underwent a L urs/hll/sbe/stent exchange on 12/19/2018 with me - only stones encountered were low pole; upper and mid pole calcifications seen on CT, no stones encountered during URS. Her stent has been removed in clinic on 12/26/2018.    Based off of ureteroscopy in 2019, I do not believe that the left upper pole and mid pole calcifications are true stones. The left lower pole is likely reflective of true stones- URS in 2018 lower pole had a tortuous calyx. Largest dimension is 7mm. Since her  who is also my pt was undergoing prostate cancer tx, she elected for observation.    When she saw me on 11/1/22 for urgency, frequency, SP discomfort, left flank pain, I recommended ucx and CT. Pt was only amenable with ucx first, wanted to hold on CT. When ucx resulted with no growth, I reached out on 11/3 to schedule CT. She underwent CT on 12/9/22 which demonstrated a left prox 6mm ureteral stone, ultimately presented to hospital on 12/11/22, she underwent a cysto, left stent on 12/11/22.     12/19/22  Here today for f/u. Was discharged from hospital last week. Pt provided urine sample on 12/16/22 demonstrated mult orgs but none grew out in predominance. Pt discharged with picc line and 1g of ertapenem daily, completion of course on 12/24/22 per discharge summary.         "     Lab Results   Component Value Date    CREATININE 0.8 12/15/2022       ---  Past Medical History:   Diagnosis Date    Allergy     Anticoagulant long-term use     Arthritis     CVA (cerebral infarction)     Depression     Disorder of kidney and ureter     renal stones    Diverticulosis of colon     Extrinsic asthma, unspecified     Hyperlipidemia     Hypertension     Kidney stone     Left atrial enlargement 2014    Low back pain     MARTIN (obstructive sleep apnea)     Osteopenia     PUD (peptic ulcer disease)     Stroke  2013    Urinary tract infection        Past Surgical History:   Procedure Laterality Date    APPENDECTOMY      @ time of hysterectomy    BACK SURGERY      CATARACT EXTRACTION       SECTION      CHOLECYSTECTOMY      laparoscopic    COLONOSCOPY N/A 2018    Procedure: COLONOSCOPY/Golytely;  Surgeon: Janine Black MD;  Location: [facility] ENDO;  Service: Endoscopy;  Laterality: N/A;    CYSTOSCOPY W/ RETROGRADES  2022    Procedure: CYSTOSCOPY, WITH RETROGRADE PYELOGRAM;  Surgeon: Mitchell Recinos MD;  Location: [facility] OR;  Service: Urology;;    CYSTOSCOPY W/ URETERAL STENT PLACEMENT Left 2022    Procedure: CYSTOSCOPY, WITH URETERAL STENT INSERTION;  Surgeon: Mitchell Recinos MD;  Location: [facility] OR;  Service: Urology;  Laterality: Left;    DILATION AND CURETTAGE OF UTERUS      HYSTERECTOMY      TAHUSO with appendectomy    INNER EAR SURGERY      replaced ear drum    JOINT REPLACEMENT Right     knee    KNEE ARTHROSCOPY W/ DEBRIDEMENT      LUMBAR DISCECTOMY      L4-L5    OOPHORECTOMY      unilateral    TONSILLECTOMY      TYMPANOPLASTY      URETEROSCOPIC REMOVAL OF URETERIC CALCULUS Left 2018    Procedure: REMOVAL, CALCULUS, URETER, URETEROSCOPIC, holmium laser lithotripsy, stone basket extraction, retrograde pyelogram, ureteral stent exchange;  Surgeon: Anaya Zamora MD;  Location: [facility] OR;  Service: Urology;  Laterality: Left;        Family History   Problem Relation Age of Onset    Cervical cancer Mother     Cancer Mother 65        lung cancer - non smoker    Stroke Paternal Grandfather     Hypertension Maternal Grandfather     Heart disease Maternal Grandfather     Hypertension Father     Coronary artery disease Father 62    Heart disease Father     Diabetes Sister     Heart disease Sister     Kidney disease Sister     Breast cancer Daughter 36    Heart failure Sister         60s    Colon cancer Neg Hx     Ovarian cancer Neg Hx        Social History     Tobacco Use    Smoking status: Former     Types: Cigarettes     Quit date: 1995     Years since quittin.9    Smokeless tobacco: Never   Substance Use Topics    Alcohol use: Yes     Alcohol/week: 1.0 standard drink     Types: 1 Glasses of wine per week     Comment: social    Drug use: No       Current Outpatient Medications on File Prior to Visit   Medication Sig Dispense Refill    acetaminophen (TYLENOL) 500 MG tablet Take 500 mg by mouth every 6 (six) hours as needed for Pain.      albuterol (PROVENTIL/VENTOLIN HFA) 90 mcg/actuation inhaler INHALE 2 PUFFS EVERY 6 HOURS AS NEEDED FOR WHEEZING 18 g 0    aspirin 81 MG Chew Take 81 mg by mouth once daily.      atorvastatin (LIPITOR) 40 MG tablet Take 1 tablet (40 mg total) by mouth every evening. 90 tablet 3    buPROPion (WELLBUTRIN XL) 300 MG 24 hr tablet Take 1 tablet (300 mg total) by mouth once daily. 90 tablet 11    calcium carbonate (OS-SPENCER) 600 mg (1,500 mg) Tab Take 600 mg by mouth once daily.      cholecalciferol, vitamin D3, 1,000 unit Chew Take 1,000 Units by mouth once daily.      diazePAM (VALIUM) 2 MG tablet Take 1/2 to 1 tablet 3 times daily as needed to control dizziness 50 tablet 1    diclofenac sodium (VOLTAREN) 1 % Gel APPLY 2-4 GRAMS TO EACH PAINFUL AREA FOUR TIMES DAILY - MAX 32 GRAMS/ g 6    EScitalopram oxalate (LEXAPRO) 20 MG tablet TAKE 1 TABLET(20 MG) BY MOUTH EVERY DAY (Patient taking differently:  Take 20 mg by mouth once daily.) 90 tablet 3    esomeprazole (NEXIUM) 40 MG capsule Take 1 capsule (40 mg total) by mouth daily as needed (heartburn). 30 capsule 3    FLUAD 1042-1227, 65 YR UP,,PF, 45 mcg (15 mcg x 3)/0.5 mL Syrg       LACTOBACILLUS ACIDOPHILUS (PROBIOTIC ORAL) Take 1 capsule by mouth once daily. PRN      losartan (COZAAR) 100 MG tablet TAKE 1 TABLET(100 MG) BY MOUTH EVERY DAY 90 tablet 3    MULTIVIT WITH CALCIUM,IRON,MIN (WOMEN'S DAILY MULTIVITAMIN ORAL) Take 1 tablet by mouth once daily.      sodium chloride 0.9% SolP 100 mL with ertapenem 1 gram SolR 1 g Inject 1 g into the vein once daily. for 9 days 1 g 0    tamsulosin (FLOMAX) 0.4 mg Cap Take 1 capsule (0.4 mg total) by mouth once daily. (Patient not taking: Reported on 12/19/2022) 90 capsule 0     No current facility-administered medications on file prior to visit.       Review of patient's allergies indicates:   Allergen Reactions    Iodinated contrast media Hives and Rash    Gabapentin Hallucinations    Iodine Hives    Isothiazolinones Rash    Penicillins Rash       Review of Systems   Constitutional:  Negative for activity change.   HENT:  Negative for congestion.    Eyes:  Negative for visual disturbance.   Respiratory:  Negative for shortness of breath.    Cardiovascular:  Negative for chest pain.   Gastrointestinal:  Negative for abdominal distention.   Musculoskeletal:  Negative for gait problem.   Skin:  Negative for color change.   Neurological:  Negative for dizziness.   Psychiatric/Behavioral:  Negative for agitation.      Objective:      Physical Exam  Constitutional:       Appearance: She is well-developed.   HENT:      Head: Normocephalic and atraumatic.   Pulmonary:      Effort: Pulmonary effort is normal.   Musculoskeletal:         General: Normal range of motion.      Cervical back: Normal range of motion.   Skin:     General: Skin is warm and dry.   Neurological:      Mental Status: She is alert and oriented to person,  place, and time.       Assessment:       1. Kidney stone    2. Nephrolithiasis    3. Pyelonephritis    4. Flank pain    5. Hydronephrosis, unspecified hydronephrosis type        Plan:         Left URS/HLL/SBE/stent exchange on 12/21/22.   I have explained the indication and benefits of proceeding with a left ureteroscopy, holmium laser lithotripsy, stone basketing, retrograde pyelogram, stent exchange  and all other indicated procedures with me in the operating room on 12/21/22. Alternatives of the procedure were also discussed. The risks included but were not limited to pain, infection (urinary tract infection), bleeding (hematuria), ureteral or urethral stricture,  injury to the urethra, bladder, ureter, need for additional treatments, inability or incomplete removal of kidney stones, pain, and discomfort related to the stent were discussed in depth with the patient.  The patient understands that if I am unable to pass the cameras up to the level of the stone or if visibility is poor, then I will only place a ureteral stent and pursue a staged procedure.     The ureteral stent was discussed at length. The patient will need to have it removed and the time period in which it should remain indwelling is to be determined after surgery. If left indwelling, the sequelae include pain, infection, lower urinary tract symptoms, development of calcifications on the ureteral stent, worsening kidney function, and complete loss of kidney function.     The patient voiced understanding and all questions have been answered and informed consents will be signed on day of surgery.  Ucx demonstrated no overt UTI, pt being covered with ertapenem daily.      Kidney stone    Nephrolithiasis    Pyelonephritis    Flank pain    Hydronephrosis, unspecified hydronephrosis type

## 2022-12-19 NOTE — PROGRESS NOTES
"Subjective:       Patient ID: Tracy Armendariz is a 72 y.o. female.    Chief Complaint: f/u inpt stay    This is a 72 y.o.  female patient that is an established patient of mine.    She was visiting [facility] and was diagnosed with an obstructing stone. Dr. Pérez, a urologist at [facility], was consulted after a Ct was performed and found an "impacted" stone that was "very close to the bladder" per the patient and her . She was told that she had a urinary tract infection and that the infection went into the blood stream (positive bacteremia). She underwent a cystoscopy and ureteral stent placement on 11/28/18.     She underwent a L urs/hll/sbe/stent exchange on 12/19/2018 with me - only stones encountered were low pole; upper and mid pole calcifications seen on CT, no stones encountered during URS. Her stent has been removed in clinic on 12/26/2018.    Based off of ureteroscopy in 2019, I do not believe that the left upper pole and mid pole calcifications are true stones. The left lower pole is likely reflective of true stones- URS in 2018 lower pole had a tortuous calyx. Largest dimension is 7mm. Since her  who is also my pt was undergoing prostate cancer tx, she elected for observation.    When she saw me on 11/1/22 for urgency, frequency, SP discomfort, left flank pain, I recommended ucx and CT. Pt was only amenable with ucx first, wanted to hold on CT. When ucx resulted with no growth, I reached out on 11/3 to schedule CT. She underwent CT on 12/9/22 which demonstrated a left prox 6mm ureteral stone, ultimately presented to hospital on 12/11/22, she underwent a cysto, left stent on 12/11/22.     12/19/22  Here today for f/u. Was discharged from hospital last week. Pt provided urine sample on 12/16/22 demonstrated mult orgs but none grew out in predominance. Pt discharged with picc line and 1g of ertapenem daily, completion of course on 12/24/22 per discharge summary.         "     Lab Results   Component Value Date    CREATININE 0.8 12/15/2022       ---  Past Medical History:   Diagnosis Date    Allergy     Anticoagulant long-term use     Arthritis     CVA (cerebral infarction)     Depression     Disorder of kidney and ureter     renal stones    Diverticulosis of colon     Extrinsic asthma, unspecified     Hyperlipidemia     Hypertension     Kidney stone     Left atrial enlargement 2014    Low back pain     MARTIN (obstructive sleep apnea)     Osteopenia     PUD (peptic ulcer disease)     Stroke  2013    Urinary tract infection        Past Surgical History:   Procedure Laterality Date    APPENDECTOMY      @ time of hysterectomy    BACK SURGERY      CATARACT EXTRACTION       SECTION      CHOLECYSTECTOMY      laparoscopic    COLONOSCOPY N/A 2018    Procedure: COLONOSCOPY/Golytely;  Surgeon: Janine Black MD;  Location: [facility] ENDO;  Service: Endoscopy;  Laterality: N/A;    CYSTOSCOPY W/ RETROGRADES  2022    Procedure: CYSTOSCOPY, WITH RETROGRADE PYELOGRAM;  Surgeon: Mitchell Recinos MD;  Location: [facility] OR;  Service: Urology;;    CYSTOSCOPY W/ URETERAL STENT PLACEMENT Left 2022    Procedure: CYSTOSCOPY, WITH URETERAL STENT INSERTION;  Surgeon: Mitchell Recinos MD;  Location: [facility] OR;  Service: Urology;  Laterality: Left;    DILATION AND CURETTAGE OF UTERUS      HYSTERECTOMY      TAHUSO with appendectomy    INNER EAR SURGERY      replaced ear drum    JOINT REPLACEMENT Right     knee    KNEE ARTHROSCOPY W/ DEBRIDEMENT      LUMBAR DISCECTOMY      L4-L5    OOPHORECTOMY      unilateral    TONSILLECTOMY      TYMPANOPLASTY      URETEROSCOPIC REMOVAL OF URETERIC CALCULUS Left 2018    Procedure: REMOVAL, CALCULUS, URETER, URETEROSCOPIC, holmium laser lithotripsy, stone basket extraction, retrograde pyelogram, ureteral stent exchange;  Surgeon: Anaya Zamora MD;  Location: [facility] OR;  Service: Urology;  Laterality: Left;        Family History   Problem Relation Age of Onset    Cervical cancer Mother     Cancer Mother 65        lung cancer - non smoker    Stroke Paternal Grandfather     Hypertension Maternal Grandfather     Heart disease Maternal Grandfather     Hypertension Father     Coronary artery disease Father 62    Heart disease Father     Diabetes Sister     Heart disease Sister     Kidney disease Sister     Breast cancer Daughter 36    Heart failure Sister         60s    Colon cancer Neg Hx     Ovarian cancer Neg Hx        Social History     Tobacco Use    Smoking status: Former     Types: Cigarettes     Quit date: 1995     Years since quittin.9    Smokeless tobacco: Never   Substance Use Topics    Alcohol use: Yes     Alcohol/week: 1.0 standard drink     Types: 1 Glasses of wine per week     Comment: social    Drug use: No       Current Outpatient Medications on File Prior to Visit   Medication Sig Dispense Refill    acetaminophen (TYLENOL) 500 MG tablet Take 500 mg by mouth every 6 (six) hours as needed for Pain.      albuterol (PROVENTIL/VENTOLIN HFA) 90 mcg/actuation inhaler INHALE 2 PUFFS EVERY 6 HOURS AS NEEDED FOR WHEEZING 18 g 0    aspirin 81 MG Chew Take 81 mg by mouth once daily.      atorvastatin (LIPITOR) 40 MG tablet Take 1 tablet (40 mg total) by mouth every evening. 90 tablet 3    buPROPion (WELLBUTRIN XL) 300 MG 24 hr tablet Take 1 tablet (300 mg total) by mouth once daily. 90 tablet 11    calcium carbonate (OS-SPENCER) 600 mg (1,500 mg) Tab Take 600 mg by mouth once daily.      cholecalciferol, vitamin D3, 1,000 unit Chew Take 1,000 Units by mouth once daily.      diazePAM (VALIUM) 2 MG tablet Take 1/2 to 1 tablet 3 times daily as needed to control dizziness 50 tablet 1    diclofenac sodium (VOLTAREN) 1 % Gel APPLY 2-4 GRAMS TO EACH PAINFUL AREA FOUR TIMES DAILY - MAX 32 GRAMS/ g 6    EScitalopram oxalate (LEXAPRO) 20 MG tablet TAKE 1 TABLET(20 MG) BY MOUTH EVERY DAY (Patient taking differently:  Take 20 mg by mouth once daily.) 90 tablet 3    esomeprazole (NEXIUM) 40 MG capsule Take 1 capsule (40 mg total) by mouth daily as needed (heartburn). 30 capsule 3    FLUAD 0157-4562, 65 YR UP,,PF, 45 mcg (15 mcg x 3)/0.5 mL Syrg       LACTOBACILLUS ACIDOPHILUS (PROBIOTIC ORAL) Take 1 capsule by mouth once daily. PRN      losartan (COZAAR) 100 MG tablet TAKE 1 TABLET(100 MG) BY MOUTH EVERY DAY 90 tablet 3    MULTIVIT WITH CALCIUM,IRON,MIN (WOMEN'S DAILY MULTIVITAMIN ORAL) Take 1 tablet by mouth once daily.      sodium chloride 0.9% SolP 100 mL with ertapenem 1 gram SolR 1 g Inject 1 g into the vein once daily. for 9 days 1 g 0    tamsulosin (FLOMAX) 0.4 mg Cap Take 1 capsule (0.4 mg total) by mouth once daily. (Patient not taking: Reported on 12/19/2022) 90 capsule 0     No current facility-administered medications on file prior to visit.       Review of patient's allergies indicates:   Allergen Reactions    Iodinated contrast media Hives and Rash    Gabapentin Hallucinations    Iodine Hives    Isothiazolinones Rash    Penicillins Rash       Review of Systems   Constitutional:  Negative for activity change.   HENT:  Negative for congestion.    Eyes:  Negative for visual disturbance.   Respiratory:  Negative for shortness of breath.    Cardiovascular:  Negative for chest pain.   Gastrointestinal:  Negative for abdominal distention.   Musculoskeletal:  Negative for gait problem.   Skin:  Negative for color change.   Neurological:  Negative for dizziness.   Psychiatric/Behavioral:  Negative for agitation.      Objective:      Physical Exam  Constitutional:       Appearance: She is well-developed.   HENT:      Head: Normocephalic and atraumatic.   Pulmonary:      Effort: Pulmonary effort is normal.   Musculoskeletal:         General: Normal range of motion.      Cervical back: Normal range of motion.   Skin:     General: Skin is warm and dry.   Neurological:      Mental Status: She is alert and oriented to person,  place, and time.       Assessment:       1. Kidney stone    2. Nephrolithiasis    3. Pyelonephritis    4. Flank pain    5. Hydronephrosis, unspecified hydronephrosis type        Plan:         Left URS/HLL/SBE/stent exchange on 12/21/22.   I have explained the indication and benefits of proceeding with a left ureteroscopy, holmium laser lithotripsy, stone basketing, retrograde pyelogram, stent exchange  and all other indicated procedures with me in the operating room on 12/21/22. Alternatives of the procedure were also discussed. The risks included but were not limited to pain, infection (urinary tract infection), bleeding (hematuria), ureteral or urethral stricture,  injury to the urethra, bladder, ureter, need for additional treatments, inability or incomplete removal of kidney stones, pain, and discomfort related to the stent were discussed in depth with the patient.  The patient understands that if I am unable to pass the cameras up to the level of the stone or if visibility is poor, then I will only place a ureteral stent and pursue a staged procedure.     The ureteral stent was discussed at length. The patient will need to have it removed and the time period in which it should remain indwelling is to be determined after surgery. If left indwelling, the sequelae include pain, infection, lower urinary tract symptoms, development of calcifications on the ureteral stent, worsening kidney function, and complete loss of kidney function.     The patient voiced understanding and all questions have been answered and informed consents will be signed on day of surgery.  Ucx demonstrated no overt UTI, pt being covered with ertapenem daily.      Kidney stone    Nephrolithiasis    Pyelonephritis    Flank pain    Hydronephrosis, unspecified hydronephrosis type

## 2022-12-21 ENCOUNTER — PATIENT OUTREACH (OUTPATIENT)
Dept: ADMINISTRATIVE | Facility: OTHER | Age: 72
End: 2022-12-21
Payer: MEDICARE

## 2022-12-21 ENCOUNTER — ANESTHESIA (OUTPATIENT)
Dept: SURGERY | Facility: HOSPITAL | Age: 72
End: 2022-12-21
Payer: MEDICARE

## 2022-12-21 ENCOUNTER — ANESTHESIA EVENT (OUTPATIENT)
Dept: SURGERY | Facility: HOSPITAL | Age: 72
End: 2022-12-21
Payer: MEDICARE

## 2022-12-21 ENCOUNTER — HOSPITAL ENCOUNTER (OUTPATIENT)
Facility: HOSPITAL | Age: 72
Discharge: HOME | End: 2022-12-21
Attending: STUDENT IN AN ORGANIZED HEALTH CARE EDUCATION/TRAINING PROGRAM | Admitting: STUDENT IN AN ORGANIZED HEALTH CARE EDUCATION/TRAINING PROGRAM
Payer: MEDICARE

## 2022-12-21 DIAGNOSIS — N20.0 NEPHROLITHIASIS: ICD-10-CM

## 2022-12-21 DIAGNOSIS — R30.0 DYSURIA: ICD-10-CM

## 2022-12-21 DIAGNOSIS — N39.0 URINARY TRACT INFECTION WITHOUT HEMATURIA, SITE UNSPECIFIED: ICD-10-CM

## 2022-12-21 DIAGNOSIS — N20.0 NEPHROLITHIASIS: Primary | ICD-10-CM

## 2022-12-21 LAB
BACTERIA BLD CULT: NORMAL
BACTERIA BLD CULT: NORMAL

## 2022-12-21 PROCEDURE — 63600175 PHARM REV CODE 636 W HCPCS: Performed by: STUDENT IN AN ORGANIZED HEALTH CARE EDUCATION/TRAINING PROGRAM

## 2022-12-21 PROCEDURE — 37000009 HC ANESTHESIA EA ADD 15 MINS: Performed by: STUDENT IN AN ORGANIZED HEALTH CARE EDUCATION/TRAINING PROGRAM

## 2022-12-21 PROCEDURE — 36000707: Performed by: STUDENT IN AN ORGANIZED HEALTH CARE EDUCATION/TRAINING PROGRAM

## 2022-12-21 PROCEDURE — 71000016 HC POSTOP RECOV ADDL HR: Performed by: STUDENT IN AN ORGANIZED HEALTH CARE EDUCATION/TRAINING PROGRAM

## 2022-12-21 PROCEDURE — D9220A PRA ANESTHESIA: Mod: ANES,,, | Performed by: STUDENT IN AN ORGANIZED HEALTH CARE EDUCATION/TRAINING PROGRAM

## 2022-12-21 PROCEDURE — 71000015 HC POSTOP RECOV 1ST HR: Performed by: STUDENT IN AN ORGANIZED HEALTH CARE EDUCATION/TRAINING PROGRAM

## 2022-12-21 PROCEDURE — C1894 INTRO/SHEATH, NON-LASER: HCPCS | Performed by: STUDENT IN AN ORGANIZED HEALTH CARE EDUCATION/TRAINING PROGRAM

## 2022-12-21 PROCEDURE — 25000003 PHARM REV CODE 250: Performed by: NURSE ANESTHETIST, CERTIFIED REGISTERED

## 2022-12-21 PROCEDURE — 25000003 PHARM REV CODE 250: Performed by: STUDENT IN AN ORGANIZED HEALTH CARE EDUCATION/TRAINING PROGRAM

## 2022-12-21 PROCEDURE — C2617 STENT, NON-COR, TEM W/O DEL: HCPCS | Performed by: STUDENT IN AN ORGANIZED HEALTH CARE EDUCATION/TRAINING PROGRAM

## 2022-12-21 PROCEDURE — 27201423 OPTIME MED/SURG SUP & DEVICES STERILE SUPPLY: Performed by: STUDENT IN AN ORGANIZED HEALTH CARE EDUCATION/TRAINING PROGRAM

## 2022-12-21 PROCEDURE — 74420 PR  X-RAY RETROGRADE PYELOGRAM: ICD-10-PCS | Mod: 26,,, | Performed by: STUDENT IN AN ORGANIZED HEALTH CARE EDUCATION/TRAINING PROGRAM

## 2022-12-21 PROCEDURE — C1769 GUIDE WIRE: HCPCS | Performed by: STUDENT IN AN ORGANIZED HEALTH CARE EDUCATION/TRAINING PROGRAM

## 2022-12-21 PROCEDURE — D9220A PRA ANESTHESIA: ICD-10-PCS | Mod: ANES,,, | Performed by: STUDENT IN AN ORGANIZED HEALTH CARE EDUCATION/TRAINING PROGRAM

## 2022-12-21 PROCEDURE — C1758 CATHETER, URETERAL: HCPCS | Performed by: STUDENT IN AN ORGANIZED HEALTH CARE EDUCATION/TRAINING PROGRAM

## 2022-12-21 PROCEDURE — D9220A PRA ANESTHESIA: Mod: CRNA,,, | Performed by: NURSE ANESTHETIST, CERTIFIED REGISTERED

## 2022-12-21 PROCEDURE — 25500020 PHARM REV CODE 255: Performed by: STUDENT IN AN ORGANIZED HEALTH CARE EDUCATION/TRAINING PROGRAM

## 2022-12-21 PROCEDURE — 37000008 HC ANESTHESIA 1ST 15 MINUTES: Performed by: STUDENT IN AN ORGANIZED HEALTH CARE EDUCATION/TRAINING PROGRAM

## 2022-12-21 PROCEDURE — D9220A PRA ANESTHESIA: ICD-10-PCS | Mod: CRNA,,, | Performed by: NURSE ANESTHETIST, CERTIFIED REGISTERED

## 2022-12-21 PROCEDURE — 52356 CYSTO/URETERO W/LITHOTRIPSY: CPT | Mod: LT,,, | Performed by: STUDENT IN AN ORGANIZED HEALTH CARE EDUCATION/TRAINING PROGRAM

## 2022-12-21 PROCEDURE — 52356 PR CYSTO/URETERO W/LITHOTRIPSY: ICD-10-PCS | Mod: LT,,, | Performed by: STUDENT IN AN ORGANIZED HEALTH CARE EDUCATION/TRAINING PROGRAM

## 2022-12-21 PROCEDURE — 63600175 PHARM REV CODE 636 W HCPCS: Performed by: NURSE ANESTHETIST, CERTIFIED REGISTERED

## 2022-12-21 PROCEDURE — 71000033 HC RECOVERY, INTIAL HOUR: Performed by: STUDENT IN AN ORGANIZED HEALTH CARE EDUCATION/TRAINING PROGRAM

## 2022-12-21 PROCEDURE — 36000706: Performed by: STUDENT IN AN ORGANIZED HEALTH CARE EDUCATION/TRAINING PROGRAM

## 2022-12-21 PROCEDURE — 82365 CALCULUS SPECTROSCOPY: CPT | Performed by: STUDENT IN AN ORGANIZED HEALTH CARE EDUCATION/TRAINING PROGRAM

## 2022-12-21 PROCEDURE — 74420 UROGRAPHY RTRGR +-KUB: CPT | Mod: 26,,, | Performed by: STUDENT IN AN ORGANIZED HEALTH CARE EDUCATION/TRAINING PROGRAM

## 2022-12-21 DEVICE — STENT SET URETERAL 6X26CM: Type: IMPLANTABLE DEVICE | Site: URETER | Status: FUNCTIONAL

## 2022-12-21 RX ORDER — HYDROMORPHONE HYDROCHLORIDE 2 MG/ML
0.2 INJECTION, SOLUTION INTRAMUSCULAR; INTRAVENOUS; SUBCUTANEOUS EVERY 5 MIN PRN
Status: DISCONTINUED | OUTPATIENT
Start: 2022-12-21 | End: 2022-12-21 | Stop reason: HOSPADM

## 2022-12-21 RX ORDER — EPHEDRINE SULFATE 50 MG/ML
INJECTION, SOLUTION INTRAVENOUS
Status: DISCONTINUED | OUTPATIENT
Start: 2022-12-21 | End: 2022-12-21

## 2022-12-21 RX ORDER — DEXAMETHASONE SODIUM PHOSPHATE 4 MG/ML
INJECTION, SOLUTION INTRA-ARTICULAR; INTRALESIONAL; INTRAMUSCULAR; INTRAVENOUS; SOFT TISSUE
Status: DISCONTINUED | OUTPATIENT
Start: 2022-12-21 | End: 2022-12-21

## 2022-12-21 RX ORDER — LIDOCAINE HCL/PF 100 MG/5ML
SYRINGE (ML) INTRAVENOUS
Status: DISCONTINUED | OUTPATIENT
Start: 2022-12-21 | End: 2022-12-21

## 2022-12-21 RX ORDER — KETOROLAC TROMETHAMINE 30 MG/ML
INJECTION, SOLUTION INTRAMUSCULAR; INTRAVENOUS
Status: DISCONTINUED | OUTPATIENT
Start: 2022-12-21 | End: 2022-12-21

## 2022-12-21 RX ORDER — FENTANYL CITRATE 50 UG/ML
INJECTION, SOLUTION INTRAMUSCULAR; INTRAVENOUS
Status: DISCONTINUED | OUTPATIENT
Start: 2022-12-21 | End: 2022-12-21

## 2022-12-21 RX ORDER — ROCURONIUM BROMIDE 10 MG/ML
INJECTION, SOLUTION INTRAVENOUS
Status: DISCONTINUED | OUTPATIENT
Start: 2022-12-21 | End: 2022-12-21

## 2022-12-21 RX ORDER — ACETAMINOPHEN 10 MG/ML
INJECTION, SOLUTION INTRAVENOUS
Status: DISCONTINUED | OUTPATIENT
Start: 2022-12-21 | End: 2022-12-21

## 2022-12-21 RX ORDER — SODIUM CHLORIDE 9 MG/ML
INJECTION, SOLUTION INTRAVENOUS CONTINUOUS
Status: DISCONTINUED | OUTPATIENT
Start: 2022-12-21 | End: 2022-12-21 | Stop reason: HOSPADM

## 2022-12-21 RX ORDER — MIDAZOLAM HYDROCHLORIDE 1 MG/ML
INJECTION INTRAMUSCULAR; INTRAVENOUS
Status: DISCONTINUED | OUTPATIENT
Start: 2022-12-21 | End: 2022-12-21

## 2022-12-21 RX ORDER — DIPHENHYDRAMINE HYDROCHLORIDE 50 MG/ML
INJECTION INTRAMUSCULAR; INTRAVENOUS
Status: DISCONTINUED | OUTPATIENT
Start: 2022-12-21 | End: 2022-12-21

## 2022-12-21 RX ORDER — KETOROLAC TROMETHAMINE 10 MG/1
10 TABLET, FILM COATED ORAL EVERY 6 HOURS PRN
Qty: 20 TABLET | Refills: 0 | Status: SHIPPED | OUTPATIENT
Start: 2022-12-21 | End: 2022-12-26

## 2022-12-21 RX ORDER — PROPOFOL 10 MG/ML
VIAL (ML) INTRAVENOUS
Status: DISCONTINUED | OUTPATIENT
Start: 2022-12-21 | End: 2022-12-21

## 2022-12-21 RX ORDER — HALOPERIDOL 5 MG/ML
0.5 INJECTION INTRAMUSCULAR EVERY 10 MIN PRN
Status: DISCONTINUED | OUTPATIENT
Start: 2022-12-21 | End: 2022-12-21 | Stop reason: HOSPADM

## 2022-12-21 RX ORDER — ONDANSETRON HYDROCHLORIDE 2 MG/ML
INJECTION, SOLUTION INTRAMUSCULAR; INTRAVENOUS
Status: DISCONTINUED | OUTPATIENT
Start: 2022-12-21 | End: 2022-12-21

## 2022-12-21 RX ORDER — OXYCODONE AND ACETAMINOPHEN 5; 325 MG/1; MG/1
1 TABLET ORAL EVERY 6 HOURS PRN
Qty: 15 TABLET | Refills: 0 | Status: SHIPPED | OUTPATIENT
Start: 2022-12-21 | End: 2023-01-12

## 2022-12-21 RX ORDER — FENTANYL CITRATE 50 UG/ML
25 INJECTION, SOLUTION INTRAMUSCULAR; INTRAVENOUS EVERY 5 MIN PRN
Status: DISCONTINUED | OUTPATIENT
Start: 2022-12-21 | End: 2022-12-21 | Stop reason: HOSPADM

## 2022-12-21 RX ORDER — LIDOCAINE HYDROCHLORIDE 20 MG/ML
JELLY TOPICAL
Status: DISCONTINUED | OUTPATIENT
Start: 2022-12-21 | End: 2022-12-21 | Stop reason: HOSPADM

## 2022-12-21 RX ADMIN — HYDROMORPHONE HYDROCHLORIDE 0.2 MG: 2 INJECTION INTRAMUSCULAR; INTRAVENOUS; SUBCUTANEOUS at 02:12

## 2022-12-21 RX ADMIN — EPHEDRINE SULFATE 15 MG: 50 INJECTION, SOLUTION INTRAMUSCULAR; INTRAVENOUS; SUBCUTANEOUS at 01:12

## 2022-12-21 RX ADMIN — DEXAMETHASONE SODIUM PHOSPHATE 8 MG: 4 INJECTION, SOLUTION INTRA-ARTICULAR; INTRALESIONAL; INTRAMUSCULAR; INTRAVENOUS; SOFT TISSUE at 01:12

## 2022-12-21 RX ADMIN — MIDAZOLAM HYDROCHLORIDE 2 MG: 1 INJECTION, SOLUTION INTRAMUSCULAR; INTRAVENOUS at 01:12

## 2022-12-21 RX ADMIN — ERTAPENEM 1 G: 1 INJECTION INTRAMUSCULAR; INTRAVENOUS at 01:12

## 2022-12-21 RX ADMIN — FENTANYL CITRATE 100 MCG: 0.05 INJECTION, SOLUTION INTRAMUSCULAR; INTRAVENOUS at 01:12

## 2022-12-21 RX ADMIN — KETOROLAC TROMETHAMINE 15 MG: 30 INJECTION, SOLUTION INTRAMUSCULAR; INTRAVENOUS at 02:12

## 2022-12-21 RX ADMIN — DIPHENHYDRAMINE HYDROCHLORIDE 12.5 MG: 50 INJECTION INTRAMUSCULAR; INTRAVENOUS at 01:12

## 2022-12-21 RX ADMIN — SUGAMMADEX 200 MG: 100 INJECTION, SOLUTION INTRAVENOUS at 02:12

## 2022-12-21 RX ADMIN — LIDOCAINE HYDROCHLORIDE 75 MG: 20 INJECTION, SOLUTION INTRAVENOUS at 01:12

## 2022-12-21 RX ADMIN — ONDANSETRON 8 MG: 2 INJECTION INTRAMUSCULAR; INTRAVENOUS at 01:12

## 2022-12-21 RX ADMIN — ROCURONIUM BROMIDE 35 MG: 10 INJECTION, SOLUTION INTRAVENOUS at 01:12

## 2022-12-21 RX ADMIN — PROPOFOL 100 MG: 10 INJECTION, EMULSION INTRAVENOUS at 01:12

## 2022-12-21 RX ADMIN — ROCURONIUM BROMIDE 5 MG: 10 INJECTION, SOLUTION INTRAVENOUS at 01:12

## 2022-12-21 RX ADMIN — ACETAMINOPHEN 1000 MG: 10 INJECTION, SOLUTION INTRAVENOUS at 01:12

## 2022-12-21 RX ADMIN — SODIUM CHLORIDE, SODIUM LACTATE, POTASSIUM CHLORIDE, AND CALCIUM CHLORIDE: .6; .31; .03; .02 INJECTION, SOLUTION INTRAVENOUS at 12:12

## 2022-12-21 NOTE — TRANSFER OF CARE
"Anesthesia Transfer of Care Note    Patient: Tracy Armendariz    Procedure(s) Performed: Procedure(s) (LRB):  left ureteroscopy, holmium laser lithotripsy, stone basketing, retrograde pyelogram, stent placement (Left)  PYELOGRAM, RETROGRADE  EXTRACTION - STONE (Left)    Patient location: PACU    Anesthesia Type: general    Transport from OR: Transported from OR on room air with adequate spontaneous ventilation    Post pain: adequate analgesia    Post assessment: no apparent anesthetic complications and tolerated procedure well    Post vital signs: stable    Level of consciousness: awake and alert    Nausea/Vomiting: no nausea/vomiting    Complications: none    Transfer of care protocol was followed      Last vitals:   Visit Vitals  BP (!) 141/65 (BP Location: Left arm, Patient Position: Lying)   Pulse 65   Temp 36.7 °C (98.1 °F) (Temporal)   Resp 16   Ht 5' 7" (1.702 m)   Wt 99.8 kg (220 lb)   LMP 01/01/1978 (Approximate)   SpO2 (!) 94%   Breastfeeding No   BMI 34.46 kg/m²     "

## 2022-12-21 NOTE — ANESTHESIA PREPROCEDURE EVALUATION
[facility]  Anesthesia Pre-Operative Evaluation         Patient Name: Tracy Armendariz  YOB: 1950  MRN: [id number]    SUBJECTIVE:     12/21/2022    Procedure(s) (LRB):  left ureteroscopy, holmium laser lithotripsy, stone basketing, retrograde pyelogram, stent placement (Left)    Tracy Armendariz is a 72 y.o. female here for Procedure(s) (LRB):  left ureteroscopy, holmium laser lithotripsy, stone basketing, retrograde pyelogram, stent placement (Left)    Drips:    sodium chloride 0.9%         Patient Active Problem List   Diagnosis    Hypertension    Depression    Extrinsic asthma    PUD (peptic ulcer disease)    Cerebral infarction    SOB (shortness of breath) on exertion    Hyperlipidemia    Severe obesity (BMI 35.0-39.9) with comorbidity    MARTIN (obstructive sleep apnea)    Cerebral embolism without mention of cerebral infarction    Left atrial enlargement    Parinaud's syndrome affecting both eyes    NICK (internuclear ophthalmoplegia)    Benign lesion of lacrimal duct    Visual floaters    Essential hypertension    Symptomatic posterior vitreous detachment of both eyes    Osteopenia    Osteoarthritis of right knee    Arthritis of right knee    Kidney stone    Atherosclerosis of aorta    Acute bilateral ankle pain    Impaired functional mobility and activity tolerance    Closed fracture of right wrist    History of CVA (cerebrovascular accident)    Nasal septal deviation    Tinnitus of both ears    Ataxia    Vertigo    Eye fatigue    Dizziness    Impairment of balance    Peripheral vertigo involving right ear    Conductive hearing loss of right ear with restricted hearing of left ear    At moderate risk for fall    Diplopia    Pyelonephritis    Infective urethritis    Left ureteral stone    ESBL (extended spectrum beta-lactamase) producing bacteria infection       Review of patient's allergies indicates:   Allergen Reactions    Iodinated contrast  media Hives and Rash    Gabapentin Hallucinations    Iodine Hives    Isothiazolinones Rash    Penicillins Rash       No current facility-administered medications on file prior to encounter.     Current Outpatient Medications on File Prior to Encounter   Medication Sig Dispense Refill    acetaminophen (TYLENOL) 500 MG tablet Take 500 mg by mouth every 6 (six) hours as needed for Pain.      albuterol (PROVENTIL/VENTOLIN HFA) 90 mcg/actuation inhaler INHALE 2 PUFFS EVERY 6 HOURS AS NEEDED FOR WHEEZING 18 g 0    aspirin 81 MG Chew Take 81 mg by mouth once daily.      atorvastatin (LIPITOR) 40 MG tablet Take 1 tablet (40 mg total) by mouth every evening. 90 tablet 3    buPROPion (WELLBUTRIN XL) 300 MG 24 hr tablet Take 1 tablet (300 mg total) by mouth once daily. 90 tablet 11    calcium carbonate (OS-SPENCER) 600 mg (1,500 mg) Tab Take 600 mg by mouth once daily.      cholecalciferol, vitamin D3, 1,000 unit Chew Take 1,000 Units by mouth once daily.      diazePAM (VALIUM) 2 MG tablet Take 1/2 to 1 tablet 3 times daily as needed to control dizziness 50 tablet 1    diclofenac sodium (VOLTAREN) 1 % Gel APPLY 2-4 GRAMS TO EACH PAINFUL AREA FOUR TIMES DAILY - MAX 32 GRAMS/ g 6    EScitalopram oxalate (LEXAPRO) 20 MG tablet TAKE 1 TABLET(20 MG) BY MOUTH EVERY DAY (Patient taking differently: Take 20 mg by mouth once daily.) 90 tablet 3    esomeprazole (NEXIUM) 40 MG capsule Take 1 capsule (40 mg total) by mouth daily as needed (heartburn). 30 capsule 3    FLUAD 5978-4496, 65 YR UP,,PF, 45 mcg (15 mcg x 3)/0.5 mL Syrg       LACTOBACILLUS ACIDOPHILUS (PROBIOTIC ORAL) Take 1 capsule by mouth once daily. PRN      losartan (COZAAR) 100 MG tablet TAKE 1 TABLET(100 MG) BY MOUTH EVERY DAY 90 tablet 3    MULTIVIT WITH CALCIUM,IRON,MIN (WOMEN'S DAILY MULTIVITAMIN ORAL) Take 1 tablet by mouth once daily.      sodium chloride 0.9% SolP 100 mL with ertapenem 1 gram SolR 1 g Inject 1 g into the vein once daily. for 9  days 1 g 0    tamsulosin (FLOMAX) 0.4 mg Cap Take 1 capsule (0.4 mg total) by mouth once daily. (Patient not taking: Reported on 2022) 90 capsule 0       Past Surgical History:   Procedure Laterality Date    APPENDECTOMY      @ time of hysterectomy    BACK SURGERY      CATARACT EXTRACTION       SECTION      CHOLECYSTECTOMY      laparoscopic    COLONOSCOPY N/A 2018    Procedure: COLONOSCOPY/Golytely;  Surgeon: Janine Black MD;  Location: [facility] ENDO;  Service: Endoscopy;  Laterality: N/A;    CYSTOSCOPY W/ RETROGRADES  2022    Procedure: CYSTOSCOPY, WITH RETROGRADE PYELOGRAM;  Surgeon: Mitchell Recinos MD;  Location: [facility] OR;  Service: Urology;;    CYSTOSCOPY W/ URETERAL STENT PLACEMENT Left 2022    Procedure: CYSTOSCOPY, WITH URETERAL STENT INSERTION;  Surgeon: Mitchell Recinos MD;  Location: [facility] OR;  Service: Urology;  Laterality: Left;    DILATION AND CURETTAGE OF UTERUS      HYSTERECTOMY      TAHUSO with appendectomy    INNER EAR SURGERY      replaced ear drum    JOINT REPLACEMENT Right     knee    KNEE ARTHROSCOPY W/ DEBRIDEMENT      LUMBAR DISCECTOMY      L4-L5    OOPHORECTOMY      unilateral    TONSILLECTOMY      TYMPANOPLASTY      URETEROSCOPIC REMOVAL OF URETERIC CALCULUS Left 2018    Procedure: REMOVAL, CALCULUS, URETER, URETEROSCOPIC, holmium laser lithotripsy, stone basket extraction, retrograde pyelogram, ureteral stent exchange;  Surgeon: Anaya Zamora MD;  Location: [facility] OR;  Service: Urology;  Laterality: Left;       Social History     Socioeconomic History    Marital status:    Tobacco Use    Smoking status: Former     Types: Cigarettes     Quit date: 1995     Years since quittin.9    Smokeless tobacco: Never   Substance and Sexual Activity    Alcohol use: Yes     Alcohol/week: 1.0 standard drink     Types: 1 Glasses of wine per week     Comment: social    Drug use: No    Sexual activity: Yes      Partners: Male     Birth control/protection: Surgical     Comment:  since 1972     Social Determinants of Health     Financial Resource Strain: Low Risk     Difficulty of Paying Living Expenses: Not hard at all   Food Insecurity: No Food Insecurity    Worried About Running Out of Food in the Last Year: Never true    Ran Out of Food in the Last Year: Never true   Transportation Needs: No Transportation Needs    Lack of Transportation (Medical): No    Lack of Transportation (Non-Medical): No   Physical Activity: Inactive    Days of Exercise per Week: 0 days    Minutes of Exercise per Session: 0 min   Stress: Stress Concern Present    Feeling of Stress : Rather much   Social Connections: Unknown    Frequency of Communication with Friends and Family: More than three times a week    Frequency of Social Gatherings with Friends and Family: More than three times a week    Active Member of Clubs or Organizations: Yes    Attends Club or Organization Meetings: More than 4 times per year    Marital Status:    Housing Stability: Low Risk     Unable to Pay for Housing in the Last Year: No    Number of Places Lived in the Last Year: 1    Unstable Housing in the Last Year: No         OBJECTIVE:     Vital Signs Range (Last 24H):  Temp:  [36.7 °C (98.1 °F)] 36.7 °C (98.1 °F)  Pulse:  [65] 65  Resp:  [16] 16  SpO2:  [94 %] 94 %  BP: (141)/(65) 141/65    Significant Labs:  Lab Results   Component Value Date    WBC 11.12 12/15/2022    HGB 10.9 (L) 12/15/2022    HCT 34.0 (L) 12/15/2022     12/15/2022    CHOL 227 (H) 03/29/2022    TRIG 122 03/29/2022    HDL 90 (H) 03/29/2022    ALT 11 12/15/2022    AST 15 12/15/2022     12/15/2022    K 3.8 12/15/2022    CL 99 12/15/2022    CREATININE 0.8 12/15/2022    BUN 12 12/15/2022    CO2 30 (H) 12/15/2022    TSH 2.017 03/29/2022    INR 1.0 01/13/2017    GLUF 123 (H) 11/04/2008    HGBA1C 6.2 (H) 06/14/2022       Diagnostic Studies:    EKG:   Results for  orders placed or performed during the hospital encounter of 12/11/22   EKG, 12 - Lead    Collection Time: 12/11/22 10:15 AM    Narrative    Test Reason : N34.2,    Vent. Rate : 081 BPM     Atrial Rate : 081 BPM     P-R Int : 132 ms          QRS Dur : 074 ms      QT Int : 364 ms       P-R-T Axes : 105 123 142 degrees     QTc Int : 422 ms     Suspect arm lead reversal, interpretation assumes no reversal  Normal sinus rhythm  Left posterior fascicular block  Inferior infarct ,age undetermined  Abnormal ECG  When compared with ECG of 03-OCT-2022 08:53,  Left posterior fascicular block is now Present  Inferior infarct is now Present  Confirmed by Mateo MENDOZA MD, Rambo LEONE ([id number]) on 12/12/2022 8:36:11 AM    Referred By: [email or web address]   SELF           Confirmed By:Rambo Galindo III, MD             Pre-op Assessment    I have reviewed the Patient Summary Reports.     I have reviewed the Nursing Notes. I have reviewed the NPO Status.   I have reviewed the Medications.     Review of Systems  Anesthesia Hx:  No problems with previous Anesthesia  History of prior surgery of interest to airway management or planning:  Denies Personal Hx of Anesthesia complications.   Social:  Former Smoker    Hematology/Oncology:  Hematology Normal   Oncology Normal     EENT/Dental:   chronic allergic rhinitis   Cardiovascular:   Exercise tolerance: good Hypertension Denies Valvular problems/Murmurs.  Denies MI.      Pulmonary:   Asthma Shortness of breath Sleep Apnea Reports asthma exacerbation due to weather.    Renal/:   renal calculi    Hepatic/GI:   PUD,    Musculoskeletal:   Arthritis     Neurological:   Denies TIA. CVA (12/2013) Denies Seizures.    Endocrine:   BMI = 34.5 Obesity / BMI > 30  Psych:   depression          Physical Exam  General: Well nourished, Cooperative, Alert and Oriented    Airway:  Mallampati: II / I  Mouth Opening: Normal  TM Distance: Normal  Tongue: Normal  Neck ROM: Normal ROM  Neck: Girth  Increased    Dental:  Intact    Chest/Lungs:  Clear to auscultation, Normal Respiratory Rate    Heart:  Rate: Normal        Anesthesia Plan  Type of Anesthesia, risks & benefits discussed:    Anesthesia Type: Gen ETT  Intra-op Monitoring Plan: Standard ASA Monitors  Post Op Pain Control Plan: multimodal analgesia and IV/PO Opioids PRN  Induction:  IV  Informed Consent: Informed consent signed with the Patient and all parties understand the risks and agree with anesthesia plan.  All questions answered.   ASA Score: 3  Day of Surgery Review of History & Physical: H&P Update referred to the surgeon/provider.    Ready For Surgery From Anesthesia Perspective.     .

## 2022-12-21 NOTE — PROGRESS NOTES
IP Liaison - Final Visit Note    Patient: Tracy Armendariz  MRN:  [id number]  Date of Service:  12/21/2022  Completed by:  JAGRUTI Urias    Reason for Visit   Patient presents with    IP Liaison Chart Review     Patient discharged from hospital before JAGRUTI was able to complete follow-up visit.        Patient Summary     Discharge Date: 12/15/2022  Discharge telephone number/address: [phone] / [address] [phone]  Follow up provider: Angie Camara MD  Follow up appointments: 1/3/2023 @ 11:00  Home Health agency & telephone number: [facility]  DME ordered &  name: n/a  Assigned OPCM RN/SW: n/a  Report sent to follow up team (PCP/OPCM) via in basket message: n/a  Community Resources arranged including agency name & contact info: n/a      JAGRUTI Urias

## 2022-12-21 NOTE — OP NOTE
OPERATIVE DICTATION  DATE OF OPERATION: 12/21/2022    SERVICE: Urology    SURGEONS:  1. Anaya Zamora MD    ANESTHESIA:  No anesthesia staff entered.    STAFF:  Circulator: Amberly Saez RN  Scrub Person: [place]    ANESTHESIA: General    PREOPERATIVE DIAGNOSIS: Pre-Op Diagnosis Codes:     * Dysuria [R30.0]     * Nephrolithiasis [N20.0]     * Urinary tract infection without hematuria, site unspecified [N39.0]    POSTOPERATIVE DIAGNOSIS: Post-Op Diagnosis Codes:     * Dysuria [R30.0]     * Nephrolithiasis [N20.0]     * Urinary tract infection without hematuria, site unspecified [N39.0]    PROCEDURES:   1. left ureteroscopy, holmium laser lithotripsy, stone basket extraction of ureteral stone  2. Left ureteroscopy, basketing of a left kidney stone without use of laser   3. Cystoscopy, left retrograde pyelogram  4. Cystoscopy, placement of left 6 Greenlandic by 26cm JJ ureteral stent OFF A STRING  5. Fluoroscopy < 1 hour  6. Interpretation of fluoroscopic images       COMPLICATIONS: * No complications entered in OR log *    DRAINS: none    TUBES: none    IMPLANTS:   Implant Name Type Inv. Item Serial No.  Lot No. LRB No. Used Action   STENT SET URETERAL [id number] - [id number]  STENT SET URETERAL [id number]  [employer] [id number] Left 1 Implanted       FLUIDS: see anesthesia record     ESTIMATED BLOOD LOSS: * No values recorded between 12/21/2022  1:40 PM and 12/21/2022  2:02 PM *    FINDINGS:   1. Left 6mm UPJ stone fragmented with holmium laser and 1 left lower pole stone visualized and basketed separately without use of laser.    SPECIMEN(S):   Left kidney stones      CONDITION: stable    INDICATIONS FOR THE PROCEDURE:  See H&P    PROCEDURE IN DETAIL:  After appropriate informed consent was obtained, the patient was taken to the operating room and placed in the supine position. After induction of General, she was placed in the dorsal lithotomy position.  Then she was prepped and draped in the usual  sterile fashion.     Thereafter a WHO timeout was performed and the procedure was initiated. The rigid 22 Guatemalan cystoscope was inserted into the bladder via the urethra.     I visualized the left ureteral orifice and cannulated with a Motion guidewire. I advanced the wire until I noted a coil in the renal pelvis fluoroscopically alongside the indwelling ureteral stent. The indwelling ureteral stent was removed using the alligator graspers.     Afterwards I advanced a second guidewire, the amplatz superstiff guidewire, into the left renal pelvis under fluoroscopic guidance via a dual lumen catheter. This was noted to coil alongside the first guidewire. I advanced a 35cm ureteral access sheath into the ureter until it was passed as proximally as possible. Then the inner obturator was removed.     I advanced the flexible ureteroscope through the access sheath and into the left renal pelvis. There I identified  a 6mm calculus  located in the renal pelvis. Then I proceeded with the holmium laser lithotripsy portion of the procedure. I used the 200 holmium micron laser fiber to dust the stones and also break the stones into smaller manageable fragments. A nitinol tipless basket was used to remove the larger fragments of stones. These stones were sent off for analysis.     Afterwards I performed a left retrograde pyelogram by injecting dilute isovue through the flexible ureteroscope. I mapped out all of the other calyces and only identified 1 stone in the left lower pole that I removed using the basket.     I removed the flexible ureteroscope and the access sheath together. As the sheath was being removed, the entire length of the ureter was inspected. There were no areas of trauma or bleeding identified. No additional calculi were encountered.     Then we proceeded with the left ureteral stent placement. A 6 Guatemalan x 26 cm JJ ureteral stent was advanced over the guidewire. Using the radiopaque marker of the pusher the  stent was positioned in the correct location. As the guidewire was being removed, the left proximal coil in the renal pelvis was formed and visualized fluoroscopically. A coil in the bladder was also noted fluoroscopically.     The rigid cystoscope was re-inserted into the bladder via the urethra to drain the bladder of all urinary contents and the distal coil of the stent was visualized directly and this concluded the procedure.     ATTENDING ATTESTATION  I was present and scrubbed for the entire duration  of the procedure.      CASE DURATION:  * Missing case tracking time(s) *    DISPOSITION:   The patient tolerated the procedure well, she was extubated, and taken to post-anesthesia care unit in satisfactory condition.  She will f/u and undergo a cysto, stent removal in 1-2 weeks.     Anaya Zamora MD

## 2022-12-21 NOTE — DISCHARGE SUMMARY
[facility] ([facility])  Urology  Discharge Summary      Patient Name: Tracy Armendariz  MRN: [id number]  Admission Date: 12/21/2022  Hospital Length of Stay: 0 days  Discharge Date: 12/21/2022  Attending Physician: Anaya Zamora MD   Discharging Provider: Anaya Zamora MD  Primary Care Physician: Bartolo Jules MD    HPI: The patient is a 72 y.o. female with past medical history (listed below) ureteral stone in the left side.    The patient elected to proceed with the procedure(s) below. Please see H&P and/or clinic progress note(s) for full details.     Procedure(s) (LRB):  left ureteroscopy, holmium laser lithotripsy, stone basketing, retrograde pyelogram, stent placement (Left)  PYELOGRAM, RETROGRADE  EXTRACTION - STONE (Left)     Past Medical History:   Diagnosis Date    Allergy     Anticoagulant long-term use     Arthritis     CVA (cerebral infarction) 2013    Depression     Disorder of kidney and ureter     renal stones    Diverticulosis of colon     Extrinsic asthma, unspecified     Hyperlipidemia     Hypertension     Kidney stone     Left atrial enlargement 12/16/2014    Low back pain     MARTIN (obstructive sleep apnea)     Osteopenia     PUD (peptic ulcer disease)     Stroke  December 2013    Urinary tract infection        Hospital Course (synopsis of major diagnoses, care, treatment, and services provided during the course of the hospital stay): pt tolerated procedure well, was transferred to pacu and discharged home in stable condition.       Consults:     Significant Diagnostic Studies:      Pending Diagnostic Studies:       Procedure Component Value Units Date/Time    SURG FL Surgery Retrograde Pyelogram [[id number]]     Order Status: Sent Lab Status: No result     Urinary Stone Analysis [[id number]] Collected: 12/21/22 1349    Order Status: Sent Lab Status: In process Updated: 12/21/22 1350    Specimen: Urine from Stone             There are no hospital problems to display for this  patient.      Discharged Condition: good    Disposition: Home or Self Care    Follow Up:   Follow-up Information       Anaya Zamora MD Follow up.    Specialty: Urology  Why: clinic will call to setup cysto, stent removal in 1-2 weeks  Contact information:  [address]  [address]  [place] [address]  [phone]                             Patient Instructions:      Diet Adult Regular     No dressing needed     Activity as tolerated       Medications:  Reconciled Home Medications:      Medication List        START taking these medications      ketorolac 10 mg tablet  Commonly known as: TORADOL  Take 1 tablet (10 mg total) by mouth every 6 (six) hours as needed for Pain (moderate pain).     oxyCODONE-acetaminophen 5-325 mg per tablet  Commonly known as: PERCOCET  Take 1 tablet by mouth every 6 (six) hours as needed for Pain (severe pain).     tamsulosin 0.4 mg Cap  Commonly known as: FLOMAX  Take 1 capsule (0.4 mg total) by mouth once daily.            CHANGE how you take these medications      EScitalopram oxalate 20 MG tablet  Commonly known as: LEXAPRO  TAKE 1 TABLET(20 MG) BY MOUTH EVERY DAY  What changed: when to take this            CONTINUE taking these medications      acetaminophen 500 MG tablet  Commonly known as: TYLENOL  Take 500 mg by mouth every 6 (six) hours as needed for Pain.     albuterol 90 mcg/actuation inhaler  Commonly known as: PROVENTIL/VENTOLIN HFA  INHALE 2 PUFFS EVERY 6 HOURS AS NEEDED FOR WHEEZING     aspirin 81 MG Chew  Take 81 mg by mouth once daily.     atorvastatin 40 MG tablet  Commonly known as: LIPITOR  Take 1 tablet (40 mg total) by mouth every evening.     buPROPion 300 MG 24 hr tablet  Commonly known as: WELLBUTRIN XL  Take 1 tablet (300 mg total) by mouth once daily.     calcium carbonate 600 mg calcium (1,500 mg) Tab  Commonly known as: OS-SPENCER  Take 600 mg by mouth once daily.     cholecalciferol (vitamin D3) 25 mcg (1,000 unit) Chew  Take 1,000 Units by mouth once  daily.     diazePAM 2 MG tablet  Commonly known as: VALIUM  Take 1/2 to 1 tablet 3 times daily as needed to control dizziness     diclofenac sodium 1 % Gel  Commonly known as: VOLTAREN  APPLY 2-4 GRAMS TO EACH PAINFUL AREA FOUR TIMES DAILY - MAX 32 GRAMS/DAY     esomeprazole 40 MG capsule  Commonly known as: NEXIUM  Take 1 capsule (40 mg total) by mouth daily as needed (heartburn).     FLUAD 7870-4875 (65 YR UP)(PF) 45 mcg (15 mcg x 3)/0.5 mL Syrg  Generic drug: flu vac 2019 [id number](PF)     losartan 100 MG tablet  Commonly known as: COZAAR  TAKE 1 TABLET(100 MG) BY MOUTH EVERY DAY     PROBIOTIC ORAL  Take 1 capsule by mouth once daily. PRN     sodium chloride 0.9% SolP 100 mL with ertapenem 1 gram SolR 1 g  Inject 1 g into the vein once daily. for 9 days     WOMEN'S DAILY MULTIVITAMIN ORAL  Take 1 tablet by mouth once daily.              Time spent on the discharge of patient: 15 minutes    Anaya Zamora MD  Urology  [place] [facility] ([facility])

## 2022-12-21 NOTE — ANESTHESIA PROCEDURE NOTES
Intubation    Date/Time: 12/21/2022 1:31 PM  Performed by: Sophia Jones CRNA  Authorized by: Roman Mcginnis MD     Intubation:     Induction:  Intravenous    Intubated:  Postinduction    Mask Ventilation:  Easy with oral airway    Attempts:  1    Attempted By:  CRNA    Method of Intubation:  Direct    Blade:  Brennen 3    Laryngeal View Grade: Grade IIA - cords partially seen      Difficult Airway Encountered?: No      Complications:  None    Airway Device:  Oral endotracheal tube    Airway Device Size:  7.0    Style/Cuff Inflation:  Cuffed (inflated to minimal occlusive pressure)    Tube secured:  22    Secured at:  The lips    Placement Verified By:  Capnometry    Complicating Factors:  None    Findings Post-Intubation:  BS equal bilateral and atraumatic/condition of teeth unchanged  Notes:      Soft tissue and dentition unchanged.

## 2022-12-22 NOTE — ANESTHESIA POSTPROCEDURE EVALUATION
Anesthesia Post Evaluation    Patient: Tracy Armendariz    Procedure(s) Performed: Procedure(s) (LRB):  left ureteroscopy, holmium laser lithotripsy, stone basketing, retrograde pyelogram, stent placement (Left)  PYELOGRAM, RETROGRADE  EXTRACTION - STONE (Left)    Final Anesthesia Type: general      Patient location during evaluation: PACU  Patient participation: Yes- Able to Participate  Level of consciousness: awake  Post-procedure vital signs: reviewed and stable  Pain management: adequate  Airway patency: patent    PONV status at discharge: No PONV  Anesthetic complications: no      Cardiovascular status: blood pressure returned to baseline  Respiratory status: unassisted  Hydration status: euvolemic  Follow-up not needed.          Vitals Value Taken Time   /60 12/21/22 1555   Temp 36.6 °C (97.9 °F) 12/21/22 1555   Pulse 71 12/21/22 1555   Resp 19 12/21/22 1555   SpO2 97 % 12/21/22 1555         Event Time   Out of Recovery 14:53:09         Pain/Elsie Score: Pain Rating Prior to Med Admin: 6 (12/21/2022  2:33 PM)  Pain Rating Post Med Admin: 1 (12/21/2022  4:00 PM)  Elsie Score: 10 (12/21/2022  4:00 PM)

## 2022-12-23 ENCOUNTER — TELEPHONE (OUTPATIENT)
Dept: NEUROLOGY | Facility: CLINIC | Age: 72
End: 2022-12-23
Payer: MEDICARE

## 2022-12-23 ENCOUNTER — PES CALL (OUTPATIENT)
Dept: ADMINISTRATIVE | Facility: OTHER | Age: 72
End: 2022-12-23
Payer: MEDICARE

## 2022-12-23 VITALS
WEIGHT: 220 LBS | BODY MASS INDEX: 34.53 KG/M2 | DIASTOLIC BLOOD PRESSURE: 60 MMHG | TEMPERATURE: 98 F | RESPIRATION RATE: 19 BRPM | OXYGEN SATURATION: 97 % | HEART RATE: 71 BPM | HEIGHT: 67 IN | SYSTOLIC BLOOD PRESSURE: 133 MMHG

## 2022-12-28 LAB
COMPN STONE: NORMAL
SPECIMEN SOURCE: NORMAL
STONE ANALYSIS IR-IMP: NORMAL

## 2022-12-30 ENCOUNTER — LAB VISIT (OUTPATIENT)
Dept: LAB | Facility: HOSPITAL | Age: 72
End: 2022-12-30
Attending: PSYCHIATRY & NEUROLOGY
Payer: MEDICARE

## 2022-12-30 ENCOUNTER — OFFICE VISIT (OUTPATIENT)
Dept: NEUROLOGY | Facility: CLINIC | Age: 72
End: 2022-12-30
Payer: MEDICARE

## 2022-12-30 VITALS
BODY MASS INDEX: 34.53 KG/M2 | SYSTOLIC BLOOD PRESSURE: 135 MMHG | DIASTOLIC BLOOD PRESSURE: 77 MMHG | HEIGHT: 67 IN | HEART RATE: 77 BPM | WEIGHT: 220 LBS

## 2022-12-30 DIAGNOSIS — R25.1 TREMOR: ICD-10-CM

## 2022-12-30 DIAGNOSIS — Z86.73 HISTORY OF CVA (CEREBROVASCULAR ACCIDENT): ICD-10-CM

## 2022-12-30 DIAGNOSIS — Z91.81 AT MODERATE RISK FOR FALL: ICD-10-CM

## 2022-12-30 DIAGNOSIS — G60.3 IDIOPATHIC PROGRESSIVE NEUROPATHY: ICD-10-CM

## 2022-12-30 DIAGNOSIS — G60.3 IDIOPATHIC PROGRESSIVE NEUROPATHY: Primary | ICD-10-CM

## 2022-12-30 DIAGNOSIS — H81.391 PERIPHERAL VERTIGO INVOLVING RIGHT EAR: ICD-10-CM

## 2022-12-30 LAB — VIT B12 SERPL-MCNC: 963 PG/ML (ref 210–950)

## 2022-12-30 PROCEDURE — 84165 PROTEIN E-PHORESIS SERUM: CPT | Mod: 26,,, | Performed by: PATHOLOGY

## 2022-12-30 PROCEDURE — 84425 ASSAY OF VITAMIN B-1: CPT | Performed by: PSYCHIATRY & NEUROLOGY

## 2022-12-30 PROCEDURE — 1160F RVW MEDS BY RX/DR IN RCRD: CPT | Mod: CPTII,S$GLB,, | Performed by: PSYCHIATRY & NEUROLOGY

## 2022-12-30 PROCEDURE — 99999 PR PBB SHADOW E&M-EST. PATIENT-LVL IV: CPT | Mod: PBBFAC,,, | Performed by: PSYCHIATRY & NEUROLOGY

## 2022-12-30 PROCEDURE — 84165 PROTEIN E-PHORESIS SERUM: CPT | Performed by: PSYCHIATRY & NEUROLOGY

## 2022-12-30 PROCEDURE — 1159F PR MEDICATION LIST DOCUMENTED IN MEDICAL RECORD: ICD-10-PCS | Mod: CPTII,S$GLB,, | Performed by: PSYCHIATRY & NEUROLOGY

## 2022-12-30 PROCEDURE — 4010F PR ACE/ARB THEARPY RXD/TAKEN: ICD-10-PCS | Mod: CPTII,S$GLB,, | Performed by: PSYCHIATRY & NEUROLOGY

## 2022-12-30 PROCEDURE — 1111F PR DISCHARGE MEDS RECONCILED W/ CURRENT OUTPATIENT MED LIST: ICD-10-PCS | Mod: CPTII,S$GLB,, | Performed by: PSYCHIATRY & NEUROLOGY

## 2022-12-30 PROCEDURE — 84165 PATHOLOGIST INTERPRETATION SPE: ICD-10-PCS | Mod: 26,,, | Performed by: PATHOLOGY

## 2022-12-30 PROCEDURE — 3044F PR MOST RECENT HEMOGLOBIN A1C LEVEL <7.0%: ICD-10-PCS | Mod: CPTII,S$GLB,, | Performed by: PSYCHIATRY & NEUROLOGY

## 2022-12-30 PROCEDURE — 3078F DIAST BP <80 MM HG: CPT | Mod: CPTII,S$GLB,, | Performed by: PSYCHIATRY & NEUROLOGY

## 2022-12-30 PROCEDURE — 3288F PR FALLS RISK ASSESSMENT DOCUMENTED: ICD-10-PCS | Mod: CPTII,S$GLB,, | Performed by: PSYCHIATRY & NEUROLOGY

## 2022-12-30 PROCEDURE — 1160F PR REVIEW ALL MEDS BY PRESCRIBER/CLIN PHARMACIST DOCUMENTED: ICD-10-PCS | Mod: CPTII,S$GLB,, | Performed by: PSYCHIATRY & NEUROLOGY

## 2022-12-30 PROCEDURE — 1159F MED LIST DOCD IN RCRD: CPT | Mod: CPTII,S$GLB,, | Performed by: PSYCHIATRY & NEUROLOGY

## 2022-12-30 PROCEDURE — 3288F FALL RISK ASSESSMENT DOCD: CPT | Mod: CPTII,S$GLB,, | Performed by: PSYCHIATRY & NEUROLOGY

## 2022-12-30 PROCEDURE — 1100F PR PT FALLS ASSESS DOC 2+ FALLS/FALL W/INJURY/YR: ICD-10-PCS | Mod: CPTII,S$GLB,, | Performed by: PSYCHIATRY & NEUROLOGY

## 2022-12-30 PROCEDURE — 99205 OFFICE O/P NEW HI 60 MIN: CPT | Mod: S$GLB,,, | Performed by: PSYCHIATRY & NEUROLOGY

## 2022-12-30 PROCEDURE — 82607 VITAMIN B-12: CPT | Performed by: PSYCHIATRY & NEUROLOGY

## 2022-12-30 PROCEDURE — 1126F PR PAIN SEVERITY QUANTIFIED, NO PAIN PRESENT: ICD-10-PCS | Mod: CPTII,S$GLB,, | Performed by: PSYCHIATRY & NEUROLOGY

## 2022-12-30 PROCEDURE — 99205 PR OFFICE/OUTPT VISIT, NEW, LEVL V, 60-74 MIN: ICD-10-PCS | Mod: S$GLB,,, | Performed by: PSYCHIATRY & NEUROLOGY

## 2022-12-30 PROCEDURE — 84252 ASSAY OF VITAMIN B-2: CPT | Performed by: PSYCHIATRY & NEUROLOGY

## 2022-12-30 PROCEDURE — 1100F PTFALLS ASSESS-DOCD GE2>/YR: CPT | Mod: CPTII,S$GLB,, | Performed by: PSYCHIATRY & NEUROLOGY

## 2022-12-30 PROCEDURE — 86334 PATHOLOGIST INTERPRETATION IFE: ICD-10-PCS | Mod: 26,,, | Performed by: PATHOLOGY

## 2022-12-30 PROCEDURE — 3075F SYST BP GE 130 - 139MM HG: CPT | Mod: CPTII,S$GLB,, | Performed by: PSYCHIATRY & NEUROLOGY

## 2022-12-30 PROCEDURE — 1126F AMNT PAIN NOTED NONE PRSNT: CPT | Mod: CPTII,S$GLB,, | Performed by: PSYCHIATRY & NEUROLOGY

## 2022-12-30 PROCEDURE — 1111F DSCHRG MED/CURRENT MED MERGE: CPT | Mod: CPTII,S$GLB,, | Performed by: PSYCHIATRY & NEUROLOGY

## 2022-12-30 PROCEDURE — 99499 UNLISTED E&M SERVICE: CPT | Mod: S$GLB,,, | Performed by: PSYCHIATRY & NEUROLOGY

## 2022-12-30 PROCEDURE — 86334 IMMUNOFIX E-PHORESIS SERUM: CPT | Performed by: PSYCHIATRY & NEUROLOGY

## 2022-12-30 PROCEDURE — 3008F BODY MASS INDEX DOCD: CPT | Mod: CPTII,S$GLB,, | Performed by: PSYCHIATRY & NEUROLOGY

## 2022-12-30 PROCEDURE — 3044F HG A1C LEVEL LT 7.0%: CPT | Mod: CPTII,S$GLB,, | Performed by: PSYCHIATRY & NEUROLOGY

## 2022-12-30 PROCEDURE — 99999 PR PBB SHADOW E&M-EST. PATIENT-LVL IV: ICD-10-PCS | Mod: PBBFAC,,, | Performed by: PSYCHIATRY & NEUROLOGY

## 2022-12-30 PROCEDURE — 3078F PR MOST RECENT DIASTOLIC BLOOD PRESSURE < 80 MM HG: ICD-10-PCS | Mod: CPTII,S$GLB,, | Performed by: PSYCHIATRY & NEUROLOGY

## 2022-12-30 PROCEDURE — 3008F PR BODY MASS INDEX (BMI) DOCUMENTED: ICD-10-PCS | Mod: CPTII,S$GLB,, | Performed by: PSYCHIATRY & NEUROLOGY

## 2022-12-30 PROCEDURE — 3075F PR MOST RECENT SYSTOLIC BLOOD PRESS GE 130-139MM HG: ICD-10-PCS | Mod: CPTII,S$GLB,, | Performed by: PSYCHIATRY & NEUROLOGY

## 2022-12-30 PROCEDURE — 86334 IMMUNOFIX E-PHORESIS SERUM: CPT | Mod: 26,,, | Performed by: PATHOLOGY

## 2022-12-30 PROCEDURE — 99499 RISK ADDL DX/OHS AUDIT: ICD-10-PCS | Mod: S$GLB,,, | Performed by: PSYCHIATRY & NEUROLOGY

## 2022-12-30 PROCEDURE — 84446 ASSAY OF VITAMIN E: CPT | Performed by: PSYCHIATRY & NEUROLOGY

## 2022-12-30 PROCEDURE — 4010F ACE/ARB THERAPY RXD/TAKEN: CPT | Mod: CPTII,S$GLB,, | Performed by: PSYCHIATRY & NEUROLOGY

## 2022-12-30 NOTE — PROGRESS NOTES
[facility] NEUROLOGY  [facility], [place]    Date: 12/30/22  Patient Name: Tracy Armendariz   MRN: [id number]   PCP: Bartolo Jules  Referring Provider: LORRAINE Alfonso MD    Chief Complaint:  History of stroke, exacerbation of vertigo  Subjective:   Patient seen in consultation at the request of LORRAINE Alfonso MD for the evaluation of the above chief complaints. A copy of this note will be sent to the referring physician.        HPI:   Ms. Tracy Armendariz is a 72 y.o. RH female presenting for evaluation of multiple neurological complaints.    Tremor in the right hand present for least the last year.  Waxes and wanes in intensity.  No significant impact in life or function but is most noticeable when using utensils or drinking from a cup.    History of stroke in December 2013.  Via patient and chart review, patient was believed to be having a large vessel ischemic event and received TPA.  During the workup, the patient received iodine and is allergic.  Had to be given Benadryl which then caused her to sleep for very long time, but no major deficits following this health event.  No underlying etiology for the stroke was ever discovered.  Had implanted heart monitor which was reviewed over an extended timeframe after this event.  Has continued on aspirin 81 mg and atorvastatin 40 mg daily since then.  MRI completed in 2016 showed no evidence of large vessel ischemic events.  It did identify small-vessel strokes in the bilateral thalami with some chronic microvascular ischemic changes.    History of peripheral vertigo on the right.  Describes a prolonged time frame where she suffered with significant room spinning sensations after rapid head movements or turning the body too quickly.  Eventually went through vestibular rehab which drastically improved symptoms.  Then, she suffered with a sepsis event due to nephrolithiasis recently.  Since discharge from the hospital, the patient has felt the  same off-balance sensation that she suffered with while being treated for vertigo in the past.  When extensive conversation regarding poor neuronal reserve and other relevant topics that could be contributing to this complaint.  Denies experiencing any episodes of dizziness with nausea/vomiting since discharge from hospital.  No recent falls.    Denies bowel/bladder incontinence.  No significant neuropathic pains in the feet.  Some mild cramping in the feet from time to time.    Hemoglobin A1C   Date Value Ref Range Status   06/14/2022 6.2 (H) 4.0 - 5.6 % Final     Comment:     ADA Screening Guidelines:  5.7-6.4%  Consistent with prediabetes  >or=6.5%  Consistent with diabetes    High levels of fetal hemoglobin interfere with the HbA1C  assay. Heterozygous hemoglobin variants (HbS, HgC, etc)do  not significantly interfere with this assay.   However, presence of multiple variants may affect accuracy.     03/15/2018 5.5 4.0 - 5.6 % Final     Comment:     According to ADA guidelines, hemoglobin A1c <7.0% represents  optimal control in non-pregnant diabetic patients. Different  metrics may apply to specific patient populations.   Standards of Medical Care in Diabetes-2016.  For the purpose of screening for the presence of diabetes:  <5.7%     Consistent with the absence of diabetes  5.7-6.4%  Consistent with increasing risk for diabetes   (prediabetes)  >or=6.5%  Consistent with diabetes  Currently, no consensus exists for use of hemoglobin A1c  for diagnosis of diabetes for children.  This Hemoglobin A1c assay has significant interference with fetal   hemoglobin   (HbF). The results are invalid for patients with abnormal amounts of   HbF,   including those with known Hereditary Persistence   of Fetal Hemoglobin. Heterozygous hemoglobin variants (HbAS, HbAC,   HbAD, HbAE, HbA2) do not significantly interfere with this assay;   however, presence of multiple variants in a sample may impact the %   interference.      04/10/2017 5.9 4.5 - 6.2 % Final     Comment:     According to ADA guidelines, hemoglobin A1C <7.0% represents  optimal control in non-pregnant diabetic patients.  Different  metrics may apply to specific populations.   Standards of Medical Care in Diabetes - 2016.  For the purpose of screening for the presence of diabetes:  <5.7%     Consistent with the absence of diabetes  5.7-6.4%  Consistent with increasing risk for diabetes   (prediabetes)  >or=6.5%  Consistent with diabetes  Currently no consensus exists for use of hemoglobin A1C  for diagnosis of diabetes for children.           PAST MEDICAL HISTORY:  Past Medical History:   Diagnosis Date    Allergy     Anticoagulant long-term use     Arthritis     CVA (cerebral infarction)     Depression     Disorder of kidney and ureter     renal stones    Diverticulosis of colon     Extrinsic asthma, unspecified     Hyperlipidemia     Hypertension     Kidney stone     Left atrial enlargement 2014    Low back pain     MARTIN (obstructive sleep apnea)     Osteopenia     PUD (peptic ulcer disease)     Stroke  2013    Urinary tract infection        PAST SURGICAL HISTORY:  Past Surgical History:   Procedure Laterality Date    APPENDECTOMY      @ time of hysterectomy    BACK SURGERY      CATARACT EXTRACTION       SECTION      CHOLECYSTECTOMY      laparoscopic    COLONOSCOPY N/A 2018    Procedure: COLONOSCOPY/Golytely;  Surgeon: Janine Black MD;  Location: [facility];  Service: Endoscopy;  Laterality: N/A;    CYSTOSCOPY W/ RETROGRADES  2022    Procedure: CYSTOSCOPY, WITH RETROGRADE PYELOGRAM;  Surgeon: Mitchell Recinos MD;  Location: [facility] OR;  Service: Urology;;    CYSTOSCOPY W/ URETERAL STENT PLACEMENT Left 2022    Procedure: CYSTOSCOPY, WITH URETERAL STENT INSERTION;  Surgeon: Mitchell Recinos MD;  Location: [facility] OR;  Service: Urology;  Laterality: Left;    CYSTOURETEROSCOPY, WITH HOLMIUM LASER LITHOTRIPSY OF URETERAL  CALCULUS AND STENT INSERTION Left 12/21/2022    Procedure: left ureteroscopy, holmium laser lithotripsy, stone basketing, retrograde pyelogram, stent placement;  Surgeon: Anaya Zamora MD;  Location: [facility] OR;  Service: Urology;  Laterality: Left;    DILATION AND CURETTAGE OF UTERUS  1972    EXTRACTION - STONE Left 12/21/2022    Procedure: EXTRACTION - STONE;  Surgeon: Anaya Zamora MD;  Location: [facility] OR;  Service: Urology;  Laterality: Left;    HYSTERECTOMY  1978    TAHUSO with appendectomy    INNER EAR SURGERY      replaced ear drum    JOINT REPLACEMENT Right     knee    KNEE ARTHROSCOPY W/ DEBRIDEMENT  2003    LUMBAR DISCECTOMY  1980    L4-L5    OOPHORECTOMY      unilateral    RETROGRADE PYELOGRAPHY  12/21/2022    Procedure: PYELOGRAM, RETROGRADE;  Surgeon: Anaya Zamora MD;  Location: [facility] OR;  Service: Urology;;    TONSILLECTOMY      TYMPANOPLASTY      URETEROSCOPIC REMOVAL OF URETERIC CALCULUS Left 12/19/2018    Procedure: REMOVAL, CALCULUS, URETER, URETEROSCOPIC, holmium laser lithotripsy, stone basket extraction, retrograde pyelogram, ureteral stent exchange;  Surgeon: Anaya Zamora MD;  Location: [facility] OR;  Service: Urology;  Laterality: Left;       CURRENT MEDS:  Current Outpatient Medications   Medication Sig Dispense Refill    acetaminophen (TYLENOL) 500 MG tablet Take 500 mg by mouth every 6 (six) hours as needed for Pain.      albuterol (PROVENTIL/VENTOLIN HFA) 90 mcg/actuation inhaler INHALE 2 PUFFS EVERY 6 HOURS AS NEEDED FOR WHEEZING 18 g 0    aspirin 81 MG Chew Take 81 mg by mouth once daily.      atorvastatin (LIPITOR) 40 MG tablet Take 1 tablet (40 mg total) by mouth every evening. 90 tablet 3    buPROPion (WELLBUTRIN XL) 300 MG 24 hr tablet Take 1 tablet (300 mg total) by mouth once daily. 90 tablet 11    calcium carbonate (OS-SPENCER) 600 mg (1,500 mg) Tab Take 600 mg by mouth once daily.      cholecalciferol, vitamin D3, 1,000 unit Chew Take 1,000 Units by mouth once daily.      diazePAM  (VALIUM) 2 MG tablet Take 1/2 to 1 tablet 3 times daily as needed to control dizziness 50 tablet 1    diclofenac sodium (VOLTAREN) 1 % Gel APPLY 2-4 GRAMS TO EACH PAINFUL AREA FOUR TIMES DAILY - MAX 32 GRAMS/ g 6    EScitalopram oxalate (LEXAPRO) 20 MG tablet TAKE 1 TABLET(20 MG) BY MOUTH EVERY DAY (Patient taking differently: Take 20 mg by mouth once daily.) 90 tablet 3    esomeprazole (NEXIUM) 40 MG capsule Take 1 capsule (40 mg total) by mouth daily as needed (heartburn). 30 capsule 3    FLUAD 8840-0807, 65 YR UP,,PF, 45 mcg (15 mcg x 3)/0.5 mL Syrg       LACTOBACILLUS ACIDOPHILUS (PROBIOTIC ORAL) Take 1 capsule by mouth once daily. PRN      losartan (COZAAR) 100 MG tablet TAKE 1 TABLET(100 MG) BY MOUTH EVERY DAY 90 tablet 3    MULTIVIT WITH CALCIUM,IRON,MIN (WOMEN'S DAILY MULTIVITAMIN ORAL) Take 1 tablet by mouth once daily.      oxyCODONE-acetaminophen (PERCOCET) 5-325 mg per tablet Take 1 tablet by mouth every 6 (six) hours as needed for Pain (severe pain). 15 tablet 0    tamsulosin (FLOMAX) 0.4 mg Cap Take 1 capsule (0.4 mg total) by mouth once daily. 90 capsule 0     No current facility-administered medications for this visit.       ALLERGIES:  Review of patient's allergies indicates:   Allergen Reactions    Iodinated contrast media Hives and Rash    Gabapentin Hallucinations    Iodine Hives    Isothiazolinones Rash    Penicillins Rash       FAMILY HISTORY:  Family History   Problem Relation Age of Onset    Cervical cancer Mother     Cancer Mother 65        lung cancer - non smoker    Stroke Paternal Grandfather     Hypertension Maternal Grandfather     Heart disease Maternal Grandfather     Hypertension Father     Coronary artery disease Father 62    Heart disease Father     Diabetes Sister     Heart disease Sister     Kidney disease Sister     Breast cancer Daughter 36    Heart failure Sister         60s    Colon cancer Neg Hx     Ovarian cancer Neg Hx        SOCIAL HISTORY:  Social History  "    Tobacco Use    Smoking status: Former     Types: Cigarettes     Quit date: 1995     Years since quittin.0    Smokeless tobacco: Never   Substance Use Topics    Alcohol use: Yes     Alcohol/week: 1.0 standard drink     Types: 1 Glasses of wine per week     Comment: social    Drug use: No       Review of Systems:  Gen: no fever, no chills, no generalized feeling of weakness   HEENT: no double vision, no blurred vision, no eye pain, no eye exudates. no nasal congestion,   no traumatic injury of head, no neck pain, no neck stiffness. no photophobia or phonophobia at this time. ?    Heart: no chest pain, no SOB    Lungs: no SOB, no cough    MSK: no weakness of legs, intact ROM    ABD: no abd pain, no N/V/D/C, no difficulty with defecation.    Extremities: No leg pain, no edema.       Objective:     Vitals:    22 1310   BP: 135/77   Pulse: 77   Weight: 99.8 kg (220 lb)   Height: 5' 7" (1.702 m)     General: female in NAD, alert and awake, Aox3, well groomed. ?No hypomimia    ? ?    HEENT: Head is NC/AT EOMI, pupil size: 4 mm B/L; hearing grossly intact b/l. Mucous membrane moist, uvula midline, no pharyngeal erythema, exudates or discharges.      Neck: Supple. no nuchal rigidity.      Cardiovascular: well perfused, no cyanosis        Respiratory: Symmetric chest rise noted       Musculoskeletal: Muscle tone noted to be adequate for patient age, muscle mass is WNL. No spontaneous movements or fasciculations noted during this examination.       Extremities: No pedal edema or calf tenderness. No cogwheel rigidity noted on B/L UE extremities.  No resting tremors  High velocity low-amplitude tremor present in the bilateral upper extremities when muscles are engaged against gravity     Neurological Examination.    Mental status: AA&O x3; Affect/mood is euthymic/congruent; no aphasia noted during examination. Patient answers simple questions appropriately & follows simple commands; no dysarthria or expressive " aphasia; no lynn-neglect or extinction. Vocabulary/word finding: excellent.       Cranial Nerves:  NICK, Nonsustained nystagmus with right end point gaze, otherwise II-XII grossly intact.     Muscle Function: Tone WNL and Muscle bulk WNL.  5/5 throughout     Sensory:  Intact to light touch throughout.  Temperature sensation in the feet mildly decreased.  Vibratory sensation mild to moderately decreased at the level of the bilateral great toes and mildly decreased at the bilateral ankles.     Reflexes: Left and Right biceps, triceps, brachioradialis are 3+/4. patellar 1+/4 on Left and 0/4 on Right (postsurgical changes), B/L Achilles 1+/4     Coordination: no dysmetria (finger to nose negative)     Gait:  Adequate casual gait with sufficient stride length.  Good arm swing.  No shuffling.  Cautious turns with extra steps.  Frequent glancing to the ground.  Gait mildly widened with occasional corrective steps.  No cane or walker use during today's exam.  Gently drifted from 1 side of the brito to the other.  Good posture    No global bradykinesia or rigidity  Normal rapid alternating movements in the bilateral upper extremities    Other:  All laboratory studies from 2022 were reviewed at length including most recent hemoglobin A1c, lipid panel, CMP.    03/22/2019 MRI cervical spine without contrast:  FINDINGS:  The visualized portions of the posterior fossa is unremarkable.  There is arthropathy at the craniocervical junction.  No prevertebral soft tissue swelling is identified.  The cervical alignment is maintained.  There are fibrotic endplate changes in the lower cervical spine.  The remainder of the bone marrow signal is within normal limits.  The cord is normal in signal without evidence of edema or expansion.  There are nintraspinal collections.  There is effacement of the ventral thecal sac in the lower cervical spine.  There is hypertrophy of the posterior elements.  There is multilevel disc desiccation.   "Evaluation of the individual disc levels reveals the following:  C2-C3, there is a central disc protrusion.  There is mild hypertrophy of the posterior elements.  The spinal canal and neural foramina are within normal limits.  C3-C4, there is a disc osteophyte complex along with facet hypertrophy and uncovertebral hypertrophy.  There is superimposed left paracentral disc protrusion.  There is mild narrowing of the spinal canal.  There is mild right and moderate left neural foraminal narrowing.  C4-C5, there is a disc osteophyte complex along with facet hypertrophy and uncovertebral hypertrophy.  There is superimposed central disc protrusion.  There is mild to moderate narrowing of the spinal canal.  There is mild right and moderate left neural foraminal narrowing.  C5-C6, there is a disc osteophyte complex along with facet hypertrophy and uncovertebral hypertrophy.  There is superimposed central disc protrusion.  There is severe narrowing of the spinal canal.  There is severe bilateral neural foraminal narrowing  C6-C7, there is a disc osteophyte complex along with facet hypertrophy and uncovertebral hypertrophy.  There is superimposed central disc protrusion.  There is moderate to severe spinal canal narrowing.  There is severe right and moderate left neural foraminal narrowing.  C7-T1, there is a disc osteophyte complex along with facet hypertrophy and uncovertebral hypertrophy.  There is mild spinal canal narrowing.  There is mild bilateral neural foraminal narrowing.  The paraspinal normal limits.  Flow voids within the vertebral arteries are unremarkable.  IMPRESSION:   Advanced degenerative changes in the lower cervical spine with moderate to severe spinal canal narrowing at the C5-C6 and C6-C7 levels and associated moderate to severe neural foraminal narrowing.  No cord signal abnormality."    03/14/2016 MRI brain without contrast:  Findings:   The brain exhibits normal contour and morphology.  The " "ventricular system is stable in size and configuration without evidence of hydrocephalus or distortion by mass effect.  No abnormal areas of restricted diffusion are identified to suggest recent infarction.  No abnormal areas of susceptibility are identified to suggest parenchymal hemorrhage.  No abnormal intra-or extra-axial fluid collections are identified.  Small remote infarcts again noted within the medial aspects of the thalami bilaterally.  A few scattered foci of increased T2/FLAIR signal identified within the supratentorial white matter which are nonspecific but suggestive of sequela of mild degree of chronic microvascular ischemic change.  There are postsurgical changes of the bilateral lenses.  The globes and orbits are otherwise unremarkable.  There is mild fluid opacification of the left ethmoid air cells.  The remaining paranasal sinuses are clear.  There is fluid within the bilateral mastoid air cells. The major intracranial T2 flow-voids are present.  The calvarium is intact.  IMPRESSION:   1.  No acute intracranial abnormality identified, specifically no evidence of recent infarction or parenchymal hemorrhage.  2.  Remote infarcts noted within the medial aspects of the thalami bilaterally.  3. Left ethmoid sinus disease, and fluid within the mastoid air cells."      Assessment:   Tracy Armendariz is a 72 y.o. female presenting to discuss multiple neurological complaints.    Postural tremor most notable in right upper extremity; no parkinsonian features present on today's exam.  No further interventions for this concern given its low impact on life or function.    History of stroke confirmed on imaging.  Recommend continuing low-dose daily aspirin and daily statin.  Counseled the patient on the importance of tight control blood pressure, glucose, cholesterol over time for good vascular neurological health.    History of peripheral vertigo with recent mild exacerbation after hospitalization related to " sepsis.  We had an extensive conversation regarding poor neuronal reserve and exacerbation of underlying sensitivities such as these after acute illness.  After discussing treatment options including returning to vestibular rehab, we ultimately decided to allow more healing time and return to home vestibular exercises.  Will revisit the idea of vestibular rehab in 1 month if symptoms remain.    Mild peripheral polyneuropathy is present as evidenced by decreased temperature and vibratory sensation in the distal lower extremities in a nondermatomal pattern.  This can also contribute to mild disequilibrium due to poor proprioception.  Patient was counseled extensively on this topic and encouraged to use her cane given multiple ongoing concerns related to balance.    Problem List Items Addressed This Visit          Neuro    History of CVA (cerebrovascular accident)       ENT    Peripheral vertigo involving right ear       Palliative Care    At moderate risk for fall     Other Visit Diagnoses       Idiopathic progressive neuropathy    -  Primary    Relevant Orders    Immunofixation electrophoresis    Protein electrophoresis, serum    Vitamin B1    Vitamin B12    Vitamin E    VITAMIN B2          I spent a total of 60 minutes on the day of the visit. This includes face to face time and non-face to face time preparing to see the patient (eg, review of tests), obtaining and/or reviewing separately obtained history, documenting clinical information in the electronic or other health record, independently interpreting results and communicating results to the patient/family/caregiver, or care coordinator.    A dictation device was used to produce this document. Use of such devices sometimes results in grammatical errors or replacement of words that sound similarly.    Javon Philippe, DO

## 2023-01-03 ENCOUNTER — PATIENT MESSAGE (OUTPATIENT)
Dept: PRIMARY CARE CLINIC | Facility: CLINIC | Age: 73
End: 2023-01-03

## 2023-01-03 ENCOUNTER — TELEPHONE (OUTPATIENT)
Dept: PRIMARY CARE CLINIC | Facility: CLINIC | Age: 73
End: 2023-01-03

## 2023-01-03 LAB
ALBUMIN SERPL ELPH-MCNC: 3.66 G/DL (ref 3.35–5.55)
ALPHA1 GLOB SERPL ELPH-MCNC: 0.34 G/DL (ref 0.17–0.41)
ALPHA2 GLOB SERPL ELPH-MCNC: 0.99 G/DL (ref 0.43–0.99)
B-GLOBULIN SERPL ELPH-MCNC: 1.06 G/DL (ref 0.5–1.1)
GAMMA GLOB SERPL ELPH-MCNC: 1.26 G/DL (ref 0.67–1.58)
INTERPRETATION SERPL IFE-IMP: NORMAL
PATHOLOGIST INTERPRETATION IFE: NORMAL
PATHOLOGIST INTERPRETATION SPE: NORMAL
PROT SERPL-MCNC: 7.3 G/DL (ref 6–8.4)

## 2023-01-03 NOTE — TELEPHONE ENCOUNTER
Left patient a voicemail to reschedule appointment due to provider being out of the office today.

## 2023-01-04 ENCOUNTER — EXTERNAL HOME HEALTH (OUTPATIENT)
Dept: HOME HEALTH SERVICES | Facility: HOSPITAL | Age: 73
End: 2023-01-04
Payer: MEDICARE

## 2023-01-06 ENCOUNTER — PROCEDURE VISIT (OUTPATIENT)
Dept: UROLOGY | Facility: CLINIC | Age: 73
End: 2023-01-06
Payer: MEDICARE

## 2023-01-06 VITALS
HEIGHT: 67 IN | SYSTOLIC BLOOD PRESSURE: 113 MMHG | DIASTOLIC BLOOD PRESSURE: 70 MMHG | WEIGHT: 234.25 LBS | BODY MASS INDEX: 36.76 KG/M2 | HEART RATE: 71 BPM

## 2023-01-06 DIAGNOSIS — N20.0 NEPHROLITHIASIS: Primary | ICD-10-CM

## 2023-01-06 RX ORDER — SULFAMETHOXAZOLE AND TRIMETHOPRIM 800; 160 MG/1; MG/1
1 TABLET ORAL 2 TIMES DAILY
Qty: 14 TABLET | Refills: 0 | Status: SHIPPED | OUTPATIENT
Start: 2023-01-06 | End: 2023-02-07

## 2023-01-06 NOTE — PROCEDURES
Procedures    Procedure:   1. Flexible cysto-uretheroscopy and ureteral stent removal;   Pre Procedure Diagnosis:  1. Indwelling ureteral stent -s/p cysto, left stent 12/11/22; left URS/HLL/SBE/stent 12/21/22 - intraop findings-Left 6mm UPJ stone fragmented with holmium laser and 1 left lower pole stone visualized and basketed separately without use of laser. Stone analysis Ca phosphate    Post Procedure Diagnosis:  1. same    Surgeon: Anaya Zamora MD    Anesthesia: 2% uro-jet lidocaine jelly for local analgesia    Procedure note:  A flexible cysto-urethroscopy was performed after consent was obtained.  The risks and benefits were explained. 2% lidocaine urojet was used for local analgesia. The genitalia was prepped and draped in the sterile fashion. The flexible scope was advanced into the urethra and into the bladder. The distal coil of the ureteral stent was identified and grasped with the cystoscopic alligator graspers and removed intact. The flexible cystoscope was removed. The patient tolerated the procedure well without complication.     Findings in summary:   Left stent removed intact     Plan:  Stent removal today.  Bactrim x 7 days to guard against infection based off of last UTI cx sensitivities.  Nephrology referral for metabolic workup. Ca phosphate stone.

## 2023-01-07 LAB — VIT B2 SERPL-MCNC: 13 MCG/L (ref 1–19)

## 2023-01-09 ENCOUNTER — TELEPHONE (OUTPATIENT)
Dept: ADMINISTRATIVE | Facility: OTHER | Age: 73
End: 2023-01-09
Payer: MEDICARE

## 2023-01-10 LAB
A-TOCOPHEROL VIT E SERPL-MCNC: 1309 UG/DL (ref 500–1800)
VIT B1 BLD-MCNC: 97 UG/L (ref 38–122)

## 2023-01-12 ENCOUNTER — OFFICE VISIT (OUTPATIENT)
Dept: PRIMARY CARE CLINIC | Facility: CLINIC | Age: 73
End: 2023-01-12
Payer: MEDICARE

## 2023-01-12 VITALS
WEIGHT: 233 LBS | DIASTOLIC BLOOD PRESSURE: 87 MMHG | BODY MASS INDEX: 36.5 KG/M2 | OXYGEN SATURATION: 95 % | HEART RATE: 83 BPM | SYSTOLIC BLOOD PRESSURE: 134 MMHG | TEMPERATURE: 98 F

## 2023-01-12 DIAGNOSIS — F32.A DEPRESSION, UNSPECIFIED DEPRESSION TYPE: ICD-10-CM

## 2023-01-12 DIAGNOSIS — N12 PYELONEPHRITIS: Primary | ICD-10-CM

## 2023-01-12 DIAGNOSIS — A49.8 INFECTION DUE TO ESBL-PRODUCING ESCHERICHIA COLI: ICD-10-CM

## 2023-01-12 DIAGNOSIS — R42 VERTIGO: ICD-10-CM

## 2023-01-12 DIAGNOSIS — Z16.12 INFECTION DUE TO ESBL-PRODUCING ESCHERICHIA COLI: ICD-10-CM

## 2023-01-12 DIAGNOSIS — N20.1 LEFT URETERAL STONE: ICD-10-CM

## 2023-01-12 DIAGNOSIS — Z74.09 IMPAIRED FUNCTIONAL MOBILITY AND ACTIVITY TOLERANCE: ICD-10-CM

## 2023-01-12 DIAGNOSIS — N20.0 KIDNEY STONE: ICD-10-CM

## 2023-01-12 PROBLEM — M25.571 ACUTE BILATERAL ANKLE PAIN: Status: RESOLVED | Noted: 2020-11-08 | Resolved: 2023-01-12

## 2023-01-12 PROBLEM — M25.572 ACUTE BILATERAL ANKLE PAIN: Status: RESOLVED | Noted: 2020-11-08 | Resolved: 2023-01-12

## 2023-01-12 PROBLEM — N34.2 INFECTIVE URETHRITIS: Status: RESOLVED | Noted: 2022-12-12 | Resolved: 2023-01-12

## 2023-01-12 PROCEDURE — 3008F PR BODY MASS INDEX (BMI) DOCUMENTED: ICD-10-PCS | Mod: CPTII,S$GLB,, | Performed by: INTERNAL MEDICINE

## 2023-01-12 PROCEDURE — 1159F PR MEDICATION LIST DOCUMENTED IN MEDICAL RECORD: ICD-10-PCS | Mod: CPTII,S$GLB,, | Performed by: INTERNAL MEDICINE

## 2023-01-12 PROCEDURE — 1111F DSCHRG MED/CURRENT MED MERGE: CPT | Mod: CPTII,S$GLB,, | Performed by: INTERNAL MEDICINE

## 2023-01-12 PROCEDURE — 99999 PR PBB SHADOW E&M-EST. PATIENT-LVL V: ICD-10-PCS | Mod: PBBFAC,,, | Performed by: INTERNAL MEDICINE

## 2023-01-12 PROCEDURE — 1101F PT FALLS ASSESS-DOCD LE1/YR: CPT | Mod: CPTII,S$GLB,, | Performed by: INTERNAL MEDICINE

## 2023-01-12 PROCEDURE — 1160F RVW MEDS BY RX/DR IN RCRD: CPT | Mod: CPTII,S$GLB,, | Performed by: INTERNAL MEDICINE

## 2023-01-12 PROCEDURE — 99215 PR OFFICE/OUTPT VISIT, EST, LEVL V, 40-54 MIN: ICD-10-PCS | Mod: S$GLB,,, | Performed by: INTERNAL MEDICINE

## 2023-01-12 PROCEDURE — 3008F BODY MASS INDEX DOCD: CPT | Mod: CPTII,S$GLB,, | Performed by: INTERNAL MEDICINE

## 2023-01-12 PROCEDURE — 1111F PR DISCHARGE MEDS RECONCILED W/ CURRENT OUTPATIENT MED LIST: ICD-10-PCS | Mod: CPTII,S$GLB,, | Performed by: INTERNAL MEDICINE

## 2023-01-12 PROCEDURE — 3079F DIAST BP 80-89 MM HG: CPT | Mod: CPTII,S$GLB,, | Performed by: INTERNAL MEDICINE

## 2023-01-12 PROCEDURE — 3079F PR MOST RECENT DIASTOLIC BLOOD PRESSURE 80-89 MM HG: ICD-10-PCS | Mod: CPTII,S$GLB,, | Performed by: INTERNAL MEDICINE

## 2023-01-12 PROCEDURE — 1101F PR PT FALLS ASSESS DOC 0-1 FALLS W/OUT INJ PAST YR: ICD-10-PCS | Mod: CPTII,S$GLB,, | Performed by: INTERNAL MEDICINE

## 2023-01-12 PROCEDURE — 1125F PR PAIN SEVERITY QUANTIFIED, PAIN PRESENT: ICD-10-PCS | Mod: CPTII,S$GLB,, | Performed by: INTERNAL MEDICINE

## 2023-01-12 PROCEDURE — 1159F MED LIST DOCD IN RCRD: CPT | Mod: CPTII,S$GLB,, | Performed by: INTERNAL MEDICINE

## 2023-01-12 PROCEDURE — 3288F FALL RISK ASSESSMENT DOCD: CPT | Mod: CPTII,S$GLB,, | Performed by: INTERNAL MEDICINE

## 2023-01-12 PROCEDURE — 3075F SYST BP GE 130 - 139MM HG: CPT | Mod: CPTII,S$GLB,, | Performed by: INTERNAL MEDICINE

## 2023-01-12 PROCEDURE — 1160F PR REVIEW ALL MEDS BY PRESCRIBER/CLIN PHARMACIST DOCUMENTED: ICD-10-PCS | Mod: CPTII,S$GLB,, | Performed by: INTERNAL MEDICINE

## 2023-01-12 PROCEDURE — 1125F AMNT PAIN NOTED PAIN PRSNT: CPT | Mod: CPTII,S$GLB,, | Performed by: INTERNAL MEDICINE

## 2023-01-12 PROCEDURE — 99499 UNLISTED E&M SERVICE: CPT | Mod: S$GLB,,, | Performed by: INTERNAL MEDICINE

## 2023-01-12 PROCEDURE — 99999 PR PBB SHADOW E&M-EST. PATIENT-LVL V: CPT | Mod: PBBFAC,,, | Performed by: INTERNAL MEDICINE

## 2023-01-12 PROCEDURE — 99499 RISK ADDL DX/OHS AUDIT: ICD-10-PCS | Mod: S$GLB,,, | Performed by: INTERNAL MEDICINE

## 2023-01-12 PROCEDURE — 3075F PR MOST RECENT SYSTOLIC BLOOD PRESS GE 130-139MM HG: ICD-10-PCS | Mod: CPTII,S$GLB,, | Performed by: INTERNAL MEDICINE

## 2023-01-12 PROCEDURE — 3288F PR FALLS RISK ASSESSMENT DOCUMENTED: ICD-10-PCS | Mod: CPTII,S$GLB,, | Performed by: INTERNAL MEDICINE

## 2023-01-12 PROCEDURE — 99215 OFFICE O/P EST HI 40 MIN: CPT | Mod: S$GLB,,, | Performed by: INTERNAL MEDICINE

## 2023-01-12 RX ORDER — ESCITALOPRAM OXALATE 20 MG/1
20 TABLET ORAL DAILY
Qty: 90 TABLET | Refills: 3 | Status: SHIPPED | OUTPATIENT
Start: 2023-01-12 | End: 2024-03-07 | Stop reason: SDUPTHER

## 2023-01-12 RX ORDER — MECLIZINE HCL 12.5 MG 12.5 MG/1
12.5 TABLET ORAL 3 TIMES DAILY PRN
Qty: 30 TABLET | Refills: 6 | Status: SHIPPED | OUTPATIENT
Start: 2023-01-12 | End: 2023-06-15

## 2023-01-12 NOTE — PROGRESS NOTES
[facility]   New Visit Progress Note   Recent Hospital Discharge     PRESENTING HISTORY     Chief Complaint/Reason for Admission:  Follow up Hospital Discharge   PCP: Bartolo Jules MD    History of Present Illness:  Ms. Tracy Armendariz is a 72 y.o. female who was recently admitted to the hospital.    [facility] Hospital Medicine Discharge Summary  Primary Team: [facility] Hospitalist Team B  Attending Physician: Edis Colbert MD  Resident: Ed  Intern: Francesca  Date of Admit: 12/11/2022  Date of Discharge: 12/15/2022  Discharge to: Home   Condition: Stable   ___________________________________________________________________    Today:  Presents to [facility] for initial hospital follow up.  Recently hospitalized for ESBL E coli Pyelonephritis.  Complicated by left ureteral stone with hydronephrosis.  Had hypoxic respiratory failure at presentation, requiring 4 L NC-> later successfully weaned.   Empiric meropenem initiated based on prior cultures.  Patient seen in consultation with Urology team.  Underwent cystoscopy and ureteral stent placement on 12/11/22.  Purulent urine noted during procedure-> sent for culture with eventual growth of ESBL E coli.   ID consulted for ABX recommendations   PICC placed and patient discharged to home on 1 G IV Ertapenem daily until 12/24/22.   [facility] to provide infusion services.   [facility] arranged through [facility].     Patient returned to hospital 12/21/22 for planned lithotripsy and stone extraction.   Later underwent stent removal on 1/6/22.   7 day course Bactrim prescribed for prophylaxis.  Referred to Nephrology for metabolic work up/ Ca phosphate stone.     Patient accompanied today by family.  Ambulatory with single prong cane.  Independent with ADL's.  Reports compliance with all medication.   Completed IV ABX and PICC line removed.  Has one day left of Bactrim.     Review of Systems  General ROS: negative for chills, fever or  weight loss  + fatigue, unsteady gait   Psychological ROS: negative for hallucination, depression or suicidal ideation  Ophthalmic ROS: negative for blurry vision, photophobia or eye pain  ENT ROS: negative for epistaxis, sore throat or rhinorrhea  Respiratory ROS: no cough, shortness of breath, or wheezing  Cardiovascular ROS: no chest pain or dyspnea on exertion  Gastrointestinal ROS: no abdominal pain, change in bowel habits, or black/ bloody stools  Genito-Urinary ROS: no dysuria, trouble voiding, or hematuria  Musculoskeletal ROS: negative for gait disturbance or muscular weakness  Neurological ROS: no syncope or seizures; no ataxia  + hand tremors  Dermatological ROS: negative for pruritis, rash and jaundice      PAST HISTORY:     Past Medical History:   Diagnosis Date    Allergy     Anticoagulant long-term use     Arthritis     CVA (cerebral infarction)     Depression     Disorder of kidney and ureter     renal stones    Diverticulosis of colon     Extrinsic asthma, unspecified     Hyperlipidemia     Hypertension     Kidney stone     Left atrial enlargement 2014    Low back pain     MARTIN (obstructive sleep apnea)     Osteopenia     PUD (peptic ulcer disease)     Stroke  2013    Urinary tract infection        Past Surgical History:   Procedure Laterality Date    APPENDECTOMY      @ time of hysterectomy    BACK SURGERY      CATARACT EXTRACTION       SECTION      CHOLECYSTECTOMY      laparoscopic    COLONOSCOPY N/A 2018    Procedure: COLONOSCOPY/Golytely;  Surgeon: Janine Black MD;  Location: [facility] ENDO;  Service: Endoscopy;  Laterality: N/A;    CYSTOSCOPY W/ RETROGRADES  2022    Procedure: CYSTOSCOPY, WITH RETROGRADE PYELOGRAM;  Surgeon: Mitchell Recinos MD;  Location: [facility] OR;  Service: Urology;;    CYSTOSCOPY W/ URETERAL STENT PLACEMENT Left 2022    Procedure: CYSTOSCOPY, WITH URETERAL STENT INSERTION;  Surgeon: Mitchell Recinos MD;  Location: [facility] OR;   Service: Urology;  Laterality: Left;    CYSTOURETEROSCOPY, WITH HOLMIUM LASER LITHOTRIPSY OF URETERAL CALCULUS AND STENT INSERTION Left 12/21/2022    Procedure: left ureteroscopy, holmium laser lithotripsy, stone basketing, retrograde pyelogram, stent placement;  Surgeon: Anaya Zamora MD;  Location: [facility] OR;  Service: Urology;  Laterality: Left;    DILATION AND CURETTAGE OF UTERUS  1972    EXTRACTION - STONE Left 12/21/2022    Procedure: EXTRACTION - STONE;  Surgeon: Anaya Zamora MD;  Location: [facility] OR;  Service: Urology;  Laterality: Left;    HYSTERECTOMY  1978    TAHUSO with appendectomy    INNER EAR SURGERY      replaced ear drum    JOINT REPLACEMENT Right     knee    KNEE ARTHROSCOPY W/ DEBRIDEMENT  2003    LUMBAR DISCECTOMY  1980    L4-L5    OOPHORECTOMY      unilateral    RETROGRADE PYELOGRAPHY  12/21/2022    Procedure: PYELOGRAM, RETROGRADE;  Surgeon: Anaya Zamora MD;  Location: [facility];  Service: Urology;;    TONSILLECTOMY      TYMPANOPLASTY      URETEROSCOPIC REMOVAL OF URETERIC CALCULUS Left 12/19/2018    Procedure: REMOVAL, CALCULUS, URETER, URETEROSCOPIC, holmium laser lithotripsy, stone basket extraction, retrograde pyelogram, ureteral stent exchange;  Surgeon: Anaya Zamora MD;  Location: [facility] OR;  Service: Urology;  Laterality: Left;       Family History   Problem Relation Age of Onset    Cervical cancer Mother     Cancer Mother 65        lung cancer - non smoker    Stroke Paternal Grandfather     Hypertension Maternal Grandfather     Heart disease Maternal Grandfather     Hypertension Father     Coronary artery disease Father 62    Heart disease Father     Diabetes Sister     Heart disease Sister     Kidney disease Sister     Breast cancer Daughter 36    Heart failure Sister         60s    Colon cancer Neg Hx     Ovarian cancer Neg Hx          MEDICATIONS & ALLERGIES:     Current Outpatient Medications on File Prior to Visit   Medication Sig Dispense Refill    acetaminophen (TYLENOL) 500 MG  tablet Take 500 mg by mouth every 6 (six) hours as needed for Pain.      albuterol (PROVENTIL/VENTOLIN HFA) 90 mcg/actuation inhaler INHALE 2 PUFFS EVERY 6 HOURS AS NEEDED FOR WHEEZING 18 g 0    aspirin 81 MG Chew Take 81 mg by mouth once daily.      atorvastatin (LIPITOR) 40 MG tablet Take 1 tablet (40 mg total) by mouth every evening. 90 tablet 3    buPROPion (WELLBUTRIN XL) 300 MG 24 hr tablet Take 1 tablet (300 mg total) by mouth once daily. 90 tablet 11    calcium carbonate (OS-SPENCER) 600 mg (1,500 mg) Tab Take 600 mg by mouth once daily.      cholecalciferol, vitamin D3, 1,000 unit Chew Take 1,000 Units by mouth once daily.      diazePAM (VALIUM) 2 MG tablet Take 1/2 to 1 tablet 3 times daily as needed to control dizziness 50 tablet 1    diclofenac sodium (VOLTAREN) 1 % Gel APPLY 2-4 GRAMS TO EACH PAINFUL AREA FOUR TIMES DAILY - MAX 32 GRAMS/ g 6    EScitalopram oxalate (LEXAPRO) 20 MG tablet TAKE 1 TABLET(20 MG) BY MOUTH EVERY DAY (Patient taking differently: Take 20 mg by mouth once daily.) 90 tablet 3    esomeprazole (NEXIUM) 40 MG capsule Take 1 capsule (40 mg total) by mouth daily as needed (heartburn). 30 capsule 3    FLUAD 9134-1006, 65 YR UP,,PF, 45 mcg (15 mcg x 3)/0.5 mL Syrg       LACTOBACILLUS ACIDOPHILUS (PROBIOTIC ORAL) Take 1 capsule by mouth once daily. PRN      losartan (COZAAR) 100 MG tablet TAKE 1 TABLET(100 MG) BY MOUTH EVERY DAY 90 tablet 3    MULTIVIT WITH CALCIUM,IRON,MIN (WOMEN'S DAILY MULTIVITAMIN ORAL) Take 1 tablet by mouth once daily.      oxyCODONE-acetaminophen (PERCOCET) 5-325 mg per tablet Take 1 tablet by mouth every 6 (six) hours as needed for Pain (severe pain). 15 tablet 0    sulfamethoxazole-trimethoprim 800-160mg (BACTRIM DS) 800-160 mg Tab Take 1 tablet by mouth 2 (two) times daily. for 7 days 14 tablet 0    tamsulosin (FLOMAX) 0.4 mg Cap Take 1 capsule (0.4 mg total) by mouth once daily. 90 capsule 0     No current facility-administered medications on file prior to  visit.        Review of patient's allergies indicates:   Allergen Reactions    Iodinated contrast media Hives and Rash    Gabapentin Hallucinations    Iodine Hives    Isothiazolinones Rash    Penicillins Rash       OBJECTIVE:     Vital Signs:  /87 (BP Location: Left arm, Patient Position: Sitting, BP Method: Medium (Automatic))   Pulse 83   Temp 98.4 °F (36.9 °C) (Oral)   Wt 105.7 kg (233 lb 0.4 oz)   LMP 01/01/1978 (Approximate)   SpO2 95%   BMI 36.50 kg/m²   Wt Readings from Last 3 Encounters:   01/06/23 1020 106.3 kg (234 lb 3.8 oz)   12/30/22 1310 99.8 kg (220 lb)   12/21/22 1030 99.8 kg (220 lb)     Body mass index is 36.5 kg/m².        Physical Exam:  /87 (BP Location: Left arm, Patient Position: Sitting, BP Method: Medium (Automatic))   Pulse 83   Temp 98.4 °F (36.9 °C) (Oral)   Wt 105.7 kg (233 lb 0.4 oz)   LMP 01/01/1978 (Approximate)   SpO2 95%   BMI 36.50 kg/m²   General appearance: alert, cooperative, no distress  Constitutional:Oriented to person, place, and time  + obese    HEENT: Normocephalic, atraumatic, neck symmetrical, no nasal discharge   Eyes: conjunctivae/corneas clear, PERRL, EOM's intact  Lungs: clear to auscultation bilaterally, no dullness to percussion bilaterally  Heart: regular rate and rhythm without rub; no displacement of the PMI   Abdomen: soft, non-tender; bowel sounds normoactive; no organomegaly  Extremities: extremities symmetric; no clubbing, cyanosis, or edema  + intention tremor bilateral hands   Integument: Skin color, texture, turgor normal; no rashes; hair distrubution normal  Neurologic: Alert and oriented X 3, normal strength, normal coordination and gait  Psychiatric: no pressured speech; normal affect; no evidence of impaired cognition     Laboratory  Lab Results   Component Value Date    WBC 11.12 12/15/2022    HGB 10.9 (L) 12/15/2022    HCT 34.0 (L) 12/15/2022    MCV 96 12/15/2022     12/15/2022     BMP  Lab Results   Component Value  Date     12/15/2022    K 3.8 12/15/2022    CL 99 12/15/2022    CO2 30 (H) 12/15/2022    BUN 12 12/15/2022    CREATININE 0.8 12/15/2022    CALCIUM 9.2 12/15/2022    ANIONGAP 9 12/15/2022    EGFRNORACEVR >60 12/15/2022     Lab Results   Component Value Date    ALT 11 12/15/2022    AST 15 12/15/2022    ALKPHOS 97 12/15/2022    BILITOT 0.4 12/15/2022     Lab Results   Component Value Date    INR 1.0 01/13/2017    INR 0.9 03/14/2016    INR 1.1 12/19/2013     Lab Results   Component Value Date    HGBA1C 6.2 (H) 06/14/2022       ASSESSMENT & PLAN:         Pyelonephritis  Infection due to ESBL-producing Escherichia coli  - completed Ertapenem as prescribed   - now on prophylactic course of Bactrim following Urology procedure     Kidney stone  Left ureteral stone  - s/p ureteral stent then stent removal  - stone analysis - Calcium phosphate  - will see Nephrology 2/7/23  -     Ambulatory referral/consult to Nephrology; Future; Expected date: 01/19/2023    Vertigo   - previously treated with meclizine and vestibular therapy with good response  - feels vertigo has returned since hospitalization and Meclizine was stopped by hospital team  - resume meclizine prn- consider repeating vestibular therapy if symptoms persist   -     meclizine (ANTIVERT) 12.5 mg tablet; Take 1 tablet (12.5 mg total) by mouth 3 (three) times daily as needed for Dizziness or Nausea.  Dispense: 30 tablet; Refill: 6    Depression, unspecified depression type  - medication refilled per patient request   -     EScitalopram oxalate (LEXAPRO) 20 MG tablet; Take 1 tablet (20 mg total) by mouth once daily.  Dispense: 90 tablet; Refill: 3    Impaired functional mobility and activity tolerance  -     Ambulatory referral/consult to Physical/Occupational Therapy; Future; Expected date: 01/19/2023    Patient will [facility] released from [facility].  She will see Dr Madisyn Villalobos 6/15/23 to establish new Primary Care.     Instructions for the  patient:      Scheduled Follow-up :  Future Appointments   Date Time Provider Department Center   2/7/2023 12:30 PM Fabiola Morales MD [facility]   6/15/2023  8:30 AM Madisyn Villalobos MD [facility] IM [place]   6/30/2023  3:40 PM Javon Philippe DO [facility]       Post Visit Medication List:     Medication List            Accurate as of January 12, 2023  3:30 PM. If you have any questions, ask your nurse or doctor.                START taking these medications      meclizine 12.5 mg tablet  Commonly known as: ANTIVERT  Take 1 tablet (12.5 mg total) by mouth 3 (three) times daily as needed for Dizziness or Nausea.  Started by: Angie Camara MD            CONTINUE taking these medications      albuterol 90 mcg/actuation inhaler  Commonly known as: PROVENTIL/VENTOLIN HFA  INHALE 2 PUFFS EVERY 6 HOURS AS NEEDED FOR WHEEZING     aspirin 81 MG Chew     atorvastatin 40 MG tablet  Commonly known as: LIPITOR  Take 1 tablet (40 mg total) by mouth every evening.     buPROPion 300 MG 24 hr tablet  Commonly known as: WELLBUTRIN XL  Take 1 tablet (300 mg total) by mouth once daily.     calcium carbonate 600 mg calcium (1,500 mg) Tab  Commonly known as: OS-SPENCER     cholecalciferol (vitamin D3) 25 mcg (1,000 unit) Chew     diazePAM 2 MG tablet  Commonly known as: VALIUM  Take 1/2 to 1 tablet 3 times daily as needed to control dizziness     diclofenac sodium 1 % Gel  Commonly known as: VOLTAREN  APPLY 2-4 GRAMS TO EACH PAINFUL AREA FOUR TIMES DAILY - MAX 32 GRAMS/DAY     EScitalopram oxalate 20 MG tablet  Commonly known as: LEXAPRO  Take 1 tablet (20 mg total) by mouth once daily.     esomeprazole 40 MG capsule  Commonly known as: NEXIUM  Take 1 capsule (40 mg total) by mouth daily as needed (heartburn).     losartan 100 MG tablet  Commonly known as: COZAAR  TAKE 1 TABLET(100 MG) BY MOUTH EVERY DAY     PROBIOTIC ORAL     sulfamethoxazole-trimethoprim 800-160mg 800-160 mg Tab  Commonly known as: BACTRIM  DS  Take 1 tablet by mouth 2 (two) times daily. for 7 days     tamsulosin 0.4 mg Cap  Commonly known as: FLOMAX  Take 1 capsule (0.4 mg total) by mouth once daily.     WOMEN'S DAILY MULTIVITAMIN ORAL               Where to Get Your Medications        These medications were sent to [facility] [address], [place] [address]      Hours: Mon-Fri, 8a-5:30p Phone: [phone]   EScitalopram oxalate 20 MG tablet  meclizine 12.5 mg tablet         Signing Physician:  Angie Camara MD

## 2023-01-19 ENCOUNTER — CLINICAL SUPPORT (OUTPATIENT)
Dept: REHABILITATION | Facility: HOSPITAL | Age: 73
End: 2023-01-19
Attending: INTERNAL MEDICINE
Payer: MEDICARE

## 2023-01-19 DIAGNOSIS — Z74.09 IMPAIRED FUNCTIONAL MOBILITY AND ACTIVITY TOLERANCE: ICD-10-CM

## 2023-01-19 DIAGNOSIS — Z74.09 IMPAIRED FUNCTIONAL MOBILITY, BALANCE, GAIT, AND ENDURANCE: ICD-10-CM

## 2023-01-19 PROCEDURE — 97162 PT EVAL MOD COMPLEX 30 MIN: CPT | Mod: PN

## 2023-01-19 NOTE — PLAN OF CARE
[facility]  Physical Therapy Neurological Rehabilitation Initial Evaluation    Name: Tracy Armendariz  Clinic Number: [id number]    Therapy Diagnosis:   Encounter Diagnoses   Name Primary?    Impaired functional mobility and activity tolerance     Impaired functional mobility, balance, gait, and endurance      Physician: Angie Camara MD    Physician Orders: PT Eval and Treat   Medical Diagnosis from Referral: Z74.09 (ICD-10-CM) - Impaired functional mobility and activity tolerance   Evaluation Date: 1/19/2023  Authorization Period Expiration: 02/14/2023   Plan of Care Expiration: 3/17/2023  Visit # / Visits authorized: 1/ 1    Time In: 12:15 PM  Time Out: 1:00 PM  Total Billable Time: 45 minutes    Precautions: Standard and Fall    Subjective   Date of onset: Dec 21, 2022  History of current condition - Tracy reports: pt was hospitalized due to complications from kidney stones.  Pt was hospitalized for 3-4 days.  Pt says her feet now feel tired all the time, pain feels like it did when she had plantarfascitis.  Pain worsens when she is standing and walking and only feels better when she is laying in bed.  Pt also says she sleeps all night (10 pm - 12 noon) and takes a nap from 2-4 daily.  Pt says she was recently diagnosed with neuropathies and given a cane. Pt was diagnosed with right vestibular dysfunction in the past and seen for vestibular therapy at [facility].      Medical History:   Past Medical History:   Diagnosis Date    Allergy     Anticoagulant long-term use     Arthritis     CVA (cerebral infarction) 2013    Depression     Disorder of kidney and ureter     renal stones    Diverticulosis of colon     Extrinsic asthma, unspecified     Hyperlipidemia     Hypertension     Kidney stone     Left atrial enlargement 12/16/2014    Low back pain     MARTIN (obstructive sleep apnea)     Osteopenia     PUD (peptic ulcer disease)     Stroke  December 2013    Urinary tract infection         Surgical History:   Tracy Armendariz  has a past surgical history that includes Inner ear surgery; Cholecystectomy ();  section (); Dilation and curettage of uterus (); Appendectomy (); Hysterectomy (); Tympanoplasty; Lumbar discectomy (); Knee arthroscopy w/ debridement (); Tonsillectomy; Back surgery; Cataract extraction; Colonoscopy (N/A, 2018); Joint replacement (Right); Ureteroscopic removal of ureteric calculus (Left, 2018); Oophorectomy; Cystoscopy w/ ureteral stent placement (Left, 2022); Cystoscopy w/ retrogrades (2022); cystoureteroscopy, with holmium laser lithotripsy of ureteral calculus and stent insertion (Left, 2022); Retrograde pyelography (2022); and extraction - stone (Left, 2022).    Medications:   Tracy has a current medication list which includes the following prescription(s): albuterol, aspirin, atorvastatin, bupropion, calcium carbonate, cholecalciferol (vitamin d3), diazepam, diclofenac sodium, escitalopram oxalate, esomeprazole, lactobacillus acidophilus, losartan, meclizine, multivit with calcium,iron,min, and tamsulosin.    Allergies:   Review of patient's allergies indicates:   Allergen Reactions    Iodinated contrast media Hives and Rash    Gabapentin Hallucinations    Iodine Hives    Isothiazolinones Rash    Penicillins Rash        Imaging, none:     Prior Therapy: vestibular therapy at [facility]  Social History:  lives with their spouse  Falls: three, twice when vacationing in ,  once tripping over rug in home  DME: Straight cane    Home Environment: one step to enter, single story home   Exercise Routine / History: none   Family Present at time of Eval: no   Occupation: retired  Prior Level of Function: independent without assistive device  Current Level of Function: independent with use of cane, limited to household distances    Pain:  Current 5/10, worst 6/10, best 3/10   Location: bilateral  feet and lower legs   Description: Aching  Aggravating Factors: Standing and Walking  Easing Factors: lying down    Patient's goals: Have more endurance, be able to stand for a conversation, not be so sleepy.    Objective     - Command following: intact   - Speech: no deficits     Mental status: alert, oriented to person, place, and time  Behavior:  calm and cooperative  Attention Span and Concentration:  Normal    Dominant hand:  right     Posture Alignment :slouched posture    Sensation:  Light Touch: Intact           Proprioception:   Intact    ROM:   UPPER EXTREMITY--AROM/PROM  (R) UE: WFLs  (L) UE: WFLs           RANGE OF MOTION--LOWER EXTREMITIES  (R) LE Hip: WFL   Knee: WFL   Ankle: WFL    (L) LE: Hip: WFL   Knee: WFL   Ankle: WFL    Upper Extremity Strength   RUE LUE   Shoulder Flexion: 4/5 4/5   Shoulder Abduction: 4/5 4/5   Elbow Flexion:     4+/5 4+/5   Elbow Extension: 4+/5 4+/5   : 4/5 4/5     Lower Extremity Strength   RLE LLE   Hip Flexion: 4-/5 4-/5   Hip Extension:  See 30 sec STS See 30 sec STS   Hip Abduction: 4-/5 4-/5   Hip Adduction: 4/5 4/5   Knee Extension: 4+/5 4+/5   Knee Flexion: 4+/5 4+/5   Ankle Dorsiflexion: 4+/5 4+/5   Ankle Plantarflexion: 4/5 (NWB) 4/5 (NWB)       Gait Assessment:   - AD used: straight cane  - Assistance: mod I  - Distance: ~300 feet during evaluation  - Stairs: Mod I (step to pattern)    Timed Up and Go: 12.4 sec with straight cane  30 second sit to stand: 8 reps with bilateral upper extremity support      Endurance Deficit: Two minute walk test: 309 feet with straight cane     Balance Assessment:    Sitting balance (static,dynamic): normal/good +  Standing balance (static, dynamic):    Functional Gait Assessment:     *note: Measure a distance of 20 feet (~6 meters) for this assessment    1. Gait on level surface =  2   (3) Normal: less than 5.5 sec, no A.D., no imbalance, normal gait pattern, deviates <6in   (2) Mild impairment: 7-5.6 sec, uses A.D., mild gait  deviations, or deviates 6-10 in   (1) Moderate impairment: > 7 sec, slow speed, imbalance, deviates 10-15 in.   (0) Severe impairment: needs assist, deviates >15 in, reach/touch wall  2. Change in Gait Speed = 2   (3) Normal: smooth change w/o loss of balance or gait deviation, deviates < 6 in, significant difference between speeds   (2) Mild impairment: changes speed, but demonstrates mild gait deviations, deviates 6-10 in, OR no deviations but unable to significantly speed, OR uses A.D.   (1) Moderate impairment: minor changes to speed, OR changes speed w/ significant deviations, deviates 10-15 in, OR  Changes speed , but loses balance & recovers   (0) Severe impairment: cannot change speed, deviates >15 in, or loses balance & needs assist  3. Gait with horizontal head turns  = 2   (3) Normal: no change in gait, deviates <6 in   (2) Mild impairment: slight change in speed, deviates 6-10 in, OR uses A.D.   (1) Moderate impairment: moderate change in speed, deviates 10-15 in   (0) Severe impairment: severe disruption of gait, deviates >15in  4. Gait with vertical head turns = 2   (3) Normal: no change in gait, deviates <6 in   (2) Mild impairment: slight change in speed, deviates 6-10 in OR uses A.D.   (1) Moderate impairment: moderate change in speed, deviates 10-15 in   (0) Severe impairment: severe disruption of gait, deviates >15 in  5. Gait with pivot turns = 1   (3) Normal: performs safely in 3 sec, no LOB   (2) Mild impairment: performs in >3 sec & no LOB, OR turns safely & requires several steps to regain LOB   (1) Moderate impairment: turns slow, OR requires several small steps for balance following turn & stop   (0) Severe impairment: cannot turn safely, needs assist  6. Step over obstacle = 1   (3) Normal: steps over 2 stacked boxes w/o change in speed or LOB   (2) Mild impairment: able to step over 1 box w/o change in speed or LOB   (1) Moderate impairment: steps over 1 box but must slow down, may  require VC   (0) Severe impairment: cannot perform w/o assist  7. Gait with Narrow STANISLAV = 0   (3) Normal: 10 steps no staggering   (2) Mild impairment: 7-9 steps   (1) Moderate impairment: 4-7 steps   (0) Severe impairment: < 4 steps or cannot perform w/o assist  8. Gait with eyes closed = 2   (3) Normal: < 7 sec, no A.D., no LOB, normal gait pattern, deviates <6 in   (2) Mild impairment: 7.1-9 sec, mild gait deviations, deviates 6-10 in   (1) Moderate impairment: > 9 sec, abnormal pattern, LOB, deviates 10-15 in   (0) Severe impairment: cannot perform w/o assist, LOB, deviates >15in  9. Ambulating Backwards = 2   (3) Normal: no A.D., no LOB, normal gait pattern, deviates <6in   (2) Mild impairment: uses A.D., slower speed, mild gait deviations, deviates 6-10 in   (1) Moderate impairment: slow speed, abnormal gait pattern, LOB, deviates 10-15 in   (0) Severe impairment: severe gait deviations or LOB, deviates >15in  10. Steps = 1   (3) Normal: alternating feet, no rail   (2) Mild Impairment: alternating feet, uses rail   (1) Moderate impairment: step-to, uses rail   (0) Severe impairment: cannot perform safely    Score 15/30     Cutoffs:   <22/30 fall risk in older adults  <18/30 fall risk in Parkinsons    MDC/MCID:  Stroke = 4.2 points (MDC)  Vestibular = 6 points (MDC)  Geriatric = 4 points (MCID)  Parkinson's = 4 points (MDC)          CMS Impairment/Limitation/Restriction for FOTO NOC-Neuromuscular D/O Survey    Therapist reviewed FOTO scores for Tracy Armendariz on 1/19/2023.   FOTO documents entered into [employer] - see Media section.    Limitation Score: 50%  Projected Limitation: 35%         TREATMENT   Home Exercises and Patient Education Provided    Education provided:   - increasing daily activity, spending less time in bed  - goals of PT, PT plan of care  - walking program      Written Home Exercises Provided:  to be initiated at future visit .    Assessment   Tracy is a 72 y.o. female referred to outpatient  Physical Therapy with a medical diagnosis of Z74.09 (ICD-10-CM) - Impaired functional mobility and activity tolerance. Patient presents with decreased proximal leg strength, decreased dynamic balance, decreased activity tolerance and posterior lower leg pain present when patient is walking and standing, possibly intermittent claudication, indicating arterial insufficiency.  Will monitor symptoms as therapy progresses, if symptoms are unchanged, will refer as appropriate. Pt has history of vestibular deficit and would benefit from additional assessment of vestibulo-ocular system and related exercise as indicated after assessment.     Patient prognosis is Good.    Patient will benefit from skilled outpatient Physical Therapy to address the deficits stated above and in the chart below, provide patient/family education, and to maximize patient's level of independence.     Plan of care discussed with patient: Yes  Patient's spiritual, cultural and educational needs considered and patient is agreeable to the plan of care and goals as stated below:     Anticipated Barriers for therapy: co-morbidities, sedentary lifestyle    Medical Necessity is demonstrated by the following  History  Co-morbidities and personal factors that may impact the plan of care Co-morbidities:   Arthritis, Asthma, Back pain, BMI over 30, Depression, Headaches, Kidney, Bladder, Prostate or Urination Problems, Previous accidents, Prosthesis / Implants, Sleep dysfunction, Stroke or TIA     Personal Factors:   lifestyle     moderate   Examination  Body Structures and Functions, activity limitations and participation restrictions that may impact the plan of care Body Regions:   lower extremities    Body Systems:    strength  gross coordinated movement  balance  gait  transfers  transitions    Participation Restrictions:   Community ambulation, recreational activity    Activity limitations:   Learning and applying knowledge  no deficits    General Tasks  and Commands  no deficits    Communication  no deficits    Mobility  lifting and carrying objects  walking    Self care  no deficits    Domestic Life  shopping  cooking  doing house work (cleaning house, washing dishes, laundry)  assisting others    Interactions/Relationships  no deficits    Life Areas  no deficits    Community and Social Life  community life  recreation and leisure         moderate   Clinical Presentation evolving clinical presentation with changing clinical characteristics moderate   Decision Making/ Complexity Score: moderate     Goals:  Short Term Goals: 4 weeks   1. Pt will be compliant with HEP in order to maximize PT benefits   2. Pt will score >/= 20/30 on FGA with least restrictive assistive device in order to reduce risk for falls and improve postural control   3. Pt will score >/= 10 repetitions on 30-Second Sit to  order to improve BLE endurance and muscular power for transfers   4. Pt will improve BLE MMT grades by >/=1/2 grade in order to improve strength for ADL completion  5. Pt will walk at least 800 ft during the 6 minute walk test with SPC and 0 LOB  without increase in lower leg pain of more than 2 point on VAS for improved community ambulation.     Long Term Goals: 6 weeks   1. Pt will improve BLE MMT grades by >/=1 grade in order to improve strength for ADL completion   2. Pt will score >/= 25/30 on FGA with least restrictive assistive device in order to reduce risk for falls and improve postural control   3. Pt will score >/= 12 repetitions on 30-Second Sit to  order to improve BLE endurance and muscular power for transfers   4. Pt will walk at least 1000 ft during the 6 minute walk test with SPC and 0 LOB  without increase in lower leg pain of more than 2 point on VAS for improved community ambulation.  5. Pt will perform 1 floor <> stand transfer with 1 UE support in order to demonstrate safe functional mobility in case of future fall   6.  Pt will report 0  falls from initiation of PT management   7. Pt will begin some form of home/community fitness in order to sustain progress gained in PT.     Plan   Plan of care Certification: 1/19/2023 to 3/17/2023.    Outpatient Physical Therapy 2 times weekly for 8 weeks to include the following interventions: Gait Training, Manual Therapy, Moist Heat/ Ice, Neuromuscular Re-ed, Patient Education, Therapeutic Activities, and Therapeutic Exercise.     Liat Masters, PT

## 2023-01-22 PROBLEM — Z74.09 IMPAIRED FUNCTIONAL MOBILITY, BALANCE, GAIT, AND ENDURANCE: Status: ACTIVE | Noted: 2023-01-22

## 2023-01-24 ENCOUNTER — CLINICAL SUPPORT (OUTPATIENT)
Dept: REHABILITATION | Facility: HOSPITAL | Age: 73
End: 2023-01-24
Payer: MEDICARE

## 2023-01-24 DIAGNOSIS — Z74.09 IMPAIRED FUNCTIONAL MOBILITY, BALANCE, GAIT, AND ENDURANCE: Primary | ICD-10-CM

## 2023-01-24 PROCEDURE — 97112 NEUROMUSCULAR REEDUCATION: CPT | Mod: PN,CQ

## 2023-01-24 PROCEDURE — 97110 THERAPEUTIC EXERCISES: CPT | Mod: PN,CQ

## 2023-01-24 NOTE — PROGRESS NOTES
[facility]   Physical Therapy Treatment Note     Name: Tracy Armendariz  Clinic Number: [id number]    Therapy Diagnosis:   Encounter Diagnosis   Name Primary?    Impaired functional mobility, balance, gait, and endurance Yes     Physician: Angie Camara MD    Visit Date: 1/24/2023     Physician: Angie Camara MD     Physician Orders: PT Eval and Treat   Medical Diagnosis from Referral: Z74.09 (ICD-10-CM) - Impaired functional mobility and activity tolerance   Evaluation Date: 1/19/2023  Authorization Period Expiration: 02/14/2023   Plan of Care Expiration: 3/17/2023  Visit # / Visits authorized: 1/20 (+eval)  Foto #: 1/5  PTA Visit #: 1/5     Time In: 4:45 pm  Time Out: 5:30 pm  Total Billable Time: 45 minutes (2 TE + 1 NMR)    Precautions: Standard and Fall    SUBJECTIVE     Pt reports: daily reciprocal bike use for 5 minutes, though she is trying to work her way to 8 minutes.  Brought her single point cane and wants to practice to make sure she uses it correctly.  Confirms she only uses it outside house for stability purposes during long walks.    She  will be  compliant with home exercise program.  Response to previous treatment: initial eval  Functional change: ongoing    Pain: 4/10  Location: bilateral calves (R>L)    OBJECTIVE     Objective Measures updated at progress report unless specified.     Treatment     Tracy received the treatments listed below:      therapeutic exercises to develop strength, endurance, ROM, and posture for 30 minutes including:  Nustep: single peak level 3.0 for 8 minutes  Hip 3 way with red theraband x15 B (hep review)  Calf raises: 2x15 (hep review)  Sit<>stand: 2x10 with upper extremity support (hep review)  Step ups: forward 2x10 B with single upper extremity support    neuromuscular re-education activities to improve: Balance, Coordination, Kinesthetic, and Proprioception for 15 minutes. The following activities were included:  2  "walking trials 150 feet each with verbal cuing for proper sequencing of single point cane  Tandem walkin lengths x 10 feet with UE support  Lateral steps: 4 lengths x 10 feet with UE support  Retro walkin lengths x 10 feet with UE support  Hurdles: 6" (red) forward and lateral: 4 lengths x 10 feet each    Patient Education and Home Exercises     Home Exercises Provided and Patient Education Provided     Education provided:   - daily HEP compliance  - daily walking and limiting time spent sitting    Written Home Exercises Provided: yes. Exercises were reviewed and Tracy was able to demonstrate them prior to the end of the session.  Tracy demonstrated good  understanding of the education provided. See EMR under Patient Instructions for exercises provided during therapy sessions    ASSESSMENT     Tracy arrived to session with moderate complaints of bilateral calf pain but was agreeable to treatment.  Fair tolerance of first follow up session after initial evaluation with decreased activity tolerance observed as patient required numerous seated and standing rest breaks throughout session for fatigue recovery.  She initially reported an increase in bilateral calf pain that she described as "tight and cramping" that improved as session continued per subjective patient report.  Patient provided a walking program and basic strengthening HEP, follow up on compliance next session.  Heavy verbal cuing for proper sequencing with single point cane with improvement noted, though patient tends to start to carry it more than use for actual support.  Tracy would benefit from continued PT services to address endurance deficits to improve safe and functional mobility.    Tracy Is progressing well towards her goals.   Pt prognosis is Good.     Pt will continue to benefit from skilled outpatient physical therapy to address the deficits listed in the problem list box on initial evaluation, provide pt/family education " and to maximize pt's level of independence in the home and community environment.     Pt's spiritual, cultural and educational needs considered and pt agreeable to plan of care and goals.     Anticipated barriers to physical therapy: co-morbidities, sedentary lifestyle    Goals: Short Term Goals: 4 weeks   1. Pt will be compliant with HEP in order to maximize PT benefits   2. Pt will score >/= 20/30 on FGA with least restrictive assistive device in order to reduce risk for falls and improve postural control   3. Pt will score >/= 10 repetitions on 30-Second Sit to  order to improve BLE endurance and muscular power for transfers   4. Pt will improve BLE MMT grades by >/=1/2 grade in order to improve strength for ADL completion  5. Pt will walk at least 800 ft during the 6 minute walk test with SPC and 0 LOB  without increase in lower leg pain of more than 2 point on VAS for improved community ambulation.     Long Term Goals: 6 weeks   1. Pt will improve BLE MMT grades by >/=1 grade in order to improve strength for ADL completion   2. Pt will score >/= 25/30 on FGA with least restrictive assistive device in order to reduce risk for falls and improve postural control   3. Pt will score >/= 12 repetitions on 30-Second Sit to  order to improve BLE endurance and muscular power for transfers   4. Pt will walk at least 1000 ft during the 6 minute walk test with SPC and 0 LOB  without increase in lower leg pain of more than 2 point on VAS for improved community ambulation.  5. Pt will perform 1 floor <> stand transfer with 1 UE support in order to demonstrate safe functional mobility in case of future fall   6.  Pt will report 0 falls from initiation of PT management   7. Pt will begin some form of home/community fitness in order to sustain progress gained in PT.     PLAN     Plan to cont with progression of PT goals per POC.    Alejandra Jimenes, PTA

## 2023-01-26 ENCOUNTER — CLINICAL SUPPORT (OUTPATIENT)
Dept: REHABILITATION | Facility: HOSPITAL | Age: 73
End: 2023-01-26
Payer: MEDICARE

## 2023-01-26 DIAGNOSIS — Z74.09 IMPAIRED FUNCTIONAL MOBILITY, BALANCE, GAIT, AND ENDURANCE: Primary | ICD-10-CM

## 2023-01-26 PROCEDURE — 97110 THERAPEUTIC EXERCISES: CPT | Mod: PN

## 2023-01-26 PROCEDURE — 97112 NEUROMUSCULAR REEDUCATION: CPT | Mod: PN

## 2023-01-26 NOTE — PROGRESS NOTES
[facility]   Physical Therapy Treatment Note     Name: Tracy Armendariz  Clinic Number: [id number]    Therapy Diagnosis:   Encounter Diagnosis   Name Primary?    Impaired functional mobility, balance, gait, and endurance Yes       Physician: Angie Camara MD    Visit Date: 1/26/2023     Physician: Angie Camara MD     Physician Orders: PT Eval and Treat   Medical Diagnosis from Referral: Z74.09 (ICD-10-CM) - Impaired functional mobility and activity tolerance   Evaluation Date: 1/19/2023  Authorization Period Expiration: 02/14/2023   Plan of Care Expiration: 3/17/2023  Visit # / Visits authorized: 2 / 20 (+eval)  Foto #: 2/5  PTA Visit #: 0/5     Time In: 1351  Time Out: 1446   Total Billable Time: 55 minutes (3TE, 1NMR)    Precautions: Standard and Fall    SUBJECTIVE     Pt reports:  Entered with single point cane (SPC) but significant pathway deviation bilaterally walking back to gym. Reports she often has headaches. She has an appointment with MD regarding Kidneys next week.   She reports compliance with home exercise program.   Response to previous treatment: no adverse reaction   Functional change: ongoing    Pain: 5/10   Location: headache and bilateral lower extremities, back pain (she related to kidneys)    OBJECTIVE     Objective Measures updated at progress report unless specified.     Treatment     Tracy received the treatments listed below:      therapeutic exercises to develop strength, endurance, ROM, and posture for 40 minutes including:  SCIFIT recumbent stepper: twin peak level 4.0 for 8 minutes    At Ballet bar:  Heel cord stretch 3 x 30s at incline board    Heel raises 2 x 20   Toe raises 2 x 20     Squats over chair - x 10 - accommodated to small range due to complaints of left knee pain      Standing hip abductions 3 x 10 - bilateral support at bar      neuromuscular re-education activities to improve: Balance, Coordination, Kinesthetic, and  Proprioception for  15 minutes. The following activities were included:   Hurdles: yellow lateral: 6 lengths x 10 feet each  Tandem static trials - reports dizziness   Gave stationary visual target (post-it on mirror) - narrow base of support, tandem trials bilateral leading leg - no dizziness onset - exaggerated ankle strategy   Stationary visual cues walking out of gym - significant decrease in pathway deviation.     Patient Education and Home Exercises     Home Exercises Provided and Patient Education Provided     Education provided:   - daily HEP compliance  - daily walking and limiting time spent sitting    Written Home Exercises Provided: yes. Exercises were reviewed and Tracy was able to demonstrate them prior to the end of the session.  Tracy demonstrated good  understanding of the education provided. See EMR under Patient Instructions for exercises provided during therapy sessions    ASSESSMENT     Tracy tolerated treatment session fair. She continues to have significantly impacted activity tolerance with overlaying pain at lower extremities, headache and back pain (she related to kidneys) as well as ongoing oculomotor/vestibular deficits impacting tolerance for standing tasks. She required frequent water/seated therapeutic rest breaks due to poor tolerance. She improved with standing tolerance once given static visual target. She states she is still doing home exercise program from when she did vestibular therapy Fall 2022. She would benefit from future sessions to be set up with minimized background distractions.  Tracy would benefit from continued PT services to address endurance deficits to improve safe and functional mobility.    Tracy Is progressing well towards her goals.   Pt prognosis is Good.     Pt will continue to benefit from skilled outpatient physical therapy to address the deficits listed in the problem list box on initial evaluation, provide pt/family education and to maximize  pt's level of independence in the home and community environment.     Pt's spiritual, cultural and educational needs considered and pt agreeable to plan of care and goals.     Anticipated barriers to physical therapy: co-morbidities, sedentary lifestyle    Goals: Short Term Goals: 4 weeks   1. Pt will be compliant with HEP in order to maximize PT benefits   2. Pt will score >/= 20/30 on FGA with least restrictive assistive device in order to reduce risk for falls and improve postural control   3. Pt will score >/= 10 repetitions on 30-Second Sit to  order to improve BLE endurance and muscular power for transfers   4. Pt will improve BLE MMT grades by >/=1/2 grade in order to improve strength for ADL completion  5. Pt will walk at least 800 ft during the 6 minute walk test with SPC and 0 LOB  without increase in lower leg pain of more than 2 point on VAS for improved community ambulation.     Long Term Goals: 6 weeks   1. Pt will improve BLE MMT grades by >/=1 grade in order to improve strength for ADL completion   2. Pt will score >/= 25/30 on FGA with least restrictive assistive device in order to reduce risk for falls and improve postural control   3. Pt will score >/= 12 repetitions on 30-Second Sit to  order to improve BLE endurance and muscular power for transfers   4. Pt will walk at least 1000 ft during the 6 minute walk test with SPC and 0 LOB  without increase in lower leg pain of more than 2 point on VAS for improved community ambulation.  5. Pt will perform 1 floor <> stand transfer with 1 UE support in order to demonstrate safe functional mobility in case of future fall   6.  Pt will report 0 falls from initiation of PT management   7. Pt will begin some form of home/community fitness in order to sustain progress gained in PT.     PLAN     Plan to cont with progression of PT goals per POC.    Padmini Vigil, PT

## 2023-01-30 ENCOUNTER — DOCUMENT SCAN (OUTPATIENT)
Dept: HOME HEALTH SERVICES | Facility: HOSPITAL | Age: 73
End: 2023-01-30
Payer: MEDICARE

## 2023-01-30 ENCOUNTER — TELEPHONE (OUTPATIENT)
Dept: REHABILITATION | Facility: HOSPITAL | Age: 73
End: 2023-01-30
Payer: MEDICARE

## 2023-01-30 ENCOUNTER — CLINICAL SUPPORT (OUTPATIENT)
Dept: REHABILITATION | Facility: HOSPITAL | Age: 73
End: 2023-01-30
Payer: MEDICARE

## 2023-01-30 DIAGNOSIS — Z74.09 IMPAIRED FUNCTIONAL MOBILITY, BALANCE, GAIT, AND ENDURANCE: Primary | ICD-10-CM

## 2023-01-30 PROCEDURE — 97110 THERAPEUTIC EXERCISES: CPT | Mod: PN

## 2023-01-30 PROCEDURE — 97112 NEUROMUSCULAR REEDUCATION: CPT | Mod: PN

## 2023-01-30 PROCEDURE — 99499 NO LOS: ICD-10-PCS | Mod: ,,, | Performed by: STUDENT IN AN ORGANIZED HEALTH CARE EDUCATION/TRAINING PROGRAM

## 2023-01-30 PROCEDURE — 99499 UNLISTED E&M SERVICE: CPT | Mod: ,,, | Performed by: STUDENT IN AN ORGANIZED HEALTH CARE EDUCATION/TRAINING PROGRAM

## 2023-01-30 NOTE — TELEPHONE ENCOUNTER
Left voicemail with patient offering her an earlier open spot if she would like to have sooner PT appointment today.    Val Crockett, PT, DPT

## 2023-01-30 NOTE — PROGRESS NOTES
"[facility]   Physical Therapy Treatment Note     Name: Tracy Armendariz  Clinic Number: [id number]    Therapy Diagnosis:   Encounter Diagnosis   Name Primary?    Impaired functional mobility, balance, gait, and endurance Yes     Physician: Angie Camara MD    Visit Date: 1/30/2023      Physician Orders: PT Eval and Treat   Medical Diagnosis from Referral: Z74.09 (ICD-10-CM) - Impaired functional mobility and activity tolerance   Evaluation Date: 1/19/2023  Authorization Period Expiration: 02/14/2023   Plan of Care Expiration: 3/17/2023  Visit # / Visits authorized: 4/ 20 (+eval)  Foto #: 4/5  PTA Visit #: 0/5     Time In: 1:45PM  Time Out: 2:30PM  Total Billable Time: 45 minutes (2TE, 1NMR)    Precautions: Standard and Fall    SUBJECTIVE     Patient reports: Soreness and fatigue after last visit but otherwise no complaints. Has appointment with nephrologist next week. Has been performing partial HEP but says she cannot tolerate all items at once.  She reports partial compliance with home exercise program.   Response to previous treatment: no adverse reaction   Functional change: ongoing    Pain: 5/10   Location: "all over" but mostly back (no headache today)    OBJECTIVE     Objective Measures updated at progress report unless specified.     Treatment     Tracy received the treatments listed below:      therapeutic exercises to develop strength, endurance, ROM, and posture for 25 minutes including:    SCIFIT recumbent stepper: single peak level 4.0 for 6 minutes    At Ballet bar:  Heel cord stretch 2x45" at incline board    Heel raises 2 x 20   Toe raises 2 x 20      Standing hip abductions 3 x 10 - bilateral support at bar red theraband      neuromuscular re-education activities to improve: Balance, Coordination, Kinesthetic, and Proprioception for  20 minutes. The following activities were included:     Hurdles: yellow (9") lateral: 6 lengths x 10 feet each (single upper extremity " "support)  Tandem static trials 3x30" each leg leading  Narrow base of support + horizontal head turns 3x30"    NOT TODAY - time  Squats over chair - x 10 - accommodated to small range due to complaints of left knee pain      Patient Education and Home Exercises     Home Exercises Provided and Patient Education Provided     Education provided:   - daily HEP compliance  - daily walking and limiting time spent sitting    Written Home Exercises Provided: yes. Exercises were reviewed and Tracy was able to demonstrate them prior to the end of the session.  Tracy demonstrated good  understanding of the education provided. See EMR under Patient Instructions for exercises provided during therapy sessions    ASSESSMENT     Tracy tolerated treatment session fairly. Deficits noted in standing tolerance/general endurance level and she required at least 5 seated rest breaks throughout visit. Encouraged to perform alternating days of strengthening and aerobic activity for home exercise in order to maximize PT benefits. Also noted significant compensatory knee hyperextension during standing tasks; response to pertinent exercise improved once cued to unlock knees, but she would benefit from continued reinforcement to minimize this in the future.    Tracy Is progressing well towards her goals.   Pt prognosis is Good.     Pt will continue to benefit from skilled outpatient physical therapy to address the deficits listed in the problem list box on initial evaluation, provide pt/family education and to maximize pt's level of independence in the home and community environment.     Pt's spiritual, cultural and educational needs considered and pt agreeable to plan of care and goals.     Anticipated barriers to physical therapy: co-morbidities, sedentary lifestyle    Goals: Short Term Goals: 4 weeks   1. Pt will be compliant with HEP in order to maximize PT benefits (progressing, not met)  2. Pt will score >/= 20/30 on FGA with " least restrictive assistive device in order to reduce risk for falls and improve postural control (progressing, not met)  3. Pt will score >/= 10 repetitions on 30-Second Sit to  order to improve BLE endurance and muscular power for transfers (progressing, not met)  4. Pt will improve BLE MMT grades by >/=1/2 grade in order to improve strength for ADL completion (progressing, not met)  5. Pt will walk at least 800 ft during the 6 minute walk test with SPC and 0 LOB  without increase in lower leg pain of more than 2 point on VAS for improved community ambulation. (progressing, not met)     Long Term Goals: 6 weeks   1. Pt will improve BLE MMT grades by >/=1 grade in order to improve strength for ADL completion (progressing, not met)  2. Pt will score >/= 25/30 on FGA with least restrictive assistive device in order to reduce risk for falls and improve postural control (progressing, not met)  3. Pt will score >/= 12 repetitions on 30-Second Sit to  order to improve BLE endurance and muscular power for transfers (progressing, not met)  4. Pt will walk at least 1000 ft during the 6 minute walk test with SPC and 0 LOB  without increase in lower leg pain of more than 2 point on VAS for improved community ambulation. (progressing, not met)  5. Pt will perform 1 floor <> stand transfer with 1 UE support in order to demonstrate safe functional mobility in case of future fall (progressing, not met)  6.  Pt will report 0 falls from initiation of PT management (progressing, not met)  7. Pt will begin some form of home/community fitness in order to sustain progress gained in PT. (progressing, not met)    PLAN     Plan to cont with progression of PT goals per POC.    MIGUEL BRICEÑO, PT

## 2023-02-02 ENCOUNTER — CLINICAL SUPPORT (OUTPATIENT)
Dept: REHABILITATION | Facility: HOSPITAL | Age: 73
End: 2023-02-02
Payer: MEDICARE

## 2023-02-02 DIAGNOSIS — Z74.09 IMPAIRED FUNCTIONAL MOBILITY, BALANCE, GAIT, AND ENDURANCE: Primary | ICD-10-CM

## 2023-02-02 PROCEDURE — 97110 THERAPEUTIC EXERCISES: CPT | Mod: PN

## 2023-02-02 NOTE — PROGRESS NOTES
"[facility]   Physical Therapy Treatment Note     Name: Tracy Armendariz  Clinic Number: [id number]    Therapy Diagnosis:   Encounter Diagnosis   Name Primary?    Impaired functional mobility, balance, gait, and endurance Yes       Physician: Angie Camara MD    Visit Date: 2/2/2023      Physician Orders: PT Eval and Treat   Medical Diagnosis from Referral: Z74.09 (ICD-10-CM) - Impaired functional mobility and activity tolerance   Evaluation Date: 1/19/2023  Authorization Period Expiration: 02/14/2023   Plan of Care Expiration: 3/17/2023  Visit # / Visits authorized: 5 / 20 (+eval)  Foto #: 0/5  PTA Visit #: 0/5     Time In: 1301  Time Out: 1343  Total Billable Time: 42 minutes (3TE)    Precautions: Standard and Fall    SUBJECTIVE     Patient reports: "I have so much more energy now" Contributing it to PT and setting alarm so she starts her days earlier.   Does report baseline nausea that has been present since she woke up. Feels like reflux and states she took a Pepcid today.   She reports partial compliance with home exercise program.   Response to previous treatment: no adverse reaction   Functional change: ongoing    Pain: 7/10   Location: knees and down    OBJECTIVE     Objective Measures updated at progress report unless specified.     CMS Impairment/Limitation/Restriction for FOTO Survey    Therapist reviewed FOTO scores for Tracy Armendariz on 2/2/2023.   FOTO documents entered into [employer] - see Media section.    Limitation Score: 44%              Treatment     Tracy received the treatments listed below:      therapeutic exercises to develop strength, endurance, ROM, and posture for 42 minutes including:    SCIFIT recumbent stepper: twin peak level 4.0 for 6 minutes    At Ballet bar: all with upper extremity support/light touch  Heel cord stretch 2x45" at incline board  Heel raises 2 x 20   Toe raises 2 x 20   Standing hip abductions 3 x 10 - bilateral support at bar 3# ankle " "wts - cues to prevent knee hyperextension  LAQ 3# ankle weights (pt requesting seated task due to hip fatigue) x 10 bilateral - complains of knee achiness after and did not want to do more  Standing hip extensions 1 x 10 - bilateral support at bar 3# ankle wts - cues to prevent knee hyperextension         neuromuscular re-education activities to improve: Balance, Coordination, Kinesthetic, and Proprioception for  0 minutes. The following activities were included:     NOT TODAY  Hurdles: yellow (9") lateral: 6 lengths x 10 feet each (single upper extremity support)  Tandem static trials 3x30" each leg leading  Narrow base of support + horizontal head turns 3x30"         Patient Education and Home Exercises     Home Exercises Provided and Patient Education Provided     Education provided:   - daily HEP compliance  - daily walking and limiting time spent sitting    Written Home Exercises Provided: yes. Exercises were reviewed and Tracy was able to demonstrate them prior to the end of the session.  Tracy demonstrated good  understanding of the education provided. See EMR under Patient Instructions for exercises provided during therapy sessions    ASSESSMENT     Tracy entered reporting increased energy but also ongoing nausea all day. She needed increased rest breaks in sitting today compared to previous session. She also reported hip and knee achiness which limited overall total exercises completed today. She was able to better sustain a small knee flexion to prevent hyperextension with standing exercises despite poor standing tolerance. Ongoing skilled PT is recommended to continue to improve activity tolerance and safety with functional mobility.     Tracy Is progressing well towards her goals.   Pt prognosis is Good.     Pt will continue to benefit from skilled outpatient physical therapy to address the deficits listed in the problem list box on initial evaluation, provide pt/family education and to maximize " pt's level of independence in the home and community environment.     Pt's spiritual, cultural and educational needs considered and pt agreeable to plan of care and goals.     Anticipated barriers to physical therapy: co-morbidities, sedentary lifestyle    Goals: Short Term Goals: 4 weeks   1. Pt will be compliant with HEP in order to maximize PT benefits (progressing, not met)  2. Pt will score >/= 20/30 on FGA with least restrictive assistive device in order to reduce risk for falls and improve postural control (progressing, not met)  3. Pt will score >/= 10 repetitions on 30-Second Sit to  order to improve BLE endurance and muscular power for transfers (progressing, not met)  4. Pt will improve BLE MMT grades by >/=1/2 grade in order to improve strength for ADL completion (progressing, not met)  5. Pt will walk at least 800 ft during the 6 minute walk test with SPC and 0 LOB  without increase in lower leg pain of more than 2 point on VAS for improved community ambulation. (progressing, not met)     Long Term Goals: 6 weeks   1. Pt will improve BLE MMT grades by >/=1 grade in order to improve strength for ADL completion (progressing, not met)  2. Pt will score >/= 25/30 on FGA with least restrictive assistive device in order to reduce risk for falls and improve postural control (progressing, not met)  3. Pt will score >/= 12 repetitions on 30-Second Sit to  order to improve BLE endurance and muscular power for transfers (progressing, not met)  4. Pt will walk at least 1000 ft during the 6 minute walk test with SPC and 0 LOB  without increase in lower leg pain of more than 2 point on VAS for improved community ambulation. (progressing, not met)  5. Pt will perform 1 floor <> stand transfer with 1 UE support in order to demonstrate safe functional mobility in case of future fall (progressing, not met)  6.  Pt will report 0 falls from initiation of PT management (progressing, not met)  7. Pt will  begin some form of home/community fitness in order to sustain progress gained in PT. (progressing, not met)    PLAN     Plan to cont with progression of PT goals per POC.    Padmini Vigil, PT

## 2023-02-07 ENCOUNTER — CLINICAL SUPPORT (OUTPATIENT)
Dept: REHABILITATION | Facility: HOSPITAL | Age: 73
End: 2023-02-07
Payer: MEDICARE

## 2023-02-07 DIAGNOSIS — Z74.09 IMPAIRED FUNCTIONAL MOBILITY, BALANCE, GAIT, AND ENDURANCE: Primary | ICD-10-CM

## 2023-02-07 PROCEDURE — 97110 THERAPEUTIC EXERCISES: CPT | Mod: PN

## 2023-02-07 NOTE — PROGRESS NOTES
" [facility]   Physical Therapy Treatment Note     Name: Tracy Armendariz  Clinic Number: [id number]    Therapy Diagnosis:   Encounter Diagnosis   Name Primary?    Impaired functional mobility, balance, gait, and endurance Yes       Physician: Angie Camara MD    Visit Date: 2/7/2023      Physician Orders: PT Eval and Treat   Medical Diagnosis from Referral: Z74.09 (ICD-10-CM) - Impaired functional mobility and activity tolerance   Evaluation Date: 1/19/2023  Authorization Period Expiration: 02/14/2023   Plan of Care Expiration: 3/17/2023  Visit # / Visits authorized: 6 / 20 (+eval)  Foto #: 0/5  PTA Visit #: 0/5     Time In: 1018 AM  Time Out: 11:08 AM  Total Billable Time: 50 minutes (3TE)    Precautions: Standard and Fall    SUBJECTIVE     Patient reports: Pt says she has not had a good few days at the start of session, due to pain in back of upper legs over the weekend.  Pt also had fall yesterday. She says she went into the garden to check on the tania on the tree and when she bent over and turned her head to look under the tree, she fell forward into the bushes. Pt also had no shoes or and did not have her cane with her.  Pt was able to stand from the ground with assist of chair her  brought out to her. Pt denies hitting her head or any new pain since fall.      She reports partial compliance with home exercise program.   Response to previous treatment: no adverse reaction   Functional change: ongoing    Pain: 7/10   Location: left knee and bilateral lower legs    OBJECTIVE     Objective Measures updated at progress report unless specified.       Treatment     Tracy received the treatments listed below:      therapeutic exercises to develop strength, endurance, ROM, and posture for 42 minutes including:    SCIFIT recumbent stepper: twin peak level 4.0 for 8 minutes    At Ballet bar: all with upper extremity support/light touch  Heel cord stretch 2x45" at incline " "board  Seated hamstring stretch (foot propped on stool) + ankle pump for gentle nerve glide 2 min bilaterally  Heel raises 2 x 20   Toe raises 2 x 20   Standing hip abductions 3 x 10 - bilateral support at bar 3# ankle wts - cues to prevent knee hyperextension  Sit to stand from arm chair with bilateral upper extremity assist x8, x5  Seated hip adduction with ball 5" x 2 min (session finished in sitting due to fatigue)    Not today:  LAQ 3# ankle weights (pt requesting seated task due to hip fatigue) x 10 bilateral - complains of knee achiness after and did not want to do more  Standing hip extensions 1 x 10 - bilateral support at bar 3# ankle wts - cues to prevent knee hyperextension         neuromuscular re-education activities to improve: Balance, Coordination, Kinesthetic, and Proprioception for  0 minutes. The following activities were included:     NOT TODAY  Hurdles: yellow (9") lateral: 6 lengths x 10 feet each (single upper extremity support)  Tandem static trials 3x30" each leg leading  Narrow base of support + horizontal head turns 3x30"         Patient Education and Home Exercises     Home Exercises Provided and Patient Education Provided     Education provided:   - daily HEP compliance  - daily walking and limiting time spent sitting    Written Home Exercises Provided: yes. Exercises were reviewed and Tracy was able to demonstrate them prior to the end of the session.  Tracy demonstrated good  understanding of the education provided. See EMR under Patient Instructions for exercises provided during therapy sessions    ASSESSMENT     Tracy entered reporting increased activity tolerance on Saturday but fall in garden on Monday.  Pt continues to nap daily and feels tired "all the time" but also states she is feeling much better overall.  Addition of sit to stand activity today fatigued patient significantly, decreasing overall amount of exercised tolerated today.  Ongoing skilled PT is recommended to " continue to improve activity tolerance and safety with functional mobility.     Tracy Is progressing well towards her goals.   Pt prognosis is Good.     Pt will continue to benefit from skilled outpatient physical therapy to address the deficits listed in the problem list box on initial evaluation, provide pt/family education and to maximize pt's level of independence in the home and community environment.     Pt's spiritual, cultural and educational needs considered and pt agreeable to plan of care and goals.     Anticipated barriers to physical therapy: co-morbidities, sedentary lifestyle    Goals: Short Term Goals: 4 weeks   1. Pt will be compliant with HEP in order to maximize PT benefits (progressing, not met)  2. Pt will score >/= 20/30 on FGA with least restrictive assistive device in order to reduce risk for falls and improve postural control (progressing, not met)  3. Pt will score >/= 10 repetitions on 30-Second Sit to  order to improve BLE endurance and muscular power for transfers (progressing, not met)  4. Pt will improve BLE MMT grades by >/=1/2 grade in order to improve strength for ADL completion (progressing, not met)  5. Pt will walk at least 800 ft during the 6 minute walk test with SPC and 0 LOB  without increase in lower leg pain of more than 2 point on VAS for improved community ambulation. (progressing, not met)     Long Term Goals: 6 weeks   1. Pt will improve BLE MMT grades by >/=1 grade in order to improve strength for ADL completion (progressing, not met)  2. Pt will score >/= 25/30 on FGA with least restrictive assistive device in order to reduce risk for falls and improve postural control (progressing, not met)  3. Pt will score >/= 12 repetitions on 30-Second Sit to  order to improve BLE endurance and muscular power for transfers (progressing, not met)  4. Pt will walk at least 1000 ft during the 6 minute walk test with SPC and 0 LOB  without increase in lower leg pain  of more than 2 point on VAS for improved community ambulation. (progressing, not met)  5. Pt will perform 1 floor <> stand transfer with 1 UE support in order to demonstrate safe functional mobility in case of future fall (progressing, not met)  6.  Pt will report 0 falls from initiation of PT management (progressing, not met)  7. Pt will begin some form of home/community fitness in order to sustain progress gained in PT. (progressing, not met)    PLAN     Plan to cont with progression of PT goals per POC.    Liat Masters, PT

## 2023-02-08 ENCOUNTER — DOCUMENT SCAN (OUTPATIENT)
Dept: HOME HEALTH SERVICES | Facility: HOSPITAL | Age: 73
End: 2023-02-08
Payer: MEDICARE

## 2023-02-09 ENCOUNTER — CLINICAL SUPPORT (OUTPATIENT)
Dept: REHABILITATION | Facility: HOSPITAL | Age: 73
End: 2023-02-09
Payer: MEDICARE

## 2023-02-09 DIAGNOSIS — Z74.09 IMPAIRED FUNCTIONAL MOBILITY, BALANCE, GAIT, AND ENDURANCE: Primary | ICD-10-CM

## 2023-02-09 PROCEDURE — 97112 NEUROMUSCULAR REEDUCATION: CPT | Mod: PN

## 2023-02-09 PROCEDURE — 97110 THERAPEUTIC EXERCISES: CPT | Mod: PN

## 2023-02-09 NOTE — PROGRESS NOTES
"[facility]   Physical Therapy Treatment Note     Name: Tracy Armendariz  Clinic Number: [id number]    Therapy Diagnosis:   Encounter Diagnosis   Name Primary?    Impaired functional mobility, balance, gait, and endurance Yes       Physician: Angie Camara MD    Visit Date: 2/9/2023      Physician Orders: PT Eval and Treat   Medical Diagnosis from Referral: Z74.09 (ICD-10-CM) - Impaired functional mobility and activity tolerance   Evaluation Date: 1/19/2023  Authorization Period Expiration: 02/14/2023   Plan of Care Expiration: 3/17/2023  Visit # / Visits authorized: 7 / 20 (+eval)  Foto #: 1/5  PTA Visit #: 0/5     Time In: 1302  Time Out: 1355  Total Billable Time: 53 minutes (2TE, 2NMR)    Precautions: Standard and Fall    SUBJECTIVE     Patient reports: No falls since the other day.      She reports partial compliance with home exercise program.   Response to previous treatment: no adverse reaction   Functional change: ongoing    Pain:4-5/10   Location: headache only "has been all day"    OBJECTIVE     Objective Measures updated at progress report unless specified.     Treatment     Tracy received the treatments listed below:      therapeutic exercises to develop strength, endurance, ROM, and posture for 30 minutes including:    Heel cord stretch 2x30" at incline board - requesting to sit after - felt like knees buckling on her but not observed unsteadiness  Seated hamstring stretch (foot propped on stool) with step and stool. 3 x 30"   Standing hip abductions 3 x 10 - bilateral support at bar 3# ankle wts - cues to prevent knee hyperextension (seated rest break requested despite encouragement to not sit after set 2)     End of session - SCIFIT recumbent stepper: multi-level sprints level 4.0 for 10 minutes for cardiovascular and neuromuscular endurance - cues and encouragement for focus on time over speed at current, but to ensure legs stay moving.      neuromuscular " "re-education activities to improve: Balance, Coordination, Kinesthetic, and Proprioception for 23 minutes. The following activities were included:     At Ballet bar: less support than last sessions - intermittent finger tip touchdown only, SBA   Heel raises 2 x 12  Toe raises 2 x 12    Sit<>stands x 6 with focus on eccentric control and initial balance achievement without back of calf bracing on chair.     Narrow base of support - static x 30s - mod sway  Narrow base of support - dynamic upper extremity twists 3 x 10 bilateral   Narrow base of support - eyes closed 3 x 10s -  min sway  Narrow base of support - perturbations all directions     Lateral bilateral alternating step and weight shifts x 10     NOT TODAY  Hurdles: yellow (9") lateral: 6 lengths x 10 feet each (single upper extremity support)  Tandem static trials 3x30" each leg leading  Narrow base of support + horizontal head turns 3x30"         Patient Education and Home Exercises     Home Exercises Provided and Patient Education Provided     Education provided:   - daily HEP compliance  - daily walking and limiting time spent sitting    Written Home Exercises Provided: yes. Exercises were reviewed and Tracy was able to demonstrate them prior to the end of the session.  Tracy demonstrated good  understanding of the education provided. See EMR under Patient Instructions for exercises provided during therapy sessions    ASSESSMENT     Tracy did well with today's therapy session although she did need frequent sitting therapeutic rest breaks, despite encouragement to limit sitting breaks. More time was spent on balance today with observed moderate sway on most tasks. She has tendency for loss of balance posteriorly but with cues for anterior tibialis firing was able to improve control. Cues throughout to control descent with stand to sit. She was able to increase time on stepper by 2 min with step frequency staying ~88-90steps/min throughout. She was " also able to tolerate slightly increased overall time today. Ongoing skilled PT is recommended to continue to improve activity tolerance and safety with functional mobility.     Tracy Is progressing well towards her goals.   Pt prognosis is Good.     Pt will continue to benefit from skilled outpatient physical therapy to address the deficits listed in the problem list box on initial evaluation, provide pt/family education and to maximize pt's level of independence in the home and community environment.     Pt's spiritual, cultural and educational needs considered and pt agreeable to plan of care and goals.     Anticipated barriers to physical therapy: co-morbidities, sedentary lifestyle    Goals: Short Term Goals: 4 weeks   1. Pt will be compliant with HEP in order to maximize PT benefits (progressing, not met)  2. Pt will score >/= 20/30 on FGA with least restrictive assistive device in order to reduce risk for falls and improve postural control (progressing, not met)  3. Pt will score >/= 10 repetitions on 30-Second Sit to  order to improve BLE endurance and muscular power for transfers (progressing, not met)  4. Pt will improve BLE MMT grades by >/=1/2 grade in order to improve strength for ADL completion (progressing, not met)  5. Pt will walk at least 800 ft during the 6 minute walk test with SPC and 0 LOB  without increase in lower leg pain of more than 2 point on VAS for improved community ambulation. (progressing, not met)     Long Term Goals: 6 weeks   1. Pt will improve BLE MMT grades by >/=1 grade in order to improve strength for ADL completion (progressing, not met)  2. Pt will score >/= 25/30 on FGA with least restrictive assistive device in order to reduce risk for falls and improve postural control (progressing, not met)  3. Pt will score >/= 12 repetitions on 30-Second Sit to  order to improve BLE endurance and muscular power for transfers (progressing, not met)  4. Pt will walk at  least 1000 ft during the 6 minute walk test with SPC and 0 LOB  without increase in lower leg pain of more than 2 point on VAS for improved community ambulation. (progressing, not met)  5. Pt will perform 1 floor <> stand transfer with 1 UE support in order to demonstrate safe functional mobility in case of future fall (progressing, not met)  6.  Pt will report 0 falls from initiation of PT management (progressing, not met)  7. Pt will begin some form of home/community fitness in order to sustain progress gained in PT. (progressing, not met)    PLAN     Plan to cont with progression of PT goals per POC.    Padmini Vigil, PT

## 2023-02-11 ENCOUNTER — LAB VISIT (OUTPATIENT)
Dept: LAB | Facility: HOSPITAL | Age: 73
End: 2023-02-11
Attending: INTERNAL MEDICINE
Payer: MEDICARE

## 2023-02-11 DIAGNOSIS — N20.0 STONE IN KIDNEY: ICD-10-CM

## 2023-02-11 DIAGNOSIS — E55.9 VITAMIN D DEFICIENCY: ICD-10-CM

## 2023-02-11 LAB
25(OH)D3+25(OH)D2 SERPL-MCNC: 53 NG/ML (ref 30–96)
ALBUMIN SERPL BCP-MCNC: 3.5 G/DL (ref 3.5–5.2)
ANION GAP SERPL CALC-SCNC: 12 MMOL/L (ref 8–16)
BASOPHILS # BLD AUTO: 0.06 K/UL (ref 0–0.2)
BASOPHILS NFR BLD: 0.9 % (ref 0–1.9)
BILIRUB UR QL STRIP: NEGATIVE
BUN SERPL-MCNC: 10 MG/DL (ref 8–23)
CALCIUM SERPL-MCNC: 9.5 MG/DL (ref 8.7–10.5)
CHLORIDE SERPL-SCNC: 105 MMOL/L (ref 95–110)
CLARITY UR: CLEAR
CO2 SERPL-SCNC: 23 MMOL/L (ref 23–29)
COLOR UR: YELLOW
CREAT SERPL-MCNC: 0.9 MG/DL (ref 0.5–1.4)
CREAT UR-MCNC: 21 MG/DL (ref 15–325)
DIFFERENTIAL METHOD: ABNORMAL
EOSINOPHIL # BLD AUTO: 0.2 K/UL (ref 0–0.5)
EOSINOPHIL NFR BLD: 2.7 % (ref 0–8)
ERYTHROCYTE [DISTWIDTH] IN BLOOD BY AUTOMATED COUNT: 13 % (ref 11.5–14.5)
EST. GFR  (NO RACE VARIABLE): >60 ML/MIN/1.73 M^2
GLUCOSE SERPL-MCNC: 145 MG/DL (ref 70–110)
GLUCOSE UR QL STRIP: NEGATIVE
HCT VFR BLD AUTO: 42.8 % (ref 37–48.5)
HGB BLD-MCNC: 13.5 G/DL (ref 12–16)
HGB UR QL STRIP: NEGATIVE
IMM GRANULOCYTES # BLD AUTO: 0.01 K/UL (ref 0–0.04)
IMM GRANULOCYTES NFR BLD AUTO: 0.1 % (ref 0–0.5)
KETONES UR QL STRIP: NEGATIVE
LEUKOCYTE ESTERASE UR QL STRIP: NEGATIVE
LYMPHOCYTES # BLD AUTO: 2.2 K/UL (ref 1–4.8)
LYMPHOCYTES NFR BLD: 31.4 % (ref 18–48)
MCH RBC QN AUTO: 30.3 PG (ref 27–31)
MCHC RBC AUTO-ENTMCNC: 31.5 G/DL (ref 32–36)
MCV RBC AUTO: 96 FL (ref 82–98)
MONOCYTES # BLD AUTO: 0.6 K/UL (ref 0.3–1)
MONOCYTES NFR BLD: 8.7 % (ref 4–15)
NEUTROPHILS # BLD AUTO: 3.9 K/UL (ref 1.8–7.7)
NEUTROPHILS NFR BLD: 56.2 % (ref 38–73)
NITRITE UR QL STRIP: NEGATIVE
NRBC BLD-RTO: 0 /100 WBC
PH UR STRIP: 7 [PH] (ref 5–8)
PHOSPHATE SERPL-MCNC: 3.5 MG/DL (ref 2.7–4.5)
PLATELET # BLD AUTO: 267 K/UL (ref 150–450)
PMV BLD AUTO: 11.3 FL (ref 9.2–12.9)
POTASSIUM SERPL-SCNC: 4 MMOL/L (ref 3.5–5.1)
PROT UR QL STRIP: NEGATIVE
PROT UR-MCNC: <7 MG/DL (ref 0–15)
PROT/CREAT UR: NORMAL MG/G{CREAT} (ref 0–0.2)
PTH-INTACT SERPL-MCNC: 71.6 PG/ML (ref 9–77)
RBC # BLD AUTO: 4.45 M/UL (ref 4–5.4)
SODIUM SERPL-SCNC: 140 MMOL/L (ref 136–145)
SP GR UR STRIP: <1.005 (ref 1–1.03)
URATE SERPL-MCNC: 7 MG/DL (ref 2.4–5.7)
URN SPEC COLLECT METH UR: ABNORMAL
UROBILINOGEN UR STRIP-ACNC: NEGATIVE EU/DL
WBC # BLD AUTO: 6.91 K/UL (ref 3.9–12.7)

## 2023-02-11 PROCEDURE — 82306 VITAMIN D 25 HYDROXY: CPT | Performed by: INTERNAL MEDICINE

## 2023-02-11 PROCEDURE — 83970 ASSAY OF PARATHORMONE: CPT | Performed by: INTERNAL MEDICINE

## 2023-02-11 PROCEDURE — 82570 ASSAY OF URINE CREATININE: CPT | Performed by: INTERNAL MEDICINE

## 2023-02-11 PROCEDURE — 36415 COLL VENOUS BLD VENIPUNCTURE: CPT | Performed by: INTERNAL MEDICINE

## 2023-02-11 PROCEDURE — 83986 ASSAY PH BODY FLUID NOS: CPT | Performed by: INTERNAL MEDICINE

## 2023-02-11 PROCEDURE — 81003 URINALYSIS AUTO W/O SCOPE: CPT | Performed by: INTERNAL MEDICINE

## 2023-02-11 PROCEDURE — 84550 ASSAY OF BLOOD/URIC ACID: CPT | Performed by: INTERNAL MEDICINE

## 2023-02-11 PROCEDURE — 85025 COMPLETE CBC W/AUTO DIFF WBC: CPT | Performed by: INTERNAL MEDICINE

## 2023-02-11 PROCEDURE — 80069 RENAL FUNCTION PANEL: CPT | Performed by: INTERNAL MEDICINE

## 2023-02-13 ENCOUNTER — CLINICAL SUPPORT (OUTPATIENT)
Dept: REHABILITATION | Facility: HOSPITAL | Age: 73
End: 2023-02-13
Payer: MEDICARE

## 2023-02-13 DIAGNOSIS — Z74.09 IMPAIRED FUNCTIONAL MOBILITY, BALANCE, GAIT, AND ENDURANCE: Primary | ICD-10-CM

## 2023-02-13 PROCEDURE — 97110 THERAPEUTIC EXERCISES: CPT | Mod: PN

## 2023-02-13 NOTE — PROGRESS NOTES
" [facility]   Physical Therapy Treatment Note     Name: Tracy Armendariz  Clinic Number: [id number]    Therapy Diagnosis:   Encounter Diagnosis   Name Primary?    Impaired functional mobility, balance, gait, and endurance Yes       Physician: Angie Camara MD    Visit Date: 2/13/2023      Physician Orders: PT Eval and Treat   Medical Diagnosis from Referral: Z74.09 (ICD-10-CM) - Impaired functional mobility and activity tolerance   Evaluation Date: 1/19/2023  Authorization Period Expiration: 02/14/2023   Plan of Care Expiration: 3/17/2023  Visit # / Visits authorized: 7 / 20 (+eval)  Foto #: 1/5  PTA Visit #: 0/5     Time In: 1302  Time Out: 1345  Total Billable Time: 43 minutes (3TE)    Precautions: Standard and Fall    SUBJECTIVE     Patient reports: left medial knee pain at start of session.  Pt says she is able to walk for 19 minutes now.       She reports partial compliance with home exercise program.   Response to previous treatment: no adverse reaction   Functional change: ongoing    Pain:7/10   Location: left knee    OBJECTIVE     Objective Measures updated at progress report unless specified.     30 second STS - 9 reps with upper extremity assist  Timed Up and Go - 9 seconds with straight cane  6 minute walk test- 1252 feet with cane  FOTO: 34% limitation    Treatment     Tracy received the treatments listed below:      therapeutic exercises to develop strength, endurance, ROM, and posture for 43 minutes including:  - 30 second sit to stand  - Timed Up and Go  - Six Minute walk  - Heel cord stretch 3x45" at incline board   - Standing hamstring stretch at step  3 x 30" bilaterally  - 6" forward step ups with bilateral upper extremity support 2 x 10 reps bilaterally  - SCIFIT recumbent stepper: multi-level sprints level 4.0 for 10 minutes for cardiovascular and neuromuscular endurance - cues and encouragement for focus on time over speed at current, but to ensure legs stay " "moving.     Not today:  - Standing hip abductions 3 x 10 - bilateral support at bar 3# ankle wts - cues to prevent knee hyperextension      neuromuscular re-education activities to improve: Balance, Coordination, Kinesthetic, and Proprioception for 0 minutes. The following activities were included:     NOT TODAY  At Ballet bar: less support than last sessions - intermittent finger tip touchdown only, SBA   Heel raises 2 x 12  Toe raises 2 x 12  Sit<>stands x 6 with focus on eccentric control and initial balance achievement without back of calf bracing on chair.   Narrow base of support - static x 30s - mod sway  Narrow base of support - dynamic upper extremity twists 3 x 10 bilateral   Narrow base of support - eyes closed 3 x 10s -  min sway  Narrow base of support - perturbations all directions   Lateral bilateral alternating step and weight shifts x 10   Hurdles: yellow (9") lateral: 6 lengths x 10 feet each (single upper extremity support)  Tandem static trials 3x30" each leg leading  Narrow base of support + horizontal head turns 3x30"         Patient Education and Home Exercises     Home Exercises Provided and Patient Education Provided     Education provided:   - daily HEP compliance  - daily walking and limiting time spent sitting    Written Home Exercises Provided: yes. Exercises were reviewed and Tracy was able to demonstrate them prior to the end of the session.  Tracy demonstrated good  understanding of the education provided. See EMR under Patient Instructions for exercises provided during therapy sessions    ASSESSMENT     Tracy arrived to session complaining of left knee pain, but was agreeable to session.  Pt demonstrates significant improvement in six minute walk test with overall distance covered as well as ability to ambulate full time without standing (or seated) rest periods.  Due to reassessment of objective measures as well as FOTO, no time was spent on balance activity.  Pt educated in " importance of energy conservation and pacing during all tasks, especially in standing and when ambulating, to improve tolerance to activity. Ongoing skilled PT is recommended to continue to improve activity tolerance and safety with functional mobility.     Tracy Is progressing well towards her goals.   Pt prognosis is Good.     Pt will continue to benefit from skilled outpatient physical therapy to address the deficits listed in the problem list box on initial evaluation, provide pt/family education and to maximize pt's level of independence in the home and community environment.     Pt's spiritual, cultural and educational needs considered and pt agreeable to plan of care and goals.     Anticipated barriers to physical therapy: co-morbidities, sedentary lifestyle    Goals: Short Term Goals: 4 weeks   1. Pt will be compliant with HEP in order to maximize PT benefits (progressing, not met)  2. Pt will score >/= 20/30 on FGA with least restrictive assistive device in order to reduce risk for falls and improve postural control (progressing, not met)  3. Pt will score >/= 10 repetitions on 30-Second Sit to  order to improve BLE endurance and muscular power for transfers (progressing, not met)  4. Pt will improve BLE MMT grades by >/=1/2 grade in order to improve strength for ADL completion (progressing, not met)  5. Pt will walk at least 800 ft during the 6 minute walk test with SPC and 0 LOB  without increase in lower leg pain of more than 2 point on VAS for improved community ambulation. MET     Long Term Goals: 6 weeks   1. Pt will improve BLE MMT grades by >/=1 grade in order to improve strength for ADL completion (progressing, not met)  2. Pt will score >/= 25/30 on FGA with least restrictive assistive device in order to reduce risk for falls and improve postural control (progressing, not met)  3. Pt will score >/= 12 repetitions on 30-Second Sit to  order to improve BLE endurance and muscular  power for transfers (progressing, not met)  4. Pt will walk at least 1000 ft during the 6 minute walk test with SPC and 0 LOB  without increase in lower leg pain of more than 2 point on VAS for improved community ambulation. (progressing, not met)  5. Pt will perform 1 floor <> stand transfer with 1 UE support in order to demonstrate safe functional mobility in case of future fall (progressing, not met)  6.  Pt will report 0 falls from initiation of PT management (progressing, not met)  7. Pt will begin some form of home/community fitness in order to sustain progress gained in PT. (progressing, not met)    PLAN     Plan to cont with progression of PT goals per POC.    Liat Masters, PT

## 2023-02-16 LAB
AMMONIA 24H UR-SRATE: 11 MMOL/24 H (ref 15–56)
BSA: ABNORMAL 1.73M(2)
CA H2 PHOS DIHYD 24H SATFR UR: -1.4 DG
CALCIUM 24H UR-MRATE: 70 MG/24 H
CAOX 24H ENGDIFF UR: -0.76 DG
CHLORIDE 24H UR-SRATE: 63 MMOL/24 H (ref 34–286)
CITRATE 24H UR-MRATE: 263 MG/24 H
CLINICAL BIOCHEMIST REVIEW: ABNORMAL
COLLECT DURATION TIME UR: 24 H
CREAT 24H UR-MRATE: 735 MG/24 H (ref 603–1783)
HYDROXYAPATITE 24H ENGDIFF UR: 4.63 DG
MAGNESIUM 24H UR-MRATE: <70 MG/24 H (ref 51–269)
OSMOLALITY 24H UR: 123 MOSM/KG (ref 150–1150)
OXALATE 24H UR-MRATE: 12.3 MG/24 H (ref 9.7–40.5)
OXALATE 24H UR-SRATE: 0.14 MMOL/24 H (ref 0.11–0.46)
PH 24H UR: 6.9 [PH] (ref 4.5–8)
PHOSPHATE 24H UR-MRATE: 385 MG/24 H (ref 226–1797)
POTASSIUM 24H UR-SRATE: 35 MMOL/24 H (ref 16–105)
PROTEIN CATABOLIC RATE, URINE: 54 G/24 H (ref 56–125)
SODIUM 24H UR-SRATE: 91 MMOL/24 H (ref 22–328)
SPECIMEN VOL 24H UR: 3500 ML
SULFATE 24H UR-SRATE: 7 MMOL/24 H (ref 7–47)
URATE 24H SATFR UR: -7.12 DG
URATE 24H UR-MRATE: 350 MG/24 H (ref 250–750)
UUN 24H UR-MRATE: 4.7 G/24 H (ref 7–42)

## 2023-02-17 ENCOUNTER — CLINICAL SUPPORT (OUTPATIENT)
Dept: REHABILITATION | Facility: HOSPITAL | Age: 73
End: 2023-02-17
Payer: MEDICARE

## 2023-02-17 DIAGNOSIS — Z74.09 IMPAIRED FUNCTIONAL MOBILITY, BALANCE, GAIT, AND ENDURANCE: Primary | ICD-10-CM

## 2023-02-17 PROCEDURE — 97110 THERAPEUTIC EXERCISES: CPT | Mod: PN,CQ

## 2023-02-17 PROCEDURE — 97112 NEUROMUSCULAR REEDUCATION: CPT | Mod: PN,CQ

## 2023-02-17 NOTE — PROGRESS NOTES
" [facility]   Physical Therapy Treatment Note     Name: Tracy Armendariz  Clinic Number: [id number]    Therapy Diagnosis:   Encounter Diagnosis   Name Primary?    Impaired functional mobility, balance, gait, and endurance Yes       Physician: Angie Camara MD    Visit Date: 2/17/2023      Physician Orders: PT Eval and Treat   Medical Diagnosis from Referral: Z74.09 (ICD-10-CM) - Impaired functional mobility and activity tolerance   Evaluation Date: 1/19/2023  Authorization Period Expiration: 02/14/2023   Plan of Care Expiration: 3/17/2023  Visit # / Visits authorized: 8 / 20 (+eval)  Foto #: 2/5  PTA Visit #: 1/5     Time In: 11:15 am  Time Out: 12:00 pm  Total Billable Time: 45 minutes (2TE + 1 NMR)    Precautions: Standard and Fall    SUBJECTIVE     Patient reports: denies any knee pain at start of session but reports an exacerbation with closed chain strengthening.  Still walking 19 minutes daily.  She reports partial compliance with home exercise program.   Response to previous treatment: no adverse reaction   Functional change: ongoing    Pain: 0/10, 7/10 after step ups   Location: left knee    OBJECTIVE     Objective Measures updated at progress report unless specified.     30 second STS - 9 reps with upper extremity assist  Timed Up and Go - 9 seconds with straight cane  6 minute walk test- 1252 feet with cane  FOTO: 34% limitation    Treatment     Tracy received the treatments listed below:      therapeutic exercises to develop strength, endurance, ROM, and posture for 30 minutes including:  - Heel cord stretch 3x45" at incline board   - Standing hamstring stretch at step  3 x 30" bilaterally  - 6" forward step ups with bilateral upper extremity support 2 x 10 reps bilaterally  - SCIFIT recumbent stepper: multi-level sprints level 4.0 for 10 minutes for cardiovascular and neuromuscular endurance - cues and encouragement for focus on time over speed at current, but to ensure " legs stay moving.   - Standing hip abductions 3 x 10 - bilateral support at bar 3# ankle wts - cues to prevent knee hyperextension      neuromuscular re-education activities to improve: Balance, Coordination, Kinesthetic, and Proprioception for 15 minutes. The following activities were included:     - heel raises 2 x 12  - Toe raises 2 x 12  - Sit<>stands 2 x 8 with focus on eccentric control and initial balance achievement without back of calf bracing on chair.   - Narrow base of support - static 2 x 30s - no sway  - Narrow base of support - dynamic upper extremity twists 3 x 10 bilateral   - Narrow base of support - eyes closed 3 x 20s -  min sway    Not performed today:  - Narrow base of support - perturbations all directions          Patient Education and Home Exercises     Home Exercises Provided and Patient Education Provided     Education provided:   - daily HEP compliance  - daily walking and limiting time spent sitting    Written Home Exercises Provided: yes. Exercises were reviewed and Tracy was able to demonstrate them prior to the end of the session.  Tracy demonstrated good  understanding of the education provided. See EMR under Patient Instructions for exercises provided during therapy sessions    ASSESSMENT     Tracy arrived to session without any complaints of left knee pain and was ready to participate in treatment.  Exacerbation of L knee pain reported with closed chain strengthening with two brief seated rest breaks required for recovery.  Good carryover of proper sit to stand mechanics from previous session and improved eccentric control observed.  Better tolerance of static balance this session with pt exhibited improved postural control and little to no sway with increased balance time.  She would benefit from continued PT services to improve overall activity tolerance and safety with functional mobility.     Tracy Is progressing well towards her goals.   Pt prognosis is Good.     Pt  will continue to benefit from skilled outpatient physical therapy to address the deficits listed in the problem list box on initial evaluation, provide pt/family education and to maximize pt's level of independence in the home and community environment.     Pt's spiritual, cultural and educational needs considered and pt agreeable to plan of care and goals.     Anticipated barriers to physical therapy: co-morbidities, sedentary lifestyle    Goals: Short Term Goals: 4 weeks   1. Pt will be compliant with HEP in order to maximize PT benefits (progressing, not met)  2. Pt will score >/= 20/30 on FGA with least restrictive assistive device in order to reduce risk for falls and improve postural control (progressing, not met)  3. Pt will score >/= 10 repetitions on 30-Second Sit to  order to improve BLE endurance and muscular power for transfers (progressing, not met)  4. Pt will improve BLE MMT grades by >/=1/2 grade in order to improve strength for ADL completion (progressing, not met)  5. Pt will walk at least 800 ft during the 6 minute walk test with SPC and 0 LOB  without increase in lower leg pain of more than 2 point on VAS for improved community ambulation. MET     Long Term Goals: 6 weeks   1. Pt will improve BLE MMT grades by >/=1 grade in order to improve strength for ADL completion (progressing, not met)  2. Pt will score >/= 25/30 on FGA with least restrictive assistive device in order to reduce risk for falls and improve postural control (progressing, not met)  3. Pt will score >/= 12 repetitions on 30-Second Sit to  order to improve BLE endurance and muscular power for transfers (progressing, not met)  4. Pt will walk at least 1000 ft during the 6 minute walk test with SPC and 0 LOB  without increase in lower leg pain of more than 2 point on VAS for improved community ambulation. (progressing, not met)  5. Pt will perform 1 floor <> stand transfer with 1 UE support in order to demonstrate  safe functional mobility in case of future fall (progressing, not met)  6.  Pt will report 0 falls from initiation of PT management (progressing, not met)  7. Pt will begin some form of home/community fitness in order to sustain progress gained in PT. (progressing, not met)    PLAN     Plan to cont with progression of PT goals per POC.    Alejandra Jimenes, PTA

## 2023-02-20 ENCOUNTER — CLINICAL SUPPORT (OUTPATIENT)
Dept: REHABILITATION | Facility: HOSPITAL | Age: 73
End: 2023-02-20
Payer: MEDICARE

## 2023-02-20 DIAGNOSIS — Z74.09 IMPAIRED FUNCTIONAL MOBILITY, BALANCE, GAIT, AND ENDURANCE: Primary | ICD-10-CM

## 2023-02-20 PROCEDURE — 97110 THERAPEUTIC EXERCISES: CPT | Mod: PN

## 2023-02-20 PROCEDURE — 97112 NEUROMUSCULAR REEDUCATION: CPT | Mod: PN

## 2023-02-20 NOTE — PROGRESS NOTES
" [facility]   Physical Therapy Treatment Note     Name: Tracy Armendariz  Clinic Number: [id number]    Therapy Diagnosis:   Encounter Diagnosis   Name Primary?    Impaired functional mobility, balance, gait, and endurance Yes       Physician: Angie Camara MD    Visit Date: 2/20/2023      Physician Orders: PT Eval and Treat   Medical Diagnosis from Referral: Z74.09 (ICD-10-CM) - Impaired functional mobility and activity tolerance   Evaluation Date: 1/19/2023  Authorization Period Expiration: 02/14/2023   Plan of Care Expiration: 3/17/2023  Visit # / Visits authorized: 8 / 20 (+eval)  Foto #: 2/5  PTA Visit #: 1/5     Time In: 1:08 PM  Time Out: 2:03 PM  Total Billable Time: 55 minutes (2TE + 2 NMR)    Precautions: Standard and Fall    SUBJECTIVE     Patient reports: pt says she has been getting out of bed later in the mornings and thinks that is making her more tired throughout the day.  Still walking 19 minutes daily.  She reports partial compliance with home exercise program.   Response to previous treatment: no adverse reaction   Functional change: ongoing    Pain: 6/10, 7/10 after step ups   Location: left knee    OBJECTIVE     Objective Measures updated at progress report unless specified.       Treatment     Tracy received the treatments listed below:      therapeutic exercises to develop strength, endurance, ROM, and posture for 32 minutes including:  - Heel cord stretch 3x45" at incline board   - Standing hamstring stretch at step  3 x 30" bilaterally  - 6" forward step ups with bilateral upper extremity support 2 x 10 reps bilaterally  - SCIFIT recumbent stepper: multi-level sprints level 5.0 for 10 minutes for cardiovascular and neuromuscular endurance - cues and encouragement for focus on time over speed at current, but to ensure legs stay moving.   - Standing hip abductions 2 x 10 - bilateral support at bar 3# ankle wts - cues to prevent knee hyperextension  - " "standing hip extension 2 x 10 reps with bilateral upper extremity support at bar 3# ankle weights   - forward 4" step up with contralateral hip thrust x 10 reps bilaterally with single upper extremity support     neuromuscular re-education activities to improve: Balance, Coordination, Kinesthetic, and Proprioception for 23 minutes. The following activities were included:     - heel raises 2 x 12  - Toe raises 2 x 12  - Sit<>stands x 10 with focus on eccentric control and initial balance achievement without back of calf bracing on chair.   - Narrow base of support - with head turns (vertical / horizontal) 30" x 2 each  - Narrow base of support - dynamic upper extremity twists 3 x 10 bilateral  - modified tandem stance 30" x 3 (each side)  + clasped arm chest press   - tandem walk with single upper extremity support 10' x 6            Patient Education and Home Exercises     Home Exercises Provided and Patient Education Provided     Education provided:   - daily HEP compliance  - daily walking and limiting time spent sitting    Written Home Exercises Provided: yes. Exercises were reviewed and Tracy was able to demonstrate them prior to the end of the session.  Tracy demonstrated good  understanding of the education provided. See EMR under Patient Instructions for exercises provided during therapy sessions    ASSESSMENT     Tracy arrived to session complaining of increased left knee pain as well as increased fatigue but still willing to participate in treatment.  Pt had more difficulty with static stance with addition of head turns, especially in vertical plane. Pt was able to decrease upper extremity support with all balance activities today.   She would benefit from continued PT services to improve overall activity tolerance and safety with functional mobility.     Tracy Is progressing well towards her goals.   Pt prognosis is Good.     Pt will continue to benefit from skilled outpatient physical therapy to " address the deficits listed in the problem list box on initial evaluation, provide pt/family education and to maximize pt's level of independence in the home and community environment.     Pt's spiritual, cultural and educational needs considered and pt agreeable to plan of care and goals.     Anticipated barriers to physical therapy: co-morbidities, sedentary lifestyle    Goals: Short Term Goals: 4 weeks   1. Pt will be compliant with HEP in order to maximize PT benefits (progressing, not met)  2. Pt will score >/= 20/30 on FGA with least restrictive assistive device in order to reduce risk for falls and improve postural control (progressing, not met)  3. Pt will score >/= 10 repetitions on 30-Second Sit to  order to improve BLE endurance and muscular power for transfers (progressing, not met)  4. Pt will improve BLE MMT grades by >/=1/2 grade in order to improve strength for ADL completion (progressing, not met)  5. Pt will walk at least 800 ft during the 6 minute walk test with SPC and 0 LOB  without increase in lower leg pain of more than 2 point on VAS for improved community ambulation. MET     Long Term Goals: 6 weeks   1. Pt will improve BLE MMT grades by >/=1 grade in order to improve strength for ADL completion (progressing, not met)  2. Pt will score >/= 25/30 on FGA with least restrictive assistive device in order to reduce risk for falls and improve postural control (progressing, not met)  3. Pt will score >/= 12 repetitions on 30-Second Sit to  order to improve BLE endurance and muscular power for transfers (progressing, not met)  4. Pt will walk at least 1000 ft during the 6 minute walk test with SPC and 0 LOB  without increase in lower leg pain of more than 2 point on VAS for improved community ambulation. (progressing, not met)  5. Pt will perform 1 floor <> stand transfer with 1 UE support in order to demonstrate safe functional mobility in case of future fall (progressing, not  met)  6.  Pt will report 0 falls from initiation of PT management (progressing, not met)  7. Pt will begin some form of home/community fitness in order to sustain progress gained in PT. (progressing, not met)    PLAN     Plan to cont with progression of PT goals per POC.    Lita Masters, PT

## 2023-02-24 ENCOUNTER — CLINICAL SUPPORT (OUTPATIENT)
Dept: REHABILITATION | Facility: HOSPITAL | Age: 73
End: 2023-02-24
Payer: MEDICARE

## 2023-02-24 DIAGNOSIS — Z74.09 IMPAIRED FUNCTIONAL MOBILITY, BALANCE, GAIT, AND ENDURANCE: Primary | ICD-10-CM

## 2023-02-24 PROCEDURE — 97110 THERAPEUTIC EXERCISES: CPT | Mod: PN

## 2023-02-24 PROCEDURE — 97112 NEUROMUSCULAR REEDUCATION: CPT | Mod: PN

## 2023-02-24 NOTE — PROGRESS NOTES
" [facility]   Physical Therapy Treatment Note     Name: Tracy Armendariz  Clinic Number: [id number]    Therapy Diagnosis:   Encounter Diagnosis   Name Primary?    Impaired functional mobility, balance, gait, and endurance Yes     Physician: Angie Camara MD    Visit Date: 2/24/2023      Physician Orders: PT Eval and Treat   Medical Diagnosis from Referral: Z74.09 (ICD-10-CM) - Impaired functional mobility and activity tolerance   Evaluation Date: 1/19/2023  Authorization Period Expiration: 02/14/2023   Plan of Care Expiration: 3/17/2023  Visit # / Visits authorized: 10/ 20 (+eval)  Foto #: 3/5  PTA Visit #: 0/5     Time In: 1:05 PM  Time Out: 1:59 PM  Total Billable Time: 54 minutes (2TE + 2 NMR)    Precautions: Standard and Fall    SUBJECTIVE     Patient reports: Not sleeping well; attributes to sleep apnea. Still needs to  CPAP machine. She would like to potentially work on full or partial floor transfers next session because she is fearful of not being able to get up from floor after fall and may want to pursue yoga in the future.  She reports partial compliance with home exercise program.   Response to previous treatment: no adverse reaction   Functional change: ongoing    Pain: 0/10  Location: left knee    OBJECTIVE     Objective Measures updated at progress report unless specified.     Treatment     Tracy received the treatments listed below:      therapeutic exercises to develop strength, endurance, ROM, and posture for 27 minutes including:  - Heel cord stretch 2x45" at incline board   - Standing hamstring stretch at step  3 x 30" bilaterally  - Lateral resistance band walks with yellow theraband around ankle; 6 lengths x 10 feet each  - SCIFIT recumbent stepper: multi-level sprints level 4.0 for 10 minutes for cardiovascular and neuromuscular endurance     NOT TODAY  - 6" forward step ups with bilateral upper extremity support 2 x 10 reps bilaterally  - Standing " "hip abductions 2 x 10 - bilateral support at bar 3# ankle wts - cues to prevent knee hyperextension  - standing hip extension 2 x 10 reps with bilateral upper extremity support at bar 3# ankle weights   - forward 4" step up with contralateral hip thrust x 10 reps bilaterally with single upper extremity support     neuromuscular re-education activities to improve: Balance, Coordination, Kinesthetic, and Proprioception for 30 minutes. The following activities were included:     - Heel raises 2 x 20  - Toe raises 2 x 20  - Sit<>stands x 10 with focus on eccentric control and initial balance achievement without back of calf bracing on chair.   - Narrow base of support - with head turns (vertical / horizontal) 2x30" each  - Modified tandem stance 2x20 + clasped arm chest press   - Lateral markus step overs with minimal upper extremity support; variable height (6" and 9") x 6 lengths x 8 feet each    NOT TODAY  - Narrow base of support - dynamic upper extremity twists 3 x 10 bilateral  - tandem walk with single upper extremity support 10' x 6     Patient Education and Home Exercises     Home Exercises Provided and Patient Education Provided     Education provided:   - daily HEP compliance  - daily walking and limiting time spent sitting    Written Home Exercises Provided: yes. Exercises were reviewed and Tracy was able to demonstrate them prior to the end of the session.  Tracy demonstrated good  understanding of the education provided. See EMR under Patient Instructions for exercises provided during therapy sessions    ASSESSMENT     Tracy arrived to session without left knee pain today but she did seem quite fatigued. In her own words, she is feeling "out of it" today and attributes fatigue to poor sleep quality lately. Encouraged to check on status of her CPAP. Despite fatigue, patient completed session without adverse response; multiple rest breaks still required for recovery. Occasional verbal cuing needed to " minimize bilateral knee hyperextension during standing activity. She did demonstrate improved postural strategies during head turning activity. She would benefit from continued PT services to achieve functional goals, but ultimate progress may be dependent on improving her sleep quality so she can tolerate progressions more easily.    Tracy Is progressing well towards her goals.   Pt prognosis is Good.     Pt will continue to benefit from skilled outpatient physical therapy to address the deficits listed in the problem list box on initial evaluation, provide pt/family education and to maximize pt's level of independence in the home and community environment.     Pt's spiritual, cultural and educational needs considered and pt agreeable to plan of care and goals.     Anticipated barriers to physical therapy: co-morbidities, sedentary lifestyle    Goals: Short Term Goals: 4 weeks   1. Pt will be compliant with HEP in order to maximize PT benefits (progressing, not met)  2. Pt will score >/= 20/30 on FGA with least restrictive assistive device in order to reduce risk for falls and improve postural control (progressing, not met)  3. Pt will score >/= 10 repetitions on 30-Second Sit to  order to improve BLE endurance and muscular power for transfers (progressing, not met)  4. Pt will improve BLE MMT grades by >/=1/2 grade in order to improve strength for ADL completion (progressing, not met)  5. Pt will walk at least 800 ft during the 6 minute walk test with SPC and 0 LOB  without increase in lower leg pain of more than 2 point on VAS for improved community ambulation. MET     Long Term Goals: 6 weeks   1. Pt will improve BLE MMT grades by >/=1 grade in order to improve strength for ADL completion (progressing, not met)  2. Pt will score >/= 25/30 on FGA with least restrictive assistive device in order to reduce risk for falls and improve postural control (progressing, not met)  3. Pt will score >/= 12  repetitions on 30-Second Sit to  order to improve BLE endurance and muscular power for transfers (progressing, not met)  4. Pt will walk at least 1000 ft during the 6 minute walk test with SPC and 0 LOB  without increase in lower leg pain of more than 2 point on VAS for improved community ambulation. (progressing, not met)  5. Pt will perform 1 floor <> stand transfer with 1 UE support in order to demonstrate safe functional mobility in case of future fall (progressing, not met)  6.  Pt will report 0 falls from initiation of PT management (progressing, not met)  7. Pt will begin some form of home/community fitness in order to sustain progress gained in PT. (progressing, not met)    PLAN     Plan to cont with progression of PT goals per POC. Patient would like to work on partial to full floor transfers next visit (note, history of TKA on right lower extremity)    MIGUEL BRICEÑO, PT

## 2023-02-27 ENCOUNTER — CLINICAL SUPPORT (OUTPATIENT)
Dept: REHABILITATION | Facility: HOSPITAL | Age: 73
End: 2023-02-27
Payer: MEDICARE

## 2023-02-27 DIAGNOSIS — Z74.09 IMPAIRED FUNCTIONAL MOBILITY, BALANCE, GAIT, AND ENDURANCE: Primary | ICD-10-CM

## 2023-02-27 PROCEDURE — 97112 NEUROMUSCULAR REEDUCATION: CPT | Mod: PN

## 2023-02-27 PROCEDURE — 97110 THERAPEUTIC EXERCISES: CPT | Mod: PN

## 2023-02-27 NOTE — PROGRESS NOTES
" [facility]   Physical Therapy Treatment Note     Name: Tracy Armendariz  Clinic Number: [id number]    Therapy Diagnosis:   Encounter Diagnosis   Name Primary?    Impaired functional mobility, balance, gait, and endurance Yes       Physician: Angie Camara MD    Visit Date: 2/27/2023      Physician Orders: PT Eval and Treat   Medical Diagnosis from Referral: Z74.09 (ICD-10-CM) - Impaired functional mobility and activity tolerance   Evaluation Date: 1/19/2023  Authorization Period Expiration: 02/14/2023   Plan of Care Expiration: 3/17/2023  Visit # / Visits authorized: 11/ 20 (+eval)  Foto #: 3/5  PTA Visit #: 0/5     Time In: 11:28 AM (patient late)  Time Out: 12:08 PM  Total Billable Time: 40 minutes (2TE + 1 NMR)    Precautions: Standard and Fall    SUBJECTIVE     Patient reports: Pt late this morning due to feeling dizzy.  Has appt with her nephrologist this afternoon and she is planning to talk to her about recently high blood sugar and blood pressure.  She reports partial compliance with home exercise program.   Response to previous treatment: no adverse reaction   Functional change: ongoing    Pain: 9/10  Location: left knee    OBJECTIVE     Objective Measures updated at progress report unless specified.     Treatment     Tracy received the treatments listed below:      therapeutic exercises to develop strength, endurance, ROM, and posture for 30 minutes including:  - Heel cord stretch 2x45" at incline board   - Standing hamstring stretch at step  3 x 30" bilaterally  - Standing hip abductions 2 x 10 - bilateral support at bar 3# ankle wts - cues to prevent knee hyperextension  - lunge to 6" step + 3 foam ovals 5 x 3 sets bilaterally with single upper extremity support  - SCIFIT recumbent stepper: multi-level sprints level 4.0 for 10 minutes for cardiovascular and neuromuscular endurance     NOT TODAY  - 6" forward step ups with bilateral upper extremity support 2 x 10 reps " "bilaterally  - forward 4" step up with contralateral hip thrust x 10 reps bilaterally with single upper extremity support   - Lateral resistance band walks with yellow theraband around ankle; 6 lengths x 10 feet each  - standing hip extension 2 x 10 reps with bilateral upper extremity support at bar 3# ankle weights       neuromuscular re-education activities to improve: Balance, Coordination, Kinesthetic, and Proprioception for 10 minutes. The following activities were included:   - Sit<>stands x 10 with focus on eccentric control and initial balance achievement without back of calf bracing on chair.   - Narrow base of support - with head turns (vertical / horizontal) 2x30" each    NOT TODAY  - Narrow base of support - dynamic upper extremity twists 3 x 10 bilateral  - tandem walk with single upper extremity support 10' x 6   - Lateral markus step overs with minimal upper extremity support; variable height (6" and 9") x 6 lengths x 8 feet each  - Modified tandem stance 2x20 + clasped arm chest press     Patient Education and Home Exercises     Home Exercises Provided and Patient Education Provided     Education provided:   - daily HEP compliance  - daily walking and limiting time spent sitting    Written Home Exercises Provided: yes. Exercises were reviewed and Tracy was able to demonstrate them prior to the end of the session.  Tracy demonstrated good  understanding of the education provided. See EMR under Patient Instructions for exercises provided during therapy sessions    ASSESSMENT     Tracy arrived to session with left knee pain and increased complaints of dizziness.  Pt arrived to therapy without cane despite reports of dizziness.  Pt was able to participate in session but needed seated rest breaks between each set of 5 lunges and seemed to have a harder time participating in session. . Occasional verbal cuing given to minimize bilateral knee hyperextension during standing activity. She did " demonstrate improved postural strategies during head turning activity. She would benefit from continued PT services to achieve functional goals, but ultimate progress may be dependent on improving her sleep quality so she can tolerate progressions more easily.    Tracy Is progressing well towards her goals.   Pt prognosis is Good.     Pt will continue to benefit from skilled outpatient physical therapy to address the deficits listed in the problem list box on initial evaluation, provide pt/family education and to maximize pt's level of independence in the home and community environment.     Pt's spiritual, cultural and educational needs considered and pt agreeable to plan of care and goals.     Anticipated barriers to physical therapy: co-morbidities, sedentary lifestyle    Goals: Short Term Goals: 4 weeks   1. Pt will be compliant with HEP in order to maximize PT benefits (progressing, not met)  2. Pt will score >/= 20/30 on FGA with least restrictive assistive device in order to reduce risk for falls and improve postural control (progressing, not met)  3. Pt will score >/= 10 repetitions on 30-Second Sit to  order to improve BLE endurance and muscular power for transfers (progressing, not met)  4. Pt will improve BLE MMT grades by >/=1/2 grade in order to improve strength for ADL completion (progressing, not met)  5. Pt will walk at least 800 ft during the 6 minute walk test with SPC and 0 LOB  without increase in lower leg pain of more than 2 point on VAS for improved community ambulation. MET     Long Term Goals: 6 weeks   1. Pt will improve BLE MMT grades by >/=1 grade in order to improve strength for ADL completion (progressing, not met)  2. Pt will score >/= 25/30 on FGA with least restrictive assistive device in order to reduce risk for falls and improve postural control (progressing, not met)  3. Pt will score >/= 12 repetitions on 30-Second Sit to  order to improve BLE endurance and  muscular power for transfers (progressing, not met)  4. Pt will walk at least 1000 ft during the 6 minute walk test with SPC and 0 LOB  without increase in lower leg pain of more than 2 point on VAS for improved community ambulation. (progressing, not met)  5. Pt will perform 1 floor <> stand transfer with 1 UE support in order to demonstrate safe functional mobility in case of future fall (progressing, not met)  6.  Pt will report 0 falls from initiation of PT management (progressing, not met)  7. Pt will begin some form of home/community fitness in order to sustain progress gained in PT. (progressing, not met)    PLAN     Plan to cont with progression of PT goals per POC. Patient would like to work on partial to full floor transfers next visit (note, history of TKA on right lower extremity)    Liat Masters, PT

## 2023-03-03 ENCOUNTER — CLINICAL SUPPORT (OUTPATIENT)
Dept: REHABILITATION | Facility: HOSPITAL | Age: 73
End: 2023-03-03
Payer: MEDICARE

## 2023-03-03 DIAGNOSIS — Z74.09 IMPAIRED FUNCTIONAL MOBILITY, BALANCE, GAIT, AND ENDURANCE: Primary | ICD-10-CM

## 2023-03-03 PROCEDURE — 97110 THERAPEUTIC EXERCISES: CPT | Mod: PN,CQ

## 2023-03-03 NOTE — PROGRESS NOTES
" [facility]   Physical Therapy Treatment Note     Name: Tracy Armendariz  Clinic Number: [id number]    Therapy Diagnosis:   Encounter Diagnosis   Name Primary?    Impaired functional mobility, balance, gait, and endurance Yes       Physician: Angie Camara MD    Visit Date: 3/3/2023      Physician Orders: PT Eval and Treat   Medical Diagnosis from Referral: Z74.09 (ICD-10-CM) - Impaired functional mobility and activity tolerance   Evaluation Date: 1/19/2023  Authorization Period Expiration: 02/14/2023   Plan of Care Expiration: 3/17/2023  Visit # / Visits authorized: 12/ 20 (+eval)  Foto #: 4/5 (2nd survey completed 2/13/23)  PTA Visit #: 1/5     Time In: 11:15 AM   Time Out: 12:00 PM  Total Billable Time: 40 minutes (3 TE)    Precautions: Standard and Fall    SUBJECTIVE     Patient reports: Pt reports increased fatigue and time spent in bed this week.  She also feels like she is losing time and reports multiple instances of losing her train of thought mid conversation and not remembering what she is doing.  She feels these symptoms have worsened this last week.  Increased low back pain reported upon arrival.    She reports partial compliance with home exercise program.   Response to previous treatment: no adverse reaction   Functional change: ongoing    Pain: 9/10  Location: lower back    OBJECTIVE     Objective Measures updated at progress report unless specified.     Treatment     Tracy received the treatments listed below:      therapeutic exercises to develop strength, endurance, ROM, and posture for 40 minutes including:  - Double knee to chest with peanut ball 2 minutes with 5" holds  - Lower trunk rotation 2 minutes with 5" holds  - Single knee to chest with towel assist 5 x ea leg with 10" holds   - Pelvic tilts 10 x with 3" holds   - Heel cord stretch 2x45" at incline board   - Standing hamstring stretch at step  3 x 30" bilaterally  - Standing hip abductions 2 x 10 - " "bilateral support at bar 3# ankle wts - cues to prevent knee hyperextension  - lunge to 6" step + 3 foam ovals 2 x 10 sets bilaterally with single upper extremity support (no foam ovals today)  - SCIFIT recumbent stepper: multi-level sprints level 4.0 for 8 minutes for cardiovascular and neuromuscular endurance     NOT TODAY  - 6" forward step ups with bilateral upper extremity support 2 x 10 reps bilaterally  - forward 4" step up with contralateral hip thrust x 10 reps bilaterally with single upper extremity support   - Lateral resistance band walks with yellow theraband around ankle; 6 lengths x 10 feet each  - standing hip extension 2 x 10 reps with bilateral upper extremity support at bar 3# ankle weights       neuromuscular re-education activities to improve: Balance, Coordination, Kinesthetic, and Proprioception for 00 minutes. The following activities were included:   Not performed today:  - Sit<>stands x 10 with focus on eccentric control and initial balance achievement without back of calf bracing on chair.   - Narrow base of support - with head turns (vertical / horizontal) 2x30" each    NOT TODAY  - Narrow base of support - dynamic upper extremity twists 3 x 10 bilateral  - tandem walk with single upper extremity support 10' x 6   - Lateral markus step overs with minimal upper extremity support; variable height (6" and 9") x 6 lengths x 8 feet each  - Modified tandem stance 2x20 + clasped arm chest press     Patient Education and Home Exercises     Home Exercises Provided and Patient Education Provided     Education provided:   - daily HEP compliance  - daily walking and limiting time spent sitting    Written Home Exercises Provided: yes. Exercises were reviewed and Tracy was able to demonstrate them prior to the end of the session.  Tracy demonstrated good  understanding of the education provided. See EMR under Patient Instructions for exercises provided during therapy sessions    ASSESSMENT "     Tracy arrived to session with increased levels of lower back pain but was agreeable to treatment.  Session started with some gentle mat based stretching with patient confirming a positive response and decreased pain, not specific to scale.  Two seated rest breaks for fatigue recovery this session but otherwise no adverse response to treatment today.  Occasional verbal cuing given to minimize bilateral knee hyperextension during standing activity. She was encouraged to speak to neurologist regarding new symptoms that she describes as increased disorientation and losing time this week.  She would benefit from continued PT services to achieve functional goals, but ultimate progress may be dependent on improving her sleep quality so she can tolerate progressions more easily.    Tracy Is progressing well towards her goals.   Pt prognosis is Good.     Pt will continue to benefit from skilled outpatient physical therapy to address the deficits listed in the problem list box on initial evaluation, provide pt/family education and to maximize pt's level of independence in the home and community environment.     Pt's spiritual, cultural and educational needs considered and pt agreeable to plan of care and goals.     Anticipated barriers to physical therapy: co-morbidities, sedentary lifestyle    Goals: Short Term Goals: 4 weeks   1. Pt will be compliant with HEP in order to maximize PT benefits (progressing, not met)  2. Pt will score >/= 20/30 on FGA with least restrictive assistive device in order to reduce risk for falls and improve postural control (progressing, not met)  3. Pt will score >/= 10 repetitions on 30-Second Sit to  order to improve BLE endurance and muscular power for transfers (progressing, not met)  4. Pt will improve BLE MMT grades by >/=1/2 grade in order to improve strength for ADL completion (progressing, not met)  5. Pt will walk at least 800 ft during the 6 minute walk test with SPC and  0 LOB  without increase in lower leg pain of more than 2 point on VAS for improved community ambulation. MET     Long Term Goals: 6 weeks   1. Pt will improve BLE MMT grades by >/=1 grade in order to improve strength for ADL completion (progressing, not met)  2. Pt will score >/= 25/30 on FGA with least restrictive assistive device in order to reduce risk for falls and improve postural control (progressing, not met)  3. Pt will score >/= 12 repetitions on 30-Second Sit to  order to improve BLE endurance and muscular power for transfers (progressing, not met)  4. Pt will walk at least 1000 ft during the 6 minute walk test with SPC and 0 LOB  without increase in lower leg pain of more than 2 point on VAS for improved community ambulation. (progressing, not met)  5. Pt will perform 1 floor <> stand transfer with 1 UE support in order to demonstrate safe functional mobility in case of future fall (progressing, not met)  6.  Pt will report 0 falls from initiation of PT management (progressing, not met)  7. Pt will begin some form of home/community fitness in order to sustain progress gained in PT. (progressing, not met)    PLAN     Plan to cont with progression of PT goals per POC. Patient would like to work on partial to full floor transfers next visit (note, history of TKA on right lower extremity)    Alejandra Jimenes PTA

## 2023-03-06 ENCOUNTER — CLINICAL SUPPORT (OUTPATIENT)
Dept: REHABILITATION | Facility: HOSPITAL | Age: 73
End: 2023-03-06
Payer: MEDICARE

## 2023-03-06 DIAGNOSIS — Z74.09 IMPAIRED FUNCTIONAL MOBILITY, BALANCE, GAIT, AND ENDURANCE: Primary | ICD-10-CM

## 2023-03-06 PROCEDURE — 97112 NEUROMUSCULAR REEDUCATION: CPT | Mod: PN

## 2023-03-06 PROCEDURE — 97110 THERAPEUTIC EXERCISES: CPT | Mod: PN

## 2023-03-06 NOTE — PROGRESS NOTES
"    [facility]   Physical Therapy Treatment Note     Name: Tracy Armendariz  Clinic Number: [id number]    Therapy Diagnosis:   Encounter Diagnosis   Name Primary?    Impaired functional mobility, balance, gait, and endurance Yes     Physician: Angie Camara MD    Visit Date: 3/6/2023      Physician Orders: PT Eval and Treat   Medical Diagnosis from Referral: Z74.09 (ICD-10-CM) - Impaired functional mobility and activity tolerance   Evaluation Date: 1/19/2023  Authorization Period Expiration: 02/14/2023   Plan of Care Expiration: 3/17/2023  Visit # / Visits authorized: 13/16 (+eval)  Foto #: next at d/c  PTA Visit #: 0/5     Time In: 11:17 AM   Time Out: 12:00 PM  Total Billable Time: 43 minutes (2TE + 1NMR)    Precautions: Standard and Fall    SUBJECTIVE     Patient reports: Low back has been feeling much better after her last appointment. She reports lightheadedness and fatigue after a few standing exercises today but also admits she took her morning meds without eating breakfast. Feels much better after enrico cracker provided by PT. Has not yet contacted neurologist about episodes where she "loses time" and forgets what she was doing/saying. Plans to  CPAP machine on Wednesday.  She reports partial compliance with home exercise program.   Response to previous treatment: no adverse reaction   Functional change: ongoing    Pain: 0/10  Location: lower back    OBJECTIVE     Objective Measures updated at progress report unless specified.     BP: 137/84mmHg     Treatment     Tracy received the treatments listed below:      therapeutic exercises to develop strength, endurance, ROM, and posture for 27 minutes including:  - Heel cord stretch 2x45" at incline board   - Standing hamstring stretch at step  3 x 30" bilaterally  - Standing hip abductions 2 x 12 - bilateral support at bar 4# ankle wts cues to prevent knee hyperextension  - SCIFIT recumbent stepper: constant resistance " "level 3.5 for 7 minutes for cardiovascular and neuromuscular endurance     NOT TODAY  - lunge to 6" step + 3 foam ovals 2 x 10 sets bilaterally with single upper extremity support (no foam ovals today)  - 6" forward step ups with bilateral upper extremity support 2 x 10 reps bilaterally   - Lateral resistance band walks with yellow theraband around ankle; 6 lengths x 10 feet each  - standing hip extension 2 x 10 reps with bilateral upper extremity support at bar 3# ankle weights     neuromuscular re-education activities to improve: Balance, Coordination, Kinesthetic, and Proprioception for 15 minutes. The following activities were included:   - Sit<>stands x 10 with focus on eccentric control as she fatigues  - Semi tandem base of support - with head turns (vertical / horizontal) 2x30" each  - forward 4" step up with contralateral hip thrust x 10 reps bilaterally with single upper extremity support    NOT TODAY  - Narrow base of support - dynamic upper extremity twists 3 x 10 bilateral  - tandem walk with single upper extremity support 10' x 6   - Lateral markus step overs with minimal upper extremity support; variable height (6" and 9") x 6 lengths x 8 feet each  - Modified tandem stance 2x20 + clasped arm chest press     Patient Education and Home Exercises     Home Exercises Provided and Patient Education Provided     Education provided:   - daily HEP compliance  - daily walking and limiting time spent sitting    Written Home Exercises Provided: yes. Exercises were reviewed and Tracy was able to demonstrate them prior to the end of the session.  Tracy demonstrated good  understanding of the education provided. See EMR under Patient Instructions for exercises provided during therapy sessions    ASSESSMENT     Tracy arrived to session without resting pain symptoms and agreeable to PT. Patient quickly became lightheaded during standing stretches at onset of visit, which was likely due to not having eaten with " her morning medications. Improved tolerance of session after light snack provided by PT. BP within normal limits when assessed. Patient still quick to fatigue but did perform exercises with improved form compared to prior visits. PT recommends she follow up with neurology regarding episodes of forgetfulness; also counseled patient that fatigue likely due, at least in part, to poor sleep quality without CPAP machine and not eating breakfast. Plan to inquire about fatigue levels at Friday's visit as she picks up CPAP on Wednesday.    Tracy Is progressing well towards her goals.   Pt prognosis is Good.     Pt will continue to benefit from skilled outpatient physical therapy to address the deficits listed in the problem list box on initial evaluation, provide pt/family education and to maximize pt's level of independence in the home and community environment.     Pt's spiritual, cultural and educational needs considered and pt agreeable to plan of care and goals.     Anticipated barriers to physical therapy: co-morbidities, sedentary lifestyle    Goals: Short Term Goals: 4 weeks   1. Pt will be compliant with HEP in order to maximize PT benefits (progressing, not met)  2. Pt will score >/= 20/30 on FGA with least restrictive assistive device in order to reduce risk for falls and improve postural control (progressing, not met)  3. Pt will score >/= 10 repetitions on 30-Second Sit to  order to improve BLE endurance and muscular power for transfers (progressing, not met)  4. Pt will improve BLE MMT grades by >/=1/2 grade in order to improve strength for ADL completion (progressing, not met)  5. Pt will walk at least 800 ft during the 6 minute walk test with SPC and 0 LOB  without increase in lower leg pain of more than 2 point on VAS for improved community ambulation. MET     Long Term Goals: 6 weeks   1. Pt will improve BLE MMT grades by >/=1 grade in order to improve strength for ADL completion (progressing,  not met)  2. Pt will score >/= 25/30 on FGA with least restrictive assistive device in order to reduce risk for falls and improve postural control (progressing, not met)  3. Pt will score >/= 12 repetitions on 30-Second Sit to  order to improve BLE endurance and muscular power for transfers (progressing, not met)  4. Pt will walk at least 1000 ft during the 6 minute walk test with SPC and 0 LOB  without increase in lower leg pain of more than 2 point on VAS for improved community ambulation. (progressing, not met)  5. Pt will perform 1 floor <> stand transfer with 1 UE support in order to demonstrate safe functional mobility in case of future fall (progressing, not met)  6.  Pt will report 0 falls from initiation of PT management (progressing, not met)  7. Pt will begin some form of home/community fitness in order to sustain progress gained in PT. (progressing, not met)    PLAN     Plan to cont with progression of PT goals per POC. Reassess next week to determine d/c versus UPOC.    MIGUEL BRICEÑO, PT

## 2023-03-10 ENCOUNTER — CLINICAL SUPPORT (OUTPATIENT)
Dept: REHABILITATION | Facility: HOSPITAL | Age: 73
End: 2023-03-10
Payer: MEDICARE

## 2023-03-10 DIAGNOSIS — Z74.09 IMPAIRED FUNCTIONAL MOBILITY, BALANCE, GAIT, AND ENDURANCE: Primary | ICD-10-CM

## 2023-03-10 PROCEDURE — 97112 NEUROMUSCULAR REEDUCATION: CPT | Mod: PN,CQ

## 2023-03-10 PROCEDURE — 97110 THERAPEUTIC EXERCISES: CPT | Mod: PN,CQ

## 2023-03-10 NOTE — PROGRESS NOTES
" [facility]   Physical Therapy Treatment Note     Name: Tracy Armendariz  Clinic Number: [id number]    Therapy Diagnosis:   Encounter Diagnosis   Name Primary?    Impaired functional mobility, balance, gait, and endurance Yes     Physician: Angie Camara MD    Visit Date: 3/10/2023      Physician Orders: PT Eval and Treat   Medical Diagnosis from Referral: Z74.09 (ICD-10-CM) - Impaired functional mobility and activity tolerance   Evaluation Date: 1/19/2023  Authorization Period Expiration: 02/14/2023   Plan of Care Expiration: 3/17/2023  Visit # / Visits authorized: 14/16 (+eval)  Foto #: next at d/c  PTA Visit #: 1/5     Time In: 11:15 AM   Time Out: 12:00 PM  Total Billable Time: 45 minutes (2TE + 1NMR)    Precautions: Standard and Fall    SUBJECTIVE     Patient reports: Low back is hurting again today and asked to do mat exercises following standing portion of treatment.  Picked up her CPAP machine on Wednesday, no change noticed yet.  Has been on Magnesium supplements following low readings last week but still ramping up dosage and no difference noted in fatigue levels.    She reports partial compliance with home exercise program.   Response to previous treatment: no adverse reaction   Functional change: ongoing    Pain: 5/10  Location: lower back    OBJECTIVE     Objective Measures updated at progress report unless specified.       Treatment     Tracy received the treatments listed below:      therapeutic exercises to develop strength, endurance, ROM, and posture for 30 minutes including:  - Heel cord stretch 2x45" at incline board   - Standing hamstring stretch at step  3 x 30" bilaterally  - Standing hip abductions 2 x 12 - bilateral support at bar 4# ankle wts cues to prevent knee hyperextension  - SCIFIT recumbent stepper: constant resistance level 3.5 for 10 minutes for cardiovascular and neuromuscular endurance     Mat based stretching requested due to exacerbation of " "back pain following standing therex:  - LTR with 5" holds for 2 minutes  - pelvic tilts 3" holds for 2 minutes  - DKTC 5" holds for 2 minutes with peanut ball  - SKTC with towel assist 10" holds x 5 ea leg      NOT TODAY  - lunge to 6" step + 3 foam ovals 2 x 10 sets bilaterally with single upper extremity support (no foam ovals today)  - 6" forward step ups with bilateral upper extremity support 2 x 10 reps bilaterally   - Lateral resistance band walks with yellow theraband around ankle; 6 lengths x 10 feet each  - standing hip extension 2 x 10 reps with bilateral upper extremity support at bar 3# ankle weights     neuromuscular re-education activities to improve: Balance, Coordination, Kinesthetic, and Proprioception for 15 minutes. The following activities were included:   - Sit<>stands x 10 with focus on eccentric control as she fatigues  - Semi tandem base of support - with head turns (vertical / horizontal) 2x30" each  - forward 4" step up with contralateral hip thrust x 10 reps bilaterally with single upper extremity support    NOT TODAY  - Narrow base of support - dynamic upper extremity twists 3 x 10 bilateral  - tandem walk with single upper extremity support 10' x 6   - Lateral markus step overs with minimal upper extremity support; variable height (6" and 9") x 6 lengths x 8 feet each  - Modified tandem stance 2x20 + clasped arm chest press     Patient Education and Home Exercises     Home Exercises Provided and Patient Education Provided     Education provided:   - daily HEP compliance  - daily walking and limiting time spent sitting    Written Home Exercises Provided: yes. Exercises were reviewed and Tracy was able to demonstrate them prior to the end of the session.  Tracy demonstrated good  understanding of the education provided. See EMR under Patient Instructions for exercises provided during therapy sessions    ASSESSMENT     Tracy arrived to session with increased levels of lower back pain " but was agreeable to treatment.  Session started with some gentle mat based stretching with patient confirming a positive response and decreased pain, not specific to scale.  Two seated rest breaks for fatigue recovery this session but otherwise no adverse response to treatment today.  Exercises were printed and reviewed so she can perform at home when she experiences any low back flare ups.  She would benefit from continued PT services to achieve functional goals, but ultimate progress may be dependent on improving her sleep quality so she can tolerate progressions more easily.    Tracy Is progressing well towards her goals.   Pt prognosis is Good.     Pt will continue to benefit from skilled outpatient physical therapy to address the deficits listed in the problem list box on initial evaluation, provide pt/family education and to maximize pt's level of independence in the home and community environment.     Pt's spiritual, cultural and educational needs considered and pt agreeable to plan of care and goals.     Anticipated barriers to physical therapy: co-morbidities, sedentary lifestyle    Goals: Short Term Goals: 4 weeks   1. Pt will be compliant with HEP in order to maximize PT benefits (progressing, not met)  2. Pt will score >/= 20/30 on FGA with least restrictive assistive device in order to reduce risk for falls and improve postural control (progressing, not met)  3. Pt will score >/= 10 repetitions on 30-Second Sit to  order to improve BLE endurance and muscular power for transfers (progressing, not met)  4. Pt will improve BLE MMT grades by >/=1/2 grade in order to improve strength for ADL completion (progressing, not met)  5. Pt will walk at least 800 ft during the 6 minute walk test with SPC and 0 LOB  without increase in lower leg pain of more than 2 point on VAS for improved community ambulation. MET     Long Term Goals: 6 weeks   1. Pt will improve BLE MMT grades by >/=1 grade in order to  improve strength for ADL completion (progressing, not met)  2. Pt will score >/= 25/30 on FGA with least restrictive assistive device in order to reduce risk for falls and improve postural control (progressing, not met)  3. Pt will score >/= 12 repetitions on 30-Second Sit to  order to improve BLE endurance and muscular power for transfers (progressing, not met)  4. Pt will walk at least 1000 ft during the 6 minute walk test with SPC and 0 LOB  without increase in lower leg pain of more than 2 point on VAS for improved community ambulation. (progressing, not met)  5. Pt will perform 1 floor <> stand transfer with 1 UE support in order to demonstrate safe functional mobility in case of future fall (progressing, not met)  6.  Pt will report 0 falls from initiation of PT management (progressing, not met)  7. Pt will begin some form of home/community fitness in order to sustain progress gained in PT. (progressing, not met)    PLAN     Plan to cont with progression of PT goals per POC. Reassess next week to determine d/c versus UPOC.    Alejandra Jimenes, PTA

## 2023-03-13 ENCOUNTER — CLINICAL SUPPORT (OUTPATIENT)
Dept: REHABILITATION | Facility: HOSPITAL | Age: 73
End: 2023-03-13
Payer: MEDICARE

## 2023-03-13 DIAGNOSIS — Z74.09 IMPAIRED FUNCTIONAL MOBILITY, BALANCE, GAIT, AND ENDURANCE: Primary | ICD-10-CM

## 2023-03-13 PROBLEM — N12 PYELONEPHRITIS: Status: RESOLVED | Noted: 2022-12-11 | Resolved: 2023-03-13

## 2023-03-13 PROCEDURE — 97112 NEUROMUSCULAR REEDUCATION: CPT | Mod: PN

## 2023-03-13 PROCEDURE — 97110 THERAPEUTIC EXERCISES: CPT | Mod: PN

## 2023-03-13 NOTE — PROGRESS NOTES
"[facility]   Physical Therapy Treatment Note     Name: Tracy Armendariz  Clinic Number: [id number]    Therapy Diagnosis:   Encounter Diagnosis   Name Primary?    Impaired functional mobility, balance, gait, and endurance Yes     Physician: Angie Camara MD    Visit Date: 3/13/2023      Physician Orders: PT Eval and Treat   Medical Diagnosis from Referral: Z74.09 (ICD-10-CM) - Impaired functional mobility and activity tolerance   Evaluation Date: 1/19/2023  Authorization Period Expiration: 02/14/2023   Plan of Care Expiration: 3/17/2023  Visit # / Visits authorized: 15/16 (+eval)  Foto #: next at d/c  PTA Visit #: 0/5     Time In: 1:03 PM   Time Out: 1:45 PM  Total Billable Time: 42 minutes (1TE + 2NMR)     Precautions: Standard and Fall    SUBJECTIVE     Patient reports: Hip and legs sore today.  [place] the zoo [place]. She stated that she plans to try yoga once she finishes PT and if that does not work out she plans to go to the [facility]. She admitted that she needs to decide to consistently exercise.  She reports partial compliance with home exercise program.   Response to previous treatment: no adverse reaction   Functional change: ongoing    Pain: 0/10  Location: lower back    Mild leg and hip soreness  OBJECTIVE     Objective Measures updated at progress report unless specified.   Reassess next visit and issue FOTO  Treatment     Tracy received the treatments listed below:      therapeutic exercises to develop strength, endurance, ROM, and posture for 19 minutes including:  - Heel cord stretch 2x45" at incline board   - Standing hamstring stretch at step  3 x 30" bilaterally  - SCIFIT recumbent stepper: constant resistance level 4.0 for 8 minutes for cardiovascular and neuromuscular endurance   - standing hip extension 2 x 10 reps with bilateral upper extremity support at bar 3# ankle weights  - Standing hip abductions 2 x 12 - bilateral support at bar 3# ankle wts cues to " "prevent knee hyperextension     NOT TODAY  - lunge to 6" step + 3 foam ovals 2 x 10 sets bilaterally with single upper extremity support (no foam ovals today)   - Lateral resistance band walks with yellow theraband around ankle; 6 lengths x 10 feet each    neuromuscular re-education activities to improve: Balance, Coordination, Kinesthetic, and Proprioception for 22 minutes. The following activities were included:   - Sit<>stands x 10 with focus on eccentric control as she fatigues  - Semi tandem base of support on green ovals - with head turns (vertical / horizontal) 2x30" each frequent use of UE support  - Semi tandem base of support on - with head turns (vertical) 2x30" each, mild use of UE support  - forward 6" step up with contralateral hip thrust 2 x 10 reps bilaterally with single upper extremity support  - Narrow base of support - dynamic upper extremity twists 3 x 10 bilateral  - tandem walk with single upper extremity support 10' x 6   - Lateral markus step overs with minimal upper extremity support; variable height (6" and 9") x 6 lengths x 8 feet each  - Modified tandem stance x20 + red med ball chest press arm chest press     NOT TODAY  Patient Education and Home Exercises     Home Exercises Provided and Patient Education Provided     Education provided:   - daily HEP compliance  - daily walking and limiting time spent sitting    Written Home Exercises Provided: yes. Exercises were reviewed and Tracy was able to demonstrate them prior to the end of the session.  Tracy demonstrated good  understanding of the education provided. See EMR under Patient Instructions for exercises provided during therapy sessions    ASSESSMENT     Tracy arrived to session with decreased back pain compared to last session and was agreeable to treatment.  She tolerated returning to standing training as the primary mode of training.  She has progressed well will likely be ready for discharge and integration into community " fitness by next visit.  Tracy Is progressing well towards her goals.   Pt prognosis is Good.     Pt will continue to benefit from skilled outpatient physical therapy to address the deficits listed in the problem list box on initial evaluation, provide pt/family education and to maximize pt's level of independence in the home and community environment.     Pt's spiritual, cultural and educational needs considered and pt agreeable to plan of care and goals.     Anticipated barriers to physical therapy: co-morbidities, sedentary lifestyle    Goals: Short Term Goals: 4 weeks   1. Pt will be compliant with HEP in order to maximize PT benefits (progressing, not met)  2. Pt will score >/= 20/30 on FGA with least restrictive assistive device in order to reduce risk for falls and improve postural control (progressing, not met)  3. Pt will score >/= 10 repetitions on 30-Second Sit to  order to improve BLE endurance and muscular power for transfers (progressing, not met)  4. Pt will improve BLE MMT grades by >/=1/2 grade in order to improve strength for ADL completion (progressing, not met)  5. Pt will walk at least 800 ft during the 6 minute walk test with SPC and 0 LOB  without increase in lower leg pain of more than 2 point on VAS for improved community ambulation. MET     Long Term Goals: 6 weeks   1. Pt will improve BLE MMT grades by >/=1 grade in order to improve strength for ADL completion (progressing, not met)  2. Pt will score >/= 25/30 on FGA with least restrictive assistive device in order to reduce risk for falls and improve postural control (progressing, not met)  3. Pt will score >/= 12 repetitions on 30-Second Sit to  order to improve BLE endurance and muscular power for transfers (progressing, not met)  4. Pt will walk at least 1000 ft during the 6 minute walk test with SPC and 0 LOB  without increase in lower leg pain of more than 2 point on VAS for improved community ambulation.  (progressing, not met)  5. Pt will perform 1 floor <> stand transfer with 1 UE support in order to demonstrate safe functional mobility in case of future fall (progressing, not met)  6.  Pt will report 0 falls from initiation of PT management (progressing, not met)  7. Pt will begin some form of home/community fitness in order to sustain progress gained in PT. (progressing, not met)    PLAN     Plan to cont with progression of PT goals per POC. Reassess next visit to determine d/c versus UPOC.    Betsy Nunez, PT

## 2023-03-16 ENCOUNTER — CLINICAL SUPPORT (OUTPATIENT)
Dept: REHABILITATION | Facility: HOSPITAL | Age: 73
End: 2023-03-16
Payer: MEDICARE

## 2023-03-16 DIAGNOSIS — Z74.09 IMPAIRED FUNCTIONAL MOBILITY, BALANCE, GAIT, AND ENDURANCE: Primary | ICD-10-CM

## 2023-03-16 PROCEDURE — 97110 THERAPEUTIC EXERCISES: CPT | Mod: PN

## 2023-03-16 PROCEDURE — 97530 THERAPEUTIC ACTIVITIES: CPT | Mod: PN

## 2023-03-16 RX ORDER — BUPROPION HYDROCHLORIDE 300 MG/1
300 TABLET ORAL DAILY
Qty: 90 TABLET | Refills: 11 | Status: CANCELLED | OUTPATIENT
Start: 2023-03-16

## 2023-03-16 NOTE — PROGRESS NOTES
[facility]   Physical Therapy Treatment Note / Discharge Summary    Name: Tracy Armendairz  Clinic Number: [id number]    Therapy Diagnosis:   Encounter Diagnosis   Name Primary?    Impaired functional mobility, balance, gait, and endurance Yes       Physician: Angie Camara MD    Visit Date: 3/16/2023      Physician Orders: PT Eval and Treat   Medical Diagnosis from Referral: Z74.09 (ICD-10-CM) - Impaired functional mobility and activity tolerance   Evaluation Date: 1/19/2023  Authorization Period Expiration: 02/14/2023   Plan of Care Expiration: 3/17/2023  Visit # / Visits authorized: 15/16 (+eval)  Foto #: next at d/c  PTA Visit #: 0/5     Time In: 2:35 PM   Time Out: 3:28 PM  Total Billable Time: 53 minutes (3TE + 1TA)     Precautions: Standard and Fall    SUBJECTIVE     Patient reports: Left sided shoulder, hip and knee pain today. She says she plans to start yoga on Tuesday of next week.   She reports partial compliance with home exercise program.   Response to previous treatment: no adverse reaction   Functional change: ongoing    Pain: 0/10  Location: lower back    Mild leg and hip soreness  OBJECTIVE     Objective Measures updated at progress report unless specified.     Lower Extremity Strength    RLE LLE   Hip Flexion: 4-/5 (4/5) 4-/5 (4/5)   Hip Extension:  See 30 sec STS See 30 sec STS   Hip Abduction: 4-/5 (4/5) 4-/5 (4/5)   Hip Adduction: 4/5 (4/5) 4/5 (4/5)   Knee Extension: 4+/5 (5/5) 4+/5   Knee Flexion: 4+/5 (5/5) 4+/5   Ankle Dorsiflexion: 4+/5 4+/5   Ankle Plantarflexion: 4/5 (NWB) 4/5 (NWB)      Timed Up and Go: 10.2 sec without AD  30 second sit to stand: 10 reps with bilateral upper extremity support    Functional Gait Assessment:     *note: Measure a distance of 20 feet (~6 meters) for this assessment    1. Gait on level surface =  3   (3) Normal: less than 5.5 sec, no A.D., no imbalance, normal gait pattern, deviates <6in   (2) Mild impairment: 7-5.6 sec, uses  A.D., mild gait deviations, or deviates 6-10 in   (1) Moderate impairment: > 7 sec, slow speed, imbalance, deviates 10-15 in.   (0) Severe impairment: needs assist, deviates >15 in, reach/touch wall  2. Change in Gait Speed = 2   (3) Normal: smooth change w/o loss of balance or gait deviation, deviates < 6 in, significant difference between speeds   (2) Mild impairment: changes speed, but demonstrates mild gait deviations, deviates 6-10 in, OR no deviations but unable to significantly speed, OR uses A.D.   (1) Moderate impairment: minor changes to speed, OR changes speed w/ significant deviations, deviates 10-15 in, OR  Changes speed , but loses balance & recovers   (0) Severe impairment: cannot change speed, deviates >15 in, or loses balance & needs assist  3. Gait with horizontal head turns  = 1   (3) Normal: no change in gait, deviates <6 in   (2) Mild impairment: slight change in speed, deviates 6-10 in, OR uses A.D.   (1) Moderate impairment: moderate change in speed, deviates 10-15 in   (0) Severe impairment: severe disruption of gait, deviates >15in  4. Gait with vertical head turns = 2   (3) Normal: no change in gait, deviates <6 in   (2) Mild impairment: slight change in speed, deviates 6-10 in OR uses A.D.   (1) Moderate impairment: moderate change in speed, deviates 10-15 in   (0) Severe impairment: severe disruption of gait, deviates >15 in  5. Gait with pivot turns = 2   (3) Normal: performs safely in 3 sec, no LOB   (2) Mild impairment: performs in >3 sec & no LOB, OR turns safely & requires several steps to regain LOB   (1) Moderate impairment: turns slow, OR requires several small steps for balance following turn & stop   (0) Severe impairment: cannot turn safely, needs assist  6. Step over obstacle = 2   (3) Normal: steps over 2 stacked boxes w/o change in speed or LOB   (2) Mild impairment: able to step over 1 box w/o change in speed or LOB   (1) Moderate impairment: steps over 1 box but must slow  "down, may require VC   (0) Severe impairment: cannot perform w/o assist  7. Gait with Narrow STANISLAV = 1   (3) Normal: 10 steps no staggering   (2) Mild impairment: 7-9 steps   (1) Moderate impairment: 4-7 steps   (0) Severe impairment: < 4 steps or cannot perform w/o assist  8. Gait with eyes closed = 1   (3) Normal: < 7 sec, no A.D., no LOB, normal gait pattern, deviates <6 in   (2) Mild impairment: 7.1-9 sec, mild gait deviations, deviates 6-10 in   (1) Moderate impairment: > 9 sec, abnormal pattern, LOB, deviates 10-15 in   (0) Severe impairment: cannot perform w/o assist, LOB, deviates >15in  9. Ambulating Backwards = 2   (3) Normal: no A.D., no LOB, normal gait pattern, deviates <6in   (2) Mild impairment: uses A.D., slower speed, mild gait deviations, deviates 6-10 in   (1) Moderate impairment: slow speed, abnormal gait pattern, LOB, deviates 10-15 in   (0) Severe impairment: severe gait deviations or LOB, deviates >15in  10. Steps = 2   (3) Normal: alternating feet, no rail   (2) Mild Impairment: alternating feet, uses rail   (1) Moderate impairment: step-to, uses rail   (0) Severe impairment: cannot perform safely    Score 18/30     Cutoffs:   <22/30 fall risk in older adults  <18/30 fall risk in Parkinsons    MDC/MCID:  Stroke = 4.2 points (MDC)  Vestibular = 6 points (MDC)  Geriatric = 4 points (MCID)  Parkinson's = 4 points (MDC)      Treatment     Tracy received the treatments listed below:      therapeutic exercises to develop strength, endurance, ROM, and posture for 38 minutes including:  - Heel cord stretch 2x45" at incline board   - Standing hamstring stretch at step  3 x 30" bilaterally  - SCIFIT recumbent stepper: constant resistance level 4.0 for 10 minutes for cardiovascular and neuromuscular endurance   - standing hip extension 2 x 10 reps with bilateral upper extremity support at bar 3# ankle weights  - Standing hip abductions 2 x 12 - bilateral support at bar 3# ankle wts cues to prevent knee " hyperextension   - straight leg raise x 10 bilaterally   - bridges x 10 bilaterally   - sidelying hip abduction x 10 bilaterally  - Sit<>stands x 10 with focus on eccentric control as she fatigues    - objective reassessment as listed above  - home exercise program review    Tracy participated in dynamic functional therapeutic activities to improve functional performance for 15  minutes, including:  - floor transfer training x 10 min   - safety education regarding community ambulation, increasing exercise levels with walking program, yoga or gym membership      Patient Education and Home Exercises     Home Exercises Provided and Patient Education Provided     Education provided:   - daily HEP compliance  - daily walking and limiting time spent sitting    Written Home Exercises Provided: yes. Exercises were reviewed and Tracy was able to demonstrate them prior to the end of the session.  Tracy demonstrated good  understanding of the education provided. See EMR under Patient Instructions for exercises provided during therapy sessions    ASSESSMENT     Tracy arrived to session with moderate left sided pain in shoulder, hip and knee.  Pt has yet to commit to exercising out of therapy session but says she is planning to do so next week.  Mild improvements noted in bilateral lower extremity strength, increased repetitions in 30 second sit to stand as well as decreased time to complete Timed Up and Go. Pt continues to fatigue quickly and requires frequent rest breaks throughout session.   Pt has a sound plan for increasing daily activity including starting to attend yoga and perform home exercise program daily, but success in maintaining functional gains made in physical therapy will be dependent upon her commitment to daily exercise.  Ms Molina is in agreement with plan to discharge physical therapy at this time.      Tracy Is progressing well towards her goals.   Pt prognosis is Good.     Pt's spiritual,  cultural and educational needs considered and pt agreeable to plan of care and goals.     Anticipated barriers to physical therapy: co-morbidities, sedentary lifestyle    Goals: Short Term Goals: 4 weeks   1. Pt will be compliant with HEP in order to maximize PT benefits met  2. Pt will score >/= 20/30 on FGA with least restrictive assistive device in order to reduce risk for falls and improve postural control not met  3. Pt will score >/= 10 repetitions on 30-Second Sit to  order to improve BLE endurance and muscular power for transfers not met  4. Pt will improve BLE MMT grades by >/=1/2 grade in order to improve strength for ADL completion partially met  5. Pt will walk at least 800 ft during the 6 minute walk test with SPC and 0 LOB  without increase in lower leg pain of more than 2 point on VAS for improved community ambulation. MET     Long Term Goals: 6 weeks   1. Pt will improve BLE MMT grades by >/=1 grade in order to improve strength for ADL completion not met  2. Pt will score >/= 25/30 on FGA with least restrictive assistive device in order to reduce risk for falls and improve postural control not met  3. Pt will score >/= 12 repetitions on 30-Second Sit to  order to improve BLE endurance and muscular power for transfers not met  4. Pt will walk at least 1000 ft during the 6 minute walk test with SPC and 0 LOB  without increase in lower leg pain of more than 2 point on VAS for improved community ambulation. (progressing, not met)  5. Pt will perform 1 floor <> stand transfer with 1 UE support in order to demonstrate safe functional mobility in case of future fall  met  6.  Pt will report 0 falls from initiation of PT management not met  7. Pt will begin some form of home/community fitness in order to sustain progress gained in PT. not met    PLAN     Discharge PT    Liat Masters, PT

## 2023-03-16 NOTE — TELEPHONE ENCOUNTER
Care Due:                  Date            Visit Type   Department     Provider  --------------------------------------------------------------------------------                                MYCHART                              FOLLOWUP/OF  [facility]  Last Visit: 02-      FICE VISIT   MEDICINE       Bartolo Jules  Next Visit: None Scheduled  None         None Found                                                            Last  Test          Frequency    Reason                     Performed    Due Date  --------------------------------------------------------------------------------    Office Visit  12 months..  EScitalopram, buPROPion,   02-   02-                             esomeprazole, losartan...    [employer] Embedded Care Gaps. Reference number: [id number]. 3/16/2023   10:58:04 AM CDT

## 2023-03-16 NOTE — TELEPHONE ENCOUNTER
No new care gaps identified.  [facility] Embedded Care Gaps. Reference number: [id number]. 3/16/2023   11:36:51 AM KYAWT

## 2023-03-17 RX ORDER — BUPROPION HYDROCHLORIDE 300 MG/1
300 TABLET ORAL DAILY
Qty: 90 TABLET | Refills: 11 | Status: SHIPPED | OUTPATIENT
Start: 2023-03-17

## 2023-03-18 ENCOUNTER — TELEPHONE (OUTPATIENT)
Dept: FAMILY MEDICINE | Facility: CLINIC | Age: 73
End: 2023-03-18
Payer: MEDICARE

## 2023-03-23 ENCOUNTER — TELEPHONE (OUTPATIENT)
Dept: ORTHOPEDICS | Facility: CLINIC | Age: 73
End: 2023-03-23
Payer: MEDICARE

## 2023-03-23 NOTE — TELEPHONE ENCOUNTER
Spoke with patient's dental office.  Need clarification of amoxicillin to be taken before surgery and does she stay on for lifetime.  Will forward to  and let dental office know.

## 2023-03-23 NOTE — TELEPHONE ENCOUNTER
----- Message from Freda Saldana sent at 3/23/2023  4:50 PM CDT -----  Type:  Patient Returning Call    Who Called:Yesenia   Would the patient rather a call back or a response via [facility]? Call back   Best Call Back Number:[phone]   Additional Information: received fax that was sent and have some question     Clearance for dental treatment ... how long pt is to premedicate and what medication if required for premed

## 2023-03-24 ENCOUNTER — OFFICE VISIT (OUTPATIENT)
Dept: URGENT CARE | Facility: CLINIC | Age: 73
End: 2023-03-24
Payer: MEDICARE

## 2023-03-24 VITALS
OXYGEN SATURATION: 95 % | WEIGHT: 237 LBS | HEART RATE: 61 BPM | TEMPERATURE: 98 F | HEIGHT: 67 IN | RESPIRATION RATE: 18 BRPM | BODY MASS INDEX: 37.2 KG/M2 | SYSTOLIC BLOOD PRESSURE: 136 MMHG | DIASTOLIC BLOOD PRESSURE: 78 MMHG

## 2023-03-24 DIAGNOSIS — R07.81 RIB PAIN ON LEFT SIDE: ICD-10-CM

## 2023-03-24 DIAGNOSIS — S20.212A CONTUSION OF RIB ON LEFT SIDE, INITIAL ENCOUNTER: Primary | ICD-10-CM

## 2023-03-24 PROCEDURE — 71046 XR CHEST PA AND LATERAL: ICD-10-PCS | Mod: FY,S$GLB,, | Performed by: RADIOLOGY

## 2023-03-24 PROCEDURE — 93005 EKG 12-LEAD: ICD-10-PCS | Mod: S$GLB,,, | Performed by: FAMILY MEDICINE

## 2023-03-24 PROCEDURE — 93010 EKG 12-LEAD: ICD-10-PCS | Mod: S$GLB,,, | Performed by: INTERNAL MEDICINE

## 2023-03-24 PROCEDURE — 99213 OFFICE O/P EST LOW 20 MIN: CPT | Mod: S$GLB,,, | Performed by: FAMILY MEDICINE

## 2023-03-24 PROCEDURE — 99213 PR OFFICE/OUTPT VISIT, EST, LEVL III, 20-29 MIN: ICD-10-PCS | Mod: S$GLB,,, | Performed by: FAMILY MEDICINE

## 2023-03-24 PROCEDURE — 71046 X-RAY EXAM CHEST 2 VIEWS: CPT | Mod: FY,S$GLB,, | Performed by: RADIOLOGY

## 2023-03-24 PROCEDURE — 93005 ELECTROCARDIOGRAM TRACING: CPT | Mod: S$GLB,,, | Performed by: FAMILY MEDICINE

## 2023-03-24 PROCEDURE — 93010 ELECTROCARDIOGRAM REPORT: CPT | Mod: S$GLB,,, | Performed by: INTERNAL MEDICINE

## 2023-03-24 RX ORDER — LIDOCAINE 50 MG/G
1 PATCH TOPICAL DAILY
Qty: 7 PATCH | Refills: 0 | Status: SHIPPED | OUTPATIENT
Start: 2023-03-24 | End: 2023-03-31

## 2023-03-24 NOTE — PROGRESS NOTES
"Subjective:       Patient ID: Tracy Armendariz is a 72 y.o. female.    Vitals:  height is 5' 7" (1.702 m) and weight is 107.5 kg (237 lb). Her temperature is 97.7 °F (36.5 °C). Her blood pressure is 136/78 and her pulse is 61. Her respiration is 18 and oxygen saturation is 95%.     Chief Complaint: Chest Pain (Rib pain/)    Patient presents to the clinic with a twisting injury from yoga x Tuesday. Patient is presenting rib pain on the left side.   Provider note begins below:  Past Medical History:  No date: Allergy  No date: Anemia, unspecified  No date: Anticoagulant long-term use  No date: Arthritis  2013: CVA (cerebral infarction)  No date: Depression  No date: Disorder of kidney and ureter      Comment:  renal stones  No date: Diverticulosis of colon  No date: Extrinsic asthma, unspecified  No date: Hematuria, unspecified  No date: Hyperlipidemia  No date: Hypertension  No date: Kidney stone  12/16/2014: Left atrial enlargement  No date: Low back pain  No date: Nephrolithiasis  No date: MARTIN (obstructive sleep apnea)  No date: Osteopenia  No date: PUD (peptic ulcer disease)  12/2013: Stroke  No date: Urinary tract infection   Pt with above pmh presents with c/o left rib pain. On Tuesday at yoga she says she twisted and felt a pop to her left rib area. Since then having increase in pain, and then having rib pain with moving, breathing. She broke a rib on the right side a year ago after a fall.     Chest Pain   This is a new problem. The current episode started yesterday. The problem has been gradually worsening. The quality of the pain is described as heavy and dull. Pertinent negatives include no cough, diaphoresis, fever, hemoptysis, palpitations, shortness of breath or sputum production. She has tried acetaminophen for the symptoms. The treatment provided no relief.   Pertinent negatives for past medical history include no COPD.     Constitution: Negative for activity change, appetite change, chills, sweating, " fatigue, fever, unexpected weight change and generalized weakness.   Cardiovascular:  Positive for chest pain. Negative for chest trauma, leg swelling, palpitations and sob on exertion.   Respiratory:  Negative for chest tightness, cough, sputum production, bloody sputum, COPD, shortness of breath, stridor, wheezing and asthma.         +left rib pain   Allergic/Immunologic: Negative for asthma.     Objective:      Physical Exam   Constitutional: She is oriented to person, place, and time. She appears well-developed.  Non-toxic appearance. She does not appear ill. No distress.      Comments: present.      HENT:   Head: Normocephalic and atraumatic.   Ears:   Right Ear: External ear normal.   Left Ear: External ear normal.   Nose: Nose normal.   Mouth/Throat: Oropharynx is clear and moist.   Eyes: Conjunctivae, EOM and lids are normal. Pupils are equal, round, and reactive to light.   Neck: Trachea normal and phonation normal. Neck supple.   Cardiovascular: S1 normal and S2 normal.   Pulmonary/Chest: Effort normal and breath sounds normal. She exhibits tenderness. She exhibits no bony tenderness.       Musculoskeletal: Normal range of motion.         General: Normal range of motion.   Neurological: She is alert and oriented to person, place, and time.   Skin: Skin is warm, dry, intact and not diaphoretic.   Psychiatric: Her speech is normal and behavior is normal. Judgment and thought content normal.   Nursing note and vitals reviewed.      Assessment:       1. Contusion of rib on left side, initial encounter    2. Rib pain on left side        Vent rate=59 bpm  MA interval= 154 ms  QRS duration= 82 ms  QT/QTc  440/435 ms  P-R-T axes 84 72 77  Interpretation: sinus bradycardia, no sulaiman    XR CHEST PA AND LATERAL    Result Date: 3/24/2023  EXAMINATION: XR CHEST PA AND LATERAL CLINICAL HISTORY: Pleurodynia TECHNIQUE: PA and lateral views of the chest were performed. COMPARISON: None 12/14/2022 FINDINGS: Heart size  normal.  No significant airspace consolidation or pleural effusion identified.  Suggestion of a loop recorder.     See above Electronically signed by: Gwyn Melendez MD Date:    03/24/2023 Time:    08:56     Plan:       Tylenol, and lidocaine patch prn pain  No obvious fractures on xray  Increase activity as tolerated.  Red flags reviewed    Discussed results/diagnosis/plan with patient in clinic. Strict precautions given to patient to monitor for worsening signs and symptoms. Advised to follow up with PCP or specialist.    Explained side effects of medications prescribed with patient and informed him/her to discontinue use if he/she has any side effects and to inform UC or PCP if this occurs. All questions answered. Strict ED verses clinic return precautions stressed and given in depth. Advised if symptoms worsens of fail to improve he/she should go to the Emergency Room. Discharge and follow-up instructions given verbally/printed with the patient who expressed understanding and willingness to comply with my recommendations. Patient voiced understanding and in agreement with current treatment plan. Patient exits the exam room in no acute distress. Conversant and engaged during discharge discussion, verbalized understanding.      Contusion of rib on left side, initial encounter  -     LIDOcaine (LIDODERM) 5 %; Place 1 patch onto the skin once daily. Remove & Discard patch within 12 hours or as directed by MD for 7 days  Dispense: 7 patch; Refill: 0    Rib pain on left side  -     XR CHEST PA AND LATERAL; Future; Expected date: 03/24/2023  -     IN OFFICE EKG 12-LEAD (to [place])  -     LIDOcaine (LIDODERM) 5 %; Place 1 patch onto the skin once daily. Remove & Discard patch within 12 hours or as directed by MD for 7 days  Dispense: 7 patch; Refill: 0              Additional MDM:     Heart Failure Score:   COPD = No  Patient Instructions   General Discharge Instructions   PLEASE READ YOUR DISCHARGE INSTRUCTIONS ENTIRELY AS  IT CONTAINS IMPORTANT INFORMATION.  If you were prescribed a narcotic or controlled medication, do not drive or operate heavy equipment or machinery while taking these medications.  If you were prescribed antibiotics, please take them to completion.  You must understand that you've received an Urgent Care treatment only and that you may be released before all your medical problems are known or treated. You, the patient, will arrange for follow up care as instructed.    OVER THE COUNTER RECOMMENDATIONS/SUGGESTIONS.    Make sure to stay well hydrated.    Use Nasal Saline to mechanically move any post nasal drip from your eustachian tube or from the back of your throat.    Use warm salt water gargles to ease your throat pain. Warm salt water gargles as needed for sore throat- 1/2 tsp salt to 1 cup warm water, gargle as desired.    Use an antihistamine such as Claritin, Zyrtec or Allegra to dry you out.    Use pseudoephedrine (behind the counter) to decongest. Pseudoephedrine 30 mg up to 240 mg /day. It can raise your blood pressure and give you palpitations.    Use mucinex (guaifenesin) to break up mucous up to 2400mg/day to loosen any mucous.    The mucinex DM pill has a cough suppressant that can be sedating. It can be used at night to stop the tickle at the back of your throat.    You can use Mucinex D (it has guaifenesin and a high dose of pseudoephedrine) in the mornings to help decongest.    Use Afrin in each nare for no longer than 3 days, as it is addictive. It can also dry out your mucous membranes and cause elevated blood pressure. This is especially useful if you are flying.    Use Flonase 1-2 sprays/nostril per day. It is a local acting steroid nasal spray, if you develop a bloody nose, stop using the medication immediately.    Sometimes Nyquil at night is beneficial to help you get some rest, however it is sedating and it does have an antihistamine, and tylenol.    Honey is a natural cough suppressant that  can be used.    Tylenol up to 4,000 mg a day is safe for short periods and can be used for body aches, pain, and fever. However in high doses and prolonged use it can cause liver irritation.    Ibuprofen is a non-steroidal anti-inflammatory that can be used for body aches, pain, and fever.However it can also cause stomach irritation if over used.     Follow up with your PCP or specialty clinic as instructed in the next 2-3 days if not improved or as needed. You can call [phone] to schedule an appointment with appropriate provider.      If you condition worsens, we recommend that you receive another evaluation at the emergency room immediately or contact your primary medical clinic's after hours call service to discuss your concerns.      Please return here or go to the Emergency Department for any concerns or worsening condition.   You can also call [phone] to schedule an appointment with the appropriate provider.    Please return here or go to the Emergency Department for any concerns or worsening of condition.    Thank you for choosing [facility]!    Our goal in the Urgent Care is to always provide outstanding medical care. You may receive a survey by mail or e-mail in the next week regarding your experience today. We would greatly appreciate you completing and returning the survey. Your feedback provides us with a way to recognize our staff who provide very good care, and it helps us learn how to improve when your experience was below our aspiration of excellence.      We appreciate you trusting us with your medical care. We hope you feel better soon. We will be happy to take care of you for all of your future medical needs.    Sincerely,    GLORIA Canales

## 2023-03-27 ENCOUNTER — TELEPHONE (OUTPATIENT)
Dept: ORTHOPEDICS | Facility: CLINIC | Age: 73
End: 2023-03-27
Payer: MEDICARE

## 2023-03-27 NOTE — TELEPHONE ENCOUNTER
----- Message from Stevie Lorenzo sent at 3/27/2023  9:48 AM CDT -----  Contact: Yesenia  .Type:  Patient Returning Call    Who Called:Yesenia solis/ Andres Mae   Who Left Message for Patient:Candis Lobo  Does the patient know what this is regarding?:yes  Would the patient rather a call back or a response via [facility]? call  Best Call Back Number:[phone]  Additional Information:

## 2023-03-27 NOTE — TELEPHONE ENCOUNTER
Spoke with Yesenia in  office.  Clarified antibotic is to be taken lifetime after joint replacement.  Verbalized understanding.

## 2023-05-11 RX ORDER — DICLOFENAC SODIUM 10 MG/G
GEL TOPICAL
Qty: 500 G | Refills: 6 | Status: SHIPPED | OUTPATIENT
Start: 2023-05-11

## 2023-05-18 ENCOUNTER — PES CALL (OUTPATIENT)
Dept: ADMINISTRATIVE | Facility: CLINIC | Age: 73
End: 2023-05-18
Payer: MEDICARE

## 2023-06-15 ENCOUNTER — OFFICE VISIT (OUTPATIENT)
Dept: INTERNAL MEDICINE | Facility: CLINIC | Age: 73
End: 2023-06-15
Payer: MEDICARE

## 2023-06-15 VITALS
SYSTOLIC BLOOD PRESSURE: 128 MMHG | HEART RATE: 66 BPM | DIASTOLIC BLOOD PRESSURE: 76 MMHG | WEIGHT: 235.25 LBS | HEIGHT: 67 IN | TEMPERATURE: 97 F | BODY MASS INDEX: 36.92 KG/M2 | OXYGEN SATURATION: 96 % | RESPIRATION RATE: 18 BRPM

## 2023-06-15 DIAGNOSIS — E78.2 MIXED HYPERLIPIDEMIA: Chronic | ICD-10-CM

## 2023-06-15 DIAGNOSIS — I70.0 ATHEROSCLEROSIS OF AORTA: ICD-10-CM

## 2023-06-15 DIAGNOSIS — R42 VERTIGO: ICD-10-CM

## 2023-06-15 DIAGNOSIS — Z00.00 ENCOUNTER FOR PREVENTIVE HEALTH EXAMINATION: Primary | ICD-10-CM

## 2023-06-15 DIAGNOSIS — G47.33 OSA (OBSTRUCTIVE SLEEP APNEA): ICD-10-CM

## 2023-06-15 DIAGNOSIS — Z12.31 ENCOUNTER FOR SCREENING MAMMOGRAM FOR BREAST CANCER: ICD-10-CM

## 2023-06-15 DIAGNOSIS — I10 PRIMARY HYPERTENSION: Chronic | ICD-10-CM

## 2023-06-15 DIAGNOSIS — R73.9 HYPERGLYCEMIA: ICD-10-CM

## 2023-06-15 DIAGNOSIS — Z86.73 HISTORY OF CVA (CEREBROVASCULAR ACCIDENT): ICD-10-CM

## 2023-06-15 DIAGNOSIS — B35.9 RINGWORM: ICD-10-CM

## 2023-06-15 PROCEDURE — 1126F PR PAIN SEVERITY QUANTIFIED, NO PAIN PRESENT: ICD-10-PCS | Mod: CPTII,S$GLB,, | Performed by: HOSPITALIST

## 2023-06-15 PROCEDURE — 4010F ACE/ARB THERAPY RXD/TAKEN: CPT | Mod: CPTII,S$GLB,, | Performed by: HOSPITALIST

## 2023-06-15 PROCEDURE — 1160F PR REVIEW ALL MEDS BY PRESCRIBER/CLIN PHARMACIST DOCUMENTED: ICD-10-PCS | Mod: CPTII,S$GLB,, | Performed by: HOSPITALIST

## 2023-06-15 PROCEDURE — 1101F PT FALLS ASSESS-DOCD LE1/YR: CPT | Mod: CPTII,S$GLB,, | Performed by: HOSPITALIST

## 2023-06-15 PROCEDURE — 3074F SYST BP LT 130 MM HG: CPT | Mod: CPTII,S$GLB,, | Performed by: HOSPITALIST

## 2023-06-15 PROCEDURE — 3008F PR BODY MASS INDEX (BMI) DOCUMENTED: ICD-10-PCS | Mod: CPTII,S$GLB,, | Performed by: HOSPITALIST

## 2023-06-15 PROCEDURE — 3066F PR DOCUMENTATION OF TREATMENT FOR NEPHROPATHY: ICD-10-PCS | Mod: CPTII,S$GLB,, | Performed by: HOSPITALIST

## 2023-06-15 PROCEDURE — 99999 PR PBB SHADOW E&M-EST. PATIENT-LVL V: CPT | Mod: PBBFAC,,, | Performed by: HOSPITALIST

## 2023-06-15 PROCEDURE — 99214 PR OFFICE/OUTPT VISIT, EST, LEVL IV, 30-39 MIN: ICD-10-PCS | Mod: S$GLB,,, | Performed by: HOSPITALIST

## 2023-06-15 PROCEDURE — 1126F AMNT PAIN NOTED NONE PRSNT: CPT | Mod: CPTII,S$GLB,, | Performed by: HOSPITALIST

## 2023-06-15 PROCEDURE — 99214 OFFICE O/P EST MOD 30 MIN: CPT | Mod: S$GLB,,, | Performed by: HOSPITALIST

## 2023-06-15 PROCEDURE — 1159F MED LIST DOCD IN RCRD: CPT | Mod: CPTII,S$GLB,, | Performed by: HOSPITALIST

## 2023-06-15 PROCEDURE — 3078F PR MOST RECENT DIASTOLIC BLOOD PRESSURE < 80 MM HG: ICD-10-PCS | Mod: CPTII,S$GLB,, | Performed by: HOSPITALIST

## 2023-06-15 PROCEDURE — 99499 RISK ADDL DX/OHS AUDIT: ICD-10-PCS | Mod: S$GLB,,, | Performed by: HOSPITALIST

## 2023-06-15 PROCEDURE — 4010F PR ACE/ARB THEARPY RXD/TAKEN: ICD-10-PCS | Mod: CPTII,S$GLB,, | Performed by: HOSPITALIST

## 2023-06-15 PROCEDURE — 3074F PR MOST RECENT SYSTOLIC BLOOD PRESSURE < 130 MM HG: ICD-10-PCS | Mod: CPTII,S$GLB,, | Performed by: HOSPITALIST

## 2023-06-15 PROCEDURE — 3044F PR MOST RECENT HEMOGLOBIN A1C LEVEL <7.0%: ICD-10-PCS | Mod: CPTII,S$GLB,, | Performed by: HOSPITALIST

## 2023-06-15 PROCEDURE — 3044F HG A1C LEVEL LT 7.0%: CPT | Mod: CPTII,S$GLB,, | Performed by: HOSPITALIST

## 2023-06-15 PROCEDURE — 99499 UNLISTED E&M SERVICE: CPT | Mod: S$GLB,,, | Performed by: HOSPITALIST

## 2023-06-15 PROCEDURE — 3008F BODY MASS INDEX DOCD: CPT | Mod: CPTII,S$GLB,, | Performed by: HOSPITALIST

## 2023-06-15 PROCEDURE — 3078F DIAST BP <80 MM HG: CPT | Mod: CPTII,S$GLB,, | Performed by: HOSPITALIST

## 2023-06-15 PROCEDURE — 1101F PR PT FALLS ASSESS DOC 0-1 FALLS W/OUT INJ PAST YR: ICD-10-PCS | Mod: CPTII,S$GLB,, | Performed by: HOSPITALIST

## 2023-06-15 PROCEDURE — 3288F FALL RISK ASSESSMENT DOCD: CPT | Mod: CPTII,S$GLB,, | Performed by: HOSPITALIST

## 2023-06-15 PROCEDURE — 99999 PR PBB SHADOW E&M-EST. PATIENT-LVL V: ICD-10-PCS | Mod: PBBFAC,,, | Performed by: HOSPITALIST

## 2023-06-15 PROCEDURE — 3066F NEPHROPATHY DOC TX: CPT | Mod: CPTII,S$GLB,, | Performed by: HOSPITALIST

## 2023-06-15 PROCEDURE — 1159F PR MEDICATION LIST DOCUMENTED IN MEDICAL RECORD: ICD-10-PCS | Mod: CPTII,S$GLB,, | Performed by: HOSPITALIST

## 2023-06-15 PROCEDURE — 3288F PR FALLS RISK ASSESSMENT DOCUMENTED: ICD-10-PCS | Mod: CPTII,S$GLB,, | Performed by: HOSPITALIST

## 2023-06-15 PROCEDURE — 1160F RVW MEDS BY RX/DR IN RCRD: CPT | Mod: CPTII,S$GLB,, | Performed by: HOSPITALIST

## 2023-06-15 RX ORDER — CLOTRIMAZOLE 1 %
CREAM (GRAM) TOPICAL 2 TIMES DAILY
Qty: 60 G | Refills: 0 | Status: SHIPPED | OUTPATIENT
Start: 2023-06-15 | End: 2024-01-12

## 2023-06-15 RX ORDER — MECLIZINE HYDROCHLORIDE 25 MG/1
25 TABLET ORAL 3 TIMES DAILY PRN
Qty: 270 TABLET | Refills: 1 | Status: SHIPPED | OUTPATIENT
Start: 2023-06-15 | End: 2023-10-12

## 2023-06-15 RX ORDER — CHOLECALCIFEROL (VITAMIN D3) 25 MCG
1000 TABLET ORAL
COMMUNITY

## 2023-06-15 NOTE — PROGRESS NOTES
Subjective:     @Patient ID: Tracy Armendariz is a 72 y.o. female.    Chief Complaint: Annual Exam and Establish Care    HPI    72 y.o. female with MARTIN, HTN, HLD, hx of CVA, atherosclerosis of aorta, obesity, R knee OA, presents here for annual exam and to establish care.      Lipid disorders/ASCVD risk (ages >/= 45 or >/= 20 if increased risk ): ordered  Breast Cancer (40-50y discretion of pt, 50-74y every 1-2 years): Mammogram  DUE    Colorectal Cancer (normal risk 50-75yr): Colonoscopy utd 2018   DEXA (F>66 yo, M >71yo, M&F 50-68 yo with risk factors (smoking, previous fx, wt <70kg; etoh abuse, chronic steroids, RA)):         Vaccines:   Influenza (yearly)     Tetanus (every 10 yrs - 1st tdap)    utd 2015  PPSV23(>66yo or <65 w/ lung dz, smoking, DM) utd    PCV13 (> 65 or <65 w/ immunocompromised) utd   Zoster (>61yo) due  Covid19: due for booster           Review of Systems   Constitutional:  Negative for chills and fever.   HENT:  Negative for congestion and sore throat.    Eyes:  Negative for pain and visual disturbance.   Respiratory:  Negative for cough and shortness of breath.    Cardiovascular:  Negative for chest pain and leg swelling.   Gastrointestinal:  Negative for abdominal pain, nausea and vomiting.   Endocrine: Negative for polydipsia and polyuria.   Genitourinary:  Negative for difficulty urinating and dysuria.   Musculoskeletal:  Negative for arthralgias and back pain.   Skin:  Negative for rash and wound.   Neurological:  Negative for dizziness, weakness and headaches.   Psychiatric/Behavioral:  Negative for agitation and confusion.    Past medical history, surgical history, and family medical history reviewed and updated as appropriate.    Medications and allergies reviewed.     Objective:     There were no vitals filed for this visit.  There is no height or weight on file to calculate BMI.  Physical Exam  Vitals reviewed.   Constitutional:       General: She is not in acute distress.      Appearance: She is well-developed.   HENT:      Head: Normocephalic and atraumatic.      Right Ear: Tympanic membrane normal.      Left Ear: Tympanic membrane normal.      Mouth/Throat:      Mouth: Mucous membranes are moist.      Pharynx: No oropharyngeal exudate.   Eyes:      General:         Right eye: No discharge.         Left eye: No discharge.      Conjunctiva/sclera: Conjunctivae normal.   Cardiovascular:      Rate and Rhythm: Normal rate and regular rhythm.      Heart sounds: No murmur heard.    No friction rub.   Pulmonary:      Effort: Pulmonary effort is normal.      Breath sounds: Normal breath sounds.   Abdominal:      General: Bowel sounds are normal. There is no distension.      Palpations: Abdomen is soft.      Tenderness: There is no abdominal tenderness. There is no guarding.   Musculoskeletal:         General: Normal range of motion.      Cervical back: Normal range of motion and neck supple.      Right lower leg: No edema.      Left lower leg: No edema.   Lymphadenopathy:      Cervical: No cervical adenopathy.   Skin:     General: Skin is warm and dry.   Neurological:      Mental Status: She is alert and oriented to person, place, and time.   Psychiatric:         Mood and Affect: Mood normal.         Behavior: Behavior normal.       Lab Results   Component Value Date    WBC 6.91 02/11/2023    HGB 13.5 02/11/2023    HCT 42.8 02/11/2023     02/11/2023    CHOL 227 (H) 03/29/2022    TRIG 122 03/29/2022    HDL 90 (H) 03/29/2022    ALT 11 12/15/2022    AST 15 12/15/2022     02/11/2023    K 4.0 02/11/2023     02/11/2023    CREATININE 0.9 02/11/2023    BUN 10 02/11/2023    CO2 23 02/11/2023    TSH 2.017 03/29/2022    INR 1.0 01/13/2017    GLUF 123 (H) 11/04/2008    HGBA1C 6.2 (H) 06/14/2022       Assessment:     1. Encounter for preventive health examination    2. Primary hypertension    3. Mixed hyperlipidemia    4. MARTIN (obstructive sleep apnea)    5. Atherosclerosis of aorta    6.  Hyperglycemia    7. Encounter for screening mammogram for breast cancer    8. Vertigo    9. Ringworm    10. History of CVA (cerebrovascular accident)      Plan:   Tracy was seen today for annual exam and establish care.    Diagnoses and all orders for this visit:    Encounter for preventive health examination  - labs ordered     Primary hypertension  - Stable. Continue home meds     -     Comprehensive Metabolic Panel; Future  -     CBC Auto Differential; Future  -     Lipid Panel; Future  -     TSH; Future  -     Urinalysis; Future  -     Hemoglobin A1C; Future    Mixed hyperlipidemia  - Stable. Continue home meds     -     Comprehensive Metabolic Panel; Future  -     CBC Auto Differential; Future  -     Lipid Panel; Future  -     TSH; Future  -     Urinalysis; Future  -     Hemoglobin A1C; Future    MARTIN (obstructive sleep apnea)  - Stable. Continue to monitor     Atherosclerosis of aorta  - Stable. Continue home meds     -     Comprehensive Metabolic Panel; Future  -     CBC Auto Differential; Future  -     Lipid Panel; Future  -     TSH; Future  -     Urinalysis; Future  -     Hemoglobin A1C; Future    Hyperglycemia  - recurrent. Check labs   -     Comprehensive Metabolic Panel; Future  -     CBC Auto Differential; Future  -     Lipid Panel; Future  -     TSH; Future  -     Urinalysis; Future  -     Hemoglobin A1C; Future    Encounter for screening mammogram for breast cancer  -     Mammo Digital Screening Bilat w/ David; Future    Vertigo  - Stable. Continue home meds     -     meclizine (ANTIVERT) 25 mg tablet; Take 1 tablet (25 mg total) by mouth 3 (three) times daily as needed for Dizziness or Nausea.    Ringworm  - new. Start lotrimin    History of CVA (cerebrovascular accident)    Other orders  -     clotrimazole (LOTRIMIN) 1 % cream; Apply topically 2 (two) times daily. For Ringworm. Apply up to 4 weeks          Madisyn Villalobos MD  Internal Medicine    6/15/2023

## 2023-06-22 ENCOUNTER — HOSPITAL ENCOUNTER (OUTPATIENT)
Dept: RADIOLOGY | Facility: HOSPITAL | Age: 73
Discharge: HOME | End: 2023-06-22
Attending: HOSPITALIST
Payer: MEDICARE

## 2023-06-22 VITALS — BODY MASS INDEX: 36.88 KG/M2 | HEIGHT: 67 IN | WEIGHT: 235 LBS

## 2023-06-22 DIAGNOSIS — Z12.31 ENCOUNTER FOR SCREENING MAMMOGRAM FOR BREAST CANCER: ICD-10-CM

## 2023-06-22 PROCEDURE — 77067 SCR MAMMO BI INCL CAD: CPT | Mod: 26,,, | Performed by: RADIOLOGY

## 2023-06-22 PROCEDURE — 77067 SCR MAMMO BI INCL CAD: CPT | Mod: TC

## 2023-06-22 PROCEDURE — 77063 MAMMO DIGITAL SCREENING BILAT WITH TOMO: ICD-10-PCS | Mod: 26,,, | Performed by: RADIOLOGY

## 2023-06-22 PROCEDURE — 77063 BREAST TOMOSYNTHESIS BI: CPT | Mod: 26,,, | Performed by: RADIOLOGY

## 2023-06-22 PROCEDURE — 77067 MAMMO DIGITAL SCREENING BILAT WITH TOMO: ICD-10-PCS | Mod: 26,,, | Performed by: RADIOLOGY

## 2023-07-10 ENCOUNTER — PATIENT MESSAGE (OUTPATIENT)
Dept: INTERNAL MEDICINE | Facility: CLINIC | Age: 73
End: 2023-07-10
Payer: MEDICARE

## 2023-07-10 RX ORDER — LOSARTAN POTASSIUM 100 MG/1
TABLET ORAL
Qty: 90 TABLET | Refills: 3 | Status: SHIPPED | OUTPATIENT
Start: 2023-07-10 | End: 2023-10-12

## 2023-07-10 NOTE — TELEPHONE ENCOUNTER
No care due was identified.  [facility] Embedded Care Due Messages. Reference number: [id number].   7/10/2023 10:04:56 AM CDT

## 2023-07-11 ENCOUNTER — PES CALL (OUTPATIENT)
Dept: ADMINISTRATIVE | Facility: CLINIC | Age: 73
End: 2023-07-11
Payer: MEDICARE

## 2023-07-12 ENCOUNTER — PATIENT MESSAGE (OUTPATIENT)
Dept: UROLOGY | Facility: CLINIC | Age: 73
End: 2023-07-12
Payer: MEDICARE

## 2023-07-12 DIAGNOSIS — R30.0 DYSURIA: Primary | ICD-10-CM

## 2023-07-13 ENCOUNTER — LAB VISIT (OUTPATIENT)
Dept: LAB | Facility: HOSPITAL | Age: 73
End: 2023-07-13
Attending: UROLOGY
Payer: MEDICARE

## 2023-07-13 DIAGNOSIS — R30.0 DYSURIA: ICD-10-CM

## 2023-07-13 PROCEDURE — 87086 URINE CULTURE/COLONY COUNT: CPT | Performed by: UROLOGY

## 2023-07-13 RX ORDER — SULFAMETHOXAZOLE AND TRIMETHOPRIM 800; 160 MG/1; MG/1
1 TABLET ORAL 2 TIMES DAILY
Qty: 6 TABLET | Refills: 0 | Status: SHIPPED | OUTPATIENT
Start: 2023-07-13 | End: 2023-07-16

## 2023-07-14 LAB
BACTERIA UR CULT: NORMAL
BACTERIA UR CULT: NORMAL

## 2023-07-28 ENCOUNTER — PATIENT MESSAGE (OUTPATIENT)
Dept: CARDIOLOGY | Facility: CLINIC | Age: 73
End: 2023-07-28
Payer: MEDICARE

## 2023-08-15 ENCOUNTER — LAB VISIT (OUTPATIENT)
Dept: LAB | Facility: HOSPITAL | Age: 73
End: 2023-08-15
Payer: MEDICARE

## 2023-08-15 DIAGNOSIS — N20.0 STONE IN KIDNEY: ICD-10-CM

## 2023-08-15 LAB
ALBUMIN SERPL BCP-MCNC: 3.3 G/DL (ref 3.5–5.2)
ALBUMIN/CREAT UR: 125.9 UG/MG (ref 0–30)
ANION GAP SERPL CALC-SCNC: 13 MMOL/L (ref 8–16)
BACTERIA #/AREA URNS HPF: ABNORMAL /HPF
BILIRUB UR QL STRIP: NEGATIVE
BUN SERPL-MCNC: 10 MG/DL (ref 8–23)
CALCIUM SERPL-MCNC: 10 MG/DL (ref 8.7–10.5)
CHLORIDE SERPL-SCNC: 105 MMOL/L (ref 95–110)
CLARITY UR: ABNORMAL
CO2 SERPL-SCNC: 24 MMOL/L (ref 23–29)
COLOR UR: YELLOW
CREAT SERPL-MCNC: 0.9 MG/DL (ref 0.5–1.4)
CREAT UR-MCNC: 243 MG/DL (ref 15–325)
EST. GFR  (NO RACE VARIABLE): >60 ML/MIN/1.73 M^2
GLUCOSE SERPL-MCNC: 121 MG/DL (ref 70–110)
GLUCOSE UR QL STRIP: NEGATIVE
HGB UR QL STRIP: NEGATIVE
HYALINE CASTS #/AREA URNS LPF: 13 /LPF
KETONES UR QL STRIP: NEGATIVE
LEUKOCYTE ESTERASE UR QL STRIP: ABNORMAL
MICROALBUMIN UR DL<=1MG/L-MCNC: 306 UG/ML
MICROSCOPIC COMMENT: ABNORMAL
NITRITE UR QL STRIP: NEGATIVE
PH UR STRIP: 6 [PH] (ref 5–8)
PHOSPHATE SERPL-MCNC: 3.7 MG/DL (ref 2.7–4.5)
POTASSIUM SERPL-SCNC: 4.1 MMOL/L (ref 3.5–5.1)
PROT UR QL STRIP: ABNORMAL
RBC #/AREA URNS HPF: 3 /HPF (ref 0–4)
SODIUM SERPL-SCNC: 142 MMOL/L (ref 136–145)
SP GR UR STRIP: 1.02 (ref 1–1.03)
SQUAMOUS #/AREA URNS HPF: 66 /HPF
URATE SERPL-MCNC: 6.4 MG/DL (ref 2.4–5.7)
URN SPEC COLLECT METH UR: ABNORMAL
UROBILINOGEN UR STRIP-ACNC: ABNORMAL EU/DL
WBC #/AREA URNS HPF: 10 /HPF (ref 0–5)

## 2023-08-15 PROCEDURE — 81000 URINALYSIS NONAUTO W/SCOPE: CPT | Performed by: INTERNAL MEDICINE

## 2023-08-15 PROCEDURE — 36415 COLL VENOUS BLD VENIPUNCTURE: CPT | Performed by: INTERNAL MEDICINE

## 2023-08-15 PROCEDURE — 83735 ASSAY OF MAGNESIUM: CPT | Performed by: INTERNAL MEDICINE

## 2023-08-15 PROCEDURE — 80069 RENAL FUNCTION PANEL: CPT | Performed by: INTERNAL MEDICINE

## 2023-08-15 PROCEDURE — 82570 ASSAY OF URINE CREATININE: CPT | Performed by: INTERNAL MEDICINE

## 2023-08-15 PROCEDURE — 84550 ASSAY OF BLOOD/URIC ACID: CPT | Performed by: INTERNAL MEDICINE

## 2023-08-15 PROCEDURE — 84105 ASSAY OF URINE PHOSPHORUS: CPT | Performed by: INTERNAL MEDICINE

## 2023-08-15 PROCEDURE — 82570 ASSAY OF URINE CREATININE: CPT | Mod: 91 | Performed by: INTERNAL MEDICINE

## 2023-08-18 LAB
AMMONIA 24H UR-SRATE: 18 MMOL/24 H (ref 15–56)
BSA: ABNORMAL 1.73M(2)
CA H2 PHOS DIHYD 24H SATFR UR: -1.25 DG
CALCIUM 24H UR-MRATE: 104 MG/24 H
CAOX 24H ENGDIFF UR: 0.66 DG
CHLORIDE 24H UR-SRATE: 101 MMOL/24 H (ref 34–286)
CITRATE 24H UR-MRATE: 270 MG/24 H
CLINICAL BIOCHEMIST REVIEW: ABNORMAL
COLLECT DURATION TIME UR: 24 H
CREAT 24H UR-MRATE: 1040 MG/24 H (ref 603–1783)
HYDROXYAPATITE 24H ENGDIFF UR: 3.75 DG
MAGNESIUM 24H UR-MRATE: 78 MG/24 H (ref 51–269)
OSMOLALITY 24H UR: 215 MOSM/KG (ref 150–1150)
OXALATE 24H UR-MRATE: 20.2 MG/24 H (ref 9.7–40.5)
OXALATE 24H UR-SRATE: 0.23 MMOL/24 H (ref 0.11–0.46)
PH 24H UR: 6.4 [PH] (ref 4.5–8)
PHOSPHATE 24H UR-MRATE: 390 MG/24 H (ref 226–1797)
POTASSIUM 24H UR-SRATE: 39 MMOL/24 H (ref 16–105)
PROTEIN CATABOLIC RATE, URINE: 63 G/24 H (ref 56–125)
SODIUM 24H UR-SRATE: 120 MMOL/24 H (ref 22–328)
SPECIMEN VOL 24H UR: 2600 ML
SULFATE 24H UR-SRATE: 10 MMOL/24 H (ref 7–47)
URATE 24H SATFR UR: -3.92 DG
URATE 24H UR-MRATE: 312 MG/24 H (ref 250–750)
UUN 24H UR-MRATE: 6.1 G/24 H (ref 7–42)

## 2023-08-19 PROBLEM — R73.03 PREDIABETES: Status: ACTIVE | Noted: 2023-08-19

## 2023-08-31 ENCOUNTER — PATIENT MESSAGE (OUTPATIENT)
Dept: ADMINISTRATIVE | Facility: HOSPITAL | Age: 73
End: 2023-08-31
Payer: MEDICARE

## 2023-08-31 ENCOUNTER — PATIENT OUTREACH (OUTPATIENT)
Dept: ADMINISTRATIVE | Facility: HOSPITAL | Age: 73
End: 2023-08-31
Payer: MEDICARE

## 2023-09-05 ENCOUNTER — PATIENT OUTREACH (OUTPATIENT)
Dept: ADMINISTRATIVE | Facility: HOSPITAL | Age: 73
End: 2023-09-05
Payer: MEDICARE

## 2023-09-25 ENCOUNTER — PATIENT MESSAGE (OUTPATIENT)
Dept: NEUROLOGY | Facility: CLINIC | Age: 73
End: 2023-09-25
Payer: MEDICARE

## 2023-09-25 ENCOUNTER — TELEPHONE (OUTPATIENT)
Dept: INTERNAL MEDICINE | Facility: CLINIC | Age: 73
End: 2023-09-25
Payer: MEDICARE

## 2023-09-25 ENCOUNTER — PATIENT MESSAGE (OUTPATIENT)
Dept: INTERNAL MEDICINE | Facility: CLINIC | Age: 73
End: 2023-09-25
Payer: MEDICARE

## 2023-09-25 VITALS — DIASTOLIC BLOOD PRESSURE: 82 MMHG | SYSTOLIC BLOOD PRESSURE: 125 MMHG

## 2023-09-29 ENCOUNTER — PATIENT MESSAGE (OUTPATIENT)
Dept: OTOLARYNGOLOGY | Facility: CLINIC | Age: 73
End: 2023-09-29
Payer: MEDICARE

## 2023-10-02 ENCOUNTER — OFFICE VISIT (OUTPATIENT)
Dept: OTOLARYNGOLOGY | Facility: CLINIC | Age: 73
End: 2023-10-02
Payer: MEDICARE

## 2023-10-02 ENCOUNTER — CLINICAL SUPPORT (OUTPATIENT)
Dept: OTOLARYNGOLOGY | Facility: CLINIC | Age: 73
End: 2023-10-02
Payer: MEDICARE

## 2023-10-02 VITALS — HEART RATE: 72 BPM | DIASTOLIC BLOOD PRESSURE: 73 MMHG | SYSTOLIC BLOOD PRESSURE: 118 MMHG

## 2023-10-02 DIAGNOSIS — H90.A22 SENSORINEURAL HEARING LOSS (SNHL) OF LEFT EAR WITH RESTRICTED HEARING OF RIGHT EAR: ICD-10-CM

## 2023-10-02 DIAGNOSIS — H81.399 OTHER PERIPHERAL VERTIGO, UNSPECIFIED EAR: Primary | ICD-10-CM

## 2023-10-02 DIAGNOSIS — H90.A31 MIXED CONDUCTIVE AND SENSORINEURAL HEARING LOSS OF RIGHT EAR WITH RESTRICTED HEARING OF LEFT EAR: ICD-10-CM

## 2023-10-02 DIAGNOSIS — R42 DIZZINESS: Primary | ICD-10-CM

## 2023-10-02 DIAGNOSIS — H93.13 TINNITUS, BILATERAL: ICD-10-CM

## 2023-10-02 PROCEDURE — 3078F DIAST BP <80 MM HG: CPT | Mod: CPTII,S$GLB,, | Performed by: NURSE PRACTITIONER

## 2023-10-02 PROCEDURE — 99999 PR PBB SHADOW E&M-EST. PATIENT-LVL I: ICD-10-PCS | Mod: PBBFAC,,,

## 2023-10-02 PROCEDURE — 99999 PR PBB SHADOW E&M-EST. PATIENT-LVL IV: ICD-10-PCS | Mod: PBBFAC,,, | Performed by: NURSE PRACTITIONER

## 2023-10-02 PROCEDURE — 1126F AMNT PAIN NOTED NONE PRSNT: CPT | Mod: CPTII,S$GLB,, | Performed by: NURSE PRACTITIONER

## 2023-10-02 PROCEDURE — 92557 PR COMPREHENSIVE HEARING TEST: ICD-10-PCS | Mod: S$GLB,,,

## 2023-10-02 PROCEDURE — 92557 COMPREHENSIVE HEARING TEST: CPT | Mod: S$GLB,,,

## 2023-10-02 PROCEDURE — 3060F POS MICROALBUMINURIA REV: CPT | Mod: CPTII,S$GLB,, | Performed by: NURSE PRACTITIONER

## 2023-10-02 PROCEDURE — 1159F MED LIST DOCD IN RCRD: CPT | Mod: CPTII,S$GLB,, | Performed by: NURSE PRACTITIONER

## 2023-10-02 PROCEDURE — 4010F ACE/ARB THERAPY RXD/TAKEN: CPT | Mod: CPTII,S$GLB,, | Performed by: NURSE PRACTITIONER

## 2023-10-02 PROCEDURE — 99214 PR OFFICE/OUTPT VISIT, EST, LEVL IV, 30-39 MIN: ICD-10-PCS | Mod: S$GLB,,, | Performed by: NURSE PRACTITIONER

## 2023-10-02 PROCEDURE — 3060F PR POS MICROALBUMINURIA RESULT DOCUMENTED/REVIEW: ICD-10-PCS | Mod: CPTII,S$GLB,, | Performed by: NURSE PRACTITIONER

## 2023-10-02 PROCEDURE — 1100F PTFALLS ASSESS-DOCD GE2>/YR: CPT | Mod: CPTII,S$GLB,, | Performed by: NURSE PRACTITIONER

## 2023-10-02 PROCEDURE — 3288F PR FALLS RISK ASSESSMENT DOCUMENTED: ICD-10-PCS | Mod: CPTII,S$GLB,, | Performed by: NURSE PRACTITIONER

## 2023-10-02 PROCEDURE — 92567 PR TYMPA2METRY: ICD-10-PCS | Mod: S$GLB,,,

## 2023-10-02 PROCEDURE — 1160F RVW MEDS BY RX/DR IN RCRD: CPT | Mod: CPTII,S$GLB,, | Performed by: NURSE PRACTITIONER

## 2023-10-02 PROCEDURE — 99999 PR PBB SHADOW E&M-EST. PATIENT-LVL IV: CPT | Mod: PBBFAC,,, | Performed by: NURSE PRACTITIONER

## 2023-10-02 PROCEDURE — 1159F PR MEDICATION LIST DOCUMENTED IN MEDICAL RECORD: ICD-10-PCS | Mod: CPTII,S$GLB,, | Performed by: NURSE PRACTITIONER

## 2023-10-02 PROCEDURE — 3074F PR MOST RECENT SYSTOLIC BLOOD PRESSURE < 130 MM HG: ICD-10-PCS | Mod: CPTII,S$GLB,, | Performed by: NURSE PRACTITIONER

## 2023-10-02 PROCEDURE — 3074F SYST BP LT 130 MM HG: CPT | Mod: CPTII,S$GLB,, | Performed by: NURSE PRACTITIONER

## 2023-10-02 PROCEDURE — 3288F FALL RISK ASSESSMENT DOCD: CPT | Mod: CPTII,S$GLB,, | Performed by: NURSE PRACTITIONER

## 2023-10-02 PROCEDURE — 3044F HG A1C LEVEL LT 7.0%: CPT | Mod: CPTII,S$GLB,, | Performed by: NURSE PRACTITIONER

## 2023-10-02 PROCEDURE — 3078F PR MOST RECENT DIASTOLIC BLOOD PRESSURE < 80 MM HG: ICD-10-PCS | Mod: CPTII,S$GLB,, | Performed by: NURSE PRACTITIONER

## 2023-10-02 PROCEDURE — 3066F PR DOCUMENTATION OF TREATMENT FOR NEPHROPATHY: ICD-10-PCS | Mod: CPTII,S$GLB,, | Performed by: NURSE PRACTITIONER

## 2023-10-02 PROCEDURE — 1160F PR REVIEW ALL MEDS BY PRESCRIBER/CLIN PHARMACIST DOCUMENTED: ICD-10-PCS | Mod: CPTII,S$GLB,, | Performed by: NURSE PRACTITIONER

## 2023-10-02 PROCEDURE — 1126F PR PAIN SEVERITY QUANTIFIED, NO PAIN PRESENT: ICD-10-PCS | Mod: CPTII,S$GLB,, | Performed by: NURSE PRACTITIONER

## 2023-10-02 PROCEDURE — 99214 OFFICE O/P EST MOD 30 MIN: CPT | Mod: S$GLB,,, | Performed by: NURSE PRACTITIONER

## 2023-10-02 PROCEDURE — 92567 TYMPANOMETRY: CPT | Mod: S$GLB,,,

## 2023-10-02 PROCEDURE — 3044F PR MOST RECENT HEMOGLOBIN A1C LEVEL <7.0%: ICD-10-PCS | Mod: CPTII,S$GLB,, | Performed by: NURSE PRACTITIONER

## 2023-10-02 PROCEDURE — 3066F NEPHROPATHY DOC TX: CPT | Mod: CPTII,S$GLB,, | Performed by: NURSE PRACTITIONER

## 2023-10-02 PROCEDURE — 99999 PR PBB SHADOW E&M-EST. PATIENT-LVL I: CPT | Mod: PBBFAC,,,

## 2023-10-02 PROCEDURE — 1100F PR PT FALLS ASSESS DOC 2+ FALLS/FALL W/INJURY/YR: ICD-10-PCS | Mod: CPTII,S$GLB,, | Performed by: NURSE PRACTITIONER

## 2023-10-02 PROCEDURE — 4010F PR ACE/ARB THEARPY RXD/TAKEN: ICD-10-PCS | Mod: CPTII,S$GLB,, | Performed by: NURSE PRACTITIONER

## 2023-10-02 NOTE — PROGRESS NOTES
Tracy Armendariz, a 72 y.o. female was seen today in the clinic for an audiologic evaluation. The patient's primary complaint was dizziness.  Ms. Armendariz has a history of asymmetric hearing loss her most recent audiological evaluation on 5-9-2022 revealing a mild to moderate mixed hearing loss in the right ear and essentially normal hearing with a moderate hearing loss at 2914-6674 Hz in the left ear. Ms. Armendariz reports experiencing tinnitus in both ears. Patient denies ear pain, drainage, history of noise exposure.    Pure tone testing revealed moderate mixed hearing loss in the right ear and mild sloping to moderate sensorineural hearing loss in the left ear. Speech reception thresholds were obtained at 35 dBHL in the right ear and 25 dBHL in the left ear. Speech discrimination scores were 84% in the right ear and 92% in the left ear. Tympanometry revealed Type A tympanograms in both ears.    Results were reviewed with the patient and the recommendation of a hearing aid consultation was discussed.    Recommendations:  Otologic evaluation  Annual audiologic evaluation  Hearing protection in noise  Hearing aid consultation when patient is ready to consider amplification

## 2023-10-02 NOTE — PROGRESS NOTES
Chief Complaint   Patient presents with    Dizziness     Daily dizzy spells with certain positions        HPI:   The patient who is self-referred for evaluation of dizziness.  She has had this in the past and has seen Dr. Alfonso and did vestibular therapy.  The symptoms started several years ago and have gradually worsened. The sensation is also described as: a sensation of movement of surroundings and off balance.The attacks occur daily and last all  day . Positions that worsen symptoms: bending over and turning head.  Associated ear symptoms: tinnitus  hearing loss. Associated CNS symptoms: headaches. Recent infections: none. Head trauma: denied. Drug ingestion prior to onset: none. Noise exposure: no occupational exposure.Previous workup: none. Previous treatment includes: meclizine  Response to this treatment has been poor   Patient denies a history of migraine headaches. She has a hx of CVA and ataxia. She has also seen Dr. Philippe, neurology.   Hx of right tympanoplasty.     Past Medical History:   Diagnosis Date    Allergy     Anemia, unspecified     Anticoagulant long-term use     Arthritis     CVA (cerebral infarction)     Depression     Disorder of kidney and ureter     renal stones    Diverticulosis of colon     Extrinsic asthma, unspecified     Hematuria, unspecified     Hyperlipidemia     Hypertension     Kidney stone     Left atrial enlargement 2014    Low back pain     Nephrolithiasis     MARTIN (obstructive sleep apnea)     Osteopenia     PUD (peptic ulcer disease)     Stroke 2013    Urinary tract infection      Social History     Socioeconomic History    Marital status:    Tobacco Use    Smoking status: Former     Current packs/day: 0.00     Types: Cigarettes     Quit date: 1995     Years since quittin.7    Smokeless tobacco: Never   Substance and Sexual Activity    Alcohol use: Yes     Alcohol/week: 1.0 standard drink of alcohol     Types: 1 Glasses of wine per week      Comment: social    Drug use: No    Sexual activity: Yes     Partners: Male     Birth control/protection: Surgical     Comment:  since      Social Determinants of Health     Financial Resource Strain: Low Risk  (12/15/2022)    Overall Financial Resource Strain (CARDIA)     Difficulty of Paying Living Expenses: Not hard at all   Food Insecurity: No Food Insecurity (12/15/2022)    Hunger Vital Sign     Worried About Running Out of Food in the Last Year: Never true     Ran Out of Food in the Last Year: Never true   Transportation Needs: No Transportation Needs (12/15/2022)    PRAPARE - Transportation     Lack of Transportation (Medical): No     Lack of Transportation (Non-Medical): No   Physical Activity: Inactive (12/15/2022)    Exercise Vital Sign     Days of Exercise per Week: 0 days     Minutes of Exercise per Session: 0 min   Stress: Stress Concern Present (12/15/2022)    Citizen of [place] of Occupational Health - Occupational Stress Questionnaire     Feeling of Stress : Rather much   Social Connections: Unknown (12/15/2022)    Social Connection and Isolation Panel [NHANES]     Frequency of Communication with Friends and Family: More than three times a week     Frequency of Social Gatherings with Friends and Family: More than three times a week     Active Member of Clubs or Organizations: Yes     Attends Club or Organization Meetings: More than 4 times per year     Marital Status:    Housing Stability: Low Risk  (12/15/2022)    Housing Stability Vital Sign     Unable to Pay for Housing in the Last Year: No     Number of Places Lived in the Last Year: 1     Unstable Housing in the Last Year: No     Past Surgical History:   Procedure Laterality Date    APPENDECTOMY      @ time of hysterectomy    BACK SURGERY      CATARACT EXTRACTION       SECTION      CHOLECYSTECTOMY      laparoscopic    COLONOSCOPY N/A 2018    Procedure: COLONOSCOPY/Golytely;  Surgeon: Janine Black MD;   Location: [facility];  Service: Endoscopy;  Laterality: N/A;    CYSTOSCOPY W/ RETROGRADES  12/11/2022    Procedure: CYSTOSCOPY, WITH RETROGRADE PYELOGRAM;  Surgeon: Mitchell Recinos MD;  Location: [facility];  Service: Urology;;    CYSTOSCOPY W/ URETERAL STENT PLACEMENT Left 12/11/2022    Procedure: CYSTOSCOPY, WITH URETERAL STENT INSERTION;  Surgeon: Mitchell Recinos MD;  Location: [facility];  Service: Urology;  Laterality: Left;    CYSTOURETEROSCOPY, WITH HOLMIUM LASER LITHOTRIPSY OF URETERAL CALCULUS AND STENT INSERTION Left 12/21/2022    Procedure: left ureteroscopy, holmium laser lithotripsy, stone basketing, retrograde pyelogram, stent placement;  Surgeon: Anaya Zamora MD;  Location: [facility];  Service: Urology;  Laterality: Left;    DILATION AND CURETTAGE OF UTERUS  1972    EXTRACTION - STONE Left 12/21/2022    Procedure: EXTRACTION - STONE;  Surgeon: Anaya Zamora MD;  Location: [facility];  Service: Urology;  Laterality: Left;    HYSTERECTOMY  1978    TAHUSO with appendectomy    INNER EAR SURGERY      replaced ear drum    JOINT REPLACEMENT Right     knee    KNEE ARTHROSCOPY W/ DEBRIDEMENT  2003    LUMBAR DISCECTOMY  1980    L4-L5    OOPHORECTOMY      unilateral    RETROGRADE PYELOGRAPHY  12/21/2022    Procedure: PYELOGRAM, RETROGRADE;  Surgeon: Anaya Zamora MD;  Location: [facility];  Service: Urology;;    TONSILLECTOMY      TYMPANOPLASTY      URETEROSCOPIC REMOVAL OF URETERIC CALCULUS Left 12/19/2018    Procedure: REMOVAL, CALCULUS, URETER, URETEROSCOPIC, holmium laser lithotripsy, stone basket extraction, retrograde pyelogram, ureteral stent exchange;  Surgeon: Anaya Zamora MD;  Location: [facility];  Service: Urology;  Laterality: Left;     Family History   Problem Relation Age of Onset    Cervical cancer Mother     Cancer Mother 65        lung cancer - non smoker    Stroke Paternal Grandfather     Hypertension Maternal Grandfather     Heart disease Maternal Grandfather     Hypertension Father     Coronary artery  disease Father 62    Heart disease Father     Diabetes Sister     Heart disease Sister     Kidney disease Sister     Breast cancer Daughter 36    Heart failure Sister         60s    Colon cancer Neg Hx     Ovarian cancer Neg Hx            Review of Systems  General: negative for chills, fever or weight loss  Psychological: negative for mood changes or depression  Ophthalmic: negative for blurry vision, photophobia or eye pain  ENT: see HPI  Respiratory: no cough, shortness of breath, or wheezing  Cardiovascular: no chest pain or dyspnea on exertion  Gastrointestinal: no abdominal pain, change in bowel habits, or black/ bloody stools  Musculoskeletal: negative for gait disturbance or muscular weakness  Neurological: no syncope or seizures; no ataxia  Dermatological: negative for pruritis,  rash and jaundice  Hematologic/lymphatic: no easy bruising, no new adenopathy    Physical Exam:    Vitals:    10/02/23 0924   BP: 118/73   Pulse: 72       Constitutional: Well appearing / communicating without difficutly.  NAD.  Eyes: EOM I Bilaterally  Head/Face: Normocephalic.  Negative paranasal sinus pressure/tenderness.  Salivary glands WNL.  House Brackmann I Bilaterally.    Right Ear: Auricle normal appearance. External Auditory Canal within normal limits no lesions or masses,TM w/o masses/lesions/perforations. TM mobility noted.   Left Ear: Auricle normal appearance. External Auditory Canal within normal limits no lesions or masses,TM w/o masses/lesions/perforations. TM mobility noted.  Vestibular exam: negativehead thrust; falling back with Fukuda step test; negative Romberg; negative Right Dixx- Hallpike; negative left Dixx- Hallpike  Nose: No gross nasal septal deviation. Inferior Turbinates 3+ bilaterally. No septal perforation. No masses/lesions. External nasal skin appears normal without masses/lesions.  Oral Cavity: Gingiva/lips within normal limits.  Dentition/gingiva healthy appearing. Mucus membranes moist. Floor  of mouth soft, no masses palpated. Oral Tongue mobile. Hard Palate appears normal.    Oropharynx: Base of tongue appears normal. No masses/lesions noted. Tonsillar fossa/pharyngeal wall without lesions. Posterior oropharynx WNL.  Soft palate without masses. Midline uvula.   Neck/Lymphatic: No LAD I-VI bilaterally.  No thyromegaly.  No masses noted on exam.    Mirror laryngoscopy/nasopharyngoscopy: Active gag reflex.  Unable to perform.    Neuro/Psychiatric: AOx3.  Normal mood and affect.   Cardiovascular: Normal carotid pulses bilaterally, no increasing jugular venous distention noted at cervical region bilaterally.    Respiratory: Normal respiratory effort, no stridor, no retractions noted.      Audiogram: Reviewed and interpreted by me, and discussed with the patient today.    Pure tone testing revealed moderate mixed hearing loss in the right ear and mild sloping to moderate sensorineural hearing loss in the left ear. Speech reception thresholds were obtained at 35 dBHL in the right ear and 25 dBHL in the left ear. Speech discrimination scores were 84% in the right ear and 92% in the left ear. Tympanometry revealed Type A tympanograms in both ears.          [facility] 2022:    Tympanometry revealed Type A tympanograms in both ears prior to testing.     Unilateral weakness: 50% (right)  Directional preponderance 18% (left-beating)  RW: 7 d/s  LW: 22 d/s  RC: 4 d/s   d/s  Fixation suppression was normal.     Impression: Caloric testing revealed a clinically significant right caloric weakness which is indicative of a right peripheral vestibular abnormality.        Recommend:   A trial with [place] exercises; a copy was given to and reviewed with the patient  A formal Risk of Falls assessment and formal vestibular/balance rehab  Follow-up with Dr. Alfonso to review the results of today's evaluation       Assessment:    ICD-10-CM ICD-9-CM    1. Other peripheral vertigo, unspecified ear  H81.399 386.19 MRI  IAC/Temporal Bones W W/O Contrast      Ambulatory referral/consult to Physical/Occupational Therapy      2. Mixed conductive and sensorineural hearing loss of right ear with restricted hearing of left ear  H90.A31 389.22         The primary encounter diagnosis was Other peripheral vertigo, unspecified ear. A diagnosis of Mixed conductive and sensorineural hearing loss of right ear with restricted hearing of left ear was also pertinent to this visit.      Plan:  Orders Placed This Encounter   Procedures    MRI IAC/Temporal Bones W W/O Contrast    Ambulatory referral/consult to Physical/Occupational Therapy     -ordered MRI IAC/temp to rule out retrocochlear pathologies. Will call with results  -referral to vestibular therapy  -she has an follow up appt with neurology in November  -f/u 6 months or sooner as needed    I had a long discussion with the patient regarding their symptoms.  Dizziness, vertigo and disequilibrium are common symptoms reported by adults during visits to their doctors. They are all symptoms that can result from a peripheral vestibular disorder (a dysfunction of the balance organs of the inner ear) or central vestibular disorder (a dysfunction of one or more parts of the central nervous system that help process balance and spatial information).  There are also non-vestibular causes of dizziness, and dizziness can be linked to a wide array of problems such as blood-flow irregularities from cardiovascular problems and blood pressure fluctuations.     Daija Joy NP        Answers submitted by the patient for this visit:  Review of Symptoms Questionnaire  (Submitted on 9/29/2023)  Fatigue (Tiredness)?: Yes  hearing loss: Yes  None of these : Yes  Sleep Apnea?: Yes  None of these : Yes  diarrhea: Yes  Urinating too frequently?: Yes  None of these: Yes  None of these : Yes  None of these: Yes  None of these : Yes  dizziness: Yes  tremors: Yes  Light-headedness: Yes  None of these: Yes  nervous/  anxious: Yes

## 2023-10-02 NOTE — PATIENT INSTRUCTIONS
Cawthorne Cooksey Exercises    In bed or sitting  Eye movements -- at first slow, then quick  up and down  from side to side  focusing on finger moving from 3 feet to 1 foot away from face  Head movements at first slow, then quick, later with eyes closed  bending forward and backward  turning from side to side.    Sitting  Eye movements and head movements as above  Shoulder shrugging and circling  Bending forward and picking up objects from the ground    Standing  Eye, head and shoulder movements as before  Changing from sitting to standing position with eyes open and shut  Throwing a small ball from hand to hand (above eye level)  Throwing a ball from hand to hand under knee    Changing from sitting to standing and turning around in between    Moving about (in class)  [place] around center person who will throw a large ball and to whom it will be returned    Walk across room with eyes open and then closed  Walk up and down slope with eyes open and then closed  Walk up and down steps with eyes open and then closed    Any game involving stooping and stretching and aiming such as bowling and basketball    Diligence and perseverance are required but the earlier and more regularly the exercise regimen is carried out, the faster and more complete will be the return to normal activity. Ideally these activities should be done with a supervised group.     Individual patients should be accompanied by a friend or relative who also learns the exercises.    (Adapted from Bernardo and Nick, 1984 and Mandie, 1994; 2000)

## 2023-10-05 ENCOUNTER — PATIENT MESSAGE (OUTPATIENT)
Dept: OTOLARYNGOLOGY | Facility: CLINIC | Age: 73
End: 2023-10-05
Payer: MEDICARE

## 2023-10-10 ENCOUNTER — HOSPITAL ENCOUNTER (OUTPATIENT)
Dept: RADIOLOGY | Facility: HOSPITAL | Age: 73
Discharge: HOME | End: 2023-10-10
Attending: NURSE PRACTITIONER
Payer: MEDICARE

## 2023-10-10 ENCOUNTER — TELEPHONE (OUTPATIENT)
Dept: OTOLARYNGOLOGY | Facility: CLINIC | Age: 73
End: 2023-10-10
Payer: MEDICARE

## 2023-10-10 DIAGNOSIS — H81.399 OTHER PERIPHERAL VERTIGO, UNSPECIFIED EAR: ICD-10-CM

## 2023-10-10 PROCEDURE — A9585 GADOBUTROL INJECTION: HCPCS | Performed by: NURSE PRACTITIONER

## 2023-10-10 PROCEDURE — 70553 MRI BRAIN STEM W/O & W/DYE: CPT | Mod: 26,,, | Performed by: RADIOLOGY

## 2023-10-10 PROCEDURE — 70553 MRI BRAIN STEM W/O & W/DYE: CPT | Mod: TC

## 2023-10-10 PROCEDURE — 70553 MRI IAC/TEMPORAL BONES W W/O CONTRAST: ICD-10-PCS | Mod: 26,,, | Performed by: RADIOLOGY

## 2023-10-10 PROCEDURE — 25500020 PHARM REV CODE 255: Performed by: NURSE PRACTITIONER

## 2023-10-10 RX ADMIN — GADOBUTROL 10 ML: 604.72 INJECTION INTRAVENOUS at 10:10

## 2023-10-10 NOTE — TELEPHONE ENCOUNTER
Spoke with  and informed her that her MRI was normal. Normal appearance of the inner ear structures. No mass, lesions or acute stroke. There is a chronic stroke. Continue follow up with neurology and vestibular therapy  ----- Message from Daija Joy NP sent at 10/10/2023  3:34 PM CDT -----  Please let her know that her MRI was normal. Normal appearance of the inner ear structures. No mass, lesions or acute stroke. There is a chronic stroke. Continue follow up with neurology and vestibular therapy.

## 2023-10-10 NOTE — TELEPHONE ENCOUNTER
----- Message from Daija Joy NP sent at 10/10/2023  3:34 PM CDT -----  Please let her know that her MRI was normal. Normal appearance of the inner ear structures. No mass, lesions or acute stroke. There is a chronic stroke. Continue follow up with neurology and vestibular therapy.

## 2023-10-10 NOTE — TELEPHONE ENCOUNTER
Called  to inform her that she must contact [facility] and that we was notified that her neuro appt was canceled, that appt was needed for additional information.   ----- Message from Daija Joy NP sent at 10/10/2023  3:34 PM CDT -----  Please let her know that her MRI was normal. Normal appearance of the inner ear structures. No mass, lesions or acute stroke. There is a chronic stroke. Continue follow up with neurology and vestibular therapy.

## 2023-10-10 NOTE — PROGRESS NOTES
Please let her know that her MRI was normal. Normal appearance of the inner ear structures. No mass, lesions or acute stroke. There is a chronic stroke. Continue follow up with neurology and vestibular therapy.

## 2023-10-12 ENCOUNTER — OFFICE VISIT (OUTPATIENT)
Dept: INTERNAL MEDICINE | Facility: CLINIC | Age: 73
End: 2023-10-12
Payer: MEDICARE

## 2023-10-12 ENCOUNTER — PATIENT MESSAGE (OUTPATIENT)
Dept: OTOLARYNGOLOGY | Facility: CLINIC | Age: 73
End: 2023-10-12
Payer: MEDICARE

## 2023-10-12 VITALS
HEIGHT: 67 IN | SYSTOLIC BLOOD PRESSURE: 136 MMHG | BODY MASS INDEX: 37.37 KG/M2 | TEMPERATURE: 98 F | WEIGHT: 238.13 LBS | RESPIRATION RATE: 16 BRPM | HEART RATE: 66 BPM | OXYGEN SATURATION: 95 % | DIASTOLIC BLOOD PRESSURE: 86 MMHG

## 2023-10-12 DIAGNOSIS — R42 ORTHOSTATIC DIZZINESS: Primary | ICD-10-CM

## 2023-10-12 DIAGNOSIS — E66.01 SEVERE OBESITY (BMI 35.0-39.9) WITH COMORBIDITY: ICD-10-CM

## 2023-10-12 DIAGNOSIS — R07.9 CHEST PAIN, UNSPECIFIED TYPE: ICD-10-CM

## 2023-10-12 DIAGNOSIS — R73.03 PREDIABETES: ICD-10-CM

## 2023-10-12 DIAGNOSIS — I10 PRIMARY HYPERTENSION: Chronic | ICD-10-CM

## 2023-10-12 PROCEDURE — 3008F PR BODY MASS INDEX (BMI) DOCUMENTED: ICD-10-PCS | Mod: CPTII,S$GLB,, | Performed by: HOSPITALIST

## 2023-10-12 PROCEDURE — 3060F PR POS MICROALBUMINURIA RESULT DOCUMENTED/REVIEW: ICD-10-PCS | Mod: CPTII,S$GLB,, | Performed by: HOSPITALIST

## 2023-10-12 PROCEDURE — 1159F MED LIST DOCD IN RCRD: CPT | Mod: CPTII,S$GLB,, | Performed by: HOSPITALIST

## 2023-10-12 PROCEDURE — 90694 VACC AIIV4 NO PRSRV 0.5ML IM: CPT | Mod: S$GLB,,, | Performed by: HOSPITALIST

## 2023-10-12 PROCEDURE — 3066F NEPHROPATHY DOC TX: CPT | Mod: CPTII,S$GLB,, | Performed by: HOSPITALIST

## 2023-10-12 PROCEDURE — 4010F ACE/ARB THERAPY RXD/TAKEN: CPT | Mod: CPTII,S$GLB,, | Performed by: HOSPITALIST

## 2023-10-12 PROCEDURE — 90694 FLU VACCINE - QUADRIVALENT - ADJUVANTED: ICD-10-PCS | Mod: S$GLB,,, | Performed by: HOSPITALIST

## 2023-10-12 PROCEDURE — 3060F POS MICROALBUMINURIA REV: CPT | Mod: CPTII,S$GLB,, | Performed by: HOSPITALIST

## 2023-10-12 PROCEDURE — 3066F PR DOCUMENTATION OF TREATMENT FOR NEPHROPATHY: ICD-10-PCS | Mod: CPTII,S$GLB,, | Performed by: HOSPITALIST

## 2023-10-12 PROCEDURE — 99999 PR PBB SHADOW E&M-EST. PATIENT-LVL V: CPT | Mod: PBBFAC,,, | Performed by: HOSPITALIST

## 2023-10-12 PROCEDURE — 93005 EKG 12-LEAD: ICD-10-PCS | Mod: S$GLB,,, | Performed by: HOSPITALIST

## 2023-10-12 PROCEDURE — 99999 PR PBB SHADOW E&M-EST. PATIENT-LVL V: ICD-10-PCS | Mod: PBBFAC,,, | Performed by: HOSPITALIST

## 2023-10-12 PROCEDURE — 4010F PR ACE/ARB THEARPY RXD/TAKEN: ICD-10-PCS | Mod: CPTII,S$GLB,, | Performed by: HOSPITALIST

## 2023-10-12 PROCEDURE — 93010 EKG 12-LEAD: ICD-10-PCS | Mod: S$GLB,,, | Performed by: INTERNAL MEDICINE

## 2023-10-12 PROCEDURE — 1160F PR REVIEW ALL MEDS BY PRESCRIBER/CLIN PHARMACIST DOCUMENTED: ICD-10-PCS | Mod: CPTII,S$GLB,, | Performed by: HOSPITALIST

## 2023-10-12 PROCEDURE — 3075F PR MOST RECENT SYSTOLIC BLOOD PRESS GE 130-139MM HG: ICD-10-PCS | Mod: CPTII,S$GLB,, | Performed by: HOSPITALIST

## 2023-10-12 PROCEDURE — G0008 FLU VACCINE - QUADRIVALENT - ADJUVANTED: ICD-10-PCS | Mod: S$GLB,,, | Performed by: HOSPITALIST

## 2023-10-12 PROCEDURE — 93010 ELECTROCARDIOGRAM REPORT: CPT | Mod: S$GLB,,, | Performed by: INTERNAL MEDICINE

## 2023-10-12 PROCEDURE — 99214 OFFICE O/P EST MOD 30 MIN: CPT | Mod: S$GLB,,, | Performed by: HOSPITALIST

## 2023-10-12 PROCEDURE — 99214 PR OFFICE/OUTPT VISIT, EST, LEVL IV, 30-39 MIN: ICD-10-PCS | Mod: S$GLB,,, | Performed by: HOSPITALIST

## 2023-10-12 PROCEDURE — 3008F BODY MASS INDEX DOCD: CPT | Mod: CPTII,S$GLB,, | Performed by: HOSPITALIST

## 2023-10-12 PROCEDURE — 3079F DIAST BP 80-89 MM HG: CPT | Mod: CPTII,S$GLB,, | Performed by: HOSPITALIST

## 2023-10-12 PROCEDURE — 1159F PR MEDICATION LIST DOCUMENTED IN MEDICAL RECORD: ICD-10-PCS | Mod: CPTII,S$GLB,, | Performed by: HOSPITALIST

## 2023-10-12 PROCEDURE — G0008 ADMIN INFLUENZA VIRUS VAC: HCPCS | Mod: S$GLB,,, | Performed by: HOSPITALIST

## 2023-10-12 PROCEDURE — 1125F PR PAIN SEVERITY QUANTIFIED, PAIN PRESENT: ICD-10-PCS | Mod: CPTII,S$GLB,, | Performed by: HOSPITALIST

## 2023-10-12 PROCEDURE — 3079F PR MOST RECENT DIASTOLIC BLOOD PRESSURE 80-89 MM HG: ICD-10-PCS | Mod: CPTII,S$GLB,, | Performed by: HOSPITALIST

## 2023-10-12 PROCEDURE — 1125F AMNT PAIN NOTED PAIN PRSNT: CPT | Mod: CPTII,S$GLB,, | Performed by: HOSPITALIST

## 2023-10-12 PROCEDURE — 3044F HG A1C LEVEL LT 7.0%: CPT | Mod: CPTII,S$GLB,, | Performed by: HOSPITALIST

## 2023-10-12 PROCEDURE — 3075F SYST BP GE 130 - 139MM HG: CPT | Mod: CPTII,S$GLB,, | Performed by: HOSPITALIST

## 2023-10-12 PROCEDURE — 1160F RVW MEDS BY RX/DR IN RCRD: CPT | Mod: CPTII,S$GLB,, | Performed by: HOSPITALIST

## 2023-10-12 PROCEDURE — 93005 ELECTROCARDIOGRAM TRACING: CPT | Mod: S$GLB,,, | Performed by: HOSPITALIST

## 2023-10-12 PROCEDURE — 3044F PR MOST RECENT HEMOGLOBIN A1C LEVEL <7.0%: ICD-10-PCS | Mod: CPTII,S$GLB,, | Performed by: HOSPITALIST

## 2023-10-12 RX ORDER — ASPIRIN 81 MG/1
81 TABLET ORAL DAILY
COMMUNITY

## 2023-10-12 RX ORDER — LOSARTAN POTASSIUM 50 MG/1
50 TABLET ORAL 2 TIMES DAILY
Qty: 180 TABLET | Refills: 3 | Status: SHIPPED | OUTPATIENT
Start: 2023-10-12 | End: 2024-01-12

## 2023-10-12 RX ORDER — SEMAGLUTIDE 0.25 MG/.5ML
0.25 INJECTION, SOLUTION SUBCUTANEOUS WEEKLY
Qty: 2 ML | Refills: 0 | Status: SHIPPED | OUTPATIENT
Start: 2023-10-12 | End: 2023-12-15

## 2023-10-12 RX ORDER — ATORVASTATIN CALCIUM 40 MG/1
40 TABLET, FILM COATED ORAL NIGHTLY
Qty: 90 TABLET | Refills: 3 | Status: SHIPPED | OUTPATIENT
Start: 2023-10-12

## 2023-10-12 NOTE — PROGRESS NOTES
Chief Complaint  Chief Complaint   Patient presents with    Hypertension       HPI  Tracy Armendariz is a 72 y.o. female with HTN, HLD, MARTIN, and obesity that presents for inconsistent BP readings. Her last appointment with primary care was 6/15/2023.    When using home BP machine, she has recordings ranging from 111 systolic to 145 systolic over the last few weeks. R arm and L arm readings have 20 point systolic discrepancies. In office today, BP reads 136/73 laying, 98/65 sitting, and 104/67 standing.  Other new complaints include dizziness upon turning head, frontal headaches, and bilateral hand tremors. She has had occasional ringing of the ears with no obvious trigger. She has had several falls. She also reports chest heaviness when laying flat. These symptoms started about 3-4 months ago and happen intermittently.      PAST MEDICAL HISTORY:  Past Medical History:   Diagnosis Date    Allergy     Anemia, unspecified     Anticoagulant long-term use     Arthritis     CVA (cerebral infarction)     Depression     Disorder of kidney and ureter     renal stones    Diverticulosis of colon     Extrinsic asthma, unspecified     Hematuria, unspecified     Hyperlipidemia     Hypertension     Kidney stone     Left atrial enlargement 2014    Low back pain     Nephrolithiasis     MARTIN (obstructive sleep apnea)     Osteopenia     PUD (peptic ulcer disease)     Stroke 2013    Urinary tract infection        PAST SURGICAL HISTORY:  Past Surgical History:   Procedure Laterality Date    APPENDECTOMY      @ time of hysterectomy    BACK SURGERY      CATARACT EXTRACTION       SECTION      CHOLECYSTECTOMY      laparoscopic    COLONOSCOPY N/A 2018    Procedure: COLONOSCOPY/Golytely;  Surgeon: Janine Black MD;  Location: [facility];  Service: Endoscopy;  Laterality: N/A;    CYSTOSCOPY W/ RETROGRADES  2022    Procedure: CYSTOSCOPY, WITH RETROGRADE PYELOGRAM;  Surgeon: Mitchell Recinos MD;   Location: [facility];  Service: Urology;;    CYSTOSCOPY W/ URETERAL STENT PLACEMENT Left 2022    Procedure: CYSTOSCOPY, WITH URETERAL STENT INSERTION;  Surgeon: Mitchell Recinos MD;  Location: [facility];  Service: Urology;  Laterality: Left;    CYSTOURETEROSCOPY, WITH HOLMIUM LASER LITHOTRIPSY OF URETERAL CALCULUS AND STENT INSERTION Left 2022    Procedure: left ureteroscopy, holmium laser lithotripsy, stone basketing, retrograde pyelogram, stent placement;  Surgeon: Anaya Zamora MD;  Location: [facility];  Service: Urology;  Laterality: Left;    DILATION AND CURETTAGE OF UTERUS      EXTRACTION - STONE Left 2022    Procedure: EXTRACTION - STONE;  Surgeon: Anaya Zamora MD;  Location: [facility];  Service: Urology;  Laterality: Left;    HYSTERECTOMY      TAHUSO with appendectomy    INNER EAR SURGERY      replaced ear drum    JOINT REPLACEMENT Right     knee    KNEE ARTHROSCOPY W/ DEBRIDEMENT      LUMBAR DISCECTOMY      L4-L5    OOPHORECTOMY      unilateral    RETROGRADE PYELOGRAPHY  2022    Procedure: PYELOGRAM, RETROGRADE;  Surgeon: Anaya Zamora MD;  Location: [facility];  Service: Urology;;    TONSILLECTOMY      TYMPANOPLASTY      URETEROSCOPIC REMOVAL OF URETERIC CALCULUS Left 2018    Procedure: REMOVAL, CALCULUS, URETER, URETEROSCOPIC, holmium laser lithotripsy, stone basket extraction, retrograde pyelogram, ureteral stent exchange;  Surgeon: Anaya Zamora MD;  Location: [facility];  Service: Urology;  Laterality: Left;       SOCIAL HISTORY:  Social History     Socioeconomic History    Marital status:    Tobacco Use    Smoking status: Former     Current packs/day: 0.00     Types: Cigarettes     Quit date: 1995     Years since quittin.7    Smokeless tobacco: Never   Substance and Sexual Activity    Alcohol use: Yes     Alcohol/week: 1.0 standard drink of alcohol     Types: 1 Glasses of wine per week     Comment: social    Drug use: No    Sexual activity: Yes      Partners: Male     Birth control/protection: Surgical     Comment:  since 1972     Social Determinants of Health     Financial Resource Strain: Low Risk  (12/15/2022)    Overall Financial Resource Strain (CARDIA)     Difficulty of Paying Living Expenses: Not hard at all   Food Insecurity: No Food Insecurity (12/15/2022)    Hunger Vital Sign     Worried About Running Out of Food in the Last Year: Never true     Ran Out of Food in the Last Year: Never true   Transportation Needs: No Transportation Needs (12/15/2022)    PRAPARE - Transportation     Lack of Transportation (Medical): No     Lack of Transportation (Non-Medical): No   Physical Activity: Inactive (12/15/2022)    Exercise Vital Sign     Days of Exercise per Week: 0 days     Minutes of Exercise per Session: 0 min   Stress: Stress Concern Present (12/15/2022)    [employer] - Occupational Stress Questionnaire     Feeling of Stress : Rather much   Social Connections: Unknown (12/15/2022)    Social Connection and Isolation Panel [NHANES]     Frequency of Communication with Friends and Family: More than three times a week     Frequency of Social Gatherings with Friends and Family: More than three times a week     Active Member of Clubs or Organizations: Yes     Attends Club or Organization Meetings: More than 4 times per year     Marital Status:    Housing Stability: Low Risk  (12/15/2022)    Housing Stability Vital Sign     Unable to Pay for Housing in the Last Year: No     Number of Places Lived in the Last Year: 1     Unstable Housing in the Last Year: No       FAMILY HISTORY:  Family History   Problem Relation Age of Onset    Cervical cancer Mother     Cancer Mother 65        lung cancer - non smoker    Stroke Paternal Grandfather     Hypertension Maternal Grandfather     Heart disease Maternal Grandfather     Hypertension Father     Coronary artery disease Father 62    Heart disease Father     Diabetes Sister      Heart disease Sister     Kidney disease Sister     Breast cancer Daughter 36    Heart failure Sister         60s    Colon cancer Neg Hx     Ovarian cancer Neg Hx        ALLERGIES AND MEDICATIONS: updated and reviewed.  Review of patient's allergies indicates:   Allergen Reactions    Iodinated contrast media Hives and Rash    Gabapentin Hallucinations    Iodine Hives    Isothiazolinones Rash    Penicillins Rash     Current Outpatient Medications   Medication Sig Dispense Refill    albuterol (PROVENTIL/VENTOLIN HFA) 90 mcg/actuation inhaler INHALE 2 PUFFS EVERY 6 HOURS AS NEEDED FOR WHEEZING 18 g 0    amoxicillin (AMOXIL) 500 MG capsule Take 4 capsules by mouth 1 hour prior to appointment 12 capsule 0    aspirin (ECOTRIN) 81 MG EC tablet Take 81 mg by mouth once daily.      buPROPion (WELLBUTRIN XL) 300 MG 24 hr tablet Take 1 tablet (300 mg total) by mouth once daily. 90 tablet 11    clotrimazole (LOTRIMIN) 1 % cream Apply topically 2 (two) times daily. For Ringworm. Apply up to 4 weeks 60 g 0    diazePAM (VALIUM) 2 MG tablet Take 1/2 to 1 tablet 3 times daily as needed to control dizziness 50 tablet 1    diclofenac sodium (VOLTAREN) 1 % Gel APPLY 2-4 GRAMS TO EACH PAINFUL AREA FOUR TIMES DAILY - MAX 32 GRAMS/ g 6    EScitalopram oxalate (LEXAPRO) 20 MG tablet Take 1 tablet (20 mg total) by mouth once daily. 90 tablet 3    esomeprazole (NEXIUM) 40 MG capsule Take 1 capsule (40 mg total) by mouth daily as needed (heartburn). 30 capsule 3    magnesium oxide (MAG-OX) 400 mg (241.3 mg magnesium) tablet Take 1 tablet (400 mg total) by mouth 2 (two) times daily. 180 tablet 3    MULTIVIT WITH CALCIUM,IRON,MIN (WOMEN'S DAILY MULTIVITAMIN ORAL) Take 1 tablet by mouth once daily.      potassium citrate (UROCIT-K 10) 10 mEq (1,080 mg) TbSR Take 2 tablets (20 mEq total) by mouth 3 (three) times daily with meals. 180 tablet 11    vitamin D (VITAMIN D3) 1000 units Tab Take 1,000 Units by mouth 3 (three) times a week.       "atorvastatin (LIPITOR) 40 MG tablet Take 1 tablet (40 mg total) by mouth every evening. 90 tablet 3    COVID vbt30-43,12up,,andu,,PF, (SPIKEVAX 2430-9495,12Y UP,,PF,) 50 mcg/0.5 mL injection Inject into the muscle. 0.5 mL 0    losartan (COZAAR) 50 MG tablet Take 1 tablet (50 mg total) by mouth 2 (two) times a day. 180 tablet 3    semaglutide, weight loss, (WEGOVY) 0.25 mg/0.5 mL PnIj Inject 0.25 mg into the skin once a week. 2 mL 0     No current facility-administered medications for this visit.         ROS  Review of Systems   Constitutional: Negative.    HENT:  Positive for tinnitus.    Eyes:  Negative for photophobia and visual disturbance.   Respiratory:  Positive for chest tightness and shortness of breath. Negative for cough, wheezing and stridor.    Cardiovascular:  Positive for chest pain. Negative for palpitations and leg swelling.   Gastrointestinal: Negative.    Endocrine: Negative.    Genitourinary: Negative.    Neurological:  Positive for tremors and headaches.         Physical Exam  Vitals:    10/12/23 0814   BP: 136/86   BP Location: Left arm   Patient Position: Sitting   BP Method: Large (Manual)   Pulse: 66   Resp: 16   Temp: 98.3 °F (36.8 °C)   TempSrc: Temporal   SpO2: 95%   Weight: 108 kg (238 lb 1.6 oz)   Height: 5' 7" (1.702 m)    Body mass index is 37.29 kg/m².  Weight: 108 kg (238 lb 1.6 oz)   Height: 5' 7" (170.2 cm)   Physical Exam  Constitutional:       Appearance: She is obese.   HENT:      Head: Normocephalic.      Right Ear: Tympanic membrane normal.      Left Ear: Tympanic membrane normal.      Mouth/Throat:      Pharynx: Oropharynx is clear.   Eyes:      Pupils: Pupils are equal, round, and reactive to light.   Cardiovascular:      Rate and Rhythm: Normal rate and regular rhythm.      Pulses: Normal pulses.   Pulmonary:      Effort: Pulmonary effort is normal.   Abdominal:      General: Bowel sounds are normal.      Palpations: Abdomen is soft.   Musculoskeletal:      Cervical back: " Normal range of motion and neck supple.      Right lower leg: No edema.      Left lower leg: No edema.   Skin:     General: Skin is warm and dry.   Psychiatric:         Mood and Affect: Mood normal.         Behavior: Behavior normal.       Health Maintenance         Date Due Completion Date    Shingles Vaccine (2 of 3) 05/03/2016 3/8/2016    Influenza Vaccine (1) 09/01/2023 12/15/2021    COVID-19 Vaccine (6 - 2023-24 season) 12/05/2023 10/10/2023    Hemoglobin A1c (Prediabetes) 06/22/2024 6/22/2023    Mammogram 06/22/2024 6/22/2023    DEXA Scan 08/13/2024 8/13/2020    High Dose Statin 10/12/2024 10/12/2023    TETANUS VACCINE 11/30/2025 11/30/2015    Colorectal Cancer Screening 05/29/2028 5/29/2018    Lipid Panel 06/22/2028 6/22/2023              Assessment and Plan:  Orthostatic dizziness  -     Comprehensive Metabolic Panel; Future; Expected date: 10/12/2023  -     CBC Auto Differential; Future; Expected date: 10/12/2023  -     TSH; Future; Expected date: 10/12/2023  -     Hemoglobin A1C; Future; Expected date: 10/12/2023    Primary hypertension  -     Comprehensive Metabolic Panel; Future; Expected date: 10/12/2023  -     CBC Auto Differential; Future; Expected date: 10/12/2023  -     TSH; Future; Expected date: 10/12/2023  -     Hemoglobin A1C; Future; Expected date: 10/12/2023    Chest pain, unspecified type  -     IN OFFICE EKG 12-LEAD (to Muse)    Severe obesity (BMI 35.0-39.9) with comorbidity  -     semaglutide, weight loss, (WEGOVY) 0.25 mg/0.5 mL PnIj; Inject 0.25 mg into the skin once a week.  Dispense: 2 mL; Refill: 0    Prediabetes  -     Comprehensive Metabolic Panel; Future; Expected date: 10/12/2023  -     CBC Auto Differential; Future; Expected date: 10/12/2023  -     TSH; Future; Expected date: 10/12/2023  -     Hemoglobin A1C; Future; Expected date: 10/12/2023    Other orders  -     atorvastatin (LIPITOR) 40 MG tablet; Take 1 tablet (40 mg total) by mouth every evening.  Dispense: 90 tablet;  Refill: 3  -     losartan (COZAAR) 50 MG tablet; Take 1 tablet (50 mg total) by mouth 2 (two) times a day.  Dispense: 180 tablet; Refill: 3    Screening  -Influenza Vaccine

## 2023-10-16 ENCOUNTER — LAB VISIT (OUTPATIENT)
Dept: LAB | Facility: HOSPITAL | Age: 73
End: 2023-10-16
Attending: HOSPITALIST
Payer: MEDICARE

## 2023-10-16 ENCOUNTER — OFFICE VISIT (OUTPATIENT)
Dept: NEUROLOGY | Facility: CLINIC | Age: 73
End: 2023-10-16
Payer: MEDICARE

## 2023-10-16 VITALS
HEART RATE: 65 BPM | HEIGHT: 67 IN | WEIGHT: 238.13 LBS | DIASTOLIC BLOOD PRESSURE: 75 MMHG | BODY MASS INDEX: 37.37 KG/M2 | SYSTOLIC BLOOD PRESSURE: 127 MMHG

## 2023-10-16 DIAGNOSIS — G60.3 IDIOPATHIC PROGRESSIVE NEUROPATHY: ICD-10-CM

## 2023-10-16 DIAGNOSIS — R73.03 PREDIABETES: ICD-10-CM

## 2023-10-16 DIAGNOSIS — H81.391 PERIPHERAL VERTIGO INVOLVING RIGHT EAR: ICD-10-CM

## 2023-10-16 DIAGNOSIS — R42 ORTHOSTATIC DIZZINESS: ICD-10-CM

## 2023-10-16 DIAGNOSIS — I10 PRIMARY HYPERTENSION: Chronic | ICD-10-CM

## 2023-10-16 DIAGNOSIS — Z86.73 HISTORY OF STROKE: Primary | ICD-10-CM

## 2023-10-16 DIAGNOSIS — R25.1 TREMOR: ICD-10-CM

## 2023-10-16 LAB
ALBUMIN SERPL BCP-MCNC: 3.4 G/DL (ref 3.5–5.2)
ALP SERPL-CCNC: 60 U/L (ref 55–135)
ALT SERPL W/O P-5'-P-CCNC: 19 U/L (ref 10–44)
ANION GAP SERPL CALC-SCNC: 11 MMOL/L (ref 8–16)
AST SERPL-CCNC: 20 U/L (ref 10–40)
BASOPHILS # BLD AUTO: 0.05 K/UL (ref 0–0.2)
BASOPHILS NFR BLD: 0.7 % (ref 0–1.9)
BILIRUB SERPL-MCNC: 0.5 MG/DL (ref 0.1–1)
BUN SERPL-MCNC: 14 MG/DL (ref 8–23)
CALCIUM SERPL-MCNC: 9.5 MG/DL (ref 8.7–10.5)
CHLORIDE SERPL-SCNC: 105 MMOL/L (ref 95–110)
CO2 SERPL-SCNC: 27 MMOL/L (ref 23–29)
CREAT SERPL-MCNC: 0.9 MG/DL (ref 0.5–1.4)
DIFFERENTIAL METHOD: ABNORMAL
EOSINOPHIL # BLD AUTO: 0.3 K/UL (ref 0–0.5)
EOSINOPHIL NFR BLD: 4.5 % (ref 0–8)
ERYTHROCYTE [DISTWIDTH] IN BLOOD BY AUTOMATED COUNT: 12.5 % (ref 11.5–14.5)
EST. GFR  (NO RACE VARIABLE): >60 ML/MIN/1.73 M^2
ESTIMATED AVG GLUCOSE: 120 MG/DL (ref 68–131)
GLUCOSE SERPL-MCNC: 114 MG/DL (ref 70–110)
HBA1C MFR BLD: 5.8 % (ref 4–5.6)
HCT VFR BLD AUTO: 42.5 % (ref 37–48.5)
HGB BLD-MCNC: 13.9 G/DL (ref 12–16)
IMM GRANULOCYTES # BLD AUTO: 0.02 K/UL (ref 0–0.04)
IMM GRANULOCYTES NFR BLD AUTO: 0.3 % (ref 0–0.5)
LYMPHOCYTES # BLD AUTO: 3.5 K/UL (ref 1–4.8)
LYMPHOCYTES NFR BLD: 47.5 % (ref 18–48)
MCH RBC QN AUTO: 31.4 PG (ref 27–31)
MCHC RBC AUTO-ENTMCNC: 32.7 G/DL (ref 32–36)
MCV RBC AUTO: 96 FL (ref 82–98)
MONOCYTES # BLD AUTO: 0.7 K/UL (ref 0.3–1)
MONOCYTES NFR BLD: 9.6 % (ref 4–15)
NEUTROPHILS # BLD AUTO: 2.8 K/UL (ref 1.8–7.7)
NEUTROPHILS NFR BLD: 37.4 % (ref 38–73)
NRBC BLD-RTO: 0 /100 WBC
PLATELET # BLD AUTO: 232 K/UL (ref 150–450)
PMV BLD AUTO: 11.1 FL (ref 9.2–12.9)
POTASSIUM SERPL-SCNC: 4.5 MMOL/L (ref 3.5–5.1)
PROT SERPL-MCNC: 7.1 G/DL (ref 6–8.4)
RBC # BLD AUTO: 4.42 M/UL (ref 4–5.4)
SODIUM SERPL-SCNC: 143 MMOL/L (ref 136–145)
TSH SERPL DL<=0.005 MIU/L-ACNC: 3.56 UIU/ML (ref 0.4–4)
WBC # BLD AUTO: 7.39 K/UL (ref 3.9–12.7)

## 2023-10-16 PROCEDURE — 85025 COMPLETE CBC W/AUTO DIFF WBC: CPT | Performed by: HOSPITALIST

## 2023-10-16 PROCEDURE — 4010F ACE/ARB THERAPY RXD/TAKEN: CPT | Mod: CPTII,S$GLB,, | Performed by: PSYCHIATRY & NEUROLOGY

## 2023-10-16 PROCEDURE — 80053 COMPREHEN METABOLIC PANEL: CPT | Performed by: HOSPITALIST

## 2023-10-16 PROCEDURE — 83036 HEMOGLOBIN GLYCOSYLATED A1C: CPT | Performed by: HOSPITALIST

## 2023-10-16 PROCEDURE — 1160F RVW MEDS BY RX/DR IN RCRD: CPT | Mod: CPTII,S$GLB,, | Performed by: PSYCHIATRY & NEUROLOGY

## 2023-10-16 PROCEDURE — 3074F SYST BP LT 130 MM HG: CPT | Mod: CPTII,S$GLB,, | Performed by: PSYCHIATRY & NEUROLOGY

## 2023-10-16 PROCEDURE — 3066F PR DOCUMENTATION OF TREATMENT FOR NEPHROPATHY: ICD-10-PCS | Mod: CPTII,S$GLB,, | Performed by: PSYCHIATRY & NEUROLOGY

## 2023-10-16 PROCEDURE — 3074F PR MOST RECENT SYSTOLIC BLOOD PRESSURE < 130 MM HG: ICD-10-PCS | Mod: CPTII,S$GLB,, | Performed by: PSYCHIATRY & NEUROLOGY

## 2023-10-16 PROCEDURE — 36415 COLL VENOUS BLD VENIPUNCTURE: CPT | Performed by: HOSPITALIST

## 2023-10-16 PROCEDURE — 1159F MED LIST DOCD IN RCRD: CPT | Mod: CPTII,S$GLB,, | Performed by: PSYCHIATRY & NEUROLOGY

## 2023-10-16 PROCEDURE — 1160F PR REVIEW ALL MEDS BY PRESCRIBER/CLIN PHARMACIST DOCUMENTED: ICD-10-PCS | Mod: CPTII,S$GLB,, | Performed by: PSYCHIATRY & NEUROLOGY

## 2023-10-16 PROCEDURE — 3078F PR MOST RECENT DIASTOLIC BLOOD PRESSURE < 80 MM HG: ICD-10-PCS | Mod: CPTII,S$GLB,, | Performed by: PSYCHIATRY & NEUROLOGY

## 2023-10-16 PROCEDURE — 3288F FALL RISK ASSESSMENT DOCD: CPT | Mod: CPTII,S$GLB,, | Performed by: PSYCHIATRY & NEUROLOGY

## 2023-10-16 PROCEDURE — 84443 ASSAY THYROID STIM HORMONE: CPT | Performed by: HOSPITALIST

## 2023-10-16 PROCEDURE — 1100F PR PT FALLS ASSESS DOC 2+ FALLS/FALL W/INJURY/YR: ICD-10-PCS | Mod: CPTII,S$GLB,, | Performed by: PSYCHIATRY & NEUROLOGY

## 2023-10-16 PROCEDURE — 3060F POS MICROALBUMINURIA REV: CPT | Mod: CPTII,S$GLB,, | Performed by: PSYCHIATRY & NEUROLOGY

## 2023-10-16 PROCEDURE — 3044F PR MOST RECENT HEMOGLOBIN A1C LEVEL <7.0%: ICD-10-PCS | Mod: CPTII,S$GLB,, | Performed by: PSYCHIATRY & NEUROLOGY

## 2023-10-16 PROCEDURE — 3008F PR BODY MASS INDEX (BMI) DOCUMENTED: ICD-10-PCS | Mod: CPTII,S$GLB,, | Performed by: PSYCHIATRY & NEUROLOGY

## 2023-10-16 PROCEDURE — 1159F PR MEDICATION LIST DOCUMENTED IN MEDICAL RECORD: ICD-10-PCS | Mod: CPTII,S$GLB,, | Performed by: PSYCHIATRY & NEUROLOGY

## 2023-10-16 PROCEDURE — 1100F PTFALLS ASSESS-DOCD GE2>/YR: CPT | Mod: CPTII,S$GLB,, | Performed by: PSYCHIATRY & NEUROLOGY

## 2023-10-16 PROCEDURE — 1125F AMNT PAIN NOTED PAIN PRSNT: CPT | Mod: CPTII,S$GLB,, | Performed by: PSYCHIATRY & NEUROLOGY

## 2023-10-16 PROCEDURE — 99999 PR PBB SHADOW E&M-EST. PATIENT-LVL IV: CPT | Mod: PBBFAC,,, | Performed by: PSYCHIATRY & NEUROLOGY

## 2023-10-16 PROCEDURE — 99215 OFFICE O/P EST HI 40 MIN: CPT | Mod: S$GLB,,, | Performed by: PSYCHIATRY & NEUROLOGY

## 2023-10-16 PROCEDURE — 3288F PR FALLS RISK ASSESSMENT DOCUMENTED: ICD-10-PCS | Mod: CPTII,S$GLB,, | Performed by: PSYCHIATRY & NEUROLOGY

## 2023-10-16 PROCEDURE — 3008F BODY MASS INDEX DOCD: CPT | Mod: CPTII,S$GLB,, | Performed by: PSYCHIATRY & NEUROLOGY

## 2023-10-16 PROCEDURE — 3066F NEPHROPATHY DOC TX: CPT | Mod: CPTII,S$GLB,, | Performed by: PSYCHIATRY & NEUROLOGY

## 2023-10-16 PROCEDURE — 99999 PR PBB SHADOW E&M-EST. PATIENT-LVL IV: ICD-10-PCS | Mod: PBBFAC,,, | Performed by: PSYCHIATRY & NEUROLOGY

## 2023-10-16 PROCEDURE — 99215 PR OFFICE/OUTPT VISIT, EST, LEVL V, 40-54 MIN: ICD-10-PCS | Mod: S$GLB,,, | Performed by: PSYCHIATRY & NEUROLOGY

## 2023-10-16 PROCEDURE — 3078F DIAST BP <80 MM HG: CPT | Mod: CPTII,S$GLB,, | Performed by: PSYCHIATRY & NEUROLOGY

## 2023-10-16 PROCEDURE — 4010F PR ACE/ARB THEARPY RXD/TAKEN: ICD-10-PCS | Mod: CPTII,S$GLB,, | Performed by: PSYCHIATRY & NEUROLOGY

## 2023-10-16 PROCEDURE — 3044F HG A1C LEVEL LT 7.0%: CPT | Mod: CPTII,S$GLB,, | Performed by: PSYCHIATRY & NEUROLOGY

## 2023-10-16 PROCEDURE — 3060F PR POS MICROALBUMINURIA RESULT DOCUMENTED/REVIEW: ICD-10-PCS | Mod: CPTII,S$GLB,, | Performed by: PSYCHIATRY & NEUROLOGY

## 2023-10-16 PROCEDURE — 1125F PR PAIN SEVERITY QUANTIFIED, PAIN PRESENT: ICD-10-PCS | Mod: CPTII,S$GLB,, | Performed by: PSYCHIATRY & NEUROLOGY

## 2023-10-16 NOTE — PROGRESS NOTES
[facility] NEUROLOGY  [facility], [place]    Date: 10/16/23  Patient Name: Tracy Armendariz   MRN: [id number]   PCP: Madisyn Villalobos  Referring Provider: No ref. provider found    Chief Complaint:  follow up for History of stroke, vertigo, neuropathy, tremor  Subjective:     10/16/23:  Presents for routine follow-up regarding multiple neurological concerns.  Joined by her  during today's encounter.    Continues to have balance complaints.  We reviewed balance risk factors including the presence of neuropathy, documented issues with orthostatic hypotension, and her history of vertigo.  Gave several examples of falls over the last year most of which included times that the patient had bent over with head below the waist and lost balance.  Immediately reviewed safety recommendations as they relate to these issues including avoidance of this type of positioning.  Discussed cautious transitions between body positioning.    No change in tremor.  No tremor present at rest.  Exacerbated with hunger, heightened emotions.  Most notable when holding a cup or glass.  Fine motor tasks.  No episodes of freezing, changes in speech, problems swallowing, or other concerns reported.    History of stroke.  Continues on aspirin and atorvastatin daily.  No new complaints in this regard.    Continues follow with ENT regarding peripheral vertigo history.  Has orders for vestibular rehab.  Recently had MRI IAC/temporal bone with no significant findings.  It was also notable that this imaging to be stable over previous scans as with only chronic microvascular changes outside of the known small-vessel strokes present in thalami.    Hemoglobin A1C   Date Value Ref Range Status   10/16/2023 5.8 (H) 4.0 - 5.6 % Final     Comment:     ADA Screening Guidelines:  5.7-6.4%  Consistent with prediabetes  >or=6.5%  Consistent with diabetes    High levels of fetal hemoglobin interfere with the HbA1C  assay. Heterozygous  hemoglobin variants (HbS, HgC, etc)do  not significantly interfere with this assay.   However, presence of multiple variants may affect accuracy.     06/22/2023 5.7 (H) 4.0 - 5.6 % Final     Comment:     ADA Screening Guidelines:  5.7-6.4%  Consistent with prediabetes  >or=6.5%  Consistent with diabetes    High levels of fetal hemoglobin interfere with the HbA1C  assay. Heterozygous hemoglobin variants (HbS, HgC, etc)do  not significantly interfere with this assay.   However, presence of multiple variants may affect accuracy.     06/14/2022 6.2 (H) 4.0 - 5.6 % Final     Comment:     ADA Screening Guidelines:  5.7-6.4%  Consistent with prediabetes  >or=6.5%  Consistent with diabetes    High levels of fetal hemoglobin interfere with the HbA1C  assay. Heterozygous hemoglobin variants (HbS, HgC, etc)do  not significantly interfere with this assay.   However, presence of multiple variants may affect accuracy.          ===================================================  12/30/22 HPI:   Ms. Tracy Armendariz is a 72 y.o. RH female presenting for evaluation of multiple neurological complaints.  Tremor in the right hand present for least the last year.  Waxes and wanes in intensity.  No significant impact in life or function but is most noticeable when using utensils or drinking from a cup.  History of stroke in December 2013.  Via patient and chart review, patient was believed to be having a large vessel ischemic event and received TPA.  During the workup, the patient received iodine and is allergic.  Had to be given Benadryl which then caused her to sleep for very long time, but no major deficits following this health event.  No underlying etiology for the stroke was ever discovered.  Had implanted heart monitor which was reviewed over an extended timeframe after this event.  Has continued on aspirin 81 mg and atorvastatin 40 mg daily since then.  MRI completed in 2016 showed no evidence of large vessel ischemic events.   It did identify small-vessel strokes in the bilateral thalami with some chronic microvascular ischemic changes  History of peripheral vertigo on the right.  Describes a prolonged time frame where she suffered with significant room spinning sensations after rapid head movements or turning the body too quickly.  Eventually went through vestibular rehab which drastically improved symptoms.  Then, she suffered with a sepsis event due to nephrolithiasis recently.  Since discharge from the hospital, the patient has felt the same off-balance sensation that she suffered with while being treated for vertigo in the past.  When extensive conversation regarding poor neuronal reserve and other relevant topics that could be contributing to this complaint.  Denies experiencing any episodes of dizziness with nausea/vomiting since discharge from hospital.  No recent falls.  Denies bowel/bladder incontinence.  No significant neuropathic pains in the feet.  Some mild cramping in the feet from time to time.  ===================================================    CURRENT MEDS:  Current Outpatient Medications   Medication Sig Dispense Refill    albuterol (PROVENTIL/VENTOLIN HFA) 90 mcg/actuation inhaler INHALE 2 PUFFS EVERY 6 HOURS AS NEEDED FOR WHEEZING 18 g 0    amoxicillin (AMOXIL) 500 MG capsule Take 4 capsules by mouth 1 hour prior to appointment 12 capsule 0    aspirin (ECOTRIN) 81 MG EC tablet Take 81 mg by mouth once daily.      atorvastatin (LIPITOR) 40 MG tablet Take 1 tablet (40 mg total) by mouth every evening. 90 tablet 3    buPROPion (WELLBUTRIN XL) 300 MG 24 hr tablet Take 1 tablet (300 mg total) by mouth once daily. 90 tablet 11    clotrimazole (LOTRIMIN) 1 % cream Apply topically 2 (two) times daily. For Ringworm. Apply up to 4 weeks 60 g 0    COVID [id number],12up,,andu,,PF, (SPIKEVAX [id number],12Y UP,,PF,) 50 mcg/0.5 mL injection Inject into the muscle. 0.5 mL 0    diazePAM (VALIUM) 2 MG tablet Take 1/2 to 1 tablet 3  "times daily as needed to control dizziness 50 tablet 1    diclofenac sodium (VOLTAREN) 1 % Gel APPLY 2-4 GRAMS TO EACH PAINFUL AREA FOUR TIMES DAILY - MAX 32 GRAMS/ g 6    EScitalopram oxalate (LEXAPRO) 20 MG tablet Take 1 tablet (20 mg total) by mouth once daily. 90 tablet 3    esomeprazole (NEXIUM) 40 MG capsule Take 1 capsule (40 mg total) by mouth daily as needed (heartburn). 30 capsule 3    losartan (COZAAR) 50 MG tablet Take 1 tablet (50 mg total) by mouth 2 (two) times a day. 180 tablet 3    magnesium oxide (MAG-OX) 400 mg (241.3 mg magnesium) tablet Take 1 tablet (400 mg total) by mouth 2 (two) times daily. 180 tablet 3    MULTIVIT WITH CALCIUM,IRON,MIN (WOMEN'S DAILY MULTIVITAMIN ORAL) Take 1 tablet by mouth once daily.      potassium citrate (UROCIT-K 10) 10 mEq (1,080 mg) TbSR Take 2 tablets (20 mEq total) by mouth 3 (three) times daily with meals. 180 tablet 11    semaglutide, weight loss, (WEGOVY) 0.25 mg/0.5 mL PnIj Inject 0.25 mg into the skin once a week. 2 mL 0    vitamin D (VITAMIN D3) 1000 units Tab Take 1,000 Units by mouth 3 (three) times a week.      RSVPreF3 antigen-AS01E, PF, (AREXVY, PF,) 120 mcg/0.5 mL SusR vaccine Inject 0.5 mLs into the muscle once. for 1 dose 1 each 0    varicella-zoster gE-AS01B, PF, (SHINGRIX, PF,) 50 mcg/0.5 mL injection Inject 0.5 mLs into the muscle once. for 1 dose 1 each 1     No current facility-administered medications for this visit.     ALLERGIES:  Review of patient's allergies indicates:   Allergen Reactions    Iodinated contrast media Hives and Rash    Gabapentin Hallucinations    Iodine Hives    Isothiazolinones Rash    Penicillins Rash        Objective:     Vitals:    10/16/23 0743   BP: 127/75   Pulse: 65   Weight: 108 kg (238 lb 1.6 oz)   Height: 5' 7" (1.702 m)     General: female in NAD, alert and awake, Aox3, well groomed. ?    No hypomimia    No hypomimia  No global bradykinesia   No global rigidity   Normal rapid alternating movements and " finger tapping in the bilateral upper extremities  No resting tremors observed   ? ?     High velocity low-amplitude tremor present once again in the bilateral upper extremities when muscles are engaged against gravity.  Very symmetric.     Neurological Examination.    Mental status: AA&O x3; Affect/mood is euthymic/congruent; no aphasia      Cranial Nerves:  NICK, Nonsustained nystagmus with right end point gaze, otherwise II-XII grossly intact.     Muscle Function: Tone WNL and Muscle bulk WNL.  5/5 throughout     Sensory: Temperature sensation in the feet mildly decreased.  Vibratory sensation mild to moderately decreased at the level of the bilateral great toes and mildly decreased at the bilateral ankles.     Reflexes: Left and Right biceps, triceps, brachioradialis are 2+/4. patellar 1+/4 on Left and 0/4 on Right (postsurgical changes), B/L Achilles 1+/4     Coordination: no dysmetria (finger to nose negative)     Gait:  Adequate casual gait with sufficient stride length.  Good arm swing.  No shuffling.  Cautious turns with extra steps. Gait mildly widened with occasional corrective steps.  Cane oused during today's exam.  Good posture    Other:  12/30/2022 vitamin B2, vitamin-E, vitamin B12, vitamin B1, routine electrophoresis, immunofixation electrophoresis results reviewed at length once again; no significant findings     10/10/2023 MRI IAC/temporal bone with/without contrast:  FINDINGS:  Inner ears/IACs/:  Normal appearance of the bilateral 7th/8th cranial nerve bundles with no mass or abnormal enhancement. Membranous labyrinthine structures are normal.  Remainder of the Intracranial Compartment:  Ventricles and sulci are normal in size for age without evidence of hydrocephalus. No extra-axial blood or fluid collections.  Punctate and confluent T2 signal hyperintensity in the periventricular white matter suggesting benign white matter changes of aging and or chronic microvascular ischemia.  Brain  "parenchyma otherwise unremarkable.  No mass lesion, acute hemorrhage, edema, or acute infarct. No abnormal enhancement.  Normal vascular flow voids are preserved.  Skull/Extracranial Contents (limited evaluation): Bone marrow signal intensity is normal.  Impression:  No acute intracranial abnormality.  Specifically no abnormality involving the 7th and 8th cranial nerves  Periventricular white matter changes of aging and or chronic microvascular ischemia."    03/22/2019 MRI cervical spine without contrast:  FINDINGS:  The visualized portions of the posterior fossa is unremarkable.  There is arthropathy at the craniocervical junction.  No prevertebral soft tissue swelling is identified.  The cervical alignment is maintained.  There are fibrotic endplate changes in the lower cervical spine.  The remainder of the bone marrow signal is within normal limits.  The cord is normal in signal without evidence of edema or expansion.  There are nintraspinal collections.  There is effacement of the ventral thecal sac in the lower cervical spine.  There is hypertrophy of the posterior elements.  There is multilevel disc desiccation.  Evaluation of the individual disc levels reveals the following:  C2-C3, there is a central disc protrusion.  There is mild hypertrophy of the posterior elements.  The spinal canal and neural foramina are within normal limits.  C3-C4, there is a disc osteophyte complex along with facet hypertrophy and uncovertebral hypertrophy.  There is superimposed left paracentral disc protrusion.  There is mild narrowing of the spinal canal.  There is mild right and moderate left neural foraminal narrowing.  C4-C5, there is a disc osteophyte complex along with facet hypertrophy and uncovertebral hypertrophy.  There is superimposed central disc protrusion.  There is mild to moderate narrowing of the spinal canal.  There is mild right and moderate left neural foraminal narrowing.  C5-C6, there is a disc osteophyte " "complex along with facet hypertrophy and uncovertebral hypertrophy.  There is superimposed central disc protrusion.  There is severe narrowing of the spinal canal.  There is severe bilateral neural foraminal narrowing  C6-C7, there is a disc osteophyte complex along with facet hypertrophy and uncovertebral hypertrophy.  There is superimposed central disc protrusion.  There is moderate to severe spinal canal narrowing.  There is severe right and moderate left neural foraminal narrowing.  C7-T1, there is a disc osteophyte complex along with facet hypertrophy and uncovertebral hypertrophy.  There is mild spinal canal narrowing.  There is mild bilateral neural foraminal narrowing.  The paraspinal normal limits.  Flow voids within the vertebral arteries are unremarkable.  IMPRESSION:   Advanced degenerative changes in the lower cervical spine with moderate to severe spinal canal narrowing at the C5-C6 and C6-C7 levels and associated moderate to severe neural foraminal narrowing.  No cord signal abnormality."    03/14/2016 MRI brain without contrast:  Findings:   The brain exhibits normal contour and morphology.  The ventricular system is stable in size and configuration without evidence of hydrocephalus or distortion by mass effect.  No abnormal areas of restricted diffusion are identified to suggest recent infarction.  No abnormal areas of susceptibility are identified to suggest parenchymal hemorrhage.  No abnormal intra-or extra-axial fluid collections are identified.  Small remote infarcts again noted within the medial aspects of the thalami bilaterally.  A few scattered foci of increased T2/FLAIR signal identified within the supratentorial white matter which are nonspecific but suggestive of sequela of mild degree of chronic microvascular ischemic change.  There are postsurgical changes of the bilateral lenses.  The globes and orbits are otherwise unremarkable.  There is mild fluid opacification of the left ethmoid " "air cells.  The remaining paranasal sinuses are clear.  There is fluid within the bilateral mastoid air cells. The major intracranial T2 flow-voids are present.  The calvarium is intact.  IMPRESSION:   1.  No acute intracranial abnormality identified, specifically no evidence of recent infarction or parenchymal hemorrhage.  2.  Remote infarcts noted within the medial aspects of the thalami bilaterally.  3. Left ethmoid sinus disease, and fluid within the mastoid air cells."      Assessment:   Tracy Armendariz is a 72 y.o. female presenting for follow-up regarding multiple neurological complaints.    Postural tremor present once again and found to be stable; no parkinsonian features present on today's exam.  No further interventions for this concern given its low impact on life or function.  Patient is not a good candidate for typical medicines such as primidone or beta blockers given other ongoing health concerns    History of stroke confirmed on imaging.  Recommend continuing low-dose daily aspirin and daily statin.  Counseled the patient on the importance of tight control blood pressure, glucose, cholesterol over time for good vascular neurological health.    Encouraged continued close follow-up with PCP regarding management of blood pressure medications given captured episodes of orthostatic hypotension with their clinic.  Likely contributing in some way to the patient's overall nonspecific dizziness complaints    History of peripheral vertigo and following with ENT.  Support vestibular rehab therapy.  Recent MRI IAC/temporal bone with no significant findings.  No further interventions from a neurological perspective indicated at this time    Mild peripheral polyneuropathy is present as evidenced by decreased temperature and vibratory sensation in the distal lower extremities in a nondermatomal pattern.  This can also contribute to mild disequilibrium due to poor proprioception.  Patient was counseled extensively " on this topic once again and encouraged to use her cane given multiple ongoing concerns related to balance.    We repeatedly reviewed or constellation of issues (orthostatic hypotension, peripheral polyneuropathy, peripheral vertigo) that are likely all contributing in some way to her chronic dizziness complaints.    Problem List Items Addressed This Visit          ENT    Peripheral vertigo involving right ear     Other Visit Diagnoses       History of stroke    -  Primary    Idiopathic progressive neuropathy        Tremor              Patient can follow-up with Neurology routinely in 1 year or sooner as needed.    I spent a total of 43 minutes on the day of the visit. This includes face to face time and non-face to face time preparing to see the patient (eg, review of tests), obtaining and/or reviewing separately obtained history, documenting clinical information in the electronic or other health record, independently interpreting results and communicating results to the patient/family/caregiver, or care coordinator.    A dictation device was used to produce this document. Use of such devices sometimes results in grammatical errors or replacement of words that sound similarly.    Javon Philippe, DO

## 2023-11-09 ENCOUNTER — OFFICE VISIT (OUTPATIENT)
Dept: CARDIOLOGY | Facility: CLINIC | Age: 73
End: 2023-11-09
Payer: MEDICARE

## 2023-11-09 VITALS
DIASTOLIC BLOOD PRESSURE: 82 MMHG | OXYGEN SATURATION: 96 % | WEIGHT: 238.13 LBS | BODY MASS INDEX: 37.37 KG/M2 | HEART RATE: 70 BPM | HEIGHT: 67 IN | SYSTOLIC BLOOD PRESSURE: 114 MMHG

## 2023-11-09 DIAGNOSIS — I10 ESSENTIAL HYPERTENSION: Chronic | ICD-10-CM

## 2023-11-09 DIAGNOSIS — I10 PRIMARY HYPERTENSION: Chronic | ICD-10-CM

## 2023-11-09 DIAGNOSIS — E66.01 SEVERE OBESITY (BMI 35.0-39.9) WITH COMORBIDITY: ICD-10-CM

## 2023-11-09 DIAGNOSIS — G47.33 OSA (OBSTRUCTIVE SLEEP APNEA): ICD-10-CM

## 2023-11-09 DIAGNOSIS — I70.0 ATHEROSCLEROSIS OF AORTA: ICD-10-CM

## 2023-11-09 DIAGNOSIS — Z86.73 HISTORY OF CVA (CEREBROVASCULAR ACCIDENT): Primary | ICD-10-CM

## 2023-11-09 DIAGNOSIS — R09.89 BRUIT: ICD-10-CM

## 2023-11-09 DIAGNOSIS — E78.2 MIXED HYPERLIPIDEMIA: Chronic | ICD-10-CM

## 2023-11-09 DIAGNOSIS — R42 DIZZINESS: ICD-10-CM

## 2023-11-09 DIAGNOSIS — I51.7 LEFT ATRIAL ENLARGEMENT: ICD-10-CM

## 2023-11-09 PROCEDURE — 3044F PR MOST RECENT HEMOGLOBIN A1C LEVEL <7.0%: ICD-10-PCS | Mod: CPTII,S$GLB,, | Performed by: INTERNAL MEDICINE

## 2023-11-09 PROCEDURE — 1100F PR PT FALLS ASSESS DOC 2+ FALLS/FALL W/INJURY/YR: ICD-10-PCS | Mod: CPTII,S$GLB,, | Performed by: INTERNAL MEDICINE

## 2023-11-09 PROCEDURE — 3066F PR DOCUMENTATION OF TREATMENT FOR NEPHROPATHY: ICD-10-PCS | Mod: CPTII,S$GLB,, | Performed by: INTERNAL MEDICINE

## 2023-11-09 PROCEDURE — 3074F PR MOST RECENT SYSTOLIC BLOOD PRESSURE < 130 MM HG: ICD-10-PCS | Mod: CPTII,S$GLB,, | Performed by: INTERNAL MEDICINE

## 2023-11-09 PROCEDURE — 99999 PR PBB SHADOW E&M-EST. PATIENT-LVL IV: CPT | Mod: PBBFAC,,, | Performed by: INTERNAL MEDICINE

## 2023-11-09 PROCEDURE — 1159F MED LIST DOCD IN RCRD: CPT | Mod: CPTII,S$GLB,, | Performed by: INTERNAL MEDICINE

## 2023-11-09 PROCEDURE — 3060F PR POS MICROALBUMINURIA RESULT DOCUMENTED/REVIEW: ICD-10-PCS | Mod: CPTII,S$GLB,, | Performed by: INTERNAL MEDICINE

## 2023-11-09 PROCEDURE — 1125F AMNT PAIN NOTED PAIN PRSNT: CPT | Mod: CPTII,S$GLB,, | Performed by: INTERNAL MEDICINE

## 2023-11-09 PROCEDURE — 3074F SYST BP LT 130 MM HG: CPT | Mod: CPTII,S$GLB,, | Performed by: INTERNAL MEDICINE

## 2023-11-09 PROCEDURE — 3079F DIAST BP 80-89 MM HG: CPT | Mod: CPTII,S$GLB,, | Performed by: INTERNAL MEDICINE

## 2023-11-09 PROCEDURE — 99214 PR OFFICE/OUTPT VISIT, EST, LEVL IV, 30-39 MIN: ICD-10-PCS | Mod: S$GLB,,, | Performed by: INTERNAL MEDICINE

## 2023-11-09 PROCEDURE — 3008F BODY MASS INDEX DOCD: CPT | Mod: CPTII,S$GLB,, | Performed by: INTERNAL MEDICINE

## 2023-11-09 PROCEDURE — 3060F POS MICROALBUMINURIA REV: CPT | Mod: CPTII,S$GLB,, | Performed by: INTERNAL MEDICINE

## 2023-11-09 PROCEDURE — 3288F PR FALLS RISK ASSESSMENT DOCUMENTED: ICD-10-PCS | Mod: CPTII,S$GLB,, | Performed by: INTERNAL MEDICINE

## 2023-11-09 PROCEDURE — 1100F PTFALLS ASSESS-DOCD GE2>/YR: CPT | Mod: CPTII,S$GLB,, | Performed by: INTERNAL MEDICINE

## 2023-11-09 PROCEDURE — 4010F PR ACE/ARB THEARPY RXD/TAKEN: ICD-10-PCS | Mod: CPTII,S$GLB,, | Performed by: INTERNAL MEDICINE

## 2023-11-09 PROCEDURE — 1125F PR PAIN SEVERITY QUANTIFIED, PAIN PRESENT: ICD-10-PCS | Mod: CPTII,S$GLB,, | Performed by: INTERNAL MEDICINE

## 2023-11-09 PROCEDURE — 99999 PR PBB SHADOW E&M-EST. PATIENT-LVL IV: ICD-10-PCS | Mod: PBBFAC,,, | Performed by: INTERNAL MEDICINE

## 2023-11-09 PROCEDURE — 3079F PR MOST RECENT DIASTOLIC BLOOD PRESSURE 80-89 MM HG: ICD-10-PCS | Mod: CPTII,S$GLB,, | Performed by: INTERNAL MEDICINE

## 2023-11-09 PROCEDURE — 1159F PR MEDICATION LIST DOCUMENTED IN MEDICAL RECORD: ICD-10-PCS | Mod: CPTII,S$GLB,, | Performed by: INTERNAL MEDICINE

## 2023-11-09 PROCEDURE — 4010F ACE/ARB THERAPY RXD/TAKEN: CPT | Mod: CPTII,S$GLB,, | Performed by: INTERNAL MEDICINE

## 2023-11-09 PROCEDURE — 99214 OFFICE O/P EST MOD 30 MIN: CPT | Mod: S$GLB,,, | Performed by: INTERNAL MEDICINE

## 2023-11-09 PROCEDURE — 3066F NEPHROPATHY DOC TX: CPT | Mod: CPTII,S$GLB,, | Performed by: INTERNAL MEDICINE

## 2023-11-09 PROCEDURE — 3008F PR BODY MASS INDEX (BMI) DOCUMENTED: ICD-10-PCS | Mod: CPTII,S$GLB,, | Performed by: INTERNAL MEDICINE

## 2023-11-09 PROCEDURE — 3288F FALL RISK ASSESSMENT DOCD: CPT | Mod: CPTII,S$GLB,, | Performed by: INTERNAL MEDICINE

## 2023-11-09 PROCEDURE — 3044F HG A1C LEVEL LT 7.0%: CPT | Mod: CPTII,S$GLB,, | Performed by: INTERNAL MEDICINE

## 2023-11-09 NOTE — PROGRESS NOTES
Subjective:   Patient ID:  Tracy Armendariz is a 72 y.o. female who presents for  of No chief complaint on file.    HPI:   November 2023:  Initial visit with me.  Larry 72 years old female.  Accompanied by her .  She has been having significant orthostatic dizziness for long term that seems to be getting worse.  She was seen by ENT in the past.  Recently losartan was split to b.i.d. due to the orthostatic hypotension however she did not notice any significant change.  She does have significant neuropathy.  No syncope but reports falls    October 2022:  Former patient of . She has a past medical history of MARTIN, obesity, vertigo, HTN, HLD and CVA in 2013.  She denies any cardiovascular symptoms.  She currently takes aspirin, losartan, and Lipitor.    ECG 10/2/2022: NSR           The ASCVD Risk score (Serenity PANDA, et al., 2019) failed to calculate for the following reasons:    The valid HDL cholesterol range is 20 to 100 mg/dL       Patient Active Problem List    Diagnosis Date Noted    Prediabetes 08/19/2023    Infection due to ESBL-producing Escherichia coli 12/14/2022    Left ureteral stone 12/12/2022    Peripheral vertigo involving right ear 07/26/2022    Conductive hearing loss of right ear with restricted hearing of left ear 07/26/2022    At moderate risk for fall 07/26/2022    Diplopia 07/26/2022    Eye fatigue 07/12/2022    Dizziness 07/12/2022    Impairment of balance 07/12/2022    History of CVA (cerebrovascular accident) 03/24/2022    Nasal septal deviation 03/24/2022    Tinnitus of both ears 03/24/2022    Ataxia 03/24/2022    Vertigo 03/24/2022    Closed fracture of right wrist 11/15/2021    Impaired functional mobility and activity tolerance 11/08/2020    Atherosclerosis of aorta 08/03/2020    Kidney stone 12/10/2018    Arthritis of right knee 02/06/2017    Osteoarthritis of right knee 02/02/2017    Osteopenia     Symptomatic posterior vitreous detachment of both eyes 03/30/2016    Visual  floaters     Essential hypertension     Benign lesion of lacrimal duct 11/19/2015    Parinaud's syndrome affecting both eyes 01/12/2015    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 Midbrain and thalamic infarct      Hypertension      -TTE (12/2013): EF 55%, E/e' 12, severe LAE      Depression     Extrinsic asthma     PUD (peptic ulcer disease)                     LABS      LAST HbA1c  Lab Results   Component Value Date    HGBA1C 5.8 (H) 10/16/2023       Lipid panel  Lab Results   Component Value Date    CHOL 206 (H) 06/22/2023    CHOL 227 (H) 03/29/2022    CHOL 188 08/04/2020     Lab Results   Component Value Date     (H) 06/22/2023    HDL 90 (H) 03/29/2022    HDL 88 (H) 08/04/2020     Lab Results   Component Value Date    LDLCALC 82.6 06/22/2023    LDLCALC 112.6 03/29/2022    LDLCALC 74.2 08/04/2020     Lab Results   Component Value Date    TRIG 107 06/22/2023    TRIG 122 03/29/2022    TRIG 129 08/04/2020     Lab Results   Component Value Date    CHOLHDL 49.5 06/22/2023    CHOLHDL 39.6 03/29/2022    CHOLHDL 46.8 08/04/2020            Review of Systems   Constitutional: Negative for chills and fever.   HENT:  Negative for hearing loss and nosebleeds.    Eyes:  Negative for blurred vision.   Cardiovascular:         As in HPI   Respiratory:  Negative for hemoptysis and shortness of breath.    Hematologic/Lymphatic: Negative for bleeding problem.   Skin:  Negative for itching.   Musculoskeletal:  Negative for falls.   Gastrointestinal:  Negative for abdominal pain and hematochezia.   Genitourinary:  Negative for hematuria.    Neurological:         As in HPI   Psychiatric/Behavioral:  Negative for altered mental status and depression.        Objective:   Physical Exam  Constitutional:       Appearance: She is well-developed. She is obese.   HENT:      Head: Normocephalic and atraumatic.   Eyes:      Conjunctiva/sclera: Conjunctivae normal.   Neck:      Vascular: No carotid bruit or JVD.   Cardiovascular:      Rate and Rhythm: Normal rate and regular rhythm.      Pulses:           Carotid pulses are 2+ on the right side and 2+ on the left side.       Radial pulses are 2+ on the right side and 2+ on the left side.      Heart sounds: Normal heart sounds. No murmur heard.     No friction rub. No gallop.   Pulmonary:      Effort: Pulmonary effort is normal. No respiratory distress.      Breath sounds: Normal breath sounds. No stridor. No wheezing.   Musculoskeletal:      Cervical back: Neck supple.   Skin:     General: Skin is warm and dry.   Neurological:      Mental Status: She is alert and oriented to person, place, and time.   Psychiatric:         Behavior: Behavior normal.         Assessment:     1. History of CVA (cerebrovascular accident)    2. Essential hypertension    3. Mixed hyperlipidemia    4. Primary hypertension    5. Left atrial enlargement    6. Atherosclerosis of aorta    7. Severe obesity (BMI 35.0-39.9) with comorbidity    8. MARTIN (obstructive sleep apnea)        Plan:   - significant orthostatic dizziness  - we will check tilt table test  - check carotid ultrasound  - compression stockings thigh-high  - decrease losartan dose to 50 mg at nighttime.  Monitor blood pressure and keep log.  We will be lenient with a goal to lower than 140/90  - take time when standing up.  Practice leg exercise before standing up.  Avoid sudden change in position.       Aspirin 81 mg daily  Statin therapy  LDL goal < 70    Weight loss  Exercise    Six-month follow-up or sooner p.r.n.    Continue with current medical plan and lifestyle  changes.  Return sooner for concerns or questions. If symptoms persist go to the ED  I have reviewed all pertinent data on this patient       I have reviewed the patient's medical history in detail and updated the computerized patient record.    No orders of the defined types were placed in this encounter.        Follow up as scheduled. Return sooner for concerns or questions            She expressed verbal understanding and agreed with the plan        Patient's Medications   New Prescriptions    No medications on file   Previous Medications    ALBUTEROL (PROVENTIL/VENTOLIN HFA) 90 MCG/ACTUATION INHALER    INHALE 2 PUFFS EVERY 6 HOURS AS NEEDED FOR WHEEZING    AMOXICILLIN (AMOXIL) 500 MG CAPSULE    Take 4 capsules by mouth 1 hour prior to appointment    ASPIRIN (ECOTRIN) 81 MG EC TABLET    Take 81 mg by mouth once daily.    ATORVASTATIN (LIPITOR) 40 MG TABLET    Take 1 tablet (40 mg total) by mouth every evening.    BUPROPION (WELLBUTRIN XL) 300 MG 24 HR TABLET    Take 1 tablet (300 mg total) by mouth once daily.    CLOTRIMAZOLE (LOTRIMIN) 1 % CREAM    Apply topically 2 (two) times daily. For Ringworm. Apply up to 4 weeks    COVID [id number],12UP,,ANDU,,PF, (SPIKEVAX [id number],12Y UP,,PF,) 50 MCG/0.5 ML INJECTION    Inject into the muscle.    DIAZEPAM (VALIUM) 2 MG TABLET    Take 1/2 to 1 tablet 3 times daily as needed to control dizziness    DICLOFENAC SODIUM (VOLTAREN) 1 % GEL    APPLY 2-4 GRAMS TO EACH PAINFUL AREA FOUR TIMES DAILY - MAX 32 GRAMS/DAY    ESCITALOPRAM OXALATE (LEXAPRO) 20 MG TABLET    Take 1 tablet (20 mg total) by mouth once daily.    ESOMEPRAZOLE (NEXIUM) 40 MG CAPSULE    Take 1 capsule (40 mg total) by mouth daily as needed (heartburn).    LOSARTAN (COZAAR) 50 MG TABLET    Take 1 tablet (50 mg total) by mouth 2 (two) times a day.    MAGNESIUM OXIDE (MAG-OX) 400 MG (241.3 MG MAGNESIUM) TABLET    Take 1 tablet (400 mg total) by mouth 2 (two) times daily.    MULTIVIT WITH CALCIUM,IRON,MIN (WOMEN'S  DAILY MULTIVITAMIN ORAL)    Take 1 tablet by mouth once daily.    POTASSIUM CITRATE (UROCIT-K 10) 10 MEQ (1,080 MG) TBSR    Take 2 tablets (20 mEq total) by mouth 3 (three) times daily with meals.    SEMAGLUTIDE, WEIGHT LOSS, (WEGOVY) 0.25 MG/0.5 ML PNIJ    Inject 0.25 mg into the skin once a week.    VITAMIN D (VITAMIN D3) 1000 UNITS TAB    Take 1,000 Units by mouth 3 (three) times a week.   Modified Medications    No medications on file   Discontinued Medications    No medications on file

## 2023-11-30 ENCOUNTER — CLINICAL SUPPORT (OUTPATIENT)
Dept: REHABILITATION | Facility: HOSPITAL | Age: 73
End: 2023-11-30
Payer: MEDICARE

## 2023-11-30 DIAGNOSIS — R29.898 WEAKNESS OF BOTH LOWER EXTREMITIES: Primary | ICD-10-CM

## 2023-11-30 DIAGNOSIS — Z91.81 AT MODERATE RISK FOR FALL: ICD-10-CM

## 2023-11-30 PROCEDURE — 97162 PT EVAL MOD COMPLEX 30 MIN: CPT | Mod: PN | Performed by: PHYSICAL THERAPIST

## 2023-11-30 PROCEDURE — 97110 THERAPEUTIC EXERCISES: CPT | Mod: PN | Performed by: PHYSICAL THERAPIST

## 2023-11-30 NOTE — PLAN OF CARE
"[facility]  Physical Therapy Initial Evaluation    Date: 11/30/2023   Name: Tracy Armendariz  Clinic Number: [id number]    Therapy Diagnosis:   Encounter Diagnoses   Name Primary?    Weakness of both lower extremities Yes    At moderate risk for fall      Physician: Pedro Luis Alston MD    Physician Orders: PT Eval and Treat   Medical Diagnosis from Referral:   M54.59 (ICD-10-CM) - Other low back pain   M47.896 (ICD-10-CM) - Other spondylosis, lumbar region     Evaluation Date: 11/30/2023  Authorization Period Expiration: 1/25/24  Plan of Care Expiration: 1/19/24  Progress Note Due: 1/16/24  Visit # / Visits authorized: 1/ 1   FOTO: 1/ 5   PTA: 0/5    Precautions: Standard, hx of CVA    Time In: 10:05 am  Time Out: 11:00 am  Total Appointment Time (timed & untimed codes): 50 minutes (MCE)    SUBJECTIVE   Date of onset: mid October    History of current condition - Tracy reports: "I'm falling a lot." 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 Her ortho doctor sent the images to her general practitioner, but she hasn't heard yet. She fell in the kitchen due to losing her balance. She had her hand on a chair and the chair fell over, causing her to fall on her back. Her  helped her up and she went to bed. She put her cream on it and noticed 2 big knots. She reports also having neuropathy bilaterally. She has a history of orthostatic hypotension. She reports most falls are when she turns her head standing or her body. She continues to feel dizzy as well. She's been referred to [facility].     Imaging: She reports MRI and CAT scan without issue. Did a tumor of the bladder    Prior Therapy: yes at [facility] this yr for decreased mobility & vestibular PT in the past  Social History:  lives with their spouse  Falls: three, twice when vacationing in June,  once tripping over rug in home; more often (8x in 6 months)  DME: Straight cane  " "  Home Environment: one step to enter, single story home   Exercise Routine / History: none; "I sleep a lot"  Family Present at time of Eval: no   Occupation: retired  Prior Level of Function: independent without assistive device  Current Level of Function: more sedentary, limits all ADLs standing (sweep, dishes, cooking)  Worse Pain with flexion? none  Bowel or bladder change? none      Pain:  Current 5/10, worst 8/10, best 0/10   Location: bilateral back (Bilateral radicular symptoms down post thigh near knee)  Constant or intermittent: intermittent  Description: tingling/numb  Aggravating Factors: standing still (5 minutes), sit for about 1 hr  Easing Factors: walking, cream, ibuprofen, lay on side, supine    Patients goals:  one place longer     Medical History:   Past Medical History:   Diagnosis Date    Allergy     Anemia, unspecified     Anticoagulant long-term use     Arthritis     CVA (cerebral infarction)     Depression     Disorder of kidney and ureter     renal stones    Diverticulosis of colon     Extrinsic asthma, unspecified     Hematuria, unspecified     Hyperlipidemia     Hypertension     Kidney stone     Left atrial enlargement 2014    Low back pain     Nephrolithiasis     MARTIN (obstructive sleep apnea)     Osteopenia     PUD (peptic ulcer disease)     Stroke 2013    Urinary tract infection        Surgical History:   Tracy Armendariz  has a past surgical history that includes Inner ear surgery; Cholecystectomy ();  section (); Dilation and curettage of uterus (); Appendectomy (); Hysterectomy (); Tympanoplasty; Lumbar discectomy (); Knee arthroscopy w/ debridement (); Tonsillectomy; Back surgery; Cataract extraction; Colonoscopy (N/A, 2018); Joint replacement (Right); Ureteroscopic removal of ureteric calculus (Left, 2018); Oophorectomy; Cystoscopy w/ ureteral stent placement (Left, 2022); Cystoscopy w/ retrogrades " (12/11/2022); cystoureteroscopy, with holmium laser lithotripsy of ureteral calculus and stent insertion (Left, 12/21/2022); Retrograde pyelography (12/21/2022); and extraction - stone (Left, 12/21/2022).    Medications:   Tracy has a current medication list which includes the following prescription(s): albuterol, amoxicillin, aspirin, atorvastatin, bupropion, clotrimazole, spikevax [id number](12y up)(pf), diazepam, diclofenac sodium, escitalopram oxalate, esomeprazole, losartan, magnesium oxide, multivit with calcium,iron,min, potassium citrate, wegovy, and vitamin d.    Allergies:   Review of patient's allergies indicates:   Allergen Reactions    Iodinated contrast media Hives and Rash    Gabapentin Hallucinations    Iodine Hives    Isothiazolinones Rash    Penicillins Rash          OBJECTIVE     Functional Movements:  Gait: with cane, slowed ginger, mild unsteadiness, dec'd stance on left    Uses rollator for distances  Sit <> stand: LOB into chair on 5th. 1 hand on cane on right and 1 on left thigh      AROM:   Degrees Pain/Dysfunction   Flexion WNL NP   Extension WNL Left/central LBP   Right Rotation WNL NP   Left Rotation WNL NP   Right Side Bending WNL NP   Left Side Bending WNL Left LBP      Hip external rotation PROM: right: 27/32 d    left: 40 d  Hip internal rotation PROM:  right: 32/40 d   left: 35 d    Strength:  RLE  LLE    Hip flexion: 5/5 Hip flexion: 5/5   Hip Abduction: 4-/5 Hip abduction: 4-/5   Hip extension 4+/5 Hip extension 4+/5   Hip ER 4-/5 Hip ER 3+/5   Hip IR 4-/5 Hip IR 4-/5   Knee flexion: 5/5 Knee flexion: 5/5   Knee extension: 13.5 kg Knee extension: 5.4 kg   Ankle Dorsiflexion: 5/5 Ankle Dorsiflexion: 5/5   Ankle Plantarflexion: 5/5 Ankle Plantarflexion: 5/5     Special Tests:   Degrees Pain/Dysfunction   Hamstring 90/90 NT NP   Ely Test NT NP   Obers Test NT NP   SLR Test  Sciatic n  Post tib (dorsiflexion + eversion)  Sural (dorsiflexion + inversion)  Common peroneal (hip internal  "rotation, plantarflexion & inversion)   - NP   Slump Test - NP   Flexion Preference NT NP   Extension Preference NT NP   Piriformis test NT NP   FABERs NT NP   FADIR NT NP   Sciatic nerve tension test - NP   Femoral nerve tension test NT NP          30 second sit-to-stand test (U/E support): 5x (LOB into chair on 5th. 1 hand on cane on right and 1 on left thigh)  30" sit to stand Cutoff Scores:15  MDC = 3.5           CMS Impairment/Limitation/Restriction for FOTO Back Survey    Therapist reviewed FOTO scores for Tracy Armendariz on 11/30/2023.   FOTO documents entered into [employer] - see Media section.    Current Limitation Score: 20%  Predicted Limitation Score: 42%       TREATMENT     Total Treatment time (time-based codes) separate from Evaluation: 8 minutes     Tracy received therapeutic exercises to develop strength, endurance, ROM, flexibility, posture, and core stabilization for 8 minutes including:    Provided with medium left heel lift due to leg length discrepency  MET for right anterior innominate followed by alternating isometric hip abd/add    Next:  Left quad set  Left short arc quad  Straight leg raise  Transverse abdominis bracing   Transverse abdominis bracing with ball squeeze  Transverse abdominis bracing with BKFO  Isometric hip external rotation  Isometric hip abduction    Tracy received the following manual therapy techniques: Joint mobilizations, Manual traction, Myofacial release, and Soft tissue Mobilization were applied to the: back for 0 minutes:    Tracy participated in neuromuscular re-education activities to improve: Balance, Coordination, Proprioception, and Posture for 0 minutes. The following activities were included:  Not today    therapeutic activities to improve functional performance for 0  minutes, including:  Not today    PATIENT EDUCATION AND HOME EXERCISES     Education provided:   - anatomy  - importance of home exercise program compliance  - attendance policy    Written " Home Exercises Provided: no  ASSESSMENT   Tracy is a 73 y.o. female referred to outpatient Physical Therapy with a medical diagnosis of lumbar spondylosis and LBP. Pt presents with reports of about 8 falls in the past 6 months with onset of LBP after her most recent a little over 1 month ago. She does have hx of CVA, vestibular issues and orthostatic hypotension that will affect her rehab. She presents with LBP with Bilateral radicular symptoms down Bilateral posterior thighs and secondary reports of Bilateral peripheral neuropathy in her feet. No direction preference. Significant left quad weakness, affecting stability of gait. Bilateral gluteal weakness as well. Poor functional endurance seen with sit <> stand test with LOB into chair on 5th rep with upper extremity use. Leg length discrepency along with possible right anterior innominate present. Provided with heel lift today for left shoe. Functional deficits including standing in one spot. She reports feeling better walking, but fatigues quickly. She is appropriate for skilled PT to help her reach her goals.    Pt prognosis is Fair.   Pt will benefit from skilled outpatient Physical Therapy to address the deficits stated above and in the chart below, provide pt/family education, and to maximize pt's level of independence.     Plan of care discussed with patient: Yes  Pt's spiritual, cultural and educational needs considered and pt agreeable to plan of care and goals as stated below:     Anticipated Barriers for therapy: chronic nature of symptoms, co-morbidities    Medical Necessity is demonstrated by the following  History  Co-morbidities and personal factors that may impact the plan of care Co-morbidities:   Arthritis, Asthma, Back pain, BMI over 30, Depression, Headaches, Kidney, Bladder, Prostate or Urination Problems, Previous accidents, Prosthesis / Implants, Sleep dysfunction, Stroke or TIA      Personal Factors:   age  lifestyle     high  "  Examination  Body Structures and Functions, activity limitations and participation restrictions that may impact the plan of care Body Regions:   back  lower extremities    Body Systems:    gross symmetry  ROM  strength  balance  gait  transfers  transitions  motor control  motor learning    Participation Restrictions:   See above    Activity limitations:   Learning and applying knowledge  no deficits    General Tasks and Commands  no deficits    Communication  no deficits    Mobility  lifting and carrying objects  walking    Self care  washing oneself (bathing, drying, washing hands)  caring for body parts (brushing teeth, shaving, grooming)  toileting  dressing  looking after one's health    Domestic Life  shopping  cooking  doing house work (cleaning house, washing dishes, laundry)  assisting others    Interactions/Relationships  family relationships    Life Areas  no deficits    Community and Social Life  community life  recreation and leisure         high     Clinical Presentation evolving clinical presentation with changing clinical characteristics moderate   Decision Making/ Complexity Score: moderate     Goals:  Short Term Goals: 3-4 weeks:  1. Patient will demonstrate lateral hip MMTs > 4/5 for improved functional mobiltiy.  2. Patient will demonstrate left quad strength via MicroFET > 11 kg for improved transfers, standing and gait.  3. Patient will report worst pain less than 6/10 in back for improved tolerance to ADL performance.    Long Term Goals: 8 weeks:  1. Patient will report resuming cooking without increased symptoms.  2. Patient will demonstrate improved sit <> stand by performing > 7x in 30" without LOB or increase in pain.  3. Patient will improve FOTO to > 41% to improve ADL performance.  4. Patient will demonstrate improved endurance by completing 6 minute walk test with ww or rollator without resting or increased symptoms.      PLAN   Plan of care Certification: 11/30/2023 to " 1/16/24.    Outpatient Physical Therapy 2 times weekly for 8 weeks to include the following interventions: Cervical/Lumbar Traction, Electrical Stimulation TENS, IFC NMES, Manual Therapy, Moist Heat/ Ice, Neuromuscular Re-ed, Patient Education, Self Care, Therapeutic Activities, Therapeutic Exercise, and dry needling.     Pedro Luis Padilla, PT      I CERTIFY THE NEED FOR THESE SERVICES FURNISHED UNDER THIS PLAN OF TREATMENT AND WHILE UNDER MY CARE   Physician's comments:     Physician's Signature: ___________________________________________________

## 2023-11-30 NOTE — PROGRESS NOTES
"[facility]  Physical Therapy Initial Evaluation    Date: 11/30/2023   Name: Tracy Armendariz  Clinic Number: [id number]    Therapy Diagnosis:   Encounter Diagnoses   Name Primary?    Weakness of both lower extremities Yes    At moderate risk for fall      Physician: Pedro Luis Alston MD    Physician Orders: PT Eval and Treat   Medical Diagnosis from Referral:   M54.59 (ICD-10-CM) - Other low back pain   M47.896 (ICD-10-CM) - Other spondylosis, lumbar region     Evaluation Date: 11/30/2023  Authorization Period Expiration: 1/25/24  Plan of Care Expiration: 1/19/24  Progress Note Due: 1/16/24  Visit # / Visits authorized: 1/ 1   FOTO: 1/ 5   PTA: 0/5    Precautions: Standard, hx of CVA    Time In: 10:05 am  Time Out: 11:00 am  Total Appointment Time (timed & untimed codes): 50 minutes (MCE)    SUBJECTIVE   Date of onset: mid October    History of current condition - Tracy reports: "I'm falling a lot." 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 Her ortho doctor sent the images to her general practitioner, but she hasn't heard yet. She fell in the kitchen due to losing her balance. She had her hand on a chair and the chair fell over, causing her to fall on her back. Her  helped her up and she went to bed. She put her cream on it and noticed 2 big knots. She reports also having neuropathy bilaterally. She has a history of orthostatic hypotension. She reports most falls are when she turns her head standing or her body. She continues to feel dizzy as well. She's been referred to [facility].     Imaging: She reports MRI and CAT scan without issue. Did a tumor of the bladder    Prior Therapy: yes at [facility] this yr for decreased mobility & vestibular PT in the past  Social History:  lives with their spouse  Falls: three, twice when vacationing in June,  once tripping over rug in home; more often (8x in 6 months)  DME: Straight cane  " "  Home Environment: one step to enter, single story home   Exercise Routine / History: none; "I sleep a lot"  Family Present at time of Eval: no   Occupation: retired  Prior Level of Function: independent without assistive device  Current Level of Function: more sedentary, limits all ADLs standing (sweep, dishes, cooking)  Worse Pain with flexion? none  Bowel or bladder change? none      Pain:  Current 5/10, worst 8/10, best 0/10   Location: bilateral back (Bilateral radicular symptoms down post thigh near knee)  Constant or intermittent: intermittent  Description: tingling/numb  Aggravating Factors: standing still (5 minutes), sit for about 1 hr  Easing Factors: walking, cream, ibuprofen, lay on side, supine    Patients goals:  one place longer     Medical History:   Past Medical History:   Diagnosis Date    Allergy     Anemia, unspecified     Anticoagulant long-term use     Arthritis     CVA (cerebral infarction)     Depression     Disorder of kidney and ureter     renal stones    Diverticulosis of colon     Extrinsic asthma, unspecified     Hematuria, unspecified     Hyperlipidemia     Hypertension     Kidney stone     Left atrial enlargement 2014    Low back pain     Nephrolithiasis     MARTIN (obstructive sleep apnea)     Osteopenia     PUD (peptic ulcer disease)     Stroke 2013    Urinary tract infection        Surgical History:   Tracy Armendariz  has a past surgical history that includes Inner ear surgery; Cholecystectomy ();  section (); Dilation and curettage of uterus (); Appendectomy (); Hysterectomy (); Tympanoplasty; Lumbar discectomy (); Knee arthroscopy w/ debridement (); Tonsillectomy; Back surgery; Cataract extraction; Colonoscopy (N/A, 2018); Joint replacement (Right); Ureteroscopic removal of ureteric calculus (Left, 2018); Oophorectomy; Cystoscopy w/ ureteral stent placement (Left, 2022); Cystoscopy w/ retrogrades " (12/11/2022); cystoureteroscopy, with holmium laser lithotripsy of ureteral calculus and stent insertion (Left, 12/21/2022); Retrograde pyelography (12/21/2022); and extraction - stone (Left, 12/21/2022).    Medications:   Tracy has a current medication list which includes the following prescription(s): albuterol, amoxicillin, aspirin, atorvastatin, bupropion, clotrimazole, spikevax [id number](12y up)(pf), diazepam, diclofenac sodium, escitalopram oxalate, esomeprazole, losartan, magnesium oxide, multivit with calcium,iron,min, potassium citrate, wegovy, and vitamin d.    Allergies:   Review of patient's allergies indicates:   Allergen Reactions    Iodinated contrast media Hives and Rash    Gabapentin Hallucinations    Iodine Hives    Isothiazolinones Rash    Penicillins Rash          OBJECTIVE     Functional Movements:  Gait: with cane, slowed ginger, mild unsteadiness, dec'd stance on left    Uses rollator for distances  Sit <> stand: LOB into chair on 5th. 1 hand on cane on right and 1 on left thigh      AROM:   Degrees Pain/Dysfunction   Flexion WNL NP   Extension WNL Left/central LBP   Right Rotation WNL NP   Left Rotation WNL NP   Right Side Bending WNL NP   Left Side Bending WNL Left LBP      Hip external rotation PROM: right: 27/32 d    left: 40 d  Hip internal rotation PROM:  right: 32/40 d   left: 35 d    Strength:  RLE  LLE    Hip flexion: 5/5 Hip flexion: 5/5   Hip Abduction: 4-/5 Hip abduction: 4-/5   Hip extension 4+/5 Hip extension 4+/5   Hip ER 4-/5 Hip ER 3+/5   Hip IR 4-/5 Hip IR 4-/5   Knee flexion: 5/5 Knee flexion: 5/5   Knee extension: 13.5 kg Knee extension: 5.4 kg   Ankle Dorsiflexion: 5/5 Ankle Dorsiflexion: 5/5   Ankle Plantarflexion: 5/5 Ankle Plantarflexion: 5/5     Special Tests:   Degrees Pain/Dysfunction   Hamstring 90/90 NT NP   Ely Test NT NP   Obers Test NT NP   SLR Test  Sciatic n  Post tib (dorsiflexion + eversion)  Sural (dorsiflexion + inversion)  Common peroneal (hip internal  "rotation, plantarflexion & inversion)   - NP   Slump Test - NP   Flexion Preference NT NP   Extension Preference NT NP   Piriformis test NT NP   FABERs NT NP   FADIR NT NP   Sciatic nerve tension test - NP   Femoral nerve tension test NT NP          30 second sit-to-stand test (U/E support): 5x (LOB into chair on 5th. 1 hand on cane on right and 1 on left thigh)  30" sit to stand Cutoff Scores:15  MDC = 3.5           CMS Impairment/Limitation/Restriction for FOTO Back Survey    Therapist reviewed FOTO scores for Tracy Armendariz on 11/30/2023.   FOTO documents entered into [employer] - see Media section.    Current Limitation Score: 20%  Predicted Limitation Score: 42%       TREATMENT     Total Treatment time (time-based codes) separate from Evaluation: 8 minutes     Tracy received therapeutic exercises to develop strength, endurance, ROM, flexibility, posture, and core stabilization for 8 minutes including:    Provided with medium left heel lift due to leg length discrepency  MET for right anterior innominate followed by alternating isometric hip abd/add    Next:  Left quad set  Left short arc quad  Straight leg raise  Transverse abdominis bracing   Transverse abdominis bracing with ball squeeze  Transverse abdominis bracing with BKFO  Isometric hip external rotation  Isometric hip abduction    Tracy received the following manual therapy techniques: Joint mobilizations, Manual traction, Myofacial release, and Soft tissue Mobilization were applied to the: back for 0 minutes:    Tracy participated in neuromuscular re-education activities to improve: Balance, Coordination, Proprioception, and Posture for 0 minutes. The following activities were included:  Not today    therapeutic activities to improve functional performance for 0  minutes, including:  Not today    PATIENT EDUCATION AND HOME EXERCISES     Education provided:   - anatomy  - importance of home exercise program compliance  - attendance policy    Written " Home Exercises Provided: no  ASSESSMENT   Tracy is a 73 y.o. female referred to outpatient Physical Therapy with a medical diagnosis of lumbar spondylosis and LBP. Pt presents with reports of about 8 falls in the past 6 months with onset of LBP after her most recent a little over 1 month ago. She does have hx of CVA, vestibular issues and orthostatic hypotension that will affect her rehab. She presents with LBP with Bilateral radicular symptoms down Bilateral posterior thighs and secondary reports of Bilateral peripheral neuropathy in her feet. No direction preference. Significant left quad weakness, affecting stability of gait. Bilateral gluteal weakness as well. Poor functional endurance seen with sit <> stand test with LOB into chair on 5th rep with upper extremity use. Leg length discrepency along with possible right anterior innominate present. Provided with heel lift today for left shoe. Functional deficits including standing in one spot. She reports feeling better walking, but fatigues quickly. She is appropriate for skilled PT to help her reach her goals.    Pt prognosis is Fair.   Pt will benefit from skilled outpatient Physical Therapy to address the deficits stated above and in the chart below, provide pt/family education, and to maximize pt's level of independence.     Plan of care discussed with patient: Yes  Pt's spiritual, cultural and educational needs considered and pt agreeable to plan of care and goals as stated below:     Anticipated Barriers for therapy: chronic nature of symptoms, co-morbidities    Medical Necessity is demonstrated by the following  History  Co-morbidities and personal factors that may impact the plan of care Co-morbidities:   Arthritis, Asthma, Back pain, BMI over 30, Depression, Headaches, Kidney, Bladder, Prostate or Urination Problems, Previous accidents, Prosthesis / Implants, Sleep dysfunction, Stroke or TIA      Personal Factors:   age  lifestyle     high  "  Examination  Body Structures and Functions, activity limitations and participation restrictions that may impact the plan of care Body Regions:   back  lower extremities    Body Systems:    gross symmetry  ROM  strength  balance  gait  transfers  transitions  motor control  motor learning    Participation Restrictions:   See above    Activity limitations:   Learning and applying knowledge  no deficits    General Tasks and Commands  no deficits    Communication  no deficits    Mobility  lifting and carrying objects  walking    Self care  washing oneself (bathing, drying, washing hands)  caring for body parts (brushing teeth, shaving, grooming)  toileting  dressing  looking after one's health    Domestic Life  shopping  cooking  doing house work (cleaning house, washing dishes, laundry)  assisting others    Interactions/Relationships  family relationships    Life Areas  no deficits    Community and Social Life  community life  recreation and leisure         high     Clinical Presentation evolving clinical presentation with changing clinical characteristics moderate   Decision Making/ Complexity Score: moderate     Goals:  Short Term Goals: 3-4 weeks:  1. Patient will demonstrate lateral hip MMTs > 4/5 for improved functional mobiltiy.  2. Patient will demonstrate left quad strength via MicroFET > 11 kg for improved transfers, standing and gait.  3. Patient will report worst pain less than 6/10 in back for improved tolerance to ADL performance.    Long Term Goals: 8 weeks:  1. Patient will report resuming cooking without increased symptoms.  2. Patient will demonstrate improved sit <> stand by performing > 7x in 30" without LOB or increase in pain.  3. Patient will improve FOTO to > 41% to improve ADL performance.  4. Patient will demonstrate improved endurance by completing 6 minute walk test with ww or rollator without resting or increased symptoms.      PLAN   Plan of care Certification: 11/30/2023 to " 1/16/24.    Outpatient Physical Therapy 2 times weekly for 8 weeks to include the following interventions: Cervical/Lumbar Traction, Electrical Stimulation TENS, IFC NMES, Manual Therapy, Moist Heat/ Ice, Neuromuscular Re-ed, Patient Education, Self Care, Therapeutic Activities, Therapeutic Exercise, and dry needling.     Pedro Luis Padilla, PT      I CERTIFY THE NEED FOR THESE SERVICES FURNISHED UNDER THIS PLAN OF TREATMENT AND WHILE UNDER MY CARE   Physician's comments:     Physician's Signature: ___________________________________________________

## 2023-12-01 ENCOUNTER — HOSPITAL ENCOUNTER (OUTPATIENT)
Dept: CARDIOLOGY | Facility: HOSPITAL | Age: 73
Discharge: HOME | End: 2023-12-01
Attending: INTERNAL MEDICINE
Payer: MEDICARE

## 2023-12-01 DIAGNOSIS — R42 DIZZINESS: ICD-10-CM

## 2023-12-01 PROCEDURE — 93660 TILT TABLE EVALUATION: CPT | Mod: 26,,, | Performed by: INTERNAL MEDICINE

## 2023-12-01 PROCEDURE — 93660 TILT TABLE EVALUATION: CPT

## 2023-12-01 PROCEDURE — 93660 TILT TABLE (CUPID ONLY): ICD-10-PCS | Mod: 26,,, | Performed by: INTERNAL MEDICINE

## 2023-12-05 ENCOUNTER — CLINICAL SUPPORT (OUTPATIENT)
Dept: REHABILITATION | Facility: HOSPITAL | Age: 73
End: 2023-12-05
Payer: MEDICARE

## 2023-12-05 ENCOUNTER — HOSPITAL ENCOUNTER (OUTPATIENT)
Dept: CARDIOLOGY | Facility: HOSPITAL | Age: 73
Discharge: HOME | End: 2023-12-05
Attending: INTERNAL MEDICINE
Payer: MEDICARE

## 2023-12-05 DIAGNOSIS — Z91.81 AT MODERATE RISK FOR FALL: Primary | ICD-10-CM

## 2023-12-05 DIAGNOSIS — R09.89 BRUIT: ICD-10-CM

## 2023-12-05 LAB
LEFT CBA DIAS: 13 CM/S
LEFT CBA SYS: 70 CM/S
LEFT CCA DIST DIAS: 18 CM/S
LEFT CCA DIST SYS: 80 CM/S
LEFT CCA PROX DIAS: 18 CM/S
LEFT CCA PROX SYS: 104 CM/S
LEFT ECA DIAS: 16 CM/S
LEFT ECA SYS: 138 CM/S
LEFT ICA DIST DIAS: 26 CM/S
LEFT ICA DIST SYS: 98 CM/S
LEFT ICA MID DIAS: 18 CM/S
LEFT ICA MID SYS: 72 CM/S
LEFT ICA PROX DIAS: 12 CM/S
LEFT ICA PROX SYS: 57 CM/S
LEFT VERTEBRAL DIAS: 12 CM/S
LEFT VERTEBRAL SYS: 59 CM/S
OHS CV CAROTID RIGHT ICA EDV HIGHEST: 24
OHS CV CAROTID ULTRASOUND LEFT ICA/CCA RATIO: 1.23
OHS CV CAROTID ULTRASOUND RIGHT ICA/CCA RATIO: 1.27
OHS CV PV CAROTID LEFT HIGHEST CCA: 104
OHS CV PV CAROTID LEFT HIGHEST ICA: 98
OHS CV PV CAROTID RIGHT HIGHEST CCA: 154
OHS CV PV CAROTID RIGHT HIGHEST ICA: 89
OHS CV US CAROTID LEFT HIGHEST EDV: 26
RIGHT CBA DIAS: 17.01 CM/S
RIGHT CBA SYS: 81.92 CM/S
RIGHT CCA DIST DIAS: 16 CM/S
RIGHT CCA DIST SYS: 70 CM/S
RIGHT CCA PROX DIAS: 16 CM/S
RIGHT CCA PROX SYS: 154 CM/S
RIGHT ECA DIAS: 14 CM/S
RIGHT ECA SYS: 109 CM/S
RIGHT ICA DIST DIAS: 24 CM/S
RIGHT ICA DIST SYS: 89 CM/S
RIGHT ICA MID DIAS: 17 CM/S
RIGHT ICA MID SYS: 67 CM/S
RIGHT ICA PROX DIAS: 13 CM/S
RIGHT ICA PROX SYS: 69 CM/S
RIGHT VERTEBRAL DIAS: 14.75 CM/S
RIGHT VERTEBRAL SYS: 77.44 CM/S

## 2023-12-05 PROCEDURE — 93880 EXTRACRANIAL BILAT STUDY: CPT | Mod: 26,,, | Performed by: INTERNAL MEDICINE

## 2023-12-05 PROCEDURE — 97110 THERAPEUTIC EXERCISES: CPT | Mod: PN,CQ

## 2023-12-05 PROCEDURE — 93880 CV US DOPPLER CAROTID (CUPID ONLY): ICD-10-PCS | Mod: 26,,, | Performed by: INTERNAL MEDICINE

## 2023-12-05 PROCEDURE — 93880 EXTRACRANIAL BILAT STUDY: CPT

## 2023-12-05 NOTE — PROGRESS NOTES
"[facility]   Physical Therapy Treatment Note      Name: Tracy Armendariz  Clinic Number: [id number]    Therapy Diagnosis:   Encounter Diagnosis   Name Primary?    At moderate risk for fall Yes     Physician: Daija Joy NP    Visit Date: 2023  hysician Orders: PT Eval and Treat   Medical Diagnosis from Referral:   M54.59 (ICD-10-CM) - Other low back pain   M47.896 (ICD-10-CM) - Other spondylosis, lumbar region      Evaluation Date: 2023  Authorization Period Expiration: 24  Plan of Care Expiration: 24  Progress Note Due: 24  Visit # / Visits authorized:  (+1)  FOTO: 3/ 5   PTA:      Precautions: Standard, hx of CVA     Time In: 3:00  pm  Time Out: 3:40 pm  Total Appointment Time (timed & untimed codes): 30 1:1 minutes (TEx2)      Subjective     Patient reports: "I'm not feeling all that great, I'm getting dizzy". Pt arrived to session requiring CGA to enter gym treatment area.  She  will be  compliant with home exercise program.  Response to previous treatment: evaluation previous session  Functional change: dizzy spells    Pain: N/A/10  Location:  N/a      Objective      Objective Measures updated at progress report unless specified.     Treatment     Tracy received the treatments listed below:      Vitals assessed prior to initiating treatment:   BP: 132/ 62 mm /Hg  HR: 82  O2: 96 %  Symptomatic upon arrival, will conduct session in supine today.    Tracy received therapeutic exercises to develop strength, endurance, ROM, flexibility, posture, and core stabilization for 30 1:1 and rest supervised minutes including:     Left quad set: 4 minutes with 10" holds  Left short arc quad: 30x5" holds  Straight leg raise: left 3x5, right 2x10  Transverse abdominis bracin minutes with 5" holds   Transverse abdominis bracing with ball squeeze: 2 minutes with 5" holds   Transverse abdominis bracing with BKFO: 2 minutes   Transverse abdominis bracing with " "alternating marches: 2 minutes      Isometric hip external rotation: 5x15" holds with 45" rest breaks  Isometric hip abduction: 5x15" holds with 45" rest breaks     Tracy received the following manual therapy techniques: Joint mobilizations, Manual traction, Myofacial release, and Soft tissue Mobilization were applied to the: back for 0 minutes:     Tracy participated in neuromuscular re-education activities to improve: Balance, Coordination, Proprioception, and Posture for 0 minutes. The following activities were included:  Not today     therapeutic activities to improve functional performance for 0  minutes, including:  Not today     Patient Education and Home Exercises       Education provided:   - importance of attendance  -Written BP parameters  -HEP compliance    Written Home Exercises Provided: Patient instructed to cont prior HEP. Exercises were reviewed and Tracy was able to demonstrate them prior to the end of the session.  Tracy demonstrated fair  understanding of the education provided. See Electronic Medical Record under Patient Instructions for exercises provided during therapy sessions    Assessment     Tracy is a 73 y.o. female referred to outpatient Physical Therapy with a medical diagnosis of lumbar spondylosis and LBP.   Limited session to supine activities due to pt reports of dizziness and unsteadiness in standing. She was able to perform core stability and LE therex as noted in objectives today. Pt reports she has means of measuring her Blood pressure at home, issued written education on Normal Blood pressure parameters and encouraged pt to contact MD if her symptoms / BP do not improve at home.     Tracy will be progressing well towards her goals.   Patient prognosis is Fair.     Patient will continue to benefit from skilled outpatient physical therapy to address the deficits listed in the problem list box on initial evaluation, provide pt/family education and to maximize pt's " "level of independence in the home and community environment.     Patient's spiritual, cultural and educational needs considered and pt agreeable to plan of care and goals.     Anticipated barriers to physical therapy:  to physical therapy: chronic nature of symptoms, co-morbidities     Goals:   Short Term Goals: 3-4 weeks:  1. Patient will demonstrate lateral hip MMTs > 4/5 for improved functional mobiltiy.  2. Patient will demonstrate left quad strength via MicroFET > 11 kg for improved transfers, standing and gait.  3. Patient will report worst pain less than 6/10 in back for improved tolerance to ADL performance.     Long Term Goals: 8 weeks:  1. Patient will report resuming cooking without increased symptoms.  2. Patient will demonstrate improved sit <> stand by performing > 7x in 30" without LOB or increase in pain.  3. Patient will improve FOTO to > 41% to improve ADL performance.  4. Patient will demonstrate improved endurance by completing 6 minute walk test with ww or rollator without resting or increased symptoms.    Plan     Cont to progress PT as per POC, Monitor vitals is pt if symptomatic    Gary Crandall PTA       "

## 2023-12-06 ENCOUNTER — PATIENT MESSAGE (OUTPATIENT)
Dept: CARDIOLOGY | Facility: CLINIC | Age: 73
End: 2023-12-06
Payer: MEDICARE

## 2023-12-07 ENCOUNTER — CLINICAL SUPPORT (OUTPATIENT)
Dept: REHABILITATION | Facility: HOSPITAL | Age: 73
End: 2023-12-07
Payer: MEDICARE

## 2023-12-07 DIAGNOSIS — Z91.81 AT MODERATE RISK FOR FALL: Primary | ICD-10-CM

## 2023-12-07 PROCEDURE — 97110 THERAPEUTIC EXERCISES: CPT | Mod: PN | Performed by: PHYSICAL THERAPIST

## 2023-12-07 NOTE — PROGRESS NOTES
"[facility]   Physical Therapy Treatment Note      Name: Tracy Armendariz  Clinic Number: [id number]    Therapy Diagnosis:   Encounter Diagnosis   Name Primary?    At moderate risk for fall Yes     Physician: Daija Joy NP    Visit Date: 2023    Physician Orders: PT Eval and Treat   Medical Diagnosis from Referral:   M54.59 (ICD-10-CM) - Other low back pain   M47.896 (ICD-10-CM) - Other spondylosis, lumbar region      Evaluation Date: 2023  Authorization Period Expiration: 24  Plan of Care Expiration: 24  Progress Note Due: 24  Visit # / Visits authorized:  (+1)  FOTO: 3/ 5   PTA: 0/5     Precautions: Standard, hx of CVA     Time In: 1:02 pm  Time Out: 2:00 pm  Total Appointment Time (timed & untimed codes): 30 1:1 minutes (TEx2)    Subjective     Patient reports: she feels like the heel lift is helping. She denies leg pain upon arrival  She was not given a home exercise program.  Response to previous treatment: ongoing  Functional change: ongoing    Pain: 5/10  Location: bilateral back     Objective      Objective Measures updated at progress report unless specified.     Treatment     Tracy received the treatments listed below:        Tracy received therapeutic exercises to develop strength, endurance, ROM, flexibility, posture, and core stabilization for 30 1:1 and rest supervised minutes including:       Left quad set: 4 minutes with 10" holds  Left short arc quad: 30x5" holds  Straight leg raise: left 3x5, right 2x10  Transverse abdominis bracin minutes with 5" holds   Transverse abdominis bracing with ball squeeze: 2 minutes with 5" holds   Transverse abdominis bracing with BKFO: 2 minutes   Transverse abdominis bracing with alternating marches: 2 minutes     Isometric hip external rotation: 5x15" holds with 45" rest breaks  Isometric hip abduction: 5x15" holds with 45" rest breaks     Tracy received the following manual therapy techniques: " Joint mobilizations, Manual traction, Myofacial release, and Soft tissue Mobilization were applied to the: back for 0 minutes:     Tracy participated in neuromuscular re-education activities to improve: Balance, Coordination, Proprioception, and Posture for 0 minutes. The following activities were included:  Not today     therapeutic activities to improve functional performance for 0  minutes, including:  Not today    Patient Education and Home Exercises       Education provided:   - anatomy  - importance of home exercise program compliance  - attendance policy    Written Home Exercises Provided: yes. Exercises were reviewed and Tracy was able to demonstrate them prior to the end of the session.  Tracy demonstrated good  understanding of the education provided. See Electronic Medical Record under Patient Instructions for exercises provided during therapy sessions    Assessment     Tracy is a 73 y.o. female referred to outpatient Physical Therapy with a medical diagnosis of lumbar spondylosis and LBP. Right groin pain initially reported in hook lying for isometric hip external rotation. When instructed to dec amount of hip flexion, symptoms resolved. Quad tremoring during Long arc quad at completion of session due to weakness. Quick to fatigue with low level strengthening.     Tracy Is progressing well towards her goals.   Patient prognosis is Fair.     Patient will continue to benefit from skilled outpatient physical therapy to address the deficits listed in the problem list box on initial evaluation, provide pt/family education and to maximize pt's level of independence in the home and community environment.     Patient's spiritual, cultural and educational needs considered and pt agreeable to plan of care and goals.     Anticipated barriers to physical therapy: chronic nature of symptoms, co-morbidities    Goals:   Short Term Goals: 3-4 weeks:  1. Patient will demonstrate lateral hip MMTs > 4/5 for  "improved functional mobiltiy.  2. Patient will demonstrate left quad strength via MicroFET > 11 kg for improved transfers, standing and gait.  3. Patient will report worst pain less than 6/10 in back for improved tolerance to ADL performance.     Long Term Goals: 8 weeks:  1. Patient will report resuming cooking without increased symptoms.  2. Patient will demonstrate improved sit <> stand by performing > 7x in 30" without LOB or increase in pain.  3. Patient will improve FOTO to > 41% to improve ADL performance.  4. Patient will demonstrate improved endurance by completing 6 minute walk test with ww or rollator without resting or increased symptoms.    Plan     Cont with POC    Plan of care Certification: 11/30/2023 to 1/16/24.     Outpatient Physical Therapy 2 times weekly for 8 weeks to include the following interventions: Cervical/Lumbar Traction, Electrical Stimulation TENS, IFC NMES, Manual Therapy, Moist Heat/ Ice, Neuromuscular Re-ed, Patient Education, Self Care, Therapeutic Activities, Therapeutic Exercise, and dry needling.     Pedro Luis Padilla, PT        "

## 2023-12-11 ENCOUNTER — CLINICAL SUPPORT (OUTPATIENT)
Dept: REHABILITATION | Facility: HOSPITAL | Age: 73
End: 2023-12-11
Payer: MEDICARE

## 2023-12-11 DIAGNOSIS — Z91.81 AT MODERATE RISK FOR FALL: Primary | ICD-10-CM

## 2023-12-11 PROCEDURE — 97140 MANUAL THERAPY 1/> REGIONS: CPT | Mod: PN | Performed by: PHYSICAL THERAPIST

## 2023-12-11 PROCEDURE — 97110 THERAPEUTIC EXERCISES: CPT | Mod: PN | Performed by: PHYSICAL THERAPIST

## 2023-12-11 NOTE — PROGRESS NOTES
"[facility]   Physical Therapy Treatment Note      Name: Tracy Armendariz  Clinic Number: [id number]    Therapy Diagnosis:   Encounter Diagnosis   Name Primary?    At moderate risk for fall Yes     Physician: Daija Joy NP    Visit Date: 2023    Physician Orders: PT Eval and Treat   Medical Diagnosis from Referral:   M54.59 (ICD-10-CM) - Other low back pain   M47.896 (ICD-10-CM) - Other spondylosis, lumbar region      Evaluation Date: 2023  Authorization Period Expiration: 24  Plan of Care Expiration: 24  Progress Note Due: 24  Visit # / Visits authorized: 3/23 (+1)  FOTO: performed 23  PTA: 0/5     Precautions: Standard, hx of CVA     Time In: 1:30 pm  Time Out: 2:30 pm  Total Appointment Time (timed & untimed codes): 30 1:1 minutes (Tex3, MTx1)    Subjective     Patient reports: "I feel so much better!"  She was not given a home exercise program.  Response to previous treatment: ongoing  Functional change: ongoing    Pain: 5/10 - decreased to 2/10 after manuals  Location: left low back     Objective      23  Quad MMT via MicroFET: 17.2 kg    Treatment     Tracy received the treatments listed below:        Tracy received therapeutic exercises to develop strength, endurance, ROM, flexibility, posture, and core stabilization for 50 1:1 minutes including:     NUSTEP: 5 minutes level 1  Left quad set: 4 minutes with 10" holds  Left short arc quad: 2x9x5" holds with 2 lb   Straight leg raise: left 3x6, right 2x10  Transverse abdominis bracin minutes with 5" holds   Transverse abdominis bracing with ball squeeze: 2 minutes with 5" holds   Transverse abdominis bracing with BKFO: 2 minutes   Transverse abdominis bracing with alternating marches: 2 minutes     Isometric hip external rotation: 5x20" holds with 45" rest breaks  Isometric hip abduction: 5x20" holds with 45" rest breaks     Tracy received the following manual therapy techniques: " Joint mobilizations, Manual traction, Myofacial release, and Soft tissue Mobilization were applied to the: back for 5 minutes:    MET for left posterior innominate     Tracy participated in neuromuscular re-education activities to improve: Balance, Coordination, Proprioception, and Posture for 0 minutes. The following activities were included:  Not today     therapeutic activities to improve functional performance for 0  minutes, including:  Not today    Patient Education and Home Exercises       Education provided:   - anatomy  - importance of home exercise program compliance  - attendance policy    Written Home Exercises Provided: yes. Exercises were reviewed and Tracy was able to demonstrate them prior to the end of the session.  Tracy demonstrated good  understanding of the education provided. See Electronic Medical Record under Patient Instructions for exercises provided during therapy sessions    Assessment     Tracy is a 73 y.o. female referred to outpatient Physical Therapy with a medical diagnosis of lumbar spondylosis and LBP. Improved ginger and gait pattern upon arrival with subjective improvements reported. Left buttock pain reported and improved from 5/10 to 2/10 with MET. Improved quad strength as seen by microFET test.     Tracy Is progressing well towards her goals.   Patient prognosis is Fair.     Patient will continue to benefit from skilled outpatient physical therapy to address the deficits listed in the problem list box on initial evaluation, provide pt/family education and to maximize pt's level of independence in the home and community environment.     Patient's spiritual, cultural and educational needs considered and pt agreeable to plan of care and goals.     Anticipated barriers to physical therapy: chronic nature of symptoms, co-morbidities    Goals:   Short Term Goals: 3-4 weeks:  1. Patient will demonstrate lateral hip MMTs > 4/5 for improved functional mobiltiy.  2. Patient  "will demonstrate left quad strength via MicroFET > 11 kg for improved transfers, standing and gait. MET 12/11/23  3. Patient will report worst pain less than 6/10 in back for improved tolerance to ADL performance. MET     Long Term Goals: 8 weeks:  1. Patient will report resuming cooking without increased symptoms.  2. Patient will demonstrate improved sit <> stand by performing > 7x in 30" without LOB or increase in pain.  3. Patient will improve FOTO to > 41% to improve ADL performance.  4. Patient will demonstrate improved endurance by completing 6 minute walk test with ww or rollator without resting or increased symptoms.    Plan     Cont with POC    Plan of care Certification: 11/30/2023 to 1/16/24.     Outpatient Physical Therapy 2 times weekly for 8 weeks to include the following interventions: Cervical/Lumbar Traction, Electrical Stimulation TENS, IFC NMES, Manual Therapy, Moist Heat/ Ice, Neuromuscular Re-ed, Patient Education, Self Care, Therapeutic Activities, Therapeutic Exercise, and dry needling.     Pedro Luis Padilla, PT        "

## 2023-12-12 ENCOUNTER — PATIENT MESSAGE (OUTPATIENT)
Dept: SLEEP MEDICINE | Facility: CLINIC | Age: 73
End: 2023-12-12
Payer: MEDICARE

## 2023-12-14 ENCOUNTER — PATIENT MESSAGE (OUTPATIENT)
Dept: INTERNAL MEDICINE | Facility: CLINIC | Age: 73
End: 2023-12-14
Payer: MEDICARE

## 2023-12-14 DIAGNOSIS — K40.20 BILATERAL INGUINAL HERNIA WITHOUT OBSTRUCTION OR GANGRENE, RECURRENCE NOT SPECIFIED: Primary | ICD-10-CM

## 2023-12-14 NOTE — TELEPHONE ENCOUNTER
"The patient stated that she had a black stool today.  Denies abdominal pain. The patient took Pepto Bismol a couple of days ago  For heartburn.  I informed the patient that Pepto Bismol can turn the stool black. Advise   The patient that if she develops abdominal pain/cramping or notice   Blood in the stool to notify the office.    Per patient;  Dr. Alston with [facility] took an MRI and CAT on Nov 2nd due to a back problem I was/am having. I am currently in therapy for my back pain. On Nov 6th, he noticed some "spots" in the fat tissue around my bladder as well a thickening of the bladder wall. Dr. Alston mentioned that he was going to forward those test to you for your perusal.     Have you seen/reviewed reports. Please advise.  "

## 2023-12-15 ENCOUNTER — OFFICE VISIT (OUTPATIENT)
Dept: URGENT CARE | Facility: CLINIC | Age: 73
End: 2023-12-15
Payer: MEDICARE

## 2023-12-15 VITALS
WEIGHT: 238 LBS | BODY MASS INDEX: 37.35 KG/M2 | HEART RATE: 64 BPM | SYSTOLIC BLOOD PRESSURE: 159 MMHG | DIASTOLIC BLOOD PRESSURE: 81 MMHG | HEIGHT: 67 IN | RESPIRATION RATE: 18 BRPM | OXYGEN SATURATION: 95 % | TEMPERATURE: 98 F

## 2023-12-15 DIAGNOSIS — R10.9 ABDOMINAL PAIN, UNSPECIFIED ABDOMINAL LOCATION: ICD-10-CM

## 2023-12-15 DIAGNOSIS — R19.5 DARK STOOLS: ICD-10-CM

## 2023-12-15 DIAGNOSIS — R35.0 URINARY FREQUENCY: Primary | ICD-10-CM

## 2023-12-15 LAB
BILIRUB UR QL STRIP: NEGATIVE
CTP QC/QA: YES
GLUCOSE UR QL STRIP: NEGATIVE
KETONES UR QL STRIP: NEGATIVE
LEUKOCYTE ESTERASE UR QL STRIP: NEGATIVE
PH, POC UA: 6
POC BLOOD, URINE: NEGATIVE
POC MOLECULAR INFLUENZA A AGN: NEGATIVE
POC MOLECULAR INFLUENZA B AGN: NEGATIVE
POC NITRATES, URINE: NEGATIVE
PROT UR QL STRIP: POSITIVE
SP GR UR STRIP: 1.02 (ref 1–1.03)
UROBILINOGEN UR STRIP-ACNC: NORMAL (ref 0.1–1.1)

## 2023-12-15 PROCEDURE — 87502 INFLUENZA DNA AMP PROBE: CPT | Mod: QW,S$GLB,, | Performed by: NURSE PRACTITIONER

## 2023-12-15 PROCEDURE — 81003 POCT URINALYSIS, DIPSTICK, AUTOMATED, W/O SCOPE: ICD-10-PCS | Mod: QW,S$GLB,, | Performed by: NURSE PRACTITIONER

## 2023-12-15 PROCEDURE — 74019 RADEX ABDOMEN 2 VIEWS: CPT | Mod: FY,S$GLB,, | Performed by: RADIOLOGY

## 2023-12-15 PROCEDURE — 87502 POCT INFLUENZA A/B MOLECULAR: ICD-10-PCS | Mod: QW,S$GLB,, | Performed by: NURSE PRACTITIONER

## 2023-12-15 PROCEDURE — 74019 XR ABDOMEN FLAT AND ERECT: ICD-10-PCS | Mod: FY,S$GLB,, | Performed by: RADIOLOGY

## 2023-12-15 PROCEDURE — 99213 PR OFFICE/OUTPT VISIT, EST, LEVL III, 20-29 MIN: ICD-10-PCS | Mod: S$GLB,,, | Performed by: NURSE PRACTITIONER

## 2023-12-15 PROCEDURE — 99213 OFFICE O/P EST LOW 20 MIN: CPT | Mod: S$GLB,,, | Performed by: NURSE PRACTITIONER

## 2023-12-15 PROCEDURE — 87086 URINE CULTURE/COLONY COUNT: CPT | Performed by: NURSE PRACTITIONER

## 2023-12-15 PROCEDURE — 81003 URINALYSIS AUTO W/O SCOPE: CPT | Mod: QW,S$GLB,, | Performed by: NURSE PRACTITIONER

## 2023-12-15 RX ORDER — ESOMEPRAZOLE MAGNESIUM 40 MG/1
40 CAPSULE, DELAYED RELEASE ORAL DAILY PRN
Qty: 90 CAPSULE | Refills: 3 | Status: SHIPPED | OUTPATIENT
Start: 2023-12-15

## 2023-12-15 NOTE — PATIENT INSTRUCTIONS
STOP PEPTO BISMOL AND START NEXIUM.  MAINTAIN HYDRATION.  YOU WILL BE CONTACTED REGARDING URINE CULTURE RESULTS WITH FURTHER RESULTS.  DISCUSSED ABDOMINAL X-RAY RESULTS.  ADVISED IF INCREASE IN ABDOMINAL PAIN, GO TO ER PROMPTLY FOR CT OF ABDOMEN.  HISTORY OF HERNIA AND RENAL CALCULI; FOLLOW UP WITH YOUR PCP.  TYLENOL AS NEEDED FOR PAIN.      You must understand that you've received an Urgent Care treatment only and that you may be released before all your medical problems are known or treated. You, the patient, will arrange for follow up care as instructed.  Follow up with your PCP or specialty clinic as directed in the next 1-2 weeks if not improved or as needed.  You can call [phone] to schedule an appointment with the appropriate provider.  If your condition worsens we recommend that you receive another evaluation at the emergency room immediately or contact your primary medical clinics after hours call service to discuss your concerns.  Please return here or go to the Emergency Department for any concerns or worsening of condition.    If you were prescribed a narcotic or controlled medication, do not drive or operate heavy equipment or machinery while taking these medications.    Thank you for choosing [facility]!    Our goal in the Urgent Care is to always provide outstanding medical care. You may receive a survey by mail or e-mail in the next week regarding your experience today. We would greatly appreciate you completing and returning the survey. Your feedback provides us with a way to recognize our staff who provide very good care, and it helps us learn how to improve when your experience was below our aspiration of excellence.      We appreciate you trusting us with your medical care. We hope you feel better soon. We will be happy to take care of you for all of your future medical needs.   This note was prepared using voice-recognition software.  Although efforts are made to proofread the  note, some errors may persist in the final document.     Sincerely,    Ben Galindo DNP, FNP-C

## 2023-12-15 NOTE — PROGRESS NOTES
"Subjective:      Patient ID: Tracy Armendariz is a 73 y.o. female.    Vitals:  height is 5' 7" (1.702 m) and weight is 108 kg (238 lb). Her oral temperature is 97.7 °F (36.5 °C). Her blood pressure is 159/81 (abnormal) and her pulse is 64. Her respiration is 18 and oxygen saturation is 95%.     Chief Complaint: Stool Color Change    72 y/o female presents with a complaint of a dark stool.  Reports onset of symptoms, 3 days ago.  Reports taking Pepto Bismol and Pepcid for GERD.  Reports abdominal discomfort, fatigue, and headaches.  Denies diarrhea, nausea, vomiting, or flank pain.  Patient reports recent abdominal CT scan; states she was may have an abdominal hernia.  States she communicated with her PCP and was told to stop Pepto Bismul and start Nexium.  Reports urinary frequency.  Denies dysuria, flank pain, fever, chills, or palpitations.  History of renal calculi.        Other  This is a new problem. The current episode started in the past 7 days. The problem has been unchanged. Associated symptoms include abdominal pain, fatigue, headaches and urinary symptoms. Pertinent negatives include no change in bowel habit, chest pain, chills, fever, myalgias, nausea, rash, swollen glands, vomiting or weakness. Nothing aggravates the symptoms.       Constitution: Positive for fatigue. Negative for chills, fever and generalized weakness.   Cardiovascular:  Negative for chest pain and palpitations.   Gastrointestinal:  Positive for abdominal pain and dark colored stools. Negative for abdominal trauma, abdominal bloating, history of abdominal surgery, nausea, vomiting, constipation, diarrhea, bright red blood in stool, rectal bleeding, rectal pain, hemorrhoids and bowel incontinence.   Genitourinary:  Positive for frequency and history of kidney stones. Negative for dysuria, urgency, urine decreased, flank pain, hematuria, vaginal discharge, vaginal bleeding, vaginal odor and pelvic pain.   Musculoskeletal:  Negative for " muscle ache.   Skin:  Negative for rash.   Neurological:  Positive for headaches. Negative for history of migraines.      Objective:     Physical Exam   Constitutional: She is oriented to person, place, and time. She appears well-developed.  Non-toxic appearance. She does not appear ill. No distress.   HENT:   Head: Normocephalic and atraumatic.   Ears:   Right Ear: External ear normal.   Left Ear: External ear normal.   Nose: Nose normal.   Mouth/Throat: Mucous membranes are normal.   Eyes: Conjunctivae and lids are normal.   Neck: Trachea normal. Neck supple.   Cardiovascular: Normal rate, regular rhythm and normal heart sounds.   Pulmonary/Chest: Effort normal and breath sounds normal. No respiratory distress.   Abdominal: Normal appearance and bowel sounds are normal. She exhibits no distension and no mass. Soft. There is no abdominal tenderness. There is no rebound, no guarding, no left CVA tenderness and no right CVA tenderness. No hernia.   Musculoskeletal: Normal range of motion.         General: Normal range of motion.   Neurological: She is alert and oriented to person, place, and time. She has normal strength.   Skin: Skin is warm, dry, intact, not diaphoretic and not pale.   Psychiatric: Her speech is normal and behavior is normal. Judgment and thought content normal.   Nursing note and vitals reviewed.      Assessment:     1. Urinary frequency    2. Abdominal pain, unspecified abdominal location    3. Dark stools      Results for orders placed or performed in visit on 12/15/23   POCT Urinalysis, Dipstick, Automated, W/O Scope   Result Value Ref Range    POC Blood, Urine Negative Negative, Positive Slide, Positive Tube    POC Bilirubin, Urine Negative Negative, Positive Slide, Positive Tube    POC Urobilinogen, Urine Normal 0.1 - 1.1    POC Ketones, Urine Negative Negative, Positive Slide, Positive Tube    POC Protein, Urine Positive (A) Negative, Positive Slide, Positive Tube    POC Nitrates, Urine  Negative Negative, Positive Slide, Positive Tube    POC Glucose, Urine Negative Negative, Positive Slide, Positive Tube    pH, UA 6.0     POC Specific Gravity, Urine 1.025 1.003 - 1.029    POC Leukocytes, Urine Negative Negative, Positive Slide, Positive Tube   POCT Influenza A/B MOLECULAR   Result Value Ref Range    POC Molecular Influenza A Ag Negative Negative, Not Reported    POC Molecular Influenza B Ag Negative Negative, Not Reported     Acceptable Yes      *Note: Due to a large number of results and/or encounters for the requested time period, some results have not been displayed. A complete set of results can be found in Results Review.      Simone Majano MD  [phone]  [phone] 12/15/2023 STAT     Narrative & Impression  EXAMINATION:  XR ABDOMEN FLAT AND ERECT     CLINICAL HISTORY:  Unspecified abdominal pain     TECHNIQUE:  Flat and erect AP views of the abdomen were performed.     COMPARISON:  12/13/2022 12/09/2022     FINDINGS:  There are postoperative changes in the right upper abdominal quadrant.  There has been interval removal of the left-sided nephroureteral stent.     The lung bases are unremarkable.  There is no evidence of free air beneath the hemidiaphragms.     The stomach is nondistended.  There are no differential air-fluid levels.  There is mild wall thickening of the bowel loops in the left hemiabdomen.  There is no evidence of pneumatosis.  No portal venous air is identified.  No evidence of free air.     The osseous structures are unremarkable.     Impression:     Mild wall thickening of the bowel loops in the left hemiabdomen.  Additional evaluation, as clinically warranted.     Overall nonobstructive bowel gas pattern.        Electronically signed by: Simone Majano MD  Date:                                            12/15/2023  Time:                                           19:26           Exam Ended: 12/15/23 18:32 CST             Plan:     Urinary  frequency  -     POCT Urinalysis, Dipstick, Automated, W/O Scope    Abdominal pain, unspecified abdominal location  -     X-Ray Abdomen Flat And Erect; Future; Expected date: 12/15/2023  -     CULTURE, URINE  -     POCT Influenza A/B MOLECULAR    Dark stools    STOP PEPTO BISMOL AND START NEXIUM.  MAINTAIN HYDRATION.  YOU WILL BE CONTACTED REGARDING URINE CULTURE RESULTS WITH FURTHER RESULTS.  FOLLOW UP WITH YOUR PCP.    DISCUSSED ABDOMINAL X-RAY RESULTS.  ADVISED IF INCREASE IN ABDOMINAL PAIN, GO TO ER PROMPTLY FOR CT OF ABDOMEN.  HISTORY OF HERNIA; FOLLOW UP WITH YOUR PCP.      You must understand that you've received an Urgent Care treatment only and that you may be released before all your medical problems are known or treated. You, the patient, will arrange for follow up care as instructed.  Follow up with your PCP or specialty clinic as directed in the next 1-2 weeks if not improved or as needed.  You can call [phone] to schedule an appointment with the appropriate provider.  If your condition worsens we recommend that you receive another evaluation at the emergency room immediately or contact your primary medical clinics after hours call service to discuss your concerns.  Please return here or go to the Emergency Department for any concerns or worsening of condition.    If you were prescribed a narcotic or controlled medication, do not drive or operate heavy equipment or machinery while taking these medications.    Thank you for choosing [facility]!

## 2023-12-16 NOTE — PROGRESS NOTES
"Subjective:     @Patient ID: Tracy Armendariz is a 73 y.o. female.    Chief Complaint: Hypertension    Hypertension  Associated symptoms include chest pain.       72 y.o. female with MARTIN, HTN, HLD, hx of CVA, atherosclerosis of aorta, obesity, R knee OA, presents here for f/u of HTN.    Pt also reports feeling dizzy with position changes recently. Also reports having chest pressure     Review of Systems   Constitutional:  Negative for chills and fever.   Cardiovascular:  Positive for chest pain.   Neurological:  Positive for dizziness.   Psychiatric/Behavioral:  Negative for agitation and confusion.      Past medical history, surgical history, and family medical history reviewed and updated as appropriate.    Medications and allergies reviewed.     Objective:     Vitals:    10/12/23 0814   BP: 136/86   BP Location: Left arm   Patient Position: Sitting   BP Method: Large (Manual)   Pulse: 66   Resp: 16   Temp: 98.3 °F (36.8 °C)   TempSrc: Temporal   SpO2: 95%   Weight: 108 kg (238 lb 1.6 oz)   Height: 5' 7" (1.702 m)     Body mass index is 37.29 kg/m².  Physical Exam  Constitutional:       Appearance: Normal appearance.   HENT:      Head: Normocephalic and atraumatic.   Eyes:      General:         Right eye: No discharge.         Left eye: No discharge.      Conjunctiva/sclera: Conjunctivae normal.   Cardiovascular:      Rate and Rhythm: Normal rate and regular rhythm.      Heart sounds: No murmur heard.  Pulmonary:      Effort: Pulmonary effort is normal.      Breath sounds: Normal breath sounds.   Musculoskeletal:      Cervical back: Normal range of motion and neck supple.      Right lower leg: No edema.      Left lower leg: No edema.   Skin:     General: Skin is warm and dry.   Neurological:      Mental Status: She is alert and oriented to person, place, and time.   Psychiatric:         Mood and Affect: Mood normal.         Behavior: Behavior normal.         Lab Results   Component Value Date    WBC 7.39 " 10/16/2023    HGB 13.9 10/16/2023    HCT 42.5 10/16/2023     10/16/2023    CHOL 206 (H) 06/22/2023    TRIG 107 06/22/2023     (H) 06/22/2023    ALT 19 10/16/2023    AST 20 10/16/2023     10/16/2023    K 4.5 10/16/2023     10/16/2023    CREATININE 0.9 10/16/2023    BUN 14 10/16/2023    CO2 27 10/16/2023    TSH 3.555 10/16/2023    INR 1.0 01/13/2017    GLUF 123 (H) 11/04/2008    HGBA1C 5.8 (H) 10/16/2023       Assessment:     1. Orthostatic dizziness    2. Primary hypertension    3. Chest pain, unspecified type    4. Severe obesity (BMI 35.0-39.9) with comorbidity    5. Prediabetes      Plan:   Tracy was seen today for hypertension.    Diagnoses and all orders for this visit:    Orthostatic dizziness      In office today, BP reads 136/73 laying, 98/65 sitting, and 104/67 standing. Counseled to increase hydration   -     Comprehensive Metabolic Panel; Future  -     CBC Auto Differential; Future  -     TSH; Future  -     Hemoglobin A1C; Future    Primary hypertension  -  see above   -     Comprehensive Metabolic Panel; Future  -     CBC Auto Differential; Future  -     TSH; Future  -     Hemoglobin A1C; Future    Chest pain, unspecified type  -     IN OFFICE EKG 12-LEAD (to Muse)    Severe obesity (BMI 35.0-39.9) with comorbidity  - chronic. Start wegovy  -      semaglutide, weight loss, (WEGOVY) 0.25 mg/0.5 mL PnIj; Inject 0.25 mg into the skin once a week.      Prediabetes  - chronic. Check labs   -     Comprehensive Metabolic Panel; Future  -     CBC Auto Differential; Future  -     TSH; Future  -     Hemoglobin A1C; Future    Other orders  -     atorvastatin (LIPITOR) 40 MG tablet; Take 1 tablet (40 mg total) by mouth every evening.  -     losartan (COZAAR) 50 MG tablet; Take 1 tablet (50 mg total) by mouth 2 (two) times a day.  -     Influenza - Quadrivalent (Adjuvanted)         Madisyn Villalobos MD  Internal Medicine

## 2023-12-18 ENCOUNTER — CLINICAL SUPPORT (OUTPATIENT)
Dept: REHABILITATION | Facility: HOSPITAL | Age: 73
End: 2023-12-18
Payer: MEDICARE

## 2023-12-18 ENCOUNTER — TELEPHONE (OUTPATIENT)
Dept: INTERNAL MEDICINE | Facility: CLINIC | Age: 73
End: 2023-12-18
Payer: MEDICARE

## 2023-12-18 DIAGNOSIS — Z91.81 AT MODERATE RISK FOR FALL: Primary | ICD-10-CM

## 2023-12-18 LAB — BACTERIA UR CULT: NORMAL

## 2023-12-18 PROCEDURE — 97110 THERAPEUTIC EXERCISES: CPT | Mod: PN | Performed by: PHYSICAL THERAPIST

## 2023-12-18 NOTE — TELEPHONE ENCOUNTER
----- Message from Thania Sanchez sent at 12/18/2023 10:38 AM CST -----  Pt is wanting Dr. Villalobos's staff to call her back in regards to referral for General Surgery states she do not know anything about hernia. Did not want to schedule until she speaks to Dr. Villalobos.    Thanks

## 2023-12-18 NOTE — PROGRESS NOTES
"[facility]   Physical Therapy Treatment Note      Name: Tracy Armendariz  Clinic Number: [id number]    Therapy Diagnosis:   Encounter Diagnosis   Name Primary?    At moderate risk for fall Yes     Physician: Daija Joy NP    Visit Date: 2023    Physician Orders: PT Eval and Treat   Medical Diagnosis from Referral:   M54.59 (ICD-10-CM) - Other low back pain   M47.896 (ICD-10-CM) - Other spondylosis, lumbar region      Evaluation Date: 2023  Authorization Period Expiration: 24  Plan of Care Expiration: 24  Progress Note Due: 24  Visit # / Visits authorized:  (+1)  FOTO: performed 23  PTA: 0/5     Precautions: Standard, hx of CVA     Time In: 2:30 pm  Time Out: 3:30 pm  Total Appointment Time (timed & untimed codes): 55 1:1 minutes (Tex4)    Subjective     Patient reports: she's had black stool for 4 days and was told by her doctor to contact a surgeon regarding hernias that she didn't know she had. She feels weak and her knees have been bothering her    She was not given a home exercise program.  Response to previous treatment: ongoing  Functional change: ongoing    Pain: 4/10  Location: left low back     Objective      23  Quad MMT via MicroFET: 17.2 kg    Treatment     Tracy received the treatments listed below:      Tracy received therapeutic exercises to develop strength, endurance, ROM, flexibility, posture, and core stabilization for 50 1:1 minutes including:     NUSTEP: 5 minutes level 1  Left quad set: 4 minutes with 10" holds  Left short arc quad: 10x10" holds with 2 lb - not today  Straight leg raise: left 3x6, right 2x10 - not today  Transverse abdominis bracin minutes with 5" holds   Transverse abdominis bracing with ball squeeze: 2 minutes with 5" holds   Transverse abdominis bracing with BKFO: 2 minutes   Transverse abdominis bracing with alternating marches: 2 minutes     Isometric hip external rotation: 5x20" holds with " "45" rest breaks  Isometric hip abduction: 5x20" holds with 45" rest breaks     Tracy received the following manual therapy techniques: Joint mobilizations, Manual traction, Myofacial release, and Soft tissue Mobilization were applied to the: back for 0 minutes:    MET for left posterior innominate     Tracy participated in neuromuscular re-education activities to improve: Balance, Coordination, Proprioception, and Posture for 0 minutes. The following activities were included:  Not today     therapeutic activities to improve functional performance for 0  minutes, including:  Not today    Patient Education and Home Exercises       Education provided:   - anatomy  - importance of home exercise program compliance  - attendance policy    Written Home Exercises Provided: yes. Exercises were reviewed and Tracy was able to demonstrate them prior to the end of the session.  Tracy demonstrated good  understanding of the education provided. See Electronic Medical Record under Patient Instructions for exercises provided during therapy sessions    Assessment     Tracy is a 73 y.o. female referred to outpatient Physical Therapy with a medical diagnosis of lumbar spondylosis and LBP. No pelvic innominate today. Held straight leg raise due to feeling weaker with slightly increased pain today. Lower energy observed today.    Tracy Is progressing well towards her goals.   Patient prognosis is Fair.     Patient will continue to benefit from skilled outpatient physical therapy to address the deficits listed in the problem list box on initial evaluation, provide pt/family education and to maximize pt's level of independence in the home and community environment.     Patient's spiritual, cultural and educational needs considered and pt agreeable to plan of care and goals.     Anticipated barriers to physical therapy: chronic nature of symptoms, co-morbidities    Goals:   Short Term Goals: 3-4 weeks:  1. Patient will " "demonstrate lateral hip MMTs > 4/5 for improved functional mobiltiy.  2. Patient will demonstrate left quad strength via MicroFET > 11 kg for improved transfers, standing and gait. MET 12/11/23  3. Patient will report worst pain less than 6/10 in back for improved tolerance to ADL performance. MET     Long Term Goals: 8 weeks:  1. Patient will report resuming cooking without increased symptoms.  2. Patient will demonstrate improved sit <> stand by performing > 7x in 30" without LOB or increase in pain.  3. Patient will improve FOTO to > 41% to improve ADL performance.  4. Patient will demonstrate improved endurance by completing 6 minute walk test with ww or rollator without resting or increased symptoms.    Plan     Cont with POC    Plan of care Certification: 11/30/2023 to 1/16/24.     Outpatient Physical Therapy 2 times weekly for 8 weeks to include the following interventions: Cervical/Lumbar Traction, Electrical Stimulation TENS, IFC NMES, Manual Therapy, Moist Heat/ Ice, Neuromuscular Re-ed, Patient Education, Self Care, Therapeutic Activities, Therapeutic Exercise, and dry needling.     Pedro Luis Padilla, PT        "

## 2023-12-18 NOTE — TELEPHONE ENCOUNTER
Please advise- Pt stated she wants to know why she has to go to general Surgery?Stated no one explain what hernia is and why she has to get surgery.      Your Note-  I do recommend to see a general surgeon about the hernias     For the bladder thickening could be a urine infection. Did read this note pt.

## 2023-12-18 NOTE — TELEPHONE ENCOUNTER
----- Message from Cadence Aly sent at 12/18/2023  3:38 PM CST -----  Contact: Tracy tyler  [phone]  [email or web address]     Patient is calling for Medical Advice regarding:    How long has patient had these symptoms:    Pharmacy name and phone#:    Would like response via [employer]:call back    Comments: Pt is requesting a call back from the nurse or the doctor because she wanted to know why the doctor put in a referral for surgery

## 2023-12-19 NOTE — TELEPHONE ENCOUNTER
MD spoke with patient about the CT results. CT showed b/l inguinal hernias. Pt reports not having significant pain of groin. Prefer to hold off on gen surg evaluation    Also no UTI. Urine culture negative.     Pt will f/u next month

## 2023-12-21 ENCOUNTER — CLINICAL SUPPORT (OUTPATIENT)
Dept: REHABILITATION | Facility: HOSPITAL | Age: 73
End: 2023-12-21
Payer: MEDICARE

## 2023-12-21 DIAGNOSIS — Z91.81 AT MODERATE RISK FOR FALL: Primary | ICD-10-CM

## 2023-12-21 PROCEDURE — 97110 THERAPEUTIC EXERCISES: CPT | Mod: PN,CQ

## 2023-12-21 NOTE — PROGRESS NOTES
"[facility]   Physical Therapy Treatment Note      Name: Tracy Armendariz  Clinic Number: [id number]    Therapy Diagnosis:   Encounter Diagnosis   Name Primary?    At moderate risk for fall Yes     Physician: Daija Joy NP    Visit Date: 2023    Physician Orders: PT Eval and Treat   Medical Diagnosis from Referral:   M54.59 (ICD-10-CM) - Other low back pain   M47.896 (ICD-10-CM) - Other spondylosis, lumbar region      Evaluation Date: 2023  Authorization Period Expiration: 24  Plan of Care Expiration: 24  Progress Note Due: 24  Visit # / Visits authorized:  (+1)  FOTO: performed 23  PTA: 0/5     Precautions: Standard, hx of CVA     Time In: 2:00 pm  Time Out: 3:00 pm  Total Appointment Time (timed & untimed codes): 55 1:1 minutes (Tex4)    Subjective     Patient reports: agreeable to PT session.      She was not given a home exercise program.  Response to previous treatment: ongoing  Functional change: ongoing    Pain: 4/10  Location: left low back     Objective      23  Quad MMT via MicroFET: 17.2 kg    Treatment     Tracy received the treatments listed below:      Tracy received therapeutic exercises to develop strength, endurance, ROM, flexibility, posture, and core stabilization for 55 1:1 minutes including:     NUSTEP: 5 minutes level 1  Left quad set: 4 minutes with 10" holds  Left short arc quad: 10x10" holds with 2 lb - not today  Straight leg raise: left 3x6, right 2x10 - not today  Transverse abdominis bracin minutes with 5" holds   Transverse abdominis bracing with ball squeeze: 2 minutes with 5" holds   Transverse abdominis bracing with BKFO: 2 minutes   Transverse abdominis bracing with alternating marches: 2 minutes     Isometric hip external rotation: 5x20" holds with 45" rest breaks  Isometric hip abduction: 5x20" holds with 45" rest breaks     Tracy received the following manual therapy techniques: Joint " mobilizations, Manual traction, Myofacial release, and Soft tissue Mobilization were applied to the: back for 0 minutes:    MET for left posterior innominate     Tracy participated in neuromuscular re-education activities to improve: Balance, Coordination, Proprioception, and Posture for 0 minutes. The following activities were included:  Not today     therapeutic activities to improve functional performance for 0  minutes, including:  Not today    Patient Education and Home Exercises       Education provided:   - anatomy  - importance of home exercise program compliance  - attendance policy    Written Home Exercises Provided: yes. Exercises were reviewed and Tracy was able to demonstrate them prior to the end of the session.  Tracy demonstrated good  understanding of the education provided. See Electronic Medical Record under Patient Instructions for exercises provided during therapy sessions    Assessment     Tracy is a 73 y.o. female referred to outpatient Physical Therapy with a medical diagnosis of lumbar spondylosis and LBP.   She was able to complete today's session with no reports of increased dizzy spells or complaints of lumbar pain. She was granted rest breaks as needed for general fatigue recovery today.   Tracy Is progressing well towards her goals.   Patient prognosis is Fair.     Patient will continue to benefit from skilled outpatient physical therapy to address the deficits listed in the problem list box on initial evaluation, provide pt/family education and to maximize pt's level of independence in the home and community environment.     Patient's spiritual, cultural and educational needs considered and pt agreeable to plan of care and goals.     Anticipated barriers to physical therapy: chronic nature of symptoms, co-morbidities    Goals:   Short Term Goals: 3-4 weeks:  1. Patient will demonstrate lateral hip MMTs > 4/5 for improved functional mobiltiy.  2. Patient will demonstrate left  "quad strength via MicroFET > 11 kg for improved transfers, standing and gait. MET 12/11/23  3. Patient will report worst pain less than 6/10 in back for improved tolerance to ADL performance. MET     Long Term Goals: 8 weeks:  1. Patient will report resuming cooking without increased symptoms.  2. Patient will demonstrate improved sit <> stand by performing > 7x in 30" without LOB or increase in pain.  3. Patient will improve FOTO to > 41% to improve ADL performance.  4. Patient will demonstrate improved endurance by completing 6 minute walk test with ww or rollator without resting or increased symptoms.    Plan     Cont with POC    Plan of care Certification: 11/30/2023 to 1/16/24.     Outpatient Physical Therapy 2 times weekly for 8 weeks to include the following interventions: Cervical/Lumbar Traction, Electrical Stimulation TENS, IFC NMES, Manual Therapy, Moist Heat/ Ice, Neuromuscular Re-ed, Patient Education, Self Care, Therapeutic Activities, Therapeutic Exercise, and dry needling.     Gary Crandall, PTA        "

## 2023-12-26 ENCOUNTER — CLINICAL SUPPORT (OUTPATIENT)
Dept: REHABILITATION | Facility: HOSPITAL | Age: 73
End: 2023-12-26
Payer: MEDICARE

## 2023-12-26 DIAGNOSIS — Z91.81 AT MODERATE RISK FOR FALL: Primary | ICD-10-CM

## 2023-12-26 PROCEDURE — 97110 THERAPEUTIC EXERCISES: CPT | Mod: PN,CQ

## 2023-12-26 NOTE — PROGRESS NOTES
"[facility]   Physical Therapy Treatment Note      Name: Tracy Armendariz  Clinic Number: [id number]    Therapy Diagnosis:   Encounter Diagnosis   Name Primary?    At moderate risk for fall Yes     Physician: Daija Joy NP    Visit Date: 2023    Physician Orders: PT Eval and Treat   Medical Diagnosis from Referral:   M54.59 (ICD-10-CM) - Other low back pain   M47.896 (ICD-10-CM) - Other spondylosis, lumbar region      Evaluation Date: 2023  Authorization Period Expiration: 24  Plan of Care Expiration: 24  Progress Note Due: 24  Visit # / Visits authorized:  (+1)  FOTO: 7/10  PTA:      Precautions: Standard, hx of CVA     Time In: 2:00  pm  Time Out: 3:00 pm  Total Appointment Time (timed & untimed codes): 55 1:1 minutes (Tex4)    Subjective     Patient reports: "I fell last Thursday after I had dinner and trying to enter my car the wrong way". She states while attempting to enter her car (passenger side) she experienced a loss of balance. She had one foot in the vehicle and could not balance on one leg enough to make it inside her vehicle. She reports some lumbar pain but has improved since last week. Pt did not reports visiting ED.    She was not given a home exercise program.  Response to previous treatment: ongoing  Functional change: ongoing    Pain: 4/10  Location: left low back     Objective      23  Quad MMT via MicroFET: 17.2 kg    Treatment     Tracy received the treatments listed below:      Tracy received therapeutic exercises to develop strength, endurance, ROM, flexibility, posture, and core stabilization for 55 1:1 minutes including:     NUSTEP: 5 minutes level 1  Left quad set: 4 minutes with 10" holds  Left short arc quad: 10x10" holds with 2 lb - not today  Straight leg raise: left 3x6, right 2x10 - not today  Transverse abdominis bracin minutes with 5" holds   Transverse abdominis bracing with ball squeeze: 2 minutes " "with 5" holds   Transverse abdominis bracing with BKFO: 2 minutes   Transverse abdominis bracing with alternating marches: 2 minutes   Isometric hip external rotation: 5x20" holds with 45" rest breaks  Isometric hip abduction: 5x20" holds with 45" rest breaks     Tracy received the following manual therapy techniques: Joint mobilizations, Manual traction, Myofacial release, and Soft tissue Mobilization were applied to the: back for 0 minutes:  MET for left posterior innominate     Tracy participated in neuromuscular re-education activities to improve: Balance, Coordination, Proprioception, and Posture for 0 minutes. The following activities were included:    therapeutic activities to improve functional performance for 0  minutes, including:  Not today    Patient Education and Home Exercises       Education provided:   - anatomy  - importance of home exercise program compliance  - attendance policy    Written Home Exercises Provided: yes. Exercises were reviewed and Tracy was able to demonstrate them prior to the end of the session.  Tracy demonstrated good  understanding of the education provided. See Electronic Medical Record under Patient Instructions for exercises provided during therapy sessions    Assessment     Tracy is a 73 y.o. female referred to outpatient Physical Therapy with a medical diagnosis of lumbar spondylosis and LBP.   Pt completed today's session with no reports of increased pain noted. She completed supine and nustep within tolerance. Heavy education provided on safety with vehicular transfers and proper sequencing, she verbally confirmed understanding, she reports she "wasn't thinking" and attempted to enter vehicle unsafely resulting in a fall.   Tracy Is progressing well towards her goals.   Patient prognosis is Fair.     Patient will continue to benefit from skilled outpatient physical therapy to address the deficits listed in the problem list box on initial evaluation, provide " "pt/family education and to maximize pt's level of independence in the home and community environment.     Patient's spiritual, cultural and educational needs considered and pt agreeable to plan of care and goals.     Anticipated barriers to physical therapy: chronic nature of symptoms, co-morbidities    Goals:   Short Term Goals: 3-4 weeks:  1. Patient will demonstrate lateral hip MMTs > 4/5 for improved functional mobiltiy.  2. Patient will demonstrate left quad strength via MicroFET > 11 kg for improved transfers, standing and gait. MET 12/11/23  3. Patient will report worst pain less than 6/10 in back for improved tolerance to ADL performance. MET     Long Term Goals: 8 weeks:  1. Patient will report resuming cooking without increased symptoms.  2. Patient will demonstrate improved sit <> stand by performing > 7x in 30" without LOB or increase in pain.  3. Patient will improve FOTO to > 41% to improve ADL performance.  4. Patient will demonstrate improved endurance by completing 6 minute walk test with ww or rollator without resting or increased symptoms.    Plan     Cont with POC    Plan of care Certification: 11/30/2023 to 1/16/24.     Outpatient Physical Therapy 2 times weekly for 8 weeks to include the following interventions: Cervical/Lumbar Traction, Electrical Stimulation TENS, IFC NMES, Manual Therapy, Moist Heat/ Ice, Neuromuscular Re-ed, Patient Education, Self Care, Therapeutic Activities, Therapeutic Exercise, and dry needling.     Gary Crandall, PTA        "

## 2023-12-29 ENCOUNTER — CLINICAL SUPPORT (OUTPATIENT)
Dept: REHABILITATION | Facility: HOSPITAL | Age: 73
End: 2023-12-29
Payer: MEDICARE

## 2023-12-29 DIAGNOSIS — Z91.81 AT MODERATE RISK FOR FALL: Primary | ICD-10-CM

## 2023-12-29 PROCEDURE — 97110 THERAPEUTIC EXERCISES: CPT | Mod: PN | Performed by: PHYSICAL THERAPIST

## 2023-12-29 NOTE — PROGRESS NOTES
"[facility]   Physical Therapy Treatment Note      Name: Tracy Armendariz  Clinic Number: [id number]    Therapy Diagnosis:   Encounter Diagnosis   Name Primary?    At moderate risk for fall Yes     Physician: Daija Joy NP    Visit Date: 2023    Physician Orders: PT Eval and Treat   Medical Diagnosis from Referral:   M54.59 (ICD-10-CM) - Other low back pain   M47.896 (ICD-10-CM) - Other spondylosis, lumbar region      Evaluation Date: 2023  Authorization Period Expiration: 24  Plan of Care Expiration: 24  Progress Note Due: 24  Visit # / Visits authorized:  (+1)  FOTO: 8/10  PTA: 0/5     Precautions: Standard, hx of CVA     Time In: 1:00  pm  Time Out: 2:01 pm  Total Appointment Time (timed & untimed codes): 58 1:1 minutes (Tex4)    Subjective     Patient reports: that she's wondering if her low back pain is radiating from her hernia. She does feel better when laying down. She's interested in starting vestibular therapy soon.    She was not given a home exercise program.  Response to previous treatment: ongoing  Functional change: ongoing    Pain: 3/10  Location: left low back     Objective      23  Quad MMT via MicroFET: 17.2 kg    Treatment     Tracy received the treatments listed below:      Tracy received therapeutic exercises to develop strength, endurance, ROM, flexibility, posture, and core stabilization for 58 1:1 minutes including:     NUSTEP: 5 minutes level 1  Left quad set: 4 minutes with 10" holds  Left short arc quad: 10x10" holds with 2 lb   Straight leg raise: left 3x6, right 2x10   Transverse abdominis bracin minutes with 5" holds   Transverse abdominis bracing with ball squeeze: 2 minutes with 5" holds   Transverse abdominis bracing with BKFO: 2 minutes   Transverse abdominis bracing with alternating marches: 2 minutes   Isometric hip external rotation: 5x25" holds with 45" rest breaks  Isometric hip abduction: 5x25" holds " "with 45" rest breaks     Tracy received the following manual therapy techniques: Joint mobilizations, Manual traction, Myofacial release, and Soft tissue Mobilization were applied to the: back for 0 minutes:  MET for left posterior innominate     Tracy participated in neuromuscular re-education activities to improve: Balance, Coordination, Proprioception, and Posture for 0 minutes. The following activities were included:    therapeutic activities to improve functional performance for 0  minutes, including:  Not today    Patient Education and Home Exercises       Education provided:   - anatomy  - importance of home exercise program compliance  - attendance policy    Written Home Exercises Provided: yes. Exercises were reviewed and Tracy was able to demonstrate them prior to the end of the session.  Tracy demonstrated good  understanding of the education provided. See Electronic Medical Record under Patient Instructions for exercises provided during therapy sessions    Assessment     Tracy is a 73 y.o. female referred to outpatient Physical Therapy with a medical diagnosis of lumbar spondylosis and LBP.   Prognosis still limited by sedentary lifestyle despite education on importance of movement in recovery. Also limited by dizziness with plan on taking part in vestibular therapy in the near future (has a referral). Appropriate muscle soreness with low level glute and quad exercises still.       Tracy Is progressing well towards her goals.   Patient prognosis is Fair.     Patient will continue to benefit from skilled outpatient physical therapy to address the deficits listed in the problem list box on initial evaluation, provide pt/family education and to maximize pt's level of independence in the home and community environment.     Patient's spiritual, cultural and educational needs considered and pt agreeable to plan of care and goals.     Anticipated barriers to physical therapy: chronic nature of " "symptoms, co-morbidities    Goals:   Short Term Goals: 3-4 weeks:  1. Patient will demonstrate lateral hip MMTs > 4/5 for improved functional mobiltiy.  2. Patient will demonstrate left quad strength via MicroFET > 11 kg for improved transfers, standing and gait. MET 12/11/23  3. Patient will report worst pain less than 6/10 in back for improved tolerance to ADL performance. MET     Long Term Goals: 8 weeks:  1. Patient will report resuming cooking without increased symptoms.  2. Patient will demonstrate improved sit <> stand by performing > 7x in 30" without LOB or increase in pain.  3. Patient will improve FOTO to > 41% to improve ADL performance.  4. Patient will demonstrate improved endurance by completing 6 minute walk test with ww or rollator without resting or increased symptoms.    Plan     Cont with POC    Plan of care Certification: 11/30/2023 to 1/16/24.     Outpatient Physical Therapy 2 times weekly for 8 weeks to include the following interventions: Cervical/Lumbar Traction, Electrical Stimulation TENS, IFC NMES, Manual Therapy, Moist Heat/ Ice, Neuromuscular Re-ed, Patient Education, Self Care, Therapeutic Activities, Therapeutic Exercise, and dry needling.     Pedro Luis Padilla, PT        "

## 2024-01-02 ENCOUNTER — CLINICAL SUPPORT (OUTPATIENT)
Dept: REHABILITATION | Facility: HOSPITAL | Age: 74
End: 2024-01-02
Payer: MEDICARE

## 2024-01-02 DIAGNOSIS — Z91.81 AT MODERATE RISK FOR FALL: Primary | ICD-10-CM

## 2024-01-02 PROCEDURE — 97110 THERAPEUTIC EXERCISES: CPT | Mod: PN | Performed by: PHYSICAL THERAPIST

## 2024-01-02 PROCEDURE — 97140 MANUAL THERAPY 1/> REGIONS: CPT | Mod: PN | Performed by: PHYSICAL THERAPIST

## 2024-01-02 PROCEDURE — 97112 NEUROMUSCULAR REEDUCATION: CPT | Mod: PN | Performed by: PHYSICAL THERAPIST

## 2024-01-02 NOTE — PROGRESS NOTES
"[facility]   Physical Therapy Treatment Note      Name: Tracy Armendariz  Clinic Number: [id number]    Therapy Diagnosis:   Encounter Diagnosis   Name Primary?    At moderate risk for fall Yes     Physician: Daija Joy NP    Visit Date: 1/2/2024    Physician Orders: PT Eval and Treat   Medical Diagnosis from Referral:   M54.59 (ICD-10-CM) - Other low back pain   M47.896 (ICD-10-CM) - Other spondylosis, lumbar region      Evaluation Date: 11/30/2023  Authorization Period Expiration: 1/25/24  Plan of Care Expiration: 1/19/24  Progress Note Due: 1/16/24  Visit # / Visits authorized: 1/23 (+8)  FOTO: 9/10  PTA: 0/5     Precautions: Standard, hx of CVA     Time In: 2:00  pm  Time Out: 2:50 pm  Total Appointment Time (timed & untimed codes): 48 1:1 minutes (Te, MT, NMR)    Subjective     Patient reports: her left buttock has been hurting worse since she woke up today.     She was not given a home exercise program.  Response to previous treatment: ongoing  Functional change: ongoing    Pain: 5/10 (4/10 after manuals)  Location: left buttock    Objective      1/2/24  Quad MMT via MicroFET: 19 kg  Left posterior innominate    Treatment     Tracy received the treatments listed below:      Tracy received therapeutic exercises to develop strength, endurance, ROM, flexibility, posture, and core stabilization for 21 1:1 minutes including:     NUSTEP: 5 minutes level 1  Straight leg raise: left 3x6, right 2x10   Side lying hip abduction: 2x6 left   Clamshells: 10x5" holds left      Long arc quad: 3 pounds 3x10 left     Tracy received the following manual therapy techniques: Joint mobilizations, Manual traction, Myofacial release, and Soft tissue Mobilization were applied to the: back for 15 minutes:  MET for left posterior innominate followed by alternating isometric hip abd/add  Bilateral LAD  Manual intermittent lumbar traction     Tracy participated in neuromuscular re-education " "activities to improve: Balance, Coordination, Proprioception, and Posture for 12 minutes. The following activities were included:  Transverse abdominis bracin minutes with 5" holds   Transverse abdominis bracing with ball squeeze: 2 minutes with 5" holds   Transverse abdominis bracing with BKFO: 2 minutes   Transverse abdominis bracing with alternating marches: 2 minutes     therapeutic activities to improve functional performance for 0  minutes, including:  Not today    Patient Education and Home Exercises       Education provided:   - anatomy  - importance of home exercise program compliance  - attendance policy    Written Home Exercises Provided: yes. Exercises were reviewed and Tracy was able to demonstrate them prior to the end of the session.  Tracy demonstrated good  understanding of the education provided. See Electronic Medical Record under Patient Instructions for exercises provided during therapy sessions    Assessment     Tracy is a 73 y.o. female referred to outpatient Physical Therapy with a medical diagnosis of lumbar spondylosis and LBP.     Her dizziness is subjectively better and she arrived without her cane today. Overall, moderate irritability today. Mild unsteady with gait observed. Left posterior innominate corrected with MET, but only mild improvement in pain. Inc'd left lumbar pain with hooklying with HOB elevated, relieved with supine. Left low back and buttock symptoms resolved with manuals, but complaint of right shin discomfort with light manual intermittent traction. No directional preference throughout session. Inconsistent presentation of symptoms throughout session.       Tracy Is progressing well towards her goals.   Patient prognosis is Fair.     Patient will continue to benefit from skilled outpatient physical therapy to address the deficits listed in the problem list box on initial evaluation, provide pt/family education and to maximize pt's level of independence in " "the home and community environment.     Patient's spiritual, cultural and educational needs considered and pt agreeable to plan of care and goals.     Anticipated barriers to physical therapy: chronic nature of symptoms, co-morbidities    Goals:   Short Term Goals: 3-4 weeks:  1. Patient will demonstrate lateral hip MMTs > 4/5 for improved functional mobiltiy.  2. Patient will demonstrate left quad strength via MicroFET > 11 kg for improved transfers, standing and gait. MET 12/11/23  3. Patient will report worst pain less than 6/10 in back for improved tolerance to ADL performance. MET     Long Term Goals: 8 weeks:  1. Patient will report resuming cooking without increased symptoms.  2. Patient will demonstrate improved sit <> stand by performing > 7x in 30" without LOB or increase in pain.  3. Patient will improve FOTO to > 41% to improve ADL performance.  4. Patient will demonstrate improved endurance by completing 6 minute walk test with ww or rollator without resting or increased symptoms.    Plan     Cont with POC    Plan of care Certification: 11/30/2023 to 1/16/24.     Outpatient Physical Therapy 2 times weekly for 8 weeks to include the following interventions: Cervical/Lumbar Traction, Electrical Stimulation TENS, IFC NMES, Manual Therapy, Moist Heat/ Ice, Neuromuscular Re-ed, Patient Education, Self Care, Therapeutic Activities, Therapeutic Exercise, and dry needling.     Pedro Luis Padilla, PT        "

## 2024-01-04 ENCOUNTER — CLINICAL SUPPORT (OUTPATIENT)
Dept: REHABILITATION | Facility: HOSPITAL | Age: 74
End: 2024-01-04
Payer: MEDICARE

## 2024-01-04 DIAGNOSIS — Z91.81 AT MODERATE RISK FOR FALL: Primary | ICD-10-CM

## 2024-01-04 PROCEDURE — 97530 THERAPEUTIC ACTIVITIES: CPT | Mod: PN | Performed by: PHYSICAL THERAPIST

## 2024-01-04 PROCEDURE — 97112 NEUROMUSCULAR REEDUCATION: CPT | Mod: PN | Performed by: PHYSICAL THERAPIST

## 2024-01-04 NOTE — PROGRESS NOTES
"[facility]   Physical Therapy Treatment Note      Name: Tracy Armendariz  Clinic Number: [id number]    Therapy Diagnosis:   Encounter Diagnosis   Name Primary?    At moderate risk for fall Yes       Physician: Daija Joy NP    Visit Date: 1/4/2024    Physician Orders: PT Eval and Treat   Medical Diagnosis from Referral:   M54.59 (ICD-10-CM) - Other low back pain   M47.896 (ICD-10-CM) - Other spondylosis, lumbar region      Evaluation Date: 11/30/2023  Authorization Period Expiration: 1/25/24  Plan of Care Expiration: 1/19/24  Progress Note Due: 1/16/24  Visit # / Visits authorized: 2/23 (+8)  FOTO: 10/10  PTA: 0/5     Precautions: Standard, hx of CVA     Time In: 1:00  pm  Time Out: 1:56 pm  Total Appointment Time (timed & untimed codes): 28 1:1 minutes (TA, NMR)    Subjective     Patient reports: she feels much better today, but the right shin pain didn't go away until yesterday    She was not given a home exercise program.  Response to previous treatment: ongoing  Functional change: ongoing    Pain: 2/10 (4/10 after manuals)  Location: left buttock    Objective      1/2/24  Quad MMT via MicroFET: 19 kg  Left posterior innominate    Treatment     Tracy received the treatments listed below:      Tracy received therapeutic exercises to develop strength, endurance, ROM, flexibility, posture, and core stabilization for 8 1:1 and rest supervised minutes including:     NUSTEP: 5 minutes level 1  Standing hip abduction: 4x5 each    Long arc quad: 3 pounds 3x10 each       Not today:  Straight leg raise: left 3x6, right 2x10   Side lying hip abduction: 2x6 left   Clamshells: 10x5" holds left          Tracy received the following manual therapy techniques: Joint mobilizations, Manual traction, Myofacial release, and Soft tissue Mobilization were applied to the: back for 00 minutes:  MET for left posterior innominate followed by alternating isometric hip abd/add  Bilateral LAD  Manual " "intermittent lumbar traction     Tracy participated in neuromuscular re-education activities to improve: Balance, Coordination, Proprioception, and Posture for 10 1:1 and rest supervised minutes. The following activities were included:  Transverse abdominis bracin minutes with 5" holds   Transverse abdominis bracing with ball squeeze: 2 minutes with 5" holds   Transverse abdominis bracing with BKFO: 2 minutes   Transverse abdominis bracing with alternating marches: 2 minutes     Cone taps: 4x5 each (CGA)    therapeutic activities to improve functional performance for 10 1:1  minutes, including:    Sit <> stands: 2x5 (CGA, poor eccentric control)  Step ups: 4x5 each, 4" step    Patient Education and Home Exercises       Education provided:   - anatomy  - importance of home exercise program compliance  - attendance policy    Written Home Exercises Provided: yes. Exercises were reviewed and Tracy was able to demonstrate them prior to the end of the session.  Tracy demonstrated good  understanding of the education provided. See Electronic Medical Record under Patient Instructions for exercises provided during therapy sessions    Assessment     Tracy is a 73 y.o. female referred to outpatient Physical Therapy with a medical diagnosis of lumbar spondylosis and LBP.     Progressed to more functional/closed chain strengthening today, with CGA or close spv needed. Appropriate quad fatigue present. Improved sit <> stands with cues to pause between each rep. After leaving visit, pt had a fall on the curb in the parking lot. She reports getting dizzy briefly while looking down at the curb. She was able to get up and reports no pain or lingering affects of fall. She declined waiting.  was informed of fall.      Tracy Is progressing well towards her goals.   Patient prognosis is Fair.     Patient will continue to benefit from skilled outpatient physical therapy to address the deficits listed in the " "problem list box on initial evaluation, provide pt/family education and to maximize pt's level of independence in the home and community environment.     Patient's spiritual, cultural and educational needs considered and pt agreeable to plan of care and goals.     Anticipated barriers to physical therapy: chronic nature of symptoms, co-morbidities    Goals:   Short Term Goals: 3-4 weeks:  1. Patient will demonstrate lateral hip MMTs > 4/5 for improved functional mobiltiy.  2. Patient will demonstrate left quad strength via MicroFET > 11 kg for improved transfers, standing and gait. MET 12/11/23  3. Patient will report worst pain less than 6/10 in back for improved tolerance to ADL performance. MET     Long Term Goals: 8 weeks:  1. Patient will report resuming cooking without increased symptoms.  2. Patient will demonstrate improved sit <> stand by performing > 7x in 30" without LOB or increase in pain.  3. Patient will improve FOTO to > 41% to improve ADL performance.  4. Patient will demonstrate improved endurance by completing 6 minute walk test with ww or rollator without resting or increased symptoms.    Plan     Cont with POC    Plan of care Certification: 11/30/2023 to 1/16/24.     Outpatient Physical Therapy 2 times weekly for 8 weeks to include the following interventions: Cervical/Lumbar Traction, Electrical Stimulation TENS, IFC NMES, Manual Therapy, Moist Heat/ Ice, Neuromuscular Re-ed, Patient Education, Self Care, Therapeutic Activities, Therapeutic Exercise, and dry needling.     Pedro Luis Padilla, PT        "

## 2024-01-09 ENCOUNTER — CLINICAL SUPPORT (OUTPATIENT)
Dept: REHABILITATION | Facility: HOSPITAL | Age: 74
End: 2024-01-09
Payer: MEDICARE

## 2024-01-09 DIAGNOSIS — Z91.81 AT MODERATE RISK FOR FALL: Primary | ICD-10-CM

## 2024-01-09 PROCEDURE — 97110 THERAPEUTIC EXERCISES: CPT | Mod: PN | Performed by: PHYSICAL THERAPIST

## 2024-01-09 PROCEDURE — 97530 THERAPEUTIC ACTIVITIES: CPT | Mod: PN | Performed by: PHYSICAL THERAPIST

## 2024-01-09 PROCEDURE — 97112 NEUROMUSCULAR REEDUCATION: CPT | Mod: PN | Performed by: PHYSICAL THERAPIST

## 2024-01-09 NOTE — PROGRESS NOTES
"[facility]   Physical Therapy Treatment Note      Name: Tracy Armendariz  Clinic Number: [id number]    Therapy Diagnosis:   Encounter Diagnosis   Name Primary?    At moderate risk for fall Yes     Physician: Daija Joy NP    Visit Date: 1/9/2024    Physician Orders: PT Eval and Treat   Medical Diagnosis from Referral:   M54.59 (ICD-10-CM) - Other low back pain   M47.896 (ICD-10-CM) - Other spondylosis, lumbar region      Evaluation Date: 11/30/2023  Authorization Period Expiration: 1/25/24  Plan of Care Expiration: 1/19/24  Progress Note Due: 1/18/24  Visit # / Visits authorized: 3/23 (+8)  FOTO: performed 1/9/24  PTA: 0/5     Precautions: Standard, hx of CVA     Time In: 2:00  pm  Time Out: 2:54 pm  Total Appointment Time (timed & untimed codes): 54 1:1 minutes (TA, NMR, TEx2)    Subjective     Patient reports: her quads were a little sore, but no sharp pains or symptoms    She was not given a home exercise program.  Response to previous treatment: ongoing  Functional change: ongoing    Pain: 2/10  Location: low back    Objective      1/2/24  Quad MMT via MicroFET: 19 kg  Left posterior innominate    Treatment     Tracy received the treatments listed below:      Tracy received therapeutic exercises to develop strength, endurance, ROM, flexibility, posture, and core stabilization for 20 1:1 minutes including:     NUSTEP: 5 minutes level 1  Standing hip abduction: 4x5 each    Long arc quad: 3 pounds 3x10 each       Not today:  Straight leg raise: left 3x6, right 2x10   Side lying hip abduction: 2x6 left   Clamshells: 10x5" holds left          Tracy received the following manual therapy techniques: Joint mobilizations, Manual traction, Myofacial release, and Soft tissue Mobilization were applied to the: back for 00 minutes:  MET for left posterior innominate followed by alternating isometric hip abd/add  Bilateral LAD  Manual intermittent lumbar traction     Tracy participated " "in neuromuscular re-education activities to improve: Balance, Coordination, Proprioception, and Posture for 18 1:1 minutes. The following activities were included:  Transverse abdominis bracin minutes with 5" holds   Transverse abdominis bracing with ball squeeze: 2 minutes with 5" holds   Transverse abdominis bracing with BKFO: 2 minutes   Transverse abdominis bracing with alternating marches: 2 minutes     Cone taps: 4x5 each (CGA)    therapeutic activities to improve functional performance for 16 1:1  minutes, including:    Sit <> stands: 1x5 with pad on chair; 1x5 without pad  Step ups: 4x5 each, 4" step    Patient Education and Home Exercises       Education provided:   - anatomy  - importance of home exercise program compliance  - attendance policy    Written Home Exercises Provided: yes. Exercises were reviewed and Tracy was able to demonstrate them prior to the end of the session.  Tracy demonstrated good  understanding of the education provided. See Electronic Medical Record under Patient Instructions for exercises provided during therapy sessions    Assessment     Tracy is a 73 y.o. female referred to outpatient Physical Therapy with a medical diagnosis of lumbar spondylosis and LBP.     No apparent injury from fall in parking lot after last visit. Only complaint of mild soreness overall attributed to progression in treatment. She's been compliant with education with not sit longer than 1 hr at a time and move more often. Improved eccentric control with stand to sit.       Tracy Is progressing well towards her goals.   Patient prognosis is Fair.     Patient will continue to benefit from skilled outpatient physical therapy to address the deficits listed in the problem list box on initial evaluation, provide pt/family education and to maximize pt's level of independence in the home and community environment.     Patient's spiritual, cultural and educational needs considered and pt agreeable to " "plan of care and goals.     Anticipated barriers to physical therapy: chronic nature of symptoms, co-morbidities    Goals:   Short Term Goals: 3-4 weeks:  1. Patient will demonstrate lateral hip MMTs > 4/5 for improved functional mobiltiy.  2. Patient will demonstrate left quad strength via MicroFET > 11 kg for improved transfers, standing and gait. MET 12/11/23  3. Patient will report worst pain less than 6/10 in back for improved tolerance to ADL performance. MET     Long Term Goals: 8 weeks:  1. Patient will report resuming cooking without increased symptoms.  2. Patient will demonstrate improved sit <> stand by performing > 7x in 30" without LOB or increase in pain.  3. Patient will improve FOTO to > 41% to improve ADL performance. MET 1/9/24  4. Patient will demonstrate improved endurance by completing 6 minute walk test with ww or rollator without resting or increased symptoms.    Plan     Cont with POC    Plan of care Certification: 11/30/2023 to 1/19/24.     Outpatient Physical Therapy 2 times weekly for 8 weeks to include the following interventions: Cervical/Lumbar Traction, Electrical Stimulation TENS, IFC NMES, Manual Therapy, Moist Heat/ Ice, Neuromuscular Re-ed, Patient Education, Self Care, Therapeutic Activities, Therapeutic Exercise, and dry needling.     Pedro Luis Padilla, PT        "

## 2024-01-11 ENCOUNTER — CLINICAL SUPPORT (OUTPATIENT)
Dept: REHABILITATION | Facility: HOSPITAL | Age: 74
End: 2024-01-11
Payer: MEDICARE

## 2024-01-11 DIAGNOSIS — Z91.81 AT MODERATE RISK FOR FALL: Primary | ICD-10-CM

## 2024-01-11 PROCEDURE — 97530 THERAPEUTIC ACTIVITIES: CPT | Mod: PN | Performed by: PHYSICAL THERAPIST

## 2024-01-11 PROCEDURE — 97112 NEUROMUSCULAR REEDUCATION: CPT | Mod: PN | Performed by: PHYSICAL THERAPIST

## 2024-01-11 NOTE — PROGRESS NOTES
"[facility]   Physical Therapy Treatment Note      Name: Tracy Armendariz  Clinic Number: [id number]    Therapy Diagnosis:   Encounter Diagnosis   Name Primary?    At moderate risk for fall Yes     Physician: Daija Joy NP    Visit Date: 1/11/2024    Physician Orders: PT Eval and Treat   Medical Diagnosis from Referral:   M54.59 (ICD-10-CM) - Other low back pain   M47.896 (ICD-10-CM) - Other spondylosis, lumbar region      Evaluation Date: 11/30/2023  Authorization Period Expiration: 1/25/24  Plan of Care Expiration: 1/19/24  Progress Note Due: 1/18/24  Visit # / Visits authorized: 4/23 (+8)  FOTO: performed 1/9/24  PTA: 0/5     Precautions: Standard, hx of CVA     Time In: 1:12  pm  Time Out: 2:00 pm  Total Appointment Time (timed & untimed codes): 32 1:1 minutes (TA, NMR)    Subjective     Patient reports: "the back is much better, but it's hurting in the front (quads) now." Less discomfort after last visit    She was not given a home exercise program.  Response to previous treatment: decreased pain  Functional change: ongoing    Pain: 2/10  Location: low back    Objective      1/2/24  Quad MMT via MicroFET: 19 kg  Left posterior innominate    Treatment     Tracy received the treatments listed below:      Tracy received therapeutic exercises to develop strength, endurance, ROM, flexibility, posture, and core stabilization for 10 1:1 and rest supervised minutes including:     NUSTEP: 5 minutes level 1  Standing hip abduction: 4x5 each    Long arc quad: 4 pounds 3x10 each   Straight leg raise: 3x7 each    Not today:  Side lying hip abduction: 2x6 left   Clamshells: 10x5" holds left          Tracy received the following manual therapy techniques: Joint mobilizations, Manual traction, Myofacial release, and Soft tissue Mobilization were applied to the: back for 00 minutes:  MET for left posterior innominate followed by alternating isometric hip abd/add  Bilateral LAD  Manual " "intermittent lumbar traction     Tracy participated in neuromuscular re-education activities to improve: Balance, Coordination, Proprioception, and Posture for 12 1:1 and rest supervised minutes. The following activities were included:    Transverse abdominis bracin minutes with 5" holds   Transverse abdominis bracing with ball squeeze: 2 minutes with 5" holds   Transverse abdominis bracing with BKFO: 2 minutes   Transverse abdominis bracing with alternating marches: 2 minutes     Cone taps: 4x5 each (CGA) - not today    therapeutic activities to improve functional performance for 10 1:1  minutes, including:    Sit <> stands: 2x5   Step ups: 4x5 each, 4" step    Patient Education and Home Exercises       Education provided:   - anatomy  - importance of home exercise program compliance  - attendance policy    Written Home Exercises Provided: yes. Exercises were reviewed and Tracy was able to demonstrate them prior to the end of the session.  Tracy demonstrated good  understanding of the education provided. See Electronic Medical Record under Patient Instructions for exercises provided during therapy sessions    Assessment     Tracy is a 73 y.o. female referred to outpatient Physical Therapy with a medical diagnosis of lumbar spondylosis and LBP.     Better response to last visit without DOMS present. Slightly progressed session with increased weight with Long arc quad and resuming straight leg raise today. Expected fatigue present. Left session with  present to ensure balance.      Tracy Is progressing well towards her goals.   Patient prognosis is Fair.     Patient will continue to benefit from skilled outpatient physical therapy to address the deficits listed in the problem list box on initial evaluation, provide pt/family education and to maximize pt's level of independence in the home and community environment.     Patient's spiritual, cultural and educational needs considered and pt " "agreeable to plan of care and goals.     Anticipated barriers to physical therapy: chronic nature of symptoms, co-morbidities    Goals:   Short Term Goals: 3-4 weeks:  1. Patient will demonstrate lateral hip MMTs > 4/5 for improved functional mobiltiy.  2. Patient will demonstrate left quad strength via MicroFET > 11 kg for improved transfers, standing and gait. MET 12/11/23  3. Patient will report worst pain less than 6/10 in back for improved tolerance to ADL performance. MET     Long Term Goals: 8 weeks:  1. Patient will report resuming cooking without increased symptoms.  2. Patient will demonstrate improved sit <> stand by performing > 7x in 30" without LOB or increase in pain.  3. Patient will improve FOTO to > 41% to improve ADL performance. MET 1/9/24  4. Patient will demonstrate improved endurance by completing 6 minute walk test with ww or rollator without resting or increased symptoms.    Plan     Cont with POC    Plan of care Certification: 11/30/2023 to 1/19/24.     Outpatient Physical Therapy 2 times weekly for 8 weeks to include the following interventions: Cervical/Lumbar Traction, Electrical Stimulation TENS, IFC NMES, Manual Therapy, Moist Heat/ Ice, Neuromuscular Re-ed, Patient Education, Self Care, Therapeutic Activities, Therapeutic Exercise, and dry needling.     Pedro Luis Padilla, PT        "

## 2024-01-12 ENCOUNTER — OFFICE VISIT (OUTPATIENT)
Dept: INTERNAL MEDICINE | Facility: CLINIC | Age: 74
End: 2024-01-12
Payer: MEDICARE

## 2024-01-12 VITALS
SYSTOLIC BLOOD PRESSURE: 106 MMHG | HEIGHT: 66 IN | DIASTOLIC BLOOD PRESSURE: 64 MMHG | OXYGEN SATURATION: 96 % | HEART RATE: 71 BPM | RESPIRATION RATE: 16 BRPM | BODY MASS INDEX: 38.55 KG/M2 | TEMPERATURE: 97 F | WEIGHT: 239.88 LBS

## 2024-01-12 DIAGNOSIS — E78.2 MIXED HYPERLIPIDEMIA: Chronic | ICD-10-CM

## 2024-01-12 DIAGNOSIS — R73.03 PREDIABETES: ICD-10-CM

## 2024-01-12 DIAGNOSIS — I10 ESSENTIAL HYPERTENSION: Primary | Chronic | ICD-10-CM

## 2024-01-12 DIAGNOSIS — R10.30 LOWER ABDOMINAL PAIN: ICD-10-CM

## 2024-01-12 DIAGNOSIS — K40.20 BILATERAL INGUINAL HERNIA WITHOUT OBSTRUCTION OR GANGRENE, RECURRENCE NOT SPECIFIED: ICD-10-CM

## 2024-01-12 DIAGNOSIS — E66.01 SEVERE OBESITY (BMI 35.0-39.9) WITH COMORBIDITY: ICD-10-CM

## 2024-01-12 PROCEDURE — 99215 OFFICE O/P EST HI 40 MIN: CPT | Mod: S$GLB,,, | Performed by: HOSPITALIST

## 2024-01-12 PROCEDURE — 99999 PR PBB SHADOW E&M-EST. PATIENT-LVL V: CPT | Mod: PBBFAC,,, | Performed by: HOSPITALIST

## 2024-01-12 RX ORDER — LOSARTAN POTASSIUM 25 MG/1
25 TABLET ORAL 2 TIMES DAILY
Qty: 180 TABLET | Refills: 3 | Status: SHIPPED | OUTPATIENT
Start: 2024-01-12

## 2024-01-12 RX ORDER — MUPIROCIN 20 MG/G
OINTMENT TOPICAL 2 TIMES DAILY
Qty: 22 G | Refills: 0 | Status: SHIPPED | OUTPATIENT
Start: 2024-01-12

## 2024-01-12 NOTE — PROGRESS NOTES
Subjective:     @Patient ID: Tracy Armendariz is a 73 y.o. female.    Chief Complaint: Follow-up (BP trending 123/67 and highest 133/70; lowest reading 99/61) and Dizziness (Extreme dizziness)    HPI  73 y.o. female with prediabetes, MARTIN, HTN, HLD, hx of CVA, atherosclerosis of aorta, obesity, R knee OA, depression presents here for f/u of HTN and dizziness      HTN: currently on losartan 25 mg bid.  Her  reports that since reducing patient's blood pressure medication dose that her symptoms of dizziness have improved.  Initially at last office visit in October losartan was changed to 50 mg b.i.d. they then followed up with her cardiologist in losartan was further reduced to 50 mg once daily.  Family reports that she was still having dizziness however decided to cut 50 mg dose in half and take it twice a day.  Since then her symptoms have improved.  Patient does report still feeling dizzy. Has upcoming pt for balance therapy. Did have a few falls last week. Did not hit her head. Has small wound of R elbow extensor surface   HLD:  Atorvastatin 40 mg daily  3. Depression: lexapro 20 mg qday, wellbutrin 300 mg qday   4. Obesity: insurance does not cover wegovy  5: Prediabetes: A1c 5.8. reports had episode were fasting   6. Abdominal pain: reports having low abdominal/pelvic b/l when having b.m. not sure if pain related to inguinal hernias seen on outside imaging     Review of Systems   Constitutional:  Negative for chills and fever.   Gastrointestinal:  Positive for abdominal pain. Negative for nausea and vomiting.   Neurological:  Positive for dizziness.   Psychiatric/Behavioral:  Negative for agitation and confusion.      Past medical history, surgical history, and family medical history reviewed and updated as appropriate.    Medications and allergies reviewed.     Objective:     Vitals:    01/12/24 1438   BP: 106/64   BP Location: Left arm   Patient Position: Sitting   BP Method: Large (Manual)   Pulse:  "71   Resp: 16   Temp: 97.4 °F (36.3 °C)   TempSrc: Temporal   SpO2: 96%   Weight: 108.8 kg (239 lb 13.8 oz)   Height: 5' 6" (1.676 m)     Body mass index is 38.71 kg/m².  Physical Exam  Constitutional:       Appearance: Normal appearance.   HENT:      Head: Normocephalic and atraumatic.   Eyes:      General:         Right eye: No discharge.         Left eye: No discharge.      Conjunctiva/sclera: Conjunctivae normal.   Cardiovascular:      Rate and Rhythm: Normal rate and regular rhythm.      Heart sounds: No murmur heard.  Pulmonary:      Effort: Pulmonary effort is normal.      Breath sounds: Normal breath sounds.   Musculoskeletal:      Cervical back: Normal range of motion and neck supple.      Right lower leg: No edema.      Left lower leg: No edema.   Skin:     General: Skin is warm and dry.      Comments: small wound of R elbow extensor surface    Neurological:      Mental Status: She is alert and oriented to person, place, and time.   Psychiatric:         Mood and Affect: Mood normal.         Behavior: Behavior normal.       Lab Results   Component Value Date    WBC 7.39 10/16/2023    HGB 13.9 10/16/2023    HCT 42.5 10/16/2023     10/16/2023    CHOL 206 (H) 06/22/2023    TRIG 107 06/22/2023     (H) 06/22/2023    ALT 19 10/16/2023    AST 20 10/16/2023     10/16/2023    K 4.5 10/16/2023     10/16/2023    CREATININE 0.9 10/16/2023    BUN 14 10/16/2023    CO2 27 10/16/2023    TSH 3.555 10/16/2023    INR 1.0 01/13/2017    GLUF 123 (H) 11/04/2008    HGBA1C 5.8 (H) 10/16/2023       Assessment:     1. Essential hypertension    2. Prediabetes    3. Mixed hyperlipidemia    4. Bilateral inguinal hernia without obstruction or gangrene, recurrence not specified    5. Lower abdominal pain    6. Severe obesity (BMI 35.0-39.9) with comorbidity      Plan:   Tracy was seen today for follow-up and dizziness.    Diagnoses and all orders for this visit:    Essential hypertension  - Stable. Continue " home meds   -     Comprehensive Metabolic Panel; Future  -     Hemoglobin A1C; Future    Prediabetes  - stable. Continue to monitor   -     Comprehensive Metabolic Panel; Future  -     Hemoglobin A1C; Future    Mixed hyperlipidemia  - Stable. Continue home meds     Bilateral inguinal hernia without obstruction or gangrene, recurrence not specified  - seen on o/s imaging. Referral placed to general surgery   -     Ambulatory referral/consult to General Surgery; Future    Lower abdominal pain  See inguinal hernia  -     Ambulatory referral/consult to General Surgery; Future    Severe obesity (BMI 35.0-39.9) with comorbidity  - chronic. Counseled on low carb diet     Other orders  -     losartan (COZAAR) 25 MG tablet; Take 1 tablet (25 mg total) by mouth 2 (two) times a day.  -     mupirocin (BACTROBAN) 2 % ointment; Apply topically 2 (two) times daily. Up to 1 week          Visit time 40 min. Includes pre-charting, face-to-face encounter, medical decision making and documentation.       Madisyn Villalobos MD  Internal Medicine    1/12/2024

## 2024-01-16 ENCOUNTER — CLINICAL SUPPORT (OUTPATIENT)
Dept: REHABILITATION | Facility: HOSPITAL | Age: 74
End: 2024-01-16
Payer: MEDICARE

## 2024-01-16 DIAGNOSIS — Z91.81 AT MODERATE RISK FOR FALL: Primary | ICD-10-CM

## 2024-01-16 PROCEDURE — 97530 THERAPEUTIC ACTIVITIES: CPT | Mod: PN,CQ

## 2024-01-16 PROCEDURE — 97112 NEUROMUSCULAR REEDUCATION: CPT | Mod: PN,CQ

## 2024-01-16 PROCEDURE — 97110 THERAPEUTIC EXERCISES: CPT | Mod: PN,CQ

## 2024-01-16 NOTE — PROGRESS NOTES
"[facility]   Physical Therapy Treatment Note      Name: Tracy Armendariz  Clinic Number: [id number]    Therapy Diagnosis:   Encounter Diagnosis   Name Primary?    At moderate risk for fall Yes     Physician: Daija Joy NP    Visit Date: 1/16/2024    Physician Orders: PT Eval and Treat   Medical Diagnosis from Referral:   M54.59 (ICD-10-CM) - Other low back pain   M47.896 (ICD-10-CM) - Other spondylosis, lumbar region      Evaluation Date: 11/30/2023  Authorization Period Expiration: 1/25/24  Plan of Care Expiration: 1/19/24  Progress Note Due: 1/18/24  Visit # / Visits authorized: 5/23 (+8)  FOTO: performed 1/9/24  PTA: 1/5     Precautions: Standard, hx of CVA     Time In: 2:00  pm  Time Out: 2:55 pm  Total Appointment Time (timed & untimed codes): 55 1:1 minutes (1TA, 1 TE,1NMR)    Subjective     Patient reports: "My legs feel so stiff today". Pt agreeable to PT session  She was not given a home exercise program.  Response to previous treatment: decreased pain  Functional change: ongoing    Pain: 2/10  Location: low back    Objective      1/2/24  Quad MMT via MicroFET: 19 kg  Left posterior innominate    Treatment     Tracy received the treatments listed below:      Tracy received therapeutic exercises to develop strength, endurance, ROM, flexibility, posture, and core stabilization for 25 1:1 minutes including:     NUSTEP: 5 minutes level 1  Standing hip abduction: 4x5 each    Long arc quad: 4 pounds 3x10 each   Straight leg raise: 3x7 each  Standing hip abduction 2x10 B  Standing hip extension 2x10 B  Not today:  Side lying hip abduction: 2x6 left   Clamshells: 10x5" holds left      Tracy received the following manual therapy techniques: Joint mobilizations, Manual traction, Myofacial release, and Soft tissue Mobilization were applied to the: back for 00 minutes:  MET for left posterior innominate followed by alternating isometric hip abd/add  Bilateral LAD  Manual " "intermittent lumbar traction     Tracy participated in neuromuscular re-education activities to improve: Balance, Coordination, Proprioception, and Posture for 20 1:1 and rest supervised minutes. The following activities were included:    Transverse abdominis bracin minutes with 5" holds   Transverse abdominis bracing with ball squeeze: 2 minutes with 5" holds   Transverse abdominis bracing with BKFO: 2 minutes   Transverse abdominis bracing with alternating marches: 2 minutes     Cone taps: 4x5 each (CGA) - not today    therapeutic activities to improve functional performance for 10 1:1  minutes, including:    Sit <> stands: 2x5   Step ups: 4x5 each, 4" step    Patient Education and Home Exercises       Education provided:   - anatomy  - importance of home exercise program compliance  - attendance policy    Written Home Exercises Provided: yes. Exercises were reviewed and Tracy was able to demonstrate them prior to the end of the session.  Tracy demonstrated good  understanding of the education provided. See Electronic Medical Record under Patient Instructions for exercises provided during therapy sessions    Assessment     Tracy is a 73 y.o. female referred to outpatient Physical Therapy with a medical diagnosis of lumbar spondylosis and LBP.   Pt completed today's session no reports of pain. She continues to demonstrate occasional unsteady balance when ambulating (SBA is encouraged) mostly attributed to reports of occasional dizziness. Pt cooperative throughout session and able to follow verbal instructions on safety and technique in standing.   Issued updated WHEP.     Tracy Is progressing well towards her goals.   Patient prognosis is Fair.     Patient will continue to benefit from skilled outpatient physical therapy to address the deficits listed in the problem list box on initial evaluation, provide pt/family education and to maximize pt's level of independence in the home and community " "environment.     Patient's spiritual, cultural and educational needs considered and pt agreeable to plan of care and goals.     Anticipated barriers to physical therapy: chronic nature of symptoms, co-morbidities    Goals:   Short Term Goals: 3-4 weeks:  1. Patient will demonstrate lateral hip MMTs > 4/5 for improved functional mobiltiy.  2. Patient will demonstrate left quad strength via MicroFET > 11 kg for improved transfers, standing and gait. MET 12/11/23  3. Patient will report worst pain less than 6/10 in back for improved tolerance to ADL performance. MET     Long Term Goals: 8 weeks:  1. Patient will report resuming cooking without increased symptoms.  2. Patient will demonstrate improved sit <> stand by performing > 7x in 30" without LOB or increase in pain.  3. Patient will improve FOTO to > 41% to improve ADL performance. MET 1/9/24  4. Patient will demonstrate improved endurance by completing 6 minute walk test with ww or rollator without resting or increased symptoms.    Plan     Cont with POC    Plan of care Certification: 11/30/2023 to 1/19/24.     Outpatient Physical Therapy 2 times weekly for 8 weeks to include the following interventions: Cervical/Lumbar Traction, Electrical Stimulation TENS, IFC NMES, Manual Therapy, Moist Heat/ Ice, Neuromuscular Re-ed, Patient Education, Self Care, Therapeutic Activities, Therapeutic Exercise, and dry needling.     Gary Crandall PTA        "

## 2024-01-18 ENCOUNTER — CLINICAL SUPPORT (OUTPATIENT)
Dept: REHABILITATION | Facility: HOSPITAL | Age: 74
End: 2024-01-18
Payer: MEDICARE

## 2024-01-18 DIAGNOSIS — Z91.81 AT MODERATE RISK FOR FALL: Primary | ICD-10-CM

## 2024-01-18 PROCEDURE — 97110 THERAPEUTIC EXERCISES: CPT | Mod: PN | Performed by: PHYSICAL THERAPIST

## 2024-01-18 NOTE — PROGRESS NOTES
"[facility]   Physical Therapy Treatment Note      Name: Tracy Armendariz  Clinic Number: [id number]    Therapy Diagnosis:   Encounter Diagnosis   Name Primary?    At moderate risk for fall Yes       Physician: Daija Joy NP    Visit Date: 1/18/2024    Physician Orders: PT Eval and Treat   Medical Diagnosis from Referral:   M54.59 (ICD-10-CM) - Other low back pain   M47.896 (ICD-10-CM) - Other spondylosis, lumbar region      Evaluation Date: 11/30/2023  Authorization Period Expiration: 1/25/24  Plan of Care Expiration: 1/19/24  Progress Note Due: 1/18/24  Visit # / Visits authorized: 6/23 (+8)  FOTO: performed 1/9/24  PTA: 0/5     Precautions: Standard, hx of CVA     Time In: 1:00  pm  Time Out: 1:40 pm  Total Appointment Time (timed & untimed codes): 40 1:1 minutes (3 TE)    Subjective     Patient reports: she's been compliant with standing/walking every 30 minutes to 1 hr during the day. Standing in one spot is still her biggest issue. Her pain has been well controlled. She's interested in starting vestibular PT now.    She was not given a home exercise program.  Response to previous treatment: decreased pain  Functional change: ongoing    Pain: 2/10  Location: low back    Objective      1/18/24    Hip external rotation A/PROM: right: 45/45 d    left: 45 d  Hip internal rotation A/PROM:  right: 36/38 d   left: 40 d     Strength:  RLE   LLE     Hip flexion: 5/5 Hip flexion: 5/5   Hip Abduction: 4-/5 --> 5/5 Hip abduction: 4-/5 --> 5/5   Hip extension 4+/5 Hip extension 4+/5   Hip ER 4-/5 (5/5) Hip ER 3+/5 (5/5)   Hip IR 4-/5 (5/5) Hip IR 4-/5 (5/5)   Knee flexion: 5/5 Knee flexion: 5/5   Knee extension: 13.5 kg --> 24.9 Knee extension: 5.4 kg --> 22.4    Ankle Dorsiflexion: 5/5 Ankle Dorsiflexion: 5/5   Ankle Plantarflexion: 5/5 Ankle Plantarflexion: 5/5      30" sit <> stand: 7x (upper extremity on thighs)  6 minute walk test: 800 ft (no seated rest breaks)    Treatment     Tracy " received the treatments listed below:      Tracy received therapeutic exercises to develop strength, endurance, ROM, flexibility, posture, and core stabilization for 40 1:1 minutes including:     Objective measures  Education regarding importance of movement and home exercise program     Patient Education and Home Exercises       Education provided:   - anatomy  - importance of home exercise program compliance  - attendance policy    Written Home Exercises Provided: yes. Exercises were reviewed and Tracy was able to demonstrate them prior to the end of the session.  Tracy demonstrated good  understanding of the education provided. See Electronic Medical Record under Patient Instructions for exercises provided during therapy sessions    Assessment     Tracy is a 73 y.o. female referred to outpatient Physical Therapy with a medical diagnosis of lumbar spondylosis and LBP. She has completed 14 PT visits and has demonstrated improved pain, lower extremity strength and mobility as seen by testing. She has met all of her goals, including her FOTO. Her main deficit at this time is dizziness looking up and down. She reports that her BP is under control now, which has not improved her dizziness. She confirms increasing her activity level at home. She is appropriate and agreeable for discharge at this time.    Tracy Is progressing well towards her goals.   Patient prognosis is Fair.     Patient will continue to benefit from skilled outpatient physical therapy to address the deficits listed in the problem list box on initial evaluation, provide pt/family education and to maximize pt's level of independence in the home and community environment.     Patient's spiritual, cultural and educational needs considered and pt agreeable to plan of care and goals.     Anticipated barriers to physical therapy: chronic nature of symptoms, co-morbidities    Goals:   Short Term Goals: 3-4 weeks:  1. Patient will demonstrate lateral  "hip MMTs > 4/5 for improved functional mobiltiy. MET 1/18/24  2. Patient will demonstrate left quad strength via MicroFET > 11 kg for improved transfers, standing and gait. MET 12/11/23  3. Patient will report worst pain less than 6/10 in back for improved tolerance to ADL performance. MET     Long Term Goals: 8 weeks:  1. Patient will report resuming cooking without increased symptoms. MET 1/18/24  2. Patient will demonstrate improved sit <> stand by performing > 7x in 30" without LOB or increase in pain. MET 1/18/24  3. Patient will improve FOTO to > 41% to improve ADL performance. MET 1/9/24  4. Patient will demonstrate improved endurance by completing 6 minute walk test with ww or rollator without resting or increased symptoms. MET    Plan     CHANEL Padilla, PT        "

## 2024-01-19 ENCOUNTER — OFFICE VISIT (OUTPATIENT)
Dept: SURGERY | Facility: CLINIC | Age: 74
End: 2024-01-19
Payer: MEDICARE

## 2024-01-19 VITALS
BODY MASS INDEX: 38.6 KG/M2 | HEART RATE: 81 BPM | DIASTOLIC BLOOD PRESSURE: 66 MMHG | HEIGHT: 66 IN | WEIGHT: 240.19 LBS | SYSTOLIC BLOOD PRESSURE: 104 MMHG

## 2024-01-19 DIAGNOSIS — R10.30 LOWER ABDOMINAL PAIN: Primary | ICD-10-CM

## 2024-01-19 DIAGNOSIS — K40.20 BILATERAL INGUINAL HERNIA WITHOUT OBSTRUCTION OR GANGRENE, RECURRENCE NOT SPECIFIED: ICD-10-CM

## 2024-01-19 PROCEDURE — 99999 PR PBB SHADOW E&M-EST. PATIENT-LVL IV: CPT | Mod: PBBFAC,,, | Performed by: SURGERY

## 2024-01-19 PROCEDURE — 99204 OFFICE O/P NEW MOD 45 MIN: CPT | Mod: S$GLB,,, | Performed by: SURGERY

## 2024-01-19 NOTE — PROGRESS NOTES
General Surgery Clinic  History and Physical    Subjective:     Tracy Armendariz is a 73 y.o. female with h/o CVA w/o residual deficits, depression, HTN, HLD, MARTIN and orthostatic hypotension who presents to clinic for lower abdominal pain. This began 2-3 weeks ago and has been intermittent. The pain occurs prior to a bowel movement and during a bowel movement. It goes away as soon as she is finished with her bowel movement. She also reports swelling around her rectum with bowel movements. She denies constipation and diarrhea but does have constant nausea that has been present for a while. She denies groin bulges and groin pain.   She was referred here because an MRI that was obtained for her back showed bilateral inguinal hernias.     She denies a previous history of MI. Previous stroke without residual deficits.   She is not on anticoagulation.  Prior surgical history of hysterectomy,  and appendectomy          PMH:   Past Medical History:   Diagnosis Date    Allergy     Anemia, unspecified     Anticoagulant long-term use     Arthritis     CVA (cerebral infarction)     Depression     Disorder of kidney and ureter     renal stones    Diverticulosis of colon     Extrinsic asthma, unspecified     Hematuria, unspecified     Hyperlipidemia     Hypertension     Kidney stone     Left atrial enlargement 2014    Low back pain     Nephrolithiasis     MARTIN (obstructive sleep apnea)     Osteopenia     PUD (peptic ulcer disease)     Stroke 2013    Urinary tract infection        Past Surgical History:   Past Surgical History:   Procedure Laterality Date    APPENDECTOMY      @ time of hysterectomy    BACK SURGERY      CATARACT EXTRACTION       SECTION      CHOLECYSTECTOMY      laparoscopic    COLONOSCOPY N/A 2018    Procedure: COLONOSCOPY/Golytely;  Surgeon: Janine Black MD;  Location: [facility];  Service: Endoscopy;  Laterality: N/A;    CYSTOSCOPY W/ RETROGRADES  2022     Procedure: CYSTOSCOPY, WITH RETROGRADE PYELOGRAM;  Surgeon: Mitchell Recinos MD;  Location: [facility];  Service: Urology;;    CYSTOSCOPY W/ URETERAL STENT PLACEMENT Left 2022    Procedure: CYSTOSCOPY, WITH URETERAL STENT INSERTION;  Surgeon: Mitchell Recinos MD;  Location: [facility] OR;  Service: Urology;  Laterality: Left;    CYSTOURETEROSCOPY, WITH HOLMIUM LASER LITHOTRIPSY OF URETERAL CALCULUS AND STENT INSERTION Left 2022    Procedure: left ureteroscopy, holmium laser lithotripsy, stone basketing, retrograde pyelogram, stent placement;  Surgeon: Anaya Zamora MD;  Location: [facility];  Service: Urology;  Laterality: Left;    DILATION AND CURETTAGE OF UTERUS      EXTRACTION - STONE Left 2022    Procedure: EXTRACTION - STONE;  Surgeon: Anaya Zamora MD;  Location: [facility];  Service: Urology;  Laterality: Left;    HYSTERECTOMY      TAHUSO with appendectomy    INNER EAR SURGERY      replaced ear drum    JOINT REPLACEMENT Right     knee    KNEE ARTHROSCOPY W/ DEBRIDEMENT      LUMBAR DISCECTOMY      L4-L5    OOPHORECTOMY      unilateral    RETROGRADE PYELOGRAPHY  2022    Procedure: PYELOGRAM, RETROGRADE;  Surgeon: Anaya Zamora MD;  Location: [facility];  Service: Urology;;    TONSILLECTOMY      TYMPANOPLASTY      URETEROSCOPIC REMOVAL OF URETERIC CALCULUS Left 2018    Procedure: REMOVAL, CALCULUS, URETER, URETEROSCOPIC, holmium laser lithotripsy, stone basket extraction, retrograde pyelogram, ureteral stent exchange;  Surgeon: Anaya Zamora MD;  Location: [facility];  Service: Urology;  Laterality: Left;       Social History:  Social History     Socioeconomic History    Marital status:    Tobacco Use    Smoking status: Former     Current packs/day: 0.00     Types: Cigarettes     Quit date: 1995     Years since quittin.0    Smokeless tobacco: Never   Substance and Sexual Activity    Alcohol use: Yes     Alcohol/week: 1.0 standard drink of alcohol     Types: 1 Glasses of  wine per week     Comment: social    Drug use: No    Sexual activity: Yes     Partners: Male     Birth control/protection: Surgical     Comment:  since 1972     Social Determinants of Health     Financial Resource Strain: Low Risk  (12/15/2022)    Overall Financial Resource Strain (CARDIA)     Difficulty of Paying Living Expenses: Not hard at all   Food Insecurity: No Food Insecurity (12/15/2022)    Hunger Vital Sign     Worried About Running Out of Food in the Last Year: Never true     Ran Out of Food in the Last Year: Never true   Transportation Needs: No Transportation Needs (12/15/2022)    PRAPARE - Transportation     Lack of Transportation (Medical): No     Lack of Transportation (Non-Medical): No   Physical Activity: Inactive (12/15/2022)    Exercise Vital Sign     Days of Exercise per Week: 0 days     Minutes of Exercise per Session: 0 min   Stress: Stress Concern Present (12/15/2022)    [employer] [place] [employer] - Occupational Stress Questionnaire     Feeling of Stress : Rather much   Social Connections: Unknown (12/15/2022)    Social Connection and Isolation Panel [NHANES]     Frequency of Communication with Friends and Family: More than three times a week     Frequency of Social Gatherings with Friends and Family: More than three times a week     Marital Status:    Housing Stability: Low Risk  (12/15/2022)    Housing Stability Vital Sign     Unable to Pay for Housing in the Last Year: No     Number of Places Lived in the Last Year: 1     Unstable Housing in the Last Year: No       Allergies:   Review of patient's allergies indicates:   Allergen Reactions    Iodinated contrast media Hives and Rash    Gabapentin Hallucinations    Iodine Hives    Isothiazolinones Rash    Penicillins Rash       Medications:    Current Outpatient Medications on File Prior to Visit   Medication Sig Dispense Refill    albuterol (PROVENTIL/VENTOLIN HFA) 90 mcg/actuation inhaler INHALE 2 PUFFS EVERY 6  HOURS AS NEEDED FOR WHEEZING 18 g 0    amoxicillin (AMOXIL) 500 MG capsule Take 4 capsules by mouth 1 hour prior to appointment 12 capsule 0    aspirin (ECOTRIN) 81 MG EC tablet Take 81 mg by mouth once daily.      atorvastatin (LIPITOR) 40 MG tablet Take 1 tablet (40 mg total) by mouth every evening. 90 tablet 3    buPROPion (WELLBUTRIN XL) 300 MG 24 hr tablet Take 1 tablet (300 mg total) by mouth once daily. 90 tablet 11    COVID zin49-58,12up,,andu,,PF, (SPIKEVAX 5196-3589,12Y UP,,PF,) 50 mcg/0.5 mL injection Inject into the muscle. 0.5 mL 0    diclofenac sodium (VOLTAREN) 1 % Gel APPLY 2-4 GRAMS TO EACH PAINFUL AREA FOUR TIMES DAILY - MAX 32 GRAMS/ g 6    EScitalopram oxalate (LEXAPRO) 20 MG tablet Take 1 tablet (20 mg total) by mouth once daily. 90 tablet 3    esomeprazole (NEXIUM) 40 MG capsule Take 1 capsule (40 mg total) by mouth daily as needed (heartburn). 90 capsule 3    losartan (COZAAR) 25 MG tablet Take 1 tablet (25 mg total) by mouth 2 (two) times a day. 180 tablet 3    magnesium oxide (MAG-OX) 400 mg (241.3 mg magnesium) tablet Take 1 tablet (400 mg total) by mouth 2 (two) times daily. 180 tablet 3    MULTIVIT WITH CALCIUM,IRON,MIN (WOMEN'S DAILY MULTIVITAMIN ORAL) Take 1 tablet by mouth once daily.      mupirocin (BACTROBAN) 2 % ointment Apply topically 2 (two) times daily. Up to 1 week 22 g 0    potassium citrate (UROCIT-K 10) 10 mEq (1,080 mg) TbSR Take 2 tablets (20 mEq total) by mouth 3 (three) times daily with meals. 180 tablet 11    vitamin D (VITAMIN D3) 1000 units Tab Take 1,000 Units by mouth 3 (three) times a week.      [] varicella-zoster [id number], PF, (SHINGRIX, PF,) 50 mcg/0.5 mL injection Inject 0.5 mLs into the muscle once. for 1 dose 1 each 1     No current facility-administered medications on file prior to visit.         Objective:     PHYSICAL EXAM:  Vital Signs (Most Recent)  Pulse: 81 (24 1421)  BP: 104/66 (24 1421)    ROS A 10+ review of systems was  "performed with pertinent positives and negatives noted above in the history of present illness.  Other systems were negative unless otherwise specified.    Physical Exam:  Physical Exam  Vitals and nursing note reviewed.   Constitutional:       General: She is not in acute distress.     Appearance: She is obese.   HENT:      Head: Normocephalic and atraumatic.      Mouth/Throat:      Mouth: Mucous membranes are moist.   Cardiovascular:      Rate and Rhythm: Normal rate.   Pulmonary:      Effort: Pulmonary effort is normal. No respiratory distress.   Abdominal:      General: There is no distension.      Palpations: Abdomen is soft.      Tenderness: There is no abdominal tenderness. There is no guarding or rebound.      Comments: No inguinal hernias appreciated on exam     Skin:     General: Skin is warm and dry.   Neurological:      General: No focal deficit present.      Mental Status: She is alert and oriented to person, place, and time.       Imaging  The following imaging was reviewed: CT Abd/Pelvis 2022 with bilateral fat containing inguinal hernias          Assessment:     73 y.o. female with lower abdominal pain and bilateral inguinal hernia.     Plan:     - It is unlikely that pain is related to hernias as it seems to be only associated with defecation and is associated with "rectal swelling" per the patient. Discussed options of electively fixing the hernias, however she does not wish to proceed with this if it will not improve her pain.  - Would recommend referral to GI for further exploration of abdominal pain      Val Paige MD   [facility]      "

## 2024-01-22 ENCOUNTER — LAB VISIT (OUTPATIENT)
Dept: LAB | Facility: HOSPITAL | Age: 74
End: 2024-01-22
Attending: HOSPITALIST
Payer: MEDICARE

## 2024-01-22 DIAGNOSIS — I10 ESSENTIAL HYPERTENSION: Chronic | ICD-10-CM

## 2024-01-22 DIAGNOSIS — R73.03 PREDIABETES: ICD-10-CM

## 2024-01-22 LAB
ALBUMIN SERPL BCP-MCNC: 3.4 G/DL (ref 3.5–5.2)
ALP SERPL-CCNC: 94 U/L (ref 55–135)
ALT SERPL W/O P-5'-P-CCNC: 24 U/L (ref 10–44)
ANION GAP SERPL CALC-SCNC: 15 MMOL/L (ref 8–16)
AST SERPL-CCNC: 19 U/L (ref 10–40)
BILIRUB SERPL-MCNC: 0.3 MG/DL (ref 0.1–1)
BUN SERPL-MCNC: 13 MG/DL (ref 8–23)
CALCIUM SERPL-MCNC: 9.5 MG/DL (ref 8.7–10.5)
CHLORIDE SERPL-SCNC: 101 MMOL/L (ref 95–110)
CO2 SERPL-SCNC: 26 MMOL/L (ref 23–29)
CREAT SERPL-MCNC: 1.1 MG/DL (ref 0.5–1.4)
EST. GFR  (NO RACE VARIABLE): 53 ML/MIN/1.73 M^2
ESTIMATED AVG GLUCOSE: 117 MG/DL (ref 68–131)
GLUCOSE SERPL-MCNC: 121 MG/DL (ref 70–110)
HBA1C MFR BLD: 5.7 % (ref 4–5.6)
POTASSIUM SERPL-SCNC: 4.3 MMOL/L (ref 3.5–5.1)
PROT SERPL-MCNC: 7.1 G/DL (ref 6–8.4)
SODIUM SERPL-SCNC: 142 MMOL/L (ref 136–145)

## 2024-01-22 PROCEDURE — 36415 COLL VENOUS BLD VENIPUNCTURE: CPT | Performed by: HOSPITALIST

## 2024-01-22 PROCEDURE — 80053 COMPREHEN METABOLIC PANEL: CPT | Performed by: HOSPITALIST

## 2024-01-22 PROCEDURE — 83036 HEMOGLOBIN GLYCOSYLATED A1C: CPT | Performed by: HOSPITALIST

## 2024-02-01 ENCOUNTER — TELEPHONE (OUTPATIENT)
Dept: INTERNAL MEDICINE | Facility: CLINIC | Age: 74
End: 2024-02-01
Payer: MEDICARE

## 2024-02-01 NOTE — TELEPHONE ENCOUNTER
I spoke to pt's , he sent a message through his chart. Pt is interested in weight loss options.  NP Farhad was going to view her results and recommend some options.  Appt have been placed on hold on 2-6-24 at 3 pm.  He verbalized understanding

## 2024-02-01 NOTE — TELEPHONE ENCOUNTER
Hi,   Can you schedule this patient for a new patient appt Diabetes eval?   This is mr. Fransisco palumbo wife.   He is a patient I saw recently, he sent a message through the portal.   We'd be happy to offer her an appointment.   Thanks,   Candida

## 2024-02-06 ENCOUNTER — OFFICE VISIT (OUTPATIENT)
Dept: INTERNAL MEDICINE | Facility: CLINIC | Age: 74
End: 2024-02-06
Payer: MEDICARE

## 2024-02-06 VITALS
WEIGHT: 235.88 LBS | RESPIRATION RATE: 16 BRPM | DIASTOLIC BLOOD PRESSURE: 72 MMHG | HEIGHT: 66 IN | HEART RATE: 66 BPM | TEMPERATURE: 98 F | SYSTOLIC BLOOD PRESSURE: 116 MMHG | BODY MASS INDEX: 37.91 KG/M2 | OXYGEN SATURATION: 95 %

## 2024-02-06 DIAGNOSIS — Z74.09 IMPAIRED FUNCTIONAL MOBILITY AND ACTIVITY TOLERANCE: ICD-10-CM

## 2024-02-06 DIAGNOSIS — I10 ESSENTIAL HYPERTENSION: Chronic | ICD-10-CM

## 2024-02-06 DIAGNOSIS — R42 VERTIGO: ICD-10-CM

## 2024-02-06 DIAGNOSIS — E11.65 TYPE 2 DIABETES MELLITUS WITH HYPERGLYCEMIA, WITHOUT LONG-TERM CURRENT USE OF INSULIN: Primary | ICD-10-CM

## 2024-02-06 DIAGNOSIS — Z86.73 HISTORY OF CVA (CEREBROVASCULAR ACCIDENT): ICD-10-CM

## 2024-02-06 DIAGNOSIS — E66.01 SEVERE OBESITY (BMI 35.0-39.9) WITH COMORBIDITY: ICD-10-CM

## 2024-02-06 PROBLEM — R73.03 PREDIABETES: Status: RESOLVED | Noted: 2023-08-19 | Resolved: 2024-02-06

## 2024-02-06 PROCEDURE — 99999 PR PBB SHADOW E&M-EST. PATIENT-LVL IV: CPT | Mod: PBBFAC,,, | Performed by: NURSE PRACTITIONER

## 2024-02-06 PROCEDURE — 99215 OFFICE O/P EST HI 40 MIN: CPT | Mod: S$GLB,,, | Performed by: NURSE PRACTITIONER

## 2024-02-06 RX ORDER — TIRZEPATIDE 2.5 MG/.5ML
2.5 INJECTION, SOLUTION SUBCUTANEOUS
Qty: 4 PEN | Refills: 3 | Status: SHIPPED | OUTPATIENT
Start: 2024-02-06 | End: 2024-03-17

## 2024-02-06 NOTE — PROGRESS NOTES
HPI: Tracy Armendariz is a 73 y.o.  female c/I for visit to address Diabetes Type 2  This is the first time I am seeing her for care, follows with Madisyn Cole MD for primary care needs.   Has not seen endocrinology or diabetes specialist in the past.     was diagnosed with T2DM about 3 - 4 years ago.   Trying to manage glucose with lifestyle changes - and diet.   Has never been hospitalized r/t DM.  Denies missing doses of DM medication.     A1c is @ 5.7% -   Had back surgery in her 30's - L4/5 issues and began gaining weight at that time.   Stroke about 10 years ago - but can't tell me really what caused this.   She says she has vertigo a lot, but not sure what is causing it.   Going to physical therapy and having some balance issues.   States has gone to ENT and neurologist, urology recently.   ENT recommended a hearing aid for her right sided conductive hearing loss -   But she never did that yet.     Wants to lose weight.  Using cpap machine for sleep apnea.   States urine/bladder doesn't empty all of the time.   Not resting well of the time.   Had a tilt table test in past and states bp was dropping.   Multiple complaints - some back issues.     She checks her glucose intermittently - brings logs with her today -   145 - 2023 -   133 - 2023 -   121 - 2023 -   114 - 2023 -   121 - 2024 -     Complications from diabetes and pertinent co-morbidities include:   Negative for DKA.   Has never taken insulin in life.   -negative for diabetic neuropathy.   -negative for nephropathy.   +  microalbuminuria.   Eyes:  negative for Diabetic retinopathy   CV: + stroke. Had loop recorder placed years ago.   CAD: Denies.  Takes aspirin 81mg tablet daily  BP: has history of HTN  Statin: Taking  ACE/ARB: Taking    Social History     Tobacco Use   Smoking Status Former    Current packs/day: 0.00    Types: Cigarettes    Quit date: 1995    Years since quittin.1   Smokeless  Tobacco Never        Past medical History:   Past Medical History:   Diagnosis Date    Allergy     Anemia, unspecified     Anticoagulant long-term use     Arthritis     CVA (cerebral infarction) 2013    Depression     Disorder of kidney and ureter     renal stones    Diverticulosis of colon     Extrinsic asthma, unspecified     Hematuria, unspecified     Hyperlipidemia     Hypertension     Kidney stone     Left atrial enlargement 12/16/2014    Low back pain     Nephrolithiasis     MARTIN (obstructive sleep apnea)     Osteopenia     PUD (peptic ulcer disease)     Stroke 12/2013    Urinary tract infection       Family hx:   Family History   Problem Relation Age of Onset    Cervical cancer Mother     Cancer Mother 65        lung cancer - non smoker    Stroke Paternal Grandfather     Hypertension Maternal Grandfather     Heart disease Maternal Grandfather     Hypertension Father     Coronary artery disease Father 62    Heart disease Father     Diabetes Sister     Heart disease Sister     Kidney disease Sister     Breast cancer Daughter 36    Heart failure Sister         60s    Colon cancer Neg Hx     Ovarian cancer Neg Hx       Current meds:   Current Outpatient Medications:     albuterol (PROVENTIL/VENTOLIN HFA) 90 mcg/actuation inhaler, INHALE 2 PUFFS EVERY 6 HOURS AS NEEDED FOR WHEEZING, Disp: 18 g, Rfl: 0    amoxicillin (AMOXIL) 500 MG capsule, Take 4 capsules by mouth 1 hour prior to appointment, Disp: 12 capsule, Rfl: 0    aspirin (ECOTRIN) 81 MG EC tablet, Take 81 mg by mouth once daily., Disp: , Rfl:     atorvastatin (LIPITOR) 40 MG tablet, Take 1 tablet (40 mg total) by mouth every evening., Disp: 90 tablet, Rfl: 3    buPROPion (WELLBUTRIN XL) 300 MG 24 hr tablet, Take 1 tablet (300 mg total) by mouth once daily., Disp: 90 tablet, Rfl: 11    COVID qlx65-58,12up,,andu,,PF, (SPIKEVAX 4207-0872,12Y UP,,PF,) 50 mcg/0.5 mL injection, Inject into the muscle., Disp: 0.5 mL, Rfl: 0    diclofenac sodium (VOLTAREN) 1 % Gel,  APPLY 2-4 GRAMS TO EACH PAINFUL AREA FOUR TIMES DAILY - MAX 32 GRAMS/DAY, Disp: 500 g, Rfl: 6    EScitalopram oxalate (LEXAPRO) 20 MG tablet, Take 1 tablet (20 mg total) by mouth once daily., Disp: 90 tablet, Rfl: 3    esomeprazole (NEXIUM) 40 MG capsule, Take 1 capsule (40 mg total) by mouth daily as needed (heartburn)., Disp: 90 capsule, Rfl: 3    losartan (COZAAR) 25 MG tablet, Take 1 tablet (25 mg total) by mouth 2 (two) times a day., Disp: 180 tablet, Rfl: 3    magnesium oxide (MAG-OX) 400 mg (241.3 mg magnesium) tablet, Take 1 tablet (400 mg total) by mouth 2 (two) times daily., Disp: 180 tablet, Rfl: 3    MULTIVIT WITH CALCIUM,IRON,MIN (WOMEN'S DAILY MULTIVITAMIN ORAL), Take 1 tablet by mouth once daily., Disp: , Rfl:     mupirocin (BACTROBAN) 2 % ointment, Apply topically 2 (two) times daily. Up to 1 week, Disp: 22 g, Rfl: 0    potassium citrate (UROCIT-K 10) 10 mEq (1,080 mg) TbSR, Take 2 tablets (20 mEq total) by mouth 3 (three) times daily with meals., Disp: 180 tablet, Rfl: 11    tirzepatide (MOUNJARO) 2.5 mg/0.5 mL PnIj, Inject 2.5 mg into the skin every 7 days., Disp: 4 pen , Rfl: 3    vitamin D (VITAMIN D3) 1000 units Tab, Take 1,000 Units by mouth 3 (three) times a week., Disp: , Rfl:      Current Diabetes medications:   None.     Medications Tried and Failed:   None.     Social:   Lives at home with:   Life changes/stressors currently: none, just overall feeling dizzy, weak  Diet: following ADA diet   Meals: 3 per day and snacks.        Breakfast - waffles, panckakes, oatmeal, eggs,        Lunch - skips       Dinner - meat, veggies       Snacks -  nuts        Drinks - water, and lemon. Diet ice/sugar free flavored drinks.   Exercise: none.   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     Glucose Monitoring:   Not checking regularly.     Standards of care:   Eyes: .Most Recent Eye Exam Date: Not Found  Foot exam: Most Recent Foot Exam Date: Not Found   Diabetes  "education: None.    Vital Signs  /72   Pulse 66   Temp 98 °F (36.7 °C)   Resp 16   Ht 5' 6" (1.676 m)   Wt 107 kg (235 lb 14.3 oz)   LMP 01/01/1978 (Approximate)   SpO2 95%   BMI 38.07 kg/m²     Pertinent Labs:   Hgba1c   Lab Results   Component Value Date    HGBA1C 5.7 (H) 01/22/2024    HGBA1C 5.8 (H) 10/16/2023    HGBA1C 5.7 (H) 06/22/2023     Lipid panel   Lab Results   Component Value Date    CHOL 206 (H) 06/22/2023    CHOL 227 (H) 03/29/2022    CHOL 188 08/04/2020     Lab Results   Component Value Date     (H) 06/22/2023    HDL 90 (H) 03/29/2022    HDL 88 (H) 08/04/2020     Lab Results   Component Value Date    LDLCALC 82.6 06/22/2023    LDLCALC 112.6 03/29/2022    LDLCALC 74.2 08/04/2020     Lab Results   Component Value Date    TRIG 107 06/22/2023    TRIG 122 03/29/2022    TRIG 129 08/04/2020     Lab Results   Component Value Date    CHOLHDL 49.5 06/22/2023    CHOLHDL 39.6 03/29/2022    CHOLHDL 46.8 08/04/2020      CMP  Glucose   Date Value Ref Range Status   01/22/2024 121 (H) 70 - 110 mg/dL Final     BUN   Date Value Ref Range Status   01/22/2024 13 8 - 23 mg/dL Final     Creatinine   Date Value Ref Range Status   01/22/2024 1.1 0.5 - 1.4 mg/dL Final     eGFR if    Date Value Ref Range Status   03/29/2022 >60 >60 mL/min/1.73 m^2 Final     eGFR if non    Date Value Ref Range Status   03/29/2022 >60 >60 mL/min/1.73 m^2 Final     Comment:     Calculation used to obtain the estimated glomerular filtration  rate (eGFR) is the CKD-EPI equation.        AST   Date Value Ref Range Status   01/22/2024 19 10 - 40 U/L Final     ALT   Date Value Ref Range Status   01/22/2024 24 10 - 44 U/L Final     Microalbumin creatinine ratio:   Lab Results   Component Value Date    MICALBCREAT 125.9 (H) 08/15/2023       Review Of Systems:   Gen: Appetite good, no weight gain or loss, + fatigue,  Denies polydipsia.  Skin: Skin is intact and heals well, denies any rashes or hair " changes.   EENT: Denies any acute visual disturbances, nor blurred vision. Wearing glasses.   + dizziness and off balance.   Resp: Denies SOB or Dyspnea on exertion, denies cough.   Cardiac: Denies chest pain, palpitations, or swelling.   GI: Denies abdominal pain, nausea or vomiting, diarrhea, or constipation. Some GERD on occasion.   /GYN: Denies nocturia, nor burning, + frequency , no pain on urination.  MS/Neuro: Denies numbness/ tingling in BLE; Gait steady, speech clear  Psych: Denies drug/ETOH abuse, + hx of depression.  Other systems: negative.    Physical Exam:   GENERAL: Well developed, well nourished in appearance.   PSYCH: AAOx3, appropriate mood and affect, pleasant expression, conversant, appears relaxed, well groomed.   EYES: PERRL, Conjunctiva and corneas clear  NECK: Soft and Supple, trachea midline  CHEST: Even, regular, and unlabored respirations  ABDOMEN: Soft, non-tender, non-distended.   VASCULAR: pedal pulses palpable bilaterally, no edema.  NEURO:  cranial nerves II - XII intact   MUSCULOSKELETAL: Good ROM, equal strength, equal hand grasp, steady gait.     Assessment and Plan of Care:     Tracy was seen today for diabetes.    Diagnoses and all orders for this visit:    Type 2 diabetes mellitus with hyperglycemia, without long-term current use of insulin  -     tirzepatide (MOUNJARO) 2.5 mg/0.5 mL PnIj; Inject 2.5 mg into the skin every 7 days.  -     Hemoglobin A1C; Future  -     Lipid Panel; Future  -     Microalbumin/Creatinine Ratio, Urine; Future  -     Comprehensive Metabolic Panel; Future    History of CVA (cerebrovascular accident)    Essential hypertension    Severe obesity (BMI 35.0-39.9) with comorbidity    Impaired functional mobility and activity tolerance    Vertigo         1. T2DM with hyperglycemia- Hgba1c goal is 7.5% or less without hypoglycemia - 5.7% - 5.8% -   discussed DM, progression of disease, long term complications, CV risk factors and tx options.   Advise  compliance with ADA diet and encourage exercise   Will try Mounjaro 2.5mg every week -      2. HTN- controlled, continue meds as previously prescribed and monitor.   Losartan - questionable 25mg every morning - but check bp at home.   120/70 -     3. Lipids- LDL goal < 100.   Currently on statin therapy    4. Weight - BMI Body mass index is 38.07 kg/m².   Encourage Ada diet and exercise.     5. Renal Function - stable nephropathy.       6. Vertigo - further ENT workup - + conductive hearing loss. Needs hearing aid. Hasn't done yet.   Recommended follow     7. Anxiety/depression - lexapro, wellbutrin -     8. GERD - in past - with some abdominal hernias.    Having acid reflux - going to GI doctor soon to check on sizes of hernias -   Advised to call me if worsening gerd.       Follow up in 3 months with OV and labs prior

## 2024-02-06 NOTE — PATIENT INSTRUCTIONS
Start mounjaro 2.5mg every week -   Eat small frequent meals.   Don't eat late at night.     Let me know if you know have any side effects such as nausea/vomiting or abdominal pain.   Avoid fatty foods, fried foods, processed foods, fake foods.     To avoid symptoms of GI upset - I find limiting foods such as these below can often help with nausea or diarrhea when taking GLP1 or gip1 medications:    Seed oils (canola, vegetable oils),   Milk products (cheese, yogurt) -   Fried foods or overly processed foods (sweets/cakes/cookies).   Or nightshades - veggies/fruits with the dark colored skins - (tomatoe, eggplant, dark grapes in excess)

## 2024-02-17 ENCOUNTER — LAB VISIT (OUTPATIENT)
Dept: LAB | Facility: HOSPITAL | Age: 74
End: 2024-02-17
Payer: MEDICARE

## 2024-02-17 DIAGNOSIS — N20.0 STONE IN KIDNEY: ICD-10-CM

## 2024-02-17 DIAGNOSIS — E11.65 TYPE 2 DIABETES MELLITUS WITH HYPERGLYCEMIA, WITHOUT LONG-TERM CURRENT USE OF INSULIN: ICD-10-CM

## 2024-02-17 LAB
ALBUMIN/CREAT UR: 160.7 UG/MG (ref 0–30)
ALBUMIN/CREAT UR: 160.7 UG/MG (ref 0–30)
BACTERIA #/AREA URNS HPF: ABNORMAL /HPF
BILIRUB UR QL STRIP: NEGATIVE
CLARITY UR: ABNORMAL
COLOR UR: YELLOW
CREAT UR-MCNC: 229 MG/DL (ref 15–325)
CREAT UR-MCNC: 229 MG/DL (ref 15–325)
GLUCOSE UR QL STRIP: NEGATIVE
HGB UR QL STRIP: NEGATIVE
HYALINE CASTS #/AREA URNS LPF: 7 /LPF
KETONES UR QL STRIP: NEGATIVE
LEUKOCYTE ESTERASE UR QL STRIP: ABNORMAL
MICROALBUMIN UR DL<=1MG/L-MCNC: 368 UG/ML
MICROALBUMIN UR DL<=1MG/L-MCNC: 368 UG/ML
MICROSCOPIC COMMENT: ABNORMAL
NITRITE UR QL STRIP: NEGATIVE
PH UR STRIP: 6 [PH] (ref 5–8)
PROT UR QL STRIP: ABNORMAL
RBC #/AREA URNS HPF: 4 /HPF (ref 0–4)
SP GR UR STRIP: 1.01 (ref 1–1.03)
SQUAMOUS #/AREA URNS HPF: 20 /HPF
URN SPEC COLLECT METH UR: ABNORMAL
UROBILINOGEN UR STRIP-ACNC: NEGATIVE EU/DL
WBC #/AREA URNS HPF: 45 /HPF (ref 0–5)
YEAST URNS QL MICRO: ABNORMAL

## 2024-02-17 PROCEDURE — 82043 UR ALBUMIN QUANTITATIVE: CPT | Performed by: INTERNAL MEDICINE

## 2024-02-17 PROCEDURE — 81000 URINALYSIS NONAUTO W/SCOPE: CPT | Performed by: INTERNAL MEDICINE

## 2024-02-19 ENCOUNTER — OFFICE VISIT (OUTPATIENT)
Dept: GASTROENTEROLOGY | Facility: CLINIC | Age: 74
End: 2024-02-19
Payer: MEDICARE

## 2024-02-19 VITALS — BODY MASS INDEX: 37.18 KG/M2 | WEIGHT: 230.38 LBS

## 2024-02-19 DIAGNOSIS — R10.9 ABDOMINAL CRAMPING: ICD-10-CM

## 2024-02-19 PROCEDURE — 99999 PR PBB SHADOW E&M-EST. PATIENT-LVL IV: CPT | Mod: PBBFAC,,,

## 2024-02-19 PROCEDURE — 99214 OFFICE O/P EST MOD 30 MIN: CPT | Mod: S$GLB,,,

## 2024-02-19 NOTE — PATIENT INSTRUCTIONS
Start taking miralax daily   - may cause some loose stool in the first week   - if continues, then take every other day     Please let me know if you do not get relief after about 2-3 weeks of taking consistently

## 2024-02-19 NOTE — PROGRESS NOTES
GASTROENTEROLOGY CLINIC NOTE    Reason for visit: The encounter diagnosis was Abdominal cramping.  Referring provider/PCP: Madisyn Villalobos MD    HPI:  Tracy Armendariz is a 73 y.o. female here today for abd cramping and constipation, she is accompanied by her . She was seen by Dr. Duron in 2021, trialed metamucil which helped at the time. She reports abd pain occurs right before/during a BM. Will have cramping. Once BM is completed, pain improves. She reports recently starting mounjaro- no UGI symptoms, but does feel it is contributing to constipation. She takes colace daily and will have daily BM. Does still have straining, not feeling she is emptying with BM's. She was recently seen by [facility] for bilateral inguinal hernias- no surgery needed at this time. UTD on CRC screening.     Initial Visit with Dr. Duron in 2021:  Tracy Armendariz is a 71 y.o. female here today for alternating bowel habits, new patient.  Accompanied by .     Symptoms ongoing since November or so, has some cramping like needs to have BM. ususally induced by exercise or walking. Has fecal incontinence at times. Will have really solid movement and then loose and runny. Only assoc with movement. No blood.  Will have BM maybe every 3rd day. Has some straining to have BM. Used to use metamucil and that seemed to help but stopped.   abd cramping as well. Some nausea. She is distressed by the symptosm.     Prior Endoscopy:  EGD:  Colon: 2018 with Dr. Black:     - Granularity in the descending colon and in the ascending colon. Biopsied.                 - The examination was otherwise normal on direct and retroflexion views.                 - Diverticulosis in the sigmoid colon and in the descending colon.                  - Repeat colonoscopy in 10 years for screening purposes     PATH:   1. Colon biopsy: normal colonic mucosa.     (Portions of this note were dictated using voice recognition software and may contain dictation  related errors in spelling/grammar/syntax not found on text review)    Review of Systems   Gastrointestinal:  Positive for abdominal pain and constipation. Negative for blood in stool.       Past Medical History: has a past medical history of Allergy, Anemia, unspecified, Anticoagulant long-term use, Arthritis, CVA (cerebral infarction), Depression, Disorder of kidney and ureter, Diverticulosis of colon, Extrinsic asthma, unspecified, Hematuria, unspecified, Hyperlipidemia, Hypertension, Kidney stone, Left atrial enlargement, Low back pain, Nephrolithiasis, MARTIN (obstructive sleep apnea), Osteopenia, PUD (peptic ulcer disease), Stroke, and Urinary tract infection.    Past Surgical History: has a past surgical history that includes Inner ear surgery; Cholecystectomy ();  section (); Dilation and curettage of uterus (); Appendectomy (); Hysterectomy (); Tympanoplasty; Lumbar discectomy (); Knee arthroscopy w/ debridement (); Tonsillectomy; Back surgery; Cataract extraction; Colonoscopy (N/A, 2018); Joint replacement (Right); Ureteroscopic removal of ureteric calculus (Left, 2018); Oophorectomy; Cystoscopy w/ ureteral stent placement (Left, 2022); Cystoscopy w/ retrogrades (2022); cystoureteroscopy, with holmium laser lithotripsy of ureteral calculus and stent insertion (Left, 2022); Retrograde pyelography (2022); and extraction - stone (Left, 2022).    Home medications:   Current Outpatient Medications on File Prior to Visit   Medication Sig Dispense Refill    albuterol (PROVENTIL/VENTOLIN HFA) 90 mcg/actuation inhaler INHALE 2 PUFFS EVERY 6 HOURS AS NEEDED FOR WHEEZING 18 g 0    amoxicillin (AMOXIL) 500 MG capsule Take 4 capsules by mouth 1 hour prior to appointment 12 capsule 0    aspirin (ECOTRIN) 81 MG EC tablet Take 81 mg by mouth once daily.      atorvastatin (LIPITOR) 40 MG tablet Take 1 tablet (40 mg total) by mouth every evening. 90  tablet 3    buPROPion (WELLBUTRIN XL) 300 MG 24 hr tablet Take 1 tablet (300 mg total) by mouth once daily. 90 tablet 11    COVID ges65-70,12up,,andu,,PF, (SPIKEVAX 8624-4346,12Y UP,,PF,) 50 mcg/0.5 mL injection Inject into the muscle. 0.5 mL 0    diclofenac sodium (VOLTAREN) 1 % Gel APPLY 2-4 GRAMS TO EACH PAINFUL AREA FOUR TIMES DAILY - MAX 32 GRAMS/ g 6    EScitalopram oxalate (LEXAPRO) 20 MG tablet Take 1 tablet (20 mg total) by mouth once daily. 90 tablet 3    esomeprazole (NEXIUM) 40 MG capsule Take 1 capsule (40 mg total) by mouth daily as needed (heartburn). 90 capsule 3    losartan (COZAAR) 25 MG tablet Take 1 tablet (25 mg total) by mouth 2 (two) times a day. 180 tablet 3    magnesium oxide (MAG-OX) 400 mg (241.3 mg magnesium) tablet Take 1 tablet (400 mg total) by mouth 2 (two) times daily. 180 tablet 3    MULTIVIT WITH CALCIUM,IRON,MIN (WOMEN'S DAILY MULTIVITAMIN ORAL) Take 1 tablet by mouth once daily.      mupirocin (BACTROBAN) 2 % ointment Apply topically 2 (two) times daily. Up to 1 week 22 g 0    potassium citrate (UROCIT-K 10) 10 mEq (1,080 mg) TbSR Take 2 tablets (20 mEq total) by mouth 3 (three) times daily with meals. 180 tablet 11    tirzepatide (MOUNJARO) 2.5 mg/0.5 mL PnIj Inject 2.5 mg into the skin every 7 days. 4 pen 3    vitamin D (VITAMIN D3) 1000 units Tab Take 1,000 Units by mouth 3 (three) times a week.       No current facility-administered medications on file prior to visit.       Vital signs:  Wt 104.5 kg (230 lb 6.1 oz)   LMP 01/01/1978 (Approximate)   BMI 37.18 kg/m²     Physical Exam  Constitutional:       Appearance: Normal appearance. She is obese.   Abdominal:      General: There is no distension.   Neurological:      Mental Status: She is alert.       Greater than 30 minutes of time was spent in a combination of : face-to-face encounter, a focused physical exam and history, reviewing outside records, reviewing of any available radiographs, counseling, communicating  with other health care professionals regarding this patient's medical condition, and documenting clinical information in the medical record.    I have reviewed associated labs, imaging and notes.     Assessment:  1. Abdominal cramping    Constipation, worsened with mounjaro   Daily BM's with colace   Not emptying, straining, abd cramping with BM's  No blood  UTD on CRC screening     Suspect abd cramping d/t underlying constipation. Should improve with BM improvement. If continues, trial bentyl.     Plan:     Start taking miralax daily    Adjust as needed    F/U if no improvement     Lindsey Ray, NP [facility]

## 2024-02-26 ENCOUNTER — HOSPITAL ENCOUNTER (OUTPATIENT)
Dept: RADIOLOGY | Facility: HOSPITAL | Age: 74
Discharge: HOME | End: 2024-02-26
Attending: INTERNAL MEDICINE
Payer: MEDICARE

## 2024-02-26 DIAGNOSIS — N20.0 STONE IN KIDNEY: ICD-10-CM

## 2024-02-26 PROCEDURE — 76770 US EXAM ABDO BACK WALL COMP: CPT | Mod: TC

## 2024-02-26 PROCEDURE — 76770 US EXAM ABDO BACK WALL COMP: CPT | Mod: 26,,, | Performed by: STUDENT IN AN ORGANIZED HEALTH CARE EDUCATION/TRAINING PROGRAM

## 2024-03-04 ENCOUNTER — LAB VISIT (OUTPATIENT)
Dept: LAB | Facility: HOSPITAL | Age: 74
End: 2024-03-04
Payer: MEDICARE

## 2024-03-04 DIAGNOSIS — N39.0 UTI (URINARY TRACT INFECTION), UNCOMPLICATED: ICD-10-CM

## 2024-03-04 LAB
ALBUMIN/CREAT UR: 52.7 UG/MG (ref 0–30)
BACTERIA #/AREA URNS HPF: NORMAL /HPF
BILIRUB UR QL STRIP: NEGATIVE
CLARITY UR: CLEAR
COLOR UR: YELLOW
CREAT UR-MCNC: 165 MG/DL (ref 15–325)
GLUCOSE UR QL STRIP: NEGATIVE
HGB UR QL STRIP: NEGATIVE
HYALINE CASTS #/AREA URNS LPF: 0 /LPF
KETONES UR QL STRIP: NEGATIVE
LEUKOCYTE ESTERASE UR QL STRIP: ABNORMAL
MICROALBUMIN UR DL<=1MG/L-MCNC: 87 UG/ML
MICROSCOPIC COMMENT: NORMAL
NITRITE UR QL STRIP: NEGATIVE
PH UR STRIP: 7 [PH] (ref 5–8)
PROT UR QL STRIP: ABNORMAL
RBC #/AREA URNS HPF: 1 /HPF (ref 0–4)
SP GR UR STRIP: 1.01 (ref 1–1.03)
SQUAMOUS #/AREA URNS HPF: 5 /HPF
URN SPEC COLLECT METH UR: ABNORMAL
UROBILINOGEN UR STRIP-ACNC: NEGATIVE EU/DL
WBC #/AREA URNS HPF: 5 /HPF (ref 0–5)

## 2024-03-04 PROCEDURE — 82043 UR ALBUMIN QUANTITATIVE: CPT | Performed by: INTERNAL MEDICINE

## 2024-03-04 PROCEDURE — 81000 URINALYSIS NONAUTO W/SCOPE: CPT | Performed by: INTERNAL MEDICINE

## 2024-03-06 ENCOUNTER — PATIENT MESSAGE (OUTPATIENT)
Dept: INTERNAL MEDICINE | Facility: CLINIC | Age: 74
End: 2024-03-06
Payer: MEDICARE

## 2024-03-06 DIAGNOSIS — F32.A DEPRESSION, UNSPECIFIED DEPRESSION TYPE: ICD-10-CM

## 2024-03-07 RX ORDER — ESCITALOPRAM OXALATE 20 MG/1
20 TABLET ORAL DAILY
Qty: 90 TABLET | Refills: 3 | Status: SHIPPED | OUTPATIENT
Start: 2024-03-07

## 2024-03-07 NOTE — TELEPHONE ENCOUNTER
No care due was identified.  [facility] Embedded Care Due Messages. Reference number: [id number].   3/07/2024 8:36:32 AM CST

## 2024-03-11 ENCOUNTER — LAB VISIT (OUTPATIENT)
Dept: LAB | Facility: HOSPITAL | Age: 74
End: 2024-03-11
Payer: MEDICARE

## 2024-03-11 DIAGNOSIS — N39.0 UTI (URINARY TRACT INFECTION), UNCOMPLICATED: ICD-10-CM

## 2024-03-11 PROCEDURE — 87086 URINE CULTURE/COLONY COUNT: CPT | Performed by: INTERNAL MEDICINE

## 2024-03-15 ENCOUNTER — PATIENT MESSAGE (OUTPATIENT)
Dept: INTERNAL MEDICINE | Facility: CLINIC | Age: 74
End: 2024-03-15
Payer: MEDICARE

## 2024-03-15 DIAGNOSIS — E11.9 TYPE 2 DIABETES MELLITUS WITHOUT COMPLICATION, WITHOUT LONG-TERM CURRENT USE OF INSULIN: Primary | ICD-10-CM

## 2024-03-17 RX ORDER — TIRZEPATIDE 5 MG/.5ML
5 INJECTION, SOLUTION SUBCUTANEOUS
Qty: 4 PEN | Refills: 6 | Status: SHIPPED | OUTPATIENT
Start: 2024-03-17 | End: 2024-06-17

## 2024-03-19 LAB
BACTERIA UR CULT: NORMAL
BACTERIA UR CULT: NORMAL

## 2024-03-20 ENCOUNTER — CLINICAL SUPPORT (OUTPATIENT)
Dept: OTOLARYNGOLOGY | Facility: CLINIC | Age: 74
End: 2024-03-20
Payer: MEDICARE

## 2024-03-20 ENCOUNTER — OFFICE VISIT (OUTPATIENT)
Dept: OTOLARYNGOLOGY | Facility: CLINIC | Age: 74
End: 2024-03-20
Payer: MEDICARE

## 2024-03-20 VITALS
HEART RATE: 65 BPM | SYSTOLIC BLOOD PRESSURE: 121 MMHG | WEIGHT: 227.19 LBS | BODY MASS INDEX: 36.67 KG/M2 | DIASTOLIC BLOOD PRESSURE: 53 MMHG

## 2024-03-20 DIAGNOSIS — H90.A31 MIXED CONDUCTIVE AND SENSORINEURAL HEARING LOSS OF RIGHT EAR WITH RESTRICTED HEARING OF LEFT EAR: Primary | ICD-10-CM

## 2024-03-20 DIAGNOSIS — Z98.890 HISTORY OF TYMPANOPLASTY OF RIGHT EAR: ICD-10-CM

## 2024-03-20 DIAGNOSIS — R42 DIZZINESS: Primary | ICD-10-CM

## 2024-03-20 DIAGNOSIS — J31.0 RHINITIS, UNSPECIFIED TYPE: ICD-10-CM

## 2024-03-20 DIAGNOSIS — H61.23 BILATERAL IMPACTED CERUMEN: ICD-10-CM

## 2024-03-20 DIAGNOSIS — H90.A11 CONDUCTIVE HEARING LOSS OF RIGHT EAR WITH RESTRICTED HEARING OF LEFT EAR: ICD-10-CM

## 2024-03-20 PROCEDURE — 99999 PR PBB SHADOW E&M-EST. PATIENT-LVL IV: CPT | Mod: PBBFAC,,, | Performed by: NURSE PRACTITIONER

## 2024-03-20 PROCEDURE — 92567 TYMPANOMETRY: CPT | Mod: S$GLB,,, | Performed by: PHYSICIAN ASSISTANT

## 2024-03-20 PROCEDURE — 69210 REMOVE IMPACTED EAR WAX UNI: CPT | Mod: S$GLB,,, | Performed by: NURSE PRACTITIONER

## 2024-03-20 PROCEDURE — 99214 OFFICE O/P EST MOD 30 MIN: CPT | Mod: 25,S$GLB,, | Performed by: NURSE PRACTITIONER

## 2024-03-20 RX ORDER — FLUTICASONE PROPIONATE 50 MCG
2 SPRAY, SUSPENSION (ML) NASAL DAILY
Qty: 16 G | Refills: 11 | Status: SHIPPED | OUTPATIENT
Start: 2024-03-20

## 2024-03-20 NOTE — PROGRESS NOTES
Tracy Armendariz, a 73 y.o. female, was seen today in the clinic for tympanometry testing.  Ms. Armendariz has a history of RIGHT tympanoplasty.  We reviewed her audiogram completed in 2023 and discussed the benefits of amplification.    Tympanometry revealed Type A in the right ear and Type A in the left ear.     Recommendations:  Otologic evaluation  Annual audiogram  Hearing protection when in noise  Hearing Aid Consultation following medical clearance

## 2024-03-20 NOTE — PROGRESS NOTES
Chief Complaint   Patient presents with    Follow-up     Overall well since last visit        HPI 10/2/2023: The patient who is self-referred for evaluation of dizziness.  She has had this in the past and has seen Dr. Alfonso and did vestibular therapy.  The symptoms started several years ago and have gradually worsened. The sensation is also described as: a sensation of movement of surroundings and off balance.The attacks occur daily and last all  day . Positions that worsen symptoms: bending over and turning head.  Associated ear symptoms: tinnitus  hearing loss. Associated CNS symptoms: headaches. Recent infections: none. Head trauma: denied. Drug ingestion prior to onset: none. Noise exposure: no occupational exposure.Previous workup: none. Previous treatment includes: meclizine  Response to this treatment has been poor   Patient denies a history of migraine headaches. She has a hx of CVA and ataxia. She has also seen Dr. Philippe, neurology.   Hx of right tympanoplasty.     Interval HPI 3/20/2024:  Follow up visit. She reports having no falls in the past 4 months. She still has some dizziness but has not started vestibular therapy. She is interested to start therapy now. She is also interested in hearing aids. No ear pain or drainage. She has occasional nasal congestion.     Past Medical History:   Diagnosis Date    Allergy     Anemia, unspecified     Anticoagulant long-term use     Arthritis     CVA (cerebral infarction) 2013    Depression     Disorder of kidney and ureter     renal stones    Diverticulosis of colon     Extrinsic asthma, unspecified     Hematuria, unspecified     Hyperlipidemia     Hypertension     Kidney stone     Left atrial enlargement 12/16/2014    Low back pain     Nephrolithiasis     MARTIN (obstructive sleep apnea)     Osteopenia     PUD (peptic ulcer disease)     Stroke 12/2013    Urinary tract infection      Social History     Socioeconomic History    Marital status:    Tobacco Use     Smoking status: Former     Current packs/day: 0.00     Types: Cigarettes     Quit date: 1995     Years since quittin.2    Smokeless tobacco: Never   Substance and Sexual Activity    Alcohol use: Yes     Alcohol/week: 1.0 standard drink of alcohol     Types: 1 Glasses of wine per week     Comment: social    Drug use: No    Sexual activity: Yes     Partners: Male     Birth control/protection: Surgical     Comment:  since      Social Determinants of Health     Financial Resource Strain: Low Risk  (2024)    Overall Financial Resource Strain (CARDIA)     Difficulty of Paying Living Expenses: Not hard at all   Food Insecurity: No Food Insecurity (2024)    Hunger Vital Sign     Worried About Running Out of Food in the Last Year: Never true     Ran Out of Food in the Last Year: Never true   Transportation Needs: No Transportation Needs (2024)    PRAPARE - Transportation     Lack of Transportation (Medical): No     Lack of Transportation (Non-Medical): No   Physical Activity: Unknown (2024)    Exercise Vital Sign     Days of Exercise per Week: 0 days   Stress: No Stress Concern Present (2024)    [employer] [place] [employer] - Occupational Stress Questionnaire     Feeling of Stress : Only a little   Social Connections: Unknown (2024)    Social Connection and Isolation Panel [NHANES]     Frequency of Communication with Friends and Family: Once a week     Frequency of Social Gatherings with Friends and Family: Once a week     Active Member of Clubs or Organizations: Yes     Marital Status:    Housing Stability: Low Risk  (2024)    Housing Stability Vital Sign     Unable to Pay for Housing in the Last Year: No     Number of Places Lived in the Last Year: 1     Unstable Housing in the Last Year: No     Past Surgical History:   Procedure Laterality Date    APPENDECTOMY      @ time of hysterectomy    BACK SURGERY      CATARACT EXTRACTION       SECTION   1977    CHOLECYSTECTOMY  1998    laparoscopic    COLONOSCOPY N/A 5/29/2018    Procedure: COLONOSCOPY/Golytely;  Surgeon: Janine Black MD;  Location: [facility] [place] END[facility];  Service: Endoscopy;  Laterality: N/A;    CYSTOSCOPY W/ RETROGRADES  12/11/2022    Procedure: CYSTOSCOPY, WITH RETROGRADE PYELOGRAM;  Surgeon: Mitchell Recinos MD;  Location: [facility] [place] OR;  Service: Urology;;    CYSTOSCOPY W/ URETERAL STENT PLACEMENT Left 12/11/2022    Procedure: CYSTOSCOPY, WITH URETERAL STENT INSERTION;  Surgeon: Mitchell Recinos MD;  Location: [facility] [place] OR;  Service: Urology;  Laterality: Left;    CYSTOURETEROSCOPY, WITH HOLMIUM LASER LITHOTRIPSY OF URETERAL CALCULUS AND STENT INSERTION Left 12/21/2022    Procedure: left ureteroscopy, holmium laser lithotripsy, stone basketing, retrograde pyelogram, stent placement;  Surgeon: Anaya Zamora MD;  Location: [facility] [place] OR;  Service: Urology;  Laterality: Left;    DILATION AND CURETTAGE OF UTERUS  1972    EXTRACTION - STONE Left 12/21/2022    Procedure: EXTRACTION - STONE;  Surgeon: Anaya Zamora MD;  Location: [facility] [place] OR;  Service: Urology;  Laterality: Left;    HYSTERECTOMY  1978    TAHUSO with appendectomy    INNER EAR SURGERY      replaced ear drum    JOINT REPLACEMENT Right     knee    KNEE ARTHROSCOPY W/ DEBRIDEMENT  2003    LUMBAR DISCECTOMY  1980    L4-L5    OOPHORECTOMY      unilateral    RETROGRADE PYELOGRAPHY  12/21/2022    Procedure: PYELOGRAM, RETROGRADE;  Surgeon: Anaya Zamora MD;  Location: [facility] [place] OR;  Service: Urology;;    TONSILLECTOMY      TYMPANOPLASTY      URETEROSCOPIC REMOVAL OF URETERIC CALCULUS Left 12/19/2018    Procedure: REMOVAL, CALCULUS, URETER, URETEROSCOPIC, holmium laser lithotripsy, stone basket extraction, retrograde pyelogram, ureteral stent exchange;  Surgeon: Anaya Zamora MD;  Location: [facility] [place] OR;  Service: Urology;  Laterality: Left;     Family History   Problem Relation Age of Onset    Cervical cancer Mother     Cancer Mother 65        lung cancer - non smoker    Stroke  Paternal Grandfather     Hypertension Maternal Grandfather     Heart disease Maternal Grandfather     Hypertension Father     Coronary artery disease Father 62    Heart disease Father     Diabetes Sister     Heart disease Sister     Kidney disease Sister     Breast cancer Daughter 36    Heart failure Sister         60s    Colon cancer Neg Hx     Ovarian cancer Neg Hx            Review of Systems  General: negative for chills, fever or weight loss  Psychological: negative for mood changes or depression  Ophthalmic: negative for blurry vision, photophobia or eye pain  ENT: see HPI  Respiratory: no cough, shortness of breath, or wheezing  Cardiovascular: no chest pain or dyspnea on exertion  Gastrointestinal: no abdominal pain, change in bowel habits, or black/ bloody stools  Musculoskeletal: negative for gait disturbance or muscular weakness  Neurological: no syncope or seizures; no ataxia  Dermatological: negative for pruritis,  rash and jaundice  Hematologic/lymphatic: no easy bruising, no new adenopathy    Physical Exam:    Vitals:    03/20/24 1423   BP: (!) 121/53   Pulse: 65         Constitutional: Well appearing / communicating without difficutly.  NAD.  Eyes: EOM I Bilaterally  Head/Face: Normocephalic.  Negative paranasal sinus pressure/tenderness.  Salivary glands WNL.  House Brackmann I Bilaterally.    Right Ear: Auricle normal appearance. External Auditory Canal with cerumen impaction. EAC within normal limits no lesions or masses,TM w/o masses/lesions/perforations. TM mobility noted.   Left Ear: Auricle normal appearance. External Auditory Canal with cerumen impaction. EAC within normal limits no lesions or masses,TM w/o masses/lesions/perforations. TM mobility noted.  Nose: No gross nasal septal deviation. Inferior Turbinates 3+ bilaterally. No septal perforation. No masses/lesions. External nasal skin appears normal without masses/lesions.  Oral Cavity: Gingiva/lips within normal limits.   Dentition/gingiva healthy appearing. Mucus membranes moist. Floor of mouth soft, no masses palpated. Oral Tongue mobile. Hard Palate appears normal.    Oropharynx: Base of tongue appears normal. No masses/lesions noted. Tonsillar fossa/pharyngeal wall without lesions. Posterior oropharynx WNL.  Soft palate without masses. Midline uvula.   Neck/Lymphatic: No LAD I-VI bilaterally.  No thyromegaly.  No masses noted on exam.    Mirror laryngoscopy/nasopharyngoscopy: Active gag reflex.  Unable to perform.    Neuro/Psychiatric: AOx3.  Normal mood and affect.   Cardiovascular: Normal carotid pulses bilaterally, no increasing jugular venous distention noted at cervical region bilaterally.    Respiratory: Normal respiratory effort, no stridor, no retractions noted.      Ear Cerumen Removal    Date/Time: 3/20/2024 2:20 PM    Performed by: Daija Joy NP  Authorized by: Daija Joy NP    Consent Done?:  Yes (Verbal)    Local anesthetic:  None  Location details:  Both ears  Procedure type: curette    Procedure type comment:  Suction  Cerumen  Removal Results:  Cerumen completely removed  Patient tolerance:  Patient tolerated the procedure well with no immediate complications        Tympanometry revealed Type A in the right ear and Type A in the left ear.         MRI IAC/TEMPORAL BONES W W/O CONTRAST 10/10/2023     CLINICAL HISTORY:  Vertigo, peripheral;Other peripheral vertigo, unspecified ear     TECHNIQUE:  Multiplanar multisequence MR imaging of the IACs was performed before and after the administration of 10 mL Gadavistintravenous contrast. Additional limited whole brain imaging was also performed.     COMPARISON:  03/14/2016 MRI     FINDINGS:  Inner ears/IACs/:  Normal appearance of the bilateral 7th/8th cranial nerve bundles with no mass or abnormal enhancement. Membranous labyrinthine structures are normal.     Remainder of the Intracranial Compartment:     Ventricles and sulci are normal in size for age  without evidence of hydrocephalus. No extra-axial blood or fluid collections.     Punctate and confluent T2 signal hyperintensity in the periventricular white matter suggesting benign white matter changes of aging and or chronic microvascular ischemia.  Brain parenchyma otherwise unremarkable.  No mass lesion, acute hemorrhage, edema, or acute infarct. No abnormal enhancement.     Normal vascular flow voids are preserved.     Skull/Extracranial Contents (limited evaluation): Bone marrow signal intensity is normal.     Impression:     No acute intracranial abnormality.  Specifically no abnormality involving the 7th and 8th cranial nerves     Periventricular white matter changes of aging and or chronic microvascular ischemia.    VNG 2022:    Tympanometry revealed Type A tympanograms in both ears prior to testing.     Unilateral weakness: 50% (right)  Directional preponderance 18% (left-beating)  RW: 7 d/s  LW: 22 d/s  RC: 4 d/s   d/s  Fixation suppression was normal.     Impression: Caloric testing revealed a clinically significant right caloric weakness which is indicative of a right peripheral vestibular abnormality.        Recommend:   A trial with Cawthorne exercises; a copy was given to and reviewed with the patient  A formal Risk of Falls assessment and formal vestibular/balance rehab  Follow-up with Dr. Alfonso to review the results of today's evaluation       Assessment:    ICD-10-CM ICD-9-CM    1. Dizziness  R42 780.4 Ambulatory referral/consult to Physical/Occupational Therapy      2. Bilateral impacted cerumen  H61.23 380.4 Ear Cerumen Removal      3. Conductive hearing loss of right ear with restricted hearing of left ear  H90.A11 389.05       4. Rhinitis, unspecified type  J31.0 472.0       5. History of tympanoplasty of right ear  Z98.890 V45.89           The primary encounter diagnosis was Dizziness. Diagnoses of Bilateral impacted cerumen, Conductive hearing loss of right ear with restricted  hearing of left ear, Rhinitis, unspecified type, and History of tympanoplasty of right ear were also pertinent to this visit.      Plan:  Orders Placed This Encounter   Procedures    Ear Cerumen Removal    Ambulatory referral/consult to Physical/Occupational Therapy     -referral to vestibular therapy  -start on Flonase 2 sprays in each nostril daily  -We reviewed the patient's recent audiogram and hearing loss in detail.  We also discussed that she is a good candidate for hearing aids, if and when she the patient is motivated.  She was given handouts with information and pricing of hearing aids, and will contact audiology when ready to proceed.  Recommended annual audiograms    Cerumen impaction:  Removed under microscopy today without difficulty.  I would recommend the use of a wax softening drop, either over the counter Debrox or mineral oil, on a weekly basis.  I also instructed the patient to avoid Qtips.    -follow up 1 year or sooner as needed          Daija Joy NP      Answers submitted by the patient for this visit:  Review of Symptoms Questionnaire  (Submitted on 3/19/2024)  None of these: Yes  hearing loss: Yes  None of these : Yes  Sleep Apnea?: Yes  None of these : Yes  None of these: Yes  Difficulty urinating?: Yes  back pain: Yes  neck pain: Yes  None of these : Yes  None of these: Yes  None of these : Yes  dizziness: Yes  headaches: Yes  tremors: Yes  Light-headedness: Yes  None of these: Yes  Feeling depressed?: Yes  sleep disturbance: Yes

## 2024-03-21 ENCOUNTER — PATIENT MESSAGE (OUTPATIENT)
Dept: SLEEP MEDICINE | Facility: CLINIC | Age: 74
End: 2024-03-21
Payer: MEDICARE

## 2024-03-21 NOTE — PROCEDURES
Ear Cerumen Removal    Date/Time: 3/20/2024 2:20 PM    Performed by: Daija Joy NP  Authorized by: Daija Joy NP    Consent Done?:  Yes (Verbal)    Local anesthetic:  None  Location details:  Both ears  Procedure type: curette    Procedure type comment:  Suction  Cerumen  Removal Results:  Cerumen completely removed  Patient tolerance:  Patient tolerated the procedure well with no immediate complications

## 2024-03-22 ENCOUNTER — TELEPHONE (OUTPATIENT)
Dept: OTOLARYNGOLOGY | Facility: CLINIC | Age: 74
End: 2024-03-22
Payer: MEDICARE

## 2024-03-22 NOTE — TELEPHONE ENCOUNTER
Spoke with patient and she would like to go through her insurance for hearing aids.  She will contact me to schedule a HAC if something changes.    ----- Message from Bonnie Chester MA sent at 3/20/2024  2:55 PM CDT -----  Regarding: Hearing Aid Appontment  Waldo Choe wanted you to get her set up with an appointment and some hearing aid information.

## 2024-03-25 DIAGNOSIS — G47.33 OSA (OBSTRUCTIVE SLEEP APNEA): Primary | ICD-10-CM

## 2024-04-10 NOTE — TELEPHONE ENCOUNTER
Care Due:                  Date            Visit Type   Department     Provider  --------------------------------------------------------------------------------                                EP -                              PRIMARY      MET INTERNAL  Last Visit: 01-      CARE ([facility])   MEDICINE       Madisyn  Wilfredo                              EP -                              PRIMARY      [facility] INTERNAL  Next Visit: 06-      CARE ([facility])   [facility]                                                            Last  Test          Frequency    Reason                     Performed    Due Date  --------------------------------------------------------------------------------    Lipid Panel.  12 months..  atorvastatin.............  06- 06-    [facility] Embedded Care Due Messages. Reference number: [id number].   4/10/2024 11:34:25 AM CDT

## 2024-04-11 ENCOUNTER — OFFICE VISIT (OUTPATIENT)
Dept: UROLOGY | Facility: CLINIC | Age: 74
End: 2024-04-11
Payer: MEDICARE

## 2024-04-11 VITALS
DIASTOLIC BLOOD PRESSURE: 78 MMHG | BODY MASS INDEX: 35.7 KG/M2 | HEART RATE: 73 BPM | WEIGHT: 222.13 LBS | SYSTOLIC BLOOD PRESSURE: 134 MMHG | HEIGHT: 66 IN

## 2024-04-11 DIAGNOSIS — Z87.440 HISTORY OF RECURRENT UTIS: ICD-10-CM

## 2024-04-11 DIAGNOSIS — R10.9 ABDOMINAL PAIN, UNSPECIFIED ABDOMINAL LOCATION: ICD-10-CM

## 2024-04-11 DIAGNOSIS — R10.30 LOWER ABDOMINAL PAIN: ICD-10-CM

## 2024-04-11 DIAGNOSIS — N20.0 NEPHROLITHIASIS: Primary | ICD-10-CM

## 2024-04-11 PROCEDURE — 3066F NEPHROPATHY DOC TX: CPT | Mod: CPTII,S$GLB,, | Performed by: UROLOGY

## 2024-04-11 PROCEDURE — 1126F AMNT PAIN NOTED NONE PRSNT: CPT | Mod: CPTII,S$GLB,, | Performed by: UROLOGY

## 2024-04-11 PROCEDURE — 3044F HG A1C LEVEL LT 7.0%: CPT | Mod: CPTII,S$GLB,, | Performed by: UROLOGY

## 2024-04-11 PROCEDURE — 1159F MED LIST DOCD IN RCRD: CPT | Mod: CPTII,S$GLB,, | Performed by: UROLOGY

## 2024-04-11 PROCEDURE — 3008F BODY MASS INDEX DOCD: CPT | Mod: CPTII,S$GLB,, | Performed by: UROLOGY

## 2024-04-11 PROCEDURE — 1160F RVW MEDS BY RX/DR IN RCRD: CPT | Mod: CPTII,S$GLB,, | Performed by: UROLOGY

## 2024-04-11 PROCEDURE — 3060F POS MICROALBUMINURIA REV: CPT | Mod: CPTII,S$GLB,, | Performed by: UROLOGY

## 2024-04-11 PROCEDURE — 99214 OFFICE O/P EST MOD 30 MIN: CPT | Mod: S$GLB,,, | Performed by: UROLOGY

## 2024-04-11 PROCEDURE — 3288F FALL RISK ASSESSMENT DOCD: CPT | Mod: CPTII,S$GLB,, | Performed by: UROLOGY

## 2024-04-11 PROCEDURE — 1101F PT FALLS ASSESS-DOCD LE1/YR: CPT | Mod: CPTII,S$GLB,, | Performed by: UROLOGY

## 2024-04-11 PROCEDURE — 4010F ACE/ARB THERAPY RXD/TAKEN: CPT | Mod: CPTII,S$GLB,, | Performed by: UROLOGY

## 2024-04-11 PROCEDURE — 3075F SYST BP GE 130 - 139MM HG: CPT | Mod: CPTII,S$GLB,, | Performed by: UROLOGY

## 2024-04-11 PROCEDURE — 99999 PR PBB SHADOW E&M-EST. PATIENT-LVL IV: CPT | Mod: PBBFAC,,, | Performed by: UROLOGY

## 2024-04-11 PROCEDURE — 3078F DIAST BP <80 MM HG: CPT | Mod: CPTII,S$GLB,, | Performed by: UROLOGY

## 2024-04-11 RX ORDER — BUPROPION HYDROCHLORIDE 300 MG/1
300 TABLET ORAL DAILY
Qty: 90 TABLET | Refills: 3 | Status: SHIPPED | OUTPATIENT
Start: 2024-04-11

## 2024-04-11 NOTE — PROGRESS NOTES
[place] - Urology   Clinic Note    SUBJECTIVE:     Chief Complaint   Patient presents with    Annual Exam       Referral from: No ref. provider found.    History of Present Illness:  Tracy Armendariz is a 73 y.o. female who presents to clinic for nephrolithiasis.    Has a h/o nephrolithiasis  Does have some mild abd pain  No hematuria  No dysuria  Follows with renal for stones  Stones were Ca oxalate  Does get recurrent UTIs  Does not take any premarin    Patient endorses no additional complaints at this time.    Past Medical History:   Diagnosis Date    Allergy     Anemia, unspecified     Anticoagulant long-term use     Arthritis     CVA (cerebral infarction)     Depression     Disorder of kidney and ureter     renal stones    Diverticulosis of colon     Extrinsic asthma, unspecified     Hematuria, unspecified     Hyperlipidemia     Hypertension     Kidney stone     Left atrial enlargement 2014    Low back pain     Nephrolithiasis     MARTIN (obstructive sleep apnea)     Osteopenia     PUD (peptic ulcer disease)     Stroke 2013    Urinary tract infection        Past Surgical History:   Procedure Laterality Date    APPENDECTOMY      @ time of hysterectomy    BACK SURGERY      CATARACT EXTRACTION       SECTION      CHOLECYSTECTOMY      laparoscopic    COLONOSCOPY N/A 2018    Procedure: COLONOSCOPY/Golytely;  Surgeon: Janine Black MD;  Location: [facility];  Service: Endoscopy;  Laterality: N/A;    CYSTOSCOPY W/ RETROGRADES  2022    Procedure: CYSTOSCOPY, WITH RETROGRADE PYELOGRAM;  Surgeon: Mitchell Recinos MD;  Location: [facility] OR;  Service: Urology;;    CYSTOSCOPY W/ URETERAL STENT PLACEMENT Left 2022    Procedure: CYSTOSCOPY, WITH URETERAL STENT INSERTION;  Surgeon: Mitchell Recinos MD;  Location: [facility] OR;  Service: Urology;  Laterality: Left;    CYSTOURETEROSCOPY, WITH HOLMIUM LASER LITHOTRIPSY OF URETERAL CALCULUS AND STENT INSERTION Left 2022    Procedure: left  ureteroscopy, holmium laser lithotripsy, stone basketing, retrograde pyelogram, stent placement;  Surgeon: Anaya Zamora MD;  Location: [facility] OR;  Service: Urology;  Laterality: Left;    DILATION AND CURETTAGE OF UTERUS  1972    EXTRACTION - STONE Left 2022    Procedure: EXTRACTION - STONE;  Surgeon: Anaya Zamora MD;  Location: [facility] OR;  Service: Urology;  Laterality: Left;    HYSTERECTOMY      TAHUSO with appendectomy    INNER EAR SURGERY      replaced ear drum    JOINT REPLACEMENT Right     knee    KNEE ARTHROSCOPY W/ DEBRIDEMENT      LUMBAR DISCECTOMY      L4-L5    OOPHORECTOMY      unilateral    RETROGRADE PYELOGRAPHY  2022    Procedure: PYELOGRAM, RETROGRADE;  Surgeon: Anaya Zamora MD;  Location: [facility] OR;  Service: Urology;;    TONSILLECTOMY      TYMPANOPLASTY      URETEROSCOPIC REMOVAL OF URETERIC CALCULUS Left 2018    Procedure: REMOVAL, CALCULUS, URETER, URETEROSCOPIC, holmium laser lithotripsy, stone basket extraction, retrograde pyelogram, ureteral stent exchange;  Surgeon: Anaya Zamora MD;  Location: [facility] OR;  Service: Urology;  Laterality: Left;       Family History   Problem Relation Age of Onset    Cervical cancer Mother     Cancer Mother 65        lung cancer - non smoker    Stroke Paternal Grandfather     Hypertension Maternal Grandfather     Heart disease Maternal Grandfather     Hypertension Father     Coronary artery disease Father 62    Heart disease Father     Diabetes Sister     Heart disease Sister     Kidney disease Sister     Breast cancer Daughter 36    Heart failure Sister         60s    Colon cancer Neg Hx     Ovarian cancer Neg Hx        Social History     Tobacco Use    Smoking status: Former     Current packs/day: 0.00     Types: Cigarettes     Quit date: 1995     Years since quittin.2    Smokeless tobacco: Never   Substance Use Topics    Alcohol use: Yes     Alcohol/week: 1.0 standard drink of alcohol     Types: 1 Glasses of wine per week      Comment: social    Drug use: No       Current Outpatient Medications on File Prior to Visit   Medication Sig Dispense Refill    albuterol (PROVENTIL/VENTOLIN HFA) 90 mcg/actuation inhaler INHALE 2 PUFFS EVERY 6 HOURS AS NEEDED FOR WHEEZING 18 g 0    aspirin (ECOTRIN) 81 MG EC tablet Take 81 mg by mouth once daily.      atorvastatin (LIPITOR) 40 MG tablet Take 1 tablet (40 mg total) by mouth every evening. 90 tablet 3    buPROPion (WELLBUTRIN XL) 300 MG 24 hr tablet Take 1 tablet (300 mg total) by mouth once daily. 90 tablet 3    COVID mlb83-68,12up,,andu,,PF, (SPIKEVAX 7917-9084,12Y UP,,PF,) 50 mcg/0.5 mL injection Inject into the muscle. 0.5 mL 0    diclofenac sodium (VOLTAREN) 1 % Gel APPLY 2-4 GRAMS TO EACH PAINFUL AREA FOUR TIMES DAILY - MAX 32 GRAMS/ g 6    EScitalopram oxalate (LEXAPRO) 20 MG tablet Take 1 tablet (20 mg total) by mouth once daily. 90 tablet 3    esomeprazole (NEXIUM) 40 MG capsule Take 1 capsule (40 mg total) by mouth daily as needed (heartburn). 90 capsule 3    fluticasone propionate (FLONASE) 50 mcg/actuation nasal spray 2 sprays (100 mcg total) by Each Nostril route once daily. 16 g 11    losartan (COZAAR) 25 MG tablet Take 1 tablet (25 mg total) by mouth 2 (two) times a day. 180 tablet 3    magnesium oxide (MAG-OX) 400 mg (241.3 mg magnesium) tablet Take 1 tablet (400 mg total) by mouth 2 (two) times daily. 180 tablet 3    nitrofurantoin, macrocrystal-monohydrate, (MACROBID) 100 MG capsule Take 1 capsule (100 mg total) by mouth 2 (two) times daily. 10 capsule 0    tirzepatide (MOUNJARO) 5 mg/0.5 mL PnIj Inject 5 mg into the skin every 7 days. 4 Pen 6    vitamin D (VITAMIN D3) 1000 units Tab Take 1,000 Units by mouth 3 (three) times a week.      amoxicillin (AMOXIL) 500 MG capsule Take 4 capsules by mouth 1 hour prior to appointment (Patient not taking: Reported on 3/20/2024) 12 capsule 0    potassium citrate (UROCIT-K 10) 10 mEq (1,080 mg) TbSR Take 2 tablets (20 mEq total) by  "mouth 3 (three) times daily with meals. 180 tablet 11     No current facility-administered medications on file prior to visit.       Review of patient's allergies indicates:   Allergen Reactions    Iodinated contrast media Hives and Rash    Gabapentin Hallucinations    Iodine Hives    Isothiazolinones Rash    Penicillins Rash       Review of Systems:  A review of 10+ systems was conducted with pertinent positive and negative findings documented in HPI with all other systems reviewed and negative.    OBJECTIVE:     Estimated body mass index is 35.85 kg/m² as calculated from the following:    Height as of this encounter: 5' 6" (1.676 m).    Weight as of this encounter: 100.8 kg (222 lb 1.8 oz).    Vital Signs (Most Recent)  Vitals:    04/11/24 0958   BP: 134/78   Pulse: 73       Physical Exam:  GENERAL: patient sitting comfortably  HEENT: normocephalic  NECK: supple, no JVD  PULM: normal chest rise, no increased WOB  HEART: non-diaphoretic  ABDO: soft, nondistended, nontender  BACK: no CVA tenderness bilaterally  SKIN: warm, dry, well perfused  EXT: no bruising or edema  NEURO: grossly normal with no focal deficits  PSYCH: appropriate mood and affect    Genitourinary Exam:  deferred    Lab Results   Component Value Date    BUN 12 02/17/2024    CREATININE 1.1 02/17/2024    WBC 7.92 02/17/2024    HGB 14.4 02/17/2024    HCT 44.8 02/17/2024     02/17/2024    AST 19 01/22/2024    ALT 24 01/22/2024    ALKPHOS 94 01/22/2024    ALBUMIN 3.6 02/17/2024    HGBA1C 5.7 (H) 01/22/2024    INR 1.0 01/13/2017        Imaging:  I have personally reviewed all relevant imaging studies.    No results found. However, due to the size of the patient record, not all encounters were searched. Please check Results Review for a complete set of results.  No results found. However, due to the size of the patient record, not all encounters were searched. Please check Results Review for a complete set of results.  US Retroperitoneal " "Complete  Narrative: EXAMINATION:  US RETROPERITONEAL COMPLETE    CLINICAL HISTORY:  kidney stone; Calculus of kidney    TECHNIQUE:  Ultrasound of the kidneys and urinary bladder was performed including color flow and Doppler evaluation of the kidneys.    COMPARISON:  Abdominal radiograph 12/15/2023, CT abdomen pelvis without contrast 12/09/2022.    FINDINGS:  Right kidney: The right kidney measures 11.2 cm. No cortical thinning. No loss of corticomedullary distinction. Resistive index measures 0.75.  No mass. No renal stone. No hydronephrosis.    Left kidney: The left kidney measures 11.0 cm. No cortical thinning. No loss of corticomedullary distinction. Resistive index measures 0.73.  Multiple echogenic foci, the largest measuring 1.0 cm, favoring nonobstructing renal stones.  Simple renal cyst measuring 2.1 x 1.5 x 1.5 cm.  No solid renal lesion or hydronephrosis.    Bladder is not well distended, limiting evaluation.    Splenic RI measures 0.73.  Impression: Multiple nonobstructing left renal stones.  No hydronephrosis.    Left renal cyst.    Electronically signed by: Jayjay Gilmore  Date:    02/26/2024  Time:    11:46       PSA:  No results found for: "PSA", "PSADIAG", "PSATOTAL", "PSAFREE"    Testosterone:  No results found for: "TOTALTESTOST", "TESTOSTERONE"     ASSESSMENT     1. Nephrolithiasis    2. Abdominal pain, unspecified abdominal location    3. Lower abdominal pain    4. History of recurrent UTIs        PLAN:     Follows with Nephrology for medical management, last stones Ca Phos  Will obtain CT abd/pelv now  Follow up 6 mo    Mitchell Recinos MD  Urology  [facility]    Disclaimer: This note has been generated using voice-recognition software. There may be typographical errors that have been missed during proof-reading.     "

## 2024-04-11 NOTE — TELEPHONE ENCOUNTER
Refill Routing Note   Medication(s) are not appropriate for processing by [facility] for the following reason(s):        No active prescription written by provider    [facility] action(s):  Defer        Medication Therapy Plan: FOVS; FLOS      Appointments  past 12m or future 3m with PCP    Date Provider   Last Visit   1/12/2024 Madisyn Villalobos MD   Next Visit   6/17/2024 Madisyn Villalobos MD   ED visits in past 90 days: 0        Note composed:5:56 AM 04/11/2024

## 2024-04-19 ENCOUNTER — PATIENT MESSAGE (OUTPATIENT)
Dept: ADMINISTRATIVE | Facility: HOSPITAL | Age: 74
End: 2024-04-19
Payer: MEDICARE

## 2024-04-23 ENCOUNTER — TELEPHONE (OUTPATIENT)
Dept: INTERNAL MEDICINE | Facility: CLINIC | Age: 74
End: 2024-04-23
Payer: MEDICARE

## 2024-04-23 NOTE — TELEPHONE ENCOUNTER
----- Message from Arely Sanchez sent at 4/23/2024 11:10 AM CDT -----  Contact: [phone]  Pt is returning a call in regards to rescheduling her appt on 05/06. Pt is requesting to be seen in [place].       Please call to reschedule.                 Thank you

## 2024-04-25 ENCOUNTER — CLINICAL SUPPORT (OUTPATIENT)
Dept: REHABILITATION | Facility: HOSPITAL | Age: 74
End: 2024-04-25
Payer: MEDICARE

## 2024-04-25 DIAGNOSIS — H83.2X1 VESTIBULAR HYPOFUNCTION OF RIGHT EAR: Primary | ICD-10-CM

## 2024-04-25 DIAGNOSIS — Z91.81 HISTORY OF RECENT FALL: ICD-10-CM

## 2024-04-25 DIAGNOSIS — R42 DIZZINESS: ICD-10-CM

## 2024-04-25 PROCEDURE — 95992 CANALITH REPOSITIONING PROC: CPT | Mod: PN

## 2024-04-25 PROCEDURE — 97162 PT EVAL MOD COMPLEX 30 MIN: CPT | Mod: PN

## 2024-04-25 NOTE — PLAN OF CARE
[facility]  Physical Therapy Neurological Rehabilitation Initial Evaluation    Name: Tracy Armendariz  Clinic Number: [id number]    Therapy Diagnosis:   Encounter Diagnosis   Name Primary?    Dizziness      Physician: Daija Joy, ELMER    Physician Orders: PT Eval and Treat   Medical Diagnosis from Referral: Z74.09 (ICD-10-CM) - Impaired functional mobility and activity tolerance   Evaluation Date: 2024  Authorization Period Expiration:2024  Plan of Care Expiration: 2024  Visit # / Visits authorized:     Time In: 1:07 PM  Time Out: 2:00 PM  Total Billable Time: 53 minutes (1 mod eval, 1 CR)     Precautions: Standard and Fall    Subjective   Date of onset: early   History of current condition - Tracy reports: Pt was diagnosed with right vestibular dysfunction in the past and seen for vestibular therapy at [facility]  and [facility].  She has right hearing loss and she is due to receive a hearing aid this summer. Most problems are trigger with quick head turns or head movements when gazing up and down.       Medical History:   Past Medical History:   Diagnosis Date    Allergy     Anemia, unspecified     Anticoagulant long-term use     Arthritis     CVA (cerebral infarction)     Depression     Disorder of kidney and ureter     renal stones    Diverticulosis of colon     Extrinsic asthma, unspecified     Hematuria, unspecified     Hyperlipidemia     Hypertension     Kidney stone     Left atrial enlargement 2014    Low back pain     Nephrolithiasis     MARTIN (obstructive sleep apnea)     Osteopenia     PUD (peptic ulcer disease)     Stroke 2013    Urinary tract infection      Surgical History:   Tracy Armendariz  has a past surgical history that includes Inner ear surgery; Cholecystectomy ();  section (); Dilation and curettage of uterus (); Appendectomy (); Hysterectomy (); Tympanoplasty; Lumbar discectomy (); Knee  arthroscopy w/ debridement (2003); Tonsillectomy; Back surgery; Cataract extraction; Colonoscopy (N/A, 5/29/2018); Joint replacement (Right); Ureteroscopic removal of ureteric calculus (Left, 12/19/2018); Oophorectomy; Cystoscopy w/ ureteral stent placement (Left, 12/11/2022); Cystoscopy w/ retrogrades (12/11/2022); cystoureteroscopy, with holmium laser lithotripsy of ureteral calculus and stent insertion (Left, 12/21/2022); Retrograde pyelography (12/21/2022); and extraction - stone (Left, 12/21/2022).    Medications:   Tracy has a current medication list which includes the following prescription(s): albuterol, amoxicillin, aspirin, atorvastatin, bupropion, conjugated estrogens, spikevax 8852-9111(12y up)(pf), diclofenac sodium, escitalopram oxalate, esomeprazole, fluticasone propionate, losartan, magnesium oxide, nitrofurantoin (macrocrystal-monohydrate), potassium citrate, mounjaro, and vitamin d.    Allergies:   Review of patient's allergies indicates:   Allergen Reactions    Iodinated contrast media Hives and Rash    Gabapentin Hallucinations    Iodine Hives    Isothiazolinones Rash    Penicillins Rash      Imaging, none:     Prior Therapy: vestibular therapy at [facility]  Social History:  lives with their spouse  Falls: 2 falls a month on average, last 2 weeks ago  DME: tripod Straight cane , rollator, uses an manual wheelchair in places like the airport  Home Environment: one step to enter, single story home   Exercise Routine / History: none   Family Present at time of Eval: no   Occupation: retired teacher  Prior Level of Function:independent with use of cane, limited to household distances     Current Level of Function: same as above with dizziness upon triggers and some that causes falls    Pain:  Current 0/10, worst 8/10, best 0/10   Location: headaches  Description: temporal region and in the sinus and frontal region  Aggravating Factors: night  Easing Factors: lying down, eye mask massager with ice  "and heat    Patient's goals: Have more endurance, be able to stand for a conversation, not be so sleepy.    Reports headaches frequently 3-4 HAs a week  Wears a C-PAP machine to sleep  Objective     - Command following: intact   - Speech: no deficits     Mental status: alert, oriented to person, place, and time  Behavior:  calm and cooperative  Attention Span and Concentration:  Normal    Dominant hand:  right     Posture Alignment :slouched posture    Sensation:  Light Touch: Intact           Proprioception:   Intact    ROM:   UPPER EXTREMITY--AROM/PROM  (R) UE: WFLs  (L) UE: WFLs           RANGE OF MOTION--LOWER EXTREMITIES  (R) LE Hip: WFL   Knee: WFL   Ankle: WFL    (L) LE: Hip: WFL   Knee: WFL   Ankle: WFL    Strength Jan 2024:  RLE   LLE     Hip flexion: 5/5 Hip flexion: 5/5   Hip Abduction: 4-/5 --> 5/5 Hip abduction: 4-/5 --> 5/5   Hip extension 4+/5 Hip extension 4+/5   Hip ER 4-/5 (5/5) Hip ER 3+/5 (5/5)   Hip IR 4-/5 (5/5) Hip IR 4-/5 (5/5)   Knee flexion: 5/5 Knee flexion: 5/5   Knee extension: 13.5 kg --> 24.9 Knee extension: 5.4 kg --> 22.4    Ankle Dorsiflexion: 5/5 Ankle Dorsiflexion: 5/5   Ankle Plantarflexion: 5/5 Ankle Plantarflexion: 5/5     30" sit <> stand: 7x (upper extremity on thighs) Jan 2024  6 minute walk test: 800 ft (no seated rest breaks) Jan 2024    Gait Assessment:   - AD used: straight cane  - Assistance: mod I  - Distance: ~300 feet during evaluation  - Stairs: Mod I (step to pattern)    Balance Assessment:    Sitting balance (static,dynamic): normal/good +  Standing balance (static, dynamic):    Functional Gait Assessment:   Test next session    [place] (posterior / CL anterior)   Right : (-) symptoms reported "the dot is jumping around) and she reported dizziness   Left: (-)  Horizontal Canals   Right: (-) symptoms reported   "the dot is jumping around) and she reported dizziness   Left: (-) no symptoms just neck pain reported    CMS Impairment/Limitation/Restriction for " TO I Survey    Therapist reviewed FOTO scores for Tracy Armendariz on 4/25/2024.   FOTO documents entered into [employer] - see Media section.    Intake Score: 88/100       TREATMENT   Therapeutic exercise to be initiated at follow-up visit:  --Complete FGA test  --Review supine and seated cawthorne head turn exercises (not ocular/eye exercises)  -- standing balance on complaint surfaces  -- standing with head turns on solid surfaces  -- vestibular sit to stand    Home Exercises and Patient Education Provided    Education provided:   - perform cawthorne exercise 2x/day  - benefits of daily HEP post PT session and post discharge  - fall prevention education    See Media section for HEP issued on 4/25/2024  Assessment   Tracy is a 73 y.o. female referred to outpatient Physical Therapy with a medical diagnosis of Z74.09 (ICD-10-CM) - dizziness and vestibular impairment.  Pt has history of vestibular deficits but her complaince and carry over with home exercise programs is very low.  She is at risks of falls and has had at least 1 fall this month.  She has hypofunction noted in her right ear and she does not wear a hearing aid yet but is set to receive one this summer.     Patient prognosis is Good.    Patient will benefit from skilled outpatient Physical Therapy to address the deficits stated above and in the chart below, provide patient/family education, and to maximize patient's level of independence.     Plan of care discussed with patient: Yes  Patient's spiritual, cultural and educational needs considered and patient is agreeable to the plan of care and goals as stated below:     Anticipated Barriers for therapy: co-morbidities, sedentary lifestyle    Medical Necessity is demonstrated by the following  History  Co-morbidities and personal factors that may impact the plan of care Co-morbidities:   Arthritis, Asthma, Back pain, BMI over 30, Depression, Headaches, Kidney, Bladder, Prostate or Urination Problems,  Previous accidents, Prosthesis / Implants, Sleep dysfunction, Stroke or TIA     Personal Factors:   lifestyle     moderate   Examination  Body Structures and Functions, activity limitations and participation restrictions that may impact the plan of care Body Regions:   lower extremities  Trunk    Body Systems:    strength  gross coordinated movement  balance  gait  transfers  transitions    Participation Restrictions:   Community ambulation, recreational activity    Activity limitations:   Learning and applying knowledge  no deficits    General Tasks and Commands  no deficits    Communication  no deficits    Mobility  lifting and carrying objects  walking    Self care  no deficits    Domestic Life  shopping  cooking  doing house work (cleaning house, washing dishes, laundry)  assisting others    Interactions/Relationships  no deficits    Life Areas  no deficits    Community and Social Life  community life  recreation and leisure         moderate   Clinical Presentation evolving clinical presentation with changing clinical characteristics moderate   Decision Making/ Complexity Score: moderate     Goals:  Short Term Goals: 4 weeks   1. Pt will be compliant with cawthorne exercise HEP in order to maximize PT benefits   2. Pt will score >/= 19/30 on FGA with least restrictive assistive device in order to reduce risk for falls and improve postural control   3. Pt will report 75 points or less on the FOTO DHI scale indicating increased confidence in balance and  mobility.         Long Term Goals: 6 weeks   1. Pt will score Pass on all components of the mCTSIB without verbalizing feeling shaking unsteady falling in eyes closed on foam.   2. Pt will score >/= 23/30 on FGA with least restrictive assistive device in order to reduce risk for falls and improve postural control   4. Pt will report 60 points or less on the FOTO DHI scale indicating increased confidence in balance and  mobility.    5. Pt will perform 1 floor <>  stand transfer with 1 UE support in order to demonstrate safe functional mobility in case of future fall   6.  Pt will report 0 falls from initiation of PT management   7. Pt will begin some form of home/community fitness in order to sustain progress gained in PT.     Plan :NO STEPPER OR RECUMBENT STEPPER use during this vestibular care episode   Plan of care Certification: 4/25/2024 to 6/21/2024.    Outpatient Physical Therapy 2 times weekly for 8 weeks to include the following interventions: Gait Training, Manual Therapy, Moist Heat/ Ice, Neuromuscular Re-ed, Patient Education, Therapeutic Activities, and Therapeutic Exercise.     Betsy Nunez, PT

## 2024-04-29 ENCOUNTER — LAB VISIT (OUTPATIENT)
Dept: LAB | Facility: HOSPITAL | Age: 74
End: 2024-04-29
Attending: NURSE PRACTITIONER
Payer: MEDICARE

## 2024-04-29 DIAGNOSIS — E11.65 TYPE 2 DIABETES MELLITUS WITH HYPERGLYCEMIA, WITHOUT LONG-TERM CURRENT USE OF INSULIN: ICD-10-CM

## 2024-04-29 LAB
ALBUMIN SERPL BCP-MCNC: 3.5 G/DL (ref 3.5–5.2)
ALP SERPL-CCNC: 76 U/L (ref 55–135)
ALT SERPL W/O P-5'-P-CCNC: 17 U/L (ref 10–44)
ANION GAP SERPL CALC-SCNC: 14 MMOL/L (ref 8–16)
AST SERPL-CCNC: 18 U/L (ref 10–40)
BILIRUB SERPL-MCNC: 0.5 MG/DL (ref 0.1–1)
BUN SERPL-MCNC: 12 MG/DL (ref 8–23)
CALCIUM SERPL-MCNC: 9.5 MG/DL (ref 8.7–10.5)
CHLORIDE SERPL-SCNC: 104 MMOL/L (ref 95–110)
CHOLEST SERPL-MCNC: 151 MG/DL (ref 120–199)
CHOLEST/HDLC SERPL: 1.8 {RATIO} (ref 2–5)
CO2 SERPL-SCNC: 24 MMOL/L (ref 23–29)
CREAT SERPL-MCNC: 1.1 MG/DL (ref 0.5–1.4)
EST. GFR  (NO RACE VARIABLE): 53 ML/MIN/1.73 M^2
ESTIMATED AVG GLUCOSE: 100 MG/DL (ref 68–131)
GLUCOSE SERPL-MCNC: 95 MG/DL (ref 70–110)
HBA1C MFR BLD: 5.1 % (ref 4–5.6)
HDLC SERPL-MCNC: 83 MG/DL (ref 40–75)
HDLC SERPL: 55 % (ref 20–50)
LDLC SERPL CALC-MCNC: 49.6 MG/DL (ref 63–159)
NONHDLC SERPL-MCNC: 68 MG/DL
POTASSIUM SERPL-SCNC: 4.1 MMOL/L (ref 3.5–5.1)
PROT SERPL-MCNC: 6.9 G/DL (ref 6–8.4)
SODIUM SERPL-SCNC: 142 MMOL/L (ref 136–145)
TRIGL SERPL-MCNC: 92 MG/DL (ref 30–150)

## 2024-04-29 PROCEDURE — 80061 LIPID PANEL: CPT | Performed by: NURSE PRACTITIONER

## 2024-04-29 PROCEDURE — 80053 COMPREHEN METABOLIC PANEL: CPT | Performed by: NURSE PRACTITIONER

## 2024-04-29 PROCEDURE — 83036 HEMOGLOBIN GLYCOSYLATED A1C: CPT | Performed by: NURSE PRACTITIONER

## 2024-04-29 PROCEDURE — 36415 COLL VENOUS BLD VENIPUNCTURE: CPT | Performed by: NURSE PRACTITIONER

## 2024-04-30 ENCOUNTER — CLINICAL SUPPORT (OUTPATIENT)
Dept: REHABILITATION | Facility: HOSPITAL | Age: 74
End: 2024-04-30
Payer: MEDICARE

## 2024-04-30 DIAGNOSIS — H83.2X9 VESTIBULAR HYPOFUNCTION, UNSPECIFIED LATERALITY: Primary | ICD-10-CM

## 2024-04-30 DIAGNOSIS — Z91.81 HISTORY OF RECENT FALL: ICD-10-CM

## 2024-04-30 PROCEDURE — 97112 NEUROMUSCULAR REEDUCATION: CPT | Mod: PN,CQ

## 2024-04-30 PROCEDURE — 97530 THERAPEUTIC ACTIVITIES: CPT | Mod: PN,CQ

## 2024-04-30 NOTE — PROGRESS NOTES
"[facility]   Physical Therapy Treatment Note      Name: Tracy Armendariz  Clinic Number: [id number]    Therapy Diagnosis:   Encounter Diagnoses   Name Primary?    Vestibular hypofunction, unspecified laterality Yes    History of recent fall      Physician: Daija Joy NP    Visit Date: 4/30/2024    Physician Orders: PT Eval and Treat   Medical Diagnosis from Referral: Z74.09 (ICD-10-CM) - Impaired functional mobility and activity tolerance   Evaluation Date: 4/25/2024  Authorization Period Expiration:12/31/2024  Plan of Care Expiration: 6/21/2024  Visit # / Visits authorized: 1/20 (+eval)     Time In: 9:30 AM  Time Out: 10:15 AM  Total Billable Time: 45 minutes (2 NMR, 1 TA)      Precautions: Standard and Fall    PTA Visit #: 1/5     Subjective     Patient reports: woke up with dizziness this morning which she rates at 9/10, did not bring her cane today. Picks up her hearing aid on Friday, nervous about using it.  She was compliant with home exercise program.  Response to previous treatment: initial eval  Functional change: ongoing    Pain: 9/10  Location: dizziness      Objective      Objective Measures updated at progress report unless specified.     Vitals taken at start of session:  129/89 mmHg  65 bpm    Treatment     Tracy received the treatments listed below:      therapeutic exercises to develop strength, endurance, ROM, and posture for 00 minutes including:  Not performed today    neuromuscular re-education activities to improve: Balance, Coordination, Kinesthetic, and Proprioception for 25 minutes. The following activities were included:     Narrow base of support: 2x30" eyes open + head turns horizontal                                        : 2x30" eyes open + head turns vertical  Narrow base of support on airex: 2x30" eyes open                                                     : 2x30" eyes closed    Semi tandem balance: 2x30" ea leg leading                              " "          Vestibular sit<>stand: 2x10 (could only tolerate first set today)    NEXT>>   Tandem balance: 6 lengths x 10 feet  Backwards walkin lengths 20 feet  Steps: x5  Hurdles 6" forward: 6 lengths x 10 fet     therapeutic activities to improve functional performance for 20  minutes, including:  [place] head turning exercises (HEP Review):  - Supine:                 Horizontal head turns 3x5                Vertical head turns 3x5  - Seated:                 Horizontal head turns 3x5                 Vertical head turns 3x5                 Shoulder shrug and [place] 3x5                 Cone  3x5    Patient Education and Home Exercises       Education provided:   - daily HEP compliance    Written Home Exercises Provided: Patient instructed to cont prior HEP. Exercises were reviewed and Tracy was able to demonstrate them prior to the end of the session.  Tracy demonstrated good  understanding of the education provided. See Electronic Medical Record under Patient Instructions for exercises provided during therapy sessions    Assessment     Tracy arrived to session with high levels of dizziness per subjective rating but was agreeable to treatment.  Fair tolerance of first follow up session after initial evaluation with multiple seated rest breaks due to frontal headache and dizzy exacerbation.  Session consisted of [place] HEP review, static balance training with sensory integration focus, and habituation activity.  Most challenged with seated cone pick ups and vestibular sit to stands with extended breaks for recovery from dizziness.  Multiple losses of balance with static balance training with narrowed base of support.  Delayed righting reactions with patient reaching for bar for recovery or requiring assistance from PTA.  She would benefit from continued PT services to improve self perceived dizziness and decrease overall fall risk.     Tracy Is progressing well towards her goals.   Patient " prognosis is Good.     Patient will continue to benefit from skilled outpatient physical therapy to address the deficits listed in the problem list box on initial evaluation, provide pt/family education and to maximize pt's level of independence in the home and community environment.     Patient's spiritual, cultural and educational needs considered and pt agreeable to plan of care and goals.     Anticipated barriers to physical therapy: co-morbidities, sedentary lifestyle     Goals:   Short Term Goals: 4 weeks   1. Pt will be compliant with cawthorne exercise HEP in order to maximize PT benefits   2. Pt will score >/= 19/30 on FGA with least restrictive assistive device in order to reduce risk for falls and improve postural control   3. Pt will report 75 points or less on the FOTO DHI scale indicating increased confidence in balance and  mobility.          Long Term Goals: 6 weeks   1. Pt will score Pass on all components of the mCTSIB without verbalizing feeling shaking unsteady falling in eyes closed on foam.   2. Pt will score >/= 23/30 on FGA with least restrictive assistive device in order to reduce risk for falls and improve postural control   4. Pt will report 60 points or less on the FOTO DHI scale indicating increased confidence in balance and  mobility.    5. Pt will perform 1 floor <> stand transfer with 1 UE support in order to demonstrate safe functional mobility in case of future fall   6.  Pt will report 0 falls from initiation of PT management   7. Pt will begin some form of home/community fitness in order to sustain progress gained in PT.     Plan     Plan to cont with progression of PT goals per POC.    Alejandra Jimenes, PTA

## 2024-05-02 ENCOUNTER — CLINICAL SUPPORT (OUTPATIENT)
Dept: REHABILITATION | Facility: HOSPITAL | Age: 74
End: 2024-05-02
Payer: MEDICARE

## 2024-05-02 DIAGNOSIS — H83.2X9 VESTIBULAR HYPOFUNCTION, UNSPECIFIED LATERALITY: Primary | ICD-10-CM

## 2024-05-02 DIAGNOSIS — Z91.81 HISTORY OF RECENT FALL: ICD-10-CM

## 2024-05-02 PROCEDURE — 97112 NEUROMUSCULAR REEDUCATION: CPT | Mod: PN,CQ

## 2024-05-02 NOTE — PROGRESS NOTES
"[facility]   Physical Therapy Treatment Note      Name: Tracy Armendariz  Clinic Number: [id number]    Therapy Diagnosis:   Encounter Diagnoses   Name Primary?    Vestibular hypofunction, unspecified laterality Yes    History of recent fall      Physician: Daija Joy NP    Visit Date: 2024    Physician Orders: PT Eval and Treat   Medical Diagnosis from Referral: Z74.09 (ICD-10-CM) - Impaired functional mobility and activity tolerance   Evaluation Date: 2024  Authorization Period Expiration:2024  Plan of Care Expiration: 2024  Visit # / Visits authorized:  (+eval)     Time In: 1:52 PM  Time Out: 2:30 PM  Total Billable Time: 38 minutes (3 NMR)      Precautions: Standard and Fall    PTA Visit #:      Subjective     Patient reports: no change in dizziness, forgot her hearing aids, brought her hurry cane  She was compliant with home exercise program.  Response to previous treatment: fatigue  Functional change: ongoing    Pain: 9/10  Location: dizziness      Objective      Objective Measures updated at progress report unless specified.       Treatment     Tracy received the treatments listed below:      therapeutic exercises to develop strength, endurance, ROM, and posture for 00 minutes including:  Not performed today    neuromuscular re-education activities to improve: Balance, Coordination, Kinesthetic, and Proprioception for 38 minutes. The following activities were included:     Narrow base of support: 2x30" eyes open + head turns horizontal                                        : 2x30" eyes open + head turns vertical  Narrow base of support on airex: 2x30" eyes open                                                     : 2x30" eyes closed    Semi tandem balance: 2x30" ea leg leading                                        Vestibular sit<>stand: 2x10 (could only tolerate first set today)    Tandem balance: 6 lengths x 10 feet  Backwards walkin lengths 20 " feet     therapeutic activities to improve functional performance for 00 minutes, including:  Not performed today:  [place] head turning exercises (HEP Review):  - Supine:                 Horizontal head turns 3x5                Vertical head turns 3x5  - Seated:                 Horizontal head turns 3x5                 Vertical head turns 3x5                 Shoulder shrug and [place] 3x5                 Cone  3x5    Patient Education and Home Exercises       Education provided:   - daily HEP compliance    Written Home Exercises Provided: Patient instructed to cont prior HEP. Exercises were reviewed and Tracy was able to demonstrate them prior to the end of the session.  Tracy demonstrated good  understanding of the education provided. See Electronic Medical Record under Patient Instructions for exercises provided during therapy sessions    Assessment     Tracy arrived to session with high levels of dizziness per subjective rating but was agreeable to treatment.  Fair tolerance of session  consisting of static balance, sensory integration, and habituation activity. Still challenged with seated cone pick ups and vestibular sit to stands with extended breaks for recovery from dizziness.  Multiple losses of balance with static balance training with narrowed base of support.  Delayed righting reactions but able to use stepping strategy rather than reaching strategy with cuing. She would benefit from continued PT services to improve self perceived dizziness and decrease overall fall risk.     Tracy Is progressing well towards her goals.   Patient prognosis is Good.     Patient will continue to benefit from skilled outpatient physical therapy to address the deficits listed in the problem list box on initial evaluation, provide pt/family education and to maximize pt's level of independence in the home and community environment.     Patient's spiritual, cultural and educational needs considered and pt  agreeable to plan of care and goals.     Anticipated barriers to physical therapy: co-morbidities, sedentary lifestyle     Goals:   Short Term Goals: 4 weeks   1. Pt will be compliant with cawthorne exercise HEP in order to maximize PT benefits   2. Pt will score >/= 19/30 on FGA with least restrictive assistive device in order to reduce risk for falls and improve postural control   3. Pt will report 75 points or less on the FOTO DHI scale indicating increased confidence in balance and  mobility.          Long Term Goals: 6 weeks   1. Pt will score Pass on all components of the mCTSIB without verbalizing feeling shaking unsteady falling in eyes closed on foam.   2. Pt will score >/= 23/30 on FGA with least restrictive assistive device in order to reduce risk for falls and improve postural control   4. Pt will report 60 points or less on the FOTO DHI scale indicating increased confidence in balance and  mobility.    5. Pt will perform 1 floor <> stand transfer with 1 UE support in order to demonstrate safe functional mobility in case of future fall   6.  Pt will report 0 falls from initiation of PT management   7. Pt will begin some form of home/community fitness in order to sustain progress gained in PT.     Plan     Plan to cont with progression of PT goals per POC.    Alejandra Jimenes, PTA

## 2024-05-06 ENCOUNTER — CLINICAL SUPPORT (OUTPATIENT)
Dept: REHABILITATION | Facility: HOSPITAL | Age: 74
End: 2024-05-06
Payer: MEDICARE

## 2024-05-06 DIAGNOSIS — Z91.81 HISTORY OF RECENT FALL: ICD-10-CM

## 2024-05-06 DIAGNOSIS — H83.2X9 VESTIBULAR HYPOFUNCTION, UNSPECIFIED LATERALITY: Primary | ICD-10-CM

## 2024-05-06 PROCEDURE — 97112 NEUROMUSCULAR REEDUCATION: CPT | Mod: PN,CQ

## 2024-05-06 NOTE — PROGRESS NOTES
"[facility]   Physical Therapy Treatment Note      Name: Tracy Armendariz  Clinic Number: [id number]    Therapy Diagnosis:   Encounter Diagnoses   Name Primary?    Vestibular hypofunction, unspecified laterality Yes    History of recent fall      Physician: Daija Joy NP    Visit Date: 2024    Physician Orders: PT Eval and Treat   Medical Diagnosis from Referral: Z74.09 (ICD-10-CM) - Impaired functional mobility and activity tolerance   Evaluation Date: 2024  Authorization Period Expiration:2024  Plan of Care Expiration: 2024  Visit # / Visits authorized: 3/20 (+eval)     Time In: 1:52 PM  Time Out: 2:30 PM  Total Billable Time: 38 minutes (3 NMR)      Precautions: Standard and Fall    PTA Visit #: 3/5     Subjective     Patient reports: no change in dizziness, brought her hearing aids which have been wonderful so far  She was compliant with home exercise program.  Response to previous treatment: fatigue  Functional change: ongoing    Pain: 8/10  Location: dizziness      Objective      Objective Measures updated at progress report unless specified.       Treatment     Tracy received the treatments listed below:      therapeutic exercises to develop strength, endurance, ROM, and posture for 00 minutes including:  Not performed today    neuromuscular re-education activities to improve: Balance, Coordination, Kinesthetic, and Proprioception for 38 minutes. The following activities were included:     Narrow base of support: 2x30" eyes open + head turns horizontal                                        : 2x30" eyes open + head turns vertical  Narrow base of support on airex: 2x30" eyes open                                                     : 2x30" eyes closed    Semi tandem balance: 2x30" ea leg leading                                        Vestibular sit<>stand: 2x10    Tandem balance: 6 lengths x 10 feet (heavy UE support)  Backwards walkin lengths 20 feet " (CGA)     therapeutic activities to improve functional performance for 00 minutes, including:  Not performed today:  [place] head turning exercises (HEP Review):  - Supine:                 Horizontal head turns 3x5                Vertical head turns 3x5  - Seated:                 Horizontal head turns 3x5                 Vertical head turns 3x5                 Shoulder shrug and [place] 3x5                 Cone  3x5    Patient Education and Home Exercises       Education provided:   - daily HEP compliance    Written Home Exercises Provided: Patient instructed to cont prior HEP. Exercises were reviewed and Tracy was able to demonstrate them prior to the end of the session.  Tracy demonstrated good  understanding of the education provided. See Electronic Medical Record under Patient Instructions for exercises provided during therapy sessions    Assessment     Tracy arrived to session with high levels of dizziness per subjective rating but was agreeable to treatment.  Fair tolerance of session consisting of static balance, sensory integration, and habituation activity. Still challenged with seated cone pick ups and vestibular sit to stands with extended breaks for recovery from dizziness.  Multiple losses of balance with static balance training with narrowed base of support.  Delayed righting reactions and having to rely on reaching strategy for recovery though today some instances where PTA provided assistance.  Again high levels of fatigue following sit to stand exercises and requesting a short session. She would benefit from continued PT services to improve self perceived dizziness and decrease overall fall risk.     Tracy Is progressing well towards her goals.   Patient prognosis is Good.     Patient will continue to benefit from skilled outpatient physical therapy to address the deficits listed in the problem list box on initial evaluation, provide pt/family education and to maximize pt's level of  independence in the home and community environment.     Patient's spiritual, cultural and educational needs considered and pt agreeable to plan of care and goals.     Anticipated barriers to physical therapy: co-morbidities, sedentary lifestyle     Goals:   Short Term Goals: 4 weeks   1. Pt will be compliant with cawthorne exercise HEP in order to maximize PT benefits   2. Pt will score >/= 19/30 on FGA with least restrictive assistive device in order to reduce risk for falls and improve postural control   3. Pt will report 75 points or less on the FOTO DHI scale indicating increased confidence in balance and  mobility.          Long Term Goals: 6 weeks   1. Pt will score Pass on all components of the mCTSIB without verbalizing feeling shaking unsteady falling in eyes closed on foam.   2. Pt will score >/= 23/30 on FGA with least restrictive assistive device in order to reduce risk for falls and improve postural control   4. Pt will report 60 points or less on the FOTO DHI scale indicating increased confidence in balance and  mobility.    5. Pt will perform 1 floor <> stand transfer with 1 UE support in order to demonstrate safe functional mobility in case of future fall   6.  Pt will report 0 falls from initiation of PT management   7. Pt will begin some form of home/community fitness in order to sustain progress gained in PT.     Plan     Plan to cont with progression of PT goals per POC.    Alejandra Jimenes, PTA

## 2024-05-09 ENCOUNTER — CLINICAL SUPPORT (OUTPATIENT)
Dept: REHABILITATION | Facility: HOSPITAL | Age: 74
End: 2024-05-09
Payer: MEDICARE

## 2024-05-09 DIAGNOSIS — Z91.81 HISTORY OF RECENT FALL: ICD-10-CM

## 2024-05-09 DIAGNOSIS — H83.2X9 VESTIBULAR HYPOFUNCTION, UNSPECIFIED LATERALITY: Primary | ICD-10-CM

## 2024-05-09 PROCEDURE — 97112 NEUROMUSCULAR REEDUCATION: CPT | Mod: PN,CQ

## 2024-05-09 NOTE — PROGRESS NOTES
"[facility]   Physical Therapy Treatment Note      Name: Tracy Armendariz  Clinic Number: [id number]    Therapy Diagnosis:   Encounter Diagnoses   Name Primary?    Vestibular hypofunction, unspecified laterality Yes    History of recent fall      Physician: Daija Joy NP    Visit Date: 2024    Physician Orders: PT Eval and Treat   Medical Diagnosis from Referral: Z74.09 (ICD-10-CM) - Impaired functional mobility and activity tolerance   Evaluation Date: 2024  Authorization Period Expiration:2024  Plan of Care Expiration: 2024  Visit # / Visits authorized:  (+eval)     Time In: 1:52 PM  Time Out: 2:30 PM  Total Billable Time: 38 minutes (3 NMR)      Precautions: Standard and Fall    PTA Visit #:      Subjective     Patient reports: no change in dizziness, brought her hearing aids which have been wonderful so far  She was compliant with home exercise program.  Response to previous treatment: fatigue  Functional change: ongoing    Pain: 8/10  Location: dizziness      Objective      Objective Measures updated at progress report unless specified.       Treatment     Tracy received the treatments listed below:      therapeutic exercises to develop strength, endurance, ROM, and posture for 00 minutes including:  Not performed today    neuromuscular re-education activities to improve: Balance, Coordination, Kinesthetic, and Proprioception for 38 minutes. The following activities were included:     Narrow base of support: 2x30" eyes open + head turns horizontal                                        : 2x30" eyes open + head turns vertical  Narrow base of support on airex: 2x30" eyes open                                                     : 2x30" eyes closed    Semi tandem balance: 2x30" ea leg leading                                        Vestibular sit<>stand: 2x10    Tandem balance: 6 lengths x 10 feet (heavy UE support)  Backwards walkin lengths 20 feet " (CGA)    Overground ambulation with head turns 4 x 45 feet with horizontal     therapeutic activities to improve functional performance for 00 minutes, including:  Not performed today:  [place] head turning exercises (HEP Review):  - Supine:                 Horizontal head turns 3x5                Vertical head turns 3x5  - Seated:                 Horizontal head turns 3x5                 Vertical head turns 3x5                 Shoulder shrug and [place] 3x5                 Cone  3x5    Patient Education and Home Exercises       Education provided:   - daily HEP compliance    Written Home Exercises Provided: Patient instructed to cont prior HEP. Exercises were reviewed and Tracy was able to demonstrate them prior to the end of the session.  Tracy demonstrated good  understanding of the education provided. See Electronic Medical Record under Patient Instructions for exercises provided during therapy sessions    Assessment     Tracy arrived to session with high levels of dizziness per subjective rating but was agreeable to treatment.  Improved tolerance of treatment today.  Less dizziness reported with static balance.  Was able to tolerate head turning activity with overground ambulation today though patient tends to lose balance because of narrowing almost scissoring gait despite cuing to walk with a normal gait width.  First session she was able to complete in full without requesting to end early.  She would benefit from continued PT services to improve self perceived dizziness and decrease overall fall risk.     Tracy Is progressing well towards her goals.   Patient prognosis is Good.     Patient will continue to benefit from skilled outpatient physical therapy to address the deficits listed in the problem list box on initial evaluation, provide pt/family education and to maximize pt's level of independence in the home and community environment.     Patient's spiritual, cultural and educational  needs considered and pt agreeable to plan of care and goals.     Anticipated barriers to physical therapy: co-morbidities, sedentary lifestyle     Goals:   Short Term Goals: 4 weeks   1. Pt will be compliant with cawthorne exercise HEP in order to maximize PT benefits   2. Pt will score >/= 19/30 on FGA with least restrictive assistive device in order to reduce risk for falls and improve postural control   3. Pt will report 75 points or less on the FOTO DHI scale indicating increased confidence in balance and  mobility.          Long Term Goals: 6 weeks   1. Pt will score Pass on all components of the mCTSIB without verbalizing feeling shaking unsteady falling in eyes closed on foam.   2. Pt will score >/= 23/30 on FGA with least restrictive assistive device in order to reduce risk for falls and improve postural control   4. Pt will report 60 points or less on the FOTO DHI scale indicating increased confidence in balance and  mobility.    5. Pt will perform 1 floor <> stand transfer with 1 UE support in order to demonstrate safe functional mobility in case of future fall   6.  Pt will report 0 falls from initiation of PT management   7. Pt will begin some form of home/community fitness in order to sustain progress gained in PT.     Plan     Plan to cont with progression of PT goals per POC.    Alejandra Jimenes, PTA

## 2024-05-17 ENCOUNTER — CLINICAL SUPPORT (OUTPATIENT)
Dept: REHABILITATION | Facility: HOSPITAL | Age: 74
End: 2024-05-17
Payer: MEDICARE

## 2024-05-17 DIAGNOSIS — H83.2X9 VESTIBULAR HYPOFUNCTION, UNSPECIFIED LATERALITY: Primary | ICD-10-CM

## 2024-05-17 DIAGNOSIS — Z91.81 HISTORY OF RECENT FALL: ICD-10-CM

## 2024-05-17 PROCEDURE — 97112 NEUROMUSCULAR REEDUCATION: CPT | Mod: PN,CQ

## 2024-05-17 NOTE — PROGRESS NOTES
"[facility]   Physical Therapy Treatment Note      Name: Tracy Armendariz  Clinic Number: [id number]    Therapy Diagnosis:   Encounter Diagnoses   Name Primary?    Vestibular hypofunction, unspecified laterality Yes    History of recent fall      Physician: Daija Joy NP    Visit Date: 5/17/2024    Physician Orders: PT Eval and Treat   Medical Diagnosis from Referral: Z74.09 (ICD-10-CM) - Impaired functional mobility and activity tolerance   Evaluation Date: 4/25/2024  Authorization Period Expiration:12/31/2024  Plan of Care Expiration: 6/21/2024  Visit # / Visits authorized: 5/20 (+eval)     Time In: 1:07 PM  Time Out: 1:45 PM  Total Billable Time: 38 minutes (3 NMR)      Precautions: Standard and Fall    PTA Visit #: 5/5, PT NEXT!!!     Subjective     Patient reports: Apologizes for missing last session and cancelling last minute but she was very sick.  Says she is having a much better day today from a dizziness standpoint but says yesterday was terrible and she was so dizzy she was unable to do anything.    She was compliant with home exercise program.  Response to previous treatment: fatigue  Functional change: ongoing    Pain: 5/10  Location: dizziness      Objective      Objective Measures updated at progress report unless specified.       Treatment     Tracy received the treatments listed below:      therapeutic exercises to develop strength, endurance, ROM, and posture for 00 minutes including:  Not performed today    neuromuscular re-education activities to improve: Balance, Coordination, Kinesthetic, and Proprioception for 38 minutes. The following activities were included:                                            Normal base of support on airex: 2x30" eyes open                                                     : 2x30" eyes closed                                                     : 2x30" eyes open + head turns horizontal                                                 " "    : 2x30" eyes open + head turns vertical                                                        Semi tandem balance: 2x30" ea leg leading                                      Normal base of support on grey incline board: 2x30" eyes open                                                                          : 2x30" eyes closed     Vestibular sit<>stand: 2x10      Tandem balance: 6 lengths x 10 feet (heavy UE support)  Backwards walkin lengths 20 feet (CGA)    Overground ambulation with head turns 4 x 45 feet (horizontal)                                                                   4 x 45 feet  (vertical)      Not completed today:  Narrow base of support: 2x30" eyes open                                       : 2x30" eyes closed                                        : 2x30" eyes open + head turns horizontal                                        : 2x30" eyes open + head turns vertical    therapeutic activities to improve functional performance for 00 minutes, including:  Not performed today:  Emilyhocontreras head turning exercises (HEP Review):  - Supine:                 Horizontal head turns 3x5                Vertical head turns 3x5  - Seated:                 Horizontal head turns 3x5                 Vertical head turns 3x5                 Shoulder shrug and [place] 3x5                 [facility]  3x5    Patient Education and Home Exercises       Education provided:   - daily HEP compliance    Written Home Exercises Provided: Patient instructed to cont prior HEP. Exercises were reviewed and Tracy was able to demonstrate them prior to the end of the session.  Tracy demonstrated good  understanding of the education provided. See Electronic Medical Record under Patient Instructions for exercises provided during therapy sessions    Assessment     Tracy arrived to session with moderate levels of dizziness, though improved per subjective rating when compared to previous session.  She arrived with multipoint " cane and appeared more stable when entering gym from waiting room.  Fair tolerance of treatment this session with patient again requiring numerous seated rest breaks throughout static balance portion of treatment.  She reported an exacerbation of both dizziness and headache by end of session.  Patient admits to making no changes to mostly sedentary lifestyle spend in bed and recliner and has not started her daily walks to the end of the block as instructed on first follow up and reinforced each session.  She may benefit from continued PT services to improve self perceived dizziness and decrease overall fall risk.     Tracy Is progressing well towards her goals.   Patient prognosis is Good.     Patient will continue to benefit from skilled outpatient physical therapy to address the deficits listed in the problem list box on initial evaluation, provide pt/family education and to maximize pt's level of independence in the home and community environment.     Patient's spiritual, cultural and educational needs considered and pt agreeable to plan of care and goals.     Anticipated barriers to physical therapy: co-morbidities, sedentary lifestyle     Goals:   Short Term Goals: 4 weeks   1. Pt will be compliant with [facility] exercise HEP in order to maximize PT benefits   2. Pt will score >/= 19/30 on FGA with least restrictive assistive device in order to reduce risk for falls and improve postural control   3. Pt will report 75 points or less on the FOTO DHI scale indicating increased confidence in balance and  mobility.          Long Term Goals: 6 weeks   1. Pt will score Pass on all components of the mCTSIB without verbalizing feeling shaking unsteady falling in eyes closed on foam.   2. Pt will score >/= 23/30 on FGA with least restrictive assistive device in order to reduce risk for falls and improve postural control   4. Pt will report 60 points or less on the FOTO DHI scale indicating increased confidence in  balance and  mobility.    5. Pt will perform 1 floor <> stand transfer with 1 UE support in order to demonstrate safe functional mobility in case of future fall   6.  Pt will report 0 falls from initiation of PT management   7. Pt will begin some form of home/community fitness in order to sustain progress gained in PT.     Plan     Plan to cont with progression of PT goals per POC.    Alejandra Jimenes, PTA

## 2024-05-21 ENCOUNTER — CLINICAL SUPPORT (OUTPATIENT)
Dept: REHABILITATION | Facility: HOSPITAL | Age: 74
End: 2024-05-21
Payer: MEDICARE

## 2024-05-21 DIAGNOSIS — Z91.81 HISTORY OF RECENT FALL: ICD-10-CM

## 2024-05-21 DIAGNOSIS — H83.2X9 VESTIBULAR HYPOFUNCTION, UNSPECIFIED LATERALITY: Primary | ICD-10-CM

## 2024-05-21 PROCEDURE — 97112 NEUROMUSCULAR REEDUCATION: CPT | Mod: PN

## 2024-05-21 NOTE — PROGRESS NOTES
"[facility]   Physical Therapy Treatment Note      Name: Tracy Armendariz  Clinic Number: [id number]    Therapy Diagnosis:   Encounter Diagnoses   Name Primary?    Vestibular hypofunction, unspecified laterality Yes    History of recent fall      Physician: Daija Joy NP    Visit Date: 5/21/2024    Physician Orders: PT Eval and Treat   Medical Diagnosis from Referral: Z74.09 (ICD-10-CM) - Impaired functional mobility and activity tolerance   Evaluation Date: 4/25/2024  Authorization Period Expiration:12/31/2024  Plan of Care Expiration: 6/21/2024  Visit # / Visits authorized: 6/20 (+eval)     Time In: 1:03 PM  Time Out: 1:45 PM  Total Billable Time: 42 minutes (3 NMR)      Precautions: Standard and Fall    PTA Visit #: 2/3, issued 5/17    Subjective     Patient reports: reports that today is a good day and she is feels today.  She was compliant with home exercise program.  Response to previous treatment: fatigue  Functional change: ongoing    Pain: 2/10  Location: dizziness      Objective      Objective Measures updated at progress report unless specified.     Treatment     Tracy received the treatments listed below:      therapeutic exercises to develop strength, endurance, ROM, and posture for 00 minutes including:  Not performed today    neuromuscular re-education activities to improve: Balance, Coordination, Kinesthetic, and Proprioception for 42 minutes. The following activities were included:                                            Normal base of support on airex: x30" eyes open                                                     : 2x30" eyes closed                                                      : 2x30" eyes open + head turns horizontal                                                     : 3x30" eyes open + head turns vertical                                                        Semi tandem balance: 2x30" ea leg leading  Narrow STANISLAV on solid floor 2 x 30''             " "                   Normal base of support on grey incline board: 2x30" eyes open                                                                          : 2x30" eyes closed     Vestibular sit<>stand: 2x10      Tandem balance: 6 lengths x 10 feet (heavy UE support)  Backwards walkin lengths 10 feet  in // bars (CGA)    Overground ambulation with head turns 4 x 45 feet (horizontal)                                                                   4 x 45 feet  (vertical)      therapeutic activities to improve functional performance for 00 minutes, including:  Not performed today:  [place] head turning exercises (HEP Review):    Patient Education and Home Exercises       Education provided:   - daily HEP compliance    Written Home Exercises Provided: Patient instructed to cont prior HEP. Exercises were reviewed and Tracy was able to demonstrate them prior to the end of the session.  Tracy demonstrated good  understanding of the education provided. See Electronic Medical Record under Patient Instructions for exercises provided during therapy sessions    Assessment     Tracy arrived to session with minimal levels of dizziness, and improved per subjective rating when compared to previous session.  She arrived with multipoint cane and appeared more stable when entering gym from waiting room. Improved tolerance of treatment this session with patient requesting 3 seated rest breaks throughout static balance portion of treatment.  She stated that she has started walking to the end of the street for exercise since last visit.  She may benefit from continued PT services to improve self perceived dizziness and decrease overall fall risk.     Tracy Is progressing well towards her goals.   Patient prognosis is Good.     Patient will continue to benefit from skilled outpatient physical therapy to address the deficits listed in the problem list box on initial evaluation, provide pt/family education and to maximize pt's " level of independence in the home and community environment.     Patient's spiritual, cultural and educational needs considered and pt agreeable to plan of care and goals.     Anticipated barriers to physical therapy: co-morbidities, sedentary lifestyle     Goals:   Short Term Goals: 4 weeks   1. Pt will be compliant with [place] exercise HEP in order to maximize PT benefits   (PROGRESSING, NOT MET)  2. Pt will score >/= 19/30 on FGA with least restrictive assistive device in order to reduce risk for falls and improve postural control (PROGRESSING, NOT MET)  3. Pt will report 75 points or less on the FOTO DHI scale indicating increased confidence in balance and  mobility.  (PROGRESSING, NOT MET)       Long Term Goals: 6 weeks   1. Pt will score Pass on all components of the mCTSIB without verbalizing feeling shaking unsteady falling in eyes closed on foam. (PROGRESSING, NOT MET)  2. Pt will score >/= 23/30 on FGA with least restrictive assistive device in order to reduce risk for falls and improve postural control (PROGRESSING, NOT MET)  4. Pt will report 60 points or less on the FOTO DHI scale indicating increased confidence in balance and  mobility.  (PROGRESSING, NOT MET)  5. Pt will perform 1 floor <> stand transfer with 1 UE support in order to demonstrate safe functional mobility in case of future fall (PROGRESSING, NOT MET)  6.  Pt will report 0 falls from initiation of PT management (PROGRESSING, NOT MET)  7. Pt will begin some form of home/community fitness in order to sustain progress gained in PT.     Plan     Plan to cont with progression of PT goals per POC.    Betsy Nunez, PT

## 2024-05-22 ENCOUNTER — OFFICE VISIT (OUTPATIENT)
Dept: SLEEP MEDICINE | Facility: CLINIC | Age: 74
End: 2024-05-22
Payer: MEDICARE

## 2024-05-22 DIAGNOSIS — G47.33 OSA (OBSTRUCTIVE SLEEP APNEA): ICD-10-CM

## 2024-05-22 DIAGNOSIS — R40.0 SOMNOLENCE: Primary | ICD-10-CM

## 2024-05-22 PROCEDURE — 4010F ACE/ARB THERAPY RXD/TAKEN: CPT | Mod: CPTII,95,, | Performed by: INTERNAL MEDICINE

## 2024-05-22 PROCEDURE — 3060F POS MICROALBUMINURIA REV: CPT | Mod: CPTII,95,, | Performed by: INTERNAL MEDICINE

## 2024-05-22 PROCEDURE — 3044F HG A1C LEVEL LT 7.0%: CPT | Mod: CPTII,95,, | Performed by: INTERNAL MEDICINE

## 2024-05-22 PROCEDURE — 3066F NEPHROPATHY DOC TX: CPT | Mod: CPTII,95,, | Performed by: INTERNAL MEDICINE

## 2024-05-22 PROCEDURE — 99214 OFFICE O/P EST MOD 30 MIN: CPT | Mod: 95,,, | Performed by: INTERNAL MEDICINE

## 2024-05-22 NOTE — PROGRESS NOTES
The patient location is: [place]  The chief complaint leading to consultation is: trouble with sleep    Visit type: audiovisual    20 minutes of total time spent on the encounter, which includes face to face time and non-face to face time preparing to see the patient (eg, review of tests), Obtaining and/or reviewing separately obtained history, Documenting clinical information in the electronic or other health record, Independently interpreting results (not separately reported) and communicating results to the patient/family/caregiver, or Care coordination (not separately reported).     Each patient to whom he or she provides medical services by telemedicine is:  (1) informed of the relationship between the physician and patient and the respective role of any other health care provider with respect to management of the patient; and (2) notified that he or she may decline to receive medical services by telemedicine and may withdraw from such care at any time.      ESTABLISHED  PATIENT VISIT    Tracy Armendariz  is a pleasant 73 y.o. female  with PMH significant for MARTIN, Obesity, HTN, and CVA in 2013, MARTIN (Bipap 16/8-CPAP 12,-> Rx auto CPAP 12-18 ), nasal, who presents for evaluation of sleep disorder. Pt previously seen by [facility] in Aug 2016. Pt on CPAP for dx of MARTIN.    Here today for:  follow-up     Since last visit:   See assessment below    There were no vitals filed for this visit.       Physical Exam:    GEN:   Well-appearing  Psych:  Appropriate affect, demonstrates insight  SKIN:  No rash on the face or bridge of the nose    LABS:   Lab Results   Component Value Date    HGB 14.4 02/17/2024    CO2 24 04/29/2024       RECORDS REVIEWED PREVIOUSLY:         ASSESSMENT      PROBLEM DESCRIPTION/ Sx on Presentation Interval Hx STATUS PLAN   MARTIN     Presentation:  CPAP intolerance, tossing and turning      PAP history   Study  Dx 2014: AHI 17  Titrated to 16/8   Problems    Mask Dreamwear nasal (prefers)  FFM    Pressure    Benefit    DME    Machine age    Download No recent usage        Mask leaking    Tossing and turning at night      Since last visit:     PAP 10.29.22: mask ResMed N20 small, 1st improvement at 7 cwp, 7cwp +/- lat N2 and lat REM, 8cwp ++lat N2,     Notes:no supine sleep, +EFM, PLMD RX= 7 to 10 cwp, push min to 8 cwp, Ramp 5 x auto, RLS?    Nov 2023-> has machine, missed compliance  -> offered F2F and repeat titration    3.25.24: needs supplies-> ordered  FU scheduled for May    5/21/24: 19/20 x 4h 24min, 7-10 (7.3/ 8.3/8.6), leak 5/17/36, AHI 1.0    Doing much better lately     Largely controlled       PAP PLAN   EPAP min 7 cwp (continue)   IPAP max 10 cwp (continue)   PS/EPR    RAMP Continue (offered to increase but likes where it is at)   Other    Altn.        -consider getting a FFM as a backup when she is congested      The patient is using and benefitting from PAP therapy     Sleepiness   + sleepiness when inactive , sleeping many hours a day  denies sleepiness when driving   ESS 12/24 on intake    SLEEPINESS   Duration    Testing    Usual TST    Max TST    H/H Hx    SP    sleep atx    sleep Inert.    Naps Dozes in afternoon   Cataplexy    ESS (intake) 12/24   Meds prior    Meds now          SLEEP SCHEDULE   Bed Time 1030/11pm   Sleep Latency 1 hr   Arousals 3-4x   Back to sleep 15/30mins   Wake time 11am/12pm this last month   Naps yes   Nocturia  none   Work Retired              Still sleepy during the day    No improvement with CPAP    Not affecting her quality of life too much     persists     -offered trial of modafinil but does not feel she needs it at this point     Sinus congestion       Antihistamine:   Nasal sprays: flonase nightly  Surgeries:          Since last visit:   Breathing better since using nasal spray at night      -continue flonase nightly  -consider astelin  trial   -consider saline spray before hand    -consider trial of breathe rite   Other issues:       RTC:  yearly or  sooner if problems arise

## 2024-05-23 ENCOUNTER — CLINICAL SUPPORT (OUTPATIENT)
Dept: REHABILITATION | Facility: HOSPITAL | Age: 74
End: 2024-05-23
Payer: MEDICARE

## 2024-05-23 ENCOUNTER — PATIENT MESSAGE (OUTPATIENT)
Dept: SLEEP MEDICINE | Facility: CLINIC | Age: 74
End: 2024-05-23
Payer: MEDICARE

## 2024-05-23 DIAGNOSIS — H83.2X9 VESTIBULAR HYPOFUNCTION, UNSPECIFIED LATERALITY: Primary | ICD-10-CM

## 2024-05-23 DIAGNOSIS — Z91.81 HISTORY OF RECENT FALL: ICD-10-CM

## 2024-05-23 PROCEDURE — 97112 NEUROMUSCULAR REEDUCATION: CPT | Mod: PN,CQ

## 2024-05-23 NOTE — PROGRESS NOTES
"[facility]   Physical Therapy Treatment Note      Name: Tracy Armendariz  Clinic Number: [id number]    Therapy Diagnosis:   Encounter Diagnoses   Name Primary?    Vestibular hypofunction, unspecified laterality Yes    History of recent fall      Physician: Daija Joy NP    Visit Date: 5/23/2024    Physician Orders: PT Eval and Treat   Medical Diagnosis from Referral: Z74.09 (ICD-10-CM) - Impaired functional mobility and activity tolerance   Evaluation Date: 4/25/2024  Authorization Period Expiration:12/31/2024  Plan of Care Expiration: 6/21/2024  Visit # / Visits authorized: 7/20 (+eval)  FOTO #: 2/3 issued 5/17    Time In: 11:20 AM  Time Out: 12:00 PM  Total Billable Time: 40 minutes (3 NMR)      Precautions: Standard and Fall    PTA Visit #: 1/5    Subjective     Patient reports: reports that today is a bad day in terms of dizziness, she woke up with high levels.  Has been walking to end of block and back once a day. Does not feel like she can walk any further. Mid way through session says she is getting a moderate frontal headache.  She was compliant with home exercise program.  Response to previous treatment: fatigue  Functional change: ongoing    Pain: 8/10  Location: dizziness      Objective      Objective Measures updated at progress report unless specified.     Treatment     Tracy received the treatments listed below:      therapeutic exercises to develop strength, endurance, ROM, and posture for 00 minutes including:  Not performed today    neuromuscular re-education activities to improve: Balance, Coordination, Kinesthetic, and Proprioception for 40 minutes. The following activities were included:                                            Normal base of support on airex: x30" eyes open                                                     : 2x30" eyes closed                                                      : 2x30" eyes open + head turns horizontal                        " "                             : 3x30" eyes open + head turns vertical                                                        Semi tandem balance: 2x30" ea leg leading  Narrow STANISLAV on solid floor: static 2 x 30''                                                        : head turns 2x30"  ea way (vertical/horizontal)                     Normal base of support on grey incline board: 2x30" eyes open (3rd rung)                                                                          : 2x30" eyes closed (2nd rung)     Vestibular sit<>stand: 2x10      Tandem balance: 6 lengths x 10 feet (light single UE support)  Backwards walkin lengths 10 feet  (light single UE support)    Overground ambulation with head turns 4 x 45 feet (horizontal)                                                                   4 x 45 feet  (vertical)      Hand to alt knee slaps: 2x10    therapeutic activities to improve functional performance for 00 minutes, including:  Not performed today:  Emilyhocontreras head turning exercises (HEP Review):    Patient Education and Home Exercises       Education provided:   - daily HEP compliance    Written Home Exercises Provided: Patient instructed to cont prior HEP. Exercises were reviewed and Tracy was able to demonstrate them prior to the end of the session.  Tracy demonstrated good  understanding of the education provided. See Electronic Medical Record under Patient Instructions for exercises provided during therapy sessions    Assessment     Tracy arrived to session with high levels of dizziness but was agreeable to treatment. Decreased activity tolerance observed this session with patient requesting 5-6 seated rest breaks following static balance exercises.  Despite high levels of dizziness reported she was able to complete tandem and backwards walking with much less support than previously required without loss of balance.  She may benefit from continued PT services to improve self perceived dizziness " and decrease overall fall risk.     Tracy Is progressing well towards her goals.   Patient prognosis is Good.     Patient will continue to benefit from skilled outpatient physical therapy to address the deficits listed in the problem list box on initial evaluation, provide pt/family education and to maximize pt's level of independence in the home and community environment.     Patient's spiritual, cultural and educational needs considered and pt agreeable to plan of care and goals.     Anticipated barriers to physical therapy: co-morbidities, sedentary lifestyle     Goals:   Short Term Goals: 4 weeks   1. Pt will be compliant with [place] exercise HEP in order to maximize PT benefits   (PROGRESSING, NOT MET)  2. Pt will score >/= 19/30 on FGA with least restrictive assistive device in order to reduce risk for falls and improve postural control (PROGRESSING, NOT MET)  3. Pt will report 75 points or less on the FOTO DHI scale indicating increased confidence in balance and  mobility.  (PROGRESSING, NOT MET)       Long Term Goals: 6 weeks   1. Pt will score Pass on all components of the mCTSIB without verbalizing feeling shaking unsteady falling in eyes closed on foam. (PROGRESSING, NOT MET)  2. Pt will score >/= 23/30 on FGA with least restrictive assistive device in order to reduce risk for falls and improve postural control (PROGRESSING, NOT MET)  4. Pt will report 60 points or less on the FOTO DHI scale indicating increased confidence in balance and  mobility.  (PROGRESSING, NOT MET)  5. Pt will perform 1 floor <> stand transfer with 1 UE support in order to demonstrate safe functional mobility in case of future fall (PROGRESSING, NOT MET)  6.  Pt will report 0 falls from initiation of PT management (PROGRESSING, NOT MET)  7. Pt will begin some form of home/community fitness in order to sustain progress gained in PT.     Plan     Plan to cont with progression of PT goals per POC.    Alejandra Jimenes, PTA

## 2024-05-28 ENCOUNTER — CLINICAL SUPPORT (OUTPATIENT)
Dept: REHABILITATION | Facility: HOSPITAL | Age: 74
End: 2024-05-28
Payer: MEDICARE

## 2024-05-28 DIAGNOSIS — G47.33 OSA (OBSTRUCTIVE SLEEP APNEA): ICD-10-CM

## 2024-05-28 DIAGNOSIS — Z91.81 HISTORY OF RECENT FALL: ICD-10-CM

## 2024-05-28 DIAGNOSIS — H83.2X9 VESTIBULAR HYPOFUNCTION, UNSPECIFIED LATERALITY: Primary | ICD-10-CM

## 2024-05-28 DIAGNOSIS — J30.9 ALLERGIC SINUSITIS: Primary | ICD-10-CM

## 2024-05-28 DIAGNOSIS — R40.0 SOMNOLENCE: ICD-10-CM

## 2024-05-28 PROCEDURE — 97110 THERAPEUTIC EXERCISES: CPT | Mod: KX,PN

## 2024-05-28 PROCEDURE — 97530 THERAPEUTIC ACTIVITIES: CPT | Mod: KX,PN

## 2024-05-28 RX ORDER — MODAFINIL 200 MG/1
200 TABLET ORAL DAILY PRN
Qty: 30 TABLET | Refills: 2 | Status: SHIPPED | OUTPATIENT
Start: 2024-05-28

## 2024-05-28 RX ORDER — AZELASTINE 1 MG/ML
1 SPRAY, METERED NASAL 2 TIMES DAILY
Qty: 30 ML | Refills: 11 | Status: SHIPPED | OUTPATIENT
Start: 2024-05-28 | End: 2025-05-28

## 2024-05-28 NOTE — PROGRESS NOTES
[facility]   Physical Therapy Treatment/Discharge Note      Name: Tracy Armendariz  Clinic Number: [id number]    Therapy Diagnosis:   Encounter Diagnoses   Name Primary?    Vestibular hypofunction, unspecified laterality Yes    History of recent fall      Physician: Daija Joy NP    Visit Date: 5/28/2024    Physician Orders: PT Eval and Treat   Medical Diagnosis from Referral: Z74.09 (ICD-10-CM) - Impaired functional mobility and activity tolerance   Evaluation Date: 4/25/2024  Authorization Period Expiration:12/31/2024  Plan of Care Expiration: 6/21/2024  Visit # / Visits authorized: 7/20 (+eval)  FOTO #: completed    Time In: 11:20 AM  Time Out: 12:00 PM  Total Billable Time: 40 minutes ( 2TA, 1TE)      Precautions: Standard and Fall    PTA Visit #: 0/5    Subjective     Patient reports: that today is a better day with dizziness but she does not feel that PT is helping consistently with her dizziness.  She elected to make today her last day of PT and she plans to return to the gym and continue to perform her HEP.   She was compliant with home exercise program.  Response to previous treatment: fatigue  Functional change: ongoing    Pain: 6/10  Location: dizziness      Objective      Objective Measures updated at progress report unless specified.   FOTO issued     Treatment     Tracy received the treatments listed below:      therapeutic exercises to develop strength, endurance, ROM, and posture for 10 minutes including:  -- Muscle energy technique performed right hip extension/left hip flexion  5 x 10 secs  -- MUSCLE ENERGY TECHNIQUE hip add/abd isometrics 5'' each direction 2' total     therapeutic activities to improve functional performance for 30 minutes, including:  -- FOTO assessment  -- heel lift fitted into left shoe  -- ambulation in gym x 3 laps of 160 feet assess MUSCLE ENERGY TECHNIQUE and heel lift with relief reported  -- education to continue HEP    Patient Education  and Home Exercises       Education provided:   - daily HEP compliance    Written Home Exercises Provided: Patient instructed to cont prior HEP. Exercises were reviewed and Tracy was able to demonstrate them prior to the end of the session.  Tracy demonstrated good  understanding of the education provided. See Electronic Medical Record under Patient Instructions for exercises provided during therapy sessions    Assessment     Tracy arrived to session with high levels of dizziness but was agreeable to treatment. Decreased activity tolerance observed this session with patient requesting 5-6 seated rest breaks following static balance exercises.  Despite high levels of dizziness reported she was able to complete tandem and backwards walking with much less support than previously required without loss of balance.  She may benefit from continued PT services to improve self perceived dizziness and decrease overall fall risk.     Tracy Is progressing well towards her goals.   Patient prognosis is Good.     Patient will continue to benefit from skilled outpatient physical therapy to address the deficits listed in the problem list box on initial evaluation, provide pt/family education and to maximize pt's level of independence in the home and community environment.     Patient's spiritual, cultural and educational needs considered and pt agreeable to plan of care and goals.     Anticipated barriers to physical therapy: co-morbidities, sedentary lifestyle     Goals:   Short Term Goals: 4 weeks   1. Pt will be compliant with [facility] exercise HEP in order to maximize PT benefits   (Met)  2. Pt will score >/= 19/30 on FGA with least restrictive assistive device in order to reduce risk for falls and improve postural control (not met)  3. Pt will report 75 points or less on the FOTO DHI scale indicating increased confidence in balance and  mobility.  (68/100 met)       Long Term Goals: 6 weeks   1. Pt will score Pass on  all components of the mCTSIB without verbalizing feeling shaking unsteady falling in eyes closed on foam. (PROGRESSING, NOT MET)  2. Pt will score >/= 23/30 on FGA with least restrictive assistive device in order to reduce risk for falls and improve postural control (PROGRESSING, NOT MET)  4. Pt will report 60 points or less on the FOTO DHI scale indicating increased confidence in balance and  mobility.  (PROGRESSING, NOT MET)  5. Pt will perform 1 floor <> stand transfer with 1 UE support in order to demonstrate safe functional mobility in case of future fall (PROGRESSING, NOT MET)  6.  Pt will report 0 falls from initiation of PT management (1 fall at [employer], not met)  7. Pt will begin some form of home/community fitness in order to sustain progress gained in PT. (Met)    Plan     Discharge today    Betsy Nunez, PT

## 2024-05-30 ENCOUNTER — TELEPHONE (OUTPATIENT)
Dept: FAMILY MEDICINE | Facility: CLINIC | Age: 74
End: 2024-05-30
Payer: MEDICARE

## 2024-05-30 PROBLEM — Z86.73 HISTORY OF CVA (CEREBROVASCULAR ACCIDENT): Status: RESOLVED | Noted: 2022-03-24 | Resolved: 2024-05-30

## 2024-05-30 NOTE — PROGRESS NOTES
Tracy Armendariz presented for a  Medicare AWV and comprehensive Health Risk Assessment today. The following components were reviewed and updated:    Medical history  Family History  Social history  Allergies and Current Medications  Health Risk Assessment  Health Maintenance  Care Team         ** See Completed Assessments for Annual Wellness Visit within the encounter summary.**         The following assessments were completed:  Living Situation  CAGE  Depression Screening  Timed Get Up and Go  Whisper Test  Cognitive Function Screening    Nutrition Screening  ADL Screening  PAQ Screening      Opioid documentation for eAWV      Patient does not have a current opioid prescription.        Review for Substance Use Disorders: Patient does not use substance        Current Outpatient Medications:     albuterol (PROVENTIL/VENTOLIN HFA) 90 mcg/actuation inhaler, INHALE 2 PUFFS EVERY 6 HOURS AS NEEDED FOR WHEEZING, Disp: 18 g, Rfl: 0    amoxicillin (AMOXIL) 500 MG capsule, Take 4 capsules by mouth 1 hour prior to appointment, Disp: 12 capsule, Rfl: 0    aspirin (ECOTRIN) 81 MG EC tablet, Take 81 mg by mouth once daily., Disp: , Rfl:     atorvastatin (LIPITOR) 40 MG tablet, Take 1 tablet (40 mg total) by mouth every evening., Disp: 90 tablet, Rfl: 3    azelastine (ASTELIN) 137 mcg (0.1 %) nasal spray, 1 spray (137 mcg total) by Nasal route 2 (two) times daily., Disp: 30 mL, Rfl: 11    buPROPion (WELLBUTRIN XL) 300 MG 24 hr tablet, Take 1 tablet (300 mg total) by mouth once daily., Disp: 90 tablet, Rfl: 3    conjugated estrogens (PREMARIN) vaginal cream, Place 0.5 g vaginally 3 (three) times a week., Disp: 30 g, Rfl: 0    diclofenac sodium (VOLTAREN) 1 % Gel, APPLY 2-4 GRAMS TO EACH PAINFUL AREA FOUR TIMES DAILY - MAX 32 GRAMS/DAY, Disp: 500 g, Rfl: 6    EScitalopram oxalate (LEXAPRO) 20 MG tablet, Take 1 tablet (20 mg total) by mouth once daily., Disp: 90 tablet, Rfl: 3    esomeprazole (NEXIUM) 40 MG capsule, Take 1 capsule  "(40 mg total) by mouth daily as needed (heartburn)., Disp: 90 capsule, Rfl: 3    fluticasone propionate (FLONASE) 50 mcg/actuation nasal spray, 2 sprays (100 mcg total) by Each Nostril route once daily., Disp: 16 g, Rfl: 11    losartan (COZAAR) 25 MG tablet, Take 1 tablet (25 mg total) by mouth 2 (two) times a day., Disp: 180 tablet, Rfl: 3    magnesium oxide (MAG-OX) 400 mg (241.3 mg magnesium) tablet, Take 1 tablet (400 mg total) by mouth 2 (two) times daily., Disp: 180 tablet, Rfl: 3    modafiniL (PROVIGIL) 200 MG Tab, Take 1 tablet (200 mg total) by mouth daily as needed (sleepiness)., Disp: 30 tablet, Rfl: 2    potassium citrate (UROCIT-K 10) 10 mEq (1,080 mg) TbSR, Take 2 tablets (20 mEq total) by mouth 3 (three) times daily with meals., Disp: 180 tablet, Rfl: 11    tirzepatide (MOUNJARO) 5 mg/0.5 mL PnIj, Inject 5 mg into the skin every 7 days., Disp: 4 Pen, Rfl: 6    vitamin D (VITAMIN D3) 1000 units Tab, Take 1,000 Units by mouth 3 (three) times a week., Disp: , Rfl:     nitrofurantoin, macrocrystal-monohydrate, (MACROBID) 100 MG capsule, Take 1 capsule (100 mg total) by mouth 2 (two) times daily. (Patient not taking: Reported on 5/31/2024), Disp: 10 capsule, Rfl: 0       Vitals:    05/31/24 1311   BP: 126/84   Pulse: 65   SpO2: 95%   Weight: 98.9 kg (218 lb 0.6 oz)   Height: 5' 6" (1.676 m)   PainSc: 0-No pain      Physical Exam  Vitals reviewed.   Constitutional:       General: She is not in acute distress.     Appearance: Normal appearance. She is not ill-appearing.   HENT:      Head: Normocephalic and atraumatic.      Mouth/Throat:      Mouth: Mucous membranes are moist.   Eyes:      General: No scleral icterus.        Right eye: No discharge.         Left eye: No discharge.      Extraocular Movements: Extraocular movements intact.      Conjunctiva/sclera: Conjunctivae normal.      Comments: +glasses   Cardiovascular:      Rate and Rhythm: Normal rate.   Pulmonary:      Effort: Pulmonary effort is " normal.   Musculoskeletal:      Cervical back: Normal range of motion.      Right lower leg: No edema.      Left lower leg: No edema.   Skin:     General: Skin is warm and dry.      Findings: No rash.   Neurological:      Mental Status: She is alert and oriented to person, place, and time.      Motor: Tremor (hands) present.      Comments: +dizziness with walking    Psychiatric:         Mood and Affect: Mood normal.         Behavior: Behavior normal. Behavior is cooperative.         Cognition and Memory: Cognition normal.             Diagnoses and health risks identified today and associated recommendations/orders:    1. Encounter for preventive health examination  - Chart reviewed. Problem list updated. Discussed current medical diagnosis, current medications, medical/surgical/family/social history; updated provider list; documented vital signs; identified any cognitive impairment; and updated risk factor list. Addressed any outstanding health maintenance. Provided patient with personalized health advice. Continue to follow up with PCP and any specialists.     2. Atherosclerosis of aorta  Chronic; stable on current treatment plan; follow up with PCP  - taking statin and ASA  - follow up with cardiology     3. Severe obesity (BMI 35.0-39.9) with comorbidity  - Recommendation for healthy diet and increasing exercise as tolerated with goal of 150min/week . Recommend weight loss    4. Type 2 diabetes mellitus with stage 3a chronic kidney disease and hypertension  Chronic; stable on current treatment plan; follow up with PCP  - recommend avoiding NSAIDs and nephrotoxins, renal dose meds  - taking losartan    5. Chronic kidney disease, stage 3a  Chronic; stable on current treatment plan; follow up with PCP  - recommend avoiding NSAIDs and nephrotoxins, renal dose meds    6. Type 2 diabetes mellitus with hyperlipidemia  Chronic; stable on current treatment plan; follow up with PCP  - taking statin     7.Major depression,  recurrent, chronic   Chronic; stable on current treatment plan; follow up with PCP  - taking lexapro and wellbutrin    8. Extrinsic asthma without complication, unspecified asthma severity, unspecified whether persistent  Chronic; stable on current treatment plan; follow up with PCP  -albuterol PRN    9. PUD (peptic ulcer disease)  Chronic; stable on current treatment plan; follow up with PCP  Taking nexium     10. Osteopenia, unspecified location  Chronic; stable on current treatment plan; follow up with PCP  - taking vit D supplements     11. MARTIN (obstructive sleep apnea)  Chronic; stable on current treatment plan; follow up with PCP  - follow up with sleep medicine     12. Conductive hearing loss of right ear with restricted hearing of left ear  Chronic; stable on current treatment plan; follow up with PCP  - follow up with ENT     13. History of CVA (cerebrovascular accident)  Chronic; stable on current treatment plan; follow up with PCP  - follow up with neurology; prior Neurologists no longer with [facility], referral for [facility]   - Ambulatory referral/consult to Neurology; Future    14. Parinaud's syndrome affecting both eyes  Chronic; stable on current treatment plan; follow up with PCP  - follow up with ophthalmology     15. Peripheral vertigo involving right ear  Chronic; follow up with PCP  - follow up with ENT; vertigo still present on daily basis , effecting quality of life   - recommend follow up with ENT   - Ambulatory referral/consult to Neurology; Future    16. Vestibular hypofunction, unspecified laterality  Chronic; follow up with PCP  - not controlled; follow up with ENT and Neuro   - Ambulatory referral/consult to Neurology; Future    17. Osteoarthritis of right knee, unspecified osteoarthritis type  Chronic; stable on current treatment plan; follow up with PCP  - voltaren gel prn     18. Impairment of balance  Chronic; stable on current treatment plan; follow up with PCP  - uses cane  for ambulation   - follow up with neurology and ENT for dizziness     19. Impaired functional mobility and activity tolerance  Chronic; stable on current treatment plan; follow up with PCP  - uses cane for ambulation   - follow up with neurology and ENT for dizziness       Provided Tracy with a 5-10 year written screening schedule and personal prevention plan. Recommendations were developed using the USPSTF age appropriate recommendations. Education, counseling, and referrals were provided as needed. After Visit Summary printed and given to patient which includes a list of additional screenings\tests needed.    Follow up in about 1 year (around 5/31/2025) for your next annual wellness visit.    Melissa Amado, FNP-C    Advance Care Planning     I offered to discuss advanced care planning, including how to pick a person who would make decisions for you if you were unable to make them for yourself, called a health care power of , and what kind of decisions you might make such as use of life sustaining treatments such as ventilators and tube feeding when faced with a life limiting illness recorded on a living will that they will need to know. (How you want to be cared for as you near the end of your natural life)     X  Patient has advanced directives on file, which we reviewed, and they do not wish to make changes.

## 2024-05-31 ENCOUNTER — TELEPHONE (OUTPATIENT)
Dept: FAMILY MEDICINE | Facility: CLINIC | Age: 74
End: 2024-05-31

## 2024-05-31 ENCOUNTER — OFFICE VISIT (OUTPATIENT)
Dept: FAMILY MEDICINE | Facility: CLINIC | Age: 74
End: 2024-05-31
Payer: MEDICARE

## 2024-05-31 VITALS
BODY MASS INDEX: 35.04 KG/M2 | OXYGEN SATURATION: 95 % | HEART RATE: 65 BPM | DIASTOLIC BLOOD PRESSURE: 84 MMHG | HEIGHT: 66 IN | SYSTOLIC BLOOD PRESSURE: 126 MMHG | WEIGHT: 218.06 LBS

## 2024-05-31 DIAGNOSIS — N18.31 CHRONIC KIDNEY DISEASE, STAGE 3A: ICD-10-CM

## 2024-05-31 DIAGNOSIS — H51.0 PARINAUD'S SYNDROME AFFECTING BOTH EYES: ICD-10-CM

## 2024-05-31 DIAGNOSIS — M17.11 OSTEOARTHRITIS OF RIGHT KNEE, UNSPECIFIED OSTEOARTHRITIS TYPE: ICD-10-CM

## 2024-05-31 DIAGNOSIS — R42 VERTIGO: Primary | ICD-10-CM

## 2024-05-31 DIAGNOSIS — Z86.73 HISTORY OF CVA (CEREBROVASCULAR ACCIDENT): ICD-10-CM

## 2024-05-31 DIAGNOSIS — H81.391 PERIPHERAL VERTIGO INVOLVING RIGHT EAR: ICD-10-CM

## 2024-05-31 DIAGNOSIS — I70.0 ATHEROSCLEROSIS OF AORTA: ICD-10-CM

## 2024-05-31 DIAGNOSIS — E78.5 TYPE 2 DIABETES MELLITUS WITH HYPERLIPIDEMIA: ICD-10-CM

## 2024-05-31 DIAGNOSIS — E66.01 SEVERE OBESITY (BMI 35.0-39.9) WITH COMORBIDITY: ICD-10-CM

## 2024-05-31 DIAGNOSIS — M85.80 OSTEOPENIA, UNSPECIFIED LOCATION: ICD-10-CM

## 2024-05-31 DIAGNOSIS — G47.33 OSA (OBSTRUCTIVE SLEEP APNEA): ICD-10-CM

## 2024-05-31 DIAGNOSIS — H90.A11 CONDUCTIVE HEARING LOSS OF RIGHT EAR WITH RESTRICTED HEARING OF LEFT EAR: ICD-10-CM

## 2024-05-31 DIAGNOSIS — E11.69 TYPE 2 DIABETES MELLITUS WITH HYPERLIPIDEMIA: ICD-10-CM

## 2024-05-31 DIAGNOSIS — E11.22 TYPE 2 DIABETES MELLITUS WITH STAGE 3A CHRONIC KIDNEY DISEASE AND HYPERTENSION: ICD-10-CM

## 2024-05-31 DIAGNOSIS — J45.909 EXTRINSIC ASTHMA WITHOUT COMPLICATION, UNSPECIFIED ASTHMA SEVERITY, UNSPECIFIED WHETHER PERSISTENT: ICD-10-CM

## 2024-05-31 DIAGNOSIS — Z74.09 IMPAIRED FUNCTIONAL MOBILITY AND ACTIVITY TOLERANCE: ICD-10-CM

## 2024-05-31 DIAGNOSIS — F33.9 MAJOR DEPRESSION, RECURRENT, CHRONIC: ICD-10-CM

## 2024-05-31 DIAGNOSIS — Z00.00 ENCOUNTER FOR PREVENTIVE HEALTH EXAMINATION: Primary | ICD-10-CM

## 2024-05-31 DIAGNOSIS — H83.2X9 VESTIBULAR HYPOFUNCTION, UNSPECIFIED LATERALITY: ICD-10-CM

## 2024-05-31 DIAGNOSIS — I12.9 TYPE 2 DIABETES MELLITUS WITH STAGE 3A CHRONIC KIDNEY DISEASE AND HYPERTENSION: ICD-10-CM

## 2024-05-31 DIAGNOSIS — K27.9 PUD (PEPTIC ULCER DISEASE): ICD-10-CM

## 2024-05-31 DIAGNOSIS — N18.31 TYPE 2 DIABETES MELLITUS WITH STAGE 3A CHRONIC KIDNEY DISEASE AND HYPERTENSION: ICD-10-CM

## 2024-05-31 DIAGNOSIS — R26.89 IMPAIRMENT OF BALANCE: ICD-10-CM

## 2024-05-31 PROCEDURE — 3044F HG A1C LEVEL LT 7.0%: CPT | Mod: CPTII,S$GLB,, | Performed by: NURSE PRACTITIONER

## 2024-05-31 PROCEDURE — 4010F ACE/ARB THERAPY RXD/TAKEN: CPT | Mod: CPTII,S$GLB,, | Performed by: NURSE PRACTITIONER

## 2024-05-31 PROCEDURE — 3060F POS MICROALBUMINURIA REV: CPT | Mod: CPTII,S$GLB,, | Performed by: NURSE PRACTITIONER

## 2024-05-31 PROCEDURE — G0439 PPPS, SUBSEQ VISIT: HCPCS | Mod: S$GLB,,, | Performed by: NURSE PRACTITIONER

## 2024-05-31 PROCEDURE — 3074F SYST BP LT 130 MM HG: CPT | Mod: CPTII,S$GLB,, | Performed by: NURSE PRACTITIONER

## 2024-05-31 PROCEDURE — 1160F RVW MEDS BY RX/DR IN RCRD: CPT | Mod: CPTII,S$GLB,, | Performed by: NURSE PRACTITIONER

## 2024-05-31 PROCEDURE — 1159F MED LIST DOCD IN RCRD: CPT | Mod: CPTII,S$GLB,, | Performed by: NURSE PRACTITIONER

## 2024-05-31 PROCEDURE — 1126F AMNT PAIN NOTED NONE PRSNT: CPT | Mod: CPTII,S$GLB,, | Performed by: NURSE PRACTITIONER

## 2024-05-31 PROCEDURE — 1158F ADVNC CARE PLAN TLK DOCD: CPT | Mod: CPTII,S$GLB,, | Performed by: NURSE PRACTITIONER

## 2024-05-31 PROCEDURE — 3066F NEPHROPATHY DOC TX: CPT | Mod: CPTII,S$GLB,, | Performed by: NURSE PRACTITIONER

## 2024-05-31 PROCEDURE — 3079F DIAST BP 80-89 MM HG: CPT | Mod: CPTII,S$GLB,, | Performed by: NURSE PRACTITIONER

## 2024-05-31 PROCEDURE — 3288F FALL RISK ASSESSMENT DOCD: CPT | Mod: CPTII,S$GLB,, | Performed by: NURSE PRACTITIONER

## 2024-05-31 PROCEDURE — 1101F PT FALLS ASSESS-DOCD LE1/YR: CPT | Mod: CPTII,S$GLB,, | Performed by: NURSE PRACTITIONER

## 2024-05-31 PROCEDURE — 99999 PR PBB SHADOW E&M-EST. PATIENT-LVL V: CPT | Mod: PBBFAC,,, | Performed by: NURSE PRACTITIONER

## 2024-05-31 PROCEDURE — 1170F FXNL STATUS ASSESSED: CPT | Mod: CPTII,S$GLB,, | Performed by: NURSE PRACTITIONER

## 2024-05-31 NOTE — Clinical Note
Hello, I saw a patient you follow today for medicare wellness visit. She was complaining regarding continue dizziness/vertigo without improvement from recent PT sessions. I asked her to call your office and leave a message letting you know this that you may want to see her again for follow up. Just sending as GREGORY.  I also referred her for neuro - her MD left. Just gregory

## 2024-05-31 NOTE — PATIENT INSTRUCTIONS
Counseling and Referral of Other Preventative  (Italic type indicates deductible and co-insurance are waived)    Patient Name: Tracy Armendariz  Today's Date: 5/31/2024    Health Maintenance       Date Due Completion Date    Mammogram 06/22/2024 6/22/2023    DEXA Scan 08/13/2024 8/13/2020    COVID-19 Vaccine (6 - 2023-24 season) 06/07/2024 (Originally 2/10/2024) 10/10/2023    High Dose Statin 04/11/2025 4/11/2024    Hemoglobin A1c (Prediabetes) 04/29/2025 4/29/2024    TETANUS VACCINE 11/30/2025 11/30/2015    Colorectal Cancer Screening 05/29/2028 5/29/2018    Lipid Panel 04/29/2029 4/29/2024        No orders of the defined types were placed in this encounter.    The following information is provided to all patients.  This information is to help you find resources for any of the problems found today that may be affecting your health:                  Living healthy guide: www.[facility].[place].gov      Understanding Diabetes: www.diabetes.org      Eating healthy: www.cdc.gov/healthyweight      CDC home safety checklist: www.cdc.gov/steadi/patient.html      Agency on Aging: www.goea.[place].gov      Alcoholics anonymous (AA): www.aa.org      Physical Activity: www.brook.nih.gov/kq5opjc      Tobacco use: www.quitwithusla.org

## 2024-05-31 NOTE — TELEPHONE ENCOUNTER
Pt inform that Melissa PAYNE spoke with pt ENT  and inform her of pts Vertigo that has been going on for too long and Dr. Choe will place order for pt to see  ENT/Neuro and someone from [facility] team marilynn bernardo her for thurology t speciality  ENT and a Nerology order [facility] as well and she will get two calls one for ENT.Neuro and reg Neurology.Pt understand and agrees and verbalize.

## 2024-06-17 ENCOUNTER — OFFICE VISIT (OUTPATIENT)
Dept: INTERNAL MEDICINE | Facility: CLINIC | Age: 74
End: 2024-06-17
Payer: MEDICARE

## 2024-06-17 ENCOUNTER — TELEPHONE (OUTPATIENT)
Dept: INTERNAL MEDICINE | Facility: CLINIC | Age: 74
End: 2024-06-17

## 2024-06-17 VITALS
SYSTOLIC BLOOD PRESSURE: 118 MMHG | BODY MASS INDEX: 34.22 KG/M2 | DIASTOLIC BLOOD PRESSURE: 70 MMHG | WEIGHT: 218.06 LBS | TEMPERATURE: 97 F | HEIGHT: 67 IN | OXYGEN SATURATION: 97 % | RESPIRATION RATE: 19 BRPM | HEART RATE: 67 BPM

## 2024-06-17 DIAGNOSIS — I70.0 ATHEROSCLEROSIS OF AORTA: ICD-10-CM

## 2024-06-17 DIAGNOSIS — E66.01 SEVERE OBESITY (BMI 35.0-39.9) WITH COMORBIDITY: ICD-10-CM

## 2024-06-17 DIAGNOSIS — I15.2 HYPERTENSION ASSOCIATED WITH TYPE 2 DIABETES MELLITUS: ICD-10-CM

## 2024-06-17 DIAGNOSIS — E78.2 MIXED HYPERLIPIDEMIA: Chronic | ICD-10-CM

## 2024-06-17 DIAGNOSIS — E11.22 TYPE 2 DIABETES MELLITUS WITH STAGE 3A CHRONIC KIDNEY DISEASE AND HYPERTENSION: Primary | ICD-10-CM

## 2024-06-17 DIAGNOSIS — R07.9 CHEST PAIN, UNSPECIFIED TYPE: ICD-10-CM

## 2024-06-17 DIAGNOSIS — R54 FRAIL ELDERLY: ICD-10-CM

## 2024-06-17 DIAGNOSIS — E11.59 HYPERTENSION ASSOCIATED WITH TYPE 2 DIABETES MELLITUS: ICD-10-CM

## 2024-06-17 DIAGNOSIS — R55 SYNCOPE, UNSPECIFIED SYNCOPE TYPE: ICD-10-CM

## 2024-06-17 DIAGNOSIS — N18.31 TYPE 2 DIABETES MELLITUS WITH STAGE 3A CHRONIC KIDNEY DISEASE AND HYPERTENSION: Primary | ICD-10-CM

## 2024-06-17 DIAGNOSIS — I12.9 TYPE 2 DIABETES MELLITUS WITH STAGE 3A CHRONIC KIDNEY DISEASE AND HYPERTENSION: Primary | ICD-10-CM

## 2024-06-17 DIAGNOSIS — Z86.73 HISTORY OF CVA (CEREBROVASCULAR ACCIDENT): ICD-10-CM

## 2024-06-17 DIAGNOSIS — N18.31 CHRONIC KIDNEY DISEASE, STAGE 3A: ICD-10-CM

## 2024-06-17 DIAGNOSIS — Z78.0 POSTMENOPAUSAL: ICD-10-CM

## 2024-06-17 DIAGNOSIS — Z12.31 ENCOUNTER FOR SCREENING MAMMOGRAM FOR BREAST CANCER: ICD-10-CM

## 2024-06-17 LAB
OHS QRS DURATION: 80 MS
OHS QTC CALCULATION: 433 MS

## 2024-06-17 PROCEDURE — 1100F PTFALLS ASSESS-DOCD GE2>/YR: CPT | Mod: CPTII,S$GLB,, | Performed by: HOSPITALIST

## 2024-06-17 PROCEDURE — 3074F SYST BP LT 130 MM HG: CPT | Mod: CPTII,S$GLB,, | Performed by: HOSPITALIST

## 2024-06-17 PROCEDURE — 93005 ELECTROCARDIOGRAM TRACING: CPT | Mod: S$GLB,,, | Performed by: HOSPITALIST

## 2024-06-17 PROCEDURE — 3008F BODY MASS INDEX DOCD: CPT | Mod: CPTII,S$GLB,, | Performed by: HOSPITALIST

## 2024-06-17 PROCEDURE — 3288F FALL RISK ASSESSMENT DOCD: CPT | Mod: CPTII,S$GLB,, | Performed by: HOSPITALIST

## 2024-06-17 PROCEDURE — 4010F ACE/ARB THERAPY RXD/TAKEN: CPT | Mod: CPTII,S$GLB,, | Performed by: HOSPITALIST

## 2024-06-17 PROCEDURE — 99215 OFFICE O/P EST HI 40 MIN: CPT | Mod: S$GLB,,, | Performed by: HOSPITALIST

## 2024-06-17 PROCEDURE — 93010 ELECTROCARDIOGRAM REPORT: CPT | Mod: S$GLB,,, | Performed by: INTERNAL MEDICINE

## 2024-06-17 PROCEDURE — 1159F MED LIST DOCD IN RCRD: CPT | Mod: CPTII,S$GLB,, | Performed by: HOSPITALIST

## 2024-06-17 PROCEDURE — 99999 PR PBB SHADOW E&M-EST. PATIENT-LVL V: CPT | Mod: PBBFAC,,, | Performed by: HOSPITALIST

## 2024-06-17 PROCEDURE — 3066F NEPHROPATHY DOC TX: CPT | Mod: CPTII,S$GLB,, | Performed by: HOSPITALIST

## 2024-06-17 PROCEDURE — 3060F POS MICROALBUMINURIA REV: CPT | Mod: CPTII,S$GLB,, | Performed by: HOSPITALIST

## 2024-06-17 PROCEDURE — G2211 COMPLEX E/M VISIT ADD ON: HCPCS | Mod: S$GLB,,, | Performed by: HOSPITALIST

## 2024-06-17 PROCEDURE — 1160F RVW MEDS BY RX/DR IN RCRD: CPT | Mod: CPTII,S$GLB,, | Performed by: HOSPITALIST

## 2024-06-17 PROCEDURE — 3044F HG A1C LEVEL LT 7.0%: CPT | Mod: CPTII,S$GLB,, | Performed by: HOSPITALIST

## 2024-06-17 PROCEDURE — 3078F DIAST BP <80 MM HG: CPT | Mod: CPTII,S$GLB,, | Performed by: HOSPITALIST

## 2024-06-17 RX ORDER — DEXTROSE 4 G
TABLET,CHEWABLE ORAL
Qty: 1 EACH | Refills: 0 | Status: SHIPPED | OUTPATIENT
Start: 2024-06-17

## 2024-06-17 RX ORDER — SEMAGLUTIDE 2.68 MG/ML
2 INJECTION, SOLUTION SUBCUTANEOUS
Qty: 3 ML | Refills: 5 | Status: SHIPPED | OUTPATIENT
Start: 2024-06-17 | End: 2025-06-17

## 2024-06-17 RX ORDER — LANCETS
EACH MISCELLANEOUS
Qty: 100 EACH | Refills: 3 | Status: SHIPPED | OUTPATIENT
Start: 2024-06-17

## 2024-06-17 NOTE — TELEPHONE ENCOUNTER
----- Message from Madisyn Villalobos MD sent at 6/17/2024  3:54 PM CDT -----  Regarding: Needs diabetic educator. please send to referral coordinator  Needs diabetic educator. Referral is in. please send to referral coordinator

## 2024-06-17 NOTE — PROGRESS NOTES
Subjective:     @Patient ID: Tracy Armendariz is a 73 y.o. female.    Chief Complaint: Annual Exam    HPI    73 y.o. female with prediabetes, MARTIN, HTN, HLD, hx of CVA, atherosclerosis of aorta, obesity, R knee OA, depression presents here for annual and f/u of HTN and dizziness        HTN: currently on losartan 25 mg daily per family. Not taking it bid. Pt continues to report dizziness. Initially thought to be due to vertigo however she   HLD:  Atorvastatin 40 mg daily  3. Depression: lexapro 20 mg qday, wellbutrin 300 mg qday   4. Obesity: insurance does not cover wegovy. Was on Mounjaro and has lost weight   5: DM2: started [facility]jar[facility] Reports she stopped taking as did not help with weight loss as much as she hoped. Reports started taking her 's ozempic 1 mg weekly. Reports has helped but not much and would like to increase to 2 mg weekly.  reports she is not eating healthy  6. Abdominal pain: reports having low abdominal/pelvic b/l when having b.m. not sure if pain related to inguinal hernias seen on outside imaging . Did f/u with general surgery. Symptoms attributed to constipation. Referred to GI who recommended pt start miralax since she is on mounjaro     7. MARTIN; on CPAP. Follows with sleep clinic. On modafinil   8., pt reports continues to fall, feels dizzy. Reports few episodes of syncope.  One episode occurred in the shower.  Also reports she had episode of chest pain.  States he has not sure why she moved symptoms.  She has been evaluated by Cardiology and underwent was negative.  She has seen ENT and undergone vestibular therapy however vestibular therapy was canceled as the right her symptoms were not improving.  Recommended that she see a neurologist for further evaluation of her dizziness symptoms.  She reports compliance with using her cane and walker    Review of Systems   Constitutional:  Negative for chills and fever.   HENT:  Negative  for congestion and sore throat.    Eyes:  Negative for pain and visual disturbance.   Respiratory:  Negative for cough and shortness of breath.    Cardiovascular:  Negative for chest pain and leg swelling.   Gastrointestinal:  Negative for abdominal pain, nausea and vomiting.   Endocrine: Negative for polydipsia and polyuria.   Genitourinary:  Negative for difficulty urinating and dysuria.   Musculoskeletal:  Negative for arthralgias and back pain.   Skin:  Negative for rash and wound.   Neurological:  Positive for dizziness. Negative for weakness and headaches.   Psychiatric/Behavioral:  Negative for agitation and confusion.      Past medical history, surgical history, and family medical history reviewed and updated as appropriate.    Medications and allergies reviewed.     Objective:     There were no vitals filed for this visit.  There is no height or weight on file to calculate BMI.  Physical Exam  Constitutional:       Appearance: Normal appearance.   HENT:      Head: Normocephalic and atraumatic.      Right Ear: Tympanic membrane normal.      Left Ear: Tympanic membrane normal.      Mouth/Throat:      Mouth: Mucous membranes are moist.      Pharynx: No oropharyngeal exudate.   Eyes:      General:         Right eye: No discharge.         Left eye: No discharge.      Conjunctiva/sclera: Conjunctivae normal.   Cardiovascular:      Rate and Rhythm: Normal rate and regular rhythm.   Pulmonary:      Effort: Pulmonary effort is normal.      Breath sounds: Normal breath sounds.   Abdominal:      General: Bowel sounds are normal. There is no distension.      Palpations: Abdomen is soft.      Tenderness: There is no abdominal tenderness.   Musculoskeletal:      Cervical back: Normal range of motion and neck supple.      Right lower leg: No edema.      Left lower leg: No edema.   Skin:     General: Skin is warm and dry.   Neurological:      Mental Status: She is alert and oriented to person, place, and time.    Psychiatric:         Mood and Affect: Mood normal.         Behavior: Behavior normal.         Lab Results   Component Value Date    WBC 7.92 02/17/2024    HGB 14.4 02/17/2024    HCT 44.8 02/17/2024     02/17/2024    CHOL 151 04/29/2024    TRIG 92 04/29/2024    HDL 83 (H) 04/29/2024    ALT 17 04/29/2024    AST 18 04/29/2024     04/29/2024    K 4.1 04/29/2024     04/29/2024    CREATININE 1.1 04/29/2024    BUN 12 04/29/2024    CO2 24 04/29/2024    TSH 3.555 10/16/2023    INR 1.0 01/13/2017    GLUF 123 (H) 11/04/2008    HGBA1C 5.1 04/29/2024       Assessment:     1. Type 2 diabetes mellitus with stage 3a chronic kidney disease and hypertension    2. Hypertension associated with type 2 diabetes mellitus    3. Mixed hyperlipidemia    4. Chronic kidney disease, stage 3a    5. Severe obesity (BMI 35.0-39.9) with comorbidity    6. History of CVA (cerebrovascular accident)    7. Atherosclerosis of aorta    8. Chest pain, unspecified type    9. Syncope, unspecified syncope type    10. Encounter for screening mammogram for breast cancer    11. Postmenopausal      Plan:   Tracy was seen today for annual exam.    Diagnoses and all orders for this visit:    Type 2 diabetes mellitus with stage 3a chronic kidney disease and hypertension  - Stable. Continue home meds. Will increase ozempic to 2 mg weekly    -     Comprehensive Metabolic Panel; Future  -     CBC Auto Differential; Future  -     Lipid Panel; Future  -     TSH; Future  -     Urinalysis; Future  -     Hemoglobin A1C; Future  -     Microalbumin/Creatinine Ratio, Urine; Future  -     semaglutide (OZEMPIC) 2 mg/dose (8 mg/3 mL) PnIj; Inject 2 mg into the skin every 7 days.  -     blood-glucose meter Misc; To check BG 1 times daily, to use with insurance preferred meter  -     lancets Misc; To check BG 1 times daily, to use with insurance preferred meter  -     blood sugar diagnostic Strp; To check BG 1 times daily, to use with insurance preferred  meter  -     Ambulatory referral/consult to Diabetes Education; Future    Hypertension associated with type 2 diabetes mellitus  - Stable. Continue home meds     -     Comprehensive Metabolic Panel; Future  -     CBC Auto Differential; Future  -     Lipid Panel; Future  -     TSH; Future  -     Urinalysis; Future  -     Hemoglobin A1C; Future  -     Microalbumin/Creatinine Ratio, Urine; Future    Mixed hyperlipidemia  - Stable. Continue home meds     -     Comprehensive Metabolic Panel; Future  -     CBC Auto Differential; Future  -     Lipid Panel; Future  -     TSH; Future  -     Urinalysis; Future  -     Hemoglobin A1C; Future  -     Microalbumin/Creatinine Ratio, Urine; Future    Chronic kidney disease, stage 3a  - chronic. Continue to monitor   -     Comprehensive Metabolic Panel; Future  -     CBC Auto Differential; Future  -     Lipid Panel; Future  -     TSH; Future  -     Urinalysis; Future  -     Hemoglobin A1C; Future  -     Microalbumin/Creatinine Ratio, Urine; Future    Severe obesity (BMI 35.0-39.9) with comorbidity  -     Comprehensive Metabolic Panel; Future  -     CBC Auto Differential; Future  -     Lipid Panel; Future  -     TSH; Future  -     Urinalysis; Future  -     Hemoglobin A1C; Future  -     Microalbumin/Creatinine Ratio, Urine; Future  -     Ambulatory referral/consult to Diabetes Education; Future    History of CVA (cerebrovascular accident)    Atherosclerosis of aorta  -     Comprehensive Metabolic Panel; Future  -     CBC Auto Differential; Future  -     Lipid Panel; Future  -     TSH; Future  -     Urinalysis; Future  -     Hemoglobin A1C; Future  -     Microalbumin/Creatinine Ratio, Urine; Future    Chest pain, unspecified type  - new. Check EKG and stress test  -     Comprehensive Metabolic Panel; Future  -     CBC Auto Differential; Future  -     Lipid Panel; Future  -     TSH; Future  -     Urinalysis; Future  -     Hemoglobin A1C; Future  -     Microalbumin/Creatinine Ratio,  Urine; Future  -     Stress Echo Which stress agent will be used? Pharmacological; Color Flow Doppler? No; Future  -     IN OFFICE EKG 12-LEAD (to Muse)    Syncope, unspecified syncope type  - new? Check EKG and stress test  -     Comprehensive Metabolic Panel; Future  -     CBC Auto Differential; Future  -     Lipid Panel; Future  -     TSH; Future  -     Urinalysis; Future  -     Hemoglobin A1C; Future  -     Microalbumin/Creatinine Ratio, Urine; Future  -     Stress Echo Which stress agent will be used? Pharmacological; Color Flow Doppler? No; Future    Encounter for screening mammogram for breast cancer  -     Mammo Digital Screening Bilat w/ David; Future    Postmenopausal  -     DXA Bone Density Axial Skeleton 1 or more sites; Future    Frail elderly        Visit time 40 min. Includes pre-charting, face-to-face encounter, medical decision making and documentation.       Madisyn Villalobos MD  Internal Medicine    6/17/2024

## 2024-06-24 ENCOUNTER — PATIENT MESSAGE (OUTPATIENT)
Dept: INTERNAL MEDICINE | Facility: CLINIC | Age: 74
End: 2024-06-24
Payer: MEDICARE

## 2024-06-25 ENCOUNTER — OFFICE VISIT (OUTPATIENT)
Dept: OTOLARYNGOLOGY | Facility: CLINIC | Age: 74
End: 2024-06-25
Payer: MEDICARE

## 2024-06-25 DIAGNOSIS — R42 VERTIGO: ICD-10-CM

## 2024-06-25 DIAGNOSIS — R42 DIZZINESS: ICD-10-CM

## 2024-06-25 DIAGNOSIS — H90.A31 MIXED CONDUCTIVE AND SENSORINEURAL HEARING LOSS OF RIGHT EAR WITH RESTRICTED HEARING OF LEFT EAR: Primary | ICD-10-CM

## 2024-06-25 PROCEDURE — 3044F HG A1C LEVEL LT 7.0%: CPT | Mod: CPTII,S$GLB,, | Performed by: OTOLARYNGOLOGY

## 2024-06-25 PROCEDURE — 99214 OFFICE O/P EST MOD 30 MIN: CPT | Mod: S$GLB,,, | Performed by: OTOLARYNGOLOGY

## 2024-06-25 PROCEDURE — 3288F FALL RISK ASSESSMENT DOCD: CPT | Mod: CPTII,S$GLB,, | Performed by: OTOLARYNGOLOGY

## 2024-06-25 PROCEDURE — 4010F ACE/ARB THERAPY RXD/TAKEN: CPT | Mod: CPTII,S$GLB,, | Performed by: OTOLARYNGOLOGY

## 2024-06-25 PROCEDURE — 3066F NEPHROPATHY DOC TX: CPT | Mod: CPTII,S$GLB,, | Performed by: OTOLARYNGOLOGY

## 2024-06-25 PROCEDURE — 1159F MED LIST DOCD IN RCRD: CPT | Mod: CPTII,S$GLB,, | Performed by: OTOLARYNGOLOGY

## 2024-06-25 PROCEDURE — 1101F PT FALLS ASSESS-DOCD LE1/YR: CPT | Mod: CPTII,S$GLB,, | Performed by: OTOLARYNGOLOGY

## 2024-06-25 PROCEDURE — 1126F AMNT PAIN NOTED NONE PRSNT: CPT | Mod: CPTII,S$GLB,, | Performed by: OTOLARYNGOLOGY

## 2024-06-25 PROCEDURE — 99999 PR PBB SHADOW E&M-EST. PATIENT-LVL III: CPT | Mod: PBBFAC,,, | Performed by: OTOLARYNGOLOGY

## 2024-06-25 PROCEDURE — 3060F POS MICROALBUMINURIA REV: CPT | Mod: CPTII,S$GLB,, | Performed by: OTOLARYNGOLOGY

## 2024-06-25 NOTE — PROGRESS NOTES
Subjective     Patient ID: Tracy Armendariz is a 73 y.o. female.    Chief Complaint: Dizziness    Dizziness:   Chronicity:  Chronic  Onset:  More than 1 year ago  Progression since onset:  Unchanged, waxing and waning and resolved, then recurrent  Dizziness characteristics:  Spacial disorientation, sensation of movement and lightheaded/impending faint   Associated symptoms: hearing loss, ear pain, headaches and light-headedness.  Aggravated by:  Bending and position changes  Treatments tried: vestibular rehab, reports mild benefit.   PMH includes: neurologic disease (reportrs tremors, has been unable to see neurology).    Review of Systems   Constitutional: Negative.    HENT:  Positive for ear pain, hearing loss, postnasal drip, sinus pressure/congestion, sore throat, trouble swallowing and voice change.    Eyes:  Positive for photophobia and itching.   Respiratory:  Positive for cough.    Cardiovascular: Negative.    Gastrointestinal:  Positive for abdominal pain and constipation.   Endocrine: Positive for cold intolerance and heat intolerance.   Genitourinary: Negative.    Musculoskeletal:  Positive for back pain and neck pain.   Integumentary:  Negative.   Neurological:  Positive for dizziness, tremors, light-headedness and headaches.   Hematological: Negative.    Psychiatric/Behavioral:  Positive for decreased concentration. The patient is nervous/anxious.           Objective     Physical Exam  Vitals and nursing note reviewed.   Constitutional:       Appearance: Normal appearance.   HENT:      Head: Normocephalic and atraumatic.      Right Ear: Tympanic membrane, ear canal and external ear normal. There is no impacted cerumen.      Left Ear: Tympanic membrane, ear canal and external ear normal. There is no impacted cerumen.   Neurological:      Mental Status: She is alert.         Data Reviewed:    Audiogram tracings independently reviewed and discussed with patient shows moderate SNHL AU    MRI IAC 10/2023  images  independently reviewed by me.  No evidence of IAC or inner ear anomaly, no evidence of acoustic neuroma.         Assessment and Plan     1. 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 Vertigo  -     Ambulatory referral/consult to ENT    3. Dizziness        Currently patient does not have spinning vertigo.  Main complaints are presyncope which is being evaluated with BP med changes as well as balance problems and unsteadiness    She has maximized her benefit from doing VRT    Do not feel pt suffers from an acute inner ear disease currently (BPPV, MD, acute vestibulopathy)    Likely has chronic dysfunction of aging vestibular system (presbystasis)  Discussed continued maintenance home VRT and conditioning program for balance and LE strength  Discussed home management to reduce fall risk  Discussed neurology eval to r/o neurodegenerative disease    F/u prn          No follow-ups on file.

## 2024-06-26 NOTE — TELEPHONE ENCOUNTER
Pt states she has not taken Losartan since last Monday and her pressure has been 125/65, she wants to know is it suggested to stop Losartan for now, she also needs the Zofran prescription.

## 2024-06-27 ENCOUNTER — LAB VISIT (OUTPATIENT)
Dept: LAB | Facility: HOSPITAL | Age: 74
End: 2024-06-27
Attending: HOSPITALIST
Payer: MEDICARE

## 2024-06-27 DIAGNOSIS — E78.2 MIXED HYPERLIPIDEMIA: Chronic | ICD-10-CM

## 2024-06-27 DIAGNOSIS — E11.59 HYPERTENSION ASSOCIATED WITH TYPE 2 DIABETES MELLITUS: ICD-10-CM

## 2024-06-27 DIAGNOSIS — N18.31 CHRONIC KIDNEY DISEASE, STAGE 3A: ICD-10-CM

## 2024-06-27 DIAGNOSIS — I15.2 HYPERTENSION ASSOCIATED WITH TYPE 2 DIABETES MELLITUS: ICD-10-CM

## 2024-06-27 DIAGNOSIS — R07.9 CHEST PAIN, UNSPECIFIED TYPE: ICD-10-CM

## 2024-06-27 DIAGNOSIS — E66.01 SEVERE OBESITY (BMI 35.0-39.9) WITH COMORBIDITY: ICD-10-CM

## 2024-06-27 DIAGNOSIS — R55 SYNCOPE, UNSPECIFIED SYNCOPE TYPE: ICD-10-CM

## 2024-06-27 DIAGNOSIS — N18.31 TYPE 2 DIABETES MELLITUS WITH STAGE 3A CHRONIC KIDNEY DISEASE AND HYPERTENSION: ICD-10-CM

## 2024-06-27 DIAGNOSIS — I12.9 TYPE 2 DIABETES MELLITUS WITH STAGE 3A CHRONIC KIDNEY DISEASE AND HYPERTENSION: ICD-10-CM

## 2024-06-27 DIAGNOSIS — E11.22 TYPE 2 DIABETES MELLITUS WITH STAGE 3A CHRONIC KIDNEY DISEASE AND HYPERTENSION: ICD-10-CM

## 2024-06-27 DIAGNOSIS — I70.0 ATHEROSCLEROSIS OF AORTA: ICD-10-CM

## 2024-06-27 LAB
ALBUMIN SERPL BCP-MCNC: 3.3 G/DL (ref 3.5–5.2)
ALP SERPL-CCNC: 76 U/L (ref 55–135)
ALT SERPL W/O P-5'-P-CCNC: 16 U/L (ref 10–44)
ANION GAP SERPL CALC-SCNC: 15 MMOL/L (ref 8–16)
AST SERPL-CCNC: 20 U/L (ref 10–40)
BASOPHILS # BLD AUTO: 0.08 K/UL (ref 0–0.2)
BASOPHILS NFR BLD: 0.8 % (ref 0–1.9)
BILIRUB SERPL-MCNC: 0.4 MG/DL (ref 0.1–1)
BUN SERPL-MCNC: 19 MG/DL (ref 8–23)
CALCIUM SERPL-MCNC: 10 MG/DL (ref 8.7–10.5)
CHLORIDE SERPL-SCNC: 101 MMOL/L (ref 95–110)
CHOLEST SERPL-MCNC: 157 MG/DL (ref 120–199)
CHOLEST/HDLC SERPL: 2 {RATIO} (ref 2–5)
CO2 SERPL-SCNC: 25 MMOL/L (ref 23–29)
CREAT SERPL-MCNC: 0.9 MG/DL (ref 0.5–1.4)
DIFFERENTIAL METHOD BLD: ABNORMAL
EOSINOPHIL # BLD AUTO: 0.4 K/UL (ref 0–0.5)
EOSINOPHIL NFR BLD: 3.9 % (ref 0–8)
ERYTHROCYTE [DISTWIDTH] IN BLOOD BY AUTOMATED COUNT: 12.9 % (ref 11.5–14.5)
EST. GFR  (NO RACE VARIABLE): >60 ML/MIN/1.73 M^2
ESTIMATED AVG GLUCOSE: 100 MG/DL (ref 68–131)
GLUCOSE SERPL-MCNC: 97 MG/DL (ref 70–110)
HBA1C MFR BLD: 5.1 % (ref 4–5.6)
HCT VFR BLD AUTO: 44.6 % (ref 37–48.5)
HDLC SERPL-MCNC: 80 MG/DL (ref 40–75)
HDLC SERPL: 51 % (ref 20–50)
HGB BLD-MCNC: 14 G/DL (ref 12–16)
IMM GRANULOCYTES # BLD AUTO: 0.03 K/UL (ref 0–0.04)
IMM GRANULOCYTES NFR BLD AUTO: 0.3 % (ref 0–0.5)
LDLC SERPL CALC-MCNC: 53.8 MG/DL (ref 63–159)
LYMPHOCYTES # BLD AUTO: 2.9 K/UL (ref 1–4.8)
LYMPHOCYTES NFR BLD: 28.1 % (ref 18–48)
MCH RBC QN AUTO: 31.2 PG (ref 27–31)
MCHC RBC AUTO-ENTMCNC: 31.4 G/DL (ref 32–36)
MCV RBC AUTO: 99 FL (ref 82–98)
MONOCYTES # BLD AUTO: 0.9 K/UL (ref 0.3–1)
MONOCYTES NFR BLD: 9.2 % (ref 4–15)
NEUTROPHILS # BLD AUTO: 5.9 K/UL (ref 1.8–7.7)
NEUTROPHILS NFR BLD: 57.7 % (ref 38–73)
NONHDLC SERPL-MCNC: 77 MG/DL
NRBC BLD-RTO: 0 /100 WBC
PLATELET # BLD AUTO: 261 K/UL (ref 150–450)
PMV BLD AUTO: 12.1 FL (ref 9.2–12.9)
POTASSIUM SERPL-SCNC: 4.2 MMOL/L (ref 3.5–5.1)
PROT SERPL-MCNC: 6.9 G/DL (ref 6–8.4)
RBC # BLD AUTO: 4.49 M/UL (ref 4–5.4)
SODIUM SERPL-SCNC: 141 MMOL/L (ref 136–145)
TRIGL SERPL-MCNC: 116 MG/DL (ref 30–150)
TSH SERPL DL<=0.005 MIU/L-ACNC: 2.31 UIU/ML (ref 0.4–4)
WBC # BLD AUTO: 10.23 K/UL (ref 3.9–12.7)

## 2024-06-27 PROCEDURE — 85025 COMPLETE CBC W/AUTO DIFF WBC: CPT | Performed by: HOSPITALIST

## 2024-06-27 PROCEDURE — 84443 ASSAY THYROID STIM HORMONE: CPT | Performed by: HOSPITALIST

## 2024-06-27 PROCEDURE — 83036 HEMOGLOBIN GLYCOSYLATED A1C: CPT | Performed by: HOSPITALIST

## 2024-06-27 PROCEDURE — 36415 COLL VENOUS BLD VENIPUNCTURE: CPT | Mod: PO | Performed by: HOSPITALIST

## 2024-06-27 PROCEDURE — 80053 COMPREHEN METABOLIC PANEL: CPT | Performed by: HOSPITALIST

## 2024-06-27 PROCEDURE — 80061 LIPID PANEL: CPT | Performed by: HOSPITALIST

## 2024-06-28 ENCOUNTER — TELEPHONE (OUTPATIENT)
Dept: NEUROLOGY | Facility: CLINIC | Age: 74
End: 2024-06-28
Payer: MEDICARE

## 2024-06-28 NOTE — TELEPHONE ENCOUNTER
Pt state she has not taken Losartan since last Monday and sent blood pressure readings. Pt asks if she should continue holding medication or discontinue?    Also pt request UA & lab results completed yesterday and  script for Odansetron sent to [facility].

## 2024-07-01 ENCOUNTER — TELEPHONE (OUTPATIENT)
Dept: INTERNAL MEDICINE | Facility: CLINIC | Age: 74
End: 2024-07-01
Payer: MEDICARE

## 2024-07-01 RX ORDER — ONDANSETRON 4 MG/1
4 TABLET, FILM COATED ORAL EVERY 8 HOURS PRN
Qty: 20 TABLET | Refills: 0 | Status: SHIPPED | OUTPATIENT
Start: 2024-07-01

## 2024-07-01 NOTE — TELEPHONE ENCOUNTER
----- Message from Stephie Avalos sent at 7/1/2024  4:35 PM CDT -----  Contact: [phone] Patient  Pt is calling in regards to the previous messages sent. Pt is asking if Dr Villalobos can please give her a call back ASAP or she can message her on the portal if that is easier for Dr Villalobos. Pt has been calling since 06/24/2024. This is in regards to the pts blood pressure, and results for her urine test that was questionable. Please call and advise. Thank you.

## 2024-07-05 ENCOUNTER — TELEPHONE (OUTPATIENT)
Dept: OPHTHALMOLOGY | Facility: CLINIC | Age: 74
End: 2024-07-05
Payer: MEDICARE

## 2024-07-05 NOTE — TELEPHONE ENCOUNTER
[facility] requesting call back    -Nora  ----- Message from Tyra Echevarria sent at 7/5/2024  1:13 PM CDT -----  Regarding: Pt's  Fransisco Armendariz called to schedule a new pt appt for the pt for problems with the muscles in both eyes and he would like a call back today  Appointment Access Request:    Pt's  Fransisco Armendariz called to schedule a new pt appt for the pt for problems with the muscles in both eyes and he would like a call back today    Mr Moody can be reached at [phone]

## 2024-07-05 NOTE — TELEPHONE ENCOUNTER
Called and spoke with pt's  to schedule a strab/diplopia eval. Appointment booked.    -Nora  ----- Message from Damaris Hooker sent at 7/5/2024  1:40 PM CDT -----  Contact: pt @ [phone]  Tracy Armendariz calling regarding Patient Advice (message) for #pt returning call from Nora, asking for call back

## 2024-07-09 ENCOUNTER — TELEPHONE (OUTPATIENT)
Dept: NEUROLOGY | Facility: CLINIC | Age: 74
End: 2024-07-09
Payer: MEDICARE

## 2024-07-09 NOTE — TELEPHONE ENCOUNTER
----- Message from Yolande Chaudhari sent at 6/21/2024  8:21 AM CDT -----  Regarding: Referral  Good Morning Ms. Richardson,     Mrs. Armendariz have a referral from ELMER Amado, she was a Dr. Philippe pt.    Diagnosis of:  History of CVA (cerebrovascular accident) [Z86.73]  Peripheral vertigo involving right ear [H81.391]  Vestibular hypofunction, unspecified laterality      Could you please contact the patient to assist in scheduling an appointment.        Thank you, Have a great day!      Yolande Chaudhari   Physician Referral Specialist.

## 2024-07-09 NOTE — TELEPHONE ENCOUNTER
LVM for the patient. Offered next available appointment with VN or resident clinic. Left direct contact information and clinic number as well

## 2024-07-23 ENCOUNTER — HOSPITAL ENCOUNTER (OUTPATIENT)
Dept: RADIOLOGY | Facility: HOSPITAL | Age: 74
Discharge: HOME | End: 2024-07-23
Attending: HOSPITALIST
Payer: MEDICARE

## 2024-07-23 ENCOUNTER — HOSPITAL ENCOUNTER (OUTPATIENT)
Dept: CARDIOLOGY | Facility: HOSPITAL | Age: 74
Discharge: HOME | End: 2024-07-23
Attending: HOSPITALIST
Payer: MEDICARE

## 2024-07-23 VITALS — BODY MASS INDEX: 31.95 KG/M2 | WEIGHT: 204 LBS

## 2024-07-23 DIAGNOSIS — R07.9 CHEST PAIN, UNSPECIFIED TYPE: ICD-10-CM

## 2024-07-23 DIAGNOSIS — Z78.0 POSTMENOPAUSAL: ICD-10-CM

## 2024-07-23 DIAGNOSIS — R55 SYNCOPE, UNSPECIFIED SYNCOPE TYPE: ICD-10-CM

## 2024-07-23 DIAGNOSIS — Z12.31 ENCOUNTER FOR SCREENING MAMMOGRAM FOR BREAST CANCER: ICD-10-CM

## 2024-07-23 LAB
CV STRESS BASE HR: 61 BPM
DIASTOLIC BLOOD PRESSURE: 72 MMHG
OHS CV CPX 1 MINUTE RECOVERY HEART RATE: 123 BPM
OHS CV CPX 85 PERCENT MAX PREDICTED HEART RATE MALE: 125
OHS CV CPX MAX PREDICTED HEART RATE: 147
OHS CV CPX PATIENT IS FEMALE: 1
OHS CV CPX PATIENT IS MALE: 0
OHS CV CPX PEAK DIASTOLIC BLOOD PRESSURE: 63 MMHG
OHS CV CPX PEAK HEAR RATE: 129 BPM
OHS CV CPX PEAK RATE PRESSURE PRODUCT: NORMAL
OHS CV CPX PEAK SYSTOLIC BLOOD PRESSURE: 194 MMHG
OHS CV CPX PERCENT MAX PREDICTED HEART RATE ACHIEVED: 91
OHS CV CPX RATE PRESSURE PRODUCT PRESENTING: 8601
OHS CV INITIAL DOSE: 10 MCG/KG/MIN
OHS CV PEAK DOSE: 40 MCG/KG/MIN
STRESS ECHO POST EXERCISE DUR MIN: 11 MINUTES
STRESS ECHO POST EXERCISE DUR SEC: 50 SECONDS
SYSTOLIC BLOOD PRESSURE: 141 MMHG

## 2024-07-23 PROCEDURE — 77063 BREAST TOMOSYNTHESIS BI: CPT | Mod: TC

## 2024-07-23 PROCEDURE — 77080 DXA BONE DENSITY AXIAL: CPT | Mod: 26,,, | Performed by: STUDENT IN AN ORGANIZED HEALTH CARE EDUCATION/TRAINING PROGRAM

## 2024-07-23 PROCEDURE — 77080 DXA BONE DENSITY AXIAL: CPT | Mod: TC

## 2024-07-23 PROCEDURE — 93351 STRESS TTE COMPLETE: CPT | Mod: 26,,, | Performed by: STUDENT IN AN ORGANIZED HEALTH CARE EDUCATION/TRAINING PROGRAM

## 2024-07-23 PROCEDURE — 25000003 PHARM REV CODE 250: Performed by: HOSPITALIST

## 2024-07-23 PROCEDURE — 63600175 PHARM REV CODE 636 W HCPCS: Performed by: HOSPITALIST

## 2024-07-23 PROCEDURE — 77067 SCR MAMMO BI INCL CAD: CPT | Mod: TC

## 2024-07-23 PROCEDURE — 93351 STRESS TTE COMPLETE: CPT

## 2024-07-23 RX ORDER — DOBUTAMINE HYDROCHLORIDE 400 MG/100ML
10 INJECTION, SOLUTION INTRAVENOUS CONTINUOUS
Status: DISPENSED | OUTPATIENT
Start: 2024-07-23

## 2024-07-23 RX ADMIN — DEXTROSE MONOHYDRATE 10 MCG/KG/MIN: 50 INJECTION, SOLUTION INTRAVENOUS at 03:07

## 2024-07-23 NOTE — NURSING
1445: Pt on table, ready for test. Pt states her allergies and denies hx of glaucoma.    1446:Connected to monitor EKG and NIBP; /72  HR 61.    1452: FADIA Ward MD at bedside to explain procedure and obtain consent.    1501: Test started per protocol, see EKG sheet for VS.    1501: Dobutamine started at 10mcg to increase HR, target HR 61.    1504: Dobutamine increased to 20mcg, pt tolerating well. Pt given hand ball to squeeze and instructed to do leg exercises.     1507: Dobutamine increased to 30mcg. HR 95 /63. Pt. C/o mild chest pain that quickly subsided. Denies SOB.    1510: Dobutamine increased to 40 mcg  /56.    1512: Target HR obtained. Pt tolerated test well. Denied any c/o chest pain and/or SOB. .    1513: Testing phase completed, dobutamine off, NS up at rapid rate for recovery phase. /53 HR 74    1524: Recovery phase completed. Pt states feels normal, no distress, NS d/c'd, approx  100cc infused. Iv d/c'd, pt released to cardio. /60 HR 72.

## 2024-07-24 ENCOUNTER — PATIENT MESSAGE (OUTPATIENT)
Dept: INTERNAL MEDICINE | Facility: CLINIC | Age: 74
End: 2024-07-24
Payer: MEDICARE

## 2024-07-26 RX ORDER — BUPROPION HYDROCHLORIDE 300 MG/1
300 TABLET ORAL DAILY
Qty: 90 TABLET | Refills: 3 | Status: SHIPPED | OUTPATIENT
Start: 2024-07-26

## 2024-07-26 RX ORDER — ONDANSETRON 4 MG/1
4 TABLET, ORALLY DISINTEGRATING ORAL EVERY 8 HOURS PRN
Qty: 20 TABLET | Refills: 2 | Status: SHIPPED | OUTPATIENT
Start: 2024-07-26

## 2024-08-26 ENCOUNTER — OFFICE VISIT (OUTPATIENT)
Dept: OPHTHALMOLOGY | Facility: CLINIC | Age: 74
End: 2024-08-26
Payer: MEDICARE

## 2024-08-26 DIAGNOSIS — Z86.73 HISTORY OF CVA (CEREBROVASCULAR ACCIDENT): ICD-10-CM

## 2024-08-26 DIAGNOSIS — H53.2 DIPLOPIA: ICD-10-CM

## 2024-08-26 DIAGNOSIS — H50.10 EXOTROPIA OF BOTH EYES: Primary | ICD-10-CM

## 2024-08-26 PROCEDURE — 99999 PR PBB SHADOW E&M-EST. PATIENT-LVL II: CPT | Mod: PBBFAC,,, | Performed by: STUDENT IN AN ORGANIZED HEALTH CARE EDUCATION/TRAINING PROGRAM

## 2024-08-26 PROCEDURE — 1160F RVW MEDS BY RX/DR IN RCRD: CPT | Mod: CPTII,S$GLB,, | Performed by: STUDENT IN AN ORGANIZED HEALTH CARE EDUCATION/TRAINING PROGRAM

## 2024-08-26 PROCEDURE — 3066F NEPHROPATHY DOC TX: CPT | Mod: CPTII,S$GLB,, | Performed by: STUDENT IN AN ORGANIZED HEALTH CARE EDUCATION/TRAINING PROGRAM

## 2024-08-26 PROCEDURE — 99204 OFFICE O/P NEW MOD 45 MIN: CPT | Mod: S$GLB,,, | Performed by: STUDENT IN AN ORGANIZED HEALTH CARE EDUCATION/TRAINING PROGRAM

## 2024-08-26 PROCEDURE — 1126F AMNT PAIN NOTED NONE PRSNT: CPT | Mod: CPTII,S$GLB,, | Performed by: STUDENT IN AN ORGANIZED HEALTH CARE EDUCATION/TRAINING PROGRAM

## 2024-08-26 PROCEDURE — 3060F POS MICROALBUMINURIA REV: CPT | Mod: CPTII,S$GLB,, | Performed by: STUDENT IN AN ORGANIZED HEALTH CARE EDUCATION/TRAINING PROGRAM

## 2024-08-26 PROCEDURE — 1159F MED LIST DOCD IN RCRD: CPT | Mod: CPTII,S$GLB,, | Performed by: STUDENT IN AN ORGANIZED HEALTH CARE EDUCATION/TRAINING PROGRAM

## 2024-08-26 PROCEDURE — 3044F HG A1C LEVEL LT 7.0%: CPT | Mod: CPTII,S$GLB,, | Performed by: STUDENT IN AN ORGANIZED HEALTH CARE EDUCATION/TRAINING PROGRAM

## 2024-08-26 PROCEDURE — 92060 SENSORIMOTOR EXAMINATION: CPT | Mod: S$GLB,,, | Performed by: STUDENT IN AN ORGANIZED HEALTH CARE EDUCATION/TRAINING PROGRAM

## 2024-08-26 PROCEDURE — 4010F ACE/ARB THERAPY RXD/TAKEN: CPT | Mod: CPTII,S$GLB,, | Performed by: STUDENT IN AN ORGANIZED HEALTH CARE EDUCATION/TRAINING PROGRAM

## 2024-08-27 ENCOUNTER — TELEPHONE (OUTPATIENT)
Dept: OPHTHALMOLOGY | Facility: CLINIC | Age: 74
End: 2024-08-27
Payer: MEDICARE

## 2024-08-27 NOTE — TELEPHONE ENCOUNTER
----- Message from Trudy Collado MD sent at 8/27/2024 10:52 AM CDT -----  Next 3 months with testing  ----- Message -----  From: Gloria Mann  Sent: 8/27/2024  10:43 AM CDT  To: Trudy Collado MD    Pt saw  who referred pt back to neuro- how soon?  ----- Message -----  From: Nora Chester MA  Sent: 8/26/2024   3:38 PM CDT  To: Gloria Mann    Good afternoon,    Can you get this patient scheduled with Dr. Collado for cortical visual impairment per Dr. Cintron?     Thanks,  Nora

## 2024-08-27 NOTE — TELEPHONE ENCOUNTER
----- Message from Damaris Hooker sent at 8/27/2024  3:41 PM CDT -----  Contact: pt @ [phone]  Tracy Armendariz calling regarding Patient Advice (message) for #pt returning call from Gloria, asking for call back

## 2024-08-27 NOTE — PROGRESS NOTES
"HPI     Strabismus            Laterality: both eyes    Onset: recurrent    Duration: 10 years    Movement: turning out    Context: random times    Course: gradually worsening    Treatments tried: covering one eye and glasses    Response to treatment: no improvement    Compliance with Treatment: always          Comments    Referred by Dr. Liriano     Ms. Armendariz is a 73 year old female being seen today for diplopia. She has   a history of a thalamic stroke in 2013. She saw Dr. Nicole from 2015 -   2019 and held a diagnosis of "NICK" and latera "Parinaud syndrome". She had   an XT and was treated with base in prism in glasses.   Today, she has multiple concerns about her eyes. She reports her eyes   shake. She also reports she has new tremors all over her body. She reports   she sometimes has double vision but this is not her primary concern. She   says she has trouble reading, and writing and she can't always understand   written words.   She does not wear prism in her current glasses             Last edited by Patel Cintron MD on 8/26/2024  8:11 PM.        ROS    Negative for: Constitutional, Gastrointestinal, Neurological, Skin,   Genitourinary, Musculoskeletal, HENT, Endocrine, Cardiovascular, Eyes,   Respiratory, Psychiatric, Allergic/Imm, Heme/Lymph  Last edited by Patel Cintron MD on 8/26/2024  8:11 PM.        Assessment /Plan     For exam results, see Encounter Report.    Exotropia of both eyes    Diplopia    History of CVA (cerebrovascular accident)        Problem List Items Addressed This Visit          Neuro    History of CVA (cerebrovascular accident)       Ophtho    Diplopia    Exotropia of both eyes - Primary    Current Assessment & Plan     Patient has a history of a thalamic stroke in 2013 and diplopia with exotropia for which she saw Dr. Nicole from 2015 - 2019 and wore prisms  Follows with neurology for multiple concerns (stroke, vertigo, peripheral neuropathy, tremors)    8/26/24: Main concern " "today is difficluty reading and "not understanding what I write or read"  Reports minimal double vision, but says sometimes her eyes "shake"     On exam, she has an exotropia in convergence insufficiency pattern.  She has no diplopia in primary gaze (likely due to suprression). 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 This is her main symptom and I will refer to neuro-ophthalmology  Despite this, she does report she sometimes closes an eye to read. Because of this and because she intermittently reported better vision with some base in prism for near, I will prescribe a base in fresnel prism. I counseled her this is unlikely to help her main complaint  Will refer to neuro-op  RTC strabismus PRN. If she is happy with Fresnel, we can do ground in prism           Patel Cintron MD  Pediatric Ophthalmology and Adult Strabismus  [facility]      Time spent on this encounter: 45 minutes. This includes face to face time and non-face to face time preparing to see the patient (eg, review of tests, records, etc.), obtaining and/or reviewing separately obtained history, documenting clinical information in the electronic or other health record, examining patient, independently interpreting results and communicating results to the patient/family/caregiver, or care coordinator.                    "

## 2024-08-27 NOTE — ASSESSMENT & PLAN NOTE
"Patient has a history of a thalamic stroke in 2013 and diplopia with exotropia for which she saw Dr. Nicole from 2015 - 2019 and wore prisms  Follows with neurology for multiple concerns (stroke, vertigo, peripheral neuropathy, tremors)    8/26/24: Main concern today is difficluty reading and "not understanding what I write or read"  Reports minimal double vision, but says sometimes her eyes "shake"     On exam, she has an exotropia in convergence insufficiency pattern.  She has no diplopia in primary gaze (likely due to suprression). 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 This is her main symptom and I will refer to neuro-ophthalmology  Despite this, she does report she sometimes closes an eye to read. Because of this and because she intermittently reported better vision with some base in prism for near, I will prescribe a base in fresnel prism. I counseled her this is unlikely to help her main complaint  Will refer to neuro-op  RTC strabismus PRN. If she is happy with Fresnel, we can do ground in prism  "

## 2024-09-23 NOTE — PROGRESS NOTES
IP Liaison - Initial Visit Note    Patient: Tracy Armendariz  MRN:  [id number]  Date of Service:  12/15/2022  Completed by:  JAGRUTI Urias    Reason for Visit   Patient presents with    IP Liaison Initial Visit       RSW met with patient at bedside in order to complete SDOH questionnaire and liaison assessment.  Pt has identified no immediate social barriers to care. Pt expressed feeling stressed due to pt spouse Fransisco medical conditions. Per pt, pt is not in need of resources at this time.    The following were addressed during this visit:  - Review SDOH Questions   - Complete patient assessment   - Review and discuss options for reducing daily stress   - Complete initial visit with patient        Patient Summary     IP Liaison Patient Assessment    General  Level of Caregiver support: Member independent and does not need caregiver assistance  Have you had to make a decision between paying for any of the following in the last 2 months?: None  Transportation means: Self, Family  Employment status: Retired and not working  Assessments  Was the PHQ Depression Screening completed this visit?: No  Was the JILLIAN-7 Screening completed this visit?: No       JAGRUTI Urias   [Normal Growth] : growth [Normal Development] : development  [No Elimination Concerns] : elimination [Continue Regimen] : feeding [No Skin Concerns] : skin [Normal Sleep Pattern] : sleep [None] : no medical problems [Anticipatory Guidance Given] : Anticipatory guidance addressed as per the history of present illness section [Physical Growth and Development] : physical growth and development [Social and Academic Competence] : social and academic competence [Emotional Well-Being] : emotional well-being [Risk Reduction] : risk reduction [Violence and Injury Prevention] : violence and injury prevention [No Vaccines] : no vaccines needed [No Medications] : ~He/She~ is not on any medications [Patient] : patient [Mother] : mother [Full Activity without restrictions including Physical Education & Athletics] : Full Activity without restrictions including Physical Education & Athletics [I have examined the above-named student and completed the preparticipation physical evaluation. The athlete does not present apparent clinical contraindications to practice and participate in sport(s) as outlined above. A copy of the physical exam is on r] : I have examined the above-named student and completed the preparticipation physical evaluation. The athlete does not present apparent clinical contraindications to practice and participate in sport(s) as outlined above. A copy of the physical exam is on record in my office and can be made available to the school at the request of the parents. If conditions arise after the athlete has been cleared for participation, the physician may rescind the clearance until the problem is resolved and the potential consequences are completely explained to the athlete (and parents/guardians). [de-identified] : Nutritional Counseling: Discussed 5-2-1-0 Healthy Habits Questionnaire Goals: see care plan  [FreeTextEntry6] : declined HPV and flu

## 2024-10-01 ENCOUNTER — PATIENT MESSAGE (OUTPATIENT)
Dept: INTERNAL MEDICINE | Facility: CLINIC | Age: 74
End: 2024-10-01
Payer: MEDICARE

## 2024-10-11 ENCOUNTER — HOSPITAL ENCOUNTER (OUTPATIENT)
Dept: RADIOLOGY | Facility: HOSPITAL | Age: 74
Discharge: HOME | End: 2024-10-11
Attending: UROLOGY
Payer: MEDICARE

## 2024-10-11 DIAGNOSIS — R10.30 LOWER ABDOMINAL PAIN: ICD-10-CM

## 2024-10-11 PROCEDURE — 74176 CT ABD & PELVIS W/O CONTRAST: CPT | Mod: TC

## 2024-10-11 PROCEDURE — 74176 CT ABD & PELVIS W/O CONTRAST: CPT | Mod: 26,,, | Performed by: RADIOLOGY

## 2024-10-14 ENCOUNTER — OFFICE VISIT (OUTPATIENT)
Dept: UROLOGY | Facility: CLINIC | Age: 74
End: 2024-10-14
Payer: MEDICARE

## 2024-10-14 VITALS
HEART RATE: 74 BPM | BODY MASS INDEX: 31.18 KG/M2 | WEIGHT: 198.63 LBS | SYSTOLIC BLOOD PRESSURE: 148 MMHG | DIASTOLIC BLOOD PRESSURE: 90 MMHG | HEIGHT: 67 IN

## 2024-10-14 DIAGNOSIS — N20.0 NEPHROLITHIASIS: Primary | ICD-10-CM

## 2024-10-14 DIAGNOSIS — E66.01 SEVERE OBESITY (BMI 35.0-39.9) WITH COMORBIDITY: ICD-10-CM

## 2024-10-14 DIAGNOSIS — N28.1 RENAL CYST, LEFT: ICD-10-CM

## 2024-10-14 PROCEDURE — 1159F MED LIST DOCD IN RCRD: CPT | Mod: CPTII,S$GLB,, | Performed by: UROLOGY

## 2024-10-14 PROCEDURE — 3044F HG A1C LEVEL LT 7.0%: CPT | Mod: CPTII,S$GLB,, | Performed by: UROLOGY

## 2024-10-14 PROCEDURE — 3008F BODY MASS INDEX DOCD: CPT | Mod: CPTII,S$GLB,, | Performed by: UROLOGY

## 2024-10-14 PROCEDURE — 3066F NEPHROPATHY DOC TX: CPT | Mod: CPTII,S$GLB,, | Performed by: UROLOGY

## 2024-10-14 PROCEDURE — 99999 PR PBB SHADOW E&M-EST. PATIENT-LVL IV: CPT | Mod: PBBFAC,,, | Performed by: UROLOGY

## 2024-10-14 PROCEDURE — 4010F ACE/ARB THERAPY RXD/TAKEN: CPT | Mod: CPTII,S$GLB,, | Performed by: UROLOGY

## 2024-10-14 PROCEDURE — 3288F FALL RISK ASSESSMENT DOCD: CPT | Mod: CPTII,S$GLB,, | Performed by: UROLOGY

## 2024-10-14 PROCEDURE — 1101F PT FALLS ASSESS-DOCD LE1/YR: CPT | Mod: CPTII,S$GLB,, | Performed by: UROLOGY

## 2024-10-14 PROCEDURE — 3060F POS MICROALBUMINURIA REV: CPT | Mod: CPTII,S$GLB,, | Performed by: UROLOGY

## 2024-10-14 PROCEDURE — 1125F AMNT PAIN NOTED PAIN PRSNT: CPT | Mod: CPTII,S$GLB,, | Performed by: UROLOGY

## 2024-10-14 PROCEDURE — 3080F DIAST BP >= 90 MM HG: CPT | Mod: CPTII,S$GLB,, | Performed by: UROLOGY

## 2024-10-14 PROCEDURE — 3077F SYST BP >= 140 MM HG: CPT | Mod: CPTII,S$GLB,, | Performed by: UROLOGY

## 2024-10-14 PROCEDURE — 99214 OFFICE O/P EST MOD 30 MIN: CPT | Mod: S$GLB,,, | Performed by: UROLOGY

## 2024-10-14 NOTE — PROGRESS NOTES
[facility] - Urology   Clinic Note    SUBJECTIVE:     No chief complaint on file.      Referral from: No ref. provider found.    History of Present Illness:  Tracy Armendariz is a 73 y.o. female who presents to clinic for nephrolithiasis.    Has a h/o nephrolithiasis  Does have some mild abd pain  No hematuria  No dysuria  Follows with renal for stones  Stones were Ca oxalate  Does get recurrent UTIs  Does not take any premarin    10/14/2024  Overall doing well  Here to discuss imaging    Impression:     1. Multiple left nonobstructing renal stones measuring up to 0.9 cm, similar in number when compared to CT dated 12/09/2022.  No hydronephrosis or hydroureter.  2. Multifocal left renal cortical thinning, presumably related to scarring.  3. Simple left renal cyst.  4. Colonic diverticulosis.  5. Moderate retained colonic fecal material.  Correlation for constipation advised.  6. Age-indeterminate compression fracture of the T10 vertebral body with mild height loss.  Consider further characterization with a thoracic spine MRI.  7. Additional findings as detailed above.  This report was flagged in Epic as abnormal.    Patient endorses no additional complaints at this time.    Past Medical History:   Diagnosis Date    Allergy     Anemia, unspecified     Anticoagulant long-term use     Arthritis     Cerebral embolism without mention of cerebral infarction 08/25/2014    Dx updated per 2019 IMO Load      CVA (cerebral infarction) 2013    Depression     Disorder of kidney and ureter     renal stones    Diverticulosis of colon     Extrinsic asthma, unspecified     Hematuria, unspecified     Hyperlipidemia     Hypertension     Kidney stone     Left atrial enlargement 12/16/2014    Low back pain     Nephrolithiasis     MARTIN (obstructive sleep apnea)     Osteopenia     PUD (peptic ulcer disease)     Stroke 12/2013    Urinary tract infection        Past Surgical History:   Procedure Laterality Date    APPENDECTOMY  1978    @ time of  hysterectomy    BACK SURGERY      CATARACT EXTRACTION       SECTION      CHOLECYSTECTOMY      laparoscopic    COLONOSCOPY N/A 2018    Procedure: COLONOSCOPY/Golytely;  Surgeon: Janine Black MD;  Location: [facility] ENDO;  Service: Endoscopy;  Laterality: N/A;    CYSTOSCOPY W/ RETROGRADES  2022    Procedure: CYSTOSCOPY, WITH RETROGRADE PYELOGRAM;  Surgeon: Mitchell Recinos MD;  Location: [facility] OR;  Service: Urology;;    CYSTOSCOPY W/ URETERAL STENT PLACEMENT Left 2022    Procedure: CYSTOSCOPY, WITH URETERAL STENT INSERTION;  Surgeon: Mitchell Recinos MD;  Location: [facility] OR;  Service: Urology;  Laterality: Left;    CYSTOURETEROSCOPY, WITH HOLMIUM LASER LITHOTRIPSY OF URETERAL CALCULUS AND STENT INSERTION Left 2022    Procedure: left ureteroscopy, holmium laser lithotripsy, stone basketing, retrograde pyelogram, stent placement;  Surgeon: Anaya Zamora MD;  Location: [facility] OR;  Service: Urology;  Laterality: Left;    DILATION AND CURETTAGE OF UTERUS      EXTRACTION - STONE Left 2022    Procedure: EXTRACTION - STONE;  Surgeon: Anaya Zamora MD;  Location: [facility] OR;  Service: Urology;  Laterality: Left;    HYSTERECTOMY      TAHUSO with appendectomy    INNER EAR SURGERY      replaced ear drum    JOINT REPLACEMENT Right     knee    KNEE ARTHROSCOPY W/ DEBRIDEMENT      LUMBAR DISCECTOMY      L4-L5    OOPHORECTOMY      unilateral    RETROGRADE PYELOGRAPHY  2022    Procedure: PYELOGRAM, RETROGRADE;  Surgeon: Anaya Zamora MD;  Location: [facility] OR;  Service: Urology;;    TONSILLECTOMY      TYMPANOPLASTY      URETEROSCOPIC REMOVAL OF URETERIC CALCULUS Left 2018    Procedure: REMOVAL, CALCULUS, URETER, URETEROSCOPIC, holmium laser lithotripsy, stone basket extraction, retrograde pyelogram, ureteral stent exchange;  Surgeon: Anaya Zamora MD;  Location: [facility] OR;  Service: Urology;  Laterality: Left;       Family History   Problem Relation Name Age of Onset     Cervical cancer Mother      Cancer Mother  65        lung cancer - non smoker    Lung cancer Mother      Depression Father      Hypertension Father      Coronary artery disease Father  62    Heart disease Father      Heart failure Sister x1     Diabetes Sister x1     Heart disease Sister x1     Kidney disease Sister x1     Depression Brother x2     Suicide Brother x2     Heart failure Brother x2     Cancer Daughter x2     Breast cancer Daughter x2 36    No Known Problems Son x1     Hypertension Maternal Grandfather      Heart disease Maternal Grandfather      Stroke Paternal Grandfather      Colon cancer Neg Hx      Ovarian cancer Neg Hx         Social History     Tobacco Use    Smoking status: Former     Current packs/day: 0.00     Average packs/day: 1.5 packs/day for 29.0 years (43.5 ttl pk-yrs)     Types: Cigarettes     Start date:      Quit date: 1995     Years since quittin.8    Smokeless tobacco: Never   Substance Use Topics    Alcohol use: Yes     Alcohol/week: 1.0 standard drink of alcohol     Types: 1 Glasses of wine per week     Comment: social    Drug use: Never       Current Outpatient Medications on File Prior to Visit   Medication Sig Dispense Refill    albuterol (PROVENTIL/VENTOLIN HFA) 90 mcg/actuation inhaler INHALE 2 PUFFS EVERY 6 HOURS AS NEEDED FOR WHEEZING 18 g 0    amoxicillin (AMOXIL) 500 MG capsule Take 4 capsules by mouth 1 hour prior to appointment 12 capsule 0    amoxicillin (AMOXIL) 500 MG capsule Take 4 capsules by mouth one hour before appointment 12 capsule 0    aspirin (ECOTRIN) 81 MG EC tablet Take 81 mg by mouth once daily.      atorvastatin (LIPITOR) 40 MG tablet Take 1 tablet (40 mg total) by mouth every evening. 90 tablet 3    azelastine (ASTELIN) 137 mcg (0.1 %) nasal spray 1 spray (137 mcg total) by Nasal route 2 (two) times daily. 30 mL 11    blood sugar diagnostic Strp To check BG 1 times daily, to use with insurance preferred meter 100 each 3    blood-glucose  meter Misc To check BG 1 times daily, to use with insurance preferred meter 1 each 0    buPROPion (WELLBUTRIN XL) 300 MG 24 hr tablet Take 1 tablet (300 mg total) by mouth once daily. 90 tablet 3    conjugated estrogens (PREMARIN) vaginal cream Place 0.5 g vaginally 3 (three) times a week. 30 g 0    diclofenac sodium (VOLTAREN) 1 % Gel APPLY 2-4 GRAMS TO EACH PAINFUL AREA FOUR TIMES DAILY - MAX 32 GRAMS/ g 6    EScitalopram oxalate (LEXAPRO) 20 MG tablet Take 1 tablet (20 mg total) by mouth once daily. 90 tablet 3    esomeprazole (NEXIUM) 40 MG capsule Take 1 capsule (40 mg total) by mouth daily as needed (heartburn). 90 capsule 3    fluticasone propionate (FLONASE) 50 mcg/actuation nasal spray 2 sprays (100 mcg total) by Each Nostril route once daily. 16 g 11    lancets Misc To check BG 1 times daily, to use with insurance preferred meter 100 each 3    magnesium oxide (MAG-OX) 400 mg (241.3 mg magnesium) tablet Take 1 tablet (400 mg total) by mouth 2 (two) times daily. 180 tablet 3    modafiniL (PROVIGIL) 200 MG Tab Take 1 tablet (200 mg total) by mouth daily as needed (sleepiness). 30 tablet 2    nitrofurantoin, macrocrystal-monohydrate, (MACROBID) 100 MG capsule Take 1 capsule (100 mg total) by mouth 2 (two) times daily. 10 capsule 0    ondansetron (ZOFRAN-ODT) 4 MG TbDL Take 1 tablet (4 mg total) by mouth every 8 (eight) hours as needed (nausea). 20 tablet 2    potassium citrate (UROCIT-K 10) 10 mEq (1,080 mg) TbSR Take 2 tablets (20 mEq total) by mouth 3 (three) times daily with meals. 180 tablet 11    semaglutide (OZEMPIC) 2 mg/dose (8 mg/3 mL) PnIj Inject 2 mg into the skin every 7 days. 3 mL 5    vitamin D (VITAMIN D3) 1000 units Tab Take 1,000 Units by mouth 3 (three) times a week.       Current Facility-Administered Medications on File Prior to Visit   Medication Dose Route Frequency Provider Last Rate Last Admin    DOBUTamine 50 mg in D5W 50 mL (1 mg/mL) cardiac stress test infusion  10 mcg/kg/min  "Intravenous Continuous Madisyn Villalobos MD   Stopped at 07/23/24 1512    perflutren protein-A microsphr 0.22 mg/mL IV susp  0.5 mL Intravenous Once Evelio Barros MD           Review of patient's allergies indicates:   Allergen Reactions    Iodinated contrast media Hives and Rash    Gabapentin Hallucinations    Iodine Hives    Isothiazolinones Rash    Penicillins Rash       Review of Systems:  A review of 10+ systems was conducted with pertinent positive and negative findings documented in HPI with all other systems reviewed and negative.    OBJECTIVE:     Estimated body mass index is 31.95 kg/m² as calculated from the following:    Height as of 6/17/24: 5' 7" (1.702 m).    Weight as of 7/23/24: 92.5 kg (204 lb).    Vital Signs (Most Recent)  There were no vitals filed for this visit.      Physical Exam:  GENERAL: patient sitting comfortably  HEENT: normocephalic  NECK: supple, no JVD  PULM: normal chest rise, no increased WOB  HEART: non-diaphoretic  ABDO: soft, nondistended, nontender  BACK: no CVA tenderness bilaterally  SKIN: warm, dry, well perfused  EXT: no bruising or edema  NEURO: grossly normal with no focal deficits  PSYCH: appropriate mood and affect    Genitourinary Exam:  deferred    Lab Results   Component Value Date    BUN 19 06/27/2024    CREATININE 0.9 06/27/2024    WBC 10.23 06/27/2024    HGB 14.0 06/27/2024    HCT 44.6 06/27/2024     06/27/2024    AST 20 06/27/2024    ALT 16 06/27/2024    ALKPHOS 76 06/27/2024    ALBUMIN 3.3 (L) 06/27/2024    HGBA1C 5.1 06/27/2024    INR 1.0 01/13/2017        Imaging:  I have personally reviewed all relevant imaging studies.    Results for orders placed or performed during the hospital encounter of 10/11/24 (from the past 2160 hours)   CT Renal Stone Study ABD Pelvis WO    Narrative    EXAMINATION:  CT RENAL STONE STUDY ABD PELVIS WO    CLINICAL HISTORY:  Flank pain, kidney stone suspected;    TECHNIQUE:  5 mm axial images were obtained through the " abdomen and pelvis without IV contrast.  Coronal and sagittal reformats were performed.    COMPARISON:  Ultrasound 02/26/2024, CT 12/07/2018, 12/09/2022.    FINDINGS:  Heart: Normal in size.  Mild to moderate coronary artery atherosclerosis.  Severe calcification of the mitral annulus.  No pericardial effusion.    Lungs: Lung bases are clear.  No pleural effusions.    Liver: Normal in size.  No focal lesions. No intrahepatic bile duct dilatation.    Gallbladder: Cholecystectomy.  Extra-hepatic bile ducts are normal in caliber.    Stomach: Unremarkable.    Duodenum: Unremarkable.    Spleen: Normal in size. No focal lesions.    Pancreas: Unremarkable.    Adrenal glands: Unremarkable    Kidneys: Left kidney is slightly smaller and demonstrates multifocal areas of cortical thinning/presumed scars.  1.8 cm left renal cyst, increased in size when compared to the previous CT.  Multiple left renal stones measuring up to 0.9 cm, similar in number when compared to the previous CT.  No hydronephrosis or hydroureter.  Bladder is decompressed and not well evaluated.    Genital organs: Postsurgical change of hysterectomy and suspected right oophorectomy.  Presumed left ovary appears within normal limits.No pelvic free fluid.    Bowel: Small bowel is normal in caliber.  Multiple colonic diverticuli.  Appendix is surgically absent.  No obstruction or inflammatory changes.    Aorta: Moderate atherosclerotic calcification.  No aneurysm.    Peritoneum/mesentery: No ascites or intra-abdominal free air.  No focal mesenteric masses.    Lymph nodes: No lymphadenopathy.    Subcutaneous soft tissues: Bilateral fat containing inguinal hernias, similar when compared to the previous CT.    Osseous structures: Postsurgical change of right L4-L5 and L5-S1 laminectomy.  Age-indeterminate compression fracture of the T10 vertebral body with mild superior endplate height loss.  Mild grade 1 anterolisthesis of L4 on L5.  Degenerative changes of the  spine and bilateral femoroacetabular joints.  No suspicious lytic or blastic lesions.      Impression    1. Multiple left nonobstructing renal stones measuring up to 0.9 cm, similar in number when compared to CT dated 12/09/2022.  No hydronephrosis or hydroureter.  2. Multifocal left renal cortical thinning, presumably related to scarring.  3. Simple left renal cyst.  4. Colonic diverticulosis.  5. Moderate retained colonic fecal material.  Correlation for constipation advised.  6. Age-indeterminate compression fracture of the T10 vertebral body with mild height loss.  Consider further characterization with a thoracic spine MRI.  7. Additional findings as detailed above.  This report was flagged in Epic as abnormal.      Electronically signed by: Juan Treviño MD  Date:    10/12/2024  Time:    18:28     *Note: Due to a large number of results and/or encounters for the requested time period, some results have not been displayed. A complete set of results can be found in Results Review.     No results found. However, due to the size of the patient record, not all encounters were searched. Please check Results Review for a complete set of results.  CT Renal Stone Study ABD Pelvis WO  Narrative: EXAMINATION:  CT RENAL STONE STUDY ABD PELVIS WO    CLINICAL HISTORY:  Flank pain, kidney stone suspected;    TECHNIQUE:  5 mm axial images were obtained through the abdomen and pelvis without IV contrast.  Coronal and sagittal reformats were performed.    COMPARISON:  Ultrasound 02/26/2024, CT 12/07/2018, 12/09/2022.    FINDINGS:  Heart: Normal in size.  Mild to moderate coronary artery atherosclerosis.  Severe calcification of the mitral annulus.  No pericardial effusion.    Lungs: Lung bases are clear.  No pleural effusions.    Liver: Normal in size.  No focal lesions. No intrahepatic bile duct dilatation.    Gallbladder: Cholecystectomy.  Extra-hepatic bile ducts are normal in caliber.    Stomach:  Unremarkable.    Duodenum: Unremarkable.    Spleen: Normal in size. No focal lesions.    Pancreas: Unremarkable.    Adrenal glands: Unremarkable    Kidneys: Left kidney is slightly smaller and demonstrates multifocal areas of cortical thinning/presumed scars.  1.8 cm left renal cyst, increased in size when compared to the previous CT.  Multiple left renal stones measuring up to 0.9 cm, similar in number when compared to the previous CT.  No hydronephrosis or hydroureter.  Bladder is decompressed and not well evaluated.    Genital organs: Postsurgical change of hysterectomy and suspected right oophorectomy.  Presumed left ovary appears within normal limits.No pelvic free fluid.    Bowel: Small bowel is normal in caliber.  Multiple colonic diverticuli.  Appendix is surgically absent.  No obstruction or inflammatory changes.    Aorta: Moderate atherosclerotic calcification.  No aneurysm.    Peritoneum/mesentery: No ascites or intra-abdominal free air.  No focal mesenteric masses.    Lymph nodes: No lymphadenopathy.    Subcutaneous soft tissues: Bilateral fat containing inguinal hernias, similar when compared to the previous CT.    Osseous structures: Postsurgical change of right L4-L5 and L5-S1 laminectomy.  Age-indeterminate compression fracture of the T10 vertebral body with mild superior endplate height loss.  Mild grade 1 anterolisthesis of L4 on L5.  Degenerative changes of the spine and bilateral femoroacetabular joints.  No suspicious lytic or blastic lesions.  Impression: 1. Multiple left nonobstructing renal stones measuring up to 0.9 cm, similar in number when compared to CT dated 12/09/2022.  No hydronephrosis or hydroureter.  2. Multifocal left renal cortical thinning, presumably related to scarring.  3. Simple left renal cyst.  4. Colonic diverticulosis.  5. Moderate retained colonic fecal material.  Correlation for constipation advised.  6. Age-indeterminate compression fracture of the T10 vertebral body  "with mild height loss.  Consider further characterization with a thoracic spine MRI.  7. Additional findings as detailed above.  This report was flagged in Epic as abnormal.    Electronically signed by: Juan Treviño MD  Date:    10/12/2024  Time:    18:28       PSA:  No results found for: "PSA", "PSADIAG", "PSATOTAL", "PSAFREE"    Testosterone:  No results found for: "TOTALTESTOST", "TESTOSTERONE"     ASSESSMENT     1. Nephrolithiasis    2. Severe obesity (BMI 35.0-39.9) with comorbidity        PLAN:     Several left renal stones appear intraparenchymal  Will continue to observe  Renal cyst appears simple, needs no follow up  Follow up 6 mo    Mitchell Recinos MD  Urology  [facility]    Disclaimer: This note has been generated using voice-recognition software. There may be typographical errors that have been missed during proof-reading.       "

## 2024-10-17 ENCOUNTER — OFFICE VISIT (OUTPATIENT)
Dept: INTERNAL MEDICINE | Facility: CLINIC | Age: 74
End: 2024-10-17
Payer: MEDICARE

## 2024-10-17 VITALS
HEIGHT: 66 IN | SYSTOLIC BLOOD PRESSURE: 124 MMHG | DIASTOLIC BLOOD PRESSURE: 76 MMHG | HEART RATE: 84 BPM | OXYGEN SATURATION: 94 % | TEMPERATURE: 98 F | BODY MASS INDEX: 32.59 KG/M2 | RESPIRATION RATE: 17 BRPM | WEIGHT: 202.81 LBS

## 2024-10-17 DIAGNOSIS — N18.31 TYPE 2 DIABETES MELLITUS WITH STAGE 3A CHRONIC KIDNEY DISEASE AND HYPERTENSION: Primary | ICD-10-CM

## 2024-10-17 DIAGNOSIS — R42 DIZZINESS: ICD-10-CM

## 2024-10-17 DIAGNOSIS — I10 ESSENTIAL HYPERTENSION: Chronic | ICD-10-CM

## 2024-10-17 DIAGNOSIS — E11.22 TYPE 2 DIABETES MELLITUS WITH STAGE 3A CHRONIC KIDNEY DISEASE AND HYPERTENSION: Primary | ICD-10-CM

## 2024-10-17 DIAGNOSIS — I12.9 TYPE 2 DIABETES MELLITUS WITH STAGE 3A CHRONIC KIDNEY DISEASE AND HYPERTENSION: Primary | ICD-10-CM

## 2024-10-17 PROCEDURE — 3044F HG A1C LEVEL LT 7.0%: CPT | Mod: CPTII,S$GLB,, | Performed by: HOSPITALIST

## 2024-10-17 PROCEDURE — G2211 COMPLEX E/M VISIT ADD ON: HCPCS | Mod: S$GLB,,, | Performed by: HOSPITALIST

## 2024-10-17 PROCEDURE — 3078F DIAST BP <80 MM HG: CPT | Mod: CPTII,S$GLB,, | Performed by: HOSPITALIST

## 2024-10-17 PROCEDURE — 3060F POS MICROALBUMINURIA REV: CPT | Mod: CPTII,S$GLB,, | Performed by: HOSPITALIST

## 2024-10-17 PROCEDURE — 1100F PTFALLS ASSESS-DOCD GE2>/YR: CPT | Mod: CPTII,S$GLB,, | Performed by: HOSPITALIST

## 2024-10-17 PROCEDURE — 3066F NEPHROPATHY DOC TX: CPT | Mod: CPTII,S$GLB,, | Performed by: HOSPITALIST

## 2024-10-17 PROCEDURE — 99214 OFFICE O/P EST MOD 30 MIN: CPT | Mod: S$GLB,,, | Performed by: HOSPITALIST

## 2024-10-17 PROCEDURE — 3008F BODY MASS INDEX DOCD: CPT | Mod: CPTII,S$GLB,, | Performed by: HOSPITALIST

## 2024-10-17 PROCEDURE — 99999 PR PBB SHADOW E&M-EST. PATIENT-LVL V: CPT | Mod: PBBFAC,,, | Performed by: HOSPITALIST

## 2024-10-17 PROCEDURE — 3288F FALL RISK ASSESSMENT DOCD: CPT | Mod: CPTII,S$GLB,, | Performed by: HOSPITALIST

## 2024-10-17 PROCEDURE — 1160F RVW MEDS BY RX/DR IN RCRD: CPT | Mod: CPTII,S$GLB,, | Performed by: HOSPITALIST

## 2024-10-17 PROCEDURE — 3074F SYST BP LT 130 MM HG: CPT | Mod: CPTII,S$GLB,, | Performed by: HOSPITALIST

## 2024-10-17 PROCEDURE — 1125F AMNT PAIN NOTED PAIN PRSNT: CPT | Mod: CPTII,S$GLB,, | Performed by: HOSPITALIST

## 2024-10-17 PROCEDURE — 1159F MED LIST DOCD IN RCRD: CPT | Mod: CPTII,S$GLB,, | Performed by: HOSPITALIST

## 2024-10-17 PROCEDURE — 4010F ACE/ARB THERAPY RXD/TAKEN: CPT | Mod: CPTII,S$GLB,, | Performed by: HOSPITALIST

## 2024-10-17 RX ORDER — SEMAGLUTIDE 2.68 MG/ML
2 INJECTION, SOLUTION SUBCUTANEOUS
Qty: 3 ML | Refills: 5 | Status: SHIPPED | OUTPATIENT
Start: 2024-10-17 | End: 2025-10-17

## 2024-10-17 NOTE — PROGRESS NOTES
Subjective:     @Patient ID: Tracy Armendariz is a 73 y.o. female.    Chief Complaint: Follow-up (4 mo)    HPI    73 y.o. female with prediabetes, MARTIN, HTN, HLD, hx of CVA, atherosclerosis of aorta, obesity, R knee OA, depression presents here for f/u:      HTN: not currently on losartan.  HLD:  Atorvastatin 40 mg daily  3. Depression: lexapro 20 mg qday, wellbutrin 300 mg qday   4. Obesity: insurance does not cover wegovy. Was on Mounjaro but now on ozempic   5: DM2: started on mounjaro by diabetic NP who is no longer with [facility]. Reports she stopped taking as did not help with weight loss as much as she hoped. Reports started taking her 's ozempic 1 mg weekly. Reports has helped but not much and would like to increase to 2 mg weekly.  reports she is not eating healthy  6. Abdominal pain: reports having low abdominal/pelvic b/l when having b.m. not sure if pain related to inguinal hernias seen on outside imaging . Did f/u with general surgery. Symptoms attributed to constipation. Referred to GI who recommended pt start miralax since she is on GLP1   7. MARTIN; on CPAP. Follows with sleep clinic. On modafinil   8., pt reports continues to fall, feels dizzy. Reports few episodes of syncope.  One episode occurred in the shower.  Also reports she had episode of chest pain.  States he has not sure why she moved symptoms.  She has been evaluated by Cardiology and underwent was negative.  She has seen ENT and undergone vestibular therapy however vestibular therapy was canceled as her symptoms were not improving.  Recommended that she see a neurologist for further evaluation of her dizziness symptoms.  She reports compliance with using her cane and walker. Has upcoming appt with neurologist            History of Present Illness    CHIEF COMPLAINT:  Ms. Armendariz presents today for a four-month follow-up visit.    RECENT DIAGNOSTIC TESTS:  She recently underwent a stress test and a CT scan for the urologist. The CT  "scan yielded abnormal results, with an extensive and thorough report. She has not yet received communication from the urologist regarding these results.    UROLOGICAL ISSUES:  Her left kidney is smaller in size, and a kidney cyst has increased in size. She also reports kidney stones on the left side.    CARDIOVASCULAR:  She has calcification of the mitral valve.    SURGICAL AND MEDICAL HISTORY:  Her history includes a hysterectomy with right ovary removal, laminectomy, groin hernias, and an old compression fracture in the spine of unknown age. She has been diagnosed with osteopenia and arthritis in the spine.    NEUROLOGICAL SYMPTOMS:  She reports eye movements described as "jumping" in different directions, both voluntary and involuntary tremors, dizziness, and frequent falls. She expresses frustration with the falling episodes. She holds onto objects for support and is using her cane and walker less frequently than before. She is looking forward to her upcoming neurology appointment to address these symptoms.    MOBILITY:  She reports reduced use of cane and walker, now relying on holding onto nearby objects for support. Despite some improvement, she is spending increased time in bed. She expresses preference for the walker over the cane, finding it more supportive.    VACCINATIONS:  She has received both flu and COVID vaccinations, as well as completed the pneumococcal vaccination series with both 23-valent and 13-valent vaccines.    MEDICATIONS AND WEIGHT MANAGEMENT:  Her Ozempic dose was increased to 2 mg, resulting in well-managed blood sugars and successful weight loss. However, she experiences nausea as a side effect, which she manages with sublingual anti-nausea medication. Her current weight is 202 lbs, down from 218 lbs in June, with recent fluctuations due to fluid retention.    UPCOMING APPOINTMENTS:  She has a neurology appointment next week, which took approximately 6 months to schedule, and an " "ophthalmology appointment in November.    CONCERNS:  She expresses concern about the safety of flying at an older age, seeking clarification on this matter.      ROS:  Constitutional: +dizziness  Gastrointestinal: +nausea         Review of Systems  Past medical history, surgical history, and family medical history reviewed and updated as appropriate.    Medications and allergies reviewed.     Objective:     Vitals:    10/17/24 1119   BP: 124/76   BP Location: Left arm   Patient Position: Sitting   Pulse: 84   Resp: 17   Temp: 97.6 °F (36.4 °C)   TempSrc: Temporal   SpO2: (!) 94%   Weight: 92 kg (202 lb 13.2 oz)   Height: 5' 6" (1.676 m)     Body mass index is 32.74 kg/m².  Physical Exam  Constitutional:       Appearance: Normal appearance.   HENT:      Head: Normocephalic and atraumatic.   Eyes:      General:         Right eye: No discharge.         Left eye: No discharge.      Conjunctiva/sclera: Conjunctivae normal.   Cardiovascular:      Rate and Rhythm: Normal rate and regular rhythm.   Pulmonary:      Effort: Pulmonary effort is normal.      Breath sounds: Normal breath sounds.   Musculoskeletal:      Cervical back: Normal range of motion and neck supple.      Right lower leg: No edema.      Left lower leg: No edema.   Skin:     General: Skin is warm and dry.   Neurological:      Mental Status: She is alert and oriented to person, place, and time.   Psychiatric:         Mood and Affect: Mood normal.         Behavior: Behavior normal.         Lab Results   Component Value Date    WBC 10.23 06/27/2024    HGB 14.0 06/27/2024    HCT 44.6 06/27/2024     06/27/2024    CHOL 157 06/27/2024    TRIG 116 06/27/2024    HDL 80 (H) 06/27/2024    ALT 16 06/27/2024    AST 20 06/27/2024     06/27/2024    K 4.2 06/27/2024     06/27/2024    CREATININE 0.9 06/27/2024    BUN 19 06/27/2024    CO2 25 06/27/2024    TSH 2.307 06/27/2024    INR 1.0 01/13/2017    GLUF 123 (H) 11/04/2008    HGBA1C 5.1 06/27/2024 "       Assessment:     1. Type 2 diabetes mellitus with stage 3a chronic kidney disease and hypertension    2. Essential hypertension    3. Dizziness      Plan:   Tracy was seen today for follow-up.    Diagnoses and all orders for this visit:    Type 2 diabetes mellitus with stage 3a chronic kidney disease and hypertension  -     Hemoglobin A1C; Future  -     Comprehensive Metabolic Panel; Future  -     Lipid Panel; Future  -     Microalbumin/Creatinine Ratio, Urine; Future  -     semaglutide (OZEMPIC) 2 mg/dose (8 mg/3 mL) PnIj; Inject 2 mg into the skin every 7 days.    Essential hypertension  - bp stable. Continue to monitor     Dizziness  - chronic. Pt has upcoming appt with neuro      Assessment & Plan    TREMORS:   Discussed that tremors can be genetic (familial tremors) or due to various conditions, and that medications are available to help manage them.    DIZZINESS:   Ms. Hunger to continue using walker for support when feeling dizzy to prevent falls.    DIABETES:   Continued Ozempic 2 mg.   Refilled Ozempic prescription with new order sent to [facility] pharmacy.    TRAVEL SAFETY FOR OLDER ADULTS:   Explained that flying is generally safe for older adults unless specific medical conditions contraindicate it.    LABS:   Blood work ordered before next visit.    FOLLOW UP:   Follow up in 4 months with labs and urine test.             Madisyn Villalobos MD  Internal Medicine    10/17/2024    This note was generated with the assistance of ambient listening technology. Verbal consent was obtained by the patient and accompanying visitor(s) for the recording of patient appointment to facilitate this note. I attest to having reviewed and edited the generated note for accuracy, though some syntax or spelling errors may persist. Please contact the author of this note for any clarification.

## 2024-10-22 ENCOUNTER — OFFICE VISIT (OUTPATIENT)
Dept: NEUROLOGY | Facility: CLINIC | Age: 74
End: 2024-10-22
Payer: MEDICARE

## 2024-10-22 DIAGNOSIS — H81.391 PERIPHERAL VERTIGO INVOLVING RIGHT EAR: ICD-10-CM

## 2024-10-22 DIAGNOSIS — H83.2X9 VESTIBULAR HYPOFUNCTION, UNSPECIFIED LATERALITY: ICD-10-CM

## 2024-10-22 DIAGNOSIS — G20.C PARKINSONISM, UNSPECIFIED PARKINSONISM TYPE: Primary | ICD-10-CM

## 2024-10-22 DIAGNOSIS — Z86.73 HISTORY OF CVA (CEREBROVASCULAR ACCIDENT): ICD-10-CM

## 2024-10-22 DIAGNOSIS — G20.C PRIMARY PARKINSONISM: Primary | ICD-10-CM

## 2024-10-22 PROCEDURE — 3044F HG A1C LEVEL LT 7.0%: CPT | Mod: CPTII,S$GLB,, | Performed by: NURSE PRACTITIONER

## 2024-10-22 PROCEDURE — 4010F ACE/ARB THERAPY RXD/TAKEN: CPT | Mod: CPTII,S$GLB,, | Performed by: NURSE PRACTITIONER

## 2024-10-22 PROCEDURE — 99999 PR PBB SHADOW E&M-EST. PATIENT-LVL IV: CPT | Mod: PBBFAC,,, | Performed by: NURSE PRACTITIONER

## 2024-10-22 PROCEDURE — 99215 OFFICE O/P EST HI 40 MIN: CPT | Mod: S$GLB,,, | Performed by: NURSE PRACTITIONER

## 2024-10-22 PROCEDURE — 3066F NEPHROPATHY DOC TX: CPT | Mod: CPTII,S$GLB,, | Performed by: NURSE PRACTITIONER

## 2024-10-22 PROCEDURE — 3060F POS MICROALBUMINURIA REV: CPT | Mod: CPTII,S$GLB,, | Performed by: NURSE PRACTITIONER

## 2024-10-22 PROCEDURE — G2211 COMPLEX E/M VISIT ADD ON: HCPCS | Mod: S$GLB,,, | Performed by: NURSE PRACTITIONER

## 2024-10-22 RX ORDER — CARBIDOPA AND LEVODOPA 25; 100 MG/1; MG/1
1 TABLET ORAL 3 TIMES DAILY
Qty: 90 TABLET | Refills: 5 | Status: SHIPPED | OUTPATIENT
Start: 2024-10-22 | End: 2025-10-22

## 2024-10-22 NOTE — PROGRESS NOTES
Name: Tracy Armendariz  MRN: [id number]   CSN: [id number]      Date: 10-22-24      Referring physician:  Melissa Amado FNP-C  [address]  [address]  [place] [id number]    Subjective:      Chief Complaint: tremor    History of Present Illness (HPI):    Tracy Armendariz is a 73 y.o. RH female with personal hx stroke (asa and statin), vertigo (follows ENT) who arrives more than 20 min late today for an initial evaluation of Tremor and is accompanied by . Visit will be focused. She has prev seen Dr Philippe for c/o neuropathy, hx stroke, vertigo and issues with OHN.     Tremor 18 mos ago by  account.   Started in BH- RH worse than LH.  It is getting worse over time.  Feel shaky inside.   He does not note tremor anywhere other than hands.   Can't read because tremors are in my eyes. Seeing neuroophtamolgist  Can't write anymore. Hands so shaky. It is legible. They are not sure if the writing is smaller or bigger. It just illegible. 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  She is able to feed self. Sometimes wear it more than it gets in her mouth.   No trouble with drinking, other than has to use .  Does have trouble with buttons and earrings. Sometimes  has to assist. He has had to help for about 6 mos or so.    Does have stiffness in muscles because I sleep all the time.  Also all of the joints hurt.   She does feel slow with everything. Eating, going to bathroom, walking. No I don't walk slow I walk fast.  says her walking has slowed.   Balance is terrible. Get dizzy and turned around. Swimming. When turn body around, just keep going instead of being able to catch self.   She did vestibular rehab. Not doing this currently. Not dizzy when laying down or sitting.   Turning is what provokes dizziness.   Standing can also provoke it.     She does fall often.   Falls started at least 2 years ago. Started with falling out of bed when she was sleeping.   She started falling  with walking around the same tie . She fell and fractured her wrist.   She falls less since she had PT and vestibular rehab. She completed PT Dec 2023.   She has had 4 falls this month.   She uses cane and walker. She has used an assistive device for at least 2 years.       She does have coughing and choking with eating and drinking. She thinks she does this about 3 times per week for the past month.       She feels her memory is getting worse. She has noted changes for the last year.    feels her memory is ok. He does not see big changes.   He says her immediate memory is not as good. Distant memory is good.   He does note that she seems to count on him a lot for things. He puts all of her pills together every week. She was doing it but not correctly. He took this over within the past year.     No [facility] tremor or Parkinson's disease.            Dr. Philippe note:   10/16/23:  Presents for routine follow-up regarding multiple neurological concerns.  Joined by her  during today's encounter.     Continues to have balance complaints.  We reviewed balance risk factors including the presence of neuropathy, documented issues with orthostatic hypotension, and her history of vertigo.  Gave several examples of falls over the last year most of which included times that the patient had bent over with head below the waist and lost balance.  Immediately reviewed safety recommendations as they relate to these issues including avoidance of this type of positioning.  Discussed cautious transitions between body positioning.     No change in tremor.  No tremor present at rest.  Exacerbated with hunger, heightened emotions.  Most notable when holding a cup or glass.  Fine motor tasks.  No episodes of freezing, changes in speech, problems swallowing, or other concerns reported.     History of stroke.  Continues on aspirin and atorvastatin daily.  No new complaints in this regard.     Continues follow with ENT regarding  peripheral vertigo history.  Has orders for vestibular rehab.  Recently had MRI IAC/temporal bone with no significant findings.  It was also notable that this imaging to be stable over previous scans as with only chronic microvascular changes outside of the known small-vessel strokes present in thalami.    12/30/22 HPI:   Ms. Tracy Armendariz is a 72 y.o. RH female presenting for evaluation of multiple neurological complaints.  Tremor in the right hand present for least the last year.  Waxes and wanes in intensity.  No significant impact in life or function but is most noticeable when using utensils or drinking from a cup.  History of stroke in December 2013.  Via patient and chart review, patient was believed to be having a large vessel ischemic event and received TPA.  During the workup, the patient received iodine and is allergic.  Had to be given Benadryl which then caused her to sleep for very long time, but no major deficits following this health event.  No underlying etiology for the stroke was ever discovered.  Had implanted heart monitor which was reviewed over an extended timeframe after this event.  Has continued on aspirin 81 mg and atorvastatin 40 mg daily since then.  MRI completed in 2016 showed no evidence of large vessel ischemic events.  It did identify small-vessel strokes in the bilateral thalami with some chronic microvascular ischemic changes  History of peripheral vertigo on the right.  Describes a prolonged time frame where she suffered with significant room spinning sensations after rapid head movements or turning the body too quickly.  Eventually went through vestibular rehab which drastically improved symptoms.  Then, she suffered with a sepsis event due to nephrolithiasis recently.  Since discharge from the hospital, the patient has felt the same off-balance sensation that she suffered with while being treated for vertigo in the past.  When extensive conversation regarding poor neuronal  "reserve and other relevant topics that could be contributing to this complaint.  Denies experiencing any episodes of dizziness with nausea/vomiting since discharge from hospital.  No recent falls.  Denies bowel/bladder incontinence.  No significant neuropathic pains in the feet.  Some mild cramping in the feet from time to time.  Review of Systems   Constitutional:  Negative for appetite change and unexpected weight change.   HENT:  Positive for hearing loss (wears hearing aids). Negative for drooling, trouble swallowing and voice change.         Smell "Lost it yesterday with this cold"- usually is good  Taste- usually good    Eyes:         'eyes tremor"  Double vision- since stroke 10 years- have prism in vision to help with this   Respiratory:  Positive for cough, choking and shortness of breath (recently since has cold).    Cardiovascular:  Negative for chest pain and leg swelling.   Gastrointestinal:  Positive for constipation (since started ozempic).   Genitourinary:  Negative for frequency and urgency.        No bladder control issues.    Musculoskeletal:  Positive for gait problem.   Neurological:  Positive for dizziness, tremors and syncope (maybe once in shower 4 mos ago- blamed this on low BP - stopped BP med and now things are good). Negative for seizures and speech difficulty.   Psychiatric/Behavioral:  Positive for dysphoric mood (longtstanding) and sleep disturbance ( says she does talk in sleep and will act out dreams). Negative for hallucinations. The patient is not nervous/anxious.        Past Medical History: The patient  has a past medical history of Allergy, Anemia, unspecified, Anticoagulant long-term use, Arthritis, Cerebral embolism without mention of cerebral infarction (08/25/2014), CVA (cerebral infarction) (2013), Depression, Disorder of kidney and ureter, Diverticulosis of colon, Extrinsic asthma, unspecified, Hematuria, unspecified, Hyperlipidemia, Hypertension, Kidney stone, Left " atrial enlargement (12/16/2014), Low back pain, Nephrolithiasis, MARTIN (obstructive sleep apnea), Osteopenia, PUD (peptic ulcer disease), Stroke (12/2013), and Urinary tract infection.    Social History: The patient  reports that she quit smoking about 29 years ago. Her smoking use included cigarettes. She started smoking about 58 years ago. She has a 43.5 pack-year smoking history. She has never used smokeless tobacco. She reports current alcohol use of about 1.0 standard drink of alcohol per week. She reports that she does not use drugs.    Family History: Their family history includes Breast cancer (age of onset: 36) in her daughter; Cancer in her daughter; Cancer (age of onset: 65) in her mother; Cervical cancer in her mother; Coronary artery disease (age of onset: 62) in her father; Depression in her brother and father; Diabetes in her sister; Heart disease in her father, maternal grandfather, and sister; Heart failure in her brother and sister; Hypertension in her father and maternal grandfather; Kidney disease in her sister; Lung cancer in her mother; No Known Problems in her son; Stroke in her paternal grandfather; Suicide in her brother.    Allergies: Iodinated contrast media, Gabapentin, Iodine, Isothiazolinones, and Penicillins     Meds:   Current Outpatient Medications on File Prior to Visit   Medication Sig Dispense Refill    aspirin (ECOTRIN) 81 MG EC tablet Take 81 mg by mouth once daily.      atorvastatin (LIPITOR) 40 MG tablet Take 1 tablet (40 mg total) by mouth every evening. 90 tablet 3    azelastine (ASTELIN) 137 mcg (0.1 %) nasal spray 1 spray (137 mcg total) by Nasal route 2 (two) times daily. 30 mL 11    buPROPion (WELLBUTRIN XL) 300 MG 24 hr tablet Take 1 tablet (300 mg total) by mouth once daily. 90 tablet 3    diclofenac sodium (VOLTAREN) 1 % Gel APPLY 2-4 GRAMS TO EACH PAINFUL AREA FOUR TIMES DAILY - MAX 32 GRAMS/ g 6    EScitalopram oxalate (LEXAPRO) 20 MG tablet Take 1 tablet (20  mg total) by mouth once daily. 90 tablet 3    esomeprazole (NEXIUM) 40 MG capsule Take 1 capsule (40 mg total) by mouth daily as needed (heartburn). 90 capsule 3    fluticasone propionate (FLONASE) 50 mcg/actuation nasal spray 2 sprays (100 mcg total) by Each Nostril route once daily. 16 g 11    magnesium oxide (MAG-OX) 400 mg (241.3 mg magnesium) tablet Take 1 tablet (400 mg total) by mouth 2 (two) times daily. 180 tablet 3    ondansetron (ZOFRAN-ODT) 4 MG TbDL Take 1 tablet (4 mg total) by mouth every 8 (eight) hours as needed (nausea). 20 tablet 2    potassium citrate (UROCIT-K 10) 10 mEq (1,080 mg) TbSR Take 2 tablets (20 mEq total) by mouth 3 (three) times daily with meals. 180 tablet 11    semaglutide (OZEMPIC) 2 mg/dose (8 mg/3 mL) PnIj Inject 2 mg into the skin every 7 days. 3 mL 5    vitamin D (VITAMIN D3) 1000 units Tab Take 1,000 Units by mouth 3 (three) times a week.      albuterol (PROVENTIL/VENTOLIN HFA) 90 mcg/actuation inhaler INHALE 2 PUFFS EVERY 6 HOURS AS NEEDED FOR WHEEZING 18 g 0    amoxicillin (AMOXIL) 500 MG capsule Take 4 capsules by mouth 1 hour prior to appointment 12 capsule 0    amoxicillin (AMOXIL) 500 MG capsule Take 4 capsules by mouth one hour before appointment 12 capsule 0    blood sugar diagnostic Strp To check BG 1 times daily, to use with insurance preferred meter 100 each 3    blood-glucose meter Misc To check BG 1 times daily, to use with insurance preferred meter 1 each 0    conjugated estrogens (PREMARIN) vaginal cream Place 0.5 g vaginally 3 (three) times a week. 30 g 0    lancets Misc To check BG 1 times daily, to use with insurance preferred meter 100 each 3    modafiniL (PROVIGIL) 200 MG Tab Take 1 tablet (200 mg total) by mouth daily as needed (sleepiness). 30 tablet 2    nitrofurantoin, macrocrystal-monohydrate, (MACROBID) 100 MG capsule Take 1 capsule (100 mg total) by mouth 2 (two) times daily. 10 capsule 0     Current Facility-Administered Medications on File Prior  to Visit   Medication Dose Route Frequency Provider Last Rate Last Admin    DOBUTamine 50 mg in D5W 50 mL (1 mg/mL) cardiac stress test infusion  10 mcg/kg/min Intravenous Continuous Madisyn Villalobos MD   Stopped at 07/23/24 1512    perflutren protein-A microsphr 0.22 mg/mL IV susp  0.5 mL Intravenous Once Evelio Barros MD           Objective:     Physical Exam:    Constitutional  Well-developed, well-nourished, appears stated age   Cardiovascular no LE edema bilaterally       No gaze palsy  No nystagmus  EOMI  ..  * Specialized movement exam  No hypophonic speech.    No facial masking.    Cogwheel rigidity:  Neck- normal  RUE and LUE- increases slightly on diverion  RLE- normal  LLE- near mild rigidity    Slight to near mild bradykinesia LH   BTT with mild joel.    Subtle rest tremor BH on diversion.  Slight to near mild postural tremor BH.  Slight intention tremor BH.  No kinetic tremor.     No apraxia.        No other dystonia, chorea, athetosis, myoclonus, or tics.       Arises without push.   No motor impersistence.  Bit broad based.  Slightly shortened stride length.   Reduced L arm swing.          RLE is longer than LLE          Imaging:   No results found. However, due to the size of the patient record, not all encounters were searched. Please check Results Review for a complete set of results.        Assessment and Plan     Parkinsonism, unspecified Parkinsonism type    History of CVA (cerebrovascular accident)  -     Ambulatory referral/consult to Neurology    Peripheral vertigo involving right ear  -     Ambulatory referral/consult to Neurology    Vestibular hypofunction, unspecified laterality  -     Ambulatory referral/consult to Neurology        Medical Decision Making:    Discussed pdism with them. Discussed trial of l-dopa. Would like to proceed.   Instructions given and explained.     Follow up 2-3 mos- VV      ..Total time: 55 minutes spent on the encounter, which includes face to face  time and non-face to face time preparing to see the patient (eg, review of tests), Obtaining and/or reviewing separately obtained history, Documenting clinical information in the electronic or other health record, Independently interpreting results (not separately reported) and communicating results to the patient/family/caregiver, or Care coordination (not separately reported).       ..      Liat Hartmann DNP, NP-C  Division of Movement and Memory Disorders  [facility]  [phone]

## 2024-10-22 NOTE — PATIENT INSTRUCTIONS
Please begin carbidopa/levodopa 25/100 as follows:      Week 1: Take 1/2 tab in AM and 1/2 tab at noon and 1/2 tab in PM  Week 2: Take 1 tab in AM and 1/2 tab at noon and 1/2 tab in PM  Week 3: Take 1 tab in AM and 1 tab at noon and 1/2 tab in PM  Week 4 and after: Take 1 tab in AM and 1 tab at noon and 1 tab in PM    If adequately improved, can stop and maintain at any level of the titration.

## 2024-10-30 ENCOUNTER — TELEPHONE (OUTPATIENT)
Dept: PODIATRY | Facility: CLINIC | Age: 74
End: 2024-10-30
Payer: MEDICARE

## 2024-11-07 ENCOUNTER — OFFICE VISIT (OUTPATIENT)
Dept: PODIATRY | Facility: CLINIC | Age: 74
End: 2024-11-07
Payer: MEDICARE

## 2024-11-07 VITALS — SYSTOLIC BLOOD PRESSURE: 137 MMHG | DIASTOLIC BLOOD PRESSURE: 82 MMHG | HEART RATE: 67 BPM

## 2024-11-07 DIAGNOSIS — L60.0 INGROWN NAIL: Primary | ICD-10-CM

## 2024-11-07 PROCEDURE — 1159F MED LIST DOCD IN RCRD: CPT | Mod: CPTII,S$GLB,, | Performed by: STUDENT IN AN ORGANIZED HEALTH CARE EDUCATION/TRAINING PROGRAM

## 2024-11-07 PROCEDURE — 99999 PR PBB SHADOW E&M-EST. PATIENT-LVL III: CPT | Mod: PBBFAC,,, | Performed by: STUDENT IN AN ORGANIZED HEALTH CARE EDUCATION/TRAINING PROGRAM

## 2024-11-07 PROCEDURE — 3060F POS MICROALBUMINURIA REV: CPT | Mod: CPTII,S$GLB,, | Performed by: STUDENT IN AN ORGANIZED HEALTH CARE EDUCATION/TRAINING PROGRAM

## 2024-11-07 PROCEDURE — 1125F AMNT PAIN NOTED PAIN PRSNT: CPT | Mod: CPTII,S$GLB,, | Performed by: STUDENT IN AN ORGANIZED HEALTH CARE EDUCATION/TRAINING PROGRAM

## 2024-11-07 PROCEDURE — 3079F DIAST BP 80-89 MM HG: CPT | Mod: CPTII,S$GLB,, | Performed by: STUDENT IN AN ORGANIZED HEALTH CARE EDUCATION/TRAINING PROGRAM

## 2024-11-07 PROCEDURE — 4010F ACE/ARB THERAPY RXD/TAKEN: CPT | Mod: CPTII,S$GLB,, | Performed by: STUDENT IN AN ORGANIZED HEALTH CARE EDUCATION/TRAINING PROGRAM

## 2024-11-07 PROCEDURE — 3066F NEPHROPATHY DOC TX: CPT | Mod: CPTII,S$GLB,, | Performed by: STUDENT IN AN ORGANIZED HEALTH CARE EDUCATION/TRAINING PROGRAM

## 2024-11-07 PROCEDURE — 3044F HG A1C LEVEL LT 7.0%: CPT | Mod: CPTII,S$GLB,, | Performed by: STUDENT IN AN ORGANIZED HEALTH CARE EDUCATION/TRAINING PROGRAM

## 2024-11-07 PROCEDURE — 3075F SYST BP GE 130 - 139MM HG: CPT | Mod: CPTII,S$GLB,, | Performed by: STUDENT IN AN ORGANIZED HEALTH CARE EDUCATION/TRAINING PROGRAM

## 2024-11-07 PROCEDURE — 99203 OFFICE O/P NEW LOW 30 MIN: CPT | Mod: S$GLB,,, | Performed by: STUDENT IN AN ORGANIZED HEALTH CARE EDUCATION/TRAINING PROGRAM

## 2024-11-07 RX ORDER — MUPIROCIN 20 MG/G
OINTMENT TOPICAL 3 TIMES DAILY
Qty: 22 G | Refills: 0 | Status: SHIPPED | OUTPATIENT
Start: 2024-11-07

## 2024-11-07 NOTE — PROGRESS NOTES
Subjective:      Patient ID: Hammad Douglas III is a 63 y.o. male.    Chief Complaint: Post-op Evaluation  Patient presents to clinic 1 week S/P amputation of the Rt. 3rd toe.  Denies pain from the amputation site with today's exam.  Has kept the surgical bandage clean, dry, and intact x 1 week.  Relates consistent elevation and rest of the surgical extremity.  Notes utilizing crutches for transfers along with light pressure applied to the heel.  Denies having chest pain, SOB, and calf/thigh pain.  Denies having N/V/F/C/D.  Denies any additional pedal complaints.    Past Medical History:   Diagnosis Date    Cataract     Chronic kidney disease     Diabetes mellitus type I     Diagnosed at age 17    Diabetic retinopathy     See's Dr. Ledesma    Hyperlipidemia     Hypertension        Past Surgical History:   Procedure Laterality Date    COLONOSCOPY      2011 wnl    TOE AMPUTATION Right 7/2/2020    Procedure: AMPUTATION, TOE; 3rd;  Surgeon: Caleb Duffy DPM;  Location: [facility];  Service: Podiatry;  Laterality: Right;    TOE SURGERY Right     right big toe       Family History   Problem Relation Age of Onset    Cataracts Mother     Breast cancer Sister        Social History     Socioeconomic History    Marital status:      Spouse name: Not on file    Number of children: Not on file    Years of education: Not on file    Highest education level: Not on file   Occupational History    Not on file   Social Needs    Financial resource strain: Not on file    Food insecurity     Worry: Not on file     Inability: Not on file    Transportation needs     Medical: Not on file     Non-medical: Not on file   Tobacco Use    Smoking status: Former Smoker     Types: Cigars    Smokeless tobacco: Never Used    Tobacco comment: Former cigar use   Substance and Sexual Activity    Alcohol use: Yes     Alcohol/week: 2.0 standard drinks     Types: 2 Cans of beer per week    Drug use: No    Sexual activity: Yes      Subjective:     Patient ID: Tracy Armendariz is a 73 y.o. female.    Chief Complaint: Nail Problem (Right great toenail, ingrown, is bothering her) and Toe Pain    Tracy is a 73 y.o. female who presents to the clinic complaining of painful ingrown toenail on the right foot, big toe, points to inside border.    Review of Systems   Constitutional: Negative for chills, decreased appetite, diaphoresis and fever.   HENT:  Negative for congestion and hearing loss.    Cardiovascular:  Negative for chest pain, claudication, leg swelling and syncope.   Respiratory:  Negative for cough and shortness of breath.    Skin:  Positive for dry skin and nail changes. Negative for color change, flushing, itching, poor wound healing and rash.   Musculoskeletal:  Positive for arthritis and joint pain.   Gastrointestinal:  Negative for nausea and vomiting.   Neurological:  Negative for paresthesias and weakness.   Psychiatric/Behavioral:  Negative for altered mental status. The patient is not nervous/anxious.         Objective:     Physical Exam  Constitutional:       General: She is not in acute distress.     Appearance: She is well-developed. She is not diaphoretic.   Cardiovascular:      Comments: Dorsalis pedis and posterior tibial pulses are within normal limits. Skin temperature is within normal limits. Toes are cool to touch and feet are warm proximally. Hair growth is diminished. Skin is mildly atrophic and with mild hyperpigmentation. Mild edema noted, bilaterally. Telangiectasias bilaterally.  Musculoskeletal:      Comments: Adequate joint range of motion without pain, limitation, nor crepitation to bilateral feet and ankle joints. Muscle strength is 5/5 in all groups bilaterally.       Lymphadenopathy:      Comments: Negative lymphangitic streaking    Skin:     General: Skin is warm and dry.      Findings: No lesion.      Comments: Skin is warm and dry, no acute signs of infection noted. No open wounds, macerations or  Partners: Female   Lifestyle    Physical activity     Days per week: Not on file     Minutes per session: Not on file    Stress: Not on file   Relationships    Social connections     Talks on phone: Not on file     Gets together: Not on file     Attends [employer] service: Not on file     Active member of club or organization: Not on file     Attends meetings of clubs or organizations: Not on file     Relationship status: Not on file   Other Topics Concern    Not on file   Social History Narrative    Retired former radiation  at [employer]       Current Outpatient Medications   Medication Sig Dispense Refill    atorvastatin (LIPITOR) 40 MG tablet TAKE 1 TABLET(40 MG) BY MOUTH EVERY DAY 90 tablet 4    blood-glucose sensor (DEXCOM G6 SENSOR) Jenni Change sensor every 10 days. 9 Device 3    blood-glucose transmitter (DEXCOM G6 TRANSMITTER) Jenni For continuous blood glucose monitoring. Change device every 90 days. 1 Device 3    FLUoxetine 20 MG capsule TAKE 1 CAPSULE(20 MG) BY MOUTH EVERY DAY 90 capsule 3    GLUCAGEN HYPOKIT 1 mg SolR Inject 1 mg into the muscle as needed (for severe hypoglycemia). 1 each 0    HYDROcodone-acetaminophen (NORCO) 5-325 mg per tablet Take 1 tablet by mouth every 6 (six) hours as needed for Pain. 28 tablet 0    insulin aspart U-100 (NOVOLOG FLEXPEN U-100 INSULIN) 100 unit/mL (3 mL) InPn pen INJECT USING INSULIN:CARB RATIO OF 1:6 SUBCUTANEOUSLY PRIOR TO MEALS. MAX TDD 40 UNITS 3 Box 3    insulin glargine U-300 conc (TOUJEO MAX U-300 SOLOSTAR) 300 unit/mL (3 mL) InPn Inject 24 Units into the skin every evening. (Patient taking differently: Inject 28 Units into the skin every evening. ) 4.5 mL 6    insulin lispro (HUMALOG KWIKPEN INSULIN) 100 unit/mL pen INJECT USING INSULIN:CARB RATIO OF 1:6 SUBCUTANEOUSLY PRIOR TO MEALS. MAX TDD 40 UNITS 3 Box 3    losartan (COZAAR) 100 MG tablet TAKE 1 TABLET(100 MG) BY MOUTH EVERY DAY 90 tablet 3    multivitamin (ONE DAILY  hyperkeratotic lesions, bilaterally.     Toenails are painted and of normal morphology    Left hallux toenail with distal cryptotic border, no associated open wounds or signs of infection     Neurological:      Mental Status: She is alert and oriented to person, place, and time.      Motor: No abnormal muscle tone.      Comments: Light touch within normal limits.    Psychiatric:         Behavior: Behavior normal.         Thought Content: Thought content normal.         Judgment: Judgment normal.           Assessment:      Encounter Diagnosis   Name Primary?    Ingrown nail Yes     Plan:     Tracy was seen today for nail problem and toe pain.    Diagnoses and all orders for this visit:    Ingrown nail    Other orders  -     mupirocin (BACTROBAN) 2 % ointment; Apply topically 3 (three) times daily.      I counseled the patient on her conditions, their implications and medical management.  Utilizing sterile toenail clippers I aggressively trimmed  the offending above mentioned  nail border approximately 3 mm from its edge and carried the nail plate incision down at an angle in order to wedge out the offending cryptotic portion of the nail plate. The offending border was then removed in toto. No blood was drawn. Patient tolerated the procedure well and related significant relief. Site dressed with antibiotic ointment and bandaid, to change same until symptoms resolve.   Return to clinic PRN       "MULTIVITAMIN) per tablet Take 1 tablet by mouth once daily.      pen needle, diabetic (PEN NEEDLE) 31 gauge x 1/4" Ndle 1 each by Misc.(Non-Drug; Combo Route) route 4 (four) times daily. 400 each 3    blood sugar diagnostic (ONETOUCH ULTRA TEST) Strp 1 strip by Other route 4 (four) times daily. (Patient not taking: Reported on 6/15/2020) 400 strip 3     Current Facility-Administered Medications   Medication Dose Route Frequency Provider Last Rate Last Dose    glucagon (human recombinant) injection 1 mg  1 mg Intramuscular Once Tania Mckeon NP         Facility-Administered Medications Ordered in Other Visits   Medication Dose Route Frequency Provider Last Rate Last Dose    electrolyte-S (ISOLYTE)   Intravenous Continuous Jostin Resendiz MD        lactated ringers infusion   Intravenous Continuous Jostin Resendiz MD 10 mL/hr at 07/02/20 1232      lidocaine (PF) 10 mg/ml (1%) injection 10 mg  1 mL Intradermal Once Jostin Resendiz MD           Review of patient's allergies indicates:   Allergen Reactions    Altace [ramipril]      Cough         Review of Systems   Constitution: Negative for chills and fever.   Cardiovascular: Negative for claudication and leg swelling.   Skin: Negative for color change, dry skin and poor wound healing.   Musculoskeletal: Negative for joint pain, joint swelling, muscle cramps and muscle weakness.   Neurological: Positive for numbness.   Psychiatric/Behavioral: Negative for altered mental status.           Objective:      Physical Exam   Constitutional: He appears well-developed and well-nourished. No distress.   Cardiovascular:   Pulses:       Dorsalis pedis pulses are 2+ on the right side, and 2+ on the left side.        Posterior tibial pulses are 1+ on the right side, and 1+ on the left side.   CFT is < 3 seconds bilateral.  Pedal hair growth is present bilateral.  Varicosities noted bilateral.  No lower extremity edema noted bilateral.  Toes are warm to touch " bilateral.     Musculoskeletal: He exhibits no edema or tenderness.   Muscle strength 5/5 in all muscle groups bilateral.  No tenderness nor crepitation with ROM of foot/ankle joints bilateral.  No tenderness with palpation of bilateral foot and ankle.  Semi-reducible contracture of the lesser digits bilateral.     Neurological: He has normal strength. A sensory deficit is present.   Protective sensation per Gonvick-Jayson monofilament is decreased bilateral.  Light touch is intact bilateral.   Skin: Skin is warm and dry. Capillary refill takes less than 2 seconds. Lesion noted. No abrasion, no burn, no ecchymosis, no laceration and no petechiae noted. He is not diaphoretic.   Incision site from the Rt. 3rd toe amputation is well approximated with all suture intact.  There is no evidence of ischemia, dehiscence, necrosis, or infection the length of the incision.  Small hematoma was expressed from the central most portion of the incision.    Fragile epithelium noted to former wound site of the Rt. Medial hallux.  No localized sign of infection noted.                 Assessment:       Encounter Diagnoses   Name Primary?    Postoperative state Yes    Type 1 diabetes mellitus with polyneuropathy          Plan:       Hammad was seen today for post-op evaluation.    Diagnoses and all orders for this visit:    Postoperative state    Type 1 diabetes mellitus with polyneuropathy      I counseled the patient on his conditions, their implications and medical management.    Overall, satisfactory postoperative results noted.  Incision site is well maintained and devoid of postoperative infection.  Evacuated 0.1 mL of clotted blood from the central most portion of the incision without incident.    The incision site was covered with xeroform gauze, and a football dressing was applied.    Advised to minimize weight bearing activity to facilitate healing at the incision site.    Patient to apply most of his weight to the heel of  the surgical extremity while in the postoperative shoe.    To keep today's dressing clean, dry, and intact x 1 week.    RTC in 1 week for 2nd postop check.    Caleb Duffy DPM

## 2024-11-19 ENCOUNTER — LAB VISIT (OUTPATIENT)
Dept: LAB | Facility: HOSPITAL | Age: 74
End: 2024-11-19
Attending: INTERNAL MEDICINE
Payer: MEDICARE

## 2024-11-19 DIAGNOSIS — N39.0 UTI (URINARY TRACT INFECTION), UNCOMPLICATED: ICD-10-CM

## 2024-11-19 LAB
BILIRUB UR QL STRIP: NEGATIVE
CLARITY UR: ABNORMAL
COLOR UR: YELLOW
GLUCOSE UR QL STRIP: NEGATIVE
HGB UR QL STRIP: NEGATIVE
KETONES UR QL STRIP: NEGATIVE
LEUKOCYTE ESTERASE UR QL STRIP: NEGATIVE
NITRITE UR QL STRIP: NEGATIVE
PH UR STRIP: 8 [PH] (ref 5–8)
PROT UR QL STRIP: NEGATIVE
SP GR UR STRIP: 1.01 (ref 1–1.03)
URN SPEC COLLECT METH UR: ABNORMAL
UROBILINOGEN UR STRIP-ACNC: NEGATIVE EU/DL

## 2024-11-19 PROCEDURE — 81003 URINALYSIS AUTO W/O SCOPE: CPT | Performed by: INTERNAL MEDICINE

## 2024-11-25 NOTE — PROGRESS NOTES
"Date:  11/27/2024    ?  Referring Provider:   Patel Cintron MD    Copies of Letters to the Following:   Patel Cintron MD    Chief Complaint:  I saw Tracy Armendariz at the [facility] for neuro-ophthalmic evaluation.   She is a 74 y.o. female with a history of HTN, HLD, left atrial enlargement, CKD, ureteral stone, obesity, T2DM, MARTIN, exotropia, Parkinsonism, bilateral thalamic and midbrain strokes, who presents for evaluation of difficulty with reading, suspected nystagmus.    History:     HPI    Referred: Abdulaziz     73 y/o female present to Neuro-opthalmic evaluation for history of CVA.   She reports both eyes "jumps' and she feels imbalance. She has been   falling more. She was recently d/x  with parkinson. She has fresnel prism   on left eye, does not help with diplopia. She has daily headaches, taking   Tylenol. She has floaters. No flashes of lights. She has dry eyes. No   ocular s/x or procedures reported.     Eyemeds  [facility] PRN   Last edited by Gloria Mann on 11/27/2024  1:46 PM.          10/22/2024 marlin (neuro):  "Tracy Armendariz is a 73 y.o. RH female with personal hx stroke (asa and statin), vertigo (follows ENT) who arrives more than 20 min late today for an initial evaluation of Tremor and is accompanied by . Visit will be focused. She has prev seen Dr Philippe for c/o neuropathy, hx stroke, vertigo and issues with OHN.      Tremor 18 mos ago by  account.   Started in -  worse than .  It is getting worse over time.  Feel shaky inside.   He does not note tremor anywhere other than hands.   Can't read because tremors are in my eyes. Seeing neuroophtamolgist  Can't write anymore. Hands so shaky. It is legible. They are not sure if the writing is smaller or bigger. It just illegible. 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  She is able to feed self. Sometimes wear it more than it gets in her mouth.   No trouble with drinking, other than has to use " "BH.  Does have trouble with buttons and earrings. Sometimes  has to assist. He has had to help for about 6 mos or so."  ?  8/26/2024 Abdulaziz:    "Referred by Dr. Liriano      Ms. Armendariz is a 73 year old female being seen today for diplopia. She has   a history of a thalamic stroke in 2013. She saw Dr. Nicole from 2015 -   2019 and held a diagnosis of "NICK" and latera "Parinaud syndrome". She had   an XT and was treated with base in prism in glasses.   Today, she has multiple concerns about her eyes. She reports her eyes   shake. She also reports she has new tremors all over her body. She reports   she sometimes has double vision but this is not her primary concern. She   says she has trouble reading, and writing and she can't always understand   written words.   She does not wear prism in her current glasses    "    5/2019 Corey  "Patient here for overdue check of Parinaud's Syndrome.  Pt states experiencing focusing problems and double images. Notice if she   looks at two images one seem to be moving up or down.  Taking steroids for back issues, so now eyes and head feels better.     No eye pain.-Headaches occasionally.  Ms. Armendariz feels more comfortable with an additional 2 diopters of base in prism. I prescribed a 2 diopter base in Fresnel prism for her left eye. If the prism works, she will replace her old eyeglasses.   "    Current Outpatient Medications   Medication Sig Dispense Refill    albuterol (PROVENTIL/VENTOLIN HFA) 90 mcg/actuation inhaler INHALE 2 PUFFS EVERY 6 HOURS AS NEEDED FOR WHEEZING 18 g 0    amoxicillin (AMOXIL) 500 MG capsule Take 4 capsules by mouth 1 hour prior to appointment 12 capsule 0    amoxicillin (AMOXIL) 500 MG capsule Take 4 capsules by mouth one hour before appointment 12 capsule 0    aspirin (ECOTRIN) 81 MG EC tablet Take 81 mg by mouth once daily.      atorvastatin (LIPITOR) 40 MG tablet Take 1 tablet (40 mg total) by mouth every evening. 90 tablet 3    azelastine " (ASTELIN) 137 mcg (0.1 %) nasal spray 1 spray (137 mcg total) by Nasal route 2 (two) times daily. 30 mL 11    blood sugar diagnostic Strp To check BG 1 times daily, to use with insurance preferred meter 100 each 3    blood-glucose meter Misc To check BG 1 times daily, to use with insurance preferred meter 1 each 0    buPROPion (WELLBUTRIN XL) 300 MG 24 hr tablet Take 1 tablet (300 mg total) by mouth once daily. 90 tablet 3    carbidopa-levodopa  mg (SINEMET)  mg per tablet Take 1 tablet by mouth 3 (three) times daily. 90 tablet 5    conjugated estrogens (PREMARIN) vaginal cream Place 0.5 g vaginally 3 (three) times a week. 30 g 0    diclofenac sodium (VOLTAREN) 1 % Gel APPLY 2-4 GRAMS TO EACH PAINFUL AREA FOUR TIMES DAILY - MAX 32 GRAMS/ g 6    EScitalopram oxalate (LEXAPRO) 20 MG tablet Take 1 tablet (20 mg total) by mouth once daily. 90 tablet 3    esomeprazole (NEXIUM) 40 MG capsule Take 1 capsule (40 mg total) by mouth daily as needed (heartburn). 90 capsule 3    fluticasone propionate (FLONASE) 50 mcg/actuation nasal spray 2 sprays (100 mcg total) by Each Nostril route once daily. 16 g 11    lancets Misc To check BG 1 times daily, to use with insurance preferred meter 100 each 3    magnesium oxide (MAG-OX) 400 mg (241.3 mg magnesium) tablet Take 1 tablet (400 mg total) by mouth 2 (two) times daily. 180 tablet 3    modafiniL (PROVIGIL) 200 MG Tab Take 1 tablet (200 mg total) by mouth daily as needed (sleepiness). 30 tablet 2    mupirocin (BACTROBAN) 2 % ointment Apply topically 3 (three) times daily. 22 g 0    nitrofurantoin, macrocrystal-monohydrate, (MACROBID) 100 MG capsule Take 1 capsule (100 mg total) by mouth 2 (two) times daily. 10 capsule 0    ondansetron (ZOFRAN-ODT) 4 MG TbDL Take 1 tablet (4 mg total) by mouth every 8 (eight) hours as needed (nausea). 20 tablet 2    potassium citrate (UROCIT-K 10) 10 mEq (1,080 mg) TbSR Take 2 tablets (20 mEq total) by mouth 3 (three) times daily  with meals. 180 tablet 11    semaglutide (OZEMPIC) 2 mg/dose (8 mg/3 mL) PnIj Inject 2 mg into the skin every 7 days. 3 mL 5    vitamin D (VITAMIN D3) 1000 units Tab Take 1,000 Units by mouth 3 (three) times a week.       Current Facility-Administered Medications   Medication Dose Route Frequency Provider Last Rate Last Admin    DOBUTamine 50 mg in D5W 50 mL (1 mg/mL) cardiac stress test infusion  10 mcg/kg/min Intravenous Continuous Madisyn Villalobos MD   Stopped at 24 1512    perflutren protein-A microsphr 0.22 mg/mL IV susp  0.5 mL Intravenous Once Evelio Barros MD         Review of patient's allergies indicates:   Allergen Reactions    Iodinated contrast media Hives and Rash    Gabapentin Hallucinations    Iodine Hives    Isothiazolinones Rash    Penicillins Rash     Past Medical History:   Diagnosis Date    Allergy     Anemia, unspecified     Anticoagulant long-term use     Arthritis     Cerebral embolism without mention of cerebral infarction 2014    Dx updated per 2019 IMO Load      CVA (cerebral infarction)     Depression     Disorder of kidney and ureter     renal stones    Diverticulosis of colon     Extrinsic asthma, unspecified     Hematuria, unspecified     Hyperlipidemia     Hypertension     Kidney stone     Left atrial enlargement 2014    Low back pain     Nephrolithiasis     MARTIN (obstructive sleep apnea)     Osteopenia     PUD (peptic ulcer disease)     Stroke 2013    Urinary tract infection      Past Surgical History:   Procedure Laterality Date    APPENDECTOMY      @ time of hysterectomy    BACK SURGERY      CATARACT EXTRACTION       SECTION      CHOLECYSTECTOMY      laparoscopic    COLONOSCOPY N/A 2018    Procedure: COLONOSCOPY/Golytely;  Surgeon: Janine Black MD;  Location: [facility];  Service: Endoscopy;  Laterality: N/A;    CYSTOSCOPY W/ RETROGRADES  2022    Procedure: CYSTOSCOPY, WITH RETROGRADE PYELOGRAM;  Surgeon: Mitchell  MD Grisel;  Location: [facility];  Service: Urology;;    CYSTOSCOPY W/ URETERAL STENT PLACEMENT Left 12/11/2022    Procedure: CYSTOSCOPY, WITH URETERAL STENT INSERTION;  Surgeon: Mitchell Recinos MD;  Location: [facility] OR;  Service: Urology;  Laterality: Left;    CYSTOURETEROSCOPY, WITH HOLMIUM LASER LITHOTRIPSY OF URETERAL CALCULUS AND STENT INSERTION Left 12/21/2022    Procedure: left ureteroscopy, holmium laser lithotripsy, stone basketing, retrograde pyelogram, stent placement;  Surgeon: Anaya Zamora MD;  Location: [facility] OR;  Service: Urology;  Laterality: Left;    DILATION AND CURETTAGE OF UTERUS  1972    EXTRACTION - STONE Left 12/21/2022    Procedure: EXTRACTION - STONE;  Surgeon: Anaya Zamora MD;  Location: [facility];  Service: Urology;  Laterality: Left;    HYSTERECTOMY  1978    TAHUSO with appendectomy    INNER EAR SURGERY      replaced ear drum    JOINT REPLACEMENT Right     knee    KNEE ARTHROSCOPY W/ DEBRIDEMENT  2003    LUMBAR DISCECTOMY  1980    L4-L5    OOPHORECTOMY      unilateral    RETROGRADE PYELOGRAPHY  12/21/2022    Procedure: PYELOGRAM, RETROGRADE;  Surgeon: Anaya Zamora MD;  Location: [facility];  Service: Urology;;    TONSILLECTOMY      TYMPANOPLASTY      URETEROSCOPIC REMOVAL OF URETERIC CALCULUS Left 12/19/2018    Procedure: REMOVAL, CALCULUS, URETER, URETEROSCOPIC, holmium laser lithotripsy, stone basket extraction, retrograde pyelogram, ureteral stent exchange;  Surgeon: Anaya Zamora MD;  Location: [facility];  Service: Urology;  Laterality: Left;     Family History   Problem Relation Name Age of Onset    Cervical cancer Mother      Cancer Mother  65        lung cancer - non smoker    Lung cancer Mother      Depression Father      Hypertension Father      Coronary artery disease Father  62    Heart disease Father      Heart failure Sister x1     Diabetes Sister x1     Heart disease Sister x1     Kidney disease Sister x1     Depression Brother x2     Suicide Brother x2     Heart failure Brother  x2     Cancer Daughter x2     Breast cancer Daughter x2 36    No Known Problems Son x1     Hypertension Maternal Grandfather      Heart disease Maternal Grandfather      Stroke Paternal Grandfather      Colon cancer Neg Hx      Ovarian cancer Neg Hx       Social History     Socioeconomic History    Marital status:    Tobacco Use    Smoking status: Former     Current packs/day: 0.00     Average packs/day: 1.5 packs/day for 29.0 years (43.5 ttl pk-yrs)     Types: Cigarettes     Start date:      Quit date: 1995     Years since quittin.9    Smokeless tobacco: Never   Substance and Sexual Activity    Alcohol use: Yes     Alcohol/week: 1.0 standard drink of alcohol     Types: 1 Glasses of wine per week     Comment: social    Drug use: Never    Sexual activity: Yes     Partners: Male     Birth control/protection: Surgical     Comment:  since      Social Drivers of Health     Financial Resource Strain: Low Risk  (2024)    Overall Financial Resource Strain (CARDIA)     Difficulty of Paying Living Expenses: Not hard at all   Food Insecurity: No Food Insecurity (2024)    Hunger Vital Sign     Worried About Running Out of Food in the Last Year: Never true     Ran Out of Food in the Last Year: Never true   Transportation Needs: No Transportation Needs (2024)    PRAPARE - Transportation     Lack of Transportation (Medical): No     Lack of Transportation (Non-Medical): No   Physical Activity: Inactive (2024)    Exercise Vital Sign     Days of Exercise per Week: 0 days     Minutes of Exercise per Session: 0 min   Stress: No Stress Concern Present (2024)    [employer] - Occupational Stress Questionnaire     Feeling of Stress : Only a little   Housing Stability: Low Risk  (2024)    Housing Stability Vital Sign     Unable to Pay for Housing in the Last Year: No     Homeless in the Last Year: No       Examination:  She was well-appearing. She was  alert and oriented. Attention span and concentration were normal. Speech, language, memory, and general knowledge were intact.      Her distance visual acuity with correction was 20/40  (PH 20/30) in the right eye and 20/80  (NIPH)  in the left eye.  Her near visual acuity with correction was J5 PH NI in the right eye and J5 PH NI in the left eye.     She perceived 8/8 OD and 8/8 OS Ishihara color plates correctly. Pupils were brisk to light without an afferent defect. Ocular ductions were full but there was notable infraduction of right eye on attempted adduction and exo of the left eye on attempted infraduction. There was no nystagmus.  Lids were symmetric.     Sensorimotor Examination    Cross cover at distance  Primary 14XT  Left large XT and large LHT  Right 6XT, variable    Cross cover at near  Unable, too difficult    NPC  15-20 cm      Laboratories Reviewed:     N/a  ?  Neuroimaging Reviewed:     10/10/2023 MRI IAC w/wo contrast  Inner ears/IACs/:  Normal appearance of the bilateral 7th/8th cranial nerve bundles with no mass or abnormal enhancement. Membranous labyrinthine structures are normal.     Remainder of the Intracranial Compartment:     Ventricles and sulci are normal in size for age without evidence of hydrocephalus. No extra-axial blood or fluid collections.     Punctate and confluent T2 signal hyperintensity in the periventricular white matter suggesting benign white matter changes of aging and or chronic microvascular ischemia.  Brain parenchyma otherwise unremarkable.  No mass lesion, acute hemorrhage, edema, or acute infarct. No abnormal enhancement.     Normal vascular flow voids are preserved.     Skull/Extracranial Contents (limited evaluation): Bone marrow signal intensity is normal.     Impression:     No acute intracranial abnormality.  Specifically no abnormality involving the 7th and 8th cranial nerves     Periventricular white matter changes of aging and or chronic microvascular  ischemia.      3/2016 MRI brain wo contrast  The brain exhibits normal contour and morphology.  The ventricular system is stable in size and configuration without evidence of hydrocephalus or distortion by mass effect.  No abnormal areas of restricted diffusion are identified to suggest recent infarction.  No abnormal areas of susceptibility are identified to suggest parenchymal hemorrhage.  No abnormal intra-or extra-axial fluid collections are identified.  Small remote infarcts again noted within the medial aspects of the thalami bilaterally.  A few scattered foci of increased T2/FLAIR signal identified within the supratentorial white matter which are nonspecific but suggestive of sequela of mild degree of chronic microvascular ischemic change.     There are postsurgical changes of the bilateral lenses.  The globes and orbits are otherwise unremarkable.  There is mild fluid opacification of the left ethmoid air cells.  The remaining paranasal sinuses are clear.  There is fluid within the bilateral mastoid air cells. The major intracranial T2 flow-voids are present.  The calvarium is intact.  IMPRESSION:         1.  No acute intracranial abnormality identified, specifically no evidence of recent infarction or parenchymal hemorrhage.     2.  Remote infarcts noted within the medial aspects of the thalami bilaterally.     3. Left ethmoid sinus disease, and fluid within the mastoid air cells.  ______________________________________      12/2013 MRI brain wo contrast  Impression   Symmetrical small medial bilateral thalamic and central mid brain signal abnormality most compatible with acute/recent infarction configuration primarily concerning for artery Percheron occlusion.  Clinical correlation and follow-up advised.    Questionable bilateral globe proptosis clinical correlation advised.    12/2013 MRA brain wo contrast  Impression   Unremarkable MRA of the head specifically without evidence for focal stenosis or  intracranial aneurysm.   ?  Ocular Imaging, Photos, Records Reviewed:     OCT RNFL Today 11/27/2024:   Right Eye - Average RNFL 93 all segments normal   Left Eye - Average RNFL 92 all segments normal     Normal macular architecture OU    Visual Field Test 24-2 OU Today 11/27/2024: Right Eye - fixation losses 2/11, false positives 9%, false negatives 13%, MD -3.29dB, Impression OD: some rim artifact and mild nonspecific changes. Left Eye - fixation losses 0/12, false positives 0%, false negatives 4%, MD -6.07dB, Impression OS: mild generalized depression.  ?  Impression:  Tracy Armendariz has history of HTN, HLD, left atrial enlargement, CKD, ureteral stone, obesity, T2DM, MARTIN, exotropia, Parkinsonism, bilateral thalamic and midbrain strokes, who presents for evaluation of difficulties with vision. She reports that since her stroke in 2013 she has difficulty understanding what she wrote as well as issues with thinking one number but unintentionally writing another number. She has had chronic issues with diplopia since that stroke as well, which she thinks she compensates for well at distance with head turns. She recently tried 10 BI Fresnel prism on her progressives and has not found it helpful. More recently she began to notice increased falls, tremors, and oscillopsia which have seemed to respond well to levodopa in the recent weeks. Neuro-ophthalmologic examination was notable for reduced visual acuities, full color vision, evidence of WEBINO, SKEW deviation, and convergence insufficiency. OCT with normal and symmetric peripapillary and macular RNFL thicknesses. Formal visual fields were notable for mild nonspecific changes OD and OS.    To optimize her vision, I recommended separate updated prescription glasses for distance and reading, likely with base in prism for reading only as she compensates for exotropia at distance well. She should continue aggressive ocular lubrication as well.  ?  Plan:  1.   Nadira for glasses, see above recs  2. Follow up with Liat Mary Kate as planned on response to levodopa!  3. Ocular lubrication (retaine PF currently)    Follow-up:  I will see her in follow-up on an as needed basis.    ?  ?  Visit Checklist (as applicable):  1. Status of new and prior symptoms discussed? yes  2. Neuroimaging reviewed/ ordered as appropriate? yes  3. Ocular imaging and photos reviewed/ ordered as appropriate? yes  4. Plan for work-up and treatment discussed with patient? yes  5. Potential medication side-effects and monitoring plan discussed? yes  6. Review of outside medical records was performed and pertinent details are summarized in the HPI above? N/a    Time spent on this encounter: 45 minutes. This includes face to face time and non-face to face time preparing to see the patient (eg, review of tests), obtaining and/or reviewing separately obtained history, documenting clinical information in the electronic or other health record, independently interpreting results and communicating results to the patient/family/caregiver, or care coordinator.      SOPHIE Rey  Neuro-Ophthalmology Consultant

## 2024-11-27 ENCOUNTER — CLINICAL SUPPORT (OUTPATIENT)
Dept: OPHTHALMOLOGY | Facility: CLINIC | Age: 74
End: 2024-11-27
Payer: MEDICARE

## 2024-11-27 ENCOUNTER — TELEPHONE (OUTPATIENT)
Dept: OPTOMETRY | Facility: CLINIC | Age: 74
End: 2024-11-27
Payer: MEDICARE

## 2024-11-27 ENCOUNTER — OFFICE VISIT (OUTPATIENT)
Dept: OPHTHALMOLOGY | Facility: CLINIC | Age: 74
End: 2024-11-27
Payer: MEDICARE

## 2024-11-27 DIAGNOSIS — H53.15 VISUAL DISTORTIONS OF SHAPE AND SIZE: Primary | ICD-10-CM

## 2024-11-27 DIAGNOSIS — H53.2 DIPLOPIA: ICD-10-CM

## 2024-11-27 DIAGNOSIS — H50.10 EXOTROPIA OF BOTH EYES: ICD-10-CM

## 2024-11-27 PROCEDURE — 92083 EXTENDED VISUAL FIELD XM: CPT | Mod: S$GLB,,, | Performed by: STUDENT IN AN ORGANIZED HEALTH CARE EDUCATION/TRAINING PROGRAM

## 2024-11-27 PROCEDURE — 4010F ACE/ARB THERAPY RXD/TAKEN: CPT | Mod: CPTII,S$GLB,, | Performed by: STUDENT IN AN ORGANIZED HEALTH CARE EDUCATION/TRAINING PROGRAM

## 2024-11-27 PROCEDURE — 1126F AMNT PAIN NOTED NONE PRSNT: CPT | Mod: CPTII,S$GLB,, | Performed by: STUDENT IN AN ORGANIZED HEALTH CARE EDUCATION/TRAINING PROGRAM

## 2024-11-27 PROCEDURE — 99999 PR PBB SHADOW E&M-EST. PATIENT-LVL III: CPT | Mod: PBBFAC,,, | Performed by: STUDENT IN AN ORGANIZED HEALTH CARE EDUCATION/TRAINING PROGRAM

## 2024-11-27 PROCEDURE — 92133 CPTRZD OPH DX IMG PST SGM ON: CPT | Mod: S$GLB,,, | Performed by: STUDENT IN AN ORGANIZED HEALTH CARE EDUCATION/TRAINING PROGRAM

## 2024-11-27 PROCEDURE — 99215 OFFICE O/P EST HI 40 MIN: CPT | Mod: S$GLB,,, | Performed by: STUDENT IN AN ORGANIZED HEALTH CARE EDUCATION/TRAINING PROGRAM

## 2024-11-27 PROCEDURE — 3044F HG A1C LEVEL LT 7.0%: CPT | Mod: CPTII,S$GLB,, | Performed by: STUDENT IN AN ORGANIZED HEALTH CARE EDUCATION/TRAINING PROGRAM

## 2024-11-27 PROCEDURE — 3060F POS MICROALBUMINURIA REV: CPT | Mod: CPTII,S$GLB,, | Performed by: STUDENT IN AN ORGANIZED HEALTH CARE EDUCATION/TRAINING PROGRAM

## 2024-11-27 PROCEDURE — 1100F PTFALLS ASSESS-DOCD GE2>/YR: CPT | Mod: CPTII,S$GLB,, | Performed by: STUDENT IN AN ORGANIZED HEALTH CARE EDUCATION/TRAINING PROGRAM

## 2024-11-27 PROCEDURE — 1159F MED LIST DOCD IN RCRD: CPT | Mod: CPTII,S$GLB,, | Performed by: STUDENT IN AN ORGANIZED HEALTH CARE EDUCATION/TRAINING PROGRAM

## 2024-11-27 PROCEDURE — 3066F NEPHROPATHY DOC TX: CPT | Mod: CPTII,S$GLB,, | Performed by: STUDENT IN AN ORGANIZED HEALTH CARE EDUCATION/TRAINING PROGRAM

## 2024-11-27 PROCEDURE — 3288F FALL RISK ASSESSMENT DOCD: CPT | Mod: CPTII,S$GLB,, | Performed by: STUDENT IN AN ORGANIZED HEALTH CARE EDUCATION/TRAINING PROGRAM

## 2024-11-27 NOTE — TELEPHONE ENCOUNTER
----- Message from Tech Maricsa sent at 11/27/2024  2:34 PM CST -----  Please schedule pt for refraction with poss prism at [facility]. Thanks.

## 2024-12-16 ENCOUNTER — PATIENT MESSAGE (OUTPATIENT)
Dept: INTERNAL MEDICINE | Facility: CLINIC | Age: 74
End: 2024-12-16
Payer: MEDICARE

## 2024-12-16 ENCOUNTER — PATIENT MESSAGE (OUTPATIENT)
Dept: OPHTHALMOLOGY | Facility: CLINIC | Age: 74
End: 2024-12-16
Payer: MEDICARE

## 2024-12-16 NOTE — TELEPHONE ENCOUNTER
Care Due:                  Date            Visit Type   Department     Provider  --------------------------------------------------------------------------------                                EP -                              PRIMARY      MET INTERNAL  Last Visit: 10-      CARE ([facility])   MEDICINE       Madisyn  Wilfredo                              EP -                              PRIMARY      [facility] INTERNAL  Next Visit: 02-      CARE ([facility])   [facility]                                                            Last  Test          Frequency    Reason                     Performed    Due Date  --------------------------------------------------------------------------------    HBA1C.......  6 months...  semaglutide..............  06- 12-    [facility] Embedded Care Due Messages. Reference number: [id number].   12/16/2024 5:13:30 PM CST

## 2024-12-17 ENCOUNTER — TELEPHONE (OUTPATIENT)
Dept: OPTOMETRY | Facility: CLINIC | Age: 74
End: 2024-12-17
Payer: MEDICARE

## 2024-12-17 RX ORDER — ONDANSETRON 4 MG/1
4 TABLET, ORALLY DISINTEGRATING ORAL EVERY 8 HOURS PRN
Qty: 20 TABLET | Refills: 2 | Status: SHIPPED | OUTPATIENT
Start: 2024-12-17

## 2024-12-18 RX ORDER — ATORVASTATIN CALCIUM 40 MG/1
40 TABLET, FILM COATED ORAL NIGHTLY
Qty: 90 TABLET | Refills: 1 | Status: SHIPPED | OUTPATIENT
Start: 2024-12-18

## 2024-12-18 NOTE — TELEPHONE ENCOUNTER
No care due was identified.  [facility] Embedded Care Due Messages. Reference number: [id number].   12/18/2024 10:47:29 AM CST

## 2025-01-06 ENCOUNTER — OFFICE VISIT (OUTPATIENT)
Facility: CLINIC | Age: 75
End: 2025-01-06
Payer: MEDICARE

## 2025-01-06 DIAGNOSIS — N18.31 TYPE 2 DIABETES MELLITUS WITH STAGE 3A CHRONIC KIDNEY DISEASE AND HYPERTENSION: ICD-10-CM

## 2025-01-06 DIAGNOSIS — R42 VERTIGO: ICD-10-CM

## 2025-01-06 DIAGNOSIS — G20.C PARKINSONISM, UNSPECIFIED PARKINSONISM TYPE: Primary | ICD-10-CM

## 2025-01-06 DIAGNOSIS — E66.01 SEVERE OBESITY (BMI 35.0-39.9) WITH COMORBIDITY: ICD-10-CM

## 2025-01-06 DIAGNOSIS — E11.22 TYPE 2 DIABETES MELLITUS WITH STAGE 3A CHRONIC KIDNEY DISEASE AND HYPERTENSION: ICD-10-CM

## 2025-01-06 DIAGNOSIS — Z86.73 HISTORY OF CVA (CEREBROVASCULAR ACCIDENT): ICD-10-CM

## 2025-01-06 DIAGNOSIS — I12.9 TYPE 2 DIABETES MELLITUS WITH STAGE 3A CHRONIC KIDNEY DISEASE AND HYPERTENSION: ICD-10-CM

## 2025-01-06 NOTE — Clinical Note
Let me know when she is scheduled. I need to know when to schedule her reg Follow up with me. Thanks!

## 2025-01-06 NOTE — PROGRESS NOTES
"The patient location is: [place]   The chief complaint leading to consultation is: Parkinson's disease     Visit type: audiovisual    Face to Face time with patient: 53  minutes of total time spent on the encounter, which includes face to face time and non-face to face time preparing to see the patient (eg, review of tests), Obtaining and/or reviewing separately obtained history, Documenting clinical information in the electronic or other health record, Independently interpreting results (not separately reported) and communicating results to the patient/family/caregiver, or Care coordination (not separately reported).         Each patient to whom he or she provides medical services by telemedicine is:  (1) informed of the relationship between the physician and patient and the respective role of any other health care provider with respect to management of the patient; and (2) notified that he or she may decline to receive medical services by telemedicine and may withdraw from such care at any time.    Notes:   74  RH female with personal hx stroke (asa and statin), vertigo (follows ENT) returns for Follow up of pdism. Initial visit was 10-22-24. At that time, she was started on trial of l-dopa. She is accompanied by her .     "That medicine is like night and day." Shaking but no as much. He agrees it has helped tremors a lot.    Awake longer by her account. He does not agree.   Got to three pills per day. On the third dose was sleeping all day. He says she always sleeps all day and has done this for years.      Carbidopa/levodopa 25/100- 1 tab at 10AM and 8PM. She takes this right after she eats.   Vasile well. Typically gets up at 10AM and to bed at 10PM.   She takes long naps. This is not new.     She eats 10A and 6PM.     He says she had built up to one tab TID (AM, mid-day and night). She felt it was too much.   She says she feels best on twice daily.   He says she has always been sleeping to much and does not " think it had to do with carbidopa/levodopa.   He agrees that twice daily does work better than nothing.   Still some slight tremors and dizziness. (Ses ENT and has had vestibular rehab) .     When she does shake, she will still notice in hands (R>L). Now noticing notices in her feet and legs.   She is aware of shaking everyday.   It makes it hard to read. Just started reading on Violeta again.   She does notice her shake will increase when she is tired.     She does not feels stiff.   She is aware of slowness. Will stop in the middle of something and don't move on for seconds and then can start again. E is not sure if she is slow or not   Balance- dizzy a lot. Been to PT before. Trained me on how to walk with this. No falls since Nov 8.   Telephone and texting are diff due to shaking.     Taking ozempic. Has had to reduce dose due to nausea. Nausea has not increased with carbidopa/levodopa.     +constipation- shakes  makes helps as lot of fiber  -some issues swallowing- mainly pills but can happen with food- she will cough at times with liquids.  says this is not common.   Some hoarseness to voice    LIMITED EXAM:  ..Awake, alert, pleasant and cooperative.   Obese.   RR equal and unlabored.   Face symmetric.   EOMI.  Hearing intact to conversation.   No hypophonia or masked facies (as this is the same as initial visit).  Does have times where she will lose train of thought but able to get back on track with cueing.   Seated.   Did not appreciate tremor today.     IMPRESSION:  PDism  Stroke  Vertigo (follows ENT)    PLAN:  Do suspect this is iPD.   Continued carbidopa/levodopa 25/100 BID but take at 10AM and 4PM.  Enc exercise! She spends a great deal of her day sleeping or sedentary. This is not new.   Movement is medicine!    They have questions about Parkinson's disease. Answered.   Discussed multi-d clinic for newly diagnosed Parkinson's disease clinic. They are interested. Will refer.   I will see  her thereafter in Follow up.     ..   ..Liat Hartmann, ZENAIDA, NP-C

## 2025-01-07 ENCOUNTER — OFFICE VISIT (OUTPATIENT)
Dept: OPTOMETRY | Facility: CLINIC | Age: 75
End: 2025-01-07
Payer: MEDICARE

## 2025-01-07 DIAGNOSIS — H50.10 EXOTROPIA OF BOTH EYES: Primary | ICD-10-CM

## 2025-01-07 PROCEDURE — 92015 DETERMINE REFRACTIVE STATE: CPT | Mod: S$GLB,,, | Performed by: OPTOMETRIST

## 2025-01-07 PROCEDURE — 99499 UNLISTED E&M SERVICE: CPT | Mod: S$GLB,,, | Performed by: OPTOMETRIST

## 2025-01-07 PROCEDURE — 99999 PR PBB SHADOW E&M-EST. PATIENT-LVL III: CPT | Mod: PBBFAC,,, | Performed by: OPTOMETRIST

## 2025-01-07 RX ORDER — ESOMEPRAZOLE MAGNESIUM 40 MG/1
40 CAPSULE, DELAYED RELEASE ORAL DAILY PRN
Qty: 90 CAPSULE | Refills: 3 | Status: SHIPPED | OUTPATIENT
Start: 2025-01-07

## 2025-01-07 NOTE — TELEPHONE ENCOUNTER
No care due was identified.  [facility] Embedded Care Due Messages. Reference number: [id number].   1/07/2025 12:26:43 PM CST

## 2025-01-07 NOTE — PROGRESS NOTES
HPI    Patient is here today for mrx/prisms per Dr. Collado. Denies pain.   Last edited by Chari Hoffman on 1/7/2025  3:38 PM.            Assessment /Plan     For exam results, see Encounter Report.    Exotropia of both eyes      Pt still has her 10 prism diopter fresnel sticker from previous visit. Instructed pt to purchase +2.50 OTC readers and then apply sticker to OS lens (instructed pt on how to apply it but she can come back [place] [facility] if she needs help).  Pt elects to trial prism this way before proceeding with purchasing NVO specs with prism ground in.  Pt elects to follow up regarding progress with readers + BASE IN fresnel prism trial OVER OS via patient portal and will assess follow up/sRx release based on pt feedback at that time

## 2025-01-08 NOTE — TELEPHONE ENCOUNTER
Refill Decision Note   Tracy Armendariz  is requesting a refill authorization.  Brief Assessment and Rationale for Refill:  Approve     Medication Therapy Plan:        Comments:     Note composed:7:43 PM 01/07/2025

## 2025-01-14 ENCOUNTER — PATIENT MESSAGE (OUTPATIENT)
Dept: OPTOMETRY | Facility: CLINIC | Age: 75
End: 2025-01-14
Payer: MEDICARE

## 2025-02-03 ENCOUNTER — TELEPHONE (OUTPATIENT)
Facility: CLINIC | Age: 75
End: 2025-02-03
Payer: MEDICARE

## 2025-02-03 NOTE — TELEPHONE ENCOUNTER
Called PT to schedule them in may. PT picks up the call. PT has been schedule on 5/13/25 at 3:15pm.

## 2025-02-10 ENCOUNTER — LAB VISIT (OUTPATIENT)
Dept: LAB | Facility: HOSPITAL | Age: 75
End: 2025-02-10
Attending: HOSPITALIST
Payer: MEDICARE

## 2025-02-10 DIAGNOSIS — I12.9 TYPE 2 DIABETES MELLITUS WITH STAGE 3A CHRONIC KIDNEY DISEASE AND HYPERTENSION: ICD-10-CM

## 2025-02-10 DIAGNOSIS — E11.22 TYPE 2 DIABETES MELLITUS WITH STAGE 3A CHRONIC KIDNEY DISEASE AND HYPERTENSION: ICD-10-CM

## 2025-02-10 DIAGNOSIS — N18.31 TYPE 2 DIABETES MELLITUS WITH STAGE 3A CHRONIC KIDNEY DISEASE AND HYPERTENSION: ICD-10-CM

## 2025-02-10 LAB
ALBUMIN SERPL BCP-MCNC: 3.5 G/DL (ref 3.5–5.2)
ALP SERPL-CCNC: 78 U/L (ref 40–150)
ALT SERPL W/O P-5'-P-CCNC: 15 U/L (ref 10–44)
ANION GAP SERPL CALC-SCNC: 13 MMOL/L (ref 8–16)
AST SERPL-CCNC: 18 U/L (ref 10–40)
BILIRUB SERPL-MCNC: 0.4 MG/DL (ref 0.1–1)
BUN SERPL-MCNC: 17 MG/DL (ref 8–23)
CALCIUM SERPL-MCNC: 9.6 MG/DL (ref 8.7–10.5)
CHLORIDE SERPL-SCNC: 104 MMOL/L (ref 95–110)
CHOLEST SERPL-MCNC: 151 MG/DL (ref 120–199)
CHOLEST/HDLC SERPL: 1.9 {RATIO} (ref 2–5)
CO2 SERPL-SCNC: 25 MMOL/L (ref 23–29)
CREAT SERPL-MCNC: 1.1 MG/DL (ref 0.5–1.4)
EST. GFR  (NO RACE VARIABLE): 53 ML/MIN/1.73 M^2
ESTIMATED AVG GLUCOSE: 100 MG/DL (ref 68–131)
GLUCOSE SERPL-MCNC: 95 MG/DL (ref 70–110)
HBA1C MFR BLD: 5.1 % (ref 4–5.6)
HDLC SERPL-MCNC: 81 MG/DL (ref 40–75)
HDLC SERPL: 53.6 % (ref 20–50)
LDLC SERPL CALC-MCNC: 49.4 MG/DL (ref 63–159)
NONHDLC SERPL-MCNC: 70 MG/DL
POTASSIUM SERPL-SCNC: 4.4 MMOL/L (ref 3.5–5.1)
PROT SERPL-MCNC: 6.9 G/DL (ref 6–8.4)
SODIUM SERPL-SCNC: 142 MMOL/L (ref 136–145)
TRIGL SERPL-MCNC: 103 MG/DL (ref 30–150)

## 2025-02-10 PROCEDURE — 83036 HEMOGLOBIN GLYCOSYLATED A1C: CPT | Performed by: HOSPITALIST

## 2025-02-10 PROCEDURE — 80061 LIPID PANEL: CPT | Performed by: HOSPITALIST

## 2025-02-10 PROCEDURE — 80053 COMPREHEN METABOLIC PANEL: CPT | Performed by: HOSPITALIST

## 2025-02-10 PROCEDURE — 36415 COLL VENOUS BLD VENIPUNCTURE: CPT | Performed by: HOSPITALIST

## 2025-02-17 ENCOUNTER — OFFICE VISIT (OUTPATIENT)
Dept: INTERNAL MEDICINE | Facility: CLINIC | Age: 75
End: 2025-02-17
Payer: MEDICARE

## 2025-02-17 VITALS
BODY MASS INDEX: 31.82 KG/M2 | HEIGHT: 66 IN | WEIGHT: 198 LBS | TEMPERATURE: 98 F | DIASTOLIC BLOOD PRESSURE: 62 MMHG | RESPIRATION RATE: 14 BRPM | OXYGEN SATURATION: 99 % | SYSTOLIC BLOOD PRESSURE: 108 MMHG | HEART RATE: 57 BPM

## 2025-02-17 DIAGNOSIS — G47.33 OSA (OBSTRUCTIVE SLEEP APNEA): ICD-10-CM

## 2025-02-17 DIAGNOSIS — R54 FRAIL ELDERLY: ICD-10-CM

## 2025-02-17 DIAGNOSIS — E11.22 TYPE 2 DIABETES MELLITUS WITH STAGE 3A CHRONIC KIDNEY DISEASE AND HYPERTENSION: Primary | ICD-10-CM

## 2025-02-17 DIAGNOSIS — I10 ESSENTIAL HYPERTENSION: Chronic | ICD-10-CM

## 2025-02-17 DIAGNOSIS — R13.10 DYSPHAGIA, UNSPECIFIED TYPE: ICD-10-CM

## 2025-02-17 DIAGNOSIS — F33.1 MODERATE EPISODE OF RECURRENT MAJOR DEPRESSIVE DISORDER: ICD-10-CM

## 2025-02-17 DIAGNOSIS — E78.2 MIXED HYPERLIPIDEMIA: Chronic | ICD-10-CM

## 2025-02-17 DIAGNOSIS — G20.C PARKINSONISM, UNSPECIFIED PARKINSONISM TYPE: ICD-10-CM

## 2025-02-17 DIAGNOSIS — I12.9 TYPE 2 DIABETES MELLITUS WITH STAGE 3A CHRONIC KIDNEY DISEASE AND HYPERTENSION: Primary | ICD-10-CM

## 2025-02-17 DIAGNOSIS — N18.31 TYPE 2 DIABETES MELLITUS WITH STAGE 3A CHRONIC KIDNEY DISEASE AND HYPERTENSION: Primary | ICD-10-CM

## 2025-02-17 RX ORDER — SERTRALINE HYDROCHLORIDE 100 MG/1
150 TABLET, FILM COATED ORAL DAILY
Qty: 135 TABLET | Refills: 3 | Status: SHIPPED | OUTPATIENT
Start: 2025-02-17

## 2025-02-17 RX ORDER — SEMAGLUTIDE 2.68 MG/ML
2 INJECTION, SOLUTION SUBCUTANEOUS
Qty: 9 ML | Refills: 1 | Status: SHIPPED | OUTPATIENT
Start: 2025-02-17 | End: 2026-02-17

## 2025-02-17 RX ORDER — MECLIZINE HCL 12.5 MG 12.5 MG/1
12.5 TABLET ORAL 3 TIMES DAILY PRN
COMMUNITY

## 2025-02-17 RX ORDER — ESCITALOPRAM OXALATE 20 MG/1
20 TABLET ORAL DAILY
Qty: 90 TABLET | Refills: 3 | Status: CANCELLED | OUTPATIENT
Start: 2025-02-17

## 2025-02-17 NOTE — PROGRESS NOTES
Subjective:     @Patient ID: Tracy Armendariz is a 74 y.o. female.    Chief Complaint: Diabetes and Follow-up  History of Present Illness      73 y.o. female with prediabetes, MARTIN, HTN, HLD, hx of CVA, atherosclerosis of aorta, obesity, R knee OA, depression presents here for diabetes and follow-up:     CHIEF COMPLAINT:  Ms. Armendariz presents today for medication refills and follow up.    PARKINSON'S DISEASE:  She was recently diagnosed with Parkinson's Disease by neurologist Dr. Liat Veliz and started on levodopa carbidopa. She reports dizziness and balance problems, requiring increased use of her cane for stability. She expresses feeling nervous when turning or preparing to turn.    DEPRESSION:  She reports experiencing episodes of feeling down despite being on two antidepressant medications. She describes sudden onset of sadness with urges to cry while at rest. The depressive symptoms have been more pronounced over the last month. She denies any thoughts of self-harm or suicide.    DYSPHAGIA:  She reports difficulty swallowing with sensation of increased food thickness during swallowing and feeling food transit through esophagus. She experiences episodes of food getting stuck with subsequent regurgitation.    GI CONCERNS:  She reports concentrated bowel movements without improvement despite magnesium supplementation.    EXERCISE:  She uses an electric under-desk stepper for 1 hour daily to maintain leg mobility.    LABS:  A1C was 5.1. Kidney function tests showed stable kidney function with mild chronic kidney disease. Liver tests were normal. Cholesterol panel was normal. Urinalysis showed no extra protein in the urine.              HTN: not currently on losartan.  HLD:  Atorvastatin 40 mg daily  3. Depression: lexapro 20 mg qday, wellbutrin 300 mg qday   4. Obesity: insurance does not cover wegovy. Was on Mounjaro but now on ozempic   5: DM2: on ozempic   6. Abdominal pain: prior visit reports having low  abdominal/pelvic b/l when having b.m. not sure if pain related to inguinal hernias seen on outside imaging . Did f/u with general surgery. Symptoms attributed to constipation. Referred to GI who recommended pt start miralax since she is on GLP1   7. MARTIN; on CPAP. Follows with sleep clinic. On modafinil   8., Pt reports continues to fall, feels dizzy. Reports few episodes of syncope.  One episode occurred in the shower.  Also reports she had episode of chest pain.  States he has not sure why she moved symptoms.  She has been evaluated by Cardiology and underwent was negative.  She has seen ENT and undergone vestibular therapy however vestibular therapy was canceled as her symptoms were not improving.  Recommended that she see a neurologist for further evaluation of her dizziness symptoms.  She reports compliance with using her cane and walker. Has upcoming appt with neurologist. Seen by neurology on 1/6/25. Was started on carbidopa/levodopa. Neuro suspects parkinsonism           ROS:  Constitutional: +dizziness- chronic  ENT: +difficulty swallowing  Gastrointestinal: +change in bowel habits  Psychiatric: +depression     Review of Systems  Past medical history, surgical history, and family medical history reviewed and updated as appropriate.    Medications and allergies reviewed.     Objective:     There were no vitals filed for this visit.  There is no height or weight on file to calculate BMI.  Physical Exam  Constitutional:       Appearance: Normal appearance.   HENT:      Head: Normocephalic and atraumatic.   Eyes:      General:         Right eye: No discharge.         Left eye: No discharge.      Conjunctiva/sclera: Conjunctivae normal.   Cardiovascular:      Rate and Rhythm: Normal rate and regular rhythm.   Pulmonary:      Effort: Pulmonary effort is normal.      Breath sounds: Normal breath sounds.   Musculoskeletal:      Cervical back: Normal range of motion and neck supple.      Comments: Trace BLE edema     Skin:     General: Skin is warm and dry.   Neurological:      Mental Status: She is alert and oriented to person, place, and time.   Psychiatric:         Mood and Affect: Mood normal.         Behavior: Behavior normal.       Lab Results   Component Value Date    WBC 6.84 11/19/2024    HGB 14.2 11/19/2024    HCT 43.2 11/19/2024     11/19/2024    CHOL 151 02/10/2025    TRIG 103 02/10/2025    HDL 81 (H) 02/10/2025    ALT 15 02/10/2025    AST 18 02/10/2025     02/10/2025    K 4.4 02/10/2025     02/10/2025    CREATININE 1.1 02/10/2025    BUN 17 02/10/2025    CO2 25 02/10/2025    TSH 2.307 06/27/2024    INR 1.0 01/13/2017    GLUF 123 (H) 11/04/2008    HGBA1C 5.1 02/10/2025       Assessment:     1. Type 2 diabetes mellitus with stage 3a chronic kidney disease and hypertension    2. Essential hypertension    3. Moderate episode of recurrent major depressive disorder    4. Mixed hyperlipidemia    5. Frail elderly    6. MARTIN (obstructive sleep apnea)    7. Parkinsonism, unspecified Parkinsonism type    8. Dysphagia, unspecified type      Plan:   Tracy was seen today for diabetes and follow-up.    Diagnoses and all orders for this visit:    Type 2 diabetes mellitus with stage 3a chronic kidney disease and hypertension  -     Comprehensive Metabolic Panel; Future  -     CBC Auto Differential; Future  -     Lipid Panel; Future  -     TSH; Future  -     Urinalysis; Future  -     Hemoglobin A1C; Future  -     Microalbumin/Creatinine Ratio, Urine; Future  -     semaglutide (OZEMPIC) 2 mg/dose (8 mg/3 mL) PnIj; Inject 2 mg into the skin every 7 days.    Essential hypertension  -     Comprehensive Metabolic Panel; Future  -     CBC Auto Differential; Future  -     Lipid Panel; Future  -     TSH; Future  -     Urinalysis; Future  -     Hemoglobin A1C; Future  -     Microalbumin/Creatinine Ratio, Urine; Future    Moderate episode of recurrent major depressive disorder  -     Comprehensive Metabolic Panel;  Future  -     CBC Auto Differential; Future  -     Lipid Panel; Future  -     TSH; Future  -     Urinalysis; Future  -     Hemoglobin A1C; Future  -     Microalbumin/Creatinine Ratio, Urine; Future    Mixed hyperlipidemia  -     Comprehensive Metabolic Panel; Future  -     CBC Auto Differential; Future  -     Lipid Panel; Future  -     TSH; Future  -     Urinalysis; Future  -     Hemoglobin A1C; Future  -     Microalbumin/Creatinine Ratio, Urine; Future    Frail elderly    MARTIN (obstructive sleep apnea)  - stable. Continue to monitor   -     Comprehensive Metabolic Panel; Future  -     CBC Auto Differential; Future  -     Lipid Panel; Future  -     TSH; Future  -     Urinalysis; Future  -     Hemoglobin A1C; Future  -     Microalbumin/Creatinine Ratio, Urine; Future    Parkinsonism, unspecified Parkinsonism type  - see below     Dysphagia, unspecified type  - see below     Other orders  -     sertraline (ZOLOFT) 100 MG tablet; Take 1.5 tablets (150 mg total) by mouth once daily.         Assessment & Plan    PARKINSONISM:   Provided printed information about Parkinson's disease to the patient.   Continued levodopa carbidopa as prescribed by neurologist    Ms. Armendariz experiences dizziness and stability issues, requiring the use of a cane.   Noted that the patient is doing better according to caregiver's opinion.   pt has upcoming follow-up visit with neurologist Dr. Liat Veliz on May 13th.   pt has upcoming Parkinson's clinic visit for June 3rd for comprehensive evaluation.   Ms. Armendariz is using a cane for stability.       DEPRESSION:   Initiated Sertraline (Zoloft) 150 mg daily (1.5 tablets).   Continued Wellbutrin at current dose.   Discontinued Lexapro (escitalopram).   Ms. Armendariz reports feeling down and experiencing sudden sadness.   Confirmed that the patient denies thoughts of self-harm or suicide.   Instructed the patient to contact the office if experiencing worsening depression or side effects from the  medication change to Sertraline.    DIABETES:   Continued Ozempic (refill sent to [facility]).   Checked A1C level, which was found to be 5.1.   Evaluated A1C as good and noted that the patient is doing well with Ozempic.    KIDNEY FUNCTION:   Monitored kidney function, which is stable.   Checked urine for protein and noted improvement in urine protein levels.   Ms. Armendariz is under the care of nephrologist for kidney monitoring.    HYPERLIPIDEMIA:   Continued cholesterol medication (refill sent to [facility]).   Checked and evaluated cholesterol panel, which was found to be within acceptable range.      DYSPHAGIA:   Ms. Armendariz reports difficulty swallowing food and pills, feeling food getting stuck.   Recommend endoscopy to check for esophageal narrowing.   Ms. Armendariz declined endoscopy for now, will follow up at next visit.   Ms. Armendariz reports regurgitation when food gets stuck.    OTHER INSTRUCTIONS:   Ensure adequate water intake to help with bowel movements.   Use stool softener if no bowel movement for a few days.    FOLLOW UP:   Blood work ordered in 4 months.   Follow up in 4 months.         This note was generated with the assistance of ambient listening technology. Verbal consent was obtained by the patient and accompanying visitor(s) for the recording of patient appointment to facilitate this note. I attest to having reviewed and edited the generated note for accuracy, though some syntax or spelling errors may persist. Please contact the author of this note for any clarification.    Visit time 40 min. Includes pre-charting, face-to-face encounter, medical decision making and documentation.       Madisyn Villalobos MD  Internal Medicine    2/17/2025

## 2025-03-03 ENCOUNTER — OFFICE VISIT (OUTPATIENT)
Dept: URGENT CARE | Facility: CLINIC | Age: 75
End: 2025-03-03
Payer: MEDICARE

## 2025-03-03 VITALS
OXYGEN SATURATION: 97 % | DIASTOLIC BLOOD PRESSURE: 76 MMHG | HEART RATE: 62 BPM | RESPIRATION RATE: 17 BRPM | SYSTOLIC BLOOD PRESSURE: 141 MMHG | WEIGHT: 197 LBS | BODY MASS INDEX: 31.66 KG/M2 | TEMPERATURE: 99 F | HEIGHT: 66 IN
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
## 2025-03-03 DIAGNOSIS — S01.112A LACERATION OF LEFT EYEBROW, INITIAL ENCOUNTER: Primary | ICD-10-CM

## 2025-03-03 PROCEDURE — 90714 TD VACC NO PRESV 7 YRS+ IM: CPT | Mod: S$GLB,,, | Performed by: NURSE PRACTITIONER

## 2025-03-03 PROCEDURE — 99499 UNLISTED E&M SERVICE: CPT | Mod: S$GLB,,, | Performed by: NURSE PRACTITIONER

## 2025-03-03 PROCEDURE — 90471 IMMUNIZATION ADMIN: CPT | Mod: S$GLB,,, | Performed by: NURSE PRACTITIONER

## 2025-03-03 PROCEDURE — 12013 RPR F/E/E/N/L/M 2.6-5.0 CM: CPT | Mod: S$GLB,,, | Performed by: NURSE PRACTITIONER

## 2025-03-03 RX ORDER — MUPIROCIN 20 MG/G
OINTMENT TOPICAL
Qty: 22 G | Refills: 1 | Status: SHIPPED | OUTPATIENT
Start: 2025-03-03

## 2025-03-04 NOTE — PATIENT INSTRUCTIONS
Laceration Repair   If your condition worsens or fails to improve we recommend that you receive another evaluation at the ER immediately or contact your PCP to discuss your concerns or return here. You must understand that you've received an urgent care treatment only and that you may be released before all your medical problems are known or treated. You the patient will arrange for followup care as instructed.   Use the prescription antibiotic ointment for 2-3 days only. Clean the wound twice a day with betadiene and apply the ointment. After that, only use a dry bandage as the ointment will keep the laceration too moist.   Suture removal on face in 5 days   Monitor for signs of infection such as redness, drainage, increase swelling or increase pain.   Tylenol or ibuprofen can also be used as directed for pain unless you have an allergy to them or medical condition such as stomach ulcers, kidney or liver disease or blood thinners etc for which you should not be taking these type of medications.

## 2025-03-04 NOTE — PROGRESS NOTES
"Subjective:      Patient ID: Tracy Armendariz is a 74 y.o. female.    Vitals:  height is 5' 5.98" (1.676 m) and weight is 89.4 kg (197 lb). Her oral temperature is 98.7 °F (37.1 °C). Her blood pressure is 141/76 (abnormal) and her pulse is 62. Her respiration is 17 and oxygen saturation is 97%.     Chief Complaint: Fall    This is a 74 y.o female who presents today with chief complaint of forehead laceration (above Left eyebrow), patient reports slipping putting her linens on her bed and striking her left eyebrow by accident on her ceramic.  States it was from a small distance. Denies headache or neck pain.  No n/v.  No loc.  Denies blood thinner use.  Patient reports Parkinson's disease.   Home tx: none      Fall  The accident occurred 1 to 3 hours ago. The fall occurred while standing. She fell from a height of 1 to 2 ft. She landed on Hard floor. The volume of blood lost was minimal. The point of impact was the face. The pain is at a severity of 0/10. The patient is experiencing no pain. Pertinent negatives include no abdominal pain, bowel incontinence, fever, headaches, hearing loss, hematuria, loss of consciousness, nausea, numbness, tingling, visual change or vomiting. She has tried nothing for the symptoms.       Constitution: Negative for fever.   Eyes:  Positive for double vision (chronic with no changes). Negative for blurred vision.   Gastrointestinal:  Negative for abdominal pain, nausea, vomiting and bowel incontinence.   Genitourinary:  Negative for hematuria.   Skin:  Positive for laceration. Negative for erythema.   Neurological:  Negative for dizziness, headaches, loss of consciousness and numbness.      Objective:     Physical Exam   Constitutional: She is oriented to person, place, and time. She appears well-developed.  Non-toxic appearance. She does not appear ill. No distress.   HENT:   Head: Normocephalic and atraumatic. Head is without abrasion, without contusion and without laceration. "   Ears:   Right Ear: External ear normal.   Left Ear: External ear normal.   Nose: Nose normal.   Mouth/Throat: Oropharynx is clear and moist and mucous membranes are normal.   Eyes: Conjunctivae, EOM and lids are normal. Pupils are equal, round, and reactive to light.   Neck: Trachea normal and phonation normal. Neck supple.   Cardiovascular: Normal rate, regular rhythm and normal heart sounds.   Pulmonary/Chest: Effort normal and breath sounds normal. No stridor. No respiratory distress.   Musculoskeletal: Normal range of motion.         General: Normal range of motion.   Neurological: She is alert and oriented to person, place, and time.   Skin: Skin is warm, dry, intact, not diaphoretic and no rash. Capillary refill takes less than 2 seconds. No abrasion, No burn, No bruising, No erythema and No ecchymosis        Psychiatric: Her speech is normal and behavior is normal. Judgment and thought content normal.   Nursing note and vitals reviewed.      Assessment:     1. Laceration of left eyebrow, initial encounter    2. Fall, initial encounter        Plan:   Here with her  from home.  Discussed ER precautions for hitting her face.  She understands and feels otherwise fine.  Denies headache or neuro changes.  Neurologically intact.  Strict ER precautions given if she develops worsening condition.    Laceration of left eyebrow, initial encounter  -     Td (Tenivac) IM vaccine (>/= 6 yo)  -     Laceration Repair    Fall, initial encounter

## 2025-03-04 NOTE — PROCEDURES
"Laceration Repair    Date/Time: 3/3/2025 7:00 PM    Performed by: Yolande Gonzalez NP  Authorized by: Yolande Gonzalez NP  Consent Done: Yes  Consent: Verbal consent obtained  Risks and benefits: risks, benefits and alternatives were discussed  Consent given by: patient  Patient understanding: patient states understanding of the procedure being performed  Patient identity confirmed: , MRN, name, provided demographic data and verbally with patient  Time out: Immediately prior to procedure a "time out" was called to verify the correct patient, procedure, equipment, support staff and site/side marked as required.  Body area: head/neck  Location details: left eyebrow  Laceration length: 3 cm  Foreign bodies: no foreign bodies  Tendon involvement: none  Nerve involvement: none  Vascular damage: no  Anesthesia: local infiltration    Anesthesia:  Local Anesthetic: lidocaine 1% without epinephrine  Anesthetic total: 2 mL    Patient sedated: no  Preparation: Patient was prepped and draped in the usual sterile fashion.  Irrigation solution: saline  Irrigation method: tap  Amount of cleaning: standard  Debridement: none  Degree of undermining: none  Skin closure: 5-0 Prolene  Number of sutures: 4  Technique: simple  Approximation: close  Approximation difficulty: simple  Dressing: antibiotic ointment and adhesive bandage  Patient tolerance: Patient tolerated the procedure well with no immediate complications  Comments: Hemostasis achieved        "

## 2025-03-08 ENCOUNTER — OFFICE VISIT (OUTPATIENT)
Dept: URGENT CARE | Facility: CLINIC | Age: 75
End: 2025-03-08
Payer: MEDICARE

## 2025-03-08 VITALS
SYSTOLIC BLOOD PRESSURE: 137 MMHG | HEIGHT: 65 IN | BODY MASS INDEX: 32.82 KG/M2 | RESPIRATION RATE: 18 BRPM | OXYGEN SATURATION: 96 % | HEART RATE: 64 BPM | DIASTOLIC BLOOD PRESSURE: 83 MMHG | WEIGHT: 197 LBS | TEMPERATURE: 98 F

## 2025-03-08 DIAGNOSIS — Z48.02 VISIT FOR SUTURE REMOVAL: Primary | ICD-10-CM

## 2025-03-08 PROCEDURE — 99024 POSTOP FOLLOW-UP VISIT: CPT | Mod: S$GLB,,, | Performed by: NURSE PRACTITIONER

## 2025-03-08 PROCEDURE — 99499 UNLISTED E&M SERVICE: CPT | Mod: S$GLB,,, | Performed by: NURSE PRACTITIONER

## 2025-03-08 NOTE — PROCEDURES
Suture Removal    Date/Time: 3/8/2025 9:00 AM  Location procedure was performed: [facility]    Performed by: Jeremie Arceo NP  Authorized by: Jeremie Arceo NP  Body area: head/neck  Location details: left eyebrow  Description of findings: clean, well-approximated and well-healed, small amount of scabbing, no drainage, erythema or swelling, 4 suture intact   Wound Appearance: clean, well healed, normal color, no drainage, nontender and warm  Sutures Removed: 4  Staples Removed: 0  Post-removal: no dressing applied  Facility: sutures placed in this facility  Patient tolerance: Patient tolerated the procedure well with no immediate complications

## 2025-03-08 NOTE — PATIENT INSTRUCTIONS
If your condition worsens or fails to improve, we recommend that you receive another evaluation at the ER immediately, contact your PCP to discuss your concerns, or return here.  You must understand that you've received an urgent care treatment only, and that you may be released before all your medical problems are known or treated.  You, the patient, will arrange for follow-up care as instructed.     Avoid picking or manipulating the wound.  Clean the wound twice a day and put antibiotic ointment on it.  You should seek ER treatment if develop fever, increasing pain, swelling, red streaking, or other signs of increasing infection.     If you are female and on birth control pills, use additional methods to prevent pregnancy while on antibiotics and for one cycle after.  If you were given narcotics, do not drive or operate heavy equipment or machinery while taking these medications.     Tylenol or Ibuprofen can also be used as directed for pain unless you have an allergy to them or medical condition (such as stomach ulcers, kidney or liver disease, or use blood thinners, etc.) for which you should not be taking these type of medications.

## 2025-03-08 NOTE — PROGRESS NOTES
"Subjective:      Patient ID: Tracy Armendariz is a 74 y.o. female.    Vitals:  height is 5' 5" (1.651 m) and weight is 89.4 kg (197 lb). Her oral temperature is 97.7 °F (36.5 °C). Her blood pressure is 137/83 and her pulse is 64. Her respiration is 18 and oxygen saturation is 96%.     Chief Complaint: Suture / Staple Removal    75yo female pt presents with w/ .  Reports that she received 4 sutures to her L eyebrow 5 days ago after a fall.  Reports that she is feeling well, denies any issues w/ wound healing.  Denies fever/chills.  Denies bleeding or drainage, denies redness or swelling.  Reports that she has been cleansing area with SoftSoap antibacterial soap and using antibiotic ointment as directed by previous provider.    Suture / Staple Removal  The sutures were placed 3 to 6 days ago. There has been no drainage from the wound. There is no redness present. There is no swelling present. There is no pain present.       Constitution: Negative for chills and fever.   Musculoskeletal:  Negative for pain.   Skin:  Negative for skin thickening/induration and erythema.      Objective:     Physical Exam   Constitutional: She is oriented to person, place, and time. She appears well-developed.   HENT:   Head: Normocephalic and atraumatic. Head is without abrasion, without contusion and without laceration.       Ears:   Right Ear: External ear normal.   Left Ear: External ear normal.   Nose: Nose normal.   Mouth/Throat: Oropharynx is clear and moist and mucous membranes are normal.   Eyes: Conjunctivae, EOM and lids are normal. Pupils are equal, round, and reactive to light.   Neck: Trachea normal and phonation normal. Neck supple.   Cardiovascular: Normal rate, regular rhythm and normal heart sounds.   Pulmonary/Chest: Effort normal and breath sounds normal. No stridor. No respiratory distress.   Musculoskeletal: Normal range of motion.         General: Normal range of motion.   Neurological: She is alert and " oriented to person, place, and time.   Skin: Skin is warm, dry, intact and no rash. Capillary refill takes less than 2 seconds. No abrasion, No burn, No bruising, No erythema and No ecchymosis   Psychiatric: Her speech is normal and behavior is normal. Judgment and thought content normal.   Nursing note and vitals reviewed.      Assessment:     1. Visit for suture removal        Plan:     Suture Removal    Date/Time: 3/8/2025 9:00 AM  Location procedure was performed: [facility]    Performed by: Jeremie Arceo NP  Authorized by: Jeremie Arceo NP  Body area: head/neck  Location details: left eyebrow  Description of findings: clean, well-approximated and well-healed, small amount of scabbing, no drainage, erythema or swelling, 4 suture intact   Wound Appearance: clean, well healed, normal color, no drainage, nontender and warm  Sutures Removed: 4  Staples Removed: 0  Post-removal: no dressing applied  Facility: sutures placed in this facility  Patient tolerance: Patient tolerated the procedure well with no immediate complications        Provided education on wound care.  Instructed for pt to cleanse area twice daily with antibacterial soap until wound fully healed.  Provided education on return/ER precautions.  Pt and  both verbalized understanding and agreed to plan.        Visit for suture removal  -     Suture Removal      Patient Instructions   If your condition worsens or fails to improve, we recommend that you receive another evaluation at the ER immediately, contact your PCP to discuss your concerns, or return here.  You must understand that you've received an urgent care treatment only, and that you may be released before all your medical problems are known or treated.  You, the patient, will arrange for follow-up care as instructed.     Avoid picking or manipulating the wound.  Clean the wound twice a day and put antibiotic ointment on it.  You should seek ER treatment if  develop fever, increasing pain, swelling, red streaking, or other signs of increasing infection.     If you are female and on birth control pills, use additional methods to prevent pregnancy while on antibiotics and for one cycle after.  If you were given narcotics, do not drive or operate heavy equipment or machinery while taking these medications.     Tylenol or Ibuprofen can also be used as directed for pain unless you have an allergy to them or medical condition (such as stomach ulcers, kidney or liver disease, or use blood thinners, etc.) for which you should not be taking these type of medications.

## 2025-04-22 DIAGNOSIS — G20.A1 PARKINSON'S DISEASE, UNSPECIFIED WHETHER DYSKINESIA PRESENT, UNSPECIFIED WHETHER MANIFESTATIONS FLUCTUATE: Primary | ICD-10-CM

## 2025-04-29 ENCOUNTER — HOSPITAL ENCOUNTER (EMERGENCY)
Facility: HOSPITAL | Age: 75
Discharge: HOME | End: 2025-04-29
Attending: EMERGENCY MEDICINE
Payer: MEDICARE

## 2025-04-29 VITALS
HEIGHT: 67 IN | TEMPERATURE: 98 F | OXYGEN SATURATION: 96 % | WEIGHT: 194 LBS | HEART RATE: 62 BPM | RESPIRATION RATE: 16 BRPM | BODY MASS INDEX: 30.45 KG/M2 | DIASTOLIC BLOOD PRESSURE: 68 MMHG | SYSTOLIC BLOOD PRESSURE: 142 MMHG
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
## 2025-04-29 DIAGNOSIS — S22.009A CLOSED FRACTURE OF THORACIC VERTEBRA, UNSPECIFIED FRACTURE MORPHOLOGY, UNSPECIFIED THORACIC VERTEBRAL LEVEL, INITIAL ENCOUNTER: Primary | ICD-10-CM

## 2025-04-29 PROCEDURE — 25000003 PHARM REV CODE 250: Performed by: EMERGENCY MEDICINE

## 2025-04-29 PROCEDURE — 99284 EMERGENCY DEPT VISIT MOD MDM: CPT | Mod: 25

## 2025-04-29 PROCEDURE — 99285 EMERGENCY DEPT VISIT HI MDM: CPT | Mod: 25

## 2025-04-29 RX ORDER — HYDROCODONE BITARTRATE AND ACETAMINOPHEN 5; 325 MG/1; MG/1
1 TABLET ORAL EVERY 6 HOURS PRN
Qty: 12 TABLET | Refills: 0 | Status: SHIPPED | OUTPATIENT
Start: 2025-04-29 | End: 2025-05-02

## 2025-04-29 RX ORDER — HYDROCODONE BITARTRATE AND ACETAMINOPHEN 5; 325 MG/1; MG/1
1 TABLET ORAL
Refills: 0 | Status: COMPLETED | OUTPATIENT
Start: 2025-04-29 | End: 2025-04-29

## 2025-04-29 RX ADMIN — HYDROCODONE BITARTRATE AND ACETAMINOPHEN 1 TABLET: 5; 325 TABLET ORAL at 09:04

## 2025-04-29 NOTE — ED NOTES
Patient provided with discharge paperwork and follow-up instructions.  All questions answered and concerns addressed at this time.  Patient encouraged to return to the ED with any new or worsening symptoms.  Patient in wheelchair off of the unit with a regular RR and rhythm and in NAD.

## 2025-04-29 NOTE — DISCHARGE INSTRUCTIONS
Follow up with your primary care doctor and also Dr. Harrell with spine surgery for further evaluation.  Remember to wear your brace whenever you are sitting or standing.  You can remove your brace when showering.  Return to ED if you develop any new or worsening symptoms such as worsening pain or weakness or sensation changes in your legs or any difficulty with urination or bowel movements

## 2025-04-29 NOTE — ED PROVIDER NOTES
Encounter Date: 2025       History     Chief Complaint   Patient presents with    Fall     Pt reports slipped off toilet last night and struck back on shower. C/o lumbar back pain. -hit head, -LOC, -blood thinners. Took tylenol at 0200 w/ relief. Hx parkinson's.      74-year-old female with a history of Parkinson's disease, stroke, hyperlipidemia, hypertension, peptic ulcer disease presents after fall on Saturday and then again on .  Patient complaining of low back pain and left thigh pain.  No head injury.  Patient has been taking Tylenol for pain.  Patient also complains of abdominal pain but says that she frequently has issues with constipation from her Parkinson's medications.  No fever.      Review of patient's allergies indicates:   Allergen Reactions    Iodinated contrast media Hives and Rash    Gabapentin Hallucinations    Iodine Hives    Isothiazolinones Rash    Penicillins Rash     Past Medical History:   Diagnosis Date    Allergy     Anemia, unspecified     Anticoagulant long-term use     Arthritis     Cerebral embolism without mention of cerebral infarction 2014    Dx updated per 2019 IMO Load      CVA (cerebral infarction)     Depression     Disorder of kidney and ureter     renal stones    Diverticulosis of colon     Extrinsic asthma, unspecified     Hematuria, unspecified     Hyperlipidemia     Hypertension     Kidney stone     Left atrial enlargement 2014    Low back pain     Nephrolithiasis     MARTIN (obstructive sleep apnea)     Osteopenia     PUD (peptic ulcer disease)     Severe obesity (BMI 35.0-39.9) with comorbidity 2013    Stroke 2013    Urinary tract infection      Past Surgical History:   Procedure Laterality Date    APPENDECTOMY      @ time of hysterectomy    BACK SURGERY      CATARACT EXTRACTION       SECTION      CHOLECYSTECTOMY      laparoscopic    COLONOSCOPY N/A 2018    Procedure: COLONOSCOPY/Golytely;  Surgeon: Janine SANCHEZ  MD [place];  Location: [facility] ENDO;  Service: Endoscopy;  Laterality: N/A;    CYSTOSCOPY W/ RETROGRADES  12/11/2022    Procedure: CYSTOSCOPY, WITH RETROGRADE PYELOGRAM;  Surgeon: Mitchell Recinos MD;  Location: [facility] OR;  Service: Urology;;    CYSTOSCOPY W/ URETERAL STENT PLACEMENT Left 12/11/2022    Procedure: CYSTOSCOPY, WITH URETERAL STENT INSERTION;  Surgeon: Mitchell Recinos MD;  Location: [facility] OR;  Service: Urology;  Laterality: Left;    CYSTOURETEROSCOPY, WITH HOLMIUM LASER LITHOTRIPSY OF URETERAL CALCULUS AND STENT INSERTION Left 12/21/2022    Procedure: left ureteroscopy, holmium laser lithotripsy, stone basketing, retrograde pyelogram, stent placement;  Surgeon: Anaya Zamora MD;  Location: [facility] OR;  Service: Urology;  Laterality: Left;    DILATION AND CURETTAGE OF UTERUS  1972    EXTRACTION - STONE Left 12/21/2022    Procedure: EXTRACTION - STONE;  Surgeon: Anaya Zamora MD;  Location: [facility] OR;  Service: Urology;  Laterality: Left;    HYSTERECTOMY  1978    TAHUSO with appendectomy    INNER EAR SURGERY      replaced ear drum    JOINT REPLACEMENT Right     knee    KNEE ARTHROSCOPY W/ DEBRIDEMENT  2003    LUMBAR DISCECTOMY  1980    L4-L5    OOPHORECTOMY      unilateral    RETROGRADE PYELOGRAPHY  12/21/2022    Procedure: PYELOGRAM, RETROGRADE;  Surgeon: Anaya Zamora MD;  Location: [facility];  Service: Urology;;    TONSILLECTOMY      TYMPANOPLASTY      URETEROSCOPIC REMOVAL OF URETERIC CALCULUS Left 12/19/2018    Procedure: REMOVAL, CALCULUS, URETER, URETEROSCOPIC, holmium laser lithotripsy, stone basket extraction, retrograde pyelogram, ureteral stent exchange;  Surgeon: Anaya Zamora MD;  Location: [facility] OR;  Service: Urology;  Laterality: Left;     Family History   Problem Relation Name Age of Onset    Cervical cancer Mother      Cancer Mother  65        lung cancer - non smoker    Lung cancer Mother      Depression Father      Hypertension Father      Coronary artery disease Father  62    Heart disease Father       Heart failure Sister x1     Diabetes Sister x1     Heart disease Sister x1     Kidney disease Sister x1     Depression Brother x2     Suicide Brother x2     Heart failure Brother x2     Cancer Daughter x2     Breast cancer Daughter x2 36    No Known Problems Son x1     Hypertension Maternal Grandfather      Heart disease Maternal Grandfather      Stroke Paternal Grandfather      Colon cancer Neg Hx      Ovarian cancer Neg Hx       Social History[1]  Review of Systems   Constitutional:  Negative for fever.   Eyes:  Negative for pain.   Respiratory:  Negative for shortness of breath.    Cardiovascular:  Negative for chest pain.   Gastrointestinal:  Negative for abdominal pain, nausea and vomiting.   Genitourinary:  Negative for difficulty urinating.   Musculoskeletal:  Positive for back pain and myalgias. Negative for neck pain.   Neurological:  Negative for headaches.   Psychiatric/Behavioral:  Negative for confusion.        Physical Exam     Initial Vitals [04/29/25 0854]   BP Pulse Resp Temp SpO2   (!) 191/81 65 20 98.2 °F (36.8 °C) 96 %      MAP       --         Physical Exam    Nursing note and vitals reviewed.  HENT:   Head: Atraumatic.   Eyes: Conjunctivae and EOM are normal.   Neck:   Normal range of motion.  Cardiovascular:      Exam reveals no gallop and no friction rub.       No murmur heard.  Pulmonary/Chest: Breath sounds normal. No respiratory distress.   Abdominal: Abdomen is soft. There is no abdominal tenderness.   Musculoskeletal:      Cervical back: Normal range of motion.      Comments: Tenderness to palpation to left mid thigh, and diffuse low spine without step-off or deformity     Neurological: She is alert and oriented to person, place, and time. She has normal strength. No cranial nerve deficit or sensory deficit.   Hesitant gait   Psychiatric: She has a normal mood and affect.         ED Course   Orthopedic Injury    Date/Time: 4/29/2025 2:28 PM    Performed by: Markel Cintron,  MD  Authorized by: Markel Cintron MD    Location procedure was performed:  [facility] EMERGENCY DEPARTMENT  Consent Done?:  Emergent Situation  Injury:     Injury location:  Spine    Location details:  T10 and T12    Injury type:  Fracture    Fracture type: vertebral body        Pre-procedure assessment:     Neurovascular status: Neurovascularly intact      Distal perfusion: normal      Neurological function: normal      Range of motion: reduced        Selections made in this section will also lock the Injury type section above.:     Manipulation performed?: No      Immobilization:  Brace  Post-procedure assessment:     Neurovascular status: Neurovascularly intact      Distal perfusion: normal      Neurological function: normal      Range of motion: splinted      Patient tolerance:  Patient tolerated the procedure well with no immediate complications    Labs Reviewed - No data to display       Imaging Results              X-Ray Thoracic Spine AP Lateral (Final result)  Result time 04/29/25 17:07:43      Final result by Everett Jamison DO (04/29/25 17:07:43)                   Impression:      T10 and T12 compression fractures as above.      Electronically signed by: Everett Jamison  Date:    04/29/2025  Time:    17:07               Narrative:    EXAMINATION:  XR THORACIC SPINE AP LATERAL    CLINICAL HISTORY:  post tlso brace;    TECHNIQUE:  AP and lateral views of the thoracic spine were performed.    COMPARISON:  Radiographs from earlier the same date.    FINDINGS:  There is osteopenia.  There is an acute compression fracture of the T12 vertebral body.  There is a subtle compression fracture of the T10 vertebral body, superior endplate.  No additional fractures are seen.  Alignment is essentially normal.  There are degenerative changes.  There is a loop recorder device.  There are vascular calcifications.  There are right upper quadrant surgical clips.                                       CT Thoracic Spine Without  Contrast (Final result)  Result time 04/29/25 13:49:05      Final result by Zuhair Giang Jr., MD (04/29/25 13:49:05)                   Impression:      1. Mild compression fractures of T10 and T12, with mild bony retropulsion and mild central canal stenosis at the level of T12 superior endplate.      Electronically signed by: Zuhair Akers Jr  Date:    04/29/2025  Time:    13:49               Narrative:    EXAMINATION:  CT THORACIC SPINE WITHOUT CONTRAST    CLINICAL HISTORY:  Spine fracture, thoracic, traumatic;    TECHNIQUE:  CT thoracic spine performed without contrast.  Coronal and sagittal reformats provided.    COMPARISON:  None    FINDINGS:  Alignment: Normal.    Vertebrae: Mild compression fracture of T12 with a small amount of bony retropulsion at the level of the superior endplate.  Mild compression fracture of the superior endplate of T10 without significant bony retropulsion.  The remainder of the vertebral body heights are preserved.    Discs: Normal height.    Degenerative findings: None    Perhaps mild central spinal stenosis at the level of the T12 superior endplate.    Paraspinal muscles & soft tissues: Left-sided renal parenchymal calcifications are partially imaged.  Aortic atherosclerosis.                                       X-Ray Thoracic Spine AP Lateral (Final result)  Result time 04/29/25 10:55:36      Final result by Liam Davis MD (04/29/25 10:55:36)                   Impression:      Moderate height loss of the T12 vertebra, appears new when compared to 10/11/2024 CT of the abdomen and pelvis, and may reflect recent fracture. Clinical correlation recommended.      Electronically signed by: Liam Davis  Date:    04/29/2025  Time:    10:55               Narrative:    EXAMINATION:  XR LUMBAR SPINE AP AND LATERAL; XR THORACIC SPINE AP LATERAL    CLINICAL HISTORY:  fall;    TECHNIQUE:  AP, lateral and spot images were performed of the thoracic and lumbar  spine.    COMPARISON:  None    FINDINGS:  Question osseous demineralization.    Moderate height loss of the T12 vertebra, appears new when compared to 10/11/2024 CT of the abdomen and pelvis, and may reflect recent fracture.  Clinical correlation recommended.    Chronic height loss of the T10 vertebra, felt grossly similar to the prior CT examination referenced above.    No convincing evidence of acute displaced fracture or traumatic subluxation of the lumbar spine.    Multilevel degenerative endplate and facet sclerosis.    Aortoiliac atherosclerotic calcifications.    No definite radiopaque foreign body    Suspect prior cholecystectomy.    In the chest, there is partially imaged loop recorder device and mitral annular calcifications.  Nonspecific interstitial coarsening is seen in both lungs.                                       X-Ray Sacrum And Coccyx (Final result)  Result time 04/29/25 10:49:32      Final result by Liam Davis MD (04/29/25 10:49:32)                   Impression:      Noting limitations above, no convincing evidence of acute grossly displaced fracture or dislocation.      Electronically signed by: Liam Davis  Date:    04/29/2025  Time:    10:49               Narrative:    EXAMINATION:  XR SACRUM AND COCCYX    CLINICAL HISTORY:  Unspecified fall, initial encounter    TECHNIQUE:  Two or three views of the sacrum and coccyx were performed.    COMPARISON:  None    FINDINGS:  Images are mildly motion compromised.    Additionally, patient body habitus and overlying bowel gas further compromised assessment.    Noting this, no convincing evidence of acute grossly displaced fracture or dislocation identified.    Degenerative findings are noted involving the spine, sacroiliac joints, uses, hip joints.    No definite radiopaque foreign body.                                       X-Ray Lumbar Spine Ap And Lateral (Final result)  Result time 04/29/25 10:55:36      Final result by Liam Davis  MD FADIA (04/29/25 10:55:36)                   Impression:      Moderate height loss of the T12 vertebra, appears new when compared to 10/11/2024 CT of the abdomen and pelvis, and may reflect recent fracture. Clinical correlation recommended.      Electronically signed by: Liam Davis  Date:    04/29/2025  Time:    10:55               Narrative:    EXAMINATION:  XR LUMBAR SPINE AP AND LATERAL; XR THORACIC SPINE AP LATERAL    CLINICAL HISTORY:  fall;    TECHNIQUE:  AP, lateral and spot images were performed of the thoracic and lumbar spine.    COMPARISON:  None    FINDINGS:  Question osseous demineralization.    Moderate height loss of the T12 vertebra, appears new when compared to 10/11/2024 CT of the abdomen and pelvis, and may reflect recent fracture.  Clinical correlation recommended.    Chronic height loss of the T10 vertebra, felt grossly similar to the prior CT examination referenced above.    No convincing evidence of acute displaced fracture or traumatic subluxation of the lumbar spine.    Multilevel degenerative endplate and facet sclerosis.    Aortoiliac atherosclerotic calcifications.    No definite radiopaque foreign body    Suspect prior cholecystectomy.    In the chest, there is partially imaged loop recorder device and mitral annular calcifications.  Nonspecific interstitial coarsening is seen in both lungs.                                       X-Ray Femur Ap/Lat Left (Final result)  Result time 04/29/25 10:48:21      Final result by Liam Davis MD (04/29/25 10:48:21)                   Impression:      No convincing evidence of acute fracture or dislocation.      Electronically signed by: Liam Davis  Date:    04/29/2025  Time:    10:48               Narrative:    EXAMINATION:  XR FEMUR 2 VIEW LEFT    CLINICAL HISTORY:  Unspecified fall, initial encounter    TECHNIQUE:  AP and lateral views of the left femur were performed.    COMPARISON:  None}    FINDINGS:  Images are mildly motion  compromised.    Noting this, no convincing evidence of acute displaced fracture or dislocation.    Suboptimally characterized degenerative changes at the hip and knee joints.    Enthesopathic change at the patella.    Vascular calcifications.  No definite radiopaque foreign body.                                       Medications   HYDROcodone-acetaminophen 5-325 mg per tablet 1 tablet (1 tablet Oral Given 4/29/25 0920)     Medical Decision Making  74-year-old female with back and leg pain after multiple falls over the weekend.  Vital signs notable for mild hypertension.  Patient has a abdominal tenderness but says that this is not a new issue for her.  No peritonitis.  Pelvis is stable and nontender.    Amount and/or Complexity of Data Reviewed  Radiology: ordered. Decision-making details documented in ED Course.    Risk  Prescription drug management.               ED Course as of 05/02/25 1430   Tue Apr 29, 2025   1429 Plain films showed possible compression fracture of T12.  CT thoracic spine obtained. [SN]   1429 CT Thoracic Spine Without Contrast  1. Mild compression fractures of T10 and T12, with mild bony retropulsion and mild central canal stenosis at the level of T12 superior endplate. [SN]   1429 Discussed case with Neurosurgery, Dr. Harrell.  Will get standing thoracic spine films in TLSO brace. [SN]   1449 Care turned over to Dr. Hopkins at shift change. [SN]      ED Course User Index  [SN] Markel Cintron MD                           Clinical Impression:  Final diagnoses:  [W19.XXXA] Fall  [S22.009A] Closed fracture of thoracic vertebra, unspecified fracture morphology, unspecified thoracic vertebral level, initial encounter (Primary)          ED Disposition Condition    Discharge Stable          ED Prescriptions       Medication Sig Dispense Start Date End Date Auth. Provider    HYDROcodone-acetaminophen (NORCO) 5-325 mg per tablet (Expires today) Take 1 tablet by mouth every 6 (six) hours as needed. 12  tablet 2025 Markel Cintron MD          Follow-up Information       Follow up With Specialties Details Why Contact Info    Patel Harrell MD Neurosurgery In 1 week For re-evaluation [address]  [address]  [place] [id number]  [phone]                 [1]   Social History  Tobacco Use    Smoking status: Former     Current packs/day: 0.00     Average packs/day: 1.5 packs/day for 29.0 years (43.5 ttl pk-yrs)     Types: Cigarettes     Start date:      Quit date: 1995     Years since quittin.3    Smokeless tobacco: Never   Substance Use Topics    Alcohol use: Yes     Alcohol/week: 1.0 standard drink of alcohol     Types: 1 Glasses of wine per week     Comment: social    Drug use: Never        Markel Cintron MD  25 1448

## 2025-04-29 NOTE — PROVIDER PROGRESS NOTES - EMERGENCY DEPT.
Encounter Date: 4/29/2025    ED Physician Progress Notes            Patient is a 74-year-old female presenting for compression fractures.  Patient was handed off to me by previous physician pending repeat scans and discussion with spine surgery    Physical exam re-evaluation: Well-appearing 74-year-old female, no distress, appropriately conversational.  Patient has 5/5 strength bilateral lower extremities, no sensation changes as per patient.  Otherwise breathing comfortably on room air, no acute distress    Plan:  Discussed repeat thoracic films with Dr. Harrell.  At this time we will discharge, given return precautions to the patient and family.  Also discussed recommendations by Dr. Harrell.  Patient understands to follow up with the primary care doctor and spine surgery for further evaluation.  The patient agrees with treatment and plan.  Family also agrees with treatment and plan.  They understand return precautions which include but not limited to worsening symptoms, worsening pain, weakness, sensation changes, urinary difficulty, stool incontinence

## 2025-04-30 ENCOUNTER — TELEPHONE (OUTPATIENT)
Dept: NEUROSURGERY | Facility: CLINIC | Age: 75
End: 2025-04-30
Payer: MEDICARE

## 2025-04-30 ENCOUNTER — PATIENT OUTREACH (OUTPATIENT)
Facility: OTHER | Age: 75
End: 2025-04-30
Payer: MEDICARE

## 2025-04-30 DIAGNOSIS — M54.9 BACK PAIN, UNSPECIFIED BACK LOCATION, UNSPECIFIED BACK PAIN LATERALITY, UNSPECIFIED CHRONICITY: Primary | ICD-10-CM

## 2025-04-30 NOTE — PROGRESS NOTES
Arcelia Camejo MA  ED Navigator  Emergency Department    Project: [facility] ED Navigator  Role: Community Health Worker    Date: 2025  Patient Name: Tracy Armendariz  MRN: [id number]  PCP: Madisyn Villalobos MD    Assessment:     Tracy Armendariz is a 74 y.o. female who has presented to ED for fall. Patient has visited the ED 1 times in the past 3 months. Patient did   contact PCP.     ED Navigator Initial Assessment    ED Navigator Enrollment Documentation  Consent to Services  Does patient consent to completing the assessment?: Yes  Contact  Method of Initial Contact: Phone  Transportation  Insurance Coverage  Do you have coverage/adequate coverage?: Yes  Specialist Appointment  Did the patient come to the ED to see a specialist?: No  Does the patient have a pending specialist referral?: No  Does the patient have a specialist appointment made?: Yes  PCP Follow Up Appointment  Medications  Is patient able to afford medication?: Yes  Psychological  Food  Communication/Education  Other Financial Concerns  Other Social Barriers/Concerns  Primary Barrier  Scheduled Appointment Date: 25  Plan: Provided information for [facility]  Nurse triage line, [phone] or [facility] ([phone])         Social History     Socioeconomic History    Marital status:    Tobacco Use    Smoking status: Former     Current packs/day: 0.00     Average packs/day: 1.5 packs/day for 29.0 years (43.5 ttl pk-yrs)     Types: Cigarettes     Start date:      Quit date: 1995     Years since quittin.3    Smokeless tobacco: Never   Substance and Sexual Activity    Alcohol use: Yes     Alcohol/week: 1.0 standard drink of alcohol     Types: 1 Glasses of wine per week     Comment: social    Drug use: Never    Sexual activity: Yes     Partners: Male     Birth control/protection: Surgical     Comment:  since      Social Drivers of Health     Financial Resource Strain: Low Risk  (2024)    Overall  Financial Resource Strain (CARDIA)     Difficulty of Paying Living Expenses: Not hard at all   Food Insecurity: No Food Insecurity (6/17/2024)    Hunger Vital Sign     Worried About Running Out of Food in the Last Year: Never true     Ran Out of Food in the Last Year: Never true   Transportation Needs: No Transportation Needs (5/31/2024)    PRAPARE - Transportation     Lack of Transportation (Medical): No     Lack of Transportation (Non-Medical): No   Physical Activity: Inactive (6/17/2024)    Exercise Vital Sign     Days of Exercise per Week: 0 days     Minutes of Exercise per Session: 0 min   Stress: No Stress Concern Present (6/17/2024)    [employer] - Occupational Stress Questionnaire     Feeling of Stress : Only a little   Housing Stability: Unknown (6/17/2024)    Housing Stability Vital Sign     Unable to Pay for Housing in the Last Year: No     Homeless in the Last Year: No       Plan:   Mr Armendariz called back and stated he was on the phone with scheduling but was not sure when his wife's follow up with Neuro surgery was scheduled. Dr Flood' staff have scheduled an x-ray on 5/14/25 at 9:30 and follow up with his PA on 5/14/25 at 10:30 am. Patient will receive an appointment reminder and follow up on 5/13/25. Patient's  denies any new needs or needing any additional assistance at this time.     ED Navigator gave [facility] 24/7 Nurse triage line ([phone] or [facility] ([phone]) contact information, in addition to office contact information if further assistance is needed in the future.

## 2025-05-05 ENCOUNTER — PATIENT MESSAGE (OUTPATIENT)
Dept: INTERNAL MEDICINE | Facility: CLINIC | Age: 75
End: 2025-05-05
Payer: MEDICARE

## 2025-05-05 DIAGNOSIS — S22.009S CLOSED FRACTURE OF THORACIC VERTEBRA, UNSPECIFIED FRACTURE MORPHOLOGY, UNSPECIFIED THORACIC VERTEBRAL LEVEL, SEQUELA: Primary | ICD-10-CM

## 2025-05-06 RX ORDER — HYDROCODONE BITARTRATE AND ACETAMINOPHEN 5; 325 MG/1; MG/1
1 TABLET ORAL EVERY 6 HOURS PRN
Qty: 28 TABLET | Refills: 0 | Status: SHIPPED | OUTPATIENT
Start: 2025-05-06

## 2025-05-06 NOTE — TELEPHONE ENCOUNTER
Pt responding to previous message, seeking refill on Norco that was prescribed on 4/29 by ED physician for closed fracture states that she has been taking Norco and Tylenol and is almost out of Norco    Pt was instructed to f/u with PCP and Dr. Julio Cesar MCGUIRE with Madisyn Villalobos MD , 2/17/2025

## 2025-05-12 NOTE — PROGRESS NOTES
Name: Tracy Armendariz  MRN: [id number]   CSN: [id number]      Date: 5-13-25      Referring physician:  No referring provider defined for this encounter.    Subjective:      Chief Complaint: Parkinson's disease     History of Present Illness (HPI):    Tracy Armendariz is a 74 y.o. RH female personal hx stroke (asa and statin), vertigo (follows ENT) who presents today for a follow-up evaluation of Parkinson's Disease and is accompanied by . Last seen in office 1-6-25. At that time, iPD suspected. Continued carbidopa/levodopa 25/100 BID but rec she take at 10A and 4P. Enc exercise.       ..How many falls have you had in the last 90 days? 6-10  Of those falls, how many have been in the past 30 days? 2  How many hospitalizations have you had in the last 90 days? 2  Of those hospitalizations, how many have been in the last 30 days? 1      Broke back two weeks ago. Fell. Compression fracture. She fell as she was going to sit on the toliet. Slid on floor. Then she fell in closet the next day. She does not recall this. She was trying. She hit back against wall. She did not hit her head but does not recall this fall. She did go to ED on Tuesday.     She is taking carbidopa/levodopa one tab at 10A and 4P.    Tremor a lot when eating or drinking. Also tremors at rest.   Tremor more R sided.   Denies stiffness.   She is aware of slowness.   Balance- not very good.  usually holds onto me since broke back.   She is now on walker for the past month- even before broken back.   She now uses walker in the house.  says this more out of fear.     She has not been to PT for Parkinson's disease.     She does not think she sleeps as much during the day. He agrees but says she still sleeps a lot.     She typically gets up for day at 10AM.  She eats at 10A-2P- 630P   Goes to bed at 10PM.     He has some concerns about her memory.       Review of Systems   Constitutional:  Negative for appetite change.   HENT:  Positive for  trouble swallowing. Negative for drooling.         Smell intact  She does have vertigo- she does spin mostly when she turns.    Respiratory:  Positive for cough (with liquids at times and taking pills).    Gastrointestinal:  Positive for constipation (metamucil helps).   Genitourinary:  Positive for frequency (but drinking more). Negative for urgency.   Musculoskeletal:  Positive for back pain and gait problem.   Neurological:  Positive for tremors. Negative for syncope.   Psychiatric/Behavioral:  Positive for dysphoric mood and sleep disturbance (sleeps well but  says she occ does talk in sleep). Negative for hallucinations. The patient is nervous/anxious.        Past Medical History: The patient  has a past medical history of Allergy, Anemia, unspecified, Anticoagulant long-term use, Arthritis, Cerebral embolism without mention of cerebral infarction (08/25/2014), CVA (cerebral infarction) (2013), Depression, Disorder of kidney and ureter, Diverticulosis of colon, Extrinsic asthma, unspecified, Hematuria, unspecified, Hyperlipidemia, Hypertension, Kidney stone, Left atrial enlargement (12/16/2014), Low back pain, Nephrolithiasis, MARTIN (obstructive sleep apnea), Osteopenia, PUD (peptic ulcer disease), Severe obesity (BMI 35.0-39.9) with comorbidity (12/22/2013), Stroke (12/2013), and Urinary tract infection.    Social History: The patient  reports that she quit smoking about 30 years ago. Her smoking use included cigarettes. She started smoking about 59 years ago. She has a 43.5 pack-year smoking history. She has never used smokeless tobacco. She reports current alcohol use of about 1.0 standard drink of alcohol per week. She reports that she does not use drugs.    Family History: Their family history includes Breast cancer (age of onset: 36) in her daughter; Cancer in her daughter; Cancer (age of onset: 65) in her mother; Cervical cancer in her mother; Coronary artery disease (age of onset: 62) in her  "father; Depression in her brother and father; Diabetes in her sister; Heart disease in her father, maternal grandfather, and sister; Heart failure in her brother and sister; Hypertension in her father and maternal grandfather; Kidney disease in her sister; Lung cancer in her mother; No Known Problems in her son; Stroke in her paternal grandfather; Suicide in her brother.    Allergies: Iodinated contrast media, Gabapentin, Iodine, Isothiazolinones, and Penicillins     Meds: Medications Ordered Prior to Encounter[1]    Objective:     Physical Exam:    Vitals:    05/13/25 1456   BP: 124/68   Pulse: 64   Height: 5' 7" (1.702 m)     Body mass index is 30.38 kg/m².    Constitutional  Well-developed, well-nourished, appears stated age   Cardiovascular  no LE edema bilaterally     ..III.  MOTOR EXAMINATION - last dose 1000- exam 1540       Speech  2 - Monotone, slurred but understandable; moderately impaired.   Facial Expression  2 - Slight but definitely abnormal diminution of facial expression.    Tremor at Rest:      Face, lips, chin 0 - Absent.    Hands:      right 2 - Mild in amplitude and persistent. Or moderate in amplitude, but only intermittently present.    left 1 - Slight and infrequently present.    Feet:      right 0 - Absent.    left 0 - Absent.    Action or Postural Tremor of Hands      right 1 - Slight; present with action.   left 1 - Slight; present with action.   Rigidity      Neck 0 - Absent.   Upper Extremity: Right 1 - Slight or detectable only when activated by mirror or other movements.   Upper Extremity: Left 1 +- Slight or detectable only when activated by mirror or other movements.   Lower Extremity: Right 1 - Slight or detectable only when activated by mirror or other movements.   Lower Extremity: Left 1 - Slight or detectable only when activated by mirror or other movements.   Finger Taps      right 1 - Mild slowing and/or reduction in amplitude.   left 1 - Mild slowing and/or reduction in " amplitude.   Hand Movements      right 0 - Normal.   left 1 - Mild slowing and/or reduction in amplitude.   Rapid Alternating Movements of Hands      right 1 - Mild slowing and/or reduction in amplitude.   left 2 - Moderately impaired. Definite and early fatiguing. May have occasional arrests in movement.   Leg Agility      right 1 - Mild slowing and/or reduction in amplitude.   left 1 +- Mild slowing and/or reduction in amplitude.   Arising from Chair  2 - Pushes self up from arms of sea (WC).   Posture  1 - Not quite erect, slightly stooped posture; could be normal for older person.  Has Davis Back Brace in place   Gait  3 - severe disturbance of gait, requiring assistance.  holds onto top of back brace to help steady.    Postural Stability (Response to sudden, strong posterior displacement produced by pull on shoulders while patient erect with eyes open and feet slightly apart. Patient is prepared, and can have had some practice runs.)  deferred   Body Bradykinesia and Hypokinesia (Combining slowness, hesitancy, decreased armswing, small amplitude, and poverty of movement in general)  1 - Minimal slowness, giving movement a deliberate character; could be normal for some persons. Possibly reduced amplitude.                 Imaging:   No results found. However, due to the size of the patient record, not all encounters were searched. Please check Results Review for a complete set of results.        Assessment and Plan     Parkinsonism, unspecified Parkinsonism type    History of CVA (cerebrovascular accident)    Vertigo    Closed fracture of anterior arch of first thoracic vertebra, initial encounter    Falls        Medical Decision Making:     says he and dtr were talking. In hindsight,  they actually think her pd may have started about 5 years ago.     She is to see NEYDA tomorrow for the first time. She is currently in an Davis back brace.     She will be seen in [facility] Parkinson's disease clinic [place]  3.   Discussed this clinic and what to expect. Suspect she will def need multiple therapies- presuming her back has healed enough.     I will see her back VV in 3 mos and 6 mos in person.       ..Total time: 52 minutes spent on the encounter, which includes face to face time and non-face to face time preparing to see the patient (eg, review of tests), Obtaining and/or reviewing separately obtained history, Documenting clinical information in the electronic or other health record, Independently interpreting results (not separately reported) and communicating results to the patient/family/caregiver, or Care coordination (not separately reported).       ..      Liat Hartmann, ZENAIDA, NP-C  Division of Movement and Memory Disorders  [facility]  [phone]             [1]   Current Outpatient Medications on File Prior to Visit   Medication Sig Dispense Refill    amoxicillin (AMOXIL) 500 MG capsule Take 4 capsules by mouth one hour before appointment 12 capsule 0    aspirin (ECOTRIN) 81 MG EC tablet Take 81 mg by mouth once daily.      atorvastatin (LIPITOR) 40 MG tablet Take 1 tablet (40 mg total) by mouth every evening. 90 tablet 1    azelastine (ASTELIN) 137 mcg (0.1 %) nasal spray 1 spray (137 mcg total) by Nasal route 2 (two) times daily. 30 mL 11    blood sugar diagnostic Strp To check BG 1 times daily, to use with insurance preferred meter 100 each 3    blood-glucose meter Misc To check BG 1 times daily, to use with insurance preferred meter 1 each 0    buPROPion (WELLBUTRIN XL) 300 MG 24 hr tablet Take 1 tablet (300 mg total) by mouth once daily. 90 tablet 3    carbidopa-levodopa  mg (SINEMET)  mg per tablet Take 1 tablet by mouth 3 (three) times daily. 90 tablet 5    diclofenac sodium (VOLTAREN) 1 % Gel APPLY 2-4 GRAMS TO EACH PAINFUL AREA FOUR TIMES DAILY - MAX 32 GRAMS/ g 6    esomeprazole (NEXIUM) 40 MG capsule Take 1 capsule (40 mg total) by mouth daily as needed  (heartburn). 90 capsule 3    fluticasone propionate (FLONASE) 50 mcg/actuation nasal spray 2 sprays (100 mcg total) by Each Nostril route once daily. 16 g 11    HYDROcodone-acetaminophen (NORCO) 5-325 mg per tablet Take 1 tablet by mouth every 6 (six) hours as needed for Pain. 28 tablet 0    lancets Misc To check BG 1 times daily, to use with insurance preferred meter 100 each 3    magnesium oxide (MAG-OX) 400 mg (241.3 mg magnesium) tablet Take 2 tablets (800 mg total) by mouth 2 (two) times daily. 360 tablet 3    meclizine (ANTIVERT) 12.5 mg tablet Take 12.5 mg by mouth 3 (three) times daily as needed for Dizziness.      modafiniL (PROVIGIL) 200 MG Tab Take 1 tablet (200 mg total) by mouth daily as needed (sleepiness). 30 tablet 2    mupirocin (BACTROBAN) 2 % ointment Apply topically 3 (three) times daily. 22 g 0    mupirocin (BACTROBAN) 2 % ointment Apply to affected area 3 times daily for 2-3 days. 22 g 1    ondansetron (ZOFRAN-ODT) 4 MG TbDL Take 1 tablet (4 mg total) by mouth every 8 (eight) hours as needed (nausea). 20 tablet 2    potassium citrate (UROCIT-K 10) 10 mEq (1,080 mg) TbSR Take 2 tablets (20 mEq total) by mouth 3 (three) times daily with meals. 180 tablet 11    semaglutide (OZEMPIC) 2 mg/dose (8 mg/3 mL) PnIj Inject 2 mg into the skin every 7 days. 9 mL 1    sertraline (ZOLOFT) 100 MG tablet Take 1.5 tablets (150 mg total) by mouth once daily. 135 tablet 3    vitamin D (VITAMIN D3) 1000 units Tab Take 1,000 Units by mouth 3 (three) times a week.      conjugated estrogens (PREMARIN) vaginal cream Place 0.5 g vaginally 3 (three) times a week. 30 g 0     Current Facility-Administered Medications on File Prior to Visit   Medication Dose Route Frequency Provider Last Rate Last Admin    DOBUTamine 50 mg in D5W 50 mL (1 mg/mL) cardiac stress test infusion  10 mcg/kg/min Intravenous Madisyn Lyons MD   Stopped at 07/23/24 1512    perflutren protein-A microsphr 0.22 mg/mL IV susp  0.5 mL  Intravenous Once Evelio Barros MD

## 2025-05-13 ENCOUNTER — PATIENT OUTREACH (OUTPATIENT)
Facility: OTHER | Age: 75
End: 2025-05-13
Payer: MEDICARE

## 2025-05-13 ENCOUNTER — OFFICE VISIT (OUTPATIENT)
Facility: CLINIC | Age: 75
End: 2025-05-13
Payer: MEDICARE

## 2025-05-13 VITALS
BODY MASS INDEX: 30.38 KG/M2 | SYSTOLIC BLOOD PRESSURE: 124 MMHG | HEART RATE: 64 BPM | DIASTOLIC BLOOD PRESSURE: 68 MMHG | HEIGHT: 67 IN

## 2025-05-13 DIAGNOSIS — G20.C PARKINSONISM, UNSPECIFIED PARKINSONISM TYPE: Primary | ICD-10-CM

## 2025-05-13 DIAGNOSIS — R29.6 FALLS: ICD-10-CM

## 2025-05-13 DIAGNOSIS — R42 VERTIGO: ICD-10-CM

## 2025-05-13 DIAGNOSIS — Z86.73 HISTORY OF CVA (CEREBROVASCULAR ACCIDENT): ICD-10-CM

## 2025-05-13 DIAGNOSIS — S22.019A: ICD-10-CM

## 2025-05-13 PROCEDURE — 3061F NEG MICROALBUMINURIA REV: CPT | Mod: CPTII,S$GLB,, | Performed by: NURSE PRACTITIONER

## 2025-05-13 PROCEDURE — 3288F FALL RISK ASSESSMENT DOCD: CPT | Mod: CPTII,S$GLB,, | Performed by: NURSE PRACTITIONER

## 2025-05-13 PROCEDURE — 1160F RVW MEDS BY RX/DR IN RCRD: CPT | Mod: CPTII,S$GLB,, | Performed by: NURSE PRACTITIONER

## 2025-05-13 PROCEDURE — 1125F AMNT PAIN NOTED PAIN PRSNT: CPT | Mod: CPTII,S$GLB,, | Performed by: NURSE PRACTITIONER

## 2025-05-13 PROCEDURE — 3008F BODY MASS INDEX DOCD: CPT | Mod: CPTII,S$GLB,, | Performed by: NURSE PRACTITIONER

## 2025-05-13 PROCEDURE — 1159F MED LIST DOCD IN RCRD: CPT | Mod: CPTII,S$GLB,, | Performed by: NURSE PRACTITIONER

## 2025-05-13 PROCEDURE — 3074F SYST BP LT 130 MM HG: CPT | Mod: CPTII,S$GLB,, | Performed by: NURSE PRACTITIONER

## 2025-05-13 PROCEDURE — 99215 OFFICE O/P EST HI 40 MIN: CPT | Mod: S$GLB,,, | Performed by: NURSE PRACTITIONER

## 2025-05-13 PROCEDURE — 1100F PTFALLS ASSESS-DOCD GE2>/YR: CPT | Mod: CPTII,S$GLB,, | Performed by: NURSE PRACTITIONER

## 2025-05-13 PROCEDURE — G2211 COMPLEX E/M VISIT ADD ON: HCPCS | Mod: S$GLB,,, | Performed by: NURSE PRACTITIONER

## 2025-05-13 PROCEDURE — 3078F DIAST BP <80 MM HG: CPT | Mod: CPTII,S$GLB,, | Performed by: NURSE PRACTITIONER

## 2025-05-13 PROCEDURE — 99999 PR PBB SHADOW E&M-EST. PATIENT-LVL IV: CPT | Mod: PBBFAC,,, | Performed by: NURSE PRACTITIONER

## 2025-05-13 PROCEDURE — 3066F NEPHROPATHY DOC TX: CPT | Mod: CPTII,S$GLB,, | Performed by: NURSE PRACTITIONER

## 2025-05-13 PROCEDURE — 3044F HG A1C LEVEL LT 7.0%: CPT | Mod: CPTII,S$GLB,, | Performed by: NURSE PRACTITIONER

## 2025-05-13 NOTE — PROGRESS NOTES
ED navigator reminded patient about appointment for Wednesday (5/14/25) at 9:30 am for x-rays and 10:30 am with NGA Piña in Neurosurgery through voicemail, as they did not answer the call. ED navigator to close encounter at this time.

## 2025-05-14 ENCOUNTER — HOSPITAL ENCOUNTER (OUTPATIENT)
Dept: RADIOLOGY | Facility: HOSPITAL | Age: 75
Discharge: HOME | End: 2025-05-14
Attending: NEUROLOGICAL SURGERY
Payer: MEDICARE

## 2025-05-14 ENCOUNTER — TELEPHONE (OUTPATIENT)
Facility: CLINIC | Age: 75
End: 2025-05-14
Payer: MEDICARE

## 2025-05-14 ENCOUNTER — OFFICE VISIT (OUTPATIENT)
Dept: NEUROSURGERY | Facility: CLINIC | Age: 75
End: 2025-05-14
Payer: MEDICARE

## 2025-05-14 VITALS
HEIGHT: 67 IN | WEIGHT: 194 LBS | HEART RATE: 63 BPM | SYSTOLIC BLOOD PRESSURE: 160 MMHG | BODY MASS INDEX: 30.45 KG/M2 | DIASTOLIC BLOOD PRESSURE: 79 MMHG

## 2025-05-14 DIAGNOSIS — S22.080D COMPRESSION FRACTURE OF T12 VERTEBRA WITH ROUTINE HEALING, SUBSEQUENT ENCOUNTER: Primary | ICD-10-CM

## 2025-05-14 DIAGNOSIS — M54.9 BACK PAIN, UNSPECIFIED BACK LOCATION, UNSPECIFIED BACK PAIN LATERALITY, UNSPECIFIED CHRONICITY: ICD-10-CM

## 2025-05-14 PROCEDURE — 99214 OFFICE O/P EST MOD 30 MIN: CPT | Mod: S$GLB,,, | Performed by: PHYSICIAN ASSISTANT

## 2025-05-14 PROCEDURE — 72070 X-RAY EXAM THORAC SPINE 2VWS: CPT | Mod: 26,,, | Performed by: RADIOLOGY

## 2025-05-14 PROCEDURE — 1159F MED LIST DOCD IN RCRD: CPT | Mod: CPTII,S$GLB,, | Performed by: PHYSICIAN ASSISTANT

## 2025-05-14 PROCEDURE — 3044F HG A1C LEVEL LT 7.0%: CPT | Mod: CPTII,S$GLB,, | Performed by: PHYSICIAN ASSISTANT

## 2025-05-14 PROCEDURE — 99999 PR PBB SHADOW E&M-EST. PATIENT-LVL V: CPT | Mod: PBBFAC,,, | Performed by: PHYSICIAN ASSISTANT

## 2025-05-14 PROCEDURE — 3008F BODY MASS INDEX DOCD: CPT | Mod: CPTII,S$GLB,, | Performed by: PHYSICIAN ASSISTANT

## 2025-05-14 PROCEDURE — 3078F DIAST BP <80 MM HG: CPT | Mod: CPTII,S$GLB,, | Performed by: PHYSICIAN ASSISTANT

## 2025-05-14 PROCEDURE — 3061F NEG MICROALBUMINURIA REV: CPT | Mod: CPTII,S$GLB,, | Performed by: PHYSICIAN ASSISTANT

## 2025-05-14 PROCEDURE — 1160F RVW MEDS BY RX/DR IN RCRD: CPT | Mod: CPTII,S$GLB,, | Performed by: PHYSICIAN ASSISTANT

## 2025-05-14 PROCEDURE — 72070 X-RAY EXAM THORAC SPINE 2VWS: CPT | Mod: TC,FY

## 2025-05-14 PROCEDURE — 1125F AMNT PAIN NOTED PAIN PRSNT: CPT | Mod: CPTII,S$GLB,, | Performed by: PHYSICIAN ASSISTANT

## 2025-05-14 PROCEDURE — 1101F PT FALLS ASSESS-DOCD LE1/YR: CPT | Mod: CPTII,S$GLB,, | Performed by: PHYSICIAN ASSISTANT

## 2025-05-14 PROCEDURE — 3077F SYST BP >= 140 MM HG: CPT | Mod: CPTII,S$GLB,, | Performed by: PHYSICIAN ASSISTANT

## 2025-05-14 PROCEDURE — 3288F FALL RISK ASSESSMENT DOCD: CPT | Mod: CPTII,S$GLB,, | Performed by: PHYSICIAN ASSISTANT

## 2025-05-14 PROCEDURE — 3066F NEPHROPATHY DOC TX: CPT | Mod: CPTII,S$GLB,, | Performed by: PHYSICIAN ASSISTANT

## 2025-05-14 NOTE — TELEPHONE ENCOUNTER
Called patient to schedule 6 month follow up with ELMER Hartmann as requested by ELMER Hartmann via secure chat. Patient confirmed appt time and date.

## 2025-05-14 NOTE — PROGRESS NOTES
Subjective:     Patient ID:  Tracy Armendariz is a 74 y.o. female.    Julio Cesar    Chief Complaint:  Low back pain    HPI   05/14/2025    Tracy Armendariz is a 74 y.o. female who presents with the above CC.  Patient had a fall on April 27th onto her back buttocks.  She went to the emergency room where the CT scan showed a T12 fracture that was new and it old T10 fracture.  Patient complains of low back pain.  No pain radiating to the legs or feet.  She has been wearing the LSO brace.  She took the extension piece off because it was bothering her neck.  She had previous spine surgery at L4-5 in the past.  No epidural steroid injections in the past.  She takes hydrocodone and Tylenol as needed.    Patient denies any recent accidents or trauma, no saddle anesthesias, and no bowel or bladder incontinence.      Review of Systems:    Review of Systems   Constitutional:  Negative for chills, diaphoresis, fever, malaise/fatigue and weight loss.   HENT:  Negative for congestion, ear discharge, ear pain, hearing loss, nosebleeds, sinus pain, sore throat and tinnitus.    Eyes:  Negative for blurred vision, double vision, photophobia, pain, discharge and redness.   Respiratory:  Negative for cough, hemoptysis, sputum production, shortness of breath, wheezing and stridor.    Cardiovascular:  Negative for chest pain, palpitations, orthopnea, leg swelling and PND.   Gastrointestinal:  Negative for abdominal pain, blood in stool, constipation, diarrhea, heartburn, melena, nausea and vomiting.   Genitourinary:  Negative for dysuria, flank pain, frequency, hematuria and urgency.   Musculoskeletal:  Positive for back pain and myalgias. Negative for falls, joint pain and neck pain.   Skin:  Negative for itching and rash.   Neurological:  Negative for dizziness, tingling, tremors, sensory change, speech change, seizures, loss of consciousness, weakness and headaches.   Endo/Heme/Allergies:  Negative for environmental allergies and  polydipsia. Does not bruise/bleed easily.   Psychiatric/Behavioral:  Negative for depression, hallucinations, memory loss and substance abuse. The patient is not nervous/anxious and does not have insomnia.          Past Medical History:   Diagnosis Date    Allergy     Anemia, unspecified     Anticoagulant long-term use     Arthritis     Cerebral embolism without mention of cerebral infarction 2014    Dx updated per  IMO Load      CVA (cerebral infarction)     Depression     Disorder of kidney and ureter     renal stones    Diverticulosis of colon     Extrinsic asthma, unspecified     Hematuria, unspecified     Hyperlipidemia     Hypertension     Kidney stone     Left atrial enlargement 2014    Low back pain     Nephrolithiasis     MARTIN (obstructive sleep apnea)     Osteopenia     PUD (peptic ulcer disease)     Severe obesity (BMI 35.0-39.9) with comorbidity 2013    Stroke 2013    Urinary tract infection      Past Surgical History:   Procedure Laterality Date    APPENDECTOMY      @ time of hysterectomy    BACK SURGERY      CATARACT EXTRACTION       SECTION      CHOLECYSTECTOMY      laparoscopic    COLONOSCOPY N/A 2018    Procedure: COLONOSCOPY/Golytely;  Surgeon: Janine Black MD;  Location: [facility];  Service: Endoscopy;  Laterality: N/A;    CYSTOSCOPY W/ RETROGRADES  2022    Procedure: CYSTOSCOPY, WITH RETROGRADE PYELOGRAM;  Surgeon: Mitchell Recinos MD;  Location: [facility] OR;  Service: Urology;;    CYSTOSCOPY W/ URETERAL STENT PLACEMENT Left 2022    Procedure: CYSTOSCOPY, WITH URETERAL STENT INSERTION;  Surgeon: Mitchell Recinos MD;  Location: [facility] OR;  Service: Urology;  Laterality: Left;    CYSTOURETEROSCOPY, WITH HOLMIUM LASER LITHOTRIPSY OF URETERAL CALCULUS AND STENT INSERTION Left 2022    Procedure: left ureteroscopy, holmium laser lithotripsy, stone basketing, retrograde pyelogram, stent placement;  Surgeon: Anaya Zamora MD;  Location:  [facility] OR;  Service: Urology;  Laterality: Left;    DILATION AND CURETTAGE OF UTERUS  1972    EXTRACTION - STONE Left 12/21/2022    Procedure: EXTRACTION - STONE;  Surgeon: Anaya Zamora MD;  Location: [facility] OR;  Service: Urology;  Laterality: Left;    HYSTERECTOMY  1978    TAHUSO with appendectomy    INNER EAR SURGERY      replaced ear drum    JOINT REPLACEMENT Right     knee    KNEE ARTHROSCOPY W/ DEBRIDEMENT  2003    LUMBAR DISCECTOMY  1980    L4-L5    OOPHORECTOMY      unilateral    RETROGRADE PYELOGRAPHY  12/21/2022    Procedure: PYELOGRAM, RETROGRADE;  Surgeon: Anaya Zamora MD;  Location: [facility] OR;  Service: Urology;;    TONSILLECTOMY      TYMPANOPLASTY      URETEROSCOPIC REMOVAL OF URETERIC CALCULUS Left 12/19/2018    Procedure: REMOVAL, CALCULUS, URETER, URETEROSCOPIC, holmium laser lithotripsy, stone basket extraction, retrograde pyelogram, ureteral stent exchange;  Surgeon: Anaya Zamora MD;  Location: [facility] OR;  Service: Urology;  Laterality: Left;     Medications Ordered Prior to Encounter[1]  Review of patient's allergies indicates:   Allergen Reactions    Iodinated contrast media Hives and Rash    Gabapentin Hallucinations    Iodine Hives    Isothiazolinones Rash    Penicillins Rash     Social History[2]  Family History   Problem Relation Name Age of Onset    Cervical cancer Mother      Cancer Mother  65        lung cancer - non smoker    Lung cancer Mother      Depression Father      Hypertension Father      Coronary artery disease Father  62    Heart disease Father      Heart failure Sister x1     Diabetes Sister x1     Heart disease Sister x1     Kidney disease Sister x1     Depression Brother x2     Suicide Brother x2     Heart failure Brother x2     Cancer Daughter x2     Breast cancer Daughter x2 36    No Known Problems Son x1     Hypertension Maternal Grandfather      Heart disease Maternal Grandfather      Stroke Paternal Grandfather      Colon cancer Neg Hx      Ovarian cancer Neg Hx    "      Objective:      Vitals:    05/14/25 1007   BP: (!) 160/79   Pulse: 63   Weight: 88 kg (194 lb 0.1 oz)   Height: 5' 7" (1.702 m)   PainSc:   8   PainLoc: Back         Physical Exam: Exam done in the wheelchair      General:  Tracy Armendariz is well-developed, well-nourished, appears stated age, in no acute distress, alert and oriented to person, place, and time.    Pulmonary/Chest:  Respiratory effort normal  Abdominal: Exhibits no distension  Psychiatric:  Normal mood and affect.  Behavior is normal.  Judgement and thought content normal      Musculoskeletal:        Lumbar Spine Inspection:  Normal with no surgical scars and no visible rashes.    Lumbar Spine Palpation:  No tenderness to low back palpation.      Neurological:     Muscle strength against resistance:     Right Left   Hip flexion  5 / 5 5 / 5   Hip extension 5 / 5 5 / 5   Hip abduction 5 / 5 5 / 5   Hip adduction  5 / 5 5 / 5   Knee extension  5 / 5 5 / 5   Knee flexion 5 / 5 5 / 5   Dorsiflexion  5 / 5 5 / 5   EHL  5 / 5 5 / 5   Plantar flexion  5 / 5 5 / 5   Inversion of the feet 5 / 5 5 / 5   Eversion of the feet  5 / 5 5 / 5       Reflexes:     Right Left   Patellar 2+ 2+   Achilles 2+ 2+     Clonus:  Negative bilaterally    On gross examination of the bilateral upper extremities, patient has full painfree ROM with no signs of clubbing, cyanosis, edema, or weakness.       CT Interpretation:     CT thoracic spine personally reviewed which has old T10 fracture and new T12 fracture without retropulsion.      Thoracic spine x-ray from today 2025 shows stable alignment of T10 and T12.      Assessment:          1. Compression fracture of T12 vertebra with routine healing, subsequent encounter            Plan:          Orders Placed This Encounter    X-Ray Thoracolumbar Spine AP Lateral       T12 fracture     -wear back brace at all times except when sleeping and showering   -no bending twisting lifting more than 10 lb  -follow up in 6 weeks with " repeat x-ray   -pain medication and Tylenol as needed    Follow-Up:  Follow up in about 6 weeks (around 6/25/2025). If there are any questions prior to this, the patient was instructed to contact the office.       Freda Piña, [facility], PASalimaC  Neurosurgery  [facility]  05/14/2025             [1]   Current Outpatient Medications on File Prior to Visit   Medication Sig Dispense Refill    amoxicillin (AMOXIL) 500 MG capsule Take 4 capsules by mouth one hour before appointment 12 capsule 0    aspirin (ECOTRIN) 81 MG EC tablet Take 81 mg by mouth once daily.      atorvastatin (LIPITOR) 40 MG tablet Take 1 tablet (40 mg total) by mouth every evening. 90 tablet 1    azelastine (ASTELIN) 137 mcg (0.1 %) nasal spray 1 spray (137 mcg total) by Nasal route 2 (two) times daily. 30 mL 11    blood sugar diagnostic Strp To check BG 1 times daily, to use with insurance preferred meter 100 each 3    blood-glucose meter Misc To check BG 1 times daily, to use with insurance preferred meter 1 each 0    buPROPion (WELLBUTRIN XL) 300 MG 24 hr tablet Take 1 tablet (300 mg total) by mouth once daily. 90 tablet 3    carbidopa-levodopa  mg (SINEMET)  mg per tablet Take 1 tablet by mouth 3 (three) times daily. 90 tablet 5    diclofenac sodium (VOLTAREN) 1 % Gel APPLY 2-4 GRAMS TO EACH PAINFUL AREA FOUR TIMES DAILY - MAX 32 GRAMS/ g 6    esomeprazole (NEXIUM) 40 MG capsule Take 1 capsule (40 mg total) by mouth daily as needed (heartburn). 90 capsule 3    fluticasone propionate (FLONASE) 50 mcg/actuation nasal spray 2 sprays (100 mcg total) by Each Nostril route once daily. 16 g 11    HYDROcodone-acetaminophen (NORCO) 5-325 mg per tablet Take 1 tablet by mouth every 6 (six) hours as needed for Pain. 28 tablet 0    lancets Misc To check BG 1 times daily, to use with insurance preferred meter 100 each 3    magnesium oxide (MAG-OX) 400 mg (241.3 mg magnesium) tablet Take 2 tablets (800 mg total) by mouth 2 (two) times  daily. 360 tablet 3    meclizine (ANTIVERT) 12.5 mg tablet Take 12.5 mg by mouth 3 (three) times daily as needed for Dizziness.      modafiniL (PROVIGIL) 200 MG Tab Take 1 tablet (200 mg total) by mouth daily as needed (sleepiness). 30 tablet 2    mupirocin (BACTROBAN) 2 % ointment Apply topically 3 (three) times daily. 22 g 0    mupirocin (BACTROBAN) 2 % ointment Apply to affected area 3 times daily for 2-3 days. 22 g 1    ondansetron (ZOFRAN-ODT) 4 MG TbDL Take 1 tablet (4 mg total) by mouth every 8 (eight) hours as needed (nausea). 20 tablet 2    potassium citrate (UROCIT-K 10) 10 mEq (1,080 mg) TbSR Take 2 tablets (20 mEq total) by mouth 3 (three) times daily with meals. 180 tablet 11    semaglutide (OZEMPIC) 2 mg/dose (8 mg/3 mL) PnIj Inject 2 mg into the skin every 7 days. 9 mL 1    sertraline (ZOLOFT) 100 MG tablet Take 1.5 tablets (150 mg total) by mouth once daily. 135 tablet 3    vitamin D (VITAMIN D3) 1000 units Tab Take 1,000 Units by mouth 3 (three) times a week.      conjugated estrogens (PREMARIN) vaginal cream Place 0.5 g vaginally 3 (three) times a week. 30 g 0     Current Facility-Administered Medications on File Prior to Visit   Medication Dose Route Frequency Provider Last Rate Last Admin    DOBUTamine 50 mg in D5W 50 mL (1 mg/mL) cardiac stress test infusion  10 mcg/kg/min Intravenous Continuous Madisyn Villalobos MD   Stopped at 24 1512    perflutren protein-A microsphr 0.22 mg/mL IV susp  0.5 mL Intravenous Once Evelio Barros MD       [2]   Social History  Socioeconomic History    Marital status:    Tobacco Use    Smoking status: Former     Current packs/day: 0.00     Average packs/day: 1.5 packs/day for 29.0 years (43.5 ttl pk-yrs)     Types: Cigarettes     Start date:      Quit date: 1995     Years since quittin.3    Smokeless tobacco: Never   Substance and Sexual Activity    Alcohol use: Yes     Alcohol/week: 1.0 standard drink of alcohol     Types: 1  Glasses of wine per week     Comment: social    Drug use: Never    Sexual activity: Yes     Partners: Male     Birth control/protection: Surgical     Comment:  since 1972     Social Drivers of Health     Financial Resource Strain: Low Risk  (6/17/2024)    Overall Financial Resource Strain (CARDIA)     Difficulty of Paying Living Expenses: Not hard at all   Food Insecurity: No Food Insecurity (6/17/2024)    Hunger Vital Sign     Worried About Running Out of Food in the Last Year: Never true     Ran Out of Food in the Last Year: Never true   Transportation Needs: No Transportation Needs (5/31/2024)    PRAPARE - Transportation     Lack of Transportation (Medical): No     Lack of Transportation (Non-Medical): No   Physical Activity: Inactive (6/17/2024)    Exercise Vital Sign     Days of Exercise per Week: 0 days     Minutes of Exercise per Session: 0 min   Stress: No Stress Concern Present (6/17/2024)    [employer] [place] [employer] - Occupational Stress Questionnaire     Feeling of Stress : Only a little   Housing Stability: Unknown (6/17/2024)    Housing Stability Vital Sign     Unable to Pay for Housing in the Last Year: No     Homeless in the Last Year: No

## 2025-05-26 DIAGNOSIS — Z00.00 ENCOUNTER FOR MEDICARE ANNUAL WELLNESS EXAM: ICD-10-CM

## 2025-06-03 ENCOUNTER — CLINICAL SUPPORT (OUTPATIENT)
Dept: REHABILITATION | Facility: HOSPITAL | Age: 75
End: 2025-06-03
Attending: STUDENT IN AN ORGANIZED HEALTH CARE EDUCATION/TRAINING PROGRAM
Payer: MEDICARE

## 2025-06-03 ENCOUNTER — OFFICE VISIT (OUTPATIENT)
Facility: CLINIC | Age: 75
End: 2025-06-03
Payer: MEDICARE

## 2025-06-03 VITALS
WEIGHT: 202 LBS | DIASTOLIC BLOOD PRESSURE: 66 MMHG | BODY MASS INDEX: 31.71 KG/M2 | SYSTOLIC BLOOD PRESSURE: 130 MMHG | HEIGHT: 67 IN | HEART RATE: 56 BPM

## 2025-06-03 DIAGNOSIS — Z74.09 IMPAIRED FUNCTIONAL MOBILITY AND ACTIVITY TOLERANCE: Primary | ICD-10-CM

## 2025-06-03 DIAGNOSIS — G20.A1 PARKINSON'S DISEASE, UNSPECIFIED WHETHER DYSKINESIA PRESENT, UNSPECIFIED WHETHER MANIFESTATIONS FLUCTUATE: ICD-10-CM

## 2025-06-03 DIAGNOSIS — G20.C PARKINSONISM, UNSPECIFIED PARKINSONISM TYPE: Primary | ICD-10-CM

## 2025-06-03 DIAGNOSIS — Z86.73 HISTORY OF STROKE: ICD-10-CM

## 2025-06-03 DIAGNOSIS — R47.1 DYSARTHRIA: ICD-10-CM

## 2025-06-03 DIAGNOSIS — R49.0 DYSPHONIA: ICD-10-CM

## 2025-06-03 DIAGNOSIS — R13.10 DYSPHAGIA, UNSPECIFIED TYPE: Primary | ICD-10-CM

## 2025-06-03 PROCEDURE — 3288F FALL RISK ASSESSMENT DOCD: CPT | Mod: CPTII,S$GLB,, | Performed by: STUDENT IN AN ORGANIZED HEALTH CARE EDUCATION/TRAINING PROGRAM

## 2025-06-03 PROCEDURE — 3066F NEPHROPATHY DOC TX: CPT | Mod: CPTII,S$GLB,, | Performed by: STUDENT IN AN ORGANIZED HEALTH CARE EDUCATION/TRAINING PROGRAM

## 2025-06-03 PROCEDURE — 92522 EVALUATE SPEECH PRODUCTION: CPT

## 2025-06-03 PROCEDURE — G2211 COMPLEX E/M VISIT ADD ON: HCPCS | Mod: S$GLB,,, | Performed by: STUDENT IN AN ORGANIZED HEALTH CARE EDUCATION/TRAINING PROGRAM

## 2025-06-03 PROCEDURE — 3075F SYST BP GE 130 - 139MM HG: CPT | Mod: CPTII,S$GLB,, | Performed by: STUDENT IN AN ORGANIZED HEALTH CARE EDUCATION/TRAINING PROGRAM

## 2025-06-03 PROCEDURE — 3078F DIAST BP <80 MM HG: CPT | Mod: CPTII,S$GLB,, | Performed by: STUDENT IN AN ORGANIZED HEALTH CARE EDUCATION/TRAINING PROGRAM

## 2025-06-03 PROCEDURE — 99999 PR PBB SHADOW E&M-EST. PATIENT-LVL IV: CPT | Mod: PBBFAC,,,

## 2025-06-03 PROCEDURE — 97167 OT EVAL HIGH COMPLEX 60 MIN: CPT

## 2025-06-03 PROCEDURE — 1159F MED LIST DOCD IN RCRD: CPT | Mod: CPTII,S$GLB,, | Performed by: STUDENT IN AN ORGANIZED HEALTH CARE EDUCATION/TRAINING PROGRAM

## 2025-06-03 PROCEDURE — 3008F BODY MASS INDEX DOCD: CPT | Mod: CPTII,S$GLB,, | Performed by: STUDENT IN AN ORGANIZED HEALTH CARE EDUCATION/TRAINING PROGRAM

## 2025-06-03 PROCEDURE — 3044F HG A1C LEVEL LT 7.0%: CPT | Mod: CPTII,S$GLB,, | Performed by: STUDENT IN AN ORGANIZED HEALTH CARE EDUCATION/TRAINING PROGRAM

## 2025-06-03 PROCEDURE — 1123F ACP DISCUSS/DSCN MKR DOCD: CPT | Mod: CPTII,S$GLB,, | Performed by: STUDENT IN AN ORGANIZED HEALTH CARE EDUCATION/TRAINING PROGRAM

## 2025-06-03 PROCEDURE — 99367 TEAM CONF W/O PAT BY PHYS: CPT | Mod: S$GLB,,, | Performed by: STUDENT IN AN ORGANIZED HEALTH CARE EDUCATION/TRAINING PROGRAM

## 2025-06-03 PROCEDURE — 1100F PTFALLS ASSESS-DOCD GE2>/YR: CPT | Mod: CPTII,S$GLB,, | Performed by: STUDENT IN AN ORGANIZED HEALTH CARE EDUCATION/TRAINING PROGRAM

## 2025-06-03 PROCEDURE — 97161 PT EVAL LOW COMPLEX 20 MIN: CPT

## 2025-06-03 PROCEDURE — 3061F NEG MICROALBUMINURIA REV: CPT | Mod: CPTII,S$GLB,, | Performed by: STUDENT IN AN ORGANIZED HEALTH CARE EDUCATION/TRAINING PROGRAM

## 2025-06-03 PROCEDURE — 99215 OFFICE O/P EST HI 40 MIN: CPT | Mod: S$GLB,,, | Performed by: STUDENT IN AN ORGANIZED HEALTH CARE EDUCATION/TRAINING PROGRAM

## 2025-06-03 PROCEDURE — 92610 EVALUATE SWALLOWING FUNCTION: CPT

## 2025-06-05 ENCOUNTER — CLINICAL SUPPORT (OUTPATIENT)
Dept: REHABILITATION | Facility: HOSPITAL | Age: 75
End: 2025-06-05
Payer: MEDICARE

## 2025-06-05 DIAGNOSIS — Z78.9 DECREASED INDEPENDENCE WITH ACTIVITIES OF DAILY LIVING: ICD-10-CM

## 2025-06-05 DIAGNOSIS — H53.40 VISUAL FIELD CUT: Primary | ICD-10-CM

## 2025-06-05 DIAGNOSIS — Z78.9 IMPAIRED INSTRUMENTAL ACTIVITIES OF DAILY LIVING (IADL): ICD-10-CM

## 2025-06-05 PROCEDURE — 97530 THERAPEUTIC ACTIVITIES: CPT | Mod: PN

## 2025-06-10 DIAGNOSIS — G20.C PRIMARY PARKINSONISM: ICD-10-CM

## 2025-06-10 RX ORDER — CARBIDOPA AND LEVODOPA 25; 100 MG/1; MG/1
1 TABLET ORAL 3 TIMES DAILY
Qty: 90 TABLET | Refills: 5 | Status: CANCELLED | OUTPATIENT
Start: 2025-06-10 | End: 2026-06-10

## 2025-06-11 DIAGNOSIS — G20.C PRIMARY PARKINSONISM: ICD-10-CM

## 2025-06-11 RX ORDER — CARBIDOPA AND LEVODOPA 25; 100 MG/1; MG/1
1 TABLET ORAL 3 TIMES DAILY
Qty: 90 TABLET | Refills: 5 | Status: SHIPPED | OUTPATIENT
Start: 2025-06-11 | End: 2026-06-11

## 2025-06-12 ENCOUNTER — LAB VISIT (OUTPATIENT)
Dept: LAB | Facility: HOSPITAL | Age: 75
End: 2025-06-12
Attending: HOSPITALIST
Payer: MEDICARE

## 2025-06-12 ENCOUNTER — CLINICAL SUPPORT (OUTPATIENT)
Dept: REHABILITATION | Facility: HOSPITAL | Age: 75
End: 2025-06-12
Payer: MEDICARE

## 2025-06-12 DIAGNOSIS — I12.9 TYPE 2 DIABETES MELLITUS WITH STAGE 3A CHRONIC KIDNEY DISEASE AND HYPERTENSION: ICD-10-CM

## 2025-06-12 DIAGNOSIS — N18.31 TYPE 2 DIABETES MELLITUS WITH STAGE 3A CHRONIC KIDNEY DISEASE AND HYPERTENSION: ICD-10-CM

## 2025-06-12 DIAGNOSIS — H53.40 VISUAL FIELD CUT: Primary | ICD-10-CM

## 2025-06-12 DIAGNOSIS — G47.33 OSA (OBSTRUCTIVE SLEEP APNEA): ICD-10-CM

## 2025-06-12 DIAGNOSIS — E78.2 MIXED HYPERLIPIDEMIA: ICD-10-CM

## 2025-06-12 DIAGNOSIS — F33.1 MODERATE EPISODE OF RECURRENT MAJOR DEPRESSIVE DISORDER: ICD-10-CM

## 2025-06-12 DIAGNOSIS — Z78.9 IMPAIRED INSTRUMENTAL ACTIVITIES OF DAILY LIVING (IADL): ICD-10-CM

## 2025-06-12 DIAGNOSIS — I10 ESSENTIAL HYPERTENSION: ICD-10-CM

## 2025-06-12 DIAGNOSIS — Z78.9 DECREASED INDEPENDENCE WITH ACTIVITIES OF DAILY LIVING: ICD-10-CM

## 2025-06-12 DIAGNOSIS — E11.22 TYPE 2 DIABETES MELLITUS WITH STAGE 3A CHRONIC KIDNEY DISEASE AND HYPERTENSION: ICD-10-CM

## 2025-06-12 LAB
ABSOLUTE EOSINOPHIL (OHS): 0.3 K/UL
ABSOLUTE MONOCYTE (OHS): 0.71 K/UL (ref 0.3–1)
ABSOLUTE NEUTROPHIL COUNT (OHS): 3.22 K/UL (ref 1.8–7.7)
ALBUMIN SERPL BCP-MCNC: 3.5 G/DL (ref 3.5–5.2)
ALP SERPL-CCNC: 90 UNIT/L (ref 40–150)
ALT SERPL W/O P-5'-P-CCNC: 15 UNIT/L (ref 10–44)
ANION GAP (OHS): 10 MMOL/L (ref 8–16)
AST SERPL-CCNC: 21 UNIT/L (ref 11–45)
BASOPHILS # BLD AUTO: 0.07 K/UL
BASOPHILS NFR BLD AUTO: 1.1 %
BILIRUB SERPL-MCNC: 0.5 MG/DL (ref 0.1–1)
BUN SERPL-MCNC: 16 MG/DL (ref 8–23)
CALCIUM SERPL-MCNC: 9.8 MG/DL (ref 8.7–10.5)
CHLORIDE SERPL-SCNC: 105 MMOL/L (ref 95–110)
CHOLEST SERPL-MCNC: 187 MG/DL (ref 120–199)
CHOLEST/HDLC SERPL: 1.7 {RATIO} (ref 2–5)
CO2 SERPL-SCNC: 25 MMOL/L (ref 23–29)
CREAT SERPL-MCNC: 1 MG/DL (ref 0.5–1.4)
EAG (OHS): 105 MG/DL (ref 68–131)
ERYTHROCYTE [DISTWIDTH] IN BLOOD BY AUTOMATED COUNT: 13.1 % (ref 11.5–14.5)
GFR SERPLBLD CREATININE-BSD FMLA CKD-EPI: 59 ML/MIN/1.73/M2
GLUCOSE SERPL-MCNC: 100 MG/DL (ref 70–110)
HBA1C MFR BLD: 5.3 % (ref 4–5.6)
HCT VFR BLD AUTO: 42.6 % (ref 37–48.5)
HDLC SERPL-MCNC: 107 MG/DL (ref 40–75)
HDLC SERPL: 57.2 % (ref 20–50)
HGB BLD-MCNC: 13.5 GM/DL (ref 12–16)
IMM GRANULOCYTES # BLD AUTO: 0.01 K/UL (ref 0–0.04)
IMM GRANULOCYTES NFR BLD AUTO: 0.2 % (ref 0–0.5)
LDLC SERPL CALC-MCNC: 60 MG/DL (ref 63–159)
LYMPHOCYTES # BLD AUTO: 2.24 K/UL (ref 1–4.8)
MCH RBC QN AUTO: 30.5 PG (ref 27–31)
MCHC RBC AUTO-ENTMCNC: 31.7 G/DL (ref 32–36)
MCV RBC AUTO: 96 FL (ref 82–98)
NONHDLC SERPL-MCNC: 80 MG/DL
NUCLEATED RBC (/100WBC) (OHS): 0 /100 WBC
PLATELET # BLD AUTO: 234 K/UL (ref 150–450)
PMV BLD AUTO: 11.8 FL (ref 9.2–12.9)
POTASSIUM SERPL-SCNC: 4.2 MMOL/L (ref 3.5–5.1)
PROT SERPL-MCNC: 7.4 GM/DL (ref 6–8.4)
RBC # BLD AUTO: 4.43 M/UL (ref 4–5.4)
RELATIVE EOSINOPHIL (OHS): 4.6 %
RELATIVE LYMPHOCYTE (OHS): 34.2 % (ref 18–48)
RELATIVE MONOCYTE (OHS): 10.8 % (ref 4–15)
RELATIVE NEUTROPHIL (OHS): 49.1 % (ref 38–73)
SODIUM SERPL-SCNC: 140 MMOL/L (ref 136–145)
T4 FREE SERPL-MCNC: 0.94 NG/DL (ref 0.71–1.51)
TRIGL SERPL-MCNC: 100 MG/DL (ref 30–150)
TSH SERPL-ACNC: 5.41 UIU/ML (ref 0.4–4)
WBC # BLD AUTO: 6.55 K/UL (ref 3.9–12.7)

## 2025-06-12 PROCEDURE — 85025 COMPLETE CBC W/AUTO DIFF WBC: CPT

## 2025-06-12 PROCEDURE — 82465 ASSAY BLD/SERUM CHOLESTEROL: CPT

## 2025-06-12 PROCEDURE — 82310 ASSAY OF CALCIUM: CPT

## 2025-06-12 PROCEDURE — 36415 COLL VENOUS BLD VENIPUNCTURE: CPT

## 2025-06-12 PROCEDURE — 83036 HEMOGLOBIN GLYCOSYLATED A1C: CPT

## 2025-06-12 PROCEDURE — 97535 SELF CARE MNGMENT TRAINING: CPT | Mod: PN

## 2025-06-12 PROCEDURE — 84439 ASSAY OF FREE THYROXINE: CPT

## 2025-06-12 NOTE — PROGRESS NOTES
Outpatient Rehab    Occupational Therapy Visit    Patient Name: Tracy Armendariz  MRN: [id number]  YOB: 1950  Encounter Date: 6/12/2025    Therapy Diagnosis:   Encounter Diagnoses   Name Primary?    Visual field cut Yes    Decreased independence with activities of daily living     Impaired instrumental activities of daily living (IADL)      Physician: Padmini Bain MD    Physician Orders: Eval and Treat  Medical Diagnosis: Parkinson's disease, unspecified whether dyskinesia present, unspecified whether manifestations fluctuate  Surgical Diagnosis: Not applicable for this Episode   Surgical Date: Not applicable for this Episode    Visit # / Visits Authorized: 2 / 20  Insurance Authorization Period: 6/3/2025 to 12/31/2025  Date of Evaluation: 6/3/2025  Plan of Care Certification: 6/3/2025 to 7/29/2025      Time In: 1310   Time Out: 1355  Total Time (in minutes): 45   Total Billable Time (in minutes): 45      Precautions:     Fall risk, spinal precautions w/ TLSO, rollator for ambulation      Subjective   Pt reports she fell yesterday.  Pain reported as 0/10.      Objective        No measures obtained this date.     Treatment:  Self Care and ADLs  ADL 1: LBD training: doff/don B socks and shoes using figure-4 technique  ADL 2: LBD training: simulated LBD using looped theraband using figure-4 technique  ADL 3: demonstrated buttoning shirt using button hook following skilled instruction  ADL 4: using weighted knife and fork to cut theraputty into bite-sized pieces, then  pieces using fork  ADL 5: Pt additionally educated regarding use of long-handled sponge and reacher in order to increase I and safety w/ ADLs/IADLs. Pt verbalized understanding.    Time Entry(in minutes):  Self Care/Home Management (ADLs) Time Entry: 45    Assessment & Plan   Assessment: Pt completed self-care and ADLs this date w/ good energy and participation. Session this date focusing on use of adaptive equipment in order to  increase I and safety w/ ADLs and IADLs. Pt demonstrated/verbalized understanding of adaptive equipment/techniques during session. Pt's spouse additionally educated regarding adaptive equipment/techniques addressed during session. Pt's spouse to obtain needed equipment as able.  Evaluation/Treatment Tolerance: Patient tolerated treatment well    The patient will continue to benefit from skilled outpatient occupational therapy in order to address the deficits listed in the problem list on the initial evaluation, provide patient and family education, and maximize the patients level of independence in the home and community environments.     The patient's spiritual, cultural, and educational needs were considered, and the patient is agreeable to the plan of care and goals.     Education  Education was done with Patient and Other recipient present. The patient's learning style includes Demonstration, Listening, and Pictures/video. The patient Demonstrates understanding and Verbalizes understanding.  They identified as Spouse/significant other. The reported learning style is Listening, Pictures/video, and Reading. The recipient Verbalizes understanding.     Adaptive equipment/techniques w/ ADLs/IADLs, spinal precautions       Plan: continue OT POC, administer HEP    Goals:   Active       LTG       OT will provide training/education on tremor management strategies and AE/DME/task set up to maximize independence, safety & efficiency with ADLs/IADLs.  (Progressing)       Start:  06/04/25    Expected End:  07/30/25            OT will provide recommendations and education on environmental set up/modification prn to accommodate for vision related deficits in order to reduce risk for falls and maximize independence and safety with occupational engagement in home environment. (Progressing)       Start:  06/04/25    Expected End:  07/30/25            OT will provide training/education on appropriate HEP/HAPs to improve global  "strength, tissue lengthening, and FM skills.  (Progressing)       Start:  06/04/25    Expected End:  07/30/25            Pt will verbalize and demonstrate appropriate sequencing and motor control for fxnl sit to stand transfer (recliner, toilet, shower chair, etc.) to reduce "plopping" and retropulsion and increase safety with ADL routine.  (Progressing)       Start:  06/04/25    Expected End:  07/30/25            Pt will perform UB dressing SPV.  (Progressing)       Start:  06/04/25    Expected End:  07/30/25            Pt will increase BUE MMT to 5/5 in deficit mm groups for improved fxnl strength (Progressing)       Start:  06/04/25    Expected End:  07/30/25            Pt will demonstrate improved L FM coordination as evidence by completing 9HPT in </= 40 secs. (Progressing)       Start:  06/04/25    Expected End:  07/30/25               LTG continued       Patient to perform smooth pursuits WFL in all planes x 60 seconds, tracking single target for improved ability to scan environment with functional mobility.  (Progressing)       Start:  06/05/25    Expected End:  07/30/25            Patient will maintain gaze stabilization on target w/ VORx1 at self-selected pace x 30 seconds.  (Progressing)       Start:  06/05/25    Expected End:  07/30/25               STG       TBA oculomotor functions (I.e. smooth pursuits, saccades, convergence/divergence) with accompanying goal(s) to follow as needed. (Met)       Start:  06/04/25    Expected End:  07/02/25    Resolved:  06/05/25         TBA B  strength with accompanying goal(s) to follow as needed. (Met)       Start:  06/04/25    Expected End:  07/02/25    Resolved:  06/05/25         TBD potential need/benefits for use of visual aids in the home to improve safety awareness and carry over of sequencing and form with fxnl transfer training, direction changes, scanning strategies, etc.. (Progressing)       Start:  06/04/25    Expected End:  07/02/25            L " shoulder pain to be further assessed and treated if deemed appropriate/within OT scope of practice. (Met)       Start:  06/04/25    Expected End:  07/02/25    Resolved:  06/05/25         Pt will increase LUE MMT to 4/5 in deficient mm groups for improved fxnl strength.  (Progressing)       Start:  06/04/25    Expected End:  07/02/25                Genevieve Hayes, OT

## 2025-06-17 ENCOUNTER — CLINICAL SUPPORT (OUTPATIENT)
Dept: REHABILITATION | Facility: HOSPITAL | Age: 75
End: 2025-06-17
Payer: MEDICARE

## 2025-06-17 DIAGNOSIS — G20.C PARKINSONISM, UNSPECIFIED PARKINSONISM TYPE: Primary | ICD-10-CM

## 2025-06-17 DIAGNOSIS — Z74.09 IMPAIRED FUNCTIONAL MOBILITY AND ACTIVITY TOLERANCE: ICD-10-CM

## 2025-06-17 PROCEDURE — 97112 NEUROMUSCULAR REEDUCATION: CPT | Mod: PN

## 2025-06-17 PROCEDURE — 97530 THERAPEUTIC ACTIVITIES: CPT | Mod: PN

## 2025-06-17 NOTE — PROGRESS NOTES
Outpatient Rehab    Physical Therapy Visit    Patient Name: Tracy Armendariz  MRN: [id number]  YOB: 1950  Encounter Date: 6/17/2025    Therapy Diagnosis: No diagnosis found.  Physician: Padmini Bain MD    Physician Orders: Eval and Treat  Medical Diagnosis: Parkinson's disease, unspecified whether dyskinesia present, unspecified whether manifestations fluctuate  Surgical Diagnosis: Not applicable for this Episode   Surgical Date: Not applicable for this Episode  Days Since Last Surgery: Not applicable for this Episode    Visit # / Visits Authorized:  1 / 20  Insurance Authorization Period: 6/3/2025 to 12/31/2025  Date of Evaluation: 6/3/2025  Plan of Care Certification: 6/4/2025 to 7/30/2025      PT/PTA: PT   Number of PTA visits since last PT visit:   Time In:   09:45  Time Out:  10:30  Total Time (in minutes):   45 min  Total Billable Time (in minutes):        Precautions:       Subjective   She has had 2 falls since the initial eval. Both falling backward.  Family / care giver present for this visit:   Pain reported as 4/10. lower back pain    Objective            Treatment:  Balance/Neuromuscular Re-Education  NMR 1: Supine glut squeeze with minimal butt lift x 10  NMR 2: Abd brace with isometric hold of opposite UE/ LE 5 sec hold x 10  NMR 3: X 30 sec knee to chest stretch, manual assist  Therapeutic Activity  TA 1: Sit to stand from black mat 3 x 5 cues for gaze stabilization with standing and using hands to control descent  TA 2: 40 ft x 4 and 100 ft ambulation with RW and cues gaze stabilization  TA 3: X 4 practice 180 degree turns with focus point and cues for slowed motion    Time Entry(in minutes):  Neuromuscular Re-Education Time Entry: 15  Therapeutic Activity Time Entry: 30    Assessment & Plan   Assessment: Ms. Armendariz participated well in today's session.  Noted limited ocular range of motion in downward gaze with compensatory cervical extension.  Focused session on proper mechanics  and techniques for sit to stand, she demo's fair carry over.  Also disuccsed strategies to use when ambulating to decrease dizziness.  She had minimal back pain post session that was alleviated with supine rest break. She remains appropriate for skilled Physical Therapy.   Evaluation/Treatment Tolerance: Patient limited by pain    The patient will continue to benefit from skilled outpatient physical therapy in order to address the deficits listed in the problem list on the initial evaluation, provide patient and family education, and maximize the patients level of independence in the home and community environments.     The patient's spiritual, cultural, and educational needs were considered, and the patient is agreeable to the plan of care and goals.     Education  Education was done with Patient. The patient's learning style includes Demonstration, Listening, and Pictures/video. The patient Demonstrates understanding and Verbalizes understanding.                 Plan: Cont to progress functional mobiltiy as tolerated    Goals:     Short Term Goals: 4 weeks    Patient to be Independent with HEP  Patient to score less than or equal to 20 sec on the 5 times sit to stand for improved transfers  Patient able to control descent of stand to sit with upper extremity to decrease plopping and chair movement behind her     Long Term Goals: 8 weeks    Patient to score less than or equal to 28 sec on the TUG for decreased fall risk  Patient to able to ambulate greater than 250 feet with rolling walker, no loss of balance, full bilateral foot clearance and minimal path deviation  Patient able to tolerate greater than 15 min in standing position without lower extremity fatigue for improved standing tolerance   Patient to improve bilateral hip flexion to greater than or equal to 4-/5 for improved transfers  Patient to demo good safety awareness with transfers (bed mobility, sit to stand) for decreased fall risk     Gem  Josefina, PT

## 2025-06-19 ENCOUNTER — CLINICAL SUPPORT (OUTPATIENT)
Dept: REHABILITATION | Facility: HOSPITAL | Age: 75
End: 2025-06-19
Payer: MEDICARE

## 2025-06-19 ENCOUNTER — OFFICE VISIT (OUTPATIENT)
Dept: INTERNAL MEDICINE | Facility: CLINIC | Age: 75
End: 2025-06-19
Payer: MEDICARE

## 2025-06-19 VITALS
SYSTOLIC BLOOD PRESSURE: 122 MMHG | DIASTOLIC BLOOD PRESSURE: 62 MMHG | HEIGHT: 66 IN | TEMPERATURE: 98 F | WEIGHT: 203.25 LBS | HEART RATE: 75 BPM | OXYGEN SATURATION: 97 % | BODY MASS INDEX: 32.66 KG/M2

## 2025-06-19 DIAGNOSIS — Z86.73 HISTORY OF CVA (CEREBROVASCULAR ACCIDENT): ICD-10-CM

## 2025-06-19 DIAGNOSIS — H53.40 VISUAL FIELD CUT: Primary | ICD-10-CM

## 2025-06-19 DIAGNOSIS — I12.9 TYPE 2 DIABETES MELLITUS WITH STAGE 3A CHRONIC KIDNEY DISEASE AND HYPERTENSION: ICD-10-CM

## 2025-06-19 DIAGNOSIS — E78.2 MIXED HYPERLIPIDEMIA: Chronic | ICD-10-CM

## 2025-06-19 DIAGNOSIS — Z00.00 ENCOUNTER FOR PREVENTIVE HEALTH EXAMINATION: Primary | ICD-10-CM

## 2025-06-19 DIAGNOSIS — G47.33 OSA (OBSTRUCTIVE SLEEP APNEA): ICD-10-CM

## 2025-06-19 DIAGNOSIS — N18.31 CHRONIC KIDNEY DISEASE, STAGE 3A: ICD-10-CM

## 2025-06-19 DIAGNOSIS — E11.22 TYPE 2 DIABETES MELLITUS WITH STAGE 3A CHRONIC KIDNEY DISEASE AND HYPERTENSION: ICD-10-CM

## 2025-06-19 DIAGNOSIS — Z74.09 IMPAIRED FUNCTIONAL MOBILITY AND ACTIVITY TOLERANCE: Primary | ICD-10-CM

## 2025-06-19 DIAGNOSIS — R13.10 DYSPHAGIA, UNSPECIFIED TYPE: ICD-10-CM

## 2025-06-19 DIAGNOSIS — Z78.9 DECREASED INDEPENDENCE WITH ACTIVITIES OF DAILY LIVING: ICD-10-CM

## 2025-06-19 DIAGNOSIS — Z12.31 ENCOUNTER FOR SCREENING MAMMOGRAM FOR BREAST CANCER: ICD-10-CM

## 2025-06-19 DIAGNOSIS — R79.89 ABNORMAL TSH: ICD-10-CM

## 2025-06-19 DIAGNOSIS — G20.C PARKINSONISM, UNSPECIFIED PARKINSONISM TYPE: ICD-10-CM

## 2025-06-19 DIAGNOSIS — F33.1 MODERATE EPISODE OF RECURRENT MAJOR DEPRESSIVE DISORDER: ICD-10-CM

## 2025-06-19 DIAGNOSIS — Z78.9 IMPAIRED INSTRUMENTAL ACTIVITIES OF DAILY LIVING (IADL): ICD-10-CM

## 2025-06-19 DIAGNOSIS — I10 ESSENTIAL HYPERTENSION: Chronic | ICD-10-CM

## 2025-06-19 DIAGNOSIS — N18.31 TYPE 2 DIABETES MELLITUS WITH STAGE 3A CHRONIC KIDNEY DISEASE AND HYPERTENSION: ICD-10-CM

## 2025-06-19 PROCEDURE — 97112 NEUROMUSCULAR REEDUCATION: CPT | Mod: PN

## 2025-06-19 PROCEDURE — 97530 THERAPEUTIC ACTIVITIES: CPT | Mod: PN

## 2025-06-19 PROCEDURE — 1160F RVW MEDS BY RX/DR IN RCRD: CPT | Mod: CPTII,S$GLB,, | Performed by: HOSPITALIST

## 2025-06-19 PROCEDURE — 3044F HG A1C LEVEL LT 7.0%: CPT | Mod: CPTII,S$GLB,, | Performed by: HOSPITALIST

## 2025-06-19 PROCEDURE — 1126F AMNT PAIN NOTED NONE PRSNT: CPT | Mod: CPTII,S$GLB,, | Performed by: HOSPITALIST

## 2025-06-19 PROCEDURE — 3074F SYST BP LT 130 MM HG: CPT | Mod: CPTII,S$GLB,, | Performed by: HOSPITALIST

## 2025-06-19 PROCEDURE — 1101F PT FALLS ASSESS-DOCD LE1/YR: CPT | Mod: CPTII,S$GLB,, | Performed by: HOSPITALIST

## 2025-06-19 PROCEDURE — 3066F NEPHROPATHY DOC TX: CPT | Mod: CPTII,S$GLB,, | Performed by: HOSPITALIST

## 2025-06-19 PROCEDURE — 3008F BODY MASS INDEX DOCD: CPT | Mod: CPTII,S$GLB,, | Performed by: HOSPITALIST

## 2025-06-19 PROCEDURE — 99999 PR PBB SHADOW E&M-EST. PATIENT-LVL V: CPT | Mod: PBBFAC,,, | Performed by: HOSPITALIST

## 2025-06-19 PROCEDURE — 1159F MED LIST DOCD IN RCRD: CPT | Mod: CPTII,S$GLB,, | Performed by: HOSPITALIST

## 2025-06-19 PROCEDURE — 3061F NEG MICROALBUMINURIA REV: CPT | Mod: CPTII,S$GLB,, | Performed by: HOSPITALIST

## 2025-06-19 PROCEDURE — 3078F DIAST BP <80 MM HG: CPT | Mod: CPTII,S$GLB,, | Performed by: HOSPITALIST

## 2025-06-19 PROCEDURE — 99397 PER PM REEVAL EST PAT 65+ YR: CPT | Mod: S$GLB,,, | Performed by: HOSPITALIST

## 2025-06-19 PROCEDURE — 3288F FALL RISK ASSESSMENT DOCD: CPT | Mod: CPTII,S$GLB,, | Performed by: HOSPITALIST

## 2025-06-19 RX ORDER — FAMOTIDINE 20 MG/1
20 TABLET, FILM COATED ORAL 2 TIMES DAILY
Qty: 30 TABLET | Refills: 2 | Status: SHIPPED | OUTPATIENT
Start: 2025-06-19 | End: 2025-07-08 | Stop reason: SDUPTHER

## 2025-06-19 NOTE — PROGRESS NOTES
Subjective:     @Patient ID: Tracy Armendariz is a 74 y.o. female.    Chief Complaint: Follow-up (4 months) and Annual Exam    HPI    74 y.o. female with Parkinson's, DM2:  MDD, MARTIN, HTN, HLD, hx of CVA, atherosclerosis of aorta, obesity, R knee OA, presents here for annual:     Dm2: ozempic  HTN: not currently on losartan.  HLD:  Atorvastatin 40 mg daily  3. Depression: Now on Zoloft , wellbutrin 300 mg qday   4. Obesity: insurance does not cover wegovy. Was on Mounjaro but now on ozempic    5. Abdominal pain: prior visit reports having low abdominal/pelvic b/l when having b.m. not sure if pain related to inguinal hernias seen on outside imaging . Did f/u with general surgery. Symptoms attributed to constipation. Referred to GI who recommended pt start miralax since she is on GLP1   6. MARTIN; on CPAP. Follows with sleep clinic. On modafinil   7., Al last visit, she reported continues to fall, feels dizzy. Reports few episodes of syncope.  One episode occurred in the shower.  Also reports she had episode of chest pain.  States he has not sure why she moved symptoms.  She has been evaluated by Cardiology and underwent was negative.  She has seen ENT and undergone vestibular therapy however vestibular therapy was canceled as her symptoms were not improving.  Recommended that she see a neurologist for further evaluation of her dizziness symptoms.  She reports compliance with using her cane and walker. Has upcoming appt with neurologist. Seen by neurology on 1/6/25. Was started on carbidopa/levodopa. Neuro suspects parkinsonism           BACK PAIN:  She reports significant improvement in back fracture pain since receiving a brace. Initially, pain was substantial, but has now decreased to minimal discomfort lasting only 3-4 days. The brace provided immediate pain relief. X-rays are scheduled next week with orthopedic spine service to assess fracture healing.    PARKINSON'S DISEASE:  She recently started carbidopa for  Parkinson's Disease management. Physical therapy has been significantly helpful, particularly in addressing balance issues. She reports improvement with current treatment approach.    GI CONCERNS:  She reports significant reflux symptoms since starting Ozempic, characterized by burning sensation in throat and back. She endorses ongoing difficulty swallowing. Swallow study is scheduled for June 25th, with ENT follow-up on July 23rd.    MENTAL HEALTH:  She continues Zoloft 30mg daily for mental health management, which she finds effective and wishes to continue. She experienced a brief period of feeling down a couple weeks ago but reports improvement.    LABORATORY RESULTS:  TSH is slightly elevated and outside normal range for the first time, though she denies thyroid-related symptoms. A1C remains within normal range with good blood sugar control. Cholesterol panel shows normal results with high HDL and low LDL. CBC is normal. Kidney function has improved compared to previous testing. Liver tests are within normal limits.    BLOOD PRESSURE:  Her blood pressure has been stable and well-controlled without antihypertensive medications.      ROS:  ENT: +difficulty swallowing  Cardiovascular: -lower extremity edema  Gastrointestinal: -abdominal pain, +heartburn  Musculoskeletal: +back pain  Neurological: +weakness  Psychiatric: +depression                       Review of Systems  Past medical history, surgical history, and family medical history reviewed and updated as appropriate.    Medications and allergies reviewed.     Objective:     There were no vitals filed for this visit.  There is no height or weight on file to calculate BMI.  Physical Exam  Vitals reviewed.   Constitutional:       General: She is not in acute distress.     Appearance: She is well-developed.   HENT:      Head: Normocephalic and atraumatic.      Right Ear: Tympanic membrane normal.      Left Ear: Tympanic membrane normal.      Mouth/Throat:       Mouth: Mucous membranes are moist.      Pharynx: No oropharyngeal exudate.   Eyes:      General:         Right eye: No discharge.         Left eye: No discharge.      Conjunctiva/sclera: Conjunctivae normal.   Cardiovascular:      Rate and Rhythm: Normal rate and regular rhythm.      Heart sounds: No murmur heard.     No friction rub.   Pulmonary:      Effort: Pulmonary effort is normal.      Breath sounds: Normal breath sounds.   Abdominal:      General: Bowel sounds are normal. There is no distension.      Palpations: Abdomen is soft.      Tenderness: There is no abdominal tenderness. There is no guarding.   Musculoskeletal:      Cervical back: Normal range of motion and neck supple.      Right lower leg: No edema.      Left lower leg: No edema.   Lymphadenopathy:      Cervical: No cervical adenopathy.   Skin:     General: Skin is warm and dry.   Neurological:      Mental Status: She is alert and oriented to person, place, and time.   Psychiatric:         Mood and Affect: Mood normal.         Behavior: Behavior normal.         Lab Results   Component Value Date    WBC 6.55 06/12/2025    HGB 13.5 06/12/2025    HCT 42.6 06/12/2025     06/12/2025    CHOL 187 06/12/2025    TRIG 100 06/12/2025     (H) 06/12/2025    ALT 15 06/12/2025    AST 21 06/12/2025     06/12/2025    K 4.2 06/12/2025     06/12/2025    CREATININE 1.0 06/12/2025    BUN 16 06/12/2025    CO2 25 06/12/2025    TSH 5.412 (H) 06/12/2025    INR 1.0 01/13/2017    GLUF 123 (H) 11/04/2008    HGBA1C 5.3 06/12/2025       Assessment:     1. Encounter for preventive health examination    2. Type 2 diabetes mellitus with stage 3a chronic kidney disease and hypertension    3. Essential hypertension    4. Mixed hyperlipidemia    5. Moderate episode of recurrent major depressive disorder    6. Encounter for screening mammogram for breast cancer    7. Abnormal TSH    8. Chronic kidney disease, stage 3a    9. MARTIN (obstructive sleep apnea)     10. Parkinsonism, unspecified Parkinsonism type    11. History of CVA (cerebrovascular accident)      Plan:   Tracy was seen today for follow-up and annual exam.    Diagnoses and all orders for this visit:    Encounter for preventive health examination  - labs reviewed in clinic     Type 2 diabetes mellitus with stage 3a chronic kidney disease and hypertension  - Stable. Continue home meds     Essential hypertension  -     TSH; Future    Mixed hyperlipidemia  - Stable. Continue home meds     Moderate episode of recurrent major depressive disorder    Encounter for screening mammogram for breast cancer  -     Mammo Digital Screening Bilat w/ David (XPD); Future    Abnormal TSH  -     TSH; Future    Chronic kidney disease, stage 3a  - stable. Continue to monitor     MARTIN (obstructive sleep apnea)  - stable. Continue cpap    Parkinsonism, unspecified Parkinsonism type    Dysphagia, unspecified type    History of CVA (cerebrovascular accident)    Other orders  -     Discontinue: famotidine (PEPCID) 20 MG tablet; Take 1 tablet (20 mg total) by mouth 2 (two) times daily.          Assessment & Plan        PARKINSONISM:   Ms. Armendariz is doing better with Parkinsonism symptoms after starting carbidopa and therapy.   Therapy has specifically helped with balance issues and preventing falls.    GASTROESOPHAGEAL REFLUX DISEASE:   Ms. Armendariz reports reflux symptoms, including burning in the throat or back, potentially related to Ozempic use.   Discussed switching from Ozempic, but advised that all medications in the same family may cause similar issues.   Prescribed Pepcid 20 mg twice daily for managing reflux symptoms.    DYSPHAGIA:   Ms. Armendariz reports ongoing trouble swallowing.   Ordered a swallow study to evaluate these difficulties and referred to otolaryngologist, Dr. Rosenthal, for further evaluation.    MAJOR DEPRESSIVE DISORDER:   Ms. Armendariz reports feeling down a few weeks ago but is feeling better now.   Noted improvement  in mood with current Zoloft dosage of 1.5 tablets, which will be continued.    HYPOTHYROIDISM:   TSH level slightly elevated, possibly indicating developing hypothyroidism or due to recent physiological stress.   Explained TSH function, its relation to thyroid hormone production, potential causes of elevation including stress and aging, and hypothyroidism prevalence with aging.   Will recheck TSH level in 2 months to determine if it normalizes or if thyroid medication is needed.   Ms. Armendariz instructed to contact the office for these test results.    HYPERTENSION:   Blood pressure is stable and well-controlled without medication.    FOLLOW-UP:   Follow up in 4 months.       Madisyn Villalobos MD  Internal Medicine    6/19/2025    Addendum: This note was generated with the assistance of ambient listening technology. Verbal consent was obtained by the patient and accompanying visitor(s) for the recording of patient appointment to facilitate this note. I attest to having reviewed and edited the generated note for accuracy, though some syntax or spelling errors may persist. Please contact the author of this note for any clarification.

## 2025-06-20 NOTE — PROGRESS NOTES
Outpatient Rehab    Occupational Therapy Visit    Patient Name: Tracy Armendariz  MRN: [id number]  YOB: 1950  Encounter Date: 6/19/2025    Therapy Diagnosis:   Encounter Diagnoses   Name Primary?    Visual field cut Yes    Decreased independence with activities of daily living     Impaired instrumental activities of daily living (IADL)      Physician: Padmini Bain MD    Physician Orders: Eval and Treat  Medical Diagnosis: Parkinson's disease, unspecified whether dyskinesia present, unspecified whether manifestations fluctuate  Surgical Diagnosis: Not applicable for this Episode   Surgical Date: Not applicable for this Episode  Days Since Last Surgery: Not applicable for this Episode    Visit # / Visits Authorized: 3 / 20  Insurance Authorization Period: 6/3/2025 to 12/31/2025  Date of Evaluation: 6/3/2025  Plan of Care Certification: 6/3/2025 to 7/29/2025      Time In: 1600   Time Out: 1645  Total Time (in minutes): 45   Total Billable Time (in minutes): 45      Precautions:     High fall risk, spinal precautions, rollator for ambulation      Subjective   Pt reports no falls since last session.  Pain reported as 0/10.      Objective        No measures obtained this date.     Treatment:  Therapeutic Activity  TA 1: [place] Chart: reading columns top -> bottom with anchoring technique (colored tape at bottom of page)  TA 2: Practicing opening Corin and using commands to send text message to   TA 3: Handwriting practice: improving spacing between words with use of highlighted lines for visual cues  TA 4: Pt educated regarding purpose of compensatory anchoring techniques and compensatory head movements with reading/writing in order to increase awareness in lower visual field for improved participation in functional tasks. Pt demonstrated understanding during session.    Time Entry(in minutes):  Therapeutic Activity Time Entry: 45    Assessment & Plan   Assessment: Pt completed therapeutic  activities this date w/ good energy and participation. OT session this date focusing on adaptive techniques and compensatory strategies to increase safety and I w/ functional tasks. Pt demonstrated understanding of techniques during session, however, would benefit from additional training to ensure carryover.   Evaluation/Treatment Tolerance: Patient tolerated treatment well    The patient will continue to benefit from skilled outpatient occupational therapy in order to address the deficits listed in the problem list on the initial evaluation, provide patient and family education, and maximize the patients level of independence in the home and community environments.     The patient's spiritual, cultural, and educational needs were considered, and the patient is agreeable to the plan of care and goals.     Education  Education was done with Patient. The patient's learning style includes Demonstration, Listening, and Pictures/video. The patient Demonstrates understanding and Verbalizes understanding.         Compensatory vision strategies/techniques       Plan: continue OT POC, administer HEP    Goals:   Active       LTG       OT will provide training/education on tremor management strategies and AE/DME/task set up to maximize independence, safety & efficiency with ADLs/IADLs.  (Progressing)       Start:  06/04/25    Expected End:  07/30/25            OT will provide recommendations and education on environmental set up/modification prn to accommodate for vision related deficits in order to reduce risk for falls and maximize independence and safety with occupational engagement in home environment. (Met)       Start:  06/04/25    Expected End:  07/30/25    Resolved:  06/20/25         OT will provide training/education on appropriate HEP/HAPs to improve global strength, tissue lengthening, and FM skills.  (Progressing)       Start:  06/04/25    Expected End:  07/30/25            Pt will verbalize and demonstrate  "appropriate sequencing and motor control for fxnl sit to stand transfer (recliner, toilet, shower chair, etc.) to reduce "plopping" and retropulsion and increase safety with ADL routine.  (Progressing)       Start:  06/04/25    Expected End:  07/30/25            Pt will perform UB dressing SPV.  (Progressing)       Start:  06/04/25    Expected End:  07/30/25            Pt will increase BUE MMT to 5/5 in deficit mm groups for improved fxnl strength (Progressing)       Start:  06/04/25    Expected End:  07/30/25            Pt will demonstrate improved L FM coordination as evidence by completing 9HPT in </= 40 secs. (Progressing)       Start:  06/04/25    Expected End:  07/30/25               LTG continued       Patient to perform smooth pursuits WFL in all planes x 60 seconds, tracking single target for improved ability to scan environment with functional mobility.  (Progressing)       Start:  06/05/25    Expected End:  07/30/25            Patient will maintain gaze stabilization on target w/ VORx1 at self-selected pace x 30 seconds.  (Progressing)       Start:  06/05/25    Expected End:  07/30/25               STG       TBA oculomotor functions (I.e. smooth pursuits, saccades, convergence/divergence) with accompanying goal(s) to follow as needed. (Met)       Start:  06/04/25    Expected End:  07/02/25    Resolved:  06/05/25         TBA B  strength with accompanying goal(s) to follow as needed. (Met)       Start:  06/04/25    Expected End:  07/02/25    Resolved:  06/05/25         TBD potential need/benefits for use of visual aids in the home to improve safety awareness and carry over of sequencing and form with fxnl transfer training, direction changes, scanning strategies, etc.. (Met)       Start:  06/04/25    Expected End:  07/02/25    Resolved:  06/20/25         L shoulder pain to be further assessed and treated if deemed appropriate/within OT scope of practice. (Met)       Start:  06/04/25    Expected End:  " 07/02/25    Resolved:  06/05/25         Pt will increase LUE MMT to 4/5 in deficient mm groups for improved fxnl strength.  (Progressing)       Start:  06/04/25    Expected End:  07/02/25                Genevieve Hayes, OT

## 2025-06-20 NOTE — PATIENT INSTRUCTIONS
Anchoring techniques. Use a highlighter to draw a bright line down the left side of a book, and then practice moving your eyes all the way to the left until you find the highlighter ed. This can ensure that you are seeing/reading from the left-most point to the right. This is a popular occupational therapy treatment activity for left neglect after stroke. It helps to ask a caregiver to draw the line and sit with you to ensure you start reading at the beginning of each line.    Jailene Technique:

## 2025-06-23 NOTE — PROGRESS NOTES
"  Outpatient Rehab    Physical Therapy Visit    Patient Name: Tracy Armendariz  MRN: [id number]  YOB: 1950  Encounter Date: 6/19/2025    Therapy Diagnosis:   Encounter Diagnosis   Name Primary?    Impaired functional mobility and activity tolerance Yes     Physician: Padmini Bain MD    Physician Orders: Eval and Treat  Medical Diagnosis: Parkinson's disease, unspecified whether dyskinesia present, unspecified whether manifestations fluctuate  Surgical Diagnosis: Not applicable for this Episode   Surgical Date: Not applicable for this Episode  Days Since Last Surgery: Not applicable for this Episode    Visit # / Visits Authorized:  2 / 20  Insurance Authorization Period: 6/3/2025 to 12/31/2025  Date of Evaluation: 6/3/2025  Plan of Care Certification: 6/4/2025 to 7/30/2025      PT/PTA:     Number of PTA visits since last PT visit:   Time In: 1645   Time Out: 1730  Total Time (in minutes): 45   Total Billable Time (in minutes): 45    FOTO:  Intake Score:  %  Survey Score 2:  %  Survey Score 3:  %    Precautions:       Subjective   She is doing fine today and haven't had any falls this past week.  Pain reported as 0/10.      Objective            Treatment:  Balance/Neuromuscular Re-Education  NMR 1: 3 x 30" static stance, UE touchdown  NMR 2: x4 laps fwd ambulation in bar with UE touchdown<>backwards walking with UE support with cues to lean forward to work on AP weightshifting  NMR 3: Supine glut squeeze with minimal butt lift x 10  Therapeutic Activity  TA 1: 2 x 8 sit<>Stand from hi/low mat, freuent retropulsion and cueing for correct foot placement  TA 2: x12, sit<>stand with beanbag> taking one big step to lianna pad on floor throwing bean bag on to rolling chair, each side      Time Entry(in minutes):  Neuromuscular Re-Education Time Entry: 19  Therapeutic Activity Time Entry: 26    Assessment & Plan   Assessment: Ms. Molina tolerated treatment fair but was very unstable when walking during " majority of treatment. She demonstrated very frequent retropulsive falls back on to mat during sit<>stand activity despite frequent cueing. She also demonstrated poor understanding and carry-over of education. While walking from plinth to parallel bars, she was unsteady while walking to it with her unable to take more than two steps without needing assistance from PT to stay upright. She did report that she was tired during PT and her legs felt exhausted. She did perform well with backwards walking in parallel bars assisting with anterior weight shift with UE support. Pt continues to be a good candidate for OPPT  Evaluation/Treatment Tolerance: Patient limited by fatigue    The patient will continue to benefit from skilled outpatient physical therapy in order to address the deficits listed in the problem list on the initial evaluation, provide patient and family education, and maximize the patients level of independence in the home and community environments.     The patient's spiritual, cultural, and educational needs were considered, and the patient is agreeable to the plan of care and goals.           Plan: Cont to progress functional mobiltiy as tolerated    Goals: Short Term Goals: 4 weeks    Patient to be Independent with HEP  Patient to score less than or equal to 20 sec on the 5 times sit to stand for improved transfers  Patient able to control descent of stand to sit with upper extremity to decrease plopping and chair movement behind her     Long Term Goals: 8 weeks    Patient to score less than or equal to 28 sec on the TUG for decreased fall risk  Patient to able to ambulate greater than 250 feet with rolling walker, no loss of balance, full bilateral foot clearance and minimal path deviation  Patient able to tolerate greater than 15 min in standing position without lower extremity fatigue for improved standing tolerance   Patient to improve bilateral hip flexion to greater than or equal to 4-/5 for  improved transfers  Patient to demo good safety awareness with transfers (bed mobility, sit to stand) for decreased fall risk     Juan A Mclaughlin, PT

## 2025-06-24 ENCOUNTER — CLINICAL SUPPORT (OUTPATIENT)
Dept: REHABILITATION | Facility: HOSPITAL | Age: 75
End: 2025-06-24
Payer: MEDICARE

## 2025-06-24 DIAGNOSIS — Z74.09 IMPAIRED FUNCTIONAL MOBILITY AND ACTIVITY TOLERANCE: Primary | ICD-10-CM

## 2025-06-24 PROCEDURE — 97110 THERAPEUTIC EXERCISES: CPT | Mod: PN,CQ

## 2025-06-24 PROCEDURE — 97112 NEUROMUSCULAR REEDUCATION: CPT | Mod: PN,CQ

## 2025-06-24 PROCEDURE — 97530 THERAPEUTIC ACTIVITIES: CPT | Mod: PN,CQ

## 2025-06-24 NOTE — PROGRESS NOTES
"    Outpatient Rehab    Physical Therapy Visit    Patient Name: Tracy Armendariz  MRN: [id number]  YOB: 1950  Encounter Date: 6/24/2025    Therapy Diagnosis:   Encounter Diagnosis   Name Primary?    Impaired functional mobility and activity tolerance Yes     Physician: Padmini Bain MD    Physician Orders: Eval and Treat  Medical Diagnosis: Parkinson's disease, unspecified whether dyskinesia present, unspecified whether manifestations fluctuate  Surgical Diagnosis: Not applicable for this Episode   Surgical Date: Not applicable for this Episode  Days Since Last Surgery: Not applicable for this Episode    Visit # / Visits Authorized:  3 / 20  Insurance Authorization Period: 6/3/2025 to 12/31/2025  Date of Evaluation: 6/3/2025  Plan of Care Certification: 6/4/2025 to 7/30/2025      PT/PTA: PTA   Number of PTA visits since last PT visit:1  Time In: 1030   Time Out: 1115  Total Time (in minutes): 45   Total Billable Time (in minutes): 45    FOTO:  Intake Score:  %  Survey Score 2:  %  Survey Score 3:  %    Precautions:     High fall risk, spinal precautions, rollator for ambulation      Subjective   Denies any new falls, says she cannot stand for long without feeling as if her knees are buckling.  Family / care giver present for this visit:  ( in lobby)    lower back pain    Objective            Treatment:  Therapeutic Exercise  TE 1: Calf raises: 2x10, Toe raises: 2x10  TE 2: Walking laps in clinic: 300 feet with rollator  Balance/Neuromuscular Re-Education  NMR 1: Narrow base of support 2 x 30" static stance, 2x30" with head turns, with red med ball (3.3#) chest press x10 * multiple posterior losses of balance  NMR 2: x4 laps fwd ambulation in bar with UE touchdown<>backwards walking with UE support with cues to lean forward to work on AP weightshifting  NMR 4: 4" step taps: 2x10  NMR 5: 4" step ups: x10 ea leg leading with bilateral UE support  Therapeutic Activity  TA 1: 2 x 8 sit<>Stand from " hi/low mat, freuent retropulsion and cueing for correct foot placement  TA 3: X 4 practice 180 degree turns with focus point and cues for slowed motion  TA 4: High marches: 6 lengths x 10 feet with bilateral UE support      Time Entry(in minutes):  Neuromuscular Re-Education Time Entry: 20  Therapeutic Activity Time Entry: 10  Therapeutic Exercise Time Entry: 15    Assessment & Plan   Assessment: Tracy arrived to session without complaints and agreeable to treatment.  She denies any recent falls.  She is SBA/CGA with all activity today with numerous posterior losses of balance and delayed righting reactions observed.  During standing portion of treatment numerous instances of bilateral knees buckling with multiple rest breaks for fatigue recovery.  Heavy verbal cuing for standing transfers for sequencing, forward weight shift, and eccentric control with improvement observed with constant cuing. High levels of fatigue reported at end of session.  She would benefit from continued PT services to improve safe functional mobility and decrease fall risk.   Evaluation/Treatment Tolerance: Patient limited by fatigue    The patient will continue to benefit from skilled outpatient physical therapy in order to address the deficits listed in the problem list on the initial evaluation, provide patient and family education, and maximize the patients level of independence in the home and community environments.     The patient's spiritual, cultural, and educational needs were considered, and the patient is agreeable to the plan of care and goals.           Plan: Cont to progress functional mobiltiy as tolerated    Goals: Short Term Goals: 4 weeks    Patient to be Independent with HEP  Patient to score less than or equal to 20 sec on the 5 times sit to stand for improved transfers  Patient able to control descent of stand to sit with upper extremity to decrease plopping and chair movement behind her     Long Term Goals: 8 weeks    Patient to score less than or equal to 28 sec on the TUG for decreased fall risk  Patient to able to ambulate greater than 250 feet with rolling walker, no loss of balance, full bilateral foot clearance and minimal path deviation  Patient able to tolerate greater than 15 min in standing position without lower extremity fatigue for improved standing tolerance   Patient to improve bilateral hip flexion to greater than or equal to 4-/5 for improved transfers  Patient to demo good safety awareness with transfers (bed mobility, sit to stand) for decreased fall risk     Alejandra Jimenes, PTA

## 2025-06-25 ENCOUNTER — HOSPITAL ENCOUNTER (OUTPATIENT)
Dept: RADIOLOGY | Facility: HOSPITAL | Age: 75
Discharge: HOME | End: 2025-06-25
Attending: PHYSICIAN ASSISTANT
Payer: MEDICARE

## 2025-06-25 ENCOUNTER — OFFICE VISIT (OUTPATIENT)
Dept: NEUROSURGERY | Facility: CLINIC | Age: 75
End: 2025-06-25
Payer: MEDICARE

## 2025-06-25 ENCOUNTER — CLINICAL SUPPORT (OUTPATIENT)
Dept: SPEECH THERAPY | Facility: HOSPITAL | Age: 75
End: 2025-06-25
Attending: STUDENT IN AN ORGANIZED HEALTH CARE EDUCATION/TRAINING PROGRAM
Payer: MEDICARE

## 2025-06-25 ENCOUNTER — HOSPITAL ENCOUNTER (OUTPATIENT)
Dept: RADIOLOGY | Facility: HOSPITAL | Age: 75
Discharge: HOME | End: 2025-06-25
Attending: STUDENT IN AN ORGANIZED HEALTH CARE EDUCATION/TRAINING PROGRAM
Payer: MEDICARE

## 2025-06-25 VITALS
SYSTOLIC BLOOD PRESSURE: 140 MMHG | WEIGHT: 203.25 LBS | HEIGHT: 66 IN | DIASTOLIC BLOOD PRESSURE: 79 MMHG | HEART RATE: 60 BPM | BODY MASS INDEX: 32.66 KG/M2

## 2025-06-25 DIAGNOSIS — G20.C PARKINSONISM, UNSPECIFIED PARKINSONISM TYPE: ICD-10-CM

## 2025-06-25 DIAGNOSIS — S22.080D COMPRESSION FRACTURE OF T12 VERTEBRA WITH ROUTINE HEALING, SUBSEQUENT ENCOUNTER: ICD-10-CM

## 2025-06-25 DIAGNOSIS — S22.080D CLOSED WEDGE COMPRESSION FRACTURE OF T12 VERTEBRA WITH ROUTINE HEALING, SUBSEQUENT ENCOUNTER: Primary | ICD-10-CM

## 2025-06-25 PROCEDURE — 1159F MED LIST DOCD IN RCRD: CPT | Mod: CPTII,S$GLB,, | Performed by: PHYSICIAN ASSISTANT

## 2025-06-25 PROCEDURE — 1126F AMNT PAIN NOTED NONE PRSNT: CPT | Mod: CPTII,S$GLB,, | Performed by: PHYSICIAN ASSISTANT

## 2025-06-25 PROCEDURE — 3044F HG A1C LEVEL LT 7.0%: CPT | Mod: CPTII,S$GLB,, | Performed by: PHYSICIAN ASSISTANT

## 2025-06-25 PROCEDURE — 99999 PR PBB SHADOW E&M-EST. PATIENT-LVL V: CPT | Mod: PBBFAC,,, | Performed by: PHYSICIAN ASSISTANT

## 2025-06-25 PROCEDURE — 99214 OFFICE O/P EST MOD 30 MIN: CPT | Mod: S$GLB,,, | Performed by: PHYSICIAN ASSISTANT

## 2025-06-25 PROCEDURE — 25500020 PHARM REV CODE 255: Performed by: STUDENT IN AN ORGANIZED HEALTH CARE EDUCATION/TRAINING PROGRAM

## 2025-06-25 PROCEDURE — 1160F RVW MEDS BY RX/DR IN RCRD: CPT | Mod: CPTII,S$GLB,, | Performed by: PHYSICIAN ASSISTANT

## 2025-06-25 PROCEDURE — 92611 MOTION FLUOROSCOPY/SWALLOW: CPT

## 2025-06-25 PROCEDURE — 3008F BODY MASS INDEX DOCD: CPT | Mod: CPTII,S$GLB,, | Performed by: PHYSICIAN ASSISTANT

## 2025-06-25 PROCEDURE — 97535 SELF CARE MNGMENT TRAINING: CPT

## 2025-06-25 PROCEDURE — 72080 X-RAY EXAM THORACOLMB 2/> VW: CPT | Mod: 26,,, | Performed by: RADIOLOGY

## 2025-06-25 PROCEDURE — 3066F NEPHROPATHY DOC TX: CPT | Mod: CPTII,S$GLB,, | Performed by: PHYSICIAN ASSISTANT

## 2025-06-25 PROCEDURE — 74230 X-RAY XM SWLNG FUNCJ C+: CPT | Mod: 26,,, | Performed by: INTERNAL MEDICINE

## 2025-06-25 PROCEDURE — 3061F NEG MICROALBUMINURIA REV: CPT | Mod: CPTII,S$GLB,, | Performed by: PHYSICIAN ASSISTANT

## 2025-06-25 PROCEDURE — 74230 X-RAY XM SWLNG FUNCJ C+: CPT | Mod: TC

## 2025-06-25 PROCEDURE — 72080 X-RAY EXAM THORACOLMB 2/> VW: CPT | Mod: TC

## 2025-06-25 PROCEDURE — 3077F SYST BP >= 140 MM HG: CPT | Mod: CPTII,S$GLB,, | Performed by: PHYSICIAN ASSISTANT

## 2025-06-25 PROCEDURE — 3288F FALL RISK ASSESSMENT DOCD: CPT | Mod: CPTII,S$GLB,, | Performed by: PHYSICIAN ASSISTANT

## 2025-06-25 PROCEDURE — 1100F PTFALLS ASSESS-DOCD GE2>/YR: CPT | Mod: CPTII,S$GLB,, | Performed by: PHYSICIAN ASSISTANT

## 2025-06-25 PROCEDURE — A9698 NON-RAD CONTRAST MATERIALNOC: HCPCS | Performed by: STUDENT IN AN ORGANIZED HEALTH CARE EDUCATION/TRAINING PROGRAM

## 2025-06-25 PROCEDURE — 3078F DIAST BP <80 MM HG: CPT | Mod: CPTII,S$GLB,, | Performed by: PHYSICIAN ASSISTANT

## 2025-06-25 RX ADMIN — BARIUM SULFATE 70 ML: 0.81 POWDER, FOR SUSPENSION ORAL at 11:06

## 2025-06-25 NOTE — PROGRESS NOTES
TIME RECORD    Date: 2025      Start Time:  11:00  Stop Time:  11:30    PROCEDURES:      UNTIMED  Procedure Min.   Modified Barium Swallow Study 15   Self Care/Home Management Training:   15     Total Timed Minutes:  30  Total Timed Units:  1  Total Untimed Units:  1  Charges Billed/# of units:  2    REHAB SERVICE VIDEO SWALLOW STUDY    MRN:  [id number]  Referring Provider: Padmini Bain MD  [id number] [place] [address]  Onset Date:  2025  SOC Date:  25  Primary Diagnosis:  Parkinson's   Treatment Diagnosis:  r/o dysphagia   Pertinent Medical History:    Past Medical History:   Diagnosis Date    Allergy     Anemia, unspecified     Anticoagulant long-term use     Arthritis     Cerebral embolism without mention of cerebral infarction 2014    Dx updated per 2019 IMO Load      CVA (cerebral infarction)     Depression     Disorder of kidney and ureter     renal stones    Diverticulosis of colon     Extrinsic asthma, unspecified     Hematuria, unspecified     Hyperlipidemia     Hypertension     Kidney stone     Left atrial enlargement 2014    Low back pain     Nephrolithiasis     MARTIN (obstructive sleep apnea)     Osteopenia     PUD (peptic ulcer disease)     Severe obesity (BMI 35.0-39.9) with comorbidity 2013    Stroke 2013    Urinary tract infection      Past Surgical History:   Procedure Laterality Date    APPENDECTOMY      @ time of hysterectomy    BACK SURGERY      CATARACT EXTRACTION       SECTION      CHOLECYSTECTOMY      laparoscopic    COLONOSCOPY N/A 2018    Procedure: COLONOSCOPY/Golytely;  Surgeon: Janine Black MD;  Location: [facility] ENDO;  Service: Endoscopy;  Laterality: N/A;    CYSTOSCOPY W/ RETROGRADES  2022    Procedure: CYSTOSCOPY, WITH RETROGRADE PYELOGRAM;  Surgeon: Mitchell Recinos MD;  Location: [facility] OR;  Service: Urology;;    CYSTOSCOPY W/ URETERAL STENT PLACEMENT Left 2022    Procedure: CYSTOSCOPY, WITH  URETERAL STENT INSERTION;  Surgeon: Mitchell Recinos MD;  Location: [facility] OR;  Service: Urology;  Laterality: Left;    CYSTOURETEROSCOPY, WITH HOLMIUM LASER LITHOTRIPSY OF URETERAL CALCULUS AND STENT INSERTION Left 12/21/2022    Procedure: left ureteroscopy, holmium laser lithotripsy, stone basketing, retrograde pyelogram, stent placement;  Surgeon: Anaya Zamora MD;  Location: [facility] OR;  Service: Urology;  Laterality: Left;    DILATION AND CURETTAGE OF UTERUS  1972    EXTRACTION - STONE Left 12/21/2022    Procedure: EXTRACTION - STONE;  Surgeon: Anaya Zamora MD;  Location: [facility] OR;  Service: Urology;  Laterality: Left;    HYSTERECTOMY  1978    TAHUSO with appendectomy    INNER EAR SURGERY      replaced ear drum    JOINT REPLACEMENT Right     knee    KNEE ARTHROSCOPY W/ DEBRIDEMENT  2003    LUMBAR DISCECTOMY  1980    L4-L5    OOPHORECTOMY      unilateral    RETROGRADE PYELOGRAPHY  12/21/2022    Procedure: PYELOGRAM, RETROGRADE;  Surgeon: Anaya Zamora MD;  Location: [facility] OR;  Service: Urology;;    TONSILLECTOMY      TYMPANOPLASTY      URETEROSCOPIC REMOVAL OF URETERIC CALCULUS Left 12/19/2018    Procedure: REMOVAL, CALCULUS, URETER, URETEROSCOPIC, holmium laser lithotripsy, stone basket extraction, retrograde pyelogram, ureteral stent exchange;  Surgeon: Anaya Zamora MD;  Location: [facility] OR;  Service: Urology;  Laterality: Left;       Prior Therapy Dates/Results (same condition):  No recent therapy, did attend OP therapy in 2017.  Prior Level of Function:  [facility]   Functional Deficits Leading to Referral:  6/3/2025:   Ms Armendariz presents today for Inter-professional Parkinson's Disease clinic. We discussed in detail the diagnosis, progression and treatment of Parkinson's disease. We went though an educational powerpoint slide and ensured his questions were answered.      She reports early falls (primarily backwards). She also reports diplopia (possibly related to her prior  stroke). Unclear response to levodopa as she is taking BID (with bedtime dosing). She does feel that perhaps her tremor has been reduced.      There were no medication changes made today. Patient will continue to follow with their primary MDS provider. Patient was seen and evaluated by PT, ST, OT, Social Work, research team and Palliative Care.    Pt also referred by outpatient SLP for throat clearing and coughing on thin liquids.     Social Cultural:  resides at home  Nutrition:  reg/thin  Signs of Abuse:  No  Medication:  Medications Ordered Prior to Encounter[1]  Alertness/Attention:  alert and awake     Oral Motor:    Oral Musculature: WNL  Structure Abnormalities: WNL  Dentition: present and adequate  Secretion Management: WNL  Mucosal Quality: WNL  Mandibular Strength and Mobility: WNL  Oral Labial Strength and Mobility: WNL  Lingual Strength and Mobility: WNL  Velar Elevation: WNL  Buccal Strength and Mobility: WNL  Volitional Cough: elicited  Volitional Swallow: timely  Voice Prior to PO Intake: clear    Alertness/Attention:  WFL  Patient Position:  Sitting  View:  Lateral     Presentations:    THIN:- self regulated sip via cup x3 and self regulated consecutive sips via straw x4  PUREE:- self fed tsp bites of pudding with barium paste x2  DENTAL SOFT:- self fed tsp bites of peaches x2  SOLID:- 1 inch bite of enrico cracker with barium paste x1  BARIUM TABLET:- 1 barium tablet thin liquid wash    All trials- Rosenbek 8-Point Penetration-Aspiration Scale Score: 1: Material does not enter the airway.  Oral Preparation / Oral Phase   WFL - Pt with adequate bolus acceptance, containment, control and timely A-P transfer across consistencies   No presence of naso-pharyngeal reflux    Pharyngeal Phase   Pt essentially timely swallow initiation for age/ swallow delay with trigger at the level of the vallecula    Pt with FAIR BOT retraction and strength  Pt with adequate hyolaryngeal elevation and excursion  patterns  Pt with complete epiglottic inversion patterns  Pt without penetration on all trials.   Pt without Aspiration on all trials.  Pt with trace amount of residuals in vallecula post swallow of thin liquids, pt able to spontaneous clear with 2nd swallow or dry saliva swallow.   Pt completed strategies with good success  Pt with adequate airway protection without penetration or aspiration on all trials noted.     Cervical Esophageal Phase   UES appeared to accommodate all bolus types without stasis or retrograde movement observed     Impressions: Patient presents with functional and timely oral and pharyngeal phases of the swallow for all consistencies assessed. No oral or pharyngeal dysphagia present.   Prognosis: Good  given no oral/pharyngeal impairments  Education: SLP provided patient education on swallowing anatomy, MBSS results, anatomy and physiology and SLP diet recommendations. All questions addressed within SLP scope of practice.     Plan: Follow up with PCP and referring MD as needed.     Recommendations:   Continue regular diet and thin liquids  Standard aspiration precautions  Slow rate  Alternate sips and bites  Double swallow after sips of thin liquids  Cup swallows better when drinking liquids  Whole meds individually with sips of liquid.       6/25/2025          I CERTIFY THE NEED FOR THESE SERVICES FURNISHED UNDER THIS PLAN OF TREATMENT AND WHILE UNDER MY CARE    Physician's comments: ________________________________________________________________________________________________________________________________________________      Physician's Name: ___________________________________         [1]   Current Outpatient Medications on File Prior to Visit   Medication Sig Dispense Refill    amoxicillin (AMOXIL) 500 MG capsule Take 4 capsules by mouth one hour before appointment 12 capsule 0    aspirin (ECOTRIN) 81 MG EC tablet Take 81 mg by mouth once daily.      atorvastatin (LIPITOR) 40 MG  tablet Take 1 tablet (40 mg total) by mouth every evening. 90 tablet 1    azelastine (ASTELIN) 137 mcg (0.1 %) nasal spray 1 spray (137 mcg total) by Nasal route 2 (two) times daily. 30 mL 11    blood sugar diagnostic Strp To check BG 1 times daily, to use with insurance preferred meter 100 each 3    blood-glucose meter Misc To check BG 1 times daily, to use with insurance preferred meter 1 each 0    buPROPion (WELLBUTRIN XL) 300 MG 24 hr tablet Take 1 tablet (300 mg total) by mouth once daily. 90 tablet 3    carbidopa-levodopa  mg (SINEMET)  mg per tablet Take 1 tablet by mouth 3 (three) times daily. 90 tablet 5    conjugated estrogens (PREMARIN) vaginal cream Place 0.5 g vaginally 3 (three) times a week. 30 g 0    diclofenac sodium (VOLTAREN) 1 % Gel APPLY 2-4 GRAMS TO EACH PAINFUL AREA FOUR TIMES DAILY - MAX 32 GRAMS/ g 6    esomeprazole (NEXIUM) 40 MG capsule Take 1 capsule (40 mg total) by mouth daily as needed (heartburn). 90 capsule 3    famotidine (PEPCID) 20 MG tablet Take 1 tablet (20 mg total) by mouth 2 (two) times daily. 30 tablet 2    fluticasone propionate (FLONASE) 50 mcg/actuation nasal spray 2 sprays (100 mcg total) by Each Nostril route once daily. 16 g 11    HYDROcodone-acetaminophen (NORCO) 5-325 mg per tablet Take 1 tablet by mouth every 6 (six) hours as needed for Pain. 28 tablet 0    lancets Misc To check BG 1 times daily, to use with insurance preferred meter 100 each 3    magnesium oxide (MAG-OX) 400 mg (241.3 mg magnesium) tablet Take 2 tablets (800 mg total) by mouth 2 (two) times daily. 360 tablet 3    meclizine (ANTIVERT) 12.5 mg tablet Take 12.5 mg by mouth 3 (three) times daily as needed for Dizziness.      modafiniL (PROVIGIL) 200 MG Tab Take 1 tablet (200 mg total) by mouth daily as needed (sleepiness). 30 tablet 2    mupirocin (BACTROBAN) 2 % ointment Apply topically 3 (three) times daily. 22 g 0    mupirocin (BACTROBAN) 2 % ointment Apply to affected area 3  times daily for 2-3 days. 22 g 1    ondansetron (ZOFRAN-ODT) 4 MG TbDL Take 1 tablet (4 mg total) by mouth every 8 (eight) hours as needed (nausea). 20 tablet 2    potassium citrate (UROCIT-K 10) 10 mEq (1,080 mg) TbSR Take 2 tablets (20 mEq total) by mouth 3 (three) times daily with meals. 180 tablet 11    semaglutide (OZEMPIC) 2 mg/dose (8 mg/3 mL) PnIj Inject 2 mg into the skin every 7 days. 9 mL 1    sertraline (ZOLOFT) 100 MG tablet Take 1.5 tablets (150 mg total) by mouth once daily. 135 tablet 3    vitamin D (VITAMIN D3) 1000 units Tab Take 1,000 Units by mouth 3 (three) times a week.       Current Facility-Administered Medications on File Prior to Visit   Medication Dose Route Frequency Provider Last Rate Last Admin    DOBUTamine 50 mg in D5W 50 mL (1 mg/mL) cardiac stress test infusion  10 mcg/kg/min Intravenous Continuous Madisyn Villalobos MD   Stopped at 07/23/24 1512    perflutren protein-A microsphr 0.22 mg/mL IV susp  0.5 mL Intravenous Once Evelio Barros MD

## 2025-06-25 NOTE — PROGRESS NOTES
Subjective:     Patient ID:  Tracy Armendariz is a 74 y.o. female.    Julio Cesar    Chief Complaint:  Low back pain    HPI   06/25/2025    Tracy Armendariz is a 74 y.o. female who presents with the above CC.  Patient had a fall on April 27th onto her back buttocks.  She went to the emergency room where the CT scan showed a T12 fracture that was new and it old T10 fracture.  Patient complains of low back pain.  No pain radiating to the legs or feet.  She has been wearing the LSO brace.  She took the extension piece off because it was bothering her neck.  She had previous spine surgery at L4-5 in the past.  No epidural steroid injections in the past.  She takes hydrocodone and Tylenol as needed.    Patient denies any recent accidents or trauma, no saddle anesthesias, and no bowel or bladder incontinence.      Interval History:   06/25/2025    No back pain.  No pain radiating around the abdomen or into the legs.  Has been wearing the back brace.  Has been doing physical therapy for prior can since with her back brace on.    Review of Systems:    Review of Systems   Constitutional:  Negative for chills, diaphoresis, fever, malaise/fatigue and weight loss.   HENT:  Negative for congestion, ear discharge, ear pain, hearing loss, nosebleeds, sinus pain, sore throat and tinnitus.    Eyes:  Negative for blurred vision, double vision, photophobia, pain, discharge and redness.   Respiratory:  Negative for cough, hemoptysis, sputum production, shortness of breath, wheezing and stridor.    Cardiovascular:  Negative for chest pain, palpitations, orthopnea, leg swelling and PND.   Gastrointestinal:  Negative for abdominal pain, blood in stool, constipation, diarrhea, heartburn, melena, nausea and vomiting.   Genitourinary:  Negative for dysuria, flank pain, frequency, hematuria and urgency.   Musculoskeletal:  Positive for back pain and myalgias. Negative for falls, joint pain and neck pain.   Skin:  Negative for itching and rash.    Neurological:  Negative for dizziness, tingling, tremors, sensory change, speech change, seizures, loss of consciousness, weakness and headaches.   Endo/Heme/Allergies:  Negative for environmental allergies and polydipsia. Does not bruise/bleed easily.   Psychiatric/Behavioral:  Negative for depression, hallucinations, memory loss and substance abuse. The patient is not nervous/anxious and does not have insomnia.          Past Medical History:   Diagnosis Date    Allergy     Anemia, unspecified     Anticoagulant long-term use     Arthritis     Cerebral embolism without mention of cerebral infarction 2014    Dx updated per  IMO Load      CVA (cerebral infarction)     Depression     Disorder of kidney and ureter     renal stones    Diverticulosis of colon     Extrinsic asthma, unspecified     Hematuria, unspecified     Hyperlipidemia     Hypertension     Kidney stone     Left atrial enlargement 2014    Low back pain     Nephrolithiasis     MARTIN (obstructive sleep apnea)     Osteopenia     PUD (peptic ulcer disease)     Severe obesity (BMI 35.0-39.9) with comorbidity 2013    Stroke 2013    Urinary tract infection      Past Surgical History:   Procedure Laterality Date    APPENDECTOMY      @ time of hysterectomy    BACK SURGERY      CATARACT EXTRACTION       SECTION      CHOLECYSTECTOMY      laparoscopic    COLONOSCOPY N/A 2018    Procedure: COLONOSCOPY/Golytely;  Surgeon: Janine Black MD;  Location: [facility];  Service: Endoscopy;  Laterality: N/A;    CYSTOSCOPY W/ RETROGRADES  2022    Procedure: CYSTOSCOPY, WITH RETROGRADE PYELOGRAM;  Surgeon: Mitchell Recinos MD;  Location: [facility] OR;  Service: Urology;;    CYSTOSCOPY W/ URETERAL STENT PLACEMENT Left 2022    Procedure: CYSTOSCOPY, WITH URETERAL STENT INSERTION;  Surgeon: Mitchell Recinos MD;  Location: [facility] OR;  Service: Urology;  Laterality: Left;    CYSTOURETEROSCOPY, WITH HOLMIUM LASER LITHOTRIPSY OF  URETERAL CALCULUS AND STENT INSERTION Left 12/21/2022    Procedure: left ureteroscopy, holmium laser lithotripsy, stone basketing, retrograde pyelogram, stent placement;  Surgeon: Anaya Zamora MD;  Location: [facility] OR;  Service: Urology;  Laterality: Left;    DILATION AND CURETTAGE OF UTERUS  1972    EXTRACTION - STONE Left 12/21/2022    Procedure: EXTRACTION - STONE;  Surgeon: Anaya Zamora MD;  Location: [facility] OR;  Service: Urology;  Laterality: Left;    HYSTERECTOMY  1978    TAHUSO with appendectomy    INNER EAR SURGERY      replaced ear drum    JOINT REPLACEMENT Right     knee    KNEE ARTHROSCOPY W/ DEBRIDEMENT  2003    LUMBAR DISCECTOMY  1980    L4-L5    OOPHORECTOMY      unilateral    RETROGRADE PYELOGRAPHY  12/21/2022    Procedure: PYELOGRAM, RETROGRADE;  Surgeon: Anaya Zamora MD;  Location: [facility] OR;  Service: Urology;;    TONSILLECTOMY      TYMPANOPLASTY      URETEROSCOPIC REMOVAL OF URETERIC CALCULUS Left 12/19/2018    Procedure: REMOVAL, CALCULUS, URETER, URETEROSCOPIC, holmium laser lithotripsy, stone basket extraction, retrograde pyelogram, ureteral stent exchange;  Surgeon: Anaya Zamora MD;  Location: [facility] OR;  Service: Urology;  Laterality: Left;     Medications Ordered Prior to Encounter[1]  Review of patient's allergies indicates:   Allergen Reactions    Iodinated contrast media Hives and Rash    Gabapentin Hallucinations    Iodine Hives    Isothiazolinones Rash    Penicillins Rash     Social History[2]  Family History   Problem Relation Name Age of Onset    Cervical cancer Mother      Cancer Mother  65        lung cancer - non smoker    Lung cancer Mother      Depression Father      Hypertension Father      Coronary artery disease Father  62    Heart disease Father      Heart failure Sister x1     Diabetes Sister x1     Heart disease Sister x1     Kidney disease Sister x1     Depression Brother x2     Suicide Brother x2     Heart failure Brother x2     Cancer Daughter x2     Breast cancer  "Daughter x2 36    No Known Problems Son x1     Hypertension Maternal Grandfather      Heart disease Maternal Grandfather      Stroke Paternal Grandfather      Colon cancer Neg Hx      Ovarian cancer Neg Hx         Objective:      Vitals:    06/25/25 1007   BP: (!) 140/79   Pulse: 60   Weight: 92.2 kg (203 lb 4.2 oz)   Height: 5' 6" (1.676 m)   PainSc: 0-No pain         Physical Exam: Exam done in the wheelchair      General:  Tracy Armendariz is well-developed, well-nourished, appears stated age, in no acute distress, alert and oriented to person, place, and time.    Pulmonary/Chest:  Respiratory effort normal  Abdominal: Exhibits no distension  Psychiatric:  Normal mood and affect.  Behavior is normal.  Judgement and thought content normal      Musculoskeletal:        Lumbar Spine Inspection:  Normal with no surgical scars and no visible rashes.    Lumbar Spine Palpation:  No tenderness to low back palpation.      Neurological:     Muscle strength against resistance:     Right Left   Hip flexion  5 / 5 5 / 5   Hip extension 5 / 5 5 / 5   Hip abduction 5 / 5 5 / 5   Hip adduction  5 / 5 5 / 5   Knee extension  5 / 5 5 / 5   Knee flexion 5 / 5 5 / 5   Dorsiflexion  5 / 5 5 / 5   EHL  5 / 5 5 / 5   Plantar flexion  5 / 5 5 / 5   Inversion of the feet 5 / 5 5 / 5   Eversion of the feet  5 / 5 5 / 5       Reflexes:     Right Left   Patellar 2+ 2+   Achilles 2+ 2+     Clonus:  Negative bilaterally    On gross examination of the bilateral upper extremities, patient has full painfree ROM with no signs of clubbing, cyanosis, edema, or weakness.       CT Interpretation:     CT thoracic spine personally reviewed which has old T10 fracture and new T12 fracture without retropulsion.      Thoracic spine x-ray from today 04/2025 shows stable alignment of T10 and T12.    Thoracic x-ray from today 06/2025 show stable alignment T10 and T12.  T12 fracture slightly progressed.      Assessment:          1. Closed wedge compression " fracture of T12 vertebra with routine healing, subsequent encounter            Plan:          Orders Placed This Encounter    X-Ray Thoracolumbar Spine AP Lateral       Stable T12 fracture     -wear back brace at all times except when sleeping and showering   -follow up in 6 weeks with repeat x-ray       Follow-Up:  Follow up in about 6 weeks (around 8/6/2025). If there are any questions prior to this, the patient was instructed to contact the office.       Freda Piña, [facility], PA-C  Neurosurgery  [facility]  06/25/2025               [1]   Current Outpatient Medications on File Prior to Visit   Medication Sig Dispense Refill    amoxicillin (AMOXIL) 500 MG capsule Take 4 capsules by mouth one hour before appointment 12 capsule 0    aspirin (ECOTRIN) 81 MG EC tablet Take 81 mg by mouth once daily.      atorvastatin (LIPITOR) 40 MG tablet Take 1 tablet (40 mg total) by mouth every evening. 90 tablet 1    blood sugar diagnostic Strp To check BG 1 times daily, to use with insurance preferred meter 100 each 3    blood-glucose meter Misc To check BG 1 times daily, to use with insurance preferred meter 1 each 0    buPROPion (WELLBUTRIN XL) 300 MG 24 hr tablet Take 1 tablet (300 mg total) by mouth once daily. 90 tablet 3    carbidopa-levodopa  mg (SINEMET)  mg per tablet Take 1 tablet by mouth 3 (three) times daily. 90 tablet 5    diclofenac sodium (VOLTAREN) 1 % Gel APPLY 2-4 GRAMS TO EACH PAINFUL AREA FOUR TIMES DAILY - MAX 32 GRAMS/ g 6    esomeprazole (NEXIUM) 40 MG capsule Take 1 capsule (40 mg total) by mouth daily as needed (heartburn). 90 capsule 3    famotidine (PEPCID) 20 MG tablet Take 1 tablet (20 mg total) by mouth 2 (two) times daily. 30 tablet 2    fluticasone propionate (FLONASE) 50 mcg/actuation nasal spray 2 sprays (100 mcg total) by Each Nostril route once daily. 16 g 11    HYDROcodone-acetaminophen (NORCO) 5-325 mg per tablet Take 1 tablet by mouth every 6 (six) hours as  needed for Pain. 28 tablet 0    lancets Misc To check BG 1 times daily, to use with insurance preferred meter 100 each 3    magnesium oxide (MAG-OX) 400 mg (241.3 mg magnesium) tablet Take 2 tablets (800 mg total) by mouth 2 (two) times daily. 360 tablet 3    meclizine (ANTIVERT) 12.5 mg tablet Take 12.5 mg by mouth 3 (three) times daily as needed for Dizziness.      modafiniL (PROVIGIL) 200 MG Tab Take 1 tablet (200 mg total) by mouth daily as needed (sleepiness). 30 tablet 2    mupirocin (BACTROBAN) 2 % ointment Apply topically 3 (three) times daily. 22 g 0    mupirocin (BACTROBAN) 2 % ointment Apply to affected area 3 times daily for 2-3 days. 22 g 1    ondansetron (ZOFRAN-ODT) 4 MG TbDL Take 1 tablet (4 mg total) by mouth every 8 (eight) hours as needed (nausea). 20 tablet 2    potassium citrate (UROCIT-K 10) 10 mEq (1,080 mg) TbSR Take 2 tablets (20 mEq total) by mouth 3 (three) times daily with meals. 180 tablet 11    semaglutide (OZEMPIC) 2 mg/dose (8 mg/3 mL) PnIj Inject 2 mg into the skin every 7 days. 9 mL 1    sertraline (ZOLOFT) 100 MG tablet Take 1.5 tablets (150 mg total) by mouth once daily. 135 tablet 3    vitamin D (VITAMIN D3) 1000 units Tab Take 1,000 Units by mouth 3 (three) times a week.      azelastine (ASTELIN) 137 mcg (0.1 %) nasal spray 1 spray (137 mcg total) by Nasal route 2 (two) times daily. 30 mL 11    conjugated estrogens (PREMARIN) vaginal cream Place 0.5 g vaginally 3 (three) times a week. 30 g 0     Current Facility-Administered Medications on File Prior to Visit   Medication Dose Route Frequency Provider Last Rate Last Admin    DOBUTamine 50 mg in D5W 50 mL (1 mg/mL) cardiac stress test infusion  10 mcg/kg/min Intravenous Continuous Madisyn Villalobos MD   Stopped at 07/23/24 8993    perflutren protein-A microsphr 0.22 mg/mL IV susp  0.5 mL Intravenous Once Booth Tejeda, Evelio, MD       [2]   Social History  Socioeconomic History    Marital status:    Tobacco Use    Smoking  status: Former     Current packs/day: 0.00     Average packs/day: 1.5 packs/day for 29.0 years (43.5 ttl pk-yrs)     Types: Cigarettes     Start date:      Quit date: 1995     Years since quittin.5    Smokeless tobacco: Never   Substance and Sexual Activity    Alcohol use: Yes     Alcohol/week: 1.0 standard drink of alcohol     Types: 1 Glasses of wine per week     Comment: social    Drug use: Never    Sexual activity: Yes     Partners: Male     Birth control/protection: Surgical     Comment:  since      Social Drivers of Health     Financial Resource Strain: Low Risk  (2024)    Overall Financial Resource Strain (CARDIA)     Difficulty of Paying Living Expenses: Not hard at all   Food Insecurity: No Food Insecurity (2024)    Hunger Vital Sign     Worried About Running Out of Food in the Last Year: Never true     Ran Out of Food in the Last Year: Never true   Transportation Needs: No Transportation Needs (2024)    PRAPARE - Transportation     Lack of Transportation (Medical): No     Lack of Transportation (Non-Medical): No   Physical Activity: Inactive (2024)    Exercise Vital Sign     Days of Exercise per Week: 0 days     Minutes of Exercise per Session: 0 min   Stress: No Stress Concern Present (2024)    [employer] [place] [employer] - Occupational Stress Questionnaire     Feeling of Stress : Only a little   Housing Stability: Unknown (2024)    Housing Stability Vital Sign     Unable to Pay for Housing in the Last Year: No

## 2025-06-26 ENCOUNTER — CLINICAL SUPPORT (OUTPATIENT)
Dept: REHABILITATION | Facility: HOSPITAL | Age: 75
End: 2025-06-26
Payer: MEDICARE

## 2025-06-26 ENCOUNTER — TELEPHONE (OUTPATIENT)
Dept: OTOLARYNGOLOGY | Facility: CLINIC | Age: 75
End: 2025-06-26
Payer: MEDICARE

## 2025-06-26 DIAGNOSIS — Z78.9 DECREASED INDEPENDENCE WITH ACTIVITIES OF DAILY LIVING: ICD-10-CM

## 2025-06-26 DIAGNOSIS — H53.40 VISUAL FIELD CUT: Primary | ICD-10-CM

## 2025-06-26 DIAGNOSIS — Z78.9 IMPAIRED INSTRUMENTAL ACTIVITIES OF DAILY LIVING (IADL): ICD-10-CM

## 2025-06-26 DIAGNOSIS — Z74.09 IMPAIRED FUNCTIONAL MOBILITY AND ACTIVITY TOLERANCE: Primary | ICD-10-CM

## 2025-06-26 PROCEDURE — 97110 THERAPEUTIC EXERCISES: CPT | Mod: PN

## 2025-06-26 PROCEDURE — 97530 THERAPEUTIC ACTIVITIES: CPT | Mod: PN

## 2025-06-26 NOTE — TELEPHONE ENCOUNTER
Attempted to contact the pt in regards to scheduling an appointment. No answer, voicemail was left with available date/time.     ----- Message from Karen Hernandez sent at 6/26/2025  3:50 PM CDT -----  Regarding: ENT Referral  Good afternoon,    When you all have a minute, would you call this patient to get her scheduled for an ENT visit? It's to visualize the vocal folds to ensure no pathology prior to starting voice treatment for Parkinson's disease. The sooner the better if possible :)     Thank you,  Karen   Frequently used (Daily)

## 2025-06-26 NOTE — PROGRESS NOTES
Outpatient Rehab    Occupational Therapy Visit    Patient Name: Tracy Armendariz  MRN: [id number]  YOB: 1950  Encounter Date: 6/26/2025    Therapy Diagnosis:   Encounter Diagnoses   Name Primary?    Visual field cut Yes    Decreased independence with activities of daily living     Impaired instrumental activities of daily living (IADL)      Physician: Padmini Bain MD    Physician Orders: Eval and Treat  Medical Diagnosis: Parkinson's disease, unspecified whether dyskinesia present, unspecified whether manifestations fluctuate  Surgical Diagnosis: Not applicable for this Episode   Surgical Date: Not applicable for this Episode  Days Since Last Surgery: Not applicable for this Episode    Visit # / Visits Authorized: 4 / 20  Insurance Authorization Period: 6/3/2025 to 12/31/2025  Date of Evaluation: 6/3/2025  Plan of Care Certification: 6/3/2025 to 7/29/2025      Time In: 1515   Time Out: 1600  Total Time (in minutes): 45   Total Billable Time (in minutes): 45    Precautions:     High fall risk, spinal precautions, rollator for ambulation      Subjective   Pt reports fall since last session -- sliding off of bed. She has utulized Corin feature on phone independently since last sesion.  Pain reported as 0/10.      Objective        No measures obtained this date.     Treatment:    Pt completed ther acts to address BUE  strength and FM control/coordination required for participation in functional tasks. Pt demonstrated the following:  Therapeutic Activity  TA 1: manipulating green theraputty to remove 10 marbles  TA 2: tripod grasp on red clothespin to  small pompoms and place in container  TA 3: Pt educated regarding tremor mgmt strategies including weight bearing, use of wrist weights, taking breaks, and demonstrating finger flicks. Pt demonstrated understanding during session.  TA 4: 9-hole peg x 3 w/ alternating UEs    Time Entry(in minutes):  Therapeutic Activity Time Entry:  45    Assessment & Plan   Assessment: Pt completed therapeutic activities this date w/ good energy and participation. Pt demonstrated fair  strength and FM control/coordination, min tremor noted. Pt educated regarding use of tremor mgmt strategies and demonstrated understanding with activities during session. Pt required min A-CGA when walking w/ rollator this date d/t right lateral lean.   Evaluation/Treatment Tolerance: Patient tolerated treatment well    The patient will continue to benefit from skilled outpatient occupational therapy in order to address the deficits listed in the problem list on the initial evaluation, provide patient and family education, and maximize the patients level of independence in the home and community environments.     The patient's spiritual, cultural, and educational needs were considered, and the patient is agreeable to the plan of care and goals.     Education  Education was done with Patient. The patient's learning style includes Listening and Demonstration. The patient Demonstrates understanding and Verbalizes understanding.                 Plan: continue OT POC, administer HEP    Goals:   Active       LTG       OT will provide training/education on tremor management strategies and AE/DME/task set up to maximize independence, safety & efficiency with ADLs/IADLs.  (Met)       Start:  06/04/25    Expected End:  07/30/25    Resolved:  06/26/25         OT will provide recommendations and education on environmental set up/modification prn to accommodate for vision related deficits in order to reduce risk for falls and maximize independence and safety with occupational engagement in home environment. (Met)       Start:  06/04/25    Expected End:  07/30/25    Resolved:  06/20/25         OT will provide training/education on appropriate HEP/HAPs to improve global strength, tissue lengthening, and FM skills.  (Progressing)       Start:  06/04/25    Expected End:  07/30/25             "Pt will verbalize and demonstrate appropriate sequencing and motor control for fxnl sit to stand transfer (recliner, toilet, shower chair, etc.) to reduce "plopping" and retropulsion and increase safety with ADL routine.  (Progressing)       Start:  06/04/25    Expected End:  07/30/25            Pt will perform UB dressing SPV.  (Progressing)       Start:  06/04/25    Expected End:  07/30/25            Pt will increase BUE MMT to 5/5 in deficit mm groups for improved fxnl strength (Progressing)       Start:  06/04/25    Expected End:  07/30/25            Pt will demonstrate improved L FM coordination as evidence by completing 9HPT in </= 40 secs. (Progressing)       Start:  06/04/25    Expected End:  07/30/25               LTG continued       Patient to perform smooth pursuits WFL in all planes x 60 seconds, tracking single target for improved ability to scan environment with functional mobility.  (Progressing)       Start:  06/05/25    Expected End:  07/30/25            Patient will maintain gaze stabilization on target w/ VORx1 at self-selected pace x 30 seconds.  (Progressing)       Start:  06/05/25    Expected End:  07/30/25               STG       TBA oculomotor functions (I.e. smooth pursuits, saccades, convergence/divergence) with accompanying goal(s) to follow as needed. (Met)       Start:  06/04/25    Expected End:  07/02/25    Resolved:  06/05/25         TBA B  strength with accompanying goal(s) to follow as needed. (Met)       Start:  06/04/25    Expected End:  07/02/25    Resolved:  06/05/25         TBD potential need/benefits for use of visual aids in the home to improve safety awareness and carry over of sequencing and form with fxnl transfer training, direction changes, scanning strategies, etc.. (Met)       Start:  06/04/25    Expected End:  07/02/25    Resolved:  06/20/25         L shoulder pain to be further assessed and treated if deemed appropriate/within OT scope of practice. (Met)       " Start:  06/04/25    Expected End:  07/02/25    Resolved:  06/05/25         Pt will increase LUE MMT to 4/5 in deficient mm groups for improved fxnl strength.  (Progressing)       Start:  06/04/25    Expected End:  07/02/25                Genevieve Hayes, OT

## 2025-06-27 NOTE — PROGRESS NOTES
"  Outpatient Rehab    Physical Therapy Visit    Patient Name: Tracy Armendariz  MRN: [id number]  YOB: 1950  Encounter Date: 6/26/2025    Therapy Diagnosis:   Encounter Diagnosis   Name Primary?    Impaired functional mobility and activity tolerance Yes     Physician: Padmini Bain MD    Physician Orders: Eval and Treat  Medical Diagnosis: Parkinson's disease, unspecified whether dyskinesia present, unspecified whether manifestations fluctuate  Surgical Diagnosis: Not applicable for this Episode   Surgical Date: Not applicable for this Episode  Days Since Last Surgery: Not applicable for this Episode    Visit # / Visits Authorized:  4 / 20  Insurance Authorization Period: 6/3/2025 to 12/31/2025  Date of Evaluation: 6/3/2025  Plan of Care Certification: 6/4/2025 to 7/30/2025      PT/PTA:     Number of PTA visits since last PT visit:   Time In: 1600   Time Out: 1645  Total Time (in minutes): 45   Total Billable Time (in minutes): 45    FOTO:  Intake Score:  %  Survey Score 2:  %  Survey Score 3:  %    Precautions:       Subjective   Pt reports she had a fall this week where she slid off the bed in which she think is becuase she was wearing non- socks, her feet slipped from her and she fell on her butt to the floor. She was on the floor for a while and needed her husbands assistance to get back on her feet. She stated that she really wants to work on getting up from the floor.         Objective            Treatment:  Therapeutic Exercise  TE 1: x7 mins, sci-fit recumbent stepper, twin peaks, level 2.5  TE 2: 3 x 30" supine quad stretch  Therapeutic Activity  TA 1: x 2 sit <>Stand with feet on incline red bolster, halted due to excess anterior weight shift  TA 2: 3 x8 sit<>stand from elevated mat with blue foam under glutes  TA 3: 3 x 8 step up, UE support  TA 4: x 8 lunges, //bars, BUE support, halted due to pain in R hip and knee    Time Entry(in minutes):  Therapeutic Activity Time Entry: " 30  Therapeutic Exercise Time Entry: 15    Assessment & Plan   Assessment: Mrs. Molina tolerated treatment fairly well today but continues to demonstrate significant postural instability. She improved quality of sit<>stand transfers from mat while sitting on compliant surface. Pt reported increased pain in her R hip during lunges which was discontinued. She reported increased fatigue during last 10 minutes of therapy in which PT observed increased postural instability and increased lateral lean to right.  Pt continues to be a good candidate for OPPT.  Evaluation/Treatment Tolerance: Patient tolerated treatment well    The patient will continue to benefit from skilled outpatient physical therapy in order to address the deficits listed in the problem list on the initial evaluation, provide patient and family education, and maximize the patients level of independence in the home and community environments.     The patient's spiritual, cultural, and educational needs were considered, and the patient is agreeable to the plan of care and goals.           Plan: Incoroporate exercises to benefit floor<>stand transfer, progressing transfers,    Goals:     Juan A Mclaughlin, PT

## 2025-07-01 ENCOUNTER — CLINICAL SUPPORT (OUTPATIENT)
Dept: REHABILITATION | Facility: HOSPITAL | Age: 75
End: 2025-07-01
Payer: MEDICARE

## 2025-07-01 DIAGNOSIS — Z78.9 DECREASED INDEPENDENCE WITH ACTIVITIES OF DAILY LIVING: ICD-10-CM

## 2025-07-01 DIAGNOSIS — Z78.9 IMPAIRED INSTRUMENTAL ACTIVITIES OF DAILY LIVING (IADL): ICD-10-CM

## 2025-07-01 DIAGNOSIS — H53.40 VISUAL FIELD CUT: Primary | ICD-10-CM

## 2025-07-01 DIAGNOSIS — Z74.09 IMPAIRED FUNCTIONAL MOBILITY AND ACTIVITY TOLERANCE: Primary | ICD-10-CM

## 2025-07-01 PROCEDURE — 97530 THERAPEUTIC ACTIVITIES: CPT | Mod: PN

## 2025-07-01 PROCEDURE — 97110 THERAPEUTIC EXERCISES: CPT | Mod: PN

## 2025-07-01 NOTE — PROGRESS NOTES
"  Outpatient Rehab    Physical Therapy Visit    Patient Name: Tracy Armendariz  MRN: [id number]  YOB: 1950  Encounter Date: 7/1/2025    Therapy Diagnosis:   Encounter Diagnosis   Name Primary?    Impaired functional mobility and activity tolerance Yes     Physician: Padmini Bain MD    Physician Orders: Eval and Treat  Medical Diagnosis: Parkinson's disease, unspecified whether dyskinesia present, unspecified whether manifestations fluctuate  Surgical Diagnosis: Not applicable for this Episode   Surgical Date: Not applicable for this Episode  Days Since Last Surgery: Not applicable for this Episode    Visit # / Visits Authorized:  5 / 20  Insurance Authorization Period: 6/3/2025 to 12/31/2025  Date of Evaluation: 6/3/2025  Plan of Care Certification: 6/4/2025 to 7/30/2025      PT/PTA: PT   Number of PTA visits since last PT visit:0  Time In: 1300   Time Out: 1345  Total Time (in minutes): 45   Total Billable Time (in minutes): 45    FOTO:  Intake Score:  %  Survey Score 2:  %  Survey Score 3:  %    Precautions:     High fall risk, spinal precautions, rollator for ambulation      Subjective   Still feeling a little sore in her hips/back from recent fall at home.  Pain reported as 0/10. lower back pain    Objective            Treatment:  Therapeutic Exercise  TE 1: x8 mins, sci-fit recumbent stepper, twin peaks, level 3.5  TE 2: Calf raises 2x15  TE 3: Standing dorsiflexion raises 2x15  TE 4: Standing hip abduction x10B with 2# ankle weights  TE 5: Seated long arc quads x15B with 2# ankle weights and brief holds  Therapeutic Activity  TA 1: Squats to chair 2x8  TA 2: 4" step taps 3x30"  TA 3: Normal base of support unsupported balance trials 3x30"  TA 4: Forward march walks in // bars x 3 lengths x 8 feet each  TA 5: Retro walks in // bars x 3 lengths x 8 feet each    Time Entry(in minutes):  Therapeutic Activity Time Entry: 20  Therapeutic Exercise Time Entry: 25    Assessment & Plan   Assessment: " Tracy remains at very high risk for falls. Frequent seated rest breaks required for fatigue recovery during standing activities but no major loss of balance. She requires CGA for most activities due to frequent retropulsion, even when using upper extremity support. Eccentric control during sit to stands still limited. She would benefit from continued PT services to reduce risk for falls.  Evaluation/Treatment Tolerance: Patient tolerated treatment well    The patient will continue to benefit from skilled outpatient physical therapy in order to address the deficits listed in the problem list on the initial evaluation, provide patient and family education, and maximize the patients level of independence in the home and community environments.     The patient's spiritual, cultural, and educational needs were considered, and the patient is agreeable to the plan of care and goals.           Plan: Incoroporate exercises to benefit floor<>stand transfer, progressing transfers    Goals:     Short Term Goals: 4 weeks    Patient to be Independent with HEP  Patient to score less than or equal to 20 sec on the 5 times sit to stand for improved transfers  Patient able to control descent of stand to sit with upper extremity to decrease plopping and chair movement behind her     Long Term Goals: 8 weeks    Patient to score less than or equal to 28 sec on the TUG for decreased fall risk  Patient to able to ambulate greater than 250 feet with rolling walker, no loss of balance, full bilateral foot clearance and minimal path deviation  Patient able to tolerate greater than 15 min in standing position without lower extremity fatigue for improved standing tolerance   Patient to improve bilateral hip flexion to greater than or equal to 4-/5 for improved transfers  Patient to demo good safety awareness with transfers (bed mobility, sit to stand) for decreased fall risk     MIGUEL BRICEÑO, PT

## 2025-07-01 NOTE — PATIENT INSTRUCTIONS
Rules with the putty:   Do not leave it in the sun/car  Do not get it on fabric/clothes, it will not come out  Do not let children/pets play with it because it can be toxic

## 2025-07-01 NOTE — PROGRESS NOTES
Outpatient Rehab    Occupational Therapy Visit    Patient Name: Tracy Armendariz  MRN: [id number]  YOB: 1950  Encounter Date: 7/1/2025    Therapy Diagnosis:   Encounter Diagnoses   Name Primary?    Visual field cut Yes    Decreased independence with activities of daily living     Impaired instrumental activities of daily living (IADL)      Physician: Padmini Bain MD    Physician Orders: Eval and Treat  Medical Diagnosis: Parkinson's disease, unspecified whether dyskinesia present, unspecified whether manifestations fluctuate  Surgical Diagnosis: Not applicable for this Episode   Surgical Date: Not applicable for this Episode  Days Since Last Surgery: Not applicable for this Episode    Visit # / Visits Authorized: 5 / 20  Insurance Authorization Period: 6/3/2025 to 12/31/2025  Date of Evaluation: 6/3/2025  Plan of Care Certification: 6/3/2025 to 7/29/2025      Time In: 1345   Time Out: 1430  Total Time (in minutes): 45   Total Billable Time (in minutes): 45      Precautions:     High fall risk, spinal precautions, rollator for ambulation      Subjective   Pt reports she has obtained previously discussed adaptive equipment, including long-handled sponge, reacher, and weighted utensils.  Pain reported as 0/10.      Objective        No measures obtained this date.     Treatment:    Pt completed ther acts to address BUE  strength and FM control/coordination required for participation in ADLs and IADLs. Pt demonstrated the following:  Therapeutic Activity  TA 1: 9-hole peg x 3 w/ alternating UEs  TA 2: tripod grasp on red clothespin to  small pompoms and place in container  TA 3: in-hand manipulation w/ palm-> finger translation to place coins through slot in container (right hand only)    Time Entry(in minutes):  Therapeutic Activity Time Entry: 45    Assessment & Plan   Assessment: Pt completed therapeutic activities this date w/ good energy and participation. Pt demonstrated fair   strength and FM control/coordination, increased tremor noted this date. Pt administered handout and educated regarding UE and theraputty HEPs, verbalized understanding. Pt requires max verbal/tactile cues for safety w/ functional transfers and CGA when walking w/ rollator.   Evaluation/Treatment Tolerance: Patient tolerated treatment well    The patient will continue to benefit from skilled outpatient occupational therapy in order to address the deficits listed in the problem list on the initial evaluation, provide patient and family education, and maximize the patients level of independence in the home and community environments.     The patient's spiritual, cultural, and educational needs were considered, and the patient is agreeable to the plan of care and goals.     Education  Education was done with Patient. The patient's learning style includes Listening, Pictures/video, and Reading. The patient Verbalizes understanding.         UE and theraputty HEP       Plan: continue OT POC    Goals:   Active       LTG       OT will provide training/education on tremor management strategies and AE/DME/task set up to maximize independence, safety & efficiency with ADLs/IADLs.  (Met)       Start:  06/04/25    Expected End:  07/30/25    Resolved:  06/26/25         OT will provide recommendations and education on environmental set up/modification prn to accommodate for vision related deficits in order to reduce risk for falls and maximize independence and safety with occupational engagement in home environment. (Met)       Start:  06/04/25    Expected End:  07/30/25    Resolved:  06/20/25         OT will provide training/education on appropriate HEP/HAPs to improve global strength, tissue lengthening, and FM skills.  (Met)       Start:  06/04/25    Expected End:  07/30/25    Resolved:  07/01/25         Pt will verbalize and demonstrate appropriate sequencing and motor control for fxnl sit to stand transfer (recliner, toilet,  "shower chair, etc.) to reduce "plopping" and retropulsion and increase safety with ADL routine.  (Progressing)       Start:  06/04/25    Expected End:  07/30/25            Pt will perform UB dressing SPV.  (Progressing)       Start:  06/04/25    Expected End:  07/30/25            Pt will increase BUE MMT to 5/5 in deficit mm groups for improved fxnl strength (Progressing)       Start:  06/04/25    Expected End:  07/30/25            Pt will demonstrate improved L FM coordination as evidence by completing 9HPT in </= 40 secs. (Progressing)       Start:  06/04/25    Expected End:  07/30/25               LTG continued       Patient to perform smooth pursuits WFL in all planes x 60 seconds, tracking single target for improved ability to scan environment with functional mobility.  (Progressing)       Start:  06/05/25    Expected End:  07/30/25            Patient will maintain gaze stabilization on target w/ VORx1 at self-selected pace x 30 seconds.  (Progressing)       Start:  06/05/25    Expected End:  07/30/25               STG       TBA oculomotor functions (I.e. smooth pursuits, saccades, convergence/divergence) with accompanying goal(s) to follow as needed. (Met)       Start:  06/04/25    Expected End:  07/02/25    Resolved:  06/05/25         TBA B  strength with accompanying goal(s) to follow as needed. (Met)       Start:  06/04/25    Expected End:  07/02/25    Resolved:  06/05/25         TBD potential need/benefits for use of visual aids in the home to improve safety awareness and carry over of sequencing and form with fxnl transfer training, direction changes, scanning strategies, etc.. (Met)       Start:  06/04/25    Expected End:  07/02/25    Resolved:  06/20/25         L shoulder pain to be further assessed and treated if deemed appropriate/within OT scope of practice. (Met)       Start:  06/04/25    Expected End:  07/02/25    Resolved:  06/05/25         Pt will increase LUE MMT to 4/5 in deficient mm " groups for improved fxnl strength.  (Progressing)       Start:  06/04/25    Expected End:  07/02/25                Genevieve Hayes OT

## 2025-07-03 ENCOUNTER — CLINICAL SUPPORT (OUTPATIENT)
Dept: REHABILITATION | Facility: HOSPITAL | Age: 75
End: 2025-07-03
Payer: MEDICARE

## 2025-07-03 VITALS — DIASTOLIC BLOOD PRESSURE: 78 MMHG | SYSTOLIC BLOOD PRESSURE: 123 MMHG

## 2025-07-03 DIAGNOSIS — Z78.9 IMPAIRED INSTRUMENTAL ACTIVITIES OF DAILY LIVING (IADL): ICD-10-CM

## 2025-07-03 DIAGNOSIS — H53.40 VISUAL FIELD CUT: Primary | ICD-10-CM

## 2025-07-03 DIAGNOSIS — Z74.09 IMPAIRED FUNCTIONAL MOBILITY AND ACTIVITY TOLERANCE: Primary | ICD-10-CM

## 2025-07-03 DIAGNOSIS — Z78.9 DECREASED INDEPENDENCE WITH ACTIVITIES OF DAILY LIVING: ICD-10-CM

## 2025-07-03 PROCEDURE — 97530 THERAPEUTIC ACTIVITIES: CPT | Mod: PN

## 2025-07-03 PROCEDURE — 97112 NEUROMUSCULAR REEDUCATION: CPT | Mod: PN

## 2025-07-03 PROCEDURE — 97110 THERAPEUTIC EXERCISES: CPT | Mod: PN

## 2025-07-03 NOTE — PATIENT INSTRUCTIONS
Gaze Stabilization: Tip Card  1. Target must remain in focus, not blurry, and appear stationary while head is in motion.  2. Perform exercises with small head movements (45° to either side of midline).  3. Increase speed of head motion so long as target is in focus.  4. If you wear eyeglasses, be sure you can see target through lens (therapist will give specific instructions for bifocal / progressive lenses).  5. These exercises may provoke dizziness or nausea. Work through these symptoms. If too dizzy, slow head movement slightly. Rest between each exercise.  6. Exercises demand concentration; avoid distractions.  7. For safety, perform exercises in sitting.      Gaze Stabilization: Sitting    Keeping eyes on target on plain wall arms' length away, tilt head slightly down and move head side to side for 30 seconds or until symptoms start. Repeat while moving head up and down for 30 seconds or until symptoms start.  Do 1 sessions per day.          Complete 30 sec , 1 sets, 1 times a day

## 2025-07-03 NOTE — PROGRESS NOTES
Outpatient Rehab    Occupational Therapy Visit    Patient Name: Tracy Armendariz  MRN: [id number]  YOB: 1950  Encounter Date: 7/3/2025    Therapy Diagnosis:   Encounter Diagnoses   Name Primary?    Visual field cut Yes    Decreased independence with activities of daily living     Impaired instrumental activities of daily living (IADL)      Physician: Padmini Bain MD    Physician Orders: Eval and Treat  Medical Diagnosis: Parkinson's disease, unspecified whether dyskinesia present, unspecified whether manifestations fluctuate  Surgical Diagnosis: Not applicable for this Episode   Surgical Date: Not applicable for this Episode  Days Since Last Surgery: Not applicable for this Episode    Visit # / Visits Authorized: 6 / 20  Insurance Authorization Period: 6/3/2025 to 12/31/2025  Date of Evaluation: 6/3/2025  Plan of Care Certification: 6/3/2025 to 7/29/2025      Time In: 1515   Time Out: 1600  Total Time (in minutes): 45   Total Billable Time (in minutes): 45      Precautions:     High fall risk, spinal precautions, rollator for ambulation      Subjective      Pain reported as 0/10.      Objective     Wrist/Hand Special Tests  Hand Coordination Special Tests  Right 9-Hole Peg Test (sec): 35  Left 9-Hole Peg Test (sec): 46       Coordination  Right 9-Hole Peg Test (sec): 35  Left 9-Hole Peg Test (sec): 46                 Treatment:    Pt completed ther acts to address oculomotor function and BUE FM control/coordination required for participation in functional tasks. Pt demonstrated the following:  Therapeutic Activity  TA 1: horizontal and vertical pursuits for 30 sec at self-selected pace  TA 2: VOR x 1 horizontal for 30 seconds at slow pace  TA 3: in-hand manipulation w/ palm-> finger translation to place coins through slot in container, using BUEs    Time Entry(in minutes):  Therapeutic Activity Time Entry: 45    Assessment & Plan   Assessment: Pt completed therapeutic activities this date w/ good  energy and participation. Pt demonstrated fair FM control/coordination, requiring significantly increased time. Pt tolerate oculomotor exercises fairly, taking rest breaks as needed for eye fatigue and dizziness. Noted difficulty w/ tracking during pursuits, losing target when down and to the left. Noted saccadic intrusions and left eye exotropia during gaze stabilization exercises. Pt administered oculomotor HEP to perform at home.   Evaluation/Treatment Tolerance: Patient tolerated treatment well    The patient will continue to benefit from skilled outpatient occupational therapy in order to address the deficits listed in the problem list on the initial evaluation, provide patient and family education, and maximize the patients level of independence in the home and community environments.     The patient's spiritual, cultural, and educational needs were considered, and the patient is agreeable to the plan of care and goals.     Education  Education was done with Patient. The patient's learning style includes Listening, Demonstration, Pictures/video, and Reading. The patient Verbalizes understanding and Demonstrates understanding.         Oculomotor HEP, progress toward goals        Plan: continue OT POC    Goals:   Active       LTG       OT will provide training/education on tremor management strategies and AE/DME/task set up to maximize independence, safety & efficiency with ADLs/IADLs.  (Met)       Start:  06/04/25    Expected End:  07/30/25    Resolved:  06/26/25         OT will provide recommendations and education on environmental set up/modification prn to accommodate for vision related deficits in order to reduce risk for falls and maximize independence and safety with occupational engagement in home environment. (Met)       Start:  06/04/25    Expected End:  07/30/25    Resolved:  06/20/25         OT will provide training/education on appropriate HEP/HAPs to improve global strength, tissue lengthening,  "and FM skills.  (Met)       Start:  06/04/25    Expected End:  07/30/25    Resolved:  07/01/25         Pt will verbalize and demonstrate appropriate sequencing and motor control for fxnl sit to stand transfer (recliner, toilet, shower chair, etc.) to reduce "plopping" and retropulsion and increase safety with ADL routine.  (Progressing)       Start:  06/04/25    Expected End:  07/30/25            Pt will perform UB dressing SPV.  (Progressing)       Start:  06/04/25    Expected End:  07/30/25            Pt will increase BUE MMT to 5/5 in deficit mm groups for improved fxnl strength (Progressing)       Start:  06/04/25    Expected End:  07/30/25            Pt will demonstrate improved L FM coordination as evidence by completing 9HPT in </= 40 secs. (Progressing)       Start:  06/04/25    Expected End:  07/30/25               LTG continued       Patient to perform smooth pursuits WFL in all planes x 60 seconds, tracking single target for improved ability to scan environment with functional mobility.  (Progressing)       Start:  06/05/25    Expected End:  07/30/25            Patient will maintain gaze stabilization on target w/ VORx1 at self-selected pace x 30 seconds.  (Progressing)       Start:  06/05/25    Expected End:  07/30/25               STG       TBA oculomotor functions (I.e. smooth pursuits, saccades, convergence/divergence) with accompanying goal(s) to follow as needed. (Met)       Start:  06/04/25    Expected End:  07/02/25    Resolved:  06/05/25         TBA B  strength with accompanying goal(s) to follow as needed. (Met)       Start:  06/04/25    Expected End:  07/02/25    Resolved:  06/05/25         TBD potential need/benefits for use of visual aids in the home to improve safety awareness and carry over of sequencing and form with fxnl transfer training, direction changes, scanning strategies, etc.. (Met)       Start:  06/04/25    Expected End:  07/02/25    Resolved:  06/20/25         L shoulder " pain to be further assessed and treated if deemed appropriate/within OT scope of practice. (Met)       Start:  06/04/25    Expected End:  07/02/25    Resolved:  06/05/25         Pt will increase LUE MMT to 4/5 in deficient mm groups for improved fxnl strength.  (Progressing)       Start:  06/04/25    Expected End:  07/02/25                Genevieve Hayes, OT

## 2025-07-06 NOTE — PROGRESS NOTES
"  Outpatient Rehab    Physical Therapy Visit    Patient Name: Tracy Armendariz  MRN: [id number]  YOB: 1950  Encounter Date: 7/3/2025    Therapy Diagnosis:   Encounter Diagnosis   Name Primary?    Impaired functional mobility and activity tolerance Yes     Physician: Padmini Bain MD    Physician Orders: Eval and Treat  Medical Diagnosis: Parkinson's disease, unspecified whether dyskinesia present, unspecified whether manifestations fluctuate  Surgical Diagnosis: Not applicable for this Episode   Surgical Date: Not applicable for this Episode  Days Since Last Surgery: Not applicable for this Episode    Visit # / Visits Authorized:  6 / 20  Insurance Authorization Period: 6/3/2025 to 12/31/2025  Date of Evaluation: 6/3/2025  Plan of Care Certification: 6/4/2025 to 7/30/2025      PT/PTA:     Number of PTA visits since last PT visit:   Time In: 1430   Time Out: 1515  Total Time (in minutes): 45   Total Billable Time (in minutes): 45    FOTO:  Intake Score:  %  Survey Score 2:  %  Survey Score 3:  %    Precautions:       Subjective   Pt reports she is fellling "mehh" today and not too well. She says she feels very off. Also says her tremors are worse today.  Pain reported as 0/10.      Objective   Vital Signs  /78   LMP 01/01/1978 (Approximate)   BP Location: Left arm  BP Position: Sitting                  Treatment:  Therapeutic Exercise  TE 1: x8 mins, sci-fit recumbent stepper, twin peaks, level 3.5  TE 2: Calf raises 2x15  TE 3: Seated dorsiflexion raises 2x20  TE 4: 2 x 10 seated marching, #2 ankle weights  TE 5: Seated long arc quads x15B with 2# ankle weights and brief holds  TE 6: x 10 standing marching, pt ceased due fear of falling  Balance/Neuromuscular Re-Education  NMR 1: Normal base of support unsupported balance trials 3x30"  NMR 2: 3 x 30" static stance on red incline bolster facing uphill  NMR 3: 3 x 30" static stance on red incline bolster facing downhill  Therapeutic Activity  TA " 1: Squats to chair 2x8  TA 2: 2 x10 step up<>step down, occasional UE support    Time Entry(in minutes):  Neuromuscular Re-Education Time Entry: 11  Therapeutic Activity Time Entry: 12  Therapeutic Exercise Time Entry: 22    Assessment & Plan   Assessment: Tracy continues to demonstrate being at a high risk for falls especially when mildly fatigued. She also demonstrated increased fear of falling due to increasing self-awareness of poor postural stability. She did well static stance on red incline bolster to help assist with postural instability. Pt continues to be a good candidate for OPPT  Evaluation/Treatment Tolerance: Patient tolerated treatment well    The patient will continue to benefit from skilled outpatient physical therapy in order to address the deficits listed in the problem list on the initial evaluation, provide patient and family education, and maximize the patients level of independence in the home and community environments.     The patient's spiritual, cultural, and educational needs were considered, and the patient is agreeable to the plan of care and goals.           Plan: Incoroporate exercises to benefit floor<>stand transfer, progressing transfers    Goals:     Juan A Mclaughlin, PT

## 2025-07-08 ENCOUNTER — CLINICAL SUPPORT (OUTPATIENT)
Dept: REHABILITATION | Facility: HOSPITAL | Age: 75
End: 2025-07-08
Payer: MEDICARE

## 2025-07-08 DIAGNOSIS — Z78.9 IMPAIRED INSTRUMENTAL ACTIVITIES OF DAILY LIVING (IADL): ICD-10-CM

## 2025-07-08 DIAGNOSIS — Z74.09 IMPAIRED FUNCTIONAL MOBILITY AND ACTIVITY TOLERANCE: Primary | ICD-10-CM

## 2025-07-08 DIAGNOSIS — Z78.9 DECREASED INDEPENDENCE WITH ACTIVITIES OF DAILY LIVING: ICD-10-CM

## 2025-07-08 DIAGNOSIS — H53.40 VISUAL FIELD CUT: Primary | ICD-10-CM

## 2025-07-08 PROCEDURE — 97530 THERAPEUTIC ACTIVITIES: CPT | Mod: PN

## 2025-07-08 PROCEDURE — 97112 NEUROMUSCULAR REEDUCATION: CPT | Mod: PN,CQ

## 2025-07-08 PROCEDURE — 97110 THERAPEUTIC EXERCISES: CPT | Mod: PN,CQ

## 2025-07-08 PROCEDURE — 97530 THERAPEUTIC ACTIVITIES: CPT | Mod: PN,CQ

## 2025-07-08 RX ORDER — FAMOTIDINE 20 MG/1
20 TABLET, FILM COATED ORAL 2 TIMES DAILY
Qty: 30 TABLET | Refills: 2 | Status: CANCELLED | OUTPATIENT
Start: 2025-07-08 | End: 2026-07-08

## 2025-07-08 RX ORDER — ATORVASTATIN CALCIUM 40 MG/1
40 TABLET, FILM COATED ORAL NIGHTLY
Qty: 90 TABLET | Refills: 3 | Status: SHIPPED | OUTPATIENT
Start: 2025-07-08

## 2025-07-08 NOTE — PROGRESS NOTES
"  Outpatient Rehab    Occupational Therapy Visit    Patient Name: Tracy Armendariz  MRN: [id number]  YOB: 1950  Encounter Date: 7/8/2025    Therapy Diagnosis:   Encounter Diagnoses   Name Primary?    Visual field cut Yes    Decreased independence with activities of daily living     Impaired instrumental activities of daily living (IADL)      Physician: Padmini Bain MD    Physician Orders: Eval and Treat  Medical Diagnosis: Parkinson's disease, unspecified whether dyskinesia present, unspecified whether manifestations fluctuate  Surgical Diagnosis: Not applicable for this Episode   Surgical Date: Not applicable for this Episode  Days Since Last Surgery: Not applicable for this Episode    Visit # / Visits Authorized: 7 / 20  Insurance Authorization Period: 6/3/2025 to 12/31/2025  Date of Evaluation: 6/3/2025  Plan of Care Certification: 6/3/2025 to 7/29/2025      Time In: 1345   Time Out: 1430  Total Time (in minutes): 45   Total Billable Time (in minutes): 45    Precautions:     High fall risk, spinal precautions, rollator for ambulation      Subjective   "I've fallen twice since I last saw you".  Pain reported as 5/10. left knee    Objective        No measures obtained this date.     Treatment:  Therapeutic Activity  TA 1: Pt educated regarding safety techniques and use of adaptive equipment in order to increase safety and I w/ IADLs. Following skilled instruction, pt walked within therapy gym with CGA-mod A using rollator to  bean bags from floor using reacher.  TA 2: seated EOM: forward trunk lean w/ BUEs extended for 10 reps  TA 3: pt educated regarding safe transfer techniques in order to increase I w/ functional transfers. Following skilled instruction, pt demonstrated understanding by transferring sit <-> stand from EOM for 5 reps x 2 using rollator  TA 4: chair <-> EOM transfer w/ CGA-min A using rollator    Time Entry(in minutes):  Therapeutic Activity Time Entry: 45    Assessment & " Plan   Assessment: Pt completed therapeutic activities this date w/ good energy and participation. Session this date focusing on safety techniques w/ functional mobility and functional transfers, including proper hand placement and nose over toes principle in order to increase forward trunk lean and to decrease occurrence of retropulsion w/ sit -> stands. Pt demonstrated understanding of safety techniques during session, however, requires continuous verbal/tactile cues for carryover. Noted significant posterior LOB w/ walking activity, requiring mod A from OT for correction. Pt reports she is able to don pullover shirt independently, but needs assistance w/ shape wear and TLSO.   Evaluation/Treatment Tolerance: Patient tolerated treatment well    The patient will continue to benefit from skilled outpatient occupational therapy in order to address the deficits listed in the problem list on the initial evaluation, provide patient and family education, and maximize the patients level of independence in the home and community environments.     The patient's spiritual, cultural, and educational needs were considered, and the patient is agreeable to the plan of care and goals.     Education  Education was done with Patient. The patient's learning style includes Demonstration and Listening. The patient Demonstrates understanding and Verbalizes understanding.         Safety techniques w/ functional transfers, nose over toes principle        Plan: continue OT POC    Goals:   Active       LTG       OT will provide training/education on tremor management strategies and AE/DME/task set up to maximize independence, safety & efficiency with ADLs/IADLs.  (Met)       Start:  06/04/25    Expected End:  07/30/25    Resolved:  06/26/25         OT will provide recommendations and education on environmental set up/modification prn to accommodate for vision related deficits in order to reduce risk for falls and maximize independence and  "safety with occupational engagement in home environment. (Met)       Start:  06/04/25    Expected End:  07/30/25    Resolved:  06/20/25         OT will provide training/education on appropriate HEP/HAPs to improve global strength, tissue lengthening, and FM skills.  (Met)       Start:  06/04/25    Expected End:  07/30/25    Resolved:  07/01/25         Pt will verbalize and demonstrate appropriate sequencing and motor control for fxnl sit to stand transfer (recliner, toilet, shower chair, etc.) to reduce "plopping" and retropulsion and increase safety with ADL routine.  (Met)       Start:  06/04/25    Expected End:  07/30/25    Resolved:  07/08/25         Pt will perform UB dressing SPV.  (Met)       Start:  06/04/25    Expected End:  07/30/25    Resolved:  07/08/25         Pt will increase BUE MMT to 5/5 in deficit mm groups for improved fxnl strength (Progressing)       Start:  06/04/25    Expected End:  07/30/25            Pt will demonstrate improved L FM coordination as evidence by completing 9HPT in </= 40 secs. (Progressing)       Start:  06/04/25    Expected End:  07/30/25               LTG continued       Patient to perform smooth pursuits WFL in all planes x 60 seconds, tracking single target for improved ability to scan environment with functional mobility.  (Progressing)       Start:  06/05/25    Expected End:  07/30/25            Patient will maintain gaze stabilization on target w/ VORx1 at self-selected pace x 30 seconds.  (Progressing)       Start:  06/05/25    Expected End:  07/30/25               STG       TBA oculomotor functions (I.e. smooth pursuits, saccades, convergence/divergence) with accompanying goal(s) to follow as needed. (Met)       Start:  06/04/25    Expected End:  07/02/25    Resolved:  06/05/25         TBA B  strength with accompanying goal(s) to follow as needed. (Met)       Start:  06/04/25    Expected End:  07/02/25    Resolved:  06/05/25         TBD potential need/benefits " for use of visual aids in the home to improve safety awareness and carry over of sequencing and form with fxnl transfer training, direction changes, scanning strategies, etc.. (Met)       Start:  06/04/25    Expected End:  07/02/25    Resolved:  06/20/25         L shoulder pain to be further assessed and treated if deemed appropriate/within OT scope of practice. (Met)       Start:  06/04/25    Expected End:  07/02/25    Resolved:  06/05/25         Pt will increase LUE MMT to 4/5 in deficient mm groups for improved fxnl strength.  (Progressing)       Start:  06/04/25    Expected End:  07/02/25                Genevieve Hayes, OT

## 2025-07-08 NOTE — TELEPHONE ENCOUNTER
No care due was identified.  [facility] Embedded Care Due Messages. Reference number: [id number].   7/08/2025 4:20:38 PM CDT

## 2025-07-08 NOTE — PROGRESS NOTES
"  Outpatient Rehab    Physical Therapy Visit    Patient Name: Tracy Armendariz  MRN: [id number]  YOB: 1950  Encounter Date: 7/8/2025    Therapy Diagnosis:   Encounter Diagnosis   Name Primary?    Impaired functional mobility and activity tolerance Yes     Physician: Padmini Bain MD    Physician Orders: Eval and Treat  Medical Diagnosis: Parkinson's disease, unspecified whether dyskinesia present, unspecified whether manifestations fluctuate  Surgical Diagnosis: Not applicable for this Episode   Surgical Date: Not applicable for this Episode  Days Since Last Surgery: Not applicable for this Episode    Visit # / Visits Authorized:  7 / 20  Insurance Authorization Period: 6/3/2025 to 12/31/2025  Date of Evaluation: 6/3/2025  Plan of Care Certification: 6/4/2025 to 7/30/2025      PT/PTA: PTA   Number of PTA visits since last PT visit:1  Time In: 1430   Time Out: 1515  Total Time (in minutes): 45   Total Billable Time (in minutes): 45    FOTO:  Intake Score:  %  Survey Score 2:  %  Survey Score 3:  %    Precautions:     High fall risk, spinal precautions, rollator for ambulation      Subjective   feeling very tired today and says she had two falls over the weekend but denies any injury..  Pain reported as 5/10. left knee    Objective            Treatment:  Therapeutic Exercise  TE 1: x8 mins, sci-fit recumbent stepper, twin peaks, level 3.5  TE 2: Calf raises 2x15  TE 3: Seated dorsiflexion raises 2x20  TE 4: 2 x 10 seated marching, #2 ankle weights  TE 5: Seated long arc quads x15B with 2# ankle weights and brief holds  Balance/Neuromuscular Re-Education  NMR 1: Normal base of support unsupported balance trials 3x30"  NMR 2: 3 x 30" static stance on red incline bolster facing uphill  NMR 3: 3 x 30" static stance on red incline bolster facing downhill  Therapeutic Activity  TA 1: Squats to chair 2x8  TA 2: 2 x10 step up<>step down, occasional UE support    Time Entry(in minutes):  Neuromuscular " Re-Education Time Entry: 15  Therapeutic Activity Time Entry: 15  Therapeutic Exercise Time Entry: 15    Assessment & Plan   Assessment: Tracy arrived to session with complaints of moderate left knee pain but was agreeable to treatment.  She reports two more falls over the weekend since previous session but denies serious injury. Decreased safety awareness with ambulation and with transfers with no carryover observed from previous session.  Multiple seated rest breaks for fatigue recovery, including a request to lay down because she was tired.  She would benefit from continued PT services to address decreased activity tolerance and objective fall risk.    Evaluation/Treatment Tolerance: Patient limited by fatigue    The patient will continue to benefit from skilled outpatient physical therapy in order to address the deficits listed in the problem list on the initial evaluation, provide patient and family education, and maximize the patients level of independence in the home and community environments.     The patient's spiritual, cultural, and educational needs were considered, and the patient is agreeable to the plan of care and goals.           Plan: Incoroporate exercises to benefit floor<>stand transfer, progressing transfers    Goals:   Goals: Short Term Goals: 4 weeks    Patient to be Independent with HEP  Patient to score less than or equal to 20 sec on the 5 times sit to stand for improved transfers  Patient able to control descent of stand to sit with upper extremity to decrease plopping and chair movement behind her     Long Term Goals: 8 weeks   Patient to score less than or equal to 28 sec on the TUG for decreased fall risk  Patient to able to ambulate greater than 250 feet with rolling walker, no loss of balance, full bilateral foot clearance and minimal path deviation  Patient able to tolerate greater than 15 min in standing position without lower extremity fatigue for improved standing tolerance    Patient to improve bilateral hip flexion to greater than or equal to 4-/5 for improved transfers  Patient to demo good safety awareness with transfers (bed mobility, sit to stand) for decreased fall risk     Alejandra Jimenes, PTA

## 2025-07-09 NOTE — TELEPHONE ENCOUNTER
Refill Routing Note   Medication(s) are not appropriate for processing by [facility] for the following reason(s):        Required labs abnormal  New or recently adjusted medication    [facility] action(s):  Defer               Appointments  past 12m or future 3m with PCP    Date Provider   Last Visit   6/19/2025 Madisyn Villalobos MD   Next Visit   10/21/2025 Madisyn Villalobos MD   ED visits in past 90 days: 1        Note composed:2:19 PM 07/09/2025

## 2025-07-09 NOTE — TELEPHONE ENCOUNTER
No care due was identified.  [facility] Embedded Care Due Messages. Reference number: [id number].   7/09/2025 2:17:25 PM CDT

## 2025-07-09 NOTE — TELEPHONE ENCOUNTER
Refill Routing Note   Medication(s) are not appropriate for processing by [facility] for the following reason(s):        New or recently adjusted medication    [facility] action(s):  Defer  Approve             Appointments  past 12m or future 3m with PCP    Date Provider   Last Visit   6/19/2025 Madisyn Villalobos MD   Next Visit   10/21/2025 Madisyn Villalobos MD   ED visits in past 90 days: 1        Note composed:10:30 PM 07/08/2025

## 2025-07-10 ENCOUNTER — CLINICAL SUPPORT (OUTPATIENT)
Dept: REHABILITATION | Facility: HOSPITAL | Age: 75
End: 2025-07-10
Payer: MEDICARE

## 2025-07-10 VITALS — HEART RATE: 78 BPM | OXYGEN SATURATION: 96 % | SYSTOLIC BLOOD PRESSURE: 135 MMHG | DIASTOLIC BLOOD PRESSURE: 82 MMHG

## 2025-07-10 DIAGNOSIS — Z74.09 IMPAIRED FUNCTIONAL MOBILITY AND ACTIVITY TOLERANCE: Primary | ICD-10-CM

## 2025-07-10 DIAGNOSIS — Z78.9 DECREASED INDEPENDENCE WITH ACTIVITIES OF DAILY LIVING: ICD-10-CM

## 2025-07-10 DIAGNOSIS — H53.40 VISUAL FIELD CUT: Primary | ICD-10-CM

## 2025-07-10 DIAGNOSIS — Z78.9 IMPAIRED INSTRUMENTAL ACTIVITIES OF DAILY LIVING (IADL): ICD-10-CM

## 2025-07-10 PROCEDURE — 97110 THERAPEUTIC EXERCISES: CPT | Mod: PN,CQ

## 2025-07-10 PROCEDURE — 97530 THERAPEUTIC ACTIVITIES: CPT | Mod: PN,CQ

## 2025-07-10 PROCEDURE — 97530 THERAPEUTIC ACTIVITIES: CPT | Mod: PN

## 2025-07-10 PROCEDURE — 97110 THERAPEUTIC EXERCISES: CPT | Mod: PN

## 2025-07-10 RX ORDER — FAMOTIDINE 20 MG/1
20 TABLET, FILM COATED ORAL 2 TIMES DAILY
Qty: 180 TABLET | Refills: 3 | Status: SHIPPED | OUTPATIENT
Start: 2025-07-10 | End: 2026-07-10

## 2025-07-10 NOTE — PROGRESS NOTES
"  Outpatient Rehab    Occupational Therapy Visit    Patient Name: Tracy Armendariz  MRN: [id number]  YOB: 1950  Encounter Date: 7/10/2025    Therapy Diagnosis:   Encounter Diagnoses   Name Primary?    Visual field cut Yes    Decreased independence with activities of daily living     Impaired instrumental activities of daily living (IADL)      Physician: Padmini Bain MD    Physician Orders: Eval and Treat  Medical Diagnosis: Parkinson's disease, unspecified whether dyskinesia present, unspecified whether manifestations fluctuate  Surgical Diagnosis: Not applicable for this Episode   Surgical Date: Not applicable for this Episode  Days Since Last Surgery: Not applicable for this Episode    Visit # / Visits Authorized: 8 / 20  Insurance Authorization Period: 6/3/2025 to 12/31/2025  Date of Evaluation: 6/3/2025  Plan of Care Certification: 6/3/2025 to 7/29/2025      Time In: 1346   Time Out: 1431  Total Time (in minutes): 45   Total Billable Time (in minutes): 45    Precautions: High fall risk, spinal precautions, rollator for ambulation        Subjective   "I'm weaker than I was last time I came. My legs look like jelly." Pt reported that shoulder abduction exercise with yellow theraband was painful. Pt with bruises on R hand this date..  Pain reported as 0/10. Location: N/A    Objective            Treatment:     Therapeutic exercises to develop strength for 31 minutes, including:  The following were completed seated EOM with wedge behind back for lumbar support:  - LUE with yellow theraband, shoulder flexion, IR, and ER, 2 x 10 each ; pt only able to tolerate x7 reps of shoulder abduction secondary to pain  - RUE with yellow theraband, shoulder flexion, abduction, IR, and ER, 2 x 10 each    Therapeutic activities to improve functional performance for 14 minutes, including:  The following were completed seated EOM with wedge behind back for lumbar support:  - BUE to find correct beads in container and " string, x 2 rounds; pt copied picture with sequence first round, listened to description of beads to find second round  - LUE 3 pt pinch with yellow resistive tweezers to  pompom balls from table and place in container      Time Entry (in minutes):  Therapeutic Activity Time Entry: 14  Therapeutic Exercise Time Entry: 31    Assessment & Plan   Assessment: Pt completed therapeutic activities and exercises this date w/ fair energy and participation. Frequent rest breaks required due to fatigue. Session this date focusing on BUE strengthening. Pt limited by pain in L shoulder. Required wedge behind back due to pt report of back pain. Pt demonstrated fair FM control/coordination, requiring increased time for completion of activities. Pt with fair (-) core strength seated EOM. Pt required CGA with functional mobility with rollator and a right lateral lean was observed. Pt continues to require max verbal cues for safety with sit to stand and mobility.  Evaluation/Treatment Tolerance: Patient tolerated treatment well, Patient limited by fatigue    The patient will continue to benefit from skilled outpatient occupational therapy in order to address the deficits listed in the problem list on the initial evaluation, provide patient and family education, and maximize the patients level of independence in the home and community environments.     The patient's spiritual, cultural, and educational needs were considered, and the patient is agreeable to the plan of care and goals.     Education  Education was done with Patient. The patient's learning style includes Demonstration and Listening. The patient Demonstrates understanding and Verbalizes understanding.         Progress toward goals       Plan: continue OT POC    Goals:   Active       LTG       OT will provide training/education on tremor management strategies and AE/DME/task set up to maximize independence, safety & efficiency with ADLs/IADLs.  (Met)       Start:   "06/04/25    Expected End:  07/30/25    Resolved:  06/26/25         OT will provide recommendations and education on environmental set up/modification prn to accommodate for vision related deficits in order to reduce risk for falls and maximize independence and safety with occupational engagement in home environment. (Met)       Start:  06/04/25    Expected End:  07/30/25    Resolved:  06/20/25         OT will provide training/education on appropriate HEP/HAPs to improve global strength, tissue lengthening, and FM skills.  (Met)       Start:  06/04/25    Expected End:  07/30/25    Resolved:  07/01/25         Pt will verbalize and demonstrate appropriate sequencing and motor control for fxnl sit to stand transfer (recliner, toilet, shower chair, etc.) to reduce "plopping" and retropulsion and increase safety with ADL routine.  (Met)       Start:  06/04/25    Expected End:  07/30/25    Resolved:  07/08/25         Pt will perform UB dressing SPV.  (Met)       Start:  06/04/25    Expected End:  07/30/25    Resolved:  07/08/25         Pt will increase BUE MMT to 5/5 in deficit mm groups for improved fxnl strength (Progressing)       Start:  06/04/25    Expected End:  07/30/25            Pt will demonstrate improved L FM coordination as evidence by completing 9HPT in </= 40 secs. (Progressing)       Start:  06/04/25    Expected End:  07/30/25               LTG continued       Patient to perform smooth pursuits WFL in all planes x 60 seconds, tracking single target for improved ability to scan environment with functional mobility.  (Progressing)       Start:  06/05/25    Expected End:  07/30/25            Patient will maintain gaze stabilization on target w/ VORx1 at self-selected pace x 30 seconds.  (Progressing)       Start:  06/05/25    Expected End:  07/30/25               STG       TBA oculomotor functions (I.e. smooth pursuits, saccades, convergence/divergence) with accompanying goal(s) to follow as needed. (Met)  "      Start:  06/04/25    Expected End:  07/02/25    Resolved:  06/05/25         TBA B  strength with accompanying goal(s) to follow as needed. (Met)       Start:  06/04/25    Expected End:  07/02/25    Resolved:  06/05/25         TBD potential need/benefits for use of visual aids in the home to improve safety awareness and carry over of sequencing and form with fxnl transfer training, direction changes, scanning strategies, etc.. (Met)       Start:  06/04/25    Expected End:  07/02/25    Resolved:  06/20/25         L shoulder pain to be further assessed and treated if deemed appropriate/within OT scope of practice. (Met)       Start:  06/04/25    Expected End:  07/02/25    Resolved:  06/05/25         Pt will increase LUE MMT to 4/5 in deficient mm groups for improved fxnl strength.  (Progressing)       Start:  06/04/25    Expected End:  07/02/25                ANNIA Alexandra    I certify that I was present in the room directing the student in service delivery and guiding them using my skilled judgment. As the co-signing therapist I have reviewed the students documentation and am responsible for the treatment, assessment, and plan.

## 2025-07-10 NOTE — PROGRESS NOTES
"  Outpatient Rehab    Physical Therapy Visit    Patient Name: Tracy Armendariz  MRN: [id number]  YOB: 1950  Encounter Date: 7/10/2025    Therapy Diagnosis:   Encounter Diagnosis   Name Primary?    Impaired functional mobility and activity tolerance Yes     Physician: Padmini Bain MD    Physician Orders: Eval and Treat  Medical Diagnosis: Parkinson's disease, unspecified whether dyskinesia present, unspecified whether manifestations fluctuate  Surgical Diagnosis: Not applicable for this Episode   Surgical Date: Not applicable for this Episode  Days Since Last Surgery: Not applicable for this Episode    Visit # / Visits Authorized:  8 / 20  Insurance Authorization Period: 6/3/2025 to 12/31/2025  Date of Evaluation: 6/3/2025  Plan of Care Certification: 6/4/2025 to 7/30/2025      PT/PTA: PTA   Number of PTA visits since last PT visit:2  Time In: 1300   Time Out: 1345  Total Time (in minutes): 45   Total Billable Time (in minutes): 45    FOTO:  Intake Score:  %  Survey Score 2:  %  Survey Score 3:  %    Precautions:     High fall risk, spinal precautions, rollator for ambulation      Subjective   "today is not a good day" says she is very dizzy, double vision, legs feel weak and rubbery.  Pain reported as 0/10. Location: N/A    Objective   Vital Signs  /82   Pulse 78   LMP 01/01/1978 (Approximate)   SpO2 96%   BP Location: Left arm     BP Cuff Size: Adult               Treatment:  Therapeutic Exercise  TE 1: x8 mins, sci-fit recumbent stepper, twin peaks, level 3.5  TE 2: Calf raises 2x15  TE 3: Seated dorsiflexion raises 2x20  TE 4: 2 x 10 seated marching, #2 ankle weights  TE 5: Seated long arc quads x15B with 2# ankle weights and brief holds  Balance/Neuromuscular Re-Education  NMR 1: Normal base of support unsupported balance trials 3x30"  Therapeutic Activity  TA 1: Squats to chair 2x8  TA 2: 2 x10 step up<>step down, occasional UE support  TA 3: Normal base of support unsupported balance " "trials 3x30"  TA 4: Forward march walks in // bars x 3 lengths x 8 feet each  TA 5: Retro walks in // bars x 3 lengths x 8 feet each    Time Entry(in minutes):  Neuromuscular Re-Education Time Entry: 5  Therapeutic Activity Time Entry: 25  Therapeutic Exercise Time Entry: 15    Assessment & Plan   Assessment: Tracy arrived to session with complaints of fatigue and dizziness but was agreeable to treatment.  Poor tolerance of treatment.  Patient was only able to stand for 1-2 minutes at a time without a report of increased dizziness and double vision and often would fall backwards into chair.  Decreased safety awareness observed throughout session.  Vitals were monitored throughout due to symptoms but within normal limits and at no point orthostatic in nature.  Multiple seated rest breaks required today so limited volume of exercise completed.  She remains an appropriate candidate for OPPT services.   Evaluation/Treatment Tolerance: Patient limited by fatigue    The patient will continue to benefit from skilled outpatient physical therapy in order to address the deficits listed in the problem list on the initial evaluation, provide patient and family education, and maximize the patients level of independence in the home and community environments.     The patient's spiritual, cultural, and educational needs were considered, and the patient is agreeable to the plan of care and goals.           Plan: Incoroporate exercises to benefit floor<>stand transfer, progressing transfers    Goals:   Goals: Short Term Goals: 4 weeks    Patient to be Independent with HEP  Patient to score less than or equal to 20 sec on the 5 times sit to stand for improved transfers  Patient able to control descent of stand to sit with upper extremity to decrease plopping and chair movement behind her     Long Term Goals: 8 weeks   Patient to score less than or equal to 28 sec on the TUG for decreased fall risk  Patient to able to ambulate " greater than 250 feet with rolling walker, no loss of balance, full bilateral foot clearance and minimal path deviation  Patient able to tolerate greater than 15 min in standing position without lower extremity fatigue for improved standing tolerance   Patient to improve bilateral hip flexion to greater than or equal to 4-/5 for improved transfers  Patient to demo good safety awareness with transfers (bed mobility, sit to stand) for decreased fall risk     Alejandra Jimenes, PTA

## 2025-07-15 ENCOUNTER — CLINICAL SUPPORT (OUTPATIENT)
Dept: REHABILITATION | Facility: HOSPITAL | Age: 75
End: 2025-07-15
Payer: MEDICARE

## 2025-07-15 DIAGNOSIS — Z74.09 IMPAIRED FUNCTIONAL MOBILITY AND ACTIVITY TOLERANCE: Primary | ICD-10-CM

## 2025-07-15 DIAGNOSIS — H53.40 VISUAL FIELD CUT: Primary | ICD-10-CM

## 2025-07-15 DIAGNOSIS — Z78.9 DECREASED INDEPENDENCE WITH ACTIVITIES OF DAILY LIVING: ICD-10-CM

## 2025-07-15 DIAGNOSIS — Z78.9 IMPAIRED INSTRUMENTAL ACTIVITIES OF DAILY LIVING (IADL): ICD-10-CM

## 2025-07-15 PROCEDURE — 97530 THERAPEUTIC ACTIVITIES: CPT | Mod: PN

## 2025-07-15 NOTE — PROGRESS NOTES
Outpatient Rehab    Occupational Therapy Visit    Patient Name: Tracy Armendariz  MRN: [id number]  YOB: 1950  Encounter Date: 7/15/2025    Therapy Diagnosis:   Encounter Diagnoses   Name Primary?    Visual field cut Yes    Decreased independence with activities of daily living     Impaired instrumental activities of daily living (IADL)      Physician: Padmini Bain MD    Physician Orders: Eval and Treat  Medical Diagnosis: Parkinson's disease, unspecified whether dyskinesia present, unspecified whether manifestations fluctuate  Surgical Diagnosis: Not applicable for this Episode   Surgical Date: Not applicable for this Episode  Days Since Last Surgery: Not applicable for this Episode    Visit # / Visits Authorized: 9 / 20  Insurance Authorization Period: 6/3/2025 to 12/31/2025  Date of Evaluation: 6/3/2025  Plan of Care Certification: 6/3/2025 to 7/29/2025      Time In: 1430   Time Out: 1515  Total Time (in minutes): 45   Total Billable Time (in minutes): 45      Precautions:  Additional Precautions and Protocol Details: High fall risk, spinal precautions, rollator for ambulation    Subjective   Pt reports she fell off of toilet since last session, she has BSC above toilet seat.  Pain reported as 6/10. back    Objective        No measures obtained this date.     Treatment:    Pt completed ther acts to address BUE  strength and FM control/coordination required for participation in ADLs/IADLs. Pt demonstrated the following:  Therapeutic Activity  TA 1: manipulating green theraputty to remove 10 marbles  TA 2: tripod grasp on red clothespin to  small pompoms and place in container, alternating UEs  TA 3: in-hand manipulation w/ palm -> finger translation to string small beads onto shoe lace, alternating UEs    Time Entry(in minutes):  Therapeutic Activity Time Entry: 45    Assessment & Plan   Assessment: Pt completed therapeutic activities w/ fair energy and good participation. Pt  demonstrating fair  strength and fair (-) FM control/coordination, tremor noted and requiring increased time to complete. Noted frequently dropping beads w/ stringing activity. Pt reports she feels increased dizziness this date, requiring verbal cues for encouraging binocular coordination w/ activities. Pt walked within clinic w/ CGA using rollator, transferred sit-> stand w/ min A and mod verbal cues for safety/sequencing.  Evaluation/Treatment Tolerance: Patient tolerated treatment well    The patient will continue to benefit from skilled outpatient occupational therapy in order to address the deficits listed in the problem list on the initial evaluation, provide patient and family education, and maximize the patients level of independence in the home and community environments.     The patient's spiritual, cultural, and educational needs were considered, and the patient is agreeable to the plan of care and goals.     Education  Education was done with Patient. The patient's learning style includes Listening. The patient Verbalizes understanding.         Progress toward goals, safety w/ functional transfers       Plan: continue OT POC    Goals:   Active       LTG       OT will provide training/education on tremor management strategies and AE/DME/task set up to maximize independence, safety & efficiency with ADLs/IADLs.  (Met)       Start:  06/04/25    Expected End:  07/30/25    Resolved:  06/26/25         OT will provide recommendations and education on environmental set up/modification prn to accommodate for vision related deficits in order to reduce risk for falls and maximize independence and safety with occupational engagement in home environment. (Met)       Start:  06/04/25    Expected End:  07/30/25    Resolved:  06/20/25         OT will provide training/education on appropriate HEP/HAPs to improve global strength, tissue lengthening, and FM skills.  (Met)       Start:  06/04/25    Expected End:   "07/30/25    Resolved:  07/01/25         Pt will verbalize and demonstrate appropriate sequencing and motor control for fxnl sit to stand transfer (recliner, toilet, shower chair, etc.) to reduce "plopping" and retropulsion and increase safety with ADL routine.  (Met)       Start:  06/04/25    Expected End:  07/30/25    Resolved:  07/08/25         Pt will perform UB dressing SPV.  (Met)       Start:  06/04/25    Expected End:  07/30/25    Resolved:  07/08/25         Pt will increase BUE MMT to 5/5 in deficit mm groups for improved fxnl strength (Progressing)       Start:  06/04/25    Expected End:  07/30/25            Pt will demonstrate improved L FM coordination as evidence by completing 9HPT in </= 40 secs. (Progressing)       Start:  06/04/25    Expected End:  07/30/25               LTG continued       Patient to perform smooth pursuits WFL in all planes x 60 seconds, tracking single target for improved ability to scan environment with functional mobility.  (Progressing)       Start:  06/05/25    Expected End:  07/30/25            Patient will maintain gaze stabilization on target w/ VORx1 at self-selected pace x 30 seconds.  (Progressing)       Start:  06/05/25    Expected End:  07/30/25               STG       TBA oculomotor functions (I.e. smooth pursuits, saccades, convergence/divergence) with accompanying goal(s) to follow as needed. (Met)       Start:  06/04/25    Expected End:  07/02/25    Resolved:  06/05/25         TBA B  strength with accompanying goal(s) to follow as needed. (Met)       Start:  06/04/25    Expected End:  07/02/25    Resolved:  06/05/25         TBD potential need/benefits for use of visual aids in the home to improve safety awareness and carry over of sequencing and form with fxnl transfer training, direction changes, scanning strategies, etc.. (Met)       Start:  06/04/25    Expected End:  07/02/25    Resolved:  06/20/25         L shoulder pain to be further assessed and treated if " deemed appropriate/within OT scope of practice. (Met)       Start:  06/04/25    Expected End:  07/02/25    Resolved:  06/05/25         Pt will increase LUE MMT to 4/5 in deficient mm groups for improved fxnl strength.  (Progressing)       Start:  06/04/25    Expected End:  07/02/25                Genevieve Hayes, OT

## 2025-07-16 DIAGNOSIS — R29.6 FALLS FREQUENTLY: ICD-10-CM

## 2025-07-16 DIAGNOSIS — G20.C PARKINSONISM, UNSPECIFIED PARKINSONISM TYPE: Primary | ICD-10-CM

## 2025-07-16 NOTE — PROGRESS NOTES
Outpatient Rehab    Physical Therapy Progress Note    Patient Name: Tracy Armendariz  MRN: [id number]  YOB: 1950  Encounter Date: 7/15/2025    Therapy Diagnosis:   Encounter Diagnosis   Name Primary?    Impaired functional mobility and activity tolerance Yes     Physician: Padmini Bain MD    Physician Orders: Eval and Treat  Medical Diagnosis: Parkinson's disease, unspecified whether dyskinesia present, unspecified whether manifestations fluctuate  Surgical Diagnosis: Not applicable for this Episode   Surgical Date: Not applicable for this Episode  Days Since Last Surgery: Not applicable for this Episode    Visit # / Visits Authorized:  9 / 20  Insurance Authorization Period: 6/3/2025 to 12/31/2025  Date of Evaluation: 6/3/2025  Plan of Care Certification: 6/4/2025 to 7/30/2025      PT/PTA:     Number of PTA visits since last PT visit:   Time In: 1345   Time Out: 1430  Total Time (in minutes): 45   Total Billable Time (in minutes): 45    FOTO:  Intake Score: Not applicable for this Episode%  Survey Score 2: Not applicable for this Episode%  Survey Score 3: Not applicable for this Episode%    Precautions:  Additional Precautions and Protocol Details: High fall risk, spinal precautions, rollator for ambulation    Subjective   Pt reports that although she has had a good past 2 days, she is having a rough time today.  Pain reported as 0/10.      Objective            Treatment:  Therapeutic Activity  TA 1: Time used for communication with patient and  on w/c evaluation and progression of progress  TA 2: Time used for objective measurement testing  TA 3: x 3 sit<>Stand, SBA with Rollator  TA 4: x 3 laps fwd<>bwd ambulation with UE supprot  TA 5: x 3 laps sidestepping with UE supprot    Time Entry(in minutes):  Therapeutic Activity Time Entry: 45    Assessment & Plan   Assessment: Pt presented to clinic with continued report of not having a great day. PT focus was on communicating with  and  patient on PT's recommendation on participating in a custom wheelchair evaluation due to the patient continued need for contact guard assistance during transfers and ambulation due to poor balance. Although patient was anxious and slightly worried that wheelchair evaluation mean she will be bound to chair, PT continued to reiterate that wheelchair will be a resource to use for days when she dont feel well and to also help with patient and caregiver safety with mobility especially during long distance mobility in community.  had multiple questions about what potential wheelchair might entail that will not cause more problems in home in which PT reiterated that Wheelchair vendors can potentially visit home to provide an even more accurate evaluation to further improve both patients and  QOL Pt does demonstrate improved ascension when performing transfer but has difficutly maintaining balance when fully upright.Pt continues to be a good candidate for OPPT       The patient will continue to benefit from skilled outpatient physical therapy in order to address the deficits listed in the problem list on the initial evaluation, provide patient and family education, and maximize the patients level of independence in the home and community environments.     The patient's spiritual, cultural, and educational needs were considered, and the patient is agreeable to the plan of care and goals.           Plan: Incoroporate exercises to benefit floor<>stand transfer, progressing transfers    Goals: Short Term Goals: 4 weeks    Patient to be Independent with HEP (Progressing)  Patient to score less than or equal to 20 sec on the 5 times sit to stand for improved transfers  Patient able to control descent of stand to sit with upper extremity to decrease plopping and chair movement behind her     Long Term Goals: 8 weeks    Patient to score less than or equal to 28 sec on the TUG for decreased fall risk  Patient to able  to ambulate greater than 250 feet with rolling walker, no loss of balance, full bilateral foot clearance and minimal path deviation  Patient able to tolerate greater than 15 min in standing position without lower extremity fatigue for improved standing tolerance   Patient to improve bilateral hip flexion to greater than or equal to 4-/5 for improved transfers progressing)    Patient to demo good safety awareness with transfers (bed mobility, sit to stand) for decreased fall risk (progressing)    Juan A Mclaughlin, PT

## 2025-07-17 ENCOUNTER — CLINICAL SUPPORT (OUTPATIENT)
Dept: REHABILITATION | Facility: HOSPITAL | Age: 75
End: 2025-07-17
Payer: MEDICARE

## 2025-07-17 ENCOUNTER — TELEPHONE (OUTPATIENT)
Facility: CLINIC | Age: 75
End: 2025-07-17
Payer: MEDICARE

## 2025-07-17 VITALS — HEART RATE: 79 BPM | OXYGEN SATURATION: 98 % | SYSTOLIC BLOOD PRESSURE: 132 MMHG | DIASTOLIC BLOOD PRESSURE: 85 MMHG

## 2025-07-17 VITALS — SYSTOLIC BLOOD PRESSURE: 145 MMHG | HEART RATE: 68 BPM | DIASTOLIC BLOOD PRESSURE: 89 MMHG

## 2025-07-17 DIAGNOSIS — G20.C PARKINSONISM, UNSPECIFIED PARKINSONISM TYPE: Primary | ICD-10-CM

## 2025-07-17 DIAGNOSIS — Z74.09 IMPAIRED FUNCTIONAL MOBILITY AND ACTIVITY TOLERANCE: Primary | ICD-10-CM

## 2025-07-17 DIAGNOSIS — H53.40 VISUAL FIELD CUT: Primary | ICD-10-CM

## 2025-07-17 DIAGNOSIS — Z78.9 DECREASED INDEPENDENCE WITH ACTIVITIES OF DAILY LIVING: ICD-10-CM

## 2025-07-17 DIAGNOSIS — Z78.9 IMPAIRED INSTRUMENTAL ACTIVITIES OF DAILY LIVING (IADL): ICD-10-CM

## 2025-07-17 PROCEDURE — 97110 THERAPEUTIC EXERCISES: CPT | Mod: PN,CQ

## 2025-07-17 PROCEDURE — 97530 THERAPEUTIC ACTIVITIES: CPT | Mod: PN,CQ

## 2025-07-17 PROCEDURE — 97110 THERAPEUTIC EXERCISES: CPT | Mod: PN

## 2025-07-17 PROCEDURE — 97530 THERAPEUTIC ACTIVITIES: CPT | Mod: PN

## 2025-07-17 PROCEDURE — 97112 NEUROMUSCULAR REEDUCATION: CPT | Mod: PN,CQ

## 2025-07-17 NOTE — PROGRESS NOTES
"  Outpatient Rehab    Occupational Therapy Visit    Patient Name: Tracy Armendariz  MRN: [id number]  YOB: 1950  Encounter Date: 7/17/2025    Therapy Diagnosis:   Encounter Diagnoses   Name Primary?    Visual field cut Yes    Decreased independence with activities of daily living     Impaired instrumental activities of daily living (IADL)      Physician: Padmini Bain MD    Physician Orders: Eval and Treat  Medical Diagnosis: Parkinson's disease, unspecified whether dyskinesia present, unspecified whether manifestations fluctuate  Surgical Diagnosis: Not applicable for this Episode   Surgical Date: Not applicable for this Episode  Days Since Last Surgery: Not applicable for this Episode    Visit # / Visits Authorized: 10 / 20  Insurance Authorization Period: 6/3/2025 to 12/31/2025  Date of Evaluation: 6/3/2025  Plan of Care Certification: 6/3/2025 to 7/29/2025      Time In: 1345   Time Out: 1428  Total Time (in minutes): 43   Total Billable Time (in minutes): 43    Precautions:  Additional Precautions and Protocol Details: High fall risk, spinal precautions, rollator for ambulation    Subjective   No falls since previous session, "I feel nauseous today".  Pain reported as 0/10.      Objective   Vital Signs  BP (!) 145/89   Pulse 68   LMP 01/01/1978 (Approximate)   BP Location: Left arm  BP Position: Sitting  BP Cuff Size: Adult               Treatment:    Pt completed ther ex to address BUE strength and ROM required for participation in ADLs and functional transfers. Pt demonstrated the following:  Therapeutic Exercise  TE 1: supine shoulder flex for 10 reps x 3 w/ 1# dowel  TE 2: supine chest press for 10 reps x 3 w/ 3# dowel  TE 3: supine horizontal abd and elbow ext for 10 reps x 3 w/ yellow theraband    Pt completed ther act to address dynamic sitting balance required for participation in functional tasks. Pt demonstrated the following:  Therapeutic Activity  TA 1: seated EOM: ball volley w/ 1# " silvano for 15 reps x 3    Time Entry(in minutes):  Therapeutic Activity Time Entry: 13  Therapeutic Exercise Time Entry: 30    Assessment & Plan   Assessment: Pt completed therapeutic activities and therapeutic exercises w/ fair energy and good participation. Pt limited by nausea and fatigue this date, requesting to complete majority of session in supine. Pt demonstrated overall fair BUE strength and ROM, taking rest breaks as needed throughout. Pt demonstrated fair (+) dynamic sitting balance, noted increased difficulty reacting to ball toss in left side of visual field. Pt transferred sit <-> supine w/ SPV on mat, transferred sit -> stand w/ CGA, and walked within therapy gym w/ CGA using rollator.   Evaluation/Treatment Tolerance: Patient limited by fatigue    The patient will continue to benefit from skilled outpatient occupational therapy in order to address the deficits listed in the problem list on the initial evaluation, provide patient and family education, and maximize the patients level of independence in the home and community environments.     The patient's spiritual, cultural, and educational needs were considered, and the patient is agreeable to the plan of care and goals.     Education  Education was done with Patient. The patient's learning style includes Listening. The patient Verbalizes understanding.         Progress toward goals, safety w/ functional transfers       Plan: continue OT POC    Goals:   Active       LTG       OT will provide training/education on tremor management strategies and AE/DME/task set up to maximize independence, safety & efficiency with ADLs/IADLs.  (Met)       Start:  06/04/25    Expected End:  07/30/25    Resolved:  06/26/25         OT will provide recommendations and education on environmental set up/modification prn to accommodate for vision related deficits in order to reduce risk for falls and maximize independence and safety with occupational engagement in home  "environment. (Met)       Start:  06/04/25    Expected End:  07/30/25    Resolved:  06/20/25         OT will provide training/education on appropriate HEP/HAPs to improve global strength, tissue lengthening, and FM skills.  (Met)       Start:  06/04/25    Expected End:  07/30/25    Resolved:  07/01/25         Pt will verbalize and demonstrate appropriate sequencing and motor control for fxnl sit to stand transfer (recliner, toilet, shower chair, etc.) to reduce "plopping" and retropulsion and increase safety with ADL routine.  (Met)       Start:  06/04/25    Expected End:  07/30/25    Resolved:  07/08/25         Pt will perform UB dressing SPV.  (Met)       Start:  06/04/25    Expected End:  07/30/25    Resolved:  07/08/25         Pt will increase BUE MMT to 5/5 in deficit mm groups for improved fxnl strength (Progressing)       Start:  06/04/25    Expected End:  07/30/25            Pt will demonstrate improved L FM coordination as evidence by completing 9HPT in </= 40 secs. (Progressing)       Start:  06/04/25    Expected End:  07/30/25               LTG continued       Patient to perform smooth pursuits WFL in all planes x 60 seconds, tracking single target for improved ability to scan environment with functional mobility.  (Progressing)       Start:  06/05/25    Expected End:  07/30/25            Patient will maintain gaze stabilization on target w/ VORx1 at self-selected pace x 30 seconds.  (Progressing)       Start:  06/05/25    Expected End:  07/30/25               STG       TBA oculomotor functions (I.e. smooth pursuits, saccades, convergence/divergence) with accompanying goal(s) to follow as needed. (Met)       Start:  06/04/25    Expected End:  07/02/25    Resolved:  06/05/25         TBA B  strength with accompanying goal(s) to follow as needed. (Met)       Start:  06/04/25    Expected End:  07/02/25    Resolved:  06/05/25         TBD potential need/benefits for use of visual aids in the home to improve " safety awareness and carry over of sequencing and form with fxnl transfer training, direction changes, scanning strategies, etc.. (Met)       Start:  06/04/25    Expected End:  07/02/25    Resolved:  06/20/25         L shoulder pain to be further assessed and treated if deemed appropriate/within OT scope of practice. (Met)       Start:  06/04/25    Expected End:  07/02/25    Resolved:  06/05/25         Pt will increase LUE MMT to 4/5 in deficient mm groups for improved fxnl strength.  (Progressing)       Start:  06/04/25    Expected End:  07/02/25                Genevieve Hayes, OT

## 2025-07-17 NOTE — PROGRESS NOTES
Outpatient Rehab    Physical Therapy Visit    Patient Name: Tracy Armendariz  MRN: [id number]  YOB: 1950  Encounter Date: 7/17/2025    Therapy Diagnosis:   Encounter Diagnosis   Name Primary?    Impaired functional mobility and activity tolerance Yes     Physician: Padmini Bain MD    Physician Orders: Eval and Treat  Medical Diagnosis: Parkinson's disease, unspecified whether dyskinesia present, unspecified whether manifestations fluctuate  Surgical Diagnosis: Not applicable for this Episode   Surgical Date: Not applicable for this Episode  Days Since Last Surgery: Not applicable for this Episode    Visit # / Visits Authorized:  10 / 20  Insurance Authorization Period: 6/3/2025 to 12/31/2025  Date of Evaluation: 6/3/2025  Plan of Care Certification: 6/4/2025 to 7/30/2025      PT/PTA: PTA   Number of PTA visits since last PT visit:1  Time In: 1300   Time Out: 1345  Total Time (in minutes): 45   Total Billable Time (in minutes): 45    FOTO:  Intake Score: Not applicable for this Episode%  Survey Score 2: Not applicable for this Episode%  Survey Score 3: Not applicable for this Episode%    Precautions:  Additional Precautions and Protocol Details: High fall risk, spinal precautions, rollator for ambulation      Subjective   not having a good day, diarhea and dizziness but  insists she attend PT regardless.  Pain reported as 0/10. back    Objective   Vital Signs  /85   Pulse 79   LMP 01/01/1978 (Approximate)   SpO2 98%   BP Location: Left arm  BP Position: Sitting  BP Cuff Size: Adult               Treatment:  Therapeutic Exercise  TE 1: x8 mins, sci-fit recumbent stepper, twin peaks, level 3.5  TE 2: Calf raises 2x15  TE 3: Seated dorsiflexion raises 2x20  TE 4: Reviewed HEP: seated hip flexion 3x10 B, seated hip abduction 3x10 B, seated knee extension 3x10 B, seated knee flexion 3x10 B *all with red theraband and seated heel/toe raises x20  Balance/Neuromuscular Re-Education  NMR 1:  "Normal base of support unsupported balance trials 3x30"  NMR 2: 3 x 30" static stance on red incline bolster facing uphill  NMR 3: 3 x 30" static stance on red incline bolster facing downhill  NMR 4: 4" step taps: 2x10  NMR 5: 4" step ups: x10 ea leg leading with bilateral UE support  NMR 6: Modified SLS on 4" step: 2x30" ea leg leading  Therapeutic Activity  TA 3: 2 x 6 sit<>Stand, SBA with Rollator  TA 4: 4 lengths x 10 feet, fwd<>bwd ambulation with UE supprot  TA 5: 4 lengths x 10 feet , sidestepping with UE supprot    Time Entry(in minutes):  Neuromuscular Re-Education Time Entry: 15  Therapeutic Activity Time Entry: 15  Therapeutic Exercise Time Entry: 15    Assessment & Plan   Assessment: Tracy arrived to session with complaints of fatigue and dizziness but was agreeable to treatment.  Poor tolerance of treatment.  Patient was only able to stand for 1-2 minutes at a time without a report of increased dizziness and double vision and often would fall backwards into chair.  Decreased safety awareness observed throughout session.  Vitals were monitored throughout due to symptoms but within normal limits and at no point orthostatic in nature.  Multiple seated rest breaks required today so limited volume of exercise completed.  She remains an appropriate candidate for OPPT services.   Evaluation/Treatment Tolerance: Patient limited by fatigue    The patient will continue to benefit from skilled outpatient physical therapy in order to address the deficits listed in the problem list on the initial evaluation, provide patient and family education, and maximize the patients level of independence in the home and community environments.     The patient's spiritual, cultural, and educational needs were considered, and the patient is agreeable to the plan of care and goals.           Plan: Incoroporate exercises to benefit floor<>stand transfer, progressing transfers    Goals:     Alejandra Jimenes PTA    "

## 2025-07-22 ENCOUNTER — CLINICAL SUPPORT (OUTPATIENT)
Dept: REHABILITATION | Facility: HOSPITAL | Age: 75
End: 2025-07-22
Payer: MEDICARE

## 2025-07-22 ENCOUNTER — OFFICE VISIT (OUTPATIENT)
Dept: OTOLARYNGOLOGY | Facility: CLINIC | Age: 75
End: 2025-07-22
Payer: MEDICARE

## 2025-07-22 VITALS — DIASTOLIC BLOOD PRESSURE: 83 MMHG | SYSTOLIC BLOOD PRESSURE: 158 MMHG

## 2025-07-22 VITALS — SYSTOLIC BLOOD PRESSURE: 131 MMHG | HEART RATE: 62 BPM | DIASTOLIC BLOOD PRESSURE: 77 MMHG

## 2025-07-22 DIAGNOSIS — R13.10 DYSPHAGIA, UNSPECIFIED TYPE: Primary | Chronic | ICD-10-CM

## 2025-07-22 DIAGNOSIS — H91.90 HEARING LOSS, UNSPECIFIED HEARING LOSS TYPE, UNSPECIFIED LATERALITY: ICD-10-CM

## 2025-07-22 DIAGNOSIS — Z74.09 IMPAIRED FUNCTIONAL MOBILITY AND ACTIVITY TOLERANCE: Primary | ICD-10-CM

## 2025-07-22 DIAGNOSIS — R49.0 HOARSENESS OF VOICE: Chronic | ICD-10-CM

## 2025-07-22 DIAGNOSIS — Z78.9 DECREASED INDEPENDENCE WITH ACTIVITIES OF DAILY LIVING: ICD-10-CM

## 2025-07-22 DIAGNOSIS — G20.A1 PARKINSON'S DISEASE, UNSPECIFIED WHETHER DYSKINESIA PRESENT, UNSPECIFIED WHETHER MANIFESTATIONS FLUCTUATE: ICD-10-CM

## 2025-07-22 DIAGNOSIS — Z78.9 IMPAIRED INSTRUMENTAL ACTIVITIES OF DAILY LIVING (IADL): ICD-10-CM

## 2025-07-22 DIAGNOSIS — H53.40 VISUAL FIELD CUT: Primary | ICD-10-CM

## 2025-07-22 PROCEDURE — 1160F RVW MEDS BY RX/DR IN RCRD: CPT | Mod: CPTII,S$GLB,, | Performed by: OTOLARYNGOLOGY

## 2025-07-22 PROCEDURE — 3288F FALL RISK ASSESSMENT DOCD: CPT | Mod: CPTII,S$GLB,, | Performed by: OTOLARYNGOLOGY

## 2025-07-22 PROCEDURE — 3061F NEG MICROALBUMINURIA REV: CPT | Mod: CPTII,S$GLB,, | Performed by: OTOLARYNGOLOGY

## 2025-07-22 PROCEDURE — 3066F NEPHROPATHY DOC TX: CPT | Mod: CPTII,S$GLB,, | Performed by: OTOLARYNGOLOGY

## 2025-07-22 PROCEDURE — 99999 PR PBB SHADOW E&M-EST. PATIENT-LVL IV: CPT | Mod: PBBFAC,,, | Performed by: OTOLARYNGOLOGY

## 2025-07-22 PROCEDURE — 1159F MED LIST DOCD IN RCRD: CPT | Mod: CPTII,S$GLB,, | Performed by: OTOLARYNGOLOGY

## 2025-07-22 PROCEDURE — 1100F PTFALLS ASSESS-DOCD GE2>/YR: CPT | Mod: CPTII,S$GLB,, | Performed by: OTOLARYNGOLOGY

## 2025-07-22 PROCEDURE — 1126F AMNT PAIN NOTED NONE PRSNT: CPT | Mod: CPTII,S$GLB,, | Performed by: OTOLARYNGOLOGY

## 2025-07-22 PROCEDURE — 97110 THERAPEUTIC EXERCISES: CPT | Mod: PN

## 2025-07-22 PROCEDURE — 31575 DIAGNOSTIC LARYNGOSCOPY: CPT | Mod: S$GLB,,, | Performed by: OTOLARYNGOLOGY

## 2025-07-22 PROCEDURE — 99214 OFFICE O/P EST MOD 30 MIN: CPT | Mod: 25,S$GLB,, | Performed by: OTOLARYNGOLOGY

## 2025-07-22 PROCEDURE — 3075F SYST BP GE 130 - 139MM HG: CPT | Mod: CPTII,S$GLB,, | Performed by: OTOLARYNGOLOGY

## 2025-07-22 PROCEDURE — 97112 NEUROMUSCULAR REEDUCATION: CPT | Mod: PN

## 2025-07-22 PROCEDURE — 3078F DIAST BP <80 MM HG: CPT | Mod: CPTII,S$GLB,, | Performed by: OTOLARYNGOLOGY

## 2025-07-22 PROCEDURE — 3044F HG A1C LEVEL LT 7.0%: CPT | Mod: CPTII,S$GLB,, | Performed by: OTOLARYNGOLOGY

## 2025-07-22 NOTE — PROGRESS NOTES
History of Present Illness    CHIEF COMPLAINT:  - Patient presents for a scope exam of her vocal cords, as part of her Parkinson's disease management and evaluation of potential swallowing issues.    HPI:  Patient has been diagnosed with Parkinson's disease and evaluated by a neurologist over the last couple of months. As part of her care, she was referred for occupational therapy, physical therapy, and speech therapy evaluations. During a recent bedside swallowing test, she had some coughing when swallowing thin liquids, which prompted further investigation. She reports occasional coughing when swallowing, but it is not constant. She denies any pain or other issues when swallowing. She has also noticed some hoarseness in her voice, though she had not previously attributed this to her Parkinson's disease. She has not yet started any speech therapy.  She had a swallow study performed recently.  Results below.    She denies any pain when swallowing, history of throat-related issues, surgeries on her throat, nodules on her vocal cords, or any previous vocal-related issues. She also denies any history of nasal procedures or sinus surgeries.       MBSS Report per Speech Therapy  Oral Preparation / Oral Phase   WFL - Pt with adequate bolus acceptance, containment, control and timely A-P transfer across consistencies   No presence of naso-pharyngeal reflux     Pharyngeal Phase   Pt essentially timely swallow initiation for age/ swallow delay with trigger at the level of the vallecula    Pt with FAIR BOT retraction and strength  Pt with adequate hyolaryngeal elevation and excursion patterns  Pt with complete epiglottic inversion patterns  Pt without penetration on all trials.   Pt without Aspiration on all trials.  Pt with trace amount of residuals in vallecula post swallow of thin liquids, pt able to spontaneous clear with 2nd swallow or dry saliva swallow.   Pt completed strategies with good success  Pt with adequate  airway protection without penetration or aspiration on all trials noted.      Cervical Esophageal Phase   UES appeared to accommodate all bolus types without stasis or retrograde movement observed      Impressions: Patient presents with functional and timely oral and pharyngeal phases of the swallow for all consistencies assessed. No oral or pharyngeal dysphagia present.   Prognosis: Good  given no oral/pharyngeal impairments  Education: SLP provided patient education on swallowing anatomy, MBSS results, anatomy and physiology and SLP diet recommendations. All questions addressed within SLP scope of practice.      Plan: Follow up with PCP and referring MD as needed.      Recommendations:   Continue regular diet and thin liquids  Standard aspiration precautions  Slow rate  Alternate sips and bites  Double swallow after sips of thin liquids  Cup swallows better when drinking liquids  Whole meds individually with sips of liquid.        Past Medical History:   Diagnosis Date    Allergy     Anemia, unspecified     Anticoagulant long-term use     Arthritis     Cerebral embolism without mention of cerebral infarction 2014    Dx updated per 2019 IMO Load      CVA (cerebral infarction)     Depression     Disorder of kidney and ureter     renal stones    Diverticulosis of colon     Extrinsic asthma, unspecified     Hematuria, unspecified     Hyperlipidemia     Hypertension     Kidney stone     Left atrial enlargement 2014    Low back pain     Nephrolithiasis     MARTIN (obstructive sleep apnea)     Osteopenia     PUD (peptic ulcer disease)     Severe obesity (BMI 35.0-39.9) with comorbidity 2013    Stroke 2013    Urinary tract infection      Social History[1]  Past Surgical History:   Procedure Laterality Date    APPENDECTOMY      @ time of hysterectomy    BACK SURGERY      CATARACT EXTRACTION       SECTION      CHOLECYSTECTOMY      laparoscopic    COLONOSCOPY N/A 2018     Procedure: COLONOSCOPY/Golytely;  Surgeon: Janine Black MD;  Location: [facility];  Service: Endoscopy;  Laterality: N/A;    CYSTOSCOPY W/ RETROGRADES  12/11/2022    Procedure: CYSTOSCOPY, WITH RETROGRADE PYELOGRAM;  Surgeon: Mitchell Recinos MD;  Location: [facility] OR;  Service: Urology;;    CYSTOSCOPY W/ URETERAL STENT PLACEMENT Left 12/11/2022    Procedure: CYSTOSCOPY, WITH URETERAL STENT INSERTION;  Surgeon: Mitchell Recinos MD;  Location: [facility] OR;  Service: Urology;  Laterality: Left;    CYSTOURETEROSCOPY, WITH HOLMIUM LASER LITHOTRIPSY OF URETERAL CALCULUS AND STENT INSERTION Left 12/21/2022    Procedure: left ureteroscopy, holmium laser lithotripsy, stone basketing, retrograde pyelogram, stent placement;  Surgeon: Anaya Zamora MD;  Location: [facility];  Service: Urology;  Laterality: Left;    DILATION AND CURETTAGE OF UTERUS  1972    EXTRACTION - STONE Left 12/21/2022    Procedure: EXTRACTION - STONE;  Surgeon: Anaya Zamora MD;  Location: [facility];  Service: Urology;  Laterality: Left;    HYSTERECTOMY  1978    TAHUSO with appendectomy    INNER EAR SURGERY      replaced ear drum    JOINT REPLACEMENT Right     knee    KNEE ARTHROSCOPY W/ DEBRIDEMENT  2003    LUMBAR DISCECTOMY  1980    L4-L5    OOPHORECTOMY      unilateral    RETROGRADE PYELOGRAPHY  12/21/2022    Procedure: PYELOGRAM, RETROGRADE;  Surgeon: Anaya Zamora MD;  Location: [facility];  Service: Urology;;    TONSILLECTOMY      TYMPANOPLASTY      URETEROSCOPIC REMOVAL OF URETERIC CALCULUS Left 12/19/2018    Procedure: REMOVAL, CALCULUS, URETER, URETEROSCOPIC, holmium laser lithotripsy, stone basket extraction, retrograde pyelogram, ureteral stent exchange;  Surgeon: Anaya Zamora MD;  Location: [facility];  Service: Urology;  Laterality: Left;     Family History   Problem Relation Name Age of Onset    Cervical cancer Mother      Cancer Mother  65        lung cancer - non smoker    Lung cancer Mother      Depression Father      Hypertension Father      Coronary  artery disease Father  62    Heart disease Father      Heart failure Sister x1     Diabetes Sister x1     Heart disease Sister x1     Kidney disease Sister x1     Depression Brother x2     Suicide Brother x2     Heart failure Brother x2     Cancer Daughter x2     Breast cancer Daughter x2 36    No Known Problems Son x1     Hypertension Maternal Grandfather      Heart disease Maternal Grandfather      Stroke Paternal Grandfather      Colon cancer Neg Hx      Ovarian cancer Neg Hx             Review of Systems  General: negative for chills, fever or weight loss  Psychological: negative for mood changes or depression  Ophthalmic: negative for blurry vision, photophobia or eye pain  ENT: see HPI  Respiratory: no cough, shortness of breath, or wheezing  Cardiovascular: no chest pain or dyspnea on exertion  Gastrointestinal: no abdominal pain, change in bowel habits, or black/ bloody stools  Musculoskeletal: negative for gait disturbance or muscular weakness  Neurological: no syncope or seizures; no ataxia  Dermatological: negative for pruritis,  rash and jaundice  Hematologic/lymphatic: no easy bruising, no new adenopathy      Physical Exam:    Vitals:    07/22/25 1506   BP: 131/77   Pulse: 62         Constitutional:   She is oriented to person, place, and time. Vital signs are normal. She appears well-developed and well-nourished. She appears alert. She is cooperative.  Non-toxic appearance. Normal speech.      Head:  Normocephalic and atraumatic. Salivary glands normal.  Facial strength is normal.      Ears:    Right Ear: Tympanic membrane is not perforated, not erythematous and not retracted. No middle ear effusion.   Left Ear: Tympanic membrane is not perforated, not erythematous and not retracted.  No middle ear effusion.     Nose:  Mucosal edema present. No rhinorrhea, septal deviation, nasal septal hematoma or polyps. No epistaxis. No turbinate masses and no turbinate hypertrophy (2+ size).  Right sinus exhibits  no maxillary sinus tenderness and no frontal sinus tenderness. Left sinus exhibits no maxillary sinus tenderness and no frontal sinus tenderness.     Mouth/Throat  Oropharynx clear and moist without lesions or asymmetry, normal uvula midline, lips, teeth, and gums normal and oropharynx normal. Normal dentition. No uvula swelling or oral lesions. No oropharyngeal exudate, posterior oropharyngeal edema or posterior oropharyngeal erythema. Mirror exam not performed due to patient tolerance.  Mirror exam not performed due to patient tolerance.      Neck:  Neck normal without thyromegaly masses, asymmetry, normal tracheal structure, crepitus, and tenderness, thyroid normal, trachea normal, full range of motion with neck supple and no adenopathy. No JVD present. Carotid bruit is not present. Thyroid tenderness is present. No edema and no erythema present. No thyroid mass and no thyromegaly present.     She has no cervical adenopathy.     Cardiovascular:    Normal rate, regular rhythm, normal heart sounds and rate and rhythm, heart sounds, and pulses normal.              Pulmonary/Chest:   Effort and breath sounds normal.     Psychiatric:   She has a normal mood and affect. Her speech is normal and behavior is normal.     Neurological:   She is alert and oriented to person, place, and time. She has neurological normal, alert and oriented. No cranial nerve deficit.     Skin:   No abrasions, lacerations, lesions, or rashes.         Laryngoscopy    Date/Time: 7/22/2025 3:00 PM    Performed by: Mara Rosenthal MD  Authorized by: Mara Rosenthal MD    Consent Done?:  Yes (Verbal)  Anesthesia:     Local anesthetic:  Topical anesthetic    Patient tolerance:  Patient tolerated the procedure well with no immediate complications    Decongestion performed?: Yes    Laryngoscopy:     Areas examined:  Nasal cavities, vocal cords, nasopharynx, oropharynx, larynx and hypopharynx  Nose External:      No external nasal deformity  Nose  Intranasal:      Mucosa no polyps     Mucosa ulcers not present     No mucosa lesions present     Septum gross deformity (slight deviation to left)     Enlarged turbinates  Nasopharynx:      No mucosa lesions     Adenoids not present     Posterior choanae patent     Eustachian tube patent  Larynx/hypopharynx:      No epiglottis lesions     No epiglottis edema     No AE folds lesions     No vocal cord polyps     Equal and normal bilateral     No hypopharynx lesions     Piriform sinus pooling     No piriform sinus lesions     No post cricoid edema     No post cricoid erythema     Increased secretions in vallecula, supraglottis, Piriforms bilaterally.  No davie aspiration noted.  Bilateral vocal folds mobile, but incomplete glottic closure on phonation.  Mild irritation of vocal folds bilaterally, mild vocal fold atrophy.                    Assessment:    ICD-10-CM ICD-9-CM    1. Dysphagia, unspecified type  R13.10 787.20       2. Hoarseness of voice  R49.0 784.42       3. Parkinson's disease, unspecified whether dyskinesia present, unspecified whether manifestations fluctuate  G20.A1 332.0       4. Hearing loss, unspecified hearing loss type, unspecified laterality  H91.90 389.9         The primary encounter diagnosis was Dysphagia, unspecified type. Diagnoses of Hoarseness of voice, Parkinson's disease, unspecified whether dyskinesia present, unspecified whether manifestations fluctuate, and Hearing loss, unspecified hearing loss type, unspecified laterality were also pertinent to this visit.      Plan:  Assessment & Plan    - Performed scope exam to evaluate vocal cords and swallowing function in patient with Parkinson's disease.  - Identified accumulation of secretions around vocal cords, likely due to delayed swallowing mechanism.  - Determined no major issues precluding speech therapy, but confirmed need for therapy to address swallowing delay and secretion management.  - Concluded that speech therapy would be  beneficial to strengthen swallowing mechanism, vocal cords, and overall voice function.  - Discussed the connection between Parkinson's disease and vocal/swallowing issues.  - Clarified the risks of aspiration, including potential for pneumonia if food or liquid enters the lungs.  - Described the purpose of speech therapy for Parkinson's patients, including strengthening muscles for both swallowing and vocalization.  - Proceed with scheduled speech therapy.  - Emphasized the importance of wearing hearing aids to prevent cognitive decline and maintain nerve pathways.  - Contact the office if not contacted by speech therapy team within a few days to schedule appointment.                 Mara Rosenthal MD    This note was generated with the assistance of ambient listening technology. Verbal consent was obtained by the patient and accompanying visitor(s) for the recording of patient appointment to facilitate this note. I attest to having reviewed and edited the generated note for accuracy, though some syntax or spelling errors may persist. Please contact the author of this note for any clarification.             [1]   Social History  Socioeconomic History    Marital status:    Tobacco Use    Smoking status: Former     Current packs/day: 0.00     Average packs/day: 1.5 packs/day for 29.0 years (43.5 ttl pk-yrs)     Types: Cigarettes     Start date:      Quit date: 1995     Years since quittin.5    Smokeless tobacco: Never   Substance and Sexual Activity    Alcohol use: Yes     Alcohol/week: 1.0 standard drink of alcohol     Types: 1 Glasses of wine per week     Comment: social    Drug use: Never    Sexual activity: Yes     Partners: Male     Birth control/protection: Surgical     Comment:  since      Social Drivers of Health     Financial Resource Strain: Low Risk  (2024)    Overall Financial Resource Strain (CARDIA)     Difficulty of Paying Living Expenses: Not hard at all   Food  Insecurity: No Food Insecurity (6/17/2024)    Hunger Vital Sign     Worried About Running Out of Food in the Last Year: Never true     Ran Out of Food in the Last Year: Never true   Transportation Needs: No Transportation Needs (5/31/2024)    PRAPARE - Transportation     Lack of Transportation (Medical): No     Lack of Transportation (Non-Medical): No   Physical Activity: Inactive (6/17/2024)    Exercise Vital Sign     Days of Exercise per Week: 0 days     Minutes of Exercise per Session: 0 min   Stress: No Stress Concern Present (6/17/2024)    [employer] [place] [employer] - Occupational Stress Questionnaire     Feeling of Stress : Only a little   Housing Stability: Unknown (6/17/2024)    Housing Stability Vital Sign     Unable to Pay for Housing in the Last Year: No

## 2025-07-22 NOTE — Clinical Note
Just copying you all on here to let you know I had seen her.  I do think she needs to proceed with speech and swallowing therapy as ordered.  Just making sure someone reaches out to her to schedule.

## 2025-07-22 NOTE — PATIENT INSTRUCTIONS
I do not see any barriers to starting ST.    Vocal fold motion equal, mild glottic gap.      I encouraged the patient to proceed with speech and swallowing therapy as ordered.

## 2025-07-22 NOTE — PROGRESS NOTES
Outpatient Rehab    Occupational Therapy Visit    Patient Name: Tracy Armendariz  MRN: [id number]  YOB: 1950  Encounter Date: 7/22/2025    Therapy Diagnosis:   Encounter Diagnoses   Name Primary?    Visual field cut Yes    Decreased independence with activities of daily living     Impaired instrumental activities of daily living (IADL)      Physician: Padmini Bain MD    Physician Orders: Eval and Treat  Medical Diagnosis: Parkinson's disease, unspecified whether dyskinesia present, unspecified whether manifestations fluctuate  Surgical Diagnosis: Not applicable for this Episode   Surgical Date: Not applicable for this Episode  Days Since Last Surgery: Not applicable for this Episode    Visit # / Visits Authorized: 11 / 20  Insurance Authorization Period: 6/3/2025 to 12/31/2025  Date of Evaluation: 6/3/2025  Plan of Care Certification: 6/3/2025 to 7/29/2025      Time In: 1345   Time Out: 1430  Total Time (in minutes): 45   Total Billable Time (in minutes): 45      Precautions:  Additional Precautions and Protocol Details: High fall risk, spinal precautions, rollator for ambulation    Subjective      Pain reported as 0/10.      Objective        No measures obtained this date.     Treatment:    Pt completed ther ex to address BUE strength and endurance required for participation in ADLs/IADLs. Pt demonstrated the following:  Therapeutic Exercise  TE 1: supine chest press and shoulder flex for 10 reps x 3 w/ 3# dowel  TE 2: supine horizontal abd for 10 reps x 3 w/ red theraband  TE 3: seated EOM: scap retraction for 10 reps x 3 w/ red theraband  TE 4: seated in standard chair: diagonal up for 10 reps x 3 w/ red theraband    Time Entry(in minutes):  Therapeutic Exercise Time Entry: 45    Assessment & Plan   Assessment: Pt completed therapeutic exercises w/ fair energy and good participation. Pt initially requesting to complete activities in supine, however, agreeable to seated activity following ~ 15  mins of supine ther ex. Pt demonstrated fair (+) BUE strength and energy, taking rest breaks as needed. Noted decreased tolerance of EOM sitting d/t back pain. Pt transferred sit <-> supine w/ SPV on mat, transferred sit -> stand w/ CGA, and walked within therapy gym w/ CGA using rollator.   Evaluation/Treatment Tolerance: Patient tolerated treatment well    The patient will continue to benefit from skilled outpatient occupational therapy in order to address the deficits listed in the problem list on the initial evaluation, provide patient and family education, and maximize the patients level of independence in the home and community environments.     The patient's spiritual, cultural, and educational needs were considered, and the patient is agreeable to the plan of care and goals.     Education  Education was done with Patient. The patient's learning style includes Listening. The patient Verbalizes understanding.         Progress toward goals       Plan: continue OT POC    Goals:   Active       LTG       OT will provide training/education on tremor management strategies and AE/DME/task set up to maximize independence, safety & efficiency with ADLs/IADLs.  (Met)       Start:  06/04/25    Expected End:  07/30/25    Resolved:  06/26/25         OT will provide recommendations and education on environmental set up/modification prn to accommodate for vision related deficits in order to reduce risk for falls and maximize independence and safety with occupational engagement in home environment. (Met)       Start:  06/04/25    Expected End:  07/30/25    Resolved:  06/20/25         OT will provide training/education on appropriate HEP/HAPs to improve global strength, tissue lengthening, and FM skills.  (Met)       Start:  06/04/25    Expected End:  07/30/25    Resolved:  07/01/25         Pt will verbalize and demonstrate appropriate sequencing and motor control for fxnl sit to stand transfer (recliner, toilet, shower  "chair, etc.) to reduce "plopping" and retropulsion and increase safety with ADL routine.  (Met)       Start:  06/04/25    Expected End:  07/30/25    Resolved:  07/08/25         Pt will perform UB dressing SPV.  (Met)       Start:  06/04/25    Expected End:  07/30/25    Resolved:  07/08/25         Pt will increase BUE MMT to 5/5 in deficit mm groups for improved fxnl strength (Progressing)       Start:  06/04/25    Expected End:  07/30/25            Pt will demonstrate improved L FM coordination as evidence by completing 9HPT in </= 40 secs. (Progressing)       Start:  06/04/25    Expected End:  07/30/25               LTG continued       Patient to perform smooth pursuits WFL in all planes x 60 seconds, tracking single target for improved ability to scan environment with functional mobility.  (Progressing)       Start:  06/05/25    Expected End:  07/30/25            Patient will maintain gaze stabilization on target w/ VORx1 at self-selected pace x 30 seconds.  (Progressing)       Start:  06/05/25    Expected End:  07/30/25               STG       TBA oculomotor functions (I.e. smooth pursuits, saccades, convergence/divergence) with accompanying goal(s) to follow as needed. (Met)       Start:  06/04/25    Expected End:  07/02/25    Resolved:  06/05/25         TBA B  strength with accompanying goal(s) to follow as needed. (Met)       Start:  06/04/25    Expected End:  07/02/25    Resolved:  06/05/25         TBD potential need/benefits for use of visual aids in the home to improve safety awareness and carry over of sequencing and form with fxnl transfer training, direction changes, scanning strategies, etc.. (Met)       Start:  06/04/25    Expected End:  07/02/25    Resolved:  06/20/25         L shoulder pain to be further assessed and treated if deemed appropriate/within OT scope of practice. (Met)       Start:  06/04/25    Expected End:  07/02/25    Resolved:  06/05/25         Pt will increase LUE MMT to 4/5 in " deficient mm groups for improved fxnl strength.  (Progressing)       Start:  06/04/25    Expected End:  07/02/25                Genevieve Hayes OT

## 2025-07-23 ENCOUNTER — OFFICE VISIT (OUTPATIENT)
Dept: URGENT CARE | Facility: CLINIC | Age: 75
End: 2025-07-23
Payer: MEDICARE

## 2025-07-23 VITALS
HEIGHT: 66 IN | TEMPERATURE: 97 F | SYSTOLIC BLOOD PRESSURE: 128 MMHG | OXYGEN SATURATION: 98 % | HEART RATE: 62 BPM | BODY MASS INDEX: 32.62 KG/M2 | WEIGHT: 203 LBS | DIASTOLIC BLOOD PRESSURE: 70 MMHG | RESPIRATION RATE: 20 BRPM

## 2025-07-23 DIAGNOSIS — M25.511 ACUTE PAIN OF RIGHT SHOULDER: ICD-10-CM

## 2025-07-23 DIAGNOSIS — Z04.3 ENCOUNTER FOR EXAMINATION FOLLOWING A FALL: Primary | ICD-10-CM

## 2025-07-23 DIAGNOSIS — S01.01XA LACERATION OF SCALP WITHOUT COMPLICATION, INITIAL ENCOUNTER: ICD-10-CM

## 2025-07-23 PROCEDURE — 73030 X-RAY EXAM OF SHOULDER: CPT | Mod: RT,S$GLB,, | Performed by: RADIOLOGY

## 2025-07-23 PROCEDURE — 99214 OFFICE O/P EST MOD 30 MIN: CPT | Mod: 25,S$GLB,, | Performed by: PHYSICIAN ASSISTANT

## 2025-07-23 PROCEDURE — 12002 RPR S/N/AX/GEN/TRNK2.6-7.5CM: CPT | Mod: S$GLB,,, | Performed by: PHYSICIAN ASSISTANT

## 2025-07-23 RX ORDER — CEPHALEXIN 500 MG/1
500 CAPSULE ORAL EVERY 6 HOURS
Qty: 28 CAPSULE | Refills: 0 | Status: SHIPPED | OUTPATIENT
Start: 2025-07-23 | End: 2025-07-31

## 2025-07-23 RX ORDER — NAPROXEN 500 MG/1
500 TABLET ORAL 2 TIMES DAILY PRN
Qty: 20 TABLET | Refills: 0 | Status: SHIPPED | OUTPATIENT
Start: 2025-07-23 | End: 2025-08-03

## 2025-07-23 RX ORDER — ACETAMINOPHEN 500 MG
1000 TABLET ORAL
Status: COMPLETED | OUTPATIENT
Start: 2025-07-23 | End: 2025-07-23

## 2025-07-23 RX ORDER — LIDOCAINE AND PRILOCAINE 25; 25 MG/G; MG/G
CREAM TOPICAL 2 TIMES DAILY PRN
Qty: 30 G | Refills: 0 | Status: SHIPPED | OUTPATIENT
Start: 2025-07-23 | End: 2025-08-03

## 2025-07-23 RX ORDER — DICLOFENAC SODIUM 10 MG/G
2 GEL TOPICAL 4 TIMES DAILY PRN
Qty: 100 G | Refills: 0 | Status: SHIPPED | OUTPATIENT
Start: 2025-07-23 | End: 2025-08-22

## 2025-07-23 RX ORDER — MUPIROCIN 20 MG/G
OINTMENT TOPICAL 2 TIMES DAILY
Qty: 22 G | Refills: 0 | Status: SHIPPED | OUTPATIENT
Start: 2025-07-23 | End: 2025-08-08

## 2025-07-23 RX ADMIN — Medication 1000 MG: at 04:07

## 2025-07-23 NOTE — PROGRESS NOTES
Outpatient Rehab    Physical Therapy Visit    Patient Name: Tracy Armendariz  MRN: [id number]  YOB: 1950  Encounter Date: 7/22/2025    Therapy Diagnosis:   Encounter Diagnosis   Name Primary?    Impaired functional mobility and activity tolerance Yes     Physician: Padmini Bain MD    Physician Orders: Eval and Treat  Medical Diagnosis: Parkinson's disease, unspecified whether dyskinesia present, unspecified whether manifestations fluctuate  Surgical Diagnosis: Not applicable for this Episode   Surgical Date: Not applicable for this Episode  Days Since Last Surgery: Not applicable for this Episode    Visit # / Visits Authorized:  11 / 20  Insurance Authorization Period: 6/3/2025 to 12/31/2025  Date of Evaluation: 6/3/2025  Plan of Care Certification: 6/4/2025 to 7/30/2025      PT/PTA: PT   Number of PTA visits since last PT visit:0  Time In: 1300   Time Out: 1345  Total Time (in minutes): 45   Total Billable Time (in minutes): 45    FOTO:  Intake Score (%): Not applicable for this Episode  Survey Score 2 (%): Not applicable for this Episode  Survey Score 3 (%): Not applicable for this Episode    Precautions:  Additional Precautions and Protocol Details: High fall risk, spinal precautions, rollator for ambulation      Subjective   Feeling some discomfort in back and dizziness with prolonged sitting.         Objective   Vital Signs  BP (!) 158/83   LMP 01/01/1978 (Approximate)      BP Position: Supine     BP decreased to 137/81 with transfer from supine > sit            Treatment:  Therapeutic Exercise  TE 1: x8 mins, sci-fit recumbent stepper, single peaks, level 4  TE 2: Seated long arc quads x15B with 1.5# ankle weights and brief holds; completed unsupported on therapy mat (cues needed for upright trunk)  Balance/Neuromuscular Re-Education  NMR 1: Sit to stands from therapy mat with heavy verbal/tactile cuing for appropriate weight shift and eccentric control to sit x10  NMR 2: Seated [place]off  "press with green theraband 2x10B  NMR 3: Seated med ball chest presses with 4.4# 2x12  NMR 4: Supine isometric hip adduction with abdominal brace x 2 minutes total with 5" holds  NMR 5: Supine clams with red theraband x15  Therapeutic Activity  TA 1: Patient education on role of outpatient case management in wheelchair evaluation process    Time Entry(in minutes):  Neuromuscular Re-Education Time Entry: 25  Therapeutic Activity Time Entry: 5  Therapeutic Exercise Time Entry: 15    Assessment & Plan   Assessment: Tracy performed fairly during visit today. Sit to stands remain impaired, with low conceptualization/carryover of body mechanics with transfer, especially with eccentric component to sit. She displays decreased core strength with seated activity today and struggles to maintain erect posture in unsupported position, requiring supine rest after about 20 minutes. She remains orthostatic with transition back to sit from supine. Provided reinforcement of education related to wheelchair procurement provided by other physical therapist; placed outpatient case management orders to streamline this process.  Evaluation/Treatment Tolerance: Patient limited by fatigue    The patient will continue to benefit from skilled outpatient physical therapy in order to address the deficits listed in the problem list on the initial evaluation, provide patient and family education, and maximize the patients level of independence in the home and community environments.     The patient's spiritual, cultural, and educational needs were considered, and the patient is agreeable to the plan of care and goals.           Plan: Progress as able; follow up with wheelchair process/case management    Goals:     Short Term Goals: 4 weeks    Patient to be Independent with HEP (Progressing)  Patient to score less than or equal to 20 sec on the 5 times sit to stand for improved transfers (Progressing)  Patient able to control descent of stand " to sit with upper extremity to decrease plopping and chair movement behind her (Progressing)     Long Term Goals: 8 weeks    Patient to score less than or equal to 28 sec on the TUG for decreased fall risk (Progressing)  Patient to able to ambulate greater than 250 feet with rolling walker, no loss of balance, full bilateral foot clearance and minimal path deviation (Progressing)  Patient able to tolerate greater than 15 min in standing position without lower extremity fatigue for improved standing tolerance (Progressing)  Patient to improve bilateral hip flexion to greater than or equal to 4-/5 for improved transfers (progressing)  Patient to demo good safety awareness with transfers (bed mobility, sit to stand) for decreased fall risk (progressing)    MIGUEL BRICEÑO, PT

## 2025-07-23 NOTE — PATIENT INSTRUCTIONS
you can gently wash the wound with soap and water or take a shower. Do not soak your wound by bathing or swimming until the staples have been removed.  You may apply an antibiotic ointment (mupirocin) to the wound 1 to 2 times each day. If you want, you can cover your wound with a bandage. You can also leave it open to air if you prefer.  Wash your hands before and after you touch your wound or bandage.  Do not try to take out the staples yourself. Your staples need to be removed on _______________________.  Avoid activities that could hurt the area of your wound for a few weeks. If you hurt the same part of your body again, the wound can open up.  Please complete full course of oral antibiotics.   Return to clinic in 12 to 14 days for staple removal.  August 4 to August 6, 2025      If not allergic, take Tylenol (Acetaminophen) 650 mg to  1 g every 6 hours as needed for fever/pain and/or Motrin (Ibuprofen) 600 to 800 mg every 6 hours or Naproxen 500 mg BID Prn as needed for pain and/or fever    Please remember that you have received care at an urgent care today. Urgent cares are not emergency rooms and are not equipped to handle life threatening emergencies and cannot rule in or out certain medical conditions and you may be released before all of your medical problems are known or treated. Please arrange follow up with your primary care physician or speciality clinic  within 2-5 days if your signs and symptoms have not resolved or worsen. Patient can call our Referral Hotline at [phone] to make an appointment.    Please return here or go to the Emergency Department for any concerns or worsening of condition.Patient was educated on signs/symptoms that would warrant emergent medical attention. Patient verbalized understanding.  Signs of infection. These include a fever of 100.4°F (38°C) or higher, chills, or wound that will not heal.  The pain in and around the area gets much worse.  There is a bad smell or  pus (thick yellow, green, or gray fluid) coming from your wound.  You notice a crunchy feeling or blisters in the skin around the wound.  The redness around your wound gets bigger or is spreading up your arm or leg.  Fluid that is not pus drains from your wound.  Your swelling doesnt improve or gets worse.

## 2025-07-23 NOTE — PROCEDURES
Laceration Repair    Date/Time: 7/23/2025 4:00 PM    Performed by: Leola Norris PA-C  Authorized by: Leola Norris PA-C  Body area: head/neck  Location details: scalp  Laceration length: 3 cm  Foreign bodies: no foreign bodies  Tendon involvement: none  Nerve involvement: none  Vascular damage: no  Anesthesia: local infiltration    Anesthesia:  Local Anesthetic: lidocaine 1% with epinephrine  Anesthetic total: 3 mL    Patient sedated: no  Preparation: Patient was prepped and draped in the usual sterile fashion.  Irrigation solution: saline  Irrigation method: syringe  Amount of cleaning: standard  Skin closure: staples  Number of sutures: 7  Approximation: close  Approximation difficulty: simple  Patient tolerance: Patient tolerated the procedure well with no immediate complications

## 2025-07-23 NOTE — PROGRESS NOTES
"Subjective:      Patient ID: Tracy Armendariz is a 74 y.o. female.    Vitals:  height is 5' 6" (1.676 m) and weight is 92.1 kg (203 lb). Her oral temperature is 97 °F (36.1 °C). Her blood pressure is 128/70 and her pulse is 62. Her respiration is 20 and oxygen saturation is 98%.     Chief Complaint: Laceration and Shoulder Injury    Tarcy Armendariz is a 74 y.o. female with Parkinson's Disease, allergic rhinitis, hypertension, hx of stroke who fall and laceration to right scalp. Pt presents with spouse, prior to arrival she fell at home, she was using her walker and went to push it over the threshold when she fell backwards and hit  and cut the back her head and her right shoulder,  Pt denies any LOC,  Pmhx of parkinson's          Laceration   The incident occurred 1 to 3 hours ago. The laceration is located on the Scalp. The laceration is 3 cm in size. The laceration mechanism is unknown.The pain is mild. The pain has been Intermittent since onset. She reports no foreign bodies present. Her tetanus status is UTD.       Constitution: Negative for activity change, appetite change, chills, fatigue, fever and generalized weakness.   Gastrointestinal:  Negative for nausea and vomiting.   Musculoskeletal:  Positive for pain, joint pain, arthritis, back pain and muscle ache. Negative for trauma, joint swelling, abnormal ROM of joint, gout, muscle cramps and history of spine disorder.   Skin:  Positive for laceration.   Neurological:  Negative for dizziness, light-headedness, passing out, headaches, history of migraines, disorientation, altered mental status, loss of consciousness and numbness.   Psychiatric/Behavioral:  Negative for altered mental status, disorientation, confusion and nervous/anxious. The patient is not nervous/anxious.       Objective:     Physical Exam   Constitutional: She is oriented to person, place, and time. She is cooperative. No distress.      Comments:Patient is awake and alert, sitting up in " exam chair, speaking and answering in complete sentences     normalawake  HENT:   Head: Normocephalic. Head is with laceration.       Ears:   Right Ear: External ear normal.   Left Ear: External ear normal.   Nose: Nose normal.   Mouth/Throat: Mucous membranes are moist. Oropharynx is clear.   Eyes: Conjunctivae are normal. Pupils are equal, round, and reactive to light. Extraocular movement intact   Neck: Neck supple.   Pulmonary/Chest: Effort normal.   Abdominal: Normal appearance.   Musculoskeletal:      Right shoulder: She exhibits tenderness and crepitus. She exhibits normal range of motion and no swelling.      Left shoulder: Normal.      Comments: Patient ambulating with rolling walker  Pain with elicited with right shoulder abduction     Neurological: no focal deficit. She is alert and oriented to person, place, and time. She has normal motor skills, normal sensation and intact cranial nerves (2-12). Gait and coordination normal. GCS eye subscore is 4. GCS verbal subscore is 5. GCS motor subscore is 6.   Skin:         Comments: Laceration with clean edges (3 cm) to right parietal scalp   Psychiatric: Her behavior is normal. Mood normal.   Nursing note and vitals reviewed.                Assessment:     1. Encounter for examination following a fall    2. Acute pain of right shoulder    3. Laceration of scalp without complication, initial encounter        Patient presents with clinical exam findings and history consistent with above.      On exam, patient is nontoxic appearing and vitals are stable.    Neurovascular intact on exam.   Patient is up-to-date with tetanus vaccine.     Acute complicated wound/injury that requires repair.    It was cleaned in clinic and closed with 7 staples.  Patient tolerated well with minimal blood loss.      Diagnostic testing results were reviewed and discussed with patient/guardian.   Tests ordered in clinic:     XR SHOULDER COMPLETE 2 OR MORE VIEWS RIGHT  Result Date:  7/23/2025  EXAMINATION: XR SHOULDER COMPLETE 2 OR MORE VIEWS RIGHT CLINICAL HISTORY: Pain in right shoulder TECHNIQUE: Two or three views of the right shoulder were performed. COMPARISON: 11/09/2021. FINDINGS: There is osteopenia.  There is no acute fracture or subluxation of the shoulder.  Alignment is normal.  There is moderate joint space narrowing of the acromioclavicular joint.  The glenohumeral joint is unremarkable.  Remaining osseous structures are intact.  There is a loop recorder device.     No acute osseous abnormality of the shoulder. Electronically signed by: Everett Jamison Date:    07/23/2025 Time:    17:00      Previous progress notes/admissions/labs and medications were reviewed.  Reviewed GFR 59 from [facility] on 6/12/25.      Plan:   Discussed to reschedule MRI of brain on July 30, 2025.   Patient was given topical mupirocin, oral prophylactic antibiotic, and wound care instructions verbally/printed in discharge summary. Patient was recommended activity modification, RICE and use OTC ibuprofen/acetaminophen every 4 to 6 hours for pain. Patient was educated to monitor for signs of infection. Patient will return to clinic for 12-14 days for suture removal. (8/4/25 to 8/6/25)      Encounter for examination following a fall  -     Ambulatory referral/consult to Internal Medicine    Acute pain of right shoulder  -     XR SHOULDER COMPLETE 2 OR MORE VIEWS RIGHT; Future; Expected date: 07/23/2025  -     acetaminophen tablet 1,000 mg  -     naproxen (NAPROSYN) 500 MG tablet; Take 1 tablet (500 mg total) by mouth 2 (two) times daily as needed (pain).  Dispense: 20 tablet; Refill: 0  -     LIDOcaine-prilocaine (EMLA) cream; Apply topically 2 (two) times daily as needed (pain).  Dispense: 30 g; Refill: 0  -     diclofenac sodium (VOLTAREN ARTHRITIS PAIN) 1 % Gel; Apply 2 g topically 4 (four) times daily as needed.  Dispense: 100 g; Refill: 0  -     Ambulatory referral/consult to Internal Medicine    Laceration of  scalp without complication, initial encounter  -     cephALEXin (KEFLEX) 500 MG capsule; Take 1 capsule (500 mg total) by mouth every 6 (six) hours. for 7 days  Dispense: 28 capsule; Refill: 0  -     mupirocin (BACTROBAN) 2 % ointment; Apply topically 2 (two) times daily. for 7 days  Dispense: 22 g; Refill: 0  -     Laceration Repair  -     Ambulatory referral/consult to Internal Medicine        Laceration Repair    Date/Time: 7/23/2025 4:00 PM    Performed by: Leola Norris PA-C  Authorized by: Leola Norris PA-C  Body area: head/neck  Location details: scalp  Laceration length: 3 cm  Foreign bodies: no foreign bodies  Tendon involvement: none  Nerve involvement: none  Vascular damage: no  Anesthesia: local infiltration    Anesthesia:  Local Anesthetic: lidocaine 1% with epinephrine  Anesthetic total: 3 mL    Patient sedated: no  Preparation: Patient was prepped and draped in the usual sterile fashion.  Irrigation solution: saline  Irrigation method: syringe  Amount of cleaning: standard  Skin closure: staples  Number of sutures: 7  Approximation: close  Approximation difficulty: simple  Patient tolerance: Patient tolerated the procedure well with no immediate complications                    1) See orders for this visit as documented in the electronic medical record.  2) Symptomatic therapy suggested: use acetaminophen/ibuprofen every 6-8 hours prn pain or fever, push fluids.   3) Call or return to clinic prn if these symptoms worsen or fail to improve as anticipated.    Discussed results/diagnosis/plan with patient in clinic.  We had shared decision making for patient's treatment. Patient verbalized understanding and in agreement with current treatment plan.     Patient was instructed to return for re-evaluation with urgent care or PCP for continued outpatient workup and management if symptoms do not improve/worsening symptoms. Strict ED versus clinic precautions given in depth.    Discharge and follow-up  "instructions given verbally/printed with the patient who expressed understanding. The instructions and results are also available on [facility].              Leola "Tiara" JOSEPH Norris          Patient Instructions     you can gently wash the wound with soap and water or take a shower. Do not soak your wound by bathing or swimming until the staples have been removed.  You may apply an antibiotic ointment (mupirocin) to the wound 1 to 2 times each day. If you want, you can cover your wound with a bandage. You can also leave it open to air if you prefer.  Wash your hands before and after you touch your wound or bandage.  Do not try to take out the staples yourself. Your staples need to be removed on _______________________.  Avoid activities that could hurt the area of your wound for a few weeks. If you hurt the same part of your body again, the wound can open up.  Please complete full course of oral antibiotics.   Return to clinic in 12 to 14 days for staple removal.  August 4 to August 6, 2025      If not allergic, take Tylenol (Acetaminophen) 650 mg to  1 g every 6 hours as needed for fever/pain and/or Motrin (Ibuprofen) 600 to 800 mg every 6 hours or Naproxen 500 mg BID Prn as needed for pain and/or fever    Please remember that you have received care at an urgent care today. Urgent cares are not emergency rooms and are not equipped to handle life threatening emergencies and cannot rule in or out certain medical conditions and you may be released before all of your medical problems are known or treated. Please arrange follow up with your primary care physician or speciality clinic  within 2-5 days if your signs and symptoms have not resolved or worsen. Patient can call our Referral Hotline at [phone] to make an appointment.    Please return here or go to the Emergency Department for any concerns or worsening of condition.Patient was educated on signs/symptoms that would warrant emergent medical attention. " Patient verbalized understanding.  Signs of infection. These include a fever of 100.4°F (38°C) or higher, chills, or wound that will not heal.  The pain in and around the area gets much worse.  There is a bad smell or pus (thick yellow, green, or gray fluid) coming from your wound.  You notice a crunchy feeling or blisters in the skin around the wound.  The redness around your wound gets bigger or is spreading up your arm or leg.  Fluid that is not pus drains from your wound.  Your swelling doesnt improve or gets worse.

## 2025-07-24 ENCOUNTER — PATIENT MESSAGE (OUTPATIENT)
Facility: CLINIC | Age: 75
End: 2025-07-24
Payer: MEDICARE

## 2025-07-24 ENCOUNTER — DOCUMENTATION ONLY (OUTPATIENT)
Dept: REHABILITATION | Facility: HOSPITAL | Age: 75
End: 2025-07-24
Payer: MEDICARE

## 2025-07-24 ENCOUNTER — OUTPATIENT CASE MANAGEMENT (OUTPATIENT)
Dept: ADMINISTRATIVE | Facility: OTHER | Age: 75
End: 2025-07-24
Payer: MEDICARE

## 2025-07-24 NOTE — PROGRESS NOTES
Pt scheduled for OT session this date but unable to attend d/t fall and subsequent head injury that occurred yesterday (see urgent care note). Pt's , Masood, presenting to clinic to inquire about resources available regarding caregiver assistance within the home. OT notified Keri ESPINOZA, who provided handouts with community resources - handouts printed and administered this date. Keri ESPINOZA, aware of 's concerns and to contact pt/family at later date. Mr. Correia additionally inquiring about previously mentioned custom w/c evaluation, to be scheduled at earliest availability. No charges placed for this encounter.

## 2025-07-29 ENCOUNTER — CLINICAL SUPPORT (OUTPATIENT)
Dept: REHABILITATION | Facility: HOSPITAL | Age: 75
End: 2025-07-29
Payer: MEDICARE

## 2025-07-29 DIAGNOSIS — H53.40 VISUAL FIELD CUT: Primary | ICD-10-CM

## 2025-07-29 DIAGNOSIS — Z78.9 DECREASED INDEPENDENCE WITH ACTIVITIES OF DAILY LIVING: ICD-10-CM

## 2025-07-29 DIAGNOSIS — Z74.09 IMPAIRED FUNCTIONAL MOBILITY AND ACTIVITY TOLERANCE: Primary | ICD-10-CM

## 2025-07-29 DIAGNOSIS — Z78.9 IMPAIRED INSTRUMENTAL ACTIVITIES OF DAILY LIVING (IADL): ICD-10-CM

## 2025-07-29 PROCEDURE — 97110 THERAPEUTIC EXERCISES: CPT | Mod: PN

## 2025-07-29 PROCEDURE — 97530 THERAPEUTIC ACTIVITIES: CPT | Mod: PN

## 2025-07-29 PROCEDURE — 97112 NEUROMUSCULAR REEDUCATION: CPT | Mod: PN

## 2025-07-29 NOTE — PROGRESS NOTES
Outpatient Rehab    Physical Therapy Visit    Patient Name: Tracy Armendariz  MRN: [id number]  YOB: 1950  Encounter Date: 7/29/2025    Therapy Diagnosis:   Encounter Diagnosis   Name Primary?    Impaired functional mobility and activity tolerance Yes     Physician: Padmini Bain MD    Physician Orders: Eval and Treat  Medical Diagnosis: Parkinson's disease, unspecified whether dyskinesia present, unspecified whether manifestations fluctuate  Surgical Diagnosis: Not applicable for this Episode   Surgical Date: Not applicable for this Episode  Days Since Last Surgery: Not applicable for this Episode    Visit # / Visits Authorized:  12 / 20  Insurance Authorization Period: 6/3/2025 to 12/31/2025  Date of Evaluation: 6/3/2025  Plan of Care Certification: 6/4/2025 to 7/30/2025      PT/PTA:     Number of PTA visits since last PT visit:   Time In: 1345   Time Out: 1430  Total Time (in minutes): 45   Total Billable Time (in minutes): 45    FOTO:  Intake Score (%): Not applicable for this Episode  Survey Score 2 (%): Not applicable for this Episode  Survey Score 3 (%): Not applicable for this Episode    Precautions:  Additional Precautions and Protocol Details: High fall risk, spinal precautions, rollator for ambulation      Subjective   She is upset that today is her last day with OT and hoping that she can continue with PT because she does not feel like she is well enough to stop. She stated she fell after her rollator got caught onto something on the floor which is then she fell back striking the back of her head on the corner of the wall. Also endorses some mild R shoulder pain.         Objective            Treatment:  Therapeutic Exercise  TE 1: x8 mins, sci-fit recumbent stepper, single peaks, level 4  TE 2: Seated resisted scap retractions x 12 RTB  TE 3: Seated T's RTB 2 x 8  Balance/Neuromuscular Re-Education  NMR 1: Sit to stands from blue chair with heavy verbal/tactile cuing for appropriate  weight shift and eccentric control to sit x10  NMR 2: Seated controlled alternating marching while holding yellow medicine ball 2 x 10  NMR 3: x 12 standing marching while holding yellow medicine ball , Mod-max A, ceased due to poor postural control  NMR 4: X 3 rounds, Standing (10)bean bag throws into wooden box, cueing to maintain posutral stability when bending down picking up bean bag  NMR 5: x12, standing bouncing ball back and forth with tech and chest passes with bllue yoga ball, ea  NMR 6: 2 x 8 standing chest press with yellow ball<>shoulder flexion with yellow medicine ball, alternating between sets    Time Entry(in minutes):  Neuromuscular Re-Education Time Entry: 29  Therapeutic Exercise Time Entry: 16    Assessment & Plan   Assessment: Ms. Molina toelrated treatment fairly well today but began to decline slightly as she became more fatigued. Sit to stands were fair today with mainly stabilized position until descending in which she demonstrate poor eccentric control. Max A to remain upright when performing marches with frequent feedback of postural positioning. Pt began to demonstrate increased right lateral leaning during end of session. Continue to emphasize using rollator when assisting her to sit instead of leaving it. Pt continues to be a good candidate for OPPT.       The patient will continue to benefit from skilled outpatient physical therapy in order to address the deficits listed in the problem list on the initial evaluation, provide patient and family education, and maximize the patients level of independence in the home and community environments.     The patient's spiritual, cultural, and educational needs were considered, and the patient is agreeable to the plan of care and goals.           Plan: Progress as able; follow up with wheelchair process/case management    Goals:     Juan A Mclaughlin, PT

## 2025-07-29 NOTE — PROGRESS NOTES
Outpatient Rehab    Occupational Therapy Discharge    Patient Name: Tracy Armendariz  MRN: [id number]  YOB: 1950  Encounter Date: 7/29/2025    Therapy Diagnosis:   Encounter Diagnoses   Name Primary?    Visual field cut Yes    Decreased independence with activities of daily living     Impaired instrumental activities of daily living (IADL)      Physician: Padmini Bain MD    Physician Orders: Eval and Treat  Medical Diagnosis: Parkinson's disease, unspecified whether dyskinesia present, unspecified whether manifestations fluctuate  Surgical Diagnosis: Not applicable for this Episode   Surgical Date: Not applicable for this Episode  Days Since Last Surgery: Not applicable for this Episode    Visit # / Visits Authorized: 12 / 20  Insurance Authorization Period: 6/3/2025 to 12/31/2025  Date of Evaluation: 6/3/2025  Plan of Care Certification: 6/3/2025 to 7/29/2025      Time In: 1430   Time Out: 1500  Total Time (in minutes): 30   Total Billable Time (in minutes): 30      Precautions:  Additional Precautions and Protocol Details: High fall risk, spinal precautions, rollator for ambulation    Subjective   Pt and spouse report they are prepared for discharge from OT services at this time.  Pain reported as 0/10.      Objective      Shoulder Range of Motion     Shoulder, Elbow, or Forearm Range of Motion Details: BUE shoulder/elbow/forearm ROM WFL    Wrist Range of Motion     BUE wrist ROM WFL       BUE hand ROM WFL              Shoulder Strength - Planes of Motion   Right Strength Right Pain Left Strength Left  Pain   Flexion 3 Yes 4+     ABduction 3 Yes 4+         Shoulder Strength Details  Right shoulder limited by pain d/t recent fall    Elbow Strength   Right Strength Left Strength   Flexion (C6) 5 5   Extension (C7) 5 5        Forearm Strength   Right Strength Left Strength   Pronation 5 5   Supination 5 5       Wrist Strength - Planes of Motion   Right Strength Left Strength   Flexion 5 5    Extension 5 5     Right  Strength  Right Hand Dynamometer Position: 2  Elbow Position Forearm Position Trial 1 (lbs) Trial 2  (lbs) Trial 3  (lbs) Average  (lbs)   Flexed Neutral 37 38 32 35.67       Left  Strength  Left Hand Dynamometer Position: 2  Elbow Position Forearm Position Trial 1 (lbs) Trial 2 (lbs) Trial 3 (lbs) Average (lbs)   Flexed Neutral 32 40 35 35.67              Wrist/Hand Special Tests  Hand Coordination Special Tests  Right 9-Hole Peg Test (sec): 41  Left 9-Hole Peg Test (sec): 50       Coordination  Right 9-Hole Peg Test (sec): 41  Left 9-Hole Peg Test (sec): 50                Treatment:  Therapeutic Activity  TA 1: discharge assessments    Time Entry(in minutes):  Therapeutic Activity Time Entry: 30    Assessment & Plan   Assessment: Pt has made fair progress in OT services, limited by rapidly progressing symptoms. However, pt reports she is compliant with compensatory strategies for safety. Pt's  reporting concern regarding need for wheelchair eval sooner than currently scheduled date. CM aware and requesting new orders from MD for re-scheduling of wheelchair evaluation. Regarding previously discussed laryngoscopy results - pt and spouse instructed to send results to [facility], to determine need for ST services. [facility], made aware. Pt is discharged from OT services at this time and to continue w/ HEPs, including use of adaptive equipment/techniques and compensatory strategies, in order to maintain progress made. Vision goals discontinued this date d/t progressive nature of deficits and chronic visual field loss - however, pt is compliant w/ vision compensatory/safety strategies.   Evaluation/Treatment Tolerance: Patient tolerated treatment well    The patient's spiritual, cultural, and educational needs were considered, and the patient is agreeable to the plan of care and goals.     Education  Education was done with Patient. The patient's learning style includes  "Listening. The patient Verbalizes understanding.         Discharge from OT services, continue w/ HEP, follow up w/ ST       Plan: discharged from OT services    Goals:   Active       LTG       OT will provide training/education on tremor management strategies and AE/DME/task set up to maximize independence, safety & efficiency with ADLs/IADLs.  (Met)       Start:  06/04/25    Expected End:  07/30/25    Resolved:  06/26/25         OT will provide recommendations and education on environmental set up/modification prn to accommodate for vision related deficits in order to reduce risk for falls and maximize independence and safety with occupational engagement in home environment. (Met)       Start:  06/04/25    Expected End:  07/30/25    Resolved:  06/20/25         OT will provide training/education on appropriate HEP/HAPs to improve global strength, tissue lengthening, and FM skills.  (Met)       Start:  06/04/25    Expected End:  07/30/25    Resolved:  07/01/25         Pt will verbalize and demonstrate appropriate sequencing and motor control for fxnl sit to stand transfer (recliner, toilet, shower chair, etc.) to reduce "plopping" and retropulsion and increase safety with ADL routine.  (Met)       Start:  06/04/25    Expected End:  07/30/25    Resolved:  07/08/25         Pt will perform UB dressing SPV.  (Met)       Start:  06/04/25    Expected End:  07/30/25    Resolved:  07/08/25         Pt will increase BUE MMT to 5/5 in deficit mm groups for improved fxnl strength (Unable to Meet)       Start:  06/04/25    Expected End:  07/30/25            Pt will demonstrate improved L FM coordination as evidence by completing 9HPT in </= 40 secs. (Unable to Meet)       Start:  06/04/25    Expected End:  07/30/25               LTG continued       Patient to perform smooth pursuits WFL in all planes x 60 seconds, tracking single target for improved ability to scan environment with functional mobility.  (Unable to Meet)       " Start:  06/05/25    Expected End:  07/30/25            Patient will maintain gaze stabilization on target w/ VORx1 at self-selected pace x 30 seconds.  (Unable to Meet)       Start:  06/05/25    Expected End:  07/30/25              Resolved       STG       TBA oculomotor functions (I.e. smooth pursuits, saccades, convergence/divergence) with accompanying goal(s) to follow as needed. (Met)       Start:  06/04/25    Expected End:  07/02/25    Resolved:  06/05/25         TBA B  strength with accompanying goal(s) to follow as needed. (Met)       Start:  06/04/25    Expected End:  07/02/25    Resolved:  06/05/25         TBD potential need/benefits for use of visual aids in the home to improve safety awareness and carry over of sequencing and form with fxnl transfer training, direction changes, scanning strategies, etc.. (Met)       Start:  06/04/25    Expected End:  07/02/25    Resolved:  06/20/25         L shoulder pain to be further assessed and treated if deemed appropriate/within OT scope of practice. (Met)       Start:  06/04/25    Expected End:  07/02/25    Resolved:  06/05/25         Pt will increase LUE MMT to 4/5 in deficient mm groups for improved fxnl strength.  (Met)       Start:  06/04/25    Expected End:  07/02/25    Resolved:  07/29/25             Genevieve Hayes, OT

## 2025-07-31 ENCOUNTER — CLINICAL SUPPORT (OUTPATIENT)
Dept: REHABILITATION | Facility: HOSPITAL | Age: 75
End: 2025-07-31
Payer: MEDICARE

## 2025-07-31 DIAGNOSIS — Z74.09 IMPAIRED FUNCTIONAL MOBILITY AND ACTIVITY TOLERANCE: Primary | ICD-10-CM

## 2025-07-31 PROCEDURE — 97530 THERAPEUTIC ACTIVITIES: CPT | Mod: PN

## 2025-07-31 NOTE — PROGRESS NOTES
Outpatient Rehab    Physical Therapy Progress Note : Updated Plan of Care    Patient Name: Tracy Armendariz  MRN: [id number]  YOB: 1950  Encounter Date: 7/31/2025    Therapy Diagnosis:   Encounter Diagnosis   Name Primary?    Impaired functional mobility and activity tolerance Yes     Physician: Padmini Bain MD    Physician Orders: Eval and Treat  Medical Diagnosis: Parkinson's disease, unspecified whether dyskinesia present, unspecified whether manifestations fluctuate  Surgical Diagnosis: Not applicable for this Episode   Surgical Date: Not applicable for this Episode  Days Since Last Surgery: Not applicable for this Episode    Visit # / Visits Authorized: 13 / 20  Insurance Authorization Period: 6/3/2025 to 12/31/2025  Date of Evaluation: 6/3/2025   Plan of Care Certification: 7/31/2025 to 8/8/2025     PT/PTA: PT   Number of PTA visits since last PT visit:0  Time In: 1350   Time Out: 1430  Total Time (in minutes): 40   Total Billable Time (in minutes): 40    FOTO:  Intake Score (%): Not applicable for this Episode  Survey Score 2 (%): Not applicable for this Episode  Survey Score 3 (%): Not applicable for this Episode    Precautions:  Additional Precautions and Protocol Details: High fall risk, spinal precautions, rollator for ambulation    Subjective   She and her  have a few questions regarding next steps; wanting to continue with at least one more visit so their daughter can ask PT questions / be present for home exercise program review. MRI had to be rescheduled because she still has staples in scalp from her recent fall; has virtual follow up scheduled with neurology soon, but MRI will be completed first.  Family / care giver present for this visit:   Pain reported as 0/10.      Objective            Functional Mobility:  - Gait: Requiring contact guard assist majority of time; occasional stand by assist when she is not fatigued and occasional minimal assist for trunk control when  gait deteriorates  - Sit <> stand: Frequently displays retropulsion and decreased eccentric control  - Rolling: Independent  - Seated > supine: Independent  - Supine > sit: Minimal assistance    Treatment:  Therapeutic Activity  TA 1: Functional mobility reassessment (see objective)  TA 2: Education: Wheelchair evaluation process (orders needed from physician, NP, or PA; role of PT in wheelchair evaluation; role of ATP in wheelchair evaluation; standard versus power mobility; likely do no need to wait for PM&R visit to start process; will defer to  for assistance in procuring orders)  TA 3: Education: Severity and frequency of falls is concerning and PT recommends further work up from neurology before continued aggressive treatment  TA 4: Education: PT recommends stand by assist (or CGA-min A if needed) at all times when patient is ambulating; benefits of gait belt use  TA 5: Education: Benefits of continued home exercise but recommend seated exercise when alone and standing exercise when supervised by   TA 6: Education: Home health PT versus outpatient and benefits of home assessment to reduce tripping/fall hazards    Time Entry(in minutes):  Therapeutic Activity Time Entry: 40    Assessment & Plan   Assessment  Tracy was scheduled past her plan of care, so formal update/discharge was originally planned for today because patient is not objectively progressing toward her goals. However, she and her  are interested in completing 1-2 more visits that have already been scheduled as they want their daughter to receive some additional education from PT next week (she will be coming in from out of town) and are interested in PT educating them on a safe home program as well. The majority of today's visit was dedicated to ample education to patient/ regarding her current functional status and our therapy team's concerns for the frequency and severity of her recent falls. After consulting  with the rest of the neuro PT team, we recommend a return to neurology for further work up before pursing additional outpatient PT intervention beyond visits next week; again reinforced benefits of wheelchair evaluation to improve safety and feasibility of community mobility, which both patient and  are agreeable to. PT plans to reach out to  to streamline this process. She would benefit from 1-2 more visits for further caregiver education and HEP instruction.  Evaluation/Treatment Tolerance: Patient tolerated treatment well    The patient will continue to benefit from skilled outpatient physical therapy in order to address the deficits listed in the problem list on the initial evaluation, provide patient and family education, and maximize the patients level of independence in the home and community environments.     The patient's spiritual, cultural, and educational needs were considered, and the patient is agreeable to the plan of care and goals.           Goals:     Short Term Goals: 4 weeks    Patient to be Independent with HEP (Progressing)  Patient to score less than or equal to 20 sec on the 5 times sit to stand for improved transfers (Progressing)  Patient able to control descent of stand to sit with upper extremity to decrease plopping and chair movement behind her (Progressing)     Long Term Goals: 8 weeks    Patient to score less than or equal to 28 sec on the TUG for decreased fall risk (Progressing)  Patient to able to ambulate greater than 250 feet with rolling walker, no loss of balance, full bilateral foot clearance and minimal path deviation (Progressing)  Patient able to tolerate greater than 15 min in standing position without lower extremity fatigue for improved standing tolerance (Progressing)  Patient to improve bilateral hip flexion to greater than or equal to 4-/5 for improved transfers (progressing)  Patient to demo good safety awareness with transfers (bed mobility, sit  to stand) for decreased fall risk (progressing)    MIGUEL BRICEÑO, PT

## 2025-08-03 PROBLEM — H83.2X9 VESTIBULAR HYPOFUNCTION: Status: RESOLVED | Noted: 2024-04-30 | Resolved: 2025-08-03

## 2025-08-03 PROBLEM — Z91.81 HISTORY OF RECENT FALL: Status: RESOLVED | Noted: 2024-04-30 | Resolved: 2025-08-03

## 2025-08-04 ENCOUNTER — OFFICE VISIT (OUTPATIENT)
Dept: URGENT CARE | Facility: CLINIC | Age: 75
End: 2025-08-04
Payer: MEDICARE

## 2025-08-04 VITALS
HEIGHT: 66 IN | HEART RATE: 65 BPM | WEIGHT: 203.06 LBS | BODY MASS INDEX: 32.64 KG/M2 | DIASTOLIC BLOOD PRESSURE: 74 MMHG | OXYGEN SATURATION: 96 % | RESPIRATION RATE: 18 BRPM | TEMPERATURE: 99 F | SYSTOLIC BLOOD PRESSURE: 127 MMHG

## 2025-08-04 DIAGNOSIS — Z48.02 ENCOUNTER FOR STAPLE REMOVAL: Primary | ICD-10-CM

## 2025-08-04 PROCEDURE — 99024 POSTOP FOLLOW-UP VISIT: CPT | Mod: S$GLB,,,

## 2025-08-04 PROCEDURE — 99211 OFF/OP EST MAY X REQ PHY/QHP: CPT | Mod: S$GLB,,,

## 2025-08-04 PROCEDURE — 15853 REMOVAL SUTR/STAPL XREQ ANES: CPT | Mod: S$GLB,,,

## 2025-08-04 NOTE — PROGRESS NOTES
"Subjective:      Patient ID: Tracy Armendariz is a 74 y.o. female.    Vitals:  height is 5' 6" (1.676 m) and weight is 92.1 kg (203 lb 0.7 oz). Her oral temperature is 98.6 °F (37 °C). Her blood pressure is 127/74 and her pulse is 65. Her respiration is 18 and oxygen saturation is 96%.     Chief Complaint: Suture / Staple Removal    75 y/o female with PMHx of parkinsons, HLD, HTN, DMT2. She presents for staples removal. The staples were placed 14 days ago. Denies any fever, chills, discharge, pain.    Suture / Staple Removal  The sutures were placed 11 to 14 days ago. She tried antibiotic ointment use since the wound repair. The treatment provided significant relief. Her temperature was unmeasured prior to arrival. There has been no drainage from the wound. There is no redness present. There is no swelling present. There is no pain present. She has no difficulty moving the affected extremity or digit.       Constitution: Negative for activity change, appetite change, chills and fever.   HENT:  Negative for ear pain, congestion, postnasal drip, sinus pain, sinus pressure and sore throat.    Neck: Negative for neck pain.   Cardiovascular:  Negative for chest pain and sob on exertion.   Eyes:  Negative for eye trauma, eye discharge, eye itching, eye redness, photophobia and blurred vision.   Respiratory:  Negative for cough, shortness of breath, wheezing and asthma.    Gastrointestinal:  Negative for abdominal pain, nausea, vomiting, constipation and diarrhea.   Genitourinary:  Negative for dysuria, frequency, urgency, urine decreased and hematuria.   Musculoskeletal:  Negative for pain and muscle ache.   Skin:  Negative for color change, rash and hives.   Allergic/Immunologic: Negative for seasonal allergies, asthma, hives and sneezing.   Neurological:  Negative for dizziness, light-headedness, headaches and altered mental status.   Psychiatric/Behavioral:  Negative for altered mental status and confusion.   "   Objective:     Physical Exam   Constitutional: She is oriented to person, place, and time. She appears well-developed. She is cooperative.      Comments:Pt sitting erect on examination table. No acute respiratory distress, no use of accessory muscles, no notice of nasal flaring.        HENT:   Head: Normocephalic. Head is with laceration.   Ears:   Right Ear: Hearing and external ear normal.   Left Ear: Hearing and external ear normal.   Nose: Nose normal. No mucosal edema or nasal deformity. No epistaxis. Right sinus exhibits no maxillary sinus tenderness and no frontal sinus tenderness. Left sinus exhibits no maxillary sinus tenderness and no frontal sinus tenderness.   Mouth/Throat: Uvula is midline, oropharynx is clear and moist and mucous membranes are normal. No trismus in the jaw. Normal dentition. No uvula swelling.   7 staples      Comments: 7 staples  Eyes: Conjunctivae and lids are normal.   Neck: Trachea normal and phonation normal. Neck supple.   Cardiovascular: Normal rate.   Pulmonary/Chest: Effort normal.   Abdominal: Normal appearance.   Musculoskeletal: Normal range of motion.         General: Normal range of motion.   Neurological: She is alert and oriented to person, place, and time. She exhibits normal muscle tone.   Skin: Skin is warm, dry and intact.   Psychiatric: Her speech is normal and behavior is normal. Judgment and thought content normal.   Nursing note and vitals reviewed.    Assessment:     1. Encounter for staple removal        Plan:     I have reviewed the patient chart and pertinent past imaging/labs.    Encounter for staple removal

## 2025-08-05 ENCOUNTER — CLINICAL SUPPORT (OUTPATIENT)
Dept: REHABILITATION | Facility: HOSPITAL | Age: 75
End: 2025-08-05
Payer: MEDICARE

## 2025-08-05 DIAGNOSIS — R49.8 HYPOPHONIA: ICD-10-CM

## 2025-08-05 DIAGNOSIS — G20.C PARKINSONISM, UNSPECIFIED PARKINSONISM TYPE: Primary | ICD-10-CM

## 2025-08-05 DIAGNOSIS — Z74.09 IMPAIRED FUNCTIONAL MOBILITY AND ACTIVITY TOLERANCE: Primary | ICD-10-CM

## 2025-08-05 PROCEDURE — 97110 THERAPEUTIC EXERCISES: CPT | Mod: PN

## 2025-08-05 PROCEDURE — 97530 THERAPEUTIC ACTIVITIES: CPT | Mod: PN

## 2025-08-05 NOTE — PROGRESS NOTES
Outpatient Rehab    Physical Therapy Visit    Patient Name: Trcay Armendariz  MRN: [id number]  YOB: 1950  Encounter Date: 8/5/2025    Therapy Diagnosis:   Encounter Diagnosis   Name Primary?    Impaired functional mobility and activity tolerance Yes     Physician: Padmini Bain MD    Physician Orders: Eval and Treat  Medical Diagnosis: Parkinson's disease, unspecified whether dyskinesia present, unspecified whether manifestations fluctuate  Surgical Diagnosis: Not applicable for this Episode   Surgical Date: Not applicable for this Episode  Days Since Last Surgery: Not applicable for this Episode    Visit # / Visits Authorized:  14 / 20  Insurance Authorization Period: 6/3/2025 to 12/31/2025  Date of Evaluation: 6/3/2025  Plan of Care Certification: 7/31/2025 to 8/8/2025      PT/PTA: PT   Number of PTA visits since last PT visit:0  Time In: 1120   Time Out: 1200  Total Time (in minutes): 40   Total Billable Time (in minutes): 40    FOTO:  Intake Score (%): Not applicable for this Episode  Survey Score 2 (%): Not applicable for this Episode  Survey Score 3 (%): Not applicable for this Episode    Precautions:  Additional Precautions and Protocol Details: High fall risk, spinal precautions, rollator for ambulation      Subjective   Patient's daughter is here today in asks additional questions about management moving forward;  requesting that social work call patient tomorrow morning if possible.  Family / care giver present for this visit:   Pain reported as 0/10. N/A    Objective            Treatment:  Therapeutic Exercise  TE 1: HEP review with brief return demonstration of the following items: long arc quad, short arc quad, hooklying march, isometric hip adduction, isometric hip abduction  Therapeutic Activity  TA 1: Continued ducation: Wheelchair evaluation process (orders needed from physician, NP, or PA; role of PT in wheelchair evaluation; role of ATP in wheelchair evaluation; standard  versus power mobility; likely do no need to wait for PM&R visit to start process; will defer to  for assistance in procuring orders)  TA 2: Education: Severity and frequency of falls is concerning and PT recommends further work up from neurology before continued aggressive treatment; still recommend continued home exercise as this may help slow decline of suspected Parkinsonism  TA 3: Education: [employer] exercise guidelines  TA 4: Education: SLP to be contacted regarding next steps for speech therapy management    Time Entry(in minutes):  Therapeutic Activity Time Entry: 25  Therapeutic Exercise Time Entry: 15    Assessment & Plan   Assessment: Today's visit again consisted mostly of patient/caregiver education as patient's daughter and  were present during visit today.  Patient and family receptive to all education provided.  Continued care coordination with  in speech therapist regarding wheelchair orders and treatment recommendations for speech therapy.  End of session consisted of home exercise program review of items that can be performed independently by patient; last visit to be dedicated to home exercise program review for items were  will likely need to be present for safety during standing exercises.  Evaluation/Treatment Tolerance: Patient tolerated treatment well    The patient will continue to benefit from skilled outpatient physical therapy in order to address the deficits listed in the problem list on the initial evaluation, provide patient and family education, and maximize the patients level of independence in the home and community environments.     The patient's spiritual, cultural, and educational needs were considered, and the patient is agreeable to the plan of care and goals.           Plan: Review standing exercsie program    Goals:     Short Term Goals: 4 weeks    Patient to be Independent with HEP (Progressing)  Patient to score less  than or equal to 20 sec on the 5 times sit to stand for improved transfers (Progressing)  Patient able to control descent of stand to sit with upper extremity to decrease plopping and chair movement behind her (Progressing)     Long Term Goals: 8 weeks    Patient to score less than or equal to 28 sec on the TUG for decreased fall risk (Progressing)  Patient to able to ambulate greater than 250 feet with rolling walker, no loss of balance, full bilateral foot clearance and minimal path deviation (Progressing)  Patient able to tolerate greater than 15 min in standing position without lower extremity fatigue for improved standing tolerance (Progressing)  Patient to improve bilateral hip flexion to greater than or equal to 4-/5 for improved transfers (progressing)  Patient to demo good safety awareness with transfers (bed mobility, sit to stand) for decreased fall risk (progressing)    MIGUEL BRICEÑO, PT

## 2025-08-06 NOTE — PROGRESS NOTES
Outpatient Care Management   - Patient Assessment    Patient: Tracy Armendariz  MRN:  [id number]  Date of Service:  8/6/2025  Completed by:  Keri Yeboah LCSW  Referral Date: 07/22/2025    Reason for Visit   Patient presents with    Social Work Assessment     8/6/25    OPCM Enrollment Call     8/6/25       Brief Summary:  received a referral from outpatient provider, Val (PT), for the following Low/Mod SW psychosocial needs Pt needs assistance with obtaining a custom wheelchair evaluation order. Pt and Pt /Pt daughter elected to participate in the OPCM program. Social work assessment and SDOH questionnaire completed. Pt and Pt  reported Pt lives with with him in a single story home. Pt utilizes a rollator for DME and is dependent on assistance with ADLs. They have two adult daughters that both live out of state and no other family/friends to assist them. The patient also requests assistance with getting HH, getting caregiver respite services. Care plan was created in collaboration with patient/caregiver input.   INTERVENTIONS:  Provided information for COA, community choices waiver, and Parkinsons support groups/exercise classes. Also sent email with information regarding sitters and related resources. Sent message to PCP regarding wanting orders for a custom wheelchair evaluation order as well as HH PT orders.     Future Appointments   Date Time Provider Department Center   8/7/2025 11:30 AM [facility] MRI1 500 LB LIMIT [facility] MRI [facility]   8/7/2025  1:00 PM Val Crockett PT [facility]   8/8/2025  3:00 PM [facility] ODC XR-B LIMIT 500 LBS [facility] XRAY OP [facility]   8/8/2025  3:30 PM Freda Piña PA-C [facility] NEUROSU [facility]   8/11/2025  1:00 PM [facility] MAMMO1 [facility] MAMMO [facility]   8/12/2025 10:15 AM Knoop, Liat, NP [facility] NEUMOV [address]   8/19/2025 10:00 AM [facility], [facility] [place]   8/19/2025  1:30 PM Fabiola Morales MD [facility]   9/16/2025  1:45 PM Candida Collier, L-SLP, CCC-SLP [facility]   10/8/2025  3:40 PM Radha Ross MD [facility]   10/21/2025  1:30 PM Madisyn Villalobos MD [facility] Juju Y[place], [facility]  Neuro Therapy   [facility]  [phone]

## 2025-08-07 ENCOUNTER — CLINICAL SUPPORT (OUTPATIENT)
Dept: REHABILITATION | Facility: HOSPITAL | Age: 75
End: 2025-08-07
Payer: MEDICARE

## 2025-08-07 ENCOUNTER — HOSPITAL ENCOUNTER (OUTPATIENT)
Dept: RADIOLOGY | Facility: HOSPITAL | Age: 75
Discharge: HOME | End: 2025-08-07
Attending: NURSE PRACTITIONER
Payer: MEDICARE

## 2025-08-07 ENCOUNTER — TELEPHONE (OUTPATIENT)
Dept: NEUROSURGERY | Facility: CLINIC | Age: 75
End: 2025-08-07
Payer: MEDICARE

## 2025-08-07 VITALS — HEART RATE: 61 BPM | SYSTOLIC BLOOD PRESSURE: 128 MMHG | DIASTOLIC BLOOD PRESSURE: 72 MMHG

## 2025-08-07 DIAGNOSIS — Z74.09 IMPAIRED FUNCTIONAL MOBILITY AND ACTIVITY TOLERANCE: Primary | ICD-10-CM

## 2025-08-07 DIAGNOSIS — G20.C PARKINSONISM, UNSPECIFIED PARKINSONISM TYPE: ICD-10-CM

## 2025-08-07 PROCEDURE — 70551 MRI BRAIN STEM W/O DYE: CPT | Mod: TC

## 2025-08-07 PROCEDURE — 97530 THERAPEUTIC ACTIVITIES: CPT | Mod: PN

## 2025-08-07 PROCEDURE — 97110 THERAPEUTIC EXERCISES: CPT | Mod: PN

## 2025-08-07 PROCEDURE — 70551 MRI BRAIN STEM W/O DYE: CPT | Mod: 26,,, | Performed by: RADIOLOGY

## 2025-08-07 NOTE — PROGRESS NOTES
Outpatient Rehab    Physical Therapy Discharge    Patient Name: Tracy Armendariz  MRN: [id number]  YOB: 1950  Encounter Date: 8/7/2025    Therapy Diagnosis:   Encounter Diagnosis   Name Primary?    Impaired functional mobility and activity tolerance Yes     Physician: Padmini Bain MD    Physician Orders: Eval and Treat  Medical Diagnosis: Parkinson's disease, unspecified whether dyskinesia present, unspecified whether manifestations fluctuate  Surgical Diagnosis: Not applicable for this Episode   Surgical Date: Not applicable for this Episode  Days Since Last Surgery: Not applicable for this Episode    Visit # / Visits Authorized:  15 / 20  Insurance Authorization Period: 6/3/2025 to 12/31/2025  Date of Evaluation: 6/3/2025  Plan of Care Certification: 7/31/2025 to 8/8/2025      PT/PTA: PT   Number of PTA visits since last PT visit:0  Time In: 1305   Time Out: 1345  Total Time (in minutes): 40   Total Billable Time (in minutes): 40    FOTO:  Intake Score (%): Not applicable for this Episode  Survey Score 2 (%): Not applicable for this Episode  Survey Score 3 (%): Not applicable for this Episode    Precautions:  Additional Precautions and Protocol Details: High fall risk, spinal precautions, rollator for ambulation    Subjective   Patient and  feeling ready for discharge today. Very grateful for all information given by . May be transitioning to home health therapy for a little while due to falls in home setting/decreased functional mobility lately.  Just had MRI performed and feeling a little dizzy since then; otherwise no new complaints.  Family / care giver present for this visit:   Pain reported as 0/10. N/A    Objective   Vital Signs  /72   Pulse 61   LMP 01/01/1978 (Approximate)   BP Location: Right arm                     Five Time Sit to Stand: 16.9 seconds with bilateral upper extremity assist    Timed Up and Go: 16.1 seconds with  rollator    Treatment:  Therapeutic Exercise  TE 1: Verbal review of supine/seated HEP  TE 2: Return demonstration of standing HEP (recommend supervision from  when completing) including one set of each of the following exercises: Standing hip flexion marches; standing hip abduction; standing hamstring curls; sit to stands; standing calf raises  Therapeutic Activity  TA 1: Brief reassessment of goals (see objective)  TA 2: Patient/caregiver education on discharge recommendations for mobility/guarding; appropriateness for home health services in the future due to current functional status  TA 3: Patient/caregiver education on benefits of task breakdown when performing transfers in order to promote safety in home setting    Time Entry(in minutes):  Therapeutic Activity Time Entry: 23  Therapeutic Exercise Time Entry: 17    Assessment & Plan   Assessment: While the frequency and severity of Tracy's falls at home are concerning, she has responded to this course physical therapy some extent.  She has follow-up scheduled with Neurology to discuss recent brain MRI.  Improvements noted in functional mobility per performance on 5 times sit-to-stand and timed up and go compared to initial evaluation.  PT team is recommending custom wheelchair evaluation so that patient can safely complete community mobility activities.  She may also benefit from a stint of home health physical therapy for home assessment and further functional mobility training in home setting.  Case management is currently involved in her care.  At this point she appears to have reached her functional potential in outpatient physical therapy realm, but she and her  understand they can contact me with any questions post discharge.  They are pleased with Ms. Molina's results and with involvement of case management and understand the benefits of continued home exercise.       The patient's spiritual, cultural, and educational needs were  considered, and the patient is agreeable to the plan of care and goals.           Plan: Discharge physical therapy; follow-up with case management and speech therapy as needed; follow-up regarding custom wheelchair evaluation as needed    Goals:     Short Term Goals: 4 weeks    Patient to be Independent with HEP (Met)  Patient to score less than or equal to 20 sec on the 5 times sit to stand for improved transfers (Met)  Patient able to control descent of stand to sit with upper extremity to decrease plopping and chair movement behind her (Met during reassessment on discharge but inconsistent during POC)     Long Term Goals: 8 weeks    Patient to score less than or equal to 28 sec on the TUG for decreased fall risk (Met)  Patient to able to ambulate greater than 250 feet with rolling walker, no loss of balance, full bilateral foot clearance and minimal path deviation (Met)  Patient able to tolerate greater than 15 min in standing position without lower extremity fatigue for improved standing tolerance (Not met)  Patient to improve bilateral hip flexion to greater than or equal to 4-/5 for improved transfers (Progressing)  Patient to demo good safety awareness with transfers (bed mobility, sit to stand) for decreased fall risk (Progressing)    MIGUEL BRICEÑO, PT

## 2025-08-07 NOTE — TELEPHONE ENCOUNTER
Attempted to contact the pt, I left a message stating that I was calling to confirm her 3 pm xray's and 330 pm appointment on tomorrow.

## 2025-08-08 ENCOUNTER — OFFICE VISIT (OUTPATIENT)
Dept: NEUROSURGERY | Facility: CLINIC | Age: 75
End: 2025-08-08
Payer: MEDICARE

## 2025-08-08 ENCOUNTER — HOSPITAL ENCOUNTER (OUTPATIENT)
Dept: RADIOLOGY | Facility: HOSPITAL | Age: 75
Discharge: HOME | End: 2025-08-08
Attending: PHYSICIAN ASSISTANT
Payer: MEDICARE

## 2025-08-08 VITALS
BODY MASS INDEX: 32.64 KG/M2 | DIASTOLIC BLOOD PRESSURE: 71 MMHG | HEART RATE: 60 BPM | WEIGHT: 203.06 LBS | HEIGHT: 66 IN | SYSTOLIC BLOOD PRESSURE: 136 MMHG

## 2025-08-08 DIAGNOSIS — S22.080D CLOSED WEDGE COMPRESSION FRACTURE OF T12 VERTEBRA WITH ROUTINE HEALING, SUBSEQUENT ENCOUNTER: ICD-10-CM

## 2025-08-08 DIAGNOSIS — S22.080D COMPRESSION FRACTURE OF T12 VERTEBRA WITH ROUTINE HEALING, SUBSEQUENT ENCOUNTER: Primary | ICD-10-CM

## 2025-08-08 PROCEDURE — 72080 X-RAY EXAM THORACOLMB 2/> VW: CPT | Mod: 26,,, | Performed by: RADIOLOGY

## 2025-08-08 PROCEDURE — 99999 PR PBB SHADOW E&M-EST. PATIENT-LVL IV: CPT | Mod: PBBFAC,,, | Performed by: PHYSICIAN ASSISTANT

## 2025-08-08 PROCEDURE — 72080 X-RAY EXAM THORACOLMB 2/> VW: CPT | Mod: TC

## 2025-08-08 NOTE — PROGRESS NOTES
Subjective:     Patient ID:  Tracy Armendariz is a 74 y.o. female.    Julio Cesar    Chief Complaint:  Low back pain    HPI       Tracy Armendariz is a 74 y.o. female who presents with the above CC.  Patient had a fall on April 27th onto her back buttocks.  She went to the emergency room where the CT scan showed a T12 fracture that was new and it old T10 fracture.  Patient complains of low back pain.  No pain radiating to the legs or feet.  She has been wearing the LSO brace.  She took the extension piece off because it was bothering her neck.  She had previous spine surgery at L4-5 in the past.  No epidural steroid injections in the past.  She takes hydrocodone and Tylenol as needed.    Patient denies any recent accidents or trauma, no saddle anesthesias, and no bowel or bladder incontinence.      Interval History:       No back pain.  No pain radiating around the abdomen or into the legs.  Has been wearing the back brace.  Has been doing physical therapy for prior can since with her back brace on.    Interval History:   08/08/2025    History of Present Illness    Ms. Armendariz presents today for follow-up She reports her back is doing great with no problems or concerns. She denies leg pain or leg numbness.  She has been wearing the back brace.    She reports ongoing physical therapy primarily focused on improving balance and strengthening legs. She is currently participating in physical therapy and preparing to initiate home physical therapy. Speech therapy is scheduled.       Review of Systems:    Review of Systems   Constitutional:  Negative for chills, diaphoresis, fever, malaise/fatigue and weight loss.   HENT:  Negative for congestion, ear discharge, ear pain, hearing loss, nosebleeds, sinus pain, sore throat and tinnitus.    Eyes:  Negative for blurred vision, double vision, photophobia, pain, discharge and redness.   Respiratory:  Negative for cough, hemoptysis, sputum production, shortness of breath, wheezing and  stridor.    Cardiovascular:  Negative for chest pain, palpitations, orthopnea, leg swelling and PND.   Gastrointestinal:  Negative for abdominal pain, blood in stool, constipation, diarrhea, heartburn, melena, nausea and vomiting.   Genitourinary:  Negative for dysuria, flank pain, frequency, hematuria and urgency.   Musculoskeletal:  Positive for back pain and myalgias. Negative for falls, joint pain and neck pain.   Skin:  Negative for itching and rash.   Neurological:  Negative for dizziness, tingling, tremors, sensory change, speech change, seizures, loss of consciousness, weakness and headaches.   Endo/Heme/Allergies:  Negative for environmental allergies and polydipsia. Does not bruise/bleed easily.   Psychiatric/Behavioral:  Negative for depression, hallucinations, memory loss and substance abuse. The patient is not nervous/anxious and does not have insomnia.          Past Medical History:   Diagnosis Date    Allergy     Anemia, unspecified     Anticoagulant long-term use     Arthritis     Cerebral embolism without mention of cerebral infarction 2014    Dx updated per  IMO Load      CVA (cerebral infarction)     Depression     Disorder of kidney and ureter     renal stones    Diverticulosis of colon     Extrinsic asthma, unspecified     Hematuria, unspecified     Hyperlipidemia     Hypertension     Kidney stone     Left atrial enlargement 2014    Low back pain     Nephrolithiasis     MARTIN (obstructive sleep apnea)     Osteopenia     PUD (peptic ulcer disease)     Severe obesity (BMI 35.0-39.9) with comorbidity 2013    Stroke 2013    Urinary tract infection      Past Surgical History:   Procedure Laterality Date    APPENDECTOMY      @ time of hysterectomy    BACK SURGERY      CATARACT EXTRACTION       SECTION      CHOLECYSTECTOMY      laparoscopic    COLONOSCOPY N/A 2018    Procedure: COLONOSCOPY/Golytely;  Surgeon: Janine Black MD;  Location: [facility];   Service: Endoscopy;  Laterality: N/A;    CYSTOSCOPY W/ RETROGRADES  12/11/2022    Procedure: CYSTOSCOPY, WITH RETROGRADE PYELOGRAM;  Surgeon: Mitchell Recinos MD;  Location: [facility];  Service: Urology;;    CYSTOSCOPY W/ URETERAL STENT PLACEMENT Left 12/11/2022    Procedure: CYSTOSCOPY, WITH URETERAL STENT INSERTION;  Surgeon: Mitchell Recinos MD;  Location: [facility] OR;  Service: Urology;  Laterality: Left;    CYSTOURETEROSCOPY, WITH HOLMIUM LASER LITHOTRIPSY OF URETERAL CALCULUS AND STENT INSERTION Left 12/21/2022    Procedure: left ureteroscopy, holmium laser lithotripsy, stone basketing, retrograde pyelogram, stent placement;  Surgeon: Anaya Zamora MD;  Location: [facility] OR;  Service: Urology;  Laterality: Left;    DILATION AND CURETTAGE OF UTERUS  1972    EXTRACTION - STONE Left 12/21/2022    Procedure: EXTRACTION - STONE;  Surgeon: Anaya Zamora MD;  Location: [facility];  Service: Urology;  Laterality: Left;    HYSTERECTOMY  1978    TAHUSO with appendectomy    INNER EAR SURGERY      replaced ear drum    JOINT REPLACEMENT Right     knee    KNEE ARTHROSCOPY W/ DEBRIDEMENT  2003    LUMBAR DISCECTOMY  1980    L4-L5    OOPHORECTOMY      unilateral    RETROGRADE PYELOGRAPHY  12/21/2022    Procedure: PYELOGRAM, RETROGRADE;  Surgeon: Anaya Zamora MD;  Location: [facility];  Service: Urology;;    TONSILLECTOMY      TYMPANOPLASTY      URETEROSCOPIC REMOVAL OF URETERIC CALCULUS Left 12/19/2018    Procedure: REMOVAL, CALCULUS, URETER, URETEROSCOPIC, holmium laser lithotripsy, stone basket extraction, retrograde pyelogram, ureteral stent exchange;  Surgeon: Anaya Zamora MD;  Location: [facility];  Service: Urology;  Laterality: Left;     Medications Ordered Prior to Encounter[1]  Review of patient's allergies indicates:   Allergen Reactions    Iodinated contrast media Hives and Rash    Gabapentin Hallucinations    Iodine Hives    Isothiazolinones Rash    Penicillins Rash     Social History[2]  Family History   Problem Relation Name  "Age of Onset    Cervical cancer Mother      Cancer Mother  65        lung cancer - non smoker    Lung cancer Mother      Depression Father      Hypertension Father      Coronary artery disease Father  62    Heart disease Father      Heart failure Sister x1     Diabetes Sister x1     Heart disease Sister x1     Kidney disease Sister x1     Depression Brother x2     Suicide Brother x2     Heart failure Brother x2     Cancer Daughter x2     Breast cancer Daughter x2 36    No Known Problems Son x1     Hypertension Maternal Grandfather      Heart disease Maternal Grandfather      Stroke Paternal Grandfather      Colon cancer Neg Hx      Ovarian cancer Neg Hx         Objective:      Vitals:    08/08/25 1524   BP: 136/71   Pulse: 60   Weight: 92.1 kg (203 lb 0.7 oz)   Height: 5' 6" (1.676 m)   PainSc: 0-No pain         Physical Exam: Exam done in the wheelchair      General:  Tracy Armendariz is well-developed, well-nourished, appears stated age, in no acute distress, alert and oriented to person, place, and time.    Pulmonary/Chest:  Respiratory effort normal  Abdominal: Exhibits no distension  Psychiatric:  Normal mood and affect.  Behavior is normal.  Judgement and thought content normal    Neurological:     Muscle strength against resistance:     Right Left   Hip flexion  5 / 5 5 / 5   Hip extension 5 / 5 5 / 5   Hip abduction 5 / 5 5 / 5   Hip adduction  5 / 5 5 / 5   Knee extension  5 / 5 5 / 5   Knee flexion 5 / 5 5 / 5   Dorsiflexion  5 / 5 5 / 5   EHL  5 / 5 5 / 5   Plantar flexion  5 / 5 5 / 5   Inversion of the feet 5 / 5 5 / 5   Eversion of the feet  5 / 5 5 / 5       Reflexes:      Clonus:  Negative bilaterally    On gross examination of the bilateral upper extremities, patient has full painfree ROM with no signs of clubbing, cyanosis, edema, or weakness.       CT Interpretation:     CT thoracic spine personally reviewed which has old T10 fracture and new T12 fracture without retropulsion.      Thoracic spine " x-ray from today 04/2025 shows stable alignment of T10 and T12.    Thoracic x-ray from today 08/2025 show stable alignment T10 and T12.  T12 fracture stable.      Assessment:          1. Compression fracture of T12 vertebra with routine healing, subsequent encounter            Plan:                 Stable T12 fracture       Assessment & Plan    PLAN SUMMARY:  - Discontinue use of back brace  - XR Back reviewed, showing good alignment and healing of T12 fracture  - Contact office if experiencing increased soreness or muscle spasms after brace removal  -Consider PT    MEDICAL DECISION MAKING:  - Reviewed XR Back, noting good alignment and healing of T12 fracture.  - Noted mild T10 fracture, deemed not clinically significant.  - Determined back brace no longer needed.  - Considered potential for muscle fatigue and soreness upon discontinuation of brace and potential need for PT  - Assessed leg strength and function.    FOLLOW UP:  - PRN        This note was generated with the assistance of ambient listening technology. Verbal consent was obtained by the patient and accompanying visitor(s) for the recording of patient appointment to facilitate this note. I attest to having reviewed and edited the generated note for accuracy, though some syntax or spelling errors may persist. Please contact the author of this note for any clarification.      Follow-Up:  Follow up if symptoms worsen or fail to improve. If there are any questions prior to this, the patient was instructed to contact the office.       Freda Piña [facility], PA-C  Neurosurgery  [facility]  08/08/2025                 [1]   Current Outpatient Medications on File Prior to Visit   Medication Sig Dispense Refill    aspirin (ECOTRIN) 81 MG EC tablet Take 81 mg by mouth once daily.      atorvastatin (LIPITOR) 40 MG tablet Take 1 tablet (40 mg total) by mouth every evening. 90 tablet 3    blood sugar diagnostic Strp To check BG 1 times daily, to use with  insurance preferred meter 100 each 3    blood-glucose meter Misc To check BG 1 times daily, to use with insurance preferred meter 1 each 0    buPROPion (WELLBUTRIN XL) 300 MG 24 hr tablet Take 1 tablet (300 mg total) by mouth once daily. 90 tablet 3    carbidopa-levodopa  mg (SINEMET)  mg per tablet Take 1 tablet by mouth 3 (three) times daily. 90 tablet 5    diclofenac sodium (VOLTAREN ARTHRITIS PAIN) 1 % Gel Apply 2 g topically 4 (four) times daily as needed. 100 g 0    diclofenac sodium (VOLTAREN) 1 % Gel APPLY 2-4 GRAMS TO EACH PAINFUL AREA FOUR TIMES DAILY - MAX 32 GRAMS/ g 6    esomeprazole (NEXIUM) 40 MG capsule Take 1 capsule (40 mg total) by mouth daily as needed (heartburn). 90 capsule 3    famotidine (PEPCID) 20 MG tablet Take 1 tablet (20 mg total) by mouth 2 (two) times daily. 180 tablet 3    fluticasone propionate (FLONASE) 50 mcg/actuation nasal spray 2 sprays (100 mcg total) by Each Nostril route once daily. 16 g 11    HYDROcodone-acetaminophen (NORCO) 5-325 mg per tablet Take 1 tablet by mouth every 6 (six) hours as needed for Pain. 28 tablet 0    lancets Misc To check BG 1 times daily, to use with insurance preferred meter 100 each 3    magnesium oxide (MAG-OX) 400 mg (241.3 mg magnesium) tablet Take 2 tablets (800 mg total) by mouth 2 (two) times daily. 360 tablet 3    meclizine (ANTIVERT) 12.5 mg tablet Take 12.5 mg by mouth 3 (three) times daily as needed for Dizziness.      modafiniL (PROVIGIL) 200 MG Tab Take 1 tablet (200 mg total) by mouth daily as needed (sleepiness). 30 tablet 2    mupirocin (BACTROBAN) 2 % ointment Apply topically 2 (two) times daily. for 7 days 22 g 0    ondansetron (ZOFRAN-ODT) 4 MG TbDL Take 1 tablet (4 mg total) by mouth every 8 (eight) hours as needed (nausea). 20 tablet 2    potassium citrate (UROCIT-K 10) 10 mEq (1,080 mg) TbSR Take 2 tablets (20 mEq total) by mouth 3 (three) times daily with meals. 180 tablet 11    semaglutide (OZEMPIC) 2  mg/dose (8 mg/3 mL) Kylah Inject 2 mg into the skin every 7 days. 9 mL 1    sertraline (ZOLOFT) 100 MG tablet Take 1.5 tablets (150 mg total) by mouth once daily. 135 tablet 3    vitamin D (VITAMIN D3) 1000 units Tab Take 1,000 Units by mouth 3 (three) times a week.      azelastine (ASTELIN) 137 mcg (0.1 %) nasal spray 1 spray (137 mcg total) by Nasal route 2 (two) times daily. 30 mL 11    conjugated estrogens (PREMARIN) vaginal cream Place 0.5 g vaginally 3 (three) times a week. 30 g 0     Current Facility-Administered Medications on File Prior to Visit   Medication Dose Route Frequency Provider Last Rate Last Admin    DOBUTamine 50 mg in D5W 50 mL (1 mg/mL) cardiac stress test infusion  10 mcg/kg/min Intravenous Continuous Madisyn Villalobos MD   Stopped at 24 1512    perflutren protein-A microsphr 0.22 mg/mL IV susp  0.5 mL Intravenous Once Evelio Barros MD       [2]   Social History  Socioeconomic History    Marital status:    Tobacco Use    Smoking status: Former     Current packs/day: 0.00     Average packs/day: 1.5 packs/day for 29.0 years (43.5 ttl pk-yrs)     Types: Cigarettes     Start date:      Quit date: 1995     Years since quittin.6    Smokeless tobacco: Never   Substance and Sexual Activity    Alcohol use: Yes     Alcohol/week: 1.0 standard drink of alcohol     Types: 1 Glasses of wine per week     Comment: social    Drug use: Never    Sexual activity: Yes     Partners: Male     Birth control/protection: Surgical     Comment:  since      Social Drivers of Health     Financial Resource Strain: Low Risk  (2025)    Overall Financial Resource Strain (CARDIA)     Difficulty of Paying Living Expenses: Not hard at all   Food Insecurity: No Food Insecurity (2025)    Hunger Vital Sign     Worried About Running Out of Food in the Last Year: Never true     Ran Out of Food in the Last Year: Never true   Transportation Needs: No Transportation Needs (2025)     PRAPARE - Transportation     Lack of Transportation (Medical): No     Lack of Transportation (Non-Medical): No   Physical Activity: Insufficiently Active (8/6/2025)    Exercise Vital Sign     Days of Exercise per Week: 2 days     Minutes of Exercise per Session: 30 min   Stress: No Stress Concern Present (8/6/2025)    [employer] [place] [employer] - Occupational Stress Questionnaire     Feeling of Stress : Only a little   Housing Stability: Low Risk  (8/6/2025)    Housing Stability Vital Sign     Unable to Pay for Housing in the Last Year: No     Number of Times Moved in the Last Year: 0     Homeless in the Last Year: No

## 2025-08-11 ENCOUNTER — HOSPITAL ENCOUNTER (OUTPATIENT)
Dept: RADIOLOGY | Facility: HOSPITAL | Age: 75
Discharge: HOME | End: 2025-08-11
Attending: HOSPITALIST
Payer: MEDICARE

## 2025-08-11 DIAGNOSIS — Z12.31 ENCOUNTER FOR SCREENING MAMMOGRAM FOR BREAST CANCER: ICD-10-CM

## 2025-08-11 PROCEDURE — 77063 BREAST TOMOSYNTHESIS BI: CPT | Mod: TC

## 2025-08-12 ENCOUNTER — OFFICE VISIT (OUTPATIENT)
Facility: CLINIC | Age: 75
End: 2025-08-12
Payer: MEDICARE

## 2025-08-12 DIAGNOSIS — H53.2 MONOCULAR DIPLOPIA, LEFT EYE: ICD-10-CM

## 2025-08-12 DIAGNOSIS — R13.10 DYSPHAGIA, UNSPECIFIED TYPE: ICD-10-CM

## 2025-08-12 DIAGNOSIS — G20.C ATYPICAL PARKINSONISM: Primary | ICD-10-CM

## 2025-08-12 DIAGNOSIS — R29.6 FREQUENT FALLS: ICD-10-CM

## 2025-08-12 PROCEDURE — 99417 PROLNG OP E/M EACH 15 MIN: CPT | Mod: 95,,, | Performed by: NURSE PRACTITIONER

## 2025-08-12 PROCEDURE — G2211 COMPLEX E/M VISIT ADD ON: HCPCS | Mod: 95,,, | Performed by: NURSE PRACTITIONER

## 2025-08-12 PROCEDURE — 98007 SYNCH AUDIO-VIDEO EST HI 40: CPT | Mod: 95,,, | Performed by: NURSE PRACTITIONER

## 2025-08-12 PROCEDURE — 99499 UNLISTED E&M SERVICE: CPT | Mod: 95,,, | Performed by: NURSE PRACTITIONER

## 2025-08-15 ENCOUNTER — OUTPATIENT CASE MANAGEMENT (OUTPATIENT)
Dept: ADMINISTRATIVE | Facility: OTHER | Age: 75
End: 2025-08-15
Payer: MEDICARE

## 2025-08-15 DIAGNOSIS — G20.C ATYPICAL PARKINSONISM: Primary | ICD-10-CM

## 2025-08-15 DIAGNOSIS — R29.6 FALLS FREQUENTLY: ICD-10-CM

## 2025-08-18 ENCOUNTER — LAB VISIT (OUTPATIENT)
Dept: LAB | Facility: HOSPITAL | Age: 75
End: 2025-08-18
Attending: HOSPITALIST
Payer: MEDICARE

## 2025-08-18 DIAGNOSIS — R79.89 ABNORMAL TSH: ICD-10-CM

## 2025-08-18 DIAGNOSIS — I10 ESSENTIAL HYPERTENSION: Chronic | ICD-10-CM

## 2025-08-18 LAB — TSH SERPL-ACNC: 2.67 UIU/ML (ref 0.4–4)

## 2025-08-18 PROCEDURE — 84443 ASSAY THYROID STIM HORMONE: CPT

## 2025-08-20 ENCOUNTER — OUTPATIENT CASE MANAGEMENT (OUTPATIENT)
Dept: ADMINISTRATIVE | Facility: OTHER | Age: 75
End: 2025-08-20
Payer: MEDICARE

## 2025-08-21 ENCOUNTER — RESULTS FOLLOW-UP (OUTPATIENT)
Dept: INTERNAL MEDICINE | Facility: CLINIC | Age: 75
End: 2025-08-21
Payer: MEDICARE

## 2025-08-26 ENCOUNTER — CLINICAL SUPPORT (OUTPATIENT)
Dept: REHABILITATION | Facility: HOSPITAL | Age: 75
End: 2025-08-26
Payer: MEDICARE

## 2025-08-26 DIAGNOSIS — Z74.09 IMPAIRED FUNCTIONAL MOBILITY, BALANCE, AND ENDURANCE: Primary | ICD-10-CM

## 2025-08-26 PROCEDURE — 97166 OT EVAL MOD COMPLEX 45 MIN: CPT | Mod: PN

## (undated) DEVICE — SOL IRR WATER STRL 3000 ML

## (undated) DEVICE — DRAPE T CYSTOSCOPY STERILE

## (undated) DEVICE — GUIDEWIRE NITINOL HYBRID 150CM

## (undated) DEVICE — SUPPORT ULNA NERVE PROTECTOR

## (undated) DEVICE — TOWEL OR DISP STRL BLUE 4/PK

## (undated) DEVICE — GOWN POLY REINF BRTH SLV XL

## (undated) DEVICE — GUIDE WIRE MOTION .035 X 150CM

## (undated) DEVICE — BAG LINGEMAN DRAIN UROLOGY

## (undated) DEVICE — GAUZE SPONGE 4X4 12PLY

## (undated) DEVICE — EXTRACTOR TIPLESS 2.4FRX1115CM

## (undated) DEVICE — JELLY KY LUBRICATING 5G PACKET

## (undated) DEVICE — SEE MEDLINE ITEM 154981

## (undated) DEVICE — SOL IRR NACL .9% 3000ML

## (undated) DEVICE — SEE MEDLINE ITEM 152622

## (undated) DEVICE — SOL NACL IRR 3000ML

## (undated) DEVICE — SHEATH URTR ACC FLX 12FRX35CM

## (undated) DEVICE — GLOVE SURGICAL LATEX SZ 6.5

## (undated) DEVICE — FIBER MOSES 200 DFL

## (undated) DEVICE — SEE MEDLINE ITEM 157117

## (undated) DEVICE — EXTRACTOR STONE WIRE TIPLESS

## (undated) DEVICE — JELLY LUBRICANT STERILE 4 OZ

## (undated) DEVICE — SEE MEDLINE ITEM 157185

## (undated) DEVICE — SET IRR URLGY 2LINE UNIV SPIKE

## (undated) DEVICE — SEE MEDLINE ITEM 152532

## (undated) DEVICE — GLOVE PROTEXIS HYDROGEL SZ6.5

## (undated) DEVICE — SEE MEDLINE ITEM 157116

## (undated) DEVICE — SYR ONLY LUER LOCK 20CC

## (undated) DEVICE — GUIDEWIRE PTFE .038INX145CM

## (undated) DEVICE — CATH URETERAL DUAL LUMEN 10FR

## (undated) DEVICE — GUIDEWIRE AMPLATZ .035IN 145CM

## (undated) DEVICE — CATH URTRL OPEN END STR TIP 5F